# Patient Record
Sex: MALE | Race: WHITE | NOT HISPANIC OR LATINO | Employment: FULL TIME | URBAN - METROPOLITAN AREA
[De-identification: names, ages, dates, MRNs, and addresses within clinical notes are randomized per-mention and may not be internally consistent; named-entity substitution may affect disease eponyms.]

---

## 2017-03-29 RX ORDER — ATORVASTATIN CALCIUM 40 MG/1
TABLET, FILM COATED ORAL
Qty: 30 TABLET | Refills: 3 | Status: SHIPPED | OUTPATIENT
Start: 2017-03-29 | End: 2017-08-23 | Stop reason: SDUPTHER

## 2017-04-16 DIAGNOSIS — I10 ESSENTIAL HYPERTENSION: Chronic | ICD-10-CM

## 2017-04-16 RX ORDER — LISINOPRIL 30 MG/1
TABLET ORAL
Qty: 90 TABLET | Refills: 0 | Status: SHIPPED | OUTPATIENT
Start: 2017-04-16 | End: 2017-07-19 | Stop reason: SDUPTHER

## 2017-04-29 DIAGNOSIS — I10 HYPERTENSION: Chronic | ICD-10-CM

## 2017-04-30 RX ORDER — CHLORTHALIDONE 25 MG/1
TABLET ORAL
Qty: 90 TABLET | Refills: 0 | Status: SHIPPED | OUTPATIENT
Start: 2017-04-30 | End: 2017-07-01 | Stop reason: SDUPTHER

## 2017-07-01 DIAGNOSIS — I10 HYPERTENSION: Chronic | ICD-10-CM

## 2017-07-02 RX ORDER — CHLORTHALIDONE 25 MG/1
TABLET ORAL
Qty: 90 TABLET | Refills: 0 | Status: SHIPPED | OUTPATIENT
Start: 2017-07-02 | End: 2017-07-24 | Stop reason: SDUPTHER

## 2017-07-14 ENCOUNTER — PATIENT MESSAGE (OUTPATIENT)
Dept: CARDIOLOGY | Facility: CLINIC | Age: 57
End: 2017-07-14

## 2017-07-14 ENCOUNTER — PATIENT MESSAGE (OUTPATIENT)
Dept: VASCULAR SURGERY | Facility: CLINIC | Age: 57
End: 2017-07-14

## 2017-07-19 DIAGNOSIS — I10 ESSENTIAL HYPERTENSION: Primary | ICD-10-CM

## 2017-07-19 DIAGNOSIS — I10 ESSENTIAL HYPERTENSION: Chronic | ICD-10-CM

## 2017-07-19 DIAGNOSIS — I10 HYPERTENSION: Chronic | ICD-10-CM

## 2017-07-19 RX ORDER — CHLORTHALIDONE 25 MG/1
TABLET ORAL
Qty: 90 TABLET | Refills: 3 | Status: SHIPPED | OUTPATIENT
Start: 2017-07-19 | End: 2018-08-27 | Stop reason: SDUPTHER

## 2017-07-19 RX ORDER — LISINOPRIL 30 MG/1
30 TABLET ORAL DAILY
Qty: 90 TABLET | Refills: 3 | Status: SHIPPED | OUTPATIENT
Start: 2017-07-19 | End: 2018-03-28 | Stop reason: SDUPTHER

## 2017-07-24 ENCOUNTER — PATIENT MESSAGE (OUTPATIENT)
Dept: INTERNAL MEDICINE | Facility: CLINIC | Age: 57
End: 2017-07-24

## 2017-07-25 ENCOUNTER — OFFICE VISIT (OUTPATIENT)
Dept: VASCULAR SURGERY | Facility: CLINIC | Age: 57
End: 2017-07-25
Payer: COMMERCIAL

## 2017-07-25 ENCOUNTER — HOSPITAL ENCOUNTER (OUTPATIENT)
Dept: VASCULAR SURGERY | Facility: CLINIC | Age: 57
Discharge: HOME | End: 2017-07-25
Attending: SURGERY
Payer: COMMERCIAL

## 2017-07-25 VITALS
BODY MASS INDEX: 27.06 KG/M2 | SYSTOLIC BLOOD PRESSURE: 124 MMHG | DIASTOLIC BLOOD PRESSURE: 74 MMHG | HEIGHT: 70 IN | WEIGHT: 189 LBS | HEART RATE: 86 BPM | TEMPERATURE: 99 F

## 2017-07-25 DIAGNOSIS — Z98.890 S/P CAROTID ENDARTERECTOMY: ICD-10-CM

## 2017-07-25 DIAGNOSIS — Z98.890 S/P CAROTID ENDARTERECTOMY: Primary | ICD-10-CM

## 2017-07-25 PROCEDURE — 93880 EXTRACRANIAL BILAT STUDY: CPT | Mod: S$GLB,,, | Performed by: SURGERY

## 2017-07-25 PROCEDURE — 99999 PR PBB SHADOW E&M-EST. PATIENT-LVL III: CPT | Mod: PBBFAC,,, | Performed by: SURGERY

## 2017-07-25 PROCEDURE — 99213 OFFICE O/P EST LOW 20 MIN: CPT | Mod: S$GLB,,, | Performed by: SURGERY

## 2017-07-25 NOTE — PROGRESS NOTES
See my prior note; review of systems, family history and social history are   unchanged.    HISTORY OF PRESENT ILLNESS:  A 57-year-old male status post: right carotid   endarterectomy 07/15/2015.    This is another one-year followup.  He denies stroke, TIA, or amaurosis.    PAST MEDICAL HISTORY:  Severe chronic back pain, for which he has been on   chronic indwelling pump.  Remarkably, his pain has resolved and he is about to   get his pump out.    MEDICINES:  Include aspirin and statin.    PHYSICAL EXAMINATION:  VITAL SIGNS:  See nursing note.  NEUROLOGIC:  Cranial nerve VII through XII intact, 5/5 motor strength in all   extremities.    IMAGING:  Carotid duplex shows no carotid stenosis bilaterally.    ASSESSMENT:  Two-year followup, right carotid doing well.    RECOMMENDATIONS:  Continue current medical therapy.  We will lengthen   surveillance to follow up in two years with screening carotid duplex.          /olive [id number] ross(s)        NICK/SHAID  dd: 07/25/2017 12:10:46 (CDT)  td: 07/26/2017 00:48:47 (CDT)  Doc ID   #[id number]  Job ID #[id number]    CC:

## 2017-08-29 ENCOUNTER — PATIENT MESSAGE (OUTPATIENT)
Dept: CARDIOLOGY | Facility: CLINIC | Age: 57
End: 2017-08-29

## 2017-09-06 RX ORDER — ATORVASTATIN CALCIUM 40 MG/1
TABLET, FILM COATED ORAL
Qty: 30 TABLET | Refills: 3 | Status: SHIPPED | OUTPATIENT
Start: 2017-09-06 | End: 2018-09-12 | Stop reason: SDUPTHER

## 2017-09-15 ENCOUNTER — PATIENT MESSAGE (OUTPATIENT)
Dept: CARDIOLOGY | Facility: CLINIC | Age: 57
End: 2017-09-15

## 2017-09-20 ENCOUNTER — OFFICE VISIT (OUTPATIENT)
Dept: CARDIOLOGY | Facility: CLINIC | Age: 57
End: 2017-09-20
Payer: COMMERCIAL

## 2017-09-20 VITALS
HEIGHT: 70 IN | HEART RATE: 80 BPM | BODY MASS INDEX: 27.01 KG/M2 | WEIGHT: 188.69 LBS | SYSTOLIC BLOOD PRESSURE: 120 MMHG | DIASTOLIC BLOOD PRESSURE: 70 MMHG

## 2017-09-20 DIAGNOSIS — I10 ESSENTIAL HYPERTENSION: Primary | Chronic | ICD-10-CM

## 2017-09-20 DIAGNOSIS — E78.5 DYSLIPIDEMIA: Chronic | ICD-10-CM

## 2017-09-20 PROCEDURE — 99213 OFFICE O/P EST LOW 20 MIN: CPT | Mod: S$GLB,,, | Performed by: INTERNAL MEDICINE

## 2017-09-20 PROCEDURE — 99999 PR PBB SHADOW E&M-EST. PATIENT-LVL III: CPT | Mod: PBBFAC,,, | Performed by: INTERNAL MEDICINE

## 2017-09-20 PROCEDURE — 3008F BODY MASS INDEX DOCD: CPT | Mod: S$GLB,,, | Performed by: INTERNAL MEDICINE

## 2017-09-20 PROCEDURE — 3074F SYST BP LT 130 MM HG: CPT | Mod: S$GLB,,, | Performed by: INTERNAL MEDICINE

## 2017-09-20 PROCEDURE — 3078F DIAST BP <80 MM HG: CPT | Mod: S$GLB,,, | Performed by: INTERNAL MEDICINE

## 2017-09-20 NOTE — PROGRESS NOTES
"Subjective:   Patient ID:  Partha Curtis Jr. is a 57 y.o. male who presents for follow-up of Hypertension      HPI:   Partha Curtis Jr. presents for follow up of Hypertension with BP well controlled including home readings. E#xercising " some ". Partha Curtis Jr. denies chest pain, shortness of breath, palpitations, presyncope , or syncope. Partha Curtis Jr. has dyslipidemia  on high intensity statin needing recheck...     Review of Systems   Constitution: Negative for weakness, malaise/fatigue, weight gain and weight loss.   Eyes: Negative for blurred vision.   Cardiovascular: Negative for chest pain, claudication, cyanosis, dyspnea on exertion, irregular heartbeat, leg swelling, near-syncope, orthopnea, palpitations, paroxysmal nocturnal dyspnea and syncope.   Respiratory: Negative for cough, shortness of breath and wheezing.    Musculoskeletal: Positive for back pain. Negative for falls and myalgias.   Gastrointestinal: Negative for abdominal pain, heartburn, nausea and vomiting.   Genitourinary: Negative for nocturia.   Neurological: Negative for brief paralysis, dizziness, focal weakness, headaches, numbness and paresthesias.   Psychiatric/Behavioral: Negative for altered mental status.       Current Outpatient Prescriptions   Medication Sig    acyclovir (ZOVIRAX) 400 MG tablet Take 400 mg by mouth 2 (two) times daily as needed. 5 days    amitriptyline (ELAVIL) 25 MG tablet Take 25 mg by mouth nightly as needed for Insomnia.    aspirin (ECOTRIN) 81 MG EC tablet Take 325 mg by mouth once daily.     atorvastatin (LIPITOR) 40 MG tablet TAKE ONE TABLET BY MOUTH ONCE DAILY    chlorthalidone (HYGROTEN) 25 MG Tab TAKE ONE TABLET BY MOUTH ONCE DAILY    FENTANYL, PREMIX, Continuous pump.    fexofenadine-pseudoephedrine  mg (ALLEGRA-D)  mg per tablet Take 1 tablet by mouth daily as needed.     hydrocodone-acetaminophen 5-325mg (NORCO) 5-325 mg per tablet Take 1 tablet by mouth every 4 (four) " "hours as needed.    lisinopril (PRINIVIL,ZESTRIL) 30 MG tablet Take 1 tablet (30 mg total) by mouth once daily.    oxycodone-acetaminophen (PERCOCET)  mg per tablet      No current facility-administered medications for this visit.      Objective:   Physical Exam   Constitutional: He is oriented to person, place, and time. He appears well-developed. No distress.   /70   Pulse 80   Ht 5' 10" (1.778 m)   Wt 85.6 kg (188 lb 11.4 oz)   BMI 27.08 kg/m²    HENT:   Head: Normocephalic.   Right Ear: External ear normal.   Left Ear: External ear normal.   Eyes: EOM are normal. Pupils are equal, round, and reactive to light. No scleral icterus.   Neck: Neck supple. No JVD present. No thyromegaly present.   Cardiovascular: Normal rate, regular rhythm, normal heart sounds and intact distal pulses.  PMI is not displaced.  Exam reveals no gallop and no friction rub.    No murmur heard.  Pulmonary/Chest: Effort normal and breath sounds normal. No respiratory distress. He has no wheezes. He has no rales.   Abdominal: Soft. He exhibits no distension. There is no hepatosplenomegaly. There is no tenderness.   Pain pump LLQ   Musculoskeletal: He exhibits no edema or tenderness.   Gait normal   Neurological: He is alert and oriented to person, place, and time.   Skin: Skin is warm and dry. No rash noted.   Psychiatric: He has a normal mood and affect. His behavior is normal.       Lab Results   Component Value Date     07/16/2015    K 3.7 07/16/2015     07/16/2015    CO2 26 07/16/2015    BUN 14 07/16/2015    CREATININE 1.0 07/16/2015     (H) 07/16/2015    MG 1.9 07/15/2015    AST 11 07/16/2015    ALT 14 07/16/2015    ALBUMIN 2.8 (L) 07/16/2015    PROT 5.2 (L) 07/16/2015    BILITOT 0.4 07/16/2015    WBC 9.90 07/16/2015    HGB 10.8 (L) 07/16/2015    HCT 31.9 (L) 07/16/2015    MCV 94 07/16/2015     07/16/2015    TSH 1.98 09/15/2008    CHOL 171 09/15/2008    HDL 36 (L) 09/15/2008    LDLCALC 99.0 " 09/15/2008    TRIG 180 (H) 09/15/2008       Assessment:     1. Essential hypertension: Good control.    2. Dyslipidemia :  on high intensity statin needing recheck.       Plan:     Partha was seen today for hypertension.    Diagnoses and all orders for this visit:    Essential hypertension  -     Comprehensive metabolic panel; Future; Expected date: 09/20/2017  Continue current regimen    Dyslipidemia  -     Lipid panel; Future; Expected date: 09/20/2017

## 2017-09-20 NOTE — PATIENT INSTRUCTIONS
Continue current regimen  Graded exercise for 30 minutes a day at least 5 days a week suggested.  Continue mediterranean diet

## 2017-09-21 ENCOUNTER — LAB VISIT (OUTPATIENT)
Dept: LAB | Facility: HOSPITAL | Age: 57
End: 2017-09-21
Attending: INTERNAL MEDICINE
Payer: COMMERCIAL

## 2017-09-21 DIAGNOSIS — I10 ESSENTIAL HYPERTENSION: Chronic | ICD-10-CM

## 2017-09-21 DIAGNOSIS — E78.5 DYSLIPIDEMIA: Chronic | ICD-10-CM

## 2017-09-21 LAB
ALBUMIN SERPL BCP-MCNC: 4 G/DL
ALP SERPL-CCNC: 84 U/L
ALT SERPL W/O P-5'-P-CCNC: 26 U/L
ANION GAP SERPL CALC-SCNC: 7 MMOL/L
AST SERPL-CCNC: 23 U/L
BILIRUB SERPL-MCNC: 0.5 MG/DL
BUN SERPL-MCNC: 16 MG/DL
CALCIUM SERPL-MCNC: 9 MG/DL
CHLORIDE SERPL-SCNC: 97 MMOL/L
CHOLEST SERPL-MCNC: 144 MG/DL
CHOLEST/HDLC SERPL: 4.5 {RATIO}
CO2 SERPL-SCNC: 33 MMOL/L
CREAT SERPL-MCNC: 1.4 MG/DL
EST. GFR  (AFRICAN AMERICAN): >60 ML/MIN/1.73 M^2
EST. GFR  (NON AFRICAN AMERICAN): 55.4 ML/MIN/1.73 M^2
GLUCOSE SERPL-MCNC: 144 MG/DL
HDLC SERPL-MCNC: 32 MG/DL
HDLC SERPL: 22.2 %
LDLC SERPL CALC-MCNC: 84.8 MG/DL
NONHDLC SERPL-MCNC: 112 MG/DL
POTASSIUM SERPL-SCNC: 3.9 MMOL/L
PROT SERPL-MCNC: 7 G/DL
SODIUM SERPL-SCNC: 137 MMOL/L
TRIGL SERPL-MCNC: 136 MG/DL

## 2017-09-21 PROCEDURE — 36415 COLL VENOUS BLD VENIPUNCTURE: CPT | Mod: PO

## 2017-09-21 PROCEDURE — 80053 COMPREHEN METABOLIC PANEL: CPT

## 2017-09-21 PROCEDURE — 80061 LIPID PANEL: CPT

## 2017-09-22 DIAGNOSIS — R73.9 ELEVATED BLOOD SUGAR: ICD-10-CM

## 2018-02-15 ENCOUNTER — OFFICE VISIT (OUTPATIENT)
Dept: OCCUPATIONAL MEDICINE | Facility: CLINIC | Age: 58
End: 2018-02-15
Payer: COMMERCIAL

## 2018-02-15 VITALS
BODY MASS INDEX: 26.32 KG/M2 | WEIGHT: 188 LBS | HEART RATE: 88 BPM | SYSTOLIC BLOOD PRESSURE: 150 MMHG | RESPIRATION RATE: 16 BRPM | HEIGHT: 71 IN | DIASTOLIC BLOOD PRESSURE: 88 MMHG | TEMPERATURE: 99 F

## 2018-02-15 DIAGNOSIS — S40.012A CONTUSION OF LEFT SHOULDER, INITIAL ENCOUNTER: Primary | ICD-10-CM
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
## 2018-02-15 DIAGNOSIS — M25.512 ACUTE PAIN OF LEFT SHOULDER: ICD-10-CM

## 2018-02-15 PROCEDURE — 3008F BODY MASS INDEX DOCD: CPT | Mod: S$GLB,,, | Performed by: NURSE PRACTITIONER

## 2018-02-15 PROCEDURE — 99203 OFFICE O/P NEW LOW 30 MIN: CPT | Mod: S$GLB,,, | Performed by: NURSE PRACTITIONER

## 2018-02-15 RX ORDER — ETODOLAC 400 MG/1
400 TABLET, FILM COATED ORAL 2 TIMES DAILY
Qty: 30 TABLET | Refills: 1 | Status: SHIPPED | OUTPATIENT
Start: 2018-02-15 | End: 2018-03-08

## 2018-02-15 RX ORDER — TESTOSTERONE CYPIONATE 200 MG/ML
100 INJECTION, SOLUTION INTRAMUSCULAR
COMMUNITY
Start: 2018-01-29 | End: 2019-03-13

## 2018-02-15 NOTE — LETTER
[facility]  [place]  [address]  [place] [address]  Phone: [phone]  Fax: [phone]    Pt Name: Partha Curtis Jr.  Injury Date: 02/14/2018   Employee ID: [id number] Date of First Treatment: 02/15/2018   Company:  [employer]              Appointment Time: 03:55 PM Arrived:  4:10 PM CST   Physician: Eros Donaldson NP Time Out:17:15 PM       Office Treatment: Partha was seen today for shoulder pain.    Diagnoses and all orders for this visit:    Contusion of left shoulder, initial encounter  -     X-Ray Shoulder Trauma 3 view Left; Future  -     etodolac (LODINE) 400 MG tablet; Take 1 tablet (400 mg total) by mouth 2 (two) times daily. take with food    Acute pain of left shoulder  -     etodolac (LODINE) 400 MG tablet; Take 1 tablet (400 mg total) by mouth 2 (two) times daily. take with food    Work related injury       Patient Instructions: Apply ice 24-48 hours then apply heat/warm soaks, Daily home exercises/warm soaks, Attention not to aggravate affected area, Elevated affected area    Restrictions: No lifting/pushing/pulling more than 10 lbs, Avoid frequent bending/lifting/twisting, Avoid climbing/kneeling/squatting, No above the shoulder/overhead work, Limited use of left hand and arm       Return Appointment: 02/19/2018 at 10:00 AM

## 2018-02-15 NOTE — PROGRESS NOTES
Subjective:       Patient ID: Partha Curtis Jr. is a 57 y.o. male.    Chief Complaint: Shoulder Pain (left)    Patient fell moving a pallet raul, twisting his left shoulder.  Only has pain with ROM. Denies previous injury to left shoulder      Shoulder Pain    The pain is present in the left shoulder. Episode onset: 02/14/2018. The problem occurs constantly. The problem has been unchanged. Associated symptoms include a limited range of motion and stiffness. Pertinent negatives include no fever, headaches, itching or numbness. There is no history of gout.     Review of Systems   Constitution: Negative for chills, fever, weakness and malaise/fatigue.   HENT: Negative for congestion, ear pain and nosebleeds.    Eyes: Negative for blurred vision and pain.   Cardiovascular: Negative for chest pain, palpitations and syncope.   Respiratory: Negative for cough, shortness of breath and wheezing.    Skin: Negative for color change, dry skin, itching, rash and suspicious lesions.   Musculoskeletal: Positive for joint pain and stiffness. Negative for arthritis, back pain, gout, joint swelling, muscle weakness and neck pain.   Gastrointestinal: Negative for abdominal pain, constipation, diarrhea, nausea and vomiting.   Genitourinary: Negative for dysuria, frequency and hematuria.   Neurological: Negative for dizziness, headaches, numbness and seizures.   Psychiatric/Behavioral: The patient has insomnia (past medical hx). The patient is not nervous/anxious.    Allergic/Immunologic: Negative for hives and persistent infections.   All other systems reviewed and are negative.      Objective:      Physical Exam   Constitutional: He is oriented to person, place, and time. He appears well-developed.   HENT:   Head: Normocephalic and atraumatic.   Pulmonary/Chest: Effort normal and breath sounds normal.   Abdominal: Soft. Bowel sounds are normal.       Musculoskeletal: He exhibits tenderness.        Left shoulder: He exhibits  decreased range of motion, tenderness, bony tenderness, pain and decreased strength. He exhibits no swelling and no spasm.        Left elbow: Normal.        Left wrist: Normal.        Right hip: He exhibits tenderness. He exhibits normal range of motion, normal strength, no bony tenderness and no swelling.        Cervical back: Normal.        Left upper arm: Normal.        Left forearm: Normal.        Arms:       Left hand: Normal.   Neurological: He is alert and oriented to person, place, and time. He displays normal reflexes. No cranial nerve deficit or sensory deficit. He exhibits normal muscle tone. Coordination and gait normal. GCS eye subscore is 4. GCS verbal subscore is 5. GCS motor subscore is 6.   Reflex Scores:       Bicep reflexes are 2+ on the right side and 2+ on the left side.       Brachioradialis reflexes are 2+ on the right side and 2+ on the left side.       Patellar reflexes are 1+ on the right side and 1+ on the left side.  Skin: Skin is warm, dry and intact. Capillary refill takes 2 to 3 seconds.       Assessment:       1. Contusion of left shoulder, initial encounter    2. Acute pain of left shoulder    3. Work related injury        Plan:     Partha was seen today for shoulder pain.    Diagnoses and all orders for this visit:    Contusion of left shoulder, initial encounter  -     X-Ray Shoulder Trauma 3 view Left; Future  -     etodolac (LODINE) 400 MG tablet; Take 1 tablet (400 mg total) by mouth 2 (two) times daily. take with food    Acute pain of left shoulder  -     etodolac (LODINE) 400 MG tablet; Take 1 tablet (400 mg total) by mouth 2 (two) times daily. take with food    Work related injury    X-ray Shoulder Trauma 3 View Left    Result Date: 2/15/2018  COMPARISON: Chest radiograph 3/5/14 FINDINGS: 3 views left shoulder.    Bones are well mineralized. Overall alignment is within normal limits.  No acute displaced fracture, dislocation, or destructive osseous process identified.  Mild  degenerative change at the a.c. joint with minimal spurring of the inferior glenoid.  There is a 1 cm well-circumscribed sclerotic area projected at the medial proximal metaphysis of the left humerus. No adjacent periosteal reaction. No subcutaneous emphysema or radiodense retained foreign body. Left lung apex is clear.    1. No acute displaced fracture or dislocation identified. 2. Left humeral neck 1 cm sclerotic focus which may represent a bone island, but remains indeterminate. If clinical concern for osseous sclerotic metastatic focus, further evaluation with elective bone scan can be obtained as warranted. EPIC Notification system was activated. Electronically signed by: DANIS FLORES MD, MD Date:     02/15/18 Time:    16:45         Will consider MRI : mechanism of injury - Twist injury    Medications Ordered This Encounter      etodolac (LODINE) 400 MG tablet          Sig: Take 1 tablet (400 mg total) by mouth 2 (two) times daily. take with food          Dispense:  30 tablet          Refill:  1  Patient Instructions: Apply ice 24-48 hours then apply heat/warm soaks, Daily home exercises/warm soaks, Attention not to aggravate affected area, Elevated affected area   Restrictions: No lifting/pushing/pulling more than 10 lbs, Avoid frequent bending/lifting/twisting, Avoid climbing/kneeling/squatting, No above the shoulder/overhead work, Limited use of left hand and arm  Follow-up in about 4 days (around 2/19/2018), or if symptoms worsen or fail to improve.

## 2018-02-15 NOTE — PATIENT INSTRUCTIONS
R.I.C.E.    R.I.C.E. stands for Rest, Ice, Compression, and Elevation. Doing these things helps limit pain and swelling after an injury. R.I.C.E. also helps injuries heal faster. Use R.I.C.E. for sprains, strains, and severe bruises or bumps. Follow the tips on this handout and begin R.I.C.E. as soon as possible after an injury.  ? Rest  Pain is your bodys way of telling you to rest an injured area. Whether you have hurt an elbow, hand, foot, or knee, limiting its use will prevent further injury and help you heal.  ? Ice  Applying ice right after an injury helps prevent swelling and reduce pain. Dont place ice directly on your skin.  · Wrap a cold pack or bag of ice in a thin cloth. Place it over the injured area.  · Ice for 10 minutes every 3 hours. Dont ice for more than 20 minutes at a time.  ? Compression  Putting pressure (compression) on an injury helps prevent swelling and provides support.  · Wrap the injured area firmly with an elastic bandage. If your hand or foot tingles, becomes discolored, or feels cold to the touch, the bandage may be too tight. Rewrap it more loosely.  · If your bandage becomes too loose, rewrap it.  · Do not wear an elastic bandage overnight.  ? Elevation  Keeping an injury elevated helps reduce swelling, pain, and throbbing. Elevation is most effective when the injury is kept elevated higher than the heart.     Call your healthcare provider if you notice any of the following:  · Fingers or toes feel numb, are cold to the touch, or change color  · Skin looks shiny or tight  · Pain, swelling, or bruising worsens and is not improved with elevation   Date Last Reviewed: 9/3/2015  © 1418-4368 [employer]. [address] [place] [address], [place] [address]. All rights reserved. This information is not intended as a substitute for professional medical care. Always follow your healthcare professional's instructions.        Shoulder Pain with Uncertain Cause  Shoulder pain can have  many causes. Pain often comes from the structures that surround the shoulder joint. These are the joint capsule, ligaments, tendons, muscles, and bursa. Pain can also come from cartilage in the joint. Cartilage can become worn out or injured. Its important to know whats causing your pain so the healthcare provider can use the correct treatment. But sometimes its difficult to find the exact cause of shoulder pain. You may need to see a specialist (orthopedist). You may also need special tests such as a CT scan or MRI. The provider may need to use special tools to look inside the joint (arthroscopy).  Shoulder pain can be treated with a sling or a device that keeps your shoulder from moving. You can take an anti-inflammatory medicine such as ibuprofen to ease pain. You may need to do special shoulder exercises. Follow up with a specialist if the pain is severe or doesnt go away after a few weeks.  Home care  Follow these tips when caring for yourself at home:  · If a sling was given to you, leave it in place for the time advised by your healthcare provider. If you arent sure how long to wear it, ask for advice. If the sling becomes loose, adjust it so that your forearm is level with the ground. Your shoulder should feel well supported.  · Put an ice pack on the injured area for 20 minutes every 1 to 2 hours the first day. You can make your own ice pack by putting ice cubes in a plastic bag. Wrap the bag in a thin towel. Continue with ice packs 3 to 4 times a day for the next 2 days. Then use the pack as needed to ease pain and swelling.  · You may use acetaminophen or ibuprofen to control pain, unless another pain medicine was prescribed. If you have chronic liver or kidney disease, talk with your healthcare provider before using these medicines. Also talk with your provider if youve ever had a stomach ulcer or GI bleeding.  · Shoulder pain may seem worse at night, when there is less to distract you from the  pain. If you sleep on your side, try to keep weight off your painful shoulder. Propping pillows behind you may stop you from rolling over onto that shoulder during sleep.   · Shoulder and elbow joints can become stiff if left in a sling for too long. You should start range of motion exercises about 7 to 10 days after the injury. Talk with your provider to find out what type of exercises to do and how soon to start.  · You can take the sling off to shower or bathe.  Follow-up care  Follow up with your healthcare provider if you dont start to get better in the next 5 days.  When to seek medical advice  Call your healthcare provider right away if any of these occur:  · Pain or swelling gets worse or continues for more than a few days  · Your hand or fingers become cold, blue, numb, or tingly  · Large amount of bruising on your shoulder or upper arm  · Difficulty moving your hand or fingers  · Weakness in your hand or fingers  · Your shoulder becomes stiff  · It feels like your shoulder is popping out  · You are less able to do your daily activities  Date Last Reviewed: 10/1/2016  © 4299-9918 [employer]. [address] [place] [address]. All rights reserved. This information is not intended as a substitute for professional medical care. Always follow your healthcare professional's instructions.

## 2018-02-19 ENCOUNTER — OFFICE VISIT (OUTPATIENT)
Dept: OCCUPATIONAL MEDICINE | Facility: CLINIC | Age: 58
End: 2018-02-19
Payer: COMMERCIAL

## 2018-02-19 DIAGNOSIS — S46.012D STRAIN OF MUSC/TEND THE ROTATOR CUFF OF LEFT SHOULDER, SUBS: Primary | ICD-10-CM

## 2018-02-19 PROCEDURE — 99214 OFFICE O/P EST MOD 30 MIN: CPT | Mod: 25,S$GLB,, | Performed by: PHYSICIAN ASSISTANT

## 2018-02-19 PROCEDURE — 20610 DRAIN/INJ JOINT/BURSA W/O US: CPT | Mod: LT,S$GLB,, | Performed by: PHYSICIAN ASSISTANT

## 2018-02-19 PROCEDURE — 3008F BODY MASS INDEX DOCD: CPT | Mod: S$GLB,,, | Performed by: PHYSICIAN ASSISTANT

## 2018-02-19 RX ORDER — BETAMETHASONE SODIUM PHOSPHATE AND BETAMETHASONE ACETATE 3; 3 MG/ML; MG/ML
6 INJECTION, SUSPENSION INTRA-ARTICULAR; INTRALESIONAL; INTRAMUSCULAR; SOFT TISSUE
Status: DISCONTINUED | OUTPATIENT
Start: 2018-02-19 | End: 2018-02-19 | Stop reason: HOSPADM

## 2018-02-19 RX ADMIN — BETAMETHASONE SODIUM PHOSPHATE AND BETAMETHASONE ACETATE 6 MG: 3; 3 INJECTION, SUSPENSION INTRA-ARTICULAR; INTRALESIONAL; INTRAMUSCULAR; SOFT TISSUE at 10:02

## 2018-02-19 NOTE — PROCEDURES
Large Joint Aspiration/Injection  Date/Time: 2/19/2018 10:53 AM  Performed by: DEL MI  Authorized by: DEL MI     Consent Done?:  Yes (Verbal)  Indications:  Pain  Procedure site marked: Yes    Timeout: Prior to procedure the correct patient, procedure, and site was verified      Location:  Shoulder  Site:  L glenohumeral  Prep: Patient was prepped and draped in usual sterile fashion    Needle size:  27 G  Approach:  Anterior  Medications:  6 mg betamethasone acetate-betamethasone sodium phosphate 6 mg/mL  Patient tolerance:  Patient tolerated the procedure well with no immediate complications    Additional Comments: 1cc Celestone/1cc Lidocaine 1% w/o epi injected into left shoulder joint.

## 2018-02-19 NOTE — PATIENT INSTRUCTIONS
Pendulum (Flexibility)    1. Lean over next to a table, with your left arm supporting your weight on the table.  2. Relax your right arm and let it hang straight down.  3. Slowly begin to swing your right arm in a small [place]. Gradually make the [place] bigger if you can. Change direction after 1 minute of motion.  4. Next, swing your right arm backward and forward. Then move it side to side. Change direction after 1 minute of motion.  5. Repeat these movements for about 5 minutes.  6. Do this exercise 3 times a day, or as often as instructed.  Date Last Reviewed: 3/10/2016  © 8700-4215 [employer]. [address] [place] [address]. All rights reserved. This information is not intended as a substitute for professional medical care. Always follow your healthcare professional's instructions.        Doorway Pectoral Stretch (Flexibility)    7.  an open doorway. Raise each arm up to the side, bent at 90-degree angles with palms forward. Rest your palms on the door frame.  8. Slowly step forward with one foot. Feel the stretch in your shoulders and chest. Stand upright and dont lean forward.  9. Hold for 30 seconds. Step back and relax.  10. Repeat 3 times, or as instructed.  Date Last Reviewed: 3/10/2016  © 0855-3911 [employer]. [address] [place] [address]. All rights reserved. This information is not intended as a substitute for professional medical care. Always follow your healthcare professional's instructions.        Exercises for Shoulder Flexibility: External Rotation    This stretch can help restore shoulder flexibility and relieve pain over time. When stretching, be sure to breathe deeply. Follow any special instructions from your doctor or physical therapist:  11.  a doorway. Grasp the doorjamb with the hand on the frozen side. Your arm should be bent.  12. With the other hand, hold the elbow on the frozen side firmly against your  body.  13. Standing in the same spot, rotate your body away from the doorjamb. Stop when you feel the stretch in the shoulder. At first, try to hold the stretch for 5 seconds.  14. Work up to doing 3 sets of this stretch, 3 times a day. Work up to holding the stretch for 30 to 60 seconds.  Note: Keep your arms as still as you can. Over time, rotate your body a little more to enhance the stretch. But be careful not to twist your back.  Frozen shoulder  Frozen shoulder is another name for adhesive capsulitis, which causes restricted movement in the shoulder. If you have frozen shoulder, this stretch may cause discomfort, especially when you first get started. A few months may pass before you achieve the results you want. But once your shoulder heals, it rarely becomes frozen again. So stick to your stretching program. If you have any questions, be sure to ask your doctor.   Date Last Reviewed: 8/16/2015  © 4976-7345 [employer]. [address] [place] [address], [place] [address]. All rights reserved. This information is not intended as a substitute for professional medical care. Always follow your healthcare professional's instructions.        Exercises for Shoulder Flexibility: Internal Rotation    This stretch can help restore shoulder flexibility and relieve pain over time. When stretching, be sure to breathe deeply. Follow any special instructions from your healthcare provider or physical therapist.  15. While seated, move the arm on the side you want to stretch toward the middle of your back. The palm of your hand should face out.  16. Cup your other hand under the hand thats behind your back. Gently push your cupped hand upward until you feel the stretch in the shoulder. Try to hold the stretch for 5 seconds.  17. Work up to doing 3 sets of this stretch, 3 times a day. Work up to holding the stretch for 30 to 60 seconds.  Note: Keep your back straight. Its OK if your hand cant reach the middle of your  back. Instead, start the stretch with your hand as close as you can get it to the middle of your back.     Frozen shoulder  Frozen shoulder is another name for adhesive capsulitis. This causes restricted movement in the shoulder. If you have frozen shoulder, this stretch may cause discomfort, especially when you first get started. A few months may pass before you achieve the results you want. But once your shoulder heals, it rarely becomes frozen again. So stick to your stretching program. If you have any questions, be sure to ask your healthcare provider.   Date Last Reviewed: 10/14/2015  © 1529-8497 [employer]. [address] [place] [address]. All rights reserved. This information is not intended as a substitute for professional medical care. Always follow your healthcare professional's instructions.        Exercises for Shoulder Flexibility: Adduction (Reaching Across)    This stretch can help restore shoulder flexibility and relieve pain over time. When stretching, be sure to breathe deeply. And follow any special instructions from your doctor or physical therapist:  18. Put the hand from the side you want to stretch on your opposite shoulder. Your elbow should point away from your body. Try to raise your elbow as close to shoulder height as you can.  19. With your other hand, push the raised elbow toward the opposite shoulder. Avoid turning your head. Stop when you feel the stretch. Try to hold the stretch for 5 seconds.  20. Work up to doing 3 sets of this stretch, 3 times a day. Work up to holding the stretch for 30 to 60 seconds.  Note: Be sure to push your elbow across your chest, not up toward your chin. Over time, try to push your elbow farther across your chest to enhance the stretch.  Frozen shoulder  Frozen shoulder is another name for adhesive capsulitis, which causes restricted movement in the shoulder. If you have frozen shoulder, this stretch may cause discomfort,  especially when you first get started. A few months may pass before you achieve the results you want. Once your shoulder heals, it rarely becomes frozen again. So stick to your stretching program. If you have any questions, be sure to ask your doctor.   Date Last Reviewed: 8/16/2015  © 2828-1808 [employer]. [address] [place] [address]. All rights reserved. This information is not intended as a substitute for professional medical care. Always follow your healthcare professional's instructions.        Exercises for Shoulder Flexibility: Back Scratch    Improving your flexibility can reduce pain. Stretching exercises also can help increase your range of pain-free motion. Breathe normally when you exercise. Try to use smooth, fluid movements. Never force a stretch.  Note: Follow any special instructions you are given. If you feel pain, stop the exercise. If the pain continues after stopping, call your healthcare provider.  · Stand straight, placing the back of your hand on the side you want to stretch flat against your lower back.  · Throw one end of a towel over your shoulder. Grab it behind your back with your other hand.  · Pull down gently on the towel with your front arm. Let your back arm slide up as high as is comfortable. Youll feel a stretch in your shoulder. Hold the stretch for a few seconds.  · Repeat 3 to 5 times. Build up to holding each stretch for 30 to 60 seconds.  For your safety, check with your healthcare provider before starting an exercise program.   Date Last Reviewed: 8/26/2015  © 2052-0718 [employer]. [address] [place] [address]. All rights reserved. This information is not intended as a substitute for professional medical care. Always follow your healthcare professional's instructions.        Exercises for Shoulder Flexibility: Wall Walk    Improving your flexibility can reduce pain. Stretching exercises also can help increase your  range of pain-free motion. Breathe normally when you exercise. Use smooth, fluid movements.  Note: Follow any special instructions you are given. If you feel pain, stop the exercise. If the pain continues after stopping, call your healthcare provider:  · Stand with your shoulder about 2 feet from the wall.  · Raise your arm to shoulder level and gently walk your fingers up the wall as high as you can.  · Hold for a few seconds. Then walk your fingers back down.  · Repeat 3 times. Move closer to the wall as you repeat.  · Build up to holding each stretch for 30 seconds.  Caution: Do this stretch only if your healthcare provider recommends it. Dont do it when you are first injured.       Date Last Reviewed: 8/16/2015  © 7770-0268 [employer]. [address] [place] [address], [place] [address]. All rights reserved. This information is not intended as a substitute for professional medical care. Always follow your healthcare professional's instructions.

## 2018-02-19 NOTE — LETTER
[facility]ational [facility]  [address] [address]  [place] [address]  Phone: [phone]  Fax: [phone]    Pt Name: Partha Curtis Jr.  Injury Date: 02/14/2018   Employee ID:  Date of Treatment: 02/19/2018   Company: [employer]              Appointment Time: 09:45 AM Arrived: 10:00 AM CST   Appointment Date: 02/19/18 Time Out:1110 AM   Physician: Gerardo Contreras PA-C        Office Treatment: Partha was seen today for shoulder pain.    Diagnoses and all orders for this visit:  EXAM, JOINT INJECTION, LIGHT DUTY    Strain of musc/tend the rotator cuff of left shoulder, subs  -     Large Joint Aspiration/Injection  -     betamethasone acetate-betamethasone sodium phosphate injection 6 mg; Inject 6 mg into the articular space.     Patient Instructions: Daily home exercises/warm soaks (Continue Lodine as directed. )    Restrictions: Limited use of left hand and arm, No above the shoulder/overhead work, No lifting/pushing/pulling more than 10 lbs       Return Appointment: 2/26/2018 at 10:00 AM

## 2018-02-19 NOTE — PROGRESS NOTES
Subjective:       Patient ID: Partha Curtis Jr. is a 57 y.o. male.    Chief Complaint: Shoulder Pain (follow up form 02/14/18)    Pt is here for a follow up on a LEFT SHOULDER injury from 02/14/18.  He reports mild improvement with the antiinflammatory meds. CT      Shoulder Pain    The pain is present in the left shoulder. The current episode started 1 to 4 weeks ago. There has been a history of trauma. The problem occurs intermittently. The problem has been gradually improving. The pain is at a severity of 6/10. The pain is mild. Associated symptoms include stiffness. Pertinent negatives include no fever, headaches, itching or numbness. He has tried NSAIDS for the symptoms. The treatment provided mild relief. There is no history of gout.     Review of Systems   Constitution: Negative for chills, fever, weakness and malaise/fatigue.   HENT: Negative for congestion, ear pain, hearing loss and nosebleeds.    Eyes: Negative for blurred vision, pain and redness.   Cardiovascular: Negative for chest pain, palpitations and syncope.   Respiratory: Negative for cough, shortness of breath and wheezing.    Hematologic/Lymphatic: Negative for bleeding problem.   Skin: Negative for color change, dry skin, itching, rash and suspicious lesions.   Musculoskeletal: Positive for joint pain and stiffness. Negative for arthritis, back pain, gout, joint swelling, muscle weakness and neck pain.   Gastrointestinal: Negative for abdominal pain, constipation, diarrhea, nausea and vomiting.   Genitourinary: Negative for dysuria, frequency and hematuria.   Neurological: Negative for dizziness, headaches, numbness, paresthesias and seizures.   Psychiatric/Behavioral: The patient has insomnia (past medical hx). The patient is not nervous/anxious.    Allergic/Immunologic: Negative for hives and persistent infections.   All other systems reviewed and are negative.      Objective:      Physical Exam   Constitutional: He appears well-developed  and well-nourished. He is active. No distress.   HENT:   Head: Normocephalic and atraumatic.   Right Ear: Hearing and external ear normal.   Left Ear: Hearing and external ear normal.   Nose: Nose normal. No nasal deformity. No epistaxis.   Mouth/Throat: Oropharynx is clear and moist and mucous membranes are normal.   Eyes: Conjunctivae and lids are normal. No scleral icterus.   Neck: Trachea normal and normal range of motion.   Cardiovascular: Intact distal pulses and normal pulses.    Pulmonary/Chest: Effort normal. No stridor. No respiratory distress.   Musculoskeletal:        Right shoulder: Normal. He exhibits normal range of motion and no tenderness.        Left shoulder: He exhibits decreased range of motion (IR to L1, Right IR to T4), tenderness (anterior aspect) and pain (Britney positive). He exhibits no swelling, no deformity and normal pulse.   Neurological: He is alert. He has normal strength. He is not disoriented. No sensory deficit. GCS eye subscore is 4. GCS verbal subscore is 5. GCS motor subscore is 6.   Skin: Skin is warm, dry and intact. Capillary refill takes less than 2 seconds. He is not diaphoretic.   Psychiatric: He has a normal mood and affect. His speech is normal and behavior is normal. He is attentive.   Nursing note and vitals reviewed.      Assessment:       1. Strain of musc/tend the rotator cuff of left shoulder, subs        Plan:           Medications Ordered This Encounter      betamethasone acetate-betamethasone sodium phosphate injection 6 mg  Patient Instructions: Daily home exercises/warm soaks (Continue Lodine as directed. )   Restrictions: Limited use of left hand and arm, No above the shoulder/overhead work, No lifting/pushing/pulling more than 10 lbs  Follow-up in about 1 week (around 2/26/2018).

## 2018-03-08 ENCOUNTER — HOSPITAL ENCOUNTER (OUTPATIENT)
Facility: HOSPITAL | Age: 58
Discharge: HOME | End: 2018-03-09
Attending: EMERGENCY MEDICINE | Admitting: HOSPITALIST
Payer: COMMERCIAL

## 2018-03-08 DIAGNOSIS — R07.9 CHEST PAIN: Primary | ICD-10-CM

## 2018-03-08 DIAGNOSIS — N17.9 AKI (ACUTE KIDNEY INJURY): ICD-10-CM

## 2018-03-08 DIAGNOSIS — R68.89 MULTIPLE COMPLAINTS: ICD-10-CM

## 2018-03-08 LAB
ALBUMIN SERPL BCP-MCNC: 4.7 G/DL
ALP SERPL-CCNC: 88 U/L
ALT SERPL W/O P-5'-P-CCNC: 27 U/L
ANION GAP SERPL CALC-SCNC: 11 MMOL/L
AST SERPL-CCNC: 23 U/L
BASOPHILS # BLD AUTO: 0.05 K/UL
BASOPHILS NFR BLD: 0.6 %
BILIRUB SERPL-MCNC: 0.8 MG/DL
BILIRUB UR QL STRIP: NEGATIVE
BNP SERPL-MCNC: <10 PG/ML
BUN SERPL-MCNC: 19 MG/DL
CALCIUM SERPL-MCNC: 9.7 MG/DL
CHLORIDE SERPL-SCNC: 97 MMOL/L
CLARITY UR REFRACT.AUTO: CLEAR
CO2 SERPL-SCNC: 28 MMOL/L
COLOR UR AUTO: ABNORMAL
CREAT SERPL-MCNC: 1.5 MG/DL
D DIMER PPP IA.FEU-MCNC: 0.25 MG/L FEU
DIFFERENTIAL METHOD: ABNORMAL
EOSINOPHIL # BLD AUTO: 0.1 K/UL
EOSINOPHIL NFR BLD: 1.4 %
ERYTHROCYTE [DISTWIDTH] IN BLOOD BY AUTOMATED COUNT: 13.6 %
EST. GFR  (AFRICAN AMERICAN): 58.9 ML/MIN/1.73 M^2
EST. GFR  (NON AFRICAN AMERICAN): 50.9 ML/MIN/1.73 M^2
GLUCOSE SERPL-MCNC: 115 MG/DL
GLUCOSE UR QL STRIP: NEGATIVE
HCT VFR BLD AUTO: 48.2 %
HGB BLD-MCNC: 16.7 G/DL
HGB UR QL STRIP: ABNORMAL
IMM GRANULOCYTES # BLD AUTO: 0.01 K/UL
IMM GRANULOCYTES NFR BLD AUTO: 0.1 %
KETONES UR QL STRIP: NEGATIVE
LEUKOCYTE ESTERASE UR QL STRIP: NEGATIVE
LYMPHOCYTES # BLD AUTO: 2.2 K/UL
LYMPHOCYTES NFR BLD: 25.5 %
MAGNESIUM SERPL-MCNC: 2.1 MG/DL
MCH RBC QN AUTO: 31.5 PG
MCHC RBC AUTO-ENTMCNC: 34.6 G/DL
MCV RBC AUTO: 91 FL
MICROSCOPIC COMMENT: NORMAL
MONOCYTES # BLD AUTO: 0.6 K/UL
MONOCYTES NFR BLD: 7.3 %
NEUTROPHILS # BLD AUTO: 5.7 K/UL
NEUTROPHILS NFR BLD: 65.1 %
NITRITE UR QL STRIP: NEGATIVE
NRBC BLD-RTO: 0 /100 WBC
PH UR STRIP: 7 [PH] (ref 5–8)
PHOSPHATE SERPL-MCNC: 2.6 MG/DL
PLATELET # BLD AUTO: 304 K/UL
PMV BLD AUTO: 9.4 FL
POCT GLUCOSE: 105 MG/DL (ref 70–110)
POTASSIUM SERPL-SCNC: 3.7 MMOL/L
PROT SERPL-MCNC: 8.2 G/DL
PROT UR QL STRIP: NEGATIVE
RBC # BLD AUTO: 5.31 M/UL
RBC #/AREA URNS AUTO: 2 /HPF (ref 0–4)
SODIUM SERPL-SCNC: 136 MMOL/L
SP GR UR STRIP: 1 (ref 1–1.03)
SQUAMOUS #/AREA URNS AUTO: 0 /HPF
TROPONIN I SERPL DL<=0.01 NG/ML-MCNC: <0.006 NG/ML
TROPONIN I SERPL DL<=0.01 NG/ML-MCNC: <0.006 NG/ML
URN SPEC COLLECT METH UR: ABNORMAL
UROBILINOGEN UR STRIP-ACNC: NEGATIVE EU/DL
WBC # BLD AUTO: 8.67 K/UL

## 2018-03-08 PROCEDURE — 85379 FIBRIN DEGRADATION QUANT: CPT

## 2018-03-08 PROCEDURE — 99285 EMERGENCY DEPT VISIT HI MDM: CPT | Mod: ,,, | Performed by: EMERGENCY MEDICINE

## 2018-03-08 PROCEDURE — G0378 HOSPITAL OBSERVATION PER HR: HCPCS

## 2018-03-08 PROCEDURE — 83880 ASSAY OF NATRIURETIC PEPTIDE: CPT

## 2018-03-08 PROCEDURE — 84100 ASSAY OF PHOSPHORUS: CPT

## 2018-03-08 PROCEDURE — 84484 ASSAY OF TROPONIN QUANT: CPT

## 2018-03-08 PROCEDURE — 63600175 PHARM REV CODE 636 W HCPCS: Performed by: PHYSICIAN ASSISTANT

## 2018-03-08 PROCEDURE — 96361 HYDRATE IV INFUSION ADD-ON: CPT

## 2018-03-08 PROCEDURE — 84484 ASSAY OF TROPONIN QUANT: CPT | Mod: 91

## 2018-03-08 PROCEDURE — 99285 EMERGENCY DEPT VISIT HI MDM: CPT

## 2018-03-08 PROCEDURE — 96360 HYDRATION IV INFUSION INIT: CPT

## 2018-03-08 PROCEDURE — 93010 ELECTROCARDIOGRAM REPORT: CPT | Mod: ,,, | Performed by: INTERNAL MEDICINE

## 2018-03-08 PROCEDURE — 85025 COMPLETE CBC W/AUTO DIFF WBC: CPT

## 2018-03-08 PROCEDURE — 36415 COLL VENOUS BLD VENIPUNCTURE: CPT

## 2018-03-08 PROCEDURE — 93005 ELECTROCARDIOGRAM TRACING: CPT

## 2018-03-08 PROCEDURE — 80053 COMPREHEN METABOLIC PANEL: CPT

## 2018-03-08 PROCEDURE — 82962 GLUCOSE BLOOD TEST: CPT

## 2018-03-08 PROCEDURE — 25000003 PHARM REV CODE 250: Performed by: PHYSICIAN ASSISTANT

## 2018-03-08 PROCEDURE — 83735 ASSAY OF MAGNESIUM: CPT

## 2018-03-08 PROCEDURE — 81001 URINALYSIS AUTO W/SCOPE: CPT

## 2018-03-08 PROCEDURE — 99220 PR INITIAL OBSERVATION CARE,LEVL III: CPT | Mod: ,,, | Performed by: PHYSICIAN ASSISTANT

## 2018-03-08 RX ORDER — SODIUM CHLORIDE 0.9 % (FLUSH) 0.9 %
3 SYRINGE (ML) INJECTION
Status: DISCONTINUED | OUTPATIENT
Start: 2018-03-08 | End: 2018-03-09 | Stop reason: HOSPADM

## 2018-03-08 RX ORDER — HEPARIN SODIUM 5000 [USP'U]/ML
5000 INJECTION, SOLUTION INTRAVENOUS; SUBCUTANEOUS EVERY 8 HOURS
Status: DISCONTINUED | OUTPATIENT
Start: 2018-03-08 | End: 2018-03-09 | Stop reason: HOSPADM

## 2018-03-08 RX ORDER — AMITRIPTYLINE HYDROCHLORIDE 25 MG/1
25 TABLET, FILM COATED ORAL NIGHTLY PRN
Status: DISCONTINUED | OUTPATIENT
Start: 2018-03-08 | End: 2018-03-09 | Stop reason: HOSPADM

## 2018-03-08 RX ORDER — ONDANSETRON 8 MG/1
8 TABLET, ORALLY DISINTEGRATING ORAL EVERY 8 HOURS PRN
Status: DISCONTINUED | OUTPATIENT
Start: 2018-03-08 | End: 2018-03-09 | Stop reason: HOSPADM

## 2018-03-08 RX ORDER — ATORVASTATIN CALCIUM 20 MG/1
TABLET, FILM COATED ORAL
Status: DISPENSED
Start: 2018-03-08 | End: 2018-03-09

## 2018-03-08 RX ORDER — ASPIRIN 325 MG
325 TABLET, DELAYED RELEASE (ENTERIC COATED) ORAL DAILY
Status: DISCONTINUED | OUTPATIENT
Start: 2018-03-09 | End: 2018-03-09 | Stop reason: HOSPADM

## 2018-03-08 RX ORDER — POLYETHYLENE GLYCOL 3350 17 G/17G
17 POWDER, FOR SOLUTION ORAL 2 TIMES DAILY PRN
Status: DISCONTINUED | OUTPATIENT
Start: 2018-03-08 | End: 2018-03-09 | Stop reason: HOSPADM

## 2018-03-08 RX ORDER — ONDANSETRON 2 MG/ML
4 INJECTION INTRAMUSCULAR; INTRAVENOUS EVERY 8 HOURS PRN
Status: DISCONTINUED | OUTPATIENT
Start: 2018-03-08 | End: 2018-03-09 | Stop reason: HOSPADM

## 2018-03-08 RX ORDER — ACETAMINOPHEN 325 MG/1
650 TABLET ORAL EVERY 4 HOURS PRN
Status: DISCONTINUED | OUTPATIENT
Start: 2018-03-08 | End: 2018-03-09 | Stop reason: HOSPADM

## 2018-03-08 RX ORDER — ATORVASTATIN CALCIUM 20 MG/1
40 TABLET, FILM COATED ORAL DAILY
Status: DISCONTINUED | OUTPATIENT
Start: 2018-03-08 | End: 2018-03-09 | Stop reason: HOSPADM

## 2018-03-08 RX ADMIN — ATORVASTATIN CALCIUM 40 MG: 20 TABLET, FILM COATED ORAL at 04:03

## 2018-03-08 RX ADMIN — SODIUM CHLORIDE 500 ML: 9 INJECTION, SOLUTION INTRAVENOUS at 04:03

## 2018-03-08 RX ADMIN — HEPARIN SODIUM 5000 UNITS: 5000 INJECTION, SOLUTION INTRAVENOUS; SUBCUTANEOUS at 10:03

## 2018-03-08 NOTE — HPI
Patient is a 58yo M with a PMHx of HTN, HLD, CAD being admitted to observation for evaluation of chest pain. Patient states the pain began around 9am this morning. The pain lasted 10 minutes with radiation to the left shoulder and arm. He describes the pain as squeezing. It progressively worsened with hand weakness. He reports taking a baby aspirin after the pain resolved. Patient denies SOB, diaphoresis, N/V, numbness, changes in vision or speech. Patient scheduled to have pain pump removed tomorrow.    In the ED, vitals stable. Intake labs remarkable for Cr 1.5, up from baseline 1.1-1.3. Troponin negative. EKG with questionable injury changes from previous. CXR clear.

## 2018-03-08 NOTE — ED NOTES
"Pt received to ED for evaluation of sudden onset chest "heaviness" at 0900 today while sitting at desk - states had pain into left shoulder and upper arm - had nausea at that time without emesis - no diaphoresis - denies SOB then or now     "

## 2018-03-08 NOTE — ASSESSMENT & PLAN NOTE
Cr 1.5 on admission, baseline Cr 1.1-1.3  - hold diuretics for now  - gentle 500cc bolus  - resume ACE tomorrow

## 2018-03-08 NOTE — ASSESSMENT & PLAN NOTE
Dyslipidemia  Carotid stenosis    Patient with history of carotid stenosis with endarterectomy presenting with crushing chest pain, resolved with rest after 10 minutes. Compliant with home statin and ACE and ASA. No neurologic deficits on exam.  - EKG uinmpressive  - Troponin negative, will trend x 3  - maintain on telemetry  - Dobutamine stress in AM  - NPO at MN  - continue statin and ASA

## 2018-03-08 NOTE — H&P
[facility] Hospital Medicine  History & Physical    Patient Name: Partha Curtis Jr.  MRN: [id number]  Admission Date: 3/8/2018  Attending Physician: Jo Ann James MD   Primary Care Provider: Oneil Vazquez MD    [facility] Medicine Team: [facility] HOSP MED E Nunu Humphrey PA-C     Patient information was obtained from patient, past medical records and ER records.     Subjective:     Principal Problem:Chest pain    Chief Complaint:   Chief Complaint   Patient presents with    Arm Pain     L arm pain, torn rotator cuff feb 14, L arm feels weaker noon, chest heavy        HPI: Patient is a 58yo M with a PMHx of HTN, HLD, CAD being admitted to observation for evaluation of chest pain. Patient states the pain began around 9am this morning. The pain lasted 10 minutes with radiation to the left shoulder and arm. He describes the pain as squeezing. It progressively worsened with hand weakness. He reports taking a baby aspirin after the pain resolved. Patient denies SOB, diaphoresis, N/V, numbness, changes in vision or speech. Patient scheduled to have pain pump removed tomorrow.    In the ED, vitals stable. Intake labs remarkable for Cr 1.5, up from baseline 1.1-1.3. Troponin negative. EKG with questionable injury changes from previous. CXR clear.    Past Medical History:   Diagnosis Date    Allergy     Carotid artery occlusion     Chronic back pain     Hyperlipidemia     Hypertension        Past Surgical History:   Procedure Laterality Date    ANKLE SURGERY      APPENDECTOMY      CAROTID ENDARTERECTOMY Right 07/15/2015    TOTAL KNEE ARTHROPLASTY         Review of patient's allergies indicates:   Allergen Reactions    Stadol [butorphanol tartrate] Rash     Swelling in face    Strawberries [strawberry] Rash       No current facility-administered medications on file prior to encounter.      Current Outpatient Prescriptions on File Prior to Encounter   Medication Sig    amitriptyline (ELAVIL)  25 MG tablet Take 25 mg by mouth nightly as needed for Insomnia.    aspirin (ECOTRIN) 81 MG EC tablet Take 325 mg by mouth once daily.     atorvastatin (LIPITOR) 40 MG tablet TAKE ONE TABLET BY MOUTH ONCE DAILY    chlorthalidone (HYGROTEN) 25 MG Tab TAKE ONE TABLET BY MOUTH ONCE DAILY    lisinopril (PRINIVIL,ZESTRIL) 30 MG tablet Take 1 tablet (30 mg total) by mouth once daily.    hydrocodone-acetaminophen 5-325mg (NORCO) 5-325 mg per tablet Take 1 tablet by mouth every 4 (four) hours as needed.    oxycodone-acetaminophen (PERCOCET)  mg per tablet     testosterone cypionate (DEPOTESTOTERONE CYPIONATE) 200 mg/mL injection Inject 100 mg into the muscle every 14 (fourteen) days.    [DISCONTINUED] etodolac (LODINE) 400 MG tablet Take 1 tablet (400 mg total) by mouth 2 (two) times daily. take with food    [DISCONTINUED] fexofenadine-pseudoephedrine  mg (ALLEGRA-D)  mg per tablet Take 1 tablet by mouth daily as needed.      Family History     Problem Relation (Age of Onset)    Cancer Father, Mother    Hypertension Father        Social History Main Topics    Smoking status: Never Smoker    Smokeless tobacco: Never Used    Alcohol use Yes      Comment: occas, none recently     Drug use: Yes     Types: Oxycodone, Hydrocodone      Comment: Prescribed as needed    Sexual activity: Not on file     Review of Systems   Constitutional: Negative for activity change, chills, diaphoresis and fever.   HENT: Negative for trouble swallowing.    Eyes: Negative for photophobia and visual disturbance.   Respiratory: Positive for chest tightness. Negative for shortness of breath and wheezing.    Cardiovascular: Positive for chest pain and leg swelling. Negative for palpitations.   Gastrointestinal: Negative for abdominal distention, abdominal pain, constipation, diarrhea and nausea.   Genitourinary: Negative for dysuria, frequency and hematuria.   Musculoskeletal: Negative for gait problem, neck pain and neck  stiffness.   Skin: Negative for rash and wound.   Neurological: Negative for dizziness, tremors, facial asymmetry, speech difficulty, weakness and light-headedness.   Psychiatric/Behavioral: Negative for agitation and confusion. The patient is not nervous/anxious.      Objective:     Vital Signs (Most Recent):  Temp: 98.6 °F (37 °C) (03/08/18 1601)  Pulse: 77 (03/08/18 1601)  Resp: 18 (03/08/18 1601)  BP: (!) 142/84 (03/08/18 1601)  SpO2: 100 % (03/08/18 1601) Vital Signs (24h Range):  Temp:  [98.4 °F (36.9 °C)-98.6 °F (37 °C)] 98.6 °F (37 °C)  Pulse:  [] 77  Resp:  [18] 18  SpO2:  [97 %-100 %] 100 %  BP: (141-147)/(84-90) 142/84     Weight: 85.3 kg (188 lb)  Body mass index is 26.22 kg/m².    Physical Exam   Constitutional: He is oriented to person, place, and time. He appears well-developed and well-nourished. No distress.   HENT:   Head: Normocephalic and atraumatic.   Mouth/Throat: No oropharyngeal exudate.   Eyes: EOM are normal. Pupils are equal, round, and reactive to light. No scleral icterus.   Neck: Normal range of motion. Neck supple.   Cardiovascular: Normal rate, regular rhythm, normal heart sounds and intact distal pulses.    No murmur heard.  Pulmonary/Chest: Effort normal and breath sounds normal. No respiratory distress. He has no wheezes.   Abdominal: Soft. Bowel sounds are normal. He exhibits no distension. There is no tenderness.   Musculoskeletal: Normal range of motion. He exhibits edema (trace, BLE to distal shin). He exhibits no tenderness.   Lymphadenopathy:     He has no cervical adenopathy.   Neurological: He is alert and oriented to person, place, and time. No cranial nerve deficit or sensory deficit. Coordination normal.   Skin: Skin is warm and dry. No rash noted.   Psychiatric: He has a normal mood and affect. His behavior is normal. Thought content normal.         CRANIAL NERVES     CN III, IV, VI   Pupils are equal, round, and reactive to light.  Extraocular motions are normal.         Significant Labs:   BMP:   Recent Labs  Lab 03/08/18  1411 03/08/18  1612   *  --      --    K 3.7  --    CL 97  --    CO2 28  --    BUN 19  --    CREATININE 1.5*  --    CALCIUM 9.7  --    MG  --  2.1     CBC:   Recent Labs  Lab 03/08/18  1411   WBC 8.67   HGB 16.7   HCT 48.2        Magnesium:   Recent Labs  Lab 03/08/18  1612   MG 2.1     Troponin:   Recent Labs  Lab 03/08/18  1411   TROPONINI <0.006     Urine Studies:   Recent Labs  Lab 03/08/18  1606   COLORU Straw   APPEARANCEUA Clear   PHUR 7.0   SPECGRAV 1.005   PROTEINUA Negative   GLUCUA Negative   KETONESU Negative   BILIRUBINUA Negative   OCCULTUA 1+*   NITRITE Negative   UROBILINOGEN Negative   LEUKOCYTESUR Negative   RBCUA 2   SQUAMEPITHEL 0       Significant Imaging: I have reviewed all pertinent imaging results/findings within the past 24 hours.    Assessment/Plan:     * Chest pain    Dyslipidemia  Carotid stenosis    Patient with history of carotid stenosis with endarterectomy presenting with crushing chest pain, resolved with rest after 10 minutes. Compliant with home statin and ACE and ASA. No neurologic deficits on exam.  - EKG uinmpressive  - Troponin negative, will trend x 3  - maintain on telemetry  - Dobutamine stress in AM  - NPO at MN  - continue statin and ASA        HANANE (acute kidney injury)    Cr 1.5 on admission, baseline Cr 1.1-1.3  - hold diuretics for now  - gentle 500cc bolus  - resume ACE tomorrow        Essential hypertension    - Lisinopril 30mg daily, continue  - hold Chlorthalidone 25mg daily given Cr 1.5          VTE Risk Mitigation         Ordered     heparin (porcine) injection 5,000 Units  Every 8 hours     Route:  Subcutaneous        03/08/18 1543     Medium Risk of VTE  Once      03/08/18 1543     Place ASPEN hose  Until discontinued      03/08/18 1543     Place sequential compression device  Until discontinued      03/08/18 1543             Nunu Humphrey PA-C  Department of Hospital Medicine    [facility]

## 2018-03-08 NOTE — ED TRIAGE NOTES
"Patient states pain in left arm onset this am at 0900 with h/o left arm/shoulder injury. Patient states  "weakness" in left arm and "coldness" over mid chest.   "

## 2018-03-08 NOTE — ED PROVIDER NOTES
Encounter Date: 3/8/2018    SCRIBE #1 NOTE: I, Cherellesonido Schultz, am scribing for, and in the presence of, Dr. Vo.       History     Chief Complaint   Patient presents with    Arm Pain     L arm pain, torn rotator cuff feb 14, L arm feels weaker noon, chest heavy     Time seen by provider: 1:39 PM    This is a 57 y.o. male with medical conditions including HTN, HLD, CAD, who presents with complaint of general malaise, left arm pain and hand weakness, and 10 minutes of squeezing chest pain this morning, no resolved. The patient reports that around 9 AM this morning, he was sitting at his desk and began to have pain in his left shoulder and arm which he thought this was related to a previous injury to this shoulder. Progressively, he began to feel generally bad, have hand weakness, and became red in the face. He took a baby aspirin. The patient denies any shortness of breath. The squeezing chest pain only lasted 10 minutes but the patient continues to have the other symptoms. Patient is scheduled to have a pain pump removed tomorrow.         The history is provided by the patient.     Review of patient's allergies indicates:   Allergen Reactions    Stadol [butorphanol tartrate] Rash     Swelling in face    Strawberries [strawberry] Rash     Past Medical History:   Diagnosis Date    Allergy     Carotid artery occlusion     Chronic back pain     Hyperlipidemia     Hypertension      Past Surgical History:   Procedure Laterality Date    ANKLE SURGERY      APPENDECTOMY      CAROTID ENDARTERECTOMY Right 07/15/2015    TOTAL KNEE ARTHROPLASTY       Family History   Problem Relation Age of Onset    Hypertension Father     Cancer Father     Cancer Mother      Social History   Substance Use Topics    Smoking status: Never Smoker    Smokeless tobacco: Never Used    Alcohol use Yes      Comment: occas, none recently      Review of Systems   Constitutional: Positive for fatigue. Negative for fever.    Respiratory: Negative for shortness of breath.    Cardiovascular: Positive for chest pain.   Musculoskeletal: Positive for arthralgias and myalgias.   Neurological: Positive for weakness (left hand).   All other systems reviewed and are negative.      Physical Exam     Initial Vitals [03/08/18 1325]   BP Pulse Resp Temp SpO2   (!) 141/86 105 18 98.4 °F (36.9 °C) 99 %      MAP       104.33         Physical Exam    Nursing note and vitals reviewed.  Constitutional: He appears well-developed and well-nourished. He is not diaphoretic. No distress.   HENT:   Head: Normocephalic and atraumatic.   Eyes: Conjunctivae and EOM are normal. Pupils are equal, round, and reactive to light.   Neck: Normal range of motion. Neck supple.   Right carotid endarterectomy scar.    Cardiovascular: Normal rate, regular rhythm and normal heart sounds.   Pulmonary/Chest: Breath sounds normal. No respiratory distress.   Abdominal: Soft. Bowel sounds are normal. He exhibits no distension. There is no tenderness.   Musculoskeletal: Normal range of motion. He exhibits no edema.   Neurological: He is alert and oriented to person, place, and time. He has normal strength. No sensory deficit.   Skin: Skin is warm and dry. Capillary refill takes less than 2 seconds. No rash noted. No erythema.   Psychiatric: He has a normal mood and affect.         ED Course   Procedures  Labs Reviewed   CBC W/ AUTO DIFFERENTIAL - Abnormal; Notable for the following:        Result Value    MCH 31.5 (*)     All other components within normal limits   COMPREHENSIVE METABOLIC PANEL - Abnormal; Notable for the following:     Glucose 115 (*)     Creatinine 1.5 (*)     eGFR if  58.9 (*)     eGFR if non  50.9 (*)     All other components within normal limits   TROPONIN I   D DIMER, QUANTITATIVE   B-TYPE NATRIURETIC PEPTIDE   POCT GLUCOSE     EKG Readings: (Independently Interpreted)   Initial Reading: No STEMI. Previous EKG: Compared with  most recent EKG Rhythm: Normal Sinus Rhythm. Ectopy: No Ectopy. Conduction: Normal. ST Segments: Normal ST Segments. T Waves: Normal. Axis: Normal. Q Waves: V1, V2 and V3. Clinical Impression: Normal Sinus Rhythm     Imaging Results          X-Ray Chest AP Portable (Final result)  Result time 03/08/18 14:42:24    Final result by Negro Jensen MD (03/08/18 14:42:24)                 Impression:      No convincing acute cardiopulmonary process, hypoventilatory exam.        Electronically signed by: NEGRO JENSEN MD  Date:     03/08/18  Time:    14:42              Narrative:    Chest AP portable    Indication:Chest pain    Comparison:3/5/2014    Findings:  The cardiomediastinal silhouette is not enlarged.  There is no pleural effusion.  The trachea is midline.  The lungs are symmetrically expanded bilaterally with mildly coarse interstitial attenuation, likely accentuated by shallow inspiratory effort. No large focal consolidation seen.  There is no pneumothorax.  The osseous structures are remarkable for degenerative changes noting remote right rib injuries.                                 Medical Decision Making:   History:   Old Medical Records: I decided to obtain old medical records.  Clinical Tests:   Lab Tests: Ordered and Reviewed  Radiological Study: Ordered and Reviewed  Medical Tests: Ordered and Reviewed  ED Management:  Cardiac risk factors. Not c/w cva. Less likely assoc with recent shoulder rotator cuff injury.  Discussed with admitting team. Will place in obs.             Scribe Attestation:   Scribe #1: I performed the above scribed service and the documentation accurately describes the services I performed. I attest to the accuracy of the note.    Attending Attestation:   Physician Attestation Statement for Resident:  As the supervising MD  -: I, Dr. Eitan Vo, personally performed the services described in this documentation. All medical record entries made by the scribe were at my  direction and in my presence.  I have reviewed the chart and agree that the record reflects my personal performance and is accurate and complete. Eitan Vo MD.  3:35 PM 03/08/2018                       Clinical Impression:   Diagnoses of Multiple complaints, Chest pain, and Chest pain were pertinent to this visit.    Disposition:   Disposition: Admitted                        Eitan Vo MD  03/08/18 1536       Eitan Vo MD  03/08/18 1267

## 2018-03-08 NOTE — SUBJECTIVE & OBJECTIVE
Past Medical History:   Diagnosis Date    Allergy     Carotid artery occlusion     Chronic back pain     Hyperlipidemia     Hypertension        Past Surgical History:   Procedure Laterality Date    ANKLE SURGERY      APPENDECTOMY      CAROTID ENDARTERECTOMY Right 07/15/2015    TOTAL KNEE ARTHROPLASTY         Review of patient's allergies indicates:   Allergen Reactions    Stadol [butorphanol tartrate] Rash     Swelling in face    Strawberries [strawberry] Rash       No current facility-administered medications on file prior to encounter.      Current Outpatient Prescriptions on File Prior to Encounter   Medication Sig    amitriptyline (ELAVIL) 25 MG tablet Take 25 mg by mouth nightly as needed for Insomnia.    aspirin (ECOTRIN) 81 MG EC tablet Take 325 mg by mouth once daily.     atorvastatin (LIPITOR) 40 MG tablet TAKE ONE TABLET BY MOUTH ONCE DAILY    chlorthalidone (HYGROTEN) 25 MG Tab TAKE ONE TABLET BY MOUTH ONCE DAILY    lisinopril (PRINIVIL,ZESTRIL) 30 MG tablet Take 1 tablet (30 mg total) by mouth once daily.    hydrocodone-acetaminophen 5-325mg (NORCO) 5-325 mg per tablet Take 1 tablet by mouth every 4 (four) hours as needed.    oxycodone-acetaminophen (PERCOCET)  mg per tablet     testosterone cypionate (DEPOTESTOTERONE CYPIONATE) 200 mg/mL injection Inject 100 mg into the muscle every 14 (fourteen) days.    [DISCONTINUED] etodolac (LODINE) 400 MG tablet Take 1 tablet (400 mg total) by mouth 2 (two) times daily. take with food    [DISCONTINUED] fexofenadine-pseudoephedrine  mg (ALLEGRA-D)  mg per tablet Take 1 tablet by mouth daily as needed.      Family History     Problem Relation (Age of Onset)    Cancer Father, Mother    Hypertension Father        Social History Main Topics    Smoking status: Never Smoker    Smokeless tobacco: Never Used    Alcohol use Yes      Comment: occas, none recently     Drug use: Yes     Types: Oxycodone, Hydrocodone      Comment:  Prescribed as needed    Sexual activity: Not on file     Review of Systems   Constitutional: Negative for activity change, chills, diaphoresis and fever.   HENT: Negative for trouble swallowing.    Eyes: Negative for photophobia and visual disturbance.   Respiratory: Positive for chest tightness. Negative for shortness of breath and wheezing.    Cardiovascular: Positive for chest pain and leg swelling. Negative for palpitations.   Gastrointestinal: Negative for abdominal distention, abdominal pain, constipation, diarrhea and nausea.   Genitourinary: Negative for dysuria, frequency and hematuria.   Musculoskeletal: Negative for gait problem, neck pain and neck stiffness.   Skin: Negative for rash and wound.   Neurological: Negative for dizziness, tremors, facial asymmetry, speech difficulty, weakness and light-headedness.   Psychiatric/Behavioral: Negative for agitation and confusion. The patient is not nervous/anxious.      Objective:     Vital Signs (Most Recent):  Temp: 98.6 °F (37 °C) (03/08/18 1601)  Pulse: 77 (03/08/18 1601)  Resp: 18 (03/08/18 1601)  BP: (!) 142/84 (03/08/18 1601)  SpO2: 100 % (03/08/18 1601) Vital Signs (24h Range):  Temp:  [98.4 °F (36.9 °C)-98.6 °F (37 °C)] 98.6 °F (37 °C)  Pulse:  [] 77  Resp:  [18] 18  SpO2:  [97 %-100 %] 100 %  BP: (141-147)/(84-90) 142/84     Weight: 85.3 kg (188 lb)  Body mass index is 26.22 kg/m².    Physical Exam   Constitutional: He is oriented to person, place, and time. He appears well-developed and well-nourished. No distress.   HENT:   Head: Normocephalic and atraumatic.   Mouth/Throat: No oropharyngeal exudate.   Eyes: EOM are normal. Pupils are equal, round, and reactive to light. No scleral icterus.   Neck: Normal range of motion. Neck supple.   Cardiovascular: Normal rate, regular rhythm, normal heart sounds and intact distal pulses.    No murmur heard.  Pulmonary/Chest: Effort normal and breath sounds normal. No respiratory distress. He has no  wheezes.   Abdominal: Soft. Bowel sounds are normal. He exhibits no distension. There is no tenderness.   Musculoskeletal: Normal range of motion. He exhibits edema (trace, BLE to distal shin). He exhibits no tenderness.   Lymphadenopathy:     He has no cervical adenopathy.   Neurological: He is alert and oriented to person, place, and time. No cranial nerve deficit or sensory deficit. Coordination normal.   Skin: Skin is warm and dry. No rash noted.   Psychiatric: He has a normal mood and affect. His behavior is normal. Thought content normal.         CRANIAL NERVES     CN III, IV, VI   Pupils are equal, round, and reactive to light.  Extraocular motions are normal.        Significant Labs:   BMP:   Recent Labs  Lab 03/08/18  1411 03/08/18  1612   *  --      --    K 3.7  --    CL 97  --    CO2 28  --    BUN 19  --    CREATININE 1.5*  --    CALCIUM 9.7  --    MG  --  2.1     CBC:   Recent Labs  Lab 03/08/18  1411   WBC 8.67   HGB 16.7   HCT 48.2        Magnesium:   Recent Labs  Lab 03/08/18  1612   MG 2.1     Troponin:   Recent Labs  Lab 03/08/18  1411   TROPONINI <0.006     Urine Studies:   Recent Labs  Lab 03/08/18  1606   COLORU Straw   APPEARANCEUA Clear   PHUR 7.0   SPECGRAV 1.005   PROTEINUA Negative   GLUCUA Negative   KETONESU Negative   BILIRUBINUA Negative   OCCULTUA 1+*   NITRITE Negative   UROBILINOGEN Negative   LEUKOCYTESUR Negative   RBCUA 2   SQUAMEPITHEL 0       Significant Imaging: I have reviewed all pertinent imaging results/findings within the past 24 hours.

## 2018-03-08 NOTE — ED NOTES
Patient identifiers verified and correct for Mr Curtis  C/C: left arm pain   APPEARANCE: awake and alert in NAD.  SKIN: warm, dry and intact. No breakdown or bruising.  MUSCULOSKELETAL: Patient moving all extremities spontaneously, no obvious swelling or deformities noted. Ambulates independently.  RESPIRATORY: Denies shortness of breath.Respirations unlabored.   CARDIAC: Denies CP, 2+ distal pulses; no peripheral edema  ABDOMEN: S/ND/NT, Denies nausea  : voids spontaneously, denies difficulty  Neurologic: AAO x 4; follows commands equal strength in all extremities; denies numbness/tingling. Denies dizziness Positive left arm weakness, Denies headache, Smile symmetrical, BUE  equal, speech clear. Positive left arm drift, push, pull equal with no c/o shoulder pain. PERRL 3mm

## 2018-03-09 ENCOUNTER — DOCUMENTATION ONLY (OUTPATIENT)
Dept: CARDIOLOGY | Facility: CLINIC | Age: 58
End: 2018-03-09

## 2018-03-09 VITALS
BODY MASS INDEX: 25.95 KG/M2 | OXYGEN SATURATION: 97 % | HEART RATE: 104 BPM | RESPIRATION RATE: 18 BRPM | TEMPERATURE: 99 F | HEIGHT: 71 IN | SYSTOLIC BLOOD PRESSURE: 127 MMHG | DIASTOLIC BLOOD PRESSURE: 81 MMHG | WEIGHT: 185.38 LBS

## 2018-03-09 PROBLEM — N17.9 AKI (ACUTE KIDNEY INJURY): Status: RESOLVED | Noted: 2018-03-08 | Resolved: 2018-03-09

## 2018-03-09 PROBLEM — R07.9 CHEST PAIN: Status: ACTIVE | Noted: 2018-03-09

## 2018-03-09 LAB
ANION GAP SERPL CALC-SCNC: 10 MMOL/L
BASOPHILS # BLD AUTO: 0.05 K/UL
BASOPHILS NFR BLD: 0.7 %
BUN SERPL-MCNC: 17 MG/DL
CALCIUM SERPL-MCNC: 9.3 MG/DL
CHLORIDE SERPL-SCNC: 100 MMOL/L
CO2 SERPL-SCNC: 29 MMOL/L
CREAT SERPL-MCNC: 1.3 MG/DL
DIASTOLIC DYSFUNCTION: NO
DIFFERENTIAL METHOD: NORMAL
EOSINOPHIL # BLD AUTO: 0.2 K/UL
EOSINOPHIL NFR BLD: 2.8 %
ERYTHROCYTE [DISTWIDTH] IN BLOOD BY AUTOMATED COUNT: 13.7 %
EST. GFR  (AFRICAN AMERICAN): >60 ML/MIN/1.73 M^2
EST. GFR  (NON AFRICAN AMERICAN): >60 ML/MIN/1.73 M^2
GLUCOSE SERPL-MCNC: 123 MG/DL
HCT VFR BLD AUTO: 45.4 %
HGB BLD-MCNC: 15.2 G/DL
IMM GRANULOCYTES # BLD AUTO: 0.01 K/UL
IMM GRANULOCYTES NFR BLD AUTO: 0.1 %
LYMPHOCYTES # BLD AUTO: 2.4 K/UL
LYMPHOCYTES NFR BLD: 32.8 %
MCH RBC QN AUTO: 30 PG
MCHC RBC AUTO-ENTMCNC: 33.5 G/DL
MCV RBC AUTO: 90 FL
MONOCYTES # BLD AUTO: 0.7 K/UL
MONOCYTES NFR BLD: 9 %
NEUTROPHILS # BLD AUTO: 3.9 K/UL
NEUTROPHILS NFR BLD: 54.6 %
NRBC BLD-RTO: 0 /100 WBC
PLATELET # BLD AUTO: 283 K/UL
PMV BLD AUTO: 9.4 FL
POTASSIUM SERPL-SCNC: 3.6 MMOL/L
RBC # BLD AUTO: 5.06 M/UL
RETIRED EF AND QEF - SEE NOTES: 65 (ref 55–65)
SODIUM SERPL-SCNC: 139 MMOL/L
TROPONIN I SERPL DL<=0.01 NG/ML-MCNC: <0.006 NG/ML
WBC # BLD AUTO: 7.2 K/UL

## 2018-03-09 PROCEDURE — 93321 DOPPLER ECHO F-UP/LMTD STD: CPT

## 2018-03-09 PROCEDURE — 63600175 PHARM REV CODE 636 W HCPCS: Performed by: PHYSICIAN ASSISTANT

## 2018-03-09 PROCEDURE — 85025 COMPLETE CBC W/AUTO DIFF WBC: CPT

## 2018-03-09 PROCEDURE — 93351 STRESS TTE COMPLETE: CPT | Mod: 26,,, | Performed by: INTERNAL MEDICINE

## 2018-03-09 PROCEDURE — 80048 BASIC METABOLIC PNL TOTAL CA: CPT

## 2018-03-09 PROCEDURE — 93321 DOPPLER ECHO F-UP/LMTD STD: CPT | Mod: 26,,, | Performed by: INTERNAL MEDICINE

## 2018-03-09 PROCEDURE — 25000003 PHARM REV CODE 250: Performed by: PHYSICIAN ASSISTANT

## 2018-03-09 PROCEDURE — 99217 PR OBSERVATION CARE DISCHARGE: CPT | Mod: ,,, | Performed by: PHYSICIAN ASSISTANT

## 2018-03-09 PROCEDURE — 36415 COLL VENOUS BLD VENIPUNCTURE: CPT

## 2018-03-09 PROCEDURE — G0378 HOSPITAL OBSERVATION PER HR: HCPCS

## 2018-03-09 PROCEDURE — 84484 ASSAY OF TROPONIN QUANT: CPT

## 2018-03-09 RX ORDER — ASPIRIN 325 MG
TABLET, DELAYED RELEASE (ENTERIC COATED) ORAL
Status: DISPENSED
Start: 2018-03-09 | End: 2018-03-09

## 2018-03-09 RX ORDER — LISINOPRIL 10 MG/1
TABLET ORAL
Status: DISPENSED
Start: 2018-03-09 | End: 2018-03-09

## 2018-03-09 RX ORDER — ATORVASTATIN CALCIUM 20 MG/1
TABLET, FILM COATED ORAL
Status: DISPENSED
Start: 2018-03-09 | End: 2018-03-09

## 2018-03-09 RX ADMIN — LISINOPRIL 30 MG: 10 TABLET ORAL at 09:03

## 2018-03-09 RX ADMIN — HEPARIN SODIUM 5000 UNITS: 5000 INJECTION, SOLUTION INTRAVENOUS; SUBCUTANEOUS at 02:03

## 2018-03-09 RX ADMIN — ASPIRIN 325 MG: 325 TABLET, DELAYED RELEASE ORAL at 09:03

## 2018-03-09 RX ADMIN — ATORVASTATIN CALCIUM 40 MG: 20 TABLET, FILM COATED ORAL at 09:03

## 2018-03-09 RX ADMIN — HEPARIN SODIUM 5000 UNITS: 5000 INJECTION, SOLUTION INTRAVENOUS; SUBCUTANEOUS at 06:03

## 2018-03-09 NOTE — PROGRESS NOTES
Patient verified by 2 identifiers and allergies reviewed.  18g IV in place to Rt AC.  DSE explained to patient, consent obtained & testing completed.  Pt tolerated testing well. IV flushed post testing.  Post study discharge instructions reviewed with patient, patient verbalized understanding.  Patient transferred back to [facility] via stretcher accompanied by escort in stable condition.

## 2018-03-09 NOTE — PLAN OF CARE
03/09/18 0910   Discharge Assessment   Assessment Type Discharge Planning Assessment   Confirmed/corrected address and phone number on facesheet? Yes   Assessment information obtained from? Patient   Expected Length of Stay (days) 1   Communicated expected length of stay with patient/caregiver yes   Prior to hospitilization cognitive status: Alert/Oriented   Prior to hospitalization functional status: Independent   Current cognitive status: Alert/Oriented   Current Functional Status: Independent   Lives With spouse  (spouse, Tierney Curtis ([phone]))   Able to Return to Prior Arrangements yes   Is patient able to care for self after discharge? Yes   Patient's perception of discharge disposition home or selfcare   Readmission Within The Last 30 Days no previous admission in last 30 days   Patient currently being followed by outpatient case management? No   Patient currently receives any other outside agency services? No   Equipment Currently Used at Home other (see comments)  (BP machine)   Do you have any problems affording any of your prescribed medications? No   Is the patient taking medications as prescribed? yes   Does the patient have transportation home? Yes   Transportation Available car   Does the patient receive services at the [facility]? No   Discharge Plan A Home with family   Discharge Plan B Home Health   Patient/Family In Agreement With Plan yes     Patient resting quietly in bed when CM rounded. No family present. Patient was admitted with chest pain. Patient scheduled to have a stress test done today. Patient lives with his spouse, Tierney Curtis, & is independent of all ADLs. Plan to discharge patient home with support or home with home health when medically stable. Patient requested assistance getting established with a new PCP. Hospital follow up appointment scheduled for the patient with Dr. Rocco Cavazos on 3/13/18 at 0920. Will continue to follow.

## 2018-03-09 NOTE — PROGRESS NOTES
Patient received discharge instructions and verbalized understanding. IV discontinued with catheter tip intact. Patient disconnected from telemetry monitor. Patient will ambulate off unit. Will continue to monitor.

## 2018-03-09 NOTE — PLAN OF CARE
Problem: Patient Care Overview  Goal: Plan of Care Review  Outcome: Ongoing (interventions implemented as appropriate)  Pt A&O x 4.  VSS on room air.  Pt denies chest pain, numbness/tingling or pain to upper extremities.  No other complaints of pain.  Telemetry reconnected at 0600 - pt running NSR.  Pt NPO since 0000.  Troponin trend WNL.  Will continue to monitor pt.

## 2018-03-09 NOTE — NURSING
Telemetry not reading on monitor.  Replaced leads and battery - no change.  Spoke with kristal Campetry.  Will send new box and leads.  Pt currently off monitor.

## 2018-03-10 NOTE — HOSPITAL COURSE
Patient admitted to observation for chest pain. Chest pain resolved with rest overnight. Dobutamine stress echo negative for ischemia. Troponins negative. EKG negative for ischemia. Patient stable for discharge.

## 2018-03-10 NOTE — ASSESSMENT & PLAN NOTE
Dyslipidemia  Carotid stenosis    Patient with history of carotid stenosis with endarterectomy presenting with crushing chest pain, resolved with rest after 10 minutes. Compliant with home statin and ACE and ASA. No neurologic deficits on exam.  - EKG uinmpressive  - Troponins negative  - maintain on telemetry  - Dobutamine stress negative for ischemia  - continue statin and ASA  - patient stable for discharge.

## 2018-03-10 NOTE — DISCHARGE SUMMARY
[facility] Hospital Medicine  Discharge Summary      Patient Name: Partha Curtis Jr.  MRN: [id number]  Admission Date: 3/8/2018  Hospital Length of Stay: 0 days  Discharge Date and Time: 3/9/2018  4:22 PM  Attending Physician: No att. providers found   Discharging Provider: Nunu Humphrey PA-C  Primary Care Provider: Rocco Cavazos DO  [facility] Medicine Team: [facility] HOSP MED E Nunu Humphrey PA-C    HPI:   Patient is a 58yo M with a PMHx of HTN, HLD, CAD being admitted to observation for evaluation of chest pain. Patient states the pain began around 9am this morning. The pain lasted 10 minutes with radiation to the left shoulder and arm. He describes the pain as squeezing. It progressively worsened with hand weakness. He reports taking a baby aspirin after the pain resolved. Patient denies SOB, diaphoresis, N/V, numbness, changes in vision or speech. Patient scheduled to have pain pump removed tomorrow.    In the ED, vitals stable. Intake labs remarkable for Cr 1.5, up from baseline 1.1-1.3. Troponin negative. EKG with questionable injury changes from previous. CXR clear.    * No surgery found *      Hospital Course:   Patient admitted to observation for chest pain. Chest pain resolved with rest overnight. Dobutamine stress echo negative for ischemia. Troponins negative. EKG negative for ischemia. Patient stable for discharge.     Consults:     * Chest pain    Dyslipidemia  Carotid stenosis    Patient with history of carotid stenosis with endarterectomy presenting with crushing chest pain, resolved with rest after 10 minutes. Compliant with home statin and ACE and ASA. No neurologic deficits on exam.  - EKG uinmpressive  - Troponins negative  - maintain on telemetry  - Dobutamine stress negative for ischemia  - continue statin and ASA  - patient stable for discharge.        HANANE (acute kidney injury)-resolved as of 3/9/2018    Cr 1.5 on admission, baseline Cr 1.1-1.3  - hold diuretics for  now  - gentle 500cc bolus  - resume ACE tomorrow        Essential hypertension    - Lisinopril 30mg daily, continue  - hold Chlorthalidone 25mg daily given Cr 1.5          Final Active Diagnoses:    Diagnosis Date Noted POA    PRINCIPAL PROBLEM:  Chest pain [R07.9] 03/09/2018 Yes    Essential hypertension [I10] 10/22/2014 Yes    S/P carotid endarterectomy [Z98.890] 08/11/2015 Not Applicable    Dyslipidemia [E78.5] 10/22/2014 Yes     Chronic    Carotid stenosis [I65.29] 07/01/2014 Yes      Problems Resolved During this Admission:    Diagnosis Date Noted Date Resolved POA    HANANE (acute kidney injury) [N17.9] 03/08/2018 03/09/2018 Yes       Discharged Condition: good    Disposition: Home or Self Care    Follow Up:    Patient Instructions:     Diet Cardiac     Activity as tolerated     Notify your health care provider if you experience any of the following:  temperature >100.4     Notify your health care provider if you experience any of the following:  persistent nausea and vomiting or diarrhea     Notify your health care provider if you experience any of the following:  severe uncontrolled pain     Notify your health care provider if you experience any of the following:  difficulty breathing or increased cough     Notify your health care provider if you experience any of the following:  severe persistent headache     Notify your health care provider if you experience any of the following:  worsening rash         Significant Diagnostic Studies: Labs:   BMP:   Recent Labs  Lab 03/08/18  1411 03/08/18  1612 03/09/18  0424   *  --  123*     --  139   K 3.7  --  3.6   CL 97  --  100   CO2 28  --  29   BUN 19  --  17   CREATININE 1.5*  --  1.3   CALCIUM 9.7  --  9.3   MG  --  2.1  --    , CMP   Recent Labs  Lab 03/08/18  1411 03/09/18  0424    139   K 3.7 3.6   CL 97 100   CO2 28 29   * 123*   BUN 19 17   CREATININE 1.5* 1.3   CALCIUM 9.7 9.3   PROT 8.2  --    ALBUMIN 4.7  --    BILITOT 0.8   --    ALKPHOS 88  --    AST 23  --    ALT 27  --    ANIONGAP 11 10   ESTGFRAFRICA 58.9* >60.0   EGFRNONAA 50.9* >60.0   , CBC   Recent Labs  Lab 03/08/18  1411 03/09/18  0424   WBC 8.67 7.20   HGB 16.7 15.2   HCT 48.2 45.4    283   , Troponin   Recent Labs  Lab 03/09/18  0424   TROPONINI <0.006    and All labs within the past 24 hours have been reviewed  Cardiac Graphics: Echocardiogram: 2D echo with color flow doppler: No results found for this or any previous visit.    Pending Diagnostic Studies:     None         Medications:  Reconciled Home Medications:   Discharge Medication List as of 3/9/2018  3:37 PM      CONTINUE these medications which have NOT CHANGED    Details   amitriptyline (ELAVIL) 25 MG tablet Take 25 mg by mouth nightly as needed for Insomnia., Until Discontinued, Historical Med      aspirin (ECOTRIN) 81 MG EC tablet Take 325 mg by mouth once daily. , Until Discontinued, Historical Med      atorvastatin (LIPITOR) 40 MG tablet TAKE ONE TABLET BY MOUTH ONCE DAILY, Normal      chlorthalidone (HYGROTEN) 25 MG Tab TAKE ONE TABLET BY MOUTH ONCE DAILY, Normal      hydrocodone-acetaminophen 5-325mg (NORCO) 5-325 mg per tablet Take 1 tablet by mouth every 4 (four) hours as needed., Starting 7/16/2015, Until Discontinued, Print      lisinopril (PRINIVIL,ZESTRIL) 30 MG tablet Take 1 tablet (30 mg total) by mouth once daily., Starting Wed 7/19/2017, Normal      oxycodone-acetaminophen (PERCOCET)  mg per tablet Starting 3/28/2016, Until Discontinued, Historical Med      testosterone cypionate (DEPOTESTOTERONE CYPIONATE) 200 mg/mL injection Inject 100 mg into the muscle every 14 (fourteen) days., Starting Mon 1/29/2018, Historical Med         STOP taking these medications       etodolac (LODINE) 400 MG tablet Comments:   Reason for Stopping:         fexofenadine-pseudoephedrine  mg (ALLEGRA-D)  mg per tablet Comments:   Reason for Stopping:               Indwelling Lines/Drains at time  of discharge:   Lines/Drains/Airways          No matching active lines, drains, or airways          Time spent on the discharge of patient: 37 minutes  Patient was seen and examined on the date of discharge and determined to be suitable for discharge.         Nunu Humphrey PA-C  Department of Hospital Medicine  [facility]

## 2018-03-12 ENCOUNTER — TELEPHONE (OUTPATIENT)
Dept: OCCUPATIONAL MEDICINE | Facility: CLINIC | Age: 58
End: 2018-03-12

## 2018-03-12 ENCOUNTER — PATIENT MESSAGE (OUTPATIENT)
Dept: CARDIOLOGY | Facility: CLINIC | Age: 58
End: 2018-03-12

## 2018-03-12 NOTE — PLAN OF CARE
03/12/18 0801   Final Note   Assessment Type Final Discharge Note     Patient discharged home with no needs 3/9/18.

## 2018-03-13 ENCOUNTER — LAB VISIT (OUTPATIENT)
Dept: LAB | Facility: HOSPITAL | Age: 58
End: 2018-03-13
Attending: FAMILY MEDICINE
Payer: COMMERCIAL

## 2018-03-13 ENCOUNTER — OFFICE VISIT (OUTPATIENT)
Dept: CARDIOLOGY | Facility: CLINIC | Age: 58
End: 2018-03-13
Payer: COMMERCIAL

## 2018-03-13 ENCOUNTER — OFFICE VISIT (OUTPATIENT)
Dept: FAMILY MEDICINE | Facility: CLINIC | Age: 58
End: 2018-03-13
Payer: COMMERCIAL

## 2018-03-13 VITALS
HEART RATE: 99 BPM | BODY MASS INDEX: 26.02 KG/M2 | TEMPERATURE: 98 F | HEIGHT: 71 IN | OXYGEN SATURATION: 96 % | SYSTOLIC BLOOD PRESSURE: 104 MMHG | DIASTOLIC BLOOD PRESSURE: 74 MMHG | WEIGHT: 185.88 LBS

## 2018-03-13 VITALS
WEIGHT: 184.31 LBS | BODY MASS INDEX: 26.39 KG/M2 | HEIGHT: 70 IN | DIASTOLIC BLOOD PRESSURE: 80 MMHG | HEART RATE: 78 BPM | SYSTOLIC BLOOD PRESSURE: 124 MMHG

## 2018-03-13 DIAGNOSIS — Z79.890 LONG-TERM CURRENT USE OF TESTOSTERONE REPLACEMENT THERAPY: ICD-10-CM

## 2018-03-13 DIAGNOSIS — E78.5 DYSLIPIDEMIA: Chronic | ICD-10-CM

## 2018-03-13 DIAGNOSIS — Z12.11 COLON CANCER SCREENING: ICD-10-CM

## 2018-03-13 DIAGNOSIS — Z09 HOSPITAL DISCHARGE FOLLOW-UP: ICD-10-CM

## 2018-03-13 DIAGNOSIS — Z00.00 VISIT FOR WELL MAN HEALTH CHECK: Primary | ICD-10-CM

## 2018-03-13 DIAGNOSIS — H91.8X3 OTHER SPECIFIED HEARING LOSS OF BOTH EARS: ICD-10-CM

## 2018-03-13 DIAGNOSIS — I10 ESSENTIAL HYPERTENSION: ICD-10-CM

## 2018-03-13 DIAGNOSIS — Z11.59 NEED FOR HEPATITIS C SCREENING TEST: ICD-10-CM

## 2018-03-13 DIAGNOSIS — Z97.8 PRESENCE OF OTHER SPECIFIED DEVICES: ICD-10-CM

## 2018-03-13 DIAGNOSIS — Z23 NEED FOR INFLUENZA VACCINATION: ICD-10-CM

## 2018-03-13 DIAGNOSIS — Z01.810 PREOPERATIVE CARDIOVASCULAR EXAMINATION: Primary | ICD-10-CM

## 2018-03-13 DIAGNOSIS — Z00.00 VISIT FOR WELL MAN HEALTH CHECK: ICD-10-CM

## 2018-03-13 DIAGNOSIS — Z23 NEED FOR DIPHTHERIA-TETANUS-PERTUSSIS (TDAP) VACCINE: ICD-10-CM

## 2018-03-13 DIAGNOSIS — Z98.890 S/P CAROTID ENDARTERECTOMY: ICD-10-CM

## 2018-03-13 PROBLEM — H91.93 BILATERAL HEARING LOSS: Status: ACTIVE | Noted: 2018-03-13

## 2018-03-13 LAB
25(OH)D3+25(OH)D2 SERPL-MCNC: 32 NG/ML
ALBUMIN SERPL BCP-MCNC: 4.5 G/DL
ALP SERPL-CCNC: 82 U/L
ALT SERPL W/O P-5'-P-CCNC: 32 U/L
ANION GAP SERPL CALC-SCNC: 10 MMOL/L
AST SERPL-CCNC: 31 U/L
BASOPHILS # BLD AUTO: 0.03 K/UL
BASOPHILS NFR BLD: 0.5 %
BILIRUB SERPL-MCNC: 0.5 MG/DL
BUN SERPL-MCNC: 17 MG/DL
CALCIUM SERPL-MCNC: 10 MG/DL
CHLORIDE SERPL-SCNC: 96 MMOL/L
CHOLEST SERPL-MCNC: 161 MG/DL
CHOLEST/HDLC SERPL: 4.7 {RATIO}
CO2 SERPL-SCNC: 32 MMOL/L
CREAT SERPL-MCNC: 1.5 MG/DL
DIFFERENTIAL METHOD: NORMAL
EOSINOPHIL # BLD AUTO: 0.1 K/UL
EOSINOPHIL NFR BLD: 1.7 %
ERYTHROCYTE [DISTWIDTH] IN BLOOD BY AUTOMATED COUNT: 13.7 %
EST. GFR  (AFRICAN AMERICAN): 58.5 ML/MIN/1.73 M^2
EST. GFR  (NON AFRICAN AMERICAN): 50.6 ML/MIN/1.73 M^2
ESTIMATED AVG GLUCOSE: 128 MG/DL
GLUCOSE SERPL-MCNC: 111 MG/DL
HBA1C MFR BLD HPLC: 6.1 %
HCT VFR BLD AUTO: 48 %
HDLC SERPL-MCNC: 34 MG/DL
HDLC SERPL: 21.1 %
HGB BLD-MCNC: 16.1 G/DL
IMM GRANULOCYTES # BLD AUTO: 0.01 K/UL
IMM GRANULOCYTES NFR BLD AUTO: 0.2 %
LDLC SERPL CALC-MCNC: 100.2 MG/DL
LYMPHOCYTES # BLD AUTO: 1.7 K/UL
LYMPHOCYTES NFR BLD: 26.3 %
MCH RBC QN AUTO: 30.7 PG
MCHC RBC AUTO-ENTMCNC: 33.5 G/DL
MCV RBC AUTO: 91 FL
MONOCYTES # BLD AUTO: 0.6 K/UL
MONOCYTES NFR BLD: 9.4 %
NEUTROPHILS # BLD AUTO: 4 K/UL
NEUTROPHILS NFR BLD: 61.9 %
NONHDLC SERPL-MCNC: 127 MG/DL
NRBC BLD-RTO: 0 /100 WBC
PLATELET # BLD AUTO: 322 K/UL
PMV BLD AUTO: 9.5 FL
POTASSIUM SERPL-SCNC: 3.8 MMOL/L
PROT SERPL-MCNC: 7.8 G/DL
RBC # BLD AUTO: 5.25 M/UL
SODIUM SERPL-SCNC: 138 MMOL/L
TRIGL SERPL-MCNC: 134 MG/DL
TSH SERPL DL<=0.005 MIU/L-ACNC: 1.99 UIU/ML
WBC # BLD AUTO: 6.4 K/UL

## 2018-03-13 PROCEDURE — 36415 COLL VENOUS BLD VENIPUNCTURE: CPT | Mod: PO

## 2018-03-13 PROCEDURE — 3079F DIAST BP 80-89 MM HG: CPT | Mod: CPTII,S$GLB,, | Performed by: INTERNAL MEDICINE

## 2018-03-13 PROCEDURE — 99214 OFFICE O/P EST MOD 30 MIN: CPT | Mod: S$GLB,,, | Performed by: INTERNAL MEDICINE

## 2018-03-13 PROCEDURE — 86803 HEPATITIS C AB TEST: CPT

## 2018-03-13 PROCEDURE — 82306 VITAMIN D 25 HYDROXY: CPT

## 2018-03-13 PROCEDURE — 80053 COMPREHEN METABOLIC PANEL: CPT

## 2018-03-13 PROCEDURE — 85025 COMPLETE CBC W/AUTO DIFF WBC: CPT

## 2018-03-13 PROCEDURE — 84443 ASSAY THYROID STIM HORMONE: CPT

## 2018-03-13 PROCEDURE — 3074F SYST BP LT 130 MM HG: CPT | Mod: CPTII,S$GLB,, | Performed by: INTERNAL MEDICINE

## 2018-03-13 PROCEDURE — 99999 PR PBB SHADOW E&M-EST. PATIENT-LVL III: CPT | Mod: PBBFAC,,, | Performed by: INTERNAL MEDICINE

## 2018-03-13 PROCEDURE — 99999 PR PBB SHADOW E&M-EST. PATIENT-LVL III: CPT | Mod: PBBFAC,,, | Performed by: FAMILY MEDICINE

## 2018-03-13 PROCEDURE — 83036 HEMOGLOBIN GLYCOSYLATED A1C: CPT

## 2018-03-13 PROCEDURE — 99386 PREV VISIT NEW AGE 40-64: CPT | Mod: S$GLB,,, | Performed by: FAMILY MEDICINE

## 2018-03-13 PROCEDURE — 80061 LIPID PANEL: CPT

## 2018-03-13 RX ORDER — ACYCLOVIR 400 MG/1
TABLET ORAL
COMMUNITY
Start: 2017-12-27 | End: 2018-11-02

## 2018-03-13 NOTE — PATIENT INSTRUCTIONS
Continue current regimen  Adhere to the mediterranean diet as much as possible.  Continue exercise

## 2018-03-13 NOTE — PATIENT INSTRUCTIONS
Low-Salt Diet  This diet removes foods that are high in salt. It also limits the amount of salt you use when cooking. It is most often used for people with high blood pressure, edema (fluid retention), and kidney, liver, or heart disease.  Table salt contains the mineral sodium. Your body needs sodium to work normally. But too much sodium can make your health problems worse. Your healthcare provider is recommending a low-salt (also called low-sodium) diet for you. Your total daily allowance of salt is 1,500 to 2,300 milligrams (mg). It is less than 1 teaspoon of table salt. This means you can have only about 500 to 700 mg of sodium at each meal. People with certain health problems should limit salt intake to the lower end of the recommended range.    When you cook, dont add much salt. If you can cook without using salt, even better. Dont add salt to your food at the table.  When shopping, read food labels. Salt is often called sodium on the label. Choose foods that are salt-free, low salt, or very low salt. Note that foods with reduced salt may not lower your salt intake enough.    Beans, potatoes, and pasta  Ok: Dry beans, split peas, lentils, potatoes, rice, macaroni, pasta, spaghetti without added salt  Avoid: Potato chips, tortilla chips, and similar products  Breads and cereals  Ok: Low-sodium breads, rolls, cereals, and cakes; low-salt crackers, matzo crackers  Avoid: Salted crackers, pretzels, popcorn, Armenian toast, pancakes, muffins  Dairy  Ok: Milk, chocolate milk, hot chocolate mix, low-salt cheeses, and yogurt  Avoid: Processed cheese and cheese spreads; Roquefort, Camembert, and cottage cheese; buttermilk, instant breakfast drink  Desserts  Ok: Ice cream, frozen yogurt, juice bars, gelatin, cookies and pies, sugar, honey, jelly, hard candy  Avoid: Most pies, cakes and cookies prepared or processed with salt; instant pudding  Drinks  Ok: Tea, coffee, fizzy (carbonated) drinks, juices  Avoid: Flavored  coffees, electrolyte replacement drinks, sports drinks  Meats  Ok: All fresh meat, fish, poultry, low-salt tuna, eggs, egg substitute  Avoid: Smoked, pickled, brine-cured, or salted meats and fish. This includes dwyer, chipped beef, corned beef, hot dogs, deli meats, ham, kosher meats, salt pork, sausage, canned tuna, salted codfish, smoked salmon, herring, sardines, or anchovies.  Seasonings and spices  Ok: Most seasonings are okay. Good substitutes for salt include: fresh herb blends, hot sauce, lemon, garlic, vigil, vinegar, dry mustard, parsley, cilantro, horseradish, tomato paste, regular margarine, mayonnaise, unsalted butter, cream cheese, vegetable oil, cream, low-salt salad dressing and gravy.  Avoid: Regular ketchup, relishes, pickles, soy sauce, teriyaki sauce, Worcestershire sauce, BBQ sauce, tartar sauce, meat tenderizer, chili sauce, regular gravy, regular salad dressing, salted butter  Soups  Ok: Low-salt soups and broths made with allowed foods  Avoid: Bouillon cubes, soups with smoked or salted meats, regular soup and broth  Vegetables  Ok: Most vegetables are okay; also low-salt tomato and vegetable juices  Avoid: Sauerkraut and other brine-soaked vegetables; pickles and other pickled vegetables; tomato juice, olives  Date Last Reviewed: 8/1/2016 © 2000-2017 [employer]. [address] [place] [address]. All rights reserved. This information is not intended as a substitute for professional medical care. Always follow your healthcare professional's instructions.        4 Steps for Eating Healthier  Changing the way you eat can improve your health. It can lower your cholesterol and blood pressure, and help you stay at a healthy weight. Your diet doesnt have to be bland and boring to be healthy. Just watch your calories and follow these steps:    1. Eat fewer unhealthy fats  · Choose more fish and lean meats instead of fatty cuts of meat.  · Skip butter and lard, and use less  margarine.  · Pass on foods that have palm, coconut, or hydrogenated oils.  · Eat fewer high-fat dairy foods like cheese, ice cream, and whole milk.  · Get a heart-healthy cookbook and try some low-fat recipes.  2. Go light on salt  · Keep the saltshaker off the table.  · Limit high-salt ingredients, such as soy sauce, bouillon, and garlic salt.  · Instead of adding salt when cooking, season your food with herbs and flavorings. Try lemon, garlic, and onion.  · Limit convenience foods, such as boxed or canned foods and restaurant food.  · Read food labels and choose lower-sodium options.  3. Limit sugar  · Pause before you add sugars to pancakes, cereal, coffee, or tea. This includes white and brown table sugar, syrup, honey, and molasses. Cut your usual amount by half.  · Use non-sugar sweeteners. Stevia, aspartame, and sucralose can satisfy a sweet tooth without adding calories.  · Swap out sugar-filled soda and other drinks. Buy sugar-free or low-calorie beverages. Remember water is always the best choice.  · Read labels and choose foods with less added sugar. Keep in mind that dairy foods and foods with fruit will have some natural sugar.  · Cut the sugar in recipes by 1/3 to 1/2. Boost the flavor with extracts like almond, vanilla, or orange. Or add spices such as cinnamon or nutmeg.  4. Eat more fiber  · Eat fresh fruits and vegetables every day.  · Boost your diet with whole grains. Go for oats, whole-grain rice, and bran.  · Add beans and lentils to your meals.  · Drink more water to match your fiber increase. This is to help prevent constipation.  Date Last Reviewed: 5/11/2015  © 5855-9881 [employer]. [address] [place] [address], [place] [address]. All rights reserved. This information is not intended as a substitute for professional medical care. Always follow your healthcare professional's instructions.        Understanding Fat and Cholesterol  Too much cholesterol in your blood can lead to many  problems such as blocked arteries. This can lead to heart attack and stroke. One of the best ways to manage heart and blood vessel disease is to lower your blood cholesterol. Planning meals that are low in saturated fat and cholesterol helps reduce the level of cholesterol in your blood. Below are eating tips to help lower your blood cholesterol levels.  Eat less fat  A healthy goal is to have less than 25% to 35% of your daily calories come from fat. Instead of fats, eat more fruits, whole-grains, and vegetables. This also helps control your weight, and can even reduce your risk for some cancers. There are different kinds of fats in foods. Fats can be saturated, unsaturated, or trans fats. The best fats to choose are unsaturated fats. But fats are high in calories, so eat even unsaturated fats sparingly.  Limit foods high in saturated fats  Saturated fats come from animals and certain plants (such as coconut and palm). Eating too much saturated fat can raise your blood cholesterol levels and make your artery problems worse. Your goal is to eat less saturated fat. Below are some examples of foods that contain lots of saturated fat:  · Fatty cuts of meat (lamb, ham, beef)  · Many pastries, cakes, cookies, and candies  · Cream, ice cream, sour cream, cheese, and butter, and foods made with them  · Sauces made with butter or cream  · Salad dressings with saturated fats  · Foods that contain palm or coconut oil  Choose unsaturated fats  Unsaturated fats are usually liquid at room temperature. They are better choices for your heart than saturated fat. There are two types of unsaturated fats: polyunsaturated fat and monounsaturated fat. Aim to replace saturated fats with polyunsaturated or monounsaturated fats.  · Polyunsaturated fats are found in corn oil, safflower oil, sunflower oil, and other vegetable oils.  · Monounsaturated fats are found in olive oil, canola oil, and peanut oil. Some margarines and spreads are now  made with these oils, too. Avocados are also high in monounsaturated fat.  Of all fats, monounsaturated fats are the least harmful to your heart.  Avoid trans fats  Like saturated fats, trans fats have been linked to heart disease. Even a small amount can harm your health. Trans fats are found in liquid oils that have been changed to be solid at room temperature. Margarine, which is often made from vegetable oil, is one example. Vegetable shortening is another. Trans fats are often found in packaged goods. Check ingredients for the words hydrogenated or partially hydrogenated. They mean the foods contain trans fat.  What about triglycerides?  Triglycerides are a type of fat in your blood. Like cholesterol, high levels of triglycerides can lead to blocked arteries. High triglyceride levels can be reduced 20% to 50%  by limiting added sugars in your diet, susbstituting healthier fats for saturated and trans fats, getting more physical activity, and losing weight if your are overweight. You may also be advised to avoid or limi alcohol.    Reading food labels  Luckily, most foods now have labels giving you the facts about what youre eating. Reading food labels helps you make healthy choices. Look for the words highlighted below.  · Serving Size. This is the amount of food in 1 serving. If you eat larger portions, be sure to count more of everything: fat, calories, and cholesterol.  · Total Fat. Tells you how many grams (g) of fat are in 1 serving.  · Calories from Fat. This tells you the total number of calories from fat in 1 serving (there are 9 calories per gram of fat). Look for foods with the fewest calories from fat.  · Saturated Fat. Tells you how many grams (g) of saturated fat are in 1 serving.  · Trans Fat. Tells how many grams (g) of trans fat are in 1 serving.  · Cholesterol. Tells you how many milligrams (mg) of cholesterol are in 1 serving.  Date Last Reviewed: 5/11/2015  © 4130-9520 [employer]. [address] [place] [address], [place] [address]. All rights reserved. This information is not intended as a substitute for professional medical care. Always follow your healthcare professional's instructions.

## 2018-03-13 NOTE — PROGRESS NOTES
Subjective:   Patient ID:  Partha Curtis Jr. is a 58 y.o. male who presents for  Pre-op Exam and Hypertension      HPI:   Partha Curtis Jr. presents for pre op evaluation and risk stratification for pain pump removal. He had a recent Obs stay for arm pain with a negative stress echo. Partha Curtis Jr. has hypertension with good control.Partha Curtis Jr. has dyslipidemia  on high intensity statin with  today on a non-fasting specimen. He is riding his bike 2-3 miles 4-5 times a week...  Review of Systems   Constitution: Negative for weakness, malaise/fatigue, weight gain and weight loss.   Eyes: Negative for blurred vision.   Cardiovascular: Negative for chest pain, claudication, cyanosis, dyspnea on exertion, irregular heartbeat, leg swelling, near-syncope, orthopnea, palpitations, paroxysmal nocturnal dyspnea and syncope.   Respiratory: Negative for cough, shortness of breath and wheezing.    Musculoskeletal: Negative for falls and myalgias.   Gastrointestinal: Negative for abdominal pain, heartburn, nausea and vomiting.   Genitourinary: Negative for nocturia.   Neurological: Negative for brief paralysis, dizziness, focal weakness, headaches, numbness and paresthesias.   Psychiatric/Behavioral: Negative for altered mental status.       Current Outpatient Prescriptions   Medication Sig    amitriptyline (ELAVIL) 25 MG tablet Take 25 mg by mouth nightly as needed for Insomnia.    aspirin (ECOTRIN) 81 MG EC tablet Take 325 mg by mouth once daily.     atorvastatin (LIPITOR) 40 MG tablet TAKE ONE TABLET BY MOUTH ONCE DAILY    chlorthalidone (HYGROTEN) 25 MG Tab TAKE ONE TABLET BY MOUTH ONCE DAILY    lisinopril (PRINIVIL,ZESTRIL) 30 MG tablet Take 1 tablet (30 mg total) by mouth once daily.    oxycodone-acetaminophen (PERCOCET)  mg per tablet     testosterone cypionate (DEPOTESTOTERONE CYPIONATE) 200 mg/mL injection Inject 100 mg into the muscle every 14 (fourteen) days.    acyclovir (ZOVIRAX)  "400 MG tablet      No current facility-administered medications for this visit.      Objective:   Physical Exam   Constitutional: He is oriented to person, place, and time. No distress.   /80 (BP Location: Left arm, Patient Position: Sitting, BP Method: Large (Manual))   Pulse 78   Ht 5' 10" (1.778 m)   Wt 83.6 kg (184 lb 4.9 oz)   BMI 26.44 kg/m²    HENT:   Head: Normocephalic.   Right Ear: External ear normal.   Left Ear: External ear normal.   Nose: Nose normal.   Eyes: Conjunctivae are normal. Pupils are equal, round, and reactive to light. No scleral icterus.   Neck: Neck supple. No JVD present. No thyromegaly present.   Cardiovascular: Normal rate, regular rhythm, normal heart sounds and intact distal pulses.  PMI is not displaced.  Exam reveals no gallop and no friction rub.    No murmur heard.  Pulmonary/Chest: Effort normal and breath sounds normal. No respiratory distress. He has no wheezes. He has no rales.   Abdominal: Soft. He exhibits no distension. There is no hepatosplenomegaly. There is no tenderness.   Pain pump RLQ   Musculoskeletal: He exhibits no edema or tenderness.   Gait normal   Neurological: He is alert and oriented to person, place, and time. No cranial nerve deficit.   Skin: Skin is warm and dry. No rash noted. He is not diaphoretic.   Psychiatric: He has a normal mood and affect. His behavior is normal.       Lab Results   Component Value Date     03/13/2018    K 3.8 03/13/2018    CL 96 03/13/2018    CO2 32 (H) 03/13/2018    BUN 17 03/13/2018    CREATININE 1.5 (H) 03/13/2018     (H) 03/13/2018    HGBA1C 6.1 (H) 03/13/2018    MG 2.1 03/08/2018    AST 31 03/13/2018    ALT 32 03/13/2018    ALBUMIN 4.5 03/13/2018    PROT 7.8 03/13/2018    BILITOT 0.5 03/13/2018    WBC 6.40 03/13/2018    HGB 16.1 03/13/2018    HCT 48.0 03/13/2018    MCV 91 03/13/2018     03/13/2018    TSH 1.994 03/13/2018    CHOL 161 03/13/2018    HDL 34 (L) 03/13/2018    LDLCALC 100.2 03/13/2018 "    TRIG 134 03/13/2018       Assessment:     1. Preoperative cardiovascular examination    2. Essential hypertension : Good control.   3. Dyslipidemia :  on high intensity statin with recent non-fasting        Plan:     Partha was seen today for pre-op exam and hypertension.    Diagnoses and all orders for this visit:    Preoperative cardiovascular examination  The Patient is an acceptable cardiovascular risk for the anticipated low risk surgery  Essential hypertension    Dyslipidemia  -     Lipid panel; Future; Expected date: 05/12/2018  .Mediteranian diet recommended  Continue current regimen    Other orders  -     acyclovir (ZOVIRAX) 400 MG tablet;

## 2018-03-13 NOTE — PROGRESS NOTES
"Subjective:       Patient ID: Partha Curtis Jr. is a 58 y.o. male.    Chief Complaint: Establish Care    Partha Curtis Jr. is a 58 y.o. male who presents today to establish care.     He had a recent hospitalization for chest pain where he was diagnosed with "a silent MI"  He has a pain pump in place, but it was going to be taken out this month, however they might defer this due to his silent MI.    Diet: he eats "what he wants to eat." He drinks sprite, but hasn't had any in over a month. He drinks juice daily. He drinks coffee, about 1-2 cups/day with cream and splenda.   Exercise: he has been biking about 2-3 miles about 3-4 times a week.     Flu: UTD  Tdap: sometime in the last 5 years, records pending  Labs: ordered    C-scope: done at [facility]. Many years ago    PMHx: reviewed in EMR and updated  Meds: reviewed in EMR and updated  Shx: reviewed in EMR and updated  FMHx: great uncle and great grandfather had cardiac issues. His maternal grandmother had a CABG.   Social: . He lives with his wife and 2 kids. (5 kids total). He has two dogs , a cat, and two chickens at home. No smokers at home.       Review of Systems   Constitutional: Negative for chills and fever.   Respiratory: Negative for shortness of breath.    Cardiovascular: Negative for chest pain.   Gastrointestinal: Negative for constipation, diarrhea, nausea and vomiting.   Genitourinary: Negative for difficulty urinating and dysuria.   Skin: Negative for rash.         Health Maintenance Due   Topic Date Due    Hepatitis C Screening  1960    Colonoscopy  03/10/2010     Objective:     Vitals:    03/13/18 0920   BP: 104/74   Pulse: 99   Temp: 98.2 °F (36.8 °C)        Physical Exam   Constitutional: He is oriented to person, place, and time. He appears well-developed and well-nourished.   HENT:   Head: Normocephalic and atraumatic.   Right Ear: Tympanic membrane, external ear and ear canal normal.   Left Ear: Tympanic " "membrane, external ear and ear canal normal.   Nose: Nose normal.   Mouth/Throat: Oropharynx is clear and moist and mucous membranes are normal. No tonsillar exudate.   Eyes: Conjunctivae are normal. Pupils are equal, round, and reactive to light.   Neck: Normal range of motion. Neck supple.   Cardiovascular: Normal rate and regular rhythm.  PMI is not displaced.    Scar from carotid surgery noted (R)  No carotid bruits noted   Pulmonary/Chest: Effort normal and breath sounds normal.   Abdominal: Soft. Bowel sounds are normal. He exhibits no distension.   Pain pump noted in right abdomen   Musculoskeletal: He exhibits no edema.   Neurological: He is alert and oriented to person, place, and time. No cranial nerve deficit or sensory deficit. He exhibits normal muscle tone.   Skin: Skin is warm and dry.   Psychiatric: He has a normal mood and affect. His speech is normal and behavior is normal. Judgment and thought content normal.   Nursing note and vitals reviewed.      Assessment:       1. Visit for well man health check    2. Dyslipidemia    3. S/P carotid endarterectomy    4. Essential hypertension    5. Need for hepatitis C screening test    6. Need for diphtheria-tetanus-pertussis (Tdap) vaccine    7. Need for influenza vaccination    8. Colon cancer screening    9. Presence of other specified devices    10. BMI 25.0-25.9,adult    11. Other specified hearing loss of both ears    12. Long-term current use of testosterone replacement therapy    13. Hospital discharge follow-up        Plan:       58-year-old male is here to establish care today.  He was scheduled to get his pain pump removed at an outside hospital but during his preoperative consult he was told he needed cardiac clearance due to an apparent "silent heart attack"    I have reviewed patient's inpatient medical records and although the initial EKG showed possible septal changes, I do not see that on the repeat EKG. Stress test was negative. I have made " an appointment for him to see his cardiologist today so he can have cardiology clearance, if deemed appropriate, for his upcoming surgery.        Visit for well man health check  ?Avoid tobacco  ?Be physically active  ?Maintain a healthy weight  ?Eat a diet rich in fruits, vegetables, and whole grains, and low in saturated/trans fat  ?Limit alcohol consumption  ?Protect against sexually transmitted infections  ?Avoid excess sun  -     Comprehensive metabolic panel; Future; Expected date: 03/13/2018  -     CBC auto differential; Future; Expected date: 03/13/2018  -     Hemoglobin A1c; Future; Expected date: 03/13/2018  -     Lipid panel; Future; Expected date: 03/13/2018  -     TSH; Future; Expected date: 03/13/2018  -     Vitamin D; Future; Expected date: 03/13/2018  -     Hepatitis C antibody; Future; Expected date: 03/13/2018    Dyslipidemia  Continue atorvastatin 40 mg    S/P carotid endarterectomy  Noted    Essential hypertension  Continue lisinopril 30 mg  Continue chlorthalidone 25 mg    Need for hepatitis C screening test  -     Hepatitis C antibody; Future; Expected date: 03/13/2018    Need for diphtheria-tetanus-pertussis (Tdap) vaccine  UTD, records pending    Need for influenza vaccination  UTD    Colon cancer screening  -     Case request GI: COLONOSCOPY    Presence of other specified devices  Has pain pump in place for chronic back pain that has improved  Is trying to get pump removed    BMI 25.0-25.9,adult  Handouts given    Other specified hearing loss of both ears  Has seen audiology in the past, doesn't want to see them again today  Out of network audiology    Long-term current use of testosterone replacement therapy  Sees urology       Warning signs discussed, patient to call with any further issues or worsening of symptoms.

## 2018-03-14 ENCOUNTER — TELEPHONE (OUTPATIENT)
Dept: FAMILY MEDICINE | Facility: CLINIC | Age: 58
End: 2018-03-14

## 2018-03-14 DIAGNOSIS — R73.01 ELEVATED FASTING GLUCOSE: ICD-10-CM

## 2018-03-14 DIAGNOSIS — N18.30 CKD (CHRONIC KIDNEY DISEASE) STAGE 3, GFR 30-59 ML/MIN: Primary | ICD-10-CM

## 2018-03-14 LAB — HCV AB SERPL QL IA: NEGATIVE

## 2018-03-14 NOTE — TELEPHONE ENCOUNTER
Following message sent via patient portal:     Hi,   I attempted to contact you but was unable to reach you this Am. I have reviewed your blood work. It appears that you are prediabetic (a1c of 6.1). Please decrease your intake of white bread, white rice, corn, pasta, potatoes and sugar to prevent diabetes. Regular daily exercise will also decrease your risk of developing diabetes. Your kidney function was also slightly decreased again. Please avoid red meat and NSAIDS to prevent this from getting worse. I have also ordered lab work to be done in 3 months to recheck this. Please schedule this at your convenience. Your other blood work was within normal limits. You still have one blood test pending. Contact my office with any questions.

## 2018-03-22 ENCOUNTER — TELEPHONE (OUTPATIENT)
Dept: GASTROENTEROLOGY | Facility: CLINIC | Age: 58
End: 2018-03-22

## 2018-03-22 ENCOUNTER — PATIENT MESSAGE (OUTPATIENT)
Dept: GASTROENTEROLOGY | Facility: CLINIC | Age: 58
End: 2018-03-22

## 2018-03-22 NOTE — TELEPHONE ENCOUNTER
Referral was sent from Dr. Cavazos to schedule an Colonoscopy, left an voicemail for patient to call the office back in regards to scheduling procedure.

## 2018-03-27 ENCOUNTER — TELEPHONE (OUTPATIENT)
Dept: GASTROENTEROLOGY | Facility: CLINIC | Age: 58
End: 2018-03-27

## 2018-03-28 DIAGNOSIS — I10 ESSENTIAL HYPERTENSION: Chronic | ICD-10-CM

## 2018-03-28 RX ORDER — LISINOPRIL 30 MG/1
TABLET ORAL
Qty: 90 TABLET | Refills: 3 | Status: SHIPPED | OUTPATIENT
Start: 2018-03-28 | End: 2019-03-13

## 2018-04-05 ENCOUNTER — TELEPHONE (OUTPATIENT)
Dept: GASTROENTEROLOGY | Facility: CLINIC | Age: 58
End: 2018-04-05

## 2018-04-17 ENCOUNTER — PATIENT MESSAGE (OUTPATIENT)
Dept: FAMILY MEDICINE | Facility: CLINIC | Age: 58
End: 2018-04-17

## 2018-04-17 DIAGNOSIS — Z12.11 ENCOUNTER FOR SCREENING COLONOSCOPY: Primary | ICD-10-CM

## 2018-04-17 NOTE — TELEPHONE ENCOUNTER
Hi,   I have ordered the c-scope again.     As he stated In his message, these days work best for him: 4/26, 4/27, 4/30, 5/2, 5/3, 5/7.     Thanks.

## 2018-04-23 ENCOUNTER — PATIENT MESSAGE (OUTPATIENT)
Dept: ADMINISTRATIVE | Facility: HOSPITAL | Age: 58
End: 2018-04-23

## 2018-04-25 ENCOUNTER — TELEPHONE (OUTPATIENT)
Dept: GASTROENTEROLOGY | Facility: CLINIC | Age: 58
End: 2018-04-25

## 2018-04-25 NOTE — TELEPHONE ENCOUNTER
Referral was sent from Dr. Murphy to schedule an Colonoscopy, left an voicemail for patient to call the office back in regards to scheduling procedure.

## 2018-04-26 ENCOUNTER — TELEPHONE (OUTPATIENT)
Dept: GASTROENTEROLOGY | Facility: CLINIC | Age: 58
End: 2018-04-26

## 2018-04-27 ENCOUNTER — PATIENT MESSAGE (OUTPATIENT)
Dept: GASTROENTEROLOGY | Facility: CLINIC | Age: 58
End: 2018-04-27

## 2018-04-30 ENCOUNTER — TELEPHONE (OUTPATIENT)
Dept: GASTROENTEROLOGY | Facility: CLINIC | Age: 58
End: 2018-04-30

## 2018-04-30 NOTE — TELEPHONE ENCOUNTER
----- Message from Altagracia Robins sent at 4/30/2018 10:47 AM CDT -----  No. [phone]    Patient is returning Kathia's call.

## 2018-05-07 ENCOUNTER — TELEPHONE (OUTPATIENT)
Dept: GASTROENTEROLOGY | Facility: CLINIC | Age: 58
End: 2018-05-07

## 2018-05-07 NOTE — TELEPHONE ENCOUNTER
A message was left on patient's voice mail to give the office a call back an regards to scheduling a Colonoscopy.

## 2018-05-11 ENCOUNTER — LAB VISIT (OUTPATIENT)
Dept: LAB | Facility: HOSPITAL | Age: 58
End: 2018-05-11
Attending: INTERNAL MEDICINE
Payer: COMMERCIAL

## 2018-05-11 DIAGNOSIS — E78.5 DYSLIPIDEMIA: Chronic | ICD-10-CM

## 2018-05-11 LAB
CHOLEST SERPL-MCNC: 157 MG/DL
CHOLEST/HDLC SERPL: 5.2 {RATIO}
HDLC SERPL-MCNC: 30 MG/DL
HDLC SERPL: 19.1 %
LDLC SERPL CALC-MCNC: 98.2 MG/DL
NONHDLC SERPL-MCNC: 127 MG/DL
TRIGL SERPL-MCNC: 144 MG/DL

## 2018-05-11 PROCEDURE — 36415 COLL VENOUS BLD VENIPUNCTURE: CPT | Mod: PO

## 2018-05-11 PROCEDURE — 80061 LIPID PANEL: CPT

## 2018-08-27 DIAGNOSIS — I10 HYPERTENSION: Chronic | ICD-10-CM

## 2018-08-27 RX ORDER — CHLORTHALIDONE 25 MG/1
TABLET ORAL
Qty: 90 TABLET | Refills: 3 | Status: SHIPPED | OUTPATIENT
Start: 2018-08-27 | End: 2019-10-17 | Stop reason: SDUPTHER

## 2018-09-12 RX ORDER — ATORVASTATIN CALCIUM 40 MG/1
TABLET, FILM COATED ORAL
Qty: 30 TABLET | Refills: 11 | Status: SHIPPED | OUTPATIENT
Start: 2018-09-12 | End: 2019-03-13 | Stop reason: ALTCHOICE

## 2018-10-18 ENCOUNTER — OFFICE VISIT (OUTPATIENT)
Dept: URGENT CARE | Facility: CLINIC | Age: 58
End: 2018-10-18
Payer: COMMERCIAL

## 2018-10-18 DIAGNOSIS — S46.912D MUSCLE STRAIN OF LEFT SHOULDER REGION, SUBSEQUENT ENCOUNTER: ICD-10-CM

## 2018-10-18 DIAGNOSIS — M25.512 LEFT SHOULDER PAIN, UNSPECIFIED CHRONICITY: Primary | ICD-10-CM

## 2018-10-18 PROCEDURE — 99213 OFFICE O/P EST LOW 20 MIN: CPT | Mod: S$GLB,,, | Performed by: NURSE PRACTITIONER

## 2018-10-18 RX ORDER — ETODOLAC 400 MG/1
400 TABLET, FILM COATED ORAL 2 TIMES DAILY
Qty: 30 TABLET | Refills: 0 | Status: SHIPPED | OUTPATIENT
Start: 2018-10-18 | End: 2018-10-18

## 2018-10-18 RX ORDER — ETODOLAC 400 MG/1
400 TABLET, FILM COATED ORAL 2 TIMES DAILY
Qty: 30 TABLET | Refills: 0 | Status: SHIPPED | OUTPATIENT
Start: 2018-10-18 | End: 2018-11-02

## 2018-10-18 NOTE — PATIENT INSTRUCTIONS
Exercises for Shoulder Flexibility: External Rotation    This stretch can help restore shoulder flexibility and relieve pain over time. When stretching, be sure to breathe deeply. Follow any special instructions from your doctor or physical therapist:  1.  a doorway. Grasp the doorjamb with the hand on the frozen side. Your arm should be bent.  2. With the other hand, hold the elbow on the frozen side firmly against your body.  3. Standing in the same spot, rotate your body away from the doorjamb. Stop when you feel the stretch in the shoulder. At first, try to hold the stretch for 5 seconds.  4. Work up to doing 3 sets of this stretch, 3 times a day. Work up to holding the stretch for 30 to 60 seconds.  Note: Keep your arms as still as you can. Over time, rotate your body a little more to enhance the stretch. But be careful not to twist your back.  Frozen shoulder  Frozen shoulder is another name for adhesive capsulitis, which causes restricted movement in the shoulder. If you have frozen shoulder, this stretch may cause discomfort, especially when you first get started. A few months may pass before you achieve the results you want. But once your shoulder heals, it rarely becomes frozen again. So stick to your stretching program. If you have any questions, be sure to ask your doctor.   Date Last Reviewed: 8/16/2015  © 2545-8045 [employer]. [address] [place] [address], [place] [id number]. All rights reserved. This information is not intended as a substitute for professional medical care. Always follow your healthcare professional's instructions.        Understanding the Pain Response  Your pain is important. It can slow healing and keep you from being active. You may have acute or chronic pain. Both types of pain respond to treatment. Work with your healthcare professional. Together you can find relief.  Types of pain  Acute pain is caused by a health problem or injury. The pain usually goes away  when its cause is treated. You may have pain:  · From an illness or injury that needs emergency care  · After an operation, such as heart surgery  · During and after the birth of your baby  Chronic pain lasts 3 to 6 months or more. It can be caused by a health problem or injury, like arthritis or a shoulder strain. Chronic pain can also exist without a clear cause.  Your perception of pain  Pain is a complex phenomenon that involves many of the chemicals found naturally in the spinal cord and brain. All pain signals travel to the brain. The brain sends back signals to protect the body. The brain also makes its own painkillers (endorphins). These can help reduce the pain.     1. Pain starts in 1 or more parts of the body. In some cases, the site of the pain is far from its source.  2. Pain signals move through nerves and up the spinal cord.  3. The brain reads the signals as pain. Natural painkillers are released.  4. The feeling of pain can be reduced in this way.  Date Last Reviewed: 5/1/2017 © 2000-2017 [employer]. [address] [place] [address], [place] [address]. All rights reserved. This information is not intended as a substitute for professional medical care. Always follow your healthcare professional's instructions.

## 2018-10-18 NOTE — PROGRESS NOTES
Subjective:       Patient ID: Partha Curtis Jr. is a 58 y.o. male.    Chief Complaint: Shoulder Pain (2/2018)    Pt returned to the clinic for a follow up visit for left shoulder pain. doi feb 2018 Pt states that 2 months ago his left shoulder began to bother him every time he lift his arm. IJ       Shoulder Pain    The pain is present in the left shoulder. This is a recurrent problem. The current episode started more than 1 month ago (02/14/2018). The problem occurs rarely. The problem has been gradually worsening. The pain is at a severity of 8/10. Associated symptoms include a limited range of motion and stiffness. Pertinent negatives include no fever, headaches, itching or numbness. The symptoms are aggravated by activity. He has tried nothing for the symptoms. The treatment provided no relief. There is no history of gout.     Review of Systems   Constitution: Negative for chills, fever, weakness and malaise/fatigue.   HENT: Negative for congestion, ear pain and nosebleeds.    Eyes: Negative for blurred vision and pain.   Cardiovascular: Negative for chest pain, claudication, palpitations and syncope.   Respiratory: Negative for cough, shortness of breath and wheezing.    Endocrine: Negative for polydipsia, polyphagia and polyuria.   Hematologic/Lymphatic: Negative for adenopathy. Does not bruise/bleed easily.   Skin: Negative for color change, dry skin, itching, rash and suspicious lesions.   Musculoskeletal: Positive for arthritis, joint pain, muscle weakness, myalgias and stiffness. Negative for back pain, gout, joint swelling and neck pain.   Gastrointestinal: Negative for abdominal pain, constipation, diarrhea, nausea and vomiting.   Genitourinary: Negative for dysuria, frequency and hematuria.   Neurological: Negative for dizziness, headaches, numbness and seizures.   Psychiatric/Behavioral: Negative for altered mental status. The patient does not have insomnia (past medical hx) and is not  nervous/anxious.    Allergic/Immunologic: Negative for hives and persistent infections.   All other systems reviewed and are negative.      Objective:      Physical Exam   Constitutional: He is oriented to person, place, and time. He appears well-developed and well-nourished.   Eyes: Conjunctivae and EOM are normal. Pupils are equal, round, and reactive to light.   Neck: Normal range of motion. Neck supple.   Right surgical scar noted and well healed    Cardiovascular: Normal rate and regular rhythm.   Pulmonary/Chest: Effort normal and breath sounds normal.   Musculoskeletal: He exhibits tenderness.        Left shoulder: He exhibits decreased range of motion, tenderness, pain and decreased strength. He exhibits no bony tenderness.        Cervical back: Normal.        Arms:  Neurological: He is alert and oriented to person, place, and time. He has normal reflexes. No cranial nerve deficit or sensory deficit. He exhibits normal muscle tone. GCS eye subscore is 4. GCS verbal subscore is 5. GCS motor subscore is 6.   Skin: Skin is warm, dry and intact.       Assessment:       1. Left shoulder pain, unspecified chronicity    2. Muscle strain of left shoulder region, subsequent encounter        Plan:     Partha was seen today for shoulder pain.    Diagnoses and all orders for this visit:    Left shoulder pain, unspecified chronicity  -     MRI Shoulder Without Contrast Left; Future  -     Discontinue: etodolac (LODINE) 400 MG tablet; Take 1 tablet (400 mg total) by mouth 2 (two) times daily. TAKE WITH FOOD  -     etodolac (LODINE) 400 MG tablet; Take 1 tablet (400 mg total) by mouth 2 (two) times daily. TAKE WITH FOOD    Muscle strain of left shoulder region, subsequent encounter  -     MRI Shoulder Without Contrast Left; Future  -     Discontinue: etodolac (LODINE) 400 MG tablet; Take 1 tablet (400 mg total) by mouth 2 (two) times daily. TAKE WITH FOOD  -     etodolac (LODINE) 400 MG tablet; Take 1 tablet (400 mg  total) by mouth 2 (two) times daily. TAKE WITH FOOD       YOUR PROVIDER HAS REQUESTED A MRI OF YOUR LEFT SHOULDER- PENDING AUTHORIZATION    Medications Ordered This Encounter   Medications    etodolac (LODINE) 400 MG tablet     Sig: Take 1 tablet (400 mg total) by mouth 2 (two) times daily. TAKE WITH FOOD     Dispense:  30 tablet     Refill:  0     Patient Instructions: Attention not to aggravate affected area, Daily home exercises/warm soaks   Restrictions: No lifting/pushing/pulling more than 10 lbs, Avoid frequent bending/lifting/twisting, No above the shoulder/overhead work, Limited use of left hand and arm  Follow-up in about 8 days (around 10/26/2018).

## 2018-10-18 NOTE — LETTER
[facility] [place]  [facility] [address]  [place] [address]  Phone: [phone]  Fax: [phone]    Pt Name: Partha Curtis Jr.  Injury Date: 02/14/2018   Employee ID:  Date of Treatment: 10/18/2018   Company:  [employer]        Appointment Time: NONE Arrived: 1440 PM    Provider: Eros Donaldson NP Time Out:1550 PM      Office Treatment:   EXAM  MRI ONCE APPROVED  LIGHT DUTY    1. Left shoulder pain, unspecified chronicity    2. Muscle strain of left shoulder region, subsequent encounter      Medications Ordered This Encounter   Medications    etodolac (LODINE) 400 MG tablet      Patient Instructions: Attention not to aggravate affected area, Daily home exercises/warm soaks      Restrictions: No lifting/pushing/pulling more than 10 lbs, Avoid frequent bending/lifting/twisting, No above the shoulder/overhead work, Limited use of left hand and arm     Return Appointment: 10/26/2018 at 1430 PM         
[id number]

## 2018-10-24 ENCOUNTER — HOSPITAL ENCOUNTER (OUTPATIENT)
Dept: RADIOLOGY | Facility: HOSPITAL | Age: 58
Discharge: HOME | End: 2018-10-24
Attending: NURSE PRACTITIONER
Payer: COMMERCIAL

## 2018-10-24 DIAGNOSIS — S46.912D MUSCLE STRAIN OF LEFT SHOULDER REGION, SUBSEQUENT ENCOUNTER: ICD-10-CM

## 2018-10-24 DIAGNOSIS — M25.512 LEFT SHOULDER PAIN, UNSPECIFIED CHRONICITY: ICD-10-CM

## 2018-10-31 ENCOUNTER — OFFICE VISIT (OUTPATIENT)
Dept: URGENT CARE | Facility: CLINIC | Age: 58
End: 2018-10-31
Payer: COMMERCIAL

## 2018-10-31 DIAGNOSIS — M67.814 TENDINOSIS OF LEFT SHOULDER: Primary | ICD-10-CM
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
## 2018-10-31 DIAGNOSIS — M75.52 SUBACROMIAL BURSITIS OF LEFT SHOULDER JOINT: ICD-10-CM

## 2018-10-31 PROCEDURE — 99213 OFFICE O/P EST LOW 20 MIN: CPT | Mod: 25,S$GLB,, | Performed by: PHYSICIAN ASSISTANT

## 2018-10-31 PROCEDURE — 20610 DRAIN/INJ JOINT/BURSA W/O US: CPT | Mod: LT,S$GLB,, | Performed by: PHYSICIAN ASSISTANT

## 2018-10-31 RX ORDER — BETAMETHASONE SODIUM PHOSPHATE AND BETAMETHASONE ACETATE 3; 3 MG/ML; MG/ML
6 INJECTION, SUSPENSION INTRA-ARTICULAR; INTRALESIONAL; INTRAMUSCULAR; SOFT TISSUE
Status: DISCONTINUED | OUTPATIENT
Start: 2018-10-31 | End: 2018-10-31 | Stop reason: HOSPADM

## 2018-10-31 RX ADMIN — BETAMETHASONE SODIUM PHOSPHATE AND BETAMETHASONE ACETATE 6 MG: 3; 3 INJECTION, SUSPENSION INTRA-ARTICULAR; INTRALESIONAL; INTRAMUSCULAR; SOFT TISSUE at 04:10

## 2018-10-31 NOTE — LETTER
[facility]  [place]  [address] [address]  Phone: [phone]  Fax: [phone]  [facility]: [phone]     Name: Partha Curtis Jr.  Injury Date: 02/14/2018   Employee ID:  Date of Treatment: 10/31/2018   Company: [employer]        Appointment Time: 02:45 PM Arrived: 1400 PM   Provider: Gerardo Contreras PA-C Time Out:1600 PM      Office Treatment:   EXAM  REGULAR DUTY    1. Tendinosis of left shoulder    2. Subacromial bursitis of left shoulder joint    3. Work related injury          Patient Instructions: Daily home exercises/warm soaks(Continue Etodolac as directed.)      Restrictions: Regular Duty     Return Appointment: 11/7/2018 at 0900 AM

## 2018-10-31 NOTE — PROCEDURES
Large Joint Aspiration/Injection: L subacromial bursa  Date/Time: 10/31/2018 4:08 PM  Performed by: Gerardo Contreras PA-C  Authorized by: Gerardo Contreras PA-C     Consent Done?:  Yes (Verbal)  Indications:  Pain  Procedure site marked: Yes    Timeout: Prior to procedure the correct patient, procedure, and site was verified      Location:  Shoulder  Site:  L subacromial bursa  Prep: Patient was prepped and draped in usual sterile fashion    Needle size:  25 G  Approach:  Anterior  Medications:  6 mg betamethasone acetate-betamethasone sodium phosphate 6 mg/mL  Patient tolerance:  Patient tolerated the procedure well with no immediate complications    Additional Comments: 2cc 1% xylocaine/2cc 2% Carbocaine/1cc celestone mixture injected into left subacromial bursa.

## 2018-10-31 NOTE — PROGRESS NOTES
Subjective:       Patient ID: Partha Curtis Jr. is a 58 y.o. male.    Chief Complaint: Shoulder Pain (LEFT 02/2018)    Pt returned to the clinic for a follow up visit for left shoulder pain. Pt states he is not having any pain at the moment but with certain movements it aches a little. IJ   Pt had MRI left shoulder 10/29/2018      Shoulder Pain    The pain is present in the left shoulder. This is a recurrent problem. The current episode started more than 1 month ago (02/14/2018). The problem occurs rarely. The problem has been gradually improving. The pain is at a severity of 8/10. Pertinent negatives include no fever, headaches, itching, limited range of motion, numbness or stiffness. The symptoms are aggravated by activity. He has tried nothing for the symptoms. The treatment provided no relief. There is no history of gout.     Review of Systems   Constitution: Negative for chills, fever, weakness and malaise/fatigue.   HENT: Negative for congestion, ear pain, hearing loss and nosebleeds.    Eyes: Negative for blurred vision, pain and redness.   Cardiovascular: Negative for chest pain, claudication, palpitations and syncope.   Respiratory: Negative for cough, shortness of breath and wheezing.    Endocrine: Negative for polydipsia, polyphagia and polyuria.   Hematologic/Lymphatic: Negative for adenopathy and bleeding problem. Does not bruise/bleed easily.   Skin: Negative for color change, dry skin, itching, rash and suspicious lesions.   Musculoskeletal: Positive for arthritis, joint pain and muscle weakness. Negative for back pain, gout, joint swelling, myalgias, neck pain and stiffness.   Gastrointestinal: Negative for abdominal pain, constipation, diarrhea, nausea and vomiting.   Genitourinary: Negative for dysuria, frequency and hematuria.   Neurological: Negative for dizziness, headaches, numbness, paresthesias and seizures.   Psychiatric/Behavioral: Negative for altered mental status. The patient does not  have insomnia (past medical hx) and is not nervous/anxious.    Allergic/Immunologic: Negative for hives and persistent infections.   All other systems reviewed and are negative.      Objective:      Physical Exam   Constitutional: He appears well-developed and well-nourished. He is active. No distress.   HENT:   Head: Normocephalic and atraumatic.   Right Ear: Hearing and external ear normal.   Left Ear: Hearing and external ear normal.   Nose: Nose normal. No nasal deformity. No epistaxis.   Mouth/Throat: Oropharynx is clear and moist and mucous membranes are normal.   Eyes: Conjunctivae and lids are normal. No scleral icterus.   Neck: Trachea normal and normal range of motion.   Cardiovascular: Intact distal pulses and normal pulses.   Pulmonary/Chest: Effort normal. No stridor. No respiratory distress.   Musculoskeletal:        Left shoulder: He exhibits tenderness and pain. He exhibits normal pulse and normal strength.   Neurological: He is alert. He has normal strength. He is not disoriented. No sensory deficit. GCS eye subscore is 4. GCS verbal subscore is 5. GCS motor subscore is 6.   Skin: Skin is warm, dry and intact. Capillary refill takes less than 2 seconds. He is not diaphoretic.   Psychiatric: He has a normal mood and affect. His speech is normal and behavior is normal. He is attentive.   Nursing note reviewed.        MRI left shoulder 10/29/2018: Subacromial impingement with bursitis, Supraspinatus tendinosis with low grade articular surface tear of anterior fibers, subscapularis and infraspinatus tendinosis. See report for full details.       Assessment:       1. Tendinosis of left shoulder    2. Subacromial bursitis of left shoulder joint    3. Work related injury        Plan:       Large Joint Aspiration/Injection: L subacromial bursa  Date/Time: 10/31/2018 4:08 PM  Performed by: Gerardo Contreras PA-C  Authorized by: Gerardo Contreras PA-C     Consent Done?:  Yes (Verbal)  Indications:   Pain  Procedure site marked: Yes    Timeout: Prior to procedure the correct patient, procedure, and site was verified      Location:  Shoulder  Site:  L subacromial bursa  Prep: Patient was prepped and draped in usual sterile fashion    Needle size:  25 G  Approach:  Anterior  Medications:  6 mg betamethasone acetate-betamethasone sodium phosphate 6 mg/mL  Patient tolerance:  Patient tolerated the procedure well with no immediate complications    Additional Comments: 2cc 1% xylocaine/2cc 2% Carbocaine/1cc celestone mixture injected into left subacromial bursa.            Patient Instructions: Daily home exercises/warm soaks(Continue Etodolac as directed.)   Restrictions: Regular Duty  Follow-up in about 1 week (around 11/7/2018).

## 2018-11-02 ENCOUNTER — OFFICE VISIT (OUTPATIENT)
Dept: FAMILY MEDICINE | Facility: CLINIC | Age: 58
End: 2018-11-02
Payer: COMMERCIAL

## 2018-11-02 VITALS
HEART RATE: 100 BPM | OXYGEN SATURATION: 99 % | SYSTOLIC BLOOD PRESSURE: 126 MMHG | HEIGHT: 70 IN | WEIGHT: 192.88 LBS | DIASTOLIC BLOOD PRESSURE: 80 MMHG | BODY MASS INDEX: 27.61 KG/M2

## 2018-11-02 DIAGNOSIS — M25.512 ACUTE PAIN OF LEFT SHOULDER: ICD-10-CM

## 2018-11-02 DIAGNOSIS — I65.29 STENOSIS OF CAROTID ARTERY, UNSPECIFIED LATERALITY: ICD-10-CM

## 2018-11-02 DIAGNOSIS — R53.83 FATIGUE, UNSPECIFIED TYPE: ICD-10-CM

## 2018-11-02 DIAGNOSIS — R79.89 LOW TESTOSTERONE: ICD-10-CM

## 2018-11-02 DIAGNOSIS — Z79.890 LONG-TERM CURRENT USE OF TESTOSTERONE REPLACEMENT THERAPY: ICD-10-CM

## 2018-11-02 DIAGNOSIS — N18.30 CKD (CHRONIC KIDNEY DISEASE) STAGE 3, GFR 30-59 ML/MIN: Primary | ICD-10-CM

## 2018-11-02 DIAGNOSIS — R25.2 LEG CRAMPS: ICD-10-CM

## 2018-11-02 DIAGNOSIS — Z12.11 COLON CANCER SCREENING: ICD-10-CM

## 2018-11-02 PROBLEM — R73.9 ELEVATED BLOOD SUGAR: Status: RESOLVED | Noted: 2017-09-22 | Resolved: 2018-11-02

## 2018-11-02 PROBLEM — Z01.810 PREOPERATIVE CARDIOVASCULAR EXAMINATION: Status: RESOLVED | Noted: 2018-03-13 | Resolved: 2018-11-02

## 2018-11-02 PROCEDURE — 3079F DIAST BP 80-89 MM HG: CPT | Mod: CPTII,S$GLB,, | Performed by: FAMILY MEDICINE

## 2018-11-02 PROCEDURE — 99214 OFFICE O/P EST MOD 30 MIN: CPT | Mod: S$GLB,,, | Performed by: FAMILY MEDICINE

## 2018-11-02 PROCEDURE — 3008F BODY MASS INDEX DOCD: CPT | Mod: CPTII,S$GLB,, | Performed by: FAMILY MEDICINE

## 2018-11-02 PROCEDURE — 99999 PR PBB SHADOW E&M-EST. PATIENT-LVL IV: CPT | Mod: PBBFAC,,, | Performed by: FAMILY MEDICINE

## 2018-11-02 PROCEDURE — 3074F SYST BP LT 130 MM HG: CPT | Mod: CPTII,S$GLB,, | Performed by: FAMILY MEDICINE

## 2018-11-02 RX ORDER — DICLOFENAC SODIUM 10 MG/G
2 GEL TOPICAL DAILY
Qty: 100 G | Refills: 0 | Status: SHIPPED | OUTPATIENT
Start: 2018-11-02 | End: 2020-10-07

## 2018-11-02 NOTE — PROGRESS NOTES
"Subjective:       Patient ID: Partha Curtis Jr. is a 58 y.o. male.    Chief Complaint: Follow-up    Partha is a 58 y.o. male who presents today for a follow-up on his chronic medical issues. Since his last visit, he has removed his pain pump.    He had CKD, but did not follow up with a repeat CMP. He is not currently having any urinary symptoms.     He was seeing Urology for low testosterone. He hasn't had the medication since May or June. His initial testosterone level was "less than a 100." he is having associated Erectile dysfunction with this. This has been ongoing for "years."    He is also having fatigue. He is unsure what triggers this. He has to nap during the afternoon. He is also having leg cramps while sleeping. He has no issues with walking or going up stairs. These leg cramps seem to feel like "charley horses" and occur in both legs, at night. He has a history of PAD.       Review of Systems   Constitutional: Positive for fatigue. Negative for chills and fever.   Respiratory: Negative for choking and shortness of breath.    Gastrointestinal: Negative for constipation, diarrhea, nausea and vomiting.   Genitourinary: Negative for difficulty urinating, discharge, dysuria, penile pain, penile swelling, scrotal swelling, testicular pain and urgency.       Objective:     Vitals:    11/02/18 1518   BP: 126/80   Pulse: 100        Physical Exam   Constitutional: He is oriented to person, place, and time. He appears well-developed and well-nourished.   Cardiovascular: Normal rate and regular rhythm.   Scar from carotid surgery noted (R)  No carotid bruits noted    Pulmonary/Chest: Effort normal and breath sounds normal.   Musculoskeletal: He exhibits no edema.   Neurological: He is alert and oriented to person, place, and time. No cranial nerve deficit or sensory deficit. He exhibits normal muscle tone.   Skin: Skin is warm. No rash noted.   Psychiatric: He has a normal mood and affect. His behavior is normal. " Judgment and thought content normal.   Nursing note and vitals reviewed.      Assessment:       1. CKD (chronic kidney disease) stage 3, GFR 30-59 ml/min    2. Long-term current use of testosterone replacement therapy    3. Low testosterone    4. BMI 27.0-27.9,adult    5. Acute pain of left shoulder    6. Fatigue, unspecified type    7. Leg cramps    8. Stenosis of carotid artery, unspecified laterality    9. Colon cancer screening        Plan:       colonoscopy ordered again per patient request.   CMP ordered. Advised to not take NSAIDs. Stopped NSAID prescribed by the urgent care. May need to decrease chlorthalidone?  Check testosterone in the AM (all labs tomorrow AM) and refer to urology for low T and ED  Rule out PAD given history of Carotid artery stenosis with arterial US  Follow up in 6 weeks.     CKD (chronic kidney disease) stage 3, GFR 30-59 ml/min  -     Comprehensive metabolic panel; Future; Expected date: 11/02/2018    Long-term current use of testosterone replacement therapy  -     Ambulatory referral to Urology  -     Ambulatory referral to Urology  -     Testosterone; Future; Expected date: 11/02/2018  -     Testosterone, free; Future; Expected date: 11/02/2018    Low testosterone  -     Ambulatory referral to Urology  -     Testosterone; Future; Expected date: 11/02/2018  -     Testosterone, free; Future; Expected date: 11/02/2018    BMI 27.0-27.9,adult    Acute pain of left shoulder  -     diclofenac sodium (VOLTAREN) 1 % Gel; Apply 2 g topically once daily.  Dispense: 100 g; Refill: 0    Fatigue, unspecified type  -     CBC auto differential; Future; Expected date: 11/02/2018  -     T4, free; Future; Expected date: 11/02/2018  -     TSH; Future; Expected date: 11/02/2018    Leg cramps  -     Cardiology Lab US Lower Extremity Arteries Bilateral; Future    Stenosis of carotid artery, unspecified laterality    Colon cancer screening  -     Case request GI: COLONOSCOPY      Warning signs discussed,  patient to call with any further issues or worsening of symptoms.

## 2018-11-02 NOTE — PATIENT INSTRUCTIONS
Labs Tomorrow AM  Follow up in 6 weeks    No NSAIDS such as ibuprofen or naproxen. Avoid Red meats. Aggressive fluids. Continue monitoring CMP q3-6 months    colonoscopy has been ordered    US of the legs have been ordered to rule out PAD.      Peripheral Artery Disease (PAD)     Peripheral artery disease (PAD) occurs when the arteries that carry blood to the limbs are narrowed or blocked. This is usually due to a buildup of a fatty substance called plaque in the walls of the arteries.  PAD most often affects the arteries in the legs. When these arteries are narrowed or blocked, blood flow to the legs is reduced. This can cause leg and foot pain and other symptoms. If severe enough, reduced blood flow to the legs can lead to tissue death (gangrene) and the loss of a toe, foot, or leg. Having PAD also makes it more likely that arteries in other body areas are blocked. For instance, arteries that carry blood to the heart or brain may be affected. This raises the chances of heart attack and stroke.  Risk factors  Certain factors can make PAD more likely. They include:  · Smoking  · Diabetes  · High blood pressure  · Unhealthy cholesterol levels  · Obesity  · Inactive lifestyle  · Older age  · Family history of PAD  Symptoms  Many people with PAD have no symptoms. If symptoms do occur, they can include:  · Pain in the muscles of the calves, thighs or hips that gets worse with activity and better with rest (intermittent claudication)  · Achy, tired, or heavy feeling in the legs  · Weakness, numbness, tingling, or loss of feeling in the legs  · Changes in skin color of the legs  · Sores on the legs and feet  · Cold leg, feet, or toes  · Pain the feet or toes even when lying down (rest pain)  Home care  PAD is a chronic (lifelong) condition. Treatment is focused on managing your condition and lowering your health risks. This may include doing the following:  · If you smoke, quit. This helps prevent further damage to your  arteries and lowers your health risks. Ask your provider about medicines or products that can help you quit smoking. Also consider joining a stop-smoking program or support group.  · Be more active. This helps you lose weight and manage problems such as high blood pressure and unhealthy cholesterol levels. Start a walking program if advised to by your provider. Your provider may also help you form a safe exercise program that is right for your needs.  · Make healthy eating changes. This includes eating less fat, salt, and sugar.  · Take medicines for high blood pressure, unhealthy cholesterol levels, and diabetes as directed.  · Have your blood pressure and cholesterol levels checked as often as directed.  · If you have diabetes, try to keep your blood sugar well controlled. Test your blood sugar as directed.  · If you are overweight, talk to your provider about forming a weight-loss plan.  · Watch for cuts, scrapes, or open sores on your feet. Poor blood flow to the feet may slow healing and increase the risk of infection from these problems.   Follow-up care  Follow up with your healthcare provider, or as advised. If imaging tests such as ultrasound were done, they will be reviewed by a doctor. You will be told the results and any new findings that may affect your care.  When to seek medical advice   Call your healthcare provider right away if any of these occur:  · Sudden severe pain in the legs or feet  · Sudden cold, pale or blue color in the legs or feet  · Weakness or numbness in the legs or feet that worsens  · Any sore or wound in the legs or feet that wont heal  · Weak pulse in your legs or feet  Know the Signs of Heart Attack and Stroke  People with PAD are at high risk for heart attack and stroke. Knowing the signs of these problems can help you protect your health and get help when you need it. Call 911 right away if you have any of the following:  Signs of a Heart Attack  · Chest discomfort, such as  pain, aching, tightness, or pressure that lasts more than a few minutes, or that comes and goes  · Pain or discomfort in the arms, back, shoulders, neck, or jaw  · Shortness of breath  · Sweating (often a cold, clammy sweat)  · Nausea  · Lightheadedness  Signs of a Stroke  · Sudden numbness or weakness of the face, arms, or legs, especially on one side  · Sudden confusion or trouble speaking or understanding  · Sudden trouble seeing in one or both eyes  · Sudden trouble walking, dizziness, or loss of balance  · Sudden, severe headache with no known cause   Date Last Reviewed: 9/21/2015  © 3396-6334 [employer]. [address] [place] [address], [place] [address]. All rights reserved. This information is not intended as a substitute for professional medical care. Always follow your healthcare professional's instructions.

## 2018-11-03 ENCOUNTER — LAB VISIT (OUTPATIENT)
Dept: LAB | Facility: HOSPITAL | Age: 58
End: 2018-11-03
Attending: FAMILY MEDICINE
Payer: COMMERCIAL

## 2018-11-03 DIAGNOSIS — N18.30 CKD (CHRONIC KIDNEY DISEASE) STAGE 3, GFR 30-59 ML/MIN: ICD-10-CM

## 2018-11-03 DIAGNOSIS — R79.89 LOW TESTOSTERONE: ICD-10-CM

## 2018-11-03 DIAGNOSIS — Z79.890 LONG-TERM CURRENT USE OF TESTOSTERONE REPLACEMENT THERAPY: ICD-10-CM

## 2018-11-03 DIAGNOSIS — R53.83 FATIGUE, UNSPECIFIED TYPE: ICD-10-CM

## 2018-11-03 LAB
ALBUMIN SERPL BCP-MCNC: 4 G/DL
ALP SERPL-CCNC: 74 U/L
ALT SERPL W/O P-5'-P-CCNC: 31 U/L
ANION GAP SERPL CALC-SCNC: 10 MMOL/L
AST SERPL-CCNC: 24 U/L
BASOPHILS # BLD AUTO: 0.04 K/UL
BASOPHILS NFR BLD: 0.4 %
BILIRUB SERPL-MCNC: 0.3 MG/DL
BUN SERPL-MCNC: 24 MG/DL
CALCIUM SERPL-MCNC: 9.6 MG/DL
CHLORIDE SERPL-SCNC: 101 MMOL/L
CO2 SERPL-SCNC: 29 MMOL/L
CREAT SERPL-MCNC: 1.4 MG/DL
DIFFERENTIAL METHOD: ABNORMAL
EOSINOPHIL # BLD AUTO: 0.2 K/UL
EOSINOPHIL NFR BLD: 1.6 %
ERYTHROCYTE [DISTWIDTH] IN BLOOD BY AUTOMATED COUNT: 14.6 %
EST. GFR  (AFRICAN AMERICAN): >60 ML/MIN/1.73 M^2
EST. GFR  (NON AFRICAN AMERICAN): 55 ML/MIN/1.73 M^2
GLUCOSE SERPL-MCNC: 116 MG/DL
HCT VFR BLD AUTO: 41.6 %
HGB BLD-MCNC: 13.5 G/DL
IMM GRANULOCYTES # BLD AUTO: 0.08 K/UL
IMM GRANULOCYTES NFR BLD AUTO: 0.7 %
LYMPHOCYTES # BLD AUTO: 3.4 K/UL
LYMPHOCYTES NFR BLD: 30.4 %
MCH RBC QN AUTO: 31.3 PG
MCHC RBC AUTO-ENTMCNC: 32.5 G/DL
MCV RBC AUTO: 96 FL
MONOCYTES # BLD AUTO: 1 K/UL
MONOCYTES NFR BLD: 8.8 %
NEUTROPHILS # BLD AUTO: 6.4 K/UL
NEUTROPHILS NFR BLD: 58.1 %
NRBC BLD-RTO: 0 /100 WBC
PLATELET # BLD AUTO: 300 K/UL
PMV BLD AUTO: 9.4 FL
POTASSIUM SERPL-SCNC: 4 MMOL/L
PROT SERPL-MCNC: 7 G/DL
RBC # BLD AUTO: 4.32 M/UL
SODIUM SERPL-SCNC: 140 MMOL/L
T4 FREE SERPL-MCNC: 1 NG/DL
TESTOST SERPL-MCNC: 283 NG/DL
TSH SERPL DL<=0.005 MIU/L-ACNC: 3.98 UIU/ML
WBC # BLD AUTO: 11.06 K/UL

## 2018-11-03 PROCEDURE — 85025 COMPLETE CBC W/AUTO DIFF WBC: CPT

## 2018-11-03 PROCEDURE — 84439 ASSAY OF FREE THYROXINE: CPT

## 2018-11-03 PROCEDURE — 84402 ASSAY OF FREE TESTOSTERONE: CPT

## 2018-11-03 PROCEDURE — 84443 ASSAY THYROID STIM HORMONE: CPT

## 2018-11-03 PROCEDURE — 80053 COMPREHEN METABOLIC PANEL: CPT

## 2018-11-03 PROCEDURE — 84403 ASSAY OF TOTAL TESTOSTERONE: CPT

## 2018-11-04 ENCOUNTER — TELEPHONE (OUTPATIENT)
Dept: FAMILY MEDICINE | Facility: CLINIC | Age: 58
End: 2018-11-04

## 2018-11-04 DIAGNOSIS — D64.9 NORMOCYTIC ANEMIA: ICD-10-CM

## 2018-11-04 DIAGNOSIS — N18.30 CKD (CHRONIC KIDNEY DISEASE) STAGE 3, GFR 30-59 ML/MIN: Primary | ICD-10-CM

## 2018-11-05 ENCOUNTER — CLINICAL SUPPORT (OUTPATIENT)
Dept: CARDIOLOGY | Facility: CLINIC | Age: 58
End: 2018-11-05
Attending: FAMILY MEDICINE
Payer: COMMERCIAL

## 2018-11-05 ENCOUNTER — TELEPHONE (OUTPATIENT)
Dept: GASTROENTEROLOGY | Facility: CLINIC | Age: 58
End: 2018-11-05

## 2018-11-05 DIAGNOSIS — R25.2 LEG CRAMPS: ICD-10-CM

## 2018-11-05 LAB
LEFT ANT TIBIAL SYS PSV: 35 CM/S
LEFT CFA PSV: 125 CM/S
LEFT EXTERNAL ILIAC PSV: 139 CM/S
LEFT PERONEAL SYS PSV: 36 CM/S
LEFT POPLITEAL PSV: 96 CM/S
LEFT POST TIBIAL SYS PSV: 90 CM/S
LEFT PROFUNDA SYS PSV: 68 CM/S
LEFT SUPER FEMORAL DIST SYS PSV: 92 CM/S
LEFT SUPER FEMORAL MID SYS PSV: 128 CM/S
LEFT SUPER FEMORAL OSTIAL SYS PSV: 112 CM/S
LEFT SUPER FEMORAL PROX SYS PSV: 139 CM/S
LEFT TIB/PER TRUNK SYS PSV: 67 CM/S
OHS CV LEFT LOWER EXTREMITY ABI (NO CALC): 1.07
OHS CV RIGHT ABI LOWER EXTREMITY (NO CALC): 1.08
RIGHT ANT TIBIAL SYS PSV: 53 CM/S
RIGHT CFA PSV: 120 CM/S
RIGHT EXTERNAL ILLIAC PSV: 96 CM/S
RIGHT POPLITEAL PSV: 88 CM/S
RIGHT POST TIBIAL SYS PSV: 83 CM/S
RIGHT PROFUNDA SYS PSV: 112 CM/S
RIGHT SUPER FEMORAL DIST SYS PSV: 105 CM/S
RIGHT SUPER FEMORAL MID SYS PSV: 126 CM/S
RIGHT SUPER FEMORAL OSTIAL SYS PSV: 114 CM/S
RIGHT SUPER FEMORAL PROX SYS PSV: 99 CM/S
RIGHT TIB/PER TRUNK SYS PSV: 92 CM/S

## 2018-11-05 PROCEDURE — 99999 PR PBB SHADOW E&M-EST. PATIENT-LVL I: CPT | Mod: PBBFAC,,,

## 2018-11-05 PROCEDURE — 93925 LOWER EXTREMITY STUDY: CPT | Mod: S$GLB,,, | Performed by: INTERNAL MEDICINE

## 2018-11-05 NOTE — TELEPHONE ENCOUNTER
Please call and schedule a CBC and CMP for patient in 3 months.     Following message was sent via the portal. Have patient check it if he wants to know his results.       Your TSH is normal as is your T4. Your thyroid is not the cause of your fatigue.     Your testosterone is low, please keep your appointment with urology to manage this and your ED.     Your kidney function is a little low, but improved from your last blood work. Remember to try No NSAIDS such as ibuprofen or naproxen. Avoid Red meats. Aggressive fluids. I will Continue monitoring CMP q3-6 months    You had mild anemia. We can recheck this in 3-6 months after you have your colonoscopy.     My staff will call you to schedule your CBC and CMP in 3 months. Contact me with any questions.

## 2018-11-05 NOTE — TELEPHONE ENCOUNTER
Spoke with patient about scheduling a Colonoscopy. Staff informed patient that he would need an office visit first before scheduling his procedure. Patient made an appointment with Mari Heard on 11/30/2018.

## 2018-11-06 ENCOUNTER — OFFICE VISIT (OUTPATIENT)
Dept: UROLOGY | Facility: CLINIC | Age: 58
End: 2018-11-06
Payer: COMMERCIAL

## 2018-11-06 ENCOUNTER — PATIENT MESSAGE (OUTPATIENT)
Dept: UROLOGY | Facility: CLINIC | Age: 58
End: 2018-11-06

## 2018-11-06 VITALS
HEIGHT: 70 IN | HEART RATE: 97 BPM | TEMPERATURE: 98 F | BODY MASS INDEX: 27.49 KG/M2 | DIASTOLIC BLOOD PRESSURE: 83 MMHG | WEIGHT: 192 LBS | SYSTOLIC BLOOD PRESSURE: 126 MMHG

## 2018-11-06 DIAGNOSIS — E29.1 HYPOGONADISM IN MALE: Primary | ICD-10-CM

## 2018-11-06 DIAGNOSIS — R53.83 FATIGUE, UNSPECIFIED TYPE: ICD-10-CM

## 2018-11-06 DIAGNOSIS — N52.9 ERECTILE DYSFUNCTION, UNSPECIFIED ERECTILE DYSFUNCTION TYPE: ICD-10-CM

## 2018-11-06 PROCEDURE — 99204 OFFICE O/P NEW MOD 45 MIN: CPT | Mod: S$GLB,,, | Performed by: STUDENT IN AN ORGANIZED HEALTH CARE EDUCATION/TRAINING PROGRAM

## 2018-11-06 PROCEDURE — 3074F SYST BP LT 130 MM HG: CPT | Mod: CPTII,S$GLB,, | Performed by: STUDENT IN AN ORGANIZED HEALTH CARE EDUCATION/TRAINING PROGRAM

## 2018-11-06 PROCEDURE — 99999 PR PBB SHADOW E&M-EST. PATIENT-LVL III: CPT | Mod: PBBFAC,,, | Performed by: STUDENT IN AN ORGANIZED HEALTH CARE EDUCATION/TRAINING PROGRAM

## 2018-11-06 PROCEDURE — 3079F DIAST BP 80-89 MM HG: CPT | Mod: CPTII,S$GLB,, | Performed by: STUDENT IN AN ORGANIZED HEALTH CARE EDUCATION/TRAINING PROGRAM

## 2018-11-06 PROCEDURE — 3008F BODY MASS INDEX DOCD: CPT | Mod: CPTII,S$GLB,, | Performed by: STUDENT IN AN ORGANIZED HEALTH CARE EDUCATION/TRAINING PROGRAM

## 2018-11-06 RX ORDER — TESTOSTERONE CYPIONATE 200 MG/ML
200 INJECTION, SOLUTION INTRAMUSCULAR
Qty: 13 ML | Refills: 0 | Status: SHIPPED | OUTPATIENT
Start: 2018-11-06 | End: 2019-05-02 | Stop reason: SDUPTHER

## 2018-11-06 NOTE — LETTER
November 6, 2018      Rocco Cavazos, DO  [address]  [place] [address]           [facility]  [address]  [place] [address]  Phone: [phone]          Patient: Partha Curtis Jr.   MR Number: [id number]   YOB: 1960   Date of Visit: 11/6/2018       Dear Dr. Rocco Cavazos:    Thank you for referring Partha Curtis to me for evaluation. Attached you will find relevant portions of my assessment and plan of care.    If you have questions, please do not hesitate to call me. I look forward to following Partha Curtis along with you.    Sincerely,    Martina Clemente MD    Enclosure  CC:  No Recipients    If you would like to receive this communication electronically, please contact [email or web address] or [phone] to request more information on [employer] Link access.    For providers and/or their staff who would like to refer a patient to [facility], please contact us through our one-stop-shop provider referral line, Rafiq Villatoro, at [phone].    If you feel you have received this communication in error or would no longer like to receive these types of communications, please e-mail [email or web address]

## 2018-11-06 NOTE — PROGRESS NOTES
Subjective:       Patient ID: Partha Curtis Jr. is a 58 y.o. male.    Chief Complaint:  Low testosterone  This is a 58 y.o.  male patient that is new to me.  The patient is referred to me by Dr. Cavazos for evaluation of low testosterone. He was previously recommended to undergo testosterone replacement therapy.  He reported that his initial testosterone level was <100. His symptoms were erectile dysfunction, fatigue, concentration issues.  He has tried testosterone gel in the past. He was optimally controlled on testopel - reached about testosterone 600/700 level however due to insurance not covering testopel, he was switched to testosterone injections. He felt like the injections was never quite optimized. He has been off of testosterone injections for about 7 months.  He was seeing Dr. James in the past at [facility] for his urologic care. He has no LUTS complaints, no weak stream, nocturia. Denies straining. No family history of prostate cancer or BPH.    He pulled up old labs in his phone of testosterone levels:  2015 - 206 5/26/16 - 186  7/22/2016 - 213    PSA from the patient's phone:  6/2/15 - 0.4  1/19/16 - 0.4  5/26/16 - 0.4  3/11/17 - 0.7  10/20/17 - 0.4    Urinalysis 3/2018 - benign, was 1+ blood on dip, 2 RBCs on micro - clinically insignificant hematuria.   LAST PSA  Lab Results   Component Value Date    PSA 0.41 09/15/2008    PSA 0.4 06/15/2007    PSA 0.3 11/11/2006    PSA 0.3 04/25/2006    PSA 0.3 01/28/2005    PSA 0.4 04/13/2004       Lab Results   Component Value Date    CREATININE 1.4 11/03/2018        ---  Past Medical History:   Diagnosis Date    Allergy     Carotid artery occlusion     Chronic back pain     Hyperlipidemia     Hypertension        Past Surgical History:   Procedure Laterality Date    ANKLE SURGERY Left     2    APPENDECTOMY      at age 20    CAROTID ENDARTERECTOMY Right 07/15/2015    CARPAL TUNNEL RELEASE Bilateral     ENDARTERECTOMY-CAROTID Right 7/15/2015    Performed  by CURT Bone III, [facility] at [facility]    NASAL SEPTUM SURGERY      TOTAL KNEE ARTHROPLASTY Right     6 knee surgeries       Family History   Problem Relation Age of Onset    Hypertension Father     Cancer Father     Lung cancer Father     Cancer Mother     Brain cancer Mother        Social History     Tobacco Use    Smoking status: Never Smoker    Smokeless tobacco: Never Used   Substance Use Topics    Alcohol use: Yes     Comment: occas, none recently     Drug use: No       Current Outpatient Medications on File Prior to Visit   Medication Sig Dispense Refill    amitriptyline (ELAVIL) 25 MG tablet Take 25 mg by mouth nightly as needed for Insomnia.      aspirin (ECOTRIN) 81 MG EC tablet Take 325 mg by mouth once daily.       atorvastatin (LIPITOR) 40 MG tablet TAKE ONE TABLET BY MOUTH ONCE DAILY 30 tablet 11    chlorthalidone (HYGROTEN) 25 MG Tab TAKE ONE TABLET BY MOUTH ONCE DAILY 90 tablet 3    diclofenac sodium (VOLTAREN) 1 % Gel Apply 2 g topically once daily. 100 g 0    lisinopril (PRINIVIL,ZESTRIL) 30 MG tablet TAKE ONE TABLET BY MOUTH ONCE DAILY 90 tablet 3    testosterone cypionate (DEPOTESTOTERONE CYPIONATE) 200 mg/mL injection Inject 100 mg into the muscle every 14 (fourteen) days.       No current facility-administered medications on file prior to visit.        Review of patient's allergies indicates:   Allergen Reactions    Stadol [butorphanol tartrate] Rash     Swelling in face    Strawberries [strawberry] Rash       Review of Systems   Constitutional: Negative for chills.   HENT: Negative for congestion.    Eyes: Negative for visual disturbance.   Respiratory: Negative for shortness of breath.    Cardiovascular: Negative for chest pain.   Gastrointestinal: Negative for abdominal distention.   Musculoskeletal: Negative for gait problem.   Skin: Negative for color change.   Neurological: Negative for dizziness.   Psychiatric/Behavioral: Negative for agitation.      "  Objective:      Physical Exam   Constitutional: He appears well-developed and well-nourished.   HENT:   Head: Normocephalic.   Eyes: Pupils are equal, round, and reactive to light.   Neck: Normal range of motion.   Cardiovascular: Intact distal pulses.   Pulmonary/Chest: Effort normal.   Abdominal: Soft. He exhibits no distension. There is no tenderness.   Right abdominal incision - well healed from previous pain pump site. No hernias   Genitourinary:   Genitourinary Comments: Circumcised phallus, normal shaft, normal orthotopic meatus and glans. No lesions.  Bilaterally descended testicles, nontender, normal in contour, no masses.   LUZ - 30-35g smooth no hard nodules, median furrow palpable   Musculoskeletal: Normal range of motion.   Neurological: He is alert.   Skin: Skin is warm and dry.   Psychiatric: He has a normal mood and affect.       Assessment:       1. Hypogonadism in male    2. Erectile dysfunction, unspecified erectile dysfunction type    3. Fatigue, unspecified type        Plan:       1. Will restart patient's Testosterone with 200mg of cypionate injection - 200mg every 2 weeks. He is aware of how to self administer this.   2. Will obtain baseline PSA before starting testosterone therapy.  3. After he administers the injection, we will recheck a CBC and testosterone around 3-5 days after he injects himself to monitor his testosterone response. We will likely also proceed with checking labs 2 weeks right before the next injection. I counseled him that patients on testosterone cypionate that I have taken care of in the past self inject 200mg q2 or 3 weeks. We will monitor the timing of his injections based off of symptoms rather than testosterone levels. There is no optimal "goal" testosterone value.     Hypogonadism in male  -     PSA, Screening; Future; Expected date: 11/06/2018    Erectile dysfunction, unspecified erectile dysfunction type    Fatigue, unspecified type    Other orders  -     " testosterone cypionate (DEPOTESTOTERONE CYPIONATE) 200 mg/mL injection; Inject 1 mL (200 mg total) into the muscle every 14 (fourteen) days. Dispense with needle and syringes  Dispense: 13 mL; Refill: 0

## 2018-11-07 ENCOUNTER — OFFICE VISIT (OUTPATIENT)
Dept: URGENT CARE | Facility: CLINIC | Age: 58
End: 2018-11-07
Payer: COMMERCIAL

## 2018-11-07 ENCOUNTER — TELEPHONE (OUTPATIENT)
Dept: UROLOGY | Facility: CLINIC | Age: 58
End: 2018-11-07

## 2018-11-07 DIAGNOSIS — M67.814 TENDINOSIS OF LEFT SHOULDER: Primary | ICD-10-CM
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
## 2018-11-07 DIAGNOSIS — M75.52 SUBACROMIAL BURSITIS OF LEFT SHOULDER JOINT: ICD-10-CM

## 2018-11-07 PROCEDURE — 99213 OFFICE O/P EST LOW 20 MIN: CPT | Mod: S$GLB,,, | Performed by: PHYSICIAN ASSISTANT

## 2018-11-07 NOTE — PROGRESS NOTES
Subjective:       Patient ID: Partha Curtis Jr. is a 58 y.o. male.    Chief Complaint: Shoulder Pain (Left 2/18/18)    Patient is a follow up for left shoulder from 2/18/18. States no pain, much improved, continues working regular duty off medicines. Ambulatory. MJB      Shoulder Pain    The pain is present in the left shoulder. This is a recurrent problem. The current episode started more than 1 month ago. The problem occurs intermittently. The problem has been rapidly improving. The quality of the pain is described as aching. The pain is at a severity of 3/10. The pain is mild. Associated symptoms include stiffness. Pertinent negatives include no fever, headaches or numbness. He has tried NSAIDS and rest for the symptoms. The treatment provided significant relief.     Review of Systems   Constitution: Negative for chills and fever.   HENT: Negative for hearing loss, nosebleeds and sore throat.    Eyes: Negative for blurred vision and redness.   Cardiovascular: Negative for chest pain and syncope.   Respiratory: Negative for cough and shortness of breath.    Hematologic/Lymphatic: Negative for bleeding problem.   Skin: Negative for color change and rash.   Musculoskeletal: Positive for stiffness. Negative for back pain, joint pain and neck pain.   Gastrointestinal: Negative for abdominal pain, diarrhea, nausea and vomiting.   Neurological: Negative for headaches, numbness and paresthesias.   Psychiatric/Behavioral: The patient is not nervous/anxious.        Objective:      Physical Exam   Constitutional: He appears well-developed and well-nourished. He is active. No distress.   HENT:   Head: Normocephalic and atraumatic.   Right Ear: Hearing and external ear normal.   Left Ear: Hearing and external ear normal.   Nose: Nose normal. No nasal deformity. No epistaxis.   Mouth/Throat: Oropharynx is clear and moist and mucous membranes are normal.   Eyes: Conjunctivae and lids are normal. No scleral icterus.   Neck:  Trachea normal and normal range of motion.   Cardiovascular: Intact distal pulses and normal pulses.   Pulmonary/Chest: Effort normal. No stridor. No respiratory distress.   Musculoskeletal:        Left shoulder: Normal. He exhibits normal range of motion and no tenderness.   Neurological: He is alert. He has normal strength. He is not disoriented. No sensory deficit. GCS eye subscore is 4. GCS verbal subscore is 5. GCS motor subscore is 6.   Skin: Skin is warm, dry and intact. Capillary refill takes less than 2 seconds. He is not diaphoretic.   Psychiatric: He has a normal mood and affect. His speech is normal and behavior is normal. He is attentive.   Nursing note reviewed.      Assessment:       1. Tendinosis of left shoulder    2. Subacromial bursitis of left shoulder joint    3. Work related injury        Plan:                Restrictions: Regular Duty, Discharged from Occupational Health  Follow-up if symptoms worsen or fail to improve.

## 2018-11-07 NOTE — LETTER
[facility] [place]  [address] [place] [address]  Phone: [phone]  Fax: [phone]  [facility]: [phone]    Pt Name: Partha Curtis Jr.  Injury Date: 02/14/2018   Employee ID:  Date of Treatment: 11/07/2018   Company: [employer]        Appointment Time: 08:45 AM Arrived: 0855 AM   Provider: Gerardo Contreras PA-C Time Out:0920 AM     Office Treatment:   EXAM  DISCHARGED TO FULL DUTY    1. Tendinosis of left shoulder    2. Subacromial bursitis of left shoulder joint    3. Work related injury               Restrictions: Regular Duty, Discharged from Occupational Health     Return Appointment: NONE

## 2018-11-07 NOTE — TELEPHONE ENCOUNTER
----- Message from Martina Clemente MD sent at 11/7/2018 10:39 AM CST -----  Hi Ms. Galindo  I put in the orders for the CBC and testosterone. can you setup the blood work for this Saturday at the [place] office as per the patient's request?  Thank you!

## 2018-11-09 LAB — TESTOST FREE SERPL-MCNC: 5.3 PG/ML

## 2018-11-10 ENCOUNTER — LAB VISIT (OUTPATIENT)
Dept: LAB | Facility: HOSPITAL | Age: 58
End: 2018-11-10
Attending: STUDENT IN AN ORGANIZED HEALTH CARE EDUCATION/TRAINING PROGRAM
Payer: COMMERCIAL

## 2018-11-10 DIAGNOSIS — E29.1 HYPOGONADISM IN MALE: ICD-10-CM

## 2018-11-10 LAB — COMPLEXED PSA SERPL-MCNC: 0.7 NG/ML

## 2018-11-10 PROCEDURE — 84153 ASSAY OF PSA TOTAL: CPT

## 2018-11-13 ENCOUNTER — PATIENT MESSAGE (OUTPATIENT)
Dept: UROLOGY | Facility: CLINIC | Age: 58
End: 2018-11-13

## 2018-11-13 DIAGNOSIS — E29.1 HYPOGONADISM IN MALE: Primary | ICD-10-CM

## 2018-11-26 ENCOUNTER — LAB VISIT (OUTPATIENT)
Dept: LAB | Facility: HOSPITAL | Age: 58
End: 2018-11-26
Attending: STUDENT IN AN ORGANIZED HEALTH CARE EDUCATION/TRAINING PROGRAM
Payer: COMMERCIAL

## 2018-11-26 DIAGNOSIS — E29.1 HYPOGONADISM IN MALE: ICD-10-CM

## 2018-11-26 LAB — TESTOST SERPL-MCNC: 178 NG/DL

## 2018-11-26 PROCEDURE — 36415 COLL VENOUS BLD VENIPUNCTURE: CPT | Mod: PO

## 2018-11-26 PROCEDURE — 84403 ASSAY OF TOTAL TESTOSTERONE: CPT

## 2018-11-27 ENCOUNTER — PATIENT MESSAGE (OUTPATIENT)
Dept: UROLOGY | Facility: CLINIC | Age: 58
End: 2018-11-27

## 2018-11-30 RX ORDER — SODIUM, POTASSIUM,MAG SULFATES 17.5-3.13G
SOLUTION, RECONSTITUTED, ORAL ORAL
Qty: 1 KIT | Refills: 0 | Status: SHIPPED | OUTPATIENT
Start: 2018-11-30 | End: 2019-01-30

## 2018-11-30 NOTE — TELEPHONE ENCOUNTER
SUPREP Instructions    You are scheduled for a colonoscopy with Dr. Selby on 12/12/18 at [facility] located at [address] [place] [address].  Check in at the admit desk, first floor of the hospital (which is the building on the left).     You will receive a call 2-3 days before your colonoscopy to tell you the time to arrive.  If you have not received a call by the day before your procedure, call the Endoscopy Lab at [phone].    To ensure that your test is accurate and complete, you MUST follow these instructions listed below.  If you have any questions, please call our office at [phone].  Plan on being at the hospital for your procedure for 3-4 hours.    1.  Follow a CLEAR LIQUID DIET for the entire day before your scheduled colonoscopy.  This means no solid food the entire day starting when you wake.  You may have as much of the clear liquids as you want throughout the day.   CLEAR LIQUID DIET:   - Avoid Red, Orange, Purple, and/or Blue food coloring   - NO DAIRY   - You can have:  Coffee with sugar (no creamer), tea, water, soda, apple or white grape juice, chicken or beef broth/bouillon (no meat, noodles, or veggies), green/yellow popsicles, green/yellow Jell-O, lemonade.    2.  AT 5 pm the evening before your colonoscopy, POUR ONE (1) BOTTLE OF SUPREP INTO THE MIXING CONTAINER, PROVIDED INSIDE THE BOX.  ADD WATER TO THE LINE ON THE CONTAINER AND MIX IT WELL.  DRINK THE ENTIRE CONTAINER AND THEN DRINK TWO (2) MORE CONTAINERS OF WATER OVER THE NEXT 1 HOUR.  This is sometimes easier to drink if this solution is cold, so you can mix the solution a few hours ahead of time and place in the refrigerator prior to drinking.  You have to drink the solution within 24 hours of mixing it.  Do NOT put this solution over ice.  It IS ok to drink with a straw.    3.  The endoscopy department will call you 2 days before your colonoscopy to tell you the exact time to arrive, AND to tell you the exact time to  drink the 2nd portion of your prep (which will be FIVE HOURS BEFORE YOUR ARRIVAL TIME).  At this time given to you, POUR ONE (1) BOTTLE OF SUPREP INTO THE MIXING CONTAINER, PROVIDED INSIDE THE BOX.  ADD WATER TO THE LINE ON THE CONTAINER AND MIX IT WELL.  DRINK THE ENTIRE CONTAINER AND THEN DRINK TWO (2) MORE CONTAINERS OF WATER OVER THE NEXT 1 HOUR.  This is sometimes easier to drink if this solution is cold, so you can mix the solution a few hours ahead of time and place in the refrigerator prior to drinking.  You have to drink the solution within 24 hours of mixing it.  Do NOT put this solution over ice.  It IS ok to drink with a straw. Once this is complete, you may not have ANYTHING else by mouth!    4.  You must have someone with you to DRIVE YOU HOME since you will be receiving IV sedation for the colonoscopy.    5.  It is ok to take your heart, blood pressure, and seizure medications in the morning of your test with a SIP of water.  Hold other medications until after your procedure.  Do NOT have anything else to eat or drink the morning of your colonoscopy.  It is ok to brush your teeth.    6.  If you are on blood thinners THAT YOU HAVE BEEN INSTRUCTED TO HOLD BY YOUR DOCTOR FOR THIS PROCEDURE, then do NOT take this the morning of your colonoscopy.  Do NOT stop these medications on your own, they must be approved to be held by your doctor.  Your colonoscopy can NOT be done if you are on these medications.  Examples of blood thinners include: Coumadin, Aggrenox, Plavix, Pradaxa, Reapro, Pletal, Xarelto, Ticagrelor, Brilinta, Eliquis, and high dose aspirin (325 mg).  You do not have to stop baby aspirin 81 mg.    7.  IF YOU ARE DIABETIC:  NO INSULIN OR ORAL MEDICATIONS THE MORNING OF THE COLONOSCOPY.  TAKE ONLY HALF THE DOSE OF YOUR INSULIN THE DAY BEFORE THE COLONOSCOPY.  DO NOT TAKE ANY ORAL DIABETIC MEDICATIONS THE DAY BEFORE THE COLONOSCOPY.  IF YOU ARE AN INSULIN DEPENDENT DIABETIC WITH UNSTABLE BLOOD  LENNIE, NOTIFY YOUR PRIMARY CARE PHYSICIAN FOR INSTRUCTIONS.

## 2018-11-30 NOTE — TELEPHONE ENCOUNTER
Colonoscopy Referral  Referring Physician: Dr. Cavazos  Date:   Reason for Referral: Screening  Family History of:   Colon polyp: No  Relationship/Age of Onset:   Colon cancer: No  Relationship/Age of Onset:   Patient with:   Hemoccults Done: No  Iron deficient: No  On Blood Thinner: No  Valvular heart disease/valve replacement: No  Anemia Present: No  On NSAID: No  Lung disease: No  Kidney disease: No  Hx of polyps: No  Hx of colon cancer: No  Previous colon evalations: Yes   Colonoscopy  When: 12 years ago  Where: [facility]  Pertinent symptoms:   Current Outpatient Prescriptions:  loperamide (IMODIUM) 1 mg/5 mL solution, Take by mouth every 6 (six) hours as needed for Diarrhea., Disp: , Rfl:   No current facility-administered medications for this visit.   ?  Review of patient's allergies indicates:  No Known Allergies  Patient was scheduled for colonoscopy on 12/12/18 with Dr. Selby at [facility]. Suprep instructions were reviewed with patient.

## 2018-11-30 NOTE — TELEPHONE ENCOUNTER
Spoke with patient in office today and was able to get him schedule for the Colonoscopy. Patient was originally scheduled for abnormal labs, but I did seek advice from Mari and an OV is not required.

## 2018-12-03 ENCOUNTER — PATIENT MESSAGE (OUTPATIENT)
Dept: GASTROENTEROLOGY | Facility: CLINIC | Age: 58
End: 2018-12-03

## 2018-12-12 ENCOUNTER — TELEPHONE (OUTPATIENT)
Dept: ENDOSCOPY | Facility: HOSPITAL | Age: 58
End: 2018-12-12

## 2018-12-12 NOTE — TELEPHONE ENCOUNTER
SUPREP Instructions    You are scheduled for a colonoscopy with Dr. Selby on 12/14/18 with arrival time of 0700.        1.  Follow a CLEAR LIQUID DIET for the entire day before your scheduled colonoscopy.  This means no solid food the entire day starting when you wake.  You may have as much of the clear liquids as you want throughout the day.   CLEAR LIQUID DIET:   - Avoid Red, Orange, Purple, and/or Blue food coloring   - NO DAIRY   - You can have:  Coffee with sugar (no creamer), tea, water, soda, apple or white grape juice, chicken or beef broth/bouillon (no meat, noodles, or veggies), green/yellow popsicles, green/yellow Jell-O, lemonade.    2.  AT 5 pm the evening before your colonoscopy, POUR ONE (1) BOTTLE OF SUPREP INTO THE MIXING CONTAINER, PROVIDED INSIDE THE BOX.  ADD WATER TO THE LINE ON THE CONTAINER AND MIX IT WELL.  DRINK THE ENTIRE CONTAINER AND THEN DRINK TWO (2) MORE CONTAINERS OF WATER OVER THE NEXT 1 HOUR.  This is sometimes easier to drink if this solution is cold, so you can mix the solution a few hours ahead of time and place in the refrigerator prior to drinking.  You have to drink the solution within 24 hours of mixing it.  Do NOT put this solution over ice.  It IS ok to drink with a straw.    3.  The endoscopy department will call you 2 days before your colonoscopy to tell you the exact time to arrive, AND to tell you the exact time to drink the 2nd portion of your prep (which will be FIVE HOURS BEFORE YOUR ARRIVAL TIME 0200 am).  At this time given to you, POUR ONE (1) BOTTLE OF SUPREP INTO THE MIXING CONTAINER, PROVIDED INSIDE THE BOX.  ADD WATER TO THE LINE ON THE CONTAINER AND MIX IT WELL.  DRINK THE ENTIRE CONTAINER AND THEN DRINK TWO (2) MORE CONTAINERS OF WATER OVER THE NEXT 1 HOUR.  This is sometimes easier to drink if this solution is cold, so you can mix the solution a few hours ahead of time and place in the refrigerator prior to drinking.  You have to drink the solution within  24 hours of mixing it.  Do NOT put this solution over ice.  It IS ok to drink with a straw. Once this is complete, you may not have ANYTHING else by mouth!    4.  You must have someone with you to DRIVE YOU HOME since you will be receiving IV sedation for the colonoscopy.    5.  It is ok to take your heart, blood pressure, and seizure medications in the morning of your test with a SIP of water.  Hold other medications until after your procedure.  Do NOT have anything else to eat or drink the morning of your colonoscopy.  It is ok to brush your teeth.    6.  If you are on blood thinners THAT YOU HAVE BEEN INSTRUCTED TO HOLD BY YOUR DOCTOR FOR THIS PROCEDURE, then do NOT take this the morning of your colonoscopy.  Do NOT stop these medications on your own, they must be approved to be held by your doctor.  Your colonoscopy can NOT be done if you are on these medications.  Examples of blood thinners include: Coumadin, Aggrenox, Plavix, Pradaxa, Reapro, Pletal, Xarelto, Ticagrelor, Brilinta, Eliquis, and high dose aspirin (325 mg).  You do not have to stop baby aspirin 81 mg.    7.  IF YOU ARE DIABETIC:  NO INSULIN OR ORAL MEDICATIONS THE MORNING OF THE COLONOSCOPY.  TAKE ONLY HALF THE DOSE OF YOUR INSULIN THE DAY BEFORE THE COLONOSCOPY.  DO NOT TAKE ANY ORAL DIABETIC MEDICATIONS THE DAY BEFORE THE COLONOSCOPY.  IF YOU ARE AN INSULIN DEPENDENT DIABETIC WITH UNSTABLE BLOOD SUGARDS, NOTIFY YOUR PRIMARY CARE PHYSICIAN FOR INSTRUCTIONS.

## 2018-12-14 ENCOUNTER — ANESTHESIA (OUTPATIENT)
Dept: ENDOSCOPY | Facility: HOSPITAL | Age: 58
End: 2018-12-14
Payer: COMMERCIAL

## 2018-12-14 ENCOUNTER — HOSPITAL ENCOUNTER (OUTPATIENT)
Facility: HOSPITAL | Age: 58
Discharge: HOME | End: 2018-12-14
Attending: INTERNAL MEDICINE | Admitting: INTERNAL MEDICINE
Payer: COMMERCIAL

## 2018-12-14 ENCOUNTER — ANESTHESIA EVENT (OUTPATIENT)
Dept: ENDOSCOPY | Facility: HOSPITAL | Age: 58
End: 2018-12-14
Payer: COMMERCIAL

## 2018-12-14 VITALS
SYSTOLIC BLOOD PRESSURE: 127 MMHG | BODY MASS INDEX: 26.46 KG/M2 | OXYGEN SATURATION: 98 % | TEMPERATURE: 98 F | WEIGHT: 189 LBS | DIASTOLIC BLOOD PRESSURE: 83 MMHG | RESPIRATION RATE: 15 BRPM | HEART RATE: 88 BPM | HEIGHT: 71 IN

## 2018-12-14 DIAGNOSIS — Z12.11 COLON CANCER SCREENING: Primary | ICD-10-CM

## 2018-12-14 PROCEDURE — 45380 COLONOSCOPY AND BIOPSY: CPT | Performed by: INTERNAL MEDICINE

## 2018-12-14 PROCEDURE — 63600175 PHARM REV CODE 636 W HCPCS: Performed by: NURSE ANESTHETIST, CERTIFIED REGISTERED

## 2018-12-14 PROCEDURE — 25000003 PHARM REV CODE 250: Performed by: INTERNAL MEDICINE

## 2018-12-14 PROCEDURE — 37000009 HC ANESTHESIA EA ADD 15 MINS: Performed by: INTERNAL MEDICINE

## 2018-12-14 PROCEDURE — 27201012 HC FORCEPS, HOT/COLD, DISP: Performed by: INTERNAL MEDICINE

## 2018-12-14 PROCEDURE — 88305 TISSUE EXAM BY PATHOLOGIST: CPT | Performed by: PATHOLOGY

## 2018-12-14 PROCEDURE — 88305 TISSUE EXAM BY PATHOLOGIST: CPT | Mod: 26,,, | Performed by: PATHOLOGY

## 2018-12-14 PROCEDURE — 45380 COLONOSCOPY AND BIOPSY: CPT | Mod: 33,,, | Performed by: INTERNAL MEDICINE

## 2018-12-14 PROCEDURE — 37000008 HC ANESTHESIA 1ST 15 MINUTES: Performed by: INTERNAL MEDICINE

## 2018-12-14 RX ORDER — PHENYLEPHRINE HYDROCHLORIDE 10 MG/ML
INJECTION INTRAVENOUS
Status: DISCONTINUED | OUTPATIENT
Start: 2018-12-14 | End: 2018-12-14

## 2018-12-14 RX ORDER — PROPOFOL 10 MG/ML
VIAL (ML) INTRAVENOUS
Status: DISCONTINUED | OUTPATIENT
Start: 2018-12-14 | End: 2018-12-14

## 2018-12-14 RX ORDER — SODIUM CHLORIDE 0.9 % (FLUSH) 0.9 %
3 SYRINGE (ML) INJECTION
Status: DISCONTINUED | OUTPATIENT
Start: 2018-12-14 | End: 2018-12-14 | Stop reason: HOSPADM

## 2018-12-14 RX ORDER — PROPOFOL 10 MG/ML
VIAL (ML) INTRAVENOUS CONTINUOUS PRN
Status: DISCONTINUED | OUTPATIENT
Start: 2018-12-14 | End: 2018-12-14

## 2018-12-14 RX ORDER — LIDOCAINE HCL/PF 100 MG/5ML
SYRINGE (ML) INTRAVENOUS
Status: DISCONTINUED | OUTPATIENT
Start: 2018-12-14 | End: 2018-12-14

## 2018-12-14 RX ORDER — SODIUM CHLORIDE 9 MG/ML
INJECTION, SOLUTION INTRAVENOUS CONTINUOUS
Status: DISCONTINUED | OUTPATIENT
Start: 2018-12-14 | End: 2018-12-14 | Stop reason: HOSPADM

## 2018-12-14 RX ADMIN — PHENYLEPHRINE HYDROCHLORIDE 100 MCG: 10 INJECTION INTRAVENOUS at 08:12

## 2018-12-14 RX ADMIN — LIDOCAINE HYDROCHLORIDE 60 MG: 20 INJECTION, SOLUTION INTRAVENOUS at 08:12

## 2018-12-14 RX ADMIN — PROPOFOL 150 MCG/KG/MIN: 10 INJECTION, EMULSION INTRAVENOUS at 08:12

## 2018-12-14 RX ADMIN — SODIUM CHLORIDE: 0.9 INJECTION, SOLUTION INTRAVENOUS at 07:12

## 2018-12-14 RX ADMIN — PROPOFOL 80 MG: 10 INJECTION, EMULSION INTRAVENOUS at 08:12

## 2018-12-14 NOTE — DISCHARGE SUMMARY
Discharge Summary/Instructions after an Endoscopic Procedure    Patient Name: Partha Curtis  Patient MRN: [id number]  Patient YOB: 1960    Friday, December 14, 2018  Silver Selby MD    RESTRICTIONS:  During your procedure today, you received medications for sedation.  These medications may affect your judgment, balance and coordination.  Therefore, for 24 hours, you have the following restrictions:     - DO NOT drive a car, operate machinery, make legal/financial decisions, sign important papers or drink alcohol.      ACTIVITY:  Today: no heavy lifting, straining or running due to procedural sedation/anesthesia.  The following day: return to full activity including work.    DIET:  Eat and drink normally unless instructed otherwise.     TREATMENT FOR COMMON SIDE EFFECTS:  - Mild abdominal pain, nausea, belching, bloating or excessive gas:  rest, eat lightly and use a heating pad.  - Sore Throat: treat with throat lozenges and/or gargle with warm salt water.  - Because air was used during the procedure, expelling large amounts of air from your rectum or belching is normal.  - If a bowel prep was taken, you may not have a bowel movement for 1-3 days.  This is normal.      SYMPTOMS TO WATCH FOR AND REPORT TO YOUR PHYSICIAN:  1. Abdominal pain or bloating, other than gas cramps.  2. Chest pain.  3. Back pain.  4. Signs of infection such as: chills or fever occurring within 24 hours after the procedure.  5. Rectal bleeding, which would show as bright red, maroon, or black stools. (A tablespoon of blood from the rectum is not serious, especially if hemorrhoids are present.)  6. Vomiting.  7. Weakness or dizziness.      GO DIRECTLY TO THE NEAREST EMERGENCY ROOM IF YOU HAVE ANY OF THE FOLLOWING:     Difficulty breathing              Chills and/or fever over 101 F   Persistent vomiting and/or vomiting blood   Severe abdominal pain   Severe chest pain   Black, tarry stools   Bleeding- more than one tablespoon   Any  other symptom or condition that you feel may need urgent attention    Your doctor recommends these additional instructions:  If any biopsies were taken, your doctors clinic will contact you in 1 to 2 weeks with any results.    - Discharge patient to home (ambulatory).   - Await pathology results.   - Repeat colonoscopy in 5 years for surveillance based on pathology results.   - Resume previous diet.    For questions, problems or results please call your physician - Silver Selby MD at Work:  [phone].    EMERGENCY PHONE NUMBER: [phone],  LAB RESULTS: [phone]    IF A COMPLICATION OR EMERGENCY SITUATION ARISES AND YOU ARE UNABLE TO REACH YOUR PHYSICIAN - GO DIRECTLY TO THE EMERGENCY ROOM.

## 2018-12-14 NOTE — [employer] PATIENT INSTRUCTIONS
Discharge Summary/Instructions after an Endoscopic Procedure  Patient Name: Partha Curtis  Patient MRN: [id number]  Patient YOB: 1960  Friday, December 14, 2018  Silver Selby MD  RESTRICTIONS:  During your procedure today, you received medications for sedation.  These   medications may affect your judgment, balance and coordination.  Therefore,   for 24 hours, you have the following restrictions:   - DO NOT drive a car, operate machinery, make legal/financial decisions,   sign important papers or drink alcohol.    ACTIVITY:  Today: no heavy lifting, straining or running due to procedural   sedation/anesthesia.  The following day: return to full activity including work.  DIET:  Eat and drink normally unless instructed otherwise.     TREATMENT FOR COMMON SIDE EFFECTS:  - Mild abdominal pain, nausea, belching, bloating or excessive gas:  rest,   eat lightly and use a heating pad.  - Sore Throat: treat with throat lozenges and/or gargle with warm salt   water.  - Because air was used during the procedure, expelling large amounts of air   from your rectum or belching is normal.  - If a bowel prep was taken, you may not have a bowel movement for 1-3 days.    This is normal.  SYMPTOMS TO WATCH FOR AND REPORT TO YOUR PHYSICIAN:  1. Abdominal pain or bloating, other than gas cramps.  2. Chest pain.  3. Back pain.  4. Signs of infection such as: chills or fever occurring within 24 hours   after the procedure.  5. Rectal bleeding, which would show as bright red, maroon, or black stools.   (A tablespoon of blood from the rectum is not serious, especially if   hemorrhoids are present.)  6. Vomiting.  7. Weakness or dizziness.  GO DIRECTLY TO THE NEAREST EMERGENCY ROOM IF YOU HAVE ANY OF THE FOLLOWING:      Difficulty breathing              Chills and/or fever over 101 F   Persistent vomiting and/or vomiting blood   Severe abdominal pain   Severe chest pain   Black, tarry stools   Bleeding- more than one  tablespoon   Any other symptom or condition that you feel may need urgent attention  Your doctor recommends these additional instructions:  If any biopsies were taken, your doctors clinic will contact you in 1 to 2   weeks with any results.  - Discharge patient to home (ambulatory).   - Await pathology results.   - Repeat colonoscopy in 5 years for surveillance based on pathology results.     - Resume previous diet.  For questions, problems or results please call your physician - Silver Selby MD at Work:  [phone].  EMERGENCY PHONE NUMBER: [phone],  LAB RESULTS: [phone]  IF A COMPLICATION OR EMERGENCY SITUATION ARISES AND YOU ARE UNABLE TO REACH   YOUR PHYSICIAN - GO DIRECTLY TO THE EMERGENCY ROOM.  Silver Selby MD  12/14/2018 8:23:58 AM  This report has been verified and signed electronically.  [employer]

## 2018-12-14 NOTE — ANESTHESIA PREPROCEDURE EVALUATION
12/14/2018  Partha uCrtis Jr. is a 58 y.o., male for colonoscopy under MAC    Past Medical History:   Diagnosis Date    Allergy     Carotid artery occlusion     Chronic back pain     Hyperlipidemia     Hypertension          Anesthesia Evaluation    I have reviewed the Patient Summary Reports.    I have reviewed the Nursing Notes.   I have reviewed the Medications.     Review of Systems  Social:  Alcohol Use, Non-Smoker        Physical Exam  General:  Well nourished    Airway/Jaw/Neck:  Airway Findings: Mallampati: II      Chest/Lungs:  Chest/Lungs Clear    Heart/Vascular:  Heart Findings: Normal          CONCLUSIONS     1 - Normal left ventricular systolic function (EF 60-65%).     2 - Normal left ventricular diastolic function.     3 - Normal right ventricular systolic function .     No evidence of stress induced myocardial ischemia.         This document has been electronically    SIGNED BY: Stacey Oconnor MD On: 03/09/2018 14:33            Anesthesia Plan  Type of Anesthesia, risks & benefits discussed:  Anesthesia Type:  MAC  Patient's Preference:   Intra-op Monitoring Plan:   Intra-op Monitoring Plan Comments:   Post Op Pain Control Plan:   Post Op Pain Control Plan Comments:   Induction:    Beta Blocker:  Patient is not currently on a Beta-Blocker (No further documentation required).       Informed Consent: Patient understands risks and agrees with Anesthesia plan.  Questions answered. Anesthesia consent signed with patient.  ASA Score: 3     Day of Surgery Review of History & Physical:            Ready For Surgery From Anesthesia Perspective.

## 2018-12-14 NOTE — TRANSFER OF CARE
"Anesthesia Transfer of Care Note    Patient: Partha Curtis Jr.    Procedure(s) Performed: Procedure(s) (LRB):  COLONOSCOPYSuprep (N/A)    Patient location: GI    Anesthesia Type: MAC    Transport from OR: Transported from OR on room air with adequate spontaneous ventilation    Post pain: adequate analgesia    Post assessment: no apparent anesthetic complications and tolerated procedure well    Post vital signs: stable    Level of consciousness: awake, alert and oriented    Nausea/Vomiting: no nausea/vomiting    Complications: none    Transfer of care protocol was followed      Last vitals:   Visit Vitals  /73 (BP Location: Left arm, Patient Position: Lying)   Pulse 93   Temp 36.3 °C (97.3 °F) (Tympanic)   Resp 16   Ht 5' 11" (1.803 m)   Wt 85.7 kg (189 lb)   SpO2 96%   BMI 26.36 kg/m²     "

## 2018-12-14 NOTE — H&P
History & Physical - Short Stay  Gastroenterology      SUBJECTIVE:     Procedure: Colonoscopy    Chief Complaint/Indication for Procedure: Screening    History of Present Illness:  Patient is a 58 y.o. male presents with no family history of colon cancer or previous polys here for screening.     PTA Medications   Medication Sig    amitriptyline (ELAVIL) 25 MG tablet Take 25 mg by mouth nightly as needed for Insomnia.    aspirin (ECOTRIN) 81 MG EC tablet Take 325 mg by mouth once daily.     atorvastatin (LIPITOR) 40 MG tablet TAKE ONE TABLET BY MOUTH ONCE DAILY    chlorthalidone (HYGROTEN) 25 MG Tab TAKE ONE TABLET BY MOUTH ONCE DAILY    lisinopril (PRINIVIL,ZESTRIL) 30 MG tablet TAKE ONE TABLET BY MOUTH ONCE DAILY    sodium,potassium,mag sulfates (SUPREP BOWEL PREP KIT) 17.5-3.13-1.6 gram SolR Use as directed    testosterone cypionate (DEPOTESTOTERONE CYPIONATE) 200 mg/mL injection Inject 100 mg into the muscle every 14 (fourteen) days.    diclofenac sodium (VOLTAREN) 1 % Gel Apply 2 g topically once daily.    testosterone cypionate (DEPOTESTOTERONE CYPIONATE) 200 mg/mL injection Inject 1 mL (200 mg total) into the muscle every 14 (fourteen) days. Dispense with needle and syringes       Review of patient's allergies indicates:   Allergen Reactions    Stadol [butorphanol tartrate] Rash     Swelling in face    Strawberries [strawberry] Rash        Past Medical History:   Diagnosis Date    Allergy     Carotid artery occlusion     Chronic back pain     Hyperlipidemia     Hypertension      Past Surgical History:   Procedure Laterality Date    ANKLE SURGERY Left     2    APPENDECTOMY      at age 20    CAROTID ENDARTERECTOMY Right 07/15/2015    CARPAL TUNNEL RELEASE Bilateral     ENDARTERECTOMY-CAROTID Right 7/15/2015    [facility]    NASAL SEPTUM SURGERY      TOTAL KNEE ARTHROPLASTY Right     6 knee surgeries     Family History   Problem Relation Age of  Onset    Hypertension Father     Cancer Father     Lung cancer Father     Cancer Mother     Brain cancer Mother      Social History     Tobacco Use    Smoking status: Never Smoker    Smokeless tobacco: Never Used   Substance Use Topics    Alcohol use: Yes     Comment: occas, none recently     Drug use: No       Review of Systems:  Constitutional: no fever or chills  Respiratory: no cough or shortness of breath  Cardiovascular: no chest pain or palpitations  Gastrointestinal: no nausea or vomiting, no abdominal pain or change in bowel habits    OBJECTIVE:     Vital Signs (Most Recent)  Temp: 97.3 °F (36.3 °C) (12/14/18 0717)  Pulse: 93 (12/14/18 0717)  Resp: 16 (12/14/18 0717)  BP: 129/73 (12/14/18 0717)  SpO2: 96 % (12/14/18 0717)    Physical Exam:  General: well developed, well nourished  Lungs:  clear to auscultation bilaterally and normal respiratory effort  Heart: regular rate, S1, S2 normal  Abdomen: soft, non-tender non-distented; bowel sounds normal; no masses,  no organomegaly    Laboratory  CBC: No results for input(s): WBC, RBC, HGB, HCT, PLT, MCV, MCH, MCHC in the last 168 hours.  CMP: No results for input(s): GLU, CALCIUM, ALBUMIN, PROT, NA, K, CO2, CL, BUN, CREATININE, ALKPHOS, ALT, AST, BILITOT in the last 168 hours.  Coagulation: No results for input(s): LABPROT, INR, APTT in the last 168 hours.      Diagnostic Results:      ASSESSMENT/PLAN:     Colon cancer screening    Plan: Colonoscopy    Anesthesia Plan: MAC    ASA Grade: ASA 2 - Patient with mild systemic disease with no functional limitations     The impression and plan was discussed in detail with the patient and family. All questions have been answered and the patient voices understanding of our plan at this point. The risk of the procedure was discussed in detail which includes but not limited to bleeding, infection, perforation in some cases requiring surgery with its spectrum of complications.

## 2018-12-14 NOTE — ANESTHESIA POSTPROCEDURE EVALUATION
"Anesthesia Post Evaluation    Patient: Partha Curtis JrRoland    Procedure(s) Performed: Procedure(s) (LRB):  COLONOSCOPYSuprep (N/A)    Final Anesthesia Type: MAC  Patient location during evaluation: GI PACU  Patient participation: Yes- Able to Participate  Level of consciousness: awake and alert and oriented  Post-procedure vital signs: reviewed and stable  Pain management: adequate  Airway patency: patent  PONV status at discharge: No PONV  Anesthetic complications: no      Cardiovascular status: blood pressure returned to baseline and hemodynamically stable  Respiratory status: room air, spontaneous ventilation and unassisted  Hydration status: euvolemic  Follow-up not needed.        Visit Vitals  /73 (BP Location: Left arm, Patient Position: Lying)   Pulse 93   Temp 36.3 °C (97.3 °F) (Tympanic)   Resp 16   Ht 5' 11" (1.803 m)   Wt 85.7 kg (189 lb)   SpO2 96%   BMI 26.36 kg/m²       Pain/Meggan Score: No Data Recorded      "

## 2018-12-20 ENCOUNTER — PATIENT MESSAGE (OUTPATIENT)
Dept: ENDOSCOPY | Facility: HOSPITAL | Age: 58
End: 2018-12-20

## 2018-12-20 ENCOUNTER — TELEPHONE (OUTPATIENT)
Dept: ENDOSCOPY | Facility: HOSPITAL | Age: 58
End: 2018-12-20

## 2019-01-30 ENCOUNTER — OFFICE VISIT (OUTPATIENT)
Dept: FAMILY MEDICINE | Facility: CLINIC | Age: 59
End: 2019-01-30
Payer: COMMERCIAL

## 2019-01-30 VITALS
SYSTOLIC BLOOD PRESSURE: 124 MMHG | DIASTOLIC BLOOD PRESSURE: 78 MMHG | OXYGEN SATURATION: 97 % | HEIGHT: 71 IN | TEMPERATURE: 98 F | HEART RATE: 94 BPM | WEIGHT: 198.19 LBS | BODY MASS INDEX: 27.75 KG/M2

## 2019-01-30 DIAGNOSIS — B96.89 ACUTE BACTERIAL SINUSITIS: Primary | ICD-10-CM

## 2019-01-30 DIAGNOSIS — J01.90 ACUTE BACTERIAL SINUSITIS: Primary | ICD-10-CM

## 2019-01-30 PROBLEM — Z12.11 COLON CANCER SCREENING: Status: RESOLVED | Noted: 2018-12-14 | Resolved: 2019-01-30

## 2019-01-30 PROCEDURE — 99214 PR OFFICE/OUTPT VISIT, EST, LEVL IV, 30-39 MIN: ICD-10-PCS | Mod: S$GLB,,, | Performed by: FAMILY MEDICINE

## 2019-01-30 PROCEDURE — 3008F PR BODY MASS INDEX (BMI) DOCUMENTED: ICD-10-PCS | Mod: CPTII,S$GLB,, | Performed by: FAMILY MEDICINE

## 2019-01-30 PROCEDURE — 3074F SYST BP LT 130 MM HG: CPT | Mod: CPTII,S$GLB,, | Performed by: FAMILY MEDICINE

## 2019-01-30 PROCEDURE — 3008F BODY MASS INDEX DOCD: CPT | Mod: CPTII,S$GLB,, | Performed by: FAMILY MEDICINE

## 2019-01-30 PROCEDURE — 99999 PR PBB SHADOW E&M-EST. PATIENT-LVL IV: CPT | Mod: PBBFAC,,, | Performed by: FAMILY MEDICINE

## 2019-01-30 PROCEDURE — 99999 PR PBB SHADOW E&M-EST. PATIENT-LVL IV: ICD-10-PCS | Mod: PBBFAC,,, | Performed by: FAMILY MEDICINE

## 2019-01-30 PROCEDURE — 3074F PR MOST RECENT SYSTOLIC BLOOD PRESSURE < 130 MM HG: ICD-10-PCS | Mod: CPTII,S$GLB,, | Performed by: FAMILY MEDICINE

## 2019-01-30 PROCEDURE — 99214 OFFICE O/P EST MOD 30 MIN: CPT | Mod: S$GLB,,, | Performed by: FAMILY MEDICINE

## 2019-01-30 PROCEDURE — 3078F PR MOST RECENT DIASTOLIC BLOOD PRESSURE < 80 MM HG: ICD-10-PCS | Mod: CPTII,S$GLB,, | Performed by: FAMILY MEDICINE

## 2019-01-30 PROCEDURE — 3078F DIAST BP <80 MM HG: CPT | Mod: CPTII,S$GLB,, | Performed by: FAMILY MEDICINE

## 2019-01-30 RX ORDER — AMOXICILLIN AND CLAVULANATE POTASSIUM 875; 125 MG/1; MG/1
1 TABLET, FILM COATED ORAL EVERY 12 HOURS
Qty: 14 TABLET | Refills: 0 | Status: SHIPPED | OUTPATIENT
Start: 2019-01-30 | End: 2019-02-06

## 2019-01-30 RX ORDER — AZELASTINE 1 MG/ML
1 SPRAY, METERED NASAL 2 TIMES DAILY
Qty: 30 ML | Refills: 11 | Status: SHIPPED | OUTPATIENT
Start: 2019-01-30 | End: 2020-03-20 | Stop reason: SDUPTHER

## 2019-01-30 RX ORDER — BENZONATATE 100 MG/1
100 CAPSULE ORAL 3 TIMES DAILY PRN
Qty: 30 CAPSULE | Refills: 0 | Status: SHIPPED | OUTPATIENT
Start: 2019-01-30 | End: 2019-02-09

## 2019-01-30 RX ORDER — FLUTICASONE PROPIONATE 50 MCG
1 SPRAY, SUSPENSION (ML) NASAL 2 TIMES DAILY
Qty: 16 G | Refills: 0 | Status: SHIPPED | OUTPATIENT
Start: 2019-01-30 | End: 2019-06-22 | Stop reason: SDUPTHER

## 2019-01-30 RX ORDER — OXYMETAZOLINE HCL 0.05 %
1 SPRAY, NON-AEROSOL (ML) NASAL 2 TIMES DAILY
Start: 2019-01-30 | End: 2019-02-02

## 2019-01-30 NOTE — PATIENT INSTRUCTIONS
Discussed diagnosis and treatment of sinusitis  augmentin x 7 days  Suggested brief course of Afrin for 3 days total (OTC)  Flonase BID  Nasal saline spray for congestion every night  Tylenol 500 mg TID as needed for pain  Aggressive fluids  Buckwheat honey for possible cough  Tessalon perles for the cough  Follow up if symptoms aren't resolving    Once antibiotics are complete, start zyrtec every night and astellin nasal spray. Continue flonase    If cough worsens or fevers return, RTC       Acute Bacterial Rhinosinusitis (ABRS)    Acute bacterial rhinosinusitis (ABRS) is an infection of your nasal cavity and sinuses. Its caused by bacteria. Acute means that youve had symptoms for less than 12 weeks.  Understanding your sinuses  The nasal cavity is the large air-filled space behind your nose. The sinuses are a group of spaces formed by the bones of your face. They connect with your nasal cavity. ABRS causes the tissue lining these spaces to become inflamed. Mucus may not drain normally. This leads to facial pain and other symptoms.  What causes ABRS?  ABRS most often follows an upper respiratory infection caused by a virus. Bacteria then infect the lining of your nasal cavity and sinuses. But you can also get ABRS if you have:  · Nasal allergies  · Long-term nasal swelling and congestion not caused by allergies  · Blockage in the nose  Symptoms of ABRS  The symptoms of ABRS may be different for each person, and can include:  · Nasal congestion  · Runny nose  · Fluid draining from the nose down the throat (postnasal drip)  · Headache  · Cough  · Pain in the sinuses  · Thick, colored fluid from the nose (mucus)  · Fever  Diagnosing ABRS  ABRS may be diagnosed if youve had an upper respiratory infection like a cold and cough for longer than 10 to 14 days. Your health care provider will ask about your symptoms and your medical history. The provider will check your vital signs, including your temperature. Youll have  a physical exam. The health care provider will check your ears, nose, and throat. You likely wont need any tests. If ABRS comes back, you may have a culture or other tests.  Treatment for ABRS  Treatment may include:  · Antibiotic medicine. This is for symptoms that last for at least 10 to 14 days.  · Nasal corticosteroid medicine. Drops or spray used in the nose can lessen swelling and congestion.  · Over-the-counter pain medicine. This is to lessen sinus pain and pressure.  · Nasal decongestant medicine. Spray or drops may help to lessen congestion. Do not use them for more than a few days.  · Salt wash (saline irrigation). This can help to loosen mucus.  Possible complications of ABRS  ABRS may come back or become long-term (chronic).  In rare cases, ABRS may cause complications such as:   · Inflamed tissue around the brain and spinal cord (meningitis)  · Inflamed tissue around the eyes (orbital cellulitis)  · Inflamed bones around the sinuses (osteitis)  These problems may need to be treated in a hospital with intravenous (IV) antibiotic medicine or surgery.  When to call the health care provider  Call your health care provider if you have any of the following:  · Symptoms that dont get better, or get worse  · Symptoms that dont get better after 3 to 5 days on antibiotics  · Trouble seeing  · Swelling around your eyes  · Confusion or trouble staying awake   Date Last Reviewed: 3/3/2015  © 2007-8701 [employer]. [address] [place] [address], [place] [address]. All rights reserved. This information is not intended as a substitute for professional medical care. Always follow your healthcare professional's instructions.

## 2019-01-30 NOTE — PROGRESS NOTES
Subjective:       Patient ID: Partha Curtis Jr. is a 58 y.o. male.    Chief Complaint: Cough and Nasal Congestion    Partha is a 58 y.o. male who presents today with cough, congestion, and feeling sick x 1 week. His symptoms started about 1 week ago with a sore throat. [place] was initially helping but then his symptoms worsened over the weekend. His OTC medications were initially providing relief but then nothing was helping. He is having a hacking cough. This is not affected by position. Low grade fever of 100.5 on Saturday.     No recent travel. No recent sick contacts.       Sinusitis   This is a new problem. The current episode started 1 to 4 weeks ago. The problem has been gradually worsening since onset. The maximum temperature recorded prior to his arrival was 100.4 - 100.9 F. The fever has been present for less than 1 day. Associated symptoms include congestion, ear pain, headaches, sinus pressure, sneezing and a sore throat. Pertinent negatives include no chills, coughing, diaphoresis, hoarse voice, neck pain, shortness of breath or swollen glands. Treatments tried: benadryl, allegra, willy seltzer, Halls, tylenol. The treatment provided mild relief.     Review of Systems   Constitutional: Positive for activity change, fatigue and fever. Negative for chills, diaphoresis and unexpected weight change.   HENT: Positive for congestion, ear pain, rhinorrhea, sinus pressure, sinus pain, sneezing and sore throat. Negative for hearing loss, hoarse voice and trouble swallowing.    Eyes: Positive for discharge. Negative for visual disturbance.   Respiratory: Negative for cough, chest tightness, shortness of breath and wheezing.    Cardiovascular: Negative for chest pain and palpitations.   Gastrointestinal: Negative for blood in stool, constipation, diarrhea and vomiting.   Endocrine: Negative for polydipsia and polyuria.   Genitourinary: Negative for difficulty urinating, hematuria and urgency.   Musculoskeletal:  Negative for arthralgias, joint swelling and neck pain.   Neurological: Positive for headaches. Negative for weakness.   Psychiatric/Behavioral: Negative for confusion and dysphoric mood.       Objective:     Vitals:    01/30/19 1455   BP: 124/78   Pulse: 94   Temp: 98.1 °F (36.7 °C)        Physical Exam   Constitutional: He is oriented to person, place, and time. He appears well-developed and well-nourished.   HENT:   Head: Normocephalic and atraumatic.   TM: appear normal BL  Severe Nasal congestion noted   Cobblestoning and pharyngeal erythema without exudate noted. There also appears to be an inflamed, but midline uvula. Patient is able to open his mouth fully without any issue.   BL adenopathy noted, Mild  Full ROM of neck  Mild TTP of sinus BL (frontal)     Eyes: Conjunctivae and EOM are normal. Pupils are equal, round, and reactive to light.   Neck: Normal range of motion. Neck supple.   Cardiovascular: Normal rate and regular rhythm.   Pulmonary/Chest: Effort normal and breath sounds normal. No stridor. No respiratory distress. He has no wheezes.   Mildly diminished but clear throughout without wheezing or rales.    Abdominal: Soft. There is no tenderness.   Musculoskeletal: He exhibits no edema.   Neurological: He is alert and oriented to person, place, and time.   Skin: No rash noted.   Psychiatric: He has a normal mood and affect. His behavior is normal. Judgment and thought content normal.   Nursing note and vitals reviewed.      Assessment:       1. Acute bacterial sinusitis        Plan:       Discussed diagnosis and treatment of sinusitis  augmentin x 7 days  Suggested brief course of Afrin for 3 days total (OTC)  Flonase BID  Nasal saline spray for congestion every night  Tylenol 500 mg TID as needed for pain  Aggressive fluids  Buckwheat honey for possible cough  Tessalon perles for the cough  Follow up if symptoms aren't resolving    Once antibiotics are complete, start zyrtec every night and  astellin nasal spray. Continue flonase    If cough worsens or fevers return, RTC       Acute bacterial sinusitis  -     amoxicillin-clavulanate 875-125mg (AUGMENTIN) 875-125 mg per tablet; Take 1 tablet by mouth every 12 (twelve) hours. for 7 days  Dispense: 14 tablet; Refill: 0  -     fluticasone (FLONASE) 50 mcg/actuation nasal spray; 1 spray (50 mcg total) by Each Nare route 2 (two) times daily.  Dispense: 16 g; Refill: 0  -     oxymetazoline (AFRIN, OXYMETAZOLINE,) 0.05 % nasal spray; 1 spray by Nasal route 2 (two) times daily. for 3 days  -     benzonatate (TESSALON) 100 MG capsule; Take 1 capsule (100 mg total) by mouth 3 (three) times daily as needed for Cough.  Dispense: 30 capsule; Refill: 0  -     azelastine (ASTELIN) 137 mcg (0.1 %) nasal spray; 1 spray (137 mcg total) by Nasal route 2 (two) times daily.  Dispense: 30 mL; Refill: 11        Warning signs discussed, patient to call with any further issues or worsening of symptoms.

## 2019-01-30 NOTE — LETTER
January 30, 2019      [facility]  [address]  [place] [address]  Phone: [phone]  Fax: [phone]       Patient: Partha Curtis   YOB: 1960  Date of Visit: 01/30/2019    To Whom It May Concern:    Yasmeen Curtis  was at [facility] on 01/30/2019. He may return to work/school on 2/1/19 with no restrictions. If you have any questions or concerns, or if I can be of further assistance, please do not hesitate to contact me.    Sincerely,    Rocco Cavazos, DO

## 2019-03-13 ENCOUNTER — OFFICE VISIT (OUTPATIENT)
Dept: CARDIOLOGY | Facility: CLINIC | Age: 59
End: 2019-03-13
Payer: COMMERCIAL

## 2019-03-13 VITALS
HEART RATE: 107 BPM | SYSTOLIC BLOOD PRESSURE: 126 MMHG | DIASTOLIC BLOOD PRESSURE: 69 MMHG | HEIGHT: 70 IN | WEIGHT: 192.69 LBS | BODY MASS INDEX: 27.58 KG/M2

## 2019-03-13 DIAGNOSIS — I10 ESSENTIAL HYPERTENSION: Primary | ICD-10-CM

## 2019-03-13 DIAGNOSIS — Z98.890 S/P CAROTID ENDARTERECTOMY: ICD-10-CM

## 2019-03-13 DIAGNOSIS — E78.5 DYSLIPIDEMIA: Chronic | ICD-10-CM

## 2019-03-13 DIAGNOSIS — N18.30 CKD (CHRONIC KIDNEY DISEASE) STAGE 3, GFR 30-59 ML/MIN: ICD-10-CM

## 2019-03-13 PROCEDURE — 3074F PR MOST RECENT SYSTOLIC BLOOD PRESSURE < 130 MM HG: ICD-10-PCS | Mod: CPTII,S$GLB,, | Performed by: INTERNAL MEDICINE

## 2019-03-13 PROCEDURE — 3008F BODY MASS INDEX DOCD: CPT | Mod: CPTII,S$GLB,, | Performed by: INTERNAL MEDICINE

## 2019-03-13 PROCEDURE — 99214 PR OFFICE/OUTPT VISIT, EST, LEVL IV, 30-39 MIN: ICD-10-PCS | Mod: S$GLB,,, | Performed by: INTERNAL MEDICINE

## 2019-03-13 PROCEDURE — 99214 OFFICE O/P EST MOD 30 MIN: CPT | Mod: S$GLB,,, | Performed by: INTERNAL MEDICINE

## 2019-03-13 PROCEDURE — 3008F PR BODY MASS INDEX (BMI) DOCUMENTED: ICD-10-PCS | Mod: CPTII,S$GLB,, | Performed by: INTERNAL MEDICINE

## 2019-03-13 PROCEDURE — 99999 PR PBB SHADOW E&M-EST. PATIENT-LVL IV: CPT | Mod: PBBFAC,,, | Performed by: INTERNAL MEDICINE

## 2019-03-13 PROCEDURE — 3074F SYST BP LT 130 MM HG: CPT | Mod: CPTII,S$GLB,, | Performed by: INTERNAL MEDICINE

## 2019-03-13 PROCEDURE — 99999 PR PBB SHADOW E&M-EST. PATIENT-LVL IV: ICD-10-PCS | Mod: PBBFAC,,, | Performed by: INTERNAL MEDICINE

## 2019-03-13 PROCEDURE — 3078F DIAST BP <80 MM HG: CPT | Mod: CPTII,S$GLB,, | Performed by: INTERNAL MEDICINE

## 2019-03-13 PROCEDURE — 3078F PR MOST RECENT DIASTOLIC BLOOD PRESSURE < 80 MM HG: ICD-10-PCS | Mod: CPTII,S$GLB,, | Performed by: INTERNAL MEDICINE

## 2019-03-13 RX ORDER — CANDESARTAN 16 MG/1
16 TABLET ORAL DAILY
Qty: 90 TABLET | Refills: 3 | Status: SHIPPED | OUTPATIENT
Start: 2019-03-13 | End: 2020-02-19

## 2019-03-13 RX ORDER — ROSUVASTATIN CALCIUM 40 MG/1
40 TABLET, COATED ORAL NIGHTLY
Qty: 90 TABLET | Refills: 3 | Status: SHIPPED | OUTPATIENT
Start: 2019-03-13 | End: 2020-02-19

## 2019-03-13 NOTE — PROGRESS NOTES
Subjective:   Patient ID:  Partha Curtis Jr. is a 59 y.o. male who presents for follow-up of Essential hypertension (1 yr fu)      HPI:   Partha Curtis Jr. presents for follow up of  hypertension currently with good control although his right arm is in the 140s/. Active and beginning to ride his bike. Partha Curtis Jr. denies chest pain, shortness of breath, palpitations, presyncope , or syncope. Has had a right CEA. Partha Curtis Jr. has dyslipidemia  on high intensity statin with  LDL not at goal and HDL low..  Review of Systems   Constitution: Negative for weakness, malaise/fatigue, weight gain and weight loss.   Eyes: Negative for blurred vision.   Cardiovascular: Negative for chest pain, claudication, cyanosis, dyspnea on exertion, irregular heartbeat, leg swelling, near-syncope, orthopnea, palpitations, paroxysmal nocturnal dyspnea and syncope.   Respiratory: Negative for cough, shortness of breath and wheezing.    Musculoskeletal: Negative for falls and myalgias.   Gastrointestinal: Negative for abdominal pain, heartburn, nausea and vomiting.   Genitourinary: Negative for nocturia.   Neurological: Negative for brief paralysis, dizziness, focal weakness, headaches, numbness and paresthesias.   Psychiatric/Behavioral: Negative for altered mental status.       Current Outpatient Medications   Medication Sig    amitriptyline (ELAVIL) 25 MG tablet Take 25 mg by mouth nightly as needed for Insomnia.    aspirin (ECOTRIN) 81 MG EC tablet Take 325 mg by mouth once daily.     azelastine (ASTELIN) 137 mcg (0.1 %) nasal spray 1 spray (137 mcg total) by Nasal route 2 (two) times daily.    chlorthalidone (HYGROTEN) 25 MG Tab TAKE ONE TABLET BY MOUTH ONCE DAILY    diclofenac sodium (VOLTAREN) 1 % Gel Apply 2 g topically once daily.    ferrous fumarate/vit Bcomp,C (SUPER B COMPLEX ORAL) Take by mouth once daily.    testosterone cypionate (DEPOTESTOTERONE CYPIONATE) 200 mg/mL injection Inject 1 mL (200 mg  "total) into the muscle every 14 (fourteen) days. Dispense with needle and syringes    candesartan (ATACAND) 16 MG tablet Take 1 tablet (16 mg total) by mouth once daily.    rosuvastatin (CRESTOR) 40 MG Tab Take 1 tablet (40 mg total) by mouth every evening.     No current facility-administered medications for this visit.      Objective:   Physical Exam   Constitutional: He is oriented to person, place, and time. He appears well-developed. No distress.   /69 (BP Location: Left arm, Patient Position: Sitting, BP Method: Large (Automatic))   Pulse 107   Ht 5' 10" (1.778 m)   Wt 87.4 kg (192 lb 10.9 oz)   BMI 27.65 kg/m²    HENT:   Head: Normocephalic.   Right Ear: External ear normal.   Left Ear: External ear normal.   Eyes: EOM are normal. Pupils are equal, round, and reactive to light. No scleral icterus.   Neck: Neck supple. No JVD present. No thyromegaly present.   Right CEA scar.   Cardiovascular: Normal rate, regular rhythm, normal heart sounds and intact distal pulses. PMI is not displaced. Exam reveals no gallop and no friction rub.   No murmur heard.  Pulmonary/Chest: Effort normal and breath sounds normal. No respiratory distress. He has no wheezes. He has no rales.   Abdominal: Soft. He exhibits no distension. There is no hepatosplenomegaly. There is no tenderness.   Musculoskeletal: He exhibits no edema or tenderness.   Gait normal   Neurological: He is alert and oriented to person, place, and time.   Skin: Skin is warm and dry. No rash noted.   Psychiatric: He has a normal mood and affect. His behavior is normal.       Lab Results   Component Value Date     11/03/2018    K 4.0 11/03/2018     11/03/2018    CO2 29 11/03/2018    BUN 24 (H) 11/03/2018    CREATININE 1.4 11/03/2018     (H) 11/03/2018    HGBA1C 6.1 (H) 03/13/2018    MG 2.1 03/08/2018    AST 24 11/03/2018    ALT 31 11/03/2018    ALBUMIN 4.0 11/03/2018    PROT 7.0 11/03/2018    BILITOT 0.3 11/03/2018    WBC 10.41 " 11/10/2018    HGB 13.7 (L) 11/10/2018    HCT 42.3 11/10/2018    MCV 94 11/10/2018     11/10/2018    TSH 3.977 11/03/2018    CHOL 157 05/11/2018    HDL 30 (L) 05/11/2018    LDLCALC 98.2 05/11/2018    TRIG 144 05/11/2018       Assessment:     1. Essential hypertension : borderline control   2. Dyslipidemia :  on high intensity statin not at LDL goal   3. S/P carotid endarterectomy    4. CKD (chronic kidney disease) stage 3, GFR 30-59 ml/min        Plan:     Partha was seen today for essential hypertension.    Diagnoses and all orders for this visit:    Essential hypertension  -     candesartan (ATACAND) 16 MG tablet; Take 1 tablet (16 mg total) by mouth once daily in place of Lisinopril.  -     Comprehensive metabolic panel; Future; Expected date: 05/12/2019    Dyslipidemia  -     rosuvastatin (CRESTOR) 40 MG Tab; Take 1 tablet (40 mg total) by mouth every evening in place of Atorvastatin.  -     Lipid panel; Future; Expected date: 05/12/2019  Graded exercise for 30 minutes a day at least 5 days a week suggested.  .Mediteranian diet recommended  S/P carotid endarterectomy    CKD (chronic kidney disease) stage 3, GFR 30-59 ml/min  -     Comprehensive metabolic panel; Future; Expected date: 05/12/2019    Other orders  -     ferrous fumarate/vit Bcomp,C (SUPER B COMPLEX ORAL); Take by mouth once daily.

## 2019-03-13 NOTE — PATIENT INSTRUCTIONS
Stop Lisinopril and begin candesartan 16 mg daily  Change Atorvastatin to Rosuvastatin 40 mg daily   .Mediteranian diet again   Graded exercise for 30 minutes a day at least 5 days a week suggested. Recommended  Continue checking Bp at home and let me know if it stays in the 130s/ or higher.

## 2019-04-26 RX ORDER — TESTOSTERONE CYPIONATE 200 MG/ML
200 INJECTION, SOLUTION INTRAMUSCULAR
Qty: 13 ML | Refills: 0 | OUTPATIENT
Start: 2019-04-26 | End: 2019-10-25

## 2019-05-02 RX ORDER — TESTOSTERONE CYPIONATE 200 MG/ML
200 INJECTION, SOLUTION INTRAMUSCULAR
Qty: 13 ML | Refills: 0 | Status: SHIPPED | OUTPATIENT
Start: 2019-05-02 | End: 2019-10-18 | Stop reason: SDUPTHER

## 2019-05-13 ENCOUNTER — LAB VISIT (OUTPATIENT)
Dept: LAB | Facility: HOSPITAL | Age: 59
End: 2019-05-13
Attending: FAMILY MEDICINE
Payer: COMMERCIAL

## 2019-05-13 DIAGNOSIS — E78.5 DYSLIPIDEMIA: Chronic | ICD-10-CM

## 2019-05-13 DIAGNOSIS — D64.9 NORMOCYTIC ANEMIA: ICD-10-CM

## 2019-05-13 LAB
BASOPHILS # BLD AUTO: 0.05 K/UL (ref 0–0.2)
BASOPHILS NFR BLD: 0.6 % (ref 0–1.9)
CHOLEST SERPL-MCNC: 125 MG/DL (ref 120–199)
CHOLEST/HDLC SERPL: 4.5 {RATIO} (ref 2–5)
DIFFERENTIAL METHOD: ABNORMAL
EOSINOPHIL # BLD AUTO: 0.3 K/UL (ref 0–0.5)
EOSINOPHIL NFR BLD: 3.3 % (ref 0–8)
ERYTHROCYTE [DISTWIDTH] IN BLOOD BY AUTOMATED COUNT: 17.4 % (ref 11.5–14.5)
HCT VFR BLD AUTO: 46 % (ref 40–54)
HDLC SERPL-MCNC: 28 MG/DL (ref 40–75)
HDLC SERPL: 22.4 % (ref 20–50)
HGB BLD-MCNC: 14.4 G/DL (ref 14–18)
IMM GRANULOCYTES # BLD AUTO: 0.02 K/UL (ref 0–0.04)
IMM GRANULOCYTES NFR BLD AUTO: 0.2 % (ref 0–0.5)
LDLC SERPL CALC-MCNC: 68.8 MG/DL (ref 63–159)
LYMPHOCYTES # BLD AUTO: 2.5 K/UL (ref 1–4.8)
LYMPHOCYTES NFR BLD: 28 % (ref 18–48)
MCH RBC QN AUTO: 27.5 PG (ref 27–31)
MCHC RBC AUTO-ENTMCNC: 31.3 G/DL (ref 32–36)
MCV RBC AUTO: 88 FL (ref 82–98)
MONOCYTES # BLD AUTO: 0.8 K/UL (ref 0.3–1)
MONOCYTES NFR BLD: 9.1 % (ref 4–15)
NEUTROPHILS # BLD AUTO: 5.3 K/UL (ref 1.8–7.7)
NEUTROPHILS NFR BLD: 58.8 % (ref 38–73)
NONHDLC SERPL-MCNC: 97 MG/DL
NRBC BLD-RTO: 0 /100 WBC
PLATELET # BLD AUTO: 341 K/UL (ref 150–350)
PMV BLD AUTO: 9.2 FL (ref 9.2–12.9)
RBC # BLD AUTO: 5.23 M/UL (ref 4.6–6.2)
TRIGL SERPL-MCNC: 141 MG/DL (ref 30–150)
WBC # BLD AUTO: 8.99 K/UL (ref 3.9–12.7)

## 2019-05-13 PROCEDURE — 85025 COMPLETE CBC W/AUTO DIFF WBC: CPT

## 2019-05-13 PROCEDURE — 80061 LIPID PANEL: CPT

## 2019-05-13 PROCEDURE — 36415 COLL VENOUS BLD VENIPUNCTURE: CPT | Mod: PO

## 2019-05-30 ENCOUNTER — PATIENT MESSAGE (OUTPATIENT)
Dept: FAMILY MEDICINE | Facility: CLINIC | Age: 59
End: 2019-05-30

## 2019-05-30 ENCOUNTER — TELEPHONE (OUTPATIENT)
Dept: DERMATOLOGY | Facility: CLINIC | Age: 59
End: 2019-05-30

## 2019-05-30 DIAGNOSIS — Z12.83 SKIN EXAM, SCREENING FOR CANCER: Primary | ICD-10-CM

## 2019-05-30 NOTE — TELEPHONE ENCOUNTER
----- Message from Joselyn Garcia sent at 5/30/2019  2:41 PM CDT -----  Good afternoon,   The patient has a referral from Dr. Cavazos, the diagnosis is Skin exam, screening for cancer. Patient sent a message to the office stating he has a few moles and would like them checked out. Can someone contact the patient to schedule?    Thank you

## 2019-06-22 DIAGNOSIS — J01.90 ACUTE BACTERIAL SINUSITIS: ICD-10-CM

## 2019-06-22 DIAGNOSIS — B96.89 ACUTE BACTERIAL SINUSITIS: ICD-10-CM

## 2019-06-23 RX ORDER — FLUTICASONE PROPIONATE 50 MCG
SPRAY, SUSPENSION (ML) NASAL
Qty: 1 BOTTLE | Refills: 0 | Status: SHIPPED | OUTPATIENT
Start: 2019-06-23 | End: 2020-03-12

## 2019-07-29 DIAGNOSIS — I65.29 STENOSIS OF CAROTID ARTERY, UNSPECIFIED LATERALITY: Primary | ICD-10-CM

## 2019-08-13 ENCOUNTER — HOSPITAL ENCOUNTER (OUTPATIENT)
Dept: VASCULAR SURGERY | Facility: CLINIC | Age: 59
Discharge: HOME | End: 2019-08-13
Attending: SURGERY
Payer: COMMERCIAL

## 2019-08-13 ENCOUNTER — OFFICE VISIT (OUTPATIENT)
Dept: VASCULAR SURGERY | Facility: CLINIC | Age: 59
End: 2019-08-13
Payer: COMMERCIAL

## 2019-08-13 VITALS
SYSTOLIC BLOOD PRESSURE: 129 MMHG | BODY MASS INDEX: 27.47 KG/M2 | DIASTOLIC BLOOD PRESSURE: 71 MMHG | HEIGHT: 71 IN | HEART RATE: 96 BPM | WEIGHT: 196.19 LBS | TEMPERATURE: 99 F

## 2019-08-13 DIAGNOSIS — I65.29 STENOSIS OF CAROTID ARTERY, UNSPECIFIED LATERALITY: ICD-10-CM

## 2019-08-13 DIAGNOSIS — Z98.890 S/P CAROTID ENDARTERECTOMY: ICD-10-CM

## 2019-08-13 DIAGNOSIS — I65.23 BILATERAL CAROTID ARTERY STENOSIS: ICD-10-CM

## 2019-08-13 DIAGNOSIS — I65.23 BILATERAL CAROTID ARTERY STENOSIS: Primary | ICD-10-CM

## 2019-08-13 PROCEDURE — 99999 PR PBB SHADOW E&M-EST. PATIENT-LVL III: ICD-10-PCS | Mod: PBBFAC,,, | Performed by: SURGERY

## 2019-08-13 PROCEDURE — 3074F PR MOST RECENT SYSTOLIC BLOOD PRESSURE < 130 MM HG: ICD-10-PCS | Mod: CPTII,S$GLB,, | Performed by: SURGERY

## 2019-08-13 PROCEDURE — 3008F PR BODY MASS INDEX (BMI) DOCUMENTED: ICD-10-PCS | Mod: CPTII,S$GLB,, | Performed by: SURGERY

## 2019-08-13 PROCEDURE — 3074F SYST BP LT 130 MM HG: CPT | Mod: CPTII,S$GLB,, | Performed by: SURGERY

## 2019-08-13 PROCEDURE — 99999 PR PBB SHADOW E&M-EST. PATIENT-LVL III: CPT | Mod: PBBFAC,,, | Performed by: SURGERY

## 2019-08-13 PROCEDURE — 93880 EXTRACRANIAL BILAT STUDY: CPT | Mod: S$GLB,,, | Performed by: SURGERY

## 2019-08-13 PROCEDURE — 3078F DIAST BP <80 MM HG: CPT | Mod: CPTII,S$GLB,, | Performed by: SURGERY

## 2019-08-13 PROCEDURE — 99214 OFFICE O/P EST MOD 30 MIN: CPT | Mod: S$GLB,,, | Performed by: SURGERY

## 2019-08-13 PROCEDURE — 3078F PR MOST RECENT DIASTOLIC BLOOD PRESSURE < 80 MM HG: ICD-10-PCS | Mod: CPTII,S$GLB,, | Performed by: SURGERY

## 2019-08-13 PROCEDURE — 3008F BODY MASS INDEX DOCD: CPT | Mod: CPTII,S$GLB,, | Performed by: SURGERY

## 2019-08-13 PROCEDURE — 99214 PR OFFICE/OUTPT VISIT, EST, LEVL IV, 30-39 MIN: ICD-10-PCS | Mod: S$GLB,,, | Performed by: SURGERY

## 2019-08-13 PROCEDURE — 93880 PR DUPLEX SCAN EXTRACRANIAL,BILAT: ICD-10-PCS | Mod: S$GLB,,, | Performed by: SURGERY

## 2019-08-13 NOTE — PROGRESS NOTES
See my prior note; review of systems, family history and social history are   unchanged.    HISTORY OF PRESENT ILLNESS:  A 59-year-old male status post: right carotid   endarterectomy 07/15/2015.    This is a 2-year followup.  He denies stroke, TIA, or amaurosis.    PAST MEDICAL HISTORY:  Severe chronic back pain, for which he has been on   chronic indwelling pump.  Remarkably, his pain has resolved and he is about to   get his pump out.    MEDICINES:  Include aspirin and statin.    SOCIAL:  No smoking    PHYSICAL EXAMINATION:  VITAL SIGNS:  See nursing note.  NEUROLOGIC:  Cranial nerve VII through XII intact, 5/5 motor strength in all   extremities.    IMAGING:  Carotid duplex shows no carotid stenosis bilaterally. Stable    ASSESSMENT:  4-year followup, right CEA  doing well.    RECOMMENDATIONS:  Continue current medical therapy.    surveillance to follow up in two years with screening carotid duplex.    LEAH Bone III, MD, FACS  Professor and Chief, Vascular and Endovascular Surgery

## 2019-09-12 ENCOUNTER — TELEPHONE (OUTPATIENT)
Dept: UROLOGY | Facility: CLINIC | Age: 59
End: 2019-09-12

## 2019-09-12 NOTE — TELEPHONE ENCOUNTER
Returned call, spoke with pharmacist.  It is now a requirement by LAW that a prescription is needed for syringes to inject testosterone.  Informed Dr Clemente is out until October, patient to request this from primary MD at this time.  Urologists covering for Dr Clemente will not authorize refill.  Patient last office visit was November 2018.  Voiced understanding.

## 2019-09-12 NOTE — TELEPHONE ENCOUNTER
----- Message from Vidya Phoenix sent at 9/12/2019  2:51 PM CDT -----  Contact: Jenifer vee/[facility] [place] [phone]   Jenifer is requesting a new prescription for Syringes 3ml 22gauge x1 1/2 inches. Please advise

## 2019-09-12 NOTE — TELEPHONE ENCOUNTER
----- Message from Altagracia Robins sent at 9/12/2019  8:50 AM CDT -----  [facility] called.   No. [phone]    Patient needs refill on syringes.

## 2019-10-17 DIAGNOSIS — I10 HYPERTENSION: Chronic | ICD-10-CM

## 2019-10-18 RX ORDER — TESTOSTERONE CYPIONATE 200 MG/ML
200 INJECTION, SOLUTION INTRAMUSCULAR
Qty: 13 ML | Refills: 0 | Status: SHIPPED | OUTPATIENT
Start: 2019-10-18 | End: 2020-03-24

## 2019-10-18 RX ORDER — CHLORTHALIDONE 25 MG/1
TABLET ORAL
Qty: 90 TABLET | Refills: 3 | Status: SHIPPED | OUTPATIENT
Start: 2019-10-18 | End: 2020-03-20 | Stop reason: SDUPTHER

## 2020-02-19 DIAGNOSIS — I10 ESSENTIAL HYPERTENSION: ICD-10-CM

## 2020-02-19 DIAGNOSIS — E78.5 DYSLIPIDEMIA: Chronic | ICD-10-CM

## 2020-02-19 RX ORDER — ROSUVASTATIN CALCIUM 40 MG/1
TABLET, COATED ORAL
Qty: 90 TABLET | Refills: 0 | Status: SHIPPED | OUTPATIENT
Start: 2020-02-19 | End: 2020-03-20 | Stop reason: SDUPTHER

## 2020-02-19 RX ORDER — CANDESARTAN 16 MG/1
TABLET ORAL
Qty: 90 TABLET | Refills: 0 | Status: SHIPPED | OUTPATIENT
Start: 2020-02-19 | End: 2020-03-10

## 2020-03-10 DIAGNOSIS — I10 ESSENTIAL HYPERTENSION: ICD-10-CM

## 2020-03-10 RX ORDER — CANDESARTAN 16 MG/1
TABLET ORAL
Qty: 90 TABLET | Refills: 0 | Status: SHIPPED | OUTPATIENT
Start: 2020-03-10 | End: 2020-03-20 | Stop reason: SDUPTHER

## 2020-03-12 ENCOUNTER — TELEPHONE (OUTPATIENT)
Dept: INTERNAL MEDICINE | Facility: CLINIC | Age: 60
End: 2020-03-12

## 2020-03-12 ENCOUNTER — NURSE TRIAGE (OUTPATIENT)
Dept: ADMINISTRATIVE | Facility: CLINIC | Age: 60
End: 2020-03-12

## 2020-03-12 DIAGNOSIS — J01.90 ACUTE BACTERIAL SINUSITIS: Primary | ICD-10-CM

## 2020-03-12 DIAGNOSIS — B96.89 ACUTE BACTERIAL SINUSITIS: Primary | ICD-10-CM

## 2020-03-12 RX ORDER — PROMETHAZINE HYDROCHLORIDE AND DEXTROMETHORPHAN HYDROBROMIDE 6.25; 15 MG/5ML; MG/5ML
5 SYRUP ORAL NIGHTLY PRN
Qty: 180 ML | Refills: 0 | Status: SHIPPED | OUTPATIENT
Start: 2020-03-12 | End: 2020-03-22

## 2020-03-12 RX ORDER — AMOXICILLIN 500 MG/1
500 TABLET, FILM COATED ORAL EVERY 12 HOURS
Qty: 20 TABLET | Refills: 0 | Status: SHIPPED | OUTPATIENT
Start: 2020-03-12 | End: 2020-03-22

## 2020-03-12 RX ORDER — BENZONATATE 100 MG/1
100 CAPSULE ORAL 3 TIMES DAILY PRN
Qty: 30 CAPSULE | Refills: 0 | Status: SHIPPED | OUTPATIENT
Start: 2020-03-12 | End: 2020-03-22

## 2020-03-12 NOTE — TELEPHONE ENCOUNTER
Contacted patient and recommended he contact the patient hotline at [phone]. He verbalized understanding and his appt today will be cancelled.

## 2020-03-12 NOTE — TELEPHONE ENCOUNTER
I spoke with patient and he state that he's having cough,sore throat and congestion. Patient advised that  would give him a return call before the end of clinic today. Patient voiced understanding.

## 2020-03-12 NOTE — TELEPHONE ENCOUNTER
----- Message from Vidya DUNAWAYRoland Jamisonanjumradha sent at 3/12/2020  8:43 AM CDT -----  Contact: [phone] self  Pt is requesting to be seen today.  Pt stated that he traveled to [facility] for [place] week. Pt is experiencing dry cough and pt stated he has a low grade fever of 99. Pt stated that he thinks that it is just a sinus issue but is little worried due to the coronavirus.  Please advise

## 2020-03-12 NOTE — TELEPHONE ENCOUNTER
Tuesday patient states that he had symptoms of COVID-19 such as sore throat, dry cough; patient's PCP cancelled appointment and told him that he had to call the nurse on call line for these symptoms. Referred to [facility].    Reason for Disposition   Patient wants to be seen    Additional Information   Negative: SEVERE difficulty breathing (e.g., struggling for each breath, speaks in single words)   Negative: Sounds like a life-threatening emergency to the triager   Negative: Drooling or spitting out saliva (because can't swallow)   Negative: Unable to open mouth completely   Negative: Drinking very little and has signs of dehydration (e.g., no urine > 12 hours, very dry mouth, very lightheaded)   Negative: Patient sounds very sick or weak to the triager   Negative: Difficulty breathing (per caller) but not severe   Negative: Fever > 104 F (40 C)   Negative: Refuses to drink anything for > 12 hours    Protocols used: ST SORE THROAT-A-OH

## 2020-03-12 NOTE — TELEPHONE ENCOUNTER
Called patient. Has been having cough and congestion. Fever overnight of 99 and 100.9. Not wheezing. Not myalgias. No travel. No myalgias.     Will call in empiric abx x 1. Cough medication.     Discussed warning signs and when to call back, go to the ED or seek immediate medical attention.

## 2020-03-20 ENCOUNTER — OFFICE VISIT (OUTPATIENT)
Dept: FAMILY MEDICINE | Facility: CLINIC | Age: 60
End: 2020-03-20
Payer: COMMERCIAL

## 2020-03-20 VITALS
HEIGHT: 71 IN | BODY MASS INDEX: 27.81 KG/M2 | SYSTOLIC BLOOD PRESSURE: 110 MMHG | WEIGHT: 198.63 LBS | DIASTOLIC BLOOD PRESSURE: 76 MMHG | OXYGEN SATURATION: 97 % | TEMPERATURE: 98 F | HEART RATE: 96 BPM

## 2020-03-20 DIAGNOSIS — R73.01 ELEVATED FASTING GLUCOSE: ICD-10-CM

## 2020-03-20 DIAGNOSIS — E78.5 DYSLIPIDEMIA: Chronic | ICD-10-CM

## 2020-03-20 DIAGNOSIS — Z12.5 SCREENING PSA (PROSTATE SPECIFIC ANTIGEN): ICD-10-CM

## 2020-03-20 DIAGNOSIS — Z00.00 VISIT FOR WELL MAN HEALTH CHECK: Primary | ICD-10-CM

## 2020-03-20 DIAGNOSIS — B96.89 ACUTE BACTERIAL SINUSITIS: ICD-10-CM

## 2020-03-20 DIAGNOSIS — J01.90 ACUTE BACTERIAL SINUSITIS: ICD-10-CM

## 2020-03-20 DIAGNOSIS — N18.30 CKD (CHRONIC KIDNEY DISEASE) STAGE 3, GFR 30-59 ML/MIN: ICD-10-CM

## 2020-03-20 DIAGNOSIS — M25.40 JOINT SWELLING: ICD-10-CM

## 2020-03-20 DIAGNOSIS — I10 ESSENTIAL HYPERTENSION: ICD-10-CM

## 2020-03-20 DIAGNOSIS — Z79.890 LONG-TERM CURRENT USE OF TESTOSTERONE REPLACEMENT THERAPY: ICD-10-CM

## 2020-03-20 PROBLEM — R07.9 CHEST PAIN: Status: RESOLVED | Noted: 2018-03-09 | Resolved: 2020-03-20

## 2020-03-20 PROCEDURE — 3078F PR MOST RECENT DIASTOLIC BLOOD PRESSURE < 80 MM HG: ICD-10-PCS | Mod: CPTII,S$GLB,, | Performed by: FAMILY MEDICINE

## 2020-03-20 PROCEDURE — 99396 PREV VISIT EST AGE 40-64: CPT | Mod: S$GLB,,, | Performed by: FAMILY MEDICINE

## 2020-03-20 PROCEDURE — 3074F SYST BP LT 130 MM HG: CPT | Mod: CPTII,S$GLB,, | Performed by: FAMILY MEDICINE

## 2020-03-20 PROCEDURE — 99396 PR PREVENTIVE VISIT,EST,40-64: ICD-10-PCS | Mod: S$GLB,,, | Performed by: FAMILY MEDICINE

## 2020-03-20 PROCEDURE — 99999 PR PBB SHADOW E&M-EST. PATIENT-LVL III: ICD-10-PCS | Mod: PBBFAC,,, | Performed by: FAMILY MEDICINE

## 2020-03-20 PROCEDURE — 3074F PR MOST RECENT SYSTOLIC BLOOD PRESSURE < 130 MM HG: ICD-10-PCS | Mod: CPTII,S$GLB,, | Performed by: FAMILY MEDICINE

## 2020-03-20 PROCEDURE — 3078F DIAST BP <80 MM HG: CPT | Mod: CPTII,S$GLB,, | Performed by: FAMILY MEDICINE

## 2020-03-20 PROCEDURE — 99999 PR PBB SHADOW E&M-EST. PATIENT-LVL III: CPT | Mod: PBBFAC,,, | Performed by: FAMILY MEDICINE

## 2020-03-20 RX ORDER — AZELASTINE 1 MG/ML
1 SPRAY, METERED NASAL 2 TIMES DAILY
Qty: 30 ML | Refills: 11 | Status: SHIPPED | OUTPATIENT
Start: 2020-03-20 | End: 2024-02-28

## 2020-03-20 RX ORDER — CHLORTHALIDONE 25 MG/1
25 TABLET ORAL DAILY
Qty: 90 TABLET | Refills: 1 | Status: SHIPPED | OUTPATIENT
Start: 2020-03-20 | End: 2020-07-07

## 2020-03-20 RX ORDER — FLUTICASONE PROPIONATE 50 MCG
2 SPRAY, SUSPENSION (ML) NASAL DAILY
Qty: 16 G | Refills: 12 | Status: SHIPPED | OUTPATIENT
Start: 2020-03-20 | End: 2020-04-19

## 2020-03-20 RX ORDER — CANDESARTAN 16 MG/1
16 TABLET ORAL DAILY
Qty: 90 TABLET | Refills: 1 | Status: SHIPPED | OUTPATIENT
Start: 2020-03-20 | End: 2020-07-07 | Stop reason: SDUPTHER

## 2020-03-20 RX ORDER — ROSUVASTATIN CALCIUM 40 MG/1
40 TABLET, COATED ORAL NIGHTLY
Qty: 90 TABLET | Refills: 1 | Status: SHIPPED | OUTPATIENT
Start: 2020-03-20 | End: 2020-10-07 | Stop reason: SDUPTHER

## 2020-03-20 NOTE — PROGRESS NOTES
"Subjective:       Patient ID: Partha Curtis Jr. is a 60 y.o. male.    Chief Complaint:  Physical.     Partha Curtis Jr. is a 60 y.o. male who presents today to establish care.     Diet:  His weight is up about 13 pounds. He is eating "junk." He is eating cookies, fried food, fast food. Eats cookies before bed. No soda. Rare sweet tea. One cup of coffee with cream and sugar.   Exercise: nothing outside of work.     He has been having some cough and congestion. He was treated over the phone; given amoxicillin and nasal sprays. Symptoms started 10 days ago, on his birthday. Symptoms started with a cough. He has fatigue and congestion as well. He was isolated at home due to his symptoms. He was having ear pain. This has possibly improved? His jaw was bothering him slightly. He has a history of hearing loss. Overall symptoms are 60% better.     He has had hand pain that he was following with hand surgery. He has been getting injections into the right middle finger. Wants to be testing for RA.     He has an outside back/spine doctor.     Flu: UTD  Tdap: UTD  Labs: ordered    C-scope: UTD    PMHx: reviewed in EMR and updated  Meds: reviewed in EMR and updated  Shx: reviewed in EMR and updated  FMHx: no family history of colon cancer, breast cancer, ovarian cancer  Social: . He lives with his wife and 2 kids. (5 kids total). He has one dog, one cat, no more chickens at home. One dog recently passed away. No smokers at home.     Cough   This is a recurrent problem. The current episode started 1 to 4 weeks ago. The problem has been unchanged. The problem occurs every few minutes. The cough is productive of purulent sputum. Associated symptoms include ear congestion, headaches, nasal congestion, rhinorrhea and a sore throat. Pertinent negatives include no chest pain, chills, fever, heartburn, hemoptysis, myalgias, postnasal drip, rash, shortness of breath, sweats, weight loss or wheezing. Risk factors for lung " disease include animal exposure. He has tried OTC cough suppressant, prescription cough suppressant and rest for the symptoms. The treatment provided mild relief. His past medical history is significant for bronchitis and environmental allergies. There is no history of asthma, bronchiectasis, COPD, emphysema or pneumonia.     Review of Systems   Constitutional: Negative for chills, fever and weight loss.   HENT: Positive for rhinorrhea and sore throat. Negative for postnasal drip.    Respiratory: Positive for cough. Negative for hemoptysis, shortness of breath and wheezing.    Cardiovascular: Negative for chest pain.   Gastrointestinal: Negative for heartburn.   Musculoskeletal: Negative for myalgias.   Skin: Negative for rash.   Allergic/Immunologic: Positive for environmental allergies.   Neurological: Positive for headaches.         Immunization History   Administered Date(s) Administered    Influenza 09/01/2018, 08/29/2019    Influenza - Quadrivalent - MDCK - PF 09/01/2018    Zoster Recombinant 03/19/2019, 05/29/2019       Objective:     Vitals:    03/20/20 0811   BP: 110/76   Pulse: 96   Temp: 98.2 °F (36.8 °C)        Physical Exam   Constitutional: He is oriented to person, place, and time. He appears well-developed and well-nourished.   HENT:   Head: Normocephalic and atraumatic.   TM: normal  Sinus congestion noted  Mild cobblestoning  No adenopathy  ROM normal   Eyes: Conjunctivae are normal.   Neck: Normal range of motion. Neck supple.   Cardiovascular: Normal rate and regular rhythm.   Pulmonary/Chest: Effort normal and breath sounds normal. No stridor. No respiratory distress.   Abdominal: Soft.   Musculoskeletal:   Mild swelling noted of the knuckles on the right hand   Neurological: He is alert and oriented to person, place, and time.   Skin: Skin is warm.   Psychiatric: He has a normal mood and affect. His behavior is normal. Judgment and thought content normal.   Nursing note and vitals  reviewed.      Assessment:       1. Visit for [facility] health check    2. Screening PSA (prostate specific antigen)    3. Long-term current use of testosterone replacement therapy    4. BMI 27.0-27.9,adult    5. Elevated fasting glucose    6. CKD (chronic kidney disease) stage 3, GFR 30-59 ml/min    7. Essential hypertension    8. Dyslipidemia    9. Joint swelling    10. Acute bacterial sinusitis        Plan:       Hasn't been seen in >1 year   Recommend f/u q6 months  Testosterone ordered for urology, labs scheduled in 1 week  Check other labs as well  Check for RA per patient request  Continue BP medications  Avoid NSAIDS including naproxen.     Visit for [facility] health check  -     CBC auto differential; Future; Expected date: 03/20/2020  -     Comprehensive metabolic panel; Future; Expected date: 03/20/2020  -     Hemoglobin A1c; Future; Expected date: 03/20/2020  -     Lipid panel; Future; Expected date: 03/20/2020  -     TSH; Future; Expected date: 03/20/2020  -     Vitamin D; Future; Expected date: 03/20/2020  -     PSA, Screening; Future; Expected date: 03/20/2020    Screening PSA (prostate specific antigen)  -     PSA, Screening; Future; Expected date: 03/20/2020    Long-term current use of testosterone replacement therapy  -     Testosterone; Future; Expected date: 03/20/2020  -     Testosterone, free; Future; Expected date: 03/20/2020    BMI 27.0-27.9,adult    Elevated fasting glucose    CKD (chronic kidney disease) stage 3, GFR 30-59 ml/min    Essential hypertension  -     candesartan (ATACAND) 16 MG tablet; Take 1 tablet (16 mg total) by mouth once daily.  Dispense: 90 tablet; Refill: 1  -     chlorthalidone (HYGROTEN) 25 MG Tab; Take 1 tablet (25 mg total) by mouth once daily.  Dispense: 90 tablet; Refill: 1    Dyslipidemia  -     rosuvastatin (CRESTOR) 40 MG Tab; Take 1 tablet (40 mg total) by mouth every evening.  Dispense: 90 tablet; Refill: 1    Joint swelling  -     JERRI Screen w/Reflex; Future;  Expected date: 03/20/2020  -     Rheumatoid factor; Future; Expected date: 03/20/2020    Acute bacterial sinusitis  -     azelastine (ASTELIN) 137 mcg (0.1 %) nasal spray; 1 spray (137 mcg total) by Nasal route 2 (two) times daily.  Dispense: 30 mL; Refill: 11  -     fluticasone propionate (FLONASE) 50 mcg/actuation nasal spray; 2 sprays (100 mcg total) by Each Nostril route once daily.  Dispense: 16 g; Refill: 12      Warning signs discussed, patient to call with any further issues or worsening of symptoms.

## 2020-03-20 NOTE — PATIENT INSTRUCTIONS
?Avoid tobacco  ?Be physically active  ?Maintain a healthy weight  ?Eat a diet rich in fruits, vegetables, and whole grains, and low in saturated/trans fat  ?Limit alcohol consumption  ?Protect against sexually transmitted infections  ?Avoid excess sun      Continue current medications  BP well controlled  Weight loss, can consider decreasing medications if patient loses weight  DASH diet  Ambulatory BP monitoring PRN  Follow up in 6 months      Low-Salt Diet  This diet removes foods that are high in salt. It also limits the amount of salt you use when cooking. It is most often used for people with high blood pressure, edema (fluid retention), and kidney, liver, or heart disease.  Table salt contains the mineral sodium. Your body needs sodium to work normally. But too much sodium can make your health problems worse. Your healthcare provider is recommending a low-salt (also called low-sodium) diet for you. Your total daily allowance of salt is 1,500 to 2,300 milligrams (mg). It is less than 1 teaspoon of table salt. This means you can have only about 500 to 700 mg of sodium at each meal. People with certain health problems should limit salt intake to the lower end of the recommended range.    When you cook, dont add much salt. If you can cook without using salt, even better. Dont add salt to your food at the table.  When shopping, read food labels. Salt is often called sodium on the label. Choose foods that are salt-free, low salt, or very low salt. Note that foods with reduced salt may not lower your salt intake enough.    Beans, potatoes, and pasta  Ok: Dry beans, split peas, lentils, potatoes, rice, macaroni, pasta, spaghetti without added salt  Avoid: Potato chips, tortilla chips, and similar products  Breads and cereals  Ok: Low-sodium breads, rolls, cereals, and cakes; low-salt crackers, matzo crackers  Avoid: Salted crackers, pretzels, popcorn, Korean toast, pancakes, muffins  Dairy  Ok: Milk, chocolate  milk, hot chocolate mix, low-salt cheeses, and yogurt  Avoid: Processed cheese and cheese spreads; Roquefort, Camembert, and cottage cheese; buttermilk, instant breakfast drink  Desserts  Ok: Ice cream, frozen yogurt, juice bars, gelatin, cookies and pies, sugar, honey, jelly, hard candy  Avoid: Most pies, cakes and cookies prepared or processed with salt; instant pudding  Drinks  Ok: Tea, coffee, fizzy (carbonated) drinks, juices  Avoid: Flavored coffees, electrolyte replacement drinks, sports drinks  Meats  Ok: All fresh meat, fish, poultry, low-salt tuna, eggs, egg substitute  Avoid: Smoked, pickled, brine-cured, or salted meats and fish. This includes dwyer, chipped beef, corned beef, hot dogs, deli meats, ham, kosher meats, salt pork, sausage, canned tuna, salted codfish, smoked salmon, herring, sardines, or anchovies.  Seasonings and spices  Ok: Most seasonings are okay. Good substitutes for salt include: fresh herb blends, hot sauce, lemon, garlic, vigil, vinegar, dry mustard, parsley, cilantro, horseradish, tomato paste, regular margarine, mayonnaise, unsalted butter, cream cheese, vegetable oil, cream, low-salt salad dressing and gravy.  Avoid: Regular ketchup, relishes, pickles, soy sauce, teriyaki sauce, Worcestershire sauce, BBQ sauce, tartar sauce, meat tenderizer, chili sauce, regular gravy, regular salad dressing, salted butter  Soups  Ok: Low-salt soups and broths made with allowed foods  Avoid: Bouillon cubes, soups with smoked or salted meats, regular soup and broth  Vegetables  Ok: Most vegetables are okay; also low-salt tomato and vegetable juices  Avoid: Sauerkraut and other brine-soaked vegetables; pickles and other pickled vegetables; tomato juice, olives  Date Last Reviewed: 8/1/2016  © 7750-7426 [employer]. [address] [place] [address]. All rights reserved. This information is not intended as a substitute for professional medical care. Always follow your  healthcare professional's instructions.        4 Steps for Eating Healthier  Changing the way you eat can improve your health. It can lower your cholesterol and blood pressure, and help you stay at a healthy weight. Your diet doesnt have to be bland and boring to be healthy. Just watch your calories and follow these steps:    1. Eat fewer unhealthy fats  · Choose more fish and lean meats instead of fatty cuts of meat.  · Skip butter and lard, and use less margarine.  · Pass on foods that have palm, coconut, or hydrogenated oils.  · Eat fewer high-fat dairy foods like cheese, ice cream, and whole milk.  · Get a heart-healthy cookbook and try some low-fat recipes.  2. Go light on salt  · Keep the saltshaker off the table.  · Limit high-salt ingredients, such as soy sauce, bouillon, and garlic salt.  · Instead of adding salt when cooking, season your food with herbs and flavorings. Try lemon, garlic, and onion.  · Limit convenience foods, such as boxed or canned foods and restaurant food.  · Read food labels and choose lower-sodium options.  3. Limit sugar  · Pause before you add sugars to pancakes, cereal, coffee, or tea. This includes white and brown table sugar, syrup, honey, and molasses. Cut your usual amount by half.  · Use non-sugar sweeteners. Stevia, aspartame, and sucralose can satisfy a sweet tooth without adding calories.  · Swap out sugar-filled soda and other drinks. Buy sugar-free or low-calorie beverages. Remember water is always the best choice.  · Read labels and choose foods with less added sugar. Keep in mind that dairy foods and foods with fruit will have some natural sugar.  · Cut the sugar in recipes by 1/3 to 1/2. Boost the flavor with extracts like almond, vanilla, or orange. Or add spices such as cinnamon or nutmeg.  4. Eat more fiber  · Eat fresh fruits and vegetables every day.  · Boost your diet with whole grains. Go for oats, whole-grain rice, and bran.  · Add beans and lentils to your  meals.  · Drink more water to match your fiber increase. This is to help prevent constipation.  Date Last Reviewed: 5/11/2015  © 5378-6794 [employer]. [address] [place] [address], [place] [address]. All rights reserved. This information is not intended as a substitute for professional medical care. Always follow your healthcare professional's instructions.        Understanding Fat and Cholesterol  Too much cholesterol in your blood can lead to many problems such as blocked arteries. This can lead to heart attack and stroke. One of the best ways to manage heart and blood vessel disease is to lower your blood cholesterol. Planning meals that are low in saturated fat and cholesterol helps reduce the level of cholesterol in your blood. Below are eating tips to help lower your blood cholesterol levels.  Eat less fat  A healthy goal is to have less than 25% to 35% of your daily calories come from fat. Instead of fats, eat more fruits, whole-grains, and vegetables. This also helps control your weight, and can even reduce your risk for some cancers. There are different kinds of fats in foods. Fats can be saturated, unsaturated, or trans fats. The best fats to choose are unsaturated fats. But fats are high in calories, so eat even unsaturated fats sparingly.  Limit foods high in saturated fats  Saturated fats come from animals and certain plants (such as coconut and palm). Eating too much saturated fat can raise your blood cholesterol levels and make your artery problems worse. Your goal is to eat less saturated fat. Below are some examples of foods that contain lots of saturated fat:  · Fatty cuts of meat (lamb, ham, beef)  · Many pastries, cakes, cookies, and candies  · Cream, ice cream, sour cream, cheese, and butter, and foods made with them  · Sauces made with butter or cream  · Salad dressings with saturated fats  · Foods that contain palm or coconut oil  Choose unsaturated fats  Unsaturated fats are usually  liquid at room temperature. They are better choices for your heart than saturated fat. There are two types of unsaturated fats: polyunsaturated fat and monounsaturated fat. Aim to replace saturated fats with polyunsaturated or monounsaturated fats.  · Polyunsaturated fats are found in corn oil, safflower oil, sunflower oil, and other vegetable oils.  · Monounsaturated fats are found in olive oil, canola oil, and peanut oil. Some margarines and spreads are now made with these oils, too. Avocados are also high in monounsaturated fat.  Of all fats, monounsaturated fats are the least harmful to your heart.  Avoid trans fats  Like saturated fats, trans fats have been linked to heart disease. Even a small amount can harm your health. Trans fats are found in liquid oils that have been changed to be solid at room temperature. Margarine, which is often made from vegetable oil, is one example. Vegetable shortening is another. Trans fats are often found in packaged goods. Check ingredients for the words hydrogenated or partially hydrogenated. They mean the foods contain trans fat.  What about triglycerides?  Triglycerides are a type of fat in your blood. Like cholesterol, high levels of triglycerides can lead to blocked arteries. High triglyceride levels can be reduced 20% to 50%  by limiting added sugars in your diet, susbstituting healthier fats for saturated and trans fats, getting more physical activity, and losing weight if your are overweight. You may also be advised to avoid or limi alcohol.    Reading food labels  Luckily, most foods now have labels giving you the facts about what youre eating. Reading food labels helps you make healthy choices. Look for the words highlighted below.  · Serving Size. This is the amount of food in 1 serving. If you eat larger portions, be sure to count more of everything: fat, calories, and cholesterol.  · Total Fat. Tells you how many grams (g) of fat are in 1 serving.  · Calories  from Fat. This tells you the total number of calories from fat in 1 serving (there are 9 calories per gram of fat). Look for foods with the fewest calories from fat.  · Saturated Fat. Tells you how many grams (g) of saturated fat are in 1 serving.  · Trans Fat. Tells how many grams (g) of trans fat are in 1 serving.  · Cholesterol. Tells you how many milligrams (mg) of cholesterol are in 1 serving.  Date Last Reviewed: 5/11/2015 © 2000-2017 [employer]. [address] [place] [address]. All rights reserved. This information is not intended as a substitute for professional medical care. Always follow your healthcare professional's instructions.

## 2020-03-24 ENCOUNTER — PATIENT MESSAGE (OUTPATIENT)
Dept: UROLOGY | Facility: CLINIC | Age: 60
End: 2020-03-24

## 2020-03-24 ENCOUNTER — PATIENT MESSAGE (OUTPATIENT)
Dept: FAMILY MEDICINE | Facility: CLINIC | Age: 60
End: 2020-03-24

## 2020-03-24 DIAGNOSIS — E29.1 HYPOGONADISM IN MALE: Primary | ICD-10-CM

## 2020-03-24 RX ORDER — TESTOSTERONE CYPIONATE 200 MG/ML
200 INJECTION, SOLUTION INTRAMUSCULAR
Qty: 13 ML | Refills: 0 | Status: SHIPPED | OUTPATIENT
Start: 2020-03-24 | End: 2020-09-02 | Stop reason: SDUPTHER

## 2020-03-25 ENCOUNTER — PATIENT MESSAGE (OUTPATIENT)
Dept: FAMILY MEDICINE | Facility: CLINIC | Age: 60
End: 2020-03-25

## 2020-04-01 ENCOUNTER — LAB VISIT (OUTPATIENT)
Dept: LAB | Facility: HOSPITAL | Age: 60
End: 2020-04-01
Attending: FAMILY MEDICINE
Payer: COMMERCIAL

## 2020-04-01 ENCOUNTER — TELEPHONE (OUTPATIENT)
Dept: FAMILY MEDICINE | Facility: CLINIC | Age: 60
End: 2020-04-01

## 2020-04-01 DIAGNOSIS — Z00.00 VISIT FOR WELL MAN HEALTH CHECK: ICD-10-CM

## 2020-04-01 DIAGNOSIS — M25.40 JOINT SWELLING: ICD-10-CM

## 2020-04-01 DIAGNOSIS — Z12.5 SCREENING PSA (PROSTATE SPECIFIC ANTIGEN): ICD-10-CM

## 2020-04-01 DIAGNOSIS — Z79.890 LONG-TERM CURRENT USE OF TESTOSTERONE REPLACEMENT THERAPY: ICD-10-CM

## 2020-04-01 LAB
25(OH)D3+25(OH)D2 SERPL-MCNC: 27 NG/ML (ref 30–96)
ALBUMIN SERPL BCP-MCNC: 4.1 G/DL (ref 3.5–5.2)
ALP SERPL-CCNC: 65 U/L (ref 55–135)
ALT SERPL W/O P-5'-P-CCNC: 27 U/L (ref 10–44)
ANION GAP SERPL CALC-SCNC: 10 MMOL/L (ref 8–16)
AST SERPL-CCNC: 25 U/L (ref 10–40)
BASOPHILS # BLD AUTO: 0.04 K/UL (ref 0–0.2)
BASOPHILS NFR BLD: 0.4 % (ref 0–1.9)
BILIRUB SERPL-MCNC: 0.3 MG/DL (ref 0.1–1)
BUN SERPL-MCNC: 17 MG/DL (ref 6–20)
CALCIUM SERPL-MCNC: 9.4 MG/DL (ref 8.7–10.5)
CHLORIDE SERPL-SCNC: 98 MMOL/L (ref 95–110)
CHOLEST SERPL-MCNC: 112 MG/DL (ref 120–199)
CHOLEST/HDLC SERPL: 3.7 {RATIO} (ref 2–5)
CO2 SERPL-SCNC: 31 MMOL/L (ref 23–29)
COMPLEXED PSA SERPL-MCNC: 0.94 NG/ML (ref 0–4)
CREAT SERPL-MCNC: 1.6 MG/DL (ref 0.5–1.4)
DIFFERENTIAL METHOD: ABNORMAL
EOSINOPHIL # BLD AUTO: 0.3 K/UL (ref 0–0.5)
EOSINOPHIL NFR BLD: 3 % (ref 0–8)
ERYTHROCYTE [DISTWIDTH] IN BLOOD BY AUTOMATED COUNT: 18.6 % (ref 11.5–14.5)
EST. GFR  (AFRICAN AMERICAN): 53.3 ML/MIN/1.73 M^2
EST. GFR  (NON AFRICAN AMERICAN): 46.1 ML/MIN/1.73 M^2
ESTIMATED AVG GLUCOSE: 183 MG/DL (ref 68–131)
GLUCOSE SERPL-MCNC: 161 MG/DL (ref 70–110)
HBA1C MFR BLD HPLC: 8 % (ref 4–5.6)
HCT VFR BLD AUTO: 44.8 % (ref 40–54)
HDLC SERPL-MCNC: 30 MG/DL (ref 40–75)
HDLC SERPL: 26.8 % (ref 20–50)
HGB BLD-MCNC: 13.2 G/DL (ref 14–18)
IMM GRANULOCYTES # BLD AUTO: 0.03 K/UL (ref 0–0.04)
IMM GRANULOCYTES NFR BLD AUTO: 0.3 % (ref 0–0.5)
LDLC SERPL CALC-MCNC: 55 MG/DL (ref 63–159)
LYMPHOCYTES # BLD AUTO: 2.8 K/UL (ref 1–4.8)
LYMPHOCYTES NFR BLD: 29.4 % (ref 18–48)
MCH RBC QN AUTO: 24.2 PG (ref 27–31)
MCHC RBC AUTO-ENTMCNC: 29.5 G/DL (ref 32–36)
MCV RBC AUTO: 82 FL (ref 82–98)
MONOCYTES # BLD AUTO: 0.8 K/UL (ref 0.3–1)
MONOCYTES NFR BLD: 8.9 % (ref 4–15)
NEUTROPHILS # BLD AUTO: 5.5 K/UL (ref 1.8–7.7)
NEUTROPHILS NFR BLD: 58 % (ref 38–73)
NONHDLC SERPL-MCNC: 82 MG/DL
NRBC BLD-RTO: 0 /100 WBC
PLATELET # BLD AUTO: 409 K/UL (ref 150–350)
PMV BLD AUTO: 9.6 FL (ref 9.2–12.9)
POTASSIUM SERPL-SCNC: 3.5 MMOL/L (ref 3.5–5.1)
PROT SERPL-MCNC: 7.5 G/DL (ref 6–8.4)
RBC # BLD AUTO: 5.46 M/UL (ref 4.6–6.2)
RHEUMATOID FACT SERPL-ACNC: <10 IU/ML (ref 0–15)
SODIUM SERPL-SCNC: 139 MMOL/L (ref 136–145)
TESTOST SERPL-MCNC: 1375 NG/DL (ref 304–1227)
TRIGL SERPL-MCNC: 135 MG/DL (ref 30–150)
TSH SERPL DL<=0.005 MIU/L-ACNC: 2.23 UIU/ML (ref 0.4–4)
WBC # BLD AUTO: 9.44 K/UL (ref 3.9–12.7)

## 2020-04-01 PROCEDURE — 84402 ASSAY OF FREE TESTOSTERONE: CPT

## 2020-04-01 PROCEDURE — 84443 ASSAY THYROID STIM HORMONE: CPT

## 2020-04-01 PROCEDURE — 82306 VITAMIN D 25 HYDROXY: CPT

## 2020-04-01 PROCEDURE — 80053 COMPREHEN METABOLIC PANEL: CPT

## 2020-04-01 PROCEDURE — 86431 RHEUMATOID FACTOR QUANT: CPT

## 2020-04-01 PROCEDURE — 85025 COMPLETE CBC W/AUTO DIFF WBC: CPT

## 2020-04-01 PROCEDURE — 36415 COLL VENOUS BLD VENIPUNCTURE: CPT | Mod: PO

## 2020-04-01 PROCEDURE — 80061 LIPID PANEL: CPT

## 2020-04-01 PROCEDURE — 83036 HEMOGLOBIN GLYCOSYLATED A1C: CPT

## 2020-04-01 PROCEDURE — 84403 ASSAY OF TOTAL TESTOSTERONE: CPT

## 2020-04-01 PROCEDURE — 84153 ASSAY OF PSA TOTAL: CPT

## 2020-04-01 PROCEDURE — 86038 ANTINUCLEAR ANTIBODIES: CPT

## 2020-04-01 NOTE — TELEPHONE ENCOUNTER
I spoke with patient and informed him of labs, Patient scheduled a virtual visit with  on 04/02/2020 at 8 am. Patient voiced understanding.

## 2020-04-02 ENCOUNTER — OFFICE VISIT (OUTPATIENT)
Dept: FAMILY MEDICINE | Facility: CLINIC | Age: 60
End: 2020-04-02
Payer: COMMERCIAL

## 2020-04-02 ENCOUNTER — PATIENT MESSAGE (OUTPATIENT)
Dept: FAMILY MEDICINE | Facility: CLINIC | Age: 60
End: 2020-04-02

## 2020-04-02 ENCOUNTER — TELEPHONE (OUTPATIENT)
Dept: DIABETES | Facility: CLINIC | Age: 60
End: 2020-04-02

## 2020-04-02 DIAGNOSIS — E78.5 DYSLIPIDEMIA: Chronic | ICD-10-CM

## 2020-04-02 DIAGNOSIS — I10 ESSENTIAL HYPERTENSION: ICD-10-CM

## 2020-04-02 DIAGNOSIS — N18.30 TYPE 2 DIABETES MELLITUS WITH STAGE 3 CHRONIC KIDNEY DISEASE, WITHOUT LONG-TERM CURRENT USE OF INSULIN: Primary | ICD-10-CM

## 2020-04-02 DIAGNOSIS — R79.89 LOW VITAMIN D LEVEL: ICD-10-CM

## 2020-04-02 DIAGNOSIS — N18.30 CKD (CHRONIC KIDNEY DISEASE) STAGE 3, GFR 30-59 ML/MIN: ICD-10-CM

## 2020-04-02 DIAGNOSIS — Z79.890 LONG-TERM CURRENT USE OF TESTOSTERONE REPLACEMENT THERAPY: ICD-10-CM

## 2020-04-02 DIAGNOSIS — E11.22 TYPE 2 DIABETES MELLITUS WITH STAGE 3 CHRONIC KIDNEY DISEASE, WITHOUT LONG-TERM CURRENT USE OF INSULIN: Primary | ICD-10-CM

## 2020-04-02 PROBLEM — R73.01 ELEVATED FASTING GLUCOSE: Status: RESOLVED | Noted: 2018-03-14 | Resolved: 2020-04-02

## 2020-04-02 PROCEDURE — 3052F HG A1C>EQUAL 8.0%<EQUAL 9.0%: CPT | Mod: CPTII,,, | Performed by: FAMILY MEDICINE

## 2020-04-02 PROCEDURE — 99214 OFFICE O/P EST MOD 30 MIN: CPT | Mod: 95,,, | Performed by: FAMILY MEDICINE

## 2020-04-02 PROCEDURE — 99214 PR OFFICE/OUTPT VISIT, EST, LEVL IV, 30-39 MIN: ICD-10-PCS | Mod: 95,,, | Performed by: FAMILY MEDICINE

## 2020-04-02 PROCEDURE — 3052F PR MOST RECENT HEMOGLOBIN A1C LEVEL 8.0 - < 9.0%: ICD-10-PCS | Mod: CPTII,,, | Performed by: FAMILY MEDICINE

## 2020-04-02 RX ORDER — METFORMIN HYDROCHLORIDE 500 MG/1
TABLET, EXTENDED RELEASE ORAL
Qty: 291 TABLET | Refills: 0 | Status: SHIPPED | OUTPATIENT
Start: 2020-04-02 | End: 2020-06-29 | Stop reason: SDUPTHER

## 2020-04-02 RX ORDER — ERGOCALCIFEROL 1.25 MG/1
50000 CAPSULE ORAL
Qty: 12 CAPSULE | Refills: 0 | Status: SHIPPED | OUTPATIENT
Start: 2020-04-02 | End: 2020-10-07 | Stop reason: SDUPTHER

## 2020-04-02 NOTE — PATIENT INSTRUCTIONS
You are diabetic.     Please decrease your intake of white bread, white rice, corn, pasta, potatoes and sugar to   prevent worsening diabetes. Regular daily exercise will also decrease your risk of developing worsening diabetes.    I want you to track your weight and your waist circumference. Do this weekly.     Start metformin slowly, titrate up by 500 mg weekly up to a max dose of 1500 mg XR daily with breakfast.     Kidney function is a little low. No NSAIDS such as ibuprofen or naproxen. Avoid Red meats. Drink a lot of water. I will continue monitoring kidney function q3-6 months.    Get an eye exam; either outside of here, [facility], or an eye camera at the next visit.    You have low vitamin D and a Rx has been sent to your pharmacy. Take this pill once a week and when the prescription and refills are done, start taking an OTC vitamin D supplementation at 1000 IU daily.     Message me via [employer] with any issues or changes.     RTC in 3 months. Labs prior.       Diabetes Management Status    Statin: Taking  ACE/ARB: Taking    Screening or Prevention Patient's value Goal Complete/Controlled?   HgA1C Testing and Control   Lab Results   Component Value Date    HGBA1C 8.0 (H) 04/01/2020      Annually/Less than 8% No   Lipid profile : 04/01/2020 Annually Yes   LDL control Lab Results   Component Value Date    LDLCALC 55.0 (L) 04/01/2020    Annually/Less than 100 mg/dl  Yes   Nephropathy screening No results found for: LABMICR  Lab Results   Component Value Date    PROTEINUA Negative 03/08/2018    Annually No   Blood pressure BP Readings from Last 1 Encounters:   03/20/20 110/76    Less than 140/90 Yes   Dilated retinal exam Most Recent Eye Exam Date: Not Found Annually Yes   Foot exam   Most Recent Foot Exam Date: Not Found Annually No

## 2020-04-02 NOTE — PROGRESS NOTES
Subjective:       Patient ID: Partha Curtis Jr. is a 60 y.o. male.    Chief Complaint: DM    The patient location is: his home In [place]  The chief complaint leading to consultation is: lab review and new onset DM  Visit type: Virtual visit with synchronous audio and video  Total time spent with patient: 25 minutes spent with patient, of which >50% was spent on counseling and coordination of care.     Each patient to whom he or she provides medical services by telemedicine is:  (1) informed of the relationship between the physician and patient and the respective role of any other health care provider with respect to management of the patient; and (2) notified that he or she may decline to receive medical services by telemedicine and may withdraw from such care at any time.    Partha is a 60 y.o. male who presents today to follow up on his recent labs.    HTN: stable. Seeing cards as well  CKD: worsening. Secondary to DM  DM: new onset. Has gained weight. Has been eating poorly. No family history  DLD: already on statin, LDL at goal.  Anemia: stable. Low due to CKD?    Labs as below reviewed in detail.     Review of Systems   Constitutional: Positive for fatigue.   Respiratory: Negative for shortness of breath.    Cardiovascular: Negative for chest pain.   Gastrointestinal: Negative for constipation, diarrhea, nausea and vomiting.   Genitourinary: Negative for difficulty urinating and dysuria.   Neurological: Negative for dizziness, light-headedness and headaches.             Results for orders placed or performed in visit on 04/01/20   CBC auto differential   Result Value Ref Range    WBC 9.44 3.90 - 12.70 K/uL    RBC 5.46 4.60 - 6.20 M/uL    Hemoglobin 13.2 (L) 14.0 - 18.0 g/dL    Hematocrit 44.8 40.0 - 54.0 %    Mean Corpuscular Volume 82 82 - 98 fL    Mean Corpuscular Hemoglobin 24.2 (L) 27.0 - 31.0 pg    Mean Corpuscular Hemoglobin Conc 29.5 (L) 32.0 - 36.0 g/dL    RDW 18.6 (H) 11.5 - 14.5 %    Platelets 409 (H)  150 - 350 K/uL    MPV 9.6 9.2 - 12.9 fL    Immature Granulocytes 0.3 0.0 - 0.5 %    Gran # (ANC) 5.5 1.8 - 7.7 K/uL    Immature Grans (Abs) 0.03 0.00 - 0.04 K/uL    Lymph # 2.8 1.0 - 4.8 K/uL    Mono # 0.8 0.3 - 1.0 K/uL    Eos # 0.3 0.0 - 0.5 K/uL    Baso # 0.04 0.00 - 0.20 K/uL    nRBC 0 0 /100 WBC    Gran% 58.0 38.0 - 73.0 %    Lymph% 29.4 18.0 - 48.0 %    Mono% 8.9 4.0 - 15.0 %    Eosinophil% 3.0 0.0 - 8.0 %    Basophil% 0.4 0.0 - 1.9 %    Differential Method Automated    Comprehensive metabolic panel   Result Value Ref Range    Sodium 139 136 - 145 mmol/L    Potassium 3.5 3.5 - 5.1 mmol/L    Chloride 98 95 - 110 mmol/L    CO2 31 (H) 23 - 29 mmol/L    Glucose 161 (H) 70 - 110 mg/dL    BUN, Bld 17 6 - 20 mg/dL    Creatinine 1.6 (H) 0.5 - 1.4 mg/dL    Calcium 9.4 8.7 - 10.5 mg/dL    Total Protein 7.5 6.0 - 8.4 g/dL    Albumin 4.1 3.5 - 5.2 g/dL    Total Bilirubin 0.3 0.1 - 1.0 mg/dL    Alkaline Phosphatase 65 55 - 135 U/L    AST 25 10 - 40 U/L    ALT 27 10 - 44 U/L    Anion Gap 10 8 - 16 mmol/L    eGFR if African American 53.3 (A) >60 mL/min/1.73 m^2    eGFR if non  46.1 (A) >60 mL/min/1.73 m^2   Hemoglobin A1c   Result Value Ref Range    Hemoglobin A1C 8.0 (H) 4.0 - 5.6 %    Estimated Avg Glucose 183 (H) 68 - 131 mg/dL   Lipid panel   Result Value Ref Range    Cholesterol 112 (L) 120 - 199 mg/dL    Triglycerides 135 30 - 150 mg/dL    HDL 30 (L) 40 - 75 mg/dL    LDL Cholesterol 55.0 (L) 63.0 - 159.0 mg/dL    Hdl/Cholesterol Ratio 26.8 20.0 - 50.0 %    Total Cholesterol/HDL Ratio 3.7 2.0 - 5.0    Non-HDL Cholesterol 82 mg/dL   TSH   Result Value Ref Range    TSH 2.231 0.400 - 4.000 uIU/mL   Vitamin D   Result Value Ref Range    Vit D, 25-Hydroxy 27 (L) 30 - 96 ng/mL   PSA, Screening   Result Value Ref Range    PSA, SCREEN 0.94 0.00 - 4.00 ng/mL   Testosterone   Result Value Ref Range    Testosterone, Total 1375 (H) 304 - 1227 ng/dL   Rheumatoid factor   Result Value Ref Range    Rheumatoid Factor  <10.0 0.0 - 15.0 IU/mL       Objective:   There were no vitals filed for this visit.      Exam performed as able via telemedicine visit    Physical Exam   Constitutional: He is oriented to person, place, and time. He appears well-developed and well-nourished. No distress.   Pulmonary/Chest: No respiratory distress. He has no wheezes.   Neurological: He is alert and oriented to person, place, and time.   Skin: He is not diaphoretic.   Psychiatric: He has a normal mood and affect. His behavior is normal. Judgment and thought content normal.       Assessment:       1. Type 2 diabetes mellitus with stage 3 chronic kidney disease, without long-term current use of insulin    2. CKD (chronic kidney disease) stage 3, GFR 30-59 ml/min    3. Low vitamin D level    4. Long-term current use of testosterone replacement therapy    5. Dyslipidemia    6. Essential hypertension        Plan:       New onset diabetes.     Please decrease your intake of white bread, white rice, corn, pasta, potatoes and sugar to prevent worsening diabetes. Regular daily exercise will also decrease your risk of developing worsening diabetes. Diabetic education referral also placed; unclear if it will be done due to covid 19 pandemic.     I want you to track your weight and your waist circumference. Do this weekly.     Start metformin slowly, titrate up by 500 mg weekly up to a max dose of 1500 mg XR daily with breakfast.     Kidney function is a little low. No NSAIDS such as ibuprofen or naproxen. Avoid Red meats. Drink a lot of water. I will continue monitoring kidney function q3-6 months.    Get an eye exam; either outside of here, through [facility], or an eye camera at the next visit.    You have low vitamin D and a Rx has been sent to your pharmacy. Take this pill once a week and when the prescription and refills are done, start taking an OTC vitamin D supplementation at 1000 IU daily.     Message me via [employer] with any issues or changes.     RTC in  3 months. Labs prior.     Type 2 diabetes mellitus with stage 3 chronic kidney disease, without long-term current use of insulin  -     Ambulatory referral/consult to Diabetes Education; Future; Expected date: 04/09/2020  -     Diabetic Eye Screening Photo; Future  -     metFORMIN (GLUCOPHAGE-XR) 500 MG XR 24hr tablet; Take 1 tablet (500 mg total) by mouth daily with breakfast for 7 days, THEN 2 tablets (1,000 mg total) once daily for 7 days, THEN 3 tablets (1,500 mg total) once daily.  Dispense: 291 tablet; Refill: 0  -     CBC auto differential; Future; Expected date: 04/02/2020  -     Comprehensive metabolic panel; Future; Expected date: 04/02/2020  -     Hemoglobin A1c; Future; Expected date: 04/02/2020  -     Diabetes Digital Medicine (DDMP) Enrollment Order  -     Diabetes Digital Medicine (DDMP): Assign Onboarding Questionnaires    CKD (chronic kidney disease) stage 3, GFR 30-59 ml/min  -     CBC auto differential; Future; Expected date: 04/02/2020  -     Comprehensive metabolic panel; Future; Expected date: 04/02/2020  -     Hemoglobin A1c; Future; Expected date: 04/02/2020    Low vitamin D level  -     ergocalciferol (ERGOCALCIFEROL) 50,000 unit Cap; Take 1 capsule (50,000 Units total) by mouth every 7 days. Then start daily OTC replacement after this Rx is complete  Dispense: 12 capsule; Refill: 0    Long-term current use of testosterone replacement therapy    Dyslipidemia    Essential hypertension  -     Hypertension Digital Medicine (HDMP) Enrollment Order  -     Hypertension Digital Medicine (HDMP): Assign Onboarding Questionnaires        Warning signs discussed, patient to call with any further issues or worsening of symptoms.

## 2020-04-03 ENCOUNTER — TELEPHONE (OUTPATIENT)
Dept: DIABETES | Facility: CLINIC | Age: 60
End: 2020-04-03

## 2020-04-03 LAB
ANA SER QL IF: NORMAL
TESTOST FREE SERPL-MCNC: 37.6 PG/ML

## 2020-04-04 ENCOUNTER — PATIENT MESSAGE (OUTPATIENT)
Dept: FAMILY MEDICINE | Facility: CLINIC | Age: 60
End: 2020-04-04

## 2020-04-15 ENCOUNTER — PATIENT MESSAGE (OUTPATIENT)
Dept: ADMINISTRATIVE | Facility: OTHER | Age: 60
End: 2020-04-15

## 2020-04-15 ENCOUNTER — PATIENT MESSAGE (OUTPATIENT)
Dept: FAMILY MEDICINE | Facility: CLINIC | Age: 60
End: 2020-04-15

## 2020-04-16 DIAGNOSIS — E11.9 TYPE 2 DIABETES MELLITUS: ICD-10-CM

## 2020-04-17 ENCOUNTER — PATIENT MESSAGE (OUTPATIENT)
Dept: FAMILY MEDICINE | Facility: CLINIC | Age: 60
End: 2020-04-17

## 2020-04-17 ENCOUNTER — PATIENT MESSAGE (OUTPATIENT)
Dept: ADMINISTRATIVE | Facility: OTHER | Age: 60
End: 2020-04-17

## 2020-04-17 NOTE — TELEPHONE ENCOUNTER
Hi,  Can we send him the number for the digital medicine team?    I can't seem to find it in my email. Thanks    JERRY

## 2020-04-22 ENCOUNTER — PATIENT OUTREACH (OUTPATIENT)
Dept: ADMINISTRATIVE | Facility: OTHER | Age: 60
End: 2020-04-22

## 2020-04-22 NOTE — PROGRESS NOTES
Chart reviewed.   Requested updates from Care Everywhere.  Immunizations reconciled.   HM updated.    
previously intubated - no problems

## 2020-04-27 ENCOUNTER — CLINICAL SUPPORT (OUTPATIENT)
Dept: DIABETES | Facility: CLINIC | Age: 60
End: 2020-04-27
Payer: COMMERCIAL

## 2020-04-27 DIAGNOSIS — E11.22 TYPE 2 DIABETES MELLITUS WITH STAGE 3 CHRONIC KIDNEY DISEASE, WITHOUT LONG-TERM CURRENT USE OF INSULIN: ICD-10-CM

## 2020-04-27 DIAGNOSIS — N18.30 TYPE 2 DIABETES MELLITUS WITH STAGE 3 CHRONIC KIDNEY DISEASE, WITHOUT LONG-TERM CURRENT USE OF INSULIN: ICD-10-CM

## 2020-04-27 PROCEDURE — 99999 PR PBB SHADOW E&M-EST. PATIENT-LVL II: ICD-10-PCS | Mod: PBBFAC,,,

## 2020-04-27 PROCEDURE — G0108 PR DIAB MANAGE TRN  PER INDIV: ICD-10-PCS | Mod: S$GLB,,, | Performed by: INTERNAL MEDICINE

## 2020-04-27 PROCEDURE — 99999 PR PBB SHADOW E&M-EST. PATIENT-LVL II: CPT | Mod: PBBFAC,,,

## 2020-04-27 PROCEDURE — G0108 DIAB MANAGE TRN  PER INDIV: HCPCS | Mod: S$GLB,,, | Performed by: INTERNAL MEDICINE

## 2020-04-28 VITALS — BODY MASS INDEX: 27.2 KG/M2 | WEIGHT: 195 LBS

## 2020-04-28 NOTE — PROGRESS NOTES
Telephone - [id number] [phone]    Diabetes Care Specialist Telehealth Visit Note  This visit was conducted using Telehealth/Telephonic Technology. It was in the patient's best interest to receive diabetes self-management education and support services in this format to prevent the exposure to COVID-19. Diabetes Education  Author: Shanna John RN  Date: 4/28/2020  Diabetes Care Management Summary  Diabetes Education Record Assessment/Progress: Initial  Current Diabetes Risk Level: Moderate   Last A1c:   Lab Results   Component Value Date    HGBA1C 8.0 (H) 04/01/2020     Last visit with Diabetes Educator: : 04/27/2020  Diabetes Type  Diabetes Type : Type II  Diabetes History  Diabetes Diagnosis: 0-1 year  Current Treatment: Oral Medication, Diet, Exercise  Health Maintenance was reviewed today with patient. Discussed with patient importance of routine eye exams, foot exams/foot care, blood work (i.e.: A1c, microalbumin, and lipid), dental visits, yearly flu vaccine, and pneumonia vaccine as indicated by PCP. Patient verbalized understanding.     Health Maintenance Topics with due status: Not Due       Topic Last Completion Date    TETANUS VACCINE 01/01/2013    Colonoscopy 12/14/2018    Aspirin/Antiplatelet Therapy 08/13/2019    High Dose Statin 03/20/2020    PROSTATE-SPECIFIC ANTIGEN 04/01/2020    Lipid Panel 04/01/2020    Hemoglobin A1c 04/01/2020     Health Maintenance Due   Topic Date Due    Foot Exam  03/10/1970    Eye Exam  03/10/1970   Nutrition  Meal Planning: water, 3 meals per day  What type of beverages do you drink?: water  Meal Plan 24 Hour Recall - Breakfast: grits  Meal Plan 24 Hour Recall - Lunch: pulled pork sandwich, potato salad  Meal Plan 24 Hour Recall - Dinner: shepherds pie  Monitoring   Self Monitoring : pt is waiting to receive meter from digital medicine. instructed pt to test once a day fasting in the am. instructed pt on the normal bs ranges. instructed pt to keep a record of his  bs's and to bring the record to his md visits.  Blood Glucose Logs: No  Do you use a personal continuous glucose monitor?: No  In the last month, how often have you had a low blood sugar reaction?: never  Can you tell when your blood sugar is too high?: no  Exercise   Exercise Type: none  Current Diabetes Treatment   Current Treatment: Oral Medication, Diet, Exercise  Social History  Preferred Learning Method: Reading Materials  Primary Support: Spouse  Occupation: plant mgr    DDS-2 Score  ( > 3 = SIGNIFICANT DISTRESS): 1   Barriers to Change  Barriers to Change: None  Learning Challenges : None  Readiness to Learn   Readiness to Learn : Acceptance  Cultural Influences  Cultural Influences: None  Diabetes Education Assessment/Progress  Diabetes Disease Process (diabetes disease process and treatment options): Discussion, Instructed, Demonstrates Understanding/Competency(verbalizes/demonstrates), Individual Session, Written Materials Provided  Nutrition (Incorporating nutritional management into one's lifestyle): Discussion, Instructed, Demonstrates Understanding/Competency (verbalizes/demonstrates), Individual Session, Written Materials Provided  Physical Activity (incorporating physical activity into one's lifestyle): Discussion, Demonstrates Understanding/Competency (verbalizes/demonstrates), Instructed, Individual Session, Written Materials Provided  Medications (states correct name, dose, onset, peak, duration, side effects & timing of meds): Discussion, Instructed, Demonstrates Understanding/Competency(verbalizes/demonstrates), Individual Session, Written Materials Provided  Monitoring (monitoring blood glucose/other parameters & using results): Discussion, Instructed, Demonstrates Understanding/Competency (verbalizes/demonstrates), Individual Session, Written Materials Provided  Acute Complications (preventing, detecting, and treating acute complications): Discussion, Instructed, Demonstrates  Understanding/Competency (verbalizes/demonstrates), Individual Session, Written Materials Provided  Chronic Complications (preventing, detecting, and treating chronic complications): Discussion, Instructed, Demonstrates Understanding/Competency (verbalizes/demonstrates), Individual Session, Written Materials Provided  Clinical (diabetes, other pertinent medical history, and relevant comorbidities reviewed during visit): Discussion, Instructed, Demonstrates Understanding/Competency (verbalizes/demonstrates), Individual Session  Cognitive (knowledge of self-management skills, functional health literacy): Discussion, Instructed, Demonstrates Understanding/Competency (verbalizes/demonstrates), Individual Session  Psychosocial (emotional response to diabetes): Discussion, Instructed, Individual Session  Diabetes Distress and Support Systems: Discussion, Instructed, Demonstrates Understanding/Competency (verbalizes/demonstrates), Individual Session  Behavioral (readiness for change, lifestyle practices, self-care behaviors): Discussion, Instructed, Demonstrates Understanding/Competency (verbalizes/demonstrates), Individual Session  Goals  Patient has selected/evaluated goals during today's session: Yes, selected  Healthy Eating: Set  Start Date: 04/28/20  Target Date: 06/28/20  Monitoring: Set  Start Date: 04/28/20  Target Date: 06/28/20  Diabetes Care Plan/Intervention  Education Plan/Intervention: Eye Exam, Foot Exam, Dental Exam  Diabetes Meal Plan  Restrictions: Restricted Carbohydrate  Calories: 1800  Carbohydrate Per Meal: 45-60g  Carbohydrate Per Snack : 15-20g  Instructed pt on the food groups, how to read labels and count carbs. Pt was given sample menus and meal plans as examples. Discussed with pt the importance of eating 3 balanced and portioned meals per day.Pt is eating 3 meals per day but they are not balanced. Discussed with pt foods that he likes that he can include in his meal plan. Discussed with pt how  to put his meals together.Discussed with pt the importance of planning his meals and portioning food. Discussed with pt what needed to be added to his meals to make them balanced. Discussed covid 19 precautions with pt. Pt had good understanding of meal plan and the adjustments that he needs to make. Pt was advised to call for any problems or questions. Will follow up in one month.  Today's Self-Management Care Plan was developed with the patient's input and is based on barriers identified during today's assessment.    The long and short-term goals in the care plan were written with the patient/caregiver's input. The patient has agreed to work toward these goals to improve his overall diabetes control.      The patient received a copy of today's self-management plan and verbalized understanding of the care plan, goals, and all of today's instructions.      The patient was encouraged to communicate with his physician and care team regarding his condition(s) and treatment.  I provided the patient with my contact information today and encouraged him to contact me via phone or patient portal as needed.     Education Units of Time   Time Spent: 60 min

## 2020-05-04 ENCOUNTER — PATIENT OUTREACH (OUTPATIENT)
Dept: OTHER | Facility: OTHER | Age: 60
End: 2020-05-04

## 2020-05-04 NOTE — LETTER
June 4, 2020     Partha Curtis Jr.  [address] [place] [address]       Dear Partha,    Welcome to [facility]! Our goal is to make care effective, proactive and convenient by using data you send us from home to better treat your chronic conditions.              My name is Luciana RoblesMarkAntwon, and I am your dedicated Digital Medicine clinician. As an expert in medication management, I will help ensure that the medications you are taking continue to provide the intended benefits and help you reach your goals. You can reach me directly at [phone] or by sending me a message directly through your [facility] account.      I am Maria Alejandra Metcalf and I will be your health . My job is to help you identify lifestyle changes to improve your disease control. We will talk about nutrition, exercise, and other ways you may be able to adjust your current habits to better your health. Additionally, we will help ensure you are completing the tests and screenings that are necessary to help manage your conditions. You can reach me directly at [phone] or by sending me a message directly through your [facility] account.    Most importantly, YOU are at the center of this team. Together, we will work to improve your overall health and encourage you to meet your goals for a healthier lifestyle.     What we expect from YOU:  · Please take frequent home blood pressure measurements. We ask that you take at least 1 blood pressure reading per week, but more information will better help us get you know you. Be sure you rest for a few minutes before taking the reading in a quiet, comfortable place.     Be available to receive phone calls or [employer] messages, when appropriate, from your care team. Please let us know if there are any specific days or times that work best for us to reach you via phone.     Complete routine tests and screenings. Dont worry, we will help keep you on track!           What  you should expect from your Digital Medicine Care Team:   We will work with you to create a personalized plan of care and provide you with encouragement and education, including regarding lifestyle changes, that could help you manage your disease states.     We will adjust your current medications, if needed, and continue to monitor your long-term progress.     We will provide you and your physician with monthly progress reports after you have been in the program for more than 30 days.     We will send you reminders through [employer] and text messages to help ensure you do not miss any testing deadlines to help manage your disease states.    You will be able to reach us by phone or through your [facility] account by clicking our names under Care Team on the right side of the home screen.    I look forward to working with you to achieve your blood pressure goals!    We look forward to working with you to help manage your health,    Sincerely,    Your Digital Medicine Team    Please visit our websites to learn more:   · Hypertension: [email or web address]      Remember, we are not available for emergencies. If you have an emergency, please contact your doctors office directly or call [facility] on-call ([phone] or [phone]) or [phone].

## 2020-05-21 ENCOUNTER — PATIENT OUTREACH (OUTPATIENT)
Dept: ADMINISTRATIVE | Facility: OTHER | Age: 60
End: 2020-05-21

## 2020-05-25 ENCOUNTER — CLINICAL SUPPORT (OUTPATIENT)
Dept: DIABETES | Facility: CLINIC | Age: 60
End: 2020-05-25
Payer: COMMERCIAL

## 2020-05-25 DIAGNOSIS — E11.22 TYPE 2 DIABETES MELLITUS WITH STAGE 3 CHRONIC KIDNEY DISEASE, WITHOUT LONG-TERM CURRENT USE OF INSULIN: ICD-10-CM

## 2020-05-25 DIAGNOSIS — N18.30 TYPE 2 DIABETES MELLITUS WITH STAGE 3 CHRONIC KIDNEY DISEASE, WITHOUT LONG-TERM CURRENT USE OF INSULIN: ICD-10-CM

## 2020-05-25 PROCEDURE — 99999 PR PBB SHADOW E&M-EST. PATIENT-LVL II: ICD-10-PCS | Mod: PBBFAC,,,

## 2020-05-25 PROCEDURE — 99999 PR PBB SHADOW E&M-EST. PATIENT-LVL II: CPT | Mod: PBBFAC,,,

## 2020-05-25 PROCEDURE — G0108 DIAB MANAGE TRN  PER INDIV: HCPCS | Mod: S$GLB,,, | Performed by: INTERNAL MEDICINE

## 2020-05-25 PROCEDURE — G0108 PR DIAB MANAGE TRN  PER INDIV: ICD-10-PCS | Mod: S$GLB,,, | Performed by: INTERNAL MEDICINE

## 2020-05-26 ENCOUNTER — OFFICE VISIT (OUTPATIENT)
Dept: OPTOMETRY | Facility: CLINIC | Age: 60
End: 2020-05-26
Payer: COMMERCIAL

## 2020-05-26 VITALS — BODY MASS INDEX: 25.66 KG/M2 | WEIGHT: 184 LBS

## 2020-05-26 DIAGNOSIS — H52.4 PRESBYOPIA: ICD-10-CM

## 2020-05-26 DIAGNOSIS — E11.9 TYPE 2 DIABETES MELLITUS WITHOUT RETINOPATHY: Primary | ICD-10-CM

## 2020-05-26 DIAGNOSIS — H25.13 NUCLEAR SCLEROSIS, BILATERAL: ICD-10-CM

## 2020-05-26 LAB
LEFT EYE DM RETINOPATHY: NEGATIVE
RIGHT EYE DM RETINOPATHY: NEGATIVE

## 2020-05-26 PROCEDURE — 92015 DETERMINE REFRACTIVE STATE: CPT | Mod: S$GLB,,, | Performed by: OPTOMETRIST

## 2020-05-26 PROCEDURE — 92004 PR EYE EXAM, NEW PATIENT,COMPREHESV: ICD-10-PCS | Mod: S$GLB,,, | Performed by: OPTOMETRIST

## 2020-05-26 PROCEDURE — 92004 COMPRE OPH EXAM NEW PT 1/>: CPT | Mod: S$GLB,,, | Performed by: OPTOMETRIST

## 2020-05-26 PROCEDURE — 99999 PR PBB SHADOW E&M-EST. PATIENT-LVL II: ICD-10-PCS | Mod: PBBFAC,,, | Performed by: OPTOMETRIST

## 2020-05-26 PROCEDURE — 92015 PR REFRACTION: ICD-10-PCS | Mod: S$GLB,,, | Performed by: OPTOMETRIST

## 2020-05-26 PROCEDURE — 99999 PR PBB SHADOW E&M-EST. PATIENT-LVL II: CPT | Mod: PBBFAC,,, | Performed by: OPTOMETRIST

## 2020-05-26 RX ORDER — OXYCODONE AND ACETAMINOPHEN 5; 325 MG/1; MG/1
1 TABLET ORAL 2 TIMES DAILY PRN
COMMUNITY
Start: 2020-05-05 | End: 2023-01-09

## 2020-05-26 NOTE — PROGRESS NOTES
Telephone - [id number] ([phone]    Diabetes Care Specialist Telehealth Visit Note  This visit was conducted using Telehealth/Telephonic Technology. It was in the patient's best interest to receive diabetes self-management education and support services in this format to prevent the exposure to COVID-19. Diabetes Education  Author: Shanna John RN  Date: 5/26/2020  Diabetes Care Management Summary  Diabetes Education Record Assessment/Progress: Post Program/Follow-up  Current Diabetes Risk Level: Moderate     Last A1c:   Lab Results   Component Value Date    HGBA1C 8.0 (H) 04/01/2020     Last visit with Diabetes Educator: : 05/25/2020    Diabetes Type  Diabetes Type : Type II  Diabetes History  Current Treatment: Oral Medication, Diet, Exercise  Health Maintenance was reviewed today with patient. Discussed with patient importance of routine eye exams, foot exams/foot care, blood work (i.e.: A1c, microalbumin, and lipid), dental visits, yearly flu vaccine, and pneumonia vaccine as indicated by PCP. Patient verbalized understanding.   Health Maintenance Topics with due status: Not Due       Topic Last Completion Date    TETANUS VACCINE 01/01/2013    Colonoscopy 12/14/2018    PROSTATE-SPECIFIC ANTIGEN 04/01/2020    Lipid Panel 04/01/2020    Hemoglobin A1c 04/01/2020    High Dose Statin 05/26/2020    Aspirin/Antiplatelet Therapy 05/26/2020    Eye Exam 05/26/2020     Health Maintenance Due   Topic Date Due    Foot Exam  03/10/1970   Nutrition  Meal Planning: water, 3 meals per day, snacks between meal  What type of beverages do you drink?: water  Meal Plan 24 Hour Recall - Breakfast: crack n egg, cheese  Meal Plan 24 Hour Recall - Lunch: sandwich, peanut butter crackers  Meal Plan 24 Hour Recall - Dinner: 3 slices pizza  Meal Plan 24 Hour Recall - Snack: chris crackers  Monitoring   Self Monitoring : pt has a meter and is checking once a day fasting in am. bs's range from 105-140  Blood Glucose Logs: Yes  Do you  use a personal continuous glucose monitor?: No  In the last month, how often have you had a low blood sugar reaction?: never  Can you tell when your blood sugar is too high?: no  Current Diabetes Treatment   Current Treatment: Oral Medication, Diet, Exercise  Social History  Primary Support: Spouse  DDS-2 Score  ( > 3 = SIGNIFICANT DISTRESS): 1     Barriers to Change  Barriers to Change: None  Learning Challenges : None  Readiness to Learn   Readiness to Learn : Acceptance  Cultural Influences  Cultural Influences: None  Diabetes Education Assessment/Progress  Diabetes Disease Process (diabetes disease process and treatment options): Not Covered/Deferred  Nutrition (Incorporating nutritional management into one's lifestyle): Discussion, Instructed, Demonstrates Understanding/Competency (verbalizes/demonstrates), Individual Session  Physical Activity (incorporating physical activity into one's lifestyle): Discussion, Instructed, Demonstrates Understanding/Competency (verbalizes/demonstrates), Individual Session  Medications (states correct name, dose, onset, peak, duration, side effects & timing of meds): Discussion, Instructed, Demonstrates Understanding/Competency(verbalizes/demonstrates), Individual Session  Monitoring (monitoring blood glucose/other parameters & using results): Discussion, Instructed, Demonstrates Understanding/Competency (verbalizes/demonstrates), Individual Session  Acute Complications (preventing, detecting, and treating acute complications): Not Covered/Deferred  Chronic Complications (preventing, detecting, and treating chronic complications): Not Covered/Deferred  Clinical (diabetes, other pertinent medical history, and relevant comorbidities reviewed during visit): Discussion, Instructed, Demonstrates Understanding/Competency (verbalizes/demonstrates), Individual Session  Cognitive (knowledge of self-management skills, functional health literacy): Discussion, Instructed, Demonstrates  Understanding/Competency (verbalizes/demonstrates), Individual Session  Psychosocial (emotional response to diabetes): Discussion, Instructed, Demonstrates Understanding/Competency (verbalizes/demonstrates), Individual Session  Diabetes Distress and Support Systems: Discussion, Instructed, Demonstrates Understanding/Competency (verbalizes/demonstrates), Individual Session  Behavioral (readiness for change, lifestyle practices, self-care behaviors): Discussion, Instructed, Demonstrates Understanding/Competency (verbalizes/demonstrates), Individual Session  Goals  Patient has selected/evaluated goals during today's session: Yes, evaluated  Healthy Eating: % Met  Met Percentage : 75%  Monitoring: % Met  Met Percentage : 100%  Diabetes Care Plan/Intervention  Education Plan/Intervention: Individual Follow-Up DSMT  Diabetes Meal Plan  Restrictions: Restricted Carbohydrate  Calories: 1800  Carbohydrate Per Meal: 45-60g  Carbohydrate Per Snack : 15-20g  Pt completed diabetes fu visit via phone. Pt states that he now has his digital glucometer but questions the validity because his bs's are considerably higher on this meter.. Bs' s ranging from 105-140. Pt is now on metformin 1500 mg per day. He is working on his meals and  Keeps a food diary of what he eats. He has lost weight. 24 hour meal recall showed that pt is not eating completely balanced. Discussed with pt adjustments that need to be added to his meals to make them more balanced and portioned. Pt had good understanding of adjustments and will work on them. Discussed covid 19 precautions with pt. Pt was advised to call for any problems or questions. Will fu in one month.  isidro's Self-Management Care Plan was developed with the patient's input and is based on barriers identified during today's assessment.    The long and short-term goals in the care plan were written with the patient/caregiver's input. The patient has agreed to work toward these goals to improve his  overall diabetes control.      The patient received a copy of today's self-management plan and verbalized understanding of the care plan, goals, and all of today's instructions.      The patient was encouraged to communicate with his physician and care team regarding his condition(s) and treatment.  I provided the patient with my contact information today and encouraged him to contact me via phone or patient portal as needed.     Education Units of Time   Time Spent: 60 min

## 2020-05-26 NOTE — LETTER
May 26, 2020      No Recipients           [facility]  [address]  [place] [address]  Phone: [phone]  Fax: [phone]          Patient: Partha Curtis Jr.   MR Number: [id number]   YOB: 1960   Date of Visit: 5/26/2020       Dear :    Thank you for referring Partha Curtis to me for evaluation. Attached you will find relevant portions of my assessment and plan of care.    If you have questions, please do not hesitate to call me. I look forward to following Partha Curtis along with you.    Sincerely,    Luis Armando Palacio, OD    Enclosure  CC:  No Recipients    If you would like to receive this communication electronically, please contact [email or web address] or [phone] to request more information on [employer] access.    For providers and/or their staff who would like to refer a patient to [facility], please contact us through our one-stop-shop provider referral line, [facility], at [phone].    If you feel you have received this communication in error or would no longer like to receive these types of communications, please e-mail [email or web address]

## 2020-05-26 NOTE — PROGRESS NOTES
HPI     Patient referred by Nohemy GALLARDO Diabetic eye exam.  Last eye exam X 2 years.  Patient deny any visual distubance at present time.  FLASHES-  FLOATERS-  HA-  PAIN-  Hemoglobin A1C       Date                     Value               Ref Range             Status                04/01/2020               8.0 (H)             4.0 - 5.6 %           Final                    Last edited by Luis Armando Palacio, OD on 5/26/2020  8:10 AM. (History)        ROS     Positive for: Endocrine (DM)    Negative for: Constitutional, Gastrointestinal, Neurological, Skin,   Genitourinary, Musculoskeletal, HENT, Cardiovascular, Eyes, Respiratory,   Psychiatric, Allergic/Imm, Heme/Lymph    Last edited by Luis Armando Palacio, OD on 5/26/2020  8:10 AM. (History)        Assessment /Plan     For exam results, see Encounter Report.    Type 2 diabetes mellitus without retinopathy    Nuclear sclerosis, bilateral    Presbyopia      1. Small Cats OU--pt happy w otc readers  2. DM- WITHOUT RETINOPATHY.  Advised yearly DFE    PLAN:    rtc 1 yr

## 2020-06-10 ENCOUNTER — PATIENT OUTREACH (OUTPATIENT)
Dept: OTHER | Facility: OTHER | Age: 60
End: 2020-06-10

## 2020-06-10 DIAGNOSIS — E11.22 TYPE 2 DIABETES MELLITUS WITH STAGE 3 CHRONIC KIDNEY DISEASE, WITHOUT LONG-TERM CURRENT USE OF INSULIN: Primary | ICD-10-CM

## 2020-06-10 DIAGNOSIS — N18.30 TYPE 2 DIABETES MELLITUS WITH STAGE 3 CHRONIC KIDNEY DISEASE, WITHOUT LONG-TERM CURRENT USE OF INSULIN: Primary | ICD-10-CM

## 2020-06-14 ENCOUNTER — OFFICE VISIT (OUTPATIENT)
Dept: URGENT CARE | Facility: CLINIC | Age: 60
End: 2020-06-14
Payer: COMMERCIAL

## 2020-06-14 VITALS
HEART RATE: 128 BPM | TEMPERATURE: 98 F | BODY MASS INDEX: 25.62 KG/M2 | OXYGEN SATURATION: 99 % | WEIGHT: 183 LBS | RESPIRATION RATE: 18 BRPM | HEIGHT: 71 IN

## 2020-06-14 DIAGNOSIS — Z01.89 ENCOUNTER FOR LABORATORY TEST: ICD-10-CM

## 2020-06-14 DIAGNOSIS — S62.652A CLOSED NONDISPLACED FRACTURE OF MIDDLE PHALANX OF RIGHT MIDDLE FINGER, INITIAL ENCOUNTER: ICD-10-CM

## 2020-06-14 DIAGNOSIS — S61.210A LACERATION OF RIGHT INDEX FINGER W/O FOREIGN BODY W/O DAMAGE TO NAIL, INITIAL ENCOUNTER: Primary | ICD-10-CM

## 2020-06-14 DIAGNOSIS — S61.212A LACERATION OF RIGHT MIDDLE FINGER WITHOUT FOREIGN BODY WITHOUT DAMAGE TO NAIL, INITIAL ENCOUNTER: ICD-10-CM

## 2020-06-14 LAB — SARS-COV-2 IGG SERPLBLD QL IA.RAPID: NEGATIVE

## 2020-06-14 PROCEDURE — 86769 SARS-COV-2 COVID-19 ANTIBODY: CPT

## 2020-06-14 PROCEDURE — 73140 X-RAY EXAM OF FINGER(S): CPT | Mod: FY,S$GLB,, | Performed by: RADIOLOGY

## 2020-06-14 PROCEDURE — 12042 LACERATION REPAIR: ICD-10-PCS | Mod: S$GLB,,, | Performed by: PHYSICIAN ASSISTANT

## 2020-06-14 PROCEDURE — 99214 OFFICE O/P EST MOD 30 MIN: CPT | Mod: 25,S$GLB,, | Performed by: PHYSICIAN ASSISTANT

## 2020-06-14 PROCEDURE — 99214 PR OFFICE/OUTPT VISIT, EST, LEVL IV, 30-39 MIN: ICD-10-PCS | Mod: 25,S$GLB,, | Performed by: PHYSICIAN ASSISTANT

## 2020-06-14 PROCEDURE — 12042 INTMD RPR N-HF/GENIT2.6-7.5: CPT | Mod: S$GLB,,, | Performed by: PHYSICIAN ASSISTANT

## 2020-06-14 PROCEDURE — 73140 XR FINGER 2 OR MORE VIEWS: ICD-10-PCS | Mod: FY,S$GLB,, | Performed by: RADIOLOGY

## 2020-06-14 RX ORDER — CEPHALEXIN 500 MG/1
500 CAPSULE ORAL EVERY 12 HOURS
Qty: 14 CAPSULE | Refills: 0 | Status: SHIPPED | OUTPATIENT
Start: 2020-06-14 | End: 2020-06-21

## 2020-06-14 NOTE — PROGRESS NOTES
"Subjective:       Patient ID: Partha Curtis Jr. is a 60 y.o. male.    Vitals:  height is 5' 11" (1.803 m) and weight is 83 kg (183 lb). His temperature is 98.4 °F (36.9 °C). His pulse is 128 (abnormal). His respiration is 18 and oxygen saturation is 99%.     Chief Complaint: Laceration (2 and 3 finger rt hand 1 hour ago)    Cut fingers     Laceration   The incident occurred 1 to 3 hours ago. The laceration is located on the right hand. The laceration mechanism was a metal edge. The pain is at a severity of 9/10. The pain is severe. The pain has been constant since onset. He reports no foreign bodies present. His tetanus status is UTD.       Constitution: Negative for fatigue.   HENT: Negative for facial swelling and facial trauma.    Neck: Negative for neck stiffness.   Cardiovascular: Negative for chest trauma.   Eyes: Negative for eye trauma, double vision and blurred vision.   Gastrointestinal: Negative for abdominal trauma, abdominal pain and rectal bleeding.   Genitourinary: Negative for hematuria, genital trauma and pelvic pain.   Musculoskeletal: Positive for pain, joint pain and joint swelling. Negative for trauma, abnormal ROM of joint and pain with walking.   Skin: Negative for color change, wound, abrasion, laceration and erythema.   Neurological: Negative for dizziness, history of vertigo, light-headedness, coordination disturbances, altered mental status and loss of consciousness.   Hematologic/Lymphatic: Negative for history of bleeding disorder.   Psychiatric/Behavioral: Negative for altered mental status.       Objective:      Physical Exam   Constitutional: He is oriented to person, place, and time. He appears well-developed.   HENT:   Head: Normocephalic and atraumatic. Head is without abrasion, without contusion and without laceration.   Right Ear: External ear normal.   Left Ear: External ear normal.   Nose: Nose normal.   Mouth/Throat: Oropharynx is clear and moist and mucous " membranes are normal.   Eyes: Pupils are equal, round, and reactive to light. Conjunctivae, EOM and lids are normal.   Neck: Trachea normal, full passive range of motion without pain and phonation normal. Neck supple.   Cardiovascular: Normal rate, regular rhythm and normal heart sounds.   Pulmonary/Chest: Effort normal and breath sounds normal. No stridor. No respiratory distress.   Musculoskeletal: Normal range of motion.      Right hand: He exhibits tenderness, bony tenderness and laceration. He exhibits normal range of motion, normal two-point discrimination, normal capillary refill, no deformity and no swelling. Normal sensation noted. Decreased sensation is not present in the ulnar distribution, is not present in the medial distribution and is not present in the radial distribution. Normal strength noted. He exhibits no finger abduction, no thumb/finger opposition and no wrist extension trouble.        Hands:    Neurological: He is alert and oriented to person, place, and time.   Skin: Skin is warm, dry, intact and no rash. Capillary refill takes less than 2 seconds. Lacerations - upper ext.:  right handabrasion, burn, bruising, erythema and ecchymosis  Psychiatric: His speech is normal and behavior is normal. Judgment and thought content normal.   Nursing note and vitals reviewed.        Assessment:       1. Laceration of right index finger w/o foreign body w/o damage to nail, initial encounter    2. Laceration of right middle finger without foreign body without damage to nail, initial encounter    3. Encounter for laboratory test    4. Closed nondisplaced fracture of middle phalanx of right middle finger, initial encounter        Plan:         Laceration of right index finger w/o foreign body w/o damage to nail, initial encounter  -     X-Ray Finger 2 or More Views; Future; Expected date: 06/14/2020  -     cephALEXin (KEFLEX) 500 MG capsule; Take 1 capsule (500 mg total) by mouth every 12 (twelve) hours. for  7 days  Dispense: 14 capsule; Refill: 0    Laceration of right middle finger without foreign body without damage to nail, initial encounter  -     X-Ray Finger 2 or More Views; Future; Expected date: 06/14/2020  -     cephALEXin (KEFLEX) 500 MG capsule; Take 1 capsule (500 mg total) by mouth every 12 (twelve) hours. for 7 days  Dispense: 14 capsule; Refill: 0    Encounter for laboratory test  -     COVID-19 (SARS CoV-2) IgG Antibody    Closed nondisplaced fracture of middle phalanx of right middle finger, initial encounter     Reviewed radiographs with patient. Discussed diagnosis with patient as well as treatment and home care. Discussed return to clinic precautions vs ER precautions. All patients questions answered. Patient verbalized understanding. Patient agreed with plan of care.         Finger Fracture, Closed  You have a broken finger (fracture). This causes local pain, swelling, and bruising. This injury usually takes about 4 to 6 weeks to heal, but can take longer in some cases. Finger injuries are often treated with a splint or cast, or by taping the injured finger to the next one (alannah taping). This protects the injured finger and holds the bone in position while it heals. More serious fractures may need surgery.     If the fingernail has been severely injured, it will probably fall off in 1 to 2 weeks. A new fingernail will usually start to grow back within a month.  Home care  Follow these guidelines when caring for yourself at home:  · Keep your hand elevated to reduce pain and swelling. When sitting or lying down keep your arm above the level of your heart. You can do this by placing your arm on a pillow that rests on your chest or on a pillow at your side. This is most important during the first 2 days (48 hours) after the injury.  · Put an ice pack on the injured area. Do this for 20 minutes every 1 to 2 hours the first day for pain relief. You can make an ice pack by wrapping a plastic bag of ice  cubes in a thin towel. As the ice melts, be careful that the cast or splint doesnt get wet. Continue using the ice pack 3 to 4 times a day until the pain and swelling go away.  · Keep the cast or splint completely dry at all times. Bathe with your cast or splint out of the water. Protect it with a large plastic bag, rubber-banded at the top end. If a fiberglass cast or splint gets wet, you can dry it with a hair dryer.  · If alannah tape was put on and it becomes wet or dirty, change it. You may replace it with paper, plastic, or cloth tape. Cloth tape and paper tapes must be kept dry. Keep the alannah tape in place for at least 4 weeks.  · You may use acetaminophen or ibuprofen to control pain, unless another pain medicine was prescribed. If you have chronic liver or kidney disease, talk with your healthcare provider before using these medicines. Also talk with your provider if youve had a stomach ulcer or gastrointestinal bleeding.  · Dont put creams or objects under the cast if you have itching.  Follow-up care  Follow up with your healthcare provider, or as advised. This is to make sure the bone is healing the way it should.  X-rays may be taken. You will be told of any new findings that may affect your care.  When to seek medical advice  Call your healthcare provider right away if any of these occur:  · The plaster cast or splint becomes wet or soft  · The cast or splint cracks  · The fiberglass cast or splint stays wet for more than 24 hours  · Pain or swelling gets worse  · Redness, warmth, swelling, drainage from the wound, or foul odor from a cast or splint  · Finger becomes more cold, blue, numb, or tingly  · You cant move your finger  · The skin around the cast or splint becomes red  · Fever of 100.4ºF (38ºC) or higher, or as directed by your healthcare provider  Date Last Reviewed: 2/1/2017  © 2267-0543 [employer]. [address] [place] [address], [place] [address]. All rights reserved. This  information is not intended as a substitute for professional medical care. Always follow your healthcare professional's instructions.        Extremity Laceration: Sutures, Staples, or Tape  A laceration is a cut through the skin. If it is deep, it may require stitches (sutures) or staples to close so it can heal. Minor cuts may be treated with surgical tape closures.   X-rays may be done if something may have entered the skin through the cut. You may also need a tetanus shot if you are not up to date on this vaccination.  Home care  · Follow the health care providers instructions on how to care for the cut.  · Wash your hands with soap and warm water before and after caring for your wound. This is to help prevent infection.  · Keep the wound clean and dry. If a bandage was applied and it becomes wet or dirty, replace it. Otherwise, leave it in place for the first 24 hours, then change it once a day or as directed.  · If sutures or staples were used, clean the wound daily:  · After removing the bandage, wash the area with soap and water. Use a wet cotton swab to loosen and remove any blood or crust that forms.  · After cleaning, keep the wound clean and dry. Talk with your doctor before applying any antibiotic ointment to the wound. Reapply the bandage.  · You may remove the bandage to shower as usual after the first 24 hours, but do not soak the area in water (no swimming) until the stitches or staples are removed.  · If surgical tape closures were used, keep the area clean and dry. If it becomes wet, blot it dry with a towel.  · The doctor may prescribe an antibiotic cream or ointment to prevent infection. Do not stop taking this medication until you have finished the prescribed course or the doctor tells you to stop. The doctor may also prescribe medications for pain. Follow the doctors instructions for taking these medications.  · Avoid activities that may reopen your wound.  Follow-up care  Follow up with your  health care provider. Most skin wounds heal within ten days. However, an infection may sometimes occur despite proper treatment. Therefore, check the wound daily for the signs of infection listed below. Stitches and staples should be removed within 7-14 days. If surgical tape closures were used, you may remove them after 10 days if they have not fallen off by then.   When to seek medical advice  Call your health care provider right away if any of these occur:  · Wound bleeding not controlled by direct pressure  · Signs of infection, including increasing pain in the wound, increasing wound redness or swelling, or pus or bad odor coming from the wound  · Fever of 100.4°F (38ºC) or higher or as directed by your healthcare provider  · Stitches or staples come apart or fall out or surgical tape falls off before 7 days  · Wound edges re-open  · Wound changes colors  · Numbness around the wound   · Decreased movement around the injured area  Date Last Reviewed: 6/14/2015  © 6744-8154 [employer]. [address] [place] [address], [place] [address]. All rights reserved. This information is not intended as a substitute for professional medical care. Always follow your healthcare professional's instructions.      Please follow up with your Primary care provider within 2-5 days if your signs and symptoms have not resolved or worsen.     If your condition worsens or fails to improve we recommend that you receive another evaluation at the emergency room immediately or contact your primary medical clinic to discuss your concerns.   You must understand that you have received an Urgent Care treatment only and that you may be released before all of your medical problems are known or treated. You, the patient, will arrange for follow up care as instructed.     RED FLAGS/WARNING SYMPTOMS DISCUSSED WITH PATIENT THAT WOULD WARRANT EMERGENT MEDICAL ATTENTION. PATIENT VERBALIZED UNDERSTANDING.

## 2020-06-14 NOTE — PATIENT INSTRUCTIONS
Finger Fracture, Closed  You have a broken finger (fracture). This causes local pain, swelling, and bruising. This injury usually takes about 4 to 6 weeks to heal, but can take longer in some cases. Finger injuries are often treated with a splint or cast, or by taping the injured finger to the next one (alannah taping). This protects the injured finger and holds the bone in position while it heals. More serious fractures may need surgery.     If the fingernail has been severely injured, it will probably fall off in 1 to 2 weeks. A new fingernail will usually start to grow back within a month.  Home care  Follow these guidelines when caring for yourself at home:  · Keep your hand elevated to reduce pain and swelling. When sitting or lying down keep your arm above the level of your heart. You can do this by placing your arm on a pillow that rests on your chest or on a pillow at your side. This is most important during the first 2 days (48 hours) after the injury.  · Put an ice pack on the injured area. Do this for 20 minutes every 1 to 2 hours the first day for pain relief. You can make an ice pack by wrapping a plastic bag of ice cubes in a thin towel. As the ice melts, be careful that the cast or splint doesnt get wet. Continue using the ice pack 3 to 4 times a day until the pain and swelling go away.  · Keep the cast or splint completely dry at all times. Bathe with your cast or splint out of the water. Protect it with a large plastic bag, rubber-banded at the top end. If a fiberglass cast or splint gets wet, you can dry it with a hair dryer.  · If alannah tape was put on and it becomes wet or dirty, change it. You may replace it with paper, plastic, or cloth tape. Cloth tape and paper tapes must be kept dry. Keep the alannah tape in place for at least 4 weeks.  · You may use acetaminophen or ibuprofen to control pain, unless another pain medicine was prescribed. If you have chronic liver or kidney disease, talk with  No signs of infection. Although she has had abx and steroid injections for similar issues in the past, I recommended against that for now. Recommend flonase and zyrtec. If not improving by Monday, call.   your healthcare provider before using these medicines. Also talk with your provider if youve had a stomach ulcer or gastrointestinal bleeding.  · Dont put creams or objects under the cast if you have itching.  Follow-up care  Follow up with your healthcare provider, or as advised. This is to make sure the bone is healing the way it should.  X-rays may be taken. You will be told of any new findings that may affect your care.  When to seek medical advice  Call your healthcare provider right away if any of these occur:  · The plaster cast or splint becomes wet or soft  · The cast or splint cracks  · The fiberglass cast or splint stays wet for more than 24 hours  · Pain or swelling gets worse  · Redness, warmth, swelling, drainage from the wound, or foul odor from a cast or splint  · Finger becomes more cold, blue, numb, or tingly  · You cant move your finger  · The skin around the cast or splint becomes red  · Fever of 100.4ºF (38ºC) or higher, or as directed by your healthcare provider  Date Last Reviewed: 2/1/2017 © 2000-2017 [employer]. [address] [place] [address]. All rights reserved. This information is not intended as a substitute for professional medical care. Always follow your healthcare professional's instructions.        Extremity Laceration: Sutures, Staples, or Tape  A laceration is a cut through the skin. If it is deep, it may require stitches (sutures) or staples to close so it can heal. Minor cuts may be treated with surgical tape closures.   X-rays may be done if something may have entered the skin through the cut. You may also need a tetanus shot if you are not up to date on this vaccination.  Home care  · Follow the health care providers instructions on how to care for the cut.  · Wash your hands with soap and warm water before and after caring for your wound. This is to help prevent infection.  · Keep the wound clean and dry. If a bandage was applied and it becomes  wet or dirty, replace it. Otherwise, leave it in place for the first 24 hours, then change it once a day or as directed.  · If sutures or staples were used, clean the wound daily:  · After removing the bandage, wash the area with soap and water. Use a wet cotton swab to loosen and remove any blood or crust that forms.  · After cleaning, keep the wound clean and dry. Talk with your doctor before applying any antibiotic ointment to the wound. Reapply the bandage.  · You may remove the bandage to shower as usual after the first 24 hours, but do not soak the area in water (no swimming) until the stitches or staples are removed.  · If surgical tape closures were used, keep the area clean and dry. If it becomes wet, blot it dry with a towel.  · The doctor may prescribe an antibiotic cream or ointment to prevent infection. Do not stop taking this medication until you have finished the prescribed course or the doctor tells you to stop. The doctor may also prescribe medications for pain. Follow the doctors instructions for taking these medications.  · Avoid activities that may reopen your wound.  Follow-up care  Follow up with your health care provider. Most skin wounds heal within ten days. However, an infection may sometimes occur despite proper treatment. Therefore, check the wound daily for the signs of infection listed below. Stitches and staples should be removed within 7-14 days. If surgical tape closures were used, you may remove them after 10 days if they have not fallen off by then.   When to seek medical advice  Call your health care provider right away if any of these occur:  · Wound bleeding not controlled by direct pressure  · Signs of infection, including increasing pain in the wound, increasing wound redness or swelling, or pus or bad odor coming from the wound  · Fever of 100.4°F (38ºC) or higher or as directed by your healthcare provider  · Stitches or staples come apart or fall out or surgical tape falls  off before 7 days  · Wound edges re-open  · Wound changes colors  · Numbness around the wound   · Decreased movement around the injured area  Date Last Reviewed: 6/14/2015  © 8445-6232 [employer]. [address] [place] [address], [place] [address]. All rights reserved. This information is not intended as a substitute for professional medical care. Always follow your healthcare professional's instructions.      Please follow up with your Primary care provider within 2-5 days if your signs and symptoms have not resolved or worsen.     If your condition worsens or fails to improve we recommend that you receive another evaluation at the emergency room immediately or contact your primary medical clinic to discuss your concerns.   You must understand that you have received an Urgent Care treatment only and that you may be released before all of your medical problems are known or treated. You, the patient, will arrange for follow up care as instructed.     RED FLAGS/WARNING SYMPTOMS DISCUSSED WITH PATIENT THAT WOULD WARRANT EMERGENT MEDICAL ATTENTION. PATIENT VERBALIZED UNDERSTANDING.

## 2020-06-14 NOTE — PROCEDURES
Laceration Repair    Date/Time: 6/14/2020 4:25 PM  Performed by: Rayne Barba PA-C  Authorized by: Rayne Barba PA-C   Body area: upper extremity  Location details: right hand  Laceration length: 3 cm  Tendon involvement: none  Nerve involvement: none  Vascular damage: no    Anesthesia:  Local Anesthetic: lidocaine 1% without epinephrine  Anesthetic total: 3 mL  Patient sedated: no  Irrigation solution: saline  Irrigation method: syringe  Amount of cleaning: extensive  Debridement: none  Degree of undermining: none  Skin closure: 3-0 nylon  Number of sutures: 9  Technique: simple  Approximation: close  Approximation difficulty: simple  Patient tolerance: Patient tolerated the procedure well with no immediate complications       Patient had laceration to the tip of right index finger.  For sutures placed.  Patient had laceration over the DI P the palmar aspect of the right middle finger with 3sutures placed.  Small superficial laceration noted the tip of the right middle finger with 2 sutures placed.  Patient illicits full range of motion of the PIP and D IP joints with extensor and flexor tendon still intact.  Based on radiographs possible fracture of the distal aspect right middle phalanx.  Patient placed in finger splint and instructed to follow-up with orthopedics.

## 2020-06-15 ENCOUNTER — TELEPHONE (OUTPATIENT)
Dept: URGENT CARE | Facility: CLINIC | Age: 60
End: 2020-06-15

## 2020-06-15 NOTE — TELEPHONE ENCOUNTER
----- Message from Home Woodruff NP sent at 6/15/2020  9:23 AM CDT -----  Please call to check on pt and notify about negative COVID-19 antibody results.

## 2020-06-23 ENCOUNTER — PATIENT OUTREACH (OUTPATIENT)
Dept: ADMINISTRATIVE | Facility: HOSPITAL | Age: 60
End: 2020-06-23

## 2020-06-29 DIAGNOSIS — N18.30 TYPE 2 DIABETES MELLITUS WITH STAGE 3 CHRONIC KIDNEY DISEASE, WITHOUT LONG-TERM CURRENT USE OF INSULIN: ICD-10-CM

## 2020-06-29 DIAGNOSIS — E11.22 TYPE 2 DIABETES MELLITUS WITH STAGE 3 CHRONIC KIDNEY DISEASE, WITHOUT LONG-TERM CURRENT USE OF INSULIN: ICD-10-CM

## 2020-06-29 RX ORDER — METFORMIN HYDROCHLORIDE 500 MG/1
TABLET, EXTENDED RELEASE ORAL
Qty: 291 TABLET | Refills: 0 | Status: SHIPPED | OUTPATIENT
Start: 2020-06-29 | End: 2020-07-07 | Stop reason: SDUPTHER

## 2020-07-01 ENCOUNTER — LAB VISIT (OUTPATIENT)
Dept: LAB | Facility: HOSPITAL | Age: 60
End: 2020-07-01
Attending: FAMILY MEDICINE
Payer: COMMERCIAL

## 2020-07-01 DIAGNOSIS — N18.30 CKD (CHRONIC KIDNEY DISEASE) STAGE 3, GFR 30-59 ML/MIN: ICD-10-CM

## 2020-07-01 DIAGNOSIS — E11.22 TYPE 2 DIABETES MELLITUS WITH STAGE 3 CHRONIC KIDNEY DISEASE, WITHOUT LONG-TERM CURRENT USE OF INSULIN: ICD-10-CM

## 2020-07-01 DIAGNOSIS — N18.30 TYPE 2 DIABETES MELLITUS WITH STAGE 3 CHRONIC KIDNEY DISEASE, WITHOUT LONG-TERM CURRENT USE OF INSULIN: ICD-10-CM

## 2020-07-01 LAB
ALBUMIN SERPL BCP-MCNC: 4.1 G/DL (ref 3.5–5.2)
ALP SERPL-CCNC: 70 U/L (ref 55–135)
ALT SERPL W/O P-5'-P-CCNC: 19 U/L (ref 10–44)
ANION GAP SERPL CALC-SCNC: 10 MMOL/L (ref 8–16)
AST SERPL-CCNC: 20 U/L (ref 10–40)
BASOPHILS # BLD AUTO: 0.05 K/UL (ref 0–0.2)
BASOPHILS NFR BLD: 0.5 % (ref 0–1.9)
BILIRUB SERPL-MCNC: 0.2 MG/DL (ref 0.1–1)
BUN SERPL-MCNC: 21 MG/DL (ref 6–20)
CALCIUM SERPL-MCNC: 9.4 MG/DL (ref 8.7–10.5)
CHLORIDE SERPL-SCNC: 100 MMOL/L (ref 95–110)
CO2 SERPL-SCNC: 26 MMOL/L (ref 23–29)
CREAT SERPL-MCNC: 2 MG/DL (ref 0.5–1.4)
DIFFERENTIAL METHOD: ABNORMAL
EOSINOPHIL # BLD AUTO: 0.6 K/UL (ref 0–0.5)
EOSINOPHIL NFR BLD: 5.7 % (ref 0–8)
ERYTHROCYTE [DISTWIDTH] IN BLOOD BY AUTOMATED COUNT: 19.9 % (ref 11.5–14.5)
EST. GFR  (AFRICAN AMERICAN): 40.7 ML/MIN/1.73 M^2
EST. GFR  (NON AFRICAN AMERICAN): 35.2 ML/MIN/1.73 M^2
ESTIMATED AVG GLUCOSE: 137 MG/DL (ref 68–131)
GLUCOSE SERPL-MCNC: 116 MG/DL (ref 70–110)
HBA1C MFR BLD HPLC: 6.4 % (ref 4–5.6)
HCT VFR BLD AUTO: 41.3 % (ref 40–54)
HGB BLD-MCNC: 12 G/DL (ref 14–18)
IMM GRANULOCYTES # BLD AUTO: 0.03 K/UL (ref 0–0.04)
IMM GRANULOCYTES NFR BLD AUTO: 0.3 % (ref 0–0.5)
LYMPHOCYTES # BLD AUTO: 2 K/UL (ref 1–4.8)
LYMPHOCYTES NFR BLD: 19.2 % (ref 18–48)
MCH RBC QN AUTO: 23 PG (ref 27–31)
MCHC RBC AUTO-ENTMCNC: 29.1 G/DL (ref 32–36)
MCV RBC AUTO: 79 FL (ref 82–98)
MONOCYTES # BLD AUTO: 1 K/UL (ref 0.3–1)
MONOCYTES NFR BLD: 9.5 % (ref 4–15)
NEUTROPHILS # BLD AUTO: 6.7 K/UL (ref 1.8–7.7)
NEUTROPHILS NFR BLD: 64.8 % (ref 38–73)
NRBC BLD-RTO: 0 /100 WBC
PLATELET # BLD AUTO: 405 K/UL (ref 150–350)
PMV BLD AUTO: 9.1 FL (ref 9.2–12.9)
POTASSIUM SERPL-SCNC: 4.1 MMOL/L (ref 3.5–5.1)
PROT SERPL-MCNC: 7.5 G/DL (ref 6–8.4)
RBC # BLD AUTO: 5.21 M/UL (ref 4.6–6.2)
SODIUM SERPL-SCNC: 136 MMOL/L (ref 136–145)
WBC # BLD AUTO: 10.25 K/UL (ref 3.9–12.7)

## 2020-07-01 PROCEDURE — 36415 COLL VENOUS BLD VENIPUNCTURE: CPT | Mod: PO

## 2020-07-01 PROCEDURE — 80053 COMPREHEN METABOLIC PANEL: CPT

## 2020-07-01 PROCEDURE — 85025 COMPLETE CBC W/AUTO DIFF WBC: CPT

## 2020-07-01 PROCEDURE — 83036 HEMOGLOBIN GLYCOSYLATED A1C: CPT

## 2020-07-07 ENCOUNTER — LAB VISIT (OUTPATIENT)
Dept: LAB | Facility: HOSPITAL | Age: 60
End: 2020-07-07
Attending: FAMILY MEDICINE
Payer: COMMERCIAL

## 2020-07-07 ENCOUNTER — OFFICE VISIT (OUTPATIENT)
Dept: FAMILY MEDICINE | Facility: CLINIC | Age: 60
End: 2020-07-07
Payer: COMMERCIAL

## 2020-07-07 VITALS
TEMPERATURE: 97 F | HEIGHT: 71 IN | DIASTOLIC BLOOD PRESSURE: 58 MMHG | BODY MASS INDEX: 27.13 KG/M2 | OXYGEN SATURATION: 97 % | WEIGHT: 193.81 LBS | SYSTOLIC BLOOD PRESSURE: 111 MMHG | HEART RATE: 92 BPM

## 2020-07-07 DIAGNOSIS — I10 ESSENTIAL HYPERTENSION: ICD-10-CM

## 2020-07-07 DIAGNOSIS — N18.30 CKD (CHRONIC KIDNEY DISEASE) STAGE 3, GFR 30-59 ML/MIN: ICD-10-CM

## 2020-07-07 DIAGNOSIS — E11.22 TYPE 2 DIABETES MELLITUS WITH STAGE 3 CHRONIC KIDNEY DISEASE, WITHOUT LONG-TERM CURRENT USE OF INSULIN: Primary | ICD-10-CM

## 2020-07-07 DIAGNOSIS — R53.83 OTHER FATIGUE: ICD-10-CM

## 2020-07-07 DIAGNOSIS — N18.30 TYPE 2 DIABETES MELLITUS WITH STAGE 3 CHRONIC KIDNEY DISEASE, WITHOUT LONG-TERM CURRENT USE OF INSULIN: Primary | ICD-10-CM

## 2020-07-07 LAB
CREAT UR-MCNC: 156 MG/DL (ref 23–375)
PROT UR-MCNC: <7 MG/DL (ref 0–15)
PROT/CREAT UR: NORMAL MG/G{CREAT} (ref 0–0.2)

## 2020-07-07 PROCEDURE — 82570 ASSAY OF URINE CREATININE: CPT

## 2020-07-07 PROCEDURE — 3078F DIAST BP <80 MM HG: CPT | Mod: CPTII,S$GLB,, | Performed by: FAMILY MEDICINE

## 2020-07-07 PROCEDURE — 3074F PR MOST RECENT SYSTOLIC BLOOD PRESSURE < 130 MM HG: ICD-10-PCS | Mod: CPTII,S$GLB,, | Performed by: FAMILY MEDICINE

## 2020-07-07 PROCEDURE — 3044F HG A1C LEVEL LT 7.0%: CPT | Mod: CPTII,S$GLB,, | Performed by: FAMILY MEDICINE

## 2020-07-07 PROCEDURE — 3008F BODY MASS INDEX DOCD: CPT | Mod: CPTII,S$GLB,, | Performed by: FAMILY MEDICINE

## 2020-07-07 PROCEDURE — 99999 PR PBB SHADOW E&M-EST. PATIENT-LVL V: ICD-10-PCS | Mod: PBBFAC,,, | Performed by: FAMILY MEDICINE

## 2020-07-07 PROCEDURE — 3078F PR MOST RECENT DIASTOLIC BLOOD PRESSURE < 80 MM HG: ICD-10-PCS | Mod: CPTII,S$GLB,, | Performed by: FAMILY MEDICINE

## 2020-07-07 PROCEDURE — 3044F PR MOST RECENT HEMOGLOBIN A1C LEVEL <7.0%: ICD-10-PCS | Mod: CPTII,S$GLB,, | Performed by: FAMILY MEDICINE

## 2020-07-07 PROCEDURE — 3008F PR BODY MASS INDEX (BMI) DOCUMENTED: ICD-10-PCS | Mod: CPTII,S$GLB,, | Performed by: FAMILY MEDICINE

## 2020-07-07 PROCEDURE — 99214 OFFICE O/P EST MOD 30 MIN: CPT | Mod: S$GLB,,, | Performed by: FAMILY MEDICINE

## 2020-07-07 PROCEDURE — 99214 PR OFFICE/OUTPT VISIT, EST, LEVL IV, 30-39 MIN: ICD-10-PCS | Mod: S$GLB,,, | Performed by: FAMILY MEDICINE

## 2020-07-07 PROCEDURE — 99999 PR PBB SHADOW E&M-EST. PATIENT-LVL V: CPT | Mod: PBBFAC,,, | Performed by: FAMILY MEDICINE

## 2020-07-07 PROCEDURE — 3074F SYST BP LT 130 MM HG: CPT | Mod: CPTII,S$GLB,, | Performed by: FAMILY MEDICINE

## 2020-07-07 RX ORDER — CANDESARTAN 32 MG/1
32 TABLET ORAL DAILY
Qty: 90 TABLET | Refills: 0 | Status: SHIPPED | OUTPATIENT
Start: 2020-07-07 | End: 2020-07-28 | Stop reason: SDUPTHER

## 2020-07-07 RX ORDER — METOPROLOL SUCCINATE 25 MG/1
25 TABLET, EXTENDED RELEASE ORAL DAILY
Qty: 30 TABLET | Refills: 0 | Status: SHIPPED | OUTPATIENT
Start: 2020-07-07 | End: 2020-07-28 | Stop reason: SDUPTHER

## 2020-07-07 RX ORDER — METFORMIN HYDROCHLORIDE 500 MG/1
1000 TABLET, EXTENDED RELEASE ORAL
Qty: 180 TABLET | Refills: 0 | Status: SHIPPED | OUTPATIENT
Start: 2020-07-07 | End: 2020-07-28 | Stop reason: SDUPTHER

## 2020-07-07 NOTE — PATIENT INSTRUCTIONS
HANANE noted  Stop chlorthalidone  Continue digital HTN  Increase candesartan to 32 mg  Monitor BP x 1 week  If BP >130/80, add metoprolol 25 mg XR    Decrease metformin to 1000 mg daily    Recheck BMP in 3 weeks  If kidney function still low, need to consider US retroperitoneal and renal referral  No NSAIDS such as ibuprofen or naproxen. Avoid Red meats. Drink a lot of water. I will continue monitoring kidney function q3-6 months.

## 2020-07-07 NOTE — PROGRESS NOTES
Subjective:       Patient ID: Partha Curtis Jr. is a 60 y.o. male.    Chief Complaint: Diabetes    Partha is a 60 y.o. male who presents today to follow up on his recent labs and chronic medical issues.      HTN: stable. Seeing cards as well  CKD: worsening. Unclear why. DM is better controlled. Has gone from CKD 3A/2 to CKD 3B (lower end)  DM: taking metformin 3 pills/day. He has lost some weight. He has been trying to eat healthier. He is still not drinking soda.   DLD: already on statin, LDL at goal.  Anemia: stable. Low due to CKD?    He possibly snores at night. He is very fatigued by the end of the day.     He gets occasionally dizzy when he goes up the steps at work. No chest pain. No SOB.      Labs as below reviewed in detail.     Review of Systems   Constitutional: Negative for activity change and unexpected weight change.   HENT: Negative for hearing loss, rhinorrhea and trouble swallowing.    Eyes: Negative for discharge and visual disturbance.   Respiratory: Negative for chest tightness and wheezing.    Cardiovascular: Negative for chest pain and palpitations.   Gastrointestinal: Positive for constipation. Negative for blood in stool, diarrhea and vomiting.   Endocrine: Negative for polydipsia and polyuria.   Genitourinary: Negative for difficulty urinating, hematuria and urgency.   Musculoskeletal: Negative for arthralgias, joint swelling and neck pain.   Neurological: Negative for weakness and headaches.   Psychiatric/Behavioral: Negative for confusion and dysphoric mood.           Results for orders placed or performed in visit on 07/01/20   CBC auto differential   Result Value Ref Range    WBC 10.25 3.90 - 12.70 K/uL    RBC 5.21 4.60 - 6.20 M/uL    Hemoglobin 12.0 (L) 14.0 - 18.0 g/dL    Hematocrit 41.3 40.0 - 54.0 %    Mean Corpuscular Volume 79 (L) 82 - 98 fL    Mean Corpuscular Hemoglobin 23.0 (L) 27.0 - 31.0 pg    Mean Corpuscular Hemoglobin Conc 29.1 (L) 32.0 - 36.0 g/dL    RDW 19.9 (H) 11.5  - 14.5 %    Platelets 405 (H) 150 - 350 K/uL    MPV 9.1 (L) 9.2 - 12.9 fL    Immature Granulocytes 0.3 0.0 - 0.5 %    Gran # (ANC) 6.7 1.8 - 7.7 K/uL    Immature Grans (Abs) 0.03 0.00 - 0.04 K/uL    Lymph # 2.0 1.0 - 4.8 K/uL    Mono # 1.0 0.3 - 1.0 K/uL    Eos # 0.6 (H) 0.0 - 0.5 K/uL    Baso # 0.05 0.00 - 0.20 K/uL    nRBC 0 0 /100 WBC    Gran% 64.8 38.0 - 73.0 %    Lymph% 19.2 18.0 - 48.0 %    Mono% 9.5 4.0 - 15.0 %    Eosinophil% 5.7 0.0 - 8.0 %    Basophil% 0.5 0.0 - 1.9 %    Differential Method Automated    Comprehensive metabolic panel   Result Value Ref Range    Sodium 136 136 - 145 mmol/L    Potassium 4.1 3.5 - 5.1 mmol/L    Chloride 100 95 - 110 mmol/L    CO2 26 23 - 29 mmol/L    Glucose 116 (H) 70 - 110 mg/dL    BUN, Bld 21 (H) 6 - 20 mg/dL    Creatinine 2.0 (H) 0.5 - 1.4 mg/dL    Calcium 9.4 8.7 - 10.5 mg/dL    Total Protein 7.5 6.0 - 8.4 g/dL    Albumin 4.1 3.5 - 5.2 g/dL    Total Bilirubin 0.2 0.1 - 1.0 mg/dL    Alkaline Phosphatase 70 55 - 135 U/L    AST 20 10 - 40 U/L    ALT 19 10 - 44 U/L    Anion Gap 10 8 - 16 mmol/L    eGFR if African American 40.7 (A) >60 mL/min/1.73 m^2    eGFR if non  35.2 (A) >60 mL/min/1.73 m^2   Hemoglobin A1c   Result Value Ref Range    Hemoglobin A1C 6.4 (H) 4.0 - 5.6 %    Estimated Avg Glucose 137 (H) 68 - 131 mg/dL       Objective:     Vitals:    07/07/20 0921   BP: (!) 111/58   Pulse: 92   Temp: 97.3 °F (36.3 °C)        Physical Exam  Vitals signs and nursing note reviewed.   Constitutional:       Appearance: He is well-developed. He is obese.   HENT:      Head: Normocephalic and atraumatic.   Neck:      Musculoskeletal: Normal range of motion and neck supple.   Cardiovascular:      Rate and Rhythm: Normal rate and regular rhythm.   Pulmonary:      Effort: Pulmonary effort is normal.      Breath sounds: Normal breath sounds.   Musculoskeletal:      Right foot: No deformity.      Left foot: No deformity.   Feet:      Right foot:      Protective Sensation:  10 sites tested. 10 sites sensed.      Skin integrity: Dry skin present. No ulcer, blister, skin breakdown, erythema, warmth or callus.      Left foot:      Protective Sensation: 10 sites tested. 10 sites sensed.      Skin integrity: Dry skin present. No ulcer, blister, skin breakdown, erythema, warmth or callus.   Neurological:      Mental Status: He is alert and oriented to person, place, and time.   Psychiatric:         Behavior: Behavior normal.         Thought Content: Thought content normal.         Judgment: Judgment normal.         Assessment:       1. Type 2 diabetes mellitus with stage 3 chronic kidney disease, without long-term current use of insulin    2. CKD (chronic kidney disease) stage 3, GFR 30-59 ml/min    3. Essential hypertension    4. Other fatigue        Plan:       HANANE noted  Stop chlorthalidone  Continue digital HTN  Increase candesartan to 32 mg  Monitor BP x 1 week  If BP >130/80, add metoprolol 25 mg XR    Decrease metformin to 1000 mg daily    Recheck BMP in 3 weeks  If kidney function still low, need to consider US retroperitoneal and renal referral  No NSAIDS such as ibuprofen or naproxen. Avoid Red meats. Drink a lot of water. I will continue monitoring kidney function q3-6 months.      Type 2 diabetes mellitus with stage 3 chronic kidney disease, without long-term current use of insulin  -     metFORMIN (GLUCOPHAGE-XR) 500 MG XR 24hr tablet; Take 2 tablets (1,000 mg total) by mouth daily with breakfast.  Dispense: 180 tablet; Refill: 0    CKD (chronic kidney disease) stage 3, GFR 30-59 ml/min  -     Basic metabolic panel; Future; Expected date: 07/07/2020  -     Protein / creatinine ratio, urine; Future; Expected date: 07/07/2020    Essential hypertension  -     candesartan (ATACAND) 32 MG tablet; Take 1 tablet (32 mg total) by mouth once daily.  Dispense: 90 tablet; Refill: 0  -     metoprolol succinate (TOPROL-XL) 25 MG 24 hr tablet; Take 1 tablet (25 mg total) by mouth once daily.   Dispense: 30 tablet; Refill: 0    Other fatigue  -     Ambulatory referral/consult to Sleep Disorders; Future; Expected date: 07/14/2020        Warning signs discussed, patient to call with any further issues or worsening of symptoms.

## 2020-07-08 NOTE — PROGRESS NOTES
Called patient, I was able to leave a voicemail. I will reach back out in 2 weeks. Need to verify med changes, also review technique. Pt has not completed enrollment in [facility], will see if he has received glucometer or testing supplies.    Pt saw PCP on 7/7, the following med changes and lab results are noted:    Hypertension  - dc'd chlorthalidone d/t worsening CKD  - increased candesartan from 16 to 32 mg QD  - option to add metoprolol 25 XR if BP above goal     *eGFR worsening over past year from 55.0 -> 46.1 -> 35.2 now    Diabetes  - decreased metformin from 1500 mg to 1000 mg QD  - A1C decreased from 8.0 three months ago to 6.4      BMP is scheduled on 7/25 and f/u with PCP on 7/28.    Last 5 Patient Entered Readings                                      Current 30 Day Average: 124/81     Recent Readings 7/8/2020 7/7/2020 7/6/2020 7/5/2020 7/4/2020    SBP (mmHg) 146 133 135 125 124    DBP (mmHg) 98 86 88 81 81    Pulse 87 94 106 87 92

## 2020-07-15 NOTE — PROGRESS NOTES
Digital Medicine: Clinician Introduction    Partha Curtis Jr. is a 60 y.o. male who is newly enrolled in the [facility].    Called pt to welcome into HDMP & DDMP.     Hypertension  Pt is doing well, confirmed recent PCP changes  - dc'd chlorthalidone d/t worsening CKD  - increased candesartan from 16 to 32 mg QD  - option to add metoprolol 25 XR if BP above goal     Pt did start using metoprolol approx 2 days ago. No issues, takes metoprolol in the AM and all other meds at night.      Diabetes  confirmed recent PCP changes  - decreased metformin from 1500 mg to 1000 mg QD; pt is taking this in the evening to help with fatigue/tiredness during the day. States this has improved since he changed admin time.     Pt reports he has manually entering readings into [employer] ricky. He states he has talked to tech support about this issue in the past, but they reported they could see the readings.     He is also out of test strips, contacted E approx a month ago and they said he could get his strips in July. He ran out a few days ago.       The history is provided by the patient.      Review of patient's allergies indicates:   -- Stadol (butorphanol tartrate) -- Rash    --  Swelling in face   -- Strawberries (strawberry) -- Rash  Completed Medication Reconciliation  Verified pharmacy information.  Patient is on ACEI/ARB.   Patient is on statin     HYPERTENSION    Explained non-pharmacologic therapies like low salt diet and physical activity can reduce blood pressure.       Explained that we expect patient to submit several blood pressure readings per week at random times of the day, but at least 30 minutes after taking blood pressure medications. Instructed patient not to allow anyone else to use their blood pressure monitor and phone as data submitted is directly entered into medical record. Reviewed and confirmed appropriate blood pressure monitoring technique.         Reviewed signs/symptoms of hypertension  (headache, changes in vision, chest pain, shortness of breath)   Reviewed signs/symptoms of hypotension (lightheaded, dizziness, weakness)     Patient's BP goal is less than or equal to 130/80. Patients BP average is 127/83 mmHg, which is at goal, per 2017 ACC/AHA Hypertension Guidelines. Patient attributes current blood pressure to: med adherence      DIABETES  Explained the goal of the diabetes digital medicine program is to decrease A1c within patient-specific target levels. Reviewed benefits of A1c reduction including reducing risk for kidney, eye, and nerve disease.      Explained that we expect patient to submit blood sugar readings as prescribed. Instructed patient not to allow anyone else to use their glucometer and phone as data submitted is directly entered into their medical record. Reviewed and confirmed appropriate blood sugar testing technique.      Patient reported SMBG schedule: Daily.   Reviewed general Self-Monitoring of Blood Glucose (SMBG) goals:  · FP-130 mg/dL  · 2h PPG: <180 mg/dL  · Bedtime: <150 mg/dL    Reviewed signs or symptoms of hyperglycemia (headache, increased thirst, increased urination, fatigue, blurred vision, etc.).      Reviewed signs and symptoms of hypoglycemia (weakness, dizziness, hunger, shakiness, nausea, headache, heart palpitations, sweating, fatigue, anxiety, etc.).  Reviewed treatment of hypoglycemia (1515 rule).      Patient does not have history of hypoglycemia.    Patient's A1C goal is less than or equal to 7. Patient's most recent A1C result is at goal. Lab Results     Component                Value               Date                     HGBA1C                   6.4 (H)             2020          . Patient attributes current A1C to: med adherence          Last 5 Patient Entered Readings                                      Current 30 Day Average: 127/83     Recent Readings 2020 2020 7/10/2020 2020 2020    SBP (mmHg) 127 136 133 128 146     DBP (mmHg) 84 91 95 89 98    Pulse 95 84 97 85 87        Last 6 Patient Entered Readings                                          Most Recent A1c: 6.4% on 7/1/2020  (Goal: 7%)     There is no flowsheet data to display.            Depression Screening  Did not address depression screening.    Sleep Apnea Screening    Did not address sleep apnea screening.     Medication Affordability Screening  Did not address medication affordability screening.           ASSESSMENT(S)  Patients BP average is 127/83 mmHg, which is at goal. Patient's BP goal is less than or equal to 130/80 per 2017 ACC/AHA Hypertension Guidelines.  Patient's most recent A1C result is Lab Results    Component                Value               Date                     HGBA1C                   6.4 (H)             07/01/2020           and is at goal. Patient's A1C goal is less than or equal to 7.        PLAN  Continue current therapy: will contact [facility] on pt behalf about test strips, also will f/u on uploading issues. will reach out to pt in approx 3 months    Patient verbalizes understanding. Patient did not express questions or concerns and patient has contact information if needed.      Explained to the patient that the Digital Medicine team is not available for emergencies. Advised patient call [facility] ([phone] or [phone]) or [phone] if needed.         There are no preventive care reminders to display for this patient.    Current Medication Regimen:  Hypertension Medications             candesartan (ATACAND) 32 MG tablet Take 1 tablet (32 mg total) by mouth once daily.    metoprolol succinate (TOPROL-XL) 25 MG 24 hr tablet Take 1 tablet (25 mg total) by mouth once daily.

## 2020-07-25 ENCOUNTER — LAB VISIT (OUTPATIENT)
Dept: LAB | Facility: HOSPITAL | Age: 60
End: 2020-07-25
Attending: FAMILY MEDICINE
Payer: COMMERCIAL

## 2020-07-25 DIAGNOSIS — N18.30 CKD (CHRONIC KIDNEY DISEASE) STAGE 3, GFR 30-59 ML/MIN: ICD-10-CM

## 2020-07-25 LAB
ANION GAP SERPL CALC-SCNC: 9 MMOL/L (ref 8–16)
BUN SERPL-MCNC: 14 MG/DL (ref 6–20)
CALCIUM SERPL-MCNC: 9.1 MG/DL (ref 8.7–10.5)
CHLORIDE SERPL-SCNC: 104 MMOL/L (ref 95–110)
CO2 SERPL-SCNC: 25 MMOL/L (ref 23–29)
CREAT SERPL-MCNC: 1.4 MG/DL (ref 0.5–1.4)
EST. GFR  (AFRICAN AMERICAN): >60 ML/MIN/1.73 M^2
EST. GFR  (NON AFRICAN AMERICAN): 54.2 ML/MIN/1.73 M^2
GLUCOSE SERPL-MCNC: 146 MG/DL (ref 70–110)
POTASSIUM SERPL-SCNC: 4.2 MMOL/L (ref 3.5–5.1)
SODIUM SERPL-SCNC: 138 MMOL/L (ref 136–145)

## 2020-07-25 PROCEDURE — 36415 COLL VENOUS BLD VENIPUNCTURE: CPT | Mod: PO

## 2020-07-25 PROCEDURE — 80048 BASIC METABOLIC PNL TOTAL CA: CPT

## 2020-07-28 ENCOUNTER — OFFICE VISIT (OUTPATIENT)
Dept: FAMILY MEDICINE | Facility: CLINIC | Age: 60
End: 2020-07-28
Payer: COMMERCIAL

## 2020-07-28 VITALS
BODY MASS INDEX: 27.4 KG/M2 | DIASTOLIC BLOOD PRESSURE: 70 MMHG | TEMPERATURE: 98 F | WEIGHT: 195.75 LBS | SYSTOLIC BLOOD PRESSURE: 138 MMHG | HEART RATE: 82 BPM | OXYGEN SATURATION: 95 % | HEIGHT: 71 IN

## 2020-07-28 DIAGNOSIS — I10 ESSENTIAL HYPERTENSION: ICD-10-CM

## 2020-07-28 DIAGNOSIS — E11.22 TYPE 2 DIABETES MELLITUS WITH STAGE 3 CHRONIC KIDNEY DISEASE, WITHOUT LONG-TERM CURRENT USE OF INSULIN: ICD-10-CM

## 2020-07-28 DIAGNOSIS — N18.30 TYPE 2 DIABETES MELLITUS WITH STAGE 3 CHRONIC KIDNEY DISEASE, WITHOUT LONG-TERM CURRENT USE OF INSULIN: ICD-10-CM

## 2020-07-28 PROCEDURE — 99999 PR PBB SHADOW E&M-EST. PATIENT-LVL IV: ICD-10-PCS | Mod: PBBFAC,,, | Performed by: FAMILY MEDICINE

## 2020-07-28 PROCEDURE — 3078F DIAST BP <80 MM HG: CPT | Mod: CPTII,S$GLB,, | Performed by: FAMILY MEDICINE

## 2020-07-28 PROCEDURE — 3078F PR MOST RECENT DIASTOLIC BLOOD PRESSURE < 80 MM HG: ICD-10-PCS | Mod: CPTII,S$GLB,, | Performed by: FAMILY MEDICINE

## 2020-07-28 PROCEDURE — 3008F BODY MASS INDEX DOCD: CPT | Mod: CPTII,S$GLB,, | Performed by: FAMILY MEDICINE

## 2020-07-28 PROCEDURE — 99213 PR OFFICE/OUTPT VISIT, EST, LEVL III, 20-29 MIN: ICD-10-PCS | Mod: S$GLB,,, | Performed by: FAMILY MEDICINE

## 2020-07-28 PROCEDURE — 3008F PR BODY MASS INDEX (BMI) DOCUMENTED: ICD-10-PCS | Mod: CPTII,S$GLB,, | Performed by: FAMILY MEDICINE

## 2020-07-28 PROCEDURE — 3075F SYST BP GE 130 - 139MM HG: CPT | Mod: CPTII,S$GLB,, | Performed by: FAMILY MEDICINE

## 2020-07-28 PROCEDURE — 99999 PR PBB SHADOW E&M-EST. PATIENT-LVL IV: CPT | Mod: PBBFAC,,, | Performed by: FAMILY MEDICINE

## 2020-07-28 PROCEDURE — 3044F PR MOST RECENT HEMOGLOBIN A1C LEVEL <7.0%: ICD-10-PCS | Mod: CPTII,S$GLB,, | Performed by: FAMILY MEDICINE

## 2020-07-28 PROCEDURE — 99213 OFFICE O/P EST LOW 20 MIN: CPT | Mod: S$GLB,,, | Performed by: FAMILY MEDICINE

## 2020-07-28 PROCEDURE — 3075F PR MOST RECENT SYSTOLIC BLOOD PRESS GE 130-139MM HG: ICD-10-PCS | Mod: CPTII,S$GLB,, | Performed by: FAMILY MEDICINE

## 2020-07-28 PROCEDURE — 3044F HG A1C LEVEL LT 7.0%: CPT | Mod: CPTII,S$GLB,, | Performed by: FAMILY MEDICINE

## 2020-07-28 RX ORDER — METOPROLOL SUCCINATE 50 MG/1
50 TABLET, EXTENDED RELEASE ORAL DAILY
Qty: 90 TABLET | Refills: 0 | Status: SHIPPED | OUTPATIENT
Start: 2020-07-28 | End: 2020-10-07 | Stop reason: SDUPTHER

## 2020-07-28 RX ORDER — METFORMIN HYDROCHLORIDE 500 MG/1
500 TABLET, EXTENDED RELEASE ORAL
Qty: 90 TABLET | Refills: 0
Start: 2020-07-28 | End: 2020-10-07 | Stop reason: SDUPTHER

## 2020-07-28 RX ORDER — CANDESARTAN 32 MG/1
32 TABLET ORAL DAILY
Qty: 90 TABLET | Refills: 0 | Status: SHIPPED | OUTPATIENT
Start: 2020-07-28 | End: 2020-10-07 | Stop reason: SDUPTHER

## 2020-07-28 NOTE — PATIENT INSTRUCTIONS
Digital Cuff: 149/89 (right)  132/78 (left, manual)  Sent message to digital HTN, calibration vs tubing change needed?  Increase metoprolol to 50 mg XR  Continue candesartan  Decrease metformin to 500 mg XR (down from 1000 mg)    Continue digital HTN and DM for now

## 2020-07-28 NOTE — Clinical Note
Hi,   In clinic today: Digital Cuff: 149/89 (right)  132/78 manual (left)    I think his tubing may need replacement, he states that its a lot tighter then before.    Can you help with this? Thanks    JM

## 2020-07-28 NOTE — PROGRESS NOTES
Subjective:       Patient ID: Partha Curtis Jr. is a 60 y.o. male.    Chief Complaint: Follow-up and Hypertension    Partha is a 60 y.o. male who presents today for f/u    CKD: improved on recent labs    Chlorthalidone was stopped. Initially prescribed by Dr. Stinson as 1/2 tablet/day >5 years ago. Had increased candesartan. BP was still a little high. He started metoprolol 25 mg. BP is still a little high, taking it at night. Machine seems to run higher as well.     Dizziness: resolved with decreased metformin.     DM: had cut metformin to 1000 mg once daily    Had asked patient to increase fluid intake. Increased fluid intake.    He feels better, has been sleeping better.     Sugars are low 100's in the AM.     Reviewed labs as per below.     Review of Systems   Constitutional: Negative for chills and fever.   Respiratory: Negative for shortness of breath.    Cardiovascular: Negative for chest pain and leg swelling.   Gastrointestinal: Negative for diarrhea, nausea and vomiting.   Neurological: Negative for dizziness, light-headedness and headaches.               Results for orders placed or performed in visit on 07/25/20   Basic metabolic panel   Result Value Ref Range    Sodium 138 136 - 145 mmol/L    Potassium 4.2 3.5 - 5.1 mmol/L    Chloride 104 95 - 110 mmol/L    CO2 25 23 - 29 mmol/L    Glucose 146 (H) 70 - 110 mg/dL    BUN, Bld 14 6 - 20 mg/dL    Creatinine 1.4 0.5 - 1.4 mg/dL    Calcium 9.1 8.7 - 10.5 mg/dL    Anion Gap 9 8 - 16 mmol/L    eGFR if African American >60.0 >60 mL/min/1.73 m^2    eGFR if non  54.2 (A) >60 mL/min/1.73 m^2       Objective:     Vitals:    07/28/20 1308   BP: 138/70   Pulse: 82   Temp: 97.6 °F (36.4 °C)        Physical Exam  Vitals signs and nursing note reviewed.   Constitutional:       Appearance: He is well-developed.   HENT:      Head: Normocephalic and atraumatic.   Eyes:      Conjunctiva/sclera: Conjunctivae normal.   Neck:      Musculoskeletal: Normal range  of motion and neck supple.   Cardiovascular:      Rate and Rhythm: Normal rate and regular rhythm.   Pulmonary:      Effort: Pulmonary effort is normal.      Breath sounds: Normal breath sounds.   Skin:     Findings: No rash.   Neurological:      Mental Status: He is alert and oriented to person, place, and time.   Psychiatric:         Behavior: Behavior normal.         Thought Content: Thought content normal.         Judgment: Judgment normal.         Assessment:       1. Essential hypertension    2. Type 2 diabetes mellitus with stage 3 chronic kidney disease, without long-term current use of insulin        Plan:         Digital Cuff: 149/89 (right)  132/78 (manual left)  Sent message to digital HTN, calibration vs tubing change needed?  Increase metoprolol to 50 mg XR  Continue candesartan  Decrease metformin to 500 mg XR (down from 1000 mg)    Continue digital HTN and DM for now    F/u as scheduled, labs prior.       Essential hypertension  -     metoprolol succinate (TOPROL-XL) 50 MG 24 hr tablet; Take 1 tablet (50 mg total) by mouth once daily.  Dispense: 90 tablet; Refill: 0  -     candesartan (ATACAND) 32 MG tablet; Take 1 tablet (32 mg total) by mouth once daily.  Dispense: 90 tablet; Refill: 0    Type 2 diabetes mellitus with stage 3 chronic kidney disease, without long-term current use of insulin  -     metFORMIN (GLUCOPHAGE-XR) 500 MG XR 24hr tablet; Take 1 tablet (500 mg total) by mouth daily with breakfast.  Dispense: 90 tablet; Refill: 0        Warning signs discussed, patient to call with any further issues or worsening of symptoms.

## 2020-07-28 NOTE — PROGRESS NOTES
Received msg from PCP:  In clinic today: Digital Cuff: 149/89 (right)   132/78 manual (left)     I think his tubing may need replacement, he states that its a lot tighter then before.     Can you help with this? Thanks       I will place CRM for tubing exchange. Of note, I am still able to see pt's glucose readings, support team is investigating active program status of DDMP.

## 2020-08-01 ENCOUNTER — PATIENT MESSAGE (OUTPATIENT)
Dept: FAMILY MEDICINE | Facility: CLINIC | Age: 60
End: 2020-08-01

## 2020-08-10 ENCOUNTER — PATIENT OUTREACH (OUTPATIENT)
Dept: OTHER | Facility: OTHER | Age: 60
End: 2020-08-10

## 2020-08-11 NOTE — PROGRESS NOTES
Digital Medicine: Clinician Follow-Up    Incoming call from patient, he remains concerned about increasing BP. He notes that BP has been increasing since d/c of chlorthalidone d/t CKD. eGFR has since improved from 35.2 to 54.2. Patient also spoke to someone else within Digital Medicine, he will go to [facility] to replace cuff.     The history is provided by the patient.     Hypertension    Readings are trending up Patient is not experiencing signs/symptoms of hypertension.          Last 5 Patient Entered Readings                                      Current 30 Day Average: 145/91     Recent Readings 8/11/2020 8/10/2020 8/10/2020 8/2/2020 8/1/2020    SBP (mmHg) 165 180 171 151 152    DBP (mmHg) 90 96 96 85 91    Pulse 72 59 63 71 71        Last 6 Patient Entered Readings                                          Most Recent A1c: 6.4% on 7/1/2020  (Goal: 7%)     Recent Readings 8/11/2020 8/10/2020    Blood Glucose (mg/dL) 144 117             Depression Screening  Did not address depression screening.    Sleep Apnea Screening    Did not address sleep apnea screening.     Medication Affordability Screening  Did not address medication affordability screening.     Medication Adherence-Medication adherence was assessed.          ASSESSMENT(S)  Patient's A1C goal is less than or equal to 7 per 2020 ADA guidelines. Patient's most recent A1C result is at goal. Lab Results    Component                Value               Date                     HGBA1C                   6.4 (H)             07/01/2020          .     Patients BP average is 145/91 mmHg, which is above goal. Patient's BP goal is less than or equal to 130/80 per 2017 ACC/AHA Hypertension Guidelines.     Patient may need additional thiazide therapy (HCTZ or lower dose chlorthalidone). He also needs to replace cuff to ensure accuracy of BP readings.       Hypertension Plan  Continue current therapy.  Await MD intervention. Will consult w/ PCP about additional  therapy.   Patient will inform me when he gets cuff replaced. F/u in 3-4 weeks or sooner if med therapy needs to be adjusted.   Diabetes Plan  Continue current therapy.       Addressed any questions or concerns and patient has my contact information if needed prior to next outreach. Patient verbalizes understanding.            There are no preventive care reminders to display for this patient.      Hypertension Medications             candesartan (ATACAND) 32 MG tablet Take 1 tablet (32 mg total) by mouth once daily.    metoprolol succinate (TOPROL-XL) 50 MG 24 hr tablet Take 1 tablet (50 mg total) by mouth once daily.        Diabetes Medications             metFORMIN (GLUCOPHAGE-XR) 500 MG XR 24hr tablet Take 1 tablet (500 mg total) by mouth daily with breakfast.

## 2020-08-12 ENCOUNTER — PATIENT OUTREACH (OUTPATIENT)
Dept: OTHER | Facility: OTHER | Age: 60
End: 2020-08-12

## 2020-08-12 DIAGNOSIS — I10 ESSENTIAL HYPERTENSION: Primary | ICD-10-CM

## 2020-08-12 NOTE — PROGRESS NOTES
Digital Medicine: Clinician Follow-Up    Patient reports that he was able to exchange the cuff, and is still getting similar elevated readings. He is open to new meds.       Follow-up reason(s): addressing patient questions/concerns.     Hypertension    Readings are trending up       Last 5 Patient Entered Readings                                      Current 30 Day Average: 145/91     Recent Readings 8/11/2020 8/10/2020 8/10/2020 8/2/2020 8/1/2020    SBP (mmHg) 165 180 171 151 152    DBP (mmHg) 90 96 96 85 91    Pulse 72 59 63 71 71        Last 6 Patient Entered Readings                                          Most Recent A1c: 6.4% on 7/1/2020  (Goal: 7%)     Recent Readings 8/12/2020 8/11/2020 8/10/2020    Blood Glucose (mg/dL) 133 144 117           Screenings    ASSESSMENT(S)  Patients BP average is 145/91 mmHg, which is above goal. Patient's BP goal is less than or equal to 130/80 per 2017 ACC/AHA Hypertension Guidelines.     Additional therapy is warranted. Most effective would likely be thiazide diuretic, it should be a lower potency than previous thiazide med. CCB may also be beneficical.       Hypertension Plan  Continue current therapy. Use of candesartan and metoprolol as prescribed.   Await MD intervention.  I messaged PCP about use of HCTZ for BP control, recommend 12.5 mg dose with renal function monitoring.    08/12/2020 4:20 PM   Received msg from PCP to start HCTZ 25 mg and check BMP in 2 weeks. I will place order and inform the patient via [employer].      Addressed any questions or concerns and patient has my contact information if needed prior to next outreach. Patient verbalizes understanding.            There are no preventive care reminders to display for this patient.      Hypertension Medications             candesartan (ATACAND) 32 MG tablet Take 1 tablet (32 mg total) by mouth once daily.    metoprolol succinate (TOPROL-XL) 50 MG 24 hr tablet Take 1 tablet (50 mg total) by mouth once daily.         Diabetes Medications             metFORMIN (GLUCOPHAGE-XR) 500 MG XR 24hr tablet Take 1 tablet (500 mg total) by mouth daily with breakfast.

## 2020-08-13 RX ORDER — HYDROCHLOROTHIAZIDE 25 MG/1
25 TABLET ORAL DAILY
Qty: 90 TABLET | Refills: 1 | Status: SHIPPED | OUTPATIENT
Start: 2020-08-13 | End: 2020-10-07 | Stop reason: SDUPTHER

## 2020-08-28 ENCOUNTER — PATIENT OUTREACH (OUTPATIENT)
Dept: ADMINISTRATIVE | Facility: HOSPITAL | Age: 60
End: 2020-08-28

## 2020-08-31 ENCOUNTER — PATIENT OUTREACH (OUTPATIENT)
Dept: OTHER | Facility: OTHER | Age: 60
End: 2020-08-31

## 2020-08-31 DIAGNOSIS — I10 ESSENTIAL HYPERTENSION: Primary | ICD-10-CM

## 2020-09-02 DIAGNOSIS — E29.1 HYPOGONADISM IN MALE: ICD-10-CM

## 2020-09-02 RX ORDER — TESTOSTERONE CYPIONATE 200 MG/ML
200 INJECTION, SOLUTION INTRAMUSCULAR
Qty: 13 ML | Refills: 3 | Status: SHIPPED | OUTPATIENT
Start: 2020-09-02 | End: 2021-06-06

## 2020-09-09 ENCOUNTER — TELEPHONE (OUTPATIENT)
Dept: FAMILY MEDICINE | Facility: CLINIC | Age: 60
End: 2020-09-09

## 2020-09-09 DIAGNOSIS — N18.30 CKD (CHRONIC KIDNEY DISEASE) STAGE 3, GFR 30-59 ML/MIN: ICD-10-CM

## 2020-09-09 DIAGNOSIS — N18.30 TYPE 2 DIABETES MELLITUS WITH STAGE 3 CHRONIC KIDNEY DISEASE, WITHOUT LONG-TERM CURRENT USE OF INSULIN: Primary | ICD-10-CM

## 2020-09-09 DIAGNOSIS — E11.22 TYPE 2 DIABETES MELLITUS WITH STAGE 3 CHRONIC KIDNEY DISEASE, WITHOUT LONG-TERM CURRENT USE OF INSULIN: Primary | ICD-10-CM

## 2020-09-09 NOTE — TELEPHONE ENCOUNTER
----- Message from Abbey Wilhelm MA sent at 9/9/2020  3:44 PM CDT -----    ----- Message -----  From: Luciana Jean, PharmD  Sent: 9/9/2020   3:21 PM CDT  To: [facility] Staff    I spoke with this patient today. He recently learned from a blood donation center that his hemoglobin was low (~12), and I confirmed that the most recent CBC from July also reflected a low hemoglobin level. He states this was not previously discussed with him, and he would like to consult with PCP on how to treat and monitor.     I have also set up a BMP for the patient next week to f/u on addition of HCTZ, feel free to add any other pertinent labs.     Thank you,    Luciana Jean, PharmD  Digital Medicine Clinical Pharmacist  [phone]

## 2020-09-09 NOTE — PROGRESS NOTES
Digital Medicine: Clinician Follow-Up    Patient called to discuss recent BP readings and other lab results. He usually gives blood every 12-16 weeks, and at the most recent visit he was unable to donate d/t a reported hemoglobin of 12.1. He was unaware of his low iron levels. He denies any changes in nutrition. He does state he is fatigued, usually in the afternoon.     He does not feel that his blood pressure has been decreasing, and he is unsure about the medication changes that have been made.     The history is provided by the patient.   Follow-up reason(s): medication change follow-up.     Hypertension    Readings are trending down due to unknown.       Diabetes    Readings are trending up due to medication adherence.        Last 5 Patient Entered Readings                                      Current 30 Day Average: 142/88     Recent Readings 9/1/2020 8/31/2020 8/28/2020 8/26/2020 8/25/2020    SBP (mmHg) 134 146 149 122 127    DBP (mmHg) 83 86 97 79 84    Pulse 71 64 68 67 68        Last 6 Patient Entered Readings                                          Most Recent A1c: 6.4% on 7/1/2020  (Goal: 7%)     Recent Readings 9/1/2020 8/31/2020 8/28/2020 8/26/2020 8/25/2020    Blood Glucose (mg/dL) 166 146 113 155 132             Depression Screening  Did not address depression screening.    Sleep Apnea Screening    Did not address sleep apnea screening.     Medication Affordability Screening  Did not address medication affordability screening.     Medication Adherence-Medication adherence was assessed.        Patient takes all meds in the evening except HCTZ.      ASSESSMENT(S)  Patient's A1C goal is less than or equal to 7 per 2020 ADA guidelines. Patient's most recent A1C result is at goal. Lab Results    Component                Value               Date                     HGBA1C                   6.4 (H)             07/01/2020          .     Patients BP average is 142/88 mmHg, which is above goal. Patient's BP  goal is less than or equal to 130/80 per 2017 ACC/AHA Hypertension Guidelines.     HTN - average BP remains elevate but is trending down nicely. Several readings have been at goal since addition of HCTZ. No further HTN changes recommended.    DM - glucose trending up, can continue to monitor at this time.       Hypertension Plan  Labs ordered. BMP Expected Lab Date: 9/16/2020  Continue current therapy. HCTZ, candesartan, and metoprolol as prescribed.   Await MD intervention.  Provided patient education.  F/u BMP to monitor renal function, I also messaged PCP about the patient's concern regarding hemoglobin. F/u in 2-3 weeks to review labs.  Diabetes Plan  Continue current therapy. Metformin as prescribed.   Continue to monitor, if FBG is consistently trending up recommend to increase metformin to BID.     Addressed any questions or concerns and patient has my contact information if needed prior to next outreach. Patient verbalizes understanding.            There are no preventive care reminders to display for this patient.      Hypertension Medications             candesartan (ATACAND) 32 MG tablet Take 1 tablet (32 mg total) by mouth once daily.    hydroCHLOROthiazide (HYDRODIURIL) 25 MG tablet Take 1 tablet (25 mg total) by mouth once daily.    metoprolol succinate (TOPROL-XL) 50 MG 24 hr tablet Take 1 tablet (50 mg total) by mouth once daily.        Diabetes Medications             metFORMIN (GLUCOPHAGE-XR) 500 MG XR 24hr tablet Take 1 tablet (500 mg total) by mouth daily with breakfast.

## 2020-09-09 NOTE — TELEPHONE ENCOUNTER
Anemia was suspected to be due to low kidney function or low testosterone or a mixture of both.     I will do CBC, CMP, iron studies.     Please cancel BMP.    [place], please schedule these labs with upcoming labs in October.

## 2020-09-10 NOTE — TELEPHONE ENCOUNTER
I attempt to call patient no answer. I left a VM informing patient that additional labs have been added to his up coming labs in October. Patient advised to call office with any questions. Please advise.

## 2020-09-23 ENCOUNTER — PATIENT OUTREACH (OUTPATIENT)
Dept: OTHER | Facility: OTHER | Age: 60
End: 2020-09-23
Payer: COMMERCIAL

## 2020-09-23 DIAGNOSIS — I10 ESSENTIAL HYPERTENSION: Primary | ICD-10-CM

## 2020-09-23 DIAGNOSIS — N18.31 TYPE 2 DIABETES MELLITUS WITH STAGE 3A CHRONIC KIDNEY DISEASE, WITHOUT LONG-TERM CURRENT USE OF INSULIN: ICD-10-CM

## 2020-09-23 DIAGNOSIS — E11.22 TYPE 2 DIABETES MELLITUS WITH STAGE 3A CHRONIC KIDNEY DISEASE, WITHOUT LONG-TERM CURRENT USE OF INSULIN: ICD-10-CM

## 2020-09-28 ENCOUNTER — PATIENT MESSAGE (OUTPATIENT)
Dept: FAMILY MEDICINE | Facility: CLINIC | Age: 60
End: 2020-09-28

## 2020-09-28 DIAGNOSIS — Z15.89 MONOALLELIC MUTATION OF MUTYH GENE: Primary | ICD-10-CM

## 2020-10-05 ENCOUNTER — LAB VISIT (OUTPATIENT)
Dept: LAB | Facility: HOSPITAL | Age: 60
End: 2020-10-05
Attending: FAMILY MEDICINE
Payer: COMMERCIAL

## 2020-10-05 DIAGNOSIS — N18.30 TYPE 2 DIABETES MELLITUS WITH STAGE 3 CHRONIC KIDNEY DISEASE, WITHOUT LONG-TERM CURRENT USE OF INSULIN: ICD-10-CM

## 2020-10-05 DIAGNOSIS — E11.22 TYPE 2 DIABETES MELLITUS WITH STAGE 3 CHRONIC KIDNEY DISEASE, WITHOUT LONG-TERM CURRENT USE OF INSULIN: ICD-10-CM

## 2020-10-05 DIAGNOSIS — N18.30 CKD (CHRONIC KIDNEY DISEASE) STAGE 3, GFR 30-59 ML/MIN: ICD-10-CM

## 2020-10-05 DIAGNOSIS — I10 ESSENTIAL HYPERTENSION: ICD-10-CM

## 2020-10-05 LAB
ALBUMIN SERPL BCP-MCNC: 4.1 G/DL (ref 3.5–5.2)
ALP SERPL-CCNC: 59 U/L (ref 55–135)
ALT SERPL W/O P-5'-P-CCNC: 23 U/L (ref 10–44)
ANION GAP SERPL CALC-SCNC: 9 MMOL/L (ref 8–16)
ANION GAP SERPL CALC-SCNC: 9 MMOL/L (ref 8–16)
AST SERPL-CCNC: 21 U/L (ref 10–40)
BASOPHILS # BLD AUTO: 0.07 K/UL (ref 0–0.2)
BASOPHILS NFR BLD: 1 % (ref 0–1.9)
BILIRUB SERPL-MCNC: 0.3 MG/DL (ref 0.1–1)
BUN SERPL-MCNC: 17 MG/DL (ref 6–20)
BUN SERPL-MCNC: 17 MG/DL (ref 6–20)
CALCIUM SERPL-MCNC: 9.3 MG/DL (ref 8.7–10.5)
CALCIUM SERPL-MCNC: 9.3 MG/DL (ref 8.7–10.5)
CHLORIDE SERPL-SCNC: 100 MMOL/L (ref 95–110)
CHLORIDE SERPL-SCNC: 100 MMOL/L (ref 95–110)
CO2 SERPL-SCNC: 31 MMOL/L (ref 23–29)
CO2 SERPL-SCNC: 31 MMOL/L (ref 23–29)
CREAT SERPL-MCNC: 1.6 MG/DL (ref 0.5–1.4)
CREAT SERPL-MCNC: 1.6 MG/DL (ref 0.5–1.4)
DIFFERENTIAL METHOD: ABNORMAL
EOSINOPHIL # BLD AUTO: 0.3 K/UL (ref 0–0.5)
EOSINOPHIL NFR BLD: 3.8 % (ref 0–8)
ERYTHROCYTE [DISTWIDTH] IN BLOOD BY AUTOMATED COUNT: 20.6 % (ref 11.5–14.5)
EST. GFR  (AFRICAN AMERICAN): 53.3 ML/MIN/1.73 M^2
EST. GFR  (AFRICAN AMERICAN): 53.3 ML/MIN/1.73 M^2
EST. GFR  (NON AFRICAN AMERICAN): 46.1 ML/MIN/1.73 M^2
EST. GFR  (NON AFRICAN AMERICAN): 46.1 ML/MIN/1.73 M^2
ESTIMATED AVG GLUCOSE: 157 MG/DL (ref 68–131)
FERRITIN SERPL-MCNC: 8 NG/ML (ref 20–300)
GLUCOSE SERPL-MCNC: 172 MG/DL (ref 70–110)
GLUCOSE SERPL-MCNC: 172 MG/DL (ref 70–110)
HBA1C MFR BLD HPLC: 7.1 % (ref 4–5.6)
HCT VFR BLD AUTO: 46.1 % (ref 40–54)
HGB BLD-MCNC: 13.6 G/DL (ref 14–18)
IMM GRANULOCYTES # BLD AUTO: 0.02 K/UL (ref 0–0.04)
IMM GRANULOCYTES NFR BLD AUTO: 0.3 % (ref 0–0.5)
IRON SERPL-MCNC: 33 UG/DL (ref 45–160)
LYMPHOCYTES # BLD AUTO: 2.3 K/UL (ref 1–4.8)
LYMPHOCYTES NFR BLD: 31.8 % (ref 18–48)
MCH RBC QN AUTO: 23.5 PG (ref 27–31)
MCHC RBC AUTO-ENTMCNC: 29.5 G/DL (ref 32–36)
MCV RBC AUTO: 80 FL (ref 82–98)
MONOCYTES # BLD AUTO: 0.7 K/UL (ref 0.3–1)
MONOCYTES NFR BLD: 10.2 % (ref 4–15)
NEUTROPHILS # BLD AUTO: 3.8 K/UL (ref 1.8–7.7)
NEUTROPHILS NFR BLD: 52.9 % (ref 38–73)
NRBC BLD-RTO: 0 /100 WBC
PLATELET # BLD AUTO: 371 K/UL (ref 150–350)
PMV BLD AUTO: 9.3 FL (ref 9.2–12.9)
POTASSIUM SERPL-SCNC: 4.6 MMOL/L (ref 3.5–5.1)
POTASSIUM SERPL-SCNC: 4.6 MMOL/L (ref 3.5–5.1)
PROT SERPL-MCNC: 7.3 G/DL (ref 6–8.4)
RBC # BLD AUTO: 5.79 M/UL (ref 4.6–6.2)
SATURATED IRON: 6 % (ref 20–50)
SODIUM SERPL-SCNC: 140 MMOL/L (ref 136–145)
SODIUM SERPL-SCNC: 140 MMOL/L (ref 136–145)
TOTAL IRON BINDING CAPACITY: 524 UG/DL (ref 250–450)
TRANSFERRIN SERPL-MCNC: 354 MG/DL (ref 200–375)
WBC # BLD AUTO: 7.14 K/UL (ref 3.9–12.7)

## 2020-10-05 PROCEDURE — 83036 HEMOGLOBIN GLYCOSYLATED A1C: CPT

## 2020-10-05 PROCEDURE — 36415 COLL VENOUS BLD VENIPUNCTURE: CPT | Mod: PO

## 2020-10-05 PROCEDURE — 85025 COMPLETE CBC W/AUTO DIFF WBC: CPT

## 2020-10-05 PROCEDURE — 82728 ASSAY OF FERRITIN: CPT

## 2020-10-05 PROCEDURE — 83540 ASSAY OF IRON: CPT

## 2020-10-05 PROCEDURE — 80053 COMPREHEN METABOLIC PANEL: CPT

## 2020-10-07 ENCOUNTER — OFFICE VISIT (OUTPATIENT)
Dept: FAMILY MEDICINE | Facility: CLINIC | Age: 60
End: 2020-10-07
Payer: COMMERCIAL

## 2020-10-07 VITALS
DIASTOLIC BLOOD PRESSURE: 74 MMHG | OXYGEN SATURATION: 98 % | BODY MASS INDEX: 27.15 KG/M2 | HEART RATE: 94 BPM | WEIGHT: 194.69 LBS | SYSTOLIC BLOOD PRESSURE: 130 MMHG | TEMPERATURE: 97 F

## 2020-10-07 DIAGNOSIS — I10 ESSENTIAL HYPERTENSION: ICD-10-CM

## 2020-10-07 DIAGNOSIS — R79.89 LOW VITAMIN D LEVEL: ICD-10-CM

## 2020-10-07 DIAGNOSIS — N18.31 STAGE 3A CHRONIC KIDNEY DISEASE: ICD-10-CM

## 2020-10-07 DIAGNOSIS — E11.22 TYPE 2 DIABETES MELLITUS WITH STAGE 3A CHRONIC KIDNEY DISEASE, WITHOUT LONG-TERM CURRENT USE OF INSULIN: Primary | ICD-10-CM

## 2020-10-07 DIAGNOSIS — D50.8 OTHER IRON DEFICIENCY ANEMIA: ICD-10-CM

## 2020-10-07 DIAGNOSIS — E78.5 DYSLIPIDEMIA: Chronic | ICD-10-CM

## 2020-10-07 DIAGNOSIS — N18.31 TYPE 2 DIABETES MELLITUS WITH STAGE 3A CHRONIC KIDNEY DISEASE, WITHOUT LONG-TERM CURRENT USE OF INSULIN: Primary | ICD-10-CM

## 2020-10-07 DIAGNOSIS — Z52.008 BLOOD DONOR: ICD-10-CM

## 2020-10-07 PROBLEM — D50.9 IRON DEFICIENCY ANEMIA: Status: ACTIVE | Noted: 2020-10-07

## 2020-10-07 PROCEDURE — 3075F SYST BP GE 130 - 139MM HG: CPT | Mod: CPTII,S$GLB,, | Performed by: FAMILY MEDICINE

## 2020-10-07 PROCEDURE — 3078F DIAST BP <80 MM HG: CPT | Mod: CPTII,S$GLB,, | Performed by: FAMILY MEDICINE

## 2020-10-07 PROCEDURE — 99214 PR OFFICE/OUTPT VISIT, EST, LEVL IV, 30-39 MIN: ICD-10-PCS | Mod: S$GLB,,, | Performed by: FAMILY MEDICINE

## 2020-10-07 PROCEDURE — 99999 PR PBB SHADOW E&M-EST. PATIENT-LVL IV: CPT | Mod: PBBFAC,,, | Performed by: FAMILY MEDICINE

## 2020-10-07 PROCEDURE — 3008F PR BODY MASS INDEX (BMI) DOCUMENTED: ICD-10-PCS | Mod: CPTII,S$GLB,, | Performed by: FAMILY MEDICINE

## 2020-10-07 PROCEDURE — 99999 PR PBB SHADOW E&M-EST. PATIENT-LVL IV: ICD-10-PCS | Mod: PBBFAC,,, | Performed by: FAMILY MEDICINE

## 2020-10-07 PROCEDURE — 3051F PR MOST RECENT HEMOGLOBIN A1C LEVEL 7.0 - < 8.0%: ICD-10-PCS | Mod: CPTII,S$GLB,, | Performed by: FAMILY MEDICINE

## 2020-10-07 PROCEDURE — 99214 OFFICE O/P EST MOD 30 MIN: CPT | Mod: S$GLB,,, | Performed by: FAMILY MEDICINE

## 2020-10-07 PROCEDURE — 3051F HG A1C>EQUAL 7.0%<8.0%: CPT | Mod: CPTII,S$GLB,, | Performed by: FAMILY MEDICINE

## 2020-10-07 PROCEDURE — 3078F PR MOST RECENT DIASTOLIC BLOOD PRESSURE < 80 MM HG: ICD-10-PCS | Mod: CPTII,S$GLB,, | Performed by: FAMILY MEDICINE

## 2020-10-07 PROCEDURE — 3008F BODY MASS INDEX DOCD: CPT | Mod: CPTII,S$GLB,, | Performed by: FAMILY MEDICINE

## 2020-10-07 PROCEDURE — 3075F PR MOST RECENT SYSTOLIC BLOOD PRESS GE 130-139MM HG: ICD-10-PCS | Mod: CPTII,S$GLB,, | Performed by: FAMILY MEDICINE

## 2020-10-07 RX ORDER — METOPROLOL SUCCINATE 50 MG/1
50 TABLET, EXTENDED RELEASE ORAL DAILY
Qty: 90 TABLET | Refills: 0 | Status: SHIPPED | OUTPATIENT
Start: 2020-10-07 | End: 2021-01-14 | Stop reason: SDUPTHER

## 2020-10-07 RX ORDER — FERROUS SULFATE 324(65)MG
325 TABLET, DELAYED RELEASE (ENTERIC COATED) ORAL 2 TIMES DAILY
Qty: 180 TABLET | Refills: 3 | Status: SHIPPED | OUTPATIENT
Start: 2020-10-07 | End: 2021-02-10

## 2020-10-07 RX ORDER — ERGOCALCIFEROL 1.25 MG/1
50000 CAPSULE ORAL
Qty: 12 CAPSULE | Refills: 0 | Status: SHIPPED | OUTPATIENT
Start: 2020-10-07 | End: 2021-02-11

## 2020-10-07 RX ORDER — METFORMIN HYDROCHLORIDE 500 MG/1
500 TABLET, EXTENDED RELEASE ORAL
Qty: 90 TABLET | Refills: 0
Start: 2020-10-07 | End: 2021-01-14 | Stop reason: SDUPTHER

## 2020-10-07 RX ORDER — FLUTICASONE PROPIONATE 50 MCG
2 SPRAY, SUSPENSION (ML) NASAL DAILY
COMMUNITY
Start: 2020-08-31 | End: 2021-05-11

## 2020-10-07 RX ORDER — ROSUVASTATIN CALCIUM 40 MG/1
40 TABLET, COATED ORAL NIGHTLY
Qty: 90 TABLET | Refills: 1 | Status: SHIPPED | OUTPATIENT
Start: 2020-10-07 | End: 2021-05-11 | Stop reason: SDUPTHER

## 2020-10-07 RX ORDER — HYDROCHLOROTHIAZIDE 25 MG/1
25 TABLET ORAL DAILY
Qty: 90 TABLET | Refills: 1 | Status: SHIPPED | OUTPATIENT
Start: 2020-10-07 | End: 2021-02-10

## 2020-10-07 RX ORDER — CANDESARTAN 32 MG/1
32 TABLET ORAL DAILY
Qty: 90 TABLET | Refills: 0 | Status: SHIPPED | OUTPATIENT
Start: 2020-10-07 | End: 2021-01-14 | Stop reason: SDUPTHER

## 2020-10-07 NOTE — PROGRESS NOTES
Subjective:       Patient ID: Partha Curtis Jr. is a 60 y.o. male.    Chief Complaint: Follow-up (Hyertension)    Partha is a 60 y.o. male who presents today for f/u     CKD: improved from low when Dm was diagnosed, but appears slightly lower then last reading     HTN: taking metoprolol and HCTZ and candesartan. Had stopped chlorthalidone.     Anemia: had been thought to be due to CKD, iron studies low. Colonoscopy was normal in 2018. Was donating blood every 12 weeks, but hasn't donated blood in about 5 months. No black stools, no red stools. No hemoptysis. No easy bruising or bleeding.      Dizziness: resolved with decreased metformin.      DM: had cut metformin to 500 mg once daily. a1c is high normal, goal is less then 7. Has been stressed. Daughter has been having health issues, abnormal pap.       Weight is up again.     He is much more stressed. Has been stress eating.     Labs as below reviewed in detail.     Hypertension  This is a new problem. The current episode started more than 1 year ago. The problem has been rapidly worsening since onset. The problem is uncontrolled. Associated symptoms include anxiety, headaches and malaise/fatigue. Pertinent negatives include no blurred vision, chest pain, neck pain, orthopnea, palpitations, peripheral edema, PND, shortness of breath or sweats. There are no associated agents to hypertension. Risk factors for coronary artery disease include diabetes mellitus and dyslipidemia. The current treatment provides no improvement.     Review of Systems   Constitutional: Positive for malaise/fatigue.   Eyes: Negative for blurred vision.   Respiratory: Negative for shortness of breath.    Cardiovascular: Negative for chest pain, palpitations, orthopnea and PND.   Musculoskeletal: Negative for neck pain.   Neurological: Positive for headaches.             Results for orders placed or performed in visit on 10/05/20   Hemoglobin A1c   Result Value Ref Range    Hemoglobin A1C  7.1 (H) 4.0 - 5.6 %    Estimated Avg Glucose 157 (H) 68 - 131 mg/dL   BASIC METABOLIC PANEL   Result Value Ref Range    Sodium 140 136 - 145 mmol/L    Potassium 4.6 3.5 - 5.1 mmol/L    Chloride 100 95 - 110 mmol/L    CO2 31 (H) 23 - 29 mmol/L    Glucose 172 (H) 70 - 110 mg/dL    BUN, Bld 17 6 - 20 mg/dL    Creatinine 1.6 (H) 0.5 - 1.4 mg/dL    Calcium 9.3 8.7 - 10.5 mg/dL    Anion Gap 9 8 - 16 mmol/L    eGFR if African American 53.3 (A) >60 mL/min/1.73 m^2    eGFR if non  46.1 (A) >60 mL/min/1.73 m^2   CBC auto differential   Result Value Ref Range    WBC 7.14 3.90 - 12.70 K/uL    RBC 5.79 4.60 - 6.20 M/uL    Hemoglobin 13.6 (L) 14.0 - 18.0 g/dL    Hematocrit 46.1 40.0 - 54.0 %    Mean Corpuscular Volume 80 (L) 82 - 98 fL    Mean Corpuscular Hemoglobin 23.5 (L) 27.0 - 31.0 pg    Mean Corpuscular Hemoglobin Conc 29.5 (L) 32.0 - 36.0 g/dL    RDW 20.6 (H) 11.5 - 14.5 %    Platelets 371 (H) 150 - 350 K/uL    MPV 9.3 9.2 - 12.9 fL    Immature Granulocytes 0.3 0.0 - 0.5 %    Gran # (ANC) 3.8 1.8 - 7.7 K/uL    Immature Grans (Abs) 0.02 0.00 - 0.04 K/uL    Lymph # 2.3 1.0 - 4.8 K/uL    Mono # 0.7 0.3 - 1.0 K/uL    Eos # 0.3 0.0 - 0.5 K/uL    Baso # 0.07 0.00 - 0.20 K/uL    nRBC 0 0 /100 WBC    Gran% 52.9 38.0 - 73.0 %    Lymph% 31.8 18.0 - 48.0 %    Mono% 10.2 4.0 - 15.0 %    Eosinophil% 3.8 0.0 - 8.0 %    Basophil% 1.0 0.0 - 1.9 %    Differential Method Automated    Comprehensive metabolic panel   Result Value Ref Range    Sodium 140 136 - 145 mmol/L    Potassium 4.6 3.5 - 5.1 mmol/L    Chloride 100 95 - 110 mmol/L    CO2 31 (H) 23 - 29 mmol/L    Glucose 172 (H) 70 - 110 mg/dL    BUN, Bld 17 6 - 20 mg/dL    Creatinine 1.6 (H) 0.5 - 1.4 mg/dL    Calcium 9.3 8.7 - 10.5 mg/dL    Total Protein 7.3 6.0 - 8.4 g/dL    Albumin 4.1 3.5 - 5.2 g/dL    Total Bilirubin 0.3 0.1 - 1.0 mg/dL    Alkaline Phosphatase 59 55 - 135 U/L    AST 21 10 - 40 U/L    ALT 23 10 - 44 U/L    Anion Gap 9 8 - 16 mmol/L    eGFR if African  American 53.3 (A) >60 mL/min/1.73 m^2    eGFR if non  46.1 (A) >60 mL/min/1.73 m^2   Iron and TIBC   Result Value Ref Range    Iron 33 (L) 45 - 160 ug/dL    Transferrin 354 200 - 375 mg/dL    TIBC 524 (H) 250 - 450 ug/dL    Saturated Iron 6 (L) 20 - 50 %   Ferritin   Result Value Ref Range    Ferritin 8 (L) 20.0 - 300.0 ng/mL       Objective:     Vitals:    10/07/20 1501   BP: 130/74   Pulse: 94   Temp: 97.2 °F (36.2 °C)        Physical Exam  Vitals signs and nursing note reviewed.   Constitutional:       Appearance: He is well-developed.   HENT:      Head: Normocephalic and atraumatic.   Eyes:      Conjunctiva/sclera: Conjunctivae normal.      Pupils: Pupils are equal, round, and reactive to light.   Neck:      Musculoskeletal: Normal range of motion and neck supple.   Cardiovascular:      Rate and Rhythm: Normal rate and regular rhythm.   Pulmonary:      Effort: Pulmonary effort is normal.      Breath sounds: Normal breath sounds.   Abdominal:      Palpations: Abdomen is soft.      Tenderness: There is no abdominal tenderness.   Skin:     General: Skin is warm.      Findings: No rash.   Neurological:      Mental Status: He is alert and oriented to person, place, and time.   Psychiatric:         Behavior: Behavior normal.         Thought Content: Thought content normal.         Judgment: Judgment normal.         Assessment:       1. Type 2 diabetes mellitus with stage 3a chronic kidney disease, without long-term current use of insulin    2. Stage 3a chronic kidney disease    3. Essential hypertension    4. Dyslipidemia    5. Other iron deficiency anemia    6. Low vitamin D level    7. Blood donor        Plan:       DM: on metformin once daily. Not increasing today. NEED to change diet and exercise. Continue metformin only once daily. Goal a1c <7    HTN: continue HCTZ and candesartan and metoprolol. 130/74, 120/68, 116/68. Checking BP on digital machine too soon after taking medications? Will message  pharm D    DLD: continue crestor 40 mg    Iron def: hold blood donation. Start iron twice daily. Recheck in 3 months.     CKD: stable, at 3a, will check US of the kidneys. If lower in the future, consider renal referral.    Kidney function is a little low. No NSAIDS such as ibuprofen or naproxen. Avoid Red meats. Drink a lot of water. I will continue monitoring kidney function q3-6 months.      Type 2 diabetes mellitus with stage 3a chronic kidney disease, without long-term current use of insulin  -     metFORMIN (GLUCOPHAGE-XR) 500 MG ER 24hr tablet; Take 1 tablet (500 mg total) by mouth daily with breakfast.  Dispense: 90 tablet; Refill: 0  -     CBC auto differential; Future; Expected date: 10/07/2020  -     Comprehensive Metabolic Panel; Future; Expected date: 10/07/2020  -     Hemoglobin A1C; Future; Expected date: 10/07/2020    Stage 3a chronic kidney disease  -     US Retroperitoneal Complete; Future; Expected date: 10/07/2020  -     CBC auto differential; Future; Expected date: 10/07/2020  -     Comprehensive Metabolic Panel; Future; Expected date: 10/07/2020    Essential hypertension  -     metoprolol succinate (TOPROL-XL) 50 MG 24 hr tablet; Take 1 tablet (50 mg total) by mouth once daily.  Dispense: 90 tablet; Refill: 0  -     hydroCHLOROthiazide (HYDRODIURIL) 25 MG tablet; Take 1 tablet (25 mg total) by mouth once daily.  Dispense: 90 tablet; Refill: 1  -     candesartan (ATACAND) 32 MG tablet; Take 1 tablet (32 mg total) by mouth once daily.  Dispense: 90 tablet; Refill: 0    Dyslipidemia  -     rosuvastatin (CRESTOR) 40 MG Tab; Take 1 tablet (40 mg total) by mouth every evening.  Dispense: 90 tablet; Refill: 1    Other iron deficiency anemia  -     ferrous sulfate 324 mg (65 mg iron) TbEC; Take 1 tablet (324 mg total) by mouth 2 (two) times daily.  Dispense: 180 tablet; Refill: 3  -     CBC auto differential; Future; Expected date: 10/07/2020  -     Iron and TIBC; Future; Expected date: 10/07/2020  -      Ferritin; Future; Expected date: 10/07/2020    Low vitamin D level  -     ergocalciferol (ERGOCALCIFEROL) 50,000 unit Cap; Take 1 capsule (50,000 Units total) by mouth every 7 days. Then start daily OTC replacement after this Rx is complete  Dispense: 12 capsule; Refill: 0    Blood donor  -     ferrous sulfate 324 mg (65 mg iron) TbEC; Take 1 tablet (324 mg total) by mouth 2 (two) times daily.  Dispense: 180 tablet; Refill: 3  -     CBC auto differential; Future; Expected date: 10/07/2020  -     Iron and TIBC; Future; Expected date: 10/07/2020  -     Ferritin; Future; Expected date: 10/07/2020        Warning signs discussed, patient to call with any further issues or worsening of symptoms.

## 2020-10-07 NOTE — PATIENT INSTRUCTIONS
DM: on metformin once daily. Not increasing today. NEED to change diet and exercise. Continue metformin only once daily. Goal a1c <7    HTN: continue HCTZ and candesartan and metoprolol. 130/74, 120/68, 116/68. Checking BP on digital machine too soon after taking medications? Will message pharm D    DLD: continue crestor 40 mg    Iron def: hold blood donation. Start iron twice daily. Recheck in 3 months.     CKD: stable, at 3a, will check US of the kidneys. If lower in the future, consider renal referral.    Kidney function is a little low. No NSAIDS such as ibuprofen or naproxen. Avoid Red meats. Drink a lot of water. I will continue monitoring kidney function q3-6 months.    Diabetes Management Status    Statin: Taking  ACE/ARB: Taking    Screening or Prevention Patient's value Goal Complete/Controlled?   HgA1C Testing and Control   Lab Results   Component Value Date    HGBA1C 7.1 (H) 10/05/2020      Annually/Less than 8% Yes   Lipid profile : 04/01/2020 Annually Yes   LDL control Lab Results   Component Value Date    LDLCALC 55.0 (L) 04/01/2020    Annually/Less than 100 mg/dl  Yes   Nephropathy screening No results found for: LABMICR  Lab Results   Component Value Date    PROTEINUA Negative 03/08/2018    Annually No   Blood pressure BP Readings from Last 1 Encounters:   10/07/20 130/74    Less than 140/90 Yes   Dilated retinal exam : 05/26/2020 Annually Yes   Foot exam   : 07/07/2020 Annually Yes

## 2020-10-08 ENCOUNTER — TELEPHONE (OUTPATIENT)
Dept: ADMINISTRATIVE | Facility: OTHER | Age: 60
End: 2020-10-08

## 2020-10-08 NOTE — TELEPHONE ENCOUNTER
Juan,   We had briefly discussed this patient and his sister's breast cancer and MUTYH gene. Eunice had sent him some paperwork. He would like to meet with you/your team prior to any testing. Can you set that up for him?    Thanks    JM

## 2020-10-12 ENCOUNTER — PATIENT MESSAGE (OUTPATIENT)
Dept: FAMILY MEDICINE | Facility: CLINIC | Age: 60
End: 2020-10-12

## 2020-10-16 ENCOUNTER — HOSPITAL ENCOUNTER (OUTPATIENT)
Dept: RADIOLOGY | Facility: HOSPITAL | Age: 60
Discharge: HOME | End: 2020-10-16
Attending: FAMILY MEDICINE
Payer: COMMERCIAL

## 2020-10-16 DIAGNOSIS — N18.31 STAGE 3A CHRONIC KIDNEY DISEASE: ICD-10-CM

## 2020-10-16 PROCEDURE — 76770 US RETROPERITONEAL COMPLETE: ICD-10-PCS | Mod: 26,,, | Performed by: RADIOLOGY

## 2020-10-16 PROCEDURE — 76770 US EXAM ABDO BACK WALL COMP: CPT | Mod: TC

## 2020-10-16 PROCEDURE — 76770 US EXAM ABDO BACK WALL COMP: CPT | Mod: 26,,, | Performed by: RADIOLOGY

## 2020-10-26 ENCOUNTER — OFFICE VISIT (OUTPATIENT)
Dept: HEMATOLOGY/ONCOLOGY | Facility: CLINIC | Age: 60
End: 2020-10-26
Payer: COMMERCIAL

## 2020-10-26 ENCOUNTER — LAB VISIT (OUTPATIENT)
Dept: LAB | Facility: HOSPITAL | Age: 60
End: 2020-10-26
Attending: NURSE PRACTITIONER
Payer: COMMERCIAL

## 2020-10-26 VITALS — HEART RATE: 83 BPM | DIASTOLIC BLOOD PRESSURE: 80 MMHG | SYSTOLIC BLOOD PRESSURE: 139 MMHG | OXYGEN SATURATION: 100 %

## 2020-10-26 DIAGNOSIS — Z84.81 FAMILY HISTORY OF CARRIER OF GENETIC DISEASE: ICD-10-CM

## 2020-10-26 DIAGNOSIS — Z71.83 ENCOUNTER FOR NONPROCREATIVE GENETIC COUNSELING: Primary | ICD-10-CM

## 2020-10-26 PROCEDURE — 99999 PR PBB SHADOW E&M-EST. PATIENT-LVL III: CPT | Mod: PBBFAC,,, | Performed by: NURSE PRACTITIONER

## 2020-10-26 PROCEDURE — 3079F PR MOST RECENT DIASTOLIC BLOOD PRESSURE 80-89 MM HG: ICD-10-PCS | Mod: CPTII,S$GLB,, | Performed by: NURSE PRACTITIONER

## 2020-10-26 PROCEDURE — 99203 OFFICE O/P NEW LOW 30 MIN: CPT | Mod: S$GLB,,, | Performed by: NURSE PRACTITIONER

## 2020-10-26 PROCEDURE — 36415 COLL VENOUS BLD VENIPUNCTURE: CPT

## 2020-10-26 PROCEDURE — 3075F SYST BP GE 130 - 139MM HG: CPT | Mod: CPTII,S$GLB,, | Performed by: NURSE PRACTITIONER

## 2020-10-26 PROCEDURE — 99999 PR PBB SHADOW E&M-EST. PATIENT-LVL III: ICD-10-PCS | Mod: PBBFAC,,, | Performed by: NURSE PRACTITIONER

## 2020-10-26 PROCEDURE — 3075F PR MOST RECENT SYSTOLIC BLOOD PRESS GE 130-139MM HG: ICD-10-PCS | Mod: CPTII,S$GLB,, | Performed by: NURSE PRACTITIONER

## 2020-10-26 PROCEDURE — 99203 PR OFFICE/OUTPT VISIT, NEW, LEVL III, 30-44 MIN: ICD-10-PCS | Mod: S$GLB,,, | Performed by: NURSE PRACTITIONER

## 2020-10-26 PROCEDURE — 3079F DIAST BP 80-89 MM HG: CPT | Mod: CPTII,S$GLB,, | Performed by: NURSE PRACTITIONER

## 2020-10-26 NOTE — LETTER
October 26, 2020      Rocco Cavazos, DO  [address]  [place] [address]           [facility] 3rd Fl  [address]  [facility] [address]  Phone: [phone]  Fax: [phone]          Patient: Partha Curtis Jr.   MR Number: [id number]   YOB: 1960   Date of Visit: 10/26/2020       Dear Dr. Rocco Cavazos:    Thank you for referring Partha Curtis to me for evaluation. Attached you will find relevant portions of my assessment and plan of care.    If you have questions, please do not hesitate to call me. I look forward to following Partha Curtis along with you.    Sincerely,    Juan Roche, DNP    Enclosure  CC:  No Recipients    If you would like to receive this communication electronically, please contact [email or web address] or [phone] to request more information on [employer] Link access.    For providers and/or their staff who would like to refer a patient to [facility], please contact us through our one-stop-shop provider referral line, [facility] , at [phone].    If you feel you have received this communication in error or would no longer like to receive these types of communications, please e-mail [email or web address]

## 2020-10-26 NOTE — PROGRESS NOTES
Hereditary and High-Risk Clinic  Department of Hematology and Oncology  [facility]  [facility] [place], [place]     10/26/2020  Provider:  Juan Roche DNP    Patient ID: Partha Curtis Jr. is a 60 y.o. male.  Patient states preferred name is Jarvis.    Chief Complaint: Genetic Evaluation      Referring Provider:  Rocco Cavazos, DO  [address] [place] [address]    SUBJECTIVE:      History of Present Illness (HPI):  New patient presents today for an evaluation as it pertains to hereditary cancer syndromes.      Pedigree      Review of Systems   - See HPI.    - Patient's Distress Score today was 5/10 (with 10 being the worst).  Patient attributes this to work.  Patient denies experiencing suicidal or homicidal ideations (SI/HI).  Offered patient a referral to a mental health professional, and patient declined.  - Patient denies chest pain or dyspnea.    OBJECTIVE:     Physical Exam   Pleasant patient.  Presents alone today.  /80 (BP Location: Left arm, Patient Position: Sitting)   Pulse 83   SpO2 100% Comment: on room air   Constitutional: He appears well developed and well nourished. No distress.   Pulmonary/Chest: Effort normal. Bilateral lung sounds normal to auscultation.  Cardiac: S1,S2 auscultated.  Neurological: He is alert.   Psychiatric: He has a normal mood and affect. His speech is normal and behavior is normal. His thought content is normal.     COUNSELING:     Based on the information provided to me by the patient, Partha Curtis Jr., he meets clinical recommendations for genetic testing for his sister's MUTYH mutation.  I would like to see a copy of Partha's sister's genetic testing results report so that I can determine whether panel genetic testing is recommended for Partha.  There are significant limitations of interpreting a negative genetic testing result in a patient unaffected with cancer prior to testing appropriate affected relative(s).  "     Germline genetic oncology panel testing consists of testing multiple genes known to be related to hereditary cancer syndromes.  These genes are known as "tumor suppressor genes."  A key role of tumor suppressor genes is to prevent cancer.  When a tumor suppressor gene has a clinically significant mutation, it affects the functioning of the gene, and the carrier may be more likely to develop cancers in certain organs.      Only some cancers are hereditary.  When a gene mutation is not identified, it does not completely rule out the possibility of hereditary cancers but does make them less likely.  Additionally, it is possible to see familial clustering of related cancer amongst family members, and these are sometimes caused by environmental factors and/or lifestyle factors that may be shared amongst family members.       Results of genetic testing include positive, negative, and variant of uncertain significance (VUS) (i.e. unclear) results.  If positive, the patient's specific cancer risks vary depending upon the tumor suppressor gene(s) in which there is/are a mutation(s).  In some cases, depending upon the result and the patient's clinical history, modified management may be recommended, including measures for risk reduction and/or surveillance, though modified management is not always an option.  Modified management may also be recommended, even with a negative result, based upon risk assessment that incorporates the family history.  A VUS indicates that there is not presently enough data for the laboratory to make a determination as to whether the variant is benign or pathogenic; VUSs are not typically acted upon clinically.       If Jarvis tests positive for a mutation inherited in an autosomal-dominant manner, his first-degree relatives would each have a 50% chance of having the same mutation, and other blood-relatives would also be at (a lesser) risk of having the same mutation.       The Genetic " "Information Nondiscrimination Act (JUDI) prohibits most health insurance agencies and employers with 16 or more employees from discriminating against an individual based on the results of genetic testing; however, JUDI does not protect individuals from discrimination by other types of policies/entities, including but not limited to life insurance, disability, long-term care insurance,  benefits or insurance, and  Health Services benefits).     An outside laboratory would perform the testing after a blood sample is collected at an [facility] laboratory or a saliva sample is collected by the patient at home.  With genetic testing, there is a potential for the patient to incur out-of-pocket costs.  Results can take several weeks.       Offered Jarvis germline genetic oncology testing today, versus deferring testing at this time or declining testing altogether.  Jarvis desires to proceed with germline genetic oncology testing of MUTYH only today and has provided informed consent to proceed with the test, after a discussion regarding various genetic testing panels that could be performed as well as associated risks.  He states he will try to obtain his sister's cancer-genetic testing results report and send it to me via [facility] message; then, I will determine whether panel testing is indicated for Fili.     Post-test genetic counseling will be conducted once the genetic testing results are available.     Questions were encouraged and answered to the patient's satisfaction, and he verbalized understanding of information and agreement with the plan.       ASSESSMENT/PLAN:       Fili was seen today for genetic evaluation.    Diagnoses and all orders for this visit:    Encounter for nonprocreative genetic counseling  Fili BRANDON Curtis Jr. "Jarvis" meets clinical recommendations for genetic testing for his sister's MUTYH mutation.  Fili desires to proceed with germline genetic oncology testing of MUTYH only " today and has provided informed consent to proceed with the test, after a discussion regarding various genetic testing panels that could be performed as well as associated risks.  He states he will try to obtain his sister's cancer-genetic testing results report and send it to me via [facility] message; then, I will determine whether panel testing is indicated for Fili.    Family history of carrier of genetic disease  -     Ambulatory referral/consult to Genetics: Completed  -     Genetic Misc Sendout Test, Blood; Future    Follow up in about 4 weeks (around 11/23/2020) for post-test genetics visit.     Discussed with patient that he is overdue for Cardiology follow-up. He states he does not need assistance in getting scheduled for follow-up and will do so.    During this encounter, I spent approximately 30 minutes face-to-face with this patient, more than half of which was spent counseling the patient and/or coordinating his care.    Juan Villalba, DNP, APRN, FNP-BC, AOCNP  Nurse Practitioner, Hereditary and High-Risk Clinic  Department of Hematology and Oncology  [facility], 3rd floor  [facility] [address], [place]  [address]  office phone: [phone]    office fax: [phone]     10/26/2020

## 2020-10-27 ENCOUNTER — PATIENT MESSAGE (OUTPATIENT)
Dept: HEMATOLOGY/ONCOLOGY | Facility: CLINIC | Age: 60
End: 2020-10-27

## 2020-11-03 ENCOUNTER — PATIENT MESSAGE (OUTPATIENT)
Dept: HEMATOLOGY/ONCOLOGY | Facility: CLINIC | Age: 60
End: 2020-11-03

## 2020-11-03 LAB
GENETIC COUNSELING?: YES
GENSO SPECIMEN TYPE: NORMAL
MISCELLANEOUS GENETIC TEST NAME: NORMAL
PARTENTAL OR SIBLING TESTING?: YES
REFERENCE LAB: NORMAL
TEST RESULT: NORMAL

## 2020-11-08 ENCOUNTER — PATIENT MESSAGE (OUTPATIENT)
Dept: FAMILY MEDICINE | Facility: CLINIC | Age: 60
End: 2020-11-08

## 2020-12-09 NOTE — PROGRESS NOTES
Patient has current dx of covid-19 w/ symptoms including cough and fever. Sent [facility] msg to communicate, will reach back out in 2 weeks.     Last 5 Patient Entered Readings                                      Current 30 Day Average: 135/87     Recent Readings 12/8/2020 12/7/2020 12/6/2020 12/3/2020 12/2/2020    SBP (mmHg) 142 159 130 125 134    DBP (mmHg) 96 99 89 86 87    Pulse 84 77 97 92 94        Hypertension Medications             candesartan (ATACAND) 32 MG tablet Take 1 tablet (32 mg total) by mouth once daily.    hydroCHLOROthiazide (HYDRODIURIL) 25 MG tablet Take 1 tablet (25 mg total) by mouth once daily.    metoprolol succinate (TOPROL-XL) 50 MG 24 hr tablet Take 1 tablet (50 mg total) by mouth once daily.        Last 6 Patient Entered Readings                                          Most Recent A1c: 7.1% on 10/5/2020  (Goal: 7%)     Recent Readings 12/8/2020 12/7/2020 12/6/2020 12/4/2020 12/3/2020    Blood Glucose (mg/dL) 116 103 136 102 130        Diabetes Medications             metFORMIN (GLUCOPHAGE-XR) 500 MG ER 24hr tablet Take 1 tablet (500 mg total) by mouth daily with breakfast.

## 2021-01-05 ENCOUNTER — OFFICE VISIT (OUTPATIENT)
Dept: HEMATOLOGY/ONCOLOGY | Facility: CLINIC | Age: 61
End: 2021-01-05
Payer: COMMERCIAL

## 2021-01-05 VITALS — DIASTOLIC BLOOD PRESSURE: 83 MMHG | OXYGEN SATURATION: 97 % | SYSTOLIC BLOOD PRESSURE: 137 MMHG | HEART RATE: 87 BPM

## 2021-01-05 DIAGNOSIS — Z71.83 ENCOUNTER FOR NONPROCREATIVE GENETIC COUNSELING: Primary | ICD-10-CM

## 2021-01-05 PROCEDURE — 99999 PR PBB SHADOW E&M-EST. PATIENT-LVL II: CPT | Mod: PBBFAC,,, | Performed by: NURSE PRACTITIONER

## 2021-01-05 PROCEDURE — 99212 PR OFFICE/OUTPT VISIT, EST, LEVL II, 10-19 MIN: ICD-10-PCS | Mod: S$GLB,,, | Performed by: NURSE PRACTITIONER

## 2021-01-05 PROCEDURE — 3072F LOW RISK FOR RETINOPATHY: CPT | Mod: S$GLB,,, | Performed by: NURSE PRACTITIONER

## 2021-01-05 PROCEDURE — 3075F SYST BP GE 130 - 139MM HG: CPT | Mod: CPTII,S$GLB,, | Performed by: NURSE PRACTITIONER

## 2021-01-05 PROCEDURE — 3079F PR MOST RECENT DIASTOLIC BLOOD PRESSURE 80-89 MM HG: ICD-10-PCS | Mod: CPTII,S$GLB,, | Performed by: NURSE PRACTITIONER

## 2021-01-05 PROCEDURE — 3072F PR LOW RISK FOR RETINOPATHY: ICD-10-PCS | Mod: S$GLB,,, | Performed by: NURSE PRACTITIONER

## 2021-01-05 PROCEDURE — 1126F AMNT PAIN NOTED NONE PRSNT: CPT | Mod: S$GLB,,, | Performed by: NURSE PRACTITIONER

## 2021-01-05 PROCEDURE — 3079F DIAST BP 80-89 MM HG: CPT | Mod: CPTII,S$GLB,, | Performed by: NURSE PRACTITIONER

## 2021-01-05 PROCEDURE — 1126F PR PAIN SEVERITY QUANTIFIED, NO PAIN PRESENT: ICD-10-PCS | Mod: S$GLB,,, | Performed by: NURSE PRACTITIONER

## 2021-01-05 PROCEDURE — 99999 PR PBB SHADOW E&M-EST. PATIENT-LVL II: ICD-10-PCS | Mod: PBBFAC,,, | Performed by: NURSE PRACTITIONER

## 2021-01-05 PROCEDURE — 3075F PR MOST RECENT SYSTOLIC BLOOD PRESS GE 130-139MM HG: ICD-10-PCS | Mod: CPTII,S$GLB,, | Performed by: NURSE PRACTITIONER

## 2021-01-05 PROCEDURE — 99212 OFFICE O/P EST SF 10 MIN: CPT | Mod: S$GLB,,, | Performed by: NURSE PRACTITIONER

## 2021-01-15 DIAGNOSIS — I65.23 BILATERAL CAROTID ARTERY STENOSIS: Primary | ICD-10-CM

## 2021-01-27 ENCOUNTER — PATIENT OUTREACH (OUTPATIENT)
Dept: ADMINISTRATIVE | Facility: OTHER | Age: 61
End: 2021-01-27

## 2021-01-28 ENCOUNTER — TELEPHONE (OUTPATIENT)
Dept: VASCULAR SURGERY | Facility: CLINIC | Age: 61
End: 2021-01-28

## 2021-01-29 ENCOUNTER — HOSPITAL ENCOUNTER (OUTPATIENT)
Dept: VASCULAR SURGERY | Facility: CLINIC | Age: 61
Discharge: HOME | End: 2021-01-29
Attending: SURGERY
Payer: COMMERCIAL

## 2021-01-29 DIAGNOSIS — I65.23 BILATERAL CAROTID ARTERY STENOSIS: ICD-10-CM

## 2021-01-29 PROCEDURE — 93880 EXTRACRANIAL BILAT STUDY: CPT | Mod: S$GLB,,, | Performed by: SURGERY

## 2021-01-29 PROCEDURE — 93880 PR DUPLEX SCAN EXTRACRANIAL,BILAT: ICD-10-PCS | Mod: S$GLB,,, | Performed by: SURGERY

## 2021-02-08 ENCOUNTER — LAB VISIT (OUTPATIENT)
Dept: LAB | Facility: HOSPITAL | Age: 61
End: 2021-02-08
Attending: FAMILY MEDICINE
Payer: COMMERCIAL

## 2021-02-08 DIAGNOSIS — Z52.008 BLOOD DONOR: ICD-10-CM

## 2021-02-08 DIAGNOSIS — E11.22 TYPE 2 DIABETES MELLITUS WITH STAGE 3A CHRONIC KIDNEY DISEASE, WITHOUT LONG-TERM CURRENT USE OF INSULIN: ICD-10-CM

## 2021-02-08 DIAGNOSIS — D50.8 OTHER IRON DEFICIENCY ANEMIA: ICD-10-CM

## 2021-02-08 DIAGNOSIS — N18.31 TYPE 2 DIABETES MELLITUS WITH STAGE 3A CHRONIC KIDNEY DISEASE, WITHOUT LONG-TERM CURRENT USE OF INSULIN: ICD-10-CM

## 2021-02-08 DIAGNOSIS — N18.31 STAGE 3A CHRONIC KIDNEY DISEASE: ICD-10-CM

## 2021-02-08 LAB
ALBUMIN SERPL BCP-MCNC: 4 G/DL (ref 3.5–5.2)
ALP SERPL-CCNC: 64 U/L (ref 55–135)
ALT SERPL W/O P-5'-P-CCNC: 28 U/L (ref 10–44)
ANION GAP SERPL CALC-SCNC: 12 MMOL/L (ref 8–16)
AST SERPL-CCNC: 20 U/L (ref 10–40)
BASOPHILS # BLD AUTO: 0.04 K/UL (ref 0–0.2)
BASOPHILS NFR BLD: 0.4 % (ref 0–1.9)
BILIRUB SERPL-MCNC: 0.3 MG/DL (ref 0.1–1)
BUN SERPL-MCNC: 15 MG/DL (ref 6–20)
CALCIUM SERPL-MCNC: 8.9 MG/DL (ref 8.7–10.5)
CHLORIDE SERPL-SCNC: 100 MMOL/L (ref 95–110)
CO2 SERPL-SCNC: 26 MMOL/L (ref 23–29)
CREAT SERPL-MCNC: 1.7 MG/DL (ref 0.5–1.4)
DIFFERENTIAL METHOD: ABNORMAL
EOSINOPHIL # BLD AUTO: 0.2 K/UL (ref 0–0.5)
EOSINOPHIL NFR BLD: 1.9 % (ref 0–8)
ERYTHROCYTE [DISTWIDTH] IN BLOOD BY AUTOMATED COUNT: 20.2 % (ref 11.5–14.5)
EST. GFR  (AFRICAN AMERICAN): 49.6 ML/MIN/1.73 M^2
EST. GFR  (NON AFRICAN AMERICAN): 42.9 ML/MIN/1.73 M^2
ESTIMATED AVG GLUCOSE: 166 MG/DL (ref 68–131)
FERRITIN SERPL-MCNC: 16 NG/ML (ref 20–300)
GLUCOSE SERPL-MCNC: 178 MG/DL (ref 70–110)
HBA1C MFR BLD: 7.4 % (ref 4–5.6)
HCT VFR BLD AUTO: 56.4 % (ref 40–54)
HGB BLD-MCNC: 17.5 G/DL (ref 14–18)
IMM GRANULOCYTES # BLD AUTO: 0.02 K/UL (ref 0–0.04)
IMM GRANULOCYTES NFR BLD AUTO: 0.2 % (ref 0–0.5)
IRON SERPL-MCNC: 52 UG/DL (ref 45–160)
LYMPHOCYTES # BLD AUTO: 2.1 K/UL (ref 1–4.8)
LYMPHOCYTES NFR BLD: 22.5 % (ref 18–48)
MCH RBC QN AUTO: 27.9 PG (ref 27–31)
MCHC RBC AUTO-ENTMCNC: 31 G/DL (ref 32–36)
MCV RBC AUTO: 90 FL (ref 82–98)
MONOCYTES # BLD AUTO: 0.8 K/UL (ref 0.3–1)
MONOCYTES NFR BLD: 9 % (ref 4–15)
NEUTROPHILS # BLD AUTO: 6.2 K/UL (ref 1.8–7.7)
NEUTROPHILS NFR BLD: 66 % (ref 38–73)
NRBC BLD-RTO: 0 /100 WBC
PLATELET # BLD AUTO: 295 K/UL (ref 150–350)
PMV BLD AUTO: 9.3 FL (ref 9.2–12.9)
POTASSIUM SERPL-SCNC: 4.1 MMOL/L (ref 3.5–5.1)
PROT SERPL-MCNC: 7.3 G/DL (ref 6–8.4)
RBC # BLD AUTO: 6.27 M/UL (ref 4.6–6.2)
SATURATED IRON: 12 % (ref 20–50)
SODIUM SERPL-SCNC: 138 MMOL/L (ref 136–145)
TOTAL IRON BINDING CAPACITY: 422 UG/DL (ref 250–450)
TRANSFERRIN SERPL-MCNC: 285 MG/DL (ref 200–375)
WBC # BLD AUTO: 9.33 K/UL (ref 3.9–12.7)

## 2021-02-08 PROCEDURE — 83540 ASSAY OF IRON: CPT

## 2021-02-08 PROCEDURE — 85025 COMPLETE CBC W/AUTO DIFF WBC: CPT

## 2021-02-08 PROCEDURE — 83036 HEMOGLOBIN GLYCOSYLATED A1C: CPT

## 2021-02-08 PROCEDURE — 80053 COMPREHEN METABOLIC PANEL: CPT

## 2021-02-08 PROCEDURE — 82728 ASSAY OF FERRITIN: CPT

## 2021-02-08 PROCEDURE — 36415 COLL VENOUS BLD VENIPUNCTURE: CPT | Mod: PO

## 2021-02-10 ENCOUNTER — OFFICE VISIT (OUTPATIENT)
Dept: FAMILY MEDICINE | Facility: CLINIC | Age: 61
End: 2021-02-10
Payer: COMMERCIAL

## 2021-02-10 VITALS
TEMPERATURE: 97 F | HEART RATE: 91 BPM | WEIGHT: 196.19 LBS | OXYGEN SATURATION: 97 % | SYSTOLIC BLOOD PRESSURE: 126 MMHG | BODY MASS INDEX: 27.47 KG/M2 | DIASTOLIC BLOOD PRESSURE: 84 MMHG | HEIGHT: 71 IN

## 2021-02-10 DIAGNOSIS — L82.1 SK (SEBORRHEIC KERATOSIS): ICD-10-CM

## 2021-02-10 DIAGNOSIS — F41.9 ANXIETY: ICD-10-CM

## 2021-02-10 DIAGNOSIS — Z12.5 SCREENING PSA (PROSTATE SPECIFIC ANTIGEN): ICD-10-CM

## 2021-02-10 DIAGNOSIS — Z52.008 BLOOD DONOR: ICD-10-CM

## 2021-02-10 DIAGNOSIS — Z00.00 VISIT FOR WELL MAN HEALTH CHECK: ICD-10-CM

## 2021-02-10 DIAGNOSIS — G89.29 CHRONIC BILATERAL LOW BACK PAIN WITHOUT SCIATICA: ICD-10-CM

## 2021-02-10 DIAGNOSIS — E55.9 VITAMIN D DEFICIENCY: ICD-10-CM

## 2021-02-10 DIAGNOSIS — D50.8 OTHER IRON DEFICIENCY ANEMIA: ICD-10-CM

## 2021-02-10 DIAGNOSIS — N18.31 TYPE 2 DIABETES MELLITUS WITH STAGE 3A CHRONIC KIDNEY DISEASE, WITHOUT LONG-TERM CURRENT USE OF INSULIN: Primary | ICD-10-CM

## 2021-02-10 DIAGNOSIS — M54.50 CHRONIC BILATERAL LOW BACK PAIN WITHOUT SCIATICA: ICD-10-CM

## 2021-02-10 DIAGNOSIS — E11.22 TYPE 2 DIABETES MELLITUS WITH STAGE 3A CHRONIC KIDNEY DISEASE, WITHOUT LONG-TERM CURRENT USE OF INSULIN: Primary | ICD-10-CM

## 2021-02-10 DIAGNOSIS — I10 ESSENTIAL HYPERTENSION: ICD-10-CM

## 2021-02-10 DIAGNOSIS — N18.31 STAGE 3A CHRONIC KIDNEY DISEASE: ICD-10-CM

## 2021-02-10 PROCEDURE — 99214 PR OFFICE/OUTPT VISIT, EST, LEVL IV, 30-39 MIN: ICD-10-PCS | Mod: S$GLB,,, | Performed by: FAMILY MEDICINE

## 2021-02-10 PROCEDURE — 3072F LOW RISK FOR RETINOPATHY: CPT | Mod: S$GLB,,, | Performed by: FAMILY MEDICINE

## 2021-02-10 PROCEDURE — 3051F PR MOST RECENT HEMOGLOBIN A1C LEVEL 7.0 - < 8.0%: ICD-10-PCS | Mod: CPTII,S$GLB,, | Performed by: FAMILY MEDICINE

## 2021-02-10 PROCEDURE — 99214 OFFICE O/P EST MOD 30 MIN: CPT | Mod: S$GLB,,, | Performed by: FAMILY MEDICINE

## 2021-02-10 PROCEDURE — 1125F PR PAIN SEVERITY QUANTIFIED, PAIN PRESENT: ICD-10-PCS | Mod: S$GLB,,, | Performed by: FAMILY MEDICINE

## 2021-02-10 PROCEDURE — 3008F BODY MASS INDEX DOCD: CPT | Mod: CPTII,S$GLB,, | Performed by: FAMILY MEDICINE

## 2021-02-10 PROCEDURE — 99999 PR PBB SHADOW E&M-EST. PATIENT-LVL V: CPT | Mod: PBBFAC,,, | Performed by: FAMILY MEDICINE

## 2021-02-10 PROCEDURE — 3051F HG A1C>EQUAL 7.0%<8.0%: CPT | Mod: CPTII,S$GLB,, | Performed by: FAMILY MEDICINE

## 2021-02-10 PROCEDURE — 3072F PR LOW RISK FOR RETINOPATHY: ICD-10-PCS | Mod: S$GLB,,, | Performed by: FAMILY MEDICINE

## 2021-02-10 PROCEDURE — 3079F DIAST BP 80-89 MM HG: CPT | Mod: CPTII,S$GLB,, | Performed by: FAMILY MEDICINE

## 2021-02-10 PROCEDURE — 3008F PR BODY MASS INDEX (BMI) DOCUMENTED: ICD-10-PCS | Mod: CPTII,S$GLB,, | Performed by: FAMILY MEDICINE

## 2021-02-10 PROCEDURE — 3074F SYST BP LT 130 MM HG: CPT | Mod: CPTII,S$GLB,, | Performed by: FAMILY MEDICINE

## 2021-02-10 PROCEDURE — 99999 PR PBB SHADOW E&M-EST. PATIENT-LVL V: ICD-10-PCS | Mod: PBBFAC,,, | Performed by: FAMILY MEDICINE

## 2021-02-10 PROCEDURE — 1125F AMNT PAIN NOTED PAIN PRSNT: CPT | Mod: S$GLB,,, | Performed by: FAMILY MEDICINE

## 2021-02-10 PROCEDURE — 3079F PR MOST RECENT DIASTOLIC BLOOD PRESSURE 80-89 MM HG: ICD-10-PCS | Mod: CPTII,S$GLB,, | Performed by: FAMILY MEDICINE

## 2021-02-10 PROCEDURE — 3074F PR MOST RECENT SYSTOLIC BLOOD PRESSURE < 130 MM HG: ICD-10-PCS | Mod: CPTII,S$GLB,, | Performed by: FAMILY MEDICINE

## 2021-02-10 RX ORDER — DULOXETIN HYDROCHLORIDE 30 MG/1
30 CAPSULE, DELAYED RELEASE ORAL DAILY
Qty: 90 CAPSULE | Refills: 0 | Status: SHIPPED | OUTPATIENT
Start: 2021-02-10 | End: 2021-05-11

## 2021-02-10 RX ORDER — SYRINGE WITH NEEDLE, 1 ML 25GX5/8"
SYRINGE, EMPTY DISPOSABLE MISCELLANEOUS
COMMUNITY
Start: 2020-11-30 | End: 2023-09-26 | Stop reason: SDUPTHER

## 2021-02-10 RX ORDER — CANDESARTAN 16 MG/1
16 TABLET ORAL DAILY
COMMUNITY
Start: 2020-12-18 | End: 2021-02-10

## 2021-02-10 RX ORDER — METOPROLOL SUCCINATE 100 MG/1
100 TABLET, EXTENDED RELEASE ORAL DAILY
Qty: 90 TABLET | Refills: 0
Start: 2021-02-10 | End: 2021-03-15 | Stop reason: SDUPTHER

## 2021-02-10 RX ORDER — FERROUS SULFATE 324(65)MG
325 TABLET, DELAYED RELEASE (ENTERIC COATED) ORAL DAILY
Qty: 90 TABLET | Refills: 1 | Status: SHIPPED | OUTPATIENT
Start: 2021-02-10 | End: 2021-05-11

## 2021-02-10 RX ORDER — HYDROCHLOROTHIAZIDE 25 MG/1
12.5 TABLET ORAL DAILY
Qty: 90 TABLET | Refills: 1
Start: 2021-02-10 | End: 2021-05-11 | Stop reason: SDUPTHER

## 2021-03-05 ENCOUNTER — OFFICE VISIT (OUTPATIENT)
Dept: DERMATOLOGY | Facility: CLINIC | Age: 61
End: 2021-03-05
Payer: COMMERCIAL

## 2021-03-05 VITALS — BODY MASS INDEX: 27.34 KG/M2 | WEIGHT: 196 LBS

## 2021-03-05 DIAGNOSIS — L81.4 LENTIGINES: ICD-10-CM

## 2021-03-05 DIAGNOSIS — L57.3 POIKILODERMA CIVATTE'S: Primary | ICD-10-CM

## 2021-03-05 DIAGNOSIS — L82.1 SK (SEBORRHEIC KERATOSIS): ICD-10-CM

## 2021-03-05 DIAGNOSIS — L82.1 SEBORRHEIC KERATOSES: ICD-10-CM

## 2021-03-05 DIAGNOSIS — Z12.83 SKIN EXAM, SCREENING FOR CANCER: ICD-10-CM

## 2021-03-05 PROCEDURE — 1126F AMNT PAIN NOTED NONE PRSNT: CPT | Mod: S$GLB,,, | Performed by: DERMATOLOGY

## 2021-03-05 PROCEDURE — 99999 PR PBB SHADOW E&M-EST. PATIENT-LVL III: CPT | Mod: PBBFAC,,, | Performed by: DERMATOLOGY

## 2021-03-05 PROCEDURE — 3008F BODY MASS INDEX DOCD: CPT | Mod: CPTII,S$GLB,, | Performed by: DERMATOLOGY

## 2021-03-05 PROCEDURE — 99202 PR OFFICE/OUTPT VISIT, NEW, LEVL II, 15-29 MIN: ICD-10-PCS | Mod: 25,S$GLB,, | Performed by: DERMATOLOGY

## 2021-03-05 PROCEDURE — 99999 PR PBB SHADOW E&M-EST. PATIENT-LVL III: ICD-10-PCS | Mod: PBBFAC,,, | Performed by: DERMATOLOGY

## 2021-03-05 PROCEDURE — 1126F PR PAIN SEVERITY QUANTIFIED, NO PAIN PRESENT: ICD-10-PCS | Mod: S$GLB,,, | Performed by: DERMATOLOGY

## 2021-03-05 PROCEDURE — 3008F PR BODY MASS INDEX (BMI) DOCUMENTED: ICD-10-PCS | Mod: CPTII,S$GLB,, | Performed by: DERMATOLOGY

## 2021-03-05 PROCEDURE — 17110 PR DESTRUCTION BENIGN LESIONS UP TO 14: ICD-10-PCS | Mod: S$GLB,,, | Performed by: DERMATOLOGY

## 2021-03-05 PROCEDURE — 3072F LOW RISK FOR RETINOPATHY: CPT | Mod: S$GLB,,, | Performed by: DERMATOLOGY

## 2021-03-05 PROCEDURE — 3072F PR LOW RISK FOR RETINOPATHY: ICD-10-PCS | Mod: S$GLB,,, | Performed by: DERMATOLOGY

## 2021-03-05 PROCEDURE — 17110 DESTRUCTION B9 LES UP TO 14: CPT | Mod: S$GLB,,, | Performed by: DERMATOLOGY

## 2021-03-05 PROCEDURE — 99202 OFFICE O/P NEW SF 15 MIN: CPT | Mod: 25,S$GLB,, | Performed by: DERMATOLOGY

## 2021-03-15 ENCOUNTER — PATIENT MESSAGE (OUTPATIENT)
Dept: FAMILY MEDICINE | Facility: CLINIC | Age: 61
End: 2021-03-15

## 2021-03-15 DIAGNOSIS — I10 ESSENTIAL HYPERTENSION: ICD-10-CM

## 2021-03-15 RX ORDER — METOPROLOL SUCCINATE 100 MG/1
100 TABLET, EXTENDED RELEASE ORAL DAILY
Qty: 90 TABLET | Refills: 0 | Status: SHIPPED | OUTPATIENT
Start: 2021-03-15 | End: 2021-05-11 | Stop reason: SDUPTHER

## 2021-04-21 DIAGNOSIS — N18.31 TYPE 2 DIABETES MELLITUS WITH STAGE 3A CHRONIC KIDNEY DISEASE, WITHOUT LONG-TERM CURRENT USE OF INSULIN: ICD-10-CM

## 2021-04-21 DIAGNOSIS — E11.22 TYPE 2 DIABETES MELLITUS WITH STAGE 3A CHRONIC KIDNEY DISEASE, WITHOUT LONG-TERM CURRENT USE OF INSULIN: ICD-10-CM

## 2021-04-21 DIAGNOSIS — R79.89 LOW VITAMIN D LEVEL: ICD-10-CM

## 2021-04-21 RX ORDER — METFORMIN HYDROCHLORIDE 500 MG/1
TABLET, EXTENDED RELEASE ORAL
Qty: 180 TABLET | Refills: 0 | Status: SHIPPED | OUTPATIENT
Start: 2021-04-21 | End: 2021-05-11 | Stop reason: SDUPTHER

## 2021-04-21 RX ORDER — ERGOCALCIFEROL 1.25 MG/1
CAPSULE ORAL
Qty: 12 CAPSULE | Refills: 0 | Status: SHIPPED | OUTPATIENT
Start: 2021-04-21 | End: 2021-09-03 | Stop reason: SDUPTHER

## 2021-05-05 ENCOUNTER — LAB VISIT (OUTPATIENT)
Dept: LAB | Facility: HOSPITAL | Age: 61
End: 2021-05-05
Attending: FAMILY MEDICINE
Payer: COMMERCIAL

## 2021-05-05 DIAGNOSIS — N18.31 TYPE 2 DIABETES MELLITUS WITH STAGE 3A CHRONIC KIDNEY DISEASE, WITHOUT LONG-TERM CURRENT USE OF INSULIN: ICD-10-CM

## 2021-05-05 DIAGNOSIS — N18.31 STAGE 3A CHRONIC KIDNEY DISEASE: ICD-10-CM

## 2021-05-05 DIAGNOSIS — E11.22 TYPE 2 DIABETES MELLITUS WITH STAGE 3A CHRONIC KIDNEY DISEASE, WITHOUT LONG-TERM CURRENT USE OF INSULIN: ICD-10-CM

## 2021-05-05 DIAGNOSIS — I10 ESSENTIAL HYPERTENSION: ICD-10-CM

## 2021-05-05 DIAGNOSIS — E55.9 VITAMIN D DEFICIENCY: ICD-10-CM

## 2021-05-05 DIAGNOSIS — D50.8 OTHER IRON DEFICIENCY ANEMIA: ICD-10-CM

## 2021-05-05 DIAGNOSIS — Z00.00 VISIT FOR WELL MAN HEALTH CHECK: ICD-10-CM

## 2021-05-05 DIAGNOSIS — Z12.5 SCREENING PSA (PROSTATE SPECIFIC ANTIGEN): ICD-10-CM

## 2021-05-05 LAB
25(OH)D3+25(OH)D2 SERPL-MCNC: 39 NG/ML (ref 30–96)
ALBUMIN SERPL BCP-MCNC: 4 G/DL (ref 3.5–5.2)
ALP SERPL-CCNC: 58 U/L (ref 55–135)
ALT SERPL W/O P-5'-P-CCNC: 32 U/L (ref 10–44)
ANION GAP SERPL CALC-SCNC: 8 MMOL/L (ref 8–16)
AST SERPL-CCNC: 29 U/L (ref 10–40)
BASOPHILS # BLD AUTO: 0.05 K/UL (ref 0–0.2)
BASOPHILS NFR BLD: 0.6 % (ref 0–1.9)
BILIRUB SERPL-MCNC: 0.5 MG/DL (ref 0.1–1)
BUN SERPL-MCNC: 19 MG/DL (ref 8–23)
CALCIUM SERPL-MCNC: 9.6 MG/DL (ref 8.7–10.5)
CHLORIDE SERPL-SCNC: 102 MMOL/L (ref 95–110)
CHOLEST SERPL-MCNC: 117 MG/DL (ref 120–199)
CHOLEST/HDLC SERPL: 3.9 {RATIO} (ref 2–5)
CO2 SERPL-SCNC: 29 MMOL/L (ref 23–29)
COMPLEXED PSA SERPL-MCNC: 0.55 NG/ML (ref 0–4)
CREAT SERPL-MCNC: 1.4 MG/DL (ref 0.5–1.4)
DIFFERENTIAL METHOD: ABNORMAL
EOSINOPHIL # BLD AUTO: 0.3 K/UL (ref 0–0.5)
EOSINOPHIL NFR BLD: 3.4 % (ref 0–8)
ERYTHROCYTE [DISTWIDTH] IN BLOOD BY AUTOMATED COUNT: 18.5 % (ref 11.5–14.5)
EST. GFR  (AFRICAN AMERICAN): >60 ML/MIN/1.73 M^2
EST. GFR  (NON AFRICAN AMERICAN): 53.8 ML/MIN/1.73 M^2
ESTIMATED AVG GLUCOSE: 163 MG/DL (ref 68–131)
FERRITIN SERPL-MCNC: 32 NG/ML (ref 20–300)
GLUCOSE SERPL-MCNC: 143 MG/DL (ref 70–110)
HBA1C MFR BLD: 7.3 % (ref 4–5.6)
HCT VFR BLD AUTO: 52.8 % (ref 40–54)
HDLC SERPL-MCNC: 30 MG/DL (ref 40–75)
HDLC SERPL: 25.6 % (ref 20–50)
HGB BLD-MCNC: 17.5 G/DL (ref 14–18)
IMM GRANULOCYTES # BLD AUTO: 0.01 K/UL (ref 0–0.04)
IMM GRANULOCYTES NFR BLD AUTO: 0.1 % (ref 0–0.5)
IRON SERPL-MCNC: 119 UG/DL (ref 45–160)
LDLC SERPL CALC-MCNC: 54.2 MG/DL (ref 63–159)
LYMPHOCYTES # BLD AUTO: 2.3 K/UL (ref 1–4.8)
LYMPHOCYTES NFR BLD: 27.6 % (ref 18–48)
MCH RBC QN AUTO: 29.7 PG (ref 27–31)
MCHC RBC AUTO-ENTMCNC: 33.1 G/DL (ref 32–36)
MCV RBC AUTO: 90 FL (ref 82–98)
MONOCYTES # BLD AUTO: 0.8 K/UL (ref 0.3–1)
MONOCYTES NFR BLD: 9.2 % (ref 4–15)
NEUTROPHILS # BLD AUTO: 4.9 K/UL (ref 1.8–7.7)
NEUTROPHILS NFR BLD: 59.1 % (ref 38–73)
NONHDLC SERPL-MCNC: 87 MG/DL
NRBC BLD-RTO: 0 /100 WBC
PLATELET # BLD AUTO: 287 K/UL (ref 150–450)
PMV BLD AUTO: 9.8 FL (ref 9.2–12.9)
POTASSIUM SERPL-SCNC: 4.3 MMOL/L (ref 3.5–5.1)
PROT SERPL-MCNC: 7.3 G/DL (ref 6–8.4)
RBC # BLD AUTO: 5.9 M/UL (ref 4.6–6.2)
SATURATED IRON: 27 % (ref 20–50)
SODIUM SERPL-SCNC: 139 MMOL/L (ref 136–145)
TOTAL IRON BINDING CAPACITY: 444 UG/DL (ref 250–450)
TRANSFERRIN SERPL-MCNC: 300 MG/DL (ref 200–375)
TRIGL SERPL-MCNC: 164 MG/DL (ref 30–150)
TSH SERPL DL<=0.005 MIU/L-ACNC: 2.78 UIU/ML (ref 0.4–4)
VIT B12 SERPL-MCNC: 472 PG/ML (ref 210–950)
WBC # BLD AUTO: 8.25 K/UL (ref 3.9–12.7)

## 2021-05-05 PROCEDURE — 82728 ASSAY OF FERRITIN: CPT | Performed by: FAMILY MEDICINE

## 2021-05-05 PROCEDURE — 85025 COMPLETE CBC W/AUTO DIFF WBC: CPT | Performed by: FAMILY MEDICINE

## 2021-05-05 PROCEDURE — 84153 ASSAY OF PSA TOTAL: CPT | Performed by: FAMILY MEDICINE

## 2021-05-05 PROCEDURE — 82306 VITAMIN D 25 HYDROXY: CPT | Performed by: FAMILY MEDICINE

## 2021-05-05 PROCEDURE — 83540 ASSAY OF IRON: CPT | Performed by: FAMILY MEDICINE

## 2021-05-05 PROCEDURE — 83036 HEMOGLOBIN GLYCOSYLATED A1C: CPT | Performed by: FAMILY MEDICINE

## 2021-05-05 PROCEDURE — 82607 VITAMIN B-12: CPT | Performed by: FAMILY MEDICINE

## 2021-05-05 PROCEDURE — 84443 ASSAY THYROID STIM HORMONE: CPT | Performed by: FAMILY MEDICINE

## 2021-05-05 PROCEDURE — 36415 COLL VENOUS BLD VENIPUNCTURE: CPT | Mod: PO | Performed by: FAMILY MEDICINE

## 2021-05-05 PROCEDURE — 80053 COMPREHEN METABOLIC PANEL: CPT | Performed by: FAMILY MEDICINE

## 2021-05-05 PROCEDURE — 80061 LIPID PANEL: CPT | Performed by: FAMILY MEDICINE

## 2021-05-11 ENCOUNTER — OFFICE VISIT (OUTPATIENT)
Dept: FAMILY MEDICINE | Facility: CLINIC | Age: 61
End: 2021-05-11
Payer: COMMERCIAL

## 2021-05-11 VITALS
HEIGHT: 71 IN | WEIGHT: 197.56 LBS | DIASTOLIC BLOOD PRESSURE: 92 MMHG | SYSTOLIC BLOOD PRESSURE: 150 MMHG | HEART RATE: 74 BPM | OXYGEN SATURATION: 97 % | BODY MASS INDEX: 27.66 KG/M2

## 2021-05-11 DIAGNOSIS — Z00.00 VISIT FOR WELL MAN HEALTH CHECK: Primary | ICD-10-CM

## 2021-05-11 DIAGNOSIS — E11.22 TYPE 2 DIABETES MELLITUS WITH STAGE 3A CHRONIC KIDNEY DISEASE, WITHOUT LONG-TERM CURRENT USE OF INSULIN: ICD-10-CM

## 2021-05-11 DIAGNOSIS — E78.5 DYSLIPIDEMIA: Chronic | ICD-10-CM

## 2021-05-11 DIAGNOSIS — Z52.008 BLOOD DONOR: ICD-10-CM

## 2021-05-11 DIAGNOSIS — N18.31 TYPE 2 DIABETES MELLITUS WITH STAGE 3A CHRONIC KIDNEY DISEASE, WITHOUT LONG-TERM CURRENT USE OF INSULIN: ICD-10-CM

## 2021-05-11 DIAGNOSIS — I65.23 BILATERAL CAROTID ARTERY STENOSIS: ICD-10-CM

## 2021-05-11 DIAGNOSIS — I10 ESSENTIAL HYPERTENSION: ICD-10-CM

## 2021-05-11 DIAGNOSIS — Z98.890 S/P CAROTID ENDARTERECTOMY: ICD-10-CM

## 2021-05-11 DIAGNOSIS — N18.31 STAGE 3A CHRONIC KIDNEY DISEASE: ICD-10-CM

## 2021-05-11 PROCEDURE — 99999 PR PBB SHADOW E&M-EST. PATIENT-LVL IV: ICD-10-PCS | Mod: PBBFAC,,, | Performed by: FAMILY MEDICINE

## 2021-05-11 PROCEDURE — 99396 PREV VISIT EST AGE 40-64: CPT | Mod: S$GLB,,, | Performed by: FAMILY MEDICINE

## 2021-05-11 PROCEDURE — 3072F LOW RISK FOR RETINOPATHY: CPT | Mod: S$GLB,,, | Performed by: FAMILY MEDICINE

## 2021-05-11 PROCEDURE — 3072F PR LOW RISK FOR RETINOPATHY: ICD-10-PCS | Mod: S$GLB,,, | Performed by: FAMILY MEDICINE

## 2021-05-11 PROCEDURE — 3008F PR BODY MASS INDEX (BMI) DOCUMENTED: ICD-10-PCS | Mod: CPTII,S$GLB,, | Performed by: FAMILY MEDICINE

## 2021-05-11 PROCEDURE — 99999 PR PBB SHADOW E&M-EST. PATIENT-LVL IV: CPT | Mod: PBBFAC,,, | Performed by: FAMILY MEDICINE

## 2021-05-11 PROCEDURE — 1126F PR PAIN SEVERITY QUANTIFIED, NO PAIN PRESENT: ICD-10-PCS | Mod: S$GLB,,, | Performed by: FAMILY MEDICINE

## 2021-05-11 PROCEDURE — 1126F AMNT PAIN NOTED NONE PRSNT: CPT | Mod: S$GLB,,, | Performed by: FAMILY MEDICINE

## 2021-05-11 PROCEDURE — 3008F BODY MASS INDEX DOCD: CPT | Mod: CPTII,S$GLB,, | Performed by: FAMILY MEDICINE

## 2021-05-11 PROCEDURE — 3051F PR MOST RECENT HEMOGLOBIN A1C LEVEL 7.0 - < 8.0%: ICD-10-PCS | Mod: CPTII,S$GLB,, | Performed by: FAMILY MEDICINE

## 2021-05-11 PROCEDURE — 3051F HG A1C>EQUAL 7.0%<8.0%: CPT | Mod: CPTII,S$GLB,, | Performed by: FAMILY MEDICINE

## 2021-05-11 PROCEDURE — 99396 PR PREVENTIVE VISIT,EST,40-64: ICD-10-PCS | Mod: S$GLB,,, | Performed by: FAMILY MEDICINE

## 2021-05-11 RX ORDER — CHLORTHALIDONE 25 MG/1
25 TABLET ORAL DAILY
Qty: 30 TABLET | Refills: 0 | Status: SHIPPED | OUTPATIENT
Start: 2021-05-11 | End: 2021-06-07

## 2021-05-11 RX ORDER — METFORMIN HYDROCHLORIDE 500 MG/1
1500 TABLET, EXTENDED RELEASE ORAL
Qty: 270 TABLET | Refills: 1
Start: 2021-05-11 | End: 2021-07-05

## 2021-05-11 RX ORDER — HYDROCHLOROTHIAZIDE 12.5 MG/1
12.5 TABLET ORAL DAILY
Qty: 90 TABLET | Refills: 1 | Status: SHIPPED | OUTPATIENT
Start: 2021-05-11 | End: 2021-05-11

## 2021-05-11 RX ORDER — METOPROLOL SUCCINATE 100 MG/1
100 TABLET, EXTENDED RELEASE ORAL DAILY
Qty: 90 TABLET | Refills: 0
Start: 2021-05-11 | End: 2021-06-07

## 2021-05-11 RX ORDER — ROSUVASTATIN CALCIUM 40 MG/1
40 TABLET, COATED ORAL NIGHTLY
Qty: 90 TABLET | Refills: 1
Start: 2021-05-11 | End: 2021-07-27 | Stop reason: SDUPTHER

## 2021-05-11 RX ORDER — CANDESARTAN 32 MG/1
32 TABLET ORAL DAILY
Qty: 90 TABLET | Refills: 0
Start: 2021-05-11 | End: 2021-07-27 | Stop reason: SDUPTHER

## 2021-05-11 RX ORDER — AMLODIPINE BESYLATE 5 MG/1
5 TABLET ORAL DAILY
Qty: 30 TABLET | Refills: 0 | Status: SHIPPED | OUTPATIENT
Start: 2021-05-11 | End: 2021-05-11

## 2021-05-12 ENCOUNTER — PATIENT MESSAGE (OUTPATIENT)
Dept: FAMILY MEDICINE | Facility: CLINIC | Age: 61
End: 2021-05-12

## 2021-05-12 NOTE — PROGRESS NOTES
PCP forwarded me the most recent encounter. Per his notes:  Increase metformin to 3 pills/day as tolerated, they are XR, can take together or separate.      Continue metoprolol 100 mg XR  Continue candesartan  STOP HCTZ  Restart chlorthalidone  Increase fluid intake  At least 8 cups water/day  ...  If HANANE noted again, will restart HCTZ 12.5 and add amlodipine    Will move up outreach for patient and reach out to patient in 1-2 days to review care plan.

## 2021-05-26 ENCOUNTER — PATIENT MESSAGE (OUTPATIENT)
Dept: FAMILY MEDICINE | Facility: CLINIC | Age: 61
End: 2021-05-26

## 2021-05-27 ENCOUNTER — PATIENT OUTREACH (OUTPATIENT)
Dept: ADMINISTRATIVE | Facility: OTHER | Age: 61
End: 2021-05-27

## 2021-05-27 ENCOUNTER — OFFICE VISIT (OUTPATIENT)
Dept: CARDIOLOGY | Facility: CLINIC | Age: 61
End: 2021-05-27
Payer: COMMERCIAL

## 2021-05-27 VITALS
HEART RATE: 60 BPM | BODY MASS INDEX: 27.94 KG/M2 | WEIGHT: 195.13 LBS | HEIGHT: 70 IN | DIASTOLIC BLOOD PRESSURE: 80 MMHG | SYSTOLIC BLOOD PRESSURE: 122 MMHG

## 2021-05-27 DIAGNOSIS — Z98.890 S/P CAROTID ENDARTERECTOMY: ICD-10-CM

## 2021-05-27 DIAGNOSIS — E78.5 DYSLIPIDEMIA: Chronic | ICD-10-CM

## 2021-05-27 DIAGNOSIS — N18.31 TYPE 2 DIABETES MELLITUS WITH STAGE 3A CHRONIC KIDNEY DISEASE, WITHOUT LONG-TERM CURRENT USE OF INSULIN: ICD-10-CM

## 2021-05-27 DIAGNOSIS — I10 ESSENTIAL HYPERTENSION: Primary | ICD-10-CM

## 2021-05-27 DIAGNOSIS — E11.22 TYPE 2 DIABETES MELLITUS WITH STAGE 3A CHRONIC KIDNEY DISEASE, WITHOUT LONG-TERM CURRENT USE OF INSULIN: ICD-10-CM

## 2021-05-27 PROCEDURE — 3074F SYST BP LT 130 MM HG: CPT | Mod: CPTII,S$GLB,, | Performed by: INTERNAL MEDICINE

## 2021-05-27 PROCEDURE — 3072F LOW RISK FOR RETINOPATHY: CPT | Mod: S$GLB,,, | Performed by: INTERNAL MEDICINE

## 2021-05-27 PROCEDURE — 3008F BODY MASS INDEX DOCD: CPT | Mod: CPTII,S$GLB,, | Performed by: INTERNAL MEDICINE

## 2021-05-27 PROCEDURE — 3051F PR MOST RECENT HEMOGLOBIN A1C LEVEL 7.0 - < 8.0%: ICD-10-PCS | Mod: CPTII,S$GLB,, | Performed by: INTERNAL MEDICINE

## 2021-05-27 PROCEDURE — 3008F PR BODY MASS INDEX (BMI) DOCUMENTED: ICD-10-PCS | Mod: CPTII,S$GLB,, | Performed by: INTERNAL MEDICINE

## 2021-05-27 PROCEDURE — 3051F HG A1C>EQUAL 7.0%<8.0%: CPT | Mod: CPTII,S$GLB,, | Performed by: INTERNAL MEDICINE

## 2021-05-27 PROCEDURE — 99999 PR PBB SHADOW E&M-EST. PATIENT-LVL IV: CPT | Mod: PBBFAC,,, | Performed by: INTERNAL MEDICINE

## 2021-05-27 PROCEDURE — 99214 OFFICE O/P EST MOD 30 MIN: CPT | Mod: S$GLB,,, | Performed by: INTERNAL MEDICINE

## 2021-05-27 PROCEDURE — 3072F PR LOW RISK FOR RETINOPATHY: ICD-10-PCS | Mod: S$GLB,,, | Performed by: INTERNAL MEDICINE

## 2021-05-27 PROCEDURE — 99214 PR OFFICE/OUTPT VISIT, EST, LEVL IV, 30-39 MIN: ICD-10-PCS | Mod: S$GLB,,, | Performed by: INTERNAL MEDICINE

## 2021-05-27 PROCEDURE — 3074F PR MOST RECENT SYSTOLIC BLOOD PRESSURE < 130 MM HG: ICD-10-PCS | Mod: CPTII,S$GLB,, | Performed by: INTERNAL MEDICINE

## 2021-05-27 PROCEDURE — 3079F DIAST BP 80-89 MM HG: CPT | Mod: CPTII,S$GLB,, | Performed by: INTERNAL MEDICINE

## 2021-05-27 PROCEDURE — 3079F PR MOST RECENT DIASTOLIC BLOOD PRESSURE 80-89 MM HG: ICD-10-PCS | Mod: CPTII,S$GLB,, | Performed by: INTERNAL MEDICINE

## 2021-05-27 PROCEDURE — 99999 PR PBB SHADOW E&M-EST. PATIENT-LVL IV: ICD-10-PCS | Mod: PBBFAC,,, | Performed by: INTERNAL MEDICINE

## 2021-06-01 ENCOUNTER — PATIENT MESSAGE (OUTPATIENT)
Dept: FAMILY MEDICINE | Facility: CLINIC | Age: 61
End: 2021-06-01

## 2021-06-03 ENCOUNTER — LAB VISIT (OUTPATIENT)
Dept: LAB | Facility: HOSPITAL | Age: 61
End: 2021-06-03
Attending: FAMILY MEDICINE
Payer: COMMERCIAL

## 2021-06-03 ENCOUNTER — PATIENT MESSAGE (OUTPATIENT)
Dept: CARDIOLOGY | Facility: CLINIC | Age: 61
End: 2021-06-03

## 2021-06-03 DIAGNOSIS — E11.22 TYPE 2 DIABETES MELLITUS WITH STAGE 3A CHRONIC KIDNEY DISEASE, WITHOUT LONG-TERM CURRENT USE OF INSULIN: ICD-10-CM

## 2021-06-03 DIAGNOSIS — N18.31 TYPE 2 DIABETES MELLITUS WITH STAGE 3A CHRONIC KIDNEY DISEASE, WITHOUT LONG-TERM CURRENT USE OF INSULIN: ICD-10-CM

## 2021-06-03 LAB
ANION GAP SERPL CALC-SCNC: 11 MMOL/L (ref 8–16)
BUN SERPL-MCNC: 21 MG/DL (ref 8–23)
CALCIUM SERPL-MCNC: 9.8 MG/DL (ref 8.7–10.5)
CHLORIDE SERPL-SCNC: 99 MMOL/L (ref 95–110)
CO2 SERPL-SCNC: 28 MMOL/L (ref 23–29)
CREAT SERPL-MCNC: 1.4 MG/DL (ref 0.5–1.4)
EST. GFR  (AFRICAN AMERICAN): >60 ML/MIN/1.73 M^2
EST. GFR  (NON AFRICAN AMERICAN): 53.8 ML/MIN/1.73 M^2
GLUCOSE SERPL-MCNC: 152 MG/DL (ref 70–110)
POTASSIUM SERPL-SCNC: 3.9 MMOL/L (ref 3.5–5.1)
SODIUM SERPL-SCNC: 138 MMOL/L (ref 136–145)

## 2021-06-03 PROCEDURE — 80048 BASIC METABOLIC PNL TOTAL CA: CPT | Performed by: FAMILY MEDICINE

## 2021-06-03 PROCEDURE — 36415 COLL VENOUS BLD VENIPUNCTURE: CPT | Mod: PO | Performed by: FAMILY MEDICINE

## 2021-06-21 ENCOUNTER — OFFICE VISIT (OUTPATIENT)
Dept: OPTOMETRY | Facility: CLINIC | Age: 61
End: 2021-06-21
Payer: COMMERCIAL

## 2021-06-21 DIAGNOSIS — H25.13 NUCLEAR SCLEROSIS, BILATERAL: ICD-10-CM

## 2021-06-21 DIAGNOSIS — Z00.00 VISIT FOR WELL MAN HEALTH CHECK: ICD-10-CM

## 2021-06-21 DIAGNOSIS — N18.31 TYPE 2 DIABETES MELLITUS WITH STAGE 3A CHRONIC KIDNEY DISEASE, WITHOUT LONG-TERM CURRENT USE OF INSULIN: ICD-10-CM

## 2021-06-21 DIAGNOSIS — E11.22 TYPE 2 DIABETES MELLITUS WITH STAGE 3A CHRONIC KIDNEY DISEASE, WITHOUT LONG-TERM CURRENT USE OF INSULIN: ICD-10-CM

## 2021-06-21 DIAGNOSIS — H52.4 PRESBYOPIA: ICD-10-CM

## 2021-06-21 DIAGNOSIS — E11.9 TYPE 2 DIABETES MELLITUS WITHOUT RETINOPATHY: Primary | ICD-10-CM

## 2021-06-21 PROCEDURE — 99999 PR PBB SHADOW E&M-EST. PATIENT-LVL III: ICD-10-PCS | Mod: PBBFAC,,, | Performed by: OPTOMETRIST

## 2021-06-21 PROCEDURE — 2023F PR DILATED RETINAL EXAM W/O EVID OF RETINOPATHY: ICD-10-PCS | Mod: S$GLB,,, | Performed by: OPTOMETRIST

## 2021-06-21 PROCEDURE — 1126F PR PAIN SEVERITY QUANTIFIED, NO PAIN PRESENT: ICD-10-PCS | Mod: S$GLB,,, | Performed by: OPTOMETRIST

## 2021-06-21 PROCEDURE — 2023F DILAT RTA XM W/O RTNOPTHY: CPT | Mod: S$GLB,,, | Performed by: OPTOMETRIST

## 2021-06-21 PROCEDURE — 92014 PR EYE EXAM, EST PATIENT,COMPREHESV: ICD-10-PCS | Mod: S$GLB,,, | Performed by: OPTOMETRIST

## 2021-06-21 PROCEDURE — 3051F PR MOST RECENT HEMOGLOBIN A1C LEVEL 7.0 - < 8.0%: ICD-10-PCS | Mod: CPTII,S$GLB,, | Performed by: OPTOMETRIST

## 2021-06-21 PROCEDURE — 92014 COMPRE OPH EXAM EST PT 1/>: CPT | Mod: S$GLB,,, | Performed by: OPTOMETRIST

## 2021-06-21 PROCEDURE — 1126F AMNT PAIN NOTED NONE PRSNT: CPT | Mod: S$GLB,,, | Performed by: OPTOMETRIST

## 2021-06-21 PROCEDURE — 3051F HG A1C>EQUAL 7.0%<8.0%: CPT | Mod: CPTII,S$GLB,, | Performed by: OPTOMETRIST

## 2021-06-21 PROCEDURE — 99999 PR PBB SHADOW E&M-EST. PATIENT-LVL III: CPT | Mod: PBBFAC,,, | Performed by: OPTOMETRIST

## 2021-06-21 RX ORDER — AMLODIPINE BESYLATE 5 MG/1
5 TABLET ORAL DAILY
COMMUNITY
Start: 2021-06-05 | End: 2021-10-05 | Stop reason: SDUPTHER

## 2021-07-19 DIAGNOSIS — N18.31 TYPE 2 DIABETES MELLITUS WITH STAGE 3A CHRONIC KIDNEY DISEASE, WITHOUT LONG-TERM CURRENT USE OF INSULIN: ICD-10-CM

## 2021-07-19 DIAGNOSIS — E11.22 TYPE 2 DIABETES MELLITUS WITH STAGE 3A CHRONIC KIDNEY DISEASE, WITHOUT LONG-TERM CURRENT USE OF INSULIN: ICD-10-CM

## 2021-07-19 RX ORDER — METFORMIN HYDROCHLORIDE 500 MG/1
TABLET, EXTENDED RELEASE ORAL
Qty: 180 TABLET | Refills: 0 | Status: SHIPPED | OUTPATIENT
Start: 2021-07-19 | End: 2021-10-05 | Stop reason: SDUPTHER

## 2021-07-27 DIAGNOSIS — E78.5 DYSLIPIDEMIA: Chronic | ICD-10-CM

## 2021-07-27 DIAGNOSIS — I10 ESSENTIAL HYPERTENSION: ICD-10-CM

## 2021-07-27 RX ORDER — ROSUVASTATIN CALCIUM 40 MG/1
40 TABLET, COATED ORAL NIGHTLY
Qty: 90 TABLET | Refills: 1 | Status: SHIPPED | OUTPATIENT
Start: 2021-07-27 | End: 2021-10-05 | Stop reason: SDUPTHER

## 2021-07-27 RX ORDER — CANDESARTAN 32 MG/1
32 TABLET ORAL DAILY
Qty: 90 TABLET | Refills: 0 | Status: SHIPPED | OUTPATIENT
Start: 2021-07-27 | End: 2021-10-04

## 2021-09-13 ENCOUNTER — PATIENT MESSAGE (OUTPATIENT)
Dept: FAMILY MEDICINE | Facility: CLINIC | Age: 61
End: 2021-09-13

## 2021-10-01 ENCOUNTER — PATIENT MESSAGE (OUTPATIENT)
Dept: ADMINISTRATIVE | Facility: OTHER | Age: 61
End: 2021-10-01

## 2021-10-01 ENCOUNTER — LAB VISIT (OUTPATIENT)
Dept: LAB | Facility: HOSPITAL | Age: 61
End: 2021-10-01
Attending: FAMILY MEDICINE
Payer: COMMERCIAL

## 2021-10-01 DIAGNOSIS — Z52.008 BLOOD DONOR: ICD-10-CM

## 2021-10-01 DIAGNOSIS — N18.31 TYPE 2 DIABETES MELLITUS WITH STAGE 3A CHRONIC KIDNEY DISEASE, WITHOUT LONG-TERM CURRENT USE OF INSULIN: ICD-10-CM

## 2021-10-01 DIAGNOSIS — E11.22 TYPE 2 DIABETES MELLITUS WITH STAGE 3A CHRONIC KIDNEY DISEASE, WITHOUT LONG-TERM CURRENT USE OF INSULIN: ICD-10-CM

## 2021-10-01 DIAGNOSIS — I10 ESSENTIAL HYPERTENSION: ICD-10-CM

## 2021-10-01 DIAGNOSIS — N18.31 STAGE 3A CHRONIC KIDNEY DISEASE: ICD-10-CM

## 2021-10-01 LAB
ALBUMIN SERPL BCP-MCNC: 4.1 G/DL (ref 3.5–5.2)
ALP SERPL-CCNC: 60 U/L (ref 55–135)
ALT SERPL W/O P-5'-P-CCNC: 49 U/L (ref 10–44)
ANION GAP SERPL CALC-SCNC: 12 MMOL/L (ref 8–16)
AST SERPL-CCNC: 35 U/L (ref 10–40)
BASOPHILS # BLD AUTO: 0.05 K/UL (ref 0–0.2)
BASOPHILS NFR BLD: 0.6 % (ref 0–1.9)
BILIRUB SERPL-MCNC: 0.6 MG/DL (ref 0.1–1)
BUN SERPL-MCNC: 16 MG/DL (ref 8–23)
CALCIUM SERPL-MCNC: 10.1 MG/DL (ref 8.7–10.5)
CHLORIDE SERPL-SCNC: 97 MMOL/L (ref 95–110)
CO2 SERPL-SCNC: 28 MMOL/L (ref 23–29)
CREAT SERPL-MCNC: 1.3 MG/DL (ref 0.5–1.4)
DIFFERENTIAL METHOD: ABNORMAL
EOSINOPHIL # BLD AUTO: 0.4 K/UL (ref 0–0.5)
EOSINOPHIL NFR BLD: 4.8 % (ref 0–8)
ERYTHROCYTE [DISTWIDTH] IN BLOOD BY AUTOMATED COUNT: 15.9 % (ref 11.5–14.5)
EST. GFR  (AFRICAN AMERICAN): >60 ML/MIN/1.73 M^2
EST. GFR  (NON AFRICAN AMERICAN): 58.9 ML/MIN/1.73 M^2
ESTIMATED AVG GLUCOSE: 169 MG/DL (ref 68–131)
FERRITIN SERPL-MCNC: 81 NG/ML (ref 20–300)
GLUCOSE SERPL-MCNC: 148 MG/DL (ref 70–110)
HBA1C MFR BLD: 7.5 % (ref 4–5.6)
HCT VFR BLD AUTO: 50.5 % (ref 40–54)
HGB BLD-MCNC: 16.8 G/DL (ref 14–18)
IMM GRANULOCYTES # BLD AUTO: 0.02 K/UL (ref 0–0.04)
IMM GRANULOCYTES NFR BLD AUTO: 0.2 % (ref 0–0.5)
IRON SERPL-MCNC: 103 UG/DL (ref 45–160)
LYMPHOCYTES # BLD AUTO: 2 K/UL (ref 1–4.8)
LYMPHOCYTES NFR BLD: 24.9 % (ref 18–48)
MCH RBC QN AUTO: 31.2 PG (ref 27–31)
MCHC RBC AUTO-ENTMCNC: 33.3 G/DL (ref 32–36)
MCV RBC AUTO: 94 FL (ref 82–98)
MONOCYTES # BLD AUTO: 1 K/UL (ref 0.3–1)
MONOCYTES NFR BLD: 12 % (ref 4–15)
NEUTROPHILS # BLD AUTO: 4.7 K/UL (ref 1.8–7.7)
NEUTROPHILS NFR BLD: 57.5 % (ref 38–73)
NRBC BLD-RTO: 0 /100 WBC
PLATELET # BLD AUTO: 252 K/UL (ref 150–450)
PMV BLD AUTO: 9.4 FL (ref 9.2–12.9)
POTASSIUM SERPL-SCNC: 4.2 MMOL/L (ref 3.5–5.1)
PROT SERPL-MCNC: 7.2 G/DL (ref 6–8.4)
RBC # BLD AUTO: 5.38 M/UL (ref 4.6–6.2)
SATURATED IRON: 26 % (ref 20–50)
SODIUM SERPL-SCNC: 137 MMOL/L (ref 136–145)
TOTAL IRON BINDING CAPACITY: 400 UG/DL (ref 250–450)
TRANSFERRIN SERPL-MCNC: 270 MG/DL (ref 200–375)
WBC # BLD AUTO: 8.1 K/UL (ref 3.9–12.7)

## 2021-10-01 PROCEDURE — 85025 COMPLETE CBC W/AUTO DIFF WBC: CPT | Performed by: FAMILY MEDICINE

## 2021-10-01 PROCEDURE — 36415 COLL VENOUS BLD VENIPUNCTURE: CPT | Mod: PO | Performed by: FAMILY MEDICINE

## 2021-10-01 PROCEDURE — 83036 HEMOGLOBIN GLYCOSYLATED A1C: CPT | Performed by: FAMILY MEDICINE

## 2021-10-01 PROCEDURE — 82728 ASSAY OF FERRITIN: CPT | Performed by: FAMILY MEDICINE

## 2021-10-01 PROCEDURE — 80053 COMPREHEN METABOLIC PANEL: CPT | Performed by: FAMILY MEDICINE

## 2021-10-01 PROCEDURE — 84466 ASSAY OF TRANSFERRIN: CPT | Performed by: FAMILY MEDICINE

## 2021-10-05 ENCOUNTER — OFFICE VISIT (OUTPATIENT)
Dept: FAMILY MEDICINE | Facility: CLINIC | Age: 61
End: 2021-10-05
Payer: COMMERCIAL

## 2021-10-05 ENCOUNTER — PATIENT MESSAGE (OUTPATIENT)
Dept: FAMILY MEDICINE | Facility: CLINIC | Age: 61
End: 2021-10-05

## 2021-10-05 VITALS
WEIGHT: 195.31 LBS | BODY MASS INDEX: 27.96 KG/M2 | SYSTOLIC BLOOD PRESSURE: 128 MMHG | DIASTOLIC BLOOD PRESSURE: 74 MMHG | HEART RATE: 57 BPM | OXYGEN SATURATION: 96 % | HEIGHT: 70 IN

## 2021-10-05 DIAGNOSIS — I10 ESSENTIAL HYPERTENSION: ICD-10-CM

## 2021-10-05 DIAGNOSIS — E55.9 VITAMIN D DEFICIENCY: ICD-10-CM

## 2021-10-05 DIAGNOSIS — N18.31 STAGE 3A CHRONIC KIDNEY DISEASE: ICD-10-CM

## 2021-10-05 DIAGNOSIS — E11.22 TYPE 2 DIABETES MELLITUS WITH STAGE 3A CHRONIC KIDNEY DISEASE, WITHOUT LONG-TERM CURRENT USE OF INSULIN: ICD-10-CM

## 2021-10-05 DIAGNOSIS — N18.31 TYPE 2 DIABETES MELLITUS WITH STAGE 3A CHRONIC KIDNEY DISEASE, WITHOUT LONG-TERM CURRENT USE OF INSULIN: ICD-10-CM

## 2021-10-05 DIAGNOSIS — E78.5 DYSLIPIDEMIA: Chronic | ICD-10-CM

## 2021-10-05 PROBLEM — D50.9 IRON DEFICIENCY ANEMIA: Status: RESOLVED | Noted: 2020-10-07 | Resolved: 2021-10-05

## 2021-10-05 PROCEDURE — 3061F PR NEG MICROALBUMINURIA RESULT DOCUMENTED/REVIEW: ICD-10-PCS | Mod: CPTII,S$GLB,, | Performed by: FAMILY MEDICINE

## 2021-10-05 PROCEDURE — 99999 PR PBB SHADOW E&M-EST. PATIENT-LVL IV: CPT | Mod: PBBFAC,,, | Performed by: FAMILY MEDICINE

## 2021-10-05 PROCEDURE — 3078F DIAST BP <80 MM HG: CPT | Mod: CPTII,S$GLB,, | Performed by: FAMILY MEDICINE

## 2021-10-05 PROCEDURE — 3066F NEPHROPATHY DOC TX: CPT | Mod: CPTII,S$GLB,, | Performed by: FAMILY MEDICINE

## 2021-10-05 PROCEDURE — 1159F MED LIST DOCD IN RCRD: CPT | Mod: CPTII,S$GLB,, | Performed by: FAMILY MEDICINE

## 2021-10-05 PROCEDURE — 3066F PR DOCUMENTATION OF TREATMENT FOR NEPHROPATHY: ICD-10-PCS | Mod: CPTII,S$GLB,, | Performed by: FAMILY MEDICINE

## 2021-10-05 PROCEDURE — 3078F PR MOST RECENT DIASTOLIC BLOOD PRESSURE < 80 MM HG: ICD-10-PCS | Mod: CPTII,S$GLB,, | Performed by: FAMILY MEDICINE

## 2021-10-05 PROCEDURE — 4010F PR ACE/ARB THEARPY RXD/TAKEN: ICD-10-PCS | Mod: CPTII,S$GLB,, | Performed by: FAMILY MEDICINE

## 2021-10-05 PROCEDURE — 3061F NEG MICROALBUMINURIA REV: CPT | Mod: CPTII,S$GLB,, | Performed by: FAMILY MEDICINE

## 2021-10-05 PROCEDURE — 3008F BODY MASS INDEX DOCD: CPT | Mod: CPTII,S$GLB,, | Performed by: FAMILY MEDICINE

## 2021-10-05 PROCEDURE — 3074F SYST BP LT 130 MM HG: CPT | Mod: CPTII,S$GLB,, | Performed by: FAMILY MEDICINE

## 2021-10-05 PROCEDURE — 3051F PR MOST RECENT HEMOGLOBIN A1C LEVEL 7.0 - < 8.0%: ICD-10-PCS | Mod: CPTII,S$GLB,, | Performed by: FAMILY MEDICINE

## 2021-10-05 PROCEDURE — 3074F PR MOST RECENT SYSTOLIC BLOOD PRESSURE < 130 MM HG: ICD-10-PCS | Mod: CPTII,S$GLB,, | Performed by: FAMILY MEDICINE

## 2021-10-05 PROCEDURE — 1160F RVW MEDS BY RX/DR IN RCRD: CPT | Mod: CPTII,S$GLB,, | Performed by: FAMILY MEDICINE

## 2021-10-05 PROCEDURE — 1160F PR REVIEW ALL MEDS BY PRESCRIBER/CLIN PHARMACIST DOCUMENTED: ICD-10-PCS | Mod: CPTII,S$GLB,, | Performed by: FAMILY MEDICINE

## 2021-10-05 PROCEDURE — 99214 OFFICE O/P EST MOD 30 MIN: CPT | Mod: S$GLB,,, | Performed by: FAMILY MEDICINE

## 2021-10-05 PROCEDURE — 1159F PR MEDICATION LIST DOCUMENTED IN MEDICAL RECORD: ICD-10-PCS | Mod: CPTII,S$GLB,, | Performed by: FAMILY MEDICINE

## 2021-10-05 PROCEDURE — 3008F PR BODY MASS INDEX (BMI) DOCUMENTED: ICD-10-PCS | Mod: CPTII,S$GLB,, | Performed by: FAMILY MEDICINE

## 2021-10-05 PROCEDURE — 3051F HG A1C>EQUAL 7.0%<8.0%: CPT | Mod: CPTII,S$GLB,, | Performed by: FAMILY MEDICINE

## 2021-10-05 PROCEDURE — 99999 PR PBB SHADOW E&M-EST. PATIENT-LVL IV: ICD-10-PCS | Mod: PBBFAC,,, | Performed by: FAMILY MEDICINE

## 2021-10-05 PROCEDURE — 4010F ACE/ARB THERAPY RXD/TAKEN: CPT | Mod: CPTII,S$GLB,, | Performed by: FAMILY MEDICINE

## 2021-10-05 PROCEDURE — 3072F PR LOW RISK FOR RETINOPATHY: ICD-10-PCS | Mod: CPTII,S$GLB,, | Performed by: FAMILY MEDICINE

## 2021-10-05 PROCEDURE — 3072F LOW RISK FOR RETINOPATHY: CPT | Mod: CPTII,S$GLB,, | Performed by: FAMILY MEDICINE

## 2021-10-05 PROCEDURE — 99214 PR OFFICE/OUTPT VISIT, EST, LEVL IV, 30-39 MIN: ICD-10-PCS | Mod: S$GLB,,, | Performed by: FAMILY MEDICINE

## 2021-10-05 RX ORDER — CANDESARTAN 32 MG/1
32 TABLET ORAL DAILY
Qty: 90 TABLET | Refills: 1 | Status: SHIPPED | OUTPATIENT
Start: 2021-10-05 | End: 2022-03-10 | Stop reason: SDUPTHER

## 2021-10-05 RX ORDER — ERGOCALCIFEROL 1.25 MG/1
CAPSULE ORAL
Qty: 12 CAPSULE | Refills: 0 | Status: SHIPPED | OUTPATIENT
Start: 2021-10-05 | End: 2022-02-23

## 2021-10-05 RX ORDER — ROSUVASTATIN CALCIUM 40 MG/1
40 TABLET, COATED ORAL NIGHTLY
Qty: 90 TABLET | Refills: 1 | Status: SHIPPED | OUTPATIENT
Start: 2021-10-05 | End: 2022-03-10 | Stop reason: SDUPTHER

## 2021-10-05 RX ORDER — DIAZEPAM 10 MG/1
TABLET ORAL
COMMUNITY
Start: 2021-06-24 | End: 2021-10-05

## 2021-10-05 RX ORDER — METOPROLOL SUCCINATE 100 MG/1
100 TABLET, EXTENDED RELEASE ORAL DAILY
Qty: 90 TABLET | Refills: 1 | Status: SHIPPED | OUTPATIENT
Start: 2021-10-05 | End: 2022-03-10 | Stop reason: SDUPTHER

## 2021-10-05 RX ORDER — METFORMIN HYDROCHLORIDE 500 MG/1
TABLET, EXTENDED RELEASE ORAL
Qty: 270 TABLET | Refills: 0 | Status: SHIPPED | OUTPATIENT
Start: 2021-10-05 | End: 2021-11-03 | Stop reason: SDUPTHER

## 2021-10-05 RX ORDER — AMLODIPINE BESYLATE 5 MG/1
5 TABLET ORAL DAILY
Qty: 90 TABLET | Refills: 1 | Status: SHIPPED | OUTPATIENT
Start: 2021-10-05 | End: 2021-10-08

## 2021-10-05 RX ORDER — CHLORTHALIDONE 25 MG/1
25 TABLET ORAL DAILY
Qty: 90 TABLET | Refills: 1 | Status: SHIPPED | OUTPATIENT
Start: 2021-10-05 | End: 2022-03-10 | Stop reason: SDUPTHER

## 2021-10-07 ENCOUNTER — PATIENT MESSAGE (OUTPATIENT)
Dept: FAMILY MEDICINE | Facility: CLINIC | Age: 61
End: 2021-10-07

## 2021-10-08 ENCOUNTER — PATIENT MESSAGE (OUTPATIENT)
Dept: FAMILY MEDICINE | Facility: CLINIC | Age: 61
End: 2021-10-08

## 2021-10-11 ENCOUNTER — PATIENT OUTREACH (OUTPATIENT)
Dept: ADMINISTRATIVE | Facility: HOSPITAL | Age: 61
End: 2021-10-11

## 2021-11-03 ENCOUNTER — PATIENT MESSAGE (OUTPATIENT)
Dept: FAMILY MEDICINE | Facility: CLINIC | Age: 61
End: 2021-11-03
Payer: COMMERCIAL

## 2021-11-03 DIAGNOSIS — N18.31 TYPE 2 DIABETES MELLITUS WITH STAGE 3A CHRONIC KIDNEY DISEASE, WITHOUT LONG-TERM CURRENT USE OF INSULIN: ICD-10-CM

## 2021-11-03 DIAGNOSIS — E11.22 TYPE 2 DIABETES MELLITUS WITH STAGE 3A CHRONIC KIDNEY DISEASE, WITHOUT LONG-TERM CURRENT USE OF INSULIN: ICD-10-CM

## 2021-11-03 RX ORDER — METFORMIN HYDROCHLORIDE 500 MG/1
TABLET, EXTENDED RELEASE ORAL
Qty: 270 TABLET | Refills: 0 | Status: SHIPPED | OUTPATIENT
Start: 2021-11-03 | End: 2022-03-10 | Stop reason: SDUPTHER

## 2021-12-08 NOTE — PROGRESS NOTES
Reviewed chart and available patient entered data.     HTN - Patient is currently at goal. BP alert received for 12/8 reading of 186/83, likely a mistake. Patient has not been answering care team calls, but is taking readings. Will remove noncompliance at this time.      Last 5 Patient Entered Readings                Current 30 Day Average: 123/81  Recent Readings 12/8/2021 12/8/2021 12/7/2021 12/6/2021 12/3/2021   SBP (mmHg) 126 186 119 109 118   DBP (mmHg) 82 83 82 76 75   Pulse 54 54 57 58 56               Hypertension Medications             candesartan (ATACAND) 32 MG tablet Take 1 tablet (32 mg total) by mouth once daily.    chlorthalidone (HYGROTEN) 25 MG Tab Take 1 tablet (25 mg total) by mouth once daily.    metoprolol succinate (TOPROL-XL) 100 MG 24 hr tablet Take 1 tablet (100 mg total) by mouth once daily.          Diabetes - Patient is currently not at goal. A1C likely lower, new labs due in Jan 2022. No changes recommended at this time.     Last 6 Patient Entered Readings                                          Most Recent A1c: 7.5% on 10/1/2021  (Goal: 7%)     Recent Readings 12/8/2021 12/7/2021 12/6/2021 12/3/2021 12/2/2021    Blood Glucose (mg/dL) 90 82 98 99 87           Diabetes Medications             metFORMIN (GLUCOPHAGE-XR) 500 MG ER 24hr tablet Take 1 tablet (500 mg total) by mouth daily with breakfast AND 2 tablets (1,000 mg total) daily with dinner or evening meal.

## 2021-12-21 ENCOUNTER — PATIENT MESSAGE (OUTPATIENT)
Dept: FAMILY MEDICINE | Facility: CLINIC | Age: 61
End: 2021-12-21
Payer: COMMERCIAL

## 2021-12-21 DIAGNOSIS — M79.89 RIGHT LEG SWELLING: Primary | ICD-10-CM

## 2021-12-22 ENCOUNTER — HOSPITAL ENCOUNTER (OUTPATIENT)
Dept: RADIOLOGY | Facility: HOSPITAL | Age: 61
Discharge: HOME | End: 2021-12-22
Attending: FAMILY MEDICINE
Payer: COMMERCIAL

## 2021-12-22 DIAGNOSIS — M79.89 RIGHT LEG SWELLING: ICD-10-CM

## 2021-12-22 PROCEDURE — 93971 US LOWER EXTREMITY VEINS RIGHT: ICD-10-PCS | Mod: 26,RT,, | Performed by: STUDENT IN AN ORGANIZED HEALTH CARE EDUCATION/TRAINING PROGRAM

## 2021-12-22 PROCEDURE — 93971 EXTREMITY STUDY: CPT | Mod: 26,RT,, | Performed by: STUDENT IN AN ORGANIZED HEALTH CARE EDUCATION/TRAINING PROGRAM

## 2021-12-22 PROCEDURE — 93971 EXTREMITY STUDY: CPT | Mod: TC,RT

## 2022-01-28 ENCOUNTER — LAB VISIT (OUTPATIENT)
Dept: LAB | Facility: HOSPITAL | Age: 62
End: 2022-01-28
Attending: FAMILY MEDICINE
Payer: COMMERCIAL

## 2022-01-28 DIAGNOSIS — N18.31 TYPE 2 DIABETES MELLITUS WITH STAGE 3A CHRONIC KIDNEY DISEASE, WITHOUT LONG-TERM CURRENT USE OF INSULIN: ICD-10-CM

## 2022-01-28 DIAGNOSIS — E11.22 TYPE 2 DIABETES MELLITUS WITH STAGE 3A CHRONIC KIDNEY DISEASE, WITHOUT LONG-TERM CURRENT USE OF INSULIN: ICD-10-CM

## 2022-01-28 DIAGNOSIS — N18.31 STAGE 3A CHRONIC KIDNEY DISEASE: ICD-10-CM

## 2022-01-28 DIAGNOSIS — E78.5 DYSLIPIDEMIA: Chronic | ICD-10-CM

## 2022-01-28 DIAGNOSIS — I10 ESSENTIAL HYPERTENSION: ICD-10-CM

## 2022-01-28 LAB
ALBUMIN SERPL BCP-MCNC: 4.1 G/DL (ref 3.5–5.2)
ALP SERPL-CCNC: 57 U/L (ref 55–135)
ALT SERPL W/O P-5'-P-CCNC: 49 U/L (ref 10–44)
ANION GAP SERPL CALC-SCNC: 8 MMOL/L (ref 8–16)
AST SERPL-CCNC: 32 U/L (ref 10–40)
BASOPHILS # BLD AUTO: 0.05 K/UL (ref 0–0.2)
BASOPHILS NFR BLD: 0.5 % (ref 0–1.9)
BILIRUB SERPL-MCNC: 0.4 MG/DL (ref 0.1–1)
BUN SERPL-MCNC: 17 MG/DL (ref 8–23)
CALCIUM SERPL-MCNC: 9.7 MG/DL (ref 8.7–10.5)
CHLORIDE SERPL-SCNC: 95 MMOL/L (ref 95–110)
CO2 SERPL-SCNC: 29 MMOL/L (ref 23–29)
CREAT SERPL-MCNC: 1.6 MG/DL (ref 0.5–1.4)
DIFFERENTIAL METHOD: ABNORMAL
EOSINOPHIL # BLD AUTO: 0.4 K/UL (ref 0–0.5)
EOSINOPHIL NFR BLD: 4.4 % (ref 0–8)
ERYTHROCYTE [DISTWIDTH] IN BLOOD BY AUTOMATED COUNT: 14.6 % (ref 11.5–14.5)
EST. GFR  (AFRICAN AMERICAN): 53 ML/MIN/1.73 M^2
EST. GFR  (NON AFRICAN AMERICAN): 45.8 ML/MIN/1.73 M^2
ESTIMATED AVG GLUCOSE: 180 MG/DL (ref 68–131)
GLUCOSE SERPL-MCNC: 248 MG/DL (ref 70–110)
HBA1C MFR BLD: 7.9 % (ref 4–5.6)
HCT VFR BLD AUTO: 51.4 % (ref 40–54)
HGB BLD-MCNC: 17.1 G/DL (ref 14–18)
IMM GRANULOCYTES # BLD AUTO: 0.02 K/UL (ref 0–0.04)
IMM GRANULOCYTES NFR BLD AUTO: 0.2 % (ref 0–0.5)
LYMPHOCYTES # BLD AUTO: 2.6 K/UL (ref 1–4.8)
LYMPHOCYTES NFR BLD: 27.8 % (ref 18–48)
MCH RBC QN AUTO: 31.2 PG (ref 27–31)
MCHC RBC AUTO-ENTMCNC: 33.3 G/DL (ref 32–36)
MCV RBC AUTO: 94 FL (ref 82–98)
MONOCYTES # BLD AUTO: 0.8 K/UL (ref 0.3–1)
MONOCYTES NFR BLD: 8.5 % (ref 4–15)
NEUTROPHILS # BLD AUTO: 5.5 K/UL (ref 1.8–7.7)
NEUTROPHILS NFR BLD: 58.6 % (ref 38–73)
NRBC BLD-RTO: 0 /100 WBC
PLATELET # BLD AUTO: 258 K/UL (ref 150–450)
PMV BLD AUTO: 9.6 FL (ref 9.2–12.9)
POTASSIUM SERPL-SCNC: 3.8 MMOL/L (ref 3.5–5.1)
PROT SERPL-MCNC: 7.3 G/DL (ref 6–8.4)
RBC # BLD AUTO: 5.48 M/UL (ref 4.6–6.2)
SODIUM SERPL-SCNC: 132 MMOL/L (ref 136–145)
WBC # BLD AUTO: 9.38 K/UL (ref 3.9–12.7)

## 2022-01-28 PROCEDURE — 80053 COMPREHEN METABOLIC PANEL: CPT | Performed by: FAMILY MEDICINE

## 2022-01-28 PROCEDURE — 83036 HEMOGLOBIN GLYCOSYLATED A1C: CPT | Performed by: FAMILY MEDICINE

## 2022-01-28 PROCEDURE — 36415 COLL VENOUS BLD VENIPUNCTURE: CPT | Mod: PO | Performed by: FAMILY MEDICINE

## 2022-01-28 PROCEDURE — 85025 COMPLETE CBC W/AUTO DIFF WBC: CPT | Performed by: FAMILY MEDICINE

## 2022-02-21 ENCOUNTER — PATIENT MESSAGE (OUTPATIENT)
Dept: OTHER | Facility: OTHER | Age: 62
End: 2022-02-21
Payer: COMMERCIAL

## 2022-03-10 ENCOUNTER — OFFICE VISIT (OUTPATIENT)
Dept: FAMILY MEDICINE | Facility: CLINIC | Age: 62
End: 2022-03-10
Payer: COMMERCIAL

## 2022-03-10 ENCOUNTER — PATIENT MESSAGE (OUTPATIENT)
Dept: FAMILY MEDICINE | Facility: CLINIC | Age: 62
End: 2022-03-10

## 2022-03-10 VITALS
OXYGEN SATURATION: 95 % | TEMPERATURE: 98 F | BODY MASS INDEX: 27.87 KG/M2 | HEIGHT: 70 IN | WEIGHT: 194.69 LBS | DIASTOLIC BLOOD PRESSURE: 70 MMHG | SYSTOLIC BLOOD PRESSURE: 130 MMHG | HEART RATE: 67 BPM

## 2022-03-10 DIAGNOSIS — E78.5 DYSLIPIDEMIA: Chronic | ICD-10-CM

## 2022-03-10 DIAGNOSIS — M54.12 CERVICAL RADICULOPATHY: ICD-10-CM

## 2022-03-10 DIAGNOSIS — N18.31 STAGE 3A CHRONIC KIDNEY DISEASE: ICD-10-CM

## 2022-03-10 DIAGNOSIS — Z00.00 VISIT FOR WELL MAN HEALTH CHECK: ICD-10-CM

## 2022-03-10 DIAGNOSIS — R06.83 SNORING: ICD-10-CM

## 2022-03-10 DIAGNOSIS — E11.22 TYPE 2 DIABETES MELLITUS WITH STAGE 3A CHRONIC KIDNEY DISEASE, WITHOUT LONG-TERM CURRENT USE OF INSULIN: ICD-10-CM

## 2022-03-10 DIAGNOSIS — I10 ESSENTIAL HYPERTENSION: ICD-10-CM

## 2022-03-10 DIAGNOSIS — N18.31 TYPE 2 DIABETES MELLITUS WITH STAGE 3A CHRONIC KIDNEY DISEASE, WITHOUT LONG-TERM CURRENT USE OF INSULIN: ICD-10-CM

## 2022-03-10 DIAGNOSIS — Z12.5 SCREENING PSA (PROSTATE SPECIFIC ANTIGEN): ICD-10-CM

## 2022-03-10 PROBLEM — E29.1 HYPOGONADISM IN MALE: Status: ACTIVE | Noted: 2022-02-24

## 2022-03-10 PROCEDURE — 3078F DIAST BP <80 MM HG: CPT | Mod: CPTII,S$GLB,, | Performed by: FAMILY MEDICINE

## 2022-03-10 PROCEDURE — 3075F PR MOST RECENT SYSTOLIC BLOOD PRESS GE 130-139MM HG: ICD-10-PCS | Mod: CPTII,S$GLB,, | Performed by: FAMILY MEDICINE

## 2022-03-10 PROCEDURE — 3072F PR LOW RISK FOR RETINOPATHY: ICD-10-PCS | Mod: CPTII,S$GLB,, | Performed by: FAMILY MEDICINE

## 2022-03-10 PROCEDURE — 3075F SYST BP GE 130 - 139MM HG: CPT | Mod: CPTII,S$GLB,, | Performed by: FAMILY MEDICINE

## 2022-03-10 PROCEDURE — 99999 PR PBB SHADOW E&M-EST. PATIENT-LVL V: CPT | Mod: PBBFAC,,, | Performed by: FAMILY MEDICINE

## 2022-03-10 PROCEDURE — 4010F PR ACE/ARB THEARPY RXD/TAKEN: ICD-10-PCS | Mod: CPTII,S$GLB,, | Performed by: FAMILY MEDICINE

## 2022-03-10 PROCEDURE — 3072F LOW RISK FOR RETINOPATHY: CPT | Mod: CPTII,S$GLB,, | Performed by: FAMILY MEDICINE

## 2022-03-10 PROCEDURE — 99214 PR OFFICE/OUTPT VISIT, EST, LEVL IV, 30-39 MIN: ICD-10-PCS | Mod: S$GLB,,, | Performed by: FAMILY MEDICINE

## 2022-03-10 PROCEDURE — 3078F PR MOST RECENT DIASTOLIC BLOOD PRESSURE < 80 MM HG: ICD-10-PCS | Mod: CPTII,S$GLB,, | Performed by: FAMILY MEDICINE

## 2022-03-10 PROCEDURE — 99214 OFFICE O/P EST MOD 30 MIN: CPT | Mod: S$GLB,,, | Performed by: FAMILY MEDICINE

## 2022-03-10 PROCEDURE — 1159F MED LIST DOCD IN RCRD: CPT | Mod: CPTII,S$GLB,, | Performed by: FAMILY MEDICINE

## 2022-03-10 PROCEDURE — 1160F RVW MEDS BY RX/DR IN RCRD: CPT | Mod: CPTII,S$GLB,, | Performed by: FAMILY MEDICINE

## 2022-03-10 PROCEDURE — 3008F PR BODY MASS INDEX (BMI) DOCUMENTED: ICD-10-PCS | Mod: CPTII,S$GLB,, | Performed by: FAMILY MEDICINE

## 2022-03-10 PROCEDURE — 3051F HG A1C>EQUAL 7.0%<8.0%: CPT | Mod: CPTII,S$GLB,, | Performed by: FAMILY MEDICINE

## 2022-03-10 PROCEDURE — 1160F PR REVIEW ALL MEDS BY PRESCRIBER/CLIN PHARMACIST DOCUMENTED: ICD-10-PCS | Mod: CPTII,S$GLB,, | Performed by: FAMILY MEDICINE

## 2022-03-10 PROCEDURE — 3008F BODY MASS INDEX DOCD: CPT | Mod: CPTII,S$GLB,, | Performed by: FAMILY MEDICINE

## 2022-03-10 PROCEDURE — 1159F PR MEDICATION LIST DOCUMENTED IN MEDICAL RECORD: ICD-10-PCS | Mod: CPTII,S$GLB,, | Performed by: FAMILY MEDICINE

## 2022-03-10 PROCEDURE — 3051F PR MOST RECENT HEMOGLOBIN A1C LEVEL 7.0 - < 8.0%: ICD-10-PCS | Mod: CPTII,S$GLB,, | Performed by: FAMILY MEDICINE

## 2022-03-10 PROCEDURE — 99999 PR PBB SHADOW E&M-EST. PATIENT-LVL V: ICD-10-PCS | Mod: PBBFAC,,, | Performed by: FAMILY MEDICINE

## 2022-03-10 PROCEDURE — 4010F ACE/ARB THERAPY RXD/TAKEN: CPT | Mod: CPTII,S$GLB,, | Performed by: FAMILY MEDICINE

## 2022-03-10 RX ORDER — ROSUVASTATIN CALCIUM 40 MG/1
40 TABLET, COATED ORAL NIGHTLY
Qty: 90 TABLET | Refills: 1 | Status: SHIPPED | OUTPATIENT
Start: 2022-03-10 | End: 2022-04-28

## 2022-03-10 RX ORDER — METFORMIN HYDROCHLORIDE 500 MG/1
TABLET, EXTENDED RELEASE ORAL
Qty: 270 TABLET | Refills: 0 | Status: SHIPPED | OUTPATIENT
Start: 2022-03-10 | End: 2022-04-08

## 2022-03-10 RX ORDER — GABAPENTIN 800 MG/1
800 TABLET ORAL NIGHTLY
Qty: 90 TABLET | Refills: 1 | Status: SHIPPED | OUTPATIENT
Start: 2022-03-10 | End: 2022-03-10 | Stop reason: SDUPTHER

## 2022-03-10 RX ORDER — CANDESARTAN 32 MG/1
32 TABLET ORAL DAILY
Qty: 90 TABLET | Refills: 1 | Status: SHIPPED | OUTPATIENT
Start: 2022-03-10 | End: 2022-06-14

## 2022-03-10 RX ORDER — GABAPENTIN 800 MG/1
800 TABLET ORAL NIGHTLY
Qty: 90 TABLET | Refills: 1 | Status: SHIPPED | OUTPATIENT
Start: 2022-03-10 | End: 2022-08-15 | Stop reason: SDUPTHER

## 2022-03-10 RX ORDER — METOPROLOL SUCCINATE 100 MG/1
100 TABLET, EXTENDED RELEASE ORAL DAILY
Qty: 90 TABLET | Refills: 1 | Status: SHIPPED | OUTPATIENT
Start: 2022-03-10 | End: 2022-06-23

## 2022-03-10 RX ORDER — CHLORTHALIDONE 25 MG/1
25 TABLET ORAL DAILY
Qty: 90 TABLET | Refills: 1 | Status: SHIPPED | OUTPATIENT
Start: 2022-03-10 | End: 2022-06-27

## 2022-03-10 NOTE — PATIENT INSTRUCTIONS
Continue metformin 1500 mg daily, 3 pills, together or separately  Recent digital DM levels, appear normal  Consider trulicity??    Continue metoprolol 100 mg XR  Continue candesartan  Continue chlorthalidone  Continue amlodipine     Continue crestor     Iron levels normal, off iron. Hold off on donation until A1c at goal.     Kidney function is a little low. No NSAIDS such as ibuprofen or naproxen. Avoid Red meats. Drink a lot of water. I will continue monitoring kidney function q3-6 months.     Has tried lyrica and gabapentin. Didn't help neck and shoulder pain. Send me outside MRI and consult notes.   Start gabapentin 1/2 pill nightly x 2 days. Then increase to full pill  Consider continuing elavil at current dose or decreasing  All can cause sedation.     Weight loss. Dash diet. F/u 3 months. Labs prior.    Lab Results   Component Value Date    HGBA1C 7.9 (H) 01/28/2022    HGBA1C 7.5 (H) 10/01/2021    HGBA1C 7.3 (H) 05/05/2021       Diabetes Management Status    Statin: Taking  ACE/ARB: Taking    Screening or Prevention Patient's value Goal Complete/Controlled?   HgA1C Testing and Control   Lab Results   Component Value Date    HGBA1C 7.9 (H) 01/28/2022      Annually/Less than 8% Yes     Lipid profile : 05/05/2021 Annually Yes     LDL control Lab Results   Component Value Date    LDLCALC 54.2 (L) 05/05/2021    Annually/Less than 100 mg/dl  Yes     Nephropathy screening Lab Results   Component Value Date    LABMICR 8.0 10/01/2021     Lab Results   Component Value Date    PROTEINUA Negative 03/08/2018     Lab Results   Component Value Date    UTPCR Unable to calculate 07/07/2020      Annually Yes     Blood pressure BP Readings from Last 1 Encounters:   03/10/22 130/70    Less than 140/90 Yes     Dilated retinal exam : 06/21/2021 Annually Yes     Foot exam   : 05/11/2021 Annually Yes

## 2022-03-10 NOTE — PROGRESS NOTES
"Subjective:       Patient ID: Partha Curtis Jr. is a 62 y.o. male.    Chief Complaint: Diabetes    Jarvis is a 62 y.o. male who presents today for f/u   CKD: slightly worse.   HTN: taking metoprolol, amlodipine, chlorthalidone, and candesartan. BP stable.  DLD: on crestor.   Anemia: had been thought to be due to CKD, iron studies low. Colonoscopy was normal in 2018. However, found out that patient was donating blood every 12 weeks. No black stools, no red stools. Anemia has resolved. Iron sat and ferritin is normal, was taking iron daily. Stopped at last visit in early 2021.      DM: on metformin, 2 at night, one in the AM. Had been as low as 1 pill daily. "I really messed up around the holidays." Checked digital DM, recent sugars are better.      Low testosterone: seeing urology    Neck/back pain: seeing outside doctor. MRI ordered. Results pending.     House had damage in [place]      Weight: stable.     Review of Systems   Constitutional: Negative for chills and fever.   Respiratory: Negative for shortness of breath.    Cardiovascular: Negative for chest pain.   Gastrointestinal: Negative for diarrhea, nausea and vomiting.   Genitourinary: Negative for difficulty urinating.   Musculoskeletal: Positive for neck pain and neck stiffness.   Neurological: Negative for dizziness, light-headedness and headaches.             Results for orders placed or performed in visit on 01/28/22   CBC Auto Differential   Result Value Ref Range    WBC 9.38 3.90 - 12.70 K/uL    RBC 5.48 4.60 - 6.20 M/uL    Hemoglobin 17.1 14.0 - 18.0 g/dL    Hematocrit 51.4 40.0 - 54.0 %    MCV 94 82 - 98 fL    MCH 31.2 (H) 27.0 - 31.0 pg    MCHC 33.3 32.0 - 36.0 g/dL    RDW 14.6 (H) 11.5 - 14.5 %    Platelets 258 150 - 450 K/uL    MPV 9.6 9.2 - 12.9 fL    Immature Granulocytes 0.2 0.0 - 0.5 %    Gran # (ANC) 5.5 1.8 - 7.7 K/uL    Immature Grans (Abs) 0.02 0.00 - 0.04 K/uL    Lymph # 2.6 1.0 - 4.8 K/uL    Mono # 0.8 0.3 - 1.0 K/uL    Eos # 0.4 0.0 - 0.5 " K/uL    Baso # 0.05 0.00 - 0.20 K/uL    nRBC 0 0 /100 WBC    Gran % 58.6 38.0 - 73.0 %    Lymph % 27.8 18.0 - 48.0 %    Mono % 8.5 4.0 - 15.0 %    Eosinophil % 4.4 0.0 - 8.0 %    Basophil % 0.5 0.0 - 1.9 %    Differential Method Automated    Comprehensive Metabolic Panel   Result Value Ref Range    Sodium 132 (L) 136 - 145 mmol/L    Potassium 3.8 3.5 - 5.1 mmol/L    Chloride 95 95 - 110 mmol/L    CO2 29 23 - 29 mmol/L    Glucose 248 (H) 70 - 110 mg/dL    BUN 17 8 - 23 mg/dL    Creatinine 1.6 (H) 0.5 - 1.4 mg/dL    Calcium 9.7 8.7 - 10.5 mg/dL    Total Protein 7.3 6.0 - 8.4 g/dL    Albumin 4.1 3.5 - 5.2 g/dL    Total Bilirubin 0.4 0.1 - 1.0 mg/dL    Alkaline Phosphatase 57 55 - 135 U/L    AST 32 10 - 40 U/L    ALT 49 (H) 10 - 44 U/L    Anion Gap 8 8 - 16 mmol/L    eGFR if African American 53.0 (A) >60 mL/min/1.73 m^2    eGFR if non  45.8 (A) >60 mL/min/1.73 m^2   Hemoglobin A1C   Result Value Ref Range    Hemoglobin A1C 7.9 (H) 4.0 - 5.6 %    Estimated Avg Glucose 180 (H) 68 - 131 mg/dL     Lab Results   Component Value Date    HGBA1C 7.9 (H) 01/28/2022    HGBA1C 7.5 (H) 10/01/2021    HGBA1C 7.3 (H) 05/05/2021         Objective:     Vitals:    03/10/22 1044   BP: 130/70   Pulse: 67   Temp: 98.4 °F (36.9 °C)        Physical Exam  Vitals and nursing note reviewed.   Constitutional:       General: He is not in acute distress.     Appearance: He is well-developed. He is not ill-appearing, toxic-appearing or diaphoretic.   HENT:      Head: Normocephalic and atraumatic.   Cardiovascular:      Rate and Rhythm: Normal rate and regular rhythm.   Pulmonary:      Effort: Pulmonary effort is normal.      Breath sounds: Normal breath sounds.   Neurological:      Mental Status: He is alert and oriented to person, place, and time.   Psychiatric:         Behavior: Behavior normal.         Thought Content: Thought content normal.         Judgment: Judgment normal.         Assessment:       1. BMI 27.0-27.9,adult    2.  Type 2 diabetes mellitus with stage 3a chronic kidney disease, without long-term current use of insulin    3. Stage 3a chronic kidney disease    4. Essential hypertension    5. Dyslipidemia    6. Cervical radiculopathy    7. Snoring    8. Screening PSA (prostate specific antigen)    9. Visit for well man health check        Plan:       Continue metformin 1500 mg daily, 3 pills, together or separately  Recent digital DM levels, appear normal  Consider trulicity??    Continue metoprolol 100 mg XR  Continue candesartan  Continue chlorthalidone  Continue amlodipine     Continue crestor     Iron levels normal, off iron. Hold off on donation until A1c at goal.     Kidney function is a little low. No NSAIDS such as ibuprofen or naproxen. Avoid Red meats. Drink a lot of water. I will continue monitoring kidney function q3-6 months.     Has tried lyrica and gabapentin. Didn't help neck and shoulder pain. Send me outside MRI and consult notes.   Start gabapentin 1/2 pill nightly x 2 days. Then increase to full pill  Consider continuing elavil at current dose or decreasing  All can cause sedation.     Weight loss. Dash diet. F/u 3 months. Labs prior.     BMI 27.0-27.9,adult    Type 2 diabetes mellitus with stage 3a chronic kidney disease, without long-term current use of insulin  -     metFORMIN (GLUCOPHAGE-XR) 500 MG ER 24hr tablet; Take 1 tablet (500 mg total) by mouth daily with breakfast AND 2 tablets (1,000 mg total) daily with dinner or evening meal.  Dispense: 270 tablet; Refill: 0  -     Microalbumin/Creatinine Ratio, Urine; Future; Expected date: 03/10/2022    Stage 3a chronic kidney disease  -     chlorthalidone (HYGROTEN) 25 MG Tab; Take 1 tablet (25 mg total) by mouth once daily.  Dispense: 90 tablet; Refill: 1    Essential hypertension  -     metoprolol succinate (TOPROL-XL) 100 MG 24 hr tablet; Take 1 tablet (100 mg total) by mouth once daily.  Dispense: 90 tablet; Refill: 1  -     chlorthalidone (HYGROTEN) 25 MG  Tab; Take 1 tablet (25 mg total) by mouth once daily.  Dispense: 90 tablet; Refill: 1  -     candesartan (ATACAND) 32 MG tablet; Take 1 tablet (32 mg total) by mouth once daily.  Dispense: 90 tablet; Refill: 1    Dyslipidemia  -     rosuvastatin (CRESTOR) 40 MG Tab; Take 1 tablet (40 mg total) by mouth every evening.  Dispense: 90 tablet; Refill: 1    Cervical radiculopathy  -     gabapentin (NEURONTIN) 800 MG tablet; Take 1 tablet (800 mg total) by mouth every evening.  Dispense: 90 tablet; Refill: 1    Snoring  -     Ambulatory referral/consult to Sleep Disorders; Future; Expected date: 03/17/2022    Screening PSA (prostate specific antigen)  -     PSA, Screening; Future; Expected date: 03/10/2022    Visit for well [place] health check  -     CBC Auto Differential; Future; Expected date: 03/10/2022  -     Comprehensive Metabolic Panel; Future; Expected date: 03/10/2022  -     Hemoglobin A1C; Future; Expected date: 03/10/2022  -     Lipid Panel; Future; Expected date: 03/10/2022  -     TSH; Future; Expected date: 03/10/2022  -     Vitamin D; Future; Expected date: 03/10/2022  -     Vitamin B12; Future; Expected date: 03/10/2022  -     Iron and TIBC; Future; Expected date: 03/10/2022  -     Ferritin; Future; Expected date: 03/10/2022  -     PSA, Screening; Future; Expected date: 03/10/2022  -     Microalbumin/Creatinine Ratio, Urine; Future; Expected date: 03/10/2022        Warning signs discussed, patient to call with any further issues or worsening of symptoms.          Alert and oriented, no focal deficits, no motor or sensory deficits.

## 2022-03-15 ENCOUNTER — TELEPHONE (OUTPATIENT)
Dept: ADMINISTRATIVE | Facility: OTHER | Age: 62
End: 2022-03-15
Payer: COMMERCIAL

## 2022-03-17 ENCOUNTER — PATIENT MESSAGE (OUTPATIENT)
Dept: OTHER | Facility: OTHER | Age: 62
End: 2022-03-17
Payer: COMMERCIAL

## 2022-03-17 NOTE — PROGRESS NOTES
Digital Medicine: Clinician Follow-Up    Patient inquired about diabetes testing supplies. He states that he ordered >1 month but had not received anything in the mail.     He denies other concerns at this time. 1st contact with patient in > 1 year.          Review of patient's allergies indicates:   -- Stadol (butorphanol tartrate) -- Rash    --  Swelling in face   -- Strawberries (strawberry) -- Rash  Follow-up reason(s): addressing patient questions/concerns.     Hypertension    Patient's blood pressure is stable.     Diabetes    Patient's blood glucose is stable.          Last 5 Patient Entered Readings                Current 30 Day Average: 121/78  Recent Readings 3/17/2022 3/16/2022 3/15/2022 3/14/2022 3/11/2022   SBP (mmHg) 133 117 113 134 111   DBP (mmHg) 84 82 70 83 69   Pulse 56 68 66 56 55               Last 6 Patient Entered Readings                                          Most Recent A1c: 7.9% on 1/28/2022  (Goal: 7%)     Recent Readings 3/17/2022 3/16/2022 3/15/2022 3/14/2022 3/11/2022    Blood Glucose (mg/dL) 118 120 96 106 105             Hypertension Medications             candesartan (ATACAND) 32 MG tablet Take 1 tablet (32 mg total) by mouth once daily.    chlorthalidone (HYGROTEN) 25 MG Tab Take 1 tablet (25 mg total) by mouth once daily.    metoprolol succinate (TOPROL-XL) 100 MG 24 hr tablet Take 1 tablet (100 mg total) by mouth once daily.         Diabetes Medications             metFORMIN (GLUCOPHAGE-XR) 500 MG ER 24hr tablet Take 1 tablet (500 mg total) by mouth daily with breakfast AND 2 tablets (1,000 mg total) daily with dinner or evening meal.                                  Medication Adherence  Medication Adherence not addressed.          ASSESSMENT(S)  Patients BP average is 121/78 mmHg, which is at goal. Patient's BP goal is less than or equal to 130/80.  Patient's A1C goal is less than or equal to 7. Patient's most recent A1C result is above goal. Lab Results    Component                 Value               Date                     HGBA1C                   7.9 (H)             01/28/2022          .          HTN - BP well controlled, no HTN medication changes recommended at this time.     Diabetes - BG well controlled, A1C elevated but likely lower or controlled at next update. No diabetes medications changes recommended at this time.          Hypertension Plan  Continue current therapy.  Continue current diet/physical activity routine.      Diabetes Plan  Continue current therapy.  Continue current diet/physical activity routine.  Contacted HME and patient, supplies should be in the mail today. F/u in 3 months.      Addressed patient questions and patient has my contact information if needed prior to next outreach. Patient verbalizes understanding.          Hypertension Medications             candesartan (ATACAND) 32 MG tablet Take 1 tablet (32 mg total) by mouth once daily.    chlorthalidone (HYGROTEN) 25 MG Tab Take 1 tablet (25 mg total) by mouth once daily.    metoprolol succinate (TOPROL-XL) 100 MG 24 hr tablet Take 1 tablet (100 mg total) by mouth once daily.        Diabetes Medications             metFORMIN (GLUCOPHAGE-XR) 500 MG ER 24hr tablet Take 1 tablet (500 mg total) by mouth daily with breakfast AND 2 tablets (1,000 mg total) daily with dinner or evening meal.          There are no preventive care reminders to display for this patient.

## 2022-03-28 ENCOUNTER — PATIENT MESSAGE (OUTPATIENT)
Dept: FAMILY MEDICINE | Facility: CLINIC | Age: 62
End: 2022-03-28
Payer: COMMERCIAL

## 2022-03-28 ENCOUNTER — PATIENT MESSAGE (OUTPATIENT)
Dept: VASCULAR SURGERY | Facility: CLINIC | Age: 62
End: 2022-03-28
Payer: COMMERCIAL

## 2022-03-30 ENCOUNTER — PATIENT MESSAGE (OUTPATIENT)
Dept: FAMILY MEDICINE | Facility: CLINIC | Age: 62
End: 2022-03-30
Payer: COMMERCIAL

## 2022-03-30 DIAGNOSIS — I65.23 BILATERAL CAROTID ARTERY STENOSIS: Primary | ICD-10-CM

## 2022-04-01 ENCOUNTER — OFFICE VISIT (OUTPATIENT)
Dept: VASCULAR SURGERY | Facility: CLINIC | Age: 62
End: 2022-04-01
Payer: COMMERCIAL

## 2022-04-01 ENCOUNTER — HOSPITAL ENCOUNTER (OUTPATIENT)
Dept: VASCULAR SURGERY | Facility: CLINIC | Age: 62
Discharge: HOME | End: 2022-04-01
Attending: SURGERY
Payer: COMMERCIAL

## 2022-04-01 VITALS
HEART RATE: 58 BPM | DIASTOLIC BLOOD PRESSURE: 75 MMHG | WEIGHT: 196.19 LBS | BODY MASS INDEX: 27.47 KG/M2 | HEIGHT: 71 IN | SYSTOLIC BLOOD PRESSURE: 132 MMHG | TEMPERATURE: 98 F

## 2022-04-01 DIAGNOSIS — I65.23 BILATERAL CAROTID ARTERY STENOSIS: Primary | ICD-10-CM

## 2022-04-01 DIAGNOSIS — Z98.890 S/P CAROTID ENDARTERECTOMY: ICD-10-CM

## 2022-04-01 DIAGNOSIS — I65.23 BILATERAL CAROTID ARTERY STENOSIS: ICD-10-CM

## 2022-04-01 PROCEDURE — 3078F PR MOST RECENT DIASTOLIC BLOOD PRESSURE < 80 MM HG: ICD-10-PCS | Mod: CPTII,S$GLB,, | Performed by: SURGERY

## 2022-04-01 PROCEDURE — 3072F PR LOW RISK FOR RETINOPATHY: ICD-10-PCS | Mod: CPTII,S$GLB,, | Performed by: SURGERY

## 2022-04-01 PROCEDURE — 93880 EXTRACRANIAL BILAT STUDY: CPT | Mod: S$GLB,,, | Performed by: SURGERY

## 2022-04-01 PROCEDURE — 3078F DIAST BP <80 MM HG: CPT | Mod: CPTII,S$GLB,, | Performed by: SURGERY

## 2022-04-01 PROCEDURE — 3051F HG A1C>EQUAL 7.0%<8.0%: CPT | Mod: CPTII,S$GLB,, | Performed by: SURGERY

## 2022-04-01 PROCEDURE — 3008F PR BODY MASS INDEX (BMI) DOCUMENTED: ICD-10-PCS | Mod: CPTII,S$GLB,, | Performed by: SURGERY

## 2022-04-01 PROCEDURE — 1159F MED LIST DOCD IN RCRD: CPT | Mod: CPTII,S$GLB,, | Performed by: SURGERY

## 2022-04-01 PROCEDURE — 4010F PR ACE/ARB THEARPY RXD/TAKEN: ICD-10-PCS | Mod: CPTII,S$GLB,, | Performed by: SURGERY

## 2022-04-01 PROCEDURE — 1160F RVW MEDS BY RX/DR IN RCRD: CPT | Mod: CPTII,S$GLB,, | Performed by: SURGERY

## 2022-04-01 PROCEDURE — 3075F PR MOST RECENT SYSTOLIC BLOOD PRESS GE 130-139MM HG: ICD-10-PCS | Mod: CPTII,S$GLB,, | Performed by: SURGERY

## 2022-04-01 PROCEDURE — 3072F LOW RISK FOR RETINOPATHY: CPT | Mod: CPTII,S$GLB,, | Performed by: SURGERY

## 2022-04-01 PROCEDURE — 93880 PR DUPLEX SCAN EXTRACRANIAL,BILAT: ICD-10-PCS | Mod: S$GLB,,, | Performed by: SURGERY

## 2022-04-01 PROCEDURE — 99214 PR OFFICE/OUTPT VISIT, EST, LEVL IV, 30-39 MIN: ICD-10-PCS | Mod: S$GLB,,, | Performed by: SURGERY

## 2022-04-01 PROCEDURE — 3051F PR MOST RECENT HEMOGLOBIN A1C LEVEL 7.0 - < 8.0%: ICD-10-PCS | Mod: CPTII,S$GLB,, | Performed by: SURGERY

## 2022-04-01 PROCEDURE — 3075F SYST BP GE 130 - 139MM HG: CPT | Mod: CPTII,S$GLB,, | Performed by: SURGERY

## 2022-04-01 PROCEDURE — 1159F PR MEDICATION LIST DOCUMENTED IN MEDICAL RECORD: ICD-10-PCS | Mod: CPTII,S$GLB,, | Performed by: SURGERY

## 2022-04-01 PROCEDURE — 3008F BODY MASS INDEX DOCD: CPT | Mod: CPTII,S$GLB,, | Performed by: SURGERY

## 2022-04-01 PROCEDURE — 4010F ACE/ARB THERAPY RXD/TAKEN: CPT | Mod: CPTII,S$GLB,, | Performed by: SURGERY

## 2022-04-01 PROCEDURE — 1160F PR REVIEW ALL MEDS BY PRESCRIBER/CLIN PHARMACIST DOCUMENTED: ICD-10-PCS | Mod: CPTII,S$GLB,, | Performed by: SURGERY

## 2022-04-01 PROCEDURE — 99999 PR PBB SHADOW E&M-EST. PATIENT-LVL IV: ICD-10-PCS | Mod: PBBFAC,,, | Performed by: SURGERY

## 2022-04-01 PROCEDURE — 99214 OFFICE O/P EST MOD 30 MIN: CPT | Mod: S$GLB,,, | Performed by: SURGERY

## 2022-04-01 PROCEDURE — 99999 PR PBB SHADOW E&M-EST. PATIENT-LVL IV: CPT | Mod: PBBFAC,,, | Performed by: SURGERY

## 2022-04-01 NOTE — PROGRESS NOTES
See my prior note; review of systems, family history and social history are   unchanged.     HISTORY OF PRESENT ILLNESS:  A 62-year-old male status post: right carotid   endarterectomy 07/15/2015.     This is a 2.5-year followup.  He denies stroke, TIA, or amaurosis. Doing well.  Had a cervical MRI done recently concerning for no flow void in the left vertebral, however US today shows normal flow in the L vert.  US reviewed, no signs of restenosis.     PAST MEDICAL HISTORY:  Severe chronic back pain, 99% R carotid stenosis s/p CEA     MEDICINES:  Include aspirin and statin.     SOCIAL:  No smoking     PHYSICAL EXAMINATION:  VITAL SIGNS:  See nursing note.  NEUROLOGIC:  Cranial nerve VII through XII intact, 5/5 motor strength in all   extremities.     IMAGING:  Carotid duplex shows no carotid stenosis bilaterally. Once again, stable     ASSESSMENT:  7-year followup, right CEA  doing well.     RECOMMENDATIONS:  Continue current medical therapy.    surveillance to follow up in two years with screening carotid duplex.     Josue Seay MD  Vascular Fellow PGY7    VASCULAR STAFF    I have personally taken the history and examined this patient and agree with the resident's note as stated above    LEAH Bone III, MD, FACS  Professor and Chief, Vascular and Endovascular Surgery

## 2022-04-06 ENCOUNTER — PATIENT MESSAGE (OUTPATIENT)
Dept: FAMILY MEDICINE | Facility: CLINIC | Age: 62
End: 2022-04-06
Payer: COMMERCIAL

## 2022-04-07 DIAGNOSIS — E11.22 TYPE 2 DIABETES MELLITUS WITH STAGE 3A CHRONIC KIDNEY DISEASE, WITHOUT LONG-TERM CURRENT USE OF INSULIN: ICD-10-CM

## 2022-04-07 DIAGNOSIS — N18.31 TYPE 2 DIABETES MELLITUS WITH STAGE 3A CHRONIC KIDNEY DISEASE, WITHOUT LONG-TERM CURRENT USE OF INSULIN: ICD-10-CM

## 2022-04-07 NOTE — TELEPHONE ENCOUNTER
No new care gaps identified.  Powered by [employer]. Reference number: [id number].   4/07/2022 5:15:01 PM CDT

## 2022-04-08 RX ORDER — METFORMIN HYDROCHLORIDE 500 MG/1
TABLET, EXTENDED RELEASE ORAL
Qty: 270 TABLET | Refills: 1 | Status: SHIPPED | OUTPATIENT
Start: 2022-04-08 | End: 2022-09-28 | Stop reason: SDUPTHER

## 2022-04-08 NOTE — TELEPHONE ENCOUNTER
Refill Routing Note   Medication(s) are not appropriate for processing by [facility] for the following reason(s):      - Required laboratory values are abnormal               Medication reconciliation completed: No     Appointments  past 12m or future 3m with PCP    Date Provider   Last Visit   3/10/2022 Rocco Cavazos, DO   Next Visit   6/27/2022 Rocco Cavazos, DO

## 2022-04-19 ENCOUNTER — PATIENT MESSAGE (OUTPATIENT)
Dept: FAMILY MEDICINE | Facility: CLINIC | Age: 62
End: 2022-04-19
Payer: COMMERCIAL

## 2022-04-28 DIAGNOSIS — E78.5 DYSLIPIDEMIA: Chronic | ICD-10-CM

## 2022-04-28 RX ORDER — ROSUVASTATIN CALCIUM 40 MG/1
TABLET, COATED ORAL
Qty: 90 TABLET | Refills: 0 | Status: SHIPPED | OUTPATIENT
Start: 2022-04-28 | End: 2022-07-25

## 2022-04-28 NOTE — TELEPHONE ENCOUNTER
No new care gaps identified.  Powered by [employer]. Reference number: [id number].   4/28/2022 12:46:20 PM CDT

## 2022-04-28 NOTE — TELEPHONE ENCOUNTER
Refill Authorization Note   Partha Curtis  is requesting a refill authorization.  Brief Assessment and Rationale for Refill:  Approve     Medication Therapy Plan:       Medication Reconciliation Completed: No   Comments:     No Care Gaps recommended.     Note composed:12:55 PM 04/28/2022

## 2022-05-19 ENCOUNTER — OFFICE VISIT (OUTPATIENT)
Dept: URGENT CARE | Facility: CLINIC | Age: 62
End: 2022-05-19
Payer: COMMERCIAL

## 2022-05-19 VITALS
HEIGHT: 71 IN | TEMPERATURE: 98 F | DIASTOLIC BLOOD PRESSURE: 81 MMHG | SYSTOLIC BLOOD PRESSURE: 133 MMHG | WEIGHT: 184 LBS | RESPIRATION RATE: 16 BRPM | BODY MASS INDEX: 25.76 KG/M2 | OXYGEN SATURATION: 95 % | HEART RATE: 66 BPM

## 2022-05-19 DIAGNOSIS — T63.481A INSECT STINGS, ACCIDENTAL OR UNINTENTIONAL, INITIAL ENCOUNTER: Primary | ICD-10-CM

## 2022-05-19 PROCEDURE — 3075F PR MOST RECENT SYSTOLIC BLOOD PRESS GE 130-139MM HG: ICD-10-PCS | Mod: CPTII,S$GLB,, | Performed by: PHYSICIAN ASSISTANT

## 2022-05-19 PROCEDURE — 3051F HG A1C>EQUAL 7.0%<8.0%: CPT | Mod: CPTII,S$GLB,, | Performed by: PHYSICIAN ASSISTANT

## 2022-05-19 PROCEDURE — 4010F ACE/ARB THERAPY RXD/TAKEN: CPT | Mod: CPTII,S$GLB,, | Performed by: PHYSICIAN ASSISTANT

## 2022-05-19 PROCEDURE — 3051F PR MOST RECENT HEMOGLOBIN A1C LEVEL 7.0 - < 8.0%: ICD-10-PCS | Mod: CPTII,S$GLB,, | Performed by: PHYSICIAN ASSISTANT

## 2022-05-19 PROCEDURE — 99214 OFFICE O/P EST MOD 30 MIN: CPT | Mod: 25,S$GLB,, | Performed by: PHYSICIAN ASSISTANT

## 2022-05-19 PROCEDURE — 3008F PR BODY MASS INDEX (BMI) DOCUMENTED: ICD-10-PCS | Mod: CPTII,S$GLB,, | Performed by: PHYSICIAN ASSISTANT

## 2022-05-19 PROCEDURE — 99214 PR OFFICE/OUTPT VISIT, EST, LEVL IV, 30-39 MIN: ICD-10-PCS | Mod: 25,S$GLB,, | Performed by: PHYSICIAN ASSISTANT

## 2022-05-19 PROCEDURE — 3079F PR MOST RECENT DIASTOLIC BLOOD PRESSURE 80-89 MM HG: ICD-10-PCS | Mod: CPTII,S$GLB,, | Performed by: PHYSICIAN ASSISTANT

## 2022-05-19 PROCEDURE — 1159F PR MEDICATION LIST DOCUMENTED IN MEDICAL RECORD: ICD-10-PCS | Mod: CPTII,S$GLB,, | Performed by: PHYSICIAN ASSISTANT

## 2022-05-19 PROCEDURE — 1159F MED LIST DOCD IN RCRD: CPT | Mod: CPTII,S$GLB,, | Performed by: PHYSICIAN ASSISTANT

## 2022-05-19 PROCEDURE — 3079F DIAST BP 80-89 MM HG: CPT | Mod: CPTII,S$GLB,, | Performed by: PHYSICIAN ASSISTANT

## 2022-05-19 PROCEDURE — 4010F PR ACE/ARB THEARPY RXD/TAKEN: ICD-10-PCS | Mod: CPTII,S$GLB,, | Performed by: PHYSICIAN ASSISTANT

## 2022-05-19 PROCEDURE — 96372 THER/PROPH/DIAG INJ SC/IM: CPT | Mod: S$GLB,,, | Performed by: PHYSICIAN ASSISTANT

## 2022-05-19 PROCEDURE — 96372 PR INJECTION,THERAP/PROPH/DIAG2ST, IM OR SUBCUT: ICD-10-PCS | Mod: S$GLB,,, | Performed by: PHYSICIAN ASSISTANT

## 2022-05-19 PROCEDURE — 3072F LOW RISK FOR RETINOPATHY: CPT | Mod: CPTII,S$GLB,, | Performed by: PHYSICIAN ASSISTANT

## 2022-05-19 PROCEDURE — 3075F SYST BP GE 130 - 139MM HG: CPT | Mod: CPTII,S$GLB,, | Performed by: PHYSICIAN ASSISTANT

## 2022-05-19 PROCEDURE — 3008F BODY MASS INDEX DOCD: CPT | Mod: CPTII,S$GLB,, | Performed by: PHYSICIAN ASSISTANT

## 2022-05-19 PROCEDURE — 1160F PR REVIEW ALL MEDS BY PRESCRIBER/CLIN PHARMACIST DOCUMENTED: ICD-10-PCS | Mod: CPTII,S$GLB,, | Performed by: PHYSICIAN ASSISTANT

## 2022-05-19 PROCEDURE — 1160F RVW MEDS BY RX/DR IN RCRD: CPT | Mod: CPTII,S$GLB,, | Performed by: PHYSICIAN ASSISTANT

## 2022-05-19 PROCEDURE — 3072F PR LOW RISK FOR RETINOPATHY: ICD-10-PCS | Mod: CPTII,S$GLB,, | Performed by: PHYSICIAN ASSISTANT

## 2022-05-19 RX ORDER — DEXAMETHASONE SODIUM PHOSPHATE 100 MG/10ML
10 INJECTION INTRAMUSCULAR; INTRAVENOUS
Status: COMPLETED | OUTPATIENT
Start: 2022-05-19 | End: 2022-05-19

## 2022-05-19 RX ORDER — CLINDAMYCIN HYDROCHLORIDE 300 MG/1
300 CAPSULE ORAL EVERY 8 HOURS
Qty: 21 CAPSULE | Refills: 0 | Status: SHIPPED | OUTPATIENT
Start: 2022-05-19 | End: 2022-05-26

## 2022-05-19 RX ADMIN — DEXAMETHASONE SODIUM PHOSPHATE 10 MG: 100 INJECTION INTRAMUSCULAR; INTRAVENOUS at 05:05

## 2022-05-19 NOTE — PATIENT INSTRUCTIONS
Take benadryl at night and zyrtec during the day.  Increase hydration.  Monitor your glucose, as it may be higher over the next few days due to your steroid injection.    - You were given a prescription for antibiotic, but do not start taking it yet.  Wait 2-3 days to see if your symptoms improve without it.  If they don't or you get significantly worse, then start the antibiotics.     Please follow up with your primary care provider within 2-5 days if your signs and symptoms have not resolved or worsen.     If your condition worsens or fails to improve we recommend that you receive another evaluation at the emergency room immediately or contact your primary medical clinic to discuss your concerns.   You must understand that you have received an Urgent Care treatment only and that you may be released before all of your medical problems are known or treated. You, the patient, will arrange for follow up care as instructed.

## 2022-05-19 NOTE — PROGRESS NOTES
"Subjective:       Patient ID: Partha Curtis Jr. is a 62 y.o. male.    Vitals:  height is 5' 11" (1.803 m) and weight is 83.5 kg (184 lb). His oral temperature is 98.4 °F (36.9 °C). His blood pressure is 133/81 and his pulse is 66. His respiration is 16 and oxygen saturation is 95%.     Chief Complaint: Insect Bite    Patient presents with complaints of a 3 separate wasp stings to his right arm. He reports increased swelling and itching today. He tried benadryl last night with no relief. Pain was 8/10 at time of stings yesterday, but reports pain has improved since.    Insect Bite  This is a new problem. The current episode started yesterday. The problem occurs constantly. Pertinent negatives include no abdominal pain, anorexia, chest pain, chills, congestion, coughing, diaphoresis, fever, headaches, joint swelling, myalgias, nausea, neck pain, rash, sore throat, swollen glands or vomiting. Nothing aggravates the symptoms. The treatment provided mild relief.       Constitution: Negative for chills, sweating and fever.   HENT: Negative for ear pain, congestion and sore throat.    Neck: Negative for neck pain, neck stiffness and painful lymph nodes.   Cardiovascular: Negative for chest pain, palpitations and sob on exertion.   Eyes: Negative for eye discharge, eye itching and eye pain.   Respiratory: Negative for cough, sputum production and shortness of breath.    Gastrointestinal: Negative for abdominal pain, nausea, vomiting and diarrhea.   Genitourinary: Negative for dysuria, hematuria and pelvic pain.   Musculoskeletal: Negative for pain, joint swelling, muscle cramps and muscle ache.   Skin: Positive for color change and erythema. Negative for pale, rash and wound.   Neurological: Negative for dizziness, light-headedness and headaches.   Hematologic/Lymphatic: Negative for swollen lymph nodes.       Objective:      Physical Exam   Constitutional: He is oriented to person, place, and time. He appears " well-developed. He is cooperative. He does not appear ill. No distress.   HENT:   Head: Normocephalic and atraumatic.   Ears:   Right Ear: External ear normal.   Left Ear: External ear normal.   Nose: Nose normal.   Mouth/Throat: Oropharynx is clear and moist.   Eyes: Conjunctivae, EOM and lids are normal.   Neck: Trachea normal and phonation normal. Neck supple.   Cardiovascular: Regular rhythm and normal heart sounds.   No murmur heard.Exam reveals no gallop.   Pulmonary/Chest: Effort normal and breath sounds normal. No respiratory distress. He has no decreased breath sounds. He has no wheezes. He has no rhonchi. He has no rales.   Musculoskeletal: Normal range of motion.         General: Normal range of motion.        Arms:         Hands:    Neurological: He is alert and oriented to person, place, and time.   Skin: Skin is warm, dry, intact and not diaphoretic. erythema   Psychiatric: His speech is normal and behavior is normal. Judgment and thought content normal.   Nursing note and vitals reviewed.        Assessment:       1. Insect stings, accidental or unintentional, initial encounter          Plan:         Insect stings, accidental or unintentional, initial encounter  -     dexamethasone injection 10 mg  -     clindamycin (CLEOCIN) 300 MG capsule; Take 1 capsule (300 mg total) by mouth every 8 (eight) hours. for 7 days  Dispense: 21 capsule; Refill: 0      Patient Instructions   Take benadryl at night and zyrtec during the day.  Increase hydration.  Monitor your glucose, as it may be higher over the next few days due to your steroid injection.    - You were given a prescription for antibiotic, but do not start taking it yet.  Wait 2-3 days to see if your symptoms improve without it.  If they don't or you get significantly worse, then start the antibiotics.     Please follow up with your primary care provider within 2-5 days if your signs and symptoms have not resolved or worsen.     If your condition worsens  or fails to improve we recommend that you receive another evaluation at the emergency room immediately or contact your primary medical clinic to discuss your concerns.   You must understand that you have received an Urgent Care treatment only and that you may be released before all of your medical problems are known or treated. You, the patient, will arrange for follow up care as instructed.

## 2022-05-23 ENCOUNTER — PATIENT MESSAGE (OUTPATIENT)
Dept: FAMILY MEDICINE | Facility: CLINIC | Age: 62
End: 2022-05-23
Payer: COMMERCIAL

## 2022-06-01 ENCOUNTER — PATIENT MESSAGE (OUTPATIENT)
Dept: OTHER | Facility: OTHER | Age: 62
End: 2022-06-01
Payer: COMMERCIAL

## 2022-06-09 ENCOUNTER — TELEPHONE (OUTPATIENT)
Dept: FAMILY MEDICINE | Facility: CLINIC | Age: 62
End: 2022-06-09
Payer: COMMERCIAL

## 2022-06-09 ENCOUNTER — PATIENT MESSAGE (OUTPATIENT)
Dept: FAMILY MEDICINE | Facility: CLINIC | Age: 62
End: 2022-06-09
Payer: COMMERCIAL

## 2022-06-09 NOTE — TELEPHONE ENCOUNTER
I advised patient to go to the ED for hypotension. Patient report dizziness, confusing, light headiness. Patient voiced understanding.

## 2022-06-09 NOTE — TELEPHONE ENCOUNTER
Please advise the patient to go to the emergency room for proper medical workup.    Patient with hypotension

## 2022-06-14 ENCOUNTER — PATIENT MESSAGE (OUTPATIENT)
Dept: FAMILY MEDICINE | Facility: CLINIC | Age: 62
End: 2022-06-14
Payer: COMMERCIAL

## 2022-06-14 DIAGNOSIS — I10 ESSENTIAL HYPERTENSION: ICD-10-CM

## 2022-06-14 RX ORDER — CANDESARTAN 16 MG/1
16 TABLET ORAL DAILY
Start: 2022-06-14 | End: 2022-07-25 | Stop reason: SDUPTHER

## 2022-06-14 RX ORDER — ACYCLOVIR 400 MG/1
400 TABLET ORAL 2 TIMES DAILY
Qty: 60 TABLET | Refills: 11 | OUTPATIENT
Start: 2022-06-14 | End: 2023-06-14

## 2022-06-14 RX ORDER — VALACYCLOVIR HYDROCHLORIDE 1 G/1
2000 TABLET, FILM COATED ORAL EVERY 12 HOURS
Qty: 4 TABLET | Refills: 0 | Status: SHIPPED | OUTPATIENT
Start: 2022-06-14 | End: 2022-09-12 | Stop reason: SDUPTHER

## 2022-06-14 NOTE — TELEPHONE ENCOUNTER
Care Due:                  Date            Visit Type   Department     Provider  --------------------------------------------------------------------------------                                EP -                              [facility] FAM[facility]  Last Visit: 03-      CARE ([facility])   [facility]       Rocco Cavazos                              EP -                              PRIMARY      [facility]  Next Visit: 06-      CARE ([facility])   [facility]       Roccobeck Cavazos                                                            Last  Test          Frequency    Reason                     Performed    Due Date  --------------------------------------------------------------------------------    HBA1C.......  6 months...  metFORMIN................  01- 07-    Lipid Panel.  12 months..  rosuvastatin.............  05- 05-    [employer] Embedded Care Gaps. Reference number: [id number]. 6/14/2022   8:51:10 AM CDT

## 2022-06-21 ENCOUNTER — LAB VISIT (OUTPATIENT)
Dept: LAB | Facility: HOSPITAL | Age: 62
End: 2022-06-21
Attending: FAMILY MEDICINE
Payer: COMMERCIAL

## 2022-06-21 ENCOUNTER — PATIENT MESSAGE (OUTPATIENT)
Dept: FAMILY MEDICINE | Facility: CLINIC | Age: 62
End: 2022-06-21
Payer: COMMERCIAL

## 2022-06-21 DIAGNOSIS — R23.2 HOT FLASHES: ICD-10-CM

## 2022-06-21 DIAGNOSIS — R00.0 TACHYCARDIA: ICD-10-CM

## 2022-06-21 DIAGNOSIS — R00.0 TACHYCARDIA: Primary | ICD-10-CM

## 2022-06-21 DIAGNOSIS — Z12.5 SCREENING PSA (PROSTATE SPECIFIC ANTIGEN): ICD-10-CM

## 2022-06-21 DIAGNOSIS — Z00.00 VISIT FOR WELL MAN HEALTH CHECK: ICD-10-CM

## 2022-06-21 LAB
ALBUMIN SERPL BCP-MCNC: 4.2 G/DL (ref 3.5–5.2)
ALP SERPL-CCNC: 59 U/L (ref 55–135)
ALT SERPL W/O P-5'-P-CCNC: 37 U/L (ref 10–44)
ANION GAP SERPL CALC-SCNC: 12 MMOL/L (ref 8–16)
AST SERPL-CCNC: 27 U/L (ref 10–40)
BASOPHILS # BLD AUTO: 0.05 K/UL (ref 0–0.2)
BASOPHILS NFR BLD: 0.6 % (ref 0–1.9)
BILIRUB SERPL-MCNC: 0.5 MG/DL (ref 0.1–1)
BUN SERPL-MCNC: 20 MG/DL (ref 8–23)
CALCIUM SERPL-MCNC: 9.9 MG/DL (ref 8.7–10.5)
CHLORIDE SERPL-SCNC: 100 MMOL/L (ref 95–110)
CHOLEST SERPL-MCNC: 110 MG/DL (ref 120–199)
CHOLEST/HDLC SERPL: 4.6 {RATIO} (ref 2–5)
CO2 SERPL-SCNC: 26 MMOL/L (ref 23–29)
COMPLEXED PSA SERPL-MCNC: 0.61 NG/ML (ref 0–4)
CREAT SERPL-MCNC: 1.6 MG/DL (ref 0.5–1.4)
CRP SERPL-MCNC: 1.3 MG/L (ref 0–8.2)
DIFFERENTIAL METHOD: NORMAL
EOSINOPHIL # BLD AUTO: 0.3 K/UL (ref 0–0.5)
EOSINOPHIL NFR BLD: 3.1 % (ref 0–8)
ERYTHROCYTE [DISTWIDTH] IN BLOOD BY AUTOMATED COUNT: 14.1 % (ref 11.5–14.5)
ERYTHROCYTE [SEDIMENTATION RATE] IN BLOOD BY WESTERGREN METHOD: 11 MM/HR (ref 0–10)
EST. GFR  (AFRICAN AMERICAN): 53 ML/MIN/1.73 M^2
EST. GFR  (NON AFRICAN AMERICAN): 45 ML/MIN/1.73 M^2
ESTIMATED AVG GLUCOSE: 206 MG/DL (ref 68–131)
FERRITIN SERPL-MCNC: 200 NG/ML (ref 20–300)
GLUCOSE SERPL-MCNC: 200 MG/DL (ref 70–110)
HBA1C MFR BLD: 8.8 % (ref 4–5.6)
HCT VFR BLD AUTO: 45.3 % (ref 40–54)
HDLC SERPL-MCNC: 24 MG/DL (ref 40–75)
HDLC SERPL: 21.8 % (ref 20–50)
HGB BLD-MCNC: 15 G/DL (ref 14–18)
IMM GRANULOCYTES # BLD AUTO: 0.02 K/UL (ref 0–0.04)
IMM GRANULOCYTES NFR BLD AUTO: 0.2 % (ref 0–0.5)
LDLC SERPL CALC-MCNC: 30 MG/DL (ref 63–159)
LYMPHOCYTES # BLD AUTO: 2 K/UL (ref 1–4.8)
LYMPHOCYTES NFR BLD: 25.2 % (ref 18–48)
MAGNESIUM SERPL-MCNC: 1.9 MG/DL (ref 1.6–2.6)
MCH RBC QN AUTO: 30.2 PG (ref 27–31)
MCHC RBC AUTO-ENTMCNC: 33.1 G/DL (ref 32–36)
MCV RBC AUTO: 91 FL (ref 82–98)
MONOCYTES # BLD AUTO: 0.9 K/UL (ref 0.3–1)
MONOCYTES NFR BLD: 10.7 % (ref 4–15)
NEUTROPHILS # BLD AUTO: 4.8 K/UL (ref 1.8–7.7)
NEUTROPHILS NFR BLD: 60.2 % (ref 38–73)
NONHDLC SERPL-MCNC: 86 MG/DL
NRBC BLD-RTO: 0 /100 WBC
PHOSPHATE SERPL-MCNC: 2.7 MG/DL (ref 2.7–4.5)
PLATELET # BLD AUTO: 262 K/UL (ref 150–450)
PMV BLD AUTO: 9.5 FL (ref 9.2–12.9)
POTASSIUM SERPL-SCNC: 4.1 MMOL/L (ref 3.5–5.1)
PROT SERPL-MCNC: 7.3 G/DL (ref 6–8.4)
RBC # BLD AUTO: 4.96 M/UL (ref 4.6–6.2)
SODIUM SERPL-SCNC: 138 MMOL/L (ref 136–145)
TRIGL SERPL-MCNC: 280 MG/DL (ref 30–150)
TSH SERPL DL<=0.005 MIU/L-ACNC: 1.54 UIU/ML (ref 0.4–4)
WBC # BLD AUTO: 8.04 K/UL (ref 3.9–12.7)

## 2022-06-21 PROCEDURE — 84153 ASSAY OF PSA TOTAL: CPT | Performed by: FAMILY MEDICINE

## 2022-06-21 PROCEDURE — 84100 ASSAY OF PHOSPHORUS: CPT | Performed by: FAMILY MEDICINE

## 2022-06-21 PROCEDURE — 86140 C-REACTIVE PROTEIN: CPT | Performed by: FAMILY MEDICINE

## 2022-06-21 PROCEDURE — 83036 HEMOGLOBIN GLYCOSYLATED A1C: CPT | Performed by: FAMILY MEDICINE

## 2022-06-21 PROCEDURE — 80053 COMPREHEN METABOLIC PANEL: CPT | Performed by: FAMILY MEDICINE

## 2022-06-21 PROCEDURE — 82306 VITAMIN D 25 HYDROXY: CPT | Performed by: FAMILY MEDICINE

## 2022-06-21 PROCEDURE — 85652 RBC SED RATE AUTOMATED: CPT | Performed by: FAMILY MEDICINE

## 2022-06-21 PROCEDURE — 36415 COLL VENOUS BLD VENIPUNCTURE: CPT | Performed by: FAMILY MEDICINE

## 2022-06-21 PROCEDURE — 82728 ASSAY OF FERRITIN: CPT | Performed by: FAMILY MEDICINE

## 2022-06-21 PROCEDURE — 83735 ASSAY OF MAGNESIUM: CPT | Performed by: FAMILY MEDICINE

## 2022-06-21 PROCEDURE — 80061 LIPID PANEL: CPT | Performed by: FAMILY MEDICINE

## 2022-06-21 PROCEDURE — 82607 VITAMIN B-12: CPT | Performed by: FAMILY MEDICINE

## 2022-06-21 PROCEDURE — 85025 COMPLETE CBC W/AUTO DIFF WBC: CPT | Performed by: FAMILY MEDICINE

## 2022-06-21 PROCEDURE — 84466 ASSAY OF TRANSFERRIN: CPT | Performed by: FAMILY MEDICINE

## 2022-06-21 PROCEDURE — 84443 ASSAY THYROID STIM HORMONE: CPT | Performed by: FAMILY MEDICINE

## 2022-06-21 NOTE — TELEPHONE ENCOUNTER
[place],   Take labs scheduled 6/25/2022    Add it to labs and urine I ordered today    Do all labs today and an EKG today please

## 2022-06-22 ENCOUNTER — TELEPHONE (OUTPATIENT)
Dept: FAMILY MEDICINE | Facility: CLINIC | Age: 62
End: 2022-06-22
Payer: COMMERCIAL

## 2022-06-22 ENCOUNTER — PATIENT MESSAGE (OUTPATIENT)
Dept: INTERNAL MEDICINE | Facility: CLINIC | Age: 62
End: 2022-06-22
Payer: COMMERCIAL

## 2022-06-22 ENCOUNTER — PATIENT MESSAGE (OUTPATIENT)
Dept: FAMILY MEDICINE | Facility: CLINIC | Age: 62
End: 2022-06-22
Payer: COMMERCIAL

## 2022-06-22 ENCOUNTER — TELEPHONE (OUTPATIENT)
Dept: INTERNAL MEDICINE | Facility: CLINIC | Age: 62
End: 2022-06-22
Payer: COMMERCIAL

## 2022-06-22 DIAGNOSIS — R42 LIGHT HEADEDNESS: ICD-10-CM

## 2022-06-22 DIAGNOSIS — R00.0 TACHYCARDIA: ICD-10-CM

## 2022-06-22 DIAGNOSIS — E11.22 TYPE 2 DIABETES MELLITUS WITH STAGE 3A CHRONIC KIDNEY DISEASE, WITHOUT LONG-TERM CURRENT USE OF INSULIN: Primary | ICD-10-CM

## 2022-06-22 DIAGNOSIS — I10 ESSENTIAL HYPERTENSION: ICD-10-CM

## 2022-06-22 DIAGNOSIS — R94.31 ABNORMAL EKG: Primary | ICD-10-CM

## 2022-06-22 DIAGNOSIS — N18.31 TYPE 2 DIABETES MELLITUS WITH STAGE 3A CHRONIC KIDNEY DISEASE, WITHOUT LONG-TERM CURRENT USE OF INSULIN: Primary | ICD-10-CM

## 2022-06-22 LAB
25(OH)D3+25(OH)D2 SERPL-MCNC: 47 NG/ML (ref 30–96)
IRON SERPL-MCNC: 143 UG/DL (ref 45–160)
SATURATED IRON: 39 % (ref 20–50)
TOTAL IRON BINDING CAPACITY: 370 UG/DL (ref 250–450)
TRANSFERRIN SERPL-MCNC: 250 MG/DL (ref 200–375)
VIT B12 SERPL-MCNC: 351 PG/ML (ref 210–950)

## 2022-06-22 RX ORDER — GLIMEPIRIDE 4 MG/1
4 TABLET ORAL
Qty: 90 TABLET | Refills: 3 | Status: SHIPPED | OUTPATIENT
Start: 2022-06-22 | End: 2023-02-01

## 2022-06-22 NOTE — TELEPHONE ENCOUNTER
Abbey/Laura  Ekg was done for this patient yesterday  It shows some changes compared to his last EKG, but non specific. Due to his symptoms, Can we get a stress test on him asap?    I also ordered a holter.

## 2022-06-23 ENCOUNTER — HOSPITAL ENCOUNTER (EMERGENCY)
Facility: HOSPITAL | Age: 62
Discharge: HOME | End: 2022-06-23
Attending: EMERGENCY MEDICINE
Payer: COMMERCIAL

## 2022-06-23 VITALS
TEMPERATURE: 98 F | SYSTOLIC BLOOD PRESSURE: 121 MMHG | DIASTOLIC BLOOD PRESSURE: 64 MMHG | BODY MASS INDEX: 25.8 KG/M2 | OXYGEN SATURATION: 100 % | HEART RATE: 57 BPM | RESPIRATION RATE: 16 BRPM | WEIGHT: 185 LBS

## 2022-06-23 DIAGNOSIS — R53.83 FATIGUE: ICD-10-CM

## 2022-06-23 DIAGNOSIS — R29.818 TRANSIENT NEUROLOGICAL SYMPTOMS: ICD-10-CM

## 2022-06-23 DIAGNOSIS — I95.1 ORTHOSTATIC HYPOTENSION: Primary | ICD-10-CM

## 2022-06-23 LAB
ALBUMIN SERPL BCP-MCNC: 4.1 G/DL (ref 3.5–5.2)
ALP SERPL-CCNC: 56 U/L (ref 55–135)
ALT SERPL W/O P-5'-P-CCNC: 40 U/L (ref 10–44)
ANION GAP SERPL CALC-SCNC: 11 MMOL/L (ref 8–16)
AST SERPL-CCNC: 31 U/L (ref 10–40)
BASOPHILS # BLD AUTO: 0.04 K/UL (ref 0–0.2)
BASOPHILS NFR BLD: 0.5 % (ref 0–1.9)
BILIRUB SERPL-MCNC: 0.5 MG/DL (ref 0.1–1)
BNP SERPL-MCNC: <10 PG/ML (ref 0–99)
BUN SERPL-MCNC: 17 MG/DL (ref 8–23)
CALCIUM SERPL-MCNC: 9.7 MG/DL (ref 8.7–10.5)
CHLORIDE SERPL-SCNC: 99 MMOL/L (ref 95–110)
CO2 SERPL-SCNC: 28 MMOL/L (ref 23–29)
CREAT SERPL-MCNC: 1.4 MG/DL (ref 0.5–1.4)
DIFFERENTIAL METHOD: ABNORMAL
EOSINOPHIL # BLD AUTO: 0.3 K/UL (ref 0–0.5)
EOSINOPHIL NFR BLD: 3.8 % (ref 0–8)
ERYTHROCYTE [DISTWIDTH] IN BLOOD BY AUTOMATED COUNT: 14 % (ref 11.5–14.5)
EST. GFR  (AFRICAN AMERICAN): >60 ML/MIN/1.73 M^2
EST. GFR  (NON AFRICAN AMERICAN): 53.5 ML/MIN/1.73 M^2
GLUCOSE SERPL-MCNC: 149 MG/DL (ref 70–110)
HCT VFR BLD AUTO: 46.7 % (ref 40–54)
HGB BLD-MCNC: 15.8 G/DL (ref 14–18)
IMM GRANULOCYTES # BLD AUTO: 0.02 K/UL (ref 0–0.04)
IMM GRANULOCYTES NFR BLD AUTO: 0.2 % (ref 0–0.5)
LYMPHOCYTES # BLD AUTO: 2.3 K/UL (ref 1–4.8)
LYMPHOCYTES NFR BLD: 27.2 % (ref 18–48)
MCH RBC QN AUTO: 30.9 PG (ref 27–31)
MCHC RBC AUTO-ENTMCNC: 33.8 G/DL (ref 32–36)
MCV RBC AUTO: 91 FL (ref 82–98)
MONOCYTES # BLD AUTO: 0.8 K/UL (ref 0.3–1)
MONOCYTES NFR BLD: 9.7 % (ref 4–15)
NEUTROPHILS # BLD AUTO: 4.9 K/UL (ref 1.8–7.7)
NEUTROPHILS NFR BLD: 58.6 % (ref 38–73)
NRBC BLD-RTO: 0 /100 WBC
PLATELET # BLD AUTO: 276 K/UL (ref 150–450)
PMV BLD AUTO: 9.1 FL (ref 9.2–12.9)
POCT GLUCOSE: 167 MG/DL (ref 70–110)
POTASSIUM SERPL-SCNC: 3.5 MMOL/L (ref 3.5–5.1)
PROT SERPL-MCNC: 7.1 G/DL (ref 6–8.4)
RBC # BLD AUTO: 5.11 M/UL (ref 4.6–6.2)
SODIUM SERPL-SCNC: 138 MMOL/L (ref 136–145)
TROPONIN I SERPL DL<=0.01 NG/ML-MCNC: 0.01 NG/ML (ref 0–0.03)
WBC # BLD AUTO: 8.36 K/UL (ref 3.9–12.7)

## 2022-06-23 PROCEDURE — 99285 EMERGENCY DEPT VISIT HI MDM: CPT | Mod: 25

## 2022-06-23 PROCEDURE — 25000003 PHARM REV CODE 250: Performed by: PHYSICIAN ASSISTANT

## 2022-06-23 PROCEDURE — 84484 ASSAY OF TROPONIN QUANT: CPT | Performed by: PHYSICIAN ASSISTANT

## 2022-06-23 PROCEDURE — 83880 ASSAY OF NATRIURETIC PEPTIDE: CPT | Performed by: PHYSICIAN ASSISTANT

## 2022-06-23 PROCEDURE — 93005 ELECTROCARDIOGRAM TRACING: CPT

## 2022-06-23 PROCEDURE — 99284 EMERGENCY DEPT VISIT MOD MDM: CPT | Mod: ,,, | Performed by: EMERGENCY MEDICINE

## 2022-06-23 PROCEDURE — 93010 EKG 12-LEAD: ICD-10-PCS | Mod: ,,, | Performed by: INTERNAL MEDICINE

## 2022-06-23 PROCEDURE — 99284 PR EMERGENCY DEPT VISIT,LEVEL IV: ICD-10-PCS | Mod: ,,, | Performed by: EMERGENCY MEDICINE

## 2022-06-23 PROCEDURE — 80053 COMPREHEN METABOLIC PANEL: CPT | Performed by: PHYSICIAN ASSISTANT

## 2022-06-23 PROCEDURE — 85025 COMPLETE CBC W/AUTO DIFF WBC: CPT | Performed by: PHYSICIAN ASSISTANT

## 2022-06-23 PROCEDURE — 93010 ELECTROCARDIOGRAM REPORT: CPT | Mod: ,,, | Performed by: INTERNAL MEDICINE

## 2022-06-23 PROCEDURE — 25500020 PHARM REV CODE 255: Performed by: EMERGENCY MEDICINE

## 2022-06-23 PROCEDURE — 82962 GLUCOSE BLOOD TEST: CPT

## 2022-06-23 RX ORDER — METOPROLOL SUCCINATE 100 MG/1
TABLET, EXTENDED RELEASE ORAL
Qty: 90 TABLET | Refills: 2 | Status: SHIPPED | OUTPATIENT
Start: 2022-06-23 | End: 2022-06-27

## 2022-06-23 RX ORDER — DIAZEPAM 5 MG/1
TABLET ORAL
COMMUNITY
Start: 2022-03-05 | End: 2022-06-27

## 2022-06-23 RX ORDER — METHYLPREDNISOLONE 4 MG/1
TABLET ORAL
COMMUNITY
Start: 2022-02-24 | End: 2022-06-23 | Stop reason: CLARIF

## 2022-06-23 RX ORDER — DIAZEPAM 10 MG/1
10 TABLET ORAL ONCE
COMMUNITY
Start: 2022-03-22 | End: 2022-06-27

## 2022-06-23 RX ORDER — DIAZEPAM 10 MG/1
TABLET ORAL
COMMUNITY
Start: 2022-03-22 | End: 2022-06-27

## 2022-06-23 RX ADMIN — IOHEXOL 75 ML: 350 INJECTION, SOLUTION INTRAVENOUS at 10:06

## 2022-06-23 RX ADMIN — SODIUM CHLORIDE 1000 ML: 0.9 INJECTION, SOLUTION INTRAVENOUS at 11:06

## 2022-06-23 NOTE — ED NOTES
Patient states b/p at home ( does not know number) states fatigue, reports changes to b/p meds recently, Bg levels 120s at home

## 2022-06-23 NOTE — TELEPHONE ENCOUNTER
No new care gaps identified.  [facility] Embedded Care Gaps. Reference number: [id number]. 6/22/2022   10:00:56 PM CDT

## 2022-06-23 NOTE — TELEPHONE ENCOUNTER
Refill Authorization Note   Partha Curtis  is requesting a refill authorization.  Brief Assessment and Rationale for Refill:  Approve     Medication Therapy Plan:       Medication Reconciliation Completed: No   Comments:     No Care Gaps Recommended     Note composed:9:05 AM 06/23/2022

## 2022-06-23 NOTE — ED PROVIDER NOTES
Encounter Date: 6/23/2022       History     Chief Complaint   Patient presents with    Fatigue     States BP has been low at home (100/74) and has been feeling weak and sluggish      This is a 62 year old male with a PMH of HTN, DM presenting to the ED with multiple complaints. He reports several months of not feeling himself. Over the last few weeks he reports progressively worsening fatigue, lightheadedness, loss of energy. He describes difficulty with speech that is intermittent. Occasional difficulty with balance. He was seen for routine internal medicine visit earlier this week and reports abnormal EKG findings, his PCP referred to cardiology for holter and stress test. Medication changes include glimepiride which he took for the first time today, decreasing candesartan and stopping chlorthalidone. He had to leave work yesterday due to feeling so poorly and reports measuring a low blood pressure with a high HR. He describes an uneasy feeling. He and his wife just started work on their house to repair damages from [place]. He is working full time in . He would typically bike a few times a week but hasn't been doing that lately due to the heat outside. He has a history of Covid in November 2020, during which time his predominant symptom was fatigue. He has been vaccinated and boosted. He reports two negative home Covid tests in the last week. He denies fever, URI symptoms, headache, vision changes, focal weakness/numbness.        Review of patient's allergies indicates:   Allergen Reactions    Stadol [butorphanol tartrate] Rash     Swelling in face    Strawberries [strawberry] Rash     Past Medical History:   Diagnosis Date    Allergy     Carotid artery occlusion     Chronic back pain     Colonic polyp     Genetic testing     MUTYH mutation-negative    Hyperlipidemia     Hypertension     Type 2 diabetes mellitus with stage 3 chronic kidney disease, without long-term current use  of insulin 4/2/2020     Past Surgical History:   Procedure Laterality Date    ANKLE SURGERY Left     2    APPENDECTOMY      at age 20    CAROTID ENDARTERECTOMY Right 07/15/2015    CARPAL TUNNEL RELEASE Bilateral     COLONOSCOPY N/A 12/14/2018    Procedure: COLONOSCOPYSuprep;  Surgeon: Silver Selby MD;  Location: [facility] ENDO;  Service: Endoscopy;  Laterality: N/A;    JOINT REPLACEMENT  9/2010    NASAL SEPTUM SURGERY      TOTAL KNEE ARTHROPLASTY Right     6 knee surgeries     Family History   Problem Relation Age of Onset    Hypertension Father     Lung cancer Father         x2 (PAUL initially- treated surgically only, then recurred distantly) (smoker, & had been exposed to many chemicals)    Cancer Father     Brain cancer Mother 67    Cancer Mother     Breast cancer Sister 58    Genetic Disorder Sister         monoallelic MUTYH mutation    Cancer Maternal Grandfather     Brain cancer Paternal Grandfather 73    Aneurysm Other 48        brain    Breast cancer Maternal Cousin 57    Ulcerative colitis Other      Social History     Tobacco Use    Smoking status: Never Smoker    Smokeless tobacco: Never Used   Substance Use Topics    Alcohol use: Yes     Alcohol/week: 2.0 standard drinks     Types: 2 Cans of beer per week     Comment: a week    Drug use: Never     Review of Systems   Constitutional: Positive for appetite change and fatigue. Negative for chills and fever.   HENT: Negative for congestion and sore throat.    Eyes: Negative for visual disturbance.   Respiratory: Negative for shortness of breath.    Cardiovascular: Negative for chest pain.   Gastrointestinal: Negative for diarrhea, nausea and vomiting.   Endocrine: Positive for polyphagia.   Genitourinary: Negative for dysuria.   Musculoskeletal: Positive for back pain (chronic, stable).   Skin: Negative for rash.   Neurological: Positive for speech difficulty and light-headedness. Negative for weakness and headaches.   Hematological:  Does not bruise/bleed easily.   Psychiatric/Behavioral: Positive for sleep disturbance.       Physical Exam     Initial Vitals [06/23/22 0903]   BP Pulse Resp Temp SpO2   124/74 82 18 98 °F (36.7 °C) 99 %      MAP       --         Physical Exam    Constitutional: He appears well-developed and well-nourished. No distress.   HENT:   Head: Atraumatic.   Eyes: Conjunctivae and EOM are normal. Pupils are equal, round, and reactive to light.   Cardiovascular: Normal rate, regular rhythm and normal heart sounds.   Pulmonary/Chest: Breath sounds normal. No respiratory distress. He has no wheezes. He has no rhonchi. He has no rales.   Abdominal: Abdomen is soft. Bowel sounds are normal. There is no abdominal tenderness. There is no rebound and no guarding.     Neurological: He is alert and oriented to person, place, and time. He has normal strength. No cranial nerve deficit or sensory deficit. Coordination and gait normal.   Skin: Skin is warm and dry. No rash noted.         ED Course   Procedures  Labs Reviewed   CBC W/ AUTO DIFFERENTIAL - Abnormal; Notable for the following components:       Result Value    MPV 9.1 (*)     All other components within normal limits   COMPREHENSIVE METABOLIC PANEL - Abnormal; Notable for the following components:    Glucose 149 (*)     eGFR if non  53.5 (*)     All other components within normal limits   TROPONIN I   B-TYPE NATRIURETIC PEPTIDE   POCT GLUCOSE MONITORING CONTINUOUS        ECG Results          EKG 12-lead (Final result)  Result time 06/23/22 13:50:31    Final result by Interface, Lab In [facility] (06/23/22 13:50:31)                 Narrative:    Test Reason : R53.83,    Vent. Rate : 068 BPM     Atrial Rate : 068 BPM     P-R Int : 166 ms          QRS Dur : 086 ms      QT Int : 396 ms       P-R-T Axes : 046 026 016 degrees     QTc Int : 421 ms    Normal sinus rhythm  Cannot rule out Septal infarct ,age undetermined  Abnormal ECG  When compared with ECG of  21-JUN-2022 16:00,  No significant change was found  Confirmed by Lida GALLARDO, Alexandru ([id number]) on 6/23/2022 1:50:17 PM    Referred By: [email or web address]   SELF           Confirmed By:Alexandru Hilton MD                            Imaging Results          CTA Head and Neck (xpd) (Final result)  Result time 06/23/22 10:54:26    Final result by Galen Moody MD (06/23/22 10:54:26)                 Impression:      No acute intracranial process.    No significant stenosis at the carotid bifurcations by NASCET criteria.  The right vertebral artery is dominant.    Intracranially mild atherosclerotic narrowing of the distal dominant right vertebral artery and cavernous and supraclinoid ICA bilaterally but no major branch stenosis/occlusion at the gbzecy-jr-Cweaaw.      Electronically signed by: Galen Moody  Date:    06/23/2022  Time:    10:54             Narrative:    EXAMINATION:  CTA HEAD AND NECK (XPD)    CLINICAL HISTORY:  Transient ischemic attack (TIA);intermittent difficulty with speech.  Fatigue, lightheadedness, occasional difficulty with balance    TECHNIQUE:  Non contrast low dose axial images were obtained through the head.  CT angiogram was performed from the level of the laura to the top of the head following the IV administration of 75 mL of Omnipaque 350.   Sagittal and coronal reconstructions and maximum intensity projection reconstructions were performed. Arterial stenosis percentages are based on NASCET measurement criteria.    COMPARISON:  MRI 07/13/2015    FINDINGS:  There is no evidence of hydrocephalus mass effect intracranial hemorrhage or acute territorial infarct.  The brain parenchyma overall maintains normal attenuation.    The visualized sinuses and mastoid air cells are clear.    No enhancing intracranial lesion is identified.    CTA:    There is no significant stenosis at the origin of the vessels from the aortic arch or at the origin of the vertebral arteries from the subclavian arteries.  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  The right vertebral artery takes a variant course entering the foramen transversarium at the C5 level.    There is no significant stenosis at the carotid bifurcations bilaterally by NASCET criteria with mild atherosclerotic calcifications.    Intracranially calcifications result in mild narrowing of the cavernous and supraclinoid segments.  No major branch stenosis/occlusion is identified at the tahpqy-dq-Hizsws.  The basilar artery is patent although there is mild narrowing and calcification of the distal intracranial right vertebral artery.    The venous sinuses are patent.  No evidence of aneurysm intracranially.    No soft tissue mass or adenopathy is identified in the neck.  A right paracentral protrusion at C4-5 mildly flattens the traversing cord.  Endplate osteophyte formation at C5-6 mildly flattens the traversing cord with severe bilateral foraminal stenosis.    Mild nonspecific ground-glass opacities within the visualized upper lung zones.                               X-Ray Chest PA And Lateral (Final result)  Result time 06/23/22 10:48:19    Final result by Lefty Hines III, MD (06/23/22 10:48:19)                 Impression:      No acute process seen.      Electronically signed by: Lefty Hines MD  Date:    06/23/2022  Time:    10:48             Narrative:    EXAMINATION:  XR CHEST PA AND LATERAL    CLINICAL HISTORY:  Other fatigue    FINDINGS:  Chest two views: Heart size is normal.  Lungs are clear.  There is an azygous fissure and lobe variant.  There is DJD and remote right rib fractures.                                 Medications   iohexoL (OMNIPAQUE 350) injection 75 mL (75 mLs Intravenous Given 6/23/22 1037)   sodium chloride 0.9% bolus 1,000 mL (1,000 mLs Intravenous New Bag 6/23/22 1102)     Medical Decision Making:   History:   Old Medical Records: I decided to obtain old medical records.  Clinical Tests:   Lab Tests: Ordered and  Reviewed  Radiological Study: Ordered and Reviewed  Medical Tests: Ordered and Reviewed       APC / Resident Notes:   62 y.o. year old male presenting with lightheadedness, dysphasia (resolved) and fatigue.    DDx includes but is not limited to hyperglycemia, orthostasis, TIA, arrhythmia, ACS.    ED course  Orthostatics are positive  EKG normal sinus rhythm, rate of 68  Labs are stable, negative cardiac biomarkers  CXR no acute process  CTA head and neck without significant stenosis. No masses or other acute findings.    Plan  IVFs  Reassurance  Advised PCP f/u for medication review, cardiology f/u as directed by PCP  Does not warrant additional emergency workup at this time.  I discussed the care of this patient with my supervising MD.            Attending Attestation:     Physician Attestation Statement for NP/PA:   I discussed this assessment and plan of this patient with the NP/PA, but I did not personally examine the patient. The face to face encounter was performed by the NP/PA.                       Clinical Impression:   Final diagnoses:  [R53.83] Fatigue  [I95.1] Orthostatic hypotension (Primary)  [R29.818] Transient neurological symptoms          ED Disposition Condition    Discharge Stable        ED Prescriptions     None        Follow-up Information     Follow up With Specialties Details Why Contact Info    Rocco Cavazos,  Family Medicine Schedule an appointment as soon as possible for a visit   [address] [facility]  [place] [address]  [phone]             Torie Clancy PA-C  06/23/22 1986       Guadalupe Steve MD  06/23/22 6251

## 2022-06-24 ENCOUNTER — PATIENT MESSAGE (OUTPATIENT)
Dept: FAMILY MEDICINE | Facility: CLINIC | Age: 62
End: 2022-06-24
Payer: COMMERCIAL

## 2022-06-24 ENCOUNTER — TELEPHONE (OUTPATIENT)
Dept: ADMINISTRATIVE | Facility: OTHER | Age: 62
End: 2022-06-24
Payer: COMMERCIAL

## 2022-06-27 ENCOUNTER — HOSPITAL ENCOUNTER (OUTPATIENT)
Dept: CARDIOLOGY | Facility: HOSPITAL | Age: 62
Discharge: HOME | End: 2022-06-27
Attending: FAMILY MEDICINE
Payer: COMMERCIAL

## 2022-06-27 ENCOUNTER — OFFICE VISIT (OUTPATIENT)
Dept: FAMILY MEDICINE | Facility: CLINIC | Age: 62
End: 2022-06-27
Payer: COMMERCIAL

## 2022-06-27 VITALS
HEIGHT: 71 IN | BODY MASS INDEX: 27.06 KG/M2 | DIASTOLIC BLOOD PRESSURE: 82 MMHG | WEIGHT: 193.31 LBS | OXYGEN SATURATION: 99 % | HEART RATE: 93 BPM | SYSTOLIC BLOOD PRESSURE: 122 MMHG

## 2022-06-27 DIAGNOSIS — Z79.890 LONG-TERM CURRENT USE OF TESTOSTERONE REPLACEMENT THERAPY: ICD-10-CM

## 2022-06-27 DIAGNOSIS — E78.5 DYSLIPIDEMIA: Chronic | ICD-10-CM

## 2022-06-27 DIAGNOSIS — E11.22 TYPE 2 DIABETES MELLITUS WITH STAGE 3A CHRONIC KIDNEY DISEASE, WITHOUT LONG-TERM CURRENT USE OF INSULIN: ICD-10-CM

## 2022-06-27 DIAGNOSIS — R81 GLUCOSURIA: ICD-10-CM

## 2022-06-27 DIAGNOSIS — R94.31 ABNORMAL EKG: ICD-10-CM

## 2022-06-27 DIAGNOSIS — R00.0 TACHYCARDIA: ICD-10-CM

## 2022-06-27 DIAGNOSIS — E53.8 B12 DEFICIENCY: ICD-10-CM

## 2022-06-27 DIAGNOSIS — N18.31 TYPE 2 DIABETES MELLITUS WITH STAGE 3A CHRONIC KIDNEY DISEASE, WITHOUT LONG-TERM CURRENT USE OF INSULIN: ICD-10-CM

## 2022-06-27 DIAGNOSIS — Z00.00 VISIT FOR WELL MAN HEALTH CHECK: Primary | ICD-10-CM

## 2022-06-27 DIAGNOSIS — R42 LIGHT HEADEDNESS: ICD-10-CM

## 2022-06-27 DIAGNOSIS — E55.9 VITAMIN D DEFICIENCY: ICD-10-CM

## 2022-06-27 DIAGNOSIS — N18.31 STAGE 3A CHRONIC KIDNEY DISEASE: ICD-10-CM

## 2022-06-27 PROCEDURE — 99999 PR PBB SHADOW E&M-EST. PATIENT-LVL IV: CPT | Mod: PBBFAC,,, | Performed by: FAMILY MEDICINE

## 2022-06-27 PROCEDURE — 3074F SYST BP LT 130 MM HG: CPT | Mod: CPTII,S$GLB,, | Performed by: FAMILY MEDICINE

## 2022-06-27 PROCEDURE — 3061F PR NEG MICROALBUMINURIA RESULT DOCUMENTED/REVIEW: ICD-10-PCS | Mod: CPTII,S$GLB,, | Performed by: FAMILY MEDICINE

## 2022-06-27 PROCEDURE — 93227 HOLTER MONITOR - 48 HOUR (CUPID ONLY): ICD-10-PCS | Mod: ,,, | Performed by: INTERNAL MEDICINE

## 2022-06-27 PROCEDURE — 1159F MED LIST DOCD IN RCRD: CPT | Mod: CPTII,S$GLB,, | Performed by: FAMILY MEDICINE

## 2022-06-27 PROCEDURE — 3066F NEPHROPATHY DOC TX: CPT | Mod: CPTII,S$GLB,, | Performed by: FAMILY MEDICINE

## 2022-06-27 PROCEDURE — 93227 XTRNL ECG REC<48 HR R&I: CPT | Mod: ,,, | Performed by: INTERNAL MEDICINE

## 2022-06-27 PROCEDURE — 4010F PR ACE/ARB THEARPY RXD/TAKEN: ICD-10-PCS | Mod: CPTII,S$GLB,, | Performed by: FAMILY MEDICINE

## 2022-06-27 PROCEDURE — 3066F PR DOCUMENTATION OF TREATMENT FOR NEPHROPATHY: ICD-10-PCS | Mod: CPTII,S$GLB,, | Performed by: FAMILY MEDICINE

## 2022-06-27 PROCEDURE — 1160F RVW MEDS BY RX/DR IN RCRD: CPT | Mod: CPTII,S$GLB,, | Performed by: FAMILY MEDICINE

## 2022-06-27 PROCEDURE — 3008F PR BODY MASS INDEX (BMI) DOCUMENTED: ICD-10-PCS | Mod: CPTII,S$GLB,, | Performed by: FAMILY MEDICINE

## 2022-06-27 PROCEDURE — 1159F PR MEDICATION LIST DOCUMENTED IN MEDICAL RECORD: ICD-10-PCS | Mod: CPTII,S$GLB,, | Performed by: FAMILY MEDICINE

## 2022-06-27 PROCEDURE — 1160F PR REVIEW ALL MEDS BY PRESCRIBER/CLIN PHARMACIST DOCUMENTED: ICD-10-PCS | Mod: CPTII,S$GLB,, | Performed by: FAMILY MEDICINE

## 2022-06-27 PROCEDURE — 99396 PR PREVENTIVE VISIT,EST,40-64: ICD-10-PCS | Mod: S$GLB,,, | Performed by: FAMILY MEDICINE

## 2022-06-27 PROCEDURE — 3061F NEG MICROALBUMINURIA REV: CPT | Mod: CPTII,S$GLB,, | Performed by: FAMILY MEDICINE

## 2022-06-27 PROCEDURE — 3079F PR MOST RECENT DIASTOLIC BLOOD PRESSURE 80-89 MM HG: ICD-10-PCS | Mod: CPTII,S$GLB,, | Performed by: FAMILY MEDICINE

## 2022-06-27 PROCEDURE — 4010F ACE/ARB THERAPY RXD/TAKEN: CPT | Mod: CPTII,S$GLB,, | Performed by: FAMILY MEDICINE

## 2022-06-27 PROCEDURE — 3079F DIAST BP 80-89 MM HG: CPT | Mod: CPTII,S$GLB,, | Performed by: FAMILY MEDICINE

## 2022-06-27 PROCEDURE — 3008F BODY MASS INDEX DOCD: CPT | Mod: CPTII,S$GLB,, | Performed by: FAMILY MEDICINE

## 2022-06-27 PROCEDURE — 3072F LOW RISK FOR RETINOPATHY: CPT | Mod: CPTII,S$GLB,, | Performed by: FAMILY MEDICINE

## 2022-06-27 PROCEDURE — 99999 PR PBB SHADOW E&M-EST. PATIENT-LVL IV: ICD-10-PCS | Mod: PBBFAC,,, | Performed by: FAMILY MEDICINE

## 2022-06-27 PROCEDURE — 3052F PR MOST RECENT HEMOGLOBIN A1C LEVEL 8.0 - < 9.0%: ICD-10-PCS | Mod: CPTII,S$GLB,, | Performed by: FAMILY MEDICINE

## 2022-06-27 PROCEDURE — 93226 XTRNL ECG REC<48 HR SCAN A/R: CPT

## 2022-06-27 PROCEDURE — 3072F PR LOW RISK FOR RETINOPATHY: ICD-10-PCS | Mod: CPTII,S$GLB,, | Performed by: FAMILY MEDICINE

## 2022-06-27 PROCEDURE — 99396 PREV VISIT EST AGE 40-64: CPT | Mod: S$GLB,,, | Performed by: FAMILY MEDICINE

## 2022-06-27 PROCEDURE — 3074F PR MOST RECENT SYSTOLIC BLOOD PRESSURE < 130 MM HG: ICD-10-PCS | Mod: CPTII,S$GLB,, | Performed by: FAMILY MEDICINE

## 2022-06-27 PROCEDURE — 3052F HG A1C>EQUAL 8.0%<EQUAL 9.0%: CPT | Mod: CPTII,S$GLB,, | Performed by: FAMILY MEDICINE

## 2022-06-27 RX ORDER — ERGOCALCIFEROL 1.25 MG/1
50000 CAPSULE ORAL
Qty: 12 CAPSULE | Refills: 4 | Status: ON HOLD | OUTPATIENT
Start: 2022-06-27 | End: 2023-06-29 | Stop reason: HOSPADM

## 2022-06-27 RX ORDER — LANOLIN ALCOHOL/MO/W.PET/CERES
1000 CREAM (GRAM) TOPICAL DAILY
Qty: 90 TABLET | Refills: 5 | Status: SHIPPED | OUTPATIENT
Start: 2022-06-27 | End: 2023-08-25

## 2022-06-27 NOTE — PROGRESS NOTES
Subjective:       Patient ID: Partha Curtis Jr. is a 62 y.o. male.    Chief Complaint: Annual Exam      Partha Curtis Jr. is a 62 y.o. male who presents today to establish care or for an annual exam    Has not been riding his bike, due to the heat.     Labs: ordered, reviewed today.     C-scope: Last Colonoscopy completed on 12/14/2018     Had recent episodes of orthostatic hypotension. Decreased BP medication. He went to the ER. Currently. Off of chlorthalidone. He ran out of his metoprolol 2 days ago. Hasn't taken it since then. Has continued to have low bp. Had one episode of labile HR.   Hasn't had night sweats since Friday.   In addition, I suspect his glucometer is not working. See info in the plan.     HTN: had been taking metoprolol, amlodipine, chlorthalidone, and candesartan. Currently only on candesartan 16 mg. Had been on chlorthalidone for almost a decade. Metoprolol for almost 2 years. Unclear why BP improved with no dietary changes.     DM: suspect glucometer through digital DM is not working. a1c very high. Home sugars low. On metformin and amaryl, amaryl restarted recently. No low sugars (symptoms) on this.     Abnormal EKG: seeing cardiology    CKD: slightly worse.   DLD: on crestor.   Anemia: had been thought to be due to CKD, iron studies low. Colonoscopy was normal in 2018. However, found out that patient was donating blood every 12 weeks. No black stools, no red stools. Anemia has resolved. Iron sat and ferritin is normal, was taking iron daily. Stopped at last visit in early 2021.     Has been on elavil for many years.     PMHx: reviewed in EMR and updated  Meds: reviewed in EMR and updated  Shx: reviewed in EMR and updated  FMHx:  reviewed in EMR and updated  Social:  reviewed in EMR and updated      Review of Systems   Constitutional: Positive for fatigue. Negative for chills and fever.   Respiratory: Negative for chest tightness and shortness of breath.    Cardiovascular: Negative for  chest pain and palpitations.   Gastrointestinal: Negative for diarrhea, nausea and vomiting.   Neurological: Negative for dizziness, light-headedness and headaches.         Health Maintenance Due   Topic Date Due    HIV Screening  Never done    COVID-19 Vaccine (4 - Booster for Pfizer series) 03/13/2022    Foot Exam  05/11/2022    Eye Exam  06/21/2022     Immunization History   Administered Date(s) Administered    COVID-19, MRNA, LN-S, PF (Pfizer) (Purple Cap) 02/28/2021, 03/21/2021, 11/13/2021    Influenza 01/22/2014, 09/01/2018, 08/29/2019, 10/07/2021    Influenza - Quadrivalent - MDCK - PF 09/06/2017, 09/01/2018, 08/16/2019, 08/21/2020, 10/07/2021    Influenza - Quadrivalent - PF *Preferred* (6 months and older) 11/21/2014, 03/28/2016, 11/10/2016    Influenza - Trivalent (ADULT) 01/22/2014    Influenza - Trivalent - PF (ADULT) 11/21/2014    Pneumococcal Polysaccharide - 23 Valent 08/21/2020    Tdap 11/10/2016    Zoster Recombinant 03/19/2019, 05/29/2019         Objective:     Vitals:    06/27/22 0925   BP: 122/82   Pulse: 93        Physical Exam  Constitutional:       General: He is not in acute distress.     Appearance: He is not ill-appearing, toxic-appearing or diaphoretic.   Cardiovascular:      Rate and Rhythm: Normal rate and regular rhythm.   Pulmonary:      Effort: Pulmonary effort is normal.      Breath sounds: Normal breath sounds.   Abdominal:      Palpations: Abdomen is soft.   Skin:     Findings: No rash.   Neurological:      Mental Status: He is alert.   Psychiatric:         Mood and Affect: Mood normal.         Behavior: Behavior normal.         Thought Content: Thought content normal.         Judgment: Judgment normal.         Assessment:       1. Visit for well man health check    2. Type 2 diabetes mellitus with stage 3a chronic kidney disease, without long-term current use of insulin    3. Stage 3a chronic kidney disease    4. Long-term current use of testosterone replacement  therapy    5. Dyslipidemia    6. Vitamin D deficiency    7. B12 deficiency    8. Glucosuria        Plan:         BP normal, off multiple medications  Off of metoprolol  Off of chlorthalidone  On 16 mg of candesartan  Continue for now only on candesartan 16 mg  Keep f/u with cardiology  Nuclear stress test??    Unclear what caused symptoms and why he is off of BP medications at this time  Working theory, dehydration due to uncontrolled diabetes (3+ glucose in urine) and chlorthalidone.       Home glucometer: 91  Here POCT: 110  Message sent to digital medicine, to try to get a new unit asap.     Continue metformin  Continue amaryl  CGM?  Refer to endocrine?  If a1c continues to be high, will do so.     You have low B12. Goal >400. Start daily OTC B12 supplementation at 1000 mcg     Continue Crestor     Kidney function is a little low. No NSAIDS such as ibuprofen or naproxen. Avoid Red meats. Drink a lot of water. I will continue monitoring kidney function q3-6 months.    Kidney function is a little low. No NSAIDS such as ibuprofen or naproxen. Avoid Red meats. Drink a lot of water. I will continue monitoring kidney function q3-6 months.    F/u 1 month, 3 months, labs prior to 3 month apt    Visit for well man health check    Type 2 diabetes mellitus with stage 3a chronic kidney disease, without long-term current use of insulin  -     POCT Glucose, Hand-Held Device  -     CBC Auto Differential; Future; Expected date: 06/27/2022  -     Comprehensive Metabolic Panel; Future; Expected date: 06/27/2022  -     Hemoglobin A1C; Future; Expected date: 06/27/2022  -     Ambulatory referral/consult to Optometry; Future; Expected date: 07/04/2022    Stage 3a chronic kidney disease  -     CBC Auto Differential; Future; Expected date: 06/27/2022  -     Comprehensive Metabolic Panel; Future; Expected date: 06/27/2022    Long-term current use of testosterone replacement therapy    Dyslipidemia    Vitamin D deficiency  -      ergocalciferol (ERGOCALCIFEROL) 50,000 unit Cap; Take 1 capsule (50,000 Units total) by mouth every 7 days.  Dispense: 12 capsule; Refill: 4    B12 deficiency  -     cyanocobalamin (VITAMIN B-12) 1000 MCG tablet; Take 1 tablet (1,000 mcg total) by mouth once daily.  Dispense: 90 tablet; Refill: 5  -     Vitamin B12; Future; Expected date: 06/27/2022    Glucosuria  -     Urinalysis; Future; Expected date: 06/27/2022

## 2022-06-27 NOTE — PATIENT INSTRUCTIONS
BP normal, off multiple medications  Off of metoprolol  Off of chlorthalidone  On 16 mg of candesartan  Continue for now only on candesartan 16 mg  Keep f/u with cardiology  Nuclear stress test??    Unclear what caused symptoms and why he is off of BP medications at this time  Working theory, dehydration due to uncontrolled diabetes (3+ glucose in urine) and chlorthalidone.       Home glucometer: 91  Here POCT: 110  Message sent to digital medicine, to try to get a new unit asap.     Continue metformin  Continue amaryl  CGM?  Refer to endocrine?  If a1c continues to be high, will do so.     You have low B12. Goal >400. Start daily OTC B12 supplementation at 1000 mcg     Continue Crestor     Kidney function is a little low. No NSAIDS such as ibuprofen or naproxen. Avoid Red meats. Drink a lot of water. I will continue monitoring kidney function q3-6 months.    F/u 1 month, 3 months, labs prior to 3 month apt    Lab Results   Component Value Date    HGBA1C 8.8 (H) 06/21/2022    HGBA1C 7.9 (H) 01/28/2022    HGBA1C 7.5 (H) 10/01/2021         Diabetes Management Status    Statin: Taking  ACE/ARB: Taking    Screening or Prevention Patient's value Goal Complete/Controlled?   HgA1C Testing and Control   Lab Results   Component Value Date    HGBA1C 8.8 (H) 06/21/2022      Annually/Less than 8% No     Lipid profile : 06/21/2022 Annually Yes     LDL control Lab Results   Component Value Date    LDLCALC 30.0 (L) 06/21/2022    Annually/Less than 100 mg/dl  Yes     Nephropathy screening Lab Results   Component Value Date    LABMICR <5.0 06/21/2022     Lab Results   Component Value Date    PROTEINUA Negative 06/21/2022     Lab Results   Component Value Date    UTPCR Unable to calculate 07/07/2020      Annually Yes     Blood pressure BP Readings from Last 1 Encounters:   06/27/22 122/82    Less than 140/90 Yes     Dilated retinal exam : 06/21/2021 Annually No     Foot exam   : 05/11/2021 Annually No

## 2022-06-29 ENCOUNTER — PATIENT MESSAGE (OUTPATIENT)
Dept: FAMILY MEDICINE | Facility: CLINIC | Age: 62
End: 2022-06-29
Payer: COMMERCIAL

## 2022-06-29 ENCOUNTER — OFFICE VISIT (OUTPATIENT)
Dept: CARDIOLOGY | Facility: CLINIC | Age: 62
End: 2022-06-29
Payer: COMMERCIAL

## 2022-06-29 VITALS
OXYGEN SATURATION: 98 % | BODY MASS INDEX: 27.06 KG/M2 | HEART RATE: 104 BPM | SYSTOLIC BLOOD PRESSURE: 140 MMHG | WEIGHT: 194 LBS | DIASTOLIC BLOOD PRESSURE: 85 MMHG

## 2022-06-29 DIAGNOSIS — N18.31 TYPE 2 DIABETES MELLITUS WITH STAGE 3A CHRONIC KIDNEY DISEASE, WITHOUT LONG-TERM CURRENT USE OF INSULIN: ICD-10-CM

## 2022-06-29 DIAGNOSIS — I10 ESSENTIAL HYPERTENSION: ICD-10-CM

## 2022-06-29 DIAGNOSIS — R94.31 ABNORMAL ELECTROCARDIOGRAM: ICD-10-CM

## 2022-06-29 DIAGNOSIS — N18.31 STAGE 3A CHRONIC KIDNEY DISEASE: ICD-10-CM

## 2022-06-29 DIAGNOSIS — E78.5 DYSLIPIDEMIA: Chronic | ICD-10-CM

## 2022-06-29 DIAGNOSIS — Z98.890 S/P CAROTID ENDARTERECTOMY: ICD-10-CM

## 2022-06-29 DIAGNOSIS — R94.31 ABNORMAL EKG: ICD-10-CM

## 2022-06-29 DIAGNOSIS — R00.0 TACHYCARDIA: ICD-10-CM

## 2022-06-29 DIAGNOSIS — E11.22 TYPE 2 DIABETES MELLITUS WITH STAGE 3A CHRONIC KIDNEY DISEASE, WITHOUT LONG-TERM CURRENT USE OF INSULIN: ICD-10-CM

## 2022-06-29 DIAGNOSIS — R42 LIGHT HEADEDNESS: ICD-10-CM

## 2022-06-29 PROCEDURE — 93000 ELECTROCARDIOGRAM COMPLETE: CPT | Mod: S$GLB,,, | Performed by: INTERNAL MEDICINE

## 2022-06-29 PROCEDURE — 3052F HG A1C>EQUAL 8.0%<EQUAL 9.0%: CPT | Mod: CPTII,S$GLB,, | Performed by: INTERNAL MEDICINE

## 2022-06-29 PROCEDURE — 4010F PR ACE/ARB THEARPY RXD/TAKEN: ICD-10-PCS | Mod: CPTII,S$GLB,, | Performed by: INTERNAL MEDICINE

## 2022-06-29 PROCEDURE — 99214 OFFICE O/P EST MOD 30 MIN: CPT | Mod: S$GLB,,, | Performed by: INTERNAL MEDICINE

## 2022-06-29 PROCEDURE — 3079F PR MOST RECENT DIASTOLIC BLOOD PRESSURE 80-89 MM HG: ICD-10-PCS | Mod: CPTII,S$GLB,, | Performed by: INTERNAL MEDICINE

## 2022-06-29 PROCEDURE — 4010F ACE/ARB THERAPY RXD/TAKEN: CPT | Mod: CPTII,S$GLB,, | Performed by: INTERNAL MEDICINE

## 2022-06-29 PROCEDURE — 3079F DIAST BP 80-89 MM HG: CPT | Mod: CPTII,S$GLB,, | Performed by: INTERNAL MEDICINE

## 2022-06-29 PROCEDURE — 93000 EKG 12-LEAD: ICD-10-PCS | Mod: S$GLB,,, | Performed by: INTERNAL MEDICINE

## 2022-06-29 PROCEDURE — 3077F SYST BP >= 140 MM HG: CPT | Mod: CPTII,S$GLB,, | Performed by: INTERNAL MEDICINE

## 2022-06-29 PROCEDURE — 1160F PR REVIEW ALL MEDS BY PRESCRIBER/CLIN PHARMACIST DOCUMENTED: ICD-10-PCS | Mod: CPTII,S$GLB,, | Performed by: INTERNAL MEDICINE

## 2022-06-29 PROCEDURE — 3008F BODY MASS INDEX DOCD: CPT | Mod: CPTII,S$GLB,, | Performed by: INTERNAL MEDICINE

## 2022-06-29 PROCEDURE — 3061F NEG MICROALBUMINURIA REV: CPT | Mod: CPTII,S$GLB,, | Performed by: INTERNAL MEDICINE

## 2022-06-29 PROCEDURE — 99214 PR OFFICE/OUTPT VISIT, EST, LEVL IV, 30-39 MIN: ICD-10-PCS | Mod: S$GLB,,, | Performed by: INTERNAL MEDICINE

## 2022-06-29 PROCEDURE — 3077F PR MOST RECENT SYSTOLIC BLOOD PRESSURE >= 140 MM HG: ICD-10-PCS | Mod: CPTII,S$GLB,, | Performed by: INTERNAL MEDICINE

## 2022-06-29 PROCEDURE — 1160F RVW MEDS BY RX/DR IN RCRD: CPT | Mod: CPTII,S$GLB,, | Performed by: INTERNAL MEDICINE

## 2022-06-29 PROCEDURE — 1159F PR MEDICATION LIST DOCUMENTED IN MEDICAL RECORD: ICD-10-PCS | Mod: CPTII,S$GLB,, | Performed by: INTERNAL MEDICINE

## 2022-06-29 PROCEDURE — 3072F LOW RISK FOR RETINOPATHY: CPT | Mod: CPTII,S$GLB,, | Performed by: INTERNAL MEDICINE

## 2022-06-29 PROCEDURE — 3072F PR LOW RISK FOR RETINOPATHY: ICD-10-PCS | Mod: CPTII,S$GLB,, | Performed by: INTERNAL MEDICINE

## 2022-06-29 PROCEDURE — 3008F PR BODY MASS INDEX (BMI) DOCUMENTED: ICD-10-PCS | Mod: CPTII,S$GLB,, | Performed by: INTERNAL MEDICINE

## 2022-06-29 PROCEDURE — 99999 PR PBB SHADOW E&M-EST. PATIENT-LVL V: CPT | Mod: PBBFAC,,, | Performed by: INTERNAL MEDICINE

## 2022-06-29 PROCEDURE — 1159F MED LIST DOCD IN RCRD: CPT | Mod: CPTII,S$GLB,, | Performed by: INTERNAL MEDICINE

## 2022-06-29 PROCEDURE — 3052F PR MOST RECENT HEMOGLOBIN A1C LEVEL 8.0 - < 9.0%: ICD-10-PCS | Mod: CPTII,S$GLB,, | Performed by: INTERNAL MEDICINE

## 2022-06-29 PROCEDURE — 3061F PR NEG MICROALBUMINURIA RESULT DOCUMENTED/REVIEW: ICD-10-PCS | Mod: CPTII,S$GLB,, | Performed by: INTERNAL MEDICINE

## 2022-06-29 PROCEDURE — 3066F PR DOCUMENTATION OF TREATMENT FOR NEPHROPATHY: ICD-10-PCS | Mod: CPTII,S$GLB,, | Performed by: INTERNAL MEDICINE

## 2022-06-29 PROCEDURE — 99999 PR PBB SHADOW E&M-EST. PATIENT-LVL V: ICD-10-PCS | Mod: PBBFAC,,, | Performed by: INTERNAL MEDICINE

## 2022-06-29 PROCEDURE — 3066F NEPHROPATHY DOC TX: CPT | Mod: CPTII,S$GLB,, | Performed by: INTERNAL MEDICINE

## 2022-06-29 NOTE — PROGRESS NOTES
[facility]     Subjective:    Patient ID:  Partha Curtis Jr. is a 62 y.o. male who presents for evaluation of Hypertension, Carotid Artery Disease, Diabetes Mellitus, Hyperlipidemia, and Dizziness    Review of patient's allergies indicates:   Allergen Reactions    Stadol [butorphanol tartrate] Rash     Swelling in face    Strawberries [strawberry] Rash      He recently had a significant change in blood pressure medication requirement.  He used to be on 3 meds and cut 2 of them out completely, candesartan and reduced from 32 to 16 mg daily.  He presented with low blood pressure readings and orthostatic changes.  He is feeling much better.  He does notice that his heart rates now are in the 100 range at rest, he even had heart rates in the 100 range as before he discontinued metoprolol.  He was taking Toprol 100 mg daily, he stopped couple of days ago.  Today's blood pressure 140/85 mm Hg.    He had 2 Electrocardiograms in June.  Some of his Electrocardiograms have shown poor R-wave progression suggestive of anterior wall MI.  His most recent Electrocardiogram showed stable R-wave progression.  In 2018 he had a stress echo showing normal wall motion negative for ischemia.  A recent Holter was placed, results are pending.  He is diabetic.      He is on treatment for mixed hyperlipidemia with LDL well controlled at 30 mg%.  He does have persistent low HDL 24 mg%, triglycerides 280 mg% his last hemoglobin A1c was 8.8.  He bicycles for exercise.  He does have renal insufficiency.      He feels okay at this time with his new regimen of blood pressure therapy.  He did have right-sided endarterectomy 2015 for 99% stenosis.  His most recent visit in the ED had repeat CTA neck which showed no obstructive carotid disease.  He has 1-39% left-sided disease, no right sided disease assessed by Doppler by recent ultrasound.     Today's  electrocardiogram confirms heart rate 101, low-voltage QRS, poor R-wave progression, cannot rule out anteroseptal infarct age undetermined.  Abnormal Electrocardiogram.        Review of Systems   Constitutional: Negative for chills, decreased appetite, diaphoresis, fever, malaise/fatigue, night sweats, weight gain and weight loss.   HENT: Positive for hearing loss. Negative for congestion, ear discharge, ear pain, hoarse voice, nosebleeds, odynophagia, sore throat, stridor and tinnitus.    Eyes: Negative for blurred vision, discharge, double vision, pain, photophobia, redness, vision loss in left eye, vision loss in right eye, visual disturbance and visual halos.   Cardiovascular: Negative for chest pain, claudication, cyanosis, dyspnea on exertion, irregular heartbeat, leg swelling, near-syncope, orthopnea, palpitations, paroxysmal nocturnal dyspnea and syncope.   Respiratory: Negative for cough, hemoptysis, shortness of breath, sleep disturbances due to breathing, snoring, sputum production and wheezing.    Endocrine: Negative for cold intolerance, heat intolerance, polydipsia, polyphagia and polyuria.   Hematologic/Lymphatic: Negative for adenopathy and bleeding problem. Does not bruise/bleed easily.   Skin: Negative for color change, dry skin, flushing, itching, nail changes, poor wound healing, rash, skin cancer, suspicious lesions and unusual hair distribution.   Musculoskeletal: Positive for back pain and neck pain. Negative for arthritis, falls, gout, joint pain, joint swelling, muscle cramps, muscle weakness, myalgias and stiffness.   Gastrointestinal: Negative for bloating, abdominal pain, anorexia, change in bowel habit, bowel incontinence, constipation, diarrhea, dysphagia, excessive appetite, flatus, heartburn, hematemesis, hematochezia, hemorrhoids, jaundice, melena, nausea and vomiting.   Genitourinary: Negative for bladder incontinence, decreased libido, dysuria, flank pain, frequency, genital sores,  hematuria, hesitancy, incomplete emptying, nocturia and urgency.   Neurological: Positive for dizziness. Negative for aphonia, brief paralysis, difficulty with concentration, disturbances in coordination, excessive daytime sleepiness, focal weakness, headaches, light-headedness, loss of balance, numbness, paresthesias, seizures, sensory change, tremors, vertigo and weakness.   Psychiatric/Behavioral: Negative for altered mental status, depression, hallucinations, memory loss, substance abuse, suicidal ideas and thoughts of violence. The patient does not have insomnia and is not nervous/anxious.    Allergic/Immunologic: Negative for hives and persistent infections.        Objective:       Vitals:    06/29/22 1109   BP: (!) 140/85   BP Location: Left arm   Pulse: 104   SpO2: 98%   Weight: 88 kg (194 lb 0.1 oz)    Physical Exam  Constitutional:       General: He is not in acute distress.     Appearance: He is well-developed. He is not diaphoretic.   HENT:      Head: Normocephalic and atraumatic.      Nose: Nose normal.   Eyes:      General: No scleral icterus.        Right eye: No discharge.      Conjunctiva/sclera: Conjunctivae normal.      Pupils: Pupils are equal, round, and reactive to light.   Neck:      Thyroid: No thyromegaly.      Vascular: No JVD.      Trachea: No tracheal deviation.   Cardiovascular:      Rate and Rhythm: Regular rhythm. Tachycardia present.      Pulses:           Carotid pulses are 2+ on the right side and 2+ on the left side.       Radial pulses are 2+ on the right side and 2+ on the left side.        Dorsalis pedis pulses are 2+ on the right side and 2+ on the left side.        Posterior tibial pulses are 2+ on the right side and 2+ on the left side.      Heart sounds: Normal heart sounds. No murmur heard.    No friction rub. No gallop.   Pulmonary:      Effort: Pulmonary effort is normal. No respiratory distress.      Breath sounds: Normal breath sounds. No stridor. No wheezing or rales.    Chest:      Chest wall: No tenderness.   Abdominal:      General: Bowel sounds are normal. There is no distension.      Palpations: Abdomen is soft. There is no mass.      Tenderness: There is no abdominal tenderness. There is no guarding or rebound.   Musculoskeletal:         General: No tenderness. Normal range of motion.      Cervical back: Normal range of motion and neck supple.   Lymphadenopathy:      Cervical: No cervical adenopathy.   Skin:     General: Skin is warm and dry.      Coloration: Skin is not pale.      Findings: No erythema or rash.   Neurological:      Mental Status: He is alert and oriented to person, place, and time.      Cranial Nerves: No cranial nerve deficit.      Coordination: Coordination normal.   Psychiatric:         Behavior: Behavior normal.         Thought Content: Thought content normal.         Judgment: Judgment normal.           Assessment:       1. Abnormal EKG    2. Tachycardia    3. Light headedness    4. Stage 3a chronic kidney disease    5. S/P carotid endarterectomy    6. Essential hypertension    7. Dyslipidemia    8. Type 2 diabetes mellitus with stage 3a chronic kidney disease, without long-term current use of insulin    9. Abnormal electrocardiogram      Results for orders placed or performed during the hospital encounter of 06/23/22   CBC auto differential   Result Value Ref Range    WBC 8.36 3.90 - 12.70 K/uL    RBC 5.11 4.60 - 6.20 M/uL    Hemoglobin 15.8 14.0 - 18.0 g/dL    Hematocrit 46.7 40.0 - 54.0 %    MCV 91 82 - 98 fL    MCH 30.9 27.0 - 31.0 pg    MCHC 33.8 32.0 - 36.0 g/dL    RDW 14.0 11.5 - 14.5 %    Platelets 276 150 - 450 K/uL    MPV 9.1 (L) 9.2 - 12.9 fL    Immature Granulocytes 0.2 0.0 - 0.5 %    Gran # (ANC) 4.9 1.8 - 7.7 K/uL    Immature Grans (Abs) 0.02 0.00 - 0.04 K/uL    Lymph # 2.3 1.0 - 4.8 K/uL    Mono # 0.8 0.3 - 1.0 K/uL    Eos # 0.3 0.0 - 0.5 K/uL    Baso # 0.04 0.00 - 0.20 K/uL    nRBC 0 0 /100 WBC    Gran % 58.6 38.0 - 73.0 %    Lymph %  "27.2 18.0 - 48.0 %    Mono % 9.7 4.0 - 15.0 %    Eosinophil % 3.8 0.0 - 8.0 %    Basophil % 0.5 0.0 - 1.9 %    Differential Method Automated    Comprehensive metabolic panel   Result Value Ref Range    Sodium 138 136 - 145 mmol/L    Potassium 3.5 3.5 - 5.1 mmol/L    Chloride 99 95 - 110 mmol/L    CO2 28 23 - 29 mmol/L    Glucose 149 (H) 70 - 110 mg/dL    BUN 17 8 - 23 mg/dL    Creatinine 1.4 0.5 - 1.4 mg/dL    Calcium 9.7 8.7 - 10.5 mg/dL    Total Protein 7.1 6.0 - 8.4 g/dL    Albumin 4.1 3.5 - 5.2 g/dL    Total Bilirubin 0.5 0.1 - 1.0 mg/dL    Alkaline Phosphatase 56 55 - 135 U/L    AST 31 10 - 40 U/L    ALT 40 10 - 44 U/L    Anion Gap 11 8 - 16 mmol/L    eGFR if African American >60.0 >60 mL/min/1.73 m^2    eGFR if non  53.5 (A) >60 mL/min/1.73 m^2   Troponin I   Result Value Ref Range    Troponin I 0.009 0.000 - 0.026 ng/mL   Brain natriuretic peptide   Result Value Ref Range    BNP <10 0 - 99 pg/mL   POCT glucose   Result Value Ref Range    POCT Glucose 167 (H) 70 - 110 mg/dL         Current Outpatient Medications:     amitriptyline (ELAVIL) 25 MG tablet, Take 75 mg by mouth nightly as needed for Insomnia., Disp: , Rfl:     aspirin (ECOTRIN) 81 MG EC tablet, Take 325 mg by mouth once daily. , Disp: , Rfl:     azelastine (ASTELIN) 137 mcg (0.1 %) nasal spray, 1 spray (137 mcg total) by Nasal route 2 (two) times daily., Disp: 30 mL, Rfl: 11    BD INTEGRA SYRINGE 3 mL 22 gauge x 1 1/2" Syrg, USE TO INJECT 1 ML INTO THE MUSCLE EVERY 14 DAYS, Disp: 5 Syringe, Rfl: 3    BD LUER-RUSS SYRINGE 3 mL 25 gauge x 1" Syrg, USE ONE SYRINGE EVERY 14 DAYS WITH TESTOSTERONE, Disp: , Rfl:     candesartan (ATACAND) 16 MG tablet, Take 1 tablet (16 mg total) by mouth once daily., Disp: , Rfl:     cyanocobalamin (VITAMIN B-12) 1000 MCG tablet, Take 1 tablet (1,000 mcg total) by mouth once daily., Disp: 90 tablet, Rfl: 5    ergocalciferol (ERGOCALCIFEROL) 50,000 unit Cap, Take 1 capsule (50,000 Units total) by " mouth every 7 days., Disp: 12 capsule, Rfl: 4    gabapentin (NEURONTIN) 800 MG tablet, Take 1 tablet (800 mg total) by mouth every evening., Disp: 90 tablet, Rfl: 1    glimepiride (AMARYL) 4 MG tablet, Take 1 tablet (4 mg total) by mouth before breakfast., Disp: 90 tablet, Rfl: 3    metFORMIN (GLUCOPHAGE-XR) 500 MG ER 24hr tablet, TAKE ONE TABLET BY MOUTH DAILY  WITH BREAKFAST AND TWO TABLETS DAILY WITH DINNER OR EVENING MEAL, Disp: 270 tablet, Rfl: 1    oxyCODONE-acetaminophen (PERCOCET) 5-325 mg per tablet, Take 1 tablet by mouth 2 (two) times daily as needed., Disp: , Rfl:     rosuvastatin (CRESTOR) 40 MG Tab, TAKE 1 TABLET BY MOUTH ONCE DAILY IN THE EVENING, Disp: 90 tablet, Rfl: 0    testosterone cypionate (DEPOTESTOTERONE CYPIONATE) 200 mg/mL injection, INJECT 1 ML INTRAMUSCULARLY  EVERY TWO WEEKS, Disp: 6 mL, Rfl: 5    valACYclovir (VALTREX) 1000 MG tablet, Take 2 tablets (2,000 mg total) by mouth every 12 (twelve) hours. for 1 day, Disp: 4 tablet, Rfl: 0     Lab Results   Component Value Date    WBC 8.36 06/23/2022    RBC 5.11 06/23/2022    HGB 15.8 06/23/2022    HCT 46.7 06/23/2022    MCV 91 06/23/2022    MCH 30.9 06/23/2022    MCHC 33.8 06/23/2022    RDW 14.0 06/23/2022     06/23/2022    MPV 9.1 (L) 06/23/2022    GRAN 4.9 06/23/2022    GRAN 58.6 06/23/2022    LYMPH 2.3 06/23/2022    LYMPH 27.2 06/23/2022    MONO 0.8 06/23/2022    MONO 9.7 06/23/2022    EOS 0.3 06/23/2022    BASO 0.04 06/23/2022    EOSINOPHIL 3.8 06/23/2022    BASOPHIL 0.5 06/23/2022    MG 1.9 06/21/2022        CMP  Lab Results   Component Value Date     06/23/2022    K 3.5 06/23/2022    CL 99 06/23/2022    CO2 28 06/23/2022     (H) 06/23/2022    BUN 17 06/23/2022    CREATININE 1.4 06/23/2022    CALCIUM 9.7 06/23/2022    PROT 7.1 06/23/2022    ALBUMIN 4.1 06/23/2022    BILITOT 0.5 06/23/2022    ALKPHOS 56 06/23/2022    AST 31 06/23/2022    ALT 40 06/23/2022    ANIONGAP 11 06/23/2022    ESTGFRAFRICA >60.0  06/23/2022    EGFRNONAA 53.5 (A) 06/23/2022        Lab Results   Component Value Date    LABURIN No significant growth 06/21/2022            Results for orders placed or performed during the hospital encounter of 06/23/22   EKG 12-lead    Collection Time: 06/23/22  9:59 AM    Narrative    Test Reason : R53.83,    Vent. Rate : 068 BPM     Atrial Rate : 068 BPM     P-R Int : 166 ms          QRS Dur : 086 ms      QT Int : 396 ms       P-R-T Axes : 046 026 016 degrees     QTc Int : 421 ms    Normal sinus rhythm  Cannot rule out Septal infarct ,age undetermined  Abnormal ECG  When compared with ECG of 21-JUN-2022 16:00,  No significant change was found  Confirmed by Alexandru Hilton MD ([id number]) on 6/23/2022 1:50:17 PM    Referred By: MATT   SELF           Confirmed By:Alexandru Hilton MD        Holter monitor - 48 hour 06/27/2022 [id number] Exam Ended   CTA Head and Neck (xpd) 06/23/2022 [id number] Final   X-Ray Chest PA And Lateral 06/23/2022 [id number] Final            Plan:       Problem List Items Addressed This Visit        Cardiac/Vascular    Dyslipidemia (Chronic)     He has mixed hyperlipidemia current LDL 30, HDL 24, triglycerides 280 mg%.  He is on Crestor 40 mg per day.  Condition stable.           Essential hypertension     Atacand 16 mg to continue.  Blood pressure today 140/85 mm Hg.  Likely, he will require increased antihypertensive therapy most likely in the near future.    Clearly his requirements for blood pressure control are changing.  The change is not well explained.           S/P carotid endarterectomy     Right-sided surgery 2015 with neurologic deficits but no major stroke.  Condition stable.           Abnormal electrocardiogram     Today's Electrocardiogram does support poor R-wave progression and a pattern of anterior wall MI. His previous echo in 2018 showed normal wall motion.  Repeat echo ordered.              Renal/    Stage 3a chronic kidney disease     He is diabetic.  GFR 54, serum creatinine 1.4.   His urinalysis is negative for protein.              Endocrine    Type 2 diabetes mellitus with stage 3 chronic kidney disease, without long-term current use of insulin     Most recent hemoglobin A1c 8.8.  He is on Amaryl.             Other Visit Diagnoses     Abnormal EKG        Relevant Orders    Echo    Tachycardia        Relevant Orders    EKG 12-lead    Light headedness                 I told the patient that likely he would return to more antihypertensive therapy.  Similar to your assessment, I do not see any explanation for why his readings on blood pressure now require less treatment.  All his recent labs looked stable.  He has had low blood pressure readings for too extensive of a time to be related to volume depletion.    I will give him phone reports of the echo ordered today.  Given his stable exercise tolerance and previous normal stress test 2018, negative for ischemia, provided his echo looks normal I do not advise a repeat stress test currently.  I advised a 3 month follow-up.      Thank you for allowing me to participate in your patient's care.      Alexandru Mario MD  06/30/2022   12:06 PM

## 2022-06-30 PROBLEM — R94.31 ABNORMAL ELECTROCARDIOGRAM: Status: ACTIVE | Noted: 2022-06-30

## 2022-06-30 NOTE — ASSESSMENT & PLAN NOTE
Atacand 16 mg to continue.  Blood pressure today 140/85 mm Hg.  Likely, he will require increased antihypertensive therapy most likely in the near future.    Clearly his requirements for blood pressure control are changing.  The change is not well explained.

## 2022-06-30 NOTE — ASSESSMENT & PLAN NOTE
Today's Electrocardiogram does support poor R-wave progression and a pattern of anterior wall MI. His previous echo in 2018 showed normal wall motion.  Repeat echo ordered.

## 2022-06-30 NOTE — ASSESSMENT & PLAN NOTE
He has mixed hyperlipidemia current LDL 30, HDL 24, triglycerides 280 mg%.  He is on Crestor 40 mg per day.  Condition stable.

## 2022-07-01 LAB
OHS CV EVENT MONITOR DAY: 0
OHS CV HOLTER LENGTH DECIMAL HOURS: 47.98
OHS CV HOLTER LENGTH HOURS: 47
OHS CV HOLTER LENGTH MINUTES: 59
OHS CV HOLTER SINUS AVERAGE HR: 93
OHS CV HOLTER SINUS MAX HR: 156
OHS CV HOLTER SINUS MIN HR: 54

## 2022-07-05 ENCOUNTER — TELEPHONE (OUTPATIENT)
Dept: FAMILY MEDICINE | Facility: CLINIC | Age: 62
End: 2022-07-05
Payer: COMMERCIAL

## 2022-07-05 DIAGNOSIS — I47.19 ATRIAL TACHYCARDIA: Primary | ICD-10-CM

## 2022-07-07 ENCOUNTER — PATIENT MESSAGE (OUTPATIENT)
Dept: FAMILY MEDICINE | Facility: CLINIC | Age: 62
End: 2022-07-07
Payer: COMMERCIAL

## 2022-07-07 ENCOUNTER — HOSPITAL ENCOUNTER (OUTPATIENT)
Dept: CARDIOLOGY | Facility: HOSPITAL | Age: 62
Discharge: HOME | End: 2022-07-07
Attending: INTERNAL MEDICINE
Payer: COMMERCIAL

## 2022-07-07 VITALS — WEIGHT: 194 LBS | BODY MASS INDEX: 27.16 KG/M2 | HEIGHT: 71 IN

## 2022-07-07 DIAGNOSIS — R94.31 ABNORMAL EKG: ICD-10-CM

## 2022-07-07 LAB
AORTIC ROOT ANNULUS: 3.5 CM
ASCENDING AORTA: 3.17 CM
AV INDEX (PROSTH): 0.79
AV MEAN GRADIENT: 5 MMHG
AV PEAK GRADIENT: 8 MMHG
AV VALVE AREA: 2.92 CM2
AV VELOCITY RATIO: 0.81
BSA FOR ECHO PROCEDURE: 2.1 M2
CV ECHO LV RWT: 0.44 CM
DOP CALC AO PEAK VEL: 1.41 M/S
DOP CALC AO VTI: 29.99 CM
DOP CALC LVOT AREA: 3.7 CM2
DOP CALC LVOT DIAMETER: 2.17 CM
DOP CALC LVOT PEAK VEL: 1.14 M/S
DOP CALC LVOT STROKE VOLUME: 87.61 CM3
DOP CALC MV VTI: 22.43 CM
DOP CALCLVOT PEAK VEL VTI: 23.7 CM
E WAVE DECELERATION TIME: 220.1 MSEC
E/A RATIO: 0.8
E/E' RATIO: 11.38 M/S
ECHO LV POSTERIOR WALL: 1 CM (ref 0.6–1.1)
EJECTION FRACTION: 65 %
FRACTIONAL SHORTENING: 60 % (ref 28–44)
INTERVENTRICULAR SEPTUM: 1 CM (ref 0.6–1.1)
LA MAJOR: 4.38 CM
LA MINOR: 4.3 CM
LA WIDTH: 3.57 CM
LEFT ATRIUM SIZE: 3.08 CM
LEFT ATRIUM VOLUME INDEX MOD: 13.1 ML/M2
LEFT ATRIUM VOLUME INDEX: 19.5 ML/M2
LEFT ATRIUM VOLUME MOD: 27.25 CM3
LEFT ATRIUM VOLUME: 40.56 CM3
LEFT INTERNAL DIMENSION IN SYSTOLE: 1.8 CM (ref 2.1–4)
LEFT VENTRICLE DIASTOLIC VOLUME INDEX: 37.43 ML/M2
LEFT VENTRICLE DIASTOLIC VOLUME: 77.86 ML
LEFT VENTRICLE MASS INDEX: 74 G/M2
LEFT VENTRICLE SYSTOLIC VOLUME INDEX: 8.9 ML/M2
LEFT VENTRICLE SYSTOLIC VOLUME: 18.59 ML
LEFT VENTRICULAR INTERNAL DIMENSION IN DIASTOLE: 4.5 CM (ref 3.5–6)
LEFT VENTRICULAR MASS: 153.27 G
LV LATERAL E/E' RATIO: 10.57 M/S
LV SEPTAL E/E' RATIO: 12.33 M/S
MV MEAN GRADIENT: 1 MMHG
MV PEAK A VEL: 0.93 M/S
MV PEAK E VEL: 0.74 M/S
MV PEAK GRADIENT: 3 MMHG
MV STENOSIS PRESSURE HALF TIME: 63.83 MS
MV VALVE AREA BY CONTINUITY EQUATION: 3.91 CM2
MV VALVE AREA P 1/2 METHOD: 3.45 CM2
PV PEAK VELOCITY: 1.23 CM/S
RA MAJOR: 4.15 CM
RA PRESSURE: 3 MMHG
RA WIDTH: 2.85 CM
RIGHT VENTRICULAR END-DIASTOLIC DIMENSION: 3.91 CM
RV TISSUE DOPPLER FREE WALL SYSTOLIC VELOCITY 1 (APICAL 4 CHAMBER VIEW): 17.77 CM/S
STJ: 3.06 CM
TDI LATERAL: 0.07 M/S
TDI SEPTAL: 0.06 M/S
TDI: 0.07 M/S
TRICUSPID ANNULAR PLANE SYSTOLIC EXCURSION: 2.68 CM

## 2022-07-07 PROCEDURE — 93306 TTE W/DOPPLER COMPLETE: CPT | Mod: 26,,, | Performed by: INTERNAL MEDICINE

## 2022-07-07 PROCEDURE — 93306 TTE W/DOPPLER COMPLETE: CPT

## 2022-07-07 PROCEDURE — 93306 ECHO (CUPID ONLY): ICD-10-PCS | Mod: 26,,, | Performed by: INTERNAL MEDICINE

## 2022-07-08 ENCOUNTER — PATIENT MESSAGE (OUTPATIENT)
Dept: CARDIOLOGY | Facility: HOSPITAL | Age: 62
End: 2022-07-08
Payer: COMMERCIAL

## 2022-07-10 ENCOUNTER — PATIENT MESSAGE (OUTPATIENT)
Dept: FAMILY MEDICINE | Facility: CLINIC | Age: 62
End: 2022-07-10
Payer: COMMERCIAL

## 2022-07-11 ENCOUNTER — TELEPHONE (OUTPATIENT)
Dept: ELECTROPHYSIOLOGY | Facility: CLINIC | Age: 62
End: 2022-07-11
Payer: COMMERCIAL

## 2022-07-11 RX ORDER — METOPROLOL SUCCINATE 100 MG/1
100 TABLET, EXTENDED RELEASE ORAL DAILY
Qty: 90 TABLET | Refills: 0 | Status: SHIPPED | OUTPATIENT
Start: 2022-07-11 | End: 2022-07-26 | Stop reason: SDUPTHER

## 2022-07-11 NOTE — TELEPHONE ENCOUNTER
Spoke with pt to ask new patient questions. No outside records or device. Informed pt of department move. Time and date confirmed

## 2022-07-12 ENCOUNTER — PATIENT MESSAGE (OUTPATIENT)
Dept: ADMINISTRATIVE | Facility: HOSPITAL | Age: 62
End: 2022-07-12
Payer: COMMERCIAL

## 2022-07-12 ENCOUNTER — PATIENT OUTREACH (OUTPATIENT)
Dept: ADMINISTRATIVE | Facility: HOSPITAL | Age: 62
End: 2022-07-12
Payer: COMMERCIAL

## 2022-07-12 NOTE — PROGRESS NOTES
2022 Care Everywhere updates requested and reviewed.  Immunizations reconciled. Media reports reviewed.  Duplicate HM overrides and  orders removed.  Overdue HM topic chart audit and/or requested.  Overdue lab testing linked to upcoming lab appointments if applies.Patient outreach regarding Health Maintenance- Portal outreached regarding Health maintenance     Health Maintenance Due   Topic Date Due    HIV Screening  Never done    Pneumococcal Vaccines (Age 0-64) (2 - PCV) 2021    COVID-19 Vaccine (4 - Booster for Pfizer series) 2022    Foot Exam  2022    Eye Exam  2022

## 2022-07-15 DIAGNOSIS — R00.0 TACHYCARDIA: Primary | ICD-10-CM

## 2022-07-23 DIAGNOSIS — I10 ESSENTIAL HYPERTENSION: ICD-10-CM

## 2022-07-23 DIAGNOSIS — E78.5 DYSLIPIDEMIA: Chronic | ICD-10-CM

## 2022-07-23 NOTE — TELEPHONE ENCOUNTER
No new care gaps identified.  [facility] Embedded Care Gaps. Reference number: [id number]. 7/23/2022   8:11:56 AM FLEXT

## 2022-07-23 NOTE — TELEPHONE ENCOUNTER
No new care gaps identified.  [facility] Embedded Care Gaps. Reference number: [id number]. 7/23/2022   8:12:32 AM CDT

## 2022-07-25 RX ORDER — CANDESARTAN 32 MG/1
TABLET ORAL
Qty: 90 TABLET | Refills: 0 | OUTPATIENT
Start: 2022-07-25

## 2022-07-25 RX ORDER — CANDESARTAN 16 MG/1
16 TABLET ORAL DAILY
Qty: 90 TABLET | Refills: 0 | Status: SHIPPED | OUTPATIENT
Start: 2022-07-25 | End: 2022-07-26 | Stop reason: SDUPTHER

## 2022-07-25 RX ORDER — ROSUVASTATIN CALCIUM 40 MG/1
TABLET, COATED ORAL
Qty: 90 TABLET | Refills: 3 | Status: SHIPPED | OUTPATIENT
Start: 2022-07-25 | End: 2023-02-01 | Stop reason: SDUPTHER

## 2022-07-25 RX ORDER — CANDESARTAN 16 MG/1
16 TABLET ORAL DAILY
Qty: 90 TABLET | Refills: 0
Start: 2022-07-25 | End: 2022-07-25 | Stop reason: SDUPTHER

## 2022-07-25 NOTE — TELEPHONE ENCOUNTER
Refill Routing Note   Medication(s) are not appropriate for processing by [facility] for the following reason(s):      - Required vitals are abnormal  - Medication not active on medication list    [facility] action(s):  Defer          Medication reconciliation completed: No     Appointments  past 12m or future 3m with PCP    Date Provider   Last Visit   6/27/2022 Rocco Cavazos, DO   Next Visit   7/23/2022 Rocco Cavazos,    ED visits in past 90 days: 1        Note composed:10:33 AM 07/25/2022

## 2022-07-25 NOTE — TELEPHONE ENCOUNTER
Refill Decision Note   Partha Curtis  is requesting a refill authorization.  Brief Assessment and Rationale for Refill:  Approve     Medication Therapy Plan:       Medication Reconciliation Completed: No   Comments:     No Care Gaps recommended.     Note composed:11:19 AM 07/25/2022

## 2022-07-26 ENCOUNTER — OFFICE VISIT (OUTPATIENT)
Dept: FAMILY MEDICINE | Facility: CLINIC | Age: 62
End: 2022-07-26
Payer: COMMERCIAL

## 2022-07-26 ENCOUNTER — PATIENT MESSAGE (OUTPATIENT)
Dept: OTHER | Facility: OTHER | Age: 62
End: 2022-07-26
Payer: COMMERCIAL

## 2022-07-26 VITALS
HEIGHT: 71 IN | BODY MASS INDEX: 27.47 KG/M2 | OXYGEN SATURATION: 96 % | SYSTOLIC BLOOD PRESSURE: 138 MMHG | WEIGHT: 196.19 LBS | DIASTOLIC BLOOD PRESSURE: 83 MMHG | HEART RATE: 70 BPM

## 2022-07-26 DIAGNOSIS — I10 ESSENTIAL HYPERTENSION: ICD-10-CM

## 2022-07-26 PROCEDURE — 3075F PR MOST RECENT SYSTOLIC BLOOD PRESS GE 130-139MM HG: ICD-10-PCS | Mod: CPTII,S$GLB,, | Performed by: FAMILY MEDICINE

## 2022-07-26 PROCEDURE — 1159F MED LIST DOCD IN RCRD: CPT | Mod: CPTII,S$GLB,, | Performed by: FAMILY MEDICINE

## 2022-07-26 PROCEDURE — 3079F DIAST BP 80-89 MM HG: CPT | Mod: CPTII,S$GLB,, | Performed by: FAMILY MEDICINE

## 2022-07-26 PROCEDURE — 1160F RVW MEDS BY RX/DR IN RCRD: CPT | Mod: CPTII,S$GLB,, | Performed by: FAMILY MEDICINE

## 2022-07-26 PROCEDURE — 99999 PR PBB SHADOW E&M-EST. PATIENT-LVL III: CPT | Mod: PBBFAC,,, | Performed by: FAMILY MEDICINE

## 2022-07-26 PROCEDURE — 99214 OFFICE O/P EST MOD 30 MIN: CPT | Mod: S$GLB,,, | Performed by: FAMILY MEDICINE

## 2022-07-26 PROCEDURE — 3075F SYST BP GE 130 - 139MM HG: CPT | Mod: CPTII,S$GLB,, | Performed by: FAMILY MEDICINE

## 2022-07-26 PROCEDURE — 99999 PR PBB SHADOW E&M-EST. PATIENT-LVL III: ICD-10-PCS | Mod: PBBFAC,,, | Performed by: FAMILY MEDICINE

## 2022-07-26 PROCEDURE — 3008F BODY MASS INDEX DOCD: CPT | Mod: CPTII,S$GLB,, | Performed by: FAMILY MEDICINE

## 2022-07-26 PROCEDURE — 3072F LOW RISK FOR RETINOPATHY: CPT | Mod: CPTII,S$GLB,, | Performed by: FAMILY MEDICINE

## 2022-07-26 PROCEDURE — 99214 PR OFFICE/OUTPT VISIT, EST, LEVL IV, 30-39 MIN: ICD-10-PCS | Mod: S$GLB,,, | Performed by: FAMILY MEDICINE

## 2022-07-26 PROCEDURE — 3066F NEPHROPATHY DOC TX: CPT | Mod: CPTII,S$GLB,, | Performed by: FAMILY MEDICINE

## 2022-07-26 PROCEDURE — 4010F ACE/ARB THERAPY RXD/TAKEN: CPT | Mod: CPTII,S$GLB,, | Performed by: FAMILY MEDICINE

## 2022-07-26 PROCEDURE — 4010F PR ACE/ARB THEARPY RXD/TAKEN: ICD-10-PCS | Mod: CPTII,S$GLB,, | Performed by: FAMILY MEDICINE

## 2022-07-26 PROCEDURE — 1159F PR MEDICATION LIST DOCUMENTED IN MEDICAL RECORD: ICD-10-PCS | Mod: CPTII,S$GLB,, | Performed by: FAMILY MEDICINE

## 2022-07-26 PROCEDURE — 3052F PR MOST RECENT HEMOGLOBIN A1C LEVEL 8.0 - < 9.0%: ICD-10-PCS | Mod: CPTII,S$GLB,, | Performed by: FAMILY MEDICINE

## 2022-07-26 PROCEDURE — 3061F NEG MICROALBUMINURIA REV: CPT | Mod: CPTII,S$GLB,, | Performed by: FAMILY MEDICINE

## 2022-07-26 PROCEDURE — 3066F PR DOCUMENTATION OF TREATMENT FOR NEPHROPATHY: ICD-10-PCS | Mod: CPTII,S$GLB,, | Performed by: FAMILY MEDICINE

## 2022-07-26 PROCEDURE — 1160F PR REVIEW ALL MEDS BY PRESCRIBER/CLIN PHARMACIST DOCUMENTED: ICD-10-PCS | Mod: CPTII,S$GLB,, | Performed by: FAMILY MEDICINE

## 2022-07-26 PROCEDURE — 3061F PR NEG MICROALBUMINURIA RESULT DOCUMENTED/REVIEW: ICD-10-PCS | Mod: CPTII,S$GLB,, | Performed by: FAMILY MEDICINE

## 2022-07-26 PROCEDURE — 3052F HG A1C>EQUAL 8.0%<EQUAL 9.0%: CPT | Mod: CPTII,S$GLB,, | Performed by: FAMILY MEDICINE

## 2022-07-26 PROCEDURE — 3079F PR MOST RECENT DIASTOLIC BLOOD PRESSURE 80-89 MM HG: ICD-10-PCS | Mod: CPTII,S$GLB,, | Performed by: FAMILY MEDICINE

## 2022-07-26 PROCEDURE — 3072F PR LOW RISK FOR RETINOPATHY: ICD-10-PCS | Mod: CPTII,S$GLB,, | Performed by: FAMILY MEDICINE

## 2022-07-26 PROCEDURE — 3008F PR BODY MASS INDEX (BMI) DOCUMENTED: ICD-10-PCS | Mod: CPTII,S$GLB,, | Performed by: FAMILY MEDICINE

## 2022-07-26 RX ORDER — AMLODIPINE BESYLATE 5 MG/1
5 TABLET ORAL DAILY
Qty: 30 TABLET | Refills: 0 | Status: SHIPPED | OUTPATIENT
Start: 2022-07-26 | End: 2022-08-15 | Stop reason: SDUPTHER

## 2022-07-26 RX ORDER — METOPROLOL SUCCINATE 100 MG/1
100 TABLET, EXTENDED RELEASE ORAL DAILY
Qty: 90 TABLET | Refills: 0 | Status: SHIPPED | OUTPATIENT
Start: 2022-07-26 | End: 2022-08-15 | Stop reason: SDUPTHER

## 2022-07-26 RX ORDER — CANDESARTAN 32 MG/1
32 TABLET ORAL DAILY
Qty: 90 TABLET | Refills: 0 | Status: SHIPPED | OUTPATIENT
Start: 2022-07-26 | End: 2022-10-24

## 2022-07-26 NOTE — PATIENT INSTRUCTIONS
Continue candesartan 32 mg  Continue metoprolol 100 mg, can increase, but will avoid for now  Add amlodipine 5 mg  Goal 130/80 or less    Update me via [employer], may increase to 10 mg.     Will try to avoid diuretics  Continue amaryl and metformin    Continue digital medicine programs    F/u 2 months, labs prior, as scheduled.

## 2022-07-26 NOTE — PROGRESS NOTES
Subjective:       Patient ID: Partha Curtis Jr. is a 62 y.o. male.    Chief Complaint: Hypertension    Jarvis is a 62 y.o. male who presents today for f/u    HTN: had been taking metoprolol, amlodipine, chlorthalidone, and candesartan. Had symptoms of orthostatic hypotension, so multiple meds were stopped. Back on candesartan 32 mg and metoprolol 100 mg Xr.     DM: suspect glucometer through digital DM is not working. New glucometer was sent. Readings have improved. Taking metformin 1500 mg/day and amaryl 4 mg day.     EAT: seeing EP    Headaches have resolved once BP has improved.       Review of Systems   Constitutional: Negative for chills and fever.   Respiratory: Negative for chest tightness.    Cardiovascular: Negative for chest pain.   Gastrointestinal: Negative for nausea and vomiting.   Neurological: Negative for dizziness, syncope, light-headedness and headaches.           Objective:     Vitals:    07/26/22 0750   BP: 138/83   Pulse: 70        Physical Exam  Constitutional:       General: He is not in acute distress.     Appearance: He is not ill-appearing, toxic-appearing or diaphoretic.   Cardiovascular:      Rate and Rhythm: Normal rate and regular rhythm.   Pulmonary:      Effort: Pulmonary effort is normal.      Breath sounds: Normal breath sounds.   Abdominal:      Palpations: Abdomen is soft.      Tenderness: There is no abdominal tenderness.   Musculoskeletal:         General: No swelling.   Neurological:      Mental Status: He is alert.   Psychiatric:         Mood and Affect: Mood normal.         Behavior: Behavior normal.         Thought Content: Thought content normal.         Judgment: Judgment normal.         Assessment:       1. Essential hypertension        Plan:       Continue candesartan 32 mg  Continue metoprolol 100 mg, can increase, but will avoid for now  Add amlodipine 5 mg  Goal 130/80 or less    Update me via mychart, may increase to 10 mg.     Will try to avoid diuretics  Continue  amaryl and metformin    Continue digital medicine programs    Refer to EP for tachycardia and EAT    F/u 2 months, labs prior, as scheduled.     Essential hypertension  -     candesartan (ATACAND) 32 MG tablet; Take 1 tablet (32 mg total) by mouth once daily.  Dispense: 90 tablet; Refill: 0  -     metoprolol succinate (TOPROL-XL) 100 MG 24 hr tablet; Take 1 tablet (100 mg total) by mouth once daily.  Dispense: 90 tablet; Refill: 0  -     amLODIPine (NORVASC) 5 MG tablet; Take 1 tablet (5 mg total) by mouth once daily.  Dispense: 30 tablet; Refill: 0        Warning signs discussed, patient to call with any further issues or worsening of symptoms.

## 2022-07-28 ENCOUNTER — OFFICE VISIT (OUTPATIENT)
Dept: CARDIOLOGY | Facility: CLINIC | Age: 62
End: 2022-07-28
Payer: COMMERCIAL

## 2022-07-28 VITALS
DIASTOLIC BLOOD PRESSURE: 91 MMHG | BODY MASS INDEX: 27.56 KG/M2 | WEIGHT: 196.88 LBS | HEART RATE: 64 BPM | HEIGHT: 71 IN | SYSTOLIC BLOOD PRESSURE: 154 MMHG

## 2022-07-28 DIAGNOSIS — E11.22 TYPE 2 DIABETES MELLITUS WITH STAGE 3A CHRONIC KIDNEY DISEASE, WITHOUT LONG-TERM CURRENT USE OF INSULIN: ICD-10-CM

## 2022-07-28 DIAGNOSIS — R94.31 ABNORMAL ELECTROCARDIOGRAM: Primary | ICD-10-CM

## 2022-07-28 DIAGNOSIS — I10 ESSENTIAL HYPERTENSION: ICD-10-CM

## 2022-07-28 DIAGNOSIS — Z76.89 SLEEP CONCERN: ICD-10-CM

## 2022-07-28 DIAGNOSIS — N18.31 TYPE 2 DIABETES MELLITUS WITH STAGE 3A CHRONIC KIDNEY DISEASE, WITHOUT LONG-TERM CURRENT USE OF INSULIN: ICD-10-CM

## 2022-07-28 DIAGNOSIS — E78.5 DYSLIPIDEMIA: Chronic | ICD-10-CM

## 2022-07-28 DIAGNOSIS — N18.31 STAGE 3A CHRONIC KIDNEY DISEASE: ICD-10-CM

## 2022-07-28 DIAGNOSIS — I47.19 ATRIAL TACHYCARDIA: ICD-10-CM

## 2022-07-28 DIAGNOSIS — R00.0 TACHYCARDIA: ICD-10-CM

## 2022-07-28 PROCEDURE — 3080F PR MOST RECENT DIASTOLIC BLOOD PRESSURE >= 90 MM HG: ICD-10-PCS | Mod: CPTII,S$GLB,, | Performed by: INTERNAL MEDICINE

## 2022-07-28 PROCEDURE — 4010F ACE/ARB THERAPY RXD/TAKEN: CPT | Mod: CPTII,S$GLB,, | Performed by: INTERNAL MEDICINE

## 2022-07-28 PROCEDURE — 3008F BODY MASS INDEX DOCD: CPT | Mod: CPTII,S$GLB,, | Performed by: INTERNAL MEDICINE

## 2022-07-28 PROCEDURE — 99204 OFFICE O/P NEW MOD 45 MIN: CPT | Mod: S$GLB,,, | Performed by: INTERNAL MEDICINE

## 2022-07-28 PROCEDURE — 1160F RVW MEDS BY RX/DR IN RCRD: CPT | Mod: CPTII,S$GLB,, | Performed by: INTERNAL MEDICINE

## 2022-07-28 PROCEDURE — 3061F NEG MICROALBUMINURIA REV: CPT | Mod: CPTII,S$GLB,, | Performed by: INTERNAL MEDICINE

## 2022-07-28 PROCEDURE — 1159F MED LIST DOCD IN RCRD: CPT | Mod: CPTII,S$GLB,, | Performed by: INTERNAL MEDICINE

## 2022-07-28 PROCEDURE — 3080F DIAST BP >= 90 MM HG: CPT | Mod: CPTII,S$GLB,, | Performed by: INTERNAL MEDICINE

## 2022-07-28 PROCEDURE — 3052F HG A1C>EQUAL 8.0%<EQUAL 9.0%: CPT | Mod: CPTII,S$GLB,, | Performed by: INTERNAL MEDICINE

## 2022-07-28 PROCEDURE — 99204 PR OFFICE/OUTPT VISIT, NEW, LEVL IV, 45-59 MIN: ICD-10-PCS | Mod: S$GLB,,, | Performed by: INTERNAL MEDICINE

## 2022-07-28 PROCEDURE — 99999 PR PBB SHADOW E&M-EST. PATIENT-LVL V: ICD-10-PCS | Mod: PBBFAC,,, | Performed by: INTERNAL MEDICINE

## 2022-07-28 PROCEDURE — 1159F PR MEDICATION LIST DOCUMENTED IN MEDICAL RECORD: ICD-10-PCS | Mod: CPTII,S$GLB,, | Performed by: INTERNAL MEDICINE

## 2022-07-28 PROCEDURE — 3052F PR MOST RECENT HEMOGLOBIN A1C LEVEL 8.0 - < 9.0%: ICD-10-PCS | Mod: CPTII,S$GLB,, | Performed by: INTERNAL MEDICINE

## 2022-07-28 PROCEDURE — 3077F PR MOST RECENT SYSTOLIC BLOOD PRESSURE >= 140 MM HG: ICD-10-PCS | Mod: CPTII,S$GLB,, | Performed by: INTERNAL MEDICINE

## 2022-07-28 PROCEDURE — 93010 ELECTROCARDIOGRAM REPORT: CPT | Mod: S$GLB,,, | Performed by: INTERNAL MEDICINE

## 2022-07-28 PROCEDURE — 93005 ELECTROCARDIOGRAM TRACING: CPT | Mod: S$GLB,,, | Performed by: INTERNAL MEDICINE

## 2022-07-28 PROCEDURE — 3008F PR BODY MASS INDEX (BMI) DOCUMENTED: ICD-10-PCS | Mod: CPTII,S$GLB,, | Performed by: INTERNAL MEDICINE

## 2022-07-28 PROCEDURE — 3061F PR NEG MICROALBUMINURIA RESULT DOCUMENTED/REVIEW: ICD-10-PCS | Mod: CPTII,S$GLB,, | Performed by: INTERNAL MEDICINE

## 2022-07-28 PROCEDURE — 1160F PR REVIEW ALL MEDS BY PRESCRIBER/CLIN PHARMACIST DOCUMENTED: ICD-10-PCS | Mod: CPTII,S$GLB,, | Performed by: INTERNAL MEDICINE

## 2022-07-28 PROCEDURE — 93005 RHYTHM STRIP: ICD-10-PCS | Mod: S$GLB,,, | Performed by: INTERNAL MEDICINE

## 2022-07-28 PROCEDURE — 3077F SYST BP >= 140 MM HG: CPT | Mod: CPTII,S$GLB,, | Performed by: INTERNAL MEDICINE

## 2022-07-28 PROCEDURE — 4010F PR ACE/ARB THEARPY RXD/TAKEN: ICD-10-PCS | Mod: CPTII,S$GLB,, | Performed by: INTERNAL MEDICINE

## 2022-07-28 PROCEDURE — 99999 PR PBB SHADOW E&M-EST. PATIENT-LVL V: CPT | Mod: PBBFAC,,, | Performed by: INTERNAL MEDICINE

## 2022-07-28 PROCEDURE — 3066F PR DOCUMENTATION OF TREATMENT FOR NEPHROPATHY: ICD-10-PCS | Mod: CPTII,S$GLB,, | Performed by: INTERNAL MEDICINE

## 2022-07-28 PROCEDURE — 3066F NEPHROPATHY DOC TX: CPT | Mod: CPTII,S$GLB,, | Performed by: INTERNAL MEDICINE

## 2022-07-28 PROCEDURE — 3072F LOW RISK FOR RETINOPATHY: CPT | Mod: CPTII,S$GLB,, | Performed by: INTERNAL MEDICINE

## 2022-07-28 PROCEDURE — 3072F PR LOW RISK FOR RETINOPATHY: ICD-10-PCS | Mod: CPTII,S$GLB,, | Performed by: INTERNAL MEDICINE

## 2022-07-28 PROCEDURE — 93010 RHYTHM STRIP: ICD-10-PCS | Mod: S$GLB,,, | Performed by: INTERNAL MEDICINE

## 2022-07-28 NOTE — PROGRESS NOTES
Subjective:    Patient ID:  Partha Curtis Jr. is a 62 y.o. male who presents for evaluation of ST    HPI   62 y.o. M  HTN on meds  DM on meds  peripheral arterial dz: s/p R CEA    After having hypotension episodes, many of his medications were stopped.  He found his HR to be consistently >100 bpm thereafter. This has improved.  He just restarted norvasc 2 days ago.    He c/o unrefreshing sleep.  Able to bike for 10 miles without issues.  No palpitations, no syncope.    6/27/22 holter: SR  (avg 93), with 2 NSAT episodes (longest 10 beats)  echo 65%    My interpretation of today's ECG is NSR without ectopy.    Review of Systems   Constitutional: Negative. Negative for malaise/fatigue.   HENT: Negative.  Negative for ear pain and tinnitus.    Eyes: Negative for blurred vision.   Cardiovascular: Negative.  Negative for chest pain, dyspnea on exertion, near-syncope, palpitations and syncope.   Respiratory: Negative.  Negative for shortness of breath.    Endocrine: Negative.  Negative for polyuria.   Hematologic/Lymphatic: Does not bruise/bleed easily.   Skin: Negative.  Negative for rash.   Musculoskeletal: Negative.  Negative for joint pain and muscle weakness.   Gastrointestinal: Negative.  Negative for abdominal pain and change in bowel habit.   Genitourinary: Negative for frequency.   Neurological: Negative.  Negative for dizziness and weakness.   Psychiatric/Behavioral: Negative.  Negative for depression. The patient is not nervous/anxious.    Allergic/Immunologic: Negative for environmental allergies.        Objective:    Physical Exam  Vitals and nursing note reviewed.   Constitutional:       Appearance: He is well-developed.   HENT:      Head: Normocephalic and atraumatic.   Eyes:      General: Lids are normal. No scleral icterus.     Conjunctiva/sclera: Conjunctivae normal.   Neck:      Thyroid: No thyromegaly.      Vascular: No JVD.      Trachea: No tracheal deviation.   Cardiovascular:      Rate and  Rhythm: Normal rate and regular rhythm.      Pulses:           Radial pulses are 2+ on the right side and 2+ on the left side.   Pulmonary:      Effort: Pulmonary effort is normal. No tachypnea, accessory muscle usage or respiratory distress.      Breath sounds: Normal breath sounds. No wheezing.   Abdominal:      General: There is no distension.   Musculoskeletal:         General: Normal range of motion.      Cervical back: Normal range of motion.   Skin:     General: Skin is warm and dry.   Neurological:      Mental Status: He is alert and oriented to person, place, and time.      Motor: No abnormal muscle tone.      Deep Tendon Reflexes: Reflexes are normal and symmetric.   Psychiatric:         Behavior: Behavior normal.           Assessment:       1. Abnormal electrocardiogram    2. Atrial tachycardia    3. Tachycardia    4. Dyslipidemia    5. Essential hypertension    6. Stage 3a chronic kidney disease    7. Type 2 diabetes mellitus with stage 3a chronic kidney disease, without long-term current use of insulin    8. Sleep concern         Plan:       Likely rebound sinus tachycardia after d/c of BB.  Short asymptomatic NSAT does not require further workup.    Reassured patient.  Refer to sleep clinic re: unrefreshing sleep, waking with SOB. ?ALLA.  HTN treatment per primary cardiologist Dr Mario.    f/u in EP clinic prn.

## 2022-08-02 ENCOUNTER — OFFICE VISIT (OUTPATIENT)
Dept: SLEEP MEDICINE | Facility: CLINIC | Age: 62
End: 2022-08-02
Payer: COMMERCIAL

## 2022-08-02 VITALS
DIASTOLIC BLOOD PRESSURE: 94 MMHG | BODY MASS INDEX: 27.09 KG/M2 | HEART RATE: 75 BPM | WEIGHT: 194.25 LBS | SYSTOLIC BLOOD PRESSURE: 154 MMHG

## 2022-08-02 DIAGNOSIS — G47.30 SLEEP APNEA, UNSPECIFIED TYPE: Primary | ICD-10-CM

## 2022-08-02 DIAGNOSIS — I10 ESSENTIAL HYPERTENSION: ICD-10-CM

## 2022-08-02 DIAGNOSIS — N18.31 TYPE 2 DIABETES MELLITUS WITH STAGE 3A CHRONIC KIDNEY DISEASE, WITHOUT LONG-TERM CURRENT USE OF INSULIN: ICD-10-CM

## 2022-08-02 DIAGNOSIS — E11.22 TYPE 2 DIABETES MELLITUS WITH STAGE 3A CHRONIC KIDNEY DISEASE, WITHOUT LONG-TERM CURRENT USE OF INSULIN: ICD-10-CM

## 2022-08-02 DIAGNOSIS — Z76.89 SLEEP CONCERN: ICD-10-CM

## 2022-08-02 PROCEDURE — 3052F HG A1C>EQUAL 8.0%<EQUAL 9.0%: CPT | Mod: CPTII,S$GLB,, | Performed by: NURSE PRACTITIONER

## 2022-08-02 PROCEDURE — 3077F SYST BP >= 140 MM HG: CPT | Mod: CPTII,S$GLB,, | Performed by: NURSE PRACTITIONER

## 2022-08-02 PROCEDURE — 4010F ACE/ARB THERAPY RXD/TAKEN: CPT | Mod: CPTII,S$GLB,, | Performed by: NURSE PRACTITIONER

## 2022-08-02 PROCEDURE — 3008F BODY MASS INDEX DOCD: CPT | Mod: CPTII,S$GLB,, | Performed by: NURSE PRACTITIONER

## 2022-08-02 PROCEDURE — 99999 PR PBB SHADOW E&M-EST. PATIENT-LVL III: CPT | Mod: PBBFAC,,, | Performed by: NURSE PRACTITIONER

## 2022-08-02 PROCEDURE — 3052F PR MOST RECENT HEMOGLOBIN A1C LEVEL 8.0 - < 9.0%: ICD-10-PCS | Mod: CPTII,S$GLB,, | Performed by: NURSE PRACTITIONER

## 2022-08-02 PROCEDURE — 3061F NEG MICROALBUMINURIA REV: CPT | Mod: CPTII,S$GLB,, | Performed by: NURSE PRACTITIONER

## 2022-08-02 PROCEDURE — 3061F PR NEG MICROALBUMINURIA RESULT DOCUMENTED/REVIEW: ICD-10-PCS | Mod: CPTII,S$GLB,, | Performed by: NURSE PRACTITIONER

## 2022-08-02 PROCEDURE — 3066F NEPHROPATHY DOC TX: CPT | Mod: CPTII,S$GLB,, | Performed by: NURSE PRACTITIONER

## 2022-08-02 PROCEDURE — 99204 PR OFFICE/OUTPT VISIT, NEW, LEVL IV, 45-59 MIN: ICD-10-PCS | Mod: S$GLB,,, | Performed by: NURSE PRACTITIONER

## 2022-08-02 PROCEDURE — 99204 OFFICE O/P NEW MOD 45 MIN: CPT | Mod: S$GLB,,, | Performed by: NURSE PRACTITIONER

## 2022-08-02 PROCEDURE — 1159F PR MEDICATION LIST DOCUMENTED IN MEDICAL RECORD: ICD-10-PCS | Mod: CPTII,S$GLB,, | Performed by: NURSE PRACTITIONER

## 2022-08-02 PROCEDURE — 3072F PR LOW RISK FOR RETINOPATHY: ICD-10-PCS | Mod: CPTII,S$GLB,, | Performed by: NURSE PRACTITIONER

## 2022-08-02 PROCEDURE — 3072F LOW RISK FOR RETINOPATHY: CPT | Mod: CPTII,S$GLB,, | Performed by: NURSE PRACTITIONER

## 2022-08-02 PROCEDURE — 3008F PR BODY MASS INDEX (BMI) DOCUMENTED: ICD-10-PCS | Mod: CPTII,S$GLB,, | Performed by: NURSE PRACTITIONER

## 2022-08-02 PROCEDURE — 1159F MED LIST DOCD IN RCRD: CPT | Mod: CPTII,S$GLB,, | Performed by: NURSE PRACTITIONER

## 2022-08-02 PROCEDURE — 4010F PR ACE/ARB THEARPY RXD/TAKEN: ICD-10-PCS | Mod: CPTII,S$GLB,, | Performed by: NURSE PRACTITIONER

## 2022-08-02 PROCEDURE — 99999 PR PBB SHADOW E&M-EST. PATIENT-LVL III: ICD-10-PCS | Mod: PBBFAC,,, | Performed by: NURSE PRACTITIONER

## 2022-08-02 PROCEDURE — 3066F PR DOCUMENTATION OF TREATMENT FOR NEPHROPATHY: ICD-10-PCS | Mod: CPTII,S$GLB,, | Performed by: NURSE PRACTITIONER

## 2022-08-02 PROCEDURE — 3080F PR MOST RECENT DIASTOLIC BLOOD PRESSURE >= 90 MM HG: ICD-10-PCS | Mod: CPTII,S$GLB,, | Performed by: NURSE PRACTITIONER

## 2022-08-02 PROCEDURE — 3077F PR MOST RECENT SYSTOLIC BLOOD PRESSURE >= 140 MM HG: ICD-10-PCS | Mod: CPTII,S$GLB,, | Performed by: NURSE PRACTITIONER

## 2022-08-02 PROCEDURE — 3080F DIAST BP >= 90 MM HG: CPT | Mod: CPTII,S$GLB,, | Performed by: NURSE PRACTITIONER

## 2022-08-02 NOTE — PROGRESS NOTES
"referred by   CHIEF COMPLAINT: Snoring, excessive daytime sleepiness    HISTORY OF PRESENT ILLNESS:He has never had a sleep study (discussed briefly with pcp years ago but ordering was deferred). +snores, so much now that wife sleeps separately. Denies witnessed apneic pauses but ~once every 10-14d he may awaken feeling like he's drowning. Active mind bedtime, taking 75mg amitriptyline helps usually (if doesn't miss the window) but sometimes its not until 1a he's asleep. Wakes [id number]. Sleep is not refreshed. Does not feel rested. Am headaches ~ 1x/week mild. May have urges to nap afternoon for up to 1hr  Chronic back pain, hx fentanyl pump, may now take percocet 1 tab infrequently  DM HgBA1c 8.8  Hard to get BP controlled despite meds    HX ENT surgery  On todays [place] Sleepiness Scale the patient scores a 8/24.     FAMILY HISTORY: No known sleep disorders.   SOCIAL HISTORY:     PHYSICAL EXAM:   BP (!) 154/94 (BP Location: Right arm, Patient Position: Sitting, BP Method: Medium (Automatic))   Pulse 75   Wt 88.1 kg (194 lb 3.6 oz)   BMI 27.09 kg/m²   GENERAL: W/D, W/N  body habitus, well groomed   17.5" neck circumference        ASSESSMENT:   Unspecified Sleep Apnea, with symptoms of disruptive snoring, questionable self-reported apneic pauses, un-refreshing disrupted sleep anddaytime sleepiness, with medical comorbidities of DM2 with CKD stage III, suboptimal control hypertension, hx carotid atherosclerosis s/p endarectomy. Warrants further investigation for untreated sleep apnea.   Claustrophobia, began recent years  Insomnia, unspecified    PLAN:   1.Home Sleep Study, discussed plan of care    2. Discussed etiology of ALLA and potential ramifications of untreated ALLA, including stroke, heart disease, HTN.  We discussed potential treatment options, which could include continuous positive airway pressure (CPAP-definitive) or referral for surgical consideration.   3. Good sleep hygiene " literature provided, sleep restriction encouraged, avoid tv/electronics in bedroom, avoid bedroom until sleepy  4. See pcp re DM/HTN mgt/continue meds      Thank you for allowing me the opportunity to participate in the care of your patient

## 2022-08-03 ENCOUNTER — TELEPHONE (OUTPATIENT)
Dept: SLEEP MEDICINE | Facility: OTHER | Age: 62
End: 2022-08-03
Payer: COMMERCIAL

## 2022-08-05 ENCOUNTER — PATIENT MESSAGE (OUTPATIENT)
Dept: FAMILY MEDICINE | Facility: CLINIC | Age: 62
End: 2022-08-05
Payer: COMMERCIAL

## 2022-08-10 ENCOUNTER — PATIENT MESSAGE (OUTPATIENT)
Dept: SLEEP MEDICINE | Facility: OTHER | Age: 62
End: 2022-08-10
Payer: COMMERCIAL

## 2022-08-10 ENCOUNTER — TELEPHONE (OUTPATIENT)
Dept: SLEEP MEDICINE | Facility: OTHER | Age: 62
End: 2022-08-10
Payer: COMMERCIAL

## 2022-08-12 ENCOUNTER — HOSPITAL ENCOUNTER (OUTPATIENT)
Dept: SLEEP MEDICINE | Facility: OTHER | Age: 62
Discharge: HOME | End: 2022-08-12
Attending: NURSE PRACTITIONER
Payer: COMMERCIAL

## 2022-08-12 DIAGNOSIS — G47.30 SLEEP APNEA, UNSPECIFIED TYPE: ICD-10-CM

## 2022-08-12 DIAGNOSIS — G47.33 OSA (OBSTRUCTIVE SLEEP APNEA): ICD-10-CM

## 2022-08-12 PROCEDURE — 95806 SLEEP STUDY UNATT&RESP EFFT: CPT | Mod: 26,,, | Performed by: PSYCHIATRY & NEUROLOGY

## 2022-08-12 PROCEDURE — 95800 SLP STDY UNATTENDED: CPT

## 2022-08-12 PROCEDURE — 95806 PR SLEEP STUDY, UNATTENDED, SIMUL RECORD HR/O2 SAT/RESP FLOW/RESP EFFT: ICD-10-PCS | Mod: 26,,, | Performed by: PSYCHIATRY & NEUROLOGY

## 2022-08-15 ENCOUNTER — PATIENT MESSAGE (OUTPATIENT)
Dept: FAMILY MEDICINE | Facility: CLINIC | Age: 62
End: 2022-08-15
Payer: COMMERCIAL

## 2022-08-15 DIAGNOSIS — M54.12 CERVICAL RADICULOPATHY: ICD-10-CM

## 2022-08-15 DIAGNOSIS — I10 ESSENTIAL HYPERTENSION: ICD-10-CM

## 2022-08-15 RX ORDER — METOPROLOL SUCCINATE 200 MG/1
200 TABLET, EXTENDED RELEASE ORAL DAILY
Qty: 90 TABLET | Refills: 0 | Status: SHIPPED | OUTPATIENT
Start: 2022-08-15 | End: 2023-01-04 | Stop reason: SDUPTHER

## 2022-08-15 RX ORDER — GABAPENTIN 800 MG/1
800 TABLET ORAL NIGHTLY
Qty: 90 TABLET | Refills: 1 | Status: SHIPPED | OUTPATIENT
Start: 2022-08-15 | End: 2023-02-01 | Stop reason: SDUPTHER

## 2022-08-15 RX ORDER — AMLODIPINE BESYLATE 10 MG/1
10 TABLET ORAL DAILY
Qty: 90 TABLET | Refills: 1 | Status: SHIPPED | OUTPATIENT
Start: 2022-08-15 | End: 2022-09-28

## 2022-08-27 NOTE — PROCEDURES
PHYSICIAN INTERPRETATION AND COMMENTS: Findings are consistent with mild, obstructive sleep apnea (ALLA) (G47.33),  by overall AHI (apnea hypopnea index). However, findings on this study suggest that the degree of sleep disordered  breathing is in the moderate severity range, when RDI is measured. This study was technically adequate to allow for  interpretation.  CLINICAL HISTORY: 62 year old male presented with: 17 inch neck, BMI of 27.1, an [place] sleepiness score of 9, history of  hypertension, diabetes and symptoms of nocturnal snoring, waking up choking and witnessed apneas. Based on the  clinical history, the patient has a high pre-test probability of having Severe ALLA.  SLEEP STUDY FINDINGS: Patient underwent a 1 night Home Sleep Test and by behavioral criteria, slept for approximately  6.38 hours, with a sleep latency of 6 minutes and a sleep efficiency of 90.2%. When considering more subtle measures of  sleep disordered breathing, the overall respiratory disturbance index is mild(RDI=13) based on a 1% hypopnea desaturation  criteria with confirmation by surrogate arousal indicators. The apneas/hypopneas are accompanied by minimal oxygen  desaturation (percent time below 90% SpO2: 0.5%, Min SpO2: 85%). The average desaturation across all sleep disordered  breathing events is 2.5%. Snoring occurs for 3.8% (30 dB) of the study, 1.5% is very loud. The mean pulse rate is 63.5 BPM,  with infrequent pulse rate variability (23 events with >= 6 BPM increase/decrease per hour).  TREATMENT CONSIDERATIONS: Consider trial of Auto-titrating CPAP 6-20 cm, mask of patient's choice, and heated  humidification. If patient has difficulty with CPAP adherence or ongoing ALLA symptoms or despite CPAP adherence, then  consider an in-lab titration sleep study in order to determine optimal fixed CPAP setting. Alternatively consider oral  appliance fitted by a dentist specializing in these devices, or surgical consultation for  uvulopalatopharyngoplasty (UPPP)  for treatment of obstructive sleep apnea.  DISEASE MANAGEMENT CONSIDERATIONS: Definitive treatment for ALLA is recommended. Consider Sleep Clinic referral for  ALLA management.  Signature:

## 2022-08-31 ENCOUNTER — PATIENT MESSAGE (OUTPATIENT)
Dept: OTHER | Facility: OTHER | Age: 62
End: 2022-08-31
Payer: COMMERCIAL

## 2022-08-31 ENCOUNTER — PATIENT MESSAGE (OUTPATIENT)
Dept: SLEEP MEDICINE | Facility: CLINIC | Age: 62
End: 2022-08-31
Payer: COMMERCIAL

## 2022-09-01 DIAGNOSIS — G47.33 OSA (OBSTRUCTIVE SLEEP APNEA): Primary | ICD-10-CM

## 2022-09-12 ENCOUNTER — PATIENT MESSAGE (OUTPATIENT)
Dept: FAMILY MEDICINE | Facility: CLINIC | Age: 62
End: 2022-09-12
Payer: COMMERCIAL

## 2022-09-12 RX ORDER — VALACYCLOVIR HYDROCHLORIDE 1 G/1
2000 TABLET, FILM COATED ORAL EVERY 12 HOURS
Qty: 4 TABLET | Refills: 0 | Status: SHIPPED | OUTPATIENT
Start: 2022-09-12 | End: 2022-10-20 | Stop reason: SDUPTHER

## 2022-09-12 NOTE — TELEPHONE ENCOUNTER
No new care gaps identified.  [facility] Embedded Care Gaps. Reference number: [id number]. 9/12/2022   2:45:06 PM CDT

## 2022-09-21 ENCOUNTER — PATIENT MESSAGE (OUTPATIENT)
Dept: FAMILY MEDICINE | Facility: CLINIC | Age: 62
End: 2022-09-21
Payer: COMMERCIAL

## 2022-09-26 ENCOUNTER — LAB VISIT (OUTPATIENT)
Dept: LAB | Facility: HOSPITAL | Age: 62
End: 2022-09-26
Attending: FAMILY MEDICINE
Payer: COMMERCIAL

## 2022-09-26 ENCOUNTER — PATIENT MESSAGE (OUTPATIENT)
Dept: FAMILY MEDICINE | Facility: CLINIC | Age: 62
End: 2022-09-26
Payer: COMMERCIAL

## 2022-09-26 DIAGNOSIS — M79.89 LEG SWELLING: Primary | ICD-10-CM

## 2022-09-26 DIAGNOSIS — R81 GLUCOSURIA: ICD-10-CM

## 2022-09-26 LAB
BILIRUB UR QL STRIP: NEGATIVE
CLARITY UR REFRACT.AUTO: CLEAR
COLOR UR AUTO: YELLOW
GLUCOSE UR QL STRIP: ABNORMAL
HGB UR QL STRIP: NEGATIVE
KETONES UR QL STRIP: NEGATIVE
LEUKOCYTE ESTERASE UR QL STRIP: NEGATIVE
NITRITE UR QL STRIP: NEGATIVE
PH UR STRIP: 6 [PH] (ref 5–8)
PROT UR QL STRIP: NEGATIVE
SP GR UR STRIP: 1.01 (ref 1–1.03)
URN SPEC COLLECT METH UR: ABNORMAL

## 2022-09-26 PROCEDURE — 81003 URINALYSIS AUTO W/O SCOPE: CPT | Performed by: FAMILY MEDICINE

## 2022-09-27 ENCOUNTER — HOSPITAL ENCOUNTER (OUTPATIENT)
Dept: RADIOLOGY | Facility: HOSPITAL | Age: 62
Discharge: HOME | End: 2022-09-27
Attending: FAMILY MEDICINE
Payer: COMMERCIAL

## 2022-09-27 DIAGNOSIS — M79.89 LEG SWELLING: ICD-10-CM

## 2022-09-27 PROCEDURE — 93970 EXTREMITY STUDY: CPT | Mod: TC

## 2022-09-27 PROCEDURE — 93970 US LOWER EXTREMITY VEINS BILATERAL: ICD-10-PCS | Mod: 26,,, | Performed by: RADIOLOGY

## 2022-09-27 PROCEDURE — 93970 EXTREMITY STUDY: CPT | Mod: 26,,, | Performed by: RADIOLOGY

## 2022-09-28 ENCOUNTER — OFFICE VISIT (OUTPATIENT)
Dept: CARDIOLOGY | Facility: CLINIC | Age: 62
End: 2022-09-28
Payer: COMMERCIAL

## 2022-09-28 ENCOUNTER — OFFICE VISIT (OUTPATIENT)
Dept: FAMILY MEDICINE | Facility: CLINIC | Age: 62
End: 2022-09-28
Payer: COMMERCIAL

## 2022-09-28 VITALS
SYSTOLIC BLOOD PRESSURE: 126 MMHG | HEIGHT: 71 IN | HEART RATE: 55 BPM | BODY MASS INDEX: 28.24 KG/M2 | OXYGEN SATURATION: 98 % | WEIGHT: 201.75 LBS | DIASTOLIC BLOOD PRESSURE: 70 MMHG

## 2022-09-28 VITALS
WEIGHT: 199.94 LBS | BODY MASS INDEX: 27.89 KG/M2 | DIASTOLIC BLOOD PRESSURE: 79 MMHG | SYSTOLIC BLOOD PRESSURE: 152 MMHG | HEART RATE: 100 BPM

## 2022-09-28 DIAGNOSIS — I10 PRIMARY HYPERTENSION: Primary | ICD-10-CM

## 2022-09-28 DIAGNOSIS — M79.89 LEG SWELLING: ICD-10-CM

## 2022-09-28 DIAGNOSIS — N18.31 TYPE 2 DIABETES MELLITUS WITH STAGE 3A CHRONIC KIDNEY DISEASE, WITHOUT LONG-TERM CURRENT USE OF INSULIN: Primary | ICD-10-CM

## 2022-09-28 DIAGNOSIS — Z98.890 S/P CAROTID ENDARTERECTOMY: ICD-10-CM

## 2022-09-28 DIAGNOSIS — E11.22 TYPE 2 DIABETES MELLITUS WITH STAGE 3A CHRONIC KIDNEY DISEASE, WITHOUT LONG-TERM CURRENT USE OF INSULIN: ICD-10-CM

## 2022-09-28 DIAGNOSIS — I10 ESSENTIAL HYPERTENSION: ICD-10-CM

## 2022-09-28 DIAGNOSIS — E78.5 DYSLIPIDEMIA: Chronic | ICD-10-CM

## 2022-09-28 DIAGNOSIS — E11.22 TYPE 2 DIABETES MELLITUS WITH STAGE 3A CHRONIC KIDNEY DISEASE, WITHOUT LONG-TERM CURRENT USE OF INSULIN: Primary | ICD-10-CM

## 2022-09-28 DIAGNOSIS — G47.33 OSA (OBSTRUCTIVE SLEEP APNEA): ICD-10-CM

## 2022-09-28 DIAGNOSIS — N18.31 STAGE 3A CHRONIC KIDNEY DISEASE: ICD-10-CM

## 2022-09-28 DIAGNOSIS — N18.31 TYPE 2 DIABETES MELLITUS WITH STAGE 3A CHRONIC KIDNEY DISEASE, WITHOUT LONG-TERM CURRENT USE OF INSULIN: ICD-10-CM

## 2022-09-28 DIAGNOSIS — E53.8 B12 DEFICIENCY: ICD-10-CM

## 2022-09-28 PROCEDURE — 3074F PR MOST RECENT SYSTOLIC BLOOD PRESSURE < 130 MM HG: ICD-10-PCS | Mod: CPTII,S$GLB,, | Performed by: FAMILY MEDICINE

## 2022-09-28 PROCEDURE — 4010F PR ACE/ARB THEARPY RXD/TAKEN: ICD-10-PCS | Mod: CPTII,S$GLB,, | Performed by: FAMILY MEDICINE

## 2022-09-28 PROCEDURE — 3078F DIAST BP <80 MM HG: CPT | Mod: CPTII,S$GLB,, | Performed by: INTERNAL MEDICINE

## 2022-09-28 PROCEDURE — 3044F PR MOST RECENT HEMOGLOBIN A1C LEVEL <7.0%: ICD-10-PCS | Mod: CPTII,S$GLB,, | Performed by: FAMILY MEDICINE

## 2022-09-28 PROCEDURE — 3061F PR NEG MICROALBUMINURIA RESULT DOCUMENTED/REVIEW: ICD-10-PCS | Mod: CPTII,S$GLB,, | Performed by: FAMILY MEDICINE

## 2022-09-28 PROCEDURE — 99999 PR PBB SHADOW E&M-EST. PATIENT-LVL V: CPT | Mod: PBBFAC,,, | Performed by: FAMILY MEDICINE

## 2022-09-28 PROCEDURE — 3066F NEPHROPATHY DOC TX: CPT | Mod: CPTII,S$GLB,, | Performed by: FAMILY MEDICINE

## 2022-09-28 PROCEDURE — 4010F ACE/ARB THERAPY RXD/TAKEN: CPT | Mod: CPTII,S$GLB,, | Performed by: INTERNAL MEDICINE

## 2022-09-28 PROCEDURE — 99214 OFFICE O/P EST MOD 30 MIN: CPT | Mod: S$GLB,,, | Performed by: FAMILY MEDICINE

## 2022-09-28 PROCEDURE — 1160F PR REVIEW ALL MEDS BY PRESCRIBER/CLIN PHARMACIST DOCUMENTED: ICD-10-PCS | Mod: CPTII,S$GLB,, | Performed by: INTERNAL MEDICINE

## 2022-09-28 PROCEDURE — 3008F PR BODY MASS INDEX (BMI) DOCUMENTED: ICD-10-PCS | Mod: CPTII,S$GLB,, | Performed by: FAMILY MEDICINE

## 2022-09-28 PROCEDURE — 4010F PR ACE/ARB THEARPY RXD/TAKEN: ICD-10-PCS | Mod: CPTII,S$GLB,, | Performed by: INTERNAL MEDICINE

## 2022-09-28 PROCEDURE — 3008F BODY MASS INDEX DOCD: CPT | Mod: CPTII,S$GLB,, | Performed by: FAMILY MEDICINE

## 2022-09-28 PROCEDURE — 3072F LOW RISK FOR RETINOPATHY: CPT | Mod: CPTII,S$GLB,, | Performed by: FAMILY MEDICINE

## 2022-09-28 PROCEDURE — 3008F PR BODY MASS INDEX (BMI) DOCUMENTED: ICD-10-PCS | Mod: CPTII,S$GLB,, | Performed by: INTERNAL MEDICINE

## 2022-09-28 PROCEDURE — 99214 PR OFFICE/OUTPT VISIT, EST, LEVL IV, 30-39 MIN: ICD-10-PCS | Mod: S$GLB,,, | Performed by: INTERNAL MEDICINE

## 2022-09-28 PROCEDURE — 99214 OFFICE O/P EST MOD 30 MIN: CPT | Mod: S$GLB,,, | Performed by: INTERNAL MEDICINE

## 2022-09-28 PROCEDURE — 1159F PR MEDICATION LIST DOCUMENTED IN MEDICAL RECORD: ICD-10-PCS | Mod: CPTII,S$GLB,, | Performed by: INTERNAL MEDICINE

## 2022-09-28 PROCEDURE — 3077F SYST BP >= 140 MM HG: CPT | Mod: CPTII,S$GLB,, | Performed by: INTERNAL MEDICINE

## 2022-09-28 PROCEDURE — 3072F PR LOW RISK FOR RETINOPATHY: ICD-10-PCS | Mod: CPTII,S$GLB,, | Performed by: INTERNAL MEDICINE

## 2022-09-28 PROCEDURE — 3078F PR MOST RECENT DIASTOLIC BLOOD PRESSURE < 80 MM HG: ICD-10-PCS | Mod: CPTII,S$GLB,, | Performed by: INTERNAL MEDICINE

## 2022-09-28 PROCEDURE — 1159F MED LIST DOCD IN RCRD: CPT | Mod: CPTII,S$GLB,, | Performed by: INTERNAL MEDICINE

## 2022-09-28 PROCEDURE — 3066F PR DOCUMENTATION OF TREATMENT FOR NEPHROPATHY: ICD-10-PCS | Mod: CPTII,S$GLB,, | Performed by: INTERNAL MEDICINE

## 2022-09-28 PROCEDURE — 3044F HG A1C LEVEL LT 7.0%: CPT | Mod: CPTII,S$GLB,, | Performed by: INTERNAL MEDICINE

## 2022-09-28 PROCEDURE — 1160F RVW MEDS BY RX/DR IN RCRD: CPT | Mod: CPTII,S$GLB,, | Performed by: FAMILY MEDICINE

## 2022-09-28 PROCEDURE — 3008F BODY MASS INDEX DOCD: CPT | Mod: CPTII,S$GLB,, | Performed by: INTERNAL MEDICINE

## 2022-09-28 PROCEDURE — 3044F PR MOST RECENT HEMOGLOBIN A1C LEVEL <7.0%: ICD-10-PCS | Mod: CPTII,S$GLB,, | Performed by: INTERNAL MEDICINE

## 2022-09-28 PROCEDURE — 3074F SYST BP LT 130 MM HG: CPT | Mod: CPTII,S$GLB,, | Performed by: FAMILY MEDICINE

## 2022-09-28 PROCEDURE — 3078F DIAST BP <80 MM HG: CPT | Mod: CPTII,S$GLB,, | Performed by: FAMILY MEDICINE

## 2022-09-28 PROCEDURE — 4010F ACE/ARB THERAPY RXD/TAKEN: CPT | Mod: CPTII,S$GLB,, | Performed by: FAMILY MEDICINE

## 2022-09-28 PROCEDURE — 3044F HG A1C LEVEL LT 7.0%: CPT | Mod: CPTII,S$GLB,, | Performed by: FAMILY MEDICINE

## 2022-09-28 PROCEDURE — 3077F PR MOST RECENT SYSTOLIC BLOOD PRESSURE >= 140 MM HG: ICD-10-PCS | Mod: CPTII,S$GLB,, | Performed by: INTERNAL MEDICINE

## 2022-09-28 PROCEDURE — 3078F PR MOST RECENT DIASTOLIC BLOOD PRESSURE < 80 MM HG: ICD-10-PCS | Mod: CPTII,S$GLB,, | Performed by: FAMILY MEDICINE

## 2022-09-28 PROCEDURE — 3061F NEG MICROALBUMINURIA REV: CPT | Mod: CPTII,S$GLB,, | Performed by: FAMILY MEDICINE

## 2022-09-28 PROCEDURE — 3072F PR LOW RISK FOR RETINOPATHY: ICD-10-PCS | Mod: CPTII,S$GLB,, | Performed by: FAMILY MEDICINE

## 2022-09-28 PROCEDURE — 3072F LOW RISK FOR RETINOPATHY: CPT | Mod: CPTII,S$GLB,, | Performed by: INTERNAL MEDICINE

## 2022-09-28 PROCEDURE — 3066F PR DOCUMENTATION OF TREATMENT FOR NEPHROPATHY: ICD-10-PCS | Mod: CPTII,S$GLB,, | Performed by: FAMILY MEDICINE

## 2022-09-28 PROCEDURE — 1159F MED LIST DOCD IN RCRD: CPT | Mod: CPTII,S$GLB,, | Performed by: FAMILY MEDICINE

## 2022-09-28 PROCEDURE — 3061F PR NEG MICROALBUMINURIA RESULT DOCUMENTED/REVIEW: ICD-10-PCS | Mod: CPTII,S$GLB,, | Performed by: INTERNAL MEDICINE

## 2022-09-28 PROCEDURE — 3066F NEPHROPATHY DOC TX: CPT | Mod: CPTII,S$GLB,, | Performed by: INTERNAL MEDICINE

## 2022-09-28 PROCEDURE — 1159F PR MEDICATION LIST DOCUMENTED IN MEDICAL RECORD: ICD-10-PCS | Mod: CPTII,S$GLB,, | Performed by: FAMILY MEDICINE

## 2022-09-28 PROCEDURE — 3061F NEG MICROALBUMINURIA REV: CPT | Mod: CPTII,S$GLB,, | Performed by: INTERNAL MEDICINE

## 2022-09-28 PROCEDURE — 99214 PR OFFICE/OUTPT VISIT, EST, LEVL IV, 30-39 MIN: ICD-10-PCS | Mod: S$GLB,,, | Performed by: FAMILY MEDICINE

## 2022-09-28 PROCEDURE — 99999 PR PBB SHADOW E&M-EST. PATIENT-LVL IV: ICD-10-PCS | Mod: PBBFAC,,, | Performed by: INTERNAL MEDICINE

## 2022-09-28 PROCEDURE — 1160F PR REVIEW ALL MEDS BY PRESCRIBER/CLIN PHARMACIST DOCUMENTED: ICD-10-PCS | Mod: CPTII,S$GLB,, | Performed by: FAMILY MEDICINE

## 2022-09-28 PROCEDURE — 1160F RVW MEDS BY RX/DR IN RCRD: CPT | Mod: CPTII,S$GLB,, | Performed by: INTERNAL MEDICINE

## 2022-09-28 PROCEDURE — 99999 PR PBB SHADOW E&M-EST. PATIENT-LVL IV: CPT | Mod: PBBFAC,,, | Performed by: INTERNAL MEDICINE

## 2022-09-28 PROCEDURE — 99999 PR PBB SHADOW E&M-EST. PATIENT-LVL V: ICD-10-PCS | Mod: PBBFAC,,, | Performed by: FAMILY MEDICINE

## 2022-09-28 RX ORDER — CHLORTHALIDONE 25 MG/1
25 TABLET ORAL DAILY
Qty: 90 TABLET | Refills: 4 | Status: ON HOLD | OUTPATIENT
Start: 2022-09-28 | End: 2023-06-29 | Stop reason: SDUPTHER

## 2022-09-28 RX ORDER — FUROSEMIDE 40 MG/1
40 TABLET ORAL DAILY
Qty: 7 TABLET | Refills: 0 | Status: SHIPPED | OUTPATIENT
Start: 2022-09-28 | End: 2023-01-09

## 2022-09-28 RX ORDER — METFORMIN HYDROCHLORIDE 500 MG/1
500 TABLET, EXTENDED RELEASE ORAL 2 TIMES DAILY WITH MEALS
Qty: 180 TABLET | Refills: 1 | Status: SHIPPED | OUTPATIENT
Start: 2022-09-28 | End: 2023-02-01 | Stop reason: SDUPTHER

## 2022-09-28 NOTE — PROGRESS NOTES
Subjective:       Patient ID: Partha Curtis Jr. is a 62 y.o. male.    Chief Complaint: Diabetes    Jarvis is a 62 y.o. male who presents today for f/u    HTN: had been taking metoprolol, amlodipine, chlorthalidone, and candesartan. Had episodes of hypotension. Saw cardiology. No cause found. Was briefly off all meds. Now back on candesartan, metoprolol, amlodipine. Has been having leg swelling for at least a month. Worse at the end of the day. US ruled out DVT yesterday. Went to [place], had a rash, this didn't change rash. When it swells, he has pain in his ankle. Has limited ROM. Denies chest pain, SOB, orthopnea.     DM: a1c much better. He is unsure why. Reports sugars at home are better. Taking amaryl 4 mg. Thinks this may be helping. Taking metformin 3 pills/day.   CKD: stable  DLD: on crestor  Anemia: had been thought to be due to CKD, iron studies low. Colonoscopy was normal in 2018. However, found out that patient was donating blood every 12 weeks. No black stools, no red stools. Anemia has resolved. Iron sat and ferritin is normal, was taking iron daily. Stopped at last visit in early 2021.      Has been on elavil for many years.     B12 def: improved on labs 9/26/2022.     Waiting for CPAP machine.     Review of Systems   Constitutional:  Negative for chills and fever.   Respiratory:  Negative for chest tightness and shortness of breath.    Cardiovascular:  Positive for leg swelling. Negative for chest pain.   Gastrointestinal:  Negative for nausea and vomiting.   Neurological:  Negative for dizziness, light-headedness and headaches.             Results for orders placed or performed in visit on 09/26/22   CBC Auto Differential   Result Value Ref Range    WBC 8.66 3.90 - 12.70 K/uL    RBC 5.35 4.60 - 6.20 M/uL    Hemoglobin 16.6 14.0 - 18.0 g/dL    Hematocrit 52.6 40.0 - 54.0 %    MCV 98 82 - 98 fL    MCH 31.0 27.0 - 31.0 pg    MCHC 31.6 (L) 32.0 - 36.0 g/dL    RDW 13.3 11.5 - 14.5 %    Platelets 274 150 - 450  K/uL    MPV 9.9 9.2 - 12.9 fL    Immature Granulocytes 0.2 0.0 - 0.5 %    Gran # (ANC) 5.4 1.8 - 7.7 K/uL    Immature Grans (Abs) 0.02 0.00 - 0.04 K/uL    Lymph # 2.0 1.0 - 4.8 K/uL    Mono # 0.8 0.3 - 1.0 K/uL    Eos # 0.4 0.0 - 0.5 K/uL    Baso # 0.04 0.00 - 0.20 K/uL    nRBC 0 0 /100 WBC    Gran % 62.7 38.0 - 73.0 %    Lymph % 23.0 18.0 - 48.0 %    Mono % 9.0 4.0 - 15.0 %    Eosinophil % 4.6 0.0 - 8.0 %    Basophil % 0.5 0.0 - 1.9 %    Differential Method Automated    Comprehensive Metabolic Panel   Result Value Ref Range    Sodium 137 136 - 145 mmol/L    Potassium 4.3 3.5 - 5.1 mmol/L    Chloride 101 95 - 110 mmol/L    CO2 26 23 - 29 mmol/L    Glucose 164 (H) 70 - 110 mg/dL    BUN 18 8 - 23 mg/dL    Creatinine 1.5 (H) 0.5 - 1.4 mg/dL    Calcium 9.4 8.7 - 10.5 mg/dL    Total Protein 6.5 6.0 - 8.4 g/dL    Albumin 4.1 3.5 - 5.2 g/dL    Total Bilirubin 0.5 0.1 - 1.0 mg/dL    Alkaline Phosphatase 59 55 - 135 U/L    AST 35 10 - 40 U/L    ALT 45 (H) 10 - 44 U/L    Anion Gap 10 8 - 16 mmol/L    eGFR 52.3 (A) >60 mL/min/1.73 m^2   Hemoglobin A1C   Result Value Ref Range    Hemoglobin A1C 6.0 (H) 4.0 - 5.6 %    Estimated Avg Glucose 126 68 - 131 mg/dL   Vitamin B12   Result Value Ref Range    Vitamin B-12 1635 (H) 210 - 950 pg/mL       Objective:     Vitals:    09/28/22 0853   BP: 126/70   Pulse: (!) 55        Physical Exam  Constitutional:       General: He is not in acute distress.     Appearance: He is obese. He is not ill-appearing, toxic-appearing or diaphoretic.   Cardiovascular:      Rate and Rhythm: Normal rate and regular rhythm.   Pulmonary:      Effort: Pulmonary effort is normal.      Breath sounds: Normal breath sounds.   Musculoskeletal:      Comments: Trace pedal edema   Neurological:      General: No focal deficit present.      Mental Status: He is alert.   Psychiatric:         Mood and Affect: Mood normal.         Behavior: Behavior normal.         Thought Content: Thought content normal.          Judgment: Judgment normal.       Assessment:       1. Type 2 diabetes mellitus with stage 3a chronic kidney disease, without long-term current use of insulin    2. B12 deficiency    3. Stage 3a chronic kidney disease    4. Essential hypertension    5. BMI 28.0-28.9,adult    6. Leg swelling        Plan:         Lasix x 7 days for acute leg swelling  Stop amlodipine  Continue metoprolol and candesartan for now  Would like to avoid diuretics  However, next medication may need to be HCTZ or aldactone if BP remains high off of amlodipine.  Wear compression socks daily, up to the knees.   Keep legs elevated    Update me with a BP in 2 weeks.  Repeat a BMP in 1-2 weeks.       Continue metformin, but only 2 pills/day.   Continue amaryl 4 mg    Continue Crestor 5 mg  Continue gabapentin, may decrease if leg swelling persists, as this can also cause leg swelling    You have low B12. Goal >400. Start daily OTC B12 supplementation at 1000 mcg     You have low vitamin D and a Rx has been sent to your pharmacy. Take this pill once a week and when the prescription and refills are done, start taking an OTC vitamin D supplementation at 1000 IU daily.           Type 2 diabetes mellitus with stage 3a chronic kidney disease, without long-term current use of insulin  -     metFORMIN (GLUCOPHAGE-XR) 500 MG ER 24hr tablet; Take 1 tablet (500 mg total) by mouth 2 (two) times daily with meals.  Dispense: 180 tablet; Refill: 1  -     Ambulatory referral/consult to Optometry; Future; Expected date: 10/05/2022  -     CBC Auto Differential; Future; Expected date: 09/28/2022  -     Comprehensive Metabolic Panel; Future; Expected date: 09/28/2022  -     Hemoglobin A1C; Future; Expected date: 09/28/2022    B12 deficiency    Stage 3a chronic kidney disease  -     CBC Auto Differential; Future; Expected date: 09/28/2022  -     Comprehensive Metabolic Panel; Future; Expected date: 09/28/2022    Essential hypertension    BMI 28.0-28.9,adult    Leg  swelling  -     furosemide (LASIX) 40 MG tablet; Take 1 tablet (40 mg total) by mouth once daily.  Dispense: 7 tablet; Refill: 0  -     BASIC METABOLIC PANEL; Future; Expected date: 09/28/2022          Warning signs discussed, patient to call with any further issues or worsening of symptoms.

## 2022-09-28 NOTE — PROGRESS NOTES
[facility] Cardiology     Subjective:    Patient ID:  Partha Curtis Jr. is a 62 y.o. male who presents for follow-up of Diabetes Mellitus, Hyperlipidemia, and Hypertension    Review of patient's allergies indicates:   Allergen Reactions    Stadol [butorphanol tartrate] Rash     Swelling in face    Strawberries [strawberry] Rash      He is still having blood pressure issues.  Amlodipine was discontinued today due to persistent swelling worsening throughout the day.  He had initiated Toprol and is now on 100 mg.  He had developed bradycardia into the 40s.  Today's heart rate 51 beats per minute.  He is diabetic.  Last A1c 6.0, GFR 52.  He has previous right carotid endarterectomy.  It was in 2015.  He is on statin therapy.  His last LDL was 30 mg% with HDL 24 triglycerides 280 mg%.  He is on aspirin.  He is asymptomatic neurologically.  His last carotid Doppler study April 2022 showed no residual right-sided disease with 1-39% plaquing left-sided carotid.  He monitors his blood pressures regularly.          Review of Systems   Constitutional: Negative for chills, decreased appetite, diaphoresis, fever, malaise/fatigue, night sweats, weight gain and weight loss.   HENT:  Positive for hearing loss. Negative for congestion, ear discharge, ear pain, hoarse voice, nosebleeds, odynophagia, sore throat, stridor and tinnitus.    Eyes:  Negative for blurred vision, discharge, double vision, pain, photophobia, redness, vision loss in left eye, vision loss in right eye, visual disturbance and visual halos.   Cardiovascular:  Positive for leg swelling. Negative for chest pain, claudication, cyanosis, dyspnea on exertion, irregular heartbeat, near-syncope, orthopnea, palpitations, paroxysmal nocturnal dyspnea and syncope.   Respiratory:  Negative for cough, hemoptysis, shortness of breath, sleep disturbances due to breathing, snoring, sputum production and  wheezing.    Endocrine: Negative for cold intolerance, heat intolerance, polydipsia, polyphagia and polyuria.   Hematologic/Lymphatic: Negative for adenopathy and bleeding problem. Does not bruise/bleed easily.   Skin:  Negative for color change, dry skin, flushing, itching, nail changes, poor wound healing, rash, skin cancer, suspicious lesions and unusual hair distribution.   Musculoskeletal:  Negative for arthritis, back pain, falls, gout, joint pain, joint swelling, muscle cramps, muscle weakness, myalgias, neck pain and stiffness.   Gastrointestinal:  Negative for bloating, abdominal pain, anorexia, change in bowel habit, bowel incontinence, constipation, diarrhea, dysphagia, excessive appetite, flatus, heartburn, hematemesis, hematochezia, hemorrhoids, jaundice, melena, nausea and vomiting.   Genitourinary:  Negative for bladder incontinence, decreased libido, dysuria, flank pain, frequency, genital sores, hematuria, hesitancy, incomplete emptying, nocturia and urgency.   Neurological:  Negative for aphonia, brief paralysis, difficulty with concentration, disturbances in coordination, excessive daytime sleepiness, dizziness, focal weakness, headaches, light-headedness, loss of balance, numbness, paresthesias, seizures, sensory change, tremors, vertigo and weakness.   Psychiatric/Behavioral:  Negative for altered mental status, depression, hallucinations, memory loss, substance abuse, suicidal ideas and thoughts of violence. The patient does not have insomnia and is not nervous/anxious.    Allergic/Immunologic: Negative for hives and persistent infections.      Objective:       Vitals:    09/28/22 1004   BP: (!) 152/79   Pulse: 100   Weight: 90.7 kg (199 lb 15.3 oz)    Physical Exam  Constitutional:       General: He is not in acute distress.     Appearance: He is well-developed. He is not diaphoretic.   HENT:      Head: Normocephalic and atraumatic.      Nose: Nose normal.   Eyes:      General: No scleral  icterus.        Right eye: No discharge.      Conjunctiva/sclera: Conjunctivae normal.      Pupils: Pupils are equal, round, and reactive to light.   Neck:      Thyroid: No thyromegaly.      Vascular: No JVD.      Trachea: No tracheal deviation.   Cardiovascular:      Rate and Rhythm: Regular rhythm. Tachycardia present.      Pulses:           Carotid pulses are 2+ on the right side and 2+ on the left side.       Radial pulses are 2+ on the right side and 2+ on the left side.        Dorsalis pedis pulses are 2+ on the right side and 2+ on the left side.        Posterior tibial pulses are 2+ on the right side and 2+ on the left side.      Heart sounds: Normal heart sounds. No murmur heard.    No friction rub. No gallop.   Pulmonary:      Effort: Pulmonary effort is normal. No respiratory distress.      Breath sounds: Normal breath sounds. No stridor. No wheezing or rales.   Chest:      Chest wall: No tenderness.   Abdominal:      General: Bowel sounds are normal. There is no distension.      Palpations: Abdomen is soft. There is no mass.      Tenderness: There is no abdominal tenderness. There is no guarding or rebound.   Musculoskeletal:         General: No tenderness. Normal range of motion.      Cervical back: Normal range of motion and neck supple.   Lymphadenopathy:      Cervical: No cervical adenopathy.   Skin:     General: Skin is warm and dry.      Coloration: Skin is not pale.      Findings: No erythema or rash.   Neurological:      Mental Status: He is alert and oriented to person, place, and time.      Cranial Nerves: No cranial nerve deficit.      Coordination: Coordination normal.   Psychiatric:         Behavior: Behavior normal.         Thought Content: Thought content normal.         Judgment: Judgment normal.         Assessment:       1. Primary hypertension    2. S/P carotid endarterectomy    3. Essential hypertension    4. Dyslipidemia    5. Stage 3a chronic kidney disease    6. Type 2 diabetes  mellitus with stage 3a chronic kidney disease, without long-term current use of insulin    7. ALLA (obstructive sleep apnea)      Results for orders placed or performed in visit on 09/26/22   CBC Auto Differential   Result Value Ref Range    WBC 8.66 3.90 - 12.70 K/uL    RBC 5.35 4.60 - 6.20 M/uL    Hemoglobin 16.6 14.0 - 18.0 g/dL    Hematocrit 52.6 40.0 - 54.0 %    MCV 98 82 - 98 fL    MCH 31.0 27.0 - 31.0 pg    MCHC 31.6 (L) 32.0 - 36.0 g/dL    RDW 13.3 11.5 - 14.5 %    Platelets 274 150 - 450 K/uL    MPV 9.9 9.2 - 12.9 fL    Immature Granulocytes 0.2 0.0 - 0.5 %    Gran # (ANC) 5.4 1.8 - 7.7 K/uL    Immature Grans (Abs) 0.02 0.00 - 0.04 K/uL    Lymph # 2.0 1.0 - 4.8 K/uL    Mono # 0.8 0.3 - 1.0 K/uL    Eos # 0.4 0.0 - 0.5 K/uL    Baso # 0.04 0.00 - 0.20 K/uL    nRBC 0 0 /100 WBC    Gran % 62.7 38.0 - 73.0 %    Lymph % 23.0 18.0 - 48.0 %    Mono % 9.0 4.0 - 15.0 %    Eosinophil % 4.6 0.0 - 8.0 %    Basophil % 0.5 0.0 - 1.9 %    Differential Method Automated    Comprehensive Metabolic Panel   Result Value Ref Range    Sodium 137 136 - 145 mmol/L    Potassium 4.3 3.5 - 5.1 mmol/L    Chloride 101 95 - 110 mmol/L    CO2 26 23 - 29 mmol/L    Glucose 164 (H) 70 - 110 mg/dL    BUN 18 8 - 23 mg/dL    Creatinine 1.5 (H) 0.5 - 1.4 mg/dL    Calcium 9.4 8.7 - 10.5 mg/dL    Total Protein 6.5 6.0 - 8.4 g/dL    Albumin 4.1 3.5 - 5.2 g/dL    Total Bilirubin 0.5 0.1 - 1.0 mg/dL    Alkaline Phosphatase 59 55 - 135 U/L    AST 35 10 - 40 U/L    ALT 45 (H) 10 - 44 U/L    Anion Gap 10 8 - 16 mmol/L    eGFR 52.3 (A) >60 mL/min/1.73 m^2   Hemoglobin A1C   Result Value Ref Range    Hemoglobin A1C 6.0 (H) 4.0 - 5.6 %    Estimated Avg Glucose 126 68 - 131 mg/dL   Vitamin B12   Result Value Ref Range    Vitamin B-12 1635 (H) 210 - 950 pg/mL         Current Outpatient Medications:     amitriptyline (ELAVIL) 25 MG tablet, Take 75 mg by mouth nightly as needed for Insomnia., Disp: , Rfl:     aspirin (ECOTRIN) 81 MG EC tablet, Take 325 mg by  "mouth once daily. , Disp: , Rfl:     azelastine (ASTELIN) 137 mcg (0.1 %) nasal spray, 1 spray (137 mcg total) by Nasal route 2 (two) times daily., Disp: 30 mL, Rfl: 11    BD INTEGRA SYRINGE 3 mL 22 gauge x 1 1/2" Syrg, USE TO INJECT 1 ML INTO THE MUSCLE EVERY 14 DAYS, Disp: 5 Syringe, Rfl: 3    BD LUER-RUSS SYRINGE 3 mL 25 gauge x 1" Syrg, USE ONE SYRINGE EVERY 14 DAYS WITH TESTOSTERONE, Disp: , Rfl:     candesartan (ATACAND) 32 MG tablet, Take 1 tablet (32 mg total) by mouth once daily., Disp: 90 tablet, Rfl: 0    cyanocobalamin (VITAMIN B-12) 1000 MCG tablet, Take 1 tablet (1,000 mcg total) by mouth once daily., Disp: 90 tablet, Rfl: 5    ergocalciferol (ERGOCALCIFEROL) 50,000 unit Cap, Take 1 capsule (50,000 Units total) by mouth every 7 days., Disp: 12 capsule, Rfl: 4    furosemide (LASIX) 40 MG tablet, Take 1 tablet (40 mg total) by mouth once daily., Disp: 7 tablet, Rfl: 0    gabapentin (NEURONTIN) 800 MG tablet, Take 1 tablet (800 mg total) by mouth every evening., Disp: 90 tablet, Rfl: 1    glimepiride (AMARYL) 4 MG tablet, Take 1 tablet (4 mg total) by mouth before breakfast., Disp: 90 tablet, Rfl: 3    metFORMIN (GLUCOPHAGE-XR) 500 MG ER 24hr tablet, Take 1 tablet (500 mg total) by mouth 2 (two) times daily with meals., Disp: 180 tablet, Rfl: 1    metoprolol succinate (TOPROL-XL) 200 MG 24 hr tablet, Take 1 tablet (200 mg total) by mouth once daily., Disp: 90 tablet, Rfl: 0    oxyCODONE-acetaminophen (PERCOCET) 5-325 mg per tablet, Take 1 tablet by mouth 2 (two) times daily as needed., Disp: , Rfl:     rosuvastatin (CRESTOR) 40 MG Tab, Take 1 tablet by mouth in the evening, Disp: 90 tablet, Rfl: 3    testosterone cypionate (DEPOTESTOTERONE CYPIONATE) 200 mg/mL injection, INJECT 1 ML INTRAMUSCULARLY  EVERY TWO WEEKS, Disp: 6 mL, Rfl: 5    chlorthalidone (HYGROTEN) 25 MG Tab, Take 1 tablet (25 mg total) by mouth once daily., Disp: 90 tablet, Rfl: 4    valACYclovir (VALTREX) 1000 MG tablet, Take 2 tablets " (2,000 mg total) by mouth every 12 (twelve) hours. for 1 day, Disp: 4 tablet, Rfl: 0     Lab Results   Component Value Date    WBC 8.66 09/26/2022    RBC 5.35 09/26/2022    HGB 16.6 09/26/2022    HCT 52.6 09/26/2022    MCV 98 09/26/2022    MCH 31.0 09/26/2022    MCHC 31.6 (L) 09/26/2022    RDW 13.3 09/26/2022     09/26/2022    MPV 9.9 09/26/2022    GRAN 5.4 09/26/2022    GRAN 62.7 09/26/2022    LYMPH 2.0 09/26/2022    LYMPH 23.0 09/26/2022    MONO 0.8 09/26/2022    MONO 9.0 09/26/2022    EOS 0.4 09/26/2022    BASO 0.04 09/26/2022    EOSINOPHIL 4.6 09/26/2022    BASOPHIL 0.5 09/26/2022    MG 1.9 06/21/2022        CMP  Lab Results   Component Value Date     09/26/2022    K 4.3 09/26/2022     09/26/2022    CO2 26 09/26/2022     (H) 09/26/2022    BUN 18 09/26/2022    CREATININE 1.5 (H) 09/26/2022    CALCIUM 9.4 09/26/2022    PROT 6.5 09/26/2022    ALBUMIN 4.1 09/26/2022    BILITOT 0.5 09/26/2022    ALKPHOS 59 09/26/2022    AST 35 09/26/2022    ALT 45 (H) 09/26/2022    ANIONGAP 10 09/26/2022    ESTGFRAFRICA >60.0 06/23/2022    EGFRNONAA 53.5 (A) 06/23/2022        Lab Results   Component Value Date    LABURIN No significant growth 06/21/2022            Results for orders placed or performed in visit on 06/29/22   EKG 12-lead    Collection Time: 06/29/22 10:16 AM    Narrative    Test Reason : R00.0,    Vent. Rate : 101 BPM     Atrial Rate : 101 BPM     P-R Int : 162 ms          QRS Dur : 078 ms      QT Int : 314 ms       P-R-T Axes : 065 058 041 degrees     QTc Int : 407 ms    Sinus tachycardia  Low voltage QRS  Cannot rule out Anteroseptal infarct (cited on or before 21-JUN-2022)  Abnormal ECG  When compared with ECG of 23-JUN-2022 09:59,  Vent. rate has increased BY  33 BPM    Confirmed by KELLIE GALLARDO, NORMAN ([id number]) on 6/30/2022 9:07:22 PM    Referred By: GUNNAR EVANS           Confirmed By:NORMAN HOPKINS MD                  Plan:       Problem List Items Addressed This Visit           Cardiac/Vascular    Dyslipidemia (Chronic)     Rosuvastatin 40 milligram she utilized.  Most recent LDL 30-mixed hyperlipidemia picture, HDL 24, triglycerides 280 milligram percent.         Essential hypertension     Chlorthalidone 25 milligrams added to his attic and therapy for better blood pressure control.  Amlodipine was drawn today due to leg swelling side effects.         S/P carotid endarterectomy     2015 right sided surgery.  Most recent follow-up ultrasound April 2022 demonstrating mild left-sided 1-39 percent plaquing no residual right-sided disease.  Condition stable.    His Preoperative severity was 80-99 percent peak velocity 272 centimeters/second right-sided disease.            Renal/    Stage 3a chronic kidney disease     Condition unchanged GFR 52 range.            Endocrine    Type 2 diabetes mellitus with stage 3 chronic kidney disease, without long-term current use of insulin     Most recent A1c 6.0.  He is on Amaryl and metformin.            Other    ALLA (obstructive sleep apnea)     Recent workup confirming apnea.  He is awaiting his CPAP unit.          Other Visit Diagnoses       Primary hypertension    -  Primary    Relevant Medications    chlorthalidone (HYGROTEN) 25 MG Tab                 Chlorthalidone 25 mg ordered.  His recent echo confirmed normal ejection fraction without LVH.  He has furosemide if his swelling persists.  I am not convinced he will need Lasix once amlodipine is withdrawn.  He is going to initiate treatment for sleep apnea soon.    I made a six-month follow-up appointment.  Dr. Cavazos is working with him on his medications.  It is possible the 5 mg amlodipine would be tolerated better than 10 mg related to swelling.    I advised a 6 month follow-up.           Alexandru Mario MD  09/29/2022   10:25 AM

## 2022-09-28 NOTE — PATIENT INSTRUCTIONS
Lasix x 7 days for acute leg swelling  Stop amlodipine  Continue metoprolol and candesartan for now  Would like to avoid diuretics  However, next medication may need to be HCTZ or aldactone if BP remains high off of amlodipine.  Wear compression socks daily, up to the knees.   Keep legs elevated    Update me with a BP in 2 weeks.  Repeat a BMP in 1-2 weeks.     Continue metformin, but only 2 pills/day.   Continue amaryl 4 mg    Continue Crestor 5 mg  Continue gabapentin, may decrease if leg swelling persists, as this can also cause leg swelling    You have low B12. Goal >400. Start daily OTC B12 supplementation at 1000 mcg     You have low vitamin D and a Rx has been sent to your pharmacy. Take this pill once a week and when the prescription and refills are done, start taking an OTC vitamin D supplementation at 1000 IU daily.     DME number for CPAP: [phone]    Lab Results   Component Value Date    HGBA1C 6.0 (H) 09/26/2022    HGBA1C 8.8 (H) 06/21/2022    HGBA1C 7.9 (H) 01/28/2022     Diabetes Management Status    Statin: Taking  ACE/ARB: Taking    Screening or Prevention Patient's value Goal Complete/Controlled?   HgA1C Testing and Control   Lab Results   Component Value Date    HGBA1C 6.0 (H) 09/26/2022      Annually/Less than 8% Yes     Lipid profile : 06/21/2022 Annually Yes     LDL control Lab Results   Component Value Date    LDLCALC 30.0 (L) 06/21/2022    Annually/Less than 100 mg/dl  Yes     Nephropathy screening Lab Results   Component Value Date    LABMICR <5.0 06/21/2022     Lab Results   Component Value Date    PROTEINUA Negative 09/26/2022     Lab Results   Component Value Date    UTPCR Unable to calculate 07/07/2020      Annually Yes     Blood pressure BP Readings from Last 1 Encounters:   09/28/22 126/70    Less than 140/90 Yes     Dilated retinal exam : 06/21/2021 Annually No     Foot exam   : 05/11/2021 Annually No

## 2022-09-29 NOTE — ASSESSMENT & PLAN NOTE
Rosuvastatin 40 milligram she utilized.  Most recent LDL 30-mixed hyperlipidemia picture, HDL 24, triglycerides 280 milligram percent.

## 2022-09-29 NOTE — ASSESSMENT & PLAN NOTE
2015 right sided surgery.  Most recent follow-up ultrasound April 2022 demonstrating mild left-sided 1-39 percent plaquing no residual right-sided disease.  Condition stable.    His Preoperative severity was 80-99 percent peak velocity 272 centimeters/second right-sided disease.

## 2022-10-05 ENCOUNTER — PATIENT MESSAGE (OUTPATIENT)
Dept: SLEEP MEDICINE | Facility: CLINIC | Age: 62
End: 2022-10-05
Payer: COMMERCIAL

## 2022-10-12 ENCOUNTER — LAB VISIT (OUTPATIENT)
Dept: LAB | Facility: HOSPITAL | Age: 62
End: 2022-10-12
Attending: FAMILY MEDICINE
Payer: COMMERCIAL

## 2022-10-12 DIAGNOSIS — M79.89 LEG SWELLING: ICD-10-CM

## 2022-10-12 LAB
ANION GAP SERPL CALC-SCNC: 9 MMOL/L (ref 8–16)
BUN SERPL-MCNC: 15 MG/DL (ref 8–23)
CALCIUM SERPL-MCNC: 9.7 MG/DL (ref 8.7–10.5)
CHLORIDE SERPL-SCNC: 100 MMOL/L (ref 95–110)
CO2 SERPL-SCNC: 29 MMOL/L (ref 23–29)
CREAT SERPL-MCNC: 1.5 MG/DL (ref 0.5–1.4)
EST. GFR  (NO RACE VARIABLE): 52.3 ML/MIN/1.73 M^2
GLUCOSE SERPL-MCNC: 171 MG/DL (ref 70–110)
POTASSIUM SERPL-SCNC: 3.6 MMOL/L (ref 3.5–5.1)
SODIUM SERPL-SCNC: 138 MMOL/L (ref 136–145)

## 2022-10-12 PROCEDURE — 80048 BASIC METABOLIC PNL TOTAL CA: CPT | Performed by: FAMILY MEDICINE

## 2022-10-12 PROCEDURE — 36415 COLL VENOUS BLD VENIPUNCTURE: CPT | Mod: PO | Performed by: FAMILY MEDICINE

## 2022-10-25 ENCOUNTER — PATIENT MESSAGE (OUTPATIENT)
Dept: FAMILY MEDICINE | Facility: CLINIC | Age: 62
End: 2022-10-25
Payer: COMMERCIAL

## 2022-10-25 ENCOUNTER — OFFICE VISIT (OUTPATIENT)
Dept: URGENT CARE | Facility: CLINIC | Age: 62
End: 2022-10-25
Payer: COMMERCIAL

## 2022-10-25 VITALS
WEIGHT: 199 LBS | OXYGEN SATURATION: 95 % | HEIGHT: 71 IN | HEART RATE: 80 BPM | SYSTOLIC BLOOD PRESSURE: 140 MMHG | RESPIRATION RATE: 20 BRPM | DIASTOLIC BLOOD PRESSURE: 70 MMHG | BODY MASS INDEX: 27.86 KG/M2 | TEMPERATURE: 100 F

## 2022-10-25 DIAGNOSIS — R05.9 COUGH, UNSPECIFIED TYPE: ICD-10-CM

## 2022-10-25 DIAGNOSIS — J10.1 INFLUENZA A: Primary | ICD-10-CM

## 2022-10-25 DIAGNOSIS — R21 RASH AND NONSPECIFIC SKIN ERUPTION: ICD-10-CM

## 2022-10-25 LAB
CTP QC/QA: YES
CTP QC/QA: YES
POC MOLECULAR INFLUENZA A AGN: POSITIVE
POC MOLECULAR INFLUENZA B AGN: NEGATIVE
SARS-COV-2 RDRP RESP QL NAA+PROBE: NEGATIVE

## 2022-10-25 PROCEDURE — 3072F LOW RISK FOR RETINOPATHY: CPT | Mod: CPTII,S$GLB,,

## 2022-10-25 PROCEDURE — 3044F PR MOST RECENT HEMOGLOBIN A1C LEVEL <7.0%: ICD-10-PCS | Mod: CPTII,S$GLB,,

## 2022-10-25 PROCEDURE — 1159F MED LIST DOCD IN RCRD: CPT | Mod: CPTII,S$GLB,,

## 2022-10-25 PROCEDURE — 99213 PR OFFICE/OUTPT VISIT, EST, LEVL III, 20-29 MIN: ICD-10-PCS | Mod: S$GLB,,,

## 2022-10-25 PROCEDURE — 3066F PR DOCUMENTATION OF TREATMENT FOR NEPHROPATHY: ICD-10-PCS | Mod: CPTII,S$GLB,,

## 2022-10-25 PROCEDURE — 3072F PR LOW RISK FOR RETINOPATHY: ICD-10-PCS | Mod: CPTII,S$GLB,,

## 2022-10-25 PROCEDURE — 3077F SYST BP >= 140 MM HG: CPT | Mod: CPTII,S$GLB,,

## 2022-10-25 PROCEDURE — 1160F RVW MEDS BY RX/DR IN RCRD: CPT | Mod: CPTII,S$GLB,,

## 2022-10-25 PROCEDURE — 1159F PR MEDICATION LIST DOCUMENTED IN MEDICAL RECORD: ICD-10-PCS | Mod: CPTII,S$GLB,,

## 2022-10-25 PROCEDURE — 3077F PR MOST RECENT SYSTOLIC BLOOD PRESSURE >= 140 MM HG: ICD-10-PCS | Mod: CPTII,S$GLB,,

## 2022-10-25 PROCEDURE — 3066F NEPHROPATHY DOC TX: CPT | Mod: CPTII,S$GLB,,

## 2022-10-25 PROCEDURE — 3078F PR MOST RECENT DIASTOLIC BLOOD PRESSURE < 80 MM HG: ICD-10-PCS | Mod: CPTII,S$GLB,,

## 2022-10-25 PROCEDURE — 87502 INFLUENZA DNA AMP PROBE: CPT | Mod: QW,S$GLB,,

## 2022-10-25 PROCEDURE — 3061F NEG MICROALBUMINURIA REV: CPT | Mod: CPTII,S$GLB,,

## 2022-10-25 PROCEDURE — 87635: ICD-10-PCS | Mod: QW,S$GLB,,

## 2022-10-25 PROCEDURE — 87502 POCT INFLUENZA A/B MOLECULAR: ICD-10-PCS | Mod: QW,S$GLB,,

## 2022-10-25 PROCEDURE — 99213 OFFICE O/P EST LOW 20 MIN: CPT | Mod: S$GLB,,,

## 2022-10-25 PROCEDURE — 87635 SARS-COV-2 COVID-19 AMP PRB: CPT | Mod: QW,S$GLB,,

## 2022-10-25 PROCEDURE — 3061F PR NEG MICROALBUMINURIA RESULT DOCUMENTED/REVIEW: ICD-10-PCS | Mod: CPTII,S$GLB,,

## 2022-10-25 PROCEDURE — 3078F DIAST BP <80 MM HG: CPT | Mod: CPTII,S$GLB,,

## 2022-10-25 PROCEDURE — 4010F PR ACE/ARB THEARPY RXD/TAKEN: ICD-10-PCS | Mod: CPTII,S$GLB,,

## 2022-10-25 PROCEDURE — 3044F HG A1C LEVEL LT 7.0%: CPT | Mod: CPTII,S$GLB,,

## 2022-10-25 PROCEDURE — 4010F ACE/ARB THERAPY RXD/TAKEN: CPT | Mod: CPTII,S$GLB,,

## 2022-10-25 PROCEDURE — 1160F PR REVIEW ALL MEDS BY PRESCRIBER/CLIN PHARMACIST DOCUMENTED: ICD-10-PCS | Mod: CPTII,S$GLB,,

## 2022-10-25 RX ORDER — BENZONATATE 200 MG/1
200 CAPSULE ORAL 3 TIMES DAILY PRN
Qty: 30 CAPSULE | Refills: 0 | Status: SHIPPED | OUTPATIENT
Start: 2022-10-25 | End: 2023-01-09

## 2022-10-25 RX ORDER — IPRATROPIUM BROMIDE 42 UG/1
2 SPRAY, METERED NASAL 2 TIMES DAILY PRN
Qty: 15 ML | Refills: 0 | Status: SHIPPED | OUTPATIENT
Start: 2022-10-25

## 2022-10-25 NOTE — PROGRESS NOTES
"Subjective:       Patient ID: Partha Curtis Jr. is a 62 y.o. male.    Vitals:  height is 5' 11" (1.803 m) and weight is 90.3 kg (199 lb). His temperature is 100.2 °F (37.9 °C). His blood pressure is 140/70 (abnormal) and his pulse is 80. His respiration is 20 and oxygen saturation is 95%.     Chief Complaint: URI and Fever    Partha Curtis Jr. is a 62 y.o. male who presents for URI sxs which onset 1 week ago. Associated sxs include productive cough, fever, congestion, fatigue, and rash. Patient denies any fever, chills, SOB, CP, abd pain, n/v/d, rash, dizziness, or numbness/tingling. Patient denies sick contact. He is vaccinated. Prior Tx includes Dayquil/Nyquil.     URI   This is a new problem. The current episode started in the past 7 days. The problem has been gradually worsening. The maximum temperature recorded prior to his arrival was 100.4 - 100.9 F. Associated symptoms include congestion, coughing, headaches, rhinorrhea, sinus pain and sneezing. Pertinent negatives include no abdominal pain, chest pain, diarrhea, ear pain, nausea, sore throat or vomiting.     Constitution: Positive for fever. Negative for appetite change, chills, sweating and fatigue.   HENT:  Positive for congestion and sinus pain. Negative for ear pain, ear discharge, foreign body in ear, hearing loss, postnasal drip, sinus pressure, sore throat and trouble swallowing.    Cardiovascular:  Negative for chest pain.   Respiratory:  Positive for cough. Negative for sputum production and shortness of breath.    Gastrointestinal:  Negative for abdominal pain, nausea, vomiting and diarrhea.   Musculoskeletal:  Negative for muscle ache.   Allergic/Immunologic: Positive for sneezing.   Neurological:  Positive for headaches. Negative for dizziness, numbness and tingling.     Objective:      Physical Exam   Constitutional: He is oriented to person, place, and time. He appears well-developed. He is cooperative.  Non-toxic appearance. He does not " appear ill. No distress.   HENT:   Head: Normocephalic and atraumatic.   Ears:   Right Ear: Hearing, tympanic membrane, external ear and ear canal normal.   Left Ear: Hearing, tympanic membrane, external ear and ear canal normal.   Nose: Nose normal. No mucosal edema, rhinorrhea or nasal deformity. No epistaxis. Right sinus exhibits no maxillary sinus tenderness and no frontal sinus tenderness. Left sinus exhibits no maxillary sinus tenderness and no frontal sinus tenderness.   Mouth/Throat: Uvula is midline, oropharynx is clear and moist and mucous membranes are normal. Mucous membranes are moist. No trismus in the jaw. Normal dentition. No uvula swelling. No oropharyngeal exudate, posterior oropharyngeal edema or posterior oropharyngeal erythema.   Eyes: Conjunctivae and lids are normal. No scleral icterus.   Neck: Trachea normal and phonation normal. Neck supple. No edema present. No erythema present. No neck rigidity present.   Cardiovascular: Normal rate, regular rhythm, normal heart sounds and normal pulses.   Pulmonary/Chest: Effort normal and breath sounds normal. No respiratory distress. He has no decreased breath sounds. He has no rhonchi.         Comments: Clear to auscultation.     Abdominal: Normal appearance.   Musculoskeletal: Normal range of motion.         General: No deformity. Normal range of motion.   Neurological: He is alert and oriented to person, place, and time. He exhibits normal muscle tone. Coordination normal.   Skin: Skin is warm, dry, intact, not diaphoretic and not pale.   Psychiatric: His speech is normal and behavior is normal. Judgment and thought content normal.   Nursing note and vitals reviewed.      Assessment:       1. Influenza A    2. Cough, unspecified type    3. Rash and nonspecific skin eruption          Results for orders placed or performed in visit on 10/25/22   POCT COVID-19 Rapid Screening   Result Value Ref Range    POC Rapid COVID Negative Negative    Quality  Control Acceptable Yes    POCT Influenza A/B MOLECULAR   Result Value Ref Range    POC Molecular Influenza A Ag Positive (A) Negative, Not Reported    POC Molecular Influenza B Ag Negative Negative, Not Reported     Acceptable Yes        Plan:         Influenza A  -     ipratropium (ATROVENT) 42 mcg (0.06 %) nasal spray; 2 sprays by Nasal route 2 (two) times daily as needed for Rhinitis.  Dispense: 15 mL; Refill: 0    Cough, unspecified type  -     POCT COVID-19 Rapid Screening  -     POCT Influenza A/B MOLECULAR  -     benzonatate (TESSALON) 200 MG capsule; Take 1 capsule (200 mg total) by mouth 3 (three) times daily as needed for Cough.  Dispense: 30 capsule; Refill: 0    Rash and nonspecific skin eruption    Discussed positive Influenza A results with patient. Patient out of the window for Tamiflu. Recommend for symptom relief, take OTC zyrtec D and flonase nasal spray for symptom relief. Get plenty of rest and drink plenty of fluids. Discussed that the patient is contagious for 24 hours after starting the Tamilfu or 24 hours after last fever, whichever happens last. Advised him to return here or go to the Emergency Department for any concerns or worsening of condition.Tamiflu prescription has been discussed and if prescribed, please take to completion unless you cannot tolerate the side effects. Advised patient to take tylenol (acetominophen) for fever, chills or body aches every 4 hours but not to exceed 4000 mg/ day. Patient vitals stable. Patient has no questions or concerns at this time, all questions were answered before discharge. Patient given handout with discharge instructions. Patient exits exam room in no acute distress.     Discussed with patient all pertinent information and results. Discussed patient diagnosis and plan of treatment. Patient was given all follow up and return instructions. All questions and concerns were addressed at this time. Patient expresses understanding of  information and instructions, and is comfortable with plan.    Patient was instructed to return to clinic or go to ED immediately for any worsening or change in current symptoms.    Patient Instructions   Flu Discharge Instructions  You have been diagnosed with Influenza.   You are contagious for 24 hours after you start the Tamilfu or 24 hours after your last fever, whichever happens last.  Please drink plenty of fluids.  Please get plenty of rest.  Please return here or go to the Emergency Department for any concerns or worsening of condition.  Tamiflu prescription has been discussed and if prescribed, please take to completion unless you cannot tolerate the side effects.   If you were prescribed a narcotic medication, do not drive or operate heavy equipment or machinery while taking these medications.  If you were given a steroid shot in the clinic and have also been given a prescription for a steroid such as Prednisone or a Medrol Dose Pack, please begin taking them tomorrow.  Take tylenol (acetominophen) for fever, chills or body aches every 4 hours. do not exceed 4000 mg/ day.  Take Motrin (Ibuprofen) every 4 hours for fever, chills, pain or inflammation.  Use an antihistmine such as claritin or zyrtec to dry you out. Use pseudoephedrine (behind the counter) to decongest (beware this can raise your blood pressure). Use mucinex (guaifenisin) to break up mucous      PLEASE READ YOUR DISCHARGE INSTRUCTIONS ENTIRELY AS IT CONTAINS IMPORTANT INFORMATION.    Please drink plenty of fluids.    Please get plenty of rest.    Please return here or go to the Emergency Department for any concerns or worsening of condition.    Please take an over the counter antihistamine medication (Allegra/Claritin/Zyrtec) of your choice as directed for allergy symptoms and/or runny nose and postnasal drip.    Try an over the counter decongestant for sinus pressure/ear pressure, congestion symptoms like Mucinex D or Sudafed or  Phenylephrine. You buy this behind the pharmacy counter.    If you do have Hypertension or palpitations, it is safe to take Coricidin HBP for relief of sinus symptoms.    Tylenol or ibuprofen can also be used as directed for pain and fever unless you have an allergy to them or medical condition such as stomach ulcers, kidney or liver disease or blood thinners etc for which you should not be taking these type of medications.     Sore throat recommendations: Warm fluids, warm salt water gargles, throat lozenges, tea, honey, soup, rest, hydration.    Use over the counter Flonase or Nasocort: one spray each nostril twice daily OR two sprays each nostril once daily until nares dry out, unless you have Glaucoma.   Can also supplement with nasal saline rinse.    Sinus rinses DO NOT USE TAP WATER, if you must, water must be at a rolling boil for 1 minute, let it cool, then use.  May use distilled water, or over the counter nasal saline rinses.  Mario's vapor rub in shower to help open nasal passages.  May use nasal gel to keep passages moisturized.  May use nasal saline sprays during the day for added relief of congestion.   For those who go to the gym, please do not use the sauna or steam room now to clear sinuses.    Cough     Rest and fluids are important  Can use honey with isabela to soothe your throat    Robitussin or Delsyum for cough suppressant for dry cough.    Mucinex DM or products containing Guaifenesin or Dextromethorphan for expectorant (wet cough).    Take prescription cough meds (pills) as prescribed; take prescription cough syrup at night as needed for cough.  Do not take both the prescribed cough pills and syrup at the same time or within 6 hours of each other.  Do not take the cough syrup with any other sedative medication as it can can cause drowsiness. Do not operate any heavy machinery, drink or drive while taking the cough syrup.     Please follow up with your primary care doctor or specialist in the  next 48-72hrs as needed and if no improvement    If you smoke, please stop smoking.    Please return or see your primary care doctor if you develop new or worsening symptoms.     Please arrange follow up with your primary medical clinic as soon as possible. You must understand that you've received an Urgent Care treatment only and that you may be released before all of your medical problems are known or treated. You, the patient, will arrange for follow up as instructed. If your symptoms worsen or fail to improve you should go to the Emergency Room.

## 2022-10-25 NOTE — PROGRESS NOTES
Subjective:       Patient ID: Partha Curtis Jr. is a 62 y.o. male.    Chief Complaint: No chief complaint on file.    HPI  ROS     Objective:      Physical Exam    Assessment:       No diagnosis found.    Plan:                   No follow-ups on file.

## 2022-10-25 NOTE — PATIENT INSTRUCTIONS
Flu Discharge Instructions  You have been diagnosed with Influenza.   You are contagious for 24 hours after you start the Tamilfu or 24 hours after your last fever, whichever happens last.  Please drink plenty of fluids.  Please get plenty of rest.  Please return here or go to the Emergency Department for any concerns or worsening of condition.  Tamiflu prescription has been discussed and if prescribed, please take to completion unless you cannot tolerate the side effects.   If you were prescribed a narcotic medication, do not drive or operate heavy equipment or machinery while taking these medications.  If you were given a steroid shot in the clinic and have also been given a prescription for a steroid such as Prednisone or a Medrol Dose Pack, please begin taking them tomorrow.  Take tylenol (acetominophen) for fever, chills or body aches every 4 hours. do not exceed 4000 mg/ day.  Take Motrin (Ibuprofen) every 4 hours for fever, chills, pain or inflammation.  Use an antihistmine such as claritin or zyrtec to dry you out. Use pseudoephedrine (behind the counter) to decongest (beware this can raise your blood pressure). Use mucinex (guaifenisin) to break up mucous      PLEASE READ YOUR DISCHARGE INSTRUCTIONS ENTIRELY AS IT CONTAINS IMPORTANT INFORMATION.    Please drink plenty of fluids.    Please get plenty of rest.    Please return here or go to the Emergency Department for any concerns or worsening of condition.    Please take an over the counter antihistamine medication (Allegra/Claritin/Zyrtec) of your choice as directed for allergy symptoms and/or runny nose and postnasal drip.    Try an over the counter decongestant for sinus pressure/ear pressure, congestion symptoms like Mucinex D or Sudafed or Phenylephrine. You buy this behind the pharmacy counter.    If you do have Hypertension or palpitations, it is safe to take Coricidin HBP for relief of sinus symptoms.    Tylenol or ibuprofen can also be used as  directed for pain and fever unless you have an allergy to them or medical condition such as stomach ulcers, kidney or liver disease or blood thinners etc for which you should not be taking these type of medications.     Sore throat recommendations: Warm fluids, warm salt water gargles, throat lozenges, tea, honey, soup, rest, hydration.    Use over the counter Flonase or Nasocort: one spray each nostril twice daily OR two sprays each nostril once daily until nares dry out, unless you have Glaucoma.   Can also supplement with nasal saline rinse.    Sinus rinses DO NOT USE TAP WATER, if you must, water must be at a rolling boil for 1 minute, let it cool, then use.  May use distilled water, or over the counter nasal saline rinses.  Mario's vapor rub in shower to help open nasal passages.  May use nasal gel to keep passages moisturized.  May use nasal saline sprays during the day for added relief of congestion.   For those who go to the gym, please do not use the sauna or steam room now to clear sinuses.    Cough     Rest and fluids are important  Can use honey with isabela to soothe your throat    Robitussin or Delsyum for cough suppressant for dry cough.    Mucinex DM or products containing Guaifenesin or Dextromethorphan for expectorant (wet cough).    Take prescription cough meds (pills) as prescribed; take prescription cough syrup at night as needed for cough.  Do not take both the prescribed cough pills and syrup at the same time or within 6 hours of each other.  Do not take the cough syrup with any other sedative medication as it can can cause drowsiness. Do not operate any heavy machinery, drink or drive while taking the cough syrup.     Please follow up with your primary care doctor or specialist in the next 48-72hrs as needed and if no improvement    If you smoke, please stop smoking.    Please return or see your primary care doctor if you develop new or worsening symptoms.     Please arrange follow up with your  primary medical clinic as soon as possible. You must understand that you've received an Urgent Care treatment only and that you may be released before all of your medical problems are known or treated. You, the patient, will arrange for follow up as instructed. If your symptoms worsen or fail to improve you should go to the Emergency Room.

## 2022-11-02 ENCOUNTER — PATIENT MESSAGE (OUTPATIENT)
Dept: FAMILY MEDICINE | Facility: CLINIC | Age: 62
End: 2022-11-02
Payer: COMMERCIAL

## 2022-11-02 RX ORDER — AMOXICILLIN AND CLAVULANATE POTASSIUM 875; 125 MG/1; MG/1
1 TABLET, FILM COATED ORAL EVERY 12 HOURS
Qty: 14 TABLET | Refills: 0 | Status: SHIPPED | OUTPATIENT
Start: 2022-11-02 | End: 2022-11-09

## 2022-11-08 ENCOUNTER — PATIENT MESSAGE (OUTPATIENT)
Dept: FAMILY MEDICINE | Facility: CLINIC | Age: 62
End: 2022-11-08
Payer: COMMERCIAL

## 2022-12-01 NOTE — PROGRESS NOTES
"Digital Medicine: Health  Introduction    Introduced Partha Curtis to Digital Medicine. Discussed health  role and recommended lifestyle modifications.    Patient states that he talked with someone this morning who stated that his BG readings are coming over. He reports that he has been talking to his doctor about the metformin recalls. Discussed taking BG readings daily. He states that he has a follow up appointment again soon. He states that he does not have a family history of diabetes, and that this is all new to him. He states that he has changed eating habits and has lost weight. He reports that his blood pressure runs close to "normal" around 120/80 mmHg typically. He states that most of the time when he checked his BP it was before medication. He states that for the past few days he has now been taking it 1-2 hours after medication.     The history is provided by the patient. No  was used.     HYPERTENSION  Our goal is to get BP to consistently below 130/80mmHg and make the process convenient so patient can avoid extra trips to the office. Getting your blood pressure below 130/80mmHg (definition of control) will reduce your risk for heart attack, kidney failure, stroke and death (as well as kidney failure, eye disease, & dementia)      Reviewed that the Digital Medicine care team - consisting of a clinician and a health  - will follow the most current evidence-based national guidelines for treating your condition.  The health  will focus on lifestyle modifications and motivation while the clinician will focus on medication therapy.  The care team will review all data on a regular basis and reach out as needed.      Explained that one of the key parts of the program is communication with the care team.  Asked patient to respond to outreach attempts and complete questionnaires.  Stressed importance of medication adherence.    Explained that we expect patient to obtain " several blood pressures per week at random times of day.  Instructed patient not to allow anyone else to use phone and monitoring device.  Confirmed appropriate BP monitoring technique.      Patient's BP goal is 130/80.Patient's BP average is 130/83 mmHg, which is at or below goal, per 2017 ACC/AHA Hypertension Guidelines.        DIABETES    Our goal is to decrease A1c within patient-specific target levels and make the process convenient so patient can avoid extra trips to the office. Reducing A1C by merely 1% results in a decreased risk of complications of at least 10%. For example, an A1C reduced from 8.5% to 7.5% results in almost 40% lower risk of kidney, eye, and nerve disease      Reviewed that the Digital Medicine care team - consisting of a clinician and a health  - will follow the most current evidence-based national guidelines for treating your condition.  The health  will focus on lifestyle modifications and motivation while the clinician will focus on medication therapy.  The care team will review all data on a regular basis and reach out as needed.      Explained that one of the key parts of the program is communication with the care team.  Asked patient to respond to outreach attempts and complete questionnaires.  Stressed importance of medication adherence.      Instructed patient not to allow anyone else to use phone and monitoring device.  Explained that we expect patient to test their blood sugar as prescribed by their physician or Digital Medicine Clinician.      Reviewed general Self-Monitoring of Blood Glucose (SMBG) goals:  · FPG: <  mg/dL  · 1h PPG: < 180 mg/dL  · 2h PPG: < 160 mg/dL  · Bedtime: < 150 mg/dL    Expected SMBG schedule: Daily  Reviewed signs and symptoms of hypoglycemia (weakness, dizziness, hunger, shakiness, nausea, headache, heart palpitations, sweating, fatigue, anxiety, etc.).  Reviewed treatment of hypoglycemia (15/15 rule).      Patient's A1C goal is less  than or equal to 7.  Patient's most recent A1C result is above goal.  Lab Results     Component                Value               Date                     HGBA1C                   8.0 (H)             04/01/2020                    Last 5 Patient Entered Readings                                      Current 30 Day Average: 130/83     Recent Readings 6/4/2020 6/3/2020 6/2/2020 6/1/2020 5/31/2020    SBP (mmHg) 108 114 114 132 138    DBP (mmHg) 75 76 77 87 86    Pulse 95 98 89 94 76        Last 6 Patient Entered Readings                                          Most Recent A1c:      There is no flowsheet data to display.          INTERVENTION(S)  reviewed monitoring technique    PLAN  patient verbalizes understanding, demonstrates understanding via teach back and continue monitoring    Will follow up in ~4 weeks to discuss health goals.       There are no preventive care reminders to display for this patient.    Reviewed the importance of self-monitoring, medication adherence, and that the health  can be used as a resource for lifestyle modifications to help reduce or maintain a healthy lifestyle.    Sent link to [facility] [place] [facility] [place] webpages and my contact information via [employer] for future questions. Follow up scheduled.       2-3x/week

## 2022-12-13 ENCOUNTER — PATIENT MESSAGE (OUTPATIENT)
Dept: OTHER | Facility: OTHER | Age: 62
End: 2022-12-13
Payer: COMMERCIAL

## 2023-01-05 ENCOUNTER — PATIENT MESSAGE (OUTPATIENT)
Dept: OTHER | Facility: OTHER | Age: 63
End: 2023-01-05
Payer: COMMERCIAL

## 2023-01-06 ENCOUNTER — OFFICE VISIT (OUTPATIENT)
Dept: OPTOMETRY | Facility: CLINIC | Age: 63
End: 2023-01-06
Payer: COMMERCIAL

## 2023-01-06 DIAGNOSIS — E11.9 TYPE 2 DIABETES MELLITUS WITHOUT RETINOPATHY: Primary | ICD-10-CM

## 2023-01-06 DIAGNOSIS — H25.13 NUCLEAR SCLEROSIS, BILATERAL: ICD-10-CM

## 2023-01-06 DIAGNOSIS — H52.4 PRESBYOPIA: ICD-10-CM

## 2023-01-06 DIAGNOSIS — N18.31 TYPE 2 DIABETES MELLITUS WITH STAGE 3A CHRONIC KIDNEY DISEASE, WITHOUT LONG-TERM CURRENT USE OF INSULIN: ICD-10-CM

## 2023-01-06 DIAGNOSIS — E11.22 TYPE 2 DIABETES MELLITUS WITH STAGE 3A CHRONIC KIDNEY DISEASE, WITHOUT LONG-TERM CURRENT USE OF INSULIN: ICD-10-CM

## 2023-01-06 PROCEDURE — 92015 PR REFRACTION: ICD-10-PCS | Mod: S$GLB,,, | Performed by: OPTOMETRIST

## 2023-01-06 PROCEDURE — 92014 PR EYE EXAM, EST PATIENT,COMPREHESV: ICD-10-PCS | Mod: S$GLB,,, | Performed by: OPTOMETRIST

## 2023-01-06 PROCEDURE — 92014 COMPRE OPH EXAM EST PT 1/>: CPT | Mod: S$GLB,,, | Performed by: OPTOMETRIST

## 2023-01-06 PROCEDURE — 99999 PR PBB SHADOW E&M-EST. PATIENT-LVL IV: CPT | Mod: PBBFAC,,, | Performed by: OPTOMETRIST

## 2023-01-06 PROCEDURE — 1159F PR MEDICATION LIST DOCUMENTED IN MEDICAL RECORD: ICD-10-PCS | Mod: CPTII,S$GLB,, | Performed by: OPTOMETRIST

## 2023-01-06 PROCEDURE — 2023F DILAT RTA XM W/O RTNOPTHY: CPT | Mod: CPTII,S$GLB,, | Performed by: OPTOMETRIST

## 2023-01-06 PROCEDURE — 92015 DETERMINE REFRACTIVE STATE: CPT | Mod: S$GLB,,, | Performed by: OPTOMETRIST

## 2023-01-06 PROCEDURE — 99999 PR PBB SHADOW E&M-EST. PATIENT-LVL IV: ICD-10-PCS | Mod: PBBFAC,,, | Performed by: OPTOMETRIST

## 2023-01-06 PROCEDURE — 1160F PR REVIEW ALL MEDS BY PRESCRIBER/CLIN PHARMACIST DOCUMENTED: ICD-10-PCS | Mod: CPTII,S$GLB,, | Performed by: OPTOMETRIST

## 2023-01-06 PROCEDURE — 1160F RVW MEDS BY RX/DR IN RCRD: CPT | Mod: CPTII,S$GLB,, | Performed by: OPTOMETRIST

## 2023-01-06 PROCEDURE — 2023F PR DILATED RETINAL EXAM W/O EVID OF RETINOPATHY: ICD-10-PCS | Mod: CPTII,S$GLB,, | Performed by: OPTOMETRIST

## 2023-01-06 PROCEDURE — 1159F MED LIST DOCD IN RCRD: CPT | Mod: CPTII,S$GLB,, | Performed by: OPTOMETRIST

## 2023-01-06 NOTE — PROGRESS NOTES
HPI    63 Y/o male is here for diabetic eye exam with no C/o about ocular health   Pt denies pain and discomfort   Occ floaters     Eye med: no gtt   Hemoglobin A1C       Date                     Value               Ref Range             Status                09/26/2022               6.0 (H)             4.0 - 5.6 %           Final                Last edited by Luis Armando Palacio, OD on 1/6/2023  8:48 AM.        ROS    Positive for: Endocrine (DM)  Negative for: Constitutional, Gastrointestinal, Neurological, Skin,   Genitourinary, Musculoskeletal, HENT, Cardiovascular, Eyes, Respiratory,   Psychiatric, Allergic/Imm, Heme/Lymph  Last edited by Luis Armando Palacio, OD on 1/6/2023  8:48 AM.        Assessment /Plan     For exam results, see Encounter Report.    Type 2 diabetes mellitus without retinopathy    Type 2 diabetes mellitus with stage 3a chronic kidney disease, without long-term current use of insulin  -     Ambulatory referral/consult to Optometry    Nuclear sclerosis, bilateral    Presbyopia      Small cats OU--pt happy w otc readers  DM- WITHOUT RETINOPATHY.  Advised yearly DFE     PLAN:    Rtc 1 yr

## 2023-01-09 ENCOUNTER — HOSPITAL ENCOUNTER (OUTPATIENT)
Dept: RADIOLOGY | Facility: HOSPITAL | Age: 63
Discharge: HOME | End: 2023-01-09
Attending: FAMILY MEDICINE
Payer: COMMERCIAL

## 2023-01-09 ENCOUNTER — PATIENT MESSAGE (OUTPATIENT)
Dept: FAMILY MEDICINE | Facility: CLINIC | Age: 63
End: 2023-01-09

## 2023-01-09 ENCOUNTER — OFFICE VISIT (OUTPATIENT)
Dept: FAMILY MEDICINE | Facility: CLINIC | Age: 63
End: 2023-01-09
Payer: COMMERCIAL

## 2023-01-09 VITALS
BODY MASS INDEX: 28.27 KG/M2 | HEIGHT: 71 IN | OXYGEN SATURATION: 99 % | DIASTOLIC BLOOD PRESSURE: 82 MMHG | HEART RATE: 62 BPM | WEIGHT: 201.94 LBS | SYSTOLIC BLOOD PRESSURE: 124 MMHG | TEMPERATURE: 98 F

## 2023-01-09 DIAGNOSIS — R53.1 WEAKNESS: Primary | ICD-10-CM

## 2023-01-09 DIAGNOSIS — R79.89 LOW TESTOSTERONE IN MALE: ICD-10-CM

## 2023-01-09 DIAGNOSIS — R53.1 WEAKNESS: ICD-10-CM

## 2023-01-09 PROCEDURE — 99999 PR PBB SHADOW E&M-EST. PATIENT-LVL V: CPT | Mod: PBBFAC,,, | Performed by: FAMILY MEDICINE

## 2023-01-09 PROCEDURE — 93010 ELECTROCARDIOGRAM REPORT: CPT | Mod: S$GLB,,, | Performed by: INTERNAL MEDICINE

## 2023-01-09 PROCEDURE — 99999 PR PBB SHADOW E&M-EST. PATIENT-LVL V: ICD-10-PCS | Mod: PBBFAC,,, | Performed by: FAMILY MEDICINE

## 2023-01-09 PROCEDURE — 93005 EKG 12-LEAD: ICD-10-PCS | Mod: S$GLB,,, | Performed by: FAMILY MEDICINE

## 2023-01-09 PROCEDURE — 99214 OFFICE O/P EST MOD 30 MIN: CPT | Mod: S$GLB,,, | Performed by: FAMILY MEDICINE

## 2023-01-09 PROCEDURE — 3079F DIAST BP 80-89 MM HG: CPT | Mod: CPTII,S$GLB,, | Performed by: FAMILY MEDICINE

## 2023-01-09 PROCEDURE — 93005 ELECTROCARDIOGRAM TRACING: CPT | Mod: S$GLB,,, | Performed by: FAMILY MEDICINE

## 2023-01-09 PROCEDURE — 3008F PR BODY MASS INDEX (BMI) DOCUMENTED: ICD-10-PCS | Mod: CPTII,S$GLB,, | Performed by: FAMILY MEDICINE

## 2023-01-09 PROCEDURE — 3074F PR MOST RECENT SYSTOLIC BLOOD PRESSURE < 130 MM HG: ICD-10-PCS | Mod: CPTII,S$GLB,, | Performed by: FAMILY MEDICINE

## 2023-01-09 PROCEDURE — 99214 PR OFFICE/OUTPT VISIT, EST, LEVL IV, 30-39 MIN: ICD-10-PCS | Mod: S$GLB,,, | Performed by: FAMILY MEDICINE

## 2023-01-09 PROCEDURE — 1160F PR REVIEW ALL MEDS BY PRESCRIBER/CLIN PHARMACIST DOCUMENTED: ICD-10-PCS | Mod: CPTII,S$GLB,, | Performed by: FAMILY MEDICINE

## 2023-01-09 PROCEDURE — 71046 X-RAY EXAM CHEST 2 VIEWS: CPT | Mod: TC,FY,PO

## 2023-01-09 PROCEDURE — 71046 XR CHEST PA AND LATERAL: ICD-10-PCS | Mod: 26,,, | Performed by: RADIOLOGY

## 2023-01-09 PROCEDURE — 71046 X-RAY EXAM CHEST 2 VIEWS: CPT | Mod: 26,,, | Performed by: RADIOLOGY

## 2023-01-09 PROCEDURE — 3079F PR MOST RECENT DIASTOLIC BLOOD PRESSURE 80-89 MM HG: ICD-10-PCS | Mod: CPTII,S$GLB,, | Performed by: FAMILY MEDICINE

## 2023-01-09 PROCEDURE — 93010 EKG 12-LEAD: ICD-10-PCS | Mod: S$GLB,,, | Performed by: INTERNAL MEDICINE

## 2023-01-09 PROCEDURE — 1159F MED LIST DOCD IN RCRD: CPT | Mod: CPTII,S$GLB,, | Performed by: FAMILY MEDICINE

## 2023-01-09 PROCEDURE — 3008F BODY MASS INDEX DOCD: CPT | Mod: CPTII,S$GLB,, | Performed by: FAMILY MEDICINE

## 2023-01-09 PROCEDURE — 1159F PR MEDICATION LIST DOCUMENTED IN MEDICAL RECORD: ICD-10-PCS | Mod: CPTII,S$GLB,, | Performed by: FAMILY MEDICINE

## 2023-01-09 PROCEDURE — 3074F SYST BP LT 130 MM HG: CPT | Mod: CPTII,S$GLB,, | Performed by: FAMILY MEDICINE

## 2023-01-09 PROCEDURE — 1160F RVW MEDS BY RX/DR IN RCRD: CPT | Mod: CPTII,S$GLB,, | Performed by: FAMILY MEDICINE

## 2023-01-09 NOTE — PROGRESS NOTES
"Subjective:       Patient ID: Partha Curtis Jr. is a 62 y.o. male.    Chief Complaint: Fatigue    Jarvis is a 62 y.o. male who presents today with weakness  He woke up yesterday feeling weak.  Denies any URI or flu or covid like symptoms    Feels like his "brain and hands" are not on the same page    He slept  most of the day yesterday. He is on chlorthalidone, not sure if he is dehydrated.     Doesn't feel better today.     Hasn't had testosterone since November.     No change in medications. However, Hasn't been taking his percocet.     Legs feel heavy, but "no pain."    No cough, no congestion, no chest pain, no SOB.     Review of Systems   Constitutional:  Positive for fatigue. Negative for chills and fever.   Respiratory:  Negative for cough, choking, chest tightness and shortness of breath.    Cardiovascular:  Negative for chest pain.   Gastrointestinal:  Negative for diarrhea, nausea and vomiting.   Neurological:  Negative for dizziness, light-headedness and headaches.           Objective:     Vitals:    01/09/23 1124   BP: 124/82   Pulse: 62   Temp: 97.7 °F (36.5 °C)        Physical Exam  Vitals and nursing note reviewed.   Constitutional:       General: He is not in acute distress.     Appearance: He is well-developed. He is not ill-appearing or toxic-appearing.   Cardiovascular:      Rate and Rhythm: Normal rate and regular rhythm.      Heart sounds: Normal heart sounds.   Pulmonary:      Effort: Pulmonary effort is normal.      Breath sounds: Normal breath sounds.   Abdominal:      Palpations: Abdomen is soft.      Tenderness: There is no abdominal tenderness.   Musculoskeletal:         General: No tenderness.   Lymphadenopathy:      Cervical: No cervical adenopathy.   Skin:     Findings: No rash.   Neurological:      Mental Status: He is alert and oriented to person, place, and time.      Cranial Nerves: No cranial nerve deficit.      Sensory: No sensory deficit.      Motor: No abnormal muscle tone.      " Comments: Finger to nose intact  Heel to shin intact  Strength and sensation grossly intact BL UE/LE  CN 2-12 grossly intact  PERRLA  Rapid alternating movement intact    Gait normal  Toe walk normal  Heel walk normal     Psychiatric:         Speech: Speech normal.         Behavior: Behavior normal.         Thought Content: Thought content normal.         Judgment: Judgment normal.       Assessment:       1. Weakness    2. Low testosterone in male          Plan:         Generalized weakness  Last had this with HANANE  Labs today  Will check T, and refer back to urology  EKG - appears unchanged.   Neuro exam normal  Exam otherwise appears normal  If symptoms worsen or persist, go to the ER    May need to hold chlorthalidone?      Weakness  -     Iron and TIBC; Future; Expected date: 01/09/2023  -     Ferritin; Future; Expected date: 01/09/2023  -     Urine culture; Future; Expected date: 01/09/2023  -     Urinalysis; Future; Expected date: 01/09/2023  -     TSH; Future; Expected date: 01/09/2023  -     Vitamin B12; Future; Expected date: 01/09/2023  -     IN OFFICE EKG 12-LEAD (to Muse)  -     CK; Future; Expected date: 01/09/2023  -     BNP; Future; Expected date: 01/09/2023  -     X-Ray Chest PA And Lateral; Future; Expected date: 01/09/2023    Low testosterone in male  -     Testosterone; Future; Expected date: 01/09/2023        Warning signs discussed, patient to call with any further issues or worsening of symptoms.

## 2023-01-09 NOTE — Clinical Note
Serena escobar, can you set up f/u for this patient to see you for his low T? Has seen you in the past, thanks!  JM

## 2023-01-19 ENCOUNTER — OFFICE VISIT (OUTPATIENT)
Dept: UROLOGY | Facility: CLINIC | Age: 63
End: 2023-01-19
Payer: COMMERCIAL

## 2023-01-19 VITALS
WEIGHT: 200.31 LBS | SYSTOLIC BLOOD PRESSURE: 116 MMHG | BODY MASS INDEX: 28.04 KG/M2 | DIASTOLIC BLOOD PRESSURE: 74 MMHG | HEART RATE: 66 BPM | HEIGHT: 71 IN

## 2023-01-19 DIAGNOSIS — R53.83 LOW ENERGY: ICD-10-CM

## 2023-01-19 DIAGNOSIS — N52.9 ERECTILE DYSFUNCTION, UNSPECIFIED ERECTILE DYSFUNCTION TYPE: ICD-10-CM

## 2023-01-19 DIAGNOSIS — R53.83 FATIGUE, UNSPECIFIED TYPE: ICD-10-CM

## 2023-01-19 DIAGNOSIS — E29.1 HYPOGONADISM IN MALE: Primary | ICD-10-CM

## 2023-01-19 DIAGNOSIS — R79.89 LOW TESTOSTERONE: ICD-10-CM

## 2023-01-19 PROCEDURE — 99204 OFFICE O/P NEW MOD 45 MIN: CPT | Mod: S$GLB,,, | Performed by: STUDENT IN AN ORGANIZED HEALTH CARE EDUCATION/TRAINING PROGRAM

## 2023-01-19 PROCEDURE — 1159F PR MEDICATION LIST DOCUMENTED IN MEDICAL RECORD: ICD-10-PCS | Mod: CPTII,S$GLB,, | Performed by: STUDENT IN AN ORGANIZED HEALTH CARE EDUCATION/TRAINING PROGRAM

## 2023-01-19 PROCEDURE — 3051F HG A1C>EQUAL 7.0%<8.0%: CPT | Mod: CPTII,S$GLB,, | Performed by: STUDENT IN AN ORGANIZED HEALTH CARE EDUCATION/TRAINING PROGRAM

## 2023-01-19 PROCEDURE — 3008F PR BODY MASS INDEX (BMI) DOCUMENTED: ICD-10-PCS | Mod: CPTII,S$GLB,, | Performed by: STUDENT IN AN ORGANIZED HEALTH CARE EDUCATION/TRAINING PROGRAM

## 2023-01-19 PROCEDURE — 99999 PR PBB SHADOW E&M-EST. PATIENT-LVL III: CPT | Mod: PBBFAC,,, | Performed by: STUDENT IN AN ORGANIZED HEALTH CARE EDUCATION/TRAINING PROGRAM

## 2023-01-19 PROCEDURE — 99999 PR PBB SHADOW E&M-EST. PATIENT-LVL III: ICD-10-PCS | Mod: PBBFAC,,, | Performed by: STUDENT IN AN ORGANIZED HEALTH CARE EDUCATION/TRAINING PROGRAM

## 2023-01-19 PROCEDURE — 99204 PR OFFICE/OUTPT VISIT, NEW, LEVL IV, 45-59 MIN: ICD-10-PCS | Mod: S$GLB,,, | Performed by: STUDENT IN AN ORGANIZED HEALTH CARE EDUCATION/TRAINING PROGRAM

## 2023-01-19 PROCEDURE — 1160F PR REVIEW ALL MEDS BY PRESCRIBER/CLIN PHARMACIST DOCUMENTED: ICD-10-PCS | Mod: CPTII,S$GLB,, | Performed by: STUDENT IN AN ORGANIZED HEALTH CARE EDUCATION/TRAINING PROGRAM

## 2023-01-19 PROCEDURE — 1160F RVW MEDS BY RX/DR IN RCRD: CPT | Mod: CPTII,S$GLB,, | Performed by: STUDENT IN AN ORGANIZED HEALTH CARE EDUCATION/TRAINING PROGRAM

## 2023-01-19 PROCEDURE — 3008F BODY MASS INDEX DOCD: CPT | Mod: CPTII,S$GLB,, | Performed by: STUDENT IN AN ORGANIZED HEALTH CARE EDUCATION/TRAINING PROGRAM

## 2023-01-19 PROCEDURE — 3051F PR MOST RECENT HEMOGLOBIN A1C LEVEL 7.0 - < 8.0%: ICD-10-PCS | Mod: CPTII,S$GLB,, | Performed by: STUDENT IN AN ORGANIZED HEALTH CARE EDUCATION/TRAINING PROGRAM

## 2023-01-19 PROCEDURE — 1159F MED LIST DOCD IN RCRD: CPT | Mod: CPTII,S$GLB,, | Performed by: STUDENT IN AN ORGANIZED HEALTH CARE EDUCATION/TRAINING PROGRAM

## 2023-01-19 RX ORDER — VALACYCLOVIR HYDROCHLORIDE 1 G/1
1000 TABLET, FILM COATED ORAL 2 TIMES DAILY
COMMUNITY
End: 2023-02-15

## 2023-01-19 NOTE — Clinical Note
Can you get with him about lower extremity edema in his ankles (pitting) and he's feeling it in his hands too. Thanks!

## 2023-01-19 NOTE — PROGRESS NOTES
Subjective:       Patient ID: Partha Curtis Jr. is a 62 y.o. male.    Chief Complaint:  f/u testosterone replacement  This is a 62 y.o.  male patient that is an established patient of mine.    The patient is referred to me by Dr. Cavazos for evaluation of low testosterone. He was previously recommended to undergo testosterone replacement therapy.  He reported that his initial testosterone level was <100. His symptoms were erectile dysfunction, fatigue, concentration issues.  He has tried testosterone gel in the past. He was optimally controlled on testopel - reached about testosterone 600/700 level however due to insurance not covering testopel, he was switched to testosterone injections. He felt like the injections was never quite optimized. He has been off of testosterone injections for about 7 months.  He was seeing Dr. James in the past at [facility] for his urologic care. He has no LUTS complaints, no weak stream, nocturia. Denies straining. No family history of prostate cancer or BPH.     He pulled up old labs in his phone of testosterone levels:  2015 - 206 5/26/16 - 186  7/22/2016 - 213     PSA from the patient's phone:  6/2/15 - 0.4  1/19/16 - 0.4  5/26/16 - 0.4  3/11/17 - 0.7  10/20/17 - 0.4     Urinalysis 3/2018 - benign, was 1+ blood on dip, 2 RBCs on micro - clinically insignificant hematuria.     1/19/2023 (last visit was 2018- delayed because of covid)  Was maintained on testosterone 200mg injection q2 weeks.   Last set of labs ordered by Dr. Cavazos were 1/9/2023 checked at 1:47pm:  Testosterone 104 but again was checked at 1:47pm  LFTs normal, hgb and hct normal, no PSA checked.    He has been off of testosterone therapy since 11/2022. He notes that when he is on testosterone replacement, erections improved, libido improved, energy is much improved. He notes that since being off of therapy in 11/2022, symptoms are all worsened.       LAST PSA  Lab Results   Component Value Date    PSA 0.61  06/21/2022    PSA 0.55 05/05/2021    PSA 0.94 04/01/2020    PSA 0.70 11/10/2018    PSA 0.41 09/15/2008    PSA 0.4 06/15/2007    PSA 0.3 11/11/2006    PSA 0.3 04/25/2006    PSA 0.3 01/28/2005    PSA 0.4 04/13/2004       Lab Results   Component Value Date    CREATININE 1.5 (H) 01/09/2023       ---  Past Medical History:   Diagnosis Date    Allergy     Carotid artery occlusion     Chronic back pain     Colonic polyp     Genetic testing     MUTYH mutation-negative    Hyperlipidemia     Hypertension     Sleep apnea     Type 2 diabetes mellitus with stage 3 chronic kidney disease, without long-term current use of insulin 4/2/2020       Past Surgical History:   Procedure Laterality Date    ANKLE SURGERY Left     2    APPENDECTOMY      at age 20    CAROTID ENDARTERECTOMY Right 07/15/2015    CARPAL TUNNEL RELEASE Bilateral     COLONOSCOPY N/A 12/14/2018    Procedure: COLONOSCOPYSuprep;  Surgeon: Silver Selby MD;  Location: [facility];  Service: Endoscopy;  Laterality: N/A;    JOINT REPLACEMENT  9/2010    NASAL SEPTUM SURGERY      TOTAL KNEE ARTHROPLASTY Right     6 knee surgeries       Family History   Problem Relation Age of Onset    Hypertension Father     Lung cancer Father         x2 (PAUL initially- treated surgically only, then recurred distantly) (smoker, & had been exposed to many chemicals)    Cancer Father     Brain cancer Mother 67    Cancer Mother     Breast cancer Sister 58    Genetic Disorder Sister         monoallelic MUTYH mutation    Cancer Maternal Grandfather     Brain cancer Paternal Grandfather 73    Aneurysm Other 48        brain    Breast cancer Maternal Cousin 57    Ulcerative colitis Other        Social History     Tobacco Use    Smoking status: Never    Smokeless tobacco: Never   Substance Use Topics    Alcohol use: Yes     Alcohol/week: 2.0 standard drinks     Types: 2 Cans of beer per week     Comment: a week    Drug use: Never       Current Outpatient Medications on File Prior to Visit  "  Medication Sig Dispense Refill    amitriptyline (ELAVIL) 25 MG tablet Take 75 mg by mouth nightly as needed for Insomnia.      aspirin (ECOTRIN) 81 MG EC tablet Take 325 mg by mouth once daily.       azelastine (ASTELIN) 137 mcg (0.1 %) nasal spray 1 spray (137 mcg total) by Nasal route 2 (two) times daily. 30 mL 11    BD INTEGRA SYRINGE 3 mL 22 gauge x 1 1/2" Syrg USE TO INJECT 1 ML INTO THE MUSCLE EVERY 14 DAYS 5 Syringe 3    BD LUER-RUSS SYRINGE 3 mL 25 gauge x 1" Syrg USE ONE SYRINGE EVERY 14 DAYS WITH TESTOSTERONE      candesartan (ATACAND) 32 MG tablet Take 1 tablet by mouth once daily 90 tablet 0    chlorthalidone (HYGROTEN) 25 MG Tab Take 1 tablet (25 mg total) by mouth once daily. 90 tablet 4    cyanocobalamin (VITAMIN B-12) 1000 MCG tablet Take 1 tablet (1,000 mcg total) by mouth once daily. 90 tablet 5    ergocalciferol (ERGOCALCIFEROL) 50,000 unit Cap Take 1 capsule (50,000 Units total) by mouth every 7 days. 12 capsule 4    gabapentin (NEURONTIN) 800 MG tablet Take 1 tablet (800 mg total) by mouth every evening. 90 tablet 1    glimepiride (AMARYL) 4 MG tablet Take 1 tablet (4 mg total) by mouth before breakfast. 90 tablet 3    ipratropium (ATROVENT) 42 mcg (0.06 %) nasal spray 2 sprays by Nasal route 2 (two) times daily as needed for Rhinitis. 15 mL 0    metFORMIN (GLUCOPHAGE-XR) 500 MG ER 24hr tablet Take 1 tablet (500 mg total) by mouth 2 (two) times daily with meals. 180 tablet 1    metoprolol succinate (TOPROL-XL) 200 MG 24 hr tablet Take 1 tablet (200 mg total) by mouth once daily. 90 tablet 0    rosuvastatin (CRESTOR) 40 MG Tab Take 1 tablet by mouth in the evening 90 tablet 3    testosterone cypionate (DEPOTESTOTERONE CYPIONATE) 200 mg/mL injection INJECT 1 ML INTRAMUSCULARLY  EVERY TWO WEEKS 6 mL 5    valACYclovir (VALTREX) 1000 MG tablet Take 1,000 mg by mouth 2 (two) times daily.       No current facility-administered medications on file prior to visit.       Review of patient's allergies " indicates:   Allergen Reactions    Stadol [butorphanol tartrate] Rash     Swelling in face    Strawberries [strawberry] Rash       Review of Systems   Constitutional:  Negative for chills.   HENT:  Negative for congestion.    Eyes:  Negative for visual disturbance.   Respiratory:  Negative for shortness of breath.    Cardiovascular:  Negative for chest pain.   Gastrointestinal:  Negative for abdominal distention.   Musculoskeletal:  Negative for gait problem.   Skin:  Negative for color change.   Neurological:  Negative for dizziness.   Psychiatric/Behavioral:  Negative for agitation.      Objective:      Physical Exam  Constitutional:       Appearance: He is well-developed.   HENT:      Head: Normocephalic.   Eyes:      Pupils: Pupils are equal, round, and reactive to light.   Pulmonary:      Effort: Pulmonary effort is normal.   Abdominal:      Palpations: Abdomen is soft.   Musculoskeletal:         General: Normal range of motion.      Cervical back: Normal range of motion.   Skin:     General: Skin is warm and dry.   Neurological:      Mental Status: He is alert.       Assessment:       1. Hypogonadism in male    2. Low testosterone    3. Erectile dysfunction, unspecified erectile dysfunction type    4. Fatigue, unspecified type    5. Low energy        Plan:         Need to repeat testosterone and PSA in AM.   Afterwards, I can refill testosterone 200mg q2 weeks.   Counseled after he starts testosterone, labs q6 months and visits once yearly.  Will message Dr. Cavazos about retaining lower extremity fluids and ankle edema, pitting edema noted when he moves his sock.       Hypogonadism in male  -     Testosterone; Future; Expected date: 01/19/2023  -     Prostate Specific Antigen, Diagnostic; Future; Expected date: 01/19/2023    Low testosterone  -     Testosterone; Future; Expected date: 01/19/2023  -     Prostate Specific Antigen, Diagnostic; Future; Expected date: 01/19/2023    Erectile dysfunction,  unspecified erectile dysfunction type  -     Testosterone; Future; Expected date: 01/19/2023  -     Prostate Specific Antigen, Diagnostic; Future; Expected date: 01/19/2023    Fatigue, unspecified type  -     Testosterone; Future; Expected date: 01/19/2023  -     Prostate Specific Antigen, Diagnostic; Future; Expected date: 01/19/2023    Low energy  -     Testosterone; Future; Expected date: 01/19/2023  -     Prostate Specific Antigen, Diagnostic; Future; Expected date: 01/19/2023

## 2023-01-23 ENCOUNTER — LAB VISIT (OUTPATIENT)
Dept: LAB | Facility: HOSPITAL | Age: 63
End: 2023-01-23
Attending: STUDENT IN AN ORGANIZED HEALTH CARE EDUCATION/TRAINING PROGRAM
Payer: COMMERCIAL

## 2023-01-23 DIAGNOSIS — E29.1 HYPOGONADISM IN MALE: ICD-10-CM

## 2023-01-23 DIAGNOSIS — R79.89 LOW TESTOSTERONE: ICD-10-CM

## 2023-01-23 DIAGNOSIS — R53.83 LOW ENERGY: ICD-10-CM

## 2023-01-23 DIAGNOSIS — N52.9 ERECTILE DYSFUNCTION, UNSPECIFIED ERECTILE DYSFUNCTION TYPE: ICD-10-CM

## 2023-01-23 DIAGNOSIS — R53.83 FATIGUE, UNSPECIFIED TYPE: ICD-10-CM

## 2023-01-23 LAB
COMPLEXED PSA SERPL-MCNC: 0.39 NG/ML (ref 0–4)
TESTOST SERPL-MCNC: 132 NG/DL (ref 304–1227)

## 2023-01-23 PROCEDURE — 36415 COLL VENOUS BLD VENIPUNCTURE: CPT | Mod: PO | Performed by: STUDENT IN AN ORGANIZED HEALTH CARE EDUCATION/TRAINING PROGRAM

## 2023-01-23 PROCEDURE — 84403 ASSAY OF TOTAL TESTOSTERONE: CPT | Performed by: STUDENT IN AN ORGANIZED HEALTH CARE EDUCATION/TRAINING PROGRAM

## 2023-01-23 PROCEDURE — 84153 ASSAY OF PSA TOTAL: CPT | Performed by: STUDENT IN AN ORGANIZED HEALTH CARE EDUCATION/TRAINING PROGRAM

## 2023-01-27 ENCOUNTER — PATIENT MESSAGE (OUTPATIENT)
Dept: UROLOGY | Facility: CLINIC | Age: 63
End: 2023-01-27
Payer: COMMERCIAL

## 2023-01-27 DIAGNOSIS — E29.1 HYPOGONADISM IN MALE: ICD-10-CM

## 2023-01-27 RX ORDER — TESTOSTERONE CYPIONATE 200 MG/ML
200 INJECTION, SOLUTION INTRAMUSCULAR
Qty: 6 ML | Refills: 1 | Status: SHIPPED | OUTPATIENT
Start: 2023-01-27 | End: 2023-09-26 | Stop reason: SDUPTHER

## 2023-01-31 DIAGNOSIS — M54.12 CERVICAL RADICULOPATHY: ICD-10-CM

## 2023-01-31 NOTE — TELEPHONE ENCOUNTER
No new care gaps identified.  [facility] Embedded Care Gaps. Reference number: [id number]. 1/31/2023   5:13:18 PM CST

## 2023-02-01 ENCOUNTER — OFFICE VISIT (OUTPATIENT)
Dept: FAMILY MEDICINE | Facility: CLINIC | Age: 63
End: 2023-02-01
Payer: COMMERCIAL

## 2023-02-01 VITALS
SYSTOLIC BLOOD PRESSURE: 128 MMHG | BODY MASS INDEX: 28.09 KG/M2 | WEIGHT: 200.63 LBS | HEIGHT: 71 IN | DIASTOLIC BLOOD PRESSURE: 70 MMHG | HEART RATE: 53 BPM | OXYGEN SATURATION: 98 %

## 2023-02-01 DIAGNOSIS — N18.31 TYPE 2 DIABETES MELLITUS WITH STAGE 3A CHRONIC KIDNEY DISEASE, WITHOUT LONG-TERM CURRENT USE OF INSULIN: Primary | ICD-10-CM

## 2023-02-01 DIAGNOSIS — E83.52 HYPERCALCEMIA: ICD-10-CM

## 2023-02-01 DIAGNOSIS — E53.8 B12 DEFICIENCY: ICD-10-CM

## 2023-02-01 DIAGNOSIS — E78.5 DYSLIPIDEMIA: Chronic | ICD-10-CM

## 2023-02-01 DIAGNOSIS — N18.31 STAGE 3A CHRONIC KIDNEY DISEASE: ICD-10-CM

## 2023-02-01 DIAGNOSIS — M54.12 CERVICAL RADICULOPATHY: ICD-10-CM

## 2023-02-01 DIAGNOSIS — F41.9 ANXIETY: ICD-10-CM

## 2023-02-01 DIAGNOSIS — Z79.890 LONG-TERM CURRENT USE OF TESTOSTERONE REPLACEMENT THERAPY: ICD-10-CM

## 2023-02-01 DIAGNOSIS — I10 ESSENTIAL HYPERTENSION: ICD-10-CM

## 2023-02-01 DIAGNOSIS — E11.22 TYPE 2 DIABETES MELLITUS WITH STAGE 3A CHRONIC KIDNEY DISEASE, WITHOUT LONG-TERM CURRENT USE OF INSULIN: Primary | ICD-10-CM

## 2023-02-01 PROCEDURE — 4010F ACE/ARB THERAPY RXD/TAKEN: CPT | Mod: CPTII,S$GLB,, | Performed by: FAMILY MEDICINE

## 2023-02-01 PROCEDURE — 3008F PR BODY MASS INDEX (BMI) DOCUMENTED: ICD-10-PCS | Mod: CPTII,S$GLB,, | Performed by: FAMILY MEDICINE

## 2023-02-01 PROCEDURE — 99999 PR PBB SHADOW E&M-EST. PATIENT-LVL IV: ICD-10-PCS | Mod: PBBFAC,,, | Performed by: FAMILY MEDICINE

## 2023-02-01 PROCEDURE — 99999 PR PBB SHADOW E&M-EST. PATIENT-LVL IV: CPT | Mod: PBBFAC,,, | Performed by: FAMILY MEDICINE

## 2023-02-01 PROCEDURE — 3078F PR MOST RECENT DIASTOLIC BLOOD PRESSURE < 80 MM HG: ICD-10-PCS | Mod: CPTII,S$GLB,, | Performed by: FAMILY MEDICINE

## 2023-02-01 PROCEDURE — 4010F PR ACE/ARB THEARPY RXD/TAKEN: ICD-10-PCS | Mod: CPTII,S$GLB,, | Performed by: FAMILY MEDICINE

## 2023-02-01 PROCEDURE — 3074F SYST BP LT 130 MM HG: CPT | Mod: CPTII,S$GLB,, | Performed by: FAMILY MEDICINE

## 2023-02-01 PROCEDURE — 3008F BODY MASS INDEX DOCD: CPT | Mod: CPTII,S$GLB,, | Performed by: FAMILY MEDICINE

## 2023-02-01 PROCEDURE — 99214 PR OFFICE/OUTPT VISIT, EST, LEVL IV, 30-39 MIN: ICD-10-PCS | Mod: S$GLB,,, | Performed by: FAMILY MEDICINE

## 2023-02-01 PROCEDURE — 3074F PR MOST RECENT SYSTOLIC BLOOD PRESSURE < 130 MM HG: ICD-10-PCS | Mod: CPTII,S$GLB,, | Performed by: FAMILY MEDICINE

## 2023-02-01 PROCEDURE — 3078F DIAST BP <80 MM HG: CPT | Mod: CPTII,S$GLB,, | Performed by: FAMILY MEDICINE

## 2023-02-01 PROCEDURE — 1159F MED LIST DOCD IN RCRD: CPT | Mod: CPTII,S$GLB,, | Performed by: FAMILY MEDICINE

## 2023-02-01 PROCEDURE — 1160F RVW MEDS BY RX/DR IN RCRD: CPT | Mod: CPTII,S$GLB,, | Performed by: FAMILY MEDICINE

## 2023-02-01 PROCEDURE — 3051F HG A1C>EQUAL 7.0%<8.0%: CPT | Mod: CPTII,S$GLB,, | Performed by: FAMILY MEDICINE

## 2023-02-01 PROCEDURE — 99214 OFFICE O/P EST MOD 30 MIN: CPT | Mod: S$GLB,,, | Performed by: FAMILY MEDICINE

## 2023-02-01 PROCEDURE — 1160F PR REVIEW ALL MEDS BY PRESCRIBER/CLIN PHARMACIST DOCUMENTED: ICD-10-PCS | Mod: CPTII,S$GLB,, | Performed by: FAMILY MEDICINE

## 2023-02-01 PROCEDURE — 3051F PR MOST RECENT HEMOGLOBIN A1C LEVEL 7.0 - < 8.0%: ICD-10-PCS | Mod: CPTII,S$GLB,, | Performed by: FAMILY MEDICINE

## 2023-02-01 PROCEDURE — 1159F PR MEDICATION LIST DOCUMENTED IN MEDICAL RECORD: ICD-10-PCS | Mod: CPTII,S$GLB,, | Performed by: FAMILY MEDICINE

## 2023-02-01 RX ORDER — GABAPENTIN 800 MG/1
400 TABLET ORAL NIGHTLY
Qty: 90 TABLET | Refills: 1
Start: 2023-02-01 | End: 2023-02-01 | Stop reason: SDUPTHER

## 2023-02-01 RX ORDER — SEMAGLUTIDE 1.34 MG/ML
INJECTION, SOLUTION SUBCUTANEOUS
Qty: 4 PEN | Refills: 0 | Status: SHIPPED | OUTPATIENT
Start: 2023-02-01 | End: 2023-05-17 | Stop reason: SDUPTHER

## 2023-02-01 RX ORDER — ROSUVASTATIN CALCIUM 40 MG/1
40 TABLET, COATED ORAL NIGHTLY
Qty: 90 TABLET | Refills: 3 | Status: SHIPPED | OUTPATIENT
Start: 2023-02-01 | End: 2023-07-14 | Stop reason: SDUPTHER

## 2023-02-01 RX ORDER — GABAPENTIN 800 MG/1
400 TABLET ORAL NIGHTLY
Qty: 90 TABLET | Refills: 0 | Status: SHIPPED | OUTPATIENT
Start: 2023-02-01 | End: 2023-07-17 | Stop reason: SDUPTHER

## 2023-02-01 RX ORDER — METOPROLOL SUCCINATE 200 MG/1
200 TABLET, EXTENDED RELEASE ORAL DAILY
Qty: 90 TABLET | Refills: 0 | Status: SHIPPED | OUTPATIENT
Start: 2023-02-01 | End: 2023-06-26

## 2023-02-01 RX ORDER — METFORMIN HYDROCHLORIDE 500 MG/1
1500 TABLET, EXTENDED RELEASE ORAL DAILY
Qty: 180 TABLET | Refills: 1 | Status: SHIPPED | OUTPATIENT
Start: 2023-02-01 | End: 2023-06-27

## 2023-02-01 RX ORDER — SEMAGLUTIDE 1.34 MG/ML
INJECTION, SOLUTION SUBCUTANEOUS
Qty: 4 PEN | Refills: 0 | Status: SHIPPED | OUTPATIENT
Start: 2023-02-01 | End: 2023-02-01 | Stop reason: SDUPTHER

## 2023-02-01 RX ORDER — CANDESARTAN 32 MG/1
32 TABLET ORAL DAILY
Qty: 90 TABLET | Refills: 0 | Status: SHIPPED | OUTPATIENT
Start: 2023-02-01 | End: 2023-05-10 | Stop reason: SDUPTHER

## 2023-02-01 NOTE — PROGRESS NOTES
"Subjective:       Patient ID: Partha Curtis Jr. is a 62 y.o. male.    Chief Complaint: Diabetes    Jarvis is a 62 y.o. male who presents today for f/u    HTN: has been taking metoprolol, chlorthalidone, and candesartan. Off amlodipine.   Hypercalcemia: due to dehydration from chlorthalidone?? Recheck today.     DM: worsening. On metformin 2 pills/day. And amaryl. Wants to start ozempic.   CKD: stable  DLD: on crestor  B12 def: at goal 1/2023    Low T: seeing urology. Started testosterone Sunday  Using CPAP, started 2 night ago.     Back in his house since December, still has no kitchen. Eating out. Not exercising. Is stressed. Snacking more.     Review of Systems   Constitutional:  Positive for fatigue. Negative for chills and fever.   Respiratory:  Negative for chest tightness and shortness of breath.    Cardiovascular:  Positive for leg swelling. Negative for chest pain.   Gastrointestinal:  Negative for diarrhea, nausea and vomiting.   Neurological:  Negative for dizziness, light-headedness and headaches.             Objective:     Vitals:    02/01/23 0815   BP: 128/70   BP Location: Left arm   Patient Position: Sitting   BP Method: Large (Manual)   Pulse: (!) 53   SpO2: 98%   Weight: 91 kg (200 lb 9.9 oz)   Height: 5' 11" (1.803 m)        Physical Exam  Constitutional:       General: He is not in acute distress.     Appearance: He is obese. He is not ill-appearing, toxic-appearing or diaphoretic.   Cardiovascular:      Rate and Rhythm: Regular rhythm. Bradycardia present.   Pulmonary:      Effort: Pulmonary effort is normal.      Breath sounds: Normal breath sounds.   Musculoskeletal:      Comments: No edema noted on exam today   Neurological:      General: No focal deficit present.      Mental Status: He is alert.   Psychiatric:         Mood and Affect: Mood normal.         Behavior: Behavior normal.         Thought Content: Thought content normal.         Judgment: Judgment normal.         Lab Results "   Component Value Date    HGBA1C 7.0 (H) 01/09/2023    HGBA1C 6.0 (H) 09/26/2022    HGBA1C 8.8 (H) 06/21/2022       Assessment:       1. Type 2 diabetes mellitus with stage 3a chronic kidney disease, without long-term current use of insulin    2. B12 deficiency    3. Stage 3a chronic kidney disease    4. Long-term current use of testosterone replacement therapy    5. Essential hypertension    6. Dyslipidemia    7. Anxiety    8. Hypercalcemia    9. Cervical radiculopathy        Plan:     Continue metformin 2 pills daily  Stop amaryl  Start ozempic 0.25 mg increase to 0.5 until f/u  Discussed risks and benefits of this medication    Continue Crestor 5 mg  Continue gabapentin, but at 1/2 pill.     You have low B12. Goal >400. Start daily OTC B12 supplementation at 1000 mcg      You have low vitamin D and a Rx has been sent to your pharmacy. Take this pill once a week and when the prescription and refills are done, start taking an OTC vitamin D supplementation at 1000 IU daily.     HTN: has been labile. Continue candesartan, metoprolol, chlorthalidone. Take 1/2 or Hold chlorthalidone if feeling weak or dehydrated or if BP is 110/70 or less.     Testosterone per urology.     Wear compression socks daily       Type 2 diabetes mellitus with stage 3a chronic kidney disease, without long-term current use of insulin  -     metFORMIN (GLUCOPHAGE-XR) 500 MG ER 24hr tablet; Take 3 tablets (1,500 mg total) by mouth once daily.  Dispense: 180 tablet; Refill: 1  -     Discontinue: semaglutide (OZEMPIC) 0.25 mg or 0.5 mg(2 mg/1.5 mL) pen injector; Inject 0.25 mg into the skin every 7 days for 30 days, THEN 0.5 mg every 7 days.  Dispense: 4 pen; Refill: 0  -     semaglutide (OZEMPIC) 0.25 mg or 0.5 mg(2 mg/1.5 mL) pen injector; Inject 0.25 mg into the skin every 7 days for 30 days, THEN 0.5 mg every 7 days.  Dispense: 4 pen; Refill: 0  -     CBC Auto Differential; Future; Expected date: 02/01/2023  -     Comprehensive Metabolic  Panel; Future; Expected date: 02/01/2023  -     Hemoglobin A1C; Future; Expected date: 02/01/2023    B12 deficiency    Stage 3a chronic kidney disease    Long-term current use of testosterone replacement therapy    Essential hypertension  -     metoprolol succinate (TOPROL-XL) 200 MG 24 hr tablet; Take 1 tablet (200 mg total) by mouth once daily.  Dispense: 90 tablet; Refill: 0  -     candesartan (ATACAND) 32 MG tablet; Take 1 tablet (32 mg total) by mouth once daily.  Dispense: 90 tablet; Refill: 0    Dyslipidemia  -     rosuvastatin (CRESTOR) 40 MG Tab; Take 1 tablet (40 mg total) by mouth every evening.  Dispense: 90 tablet; Refill: 3    Anxiety    Hypercalcemia  -     Comprehensive Metabolic Panel; Future; Expected date: 02/01/2023  -     Vitamin D; Future; Expected date: 02/01/2023  -     PTH, intact; Future; Expected date: 02/01/2023    Cervical radiculopathy  -     gabapentin (NEURONTIN) 800 MG tablet; Take 0.5 tablets (400 mg total) by mouth every evening.  Dispense: 90 tablet; Refill: 1            Warning signs discussed, patient to call with any further issues or worsening of symptoms. Above note may have utilized dictation, please excuse any transcription errors.

## 2023-02-01 NOTE — PATIENT INSTRUCTIONS
Continue metformin 2 pills daily  Stop amaryl  Start ozempic 0.25 mg increase to 0.5 until f/u  Discussed risks and benefits of this medication    Continue Crestor 5 mg  Continue gabapentin, but at 1/2 pill.     You have low B12. Goal >400. Start daily OTC B12 supplementation at 1000 mcg      You have low vitamin D and a Rx has been sent to your pharmacy. Take this pill once a week and when the prescription and refills are done, start taking an OTC vitamin D supplementation at 1000 IU daily.     HTN: has been labile. Continue candesartan, metoprolol, chlorthalidone. Take 1/2 or Hold chlorthalidone if feeling weak or dehydrated or if BP is 110/70 or less.     Testosterone per urology.     Wear compression socks daily

## 2023-02-02 ENCOUNTER — PATIENT MESSAGE (OUTPATIENT)
Dept: FAMILY MEDICINE | Facility: CLINIC | Age: 63
End: 2023-02-02
Payer: COMMERCIAL

## 2023-02-06 ENCOUNTER — PATIENT MESSAGE (OUTPATIENT)
Dept: FAMILY MEDICINE | Facility: CLINIC | Age: 63
End: 2023-02-06
Payer: COMMERCIAL

## 2023-02-06 ENCOUNTER — PATIENT MESSAGE (OUTPATIENT)
Dept: SLEEP MEDICINE | Facility: CLINIC | Age: 63
End: 2023-02-06
Payer: COMMERCIAL

## 2023-03-22 ENCOUNTER — OFFICE VISIT (OUTPATIENT)
Dept: URGENT CARE | Facility: CLINIC | Age: 63
End: 2023-03-22
Payer: COMMERCIAL

## 2023-03-22 VITALS
HEIGHT: 70 IN | TEMPERATURE: 98 F | OXYGEN SATURATION: 95 % | RESPIRATION RATE: 19 BRPM | SYSTOLIC BLOOD PRESSURE: 135 MMHG | BODY MASS INDEX: 28.63 KG/M2 | HEART RATE: 60 BPM | WEIGHT: 200 LBS | DIASTOLIC BLOOD PRESSURE: 90 MMHG

## 2023-03-22 DIAGNOSIS — S93.602A SPRAIN OF LEFT FOOT, INITIAL ENCOUNTER: Primary | ICD-10-CM

## 2023-03-22 PROCEDURE — 73630 XR FOOT COMPLETE 3 VIEW LEFT: ICD-10-PCS | Mod: FY,LT,S$GLB, | Performed by: RADIOLOGY

## 2023-03-22 PROCEDURE — 99213 PR OFFICE/OUTPT VISIT, EST, LEVL III, 20-29 MIN: ICD-10-PCS | Mod: S$GLB,,,

## 2023-03-22 PROCEDURE — 99213 OFFICE O/P EST LOW 20 MIN: CPT | Mod: S$GLB,,,

## 2023-03-22 PROCEDURE — 73630 X-RAY EXAM OF FOOT: CPT | Mod: FY,LT,S$GLB, | Performed by: RADIOLOGY

## 2023-03-22 RX ORDER — NAPROXEN 500 MG/1
500 TABLET ORAL 2 TIMES DAILY
Qty: 30 TABLET | Refills: 0 | Status: SHIPPED | OUTPATIENT
Start: 2023-03-22 | End: 2023-06-27

## 2023-03-22 NOTE — PATIENT INSTRUCTIONS
Rest ankle and use walking boot provided until swelling and pain resolve.    Ice to the ankle. Use ice for about 15-20 minutes at a time to help reduce swelling.    Tylenol as needed for pain.     Take the naproxen with food. Also take an over the counter antacid with it (prilosec, Nexium, Pepcid) to protect your stomach.     Referral for ortho placed if symptoms do not improve within the next couple of weeks. A referral was placed for you, call [phone] to schedule appointment.    Please arrange follow up with your primary medical clinic as soon as possible. You must understand that you've received an Urgent Care treatment only and that you may be released before all of your medical problems are known or treated. You, the patient, will arrange for follow up as instructed. If your symptoms worsen or fail to improve you should go to the Emergency Room.

## 2023-03-22 NOTE — PROGRESS NOTES
"Subjective:       Patient ID: Partha Curtis Jr. is a 63 y.o. male.    Vitals:  height is 5' 10" (1.778 m) and weight is 90.7 kg (200 lb). His oral temperature is 97.5 °F (36.4 °C). His blood pressure is 135/90 (abnormal) and his pulse is 60. His respiration is 19 and oxygen saturation is 95%.     Chief Complaint: Injury (Felt and heard a crack in my left ankle this morning. Difficulty in walking, very painful - Entered by patient)    This is a 63 y.o. male who presents today with a chief complaint of left foot injury from a fall that happened today. Pt explain that he was trying to put on his pants this morning and loss balance fell and injured his foot. He states he felt like he heard something pop when he fell. Pt reports he has pain with walking. Pt denies numbness or tingling.      Injury  This is a new problem. The current episode started today. The problem occurs constantly. The problem has been unchanged. Nothing aggravates the symptoms. He has tried nothing for the symptoms. The treatment provided no relief.     Musculoskeletal:  Positive for pain and pain with walking.     Objective:      Physical Exam   Constitutional: He is oriented to person, place, and time. He appears well-developed. He is cooperative.   HENT:   Head: Normocephalic and atraumatic.   Nose: Nose normal.   Mouth/Throat: Oropharynx is clear and moist and mucous membranes are normal.   Eyes: Conjunctivae and lids are normal.   Neck: Trachea normal and phonation normal. Neck supple.   Cardiovascular: Normal rate, regular rhythm, normal heart sounds and normal pulses.   Pulmonary/Chest: Effort normal and breath sounds normal.   Abdominal: Normal appearance and bowel sounds are normal. Soft.   Musculoskeletal: Normal range of motion.         General: Normal range of motion.        Feet:       Comments: TTP to lateral aspect of foot, no erythema, bruising or swelling; FROM, cap refill <2 seconds, dorsalis pedal pulse 2+   Neurological: He is " alert and oriented to person, place, and time. He exhibits normal muscle tone.   Skin: Skin is warm, dry and intact.   Psychiatric: His speech is normal and behavior is normal. Judgment and thought content normal.   Nursing note and vitals reviewed.    XR FOOT COMPLETE 3 VIEW LEFT    Result Date: 3/22/2023  EXAMINATION: XR FOOT COMPLETE 3 VIEW LEFT CLINICAL HISTORY: Unspecified injury of left foot, initial encounter FINDINGS: Foot complete three views left: There is postoperative change of the distal tibia.  No acute fracture dislocation bone destruction seen.  There is remote trauma of the distal tibia.  There are spurs on the calcaneus. Electronically signed by: Lefty Hines MD Date:    03/22/2023 Time:    11:25     Assessment:       1. Sprain of left foot, initial encounter          Plan:         Sprain of left foot, initial encounter  -     XR FOOT COMPLETE 3 VIEW LEFT; Future; Expected date: 03/22/2023  -     NON-PNEUMATIC WALKING BOOT FOR HOME USE  -     naproxen (NAPROSYN) 500 MG tablet; Take 1 tablet (500 mg total) by mouth 2 (two) times daily.  Dispense: 30 tablet; Refill: 0  -     Ambulatory referral/consult to Orthopedics    Reviewed negative x-rays of foot with patient who verbalized understanding.  Patient advised that we would treat for sprains of the right foot and right ankle with the [employer] principal, a walking boot and Naproxen which was prescribed on today.  We also discussed an ambulatory referral to Orthopedics if his symptoms do not improve with this treatment within the next week or so.  Patient verbalized understanding agrees with plan of care.  Patient exits exam room walking with his walking boot and crutches in no acute distress.    Patient Instructions   Rest ankle and use walking boot provided until swelling and pain resolve.    Ice to the ankle. Use ice for about 15-20 minutes at a time to help reduce swelling.    Tylenol as needed for pain.     Take the naproxen with food. Also take  an over the counter antacid with it (prilosec, Nexium, Pepcid) to protect your stomach.     Referral for ortho placed if symptoms do not improve within the next couple of weeks. A referral was placed for you, call [phone] to schedule appointment.    Please arrange follow up with your primary medical clinic as soon as possible. You must understand that you've received an Urgent Care treatment only and that you may be released before all of your medical problems are known or treated. You, the patient, will arrange for follow up as instructed. If your symptoms worsen or fail to improve you should go to the Emergency Room.

## 2023-03-24 ENCOUNTER — PATIENT MESSAGE (OUTPATIENT)
Dept: OTHER | Facility: OTHER | Age: 63
End: 2023-03-24
Payer: COMMERCIAL

## 2023-03-28 ENCOUNTER — PATIENT MESSAGE (OUTPATIENT)
Dept: ADMINISTRATIVE | Facility: OTHER | Age: 63
End: 2023-03-28
Payer: COMMERCIAL

## 2023-04-03 ENCOUNTER — PATIENT MESSAGE (OUTPATIENT)
Dept: ADMINISTRATIVE | Facility: OTHER | Age: 63
End: 2023-04-03
Payer: COMMERCIAL

## 2023-05-08 ENCOUNTER — OFFICE VISIT (OUTPATIENT)
Dept: URGENT CARE | Facility: CLINIC | Age: 63
End: 2023-05-08
Payer: COMMERCIAL

## 2023-05-08 VITALS
DIASTOLIC BLOOD PRESSURE: 88 MMHG | WEIGHT: 200 LBS | HEIGHT: 70 IN | OXYGEN SATURATION: 95 % | RESPIRATION RATE: 19 BRPM | TEMPERATURE: 98 F | HEART RATE: 68 BPM | BODY MASS INDEX: 28.63 KG/M2 | SYSTOLIC BLOOD PRESSURE: 154 MMHG

## 2023-05-08 DIAGNOSIS — S93.601A SPRAIN OF RIGHT FOOT, INITIAL ENCOUNTER: Primary | ICD-10-CM

## 2023-05-08 DIAGNOSIS — M79.671 RIGHT FOOT PAIN: ICD-10-CM

## 2023-05-08 PROCEDURE — 73630 XR FOOT COMPLETE 3 VIEW RIGHT: ICD-10-PCS | Mod: FY,RT,S$GLB, | Performed by: RADIOLOGY

## 2023-05-08 PROCEDURE — 99213 OFFICE O/P EST LOW 20 MIN: CPT | Mod: S$GLB,,, | Performed by: NURSE PRACTITIONER

## 2023-05-08 PROCEDURE — 73630 X-RAY EXAM OF FOOT: CPT | Mod: FY,RT,S$GLB, | Performed by: RADIOLOGY

## 2023-05-08 PROCEDURE — 99213 PR OFFICE/OUTPT VISIT, EST, LEVL III, 20-29 MIN: ICD-10-PCS | Mod: S$GLB,,, | Performed by: NURSE PRACTITIONER

## 2023-05-10 DIAGNOSIS — I10 ESSENTIAL HYPERTENSION: ICD-10-CM

## 2023-05-10 RX ORDER — CANDESARTAN 32 MG/1
32 TABLET ORAL DAILY
Qty: 90 TABLET | Refills: 0 | Status: ON HOLD | OUTPATIENT
Start: 2023-05-10 | End: 2023-06-29 | Stop reason: SDUPTHER

## 2023-05-10 NOTE — TELEPHONE ENCOUNTER
Care Due:                  Date            Visit Type   Department     Provider  --------------------------------------------------------------------------------                                EP -                              PRIMARY      [facility]  Last Visit: 02-      CARE ([facility])   [facility]       Rocco Cavazos                              EP -                              PRIMARY      [facility]  Next Visit: 06-      CARE ([facility])   [facility]       Rocco Cavazos                                                            Last  Test          Frequency    Reason                     Performed    Due Date  --------------------------------------------------------------------------------    HBA1C.......  6 months...  metFORMIN, semaglutide...  01-   07-    Lipid Panel.  12 months..  rosuvastatin.............  06- 06-    Health [facility] Embedded Care Due Messages. Reference number: [id number].   5/10/2023 11:17:52 AM CDT

## 2023-05-12 ENCOUNTER — OFFICE VISIT (OUTPATIENT)
Dept: ORTHOPEDICS | Facility: CLINIC | Age: 63
End: 2023-05-12
Payer: COMMERCIAL

## 2023-05-12 VITALS — WEIGHT: 199.94 LBS | BODY MASS INDEX: 28.62 KG/M2 | HEIGHT: 70 IN

## 2023-05-12 DIAGNOSIS — S93.601A RIGHT FOOT SPRAIN, INITIAL ENCOUNTER: Primary | ICD-10-CM

## 2023-05-12 PROCEDURE — 99203 OFFICE O/P NEW LOW 30 MIN: CPT | Mod: S$GLB,,, | Performed by: ORTHOPAEDIC SURGERY

## 2023-05-12 PROCEDURE — 1159F MED LIST DOCD IN RCRD: CPT | Mod: CPTII,S$GLB,, | Performed by: ORTHOPAEDIC SURGERY

## 2023-05-12 PROCEDURE — 3051F HG A1C>EQUAL 7.0%<8.0%: CPT | Mod: CPTII,S$GLB,, | Performed by: ORTHOPAEDIC SURGERY

## 2023-05-12 PROCEDURE — 99999 PR PBB SHADOW E&M-EST. PATIENT-LVL III: ICD-10-PCS | Mod: PBBFAC,,, | Performed by: ORTHOPAEDIC SURGERY

## 2023-05-12 PROCEDURE — 3051F PR MOST RECENT HEMOGLOBIN A1C LEVEL 7.0 - < 8.0%: ICD-10-PCS | Mod: CPTII,S$GLB,, | Performed by: ORTHOPAEDIC SURGERY

## 2023-05-12 PROCEDURE — 4010F ACE/ARB THERAPY RXD/TAKEN: CPT | Mod: CPTII,S$GLB,, | Performed by: ORTHOPAEDIC SURGERY

## 2023-05-12 PROCEDURE — 1159F PR MEDICATION LIST DOCUMENTED IN MEDICAL RECORD: ICD-10-PCS | Mod: CPTII,S$GLB,, | Performed by: ORTHOPAEDIC SURGERY

## 2023-05-12 PROCEDURE — 3008F BODY MASS INDEX DOCD: CPT | Mod: CPTII,S$GLB,, | Performed by: ORTHOPAEDIC SURGERY

## 2023-05-12 PROCEDURE — 4010F PR ACE/ARB THEARPY RXD/TAKEN: ICD-10-PCS | Mod: CPTII,S$GLB,, | Performed by: ORTHOPAEDIC SURGERY

## 2023-05-12 PROCEDURE — 99999 PR PBB SHADOW E&M-EST. PATIENT-LVL III: CPT | Mod: PBBFAC,,, | Performed by: ORTHOPAEDIC SURGERY

## 2023-05-12 PROCEDURE — 3008F PR BODY MASS INDEX (BMI) DOCUMENTED: ICD-10-PCS | Mod: CPTII,S$GLB,, | Performed by: ORTHOPAEDIC SURGERY

## 2023-05-12 PROCEDURE — 99203 PR OFFICE/OUTPT VISIT, NEW, LEVL III, 30-44 MIN: ICD-10-PCS | Mod: S$GLB,,, | Performed by: ORTHOPAEDIC SURGERY

## 2023-05-12 NOTE — PROGRESS NOTES
"Subjective:      Patient ID: Partha Curtis Jr. is a 63 y.o. male.    Chief Complaint:  Right foot injury    HPI:  This is a 63-year-old male who four days ago had a misstep on his patio and his forefoot and toes hyperextended and he had some immediate pain.  He was seen in urgent care where an x-ray was obtained which revealed a possible nondisplaced fracture of the base of the distal phalanx of the big toe.  He went into a fracture boot that he would received recently for an injury to his left foot and comes in today for follow-up.  He reports pain mainly across the ball of his foot.  He does not report any pain in the midfoot.  He reports minimal swelling.    Past Medical History:   Diagnosis Date    Allergy     Carotid artery occlusion     Chronic back pain     Colonic polyp     Genetic testing     MUTYH mutation-negative    Hyperlipidemia     Hypertension     Sleep apnea     Type 2 diabetes mellitus with stage 3 chronic kidney disease, without long-term current use of insulin 4/2/2020       Current Outpatient Medications:     amitriptyline (ELAVIL) 25 MG tablet, Take 75 mg by mouth nightly as needed for Insomnia., Disp: , Rfl:     aspirin (ECOTRIN) 81 MG EC tablet, Take 325 mg by mouth once daily. , Disp: , Rfl:     azelastine (ASTELIN) 137 mcg (0.1 %) nasal spray, 1 spray (137 mcg total) by Nasal route 2 (two) times daily., Disp: 30 mL, Rfl: 11    BD LUER-RUSS SYRINGE 3 mL 25 gauge x 1" Syrg, USE ONE SYRINGE EVERY 14 DAYS WITH TESTOSTERONE, Disp: , Rfl:     candesartan (ATACAND) 32 MG tablet, Take 1 tablet (32 mg total) by mouth once daily., Disp: 90 tablet, Rfl: 0    chlorthalidone (HYGROTEN) 25 MG Tab, Take 1 tablet (25 mg total) by mouth once daily., Disp: 90 tablet, Rfl: 4    cyanocobalamin (VITAMIN B-12) 1000 MCG tablet, Take 1 tablet (1,000 mcg total) by mouth once daily., Disp: 90 tablet, Rfl: 5    ergocalciferol (ERGOCALCIFEROL) 50,000 unit Cap, Take 1 capsule (50,000 Units total) by mouth every 7 " "days. (Patient not taking: Reported on 3/22/2023), Disp: 12 capsule, Rfl: 4    gabapentin (NEURONTIN) 800 MG tablet, Take 0.5 tablets (400 mg total) by mouth every evening., Disp: 90 tablet, Rfl: 0    ipratropium (ATROVENT) 42 mcg (0.06 %) nasal spray, 2 sprays by Nasal route 2 (two) times daily as needed for Rhinitis., Disp: 15 mL, Rfl: 0    metFORMIN (GLUCOPHAGE-XR) 500 MG ER 24hr tablet, Take 3 tablets (1,500 mg total) by mouth once daily. (Patient not taking: Reported on 3/22/2023), Disp: 180 tablet, Rfl: 1    metoprolol succinate (TOPROL-XL) 200 MG 24 hr tablet, Take 1 tablet (200 mg total) by mouth once daily., Disp: 90 tablet, Rfl: 0    naproxen (NAPROSYN) 500 MG tablet, Take 1 tablet (500 mg total) by mouth 2 (two) times daily., Disp: 30 tablet, Rfl: 0    rosuvastatin (CRESTOR) 40 MG Tab, Take 1 tablet (40 mg total) by mouth every evening., Disp: 90 tablet, Rfl: 3    semaglutide (OZEMPIC) 0.25 mg or 0.5 mg(2 mg/1.5 mL) pen injector, Inject 0.25 mg into the skin every 7 days for 30 days, THEN 0.5 mg every 7 days., Disp: 4 pen, Rfl: 0    syringe with needle, safety (BD INTEGRA SYRINGE) 3 mL 22 gauge x 1 1/2" Syrg, Inject 1 Syringe as directed once a week., Disp: 6 each, Rfl: 3    testosterone cypionate (DEPOTESTOTERONE CYPIONATE) 200 mg/mL injection, Inject 1 mL (200 mg total) into the muscle every 14 (fourteen) days., Disp: 6 mL, Rfl: 1    valACYclovir (VALTREX) 1000 MG tablet, TAKE 2 TABLETS BY MOUTH EVERY 12 HOURS (Patient not taking: Reported on 3/22/2023), Disp: 4 tablet, Rfl: 3  Review of patient's allergies indicates:   Allergen Reactions    Stadol [butorphanol tartrate] Rash     Swelling in face    Strawberries [strawberry] Rash       There were no vitals taken for this visit.    ROS:  Negative for chest pain, shortness of breath, fevers, or unexplained weight loss      Objective:    Ortho Exam       Is a well-developed well-nourished male who walks in with an antalgic gait.  Inspection of the right " foot reveals no significant swelling.  He has full motion of his right ankle and subtalar joint without any pain.  He has some mild diffuse tenderness across the forefoot.  There is no tenderness in the midfoot.  He has active motion of his toes with mild discomfort.  He is good function of all the tendons about his ankle.  He is neurovascularly intact.      Assessment:     Imaging:  I reviewed x-rays of the right foot that were performed on 05/08/2023 which do not reveal any obvious structural abnormalities.  There is a small cortical break in the distal phalanx as described in the radiology report.  The midfoot joints are all well aligned.        1. Right foot sprain, initial encounter                Plan:          Recommendation:  I do not believe there any significant structural injuries that would require any intervention.  I would recommend giving this further time and avoid any weight-bearing that causes pain.    Follow-up in two weeks.  If his symptoms are not improved or worsening I will consider obtaining an MRI

## 2023-05-23 ENCOUNTER — TELEPHONE (OUTPATIENT)
Dept: FAMILY MEDICINE | Facility: CLINIC | Age: 63
End: 2023-05-23
Payer: COMMERCIAL

## 2023-05-23 NOTE — TELEPHONE ENCOUNTER
I left a VM informing patient appointment scheduled on 6/28/2023 need to be rescheduled . 1 st attempt. Please advise.

## 2023-05-25 ENCOUNTER — TELEPHONE (OUTPATIENT)
Dept: FAMILY MEDICINE | Facility: CLINIC | Age: 63
End: 2023-05-25
Payer: COMMERCIAL

## 2023-05-25 NOTE — TELEPHONE ENCOUNTER
I attempt to call patient to rescheduled appointment , No answer. 2nd attempt . I left a VM for pt to return my call. Please advise.

## 2023-06-07 RX ORDER — GLIMEPIRIDE 4 MG/1
4 TABLET ORAL EVERY MORNING
COMMUNITY
Start: 2023-03-01 | End: 2023-06-07 | Stop reason: SDUPTHER

## 2023-06-07 RX ORDER — GLIMEPIRIDE 4 MG/1
4 TABLET ORAL
Qty: 90 TABLET | Refills: 0 | Status: ON HOLD | OUTPATIENT
Start: 2023-06-07 | End: 2023-06-29 | Stop reason: HOSPADM

## 2023-06-07 NOTE — TELEPHONE ENCOUNTER
Refill Routing Note   Medication(s) are not appropriate for processing by [facility] for the following reason(s):      Required labs abnormal: Cr, eGFR  New or recently adjusted medication    [facility] action(s):  Defer Care Due:  None identified          Appointments  past 12m or future 3m with PCP    Date Provider   Last Visit   2/1/2023 Rocco Cavazos DO   Next Visit   6/28/2023 Rocco Cavazos DO   ED visits in past 90 days: 0        Note composed:1:41 PM 06/07/2023

## 2023-06-07 NOTE — TELEPHONE ENCOUNTER
No care due was identified.  Health [facility] Embedded Care Due Messages. Reference number: [id number].   6/07/2023 9:43:08 AM CDT

## 2023-06-09 RX ORDER — SEMAGLUTIDE 0.68 MG/ML
INJECTION, SOLUTION SUBCUTANEOUS
Qty: 3 ML | Refills: 0 | Status: SHIPPED | OUTPATIENT
Start: 2023-06-09 | End: 2023-06-22

## 2023-06-09 NOTE — TELEPHONE ENCOUNTER
No care due was identified.  Health [facility] Embedded Care Due Messages. Reference number: [id number].   6/09/2023 9:06:56 AM CDT

## 2023-06-15 ENCOUNTER — PATIENT MESSAGE (OUTPATIENT)
Dept: FAMILY MEDICINE | Facility: CLINIC | Age: 63
End: 2023-06-15
Payer: COMMERCIAL

## 2023-06-20 ENCOUNTER — PATIENT MESSAGE (OUTPATIENT)
Dept: FAMILY MEDICINE | Facility: CLINIC | Age: 63
End: 2023-06-20
Payer: COMMERCIAL

## 2023-06-22 ENCOUNTER — PATIENT MESSAGE (OUTPATIENT)
Dept: FAMILY MEDICINE | Facility: CLINIC | Age: 63
End: 2023-06-22
Payer: COMMERCIAL

## 2023-06-22 DIAGNOSIS — N18.31 TYPE 2 DIABETES MELLITUS WITH STAGE 3A CHRONIC KIDNEY DISEASE, WITHOUT LONG-TERM CURRENT USE OF INSULIN: Primary | ICD-10-CM

## 2023-06-22 DIAGNOSIS — E11.22 TYPE 2 DIABETES MELLITUS WITH STAGE 3A CHRONIC KIDNEY DISEASE, WITHOUT LONG-TERM CURRENT USE OF INSULIN: Primary | ICD-10-CM

## 2023-06-22 DIAGNOSIS — E78.5 DYSLIPIDEMIA: Primary | ICD-10-CM

## 2023-06-22 RX ORDER — SEMAGLUTIDE 1.34 MG/ML
1 INJECTION, SOLUTION SUBCUTANEOUS
Qty: 9 ML | Refills: 1 | Status: SHIPPED | OUTPATIENT
Start: 2023-06-22 | End: 2023-10-11 | Stop reason: ALTCHOICE

## 2023-06-25 DIAGNOSIS — I10 ESSENTIAL HYPERTENSION: ICD-10-CM

## 2023-06-25 NOTE — TELEPHONE ENCOUNTER
No care due was identified.  Health [facility] Embedded Care Due Messages. Reference number: [id number].   6/25/2023 11:09:18 AM CDT

## 2023-06-26 ENCOUNTER — PATIENT MESSAGE (OUTPATIENT)
Dept: FAMILY MEDICINE | Facility: CLINIC | Age: 63
End: 2023-06-26
Payer: COMMERCIAL

## 2023-06-26 RX ORDER — METOPROLOL SUCCINATE 200 MG/1
TABLET, EXTENDED RELEASE ORAL
Qty: 90 TABLET | Refills: 0 | Status: SHIPPED | OUTPATIENT
Start: 2023-06-26 | End: 2023-07-14 | Stop reason: SDUPTHER

## 2023-06-26 RX ORDER — ONDANSETRON 4 MG/1
4 TABLET, FILM COATED ORAL EVERY 8 HOURS PRN
Qty: 16 TABLET | Refills: 0 | Status: SHIPPED | OUTPATIENT
Start: 2023-06-26 | End: 2023-07-14

## 2023-06-26 NOTE — TELEPHONE ENCOUNTER
Refill Routing Note   Medication(s) are not appropriate for processing by [facility] for the following reason(s):      Required vitals abnormal    [facility] action(s):  Defer None identified          Appointments  past 12m or future 3m with PCP    Date Provider   Last Visit   2/1/2023 Rocco Cavazos DO   Next Visit   7/14/2023 Rocco Cavazos DO   ED visits in past 90 days: 0        Note composed:10:47 PM 06/25/2023

## 2023-06-27 ENCOUNTER — OFFICE VISIT (OUTPATIENT)
Dept: URGENT CARE | Facility: CLINIC | Age: 63
End: 2023-06-27
Payer: COMMERCIAL

## 2023-06-27 ENCOUNTER — HOSPITAL ENCOUNTER (INPATIENT)
Facility: HOSPITAL | Age: 63
LOS: 2 days | Discharge: HOME | DRG: 641 | End: 2023-06-29
Attending: EMERGENCY MEDICINE | Admitting: HOSPITALIST
Payer: COMMERCIAL

## 2023-06-27 VITALS
HEART RATE: 105 BPM | RESPIRATION RATE: 19 BRPM | SYSTOLIC BLOOD PRESSURE: 91 MMHG | OXYGEN SATURATION: 96 % | BODY MASS INDEX: 26.04 KG/M2 | TEMPERATURE: 98 F | DIASTOLIC BLOOD PRESSURE: 69 MMHG | WEIGHT: 186 LBS | HEIGHT: 71 IN

## 2023-06-27 DIAGNOSIS — R53.83 FATIGUE, UNSPECIFIED TYPE: ICD-10-CM

## 2023-06-27 DIAGNOSIS — E86.0 DEHYDRATION: Primary | ICD-10-CM

## 2023-06-27 DIAGNOSIS — R10.9 ABDOMINAL PAIN: ICD-10-CM

## 2023-06-27 DIAGNOSIS — E87.6 HYPOKALEMIA: ICD-10-CM

## 2023-06-27 DIAGNOSIS — R07.9 CHEST PAIN: ICD-10-CM

## 2023-06-27 DIAGNOSIS — N17.9 AKI (ACUTE KIDNEY INJURY): Primary | ICD-10-CM

## 2023-06-27 DIAGNOSIS — K21.9 GASTROESOPHAGEAL REFLUX DISEASE, UNSPECIFIED WHETHER ESOPHAGITIS PRESENT: ICD-10-CM

## 2023-06-27 DIAGNOSIS — I10 PRIMARY HYPERTENSION: ICD-10-CM

## 2023-06-27 DIAGNOSIS — I10 ESSENTIAL HYPERTENSION: ICD-10-CM

## 2023-06-27 DIAGNOSIS — E83.42 HYPOMAGNESEMIA: ICD-10-CM

## 2023-06-27 DIAGNOSIS — E83.52 HYPERCALCEMIA: ICD-10-CM

## 2023-06-27 PROBLEM — G47.00 INSOMNIA: Status: ACTIVE | Noted: 2023-06-27

## 2023-06-27 PROBLEM — E66.3 OVERWEIGHT (BMI 25.0-29.9): Status: ACTIVE | Noted: 2023-06-27

## 2023-06-27 PROBLEM — N18.30 ACUTE RENAL FAILURE SUPERIMPOSED ON STAGE 3 CHRONIC KIDNEY DISEASE: Status: ACTIVE | Noted: 2018-03-08

## 2023-06-27 PROBLEM — D58.2 ELEVATED HEMOGLOBIN: Status: ACTIVE | Noted: 2023-06-27

## 2023-06-27 LAB
ALBUMIN SERPL BCP-MCNC: 4.2 G/DL (ref 3.5–5.2)
ALP SERPL-CCNC: 68 U/L (ref 55–135)
ALT SERPL W/O P-5'-P-CCNC: 34 U/L (ref 10–44)
ANION GAP SERPL CALC-SCNC: 14 MMOL/L (ref 8–16)
AST SERPL-CCNC: 32 U/L (ref 10–40)
BASOPHILS # BLD AUTO: 0.04 K/UL (ref 0–0.2)
BASOPHILS NFR BLD: 0.3 % (ref 0–1.9)
BILIRUB SERPL-MCNC: 0.6 MG/DL (ref 0.1–1)
BILIRUB UR QL STRIP: NEGATIVE
BUN SERPL-MCNC: 25 MG/DL (ref 8–23)
CALCIUM SERPL-MCNC: 12.4 MG/DL (ref 8.7–10.5)
CHLORIDE SERPL-SCNC: 97 MMOL/L (ref 95–110)
CLARITY UR: CLEAR
CO2 SERPL-SCNC: 28 MMOL/L (ref 23–29)
COLOR UR: YELLOW
CREAT SERPL-MCNC: 2.5 MG/DL (ref 0.5–1.4)
DIFFERENTIAL METHOD: ABNORMAL
EOSINOPHIL # BLD AUTO: 0.1 K/UL (ref 0–0.5)
EOSINOPHIL NFR BLD: 0.8 % (ref 0–8)
ERYTHROCYTE [DISTWIDTH] IN BLOOD BY AUTOMATED COUNT: 17.2 % (ref 11.5–14.5)
EST. GFR  (NO RACE VARIABLE): 28 ML/MIN/1.73 M^2
GLUCOSE SERPL-MCNC: 116 MG/DL (ref 70–110)
GLUCOSE UR QL STRIP: NEGATIVE
HCT VFR BLD AUTO: 55.7 % (ref 40–54)
HGB BLD-MCNC: 18.7 G/DL (ref 14–18)
HGB UR QL STRIP: NEGATIVE
IMM GRANULOCYTES # BLD AUTO: 0.04 K/UL (ref 0–0.04)
IMM GRANULOCYTES NFR BLD AUTO: 0.3 % (ref 0–0.5)
KETONES UR QL STRIP: ABNORMAL
LEUKOCYTE ESTERASE UR QL STRIP: NEGATIVE
LIPASE SERPL-CCNC: 82 U/L (ref 4–60)
LYMPHOCYTES # BLD AUTO: 2.5 K/UL (ref 1–4.8)
LYMPHOCYTES NFR BLD: 17.2 % (ref 18–48)
MAGNESIUM SERPL-MCNC: 1.4 MG/DL (ref 1.6–2.6)
MCH RBC QN AUTO: 30.2 PG (ref 27–31)
MCHC RBC AUTO-ENTMCNC: 33.6 G/DL (ref 32–36)
MCV RBC AUTO: 90 FL (ref 82–98)
MONOCYTES # BLD AUTO: 1.3 K/UL (ref 0.3–1)
MONOCYTES NFR BLD: 9.4 % (ref 4–15)
NEUTROPHILS # BLD AUTO: 10.3 K/UL (ref 1.8–7.7)
NEUTROPHILS NFR BLD: 72 % (ref 38–73)
NITRITE UR QL STRIP: NEGATIVE
NRBC BLD-RTO: 0 /100 WBC
PH UR STRIP: 6 [PH] (ref 5–8)
PLATELET # BLD AUTO: 288 K/UL (ref 150–450)
PMV BLD AUTO: 9.5 FL (ref 9.2–12.9)
POCT GLUCOSE: 112 MG/DL (ref 70–110)
POCT GLUCOSE: 51 MG/DL (ref 70–110)
POCT GLUCOSE: 62 MG/DL (ref 70–110)
POCT GLUCOSE: 81 MG/DL (ref 70–110)
POTASSIUM SERPL-SCNC: 3.2 MMOL/L (ref 3.5–5.1)
PROT SERPL-MCNC: 7.5 G/DL (ref 6–8.4)
PROT UR QL STRIP: ABNORMAL
PTH-INTACT SERPL-MCNC: 7.1 PG/ML (ref 9–77)
RBC # BLD AUTO: 6.19 M/UL (ref 4.6–6.2)
SODIUM SERPL-SCNC: 139 MMOL/L (ref 136–145)
SP GR UR STRIP: 1.02 (ref 1–1.03)
URN SPEC COLLECT METH UR: ABNORMAL
UROBILINOGEN UR STRIP-ACNC: NEGATIVE EU/DL
WBC # BLD AUTO: 14.32 K/UL (ref 3.9–12.7)

## 2023-06-27 PROCEDURE — 63600175 PHARM REV CODE 636 W HCPCS: Performed by: HOSPITALIST

## 2023-06-27 PROCEDURE — 93010 EKG 12-LEAD: ICD-10-PCS | Mod: ,,, | Performed by: INTERNAL MEDICINE

## 2023-06-27 PROCEDURE — 99291 CRITICAL CARE FIRST HOUR: CPT

## 2023-06-27 PROCEDURE — 83970 ASSAY OF PARATHORMONE: CPT | Performed by: HOSPITALIST

## 2023-06-27 PROCEDURE — 85025 COMPLETE CBC W/AUTO DIFF WBC: CPT | Performed by: EMERGENCY MEDICINE

## 2023-06-27 PROCEDURE — A4216 STERILE WATER/SALINE, 10 ML: HCPCS | Performed by: HOSPITALIST

## 2023-06-27 PROCEDURE — 93005 ELECTROCARDIOGRAM TRACING: CPT

## 2023-06-27 PROCEDURE — 11000001 HC ACUTE MED/SURG PRIVATE ROOM

## 2023-06-27 PROCEDURE — 25000003 PHARM REV CODE 250: Performed by: HOSPITALIST

## 2023-06-27 PROCEDURE — 99214 OFFICE O/P EST MOD 30 MIN: CPT | Mod: S$GLB,,,

## 2023-06-27 PROCEDURE — 93005 EKG 12-LEAD: ICD-10-PCS | Mod: S$GLB,,,

## 2023-06-27 PROCEDURE — 83690 ASSAY OF LIPASE: CPT | Performed by: EMERGENCY MEDICINE

## 2023-06-27 PROCEDURE — 83735 ASSAY OF MAGNESIUM: CPT | Performed by: EMERGENCY MEDICINE

## 2023-06-27 PROCEDURE — 99214 PR OFFICE/OUTPT VISIT, EST, LEVL IV, 30-39 MIN: ICD-10-PCS | Mod: S$GLB,,,

## 2023-06-27 PROCEDURE — 93010 EKG 12-LEAD: ICD-10-PCS | Mod: S$GLB,,, | Performed by: INTERNAL MEDICINE

## 2023-06-27 PROCEDURE — 81003 URINALYSIS AUTO W/O SCOPE: CPT | Performed by: EMERGENCY MEDICINE

## 2023-06-27 PROCEDURE — 93005 ELECTROCARDIOGRAM TRACING: CPT | Mod: S$GLB,,,

## 2023-06-27 PROCEDURE — 93010 ELECTROCARDIOGRAM REPORT: CPT | Mod: ,,, | Performed by: INTERNAL MEDICINE

## 2023-06-27 PROCEDURE — 93010 ELECTROCARDIOGRAM REPORT: CPT | Mod: S$GLB,,, | Performed by: INTERNAL MEDICINE

## 2023-06-27 PROCEDURE — 80053 COMPREHEN METABOLIC PANEL: CPT | Performed by: EMERGENCY MEDICINE

## 2023-06-27 RX ORDER — AMITRIPTYLINE HYDROCHLORIDE 25 MG/1
75 TABLET, FILM COATED ORAL NIGHTLY PRN
Status: DISCONTINUED | OUTPATIENT
Start: 2023-06-27 | End: 2023-06-29 | Stop reason: HOSPADM

## 2023-06-27 RX ORDER — ONDANSETRON 2 MG/ML
4 INJECTION INTRAMUSCULAR; INTRAVENOUS EVERY 8 HOURS PRN
Status: DISCONTINUED | OUTPATIENT
Start: 2023-06-27 | End: 2023-06-29 | Stop reason: HOSPADM

## 2023-06-27 RX ORDER — POTASSIUM CHLORIDE 20 MEQ/1
40 TABLET, EXTENDED RELEASE ORAL
Status: COMPLETED | OUTPATIENT
Start: 2023-06-27 | End: 2023-06-27

## 2023-06-27 RX ORDER — TALC
6 POWDER (GRAM) TOPICAL NIGHTLY PRN
Status: DISCONTINUED | OUTPATIENT
Start: 2023-06-27 | End: 2023-06-29 | Stop reason: HOSPADM

## 2023-06-27 RX ORDER — MAGNESIUM SULFATE HEPTAHYDRATE 40 MG/ML
2 INJECTION, SOLUTION INTRAVENOUS ONCE
Status: COMPLETED | OUTPATIENT
Start: 2023-06-27 | End: 2023-06-27

## 2023-06-27 RX ORDER — ASPIRIN 81 MG/1
81 TABLET ORAL DAILY
Status: DISCONTINUED | OUTPATIENT
Start: 2023-06-27 | End: 2023-06-29 | Stop reason: HOSPADM

## 2023-06-27 RX ORDER — ACETAMINOPHEN 325 MG/1
650 TABLET ORAL EVERY 4 HOURS PRN
Status: DISCONTINUED | OUTPATIENT
Start: 2023-06-27 | End: 2023-06-29 | Stop reason: HOSPADM

## 2023-06-27 RX ORDER — OXYCODONE AND ACETAMINOPHEN 5; 325 MG/1; MG/1
1 TABLET ORAL 2 TIMES DAILY PRN
COMMUNITY
Start: 2023-04-04

## 2023-06-27 RX ORDER — GLUCAGON 1 MG
1 KIT INJECTION
Status: DISCONTINUED | OUTPATIENT
Start: 2023-06-27 | End: 2023-06-29 | Stop reason: HOSPADM

## 2023-06-27 RX ORDER — NALOXONE HCL 0.4 MG/ML
0.02 VIAL (ML) INJECTION
Status: DISCONTINUED | OUTPATIENT
Start: 2023-06-27 | End: 2023-06-29 | Stop reason: HOSPADM

## 2023-06-27 RX ORDER — INSULIN ASPART 100 [IU]/ML
0-5 INJECTION, SOLUTION INTRAVENOUS; SUBCUTANEOUS
Status: DISCONTINUED | OUTPATIENT
Start: 2023-06-27 | End: 2023-06-29 | Stop reason: HOSPADM

## 2023-06-27 RX ORDER — ONDANSETRON 8 MG/1
8 TABLET, ORALLY DISINTEGRATING ORAL EVERY 8 HOURS PRN
Status: DISCONTINUED | OUTPATIENT
Start: 2023-06-27 | End: 2023-06-29 | Stop reason: HOSPADM

## 2023-06-27 RX ORDER — GABAPENTIN 400 MG/1
400 CAPSULE ORAL NIGHTLY
Status: DISCONTINUED | OUTPATIENT
Start: 2023-06-27 | End: 2023-06-29 | Stop reason: HOSPADM

## 2023-06-27 RX ORDER — SEMAGLUTIDE 0.68 MG/ML
INJECTION, SOLUTION SUBCUTANEOUS
COMMUNITY
Start: 2023-05-17 | End: 2023-06-27 | Stop reason: DRUGHIGH

## 2023-06-27 RX ORDER — METOPROLOL SUCCINATE 50 MG/1
200 TABLET, EXTENDED RELEASE ORAL DAILY
Status: DISCONTINUED | OUTPATIENT
Start: 2023-06-28 | End: 2023-06-29 | Stop reason: HOSPADM

## 2023-06-27 RX ORDER — OXYCODONE AND ACETAMINOPHEN 5; 325 MG/1; MG/1
1 TABLET ORAL 2 TIMES DAILY PRN
COMMUNITY
Start: 2023-04-04 | End: 2023-06-27 | Stop reason: DRUGHIGH

## 2023-06-27 RX ORDER — POLYETHYLENE GLYCOL 3350 17 G/17G
17 POWDER, FOR SOLUTION ORAL DAILY
Status: DISCONTINUED | OUTPATIENT
Start: 2023-06-27 | End: 2023-06-29 | Stop reason: HOSPADM

## 2023-06-27 RX ORDER — SODIUM CHLORIDE 0.9 % (FLUSH) 0.9 %
10 SYRINGE (ML) INJECTION EVERY 8 HOURS
Status: DISCONTINUED | OUTPATIENT
Start: 2023-06-27 | End: 2023-06-29 | Stop reason: HOSPADM

## 2023-06-27 RX ORDER — ACETAMINOPHEN 500 MG
1000 TABLET ORAL EVERY 8 HOURS PRN
Status: DISCONTINUED | OUTPATIENT
Start: 2023-06-27 | End: 2023-06-29 | Stop reason: HOSPADM

## 2023-06-27 RX ORDER — IBUPROFEN 200 MG
24 TABLET ORAL
Status: DISCONTINUED | OUTPATIENT
Start: 2023-06-27 | End: 2023-06-29 | Stop reason: HOSPADM

## 2023-06-27 RX ORDER — LIDOCAINE HYDROCHLORIDE 20 MG/ML
10 SOLUTION OROPHARYNGEAL
Status: DISCONTINUED | OUTPATIENT
Start: 2023-06-27 | End: 2023-06-27 | Stop reason: HOSPADM

## 2023-06-27 RX ORDER — MAG HYDROX/ALUMINUM HYD/SIMETH 200-200-20
30 SUSPENSION, ORAL (FINAL DOSE FORM) ORAL
Status: DISCONTINUED | OUTPATIENT
Start: 2023-06-27 | End: 2023-06-27 | Stop reason: HOSPADM

## 2023-06-27 RX ORDER — SYRINGE WITH NEEDLE, 1 ML 25GX5/8"
SYRINGE, EMPTY DISPOSABLE MISCELLANEOUS
COMMUNITY
Start: 2023-04-21 | End: 2023-06-27 | Stop reason: DRUGHIGH

## 2023-06-27 RX ORDER — SODIUM CHLORIDE, SODIUM LACTATE, POTASSIUM CHLORIDE, CALCIUM CHLORIDE 600; 310; 30; 20 MG/100ML; MG/100ML; MG/100ML; MG/100ML
INJECTION, SOLUTION INTRAVENOUS CONTINUOUS
Status: DISCONTINUED | OUTPATIENT
Start: 2023-06-27 | End: 2023-06-29 | Stop reason: HOSPADM

## 2023-06-27 RX ORDER — ATORVASTATIN CALCIUM 40 MG/1
40 TABLET, FILM COATED ORAL DAILY
Status: DISCONTINUED | OUTPATIENT
Start: 2023-06-27 | End: 2023-06-29 | Stop reason: HOSPADM

## 2023-06-27 RX ORDER — IBUPROFEN 200 MG
16 TABLET ORAL
Status: DISCONTINUED | OUTPATIENT
Start: 2023-06-27 | End: 2023-06-29 | Stop reason: HOSPADM

## 2023-06-27 RX ORDER — LANOLIN ALCOHOL/MO/W.PET/CERES
1000 CREAM (GRAM) TOPICAL DAILY
Status: DISCONTINUED | OUTPATIENT
Start: 2023-06-27 | End: 2023-06-29 | Stop reason: HOSPADM

## 2023-06-27 RX ORDER — HEPATITIS A VACCINE, INACTIVATED 50 [IU]/ML
INJECTION, SUSPENSION INTRAMUSCULAR
COMMUNITY
Start: 2023-03-03 | End: 2024-02-15

## 2023-06-27 RX ADMIN — CYANOCOBALAMIN TAB 1000 MCG 1000 MCG: 1000 TAB at 03:06

## 2023-06-27 RX ADMIN — Medication 10 ML: at 10:06

## 2023-06-27 RX ADMIN — MAGNESIUM SULFATE 2 G: 2 INJECTION INTRAVENOUS at 01:06

## 2023-06-27 RX ADMIN — POLYETHYLENE GLYCOL 3350 17 G: 17 POWDER, FOR SOLUTION ORAL at 03:06

## 2023-06-27 RX ADMIN — GABAPENTIN 400 MG: 400 CAPSULE ORAL at 09:06

## 2023-06-27 RX ADMIN — Medication 30 ML: at 09:06

## 2023-06-27 RX ADMIN — ATORVASTATIN CALCIUM 40 MG: 40 TABLET, FILM COATED ORAL at 03:06

## 2023-06-27 RX ADMIN — POTASSIUM CHLORIDE 40 MEQ: 1500 TABLET, EXTENDED RELEASE ORAL at 05:06

## 2023-06-27 RX ADMIN — LIDOCAINE HYDROCHLORIDE 10 ML: 20 SOLUTION OROPHARYNGEAL at 09:06

## 2023-06-27 RX ADMIN — ASPIRIN 81 MG: 81 TABLET, COATED ORAL at 03:06

## 2023-06-27 RX ADMIN — Medication 16 G: at 11:06

## 2023-06-27 RX ADMIN — POTASSIUM CHLORIDE 40 MEQ: 1500 TABLET, EXTENDED RELEASE ORAL at 02:06

## 2023-06-27 RX ADMIN — SODIUM CHLORIDE, POTASSIUM CHLORIDE, SODIUM LACTATE AND CALCIUM CHLORIDE: 600; 310; 30; 20 INJECTION, SOLUTION INTRAVENOUS at 01:06

## 2023-06-27 NOTE — ASSESSMENT & PLAN NOTE
Patient's FSGs are controlled on current medication regimen.  Last A1c reviewed-   Lab Results   Component Value Date    HGBA1C 7.0 (H) 01/09/2023     Most recent fingerstick glucose reviewed- No results for input(s): POCTGLUCOSE in the last 24 hours.  Current correctional scale  Low  Maintain anti-hyperglycemic dose as follows-   Antihyperglycemics (From admission, onward)    Start     Stop Route Frequency Ordered    06/27/23 1538  insulin aspart U-100 pen 0-5 Units         -- SubQ Before meals & nightly PRN 06/27/23 1438        Hold Oral hypoglycemics while patient is in the hospital.

## 2023-06-27 NOTE — NURSING
Report called to room 4th floor receiving nurse.  Patient eating meal at this time and CBG 62.  Patient is asymptomatic and has no complaints at this time.  Requested glucose tablets from pharmacy.  Will recheck CBG in 15 minutes.

## 2023-06-27 NOTE — SUBJECTIVE & OBJECTIVE
Past Medical History:   Diagnosis Date    Allergy     Carotid artery occlusion     Chronic back pain     Colonic polyp     Genetic testing     MUTYH mutation-negative    Hyperlipidemia     Hypertension     Sleep apnea     Type 2 diabetes mellitus with stage 3 chronic kidney disease, without long-term current use of insulin 4/2/2020       Past Surgical History:   Procedure Laterality Date    ANKLE SURGERY Left     2    APPENDECTOMY      at age 20    CAROTID ENDARTERECTOMY Right 07/15/2015    CARPAL TUNNEL RELEASE Bilateral     COLONOSCOPY N/A 12/14/2018    Procedure: COLONOSCOPYSuprep;  Surgeon: Silver Selby MD;  Location: [facility];  Service: Endoscopy;  Laterality: N/A;    JOINT REPLACEMENT  9/2010    NASAL SEPTUM SURGERY      TOTAL KNEE ARTHROPLASTY Right     6 knee surgeries       Review of patient's allergies indicates:   Allergen Reactions    Stadol [butorphanol tartrate] Rash     Swelling in face    Strawberries [strawberry] Rash       Current Facility-Administered Medications on File Prior to Encounter   Medication    [COMPLETED] aluminum-magnesium hydroxide-simethicone 200-200-20 mg/5 mL suspension 30 mL    [COMPLETED] LIDOcaine HCl 2% oral solution 10 mL     Current Outpatient Medications on File Prior to Encounter   Medication Sig    amitriptyline (ELAVIL) 25 MG tablet Take 75 mg by mouth nightly as needed for Insomnia.    aspirin (ECOTRIN) 81 MG EC tablet Take 81 mg by mouth once daily.    azelastine (ASTELIN) 137 mcg (0.1 %) nasal spray 1 spray (137 mcg total) by Nasal route 2 (two) times daily. (Patient taking differently: 1 spray by Nasal route 2 (two) times daily as needed.)    candesartan (ATACAND) 32 MG tablet Take 1 tablet (32 mg total) by mouth once daily. (Patient taking differently: Take 32 mg by mouth every evening.)    chlorthalidone (HYGROTEN) 25 MG Tab Take 1 tablet (25 mg total) by mouth once daily.    cyanocobalamin (VITAMIN B-12) 1000 MCG tablet Take 1 tablet (1,000 mcg total) by  "mouth once daily.    ergocalciferol (ERGOCALCIFEROL) 50,000 unit Cap Take 1 capsule (50,000 Units total) by mouth every 7 days. (Patient taking differently: Take 50,000 Units by mouth Every Friday.)    gabapentin (NEURONTIN) 800 MG tablet Take 0.5 tablets (400 mg total) by mouth every evening.    glimepiride (AMARYL) 4 MG tablet Take 1 tablet (4 mg total) by mouth before breakfast.    metoprolol succinate (TOPROL-XL) 200 MG 24 hr tablet Take 1 tablet by mouth once daily (Patient taking differently: Take 200 mg by mouth once daily.)    ondansetron (ZOFRAN) 4 MG tablet Take 1 tablet (4 mg total) by mouth every 8 (eight) hours as needed for Nausea.    oxyCODONE-acetaminophen (PERCOCET) 5-325 mg per tablet Take 1 tablet by mouth 2 (two) times daily as needed.    rosuvastatin (CRESTOR) 40 MG Tab Take 1 tablet (40 mg total) by mouth every evening.    semaglutide (OZEMPIC) 1 mg/dose (4 mg/3 mL) Inject 1 mg into the skin every 7 days. (Patient taking differently: Inject 1 mg into the skin every Thursday.)    testosterone cypionate (DEPOTESTOTERONE CYPIONATE) 200 mg/mL injection Inject 1 mL (200 mg total) into the muscle every 14 (fourteen) days.    [DISCONTINUED] oxyCODONE-acetaminophen (PERCOCET) 5-325 mg per tablet Take 1 tablet by mouth 2 (two) times daily as needed.    [DISCONTINUED] semaglutide (OZEMPIC) 0.25 mg or 0.5 mg (2 mg/3 mL) pen injector     BD LUER-RUSS SYRINGE 3 mL 25 gauge x 1" Syrg USE ONE SYRINGE EVERY 14 DAYS WITH TESTOSTERONE    ipratropium (ATROVENT) 42 mcg (0.06 %) nasal spray 2 sprays by Nasal route 2 (two) times daily as needed for Rhinitis.    syringe with needle, safety (BD INTEGRA SYRINGE) 3 mL 22 gauge x 1 1/2" Syrg Inject 1 Syringe as directed once a week.    valACYclovir (VALTREX) 1000 MG tablet TAKE 2 TABLETS BY MOUTH EVERY 12 HOURS (Patient taking differently: Take 2,000 mg by mouth every 12 (twelve) hours as needed.)    VAQTA, PF, 50 unit/mL vaccine     [DISCONTINUED] BD LUER-RUSS SYRINGE 3 " "mL 22 x 1 1/2" Syrg USE ONE SYRINGE ONCE A WEEK AS DIRECTED    [DISCONTINUED] metFORMIN (GLUCOPHAGE-XR) 500 MG ER 24hr tablet Take 3 tablets (1,500 mg total) by mouth once daily.    [DISCONTINUED] naproxen (NAPROSYN) 500 MG tablet Take 1 tablet (500 mg total) by mouth 2 (two) times daily. (Patient not taking: Reported on 6/27/2023)     Family History       Problem Relation (Age of Onset)    Aneurysm Other (48)    Brain cancer Mother (67), Paternal Grandfather (73)    Breast cancer Sister (58), Maternal Cousin (57)    Cancer Father, Mother, Maternal Grandfather    Genetic Disorder Sister    Hypertension Father    Lung cancer Father    Ulcerative colitis Other          Tobacco Use    Smoking status: Never     Passive exposure: Never    Smokeless tobacco: Never   Substance and Sexual Activity    Alcohol use: Yes     Alcohol/week: 2.0 standard drinks     Types: 2 Cans of beer per week     Comment: a week    Drug use: Never    Sexual activity: Yes     Partners: Female     Birth control/protection: None     Review of Systems   Constitutional:  Positive for appetite change. Negative for chills, diaphoresis and fever.   HENT:  Negative for congestion and sore throat.    Eyes:  Negative for discharge and visual disturbance.   Respiratory:  Negative for cough and shortness of breath.    Cardiovascular:  Negative for chest pain and leg swelling.   Gastrointestinal:  Positive for nausea and vomiting. Negative for abdominal pain.   Genitourinary:  Negative for dysuria and flank pain.   Musculoskeletal:  Negative for arthralgias and joint swelling.   Skin:  Negative for rash and wound.   Allergic/Immunologic: Negative for immunocompromised state.   Neurological:  Positive for dizziness. Negative for light-headedness and headaches.   Psychiatric/Behavioral:  Negative for agitation and confusion.    Objective:     Vital Signs (Most Recent):  Temp: 98.1 °F (36.7 °C) (06/27/23 1353)  Pulse: 96 (06/27/23 1353)  Resp: 14 (06/27/23 " 1353)  BP: (!) 151/78 (06/27/23 1353)  SpO2: (!) 94 % (06/27/23 1353) Vital Signs (24h Range):  Temp:  [97.8 °F (36.6 °C)-98.4 °F (36.9 °C)] 98.1 °F (36.7 °C)  Pulse:  [] 96  Resp:  [14-19] 14  SpO2:  [94 %-98 %] 94 %  BP: ()/() 151/78     Weight: 84.4 kg (186 lb)  Body mass index is 25.94 kg/m².     Physical Exam  Vitals reviewed.   Constitutional:       General: He is not in acute distress.     Appearance: He is well-developed. He is not diaphoretic.   HENT:      Head: Normocephalic and atraumatic.      Nose: Nose normal.   Eyes:      General: No scleral icterus.     Pupils: Pupils are equal, round, and reactive to light.   Neck:      Vascular: No JVD.      Trachea: No tracheal deviation.   Cardiovascular:      Rate and Rhythm: Normal rate and regular rhythm.      Heart sounds: Normal heart sounds. No murmur heard.    No friction rub. No gallop.   Pulmonary:      Effort: Pulmonary effort is normal. No respiratory distress.      Breath sounds: Normal breath sounds.   Abdominal:      General: There is no distension.      Palpations: Abdomen is soft. There is no mass.      Tenderness: There is no abdominal tenderness.      Hernia: No hernia is present.   Musculoskeletal:         General: No deformity.      Cervical back: Normal range of motion.   Skin:     General: Skin is warm and dry.      Findings: No rash.   Neurological:      Mental Status: He is alert and oriented to person, place, and time.   Psychiatric:         Behavior: Behavior normal.            CRANIAL NERVES     CN III, IV, VI   Pupils are equal, round, and reactive to light.     Significant Labs: All pertinent labs within the past 24 hours have been reviewed.    Significant Imaging: I have reviewed all pertinent imaging results/findings within the past 24 hours.

## 2023-06-27 NOTE — ASSESSMENT & PLAN NOTE
-could be due to hemoconcentration from significant nausea and vomiting versus testosterone side effect   -repeat CBC in a.m.

## 2023-06-27 NOTE — HPI
Mr. Curtis is a 62yo man with hypertension, type 2 diabetes, and obesity who presents with several days of nausea and vomiting.  He states that he is on Ozempic at home and recently had dose increased on Thursday (6 days prior to admission) the following day after taking increased dose, he had significantly decreased appetite and did not eat or drink at on Friday.  On Saturday, he started to have severe nausea and vomiting with retching every 20-40 minutes and significant vomiting.  He contacted his PCP, who prescribed Zofran but was without relief.  After no improvement with monitoring, he was seen in urgent care where he was found to have positive orthostatics and given 1 L of normal saline and directed to the ED.

## 2023-06-27 NOTE — ASSESSMENT & PLAN NOTE
-noted   -patient reports significant weight loss over the past few days as he has been unable to eat or drink  -hold Ozempic for now; may need to deescalate to lower dose in the future, will defer to PCP

## 2023-06-27 NOTE — ASSESSMENT & PLAN NOTE
-mildly elevated upon admission but in the setting of nausea/vomiting   -had positive orthostatics at outside urgent care   -hold home antihypertensives for now; can consider resumption soon

## 2023-06-27 NOTE — ASSESSMENT & PLAN NOTE
- baseline serum Cr 1.5, 2.5 on admit  - continue to monitor renal function with daily labs   - avoid nephrotoxins  - renally dose all medications   - monitor events that may lead to decreased renal perfusion (hypovolemia, hypotension, sepsis)  - monitor urine output to assure that no obstruction precipitates worsening in GFR  - IV fluids; suspect prerenal in the setting of significant GI losses

## 2023-06-27 NOTE — NURSING
Patient's CBG 62.  Patient was able to eat meal and CBG recheck was 82.  Patient remain asymptomatic throughout.  Physician notified, no new orders at this time.  Will continue to monitor.

## 2023-06-27 NOTE — ASSESSMENT & PLAN NOTE
-in the setting significant dehydration and GI losses with HANANE although was up trending at last blood check 6 months previously  -admission calcium corrects to 12.2  -initiate on IV fluids   -check PTH   -may need to consider whether vitamin-D supplementation will be required in future

## 2023-06-27 NOTE — ASSESSMENT & PLAN NOTE
-presents with hypercalcemia but also in the setting of severe dehydration   -checking PTH; may benefit from vitamin-D level and reassessment of need for vitamin-D supplementation

## 2023-06-27 NOTE — H&P
[facility] Medicine  History & Physical    Patient Name: Partha Curtis Jr.  MRN: [id number]  Patient Class: IP- Inpatient  Admission Date: 6/27/2023  Attending Physician: Arpan Nava MD  Primary Care Provider: Rocco Cavazos DO         Patient information was obtained from patient, spouse/SO, past medical records and ER records.     Subjective:     Principal Problem:Hypercalcemia    Chief Complaint:   Chief Complaint   Patient presents with    Abdominal Pain     Pt arrived via EMS from urgent care,  pt increased ozempic, pt complains of fatigue, heart burn, received maylox and po lidocaine, and 1 liter fluids from urgent care, urgent care reports pt was orthostatic            HPI: Mr. Curtis is a 64yo man with hypertension, type 2 diabetes, and obesity who presents with several days of nausea and vomiting.  He states that he is on Ozempic at home and recently had dose increased on Thursday (6 days prior to admission) the following day after taking increased dose, he had significantly decreased appetite and did not eat or drink at on Friday.  On Saturday, he started to have severe nausea and vomiting with retching every 20-40 minutes and significant vomiting.  He contacted his PCP, who prescribed Zofran but was without relief.  After no improvement with monitoring, he was seen in urgent care where he was found to have positive orthostatics and given 1 L of normal saline and directed to the ED.      Past Medical History:   Diagnosis Date    Allergy     Carotid artery occlusion     Chronic back pain     Colonic polyp     Genetic testing     MUTYH mutation-negative    Hyperlipidemia     Hypertension     Sleep apnea     Type 2 diabetes mellitus with stage 3 chronic kidney disease, without long-term current use of insulin 4/2/2020       Past Surgical History:   Procedure Laterality Date    ANKLE SURGERY Left     2    APPENDECTOMY      at age 20    CAROTID ENDARTERECTOMY Right  07/15/2015    CARPAL TUNNEL RELEASE Bilateral     COLONOSCOPY N/A 12/14/2018    Procedure: COLONOSCOPYSuprep;  Surgeon: Silver Selby MD;  Location: [facility];  Service: Endoscopy;  Laterality: N/A;    JOINT REPLACEMENT  9/2010    NASAL SEPTUM SURGERY      TOTAL KNEE ARTHROPLASTY Right     6 knee surgeries       Review of patient's allergies indicates:   Allergen Reactions    Stadol [butorphanol tartrate] Rash     Swelling in face    Strawberries [strawberry] Rash       Current Facility-Administered Medications on File Prior to Encounter   Medication    [COMPLETED] aluminum-magnesium hydroxide-simethicone 200-200-20 mg/5 mL suspension 30 mL    [COMPLETED] LIDOcaine HCl 2% oral solution 10 mL     Current Outpatient Medications on File Prior to Encounter   Medication Sig    amitriptyline (ELAVIL) 25 MG tablet Take 75 mg by mouth nightly as needed for Insomnia.    aspirin (ECOTRIN) 81 MG EC tablet Take 81 mg by mouth once daily.    azelastine (ASTELIN) 137 mcg (0.1 %) nasal spray 1 spray (137 mcg total) by Nasal route 2 (two) times daily. (Patient taking differently: 1 spray by Nasal route 2 (two) times daily as needed.)    candesartan (ATACAND) 32 MG tablet Take 1 tablet (32 mg total) by mouth once daily. (Patient taking differently: Take 32 mg by mouth every evening.)    chlorthalidone (HYGROTEN) 25 MG Tab Take 1 tablet (25 mg total) by mouth once daily.    cyanocobalamin (VITAMIN B-12) 1000 MCG tablet Take 1 tablet (1,000 mcg total) by mouth once daily.    ergocalciferol (ERGOCALCIFEROL) 50,000 unit Cap Take 1 capsule (50,000 Units total) by mouth every 7 days. (Patient taking differently: Take 50,000 Units by mouth Every Friday.)    gabapentin (NEURONTIN) 800 MG tablet Take 0.5 tablets (400 mg total) by mouth every evening.    glimepiride (AMARYL) 4 MG tablet Take 1 tablet (4 mg total) by mouth before breakfast.    metoprolol succinate (TOPROL-XL) 200 MG 24 hr tablet Take 1 tablet by mouth  "once daily (Patient taking differently: Take 200 mg by mouth once daily.)    ondansetron (ZOFRAN) 4 MG tablet Take 1 tablet (4 mg total) by mouth every 8 (eight) hours as needed for Nausea.    oxyCODONE-acetaminophen (PERCOCET) 5-325 mg per tablet Take 1 tablet by mouth 2 (two) times daily as needed.    rosuvastatin (CRESTOR) 40 MG Tab Take 1 tablet (40 mg total) by mouth every evening.    semaglutide (OZEMPIC) 1 mg/dose (4 mg/3 mL) Inject 1 mg into the skin every 7 days. (Patient taking differently: Inject 1 mg into the skin every Thursday.)    testosterone cypionate (DEPOTESTOTERONE CYPIONATE) 200 mg/mL injection Inject 1 mL (200 mg total) into the muscle every 14 (fourteen) days.    [DISCONTINUED] oxyCODONE-acetaminophen (PERCOCET) 5-325 mg per tablet Take 1 tablet by mouth 2 (two) times daily as needed.    [DISCONTINUED] semaglutide (OZEMPIC) 0.25 mg or 0.5 mg (2 mg/3 mL) pen injector     BD LUER-RUSS SYRINGE 3 mL 25 gauge x 1" Syrg USE ONE SYRINGE EVERY 14 DAYS WITH TESTOSTERONE    ipratropium (ATROVENT) 42 mcg (0.06 %) nasal spray 2 sprays by Nasal route 2 (two) times daily as needed for Rhinitis.    syringe with needle, safety (BD INTEGRA SYRINGE) 3 mL 22 gauge x 1 1/2" Syrg Inject 1 Syringe as directed once a week.    valACYclovir (VALTREX) 1000 MG tablet TAKE 2 TABLETS BY MOUTH EVERY 12 HOURS (Patient taking differently: Take 2,000 mg by mouth every 12 (twelve) hours as needed.)    VAQTA, PF, 50 unit/mL vaccine     [DISCONTINUED] BD LUER-RUSS SYRINGE 3 mL 22 x 1 1/2" Syrg USE ONE SYRINGE ONCE A WEEK AS DIRECTED    [DISCONTINUED] metFORMIN (GLUCOPHAGE-XR) 500 MG ER 24hr tablet Take 3 tablets (1,500 mg total) by mouth once daily.    [DISCONTINUED] naproxen (NAPROSYN) 500 MG tablet Take 1 tablet (500 mg total) by mouth 2 (two) times daily. (Patient not taking: Reported on 6/27/2023)     Family History       Problem Relation (Age of Onset)    Aneurysm Other (48)    Brain cancer Mother (67), " Paternal Grandfather (73)    Breast cancer Sister (58), Maternal Cousin (57)    Cancer Father, Mother, Maternal Grandfather    Genetic Disorder Sister    Hypertension Father    Lung cancer Father    Ulcerative colitis Other          Tobacco Use    Smoking status: Never     Passive exposure: Never    Smokeless tobacco: Never   Substance and Sexual Activity    Alcohol use: Yes     Alcohol/week: 2.0 standard drinks     Types: 2 Cans of beer per week     Comment: a week    Drug use: Never    Sexual activity: Yes     Partners: Female     Birth control/protection: None     Review of Systems   Constitutional:  Positive for appetite change. Negative for chills, diaphoresis and fever.   HENT:  Negative for congestion and sore throat.    Eyes:  Negative for discharge and visual disturbance.   Respiratory:  Negative for cough and shortness of breath.    Cardiovascular:  Negative for chest pain and leg swelling.   Gastrointestinal:  Positive for nausea and vomiting. Negative for abdominal pain.   Genitourinary:  Negative for dysuria and flank pain.   Musculoskeletal:  Negative for arthralgias and joint swelling.   Skin:  Negative for rash and wound.   Allergic/Immunologic: Negative for immunocompromised state.   Neurological:  Positive for dizziness. Negative for light-headedness and headaches.   Psychiatric/Behavioral:  Negative for agitation and confusion.    Objective:     Vital Signs (Most Recent):  Temp: 98.1 °F (36.7 °C) (06/27/23 1353)  Pulse: 96 (06/27/23 1353)  Resp: 14 (06/27/23 1353)  BP: (!) 151/78 (06/27/23 1353)  SpO2: (!) 94 % (06/27/23 1353) Vital Signs (24h Range):  Temp:  [97.8 °F (36.6 °C)-98.4 °F (36.9 °C)] 98.1 °F (36.7 °C)  Pulse:  [] 96  Resp:  [14-19] 14  SpO2:  [94 %-98 %] 94 %  BP: ()/() 151/78     Weight: 84.4 kg (186 lb)  Body mass index is 25.94 kg/m².     Physical Exam  Vitals reviewed.   Constitutional:       General: He is not in acute distress.     Appearance: He is  well-developed. He is not diaphoretic.   HENT:      Head: Normocephalic and atraumatic.      Nose: Nose normal.   Eyes:      General: No scleral icterus.     Pupils: Pupils are equal, round, and reactive to light.   Neck:      Vascular: No JVD.      Trachea: No tracheal deviation.   Cardiovascular:      Rate and Rhythm: Normal rate and regular rhythm.      Heart sounds: Normal heart sounds. No murmur heard.    No friction rub. No gallop.   Pulmonary:      Effort: Pulmonary effort is normal. No respiratory distress.      Breath sounds: Normal breath sounds.   Abdominal:      General: There is no distension.      Palpations: Abdomen is soft. There is no mass.      Tenderness: There is no abdominal tenderness.      Hernia: No hernia is present.   Musculoskeletal:         General: No deformity.      Cervical back: Normal range of motion.   Skin:     General: Skin is warm and dry.      Findings: No rash.   Neurological:      Mental Status: He is alert and oriented to person, place, and time.   Psychiatric:         Behavior: Behavior normal.            CRANIAL NERVES     CN III, IV, VI   Pupils are equal, round, and reactive to light.     Significant Labs: All pertinent labs within the past 24 hours have been reviewed.    Significant Imaging: I have reviewed all pertinent imaging results/findings within the past 24 hours.    Assessment/Plan:     * Hypercalcemia  -in the setting significant dehydration and GI losses with HANANE although was up trending at last blood check 6 months previously  -admission calcium corrects to 12.2  -initiate on IV fluids   -check PTH   -may need to consider whether vitamin-D supplementation will be required in future      Hypomagnesemia  -1.4 on admission, will replace      Elevated hemoglobin  -could be due to hemoconcentration from significant nausea and vomiting versus testosterone side effect   -repeat CBC in a.m.      Insomnia  -continue home amitriptyline      Overweight (BMI  25.0-29.9)  -noted   -patient reports significant weight loss over the past few days as he has been unable to eat or drink  -hold Ozempic for now; may need to deescalate to lower dose in the future, will defer to PCP      Hypokalemia  -in the setting of GI losses   -replace and repeat BMP in a.m.      ALLA (obstructive sleep apnea)  -CPAP q.h.s.      B12 deficiency  -continue home B12 replacement      Vitamin D deficiency  -presents with hypercalcemia but also in the setting of severe dehydration   -checking PTH; may benefit from vitamin-D level and reassessment of need for vitamin-D supplementation      Chronic bilateral low back pain without sciatica  -continue home gabapentin      Type 2 diabetes mellitus with stage 3 chronic kidney disease, without long-term current use of insulin  Patient's FSGs are controlled on current medication regimen.  Last A1c reviewed-   Lab Results   Component Value Date    HGBA1C 7.0 (H) 01/09/2023     Most recent fingerstick glucose reviewed- No results for input(s): POCTGLUCOSE in the last 24 hours.  Current correctional scale  Low  Maintain anti-hyperglycemic dose as follows-   Antihyperglycemics (From admission, onward)    Start     Stop Route Frequency Ordered    06/27/23 1538  insulin aspart U-100 pen 0-5 Units         -- SubQ Before meals & nightly PRN 06/27/23 1438        Hold Oral hypoglycemics while patient is in the hospital.    Long-term current use of testosterone replacement therapy  -noted   -would monitor elevated hemoglobin as this could be related to testosterone use versus hemoconcentration      Acute renal failure superimposed on stage 3 chronic kidney disease  - baseline serum Cr 1.5, 2.5 on admit  - continue to monitor renal function with daily labs   - avoid nephrotoxins  - renally dose all medications   - monitor events that may lead to decreased renal perfusion (hypovolemia, hypotension, sepsis)  - monitor urine output to assure that no obstruction precipitates  worsening in GFR  - IV fluids; suspect prerenal in the setting of significant GI losses         Dyslipidemia  -continue home statin      Essential hypertension  -mildly elevated upon admission but in the setting of nausea/vomiting   -had positive orthostatics at outside urgent care   -hold home antihypertensives for now; can consider resumption soon        VTE Risk Mitigation (From admission, onward)         Ordered     IP VTE LOW RISK PATIENT  Once         06/27/23 1438     Place sequential compression device  Until discontinued         06/27/23 1438                           Arpan Nava MD  Department of Hospital Medicine  [place] - Emergency Dept

## 2023-06-27 NOTE — PROGRESS NOTES
"Subjective:      Patient ID: Partha Curtis Jr. is a 63 y.o. male.    Vitals:  height is 5' 11" (1.803 m) and weight is 84.4 kg (186 lb). His temperature is 97.8 °F (36.6 °C). His blood pressure is 91/69 and his pulse is 105. His respiration is 19 and oxygen saturation is 96%.     Chief Complaint: Medication Reaction    Pt has been taking Ozempic X 3-4 months and his PCP told him to increase his dose last Thursday 6/22/2023. Since then has loss appetite, heart burn, nausea, vomiting and unable to hold any food or liquids down. His PCP called in an RX- of Zofran and told him to go to urgent care if his symptoms don't improve. He reports zofran has been controlling N/V. Pt stated he weight 202 lb on Friday morning and now weighing 186 lb. Pt feeling very weak and fatigued and hasn't been able to even take his daily medications. No abdominal pain or heart burn at this time. He only feels heartburn when he lies flat. No CP, SOB, palpitations, lightheaded/dizziness.     Medication Reaction  This is a new problem. The current episode started in the past 7 days. The problem occurs constantly. The problem has been gradually worsening. Associated symptoms include fatigue, headaches, nausea, vomiting and weakness. Pertinent negatives include no abdominal pain, chest pain or fever. Associated symptoms comments: Loss of appetite and heart burn  . Nothing aggravates the symptoms. Treatments tried: RX- Zofran. The treatment provided no relief.     Constitution: Positive for fatigue. Negative for fever.   Cardiovascular:  Negative for chest pain.   Gastrointestinal:  Positive for nausea and vomiting. Negative for abdominal pain.   Neurological:  Positive for headaches.    Objective:     Physical Exam   Constitutional: He is oriented to person, place, and time. He appears well-developed.   HENT:   Head: Normocephalic and atraumatic.   Ears:   Right Ear: External ear normal.   Left Ear: External ear normal.   Nose: Nose normal. "   Mouth/Throat: Mucous membranes are dry.   Eyes: Conjunctivae and lids are normal.   Neck: Trachea normal. Neck supple.   Cardiovascular: Regular rhythm and normal heart sounds. Tachycardia present.   Pulmonary/Chest: Effort normal and breath sounds normal. No stridor. No respiratory distress. He has no wheezes. He has no rhonchi. He has no rales. He exhibits no tenderness.   Abdominal: Normal appearance and bowel sounds are normal. He exhibits no distension and no mass. Soft. There is no abdominal tenderness. There is no rebound and no guarding.   Musculoskeletal: Normal range of motion.         General: Normal range of motion.   Neurological: He is alert and oriented to person, place, and time. He has normal strength.   Skin: Skin is warm, dry, intact, not diaphoretic and not pale.   Psychiatric: His speech is normal and behavior is normal. Judgment and thought content normal.   Nursing note and vitals reviewed.    Assessment:     1. Dehydration    2. Fatigue, unspecified type    3. Gastroesophageal reflux disease, unspecified whether esophagitis present        Plan:       Dehydration  -     Refer to Emergency Dept.    Fatigue, unspecified type  -     Orthostatic vital signs  -     IN OFFICE EKG 12-LEAD (to Muse)    Gastroesophageal reflux disease, unspecified whether esophagitis present  -     aluminum-magnesium hydroxide-simethicone 200-200-20 mg/5 mL suspension 30 mL  -     LIDOcaine HCl 2% oral solution 10 mL  -     IN OFFICE EKG 12-LEAD (to Muse)    Orthostatic Vitals  10:00 Right arm lying BP: 120/82 P: 89   10:03 Right arm standing BP: 90/68  P:110   10:04 Right arm standing BP: 90/68 P:110               Medical Decision Making:   Differential Diagnosis:   Dehydration, electrolyte abnormalities, arrhythmia, ACS  Clinical Tests:   Medical Tests: Ordered and Reviewed  Urgent Care Management:  Pt here for generalized weakness after 3-4 days of vomiting. Ozempic dose was increased prior to start of symptoms.  "He tried zofran with relief of nausea. He has not kept food or fluid down since Friday. He reports reflux when lying. Denied abdominal pain, CP, SOB, palpitations, diarrhea. Maalox and lidocaine PO given and tolerated. Orthostatic vitals are positive with a 30 point decrease in SBP. EKG completed at 1003: "Sinus tachycardia, possible left atrial enlargement, septal & inferior infarct, abnormal ECG". Depressed T waves present. 20g IV inserted to rt AC. 1 liter of NS administered. I consulted with NINFA Kapoor here in the clinic to discuss the treatment options for the patient. Due to the high risk of complications, the patient is being sent to the emergency room for further evaluation, treatment and possible hospitalization. I have reviewed the patients previous visits, labs and images to look for any trends or previous treatments. I called report to [place] [facility] and EMS transferred patient.     30 minutes spent on patient's encounter. This includes face to face time and non-face to face time preparing to see the patient (eg, review of tests), obtaining and/or reviewing separately obtained history, documenting clinical information in the electronic or other health record, independently interpreting results and communicating results to the patient/family/caregiver, or care coordinator.  Other:   I have discussed this case with another health care provider.           "

## 2023-06-27 NOTE — NURSING
Patient arrived to room via stretcher with nurse.  Patient is a,a,ox4, VSS, and has no complaints at this time.  Will continue to monitor.

## 2023-06-27 NOTE — ED PROVIDER NOTES
Encounter Date: 6/27/2023       History     Chief Complaint   Patient presents with    Abdominal Pain     Pt arrived via EMS from urgent care,  pt increased ozempic, pt complains of fatigue, heart burn, received maylox and po lidocaine, and 1 liter fluids from urgent care, urgent care reports pt was orthostatic         Patient is a 64 yo M who presents to the ED with the complaint of nausea and reflux. Pt states that he has been taking Ozempic for the past 3-4 months and his PCP recently increases last dose last Thursday.  Since that time the patient has had intermittent nausea, heartburn type symptoms.  He states that on Saturday he had multiple episodes of nonbilious nonbloody emesis the last in the Sunday and has since resolved.  States that he has been feeling very fatigued and tired following this.  His PCP called in a prescription for Zofran on Monday which help the patient's nausea but he continues to feel dehydrated week.  He subsequently presented to a local urgent care today where he was referred to the ED for further evaluation and treatment.  Per the urgent Care note, the patient was found to have orthostatic hypotension and was administered IV fluids.  The patient reports feeling much better after receipt of the aforementioned fluids.  He denies any overt abdominal pain,  chest pain, shortness of breath palpitations, near-syncope, syncope, lightheadedness or dizziness when explicitly question.  Denies any numbness or tingling or weakness in any extremity.  Denies any suspicious food intake or recent travel.        Review of patient's allergies indicates:   Allergen Reactions    Stadol [butorphanol tartrate] Rash     Swelling in face    Strawberries [strawberry] Rash     Past Medical History:   Diagnosis Date    Allergy     Carotid artery occlusion     Chronic back pain     Colonic polyp     Genetic testing     MUTYH mutation-negative    Hyperlipidemia     Hypertension     Sleep apnea     Type 2 diabetes  mellitus with stage 3 chronic kidney disease, without long-term current use of insulin 4/2/2020     Past Surgical History:   Procedure Laterality Date    ANKLE SURGERY Left     2    APPENDECTOMY      at age 20    CAROTID ENDARTERECTOMY Right 07/15/2015    CARPAL TUNNEL RELEASE Bilateral     COLONOSCOPY N/A 12/14/2018    Procedure: COLONOSCOPYSuprep;  Surgeon: Silver Selby MD;  Location: [facility];  Service: Endoscopy;  Laterality: N/A;    JOINT REPLACEMENT  9/2010    NASAL SEPTUM SURGERY      TOTAL KNEE ARTHROPLASTY Right     6 knee surgeries     Family History   Problem Relation Age of Onset    Hypertension Father     Lung cancer Father         x2 (PAUL initially- treated surgically only, then recurred distantly) (smoker, & had been exposed to many chemicals)    Cancer Father     Brain cancer Mother 67    Cancer Mother     Breast cancer Sister 58    Genetic Disorder Sister         monoallelic MUTYH mutation    Cancer Maternal Grandfather     Brain cancer Paternal Grandfather 73    Aneurysm Other 48        brain    Breast cancer Maternal Cousin 57    Ulcerative colitis Other      Social History     Tobacco Use    Smoking status: Never     Passive exposure: Never    Smokeless tobacco: Never   Substance Use Topics    Alcohol use: Yes     Alcohol/week: 2.0 standard drinks     Types: 2 Cans of beer per week     Comment: a week    Drug use: Never     Review of Systems   HENT:  Negative for congestion and drooling.    Respiratory:  Negative for chest tightness and shortness of breath.    Cardiovascular:  Negative for palpitations and leg swelling.   Gastrointestinal:  Positive for nausea and vomiting. Negative for abdominal pain and diarrhea.   Genitourinary:  Negative for dysuria and flank pain.   Musculoskeletal:  Negative for back pain and joint swelling.   Neurological:  Negative for dizziness and headaches.   Psychiatric/Behavioral:  Negative for agitation and confusion.      Physical Exam     Initial Vitals  [06/27/23 1117]   BP Pulse Resp Temp SpO2   (!) 180/101 96 18 98.4 °F (36.9 °C) 98 %      MAP       --         Physical Exam    Nursing note and vitals reviewed.  Constitutional: He appears well-developed and well-nourished. No distress.   HENT:   Head: Normocephalic and atraumatic.   Right Ear: External ear normal.   Left Ear: External ear normal.   Eyes: EOM are normal. Pupils are equal, round, and reactive to light.   Neck: Neck supple.   Normal range of motion.  Cardiovascular:  Normal rate, regular rhythm, normal heart sounds and intact distal pulses.           Pulmonary/Chest: Breath sounds normal.   Abdominal: Abdomen is soft. Bowel sounds are normal.   The abdomen is soft. There is no rebound or guarding. Normal bowel sounds in all four quadrants. Negative Phillips's sign. Negative McBurney's point tenderness Negative heel tap. No masses, fluid wave, ecchymosis or other skin changes appreciated on physical exam.     Musculoskeletal:         General: Normal range of motion.      Cervical back: Normal range of motion and neck supple.     Neurological: He is alert and oriented to person, place, and time. He has normal strength. GCS score is 15. GCS eye subscore is 4. GCS verbal subscore is 5. GCS motor subscore is 6.   No focal neurological deficit   Strength 5/5   Sensation grossly in tact  MAEW  GCS 15   AAOX3    Skin: Skin is warm. Capillary refill takes less than 2 seconds.   Psychiatric: He has a normal mood and affect.       ED Course   Critical Care    Date/Time: 6/27/2023 2:27 PM  Performed by: Edmar Barraza MD  Authorized by: Edmar Barraza MD   Direct patient critical care time: 15 minutes  Additional history critical care time: 5 minutes  Ordering / reviewing critical care time: 5 minutes  Documentation critical care time: 5 minutes  Consulting other physicians critical care time: 5 minutes  Consult with family critical care time: 5 minutes  Other critical care time: 5 minutes  Total  critical care time (exclusive of procedural time) : 45 minutes  Critical care was necessary to treat or prevent imminent or life-threatening deterioration of the following conditions: metabolic crisis and dehydration.  Critical care was time spent personally by me on the following activities: discussions with primary provider, evaluation of patient's response to treatment, examination of patient, obtaining history from patient or surrogate, review of old charts, re-evaluation of patient's condition, pulse oximetry, ordering and review of radiographic studies and ordering and review of laboratory studies.      Labs Reviewed   CBC W/ AUTO DIFFERENTIAL - Abnormal; Notable for the following components:       Result Value    WBC 14.32 (*)     Hemoglobin 18.7 (*)     Hematocrit 55.7 (*)     RDW 17.2 (*)     Gran # (ANC) 10.3 (*)     Mono # 1.3 (*)     Lymph % 17.2 (*)     All other components within normal limits   COMPREHENSIVE METABOLIC PANEL - Abnormal; Notable for the following components:    Potassium 3.2 (*)     Glucose 116 (*)     BUN 25 (*)     Creatinine 2.5 (*)     Calcium 12.4 (*)     eGFR 28 (*)     All other components within normal limits    Narrative:       Ca critical result(s) called and verbal readback obtained from   Celia Leiva RN. by DILLON 06/27/2023 12:44   LIPASE - Abnormal; Notable for the following components:    Lipase 82 (*)     All other components within normal limits   URINALYSIS, REFLEX TO URINE CULTURE - Abnormal; Notable for the following components:    Protein, UA Trace (*)     Ketones, UA Trace (*)     All other components within normal limits    Narrative:     Specimen Source->Urine   MAGNESIUM - Abnormal; Notable for the following components:    Magnesium 1.4 (*)     All other components within normal limits   PTH, INTACT     EKG Readings: (Independently Interpreted)   Initial Reading: No STEMI. Rhythm: Normal Sinus Rhythm. Heart Rate: 91. Ectopy: No Ectopy. Conduction: Normal. ST  Segments: Normal ST Segments. T Waves: Normal.   NSR; no ischemic change; no STEMI    ECG Results              EKG 12-lead (In process)  Result time 06/27/23 12:28:42      In process by Interface, Lab In [facility] (06/27/23 12:28:42)                   Narrative:    Test Reason : R10.9,    Vent. Rate : 091 BPM     Atrial Rate : 091 BPM     P-R Int : 182 ms          QRS Dur : 088 ms      QT Int : 352 ms       P-R-T Axes : 056 002 028 degrees     QTc Int : 432 ms    Normal sinus rhythm  Low voltage QRS  Cannot rule out Anteroseptal infarct (cited on or before 21-JUN-2022)  Abnormal ECG  When compared with ECG of 27-JUN-2023 10:03,  Criteria for Inferior infarct are no longer Present  Serial changes of Anteroseptal infarct Present    Referred By: [email or web address]   SELF           Confirmed By:                                   Imaging Results    None          Medications   lactated ringers infusion ( Intravenous New Bag 6/27/23 1335)   magnesium sulfate 2g in water 50mL IVPB (premix) (2 g Intravenous New Bag 6/27/23 1336)   potassium chloride SA CR tablet 40 mEq (40 mEq Oral Given 6/27/23 1413)     Medical Decision Making:   History:   Old Records Summarized: records from clinic visits.       <> Summary of Records: See HPI   Initial Assessment:   64 yo M presents with N/GERD. Pt with recent increase in Ozempic dose; will obtain basic labs, administer IVF, anti-emetics   Differential Diagnosis:   Drug-induced pancreatitis, electrolyte abnormality, dehydration, UTI   Clinical Tests:   Lab Tests: Reviewed and Ordered  Radiological Study: Reviewed and Ordered  ED Management:  - laboratory analysis notable for hypercalcemia, hypomagnesemia, mild hypokalemia and creatinine of 2.5 which is significant compared to the patient's baseline creatinine of 1.4; in light of these findings, we will admit the patient to the [facility] Medicine service for further evaluation of electrolyte abnormality, acute kidney injury; patient was  updated regarding the findings of ED work today he is comfortable with plan for admission at this time.  All patient questions were answered           ED Course as of 06/27/23 1429   Tue Jun 27, 2023   1247 Calcium(!!): 12.4  Reviewed by self - abnormal.  [LC]   1248 WBC(!): 14.32  Reviewed by self.  [LC]   1249 Potassium(!): 3.2  Reviewed by self - mildly decrease.  [LC]   1249 Magnesium(!): 1.4  Reviewed by self - mildly decreased.  [LC]   1250 Creatinine(!): 2.5  Reviewed by self - elevated (baseline is 1.5)  [LC]      ED Course User Index  [LC] Edmar Barraza MD                   Clinical Impression:   Final diagnoses:  [R10.9] Abdominal pain  [R07.9] Chest pain  [N17.9] HANANE (acute kidney injury) (Primary)  [E83.52] Hypercalcemia  [E83.42] Hypomagnesemia  [E87.6] Hypokalemia        ED Disposition Condition    Admit                 Edmar Barraza MD  06/27/23 143

## 2023-06-27 NOTE — ED NOTES
Informed patient of need for urine sample, which patient proceeded to laugh at. Instructed patient to utilize call light once sample is provided.

## 2023-06-27 NOTE — ED TRIAGE NOTES
"Pt admitted to the ED via [facility] from urgent care for abd pain, nausea, heartburn and vomiting. Pt reports increasing his dose of Ozempic on Thursday with symptoms beginning Saturday. Pt states that he has thrown up "to many times to count." Per EMS urgent care was concerned about EKG changes and electrolyte imbalances. Pt received 1000 ml of NS in route and reports feeling "much better" at this time. Pt also received zofran from PCP with relief in nausea.   "

## 2023-06-27 NOTE — ASSESSMENT & PLAN NOTE
-noted   -would monitor elevated hemoglobin as this could be related to testosterone use versus hemoconcentration

## 2023-06-27 NOTE — PHARMACY MED REC
"Admission Medication History     The home medication history was taken by Sangeeta Martinez CPhT.    Medication history obtained from, Patient Verified    You may go to "Admission" then "Reconcile Home Medications" tabs to review and/or act upon these items.     The home medication list has been updated by the Pharmacy department.   Please read ALL comments highlighted in yellow.   Please address this information as you see fit.    Feel free to contact us if you have any questions or require assistance.      The medications listed below were removed from the home medication list.  Please reorder if appropriate:  Patient reports no longer taking the following medication(s):  Metformin  mg  Naproxen 500 mg      Sangeeta Martinez CPhT.  Ext [phone]               .        "

## 2023-06-28 LAB
ANION GAP SERPL CALC-SCNC: 13 MMOL/L (ref 8–16)
BASOPHILS # BLD AUTO: 0.04 K/UL (ref 0–0.2)
BASOPHILS NFR BLD: 0.4 % (ref 0–1.9)
BUN SERPL-MCNC: 27 MG/DL (ref 8–23)
CALCIUM SERPL-MCNC: 10.7 MG/DL (ref 8.7–10.5)
CHLORIDE SERPL-SCNC: 100 MMOL/L (ref 95–110)
CO2 SERPL-SCNC: 26 MMOL/L (ref 23–29)
CREAT SERPL-MCNC: 2.1 MG/DL (ref 0.5–1.4)
DIFFERENTIAL METHOD: ABNORMAL
EOSINOPHIL # BLD AUTO: 0.2 K/UL (ref 0–0.5)
EOSINOPHIL NFR BLD: 1.9 % (ref 0–8)
ERYTHROCYTE [DISTWIDTH] IN BLOOD BY AUTOMATED COUNT: 17 % (ref 11.5–14.5)
EST. GFR  (NO RACE VARIABLE): 34.7 ML/MIN/1.73 M^2
GLUCOSE SERPL-MCNC: 101 MG/DL (ref 70–110)
HCT VFR BLD AUTO: 49.7 % (ref 40–54)
HGB BLD-MCNC: 16.9 G/DL (ref 14–18)
IMM GRANULOCYTES # BLD AUTO: 0.03 K/UL (ref 0–0.04)
IMM GRANULOCYTES NFR BLD AUTO: 0.3 % (ref 0–0.5)
LYMPHOCYTES # BLD AUTO: 2.6 K/UL (ref 1–4.8)
LYMPHOCYTES NFR BLD: 24.1 % (ref 18–48)
MAGNESIUM SERPL-MCNC: 1.8 MG/DL (ref 1.6–2.6)
MCH RBC QN AUTO: 30.1 PG (ref 27–31)
MCHC RBC AUTO-ENTMCNC: 34 G/DL (ref 32–36)
MCV RBC AUTO: 88 FL (ref 82–98)
MONOCYTES # BLD AUTO: 1.4 K/UL (ref 0.3–1)
MONOCYTES NFR BLD: 12.9 % (ref 4–15)
NEUTROPHILS # BLD AUTO: 6.5 K/UL (ref 1.8–7.7)
NEUTROPHILS NFR BLD: 60.4 % (ref 38–73)
NRBC BLD-RTO: 0 /100 WBC
PHOSPHATE SERPL-MCNC: 1.8 MG/DL (ref 2.7–4.5)
PLATELET # BLD AUTO: 262 K/UL (ref 150–450)
PMV BLD AUTO: 9.2 FL (ref 9.2–12.9)
POCT GLUCOSE: 107 MG/DL (ref 70–110)
POCT GLUCOSE: 115 MG/DL (ref 70–110)
POCT GLUCOSE: 120 MG/DL (ref 70–110)
POCT GLUCOSE: 144 MG/DL (ref 70–110)
POCT GLUCOSE: 150 MG/DL (ref 70–110)
POCT GLUCOSE: 67 MG/DL (ref 70–110)
POCT GLUCOSE: 93 MG/DL (ref 70–110)
POTASSIUM SERPL-SCNC: 3.1 MMOL/L (ref 3.5–5.1)
RBC # BLD AUTO: 5.62 M/UL (ref 4.6–6.2)
SODIUM SERPL-SCNC: 139 MMOL/L (ref 136–145)
WBC # BLD AUTO: 10.81 K/UL (ref 3.9–12.7)

## 2023-06-28 PROCEDURE — 83735 ASSAY OF MAGNESIUM: CPT | Performed by: HOSPITALIST

## 2023-06-28 PROCEDURE — 25000003 PHARM REV CODE 250: Performed by: STUDENT IN AN ORGANIZED HEALTH CARE EDUCATION/TRAINING PROGRAM

## 2023-06-28 PROCEDURE — 99900035 HC TECH TIME PER 15 MIN (STAT)

## 2023-06-28 PROCEDURE — 25000003 PHARM REV CODE 250: Performed by: HOSPITALIST

## 2023-06-28 PROCEDURE — 84100 ASSAY OF PHOSPHORUS: CPT | Performed by: HOSPITALIST

## 2023-06-28 PROCEDURE — 94761 N-INVAS EAR/PLS OXIMETRY MLT: CPT

## 2023-06-28 PROCEDURE — A4216 STERILE WATER/SALINE, 10 ML: HCPCS | Performed by: HOSPITALIST

## 2023-06-28 PROCEDURE — 11000001 HC ACUTE MED/SURG PRIVATE ROOM

## 2023-06-28 PROCEDURE — 63600175 PHARM REV CODE 636 W HCPCS: Performed by: HOSPITALIST

## 2023-06-28 PROCEDURE — 85025 COMPLETE CBC W/AUTO DIFF WBC: CPT | Performed by: HOSPITALIST

## 2023-06-28 PROCEDURE — 63600175 PHARM REV CODE 636 W HCPCS: Performed by: STUDENT IN AN ORGANIZED HEALTH CARE EDUCATION/TRAINING PROGRAM

## 2023-06-28 PROCEDURE — 80048 BASIC METABOLIC PNL TOTAL CA: CPT | Performed by: HOSPITALIST

## 2023-06-28 PROCEDURE — 36415 COLL VENOUS BLD VENIPUNCTURE: CPT | Performed by: HOSPITALIST

## 2023-06-28 RX ORDER — MAGNESIUM SULFATE 1 G/100ML
1 INJECTION INTRAVENOUS ONCE
Status: COMPLETED | OUTPATIENT
Start: 2023-06-28 | End: 2023-06-28

## 2023-06-28 RX ORDER — POTASSIUM CHLORIDE 20 MEQ/1
40 TABLET, EXTENDED RELEASE ORAL EVERY 4 HOURS
Status: COMPLETED | OUTPATIENT
Start: 2023-06-28 | End: 2023-06-28

## 2023-06-28 RX ADMIN — CYANOCOBALAMIN TAB 1000 MCG 1000 MCG: 1000 TAB at 09:06

## 2023-06-28 RX ADMIN — GABAPENTIN 400 MG: 400 CAPSULE ORAL at 09:06

## 2023-06-28 RX ADMIN — POTASSIUM CHLORIDE 40 MEQ: 1500 TABLET, EXTENDED RELEASE ORAL at 06:06

## 2023-06-28 RX ADMIN — SODIUM CHLORIDE, POTASSIUM CHLORIDE, SODIUM LACTATE AND CALCIUM CHLORIDE: 600; 310; 30; 20 INJECTION, SOLUTION INTRAVENOUS at 08:06

## 2023-06-28 RX ADMIN — Medication 16 G: at 05:06

## 2023-06-28 RX ADMIN — Medication 10 ML: at 05:06

## 2023-06-28 RX ADMIN — POTASSIUM CHLORIDE 40 MEQ: 1500 TABLET, EXTENDED RELEASE ORAL at 04:06

## 2023-06-28 RX ADMIN — ATORVASTATIN CALCIUM 40 MG: 40 TABLET, FILM COATED ORAL at 09:06

## 2023-06-28 RX ADMIN — ASPIRIN 81 MG: 81 TABLET, COATED ORAL at 09:06

## 2023-06-28 RX ADMIN — POTASSIUM PHOSPHATE, MONOBASIC AND POTASSIUM PHOSPHATE, DIBASIC 20 MMOL: 224; 236 INJECTION, SOLUTION, CONCENTRATE INTRAVENOUS at 06:06

## 2023-06-28 RX ADMIN — MAGNESIUM SULFATE 1 G: 1 INJECTION INTRAVENOUS at 04:06

## 2023-06-28 RX ADMIN — METOPROLOL SUCCINATE 200 MG: 50 TABLET, EXTENDED RELEASE ORAL at 09:06

## 2023-06-28 NOTE — ASSESSMENT & PLAN NOTE
-could be due to hemoconcentration from significant nausea and vomiting versus testosterone side effect   Hemoglobin downtrending, elevated hemoglobin likely in setting of hemoconcentration

## 2023-06-28 NOTE — PLAN OF CARE
Pt arrived to unit. Introduced self as VN for this shift. Admission questions completed by VN. Educated pt on VTE risk, safety precautions, and VN's role in pt care. Opportunity given for pt's questions. All questions answered.     Problem: Adult Inpatient Plan of Care  Goal: Plan of Care Review  Outcome: Ongoing, Progressing  Goal: Patient-Specific Goal (Individualized)  Outcome: Ongoing, Progressing  Goal: Absence of Hospital-Acquired Illness or Injury  Outcome: Ongoing, Progressing  Goal: Optimal Comfort and Wellbeing  Outcome: Ongoing, Progressing  Goal: Readiness for Transition of Care  Outcome: Ongoing, Progressing

## 2023-06-28 NOTE — NURSING
BG 51, asymptomatic, pt wide awake, denies any complaitns. Dr Lewis notified juice and prn tab 16g given. No orders per MD. Will recheck see graphics.

## 2023-06-28 NOTE — SUBJECTIVE & OBJECTIVE
Interval History:  Reports feeling much better overall.  Nausea resolved.  Patient able to tolerate p.o..  Renal function gradually improving, remains hypercalcemic.      Review of Systems   Gastrointestinal:  Negative for abdominal pain, nausea and vomiting.   Objective:     Vital Signs (Most Recent):  Temp: 97.1 °F (36.2 °C) (06/28/23 1553)  Pulse: 79 (06/28/23 1602)  Resp: 18 (06/28/23 1553)  BP: (!) 162/80 (06/28/23 1553)  SpO2: 98 % (06/28/23 1553) Vital Signs (24h Range):  Temp:  [96.4 °F (35.8 °C)-98.3 °F (36.8 °C)] 97.1 °F (36.2 °C)  Pulse:  [] 79  Resp:  [18-19] 18  SpO2:  [92 %-98 %] 98 %  BP: (110-162)/(63-83) 162/80     Weight: 91 kg (200 lb 9.9 oz)  Body mass index is 27.98 kg/m².    Intake/Output Summary (Last 24 hours) at 6/28/2023 1718  Last data filed at 6/28/2023 0821  Gross per 24 hour   Intake 620 ml   Output 900 ml   Net -280 ml         Physical Exam  Vitals and nursing note reviewed.   Constitutional:       General: He is not in acute distress.     Appearance: He is well-developed. He is not diaphoretic.   Cardiovascular:      Rate and Rhythm: Normal rate and regular rhythm.      Heart sounds: Normal heart sounds. No murmur heard.  Pulmonary:      Effort: Pulmonary effort is normal. No respiratory distress.      Breath sounds: Normal breath sounds. No wheezing.   Abdominal:      Palpations: Abdomen is soft.      Tenderness: There is no abdominal tenderness.   Musculoskeletal:      Right lower leg: No edema.      Left lower leg: No edema.   Skin:     General: Skin is warm and dry.      Findings: No rash.   Neurological:      Mental Status: He is alert and oriented to person, place, and time.           Significant Labs: All pertinent labs within the past 24 hours have been reviewed.    Significant Imaging: I have reviewed all pertinent imaging results/findings within the past 24 hours.

## 2023-06-28 NOTE — ASSESSMENT & PLAN NOTE
-presents with hypercalcemia but also in the setting of severe dehydration   PTH low at 7.1, not suggestive of primary hyperparathyroidism  Possible in setting of elevated vitamin-D  Takes 50,000 ergocalciferol weekly as an outpatient for the last 1 year.  Last vitamin-D per patient in January 2023 within normal limits   Vitamin-D is a send out lab here and take several days to resolved.  Will have patient get vitamin-D levels as an outpatient   Will hold ergocalciferol for now and at discharge until follow-up

## 2023-06-28 NOTE — ASSESSMENT & PLAN NOTE
-mildly elevated upon admission but in the setting of nausea/vomiting   -had positive orthostatics at outside urgent care   Holding ARB given HANANE.  Holding chlorthalidone given HANANE, hypercalcemia and volume depletion

## 2023-06-28 NOTE — PLAN OF CARE
SW communicated with pt to discuss dc planning. Pt confirmed information on chart. Pt resides in home with his wife and son. Pt is independent in ADLs. Pt has no dme/hh services. Upon dc pts wife will provide transport home. SW will continue to follow pt throughout his transitions of care and assist with any dc needs.     Future Appointments   Date Time Provider Department Center   7/14/2023 10:40 AM Rocco Cavazos DO [facility]        06/28/23 1348   Discharge Assessment   Assessment Type Discharge Planning Assessment   Confirmed/corrected address, phone number and insurance Yes   Confirmed Demographics Correct on Facesheet   Source of Information patient   Communicated CHUCK with patient/caregiver Yes   Reason For Admission Hypercalcemia   People in Home spouse;child(leonard), adult   Do you expect to return to your current living situation? Yes   Do you have help at home or someone to help you manage your care at home? Yes   Who are your caregiver(s) and their phone number(s)? Tierney Curtis (Spouse)   [phone]   Prior to hospitilization cognitive status: Alert/Oriented   Current cognitive status: Alert/Oriented   Home Layout Able to live on 1st floor   Equipment Currently Used at Home none   Readmission within 30 days? No   Patient currently being followed by outpatient case management? No   Do you currently have service(s) that help you manage your care at home? No   Do you take prescription medications? Yes   Do you have prescription coverage? Yes   Coverage BLUE CROSS BLUE SHIELD - BCBS OF [facility]   Do you have any problems affording any of your prescribed medications? No   Is the patient taking medications as prescribed? yes   Who is going to help you get home at discharge? Tierney Curtis (Spouse)   [phone]   How do you get to doctors appointments? family or friend will provide;car, drives self   Are you on dialysis? No   Do you take coumadin? No   Discharge Plan A Home with family   DME Needed Upon  Discharge  none   Discharge Plan discussed with: Patient   Transition of Care Barriers None   Financial Resource Strain   How hard is it for you to pay for the very basics like food, housing, medical care, and heating? Not hard   Housing Stability   In the last 12 months, was there a time when you were not able to pay the mortgage or rent on time? N   In the last 12 months, was there a time when you did not have a steady place to sleep or slept in a shelter (including now)? N   Transportation Needs   In the past 12 months, has lack of transportation kept you from medical appointments or from getting medications? no   In the past 12 months, has lack of transportation kept you from meetings, work, or from getting things needed for daily living? No   Food Insecurity   Within the past 12 months, you worried that your food would run out before you got the money to buy more. Never true   Within the past 12 months, the food you bought just didn't last and you didn't have money to get more. Never true   Social Connections   Are you , , , , never , or living with a partner?    Alcohol Use   Q1: How often do you have a drink containing alcohol? Monthly or l   Q2: How many drinks containing alcohol do you have on a typical day when you are drinking? 1 or 2   Q3: How often do you have six or more drinks on one occasion? Less than mo   OTHER   Name(s) of People in Home Tierney Curtis (Spouse)   [phone] and son

## 2023-06-28 NOTE — PROGRESS NOTES
[facility] Medicine  Progress Note    Patient Name: Partha Curtis Jr.  MRN: [id number]  Patient Class: IP- Inpatient   Admission Date: 6/27/2023  Length of Stay: 1 days  Attending Physician: Wandy Benavidez MD  Primary Care Provider: Rocco Cavazos DO        Subjective:     Principal Problem:Hypercalcemia      HPI:  Mr. Curtis is a 62yo man with hypertension, type 2 diabetes, and obesity who presents with several days of nausea and vomiting.  He states that he is on Ozempic at home and recently had dose increased on Thursday (6 days prior to admission) the following day after taking increased dose, he had significantly decreased appetite and did not eat or drink at on Friday.  On Saturday, he started to have severe nausea and vomiting with retching every 20-40 minutes and significant vomiting.  He contacted his PCP, who prescribed Zofran but was without relief.  After no improvement with monitoring, he was seen in urgent care where he was found to have positive orthostatics and given 1 L of normal saline and directed to the ED.      Overview/Hospital Course:  No notes on file    Interval History:  Reports feeling much better overall.  Nausea resolved.  Patient able to tolerate p.o..  Renal function gradually improving, remains hypercalcemic.      Review of Systems   Gastrointestinal:  Negative for abdominal pain, nausea and vomiting.   Objective:     Vital Signs (Most Recent):  Temp: 97.1 °F (36.2 °C) (06/28/23 1553)  Pulse: 79 (06/28/23 1602)  Resp: 18 (06/28/23 1553)  BP: (!) 162/80 (06/28/23 1553)  SpO2: 98 % (06/28/23 1553) Vital Signs (24h Range):  Temp:  [96.4 °F (35.8 °C)-98.3 °F (36.8 °C)] 97.1 °F (36.2 °C)  Pulse:  [] 79  Resp:  [18-19] 18  SpO2:  [92 %-98 %] 98 %  BP: (110-162)/(63-83) 162/80     Weight: 91 kg (200 lb 9.9 oz)  Body mass index is 27.98 kg/m².    Intake/Output Summary (Last 24 hours) at 6/28/2023 1968  Last data filed at 6/28/2023 0821  Gross per 24 hour   Intake 620  ml   Output 900 ml   Net -280 ml         Physical Exam  Vitals and nursing note reviewed.   Constitutional:       General: He is not in acute distress.     Appearance: He is well-developed. He is not diaphoretic.   Cardiovascular:      Rate and Rhythm: Normal rate and regular rhythm.      Heart sounds: Normal heart sounds. No murmur heard.  Pulmonary:      Effort: Pulmonary effort is normal. No respiratory distress.      Breath sounds: Normal breath sounds. No wheezing.   Abdominal:      Palpations: Abdomen is soft.      Tenderness: There is no abdominal tenderness.   Musculoskeletal:      Right lower leg: No edema.      Left lower leg: No edema.   Skin:     General: Skin is warm and dry.      Findings: No rash.   Neurological:      Mental Status: He is alert and oriented to person, place, and time.           Significant Labs: All pertinent labs within the past 24 hours have been reviewed.    Significant Imaging: I have reviewed all pertinent imaging results/findings within the past 24 hours.      Assessment/Plan:      * Hypercalcemia  -in the setting significant dehydration and GI losses with HANANE although was up trending at last blood check 6 months previously  -admission calcium corrects to 12.2, gradually downtrending  -continue IV fluids   -check PTH low, not consistent with primary hyperparathyroidism  Will hold vitamin-D supplementation at discharge       Hypomagnesemia  -1.4 on admission, will replace as needed      Elevated hemoglobin  -could be due to hemoconcentration from significant nausea and vomiting versus testosterone side effect   Hemoglobin downtrending, elevated hemoglobin likely in setting of hemoconcentration      Insomnia  -continue home amitriptyline      Overweight (BMI 25.0-29.9)  -noted   -patient reports significant weight loss over the past few days as he has been unable to eat or drink  -hold Ozempic for now; may need to deescalate to lower dose in the future, will defer to  PCP      Hypokalemia  -in the setting of GI losses   -replace as needed  Telemetry  Monitor closely      ALLA (obstructive sleep apnea)  -CPAP q.h.s.      B12 deficiency  -continue home B12 replacement      Vitamin D deficiency  -presents with hypercalcemia but also in the setting of severe dehydration   PTH low at 7.1, not suggestive of primary hyperparathyroidism  Possible in setting of elevated vitamin-D  Takes 50,000 ergocalciferol weekly as an outpatient for the last 1 year.  Last vitamin-D per patient in January 2023 within normal limits   Vitamin-D is a send out lab here and take several days to resolved.  Will have patient get vitamin-D levels as an outpatient   Will hold ergocalciferol for now and at discharge until follow-up      Chronic bilateral low back pain without sciatica  -continue home gabapentin      Type 2 diabetes mellitus with stage 3 chronic kidney disease, without long-term current use of insulin  Patient's FSGs are uncontrolled due to hypoglycemia on current medication regimen.  Last A1c reviewed-   Lab Results   Component Value Date    HGBA1C 7.0 (H) 01/09/2023     Most recent fingerstick glucose reviewed-   Recent Labs   Lab 06/28/23  0532 06/28/23  0611 06/28/23  1048 06/28/23  1554   POCTGLUCOSE 67* 107 93 115*     Current correctional scale  Low  Maintain anti-hyperglycemic dose as follows-   Antihyperglycemics (From admission, onward)    Start     Stop Route Frequency Ordered    06/27/23 1538  insulin aspart U-100 pen 0-5 Units         -- SubQ Before meals & nightly PRN 06/27/23 1438        Hold Oral hypoglycemics while patient is in the hospital.    Long-term current use of testosterone replacement therapy  -noted   -would monitor elevated hemoglobin as this could be related to testosterone use versus hemoconcentration      Acute renal failure superimposed on stage 3 chronic kidney disease  - baseline serum Cr 1.5, 2.5 on admit gradually improving on IV fluids  - continue to monitor  renal function with daily labs   - avoid nephrotoxins  - renally dose all medications   - monitor events that may lead to decreased renal perfusion (hypovolemia, hypotension, sepsis)  - monitor urine output to assure that no obstruction precipitates worsening in GFR  - continue IV fluids; suspect prerenal in the setting of significant GI losses         Dyslipidemia  -continue home statin      Essential hypertension  -mildly elevated upon admission but in the setting of nausea/vomiting   -had positive orthostatics at outside urgent care   Holding ARB given HANANE.  Holding chlorthalidone given HANANE, hypercalcemia and volume depletion        VTE Risk Mitigation (From admission, onward)         Ordered     IP VTE LOW RISK PATIENT  Once         06/27/23 1438     Place sequential compression device  Until discontinued         06/27/23 1438                Discharge Planning   CHUCK:      Code Status: Full Code   Is the patient medically ready for discharge?:     Reason for patient still in hospital (select all that apply): Patient trending condition, Laboratory test and Treatment  Discharge Plan A: Home with family            Wandy Benavidez MD  Department of Hospital Medicine   [facility]

## 2023-06-28 NOTE — NURSING
pt BG this AM resulted 67 at 0530, previous BG spot check resulted 144 at 0203. pt has been dropping fairly quicly after replacements given. Dr Ferreira notified. Pt completely asymtomatic each time, wide awake, denies any complaints.

## 2023-06-28 NOTE — ASSESSMENT & PLAN NOTE
Patient's FSGs are uncontrolled due to hypoglycemia on current medication regimen.  Last A1c reviewed-   Lab Results   Component Value Date    HGBA1C 7.0 (H) 01/09/2023     Most recent fingerstick glucose reviewed-   Recent Labs   Lab 06/28/23  0532 06/28/23  0611 06/28/23  1048 06/28/23  1554   POCTGLUCOSE 67* 107 93 115*     Current correctional scale  Low  Maintain anti-hyperglycemic dose as follows-   Antihyperglycemics (From admission, onward)    Start     Stop Route Frequency Ordered    06/27/23 1538  insulin aspart U-100 pen 0-5 Units         -- SubQ Before meals & nightly PRN 06/27/23 1438        Hold Oral hypoglycemics while patient is in the hospital.

## 2023-06-28 NOTE — ASSESSMENT & PLAN NOTE
- baseline serum Cr 1.5, 2.5 on admit gradually improving on IV fluids  - continue to monitor renal function with daily labs   - avoid nephrotoxins  - renally dose all medications   - monitor events that may lead to decreased renal perfusion (hypovolemia, hypotension, sepsis)  - monitor urine output to assure that no obstruction precipitates worsening in GFR  - continue IV fluids; suspect prerenal in the setting of significant GI losses

## 2023-06-29 ENCOUNTER — PATIENT MESSAGE (OUTPATIENT)
Dept: PRIMARY CARE CLINIC | Facility: CLINIC | Age: 63
End: 2023-06-29
Payer: COMMERCIAL

## 2023-06-29 VITALS
SYSTOLIC BLOOD PRESSURE: 162 MMHG | WEIGHT: 200.63 LBS | HEART RATE: 66 BPM | OXYGEN SATURATION: 95 % | DIASTOLIC BLOOD PRESSURE: 86 MMHG | TEMPERATURE: 98 F | BODY MASS INDEX: 27.98 KG/M2 | RESPIRATION RATE: 18 BRPM

## 2023-06-29 PROBLEM — D58.2 ELEVATED HEMOGLOBIN: Status: RESOLVED | Noted: 2023-06-27 | Resolved: 2023-06-29

## 2023-06-29 PROBLEM — N17.9 ACUTE RENAL FAILURE SUPERIMPOSED ON STAGE 3 CHRONIC KIDNEY DISEASE: Status: RESOLVED | Noted: 2018-03-08 | Resolved: 2023-06-29

## 2023-06-29 PROBLEM — N18.30 ACUTE RENAL FAILURE SUPERIMPOSED ON STAGE 3 CHRONIC KIDNEY DISEASE: Status: RESOLVED | Noted: 2018-03-08 | Resolved: 2023-06-29

## 2023-06-29 PROBLEM — E83.52 HYPERCALCEMIA: Status: RESOLVED | Noted: 2023-06-27 | Resolved: 2023-06-29

## 2023-06-29 PROBLEM — E83.42 HYPOMAGNESEMIA: Status: RESOLVED | Noted: 2023-06-27 | Resolved: 2023-06-29

## 2023-06-29 LAB
ANION GAP SERPL CALC-SCNC: 9 MMOL/L (ref 8–16)
BUN SERPL-MCNC: 20 MG/DL (ref 8–23)
CALCIUM SERPL-MCNC: 9.7 MG/DL (ref 8.7–10.5)
CHLORIDE SERPL-SCNC: 103 MMOL/L (ref 95–110)
CO2 SERPL-SCNC: 28 MMOL/L (ref 23–29)
CREAT SERPL-MCNC: 1.7 MG/DL (ref 0.5–1.4)
EST. GFR  (NO RACE VARIABLE): 45 ML/MIN/1.73 M^2
GLUCOSE SERPL-MCNC: 120 MG/DL (ref 70–110)
MAGNESIUM SERPL-MCNC: 1.6 MG/DL (ref 1.6–2.6)
PHOSPHATE SERPL-MCNC: 2.5 MG/DL (ref 2.7–4.5)
POCT GLUCOSE: 122 MG/DL (ref 70–110)
POCT GLUCOSE: 123 MG/DL (ref 70–110)
POCT GLUCOSE: 90 MG/DL (ref 70–110)
POCT GLUCOSE: 91 MG/DL (ref 70–110)
POTASSIUM SERPL-SCNC: 3.4 MMOL/L (ref 3.5–5.1)
SODIUM SERPL-SCNC: 140 MMOL/L (ref 136–145)

## 2023-06-29 PROCEDURE — 36415 COLL VENOUS BLD VENIPUNCTURE: CPT | Performed by: STUDENT IN AN ORGANIZED HEALTH CARE EDUCATION/TRAINING PROGRAM

## 2023-06-29 PROCEDURE — 84100 ASSAY OF PHOSPHORUS: CPT | Performed by: STUDENT IN AN ORGANIZED HEALTH CARE EDUCATION/TRAINING PROGRAM

## 2023-06-29 PROCEDURE — 80048 BASIC METABOLIC PNL TOTAL CA: CPT | Performed by: STUDENT IN AN ORGANIZED HEALTH CARE EDUCATION/TRAINING PROGRAM

## 2023-06-29 PROCEDURE — 94761 N-INVAS EAR/PLS OXIMETRY MLT: CPT

## 2023-06-29 PROCEDURE — 83735 ASSAY OF MAGNESIUM: CPT | Performed by: STUDENT IN AN ORGANIZED HEALTH CARE EDUCATION/TRAINING PROGRAM

## 2023-06-29 PROCEDURE — 63600175 PHARM REV CODE 636 W HCPCS: Performed by: STUDENT IN AN ORGANIZED HEALTH CARE EDUCATION/TRAINING PROGRAM

## 2023-06-29 PROCEDURE — 99900035 HC TECH TIME PER 15 MIN (STAT)

## 2023-06-29 PROCEDURE — 25000003 PHARM REV CODE 250: Performed by: STUDENT IN AN ORGANIZED HEALTH CARE EDUCATION/TRAINING PROGRAM

## 2023-06-29 PROCEDURE — 25000003 PHARM REV CODE 250: Performed by: HOSPITALIST

## 2023-06-29 RX ORDER — CANDESARTAN 32 MG/1
32 TABLET ORAL DAILY
Qty: 90 TABLET | Refills: 0 | Status: SHIPPED | OUTPATIENT
Start: 2023-06-30 | End: 2023-07-14 | Stop reason: SDUPTHER

## 2023-06-29 RX ORDER — POTASSIUM CHLORIDE 20 MEQ/1
40 TABLET, EXTENDED RELEASE ORAL EVERY 4 HOURS
Status: COMPLETED | OUTPATIENT
Start: 2023-06-29 | End: 2023-06-29

## 2023-06-29 RX ORDER — MAGNESIUM SULFATE 1 G/100ML
1 INJECTION INTRAVENOUS ONCE
Status: COMPLETED | OUTPATIENT
Start: 2023-06-29 | End: 2023-06-29

## 2023-06-29 RX ORDER — CHLORTHALIDONE 25 MG/1
25 TABLET ORAL DAILY
Qty: 90 TABLET | Refills: 4 | Status: SHIPPED | OUTPATIENT
Start: 2023-07-02 | End: 2023-07-14

## 2023-06-29 RX ORDER — POTASSIUM CHLORIDE 20 MEQ/1
20 TABLET, EXTENDED RELEASE ORAL DAILY
Qty: 7 TABLET | Refills: 0 | Status: SHIPPED | OUTPATIENT
Start: 2023-06-29 | End: 2023-07-14

## 2023-06-29 RX ORDER — SODIUM,POTASSIUM PHOSPHATES 280-250MG
1 POWDER IN PACKET (EA) ORAL ONCE
Status: COMPLETED | OUTPATIENT
Start: 2023-06-29 | End: 2023-06-29

## 2023-06-29 RX ADMIN — CYANOCOBALAMIN TAB 1000 MCG 1000 MCG: 1000 TAB at 09:06

## 2023-06-29 RX ADMIN — POTASSIUM & SODIUM PHOSPHATES POWDER PACK 280-160-250 MG 1 PACKET: 280-160-250 PACK at 09:06

## 2023-06-29 RX ADMIN — ATORVASTATIN CALCIUM 40 MG: 40 TABLET, FILM COATED ORAL at 09:06

## 2023-06-29 RX ADMIN — ASPIRIN 81 MG: 81 TABLET, COATED ORAL at 09:06

## 2023-06-29 RX ADMIN — POTASSIUM CHLORIDE 40 MEQ: 1500 TABLET, EXTENDED RELEASE ORAL at 09:06

## 2023-06-29 RX ADMIN — METOPROLOL SUCCINATE 200 MG: 50 TABLET, EXTENDED RELEASE ORAL at 09:06

## 2023-06-29 RX ADMIN — MAGNESIUM SULFATE 1 G: 1 INJECTION INTRAVENOUS at 09:06

## 2023-06-29 RX ADMIN — POTASSIUM CHLORIDE 40 MEQ: 1500 TABLET, EXTENDED RELEASE ORAL at 12:06

## 2023-06-29 NOTE — PROGRESS NOTES
"[facility]                    Pharmacy       Discharge Medication Education    Patient ACCEPTED medication education. Pharmacy has provided education on the name, indication, and possible side effects of the medication(s) prescribed, using teach-back method.     The following medications have also been discussed, during this admission.        Medication List        START taking these medications      potassium chloride SA 20 MEQ tablet  Commonly known as: K-DUR,KLOR-CON  Take 1 tablet (20 mEq total) by mouth once daily.            CHANGE how you take these medications      candesartan 32 MG tablet  Commonly known as: ATACAND  Take 1 tablet (32 mg total) by mouth once daily.  Start taking on: June 30, 2023  What changed: when to take this     chlorthalidone 25 MG Tab  Commonly known as: HYGROTEN  Take 1 tablet (25 mg total) by mouth once daily.  Start taking on: July 2, 2023  What changed: These instructions start on July 2, 2023. If you are unsure what to do until then, ask your doctor or other care provider.     metoprolol succinate 200 MG 24 hr tablet  Commonly known as: TOPROL-XL  Take 1 tablet by mouth once daily  What changed: when to take this     OZEMPIC 1 mg/dose (4 mg/3 mL)  Generic drug: semaglutide  Inject 1 mg into the skin every 7 days.  What changed: when to take this     valACYclovir 1000 MG tablet  Commonly known as: VALTREX  TAKE 2 TABLETS BY MOUTH EVERY 12 HOURS  What changed:   when to take this  reasons to take this            CONTINUE taking these medications      amitriptyline 25 MG tablet  Commonly known as: ELAVIL     aspirin 81 MG EC tablet  Commonly known as: ECOTRIN     azelastine 137 mcg (0.1 %) nasal spray  Commonly known as: ASTELIN  1 spray (137 mcg total) by Nasal route 2 (two) times daily.     BD INTEGRA SYRINGE 3 mL 22 gauge x 1 1/2" Syrg  Generic drug: syringe with needle, safety  Inject 1 Syringe as directed once a week.     BD LUER-RUSS SYRINGE 3 mL 25 gauge x " "1" Syrg  Generic drug: syringe with needle     cyanocobalamin 1000 MCG tablet  Commonly known as: VITAMIN B-12  Take 1 tablet (1,000 mcg total) by mouth once daily.     gabapentin 800 MG tablet  Commonly known as: NEURONTIN  Take 0.5 tablets (400 mg total) by mouth every evening.     ipratropium 42 mcg (0.06 %) nasal spray  Commonly known as: ATROVENT  2 sprays by Nasal route 2 (two) times daily as needed for Rhinitis.     ondansetron 4 MG tablet  Commonly known as: ZOFRAN  Take 1 tablet (4 mg total) by mouth every 8 (eight) hours as needed for Nausea.     oxyCODONE-acetaminophen 5-325 mg per tablet  Commonly known as: PERCOCET     rosuvastatin 40 MG Tab  Commonly known as: CRESTOR  Take 1 tablet (40 mg total) by mouth every evening.     testosterone cypionate 200 mg/mL injection  Commonly known as: DEPOTESTOTERONE CYPIONATE  Inject 1 mL (200 mg total) into the muscle every 14 (fourteen) days.     VAQTA (PF) 50 unit/mL vaccine  Generic drug: hepatitis A vaccine syringe (PF)            STOP taking these medications      ergocalciferol 50,000 unit Cap  Commonly known as: ERGOCALCIFEROL     glimepiride 4 MG tablet  Commonly known as: AMARYL               Where to Get Your Medications        These medications were sent to [facility] [address], [place] [id number]      Hours: Mon-Fri, 8a-5:30p Phone: [phone]   candesartan 32 MG tablet  chlorthalidone 25 MG Tab  potassium chloride SA 20 MEQ tablet          Thank you  Samm Velasquez, PharmD  [phone]   "

## 2023-06-29 NOTE — PLAN OF CARE
Anesthesia Pre Eval Note    Anesthesia ROS/Med Hx        Anesthetic Complication History:  Patient does not have a history of anesthetic complications      Pulmonary Review:  Patient does not have a pulmonary history      Neuro/Psych Review:  Patient does not have a neuro/psych history       Cardiovascular Review:  Exercise tolerance: good (>4 METS)  Positive for hypertension    GI/HEPATIC/RENAL Review:  Patient does not have a GI/hepatic/renalhistory     Positive for bowel prep    End/Other Review:  Patient does not have an endo/other history    Additional Results:     ALLERGIES:  No Known Allergies       Lab Results       Component                Value               Date                       WBC                      6.3                 01/09/2020                 RBC                      6.16 (H)            01/09/2020                 HGB                      14.3                01/09/2020                 HCT                      47.2                01/09/2020                 MCHC                     30.3 (L)            01/09/2020                 SODIUM                   139                 01/09/2020                 POTASSIUM                4.1                 01/09/2020                 CHLORIDE                 106                 01/09/2020                 CO2                      28                  01/09/2020                 GLUCOSE                  91                  01/09/2020                 BUN                      12                  01/09/2020                 CREATININE               1.02                01/09/2020                 GFRA                     >90                 01/09/2020                 GFRNA                    86                  01/09/2020                 CALCIUM                  9.2                 01/09/2020                 PLT                      277                 01/09/2020             Past Medical History:  No date: Essential (primary) hypertension    History reviewed. No  VIRTUAL NURSE:  Labs, notes, orders, and careplan reviewed. VN to be available as needed.    Problem: Adult Inpatient Plan of Care  Goal: Plan of Care Review  Outcome: Ongoing, Progressing  Goal: Patient-Specific Goal (Individualized)  Outcome: Ongoing, Progressing  Goal: Absence of Hospital-Acquired Illness or Injury  Outcome: Ongoing, Progressing  Goal: Optimal Comfort and Wellbeing  Outcome: Ongoing, Progressing  Goal: Readiness for Transition of Care  Outcome: Ongoing, Progressing      pertinent surgical history.       Prior to Admission medications :  Medication Multiple Vitamin (MULTIVITAMIN ADULT PO), Sig Take 1 capsule by mouth daily., Start Date , End Date , Taking? Yes, Authorizing Provider Outside Provider    Medication amlodipine-benazepril (LOTREL) 5-20 MG per capsule, Sig Take 1 capsule by mouth daily., Start Date 3/19/21, End Date , Taking? Yes, Authorizing Provider Drew Meadows MD            Relevant Problems   No relevant active problems       Physical Exam     Airway   Mallampati: III  TM Distance: >3 FB  Neck ROM: Full  TMJ Mobility: Good    Cardiovascular  Cardiovascular exam normal  Cardio Rhythm: Regular  Cardio Rate: Normal    Head Assessment  Head assessment: Normocephalic and Atraumatic    General Assessment  General Assessment: Alert and oriented and No acute distress    Dental Exam  Dental exam normal    Pulmonary Exam  Pulmonary exam normal  Breath sounds clear to auscultation:  Yes      Anesthesia Plan:  Anesthesia Plan    ASA Status: 2    Anesthesia Type: MAC    Induction: Intravenous  Preferred Airway Type: Nasal Cannula  Premedication: None      Post-op Pain Management: Per Surgeon      Checklist  Reviewed: Lab Results, Nursing Notes, Patient Summary, Beta Blocker Status, Outside Records, Care Everywhere, DNR Status, NPO Status, Allergies, Medications, Problem list and Past Med History  Consent/Risks Discussed Statement:  The proposed anesthetic plan, including its risks and benefits, have been discussed with the Patient along with the risks and benefits of alternatives. Questions were encouraged and answered and the patient and/or representative understands and agrees to proceed.        I discussed with the patient (and/or patient's legal representative) the risks and benefits of the proposed anesthesia plan, MAC, which may include services performed by other anesthesia providers.    Alternative anesthesia plans, if available, were reviewed with the patient (and/or  patient's legal representative). Discussion has been held with the patient (and/or patient's legal representative) regarding risks of anesthesia, which include Sore Throat, Dental Injury, Hypotension, Allergic Reaction, Aspiration, Intra-operative Awareness, Emergence Delirium, conversion to general anesthesia, anxiety, depressed breathing, vomiting, nausea, memory loss, nerve injury, oral injury, post-op intubation, intra-operative awareness, ICU admit, eye injury, emergence delirium, aspiration, allergic reaction, bleeding/hematoma, dental injury, hypotension and infection and emergent situations that may require change in anesthesia plan.    The patient (and/or patient's legal representative) has indicated understanding, his/her questions have been answered, and he/she wishes to proceed with the planned anesthetic.    Blood Products: Not Anticipated    Comments  Plan Comments: RECORDS, EKG AND CHEST X-RAYS WHEN AVAILABLE

## 2023-06-29 NOTE — PROGRESS NOTES
Virtual Nurse:Discharge orders noted; additional clinical references attached.  and pharmacy tech notified.  Patient's discharge instruction packet given by bedside RN.    Cued into patient's room.  Permission received per patient to turn camera to view patient.  Introduced as VN that will be instructing on discharge instructions.  Educated patient on reason for admission; medications to hold, continue, and start, appointment to follow-up with doctor, and when to return to ED. Teach back method used. Verbalized understanding  Number given for 24/7 Nurse Line. Opportunity given for questions and questions answered.  Bedside nurse updated. Transport to [facility] requested.        06/29/23 1501   Admission   Communication Issues? None   Shift   Virtual Nurse - Rounding Complete   Virtual Nurse - Patient Verbalized Approval Of Camera Use;VN Rounding   Type of Frequent Check   Type Patient Rounds   Safety/Activity   Patient Rounds visualized patient

## 2023-06-29 NOTE — HOSPITAL COURSE
63-year-old male with PMH of type 2 diabetes mellitus, hypertension, obesity presented to the ER with nausea/vomiting found to have HANANE, hypercalcemia, hypokalemia. Ozempic dose was recently increased which was thought to be contributing to his nausea/vomiting and poor p.o. intake causing pre renal HANANE and electrolyte abnormalities.  Symptoms, hypercalcemia and HANANE improved with IV fluids, supportive care.  Parathyroid hormone levels low at 7.1.  Patient takes vitamin-D supplements at home, last vitamin-D levels in January per report were normal.  Given vitamin D is a send out lab and it takes 3-5 days for the results, this was not done during this hospital stay.  Electrolytes were replaced.  Patient was able to tolerate a diet.  Vital signs remained stable.    Patient advised to hold Ozempic and vitamin-D at discharge.  Also noted to have hypoglycemic episodes which could be in setting of the higher Ozempic dose.  Advised patient to hold glimepiride at discharge.  Patient to resume ARB tomorrow and chlorthalidone in a few days for hypertension.    Patient to follow-up with [facility] for lab work when medication can be resumed as deemed appropriate.    Physical Exam  Vitals and nursing note reviewed.   Constitutional:       General: He is not in acute distress.  Cardiovascular:      Rate and Rhythm: Normal rate and regular rhythm.      Heart sounds: Normal heart sounds. No murmur heard.  Pulmonary:      Effort: Pulmonary effort is normal. No respiratory distress.      Breath sounds: Normal breath sounds. No wheezing.   Abdominal:      Palpations: Abdomen is soft.      Tenderness: There is no abdominal tenderness.   Musculoskeletal:      Right lower leg: No edema.      Left lower leg: No edema.   Skin:     General: Skin is warm and dry.      Findings: No rash.   Neurological:      Mental Status: He is alert and oriented to person, place, and time.

## 2023-06-29 NOTE — DISCHARGE SUMMARY
[facility] Medicine  Discharge Summary      Patient Name: Partha Curtis Jr.  MRN: [id number]  WENDY: [id number]  Patient Class: IP- Inpatient  Admission Date: 6/27/2023  Hospital Length of Stay: 2 days  Discharge Date and Time: 6/29/2023  3:20 PM  Attending Physician: No att. providers found   Discharging Provider: Wandy Benavidez MD  Primary Care Provider: Rocco Cavazos DO    Primary Care Team: Networked reference to record PCT     HPI:   Mr. Curtis is a 62yo man with hypertension, type 2 diabetes, and obesity who presents with several days of nausea and vomiting.  He states that he is on Ozempic at home and recently had dose increased on Thursday (6 days prior to admission) the following day after taking increased dose, he had significantly decreased appetite and did not eat or drink at on Friday.  On Saturday, he started to have severe nausea and vomiting with retching every 20-40 minutes and significant vomiting.  He contacted his PCP, who prescribed Zofran but was without relief.  After no improvement with monitoring, he was seen in urgent care where he was found to have positive orthostatics and given 1 L of normal saline and directed to the ED.      * No surgery found *      Hospital Course:   63-year-old male with PMH of type 2 diabetes mellitus, hypertension, obesity presented to the ER with nausea/vomiting found to have HANANE, hypercalcemia, hypokalemia. Ozempic dose was recently increased which was thought to be contributing to his nausea/vomiting and poor p.o. intake causing pre renal HANANE and electrolyte abnormalities.  Symptoms, hypercalcemia and HANANE improved with IV fluids, supportive care.  Parathyroid hormone levels low at 7.1.  Patient takes vitamin-D supplements at home, last vitamin-D levels in January per report were normal.  Given vitamin D is a send out lab and it takes 3-5 days for the results, this was not done during this hospital stay.  Electrolytes were replaced.  Patient  was able to tolerate a diet.  Vital signs remained stable.    Patient advised to hold Ozempic and vitamin-D at discharge.  Also noted to have hypoglycemic episodes which could be in setting of the higher Ozempic dose.  Advised patient to hold glimepiride at discharge.  Patient to resume ARB tomorrow and chlorthalidone in a few days for hypertension.    Patient [facility] for lab work when medication can be resumed as deemed appropriate.    Physical Exam  Vitals and nursing note reviewed.   Constitutional:       General: He is not in acute distress.  Cardiovascular:      Rate and Rhythm: Normal rate and regular rhythm.      Heart sounds: Normal heart sounds. No murmur heard.  Pulmonary:      Effort: Pulmonary effort is normal. No respiratory distress.      Breath sounds: Normal breath sounds. No wheezing.   Abdominal:      Palpations: Abdomen is soft.      Tenderness: There is no abdominal tenderness.   Musculoskeletal:      Right lower leg: No edema.      Left lower leg: No edema.   Skin:     General: Skin is warm and dry.      Findings: No rash.   Neurological:      Mental Status: He is alert and oriented to person, place, and time.        Goals of Care Treatment Preferences:  Code Status: Full Code    Living Will: Yes              Consults:       Final Active Diagnoses:    Diagnosis Date Noted POA    Hypokalemia [E87.6] 06/27/2023 Yes    Overweight (BMI 25.0-29.9) [E66.3] 06/27/2023 Yes    Insomnia [G47.00] 06/27/2023 Yes    ALLA (obstructive sleep apnea) [G47.33]  Yes    B12 deficiency [E53.8] 06/27/2022 Yes    Chronic bilateral low back pain without sciatica [M54.50, G89.29] 02/10/2021 Yes    Vitamin D deficiency [E55.9] 02/10/2021 Yes    Type 2 diabetes mellitus with stage 3 chronic kidney disease, without long-term current use of insulin [E11.22, N18.30] 04/02/2020 Yes    Long-term current use of testosterone replacement therapy [Z79.890] 03/13/2018 Not Applicable     Essential hypertension [I10] 10/22/2014 Yes    Dyslipidemia [E78.5] 10/22/2014 Yes     Chronic      Problems Resolved During this Admission:    Diagnosis Date Noted Date Resolved POA    PRINCIPAL PROBLEM:  Hypercalcemia [E83.52] 06/27/2023 06/29/2023 Yes    Elevated hemoglobin [D58.2] 06/27/2023 06/29/2023 Yes    Hypomagnesemia [E83.42] 06/27/2023 06/29/2023 Yes    Acute renal failure superimposed on stage 3 chronic kidney disease [N17.9, N18.30] 03/08/2018 06/29/2023 Yes       Discharged Condition: stable    Disposition: Home or Self Care    Follow Up:    Patient Instructions:      BASIC METABOLIC PANEL   Standing Status: Future Standing Exp. Date: 08/27/24     [facility]   Standing Status: Future   Referral Priority: Routine Referral Type: Consultation   Referral Reason: Specialty Services Required   Number of Visits Requested: 1       Significant Diagnostic Studies: Labs:   BMP:   Recent Labs   Lab 06/28/23  0351 06/29/23  0327    120*    140   K 3.1* 3.4*    103   CO2 26 28   BUN 27* 20   CREATININE 2.1* 1.7*   CALCIUM 10.7* 9.7   MG 1.8 1.6    and CBC   Recent Labs   Lab 06/28/23  0351   WBC 10.81   HGB 16.9   HCT 49.7          Pending Diagnostic Studies:     None         Medications:  Reconciled Home Medications:      Medication List      START taking these medications    potassium chloride SA 20 MEQ tablet  Commonly known as: K-DUR,KLOR-CON  Take 1 tablet (20 mEq total) by mouth once daily.        CHANGE how you take these medications    candesartan 32 MG tablet  Commonly known as: ATACAND  Take 1 tablet (32 mg total) by mouth once daily.  Start taking on: June 30, 2023  What changed: when to take this     chlorthalidone 25 MG Tab  Commonly known as: HYGROTEN  Take 1 tablet (25 mg total) by mouth once daily.  Start taking on: July 2, 2023  What changed: These instructions start on July 2, 2023. If you are unsure what to do until then, ask your  "doctor or other care provider.     metoprolol succinate 200 MG 24 hr tablet  Commonly known as: TOPROL-XL  Take 1 tablet by mouth once daily  What changed: when to take this     OZEMPIC 1 mg/dose (4 mg/3 mL)  Generic drug: semaglutide  Inject 1 mg into the skin every 7 days.  What changed: when to take this     valACYclovir 1000 MG tablet  Commonly known as: VALTREX  TAKE 2 TABLETS BY MOUTH EVERY 12 HOURS  What changed:   · when to take this  · reasons to take this        CONTINUE taking these medications    amitriptyline 25 MG tablet  Commonly known as: ELAVIL  Take 75 mg by mouth nightly as needed for Insomnia.     aspirin 81 MG EC tablet  Commonly known as: ECOTRIN  Take 81 mg by mouth once daily.     azelastine 137 mcg (0.1 %) nasal spray  Commonly known as: ASTELIN  1 spray (137 mcg total) by Nasal route 2 (two) times daily.     BD INTEGRA SYRINGE 3 mL 22 gauge x 1 1/2" Syrg  Generic drug: syringe with needle, safety  Inject 1 Syringe as directed once a week.     BD LUER-RUSS SYRINGE 3 mL 25 gauge x 1" Syrg  Generic drug: syringe with needle  USE ONE SYRINGE EVERY 14 DAYS WITH TESTOSTERONE     cyanocobalamin 1000 MCG tablet  Commonly known as: VITAMIN B-12  Take 1 tablet (1,000 mcg total) by mouth once daily.     gabapentin 800 MG tablet  Commonly known as: NEURONTIN  Take 0.5 tablets (400 mg total) by mouth every evening.     ipratropium 42 mcg (0.06 %) nasal spray  Commonly known as: ATROVENT  2 sprays by Nasal route 2 (two) times daily as needed for Rhinitis.     ondansetron 4 MG tablet  Commonly known as: ZOFRAN  Take 1 tablet (4 mg total) by mouth every 8 (eight) hours as needed for Nausea.     oxyCODONE-acetaminophen 5-325 mg per tablet  Commonly known as: PERCOCET  Take 1 tablet by mouth 2 (two) times daily as needed.     rosuvastatin 40 MG Tab  Commonly known as: CRESTOR  Take 1 tablet (40 mg total) by mouth every evening.     testosterone cypionate 200 mg/mL injection  Commonly known as: DEPOTESTOTERONE " CYPIONATE  Inject 1 mL (200 mg total) into the muscle every 14 (fourteen) days.     VAQTA (PF) 50 unit/mL vaccine  Generic drug: hepatitis A vaccine syringe (PF)        STOP taking these medications    ergocalciferol 50,000 unit Cap  Commonly known as: ERGOCALCIFEROL     glimepiride 4 MG tablet  Commonly known as: AMARYL            Indwelling Lines/Drains at time of discharge:   Lines/Drains/Airways     None                 Time spent on the discharge of patient: 38 minutes        This report has been created through the use of [employer] dictation software. Typographical and content errors may occur with this process. While efforts are made to detect and correct such errors, in some cases errors will persist. For this reason, wording in this document should be considered in the proper context and not strictly verbatim        Wandy Benavidez MD  Department of [facility] Medicine  [facility]

## 2023-06-29 NOTE — PLAN OF CARE
Pt will dc with no needs noted. Pts wife will provide transport home. SW will continue to follow pt throughout his transitions of care and assist with any dc needs.     Cleared from CM . Bedside Nurse and VN notified.    SW requested PCC follow up.     Future Appointments   Date Time Provider Department Center   7/14/2023 10:40 AM DO CON [facility]        06/29/23 0905   Final Note   Assessment Type Final Discharge Note   Anticipated Discharge Disposition Home   Hospital Resources/Appts/Education Provided Appointments scheduled by Navigator/Coordinator   Post-Acute Status   Discharge Delays None known at this time

## 2023-06-30 ENCOUNTER — PATIENT OUTREACH (OUTPATIENT)
Dept: ADMINISTRATIVE | Facility: CLINIC | Age: 63
End: 2023-06-30
Payer: COMMERCIAL

## 2023-06-30 NOTE — NURSING
[facility] RN met with [facility] regarding [facility] hospital follow up upon discharge. Pt agreeable to hospital follow up .RN informed pt of scheduled appointment and that appointment will also appear on d/c AVS. Patient informed to bring all medication bottles to [facility] follow up appointment.  [facility] information handout, appointment letter and folder provided to patient. Pt able drive self to appointment will provide transportation to appointment.    Patient Contact info:Contact Information   [phone] (Home Phone)    [phone] (    Person providing transportation contact info: self    Barriers to attending PCC visit: none     Future Appointments   Date Time Provider Department Center   7/5/2023  3:00 PM Skylar Pelletier MD [facility]   [facility]14[facility]2023 10[facility]

## 2023-07-05 ENCOUNTER — LAB VISIT (OUTPATIENT)
Dept: LAB | Facility: HOSPITAL | Age: 63
End: 2023-07-05
Attending: FAMILY MEDICINE
Payer: COMMERCIAL

## 2023-07-05 ENCOUNTER — PATIENT MESSAGE (OUTPATIENT)
Dept: FAMILY MEDICINE | Facility: CLINIC | Age: 63
End: 2023-07-05
Payer: COMMERCIAL

## 2023-07-05 ENCOUNTER — OFFICE VISIT (OUTPATIENT)
Dept: PRIMARY CARE CLINIC | Facility: CLINIC | Age: 63
End: 2023-07-05
Payer: COMMERCIAL

## 2023-07-05 VITALS
WEIGHT: 197.75 LBS | BODY MASS INDEX: 27.58 KG/M2 | TEMPERATURE: 98 F | OXYGEN SATURATION: 95 % | SYSTOLIC BLOOD PRESSURE: 113 MMHG | HEART RATE: 73 BPM | DIASTOLIC BLOOD PRESSURE: 72 MMHG

## 2023-07-05 DIAGNOSIS — N17.9 AKI (ACUTE KIDNEY INJURY): ICD-10-CM

## 2023-07-05 DIAGNOSIS — E87.6 HYPOKALEMIA: ICD-10-CM

## 2023-07-05 DIAGNOSIS — N18.31 TYPE 2 DIABETES MELLITUS WITH STAGE 3A CHRONIC KIDNEY DISEASE, WITHOUT LONG-TERM CURRENT USE OF INSULIN: ICD-10-CM

## 2023-07-05 DIAGNOSIS — E11.22 TYPE 2 DIABETES MELLITUS WITH STAGE 3A CHRONIC KIDNEY DISEASE, WITHOUT LONG-TERM CURRENT USE OF INSULIN: ICD-10-CM

## 2023-07-05 DIAGNOSIS — E83.52 HYPERCALCEMIA: ICD-10-CM

## 2023-07-05 DIAGNOSIS — R11.2 NAUSEA AND VOMITING, UNSPECIFIED VOMITING TYPE: Primary | ICD-10-CM

## 2023-07-05 DIAGNOSIS — R79.89 HIGH SERUM PARATHYROID HORMONE (PTH): ICD-10-CM

## 2023-07-05 DIAGNOSIS — E78.5 DYSLIPIDEMIA: ICD-10-CM

## 2023-07-05 LAB
25(OH)D3+25(OH)D2 SERPL-MCNC: 53 NG/ML (ref 30–96)
ALBUMIN SERPL BCP-MCNC: 4.1 G/DL (ref 3.5–5.2)
ALBUMIN SERPL BCP-MCNC: 4.1 G/DL (ref 3.5–5.2)
ALP SERPL-CCNC: 63 U/L (ref 55–135)
ALP SERPL-CCNC: 63 U/L (ref 55–135)
ALT SERPL W/O P-5'-P-CCNC: 39 U/L (ref 10–44)
ALT SERPL W/O P-5'-P-CCNC: 39 U/L (ref 10–44)
ANION GAP SERPL CALC-SCNC: 12 MMOL/L (ref 8–16)
AST SERPL-CCNC: 30 U/L (ref 10–40)
AST SERPL-CCNC: 30 U/L (ref 10–40)
BASOPHILS # BLD AUTO: 0.03 K/UL (ref 0–0.2)
BASOPHILS NFR BLD: 0.4 % (ref 0–1.9)
BILIRUB SERPL-MCNC: 0.5 MG/DL (ref 0.1–1)
BILIRUB SERPL-MCNC: 0.5 MG/DL (ref 0.1–1)
BUN SERPL-MCNC: 17 MG/DL (ref 8–23)
CALCIUM SERPL-MCNC: 9.4 MG/DL (ref 8.7–10.5)
CHLORIDE SERPL-SCNC: 103 MMOL/L (ref 95–110)
CHOLEST SERPL-MCNC: 124 MG/DL (ref 120–199)
CHOLEST/HDLC SERPL: 4.6 {RATIO} (ref 2–5)
CO2 SERPL-SCNC: 25 MMOL/L (ref 23–29)
CREAT SERPL-MCNC: 1.8 MG/DL (ref 0.5–1.4)
DIFFERENTIAL METHOD: ABNORMAL
EOSINOPHIL # BLD AUTO: 0.2 K/UL (ref 0–0.5)
EOSINOPHIL NFR BLD: 3.2 % (ref 0–8)
ERYTHROCYTE [DISTWIDTH] IN BLOOD BY AUTOMATED COUNT: 17.6 % (ref 11.5–14.5)
EST. GFR  (NO RACE VARIABLE): 42 ML/MIN/1.73 M^2
ESTIMATED AVG GLUCOSE: 143 MG/DL (ref 68–131)
GLUCOSE SERPL-MCNC: 131 MG/DL (ref 70–110)
HBA1C MFR BLD: 6.6 % (ref 4–5.6)
HCT VFR BLD AUTO: 53.1 % (ref 40–54)
HDLC SERPL-MCNC: 27 MG/DL (ref 40–75)
HDLC SERPL: 21.8 % (ref 20–50)
HGB BLD-MCNC: 17.4 G/DL (ref 14–18)
IMM GRANULOCYTES # BLD AUTO: 0.02 K/UL (ref 0–0.04)
IMM GRANULOCYTES NFR BLD AUTO: 0.3 % (ref 0–0.5)
LDLC SERPL CALC-MCNC: 56 MG/DL (ref 63–159)
LYMPHOCYTES # BLD AUTO: 3 K/UL (ref 1–4.8)
LYMPHOCYTES NFR BLD: 40.5 % (ref 18–48)
MCH RBC QN AUTO: 29.8 PG (ref 27–31)
MCHC RBC AUTO-ENTMCNC: 32.8 G/DL (ref 32–36)
MCV RBC AUTO: 91 FL (ref 82–98)
MONOCYTES # BLD AUTO: 0.8 K/UL (ref 0.3–1)
MONOCYTES NFR BLD: 10.1 % (ref 4–15)
NEUTROPHILS # BLD AUTO: 3.4 K/UL (ref 1.8–7.7)
NEUTROPHILS NFR BLD: 45.5 % (ref 38–73)
NONHDLC SERPL-MCNC: 97 MG/DL
NRBC BLD-RTO: 0 /100 WBC
PLATELET # BLD AUTO: 257 K/UL (ref 150–450)
PMV BLD AUTO: 9.5 FL (ref 9.2–12.9)
POTASSIUM SERPL-SCNC: 4.3 MMOL/L (ref 3.5–5.1)
PROT SERPL-MCNC: 7.4 G/DL (ref 6–8.4)
PROT SERPL-MCNC: 7.4 G/DL (ref 6–8.4)
PTH-INTACT SERPL-MCNC: 128.9 PG/ML (ref 9–77)
RBC # BLD AUTO: 5.83 M/UL (ref 4.6–6.2)
SODIUM SERPL-SCNC: 140 MMOL/L (ref 136–145)
TRIGL SERPL-MCNC: 205 MG/DL (ref 30–150)
WBC # BLD AUTO: 7.4 K/UL (ref 3.9–12.7)

## 2023-07-05 PROCEDURE — 80061 LIPID PANEL: CPT | Performed by: FAMILY MEDICINE

## 2023-07-05 PROCEDURE — 3008F BODY MASS INDEX DOCD: CPT | Mod: CPTII,S$GLB,, | Performed by: INTERNAL MEDICINE

## 2023-07-05 PROCEDURE — 3074F PR MOST RECENT SYSTOLIC BLOOD PRESSURE < 130 MM HG: ICD-10-PCS | Mod: CPTII,S$GLB,, | Performed by: INTERNAL MEDICINE

## 2023-07-05 PROCEDURE — 4010F PR ACE/ARB THEARPY RXD/TAKEN: ICD-10-PCS | Mod: CPTII,S$GLB,, | Performed by: INTERNAL MEDICINE

## 2023-07-05 PROCEDURE — 83036 HEMOGLOBIN GLYCOSYLATED A1C: CPT | Performed by: FAMILY MEDICINE

## 2023-07-05 PROCEDURE — 80053 COMPREHEN METABOLIC PANEL: CPT | Performed by: FAMILY MEDICINE

## 2023-07-05 PROCEDURE — 99204 OFFICE O/P NEW MOD 45 MIN: CPT | Mod: S$GLB,,, | Performed by: INTERNAL MEDICINE

## 2023-07-05 PROCEDURE — 1111F PR DISCHARGE MEDS RECONCILED W/ CURRENT OUTPATIENT MED LIST: ICD-10-PCS | Mod: CPTII,S$GLB,, | Performed by: INTERNAL MEDICINE

## 2023-07-05 PROCEDURE — 99999 PR PBB SHADOW E&M-EST. PATIENT-LVL V: ICD-10-PCS | Mod: PBBFAC,,, | Performed by: INTERNAL MEDICINE

## 2023-07-05 PROCEDURE — 85025 COMPLETE CBC W/AUTO DIFF WBC: CPT | Performed by: FAMILY MEDICINE

## 2023-07-05 PROCEDURE — 99204 PR OFFICE/OUTPT VISIT, NEW, LEVL IV, 45-59 MIN: ICD-10-PCS | Mod: S$GLB,,, | Performed by: INTERNAL MEDICINE

## 2023-07-05 PROCEDURE — 3044F HG A1C LEVEL LT 7.0%: CPT | Mod: CPTII,S$GLB,, | Performed by: INTERNAL MEDICINE

## 2023-07-05 PROCEDURE — 83970 ASSAY OF PARATHORMONE: CPT | Performed by: FAMILY MEDICINE

## 2023-07-05 PROCEDURE — 3008F PR BODY MASS INDEX (BMI) DOCUMENTED: ICD-10-PCS | Mod: CPTII,S$GLB,, | Performed by: INTERNAL MEDICINE

## 2023-07-05 PROCEDURE — 36415 COLL VENOUS BLD VENIPUNCTURE: CPT | Performed by: FAMILY MEDICINE

## 2023-07-05 PROCEDURE — 3074F SYST BP LT 130 MM HG: CPT | Mod: CPTII,S$GLB,, | Performed by: INTERNAL MEDICINE

## 2023-07-05 PROCEDURE — 3078F DIAST BP <80 MM HG: CPT | Mod: CPTII,S$GLB,, | Performed by: INTERNAL MEDICINE

## 2023-07-05 PROCEDURE — 1111F DSCHRG MED/CURRENT MED MERGE: CPT | Mod: CPTII,S$GLB,, | Performed by: INTERNAL MEDICINE

## 2023-07-05 PROCEDURE — 1159F MED LIST DOCD IN RCRD: CPT | Mod: CPTII,S$GLB,, | Performed by: INTERNAL MEDICINE

## 2023-07-05 PROCEDURE — 3078F PR MOST RECENT DIASTOLIC BLOOD PRESSURE < 80 MM HG: ICD-10-PCS | Mod: CPTII,S$GLB,, | Performed by: INTERNAL MEDICINE

## 2023-07-05 PROCEDURE — 99999 PR PBB SHADOW E&M-EST. PATIENT-LVL V: CPT | Mod: PBBFAC,,, | Performed by: INTERNAL MEDICINE

## 2023-07-05 PROCEDURE — 1160F RVW MEDS BY RX/DR IN RCRD: CPT | Mod: CPTII,S$GLB,, | Performed by: INTERNAL MEDICINE

## 2023-07-05 PROCEDURE — 3044F PR MOST RECENT HEMOGLOBIN A1C LEVEL <7.0%: ICD-10-PCS | Mod: CPTII,S$GLB,, | Performed by: INTERNAL MEDICINE

## 2023-07-05 PROCEDURE — 1160F PR REVIEW ALL MEDS BY PRESCRIBER/CLIN PHARMACIST DOCUMENTED: ICD-10-PCS | Mod: CPTII,S$GLB,, | Performed by: INTERNAL MEDICINE

## 2023-07-05 PROCEDURE — 1159F PR MEDICATION LIST DOCUMENTED IN MEDICAL RECORD: ICD-10-PCS | Mod: CPTII,S$GLB,, | Performed by: INTERNAL MEDICINE

## 2023-07-05 PROCEDURE — 4010F ACE/ARB THERAPY RXD/TAKEN: CPT | Mod: CPTII,S$GLB,, | Performed by: INTERNAL MEDICINE

## 2023-07-05 PROCEDURE — 82306 VITAMIN D 25 HYDROXY: CPT | Performed by: FAMILY MEDICINE

## 2023-07-05 RX ORDER — GLIMEPIRIDE 4 MG/1
4 TABLET ORAL
COMMUNITY
End: 2023-09-26

## 2023-07-05 NOTE — PROGRESS NOTES
[facility]   New Visit Progress Note   Recent Hospital Discharge     PRESENTING HISTORY     Chief Complaint/Reason for Admission:  Follow up Hospital Discharge   PCP: Rocco Cavazos DO    History of Present Illness:  Mr. Partha Curtis Jr. is a 63 y.o. male who was recently admitted to the hospital.    [facility] Medicine  Discharge Summary        Patient Name: Partha Curtis Jr.  MRN: [id number]  WENDY: [id number]  Patient Class: IP- Inpatient  Admission Date: 6/27/2023  Hospital Length of Stay: 2 days  Discharge Date and Time: 6/29/2023  3:20 PM  Attending Physician: Yanique att. providers found   Discharging Provider: Wandy Benavidez MD  Primary Care Provider: Rocco Cavazos DO  ___________________________________________________________________    Today:  [facility] follow up.  Recently hospitalized for management of N/V with HANANE, hypercalcemia and hypokalemia.  Patient on Ozempic as outpatient with recent increase 0.5 -> 1 mg.  N/V symptoms began ~ 24 hours after new dose was administered.   Admitted to [facility] Medicine service.  IV fluids administered with improvement in renal function and electrolyte abnormalities.  Parathyroid hormone level low at 7.1.  Patient on Vit D supplements as part of outpatient regimen and this was discontinued.  Also advised to hold Ozempic due to NV and glimepiride due to hypoglycemic episodes while hospitalized.   Patient discharged to home.     Patient unaccompanied today.  Ambulatory and independent with ADL's.  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  CBG 95- 130 OFF all diabetic meds.  Repeat labs drawn this AM with elevated .9 in setting of normal calcium, normal Vit D, and normal albumin levels.   Renal function remains impaired but near baseline.     Review of Systems  General ROS: negative for chills, fever or weight loss  Psychological ROS: negative for hallucination, depression or  suicidal ideation  Ophthalmic ROS: negative for blurry vision, photophobia or eye pain  ENT ROS: negative for epistaxis, sore throat or rhinorrhea  Respiratory ROS: no cough, shortness of breath, or wheezing  Cardiovascular ROS: no chest pain or dyspnea on exertion  Gastrointestinal ROS: no abdominal pain, change in bowel habits, or black/ bloody stools  Genito-Urinary ROS: no dysuria, trouble voiding, or hematuria  Musculoskeletal ROS: negative for gait disturbance or muscular weakness  Neurological ROS: no syncope or seizures; no ataxia  Dermatological ROS: negative for pruritis, rash and jaundice      PAST HISTORY:     Past Medical History:   Diagnosis Date    Allergy     Carotid artery occlusion     Chronic back pain     Colonic polyp     Genetic testing     MUTYH mutation-negative    Hyperlipidemia     Hypertension     Sleep apnea     Type 2 diabetes mellitus with stage 3 chronic kidney disease, without long-term current use of insulin 4/2/2020       Past Surgical History:   Procedure Laterality Date    ANKLE SURGERY Left     2    APPENDECTOMY      at age 20    CAROTID ENDARTERECTOMY Right 07/15/2015    CARPAL TUNNEL RELEASE Bilateral     COLONOSCOPY N/A 12/14/2018    Procedure: COLONOSCOPYSuprep;  Surgeon: Silver Selby MD;  Location: [facility];  Service: Endoscopy;  Laterality: N/A;    JOINT REPLACEMENT  9/2010    NASAL SEPTUM SURGERY      TOTAL KNEE ARTHROPLASTY Right     6 knee surgeries       Family History   Problem Relation Age of Onset    Hypertension Father     Lung cancer Father         x2 (PAUL initially- treated surgically only, then recurred distantly) (smoker, & had been exposed to many chemicals)    Cancer Father     Brain cancer Mother 67    Cancer Mother     Breast cancer Sister 58    Genetic Disorder Sister         monoallelic MUTYH mutation    Cancer Maternal Grandfather     Brain cancer Paternal Grandfather 73    Aneurysm Other 48        brain    Breast cancer Maternal Cousin 57     "Ulcerative colitis Other          MEDICATIONS & ALLERGIES:     Current Outpatient Medications on File Prior to Visit   Medication Sig Dispense Refill    amitriptyline (ELAVIL) 25 MG tablet Take 75 mg by mouth nightly as needed for Insomnia.      aspirin (ECOTRIN) 81 MG EC tablet Take 81 mg by mouth once daily.      azelastine (ASTELIN) 137 mcg (0.1 %) nasal spray 1 spray (137 mcg total) by Nasal route 2 (two) times daily. (Patient taking differently: 1 spray by Nasal route 2 (two) times daily as needed.) 30 mL 11    BD LUER-RUSS SYRINGE 3 mL 25 gauge x 1" Syrg USE ONE SYRINGE EVERY 14 DAYS WITH TESTOSTERONE      candesartan (ATACAND) 32 MG tablet Take 1 tablet (32 mg total) by mouth once daily. 90 tablet 0    chlorthalidone (HYGROTEN) 25 MG Tab Take 1 tablet (25 mg total) by mouth once daily. 90 tablet 4    cyanocobalamin (VITAMIN B-12) 1000 MCG tablet Take 1 tablet (1,000 mcg total) by mouth once daily. 90 tablet 5    gabapentin (NEURONTIN) 800 MG tablet Take 0.5 tablets (400 mg total) by mouth every evening. 90 tablet 0    ipratropium (ATROVENT) 42 mcg (0.06 %) nasal spray 2 sprays by Nasal route 2 (two) times daily as needed for Rhinitis. 15 mL 0    metoprolol succinate (TOPROL-XL) 200 MG 24 hr tablet Take 1 tablet by mouth once daily (Patient taking differently: Take 200 mg by mouth once daily.) 90 tablet 0    ondansetron (ZOFRAN) 4 MG tablet Take 1 tablet (4 mg total) by mouth every 8 (eight) hours as needed for Nausea. 16 tablet 0    oxyCODONE-acetaminophen (PERCOCET) 5-325 mg per tablet Take 1 tablet by mouth 2 (two) times daily as needed.      potassium chloride SA (K-DUR,KLOR-CON) 20 MEQ tablet Take 1 tablet (20 mEq total) by mouth once daily. 7 tablet 0    rosuvastatin (CRESTOR) 40 MG Tab Take 1 tablet (40 mg total) by mouth every evening. 90 tablet 3    semaglutide (OZEMPIC) 1 mg/dose (4 mg/3 mL) Inject 1 mg into the skin every 7 days. (Patient taking differently: Inject 1 mg into the skin every Thursday.) " "9 mL 1    syringe with needle, safety (BD INTEGRA SYRINGE) 3 mL 22 gauge x 1 1/2" Syrg Inject 1 Syringe as directed once a week. 6 each 3    testosterone cypionate (DEPOTESTOTERONE CYPIONATE) 200 mg/mL injection Inject 1 mL (200 mg total) into the muscle every 14 (fourteen) days. 6 mL 1    valACYclovir (VALTREX) 1000 MG tablet TAKE 2 TABLETS BY MOUTH EVERY 12 HOURS (Patient taking differently: Take 2,000 mg by mouth every 12 (twelve) hours as needed.) 4 tablet 3    VAQTA, PF, 50 unit/mL vaccine        No current facility-administered medications on file prior to visit.        Review of patient's allergies indicates:   Allergen Reactions    Stadol [butorphanol tartrate] Rash     Swelling in face    Strawberries [strawberry] Rash       OBJECTIVE:     Vital Signs:  /72 (BP Location: Right arm, Patient Position: Sitting, BP Method: Medium (Automatic))   Pulse 73   Temp 97.8 °F (36.6 °C) (Oral)   Wt 89.7 kg (197 lb 12 oz)   SpO2 95%   BMI 27.58 kg/m²   Wt Readings from Last 3 Encounters:   07/05/23 1509 89.7 kg (197 lb 12 oz)   06/27/23 1820 91 kg (200 lb 9.9 oz)   06/27/23 1117 84.4 kg (186 lb)   06/27/23 0921 84.4 kg (186 lb)     Body mass index is 27.58 kg/m².        Physical Exam:  /72 (BP Location: Right arm, Patient Position: Sitting, BP Method: Medium (Automatic))   Pulse 73   Temp 97.8 °F (36.6 °C) (Oral)   Wt 89.7 kg (197 lb 12 oz)   SpO2 95%   BMI 27.58 kg/m²   General appearance: alert, cooperative, no distress  Constitutional:Oriented to person, place, and time  + appears well-developed and well-nourished.   HEENT: Normocephalic, atraumatic, neck symmetrical, no nasal discharge   Eyes: conjunctivae/corneas clear, PERRL, EOM's intact  Lungs: clear to auscultation bilaterally, no dullness to percussion bilaterally  Heart: regular rate and rhythm without rub; no displacement of the PMI   Abdomen: soft, non-tender; bowel sounds normoactive; no organomegaly  Extremities: extremities " symmetric; no clubbing, cyanosis, or edema  Integument: Skin color, texture, turgor normal; no rashes; hair distrubution normal  Neurologic: Alert and oriented X 3, normal strength, normal coordination and gait  Psychiatric: no pressured speech; normal affect; no evidence of impaired cognition     Laboratory  Lab Results   Component Value Date    WBC 7.40 07/05/2023    HGB 17.4 07/05/2023    HCT 53.1 07/05/2023    MCV 91 07/05/2023     07/05/2023     BMP  Lab Results   Component Value Date     07/05/2023     07/05/2023     07/05/2023    K 4.3 07/05/2023    K 4.3 07/05/2023    K 4.3 07/05/2023     07/05/2023     07/05/2023     07/05/2023    CO2 25 07/05/2023    CO2 25 07/05/2023    CO2 25 07/05/2023    BUN 17 07/05/2023    BUN 17 07/05/2023    BUN 17 07/05/2023    CREATININE 1.8 (H) 07/05/2023    CREATININE 1.8 (H) 07/05/2023    CREATININE 1.8 (H) 07/05/2023    CALCIUM 9.4 07/05/2023    CALCIUM 9.4 07/05/2023    CALCIUM 9.4 07/05/2023    ANIONGAP 12 07/05/2023    ANIONGAP 12 07/05/2023    ANIONGAP 12 07/05/2023    EGFRNORACEVR 42 (A) 07/05/2023    EGFRNORACEVR 42 (A) 07/05/2023    EGFRNORACEVR 42 (A) 07/05/2023     Lab Results   Component Value Date    ALT 39 07/05/2023    ALT 39 07/05/2023    AST 30 07/05/2023    AST 30 07/05/2023    ALKPHOS 63 07/05/2023    ALKPHOS 63 07/05/2023    BILITOT 0.5 07/05/2023    BILITOT 0.5 07/05/2023     Lab Results   Component Value Date    INR 1.1 03/05/2014    INR 1.0 04/13/2004     Lab Results   Component Value Date    HGBA1C 6.6 (H) 07/05/2023       ASSESSMENT & PLAN:       Nausea and vomiting, unspecified vomiting type  - etiology unclear  - symptoms began ~ 24 hours after administering increased dose Ozempic when he titrated 0.5 mg to 1 mg  - Ozempic side effect? vs viral gastroenteritis vs food poisoning (last thing he ate prior to symptom onset was [employer] egg mc muffin)   - symptoms were self limited and have resolved     HANANE  (acute kidney injury)  - occurred in setting of N/V, GI fluid loss   - repeat labs today with return to baseline renal function     Hypercalcemia  Hypokalemia  - electrolyte abnormalities noted at hospital presentation  - resolved with IV fluids and supportive care     Type 2 diabetes mellitus with stage 3a chronic kidney disease, without long-term current use of insulin  - holding Ozempic since hospital discharge due to NV  - holding glimepiride since hospital discharge due to hypoglycemia while hospitalized  - CBG  while OFF all diabetic meds per patient report   - will defer to PCP to restart diabetic regimen     High serum parathyroid hormone (PTH)  - PTH low while hospitalized and patient advised to discontinue Vit D supplements  - repeat PTH this AM elevated 128.9 in setting of normal calcium, normal Vit D, and normal albumin levels  - defer to PCP    Patient will be released from hospital follow up clinic.  He will see his PCP, Dr Cavazos, 7/14/23.       Instructions for the patient:      Scheduled Follow-up :  Future Appointments   Date Time Provider Department Center   7/5/2023  3:00 PM [facility]   7/14/2023 10:40 AM Rocco Cavazos DO [facility]       Post Visit Medication List:     Medication List            Accurate as of July 5, 2023  4:42 PM. If you have any questions, ask your nurse or doctor.                CHANGE how you take these medications      azelastine 137 mcg (0.1 %) nasal spray  Commonly known as: ASTELIN  1 spray (137 mcg total) by Nasal route 2 (two) times daily.  What changed:   when to take this  reasons to take this     metoprolol succinate 200 MG 24 hr tablet  Commonly known as: TOPROL-XL  Take 1 tablet by mouth once daily  What changed: when to take this     valACYclovir 1000 MG tablet  Commonly known as: VALTREX  TAKE 2 TABLETS BY MOUTH EVERY 12 HOURS  What changed:   when to take this  reasons to take this            CONTINUE  "taking these medications      amitriptyline 25 MG tablet  Commonly known as: ELAVIL     aspirin 81 MG EC tablet  Commonly known as: ECOTRIN     BD INTEGRA SYRINGE 3 mL 22 gauge x 1 1/2" Syrg  Generic drug: syringe with needle, safety  Inject 1 Syringe as directed once a week.     BD LUER-RUSS SYRINGE 3 mL 25 gauge x 1" Syrg  Generic drug: syringe with needle     candesartan 32 MG tablet  Commonly known as: ATACAND  Take 1 tablet (32 mg total) by mouth once daily.     chlorthalidone 25 MG Tab  Commonly known as: HYGROTEN  Take 1 tablet (25 mg total) by mouth once daily.     cyanocobalamin 1000 MCG tablet  Commonly known as: VITAMIN B-12  Take 1 tablet (1,000 mcg total) by mouth once daily.     gabapentin 800 MG tablet  Commonly known as: NEURONTIN  Take 0.5 tablets (400 mg total) by mouth every evening.     glimepiride 4 MG tablet  Commonly known as: AMARYL     ipratropium 42 mcg (0.06 %) nasal spray  Commonly known as: ATROVENT  2 sprays by Nasal route 2 (two) times daily as needed for Rhinitis.     ondansetron 4 MG tablet  Commonly known as: ZOFRAN  Take 1 tablet (4 mg total) by mouth every 8 (eight) hours as needed for Nausea.     oxyCODONE-acetaminophen 5-325 mg per tablet  Commonly known as: PERCOCET     OZEMPIC 1 mg/dose (4 mg/3 mL)  Generic drug: semaglutide  Inject 1 mg into the skin every 7 days.     potassium chloride SA 20 MEQ tablet  Commonly known as: K-DUR,KLOR-CON  Take 1 tablet (20 mEq total) by mouth once daily.     rosuvastatin 40 MG Tab  Commonly known as: CRESTOR  Take 1 tablet (40 mg total) by mouth every evening.     testosterone cypionate 200 mg/mL injection  Commonly known as: DEPOTESTOTERONE CYPIONATE  Inject 1 mL (200 mg total) into the muscle every 14 (fourteen) days.     VAQTA (PF) 50 unit/mL vaccine  Generic drug: hepatitis A vaccine syringe (PF)              Signing Physician:  Skylar Pelletier MD    "

## 2023-07-14 ENCOUNTER — OFFICE VISIT (OUTPATIENT)
Dept: FAMILY MEDICINE | Facility: CLINIC | Age: 63
End: 2023-07-14
Payer: COMMERCIAL

## 2023-07-14 VITALS
WEIGHT: 183 LBS | HEART RATE: 67 BPM | BODY MASS INDEX: 25.62 KG/M2 | HEIGHT: 71 IN | SYSTOLIC BLOOD PRESSURE: 108 MMHG | OXYGEN SATURATION: 97 % | TEMPERATURE: 98 F | DIASTOLIC BLOOD PRESSURE: 74 MMHG

## 2023-07-14 DIAGNOSIS — E11.22 TYPE 2 DIABETES MELLITUS WITH STAGE 3A CHRONIC KIDNEY DISEASE, WITHOUT LONG-TERM CURRENT USE OF INSULIN: ICD-10-CM

## 2023-07-14 DIAGNOSIS — R79.89 HIGH SERUM PARATHYROID HORMONE (PTH): ICD-10-CM

## 2023-07-14 DIAGNOSIS — Z00.00 VISIT FOR WELL MAN HEALTH CHECK: Primary | ICD-10-CM

## 2023-07-14 DIAGNOSIS — I10 ESSENTIAL HYPERTENSION: ICD-10-CM

## 2023-07-14 DIAGNOSIS — M54.12 CERVICAL RADICULOPATHY: ICD-10-CM

## 2023-07-14 DIAGNOSIS — N17.9 AKI (ACUTE KIDNEY INJURY): ICD-10-CM

## 2023-07-14 DIAGNOSIS — E78.5 DYSLIPIDEMIA: Chronic | ICD-10-CM

## 2023-07-14 DIAGNOSIS — N18.31 TYPE 2 DIABETES MELLITUS WITH STAGE 3A CHRONIC KIDNEY DISEASE, WITHOUT LONG-TERM CURRENT USE OF INSULIN: ICD-10-CM

## 2023-07-14 PROCEDURE — 99999 PR PBB SHADOW E&M-EST. PATIENT-LVL V: ICD-10-PCS | Mod: PBBFAC,,, | Performed by: FAMILY MEDICINE

## 2023-07-14 PROCEDURE — 3008F BODY MASS INDEX DOCD: CPT | Mod: CPTII,S$GLB,, | Performed by: FAMILY MEDICINE

## 2023-07-14 PROCEDURE — 1159F MED LIST DOCD IN RCRD: CPT | Mod: CPTII,S$GLB,, | Performed by: FAMILY MEDICINE

## 2023-07-14 PROCEDURE — 1160F PR REVIEW ALL MEDS BY PRESCRIBER/CLIN PHARMACIST DOCUMENTED: ICD-10-PCS | Mod: CPTII,S$GLB,, | Performed by: FAMILY MEDICINE

## 2023-07-14 PROCEDURE — 1111F DSCHRG MED/CURRENT MED MERGE: CPT | Mod: CPTII,S$GLB,, | Performed by: FAMILY MEDICINE

## 2023-07-14 PROCEDURE — 3078F PR MOST RECENT DIASTOLIC BLOOD PRESSURE < 80 MM HG: ICD-10-PCS | Mod: CPTII,S$GLB,, | Performed by: FAMILY MEDICINE

## 2023-07-14 PROCEDURE — 3044F PR MOST RECENT HEMOGLOBIN A1C LEVEL <7.0%: ICD-10-PCS | Mod: CPTII,S$GLB,, | Performed by: FAMILY MEDICINE

## 2023-07-14 PROCEDURE — 3074F PR MOST RECENT SYSTOLIC BLOOD PRESSURE < 130 MM HG: ICD-10-PCS | Mod: CPTII,S$GLB,, | Performed by: FAMILY MEDICINE

## 2023-07-14 PROCEDURE — 3078F DIAST BP <80 MM HG: CPT | Mod: CPTII,S$GLB,, | Performed by: FAMILY MEDICINE

## 2023-07-14 PROCEDURE — 4010F ACE/ARB THERAPY RXD/TAKEN: CPT | Mod: CPTII,S$GLB,, | Performed by: FAMILY MEDICINE

## 2023-07-14 PROCEDURE — 99396 PREV VISIT EST AGE 40-64: CPT | Mod: S$GLB,,, | Performed by: FAMILY MEDICINE

## 2023-07-14 PROCEDURE — 1111F PR DISCHARGE MEDS RECONCILED W/ CURRENT OUTPATIENT MED LIST: ICD-10-PCS | Mod: CPTII,S$GLB,, | Performed by: FAMILY MEDICINE

## 2023-07-14 PROCEDURE — 4010F PR ACE/ARB THEARPY RXD/TAKEN: ICD-10-PCS | Mod: CPTII,S$GLB,, | Performed by: FAMILY MEDICINE

## 2023-07-14 PROCEDURE — 3008F PR BODY MASS INDEX (BMI) DOCUMENTED: ICD-10-PCS | Mod: CPTII,S$GLB,, | Performed by: FAMILY MEDICINE

## 2023-07-14 PROCEDURE — 3074F SYST BP LT 130 MM HG: CPT | Mod: CPTII,S$GLB,, | Performed by: FAMILY MEDICINE

## 2023-07-14 PROCEDURE — 99999 PR PBB SHADOW E&M-EST. PATIENT-LVL V: CPT | Mod: PBBFAC,,, | Performed by: FAMILY MEDICINE

## 2023-07-14 PROCEDURE — 99396 PR PREVENTIVE VISIT,EST,40-64: ICD-10-PCS | Mod: S$GLB,,, | Performed by: FAMILY MEDICINE

## 2023-07-14 PROCEDURE — 1159F PR MEDICATION LIST DOCUMENTED IN MEDICAL RECORD: ICD-10-PCS | Mod: CPTII,S$GLB,, | Performed by: FAMILY MEDICINE

## 2023-07-14 PROCEDURE — 3044F HG A1C LEVEL LT 7.0%: CPT | Mod: CPTII,S$GLB,, | Performed by: FAMILY MEDICINE

## 2023-07-14 PROCEDURE — 1160F RVW MEDS BY RX/DR IN RCRD: CPT | Mod: CPTII,S$GLB,, | Performed by: FAMILY MEDICINE

## 2023-07-14 RX ORDER — AMLODIPINE BESYLATE 2.5 MG/1
2.5 TABLET ORAL DAILY
Qty: 30 TABLET | Refills: 0 | Status: SHIPPED | OUTPATIENT
Start: 2023-07-14 | End: 2023-08-15 | Stop reason: SDUPTHER

## 2023-07-14 RX ORDER — METOPROLOL SUCCINATE 200 MG/1
200 TABLET, EXTENDED RELEASE ORAL DAILY
Qty: 90 TABLET | Refills: 0 | Status: SHIPPED | OUTPATIENT
Start: 2023-07-14 | End: 2023-11-24

## 2023-07-14 RX ORDER — ROSUVASTATIN CALCIUM 40 MG/1
40 TABLET, COATED ORAL NIGHTLY
Qty: 90 TABLET | Refills: 3 | Status: SHIPPED | OUTPATIENT
Start: 2023-07-14 | End: 2024-02-15 | Stop reason: SDUPTHER

## 2023-07-14 RX ORDER — CANDESARTAN 32 MG/1
32 TABLET ORAL DAILY
Qty: 90 TABLET | Refills: 0 | Status: SHIPPED | OUTPATIENT
Start: 2023-07-14 | End: 2023-11-24

## 2023-07-14 RX ORDER — METFORMIN HYDROCHLORIDE 500 MG/1
1000 TABLET, EXTENDED RELEASE ORAL DAILY
Qty: 180 TABLET | Refills: 1 | Status: SHIPPED | OUTPATIENT
Start: 2023-07-14 | End: 2024-02-15

## 2023-07-14 NOTE — PROGRESS NOTES
Subjective:       Patient ID: Partha Curtis Jr. is a 63 y.o. male.    Chief Complaint: Diabetes    Partha Curtis Jr. is a 63 y.o. male who presents today for an annual exam     Labs: ordered, reviewed today.      C-scope: Last Colonoscopy completed on 12/14/2018      HTN: has multiple issues with meds. In the past: had been taking metoprolol, amlodipine, chlorthalidone, and candesartan.   Had second episode of hospitalization/Er visit due to HANANE. Had episode June 2022 and June 2023 again.   See EMR for full details and discussions.     DM: was on ozempic. Did have abdominal pain/hospitalization, but I am unsure if this was related to increase vs viral infection superimposed with hanane worsened by diuretic and decreased appetite from ozempic  CKD: slightly worse.   DLD: on crestor. LDL at goal. HDL chronically low.   Anemia: had been thought to be due to CKD, iron studies low. Colonoscopy was normal in 2018. However, found out that patient was donating blood every 12 weeks. No black stools, no red stools. Anemia has resolved. Iron sat and ferritin is normal, was taking iron daily. Stopped at last visit in early 2021.      Has been on elavil for many years.      PMHx: reviewed in EMR and updated  Meds: reviewed in EMR and updated  Shx: reviewed in EMR and updated  FMHx:  reviewed in EMR and updated  Social:  reviewed in EMR and updated    Review of Systems   Constitutional:  Positive for fatigue. Negative for chills and fever.   Respiratory:  Negative for shortness of breath.    Cardiovascular:  Negative for chest pain.   Gastrointestinal:  Negative for nausea and vomiting.   Neurological:  Negative for dizziness, light-headedness and headaches.       Results for orders placed or performed in visit on 07/05/23   Comprehensive Metabolic Panel   Result Value Ref Range    Sodium 140 136 - 145 mmol/L    Potassium 4.3 3.5 - 5.1 mmol/L    Chloride 103 95 - 110 mmol/L    CO2 25 23 - 29 mmol/L    Glucose 131 (H) 70 - 110  mg/dL    BUN 17 8 - 23 mg/dL    Creatinine 1.8 (H) 0.5 - 1.4 mg/dL    Calcium 9.4 8.7 - 10.5 mg/dL    Total Protein 7.4 6.0 - 8.4 g/dL    Albumin 4.1 3.5 - 5.2 g/dL    Total Bilirubin 0.5 0.1 - 1.0 mg/dL    Alkaline Phosphatase 63 55 - 135 U/L    AST 30 10 - 40 U/L    ALT 39 10 - 44 U/L    eGFR 42 (A) >60 mL/min/1.73 m^2    Anion Gap 12 8 - 16 mmol/L   Vitamin D   Result Value Ref Range    Vit D, 25-Hydroxy 53 30 - 96 ng/mL   PTH, intact   Result Value Ref Range    PTH, Intact 128.9 (H) 9.0 - 77.0 pg/mL   CBC Auto Differential   Result Value Ref Range    WBC 7.40 3.90 - 12.70 K/uL    RBC 5.83 4.60 - 6.20 M/uL    Hemoglobin 17.4 14.0 - 18.0 g/dL    Hematocrit 53.1 40.0 - 54.0 %    MCV 91 82 - 98 fL    MCH 29.8 27.0 - 31.0 pg    MCHC 32.8 32.0 - 36.0 g/dL    RDW 17.6 (H) 11.5 - 14.5 %    Platelets 257 150 - 450 K/uL    MPV 9.5 9.2 - 12.9 fL    Immature Granulocytes 0.3 0.0 - 0.5 %    Gran # (ANC) 3.4 1.8 - 7.7 K/uL    Immature Grans (Abs) 0.02 0.00 - 0.04 K/uL    Lymph # 3.0 1.0 - 4.8 K/uL    Mono # 0.8 0.3 - 1.0 K/uL    Eos # 0.2 0.0 - 0.5 K/uL    Baso # 0.03 0.00 - 0.20 K/uL    nRBC 0 0 /100 WBC    Gran % 45.5 38.0 - 73.0 %    Lymph % 40.5 18.0 - 48.0 %    Mono % 10.1 4.0 - 15.0 %    Eosinophil % 3.2 0.0 - 8.0 %    Basophil % 0.4 0.0 - 1.9 %    Differential Method Automated    Comprehensive Metabolic Panel   Result Value Ref Range    Sodium 140 136 - 145 mmol/L    Potassium 4.3 3.5 - 5.1 mmol/L    Chloride 103 95 - 110 mmol/L    CO2 25 23 - 29 mmol/L    Glucose 131 (H) 70 - 110 mg/dL    BUN 17 8 - 23 mg/dL    Creatinine 1.8 (H) 0.5 - 1.4 mg/dL    Calcium 9.4 8.7 - 10.5 mg/dL    Total Protein 7.4 6.0 - 8.4 g/dL    Albumin 4.1 3.5 - 5.2 g/dL    Total Bilirubin 0.5 0.1 - 1.0 mg/dL    Alkaline Phosphatase 63 55 - 135 U/L    AST 30 10 - 40 U/L    ALT 39 10 - 44 U/L    eGFR 42 (A) >60 mL/min/1.73 m^2    Anion Gap 12 8 - 16 mmol/L   Hemoglobin A1C   Result Value Ref Range    Hemoglobin A1C 6.6 (H) 4.0 - 5.6 %    Estimated  "Avg Glucose 143 (H) 68 - 131 mg/dL   Lipid Panel   Result Value Ref Range    Cholesterol 124 120 - 199 mg/dL    Triglycerides 205 (H) 30 - 150 mg/dL    HDL 27 (L) 40 - 75 mg/dL    LDL Cholesterol 56.0 (L) 63.0 - 159.0 mg/dL    HDL/Cholesterol Ratio 21.8 20.0 - 50.0 %    Total Cholesterol/HDL Ratio 4.6 2.0 - 5.0    Non-HDL Cholesterol 97 mg/dL   BASIC METABOLIC PANEL   Result Value Ref Range    Sodium 140 136 - 145 mmol/L    Potassium 4.3 3.5 - 5.1 mmol/L    Chloride 103 95 - 110 mmol/L    CO2 25 23 - 29 mmol/L    Glucose 131 (H) 70 - 110 mg/dL    BUN 17 8 - 23 mg/dL    Creatinine 1.8 (H) 0.5 - 1.4 mg/dL    Calcium 9.4 8.7 - 10.5 mg/dL    Anion Gap 12 8 - 16 mmol/L    eGFR 42 (A) >60 mL/min/1.73 m^2       Objective:     Vitals:    07/14/23 1021   BP: 108/74   BP Location: Right arm   Patient Position: Sitting   BP Method: Large (Manual)   Pulse: 67   Temp: 98.4 °F (36.9 °C)   TempSrc: Oral   SpO2: 97%   Weight: 83 kg (182 lb 15.7 oz)   Height: 5' 11" (1.803 m)        Physical Exam  Constitutional:       General: He is not in acute distress.     Appearance: He is not ill-appearing, toxic-appearing or diaphoretic.   Cardiovascular:      Rate and Rhythm: Normal rate and regular rhythm.   Pulmonary:      Effort: Pulmonary effort is normal.      Breath sounds: Normal breath sounds.   Abdominal:      Palpations: Abdomen is soft.      Tenderness: There is no abdominal tenderness.   Musculoskeletal:         General: No swelling.   Neurological:      General: No focal deficit present.      Mental Status: He is alert.   Psychiatric:         Mood and Affect: Mood normal.         Behavior: Behavior normal.         Thought Content: Thought content normal.         Judgment: Judgment normal.       Assessment:       1. Visit for well man health check    2. Dyslipidemia    3. Essential hypertension    4. Cervical radiculopathy    5. Type 2 diabetes mellitus with stage 3a chronic kidney disease, without long-term current use of " insulin    6. High serum parathyroid hormone (PTH)    7. HANANE (acute kidney injury)        Plan:       Stop chlorthalidone  Will try my utmost to not restart diuretic  Continue candesartan  Continue metoprolol  Start amlodipine but at 2.5 mg   Wear compression socks    For now, restart metformin 2 pills/day.   If sugars >140 fasting, restart amaryl 2 mg (1/2 pill with a meal)  If sugars still high, restart full dose (4 mg)  Will defer ozempic for now  Can consider again in the future  Had no issues with 0.5 mg.     Continue crestor    Avoid naproxen, aleve, ibuprofen, mobic, etc.     Very close monitoring of BP and blood sugar    Weight loss maintained in a HEALTHY manner    F/u 3 months.     Visit for well man health check    Dyslipidemia  -     rosuvastatin (CRESTOR) 40 MG Tab; Take 1 tablet (40 mg total) by mouth every evening.  Dispense: 90 tablet; Refill: 3    Essential hypertension  -     candesartan (ATACAND) 32 MG tablet; Take 1 tablet (32 mg total) by mouth once daily.  Dispense: 90 tablet; Refill: 0  -     metoprolol succinate (TOPROL-XL) 200 MG 24 hr tablet; Take 1 tablet (200 mg total) by mouth once daily.  Dispense: 90 tablet; Refill: 0  -     amLODIPine (NORVASC) 2.5 MG tablet; Take 1 tablet (2.5 mg total) by mouth once daily.  Dispense: 30 tablet; Refill: 0  -     Comprehensive Metabolic Panel; Future; Expected date: 07/14/2023    Cervical radiculopathy    Type 2 diabetes mellitus with stage 3a chronic kidney disease, without long-term current use of insulin  -     metFORMIN (GLUCOPHAGE-XR) 500 MG ER 24hr tablet; Take 2 tablets (1,000 mg total) by mouth once daily.  Dispense: 180 tablet; Refill: 1  -     CBC Auto Differential; Future; Expected date: 07/14/2023  -     Comprehensive Metabolic Panel; Future; Expected date: 07/14/2023  -     Hemoglobin A1C; Future; Expected date: 07/14/2023    High serum parathyroid hormone (PTH)  -     PTH, Intact; Future; Expected date: 07/14/2023    HANANE (acute kidney  injury)  -     Comprehensive Metabolic Panel; Future; Expected date: 07/14/2023        Warning signs discussed, patient to call with any further issues or worsening of symptoms. Above note may have utilized dictation, please excuse any transcription errors.

## 2023-07-14 NOTE — PATIENT INSTRUCTIONS
Stop chlorthalidone  Will try my utmost to not restart diuretic  Continue candesartan  Continue metoprolol  Start amlodipine but at 2.5 mg   Wear compression socks    For now, restart metformin 2 pills/day.   If sugars >140 fasting, restart amaryl 2 mg (1/2 pill with a meal)  If sugars still high, restart full dose (4 mg)  Will defer ozempic for now  Can consider again in the future  Had no issues with 0.5 mg.     Continue crestor    Avoid naproxen, aleve, ibuprofen, mobic, etc.     Very close monitoring of BP and blood sugar    Weight loss maintained in a HEALTHY manner    F/u 3 months.     Lab Results   Component Value Date    HGBA1C 6.6 (H) 07/05/2023    HGBA1C 7.0 (H) 01/09/2023    HGBA1C 6.0 (H) 09/26/2022       Lab Results   Component Value Date    LDLCALC 56.0 (L) 07/05/2023    LDLCALC 30.0 (L) 06/21/2022    LDLCALC 54.2 (L) 05/05/2021

## 2023-07-17 ENCOUNTER — PATIENT MESSAGE (OUTPATIENT)
Dept: FAMILY MEDICINE | Facility: CLINIC | Age: 63
End: 2023-07-17
Payer: COMMERCIAL

## 2023-07-17 DIAGNOSIS — M54.12 CERVICAL RADICULOPATHY: ICD-10-CM

## 2023-07-17 RX ORDER — GABAPENTIN 800 MG/1
400 TABLET ORAL NIGHTLY
Qty: 90 TABLET | Refills: 1 | Status: SHIPPED | OUTPATIENT
Start: 2023-07-17 | End: 2024-02-15 | Stop reason: SDUPTHER

## 2023-07-20 ENCOUNTER — PATIENT MESSAGE (OUTPATIENT)
Dept: FAMILY MEDICINE | Facility: CLINIC | Age: 63
End: 2023-07-20
Payer: COMMERCIAL

## 2023-08-15 ENCOUNTER — PATIENT MESSAGE (OUTPATIENT)
Dept: FAMILY MEDICINE | Facility: CLINIC | Age: 63
End: 2023-08-15
Payer: COMMERCIAL

## 2023-08-15 DIAGNOSIS — I10 ESSENTIAL HYPERTENSION: ICD-10-CM

## 2023-08-15 RX ORDER — AMLODIPINE BESYLATE 5 MG/1
5 TABLET ORAL DAILY
Qty: 30 TABLET | Refills: 0 | Status: SHIPPED | OUTPATIENT
Start: 2023-08-15 | End: 2023-09-26

## 2023-08-24 DIAGNOSIS — E53.8 B12 DEFICIENCY: ICD-10-CM

## 2023-08-24 DIAGNOSIS — E55.9 VITAMIN D DEFICIENCY: ICD-10-CM

## 2023-08-25 RX ORDER — LANOLIN ALCOHOL/MO/W.PET/CERES
1000 CREAM (GRAM) TOPICAL
Qty: 90 TABLET | Refills: 0 | Status: SHIPPED | OUTPATIENT
Start: 2023-08-25 | End: 2023-11-26

## 2023-08-25 RX ORDER — ERGOCALCIFEROL 1.25 MG/1
50000 CAPSULE ORAL
Qty: 12 CAPSULE | Refills: 0 | Status: SHIPPED | OUTPATIENT
Start: 2023-08-25 | End: 2024-01-10

## 2023-09-20 ENCOUNTER — PATIENT MESSAGE (OUTPATIENT)
Dept: FAMILY MEDICINE | Facility: CLINIC | Age: 63
End: 2023-09-20
Payer: COMMERCIAL

## 2023-09-20 DIAGNOSIS — I10 ESSENTIAL HYPERTENSION: Primary | ICD-10-CM

## 2023-09-20 DIAGNOSIS — Z79.890 LONG-TERM CURRENT USE OF TESTOSTERONE REPLACEMENT THERAPY: ICD-10-CM

## 2023-09-22 ENCOUNTER — TELEPHONE (OUTPATIENT)
Dept: UROLOGY | Facility: CLINIC | Age: 63
End: 2023-09-22
Payer: COMMERCIAL

## 2023-09-22 NOTE — TELEPHONE ENCOUNTER
----- Message from Joselyn Garcia sent at 9/22/2023  2:45 PM CDT -----  Good afternoon,  Do any of the providers so testosterone injections? For a patient with Low testosterone.    Thanks!

## 2023-09-25 DIAGNOSIS — I10 ESSENTIAL HYPERTENSION: ICD-10-CM

## 2023-09-25 NOTE — TELEPHONE ENCOUNTER
No care due was identified.  [facility] Embedded Care Due Messages. Reference number: [id number].   9/25/2023 9:53:11 AM CDT

## 2023-09-26 ENCOUNTER — OFFICE VISIT (OUTPATIENT)
Dept: ENDOCRINOLOGY | Facility: CLINIC | Age: 63
End: 2023-09-26
Payer: COMMERCIAL

## 2023-09-26 VITALS
BODY MASS INDEX: 27.55 KG/M2 | DIASTOLIC BLOOD PRESSURE: 81 MMHG | WEIGHT: 197.56 LBS | SYSTOLIC BLOOD PRESSURE: 144 MMHG | OXYGEN SATURATION: 96 % | HEART RATE: 77 BPM

## 2023-09-26 DIAGNOSIS — E29.1 HYPOGONADISM IN MALE: Primary | ICD-10-CM

## 2023-09-26 DIAGNOSIS — Z79.890 LONG-TERM CURRENT USE OF TESTOSTERONE REPLACEMENT THERAPY: ICD-10-CM

## 2023-09-26 PROCEDURE — 3079F PR MOST RECENT DIASTOLIC BLOOD PRESSURE 80-89 MM HG: ICD-10-PCS | Mod: CPTII,S$GLB,, | Performed by: INTERNAL MEDICINE

## 2023-09-26 PROCEDURE — 1159F MED LIST DOCD IN RCRD: CPT | Mod: CPTII,S$GLB,, | Performed by: INTERNAL MEDICINE

## 2023-09-26 PROCEDURE — 1160F RVW MEDS BY RX/DR IN RCRD: CPT | Mod: CPTII,S$GLB,, | Performed by: INTERNAL MEDICINE

## 2023-09-26 PROCEDURE — 3008F PR BODY MASS INDEX (BMI) DOCUMENTED: ICD-10-PCS | Mod: CPTII,S$GLB,, | Performed by: INTERNAL MEDICINE

## 2023-09-26 PROCEDURE — 3044F HG A1C LEVEL LT 7.0%: CPT | Mod: CPTII,S$GLB,, | Performed by: INTERNAL MEDICINE

## 2023-09-26 PROCEDURE — 1160F PR REVIEW ALL MEDS BY PRESCRIBER/CLIN PHARMACIST DOCUMENTED: ICD-10-PCS | Mod: CPTII,S$GLB,, | Performed by: INTERNAL MEDICINE

## 2023-09-26 PROCEDURE — 3079F DIAST BP 80-89 MM HG: CPT | Mod: CPTII,S$GLB,, | Performed by: INTERNAL MEDICINE

## 2023-09-26 PROCEDURE — 99204 PR OFFICE/OUTPT VISIT, NEW, LEVL IV, 45-59 MIN: ICD-10-PCS | Mod: S$GLB,,, | Performed by: INTERNAL MEDICINE

## 2023-09-26 PROCEDURE — 3077F SYST BP >= 140 MM HG: CPT | Mod: CPTII,S$GLB,, | Performed by: INTERNAL MEDICINE

## 2023-09-26 PROCEDURE — 3044F PR MOST RECENT HEMOGLOBIN A1C LEVEL <7.0%: ICD-10-PCS | Mod: CPTII,S$GLB,, | Performed by: INTERNAL MEDICINE

## 2023-09-26 PROCEDURE — 3008F BODY MASS INDEX DOCD: CPT | Mod: CPTII,S$GLB,, | Performed by: INTERNAL MEDICINE

## 2023-09-26 PROCEDURE — 99204 OFFICE O/P NEW MOD 45 MIN: CPT | Mod: S$GLB,,, | Performed by: INTERNAL MEDICINE

## 2023-09-26 PROCEDURE — 99999 PR PBB SHADOW E&M-EST. PATIENT-LVL III: CPT | Mod: PBBFAC,,, | Performed by: INTERNAL MEDICINE

## 2023-09-26 PROCEDURE — 99999 PR PBB SHADOW E&M-EST. PATIENT-LVL III: ICD-10-PCS | Mod: PBBFAC,,, | Performed by: INTERNAL MEDICINE

## 2023-09-26 PROCEDURE — 3077F PR MOST RECENT SYSTOLIC BLOOD PRESSURE >= 140 MM HG: ICD-10-PCS | Mod: CPTII,S$GLB,, | Performed by: INTERNAL MEDICINE

## 2023-09-26 PROCEDURE — 4010F ACE/ARB THERAPY RXD/TAKEN: CPT | Mod: CPTII,S$GLB,, | Performed by: INTERNAL MEDICINE

## 2023-09-26 PROCEDURE — 1159F PR MEDICATION LIST DOCUMENTED IN MEDICAL RECORD: ICD-10-PCS | Mod: CPTII,S$GLB,, | Performed by: INTERNAL MEDICINE

## 2023-09-26 PROCEDURE — 4010F PR ACE/ARB THEARPY RXD/TAKEN: ICD-10-PCS | Mod: CPTII,S$GLB,, | Performed by: INTERNAL MEDICINE

## 2023-09-26 RX ORDER — SYRINGE WITH NEEDLE, 1 ML 25GX5/8"
SYRINGE, EMPTY DISPOSABLE MISCELLANEOUS
Qty: 100 EACH | Refills: 2 | Status: SHIPPED | OUTPATIENT
Start: 2023-09-26

## 2023-09-26 RX ORDER — NEEDLES, FILTER 19GX1 1/2"
1 NEEDLE, DISPOSABLE MISCELLANEOUS WEEKLY
Qty: 6 EACH | Refills: 3 | Status: SHIPPED | OUTPATIENT
Start: 2023-09-26

## 2023-09-26 RX ORDER — AMLODIPINE BESYLATE 5 MG/1
5 TABLET ORAL
Qty: 90 TABLET | Refills: 3 | Status: SHIPPED | OUTPATIENT
Start: 2023-09-26 | End: 2024-02-15 | Stop reason: SDUPTHER

## 2023-09-26 RX ORDER — TESTOSTERONE CYPIONATE 200 MG/ML
200 INJECTION, SOLUTION INTRAMUSCULAR
Qty: 6 ML | Refills: 1 | Status: SHIPPED | OUTPATIENT
Start: 2023-09-26 | End: 2024-03-11 | Stop reason: SDUPTHER

## 2023-09-26 RX ORDER — GLIMEPIRIDE 4 MG/1
4 TABLET ORAL
Qty: 90 TABLET | Refills: 1 | Status: SHIPPED | OUTPATIENT
Start: 2023-09-26 | End: 2024-02-15 | Stop reason: SDUPTHER

## 2023-09-26 NOTE — TELEPHONE ENCOUNTER
Refill Routing Note   Medication(s) are not appropriate for processing by [facility] for the following reason(s):      Required labs abnormal  New or recently adjusted medication  No active prescription written by PCP    [facility] action(s):  Defer Care Due:  None identified          Appointments  past 12m or future 3m with PCP    Date Provider   Last Visit   7/14/2023 Rocco Cavazos DO   Next Visit   10/11/2023 Rocco Cavazos DO   ED visits in past 90 days: 0        Note composed:9:54 PM 09/25/2023

## 2023-09-26 NOTE — PROGRESS NOTES
Subjective:      Patient ID: Partha Curtis Jr. is referred by Rocco Cavazos DO     Chief Complaint:  Hypogonadism    History of Present Illness    Partha Curtis is a 63 y.o. male who presents for evaluation of hypogonadism.      Hypogonadism    Dx in 2015, presented with fatigue, decreased libido.   Patient is currently on testosterone injection 200 mg every 14 days.  Patient was on testosterone pellets for a few years, was following with urology.   Tried testosterone gel, says it did not work well.   Off testosterone for 6 weeks as he ran out of his prescription.     Patient was hospitalized in 6/2023 for nausea and vomiting and found to have HANANE.   Ozempic dose had been recently increased.  He has lost 21 lb on the Ozempic.    Sleep apnea: CPAP, not very compliant. Tired various combinations - is a side sleeper.   Liver disease: Denies    Headaches: Denies  Loss of peripheral vision: Denies  Galactorrhea: Denies  Urinary symptoms: Denies    He denies use of anabolic steroids, pelvic trauma or surgery, head trauma     Exercise:  Was biking 3-4 times a week, 8-12 miles per day.  We will start again.    Has 3 children.     Family history:  No family history low testosterone.     Weight:  Wt Readings from Last 3 Encounters:   09/26/23 89.6 kg (197 lb 8.5 oz)   07/14/23 83 kg (182 lb 15.7 oz)   07/05/23 89.7 kg (197 lb 12 oz)     Body mass index is 27.55 kg/m².    Lab Results   Component Value Date    HCT 53.1 07/05/2023    HCT 49.7 06/28/2023    HCT 55.7 (H) 06/27/2023    TOTALTESTOST 132 (L) 01/23/2023    TOTALTESTOST 104 (L) 01/09/2023    TOTALTESTOST 1375 (H) 04/01/2020    TESTOSTERONE 37.6 04/01/2020    TESTOSTERONE 5.3 11/03/2018    PSADIAG 0.39 01/23/2023    PSA 0.61 06/21/2022    PSA 0.55 05/05/2021    HDL 27 (L) 07/05/2023    HDL 24 (L) 06/21/2022    TRIG 205 (H) 07/05/2023    TRIG 280 (H) 06/21/2022    ALT 39 07/05/2023    ALT 39 07/05/2023    ALT 34 06/27/2023    AST 30 07/05/2023    AST 30  07/05/2023    AST 32 06/27/2023       Patient has history of type 2 diabetes, controlled on metformin, glimepiride and Ozempic. He follows up with his PCP.      ROS:   As above    Objective:     BP (!) 144/81 (BP Location: Left arm, Patient Position: Sitting, BP Method: Medium (Automatic))   Pulse 77   Wt 89.6 kg (197 lb 8.5 oz)   SpO2 96%   BMI 27.55 kg/m²     Body mass index is 27.55 kg/m².      Physical Exam  Constitutional:       General: He is not in acute distress.     Appearance: He is not ill-appearing.   HENT:      Head: Normocephalic.   Eyes:      Conjunctiva/sclera: Conjunctivae normal.   Cardiovascular:      Rate and Rhythm: Normal rate.   Pulmonary:      Effort: Pulmonary effort is normal.   Abdominal:      Palpations: Abdomen is soft.   Skin:     General: Skin is warm and dry.   Neurological:      Mental Status: He is alert and oriented to person, place, and time.           Lab Review:   Lab Results   Component Value Date    HGBA1C 6.6 (H) 07/05/2023     Lab Results   Component Value Date    CHOL 124 07/05/2023    HDL 27 (L) 07/05/2023    LDLCALC 56.0 (L) 07/05/2023    TRIG 205 (H) 07/05/2023    CHOLHDL 21.8 07/05/2023     Lab Results   Component Value Date     07/05/2023     07/05/2023     07/05/2023    K 4.3 07/05/2023    K 4.3 07/05/2023    K 4.3 07/05/2023     07/05/2023     07/05/2023     07/05/2023    CO2 25 07/05/2023    CO2 25 07/05/2023    CO2 25 07/05/2023     (H) 07/05/2023     (H) 07/05/2023     (H) 07/05/2023    BUN 17 07/05/2023    BUN 17 07/05/2023    BUN 17 07/05/2023    CREATININE 1.8 (H) 07/05/2023    CREATININE 1.8 (H) 07/05/2023    CREATININE 1.8 (H) 07/05/2023    CALCIUM 9.4 07/05/2023    CALCIUM 9.4 07/05/2023    CALCIUM 9.4 07/05/2023    PROT 7.4 07/05/2023    PROT 7.4 07/05/2023    ALBUMIN 4.1 07/05/2023    ALBUMIN 4.1 07/05/2023    BILITOT 0.5 07/05/2023    BILITOT 0.5 07/05/2023    ALKPHOS 63 07/05/2023    ALKPHOS 63  07/05/2023    AST 30 07/05/2023    AST 30 07/05/2023    ALT 39 07/05/2023    ALT 39 07/05/2023    ANIONGAP 12 07/05/2023    ANIONGAP 12 07/05/2023    ANIONGAP 12 07/05/2023    EGFRNORACEVR 42 (A) 07/05/2023    EGFRNORACEVR 42 (A) 07/05/2023    EGFRNORACEVR 42 (A) 07/05/2023    TSH 2.589 01/09/2023          Vit D, 25-Hydroxy   Date Value Ref Range Status   07/05/2023 53 30 - 96 ng/mL Final     Comment:     Vitamin D deficiency.........<10 ng/mL                              Vitamin D insufficiency......10-29 ng/mL       Vitamin D sufficiency........> or equal to 30 ng/mL  Vitamin D toxicity............>100 ng/mL         Assessment and Plan     Problem List Items Addressed This Visit          Renal/    Long-term current use of testosterone replacement therapy       Endocrine    Hypogonadism in male         Patient had presented with fatigue and decreased libido  Patient is currently on testosterone injection 200 mg every 14 days.  Off testosterone for about 6 weeks as he ran out of prescriptions.  Repeat testosterone once he starts his testosterone injections.    Recent Dx of sleep apnea, not very compliant with CPAP.  BMI 27.       Lab Results   Component Value Date    TOTALTESTOST 132 (L) 01/23/2023     Lab Results   Component Value Date    HGB 17.4 07/05/2023    HCT 53.1 07/05/2023    PSA 0.61 06/21/2022    PSADIAG 0.39 01/23/2023    AST 30 07/05/2023    AST 30 07/05/2023    ALT 39 07/05/2023    ALT 39 07/05/2023     I discussed the potential adverse effects of testosterone including risk of cardiovascular events, BPH, prostate cancer, erythrocytosis and venous thromboembolism.   Monitor PSA, LFTs, HGB, HCT  Monitor urinary and cardiovascular symptoms             Relevant Medications    testosterone cypionate (DEPOTESTOTERONE CYPIONATE) 200 mg/mL injection      Follow up in about 1 year (around 9/26/2024).     Tammy FUNK Rai, MD

## 2023-10-01 NOTE — ASSESSMENT & PLAN NOTE
Patient had presented with fatigue and decreased libido  Patient is currently on testosterone injection 200 mg every 14 days.  Off testosterone for about 6 weeks as he ran out of prescriptions.  Repeat testosterone once he starts his testosterone injections.    Recent Dx of sleep apnea, not very compliant with CPAP.  BMI 27.       Lab Results   Component Value Date    TOTALTESTOST 132 (L) 01/23/2023     Lab Results   Component Value Date    HGB 17.4 07/05/2023    HCT 53.1 07/05/2023    PSA 0.61 06/21/2022    PSADIAG 0.39 01/23/2023    AST 30 07/05/2023    AST 30 07/05/2023    ALT 39 07/05/2023    ALT 39 07/05/2023     I discussed the potential adverse effects of testosterone including risk of cardiovascular events, BPH, prostate cancer, erythrocytosis and venous thromboembolism.   Monitor PSA, LFTs, HGB, HCT yearly.  Monitor urinary and cardiovascular symptoms

## 2023-10-09 ENCOUNTER — LAB VISIT (OUTPATIENT)
Dept: LAB | Facility: HOSPITAL | Age: 63
End: 2023-10-09
Attending: FAMILY MEDICINE
Payer: COMMERCIAL

## 2023-10-09 DIAGNOSIS — R79.89 HIGH SERUM PARATHYROID HORMONE (PTH): ICD-10-CM

## 2023-10-09 DIAGNOSIS — N18.31 TYPE 2 DIABETES MELLITUS WITH STAGE 3A CHRONIC KIDNEY DISEASE, WITHOUT LONG-TERM CURRENT USE OF INSULIN: ICD-10-CM

## 2023-10-09 DIAGNOSIS — I10 ESSENTIAL HYPERTENSION: ICD-10-CM

## 2023-10-09 DIAGNOSIS — N17.9 AKI (ACUTE KIDNEY INJURY): ICD-10-CM

## 2023-10-09 DIAGNOSIS — E11.22 TYPE 2 DIABETES MELLITUS WITH STAGE 3A CHRONIC KIDNEY DISEASE, WITHOUT LONG-TERM CURRENT USE OF INSULIN: ICD-10-CM

## 2023-10-09 LAB
ALBUMIN SERPL BCP-MCNC: 4.1 G/DL (ref 3.5–5.2)
ALP SERPL-CCNC: 75 U/L (ref 55–135)
ALT SERPL W/O P-5'-P-CCNC: 30 U/L (ref 10–44)
ANION GAP SERPL CALC-SCNC: 11 MMOL/L (ref 8–16)
AST SERPL-CCNC: 25 U/L (ref 10–40)
BASOPHILS # BLD AUTO: 0.05 K/UL (ref 0–0.2)
BASOPHILS NFR BLD: 0.6 % (ref 0–1.9)
BILIRUB SERPL-MCNC: 0.4 MG/DL (ref 0.1–1)
BUN SERPL-MCNC: 12 MG/DL (ref 8–23)
CALCIUM SERPL-MCNC: 10.2 MG/DL (ref 8.7–10.5)
CHLORIDE SERPL-SCNC: 96 MMOL/L (ref 95–110)
CO2 SERPL-SCNC: 30 MMOL/L (ref 23–29)
CREAT SERPL-MCNC: 1.3 MG/DL (ref 0.5–1.4)
DIFFERENTIAL METHOD: ABNORMAL
EOSINOPHIL # BLD AUTO: 0.4 K/UL (ref 0–0.5)
EOSINOPHIL NFR BLD: 4.4 % (ref 0–8)
ERYTHROCYTE [DISTWIDTH] IN BLOOD BY AUTOMATED COUNT: 16.6 % (ref 11.5–14.5)
EST. GFR  (NO RACE VARIABLE): >60 ML/MIN/1.73 M^2
ESTIMATED AVG GLUCOSE: 163 MG/DL (ref 68–131)
GLUCOSE SERPL-MCNC: 160 MG/DL (ref 70–110)
HBA1C MFR BLD: 7.3 % (ref 4–5.6)
HCT VFR BLD AUTO: 52.2 % (ref 40–54)
HGB BLD-MCNC: 17.3 G/DL (ref 14–18)
IMM GRANULOCYTES # BLD AUTO: 0.05 K/UL (ref 0–0.04)
IMM GRANULOCYTES NFR BLD AUTO: 0.6 % (ref 0–0.5)
LYMPHOCYTES # BLD AUTO: 2.5 K/UL (ref 1–4.8)
LYMPHOCYTES NFR BLD: 28.2 % (ref 18–48)
MCH RBC QN AUTO: 31.2 PG (ref 27–31)
MCHC RBC AUTO-ENTMCNC: 33.1 G/DL (ref 32–36)
MCV RBC AUTO: 94 FL (ref 82–98)
MONOCYTES # BLD AUTO: 0.9 K/UL (ref 0.3–1)
MONOCYTES NFR BLD: 10.8 % (ref 4–15)
NEUTROPHILS # BLD AUTO: 4.8 K/UL (ref 1.8–7.7)
NEUTROPHILS NFR BLD: 55.4 % (ref 38–73)
NRBC BLD-RTO: 0 /100 WBC
PLATELET # BLD AUTO: 326 K/UL (ref 150–450)
PMV BLD AUTO: 9.5 FL (ref 9.2–12.9)
POTASSIUM SERPL-SCNC: 3.6 MMOL/L (ref 3.5–5.1)
PROT SERPL-MCNC: 7.5 G/DL (ref 6–8.4)
PTH-INTACT SERPL-MCNC: 29.5 PG/ML (ref 9–77)
RBC # BLD AUTO: 5.55 M/UL (ref 4.6–6.2)
SODIUM SERPL-SCNC: 137 MMOL/L (ref 136–145)
WBC # BLD AUTO: 8.71 K/UL (ref 3.9–12.7)

## 2023-10-09 PROCEDURE — 83036 HEMOGLOBIN GLYCOSYLATED A1C: CPT | Performed by: FAMILY MEDICINE

## 2023-10-09 PROCEDURE — 83970 ASSAY OF PARATHORMONE: CPT | Performed by: FAMILY MEDICINE

## 2023-10-09 PROCEDURE — 36415 COLL VENOUS BLD VENIPUNCTURE: CPT | Mod: PO | Performed by: FAMILY MEDICINE

## 2023-10-09 PROCEDURE — 85025 COMPLETE CBC W/AUTO DIFF WBC: CPT | Performed by: FAMILY MEDICINE

## 2023-10-09 PROCEDURE — 80053 COMPREHEN METABOLIC PANEL: CPT | Performed by: FAMILY MEDICINE

## 2023-10-11 ENCOUNTER — OFFICE VISIT (OUTPATIENT)
Dept: FAMILY MEDICINE | Facility: CLINIC | Age: 63
End: 2023-10-11
Payer: COMMERCIAL

## 2023-10-11 VITALS
OXYGEN SATURATION: 98 % | WEIGHT: 195.31 LBS | HEIGHT: 71 IN | BODY MASS INDEX: 27.34 KG/M2 | DIASTOLIC BLOOD PRESSURE: 74 MMHG | SYSTOLIC BLOOD PRESSURE: 124 MMHG | HEART RATE: 79 BPM | TEMPERATURE: 99 F

## 2023-10-11 DIAGNOSIS — N18.31 TYPE 2 DIABETES MELLITUS WITH STAGE 3A CHRONIC KIDNEY DISEASE, WITHOUT LONG-TERM CURRENT USE OF INSULIN: Primary | ICD-10-CM

## 2023-10-11 DIAGNOSIS — I10 ESSENTIAL HYPERTENSION: ICD-10-CM

## 2023-10-11 DIAGNOSIS — E78.5 DYSLIPIDEMIA: Chronic | ICD-10-CM

## 2023-10-11 DIAGNOSIS — E53.8 B12 DEFICIENCY: ICD-10-CM

## 2023-10-11 DIAGNOSIS — E11.22 TYPE 2 DIABETES MELLITUS WITH STAGE 3A CHRONIC KIDNEY DISEASE, WITHOUT LONG-TERM CURRENT USE OF INSULIN: Primary | ICD-10-CM

## 2023-10-11 DIAGNOSIS — Z12.11 COLON CANCER SCREENING: ICD-10-CM

## 2023-10-11 DIAGNOSIS — R79.89 LOW TESTOSTERONE IN MALE: ICD-10-CM

## 2023-10-11 DIAGNOSIS — Z23 NEED FOR VACCINATION AGAINST STREPTOCOCCUS PNEUMONIAE: ICD-10-CM

## 2023-10-11 PROBLEM — E87.6 HYPOKALEMIA: Status: RESOLVED | Noted: 2023-06-27 | Resolved: 2023-10-11

## 2023-10-11 PROCEDURE — 3078F PR MOST RECENT DIASTOLIC BLOOD PRESSURE < 80 MM HG: ICD-10-PCS | Mod: CPTII,S$GLB,, | Performed by: FAMILY MEDICINE

## 2023-10-11 PROCEDURE — 3008F PR BODY MASS INDEX (BMI) DOCUMENTED: ICD-10-PCS | Mod: CPTII,S$GLB,, | Performed by: FAMILY MEDICINE

## 2023-10-11 PROCEDURE — 99999 PR PBB SHADOW E&M-EST. PATIENT-LVL V: CPT | Mod: PBBFAC,,, | Performed by: FAMILY MEDICINE

## 2023-10-11 PROCEDURE — 3074F PR MOST RECENT SYSTOLIC BLOOD PRESSURE < 130 MM HG: ICD-10-PCS | Mod: CPTII,S$GLB,, | Performed by: FAMILY MEDICINE

## 2023-10-11 PROCEDURE — 99999 PR PBB SHADOW E&M-EST. PATIENT-LVL V: ICD-10-PCS | Mod: PBBFAC,,, | Performed by: FAMILY MEDICINE

## 2023-10-11 PROCEDURE — 1160F RVW MEDS BY RX/DR IN RCRD: CPT | Mod: CPTII,S$GLB,, | Performed by: FAMILY MEDICINE

## 2023-10-11 PROCEDURE — 3051F PR MOST RECENT HEMOGLOBIN A1C LEVEL 7.0 - < 8.0%: ICD-10-PCS | Mod: CPTII,S$GLB,, | Performed by: FAMILY MEDICINE

## 2023-10-11 PROCEDURE — 1160F PR REVIEW ALL MEDS BY PRESCRIBER/CLIN PHARMACIST DOCUMENTED: ICD-10-PCS | Mod: CPTII,S$GLB,, | Performed by: FAMILY MEDICINE

## 2023-10-11 PROCEDURE — 3008F BODY MASS INDEX DOCD: CPT | Mod: CPTII,S$GLB,, | Performed by: FAMILY MEDICINE

## 2023-10-11 PROCEDURE — 1159F MED LIST DOCD IN RCRD: CPT | Mod: CPTII,S$GLB,, | Performed by: FAMILY MEDICINE

## 2023-10-11 PROCEDURE — 99214 OFFICE O/P EST MOD 30 MIN: CPT | Mod: 25,S$GLB,, | Performed by: FAMILY MEDICINE

## 2023-10-11 PROCEDURE — 90677 PCV20 VACCINE IM: CPT | Mod: S$GLB,,, | Performed by: FAMILY MEDICINE

## 2023-10-11 PROCEDURE — 1159F PR MEDICATION LIST DOCUMENTED IN MEDICAL RECORD: ICD-10-PCS | Mod: CPTII,S$GLB,, | Performed by: FAMILY MEDICINE

## 2023-10-11 PROCEDURE — 90471 IMMUNIZATION ADMIN: CPT | Mod: S$GLB,,, | Performed by: FAMILY MEDICINE

## 2023-10-11 PROCEDURE — 3074F SYST BP LT 130 MM HG: CPT | Mod: CPTII,S$GLB,, | Performed by: FAMILY MEDICINE

## 2023-10-11 PROCEDURE — 3078F DIAST BP <80 MM HG: CPT | Mod: CPTII,S$GLB,, | Performed by: FAMILY MEDICINE

## 2023-10-11 PROCEDURE — 90471 PNEUMOCOCCAL CONJUGATE VACCINE 20-VALENT: ICD-10-PCS | Mod: S$GLB,,, | Performed by: FAMILY MEDICINE

## 2023-10-11 PROCEDURE — 99214 PR OFFICE/OUTPT VISIT, EST, LEVL IV, 30-39 MIN: ICD-10-PCS | Mod: 25,S$GLB,, | Performed by: FAMILY MEDICINE

## 2023-10-11 PROCEDURE — 4010F PR ACE/ARB THEARPY RXD/TAKEN: ICD-10-PCS | Mod: CPTII,S$GLB,, | Performed by: FAMILY MEDICINE

## 2023-10-11 PROCEDURE — 4010F ACE/ARB THERAPY RXD/TAKEN: CPT | Mod: CPTII,S$GLB,, | Performed by: FAMILY MEDICINE

## 2023-10-11 PROCEDURE — 3051F HG A1C>EQUAL 7.0%<8.0%: CPT | Mod: CPTII,S$GLB,, | Performed by: FAMILY MEDICINE

## 2023-10-11 PROCEDURE — 90677 PNEUMOCOCCAL CONJUGATE VACCINE 20-VALENT: ICD-10-PCS | Mod: S$GLB,,, | Performed by: FAMILY MEDICINE

## 2023-10-11 RX ORDER — SEMAGLUTIDE 0.68 MG/ML
INJECTION, SOLUTION SUBCUTANEOUS
Qty: 12 ML | Refills: 1 | Status: SHIPPED | OUTPATIENT
Start: 2023-10-11 | End: 2023-11-27 | Stop reason: SDUPTHER

## 2023-10-11 NOTE — PATIENT INSTRUCTIONS
Continue not taking chlorthalidone  Will try my utmost to not restart diuretic  Continue candesartan  Continue metoprolol  Continue amlodipine 5 mg  Wear compression socks    Continue metformin 2 pills/day  Continue amaryl at 4 mg  Start ozempic 0.25 weekly x 4 weeks  Increase to 0.5 mg until f/u  Will monitor closely    Continue crestor    Weight loss maintained in a HEALTHY manner     F/u 3 -4 months, DM, labs prior. AM labs.     Lab Results   Component Value Date    HGBA1C 7.3 (H) 10/09/2023    HGBA1C 6.6 (H) 07/05/2023    HGBA1C 7.0 (H) 01/09/2023

## 2023-10-11 NOTE — PROGRESS NOTES
Subjective:       Patient ID: Partha Curtis Jr. is a 63 y.o. male.    Chief Complaint: Follow-up    Jarvis is a 63 y.o. male who presents today for f/u     HTN: currently taking metoprolol, amlodipine, and candesartan. No longer on diuretic.   See EMR for full details and discussions.   Had second episode of hospitalization/Er visit due to RAMILA. Had episode June 2022 and June 2023 again.     DM: was on ozempic. Did have abdominal pain/hospitalization, but I am unsure if this was related to increase vs viral infection superimposed with ramila worsened by diuretic and decreased appetite from ozempic. On metformin 2 pills/day and amaryl 4 mg.   DLD: on crestor. LDL at goal. HDL chronically low.    RAMILA/CKD: not noted on recent labs.   Electrolyte issues: resolved  High PTH: resolved.     Has acute worsening of chronic low back pain. Seeing external physician.       Review of Systems   Constitutional:  Negative for chills and fever.   Cardiovascular:  Negative for chest pain.   Gastrointestinal:  Negative for nausea and vomiting.   Musculoskeletal:  Positive for back pain.   Neurological:  Negative for dizziness, light-headedness and headaches.               Results for orders placed or performed in visit on 10/09/23   CBC Auto Differential   Result Value Ref Range    WBC 8.71 3.90 - 12.70 K/uL    RBC 5.55 4.60 - 6.20 M/uL    Hemoglobin 17.3 14.0 - 18.0 g/dL    Hematocrit 52.2 40.0 - 54.0 %    MCV 94 82 - 98 fL    MCH 31.2 (H) 27.0 - 31.0 pg    MCHC 33.1 32.0 - 36.0 g/dL    RDW 16.6 (H) 11.5 - 14.5 %    Platelets 326 150 - 450 K/uL    MPV 9.5 9.2 - 12.9 fL    Immature Granulocytes 0.6 (H) 0.0 - 0.5 %    Gran # (ANC) 4.8 1.8 - 7.7 K/uL    Immature Grans (Abs) 0.05 (H) 0.00 - 0.04 K/uL    Lymph # 2.5 1.0 - 4.8 K/uL    Mono # 0.9 0.3 - 1.0 K/uL    Eos # 0.4 0.0 - 0.5 K/uL    Baso # 0.05 0.00 - 0.20 K/uL    nRBC 0 0 /100 WBC    Gran % 55.4 38.0 - 73.0 %    Lymph % 28.2 18.0 - 48.0 %    Mono % 10.8 4.0 - 15.0 %    Eosinophil %  "4.4 0.0 - 8.0 %    Basophil % 0.6 0.0 - 1.9 %    Differential Method Automated    Comprehensive Metabolic Panel   Result Value Ref Range    Sodium 137 136 - 145 mmol/L    Potassium 3.6 3.5 - 5.1 mmol/L    Chloride 96 95 - 110 mmol/L    CO2 30 (H) 23 - 29 mmol/L    Glucose 160 (H) 70 - 110 mg/dL    BUN 12 8 - 23 mg/dL    Creatinine 1.3 0.5 - 1.4 mg/dL    Calcium 10.2 8.7 - 10.5 mg/dL    Total Protein 7.5 6.0 - 8.4 g/dL    Albumin 4.1 3.5 - 5.2 g/dL    Total Bilirubin 0.4 0.1 - 1.0 mg/dL    Alkaline Phosphatase 75 55 - 135 U/L    AST 25 10 - 40 U/L    ALT 30 10 - 44 U/L    eGFR >60.0 >60 mL/min/1.73 m^2    Anion Gap 11 8 - 16 mmol/L   Hemoglobin A1C   Result Value Ref Range    Hemoglobin A1C 7.3 (H) 4.0 - 5.6 %    Estimated Avg Glucose 163 (H) 68 - 131 mg/dL   PTH, Intact   Result Value Ref Range    PTH, Intact 29.5 9.0 - 77.0 pg/mL       Objective:     Vitals:    10/11/23 0957 10/11/23 1004   BP: (!) 142/80 124/74   BP Location: Right arm    Patient Position: Sitting    BP Method: Medium (Manual)    Pulse: 79    Temp: 98.5 °F (36.9 °C)    TempSrc: Oral    SpO2: 98%    Weight: 88.6 kg (195 lb 5.2 oz)    Height: 5' 11" (1.803 m)         Physical Exam  Constitutional:       General: He is not in acute distress.     Appearance: He is not ill-appearing, toxic-appearing or diaphoretic.   Cardiovascular:      Rate and Rhythm: Normal rate and regular rhythm.   Pulmonary:      Effort: Pulmonary effort is normal.      Breath sounds: Normal breath sounds.   Abdominal:      Palpations: Abdomen is soft.      Tenderness: There is no abdominal tenderness.   Musculoskeletal:         General: No swelling.   Neurological:      General: No focal deficit present.      Mental Status: He is alert.   Psychiatric:         Mood and Affect: Mood normal.         Behavior: Behavior normal.         Thought Content: Thought content normal.         Judgment: Judgment normal.         Assessment:       1. Type 2 diabetes mellitus with stage 3a " chronic kidney disease, without long-term current use of insulin    2. B12 deficiency    3. Essential hypertension    4. Dyslipidemia    5. Need for vaccination against Streptococcus pneumoniae    6. Colon cancer screening    7. Low testosterone in male    8. BMI 27.0-27.9,adult        Plan:       Plan     Continue not taking chlorthalidone  Will try my utmost to not restart diuretic  Continue candesartan  Continue metoprolol  Continue amlodipine 5 mg  Wear compression socks    Continue metformin 2 pills/day  Continue amaryl at 4 mg  Start ozempic 0.25 weekly x 4 weeks  Increase to 0.5 mg until f/u  Will monitor closely    Continue crestor    Will recheck testosterone for endocrine.     Weight loss maintained in a HEALTHY manner     F/u 3 -4 months, DM, labs prior. AM labs.       Type 2 diabetes mellitus with stage 3a chronic kidney disease, without long-term current use of insulin  -     semaglutide (OZEMPIC) 0.25 mg or 0.5 mg (2 mg/3 mL) pen injector; Inject 0.25 mg into the skin every 7 days for 30 days, THEN 0.5 mg every 7 days.  Dispense: 12 mL; Refill: 1  -     CBC Auto Differential; Future; Expected date: 10/11/2023  -     Comprehensive Metabolic Panel; Future; Expected date: 10/11/2023  -     Hemoglobin A1C; Future; Expected date: 10/11/2023  -     Microalbumin/Creatinine Ratio, Urine; Future; Expected date: 10/11/2023  -     Ambulatory referral/consult to Optometry; Future; Expected date: 10/18/2023    B12 deficiency    Essential hypertension    Dyslipidemia    Need for vaccination against Streptococcus pneumoniae  -     (In Office Administered) Pneumococcal Conjugate Vaccine (20 Valent) (IM) (Preferred)    Colon cancer screening  -     Ambulatory referral/consult to Endo Procedure ; Future; Expected date: 10/12/2023    Low testosterone in male  -     Testosterone; Future; Expected date: 10/11/2023    BMI 27.0-27.9,adult            Warning signs discussed, patient to call with any further issues or  worsening of symptoms. Above note may have utilized dictation, please excuse any transcription errors.

## 2023-10-18 ENCOUNTER — TELEPHONE (OUTPATIENT)
Dept: ENDOSCOPY | Facility: HOSPITAL | Age: 63
End: 2023-10-18

## 2023-10-23 ENCOUNTER — TELEPHONE (OUTPATIENT)
Dept: FAMILY MEDICINE | Facility: CLINIC | Age: 63
End: 2023-10-23
Payer: COMMERCIAL

## 2023-10-23 DIAGNOSIS — Z12.11 COLON CANCER SCREENING: Primary | ICD-10-CM

## 2023-10-24 ENCOUNTER — TELEPHONE (OUTPATIENT)
Dept: ENDOSCOPY | Facility: HOSPITAL | Age: 63
End: 2023-10-24

## 2023-10-24 ENCOUNTER — CLINICAL SUPPORT (OUTPATIENT)
Dept: ENDOSCOPY | Facility: HOSPITAL | Age: 63
End: 2023-10-24
Attending: FAMILY MEDICINE
Payer: COMMERCIAL

## 2023-10-24 DIAGNOSIS — Z12.11 COLON CANCER SCREENING: ICD-10-CM

## 2023-10-24 RX ORDER — SOD SULF/POT CHLORIDE/MAG SULF 1.479 G
12 TABLET ORAL DAILY
Qty: 24 TABLET | Refills: 0 | Status: SHIPPED | OUTPATIENT
Start: 2023-10-24

## 2023-10-24 NOTE — TELEPHONE ENCOUNTER
Spoke to pt to schedule procedure(s) Colonoscopy       Physician to perform procedure(s) Dr. BIENVENIDO Ortiz  Date of Procedure (s) 2/2/24  Arrival Time 8:30 AM  Time of Procedure(s) 9:30 AM   Location of Procedure(s) [place] 2nd Floor  Type of Rx Prep sent to patient: Sutab  Instructions provided to patient via [facility]    Patient was informed on the following information and verbalized understanding. Screening questionnaire reviewed with patient and complete. If procedure requires anesthesia, a responsible adult needs to be present to accompany the patient home, patient cannot drive after receiving anesthesia. Appointment details are tentative, especially check-in time. Patient will receive a prep-op call 4 days prior to confirm check-in time for procedure. If applicable the patient should contact their pharmacy to verify Rx for procedure prep is ready for pick-up. Patient was advised to call the scheduling department at [phone] if pharmacy states no Rx is available. Patient was advised to call the endoscopy scheduling department if any questions or concerns arise.      [facility] Scheduling Department

## 2023-11-11 NOTE — TELEPHONE ENCOUNTER
Care Due:                  Date            Visit Type   Department     Provider  --------------------------------------------------------------------------------                                EP -                              PRIMARY      [facility]  Last Visit: 03-      CARE ([facility])   [facility]       Rocco Cavazos                              [facility]                              PRIMARY      [facility]  Next Visit: 06-      CARE ([facility])   [facility]  [facility]                                                            Last  Test          Frequency    Reason                     Performed    Due Date  --------------------------------------------------------------------------------    Lipid Panel.  12 months..  rosuvastatin.............  05- 05-    Powered by [employer]. Reference number: [id number].   3/10/2022 2:16:14 PM CST   - Limited PNC, only seen x2 in this pregnancy  - Rubella/Rubeola immune, HepB NR  - A1c 5.0  - FHT reassuring, on continuous monitoring  - Daily PNV

## 2023-11-23 DIAGNOSIS — E53.8 B12 DEFICIENCY: ICD-10-CM

## 2023-11-23 DIAGNOSIS — I10 ESSENTIAL HYPERTENSION: ICD-10-CM

## 2023-11-23 NOTE — TELEPHONE ENCOUNTER
No care due was identified.  [facility] Embedded Care Due Messages. Reference number: [id number].   11/23/2023 1:08:59 PM CST

## 2023-11-24 RX ORDER — CANDESARTAN 32 MG/1
32 TABLET ORAL
Qty: 90 TABLET | Refills: 3 | Status: SHIPPED | OUTPATIENT
Start: 2023-11-24 | End: 2024-02-15 | Stop reason: SDUPTHER

## 2023-11-24 RX ORDER — METOPROLOL SUCCINATE 200 MG/1
200 TABLET, EXTENDED RELEASE ORAL
Qty: 90 TABLET | Refills: 3 | Status: SHIPPED | OUTPATIENT
Start: 2023-11-24 | End: 2024-02-15 | Stop reason: SDUPTHER

## 2023-11-24 NOTE — TELEPHONE ENCOUNTER
Refill Routing Note   Medication(s) are not appropriate for processing by [facility] for the following reason(s):        Outside of protocol    [facility] action(s):  Route  Approve               Appointments  past 12m or future 3m with PCP    Date Provider   Last Visit   10/11/2023 Rocco Cavazos DO   Next Visit   2/15/2024 Rocco Cavazos,    ED visits in past 90 days: 0        Note composed:12:04 PM 11/24/2023

## 2023-11-26 RX ORDER — ZINC GLUCONATE 50 MG
1000 TABLET ORAL
Qty: 90 TABLET | Refills: 4 | Status: SHIPPED | OUTPATIENT
Start: 2023-11-26 | End: 2024-02-15 | Stop reason: SDUPTHER

## 2023-11-27 DIAGNOSIS — E11.22 TYPE 2 DIABETES MELLITUS WITH STAGE 3A CHRONIC KIDNEY DISEASE, WITHOUT LONG-TERM CURRENT USE OF INSULIN: ICD-10-CM

## 2023-11-27 DIAGNOSIS — N18.31 TYPE 2 DIABETES MELLITUS WITH STAGE 3A CHRONIC KIDNEY DISEASE, WITHOUT LONG-TERM CURRENT USE OF INSULIN: ICD-10-CM

## 2023-11-27 RX ORDER — SEMAGLUTIDE 0.68 MG/ML
0.5 INJECTION, SOLUTION SUBCUTANEOUS
Qty: 12 ML | Refills: 1 | Status: SHIPPED | OUTPATIENT
Start: 2023-11-27 | End: 2024-02-15 | Stop reason: SDUPTHER

## 2023-11-27 NOTE — TELEPHONE ENCOUNTER
No care due was identified.  [facility] Embedded Care Due Messages. Reference number: [id number].   11/27/2023 2:42:04 PM CST

## 2024-01-10 DIAGNOSIS — E55.9 VITAMIN D DEFICIENCY: ICD-10-CM

## 2024-01-10 RX ORDER — ERGOCALCIFEROL 1.25 MG/1
50000 CAPSULE ORAL
Qty: 12 CAPSULE | Refills: 0 | Status: SHIPPED | OUTPATIENT
Start: 2024-01-10 | End: 2024-02-01 | Stop reason: SDUPTHER

## 2024-01-10 NOTE — TELEPHONE ENCOUNTER
Refill Routing Note   Medication(s) are not appropriate for processing by [facility] for the following reason(s):        Outside of protocol    [facility] action(s):  Route             Appointments  past 12m or future 3m with PCP    Date Provider   Last Visit   10/11/2023 Rocco Cavazos DO   Next Visit   2/15/2024 Rocco Cavazos,    ED visits in past 90 days: 0        Note composed:7:41 AM 01/10/2024

## 2024-01-17 ENCOUNTER — TELEPHONE (OUTPATIENT)
Dept: PAIN MEDICINE | Facility: CLINIC | Age: 64
End: 2024-01-17
Payer: COMMERCIAL

## 2024-01-17 ENCOUNTER — PATIENT MESSAGE (OUTPATIENT)
Dept: PAIN MEDICINE | Facility: CLINIC | Age: 64
End: 2024-01-17

## 2024-01-17 ENCOUNTER — OFFICE VISIT (OUTPATIENT)
Dept: PAIN MEDICINE | Facility: CLINIC | Age: 64
End: 2024-01-17
Attending: ANESTHESIOLOGY
Payer: COMMERCIAL

## 2024-01-17 VITALS
SYSTOLIC BLOOD PRESSURE: 152 MMHG | DIASTOLIC BLOOD PRESSURE: 96 MMHG | HEIGHT: 71 IN | WEIGHT: 191.81 LBS | BODY MASS INDEX: 26.85 KG/M2 | RESPIRATION RATE: 18 BRPM | OXYGEN SATURATION: 100 % | TEMPERATURE: 98 F | HEART RATE: 66 BPM

## 2024-01-17 DIAGNOSIS — M25.812 SHOULDER IMPINGEMENT, LEFT: Primary | ICD-10-CM

## 2024-01-17 DIAGNOSIS — M54.12 CERVICAL RADICULOPATHY: Primary | ICD-10-CM

## 2024-01-17 DIAGNOSIS — M54.2 CERVICALGIA: ICD-10-CM

## 2024-01-17 DIAGNOSIS — M79.18 MYOFASCIAL PAIN SYNDROME: ICD-10-CM

## 2024-01-17 DIAGNOSIS — M47.812 CERVICAL SPONDYLOSIS: ICD-10-CM

## 2024-01-17 DIAGNOSIS — M67.912 TENDINOPATHY OF ROTATOR CUFF, LEFT: ICD-10-CM

## 2024-01-17 DIAGNOSIS — M25.812 SHOULDER IMPINGEMENT, LEFT: ICD-10-CM

## 2024-01-17 PROCEDURE — 99999 PR PBB SHADOW E&M-EST. PATIENT-LVL IV: CPT | Mod: PBBFAC,,, | Performed by: ANESTHESIOLOGY

## 2024-01-17 PROCEDURE — 4010F ACE/ARB THERAPY RXD/TAKEN: CPT | Mod: CPTII,S$GLB,, | Performed by: ANESTHESIOLOGY

## 2024-01-17 PROCEDURE — 3072F LOW RISK FOR RETINOPATHY: CPT | Mod: CPTII,S$GLB,, | Performed by: ANESTHESIOLOGY

## 2024-01-17 PROCEDURE — 3077F SYST BP >= 140 MM HG: CPT | Mod: CPTII,S$GLB,, | Performed by: ANESTHESIOLOGY

## 2024-01-17 PROCEDURE — 3008F BODY MASS INDEX DOCD: CPT | Mod: CPTII,S$GLB,, | Performed by: ANESTHESIOLOGY

## 2024-01-17 PROCEDURE — 1160F RVW MEDS BY RX/DR IN RCRD: CPT | Mod: CPTII,S$GLB,, | Performed by: ANESTHESIOLOGY

## 2024-01-17 PROCEDURE — 99204 OFFICE O/P NEW MOD 45 MIN: CPT | Mod: S$GLB,,, | Performed by: ANESTHESIOLOGY

## 2024-01-17 PROCEDURE — 1159F MED LIST DOCD IN RCRD: CPT | Mod: CPTII,S$GLB,, | Performed by: ANESTHESIOLOGY

## 2024-01-17 PROCEDURE — 3080F DIAST BP >= 90 MM HG: CPT | Mod: CPTII,S$GLB,, | Performed by: ANESTHESIOLOGY

## 2024-01-17 RX ORDER — DIAZEPAM 2 MG/1
TABLET ORAL
Qty: 3 TABLET | Refills: 0 | Status: SHIPPED | OUTPATIENT
Start: 2024-01-17 | End: 2024-01-30 | Stop reason: SDUPTHER

## 2024-01-17 NOTE — PROGRESS NOTES
Subjective:      Patient ID: Partha Curtis Jr. is a 63 y.o. male.    Chief Complaint: Neck Pain    Referred by: Self, Aaareferral     HPI: Partha Curtis Jr. is a 63 y.o. male presents to pain clinic for evaluation of neck pain. Symptoms developed 4 weeks ago. Similar pain in 2022 that improved after an C7/T1 ILESI on 3/28/22, had been pain free since this procedure until 4 weeks ago. Original neck pain started in 2021 without inciting event. Denies trauma or injury to the area in the past 4 weeks upon return of symptoms. Pain management previously at [facility] with Dr. Herrera     Original Pain Description:  The pain is located in the upper thoracic region just left of the midline with radiation into his left shoulder and proximal arm terminating above the elbow. All of patients upper back/neck pain is on the left. The pain is described as aching, sharp, and throbbing. These exact symptoms were present prior to his 2022 ILESI. Chinyere ranges from 4 - 10. The current pain is 8. Exacerbating factors: Coughing/Sneezing and L arm abduction above the head, head forward flexion and head lateral bending to the left, and laying flat on the ground. Mitigating factors pillow. Symptoms interfere with daily activity and sleeping and work. 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 Works as a  for [employer]. The patient feels like symptoms have been worsening. Patient endorses weakness in his left arm with return of pain. Patient denies saddle anesthesia, bladder/bowel incontinence, acute or signifcant limb weakness, ataxia, or increased falls.     Additional Treatments:  PT: None  Chiro: years ago.   HEP: Bicycles 3-4 days weeks.     Treatments / Medications: (Ice/Heat/NSAIDS/APAP/etc):  - Gabapentin 400mg nightly, sedation during the day   - Percocet 5-325 Taking BID recently. Historically receiving #60/month and would typically last 2-3 months. This was primarily for LBP.   -  No NSAIDs due to CKD   - Tylenol without relief     Interventional Pain History  - March 2022 - C7/T1 ILESI at [facility] nearly 2 years of relief   - Multiple low back RFA: 80% relief for usually 6 months or more, 2023 most recent 8/10/23  - Multiple ESIs 80% relief for usually 6 months, November 2023 most recent  - 8/10/23 BL SIJ Injections 100% relief of buttock pain that continues until today  - 5/3/22Intercostal nerve blocks right T6 T7 and T8  - History of explanted pain pump    ROS:  GENERAL: No fever. No chills. Denies unintentional weight loss. Denies history of cancer  HEENT: Denies dysphagia. Denies new vision loss.  Denies new hearing loss  CV: Denies chest pain and palpitations.   PULM: Denies of shortness of breath or cough  GI: Denies bowel incontinence. Denies blood in stool. Denies Melena  HEME: Denies bleeding problems. On ASA 81  : Denies incontinence and dysuria   MS: See H&P  SKIN: Denies new rash.   ENDOCRINE: DM2 or thyroid disease  NEURO: Denies seizures.  PSYCH: Insomnia. Denies anxiety. Denies depression. No suicidal thoughts.       Imaging:  FINDINGS:  Spine Numbering: For purposes of this dictation, it is assumed that there are 5 non-rib-bearing, lumbar-type vertebrae, and the most caudal fully segmented lumbar vertebra is labeled L5.    Alignment: Straightening of normal lumbar lordosis. Unchanged dextrocurvature of the lumbar spine. Unchanged grade 1 left lateral listhesis of L2 on L3. Unchanged grade 1 right lateral listhesis of L3 on L4. Unchanged grade 1 retrolisthesis of L2 on L3.    Surgical: None.    Vertebral Bodies: Unchanged multilevel mild anterior osteophytosis.    Marrow Signal: Unchanged degenerative fatty marrow placement at the L3-4 endplates. No marrow edema. No suspicious osseous lesions.    Intervertebral Discs: Unchanged multilevel disc dessication with loss of disc space height    Conus Medullaris: Terminates at L1    Cauda Equina: Unchanged bunching of the cauda  equina at L3-4 and L4-5 due to thecal sac narrowing detailed below.    Paraspinal Soft Tissues: Normal    Intra-abdominal Findings: None.    Individual Levels:    T12-L1: Unchanged circumferential disc bulge with unchanged right paracentral disc herniation with cranial migration to the infrapedicular level of T12 measuring 1.6 x 0.6 cm (image 7, series 2). No thecal sac or neural foraminal narrowing.    L1-L2: Unchanged circumferential disc bulge with bulky left lateral osteophytosis. No spinal canal or foraminal narrowing.    L2-L3: Circumferential disc bulge, ligamentum flavum thickening, and epidural lipomatosis cause unchanged moderate thecal sac narrowing. Mild facet arthropathy and disc disease cause unchanged moderate bilateral neural foraminal narrowing.    L3-L4: Circumferential disc bulge, ligamentum flavum thickening, and epidural lipomatosis cause unchanged severe thecal sac narrowing. Moderate right and severe left facet arthropathy with disc  disease cause unchanged mild right and moderate left neural foraminal narrowing.    L4-L5: Circumferential disc bulge, ligamentum flavum thickening, and epidural lipomatosis cause unchanged severe thecal sac narrowing. Severe facet arthropathy and disc disease cause unchanged severe right and moderate left neural foraminal narrowing with impingement of the exiting right L4 nerve root.    L5-S1: Unchanged small disc bulge and moderate facet arthropathy. No spinal canal or foraminal narrowing.    Exam End: 11/02/23 17:52     Past Medical History:   Diagnosis Date    Allergy     Carotid artery occlusion     Chronic back pain     Colonic polyp     Genetic testing     MUTYH mutation-negative    Hyperlipidemia     Hypertension     Sleep apnea     Type 2 diabetes mellitus with stage 3 chronic kidney disease, without long-term current use of insulin 4/2/2020       Past Surgical History:   Procedure Laterality Date    ANKLE SURGERY Left     2    APPENDECTOMY      at age 20  "   CAROTID ENDARTERECTOMY Right 07/15/2015    CARPAL TUNNEL RELEASE Bilateral     COLONOSCOPY N/A 12/14/2018    Procedure: COLONOSCOPYSuprep;  Surgeon: Silver Selby MD;  Location: [facility];  Service: Endoscopy;  Laterality: N/A;    JOINT REPLACEMENT  9/2010    NASAL SEPTUM SURGERY      TOTAL KNEE ARTHROPLASTY Right     6 knee surgeries       Review of patient's allergies indicates:   Allergen Reactions    Stadol [butorphanol tartrate] Rash     Swelling in face    Strawberries [strawberry] Rash       Current Outpatient Medications   Medication Sig Dispense Refill    amitriptyline (ELAVIL) 25 MG tablet Take 75 mg by mouth nightly as needed for Insomnia.      amLODIPine (NORVASC) 5 MG tablet Take 1 tablet by mouth once daily 90 tablet 3    aspirin (ECOTRIN) 81 MG EC tablet Take 81 mg by mouth once daily.      azelastine (ASTELIN) 137 mcg (0.1 %) nasal spray 1 spray (137 mcg total) by Nasal route 2 (two) times daily. (Patient taking differently: 1 spray by Nasal route 2 (two) times daily as needed.) 30 mL 11    BD LUER-RUSS SYRINGE 3 mL 25 gauge x 1" Syrg USE ONE SYRINGE EVERY 14 DAYS WITH TESTOSTERONE 100 each 2    candesartan (ATACAND) 32 MG tablet Take 1 tablet by mouth once daily 90 tablet 3    cyanocobalamin (VITAMIN B-12) 1000 MCG tablet Take 1 tablet by mouth once daily 90 tablet 4    ergocalciferol (ERGOCALCIFEROL) 50,000 unit Cap Take 1 capsule by mouth once a week 12 capsule 0    gabapentin (NEURONTIN) 800 MG tablet Take 0.5 tablets (400 mg total) by mouth every evening. 90 tablet 1    glimepiride (AMARYL) 4 MG tablet TAKE 1 TABLET BY MOUTH BEFORE BREAKFAST 90 tablet 1    ipratropium (ATROVENT) 42 mcg (0.06 %) nasal spray 2 sprays by Nasal route 2 (two) times daily as needed for Rhinitis. 15 mL 0    metFORMIN (GLUCOPHAGE-XR) 500 MG ER 24hr tablet Take 2 tablets (1,000 mg total) by mouth once daily. 180 tablet 1    metoprolol succinate (TOPROL-XL) 200 MG 24 hr tablet Take 1 tablet by mouth once daily 90 " "tablet 3    oxyCODONE-acetaminophen (PERCOCET) 5-325 mg per tablet Take 1 tablet by mouth 2 (two) times daily as needed.      rosuvastatin (CRESTOR) 40 MG Tab Take 1 tablet (40 mg total) by mouth every evening. 90 tablet 3    semaglutide (OZEMPIC) 0.25 mg or 0.5 mg (2 mg/3 mL) pen injector Inject 0.5 mg into the skin every 7 days. 12 mL 1    sod sulf-pot chloride-mag sulf (SUTAB) 1.479-0.188- 0.225 gram tablet Take 12 tablets by mouth once daily. Take according to package instructions with indicated amount of water. 24 tablet 0    syringe with needle, safety (BD INTEGRA SYRINGE) 3 mL 22 gauge x 1 1/2" Syrg Inject 1 Syringe as directed once a week. 6 each 3    testosterone cypionate (DEPOTESTOTERONE CYPIONATE) 200 mg/mL injection Inject 1 mL (200 mg total) into the muscle every 14 (fourteen) days. 6 mL 1    valACYclovir (VALTREX) 1000 MG tablet TAKE 2 TABLETS BY MOUTH EVERY 12 HOURS (Patient taking differently: Take 2,000 mg by mouth every 12 (twelve) hours as needed.) 4 tablet 3    VAQTA, PF, 50 unit/mL vaccine        No current facility-administered medications for this visit.       Family History   Problem Relation Age of Onset    Brain cancer Mother 67    Cancer Mother     Hypertension Father     Lung cancer Father         x2 (PAUL initially- treated surgically only, then recurred distantly) (smoker, & had been exposed to many chemicals)    Cancer Father     Breast cancer Sister 58    Genetic Disorder Sister         monoallelic MUTYH mutation    Seizures Daughter     Cancer Maternal Grandfather     Brain cancer Paternal Grandfather 73    Breast cancer Maternal Cousin 57    Aneurysm Other 48        brain    Ulcerative colitis Other        Social History     Socioeconomic History    Marital status:    Tobacco Use    Smoking status: Never     Passive exposure: Never    Smokeless tobacco: Never   Substance and Sexual Activity    Alcohol use: Yes     Alcohol/week: 2.0 standard drinks of alcohol     Types: 2 Cans " "of beer per week     Comment: a week    Drug use: Never    Sexual activity: Yes     Partners: Female     Birth control/protection: None   Social History Narrative    5/11/2021: . He lives with his wife and 2 kids. (5 kids total). He has one dog, one cat, no more chickens at home. One dog recently passed away. No smokers at home.      Social Determinants of Health     Financial Resource Strain: Low Risk  (6/28/2023)    Overall Financial Resource Strain (CARDIA)     Difficulty of Paying Living Expenses: Not hard at all   Food Insecurity: No Food Insecurity (6/28/2023)    Hunger Vital Sign     Worried About Running Out of Food in the Last Year: Never true     Ran Out of Food in the Last Year: Never true   Transportation Needs: No Transportation Needs (6/28/2023)    PRAPARE - Transportation     Lack of Transportation (Medical): No     Lack of Transportation (Non-Medical): No   Social Connections: Unknown (6/28/2023)    Social Connection and Isolation Panel [NHANES]     Marital Status:    Housing Stability: Unknown (6/28/2023)    Housing Stability Vital Sign     Unable to Pay for Housing in the Last Year: No     Unstable Housing in the Last Year: No             Objective:   BP (!) 152/96   Pulse 66   Temp 97.6 °F (36.4 °C)   Resp 18   Ht 5' 11" (1.803 m)   Wt 87 kg (191 lb 12.8 oz)   SpO2 100%   BMI 26.75 kg/m²   Pain Disability Index Review:      1/17/2024    10:38 AM   Last 3 PDI Scores   Pain Disability Index (PDI) 37     PHYSICAL EXAM:   GENERAL: Well appearing, in no acute distress, alert and oriented x3.  PSYCH:  Mood and affect appropriate.  SKIN: Skin color, texture, turgor normal, no rashes or lesions.  HEENT:  Normocephalic, atraumatic. Cranial nerves grossly intact.  NECK:  Reduced ROM with flexion, extension, rotation left, and lateral flexion left. Pain on end range. Facet loading positive. Upper thoracic left Facets, and paraspinals tender to palpation. Spurling's positive on the " left   PULM: No evidence of respiratory difficulty, symmetric chest rise.  GI:  Non-distended  EXTREMITIES: No deformities, edema, or skin discoloration.   MUSCULOSKELETAL: Left neer's hawkin's, empty can, painful arc positive. Asymmetric weakness with external rotation on the left, 4/5.  Left shoulder abduction and interosseous 4+/5. Otherwise Bilateral upper and lower extremity strength is normal and symmetric. No atrophy is noted.  NEURO: Sensation is equal and appropriate bilaterally. Bilateral upper and lower extremity muscle stretch reflexes are physiologic and symmetric. Plantar response are downgoing.   GAIT: Normal        Assessment:   Partha Curtis Jr. Presents with neck and left shoulder pain consistent with the below diagnoses. His upper thoracic pain radiates to shoulder and down his arm terminating at the elbow that improved 100% following a C7/T1 ILESI in 2022. Pain returned 4 weeks with new weakness of his left arm. His exam was notable for positive Spurling's as well as positive RTC and impingement findings on the left. His history and exam is consistent with cervical radiculopathy complicated by left sided rotator cuff tendinopathy and subacromial impingement. He additionally has thoracic paraspinal and periscapular tenderness on the left suggesting a myofascial component to his pain.     Encounter Diagnoses   Name Primary?    Cervical radiculopathy Yes    Tendinopathy of rotator cuff, left     Shoulder impingement, left     Cervical spondylosis     Myofascial pain syndrome     Cervicalgia          Plan:   We discussed with the patient the assessment and recommendations. The following is the plan we agreed on:    - MRI C Spine to assess for new anatomical source of return of pain and LUE weakness. This will need to occur prior to repeat SELENA    - Schedule repeat C7/T1 ILESI as this provided significant 100% reduction in pain lasting nearly 2 years for symptoms in similar quality and location.      - Plan for left subacromial bursa injection at 2 week SELENA follow up.     - Referral to physical therapy for neck and left shoulder pain    - Can address low back pain when pain     - Continue Percocet  BID PRN, #60 will typically last 2-3 months. Patient does not need a refill at this time    - Plan for UDS if patient requires further opioid pain management after intervention    - Plan for opioid contract if patient requires further opioid pain management          Partha was seen today for low-back pain and neck pain.    Diagnoses and all orders for this visit:    Cervical radiculopathy  -     Ambulatory referral/consult to Physical/Occupational Therapy; Future  -     Procedure Order to Pain Management; Future    Tendinopathy of rotator cuff, left  -     Ambulatory referral/consult to Physical/Occupational Therapy; Future    Shoulder impingement, left    Cervical spondylosis    Myofascial pain syndrome  -     Ambulatory referral/consult to Physical/Occupational Therapy; Future    Cervicalgia  -     MRI Cervical Spine Without Contrast; Future     Raffy Ascencio MD  [facility] PM&R Resident    I have personally taken the history and examined this patient and agree with the resident's note as stated above.

## 2024-01-18 ENCOUNTER — TELEPHONE (OUTPATIENT)
Dept: PAIN MEDICINE | Facility: CLINIC | Age: 64
End: 2024-01-18
Payer: COMMERCIAL

## 2024-01-18 ENCOUNTER — PATIENT MESSAGE (OUTPATIENT)
Dept: PAIN MEDICINE | Facility: OTHER | Age: 64
End: 2024-01-18
Payer: COMMERCIAL

## 2024-01-18 NOTE — TELEPHONE ENCOUNTER
Pt notified.    ----- Message from Gokul Fung MD sent at 1/17/2024  4:53 PM CST -----  done  ----- Message -----  From: Felicita Laureano MA  Sent: 1/17/2024   2:27 PM CST  To: Gokul Fung MD    Hi I just saw Dr. Fung and he scheduled me for an MRI on 1/25/2024.   I forgot to ask him for medication for the MRI. I'm, very claustrophobic and need something for it.  Please send to [facility]'s pharmacy.     Thanks Jarvis Curtis

## 2024-01-18 NOTE — TELEPHONE ENCOUNTER
----- Message from Gokul Fung MD sent at 1/17/2024  4:53 PM CST -----  done  ----- Message -----  From: Felicita Laureano MA  Sent: 1/17/2024   2:27 PM CST  To: Gokul Fung MD    Hi I just saw Dr. Fung and he scheduled me for an MRI on 1/25/2024.   I forgot to ask him for medication for the MRI. I'm, very claustrophobic and need something for it.  Please send to [facility]'s pharmacy.     Thanks Jarvis Curtis

## 2024-01-23 ENCOUNTER — CLINICAL SUPPORT (OUTPATIENT)
Dept: REHABILITATION | Facility: HOSPITAL | Age: 64
End: 2024-01-23
Payer: COMMERCIAL

## 2024-01-23 DIAGNOSIS — M79.18 MYOFASCIAL PAIN SYNDROME: ICD-10-CM

## 2024-01-23 DIAGNOSIS — M25.612 DECREASED RANGE OF MOTION OF LEFT SHOULDER: ICD-10-CM

## 2024-01-23 DIAGNOSIS — M67.912 TENDINOPATHY OF ROTATOR CUFF, LEFT: ICD-10-CM

## 2024-01-23 DIAGNOSIS — M54.12 CERVICAL RADICULOPATHY: ICD-10-CM

## 2024-01-23 DIAGNOSIS — M54.2 NECK PAIN: ICD-10-CM

## 2024-01-23 DIAGNOSIS — R29.898 DECREASED RANGE OF MOTION OF NECK: Primary | ICD-10-CM

## 2024-01-23 PROCEDURE — 97162 PT EVAL MOD COMPLEX 30 MIN: CPT | Mod: PN

## 2024-01-23 PROCEDURE — 97110 THERAPEUTIC EXERCISES: CPT | Mod: PN

## 2024-01-23 PROCEDURE — 97140 MANUAL THERAPY 1/> REGIONS: CPT | Mod: PN

## 2024-01-23 NOTE — PLAN OF CARE
[facility]  Physical Therapy Initial Evaluation    Date: 1/23/2024   Name: Partha Curtis Jr.  Clinic Number: [id number]    Therapy Diagnosis:   Encounter Diagnoses   Name Primary?    Cervical radiculopathy     Myofascial pain syndrome     Tendinopathy of rotator cuff, left     Decreased range of motion of neck Yes    Decreased range of motion of left shoulder     Neck pain      Physician: Raffy Ascencio MD    Physician Orders: PT Eval and Treat   Medical Diagnosis from Referral:   M54.12 (ICD-10-CM) - Cervical radiculopathy   M79.18 (ICD-10-CM) - Myofascial pain syndrome   M67.912 (ICD-10-CM) - Tendinopathy of rotator cuff, left   Evaluation Date: 1/23/2024  Authorization Period Expiration: 12/31/2024  Plan of Care Expiration: 3/18/2024  Visit # / Visits authorized: 1/1    Time In: 3:10 pm  Time Out: 4:30 pm  Total Appointment Time (timed & untimed codes): 70 minutes (1 MCE) (1 TE) (1 MT)    Precautions: Standard; history of carotid artery occlusion    Subjective   Date of onset: ~ 3 years; flare up about 2 months ago   History of current condition - Jarvis reports: he's had chronic neck and low back pain with a history of steroid injections. Patient had a cervical steroid injection in 2022 with complete relief for about 2 years. Also reporting left shoulder pain off and on for the same amount of time. Patient reports insidious onset of neck pain about 2 months ago. Aggravating factors include sitting at his computer for too long, extension of his neck, reaching overhead, lifting, and prolonged positions. Easing factors are minimal at this time. Patient has pain medication if needed.      Medical History:   Past Medical History:   Diagnosis Date    Allergy     Carotid artery occlusion     Chronic back pain     Colonic polyp     Genetic testing     MUTYH mutation-negative    Hyperlipidemia     Hypertension     Sleep apnea     Type 2 diabetes mellitus with stage 3 chronic kidney  disease, without long-term current use of insulin 4/2/2020       Surgical History:   Partha Curtis Jr.  has a past surgical history that includes Total knee arthroplasty (Right); Ankle surgery (Left); Carotid endarterectomy (Right, 07/15/2015); Nasal septum surgery; Appendectomy; Carpal tunnel release (Bilateral); Colonoscopy (N/A, 12/14/2018); and Joint replacement (9/2010).    Medications:   Partha has a current medication list which includes the following prescription(s): amitriptyline, amlodipine, aspirin, azelastine, bd luer-dequan syringe, candesartan, vitamin b-12, diazepam, ergocalciferol, gabapentin, glimepiride, ipratropium, metformin, metoprolol succinate, oxycodone-acetaminophen, rosuvastatin, ozempic, sutab, bd integra syringe, testosterone cypionate, valacyclovir, and vaqta (pf).    Allergies:   Review of patient's allergies indicates:   Allergen Reactions    Stadol [butorphanol tartrate] Rash     Swelling in face    Strawberries [strawberry] Rash        Imaging: see EMR; upcoming cervical MRI    Prior Therapy: yes, but not for his neck   Social History: lives with wife   Occupation: works as a  for [employer]; primarily desk work   Prior Level of Function: no neck pain prior to about 2 months ago   Current Level of Function: not biking, wants to ride in [place]    Pain:  Current 9/10, worst 10/10, best 7/10   Location: neck; left shoulder   Description: dull/aching, sharp at times; denies numbness/tingling  Aggravating Factors: see above  Easing Factors: see above    Pt's goals:   Patient wants to learn how to manage his neck pain and reduce the need for opioids.     Objective   Observation/Posture: Protracted cervical spine/forward head posture    Cervical Range of Motion: AROM   Degrees Subjective report   Flexion  45 degrees  Tightness    Extension  10 degrees  Left sided neck pain     Right Rotation  75 degrees  No pain     Left Rotation  60 degrees  Left sided neck pain       Right Side Bending  40 degrees  No pain   Left Side Bending  20 degrees  Left sided neck pain        Shoulder Range of Motion: AROM   Shoulder Right Left   Flexion  145 degrees  125 degrees   ER  65 degrees  60 degrees       Upper Extremity Strength  (R) UE  (L) UE    Shoulder flexion: 5/5 Shoulder flexion: 4+/5 *   Shoulder Abduction: 5/5 Shoulder abduction: 4+/5 *   Shoulder ER 5/5 Shoulder ER 4-/5 *   Shoulder IR 5/5 Shoulder IR 4+/5   Lower Trap 3+/5 Lower Trap 3+/5   Middle Trap 4/5 Middle Trap 4/5     Joint Mobility: left lower cervical facet hypomobility with down glide; left C7 facet arthropathy     Palpation: upper trap tender to palpation    Sensation: WNL    Flexibility: Pec major/minor tightness; Latissimus dorsi tightness       Limitation/Restriction for FOTO Cervical Survey    Therapist reviewed FOTO scores for Partha Curtis Jr. on 1/23/2024.   FOTO documents entered into [employer] - see Media section.    Limitation Score: 47%  Predicted Limitation Score: 33%         TREATMENT   Treatment Time In: 4:00 pm  Treatment Time Out: 4:30 pm  Total Treatment time (time-based codes) separate from Evaluation: 30 minutes    manual therapy techniques: Joint mobilizations and Soft tissue Mobilization were applied to the: cervical spine for 15 minutes, including:  Intermittent manual cervical traction   Left lower cervical down glides  Mechanical traction x 10 minutes - 30# high; 15# low     Jarvis received therapeutic exercises to develop ROM, flexibility, and posture for 15 minutes including:  Seated cervical extension SNAGs: 15x   Home exercise program overview   Patient education on plan of care, prognosis, and home exercise program         Home Exercises and Patient Education Provided    Education provided:   - Home Exercise Program  - Prognosis/POC  - Pain science    Written Home Exercises Provided: yes.  Exercises were reviewed and Jarvis was able to demonstrate them prior to the end of the session.  Jarvis  demonstrated good  understanding of the education provided.     See EMR under Patient Instructions for exercises provided 1/23/2024.    Assessment   Partha is a 63 y.o. male referred to outpatient Physical Therapy with a medical diagnosis of cervical radiculopathy, myofascial pain syndrome, and rotator cuff tendinopathy of the left shoulder. Patient presents with a significant past medical history including carotid artery occlusion and right carotid endarterectomy. Patient is presenting with signs and symptoms of left C7 facet arthropathy with down glides. With objective tests and measures, patient presents with cervical range of motion deficits, bilateral shoulder range of motion deficits, and left rotator cuff strength deficits. Treatment consisted of manual therapy for cervical joint mobility, mechanical traction for pain relief, and cervical extension SNAGs. Patient would benefit from skilled PT focusing on regaining neck and shoulder range of motion followed by postural education and strengthening.     Pt prognosis is Excellent.   Pt will benefit from skilled outpatient Physical Therapy to address the deficits stated above and in the chart below, provide pt/family education, and to maximize pt's level of independence.     Plan of care discussed with patient: Yes  Pt's spiritual, cultural and educational needs considered and patient is agreeable to the plan of care and goals as stated below:     Anticipated Barriers for therapy: past medical history     Medical Necessity is demonstrated by the following  History  Co-morbidities and personal factors that may impact the plan of care Co-morbidities:   diabetes, HTN, and carotid artery occlusion    Personal Factors:   coping style     high   Examination  Body Structures and Functions, activity limitations and participation restrictions that may impact the plan of care Body Regions:   head  neck  upper extremities    Body Systems:    gross  symmetry  ROM  strength  gross coordinated movement  balance  gait  transfers  transitions  motor control  motor learning    Participation Restrictions:   Chores   ADLs  Exercising     Activity limitations:   Learning and applying knowledge  no deficits    General Tasks and Commands  no deficits    Communication  no deficits    Mobility  lifting and carrying objects    Self care  no deficits    Domestic Life  cooking  doing house work (cleaning house, washing dishes, laundry)  assisting others    Interactions/Relationships  no deficits    Life Areas  no deficits    Community and Social Life  no deficits         high   Clinical Presentation evolving clinical presentation with changing clinical characteristics moderate   Decision Making/ Complexity Score: moderate     Goals:  Short Term Goals (4 Weeks):   1. Pt will be independent with HEP to supplement PT in improving pain free cervical mobility  2. Pt will improve cervical AROM 10 deg with rotation to improve cervical mobility for driving  3. Pt will improve low trap, mid trap, and rotator cuff MMTs by 1/2 grade in all planes to improve strength for lifting and carrying tasks.  4. Pt will demonstrate improved sitting posture to decrease pain experienced in head and neck.  Long Term Goals (8 Weeks):   1. Pt will improve FOTO to </=33% limitation to improve perceived limitation with changing and maintaining mobility.  2. Pt will improve cervical AROM to WNL in all planes to improve cervical mobility for driving   3. Pt will improve low trap, mid trap, and rotator cuff MMTs 1 grade in all planes to improve strength for lifting and carrying tasks.  4. Pt will report no pain with lifting 10 lbs to promote physical activity.   5. Pt will report no pain with cervical AROM in all planes to promote QOL.    Plan   Plan of care Certification: 1/23/2024 to 3/18/2024.    Outpatient Physical Therapy 1 times weekly for 8 weeks to include the following interventions:  Cervical/Lumbar Traction, Manual Therapy, Neuromuscular Re-ed, Patient Education, Self Care, Therapeutic Activities, and Therapeutic Exercise.     Jan Rowe, PT

## 2024-01-26 ENCOUNTER — PATIENT MESSAGE (OUTPATIENT)
Dept: PAIN MEDICINE | Facility: CLINIC | Age: 64
End: 2024-01-26
Payer: COMMERCIAL

## 2024-01-26 ENCOUNTER — HOSPITAL ENCOUNTER (OUTPATIENT)
Dept: RADIOLOGY | Facility: OTHER | Age: 64
Discharge: HOME | End: 2024-01-26
Attending: STUDENT IN AN ORGANIZED HEALTH CARE EDUCATION/TRAINING PROGRAM
Payer: COMMERCIAL

## 2024-01-26 DIAGNOSIS — M54.2 CERVICALGIA: ICD-10-CM

## 2024-01-29 ENCOUNTER — OFFICE VISIT (OUTPATIENT)
Dept: OPTOMETRY | Facility: CLINIC | Age: 64
End: 2024-01-29
Payer: COMMERCIAL

## 2024-01-29 DIAGNOSIS — H25.13 NUCLEAR SCLEROSIS, BILATERAL: ICD-10-CM

## 2024-01-29 DIAGNOSIS — N18.31 TYPE 2 DIABETES MELLITUS WITH STAGE 3A CHRONIC KIDNEY DISEASE, WITHOUT LONG-TERM CURRENT USE OF INSULIN: ICD-10-CM

## 2024-01-29 DIAGNOSIS — E11.9 TYPE 2 DIABETES MELLITUS WITHOUT RETINOPATHY: Primary | ICD-10-CM

## 2024-01-29 DIAGNOSIS — E11.22 TYPE 2 DIABETES MELLITUS WITH STAGE 3A CHRONIC KIDNEY DISEASE, WITHOUT LONG-TERM CURRENT USE OF INSULIN: ICD-10-CM

## 2024-01-29 PROCEDURE — 92014 COMPRE OPH EXAM EST PT 1/>: CPT | Mod: S$GLB,,, | Performed by: OPTOMETRIST

## 2024-01-29 PROCEDURE — 4010F ACE/ARB THERAPY RXD/TAKEN: CPT | Mod: CPTII,S$GLB,, | Performed by: OPTOMETRIST

## 2024-01-29 PROCEDURE — 1160F RVW MEDS BY RX/DR IN RCRD: CPT | Mod: CPTII,S$GLB,, | Performed by: OPTOMETRIST

## 2024-01-29 PROCEDURE — 2023F DILAT RTA XM W/O RTNOPTHY: CPT | Mod: CPTII,S$GLB,, | Performed by: OPTOMETRIST

## 2024-01-29 PROCEDURE — 99999 PR PBB SHADOW E&M-EST. PATIENT-LVL IV: CPT | Mod: PBBFAC,,, | Performed by: OPTOMETRIST

## 2024-01-29 PROCEDURE — 1159F MED LIST DOCD IN RCRD: CPT | Mod: CPTII,S$GLB,, | Performed by: OPTOMETRIST

## 2024-01-29 NOTE — PROGRESS NOTES
HPI    Patient is here today for a diabetic eye exam. Last seen on 01/06/2023.   States it is difficult to see up close with smaller prints. Patient is   wearing OTC +1.75 readers. No pain or discomfort. Occasional floaters.     Eye Meds: None  Past Ocular Sx: None    Hemoglobin A1C       Date                     Value               Ref Range             Status                10/09/2023               7.3 (H)             4.0 - 5.6 %           Final              Comment:    ADA Screening Guidelines:  5.7-6.4%  Consistent with   prediabetes  >or=6.5%  Consistent with diabetes    High levels of fetal   hemoglobin interfere with the HbA1C  assay. Heterozygous hemoglobin   variants (HbS, HgC, etc)do  not significantly interfere with this assay.     However, presence of multiple variants may affect accuracy.         07/05/2023               6.6 (H)             4.0 - 5.6 %           Final              Comment:    ADA Screening Guidelines:  5.7-6.4%  Consistent with   prediabetes  >or=6.5%  Consistent with diabetes    High levels of fetal   hemoglobin interfere with the HbA1C  assay. Heterozygous hemoglobin   variants (HbS, HgC, etc)do  not significantly interfere with this assay.     However, presence of multiple variants may affect accuracy.         01/09/2023               7.0 (H)             4.0 - 5.6 %           Final              Comment:    ADA Screening Guidelines:  5.7-6.4%  Consistent with   prediabetes  >or=6.5%  Consistent with diabetes    High levels of fetal   hemoglobin interfere with the HbA1C  assay. Heterozygous hemoglobin   variants (HbS, HgC, etc)do  not significantly interfere with this assay.     However, presence of multiple variants may affect accuracy.    ----------   Last edited by Nicole Winchester on 1/29/2024  8:01 AM.            Assessment /Plan     For exam results, see Encounter Report.    Type 2 diabetes mellitus without retinopathy    Type 2 diabetes mellitus with stage 3a chronic kidney  disease, without long-term current use of insulin  -     Ambulatory referral/consult to Optometry    Nuclear sclerosis, bilateral      Small cats OU--pt happy w otc readers  DM- WITHOUT RETINOPATHY.  Advised yearly DFE     PLAN:    Rtc 1 yr

## 2024-01-30 ENCOUNTER — PATIENT MESSAGE (OUTPATIENT)
Dept: ADMINISTRATIVE | Facility: OTHER | Age: 64
End: 2024-01-30
Payer: COMMERCIAL

## 2024-01-30 ENCOUNTER — CLINICAL SUPPORT (OUTPATIENT)
Dept: REHABILITATION | Facility: HOSPITAL | Age: 64
End: 2024-01-30
Payer: COMMERCIAL

## 2024-01-30 DIAGNOSIS — M54.2 NECK PAIN: ICD-10-CM

## 2024-01-30 DIAGNOSIS — R29.898 DECREASED RANGE OF MOTION OF NECK: Primary | ICD-10-CM

## 2024-01-30 DIAGNOSIS — M79.18 MYOFASCIAL PAIN SYNDROME: ICD-10-CM

## 2024-01-30 DIAGNOSIS — M67.912 TENDINOPATHY OF ROTATOR CUFF, LEFT: ICD-10-CM

## 2024-01-30 DIAGNOSIS — M25.612 DECREASED RANGE OF MOTION OF LEFT SHOULDER: ICD-10-CM

## 2024-01-30 PROCEDURE — 97140 MANUAL THERAPY 1/> REGIONS: CPT | Mod: PN

## 2024-01-30 PROCEDURE — 97112 NEUROMUSCULAR REEDUCATION: CPT | Mod: PN

## 2024-01-30 RX ORDER — DIAZEPAM 2 MG/1
TABLET ORAL
Qty: 3 TABLET | Refills: 0 | Status: SHIPPED | OUTPATIENT
Start: 2024-01-30 | End: 2024-02-01

## 2024-01-30 NOTE — PROGRESS NOTES
[facility]   Physical Therapy Treatment Note      Name: Partha Curtis Jr.  Clinic Number: [id number]    Therapy Diagnosis:   Encounter Diagnoses   Name Primary?    Decreased range of motion of neck Yes    Decreased range of motion of left shoulder     Neck pain     Myofascial pain syndrome     Tendinopathy of rotator cuff, left      Physician: Raffy Ascencio MD    Visit Date: 1/30/2024    Physician Orders: PT Eval and Treat   Medical Diagnosis from Referral:   M54.12 (ICD-10-CM) - Cervical radiculopathy   M79.18 (ICD-10-CM) - Myofascial pain syndrome   M67.912 (ICD-10-CM) - Tendinopathy of rotator cuff, left   Evaluation Date: 1/23/2024  Authorization Period Expiration: 12/31/2024  Plan of Care Expiration: 3/18/2024  Visit # / Visits authorized: 1/20 (+1)     Time In: 3:05 pm  Time Out: 4:00 pm  Total Appointment Time (timed & untimed codes): 55 minutes (2 MT) (2 NMR)     Precautions: Standard; history of carotid artery occlusion    Subjective     Patient reports: he had pain relief following the initial evaluation for about 3 days until pain eventually returned. Patient reports 6/10 pain this afternoon.   He was compliant with home exercise program.  Response to previous treatment: no change   Functional change: no change    Pain: 6/10  Location: neck    Objective      Objective Measures updated at progress report unless specified.     Treatment     Jarvis received the treatments listed below:      manual therapy techniques: Joint mobilizations and mechanical traction were applied to the: cervical spine for 30 minutes, including:  Intermittent mechanical cervical traction: 25 minutes   30 pound high; 15 pound low   30 seconds on; 10 seconds off     neuromuscular re-education activities to improve: Coordination, Kinesthetic, Sense, Proprioception, and Posture for 25 minutes. The following activities were included:  Seated cervical extension SNAGs: 15x   Seated cervical rotation  "SNAGs: 15x   Seated thoracic extension: 15x   Supine chin tucks: 5"x20   Supine shoulder flexion AAROM: 2x10       Patient Education and Home Exercises       Education provided:   - home exercise program   - POC/Prognosis     Written Home Exercises Provided: yes. Exercises were reviewed and Jarvis was able to demonstrate them prior to the end of the session.  Jarvis demonstrated good  understanding of the education provided. See Electronic Medical Record under Patient Instructions for exercises provided during therapy sessions    Assessment   Partha is a 63 y.o. male referred to outpatient Physical Therapy with a medical diagnosis of cervical radiculopathy, myofascial pain syndrome, and rotator cuff tendinopathy of the left shoulder. Patient presents with a significant past medical history including carotid artery occlusion and right carotid endarterectomy. Patient is presenting with signs and symptoms of left C7 facet arthropathy with down glides. First visit following initial evaluation consisted of mechanical cervical traction for cervical joint mobility and pain reduction followed by shoulder and cervical AAROM exercises. Planning to monitor response next visit.     Jarvis Is progressing well towards his goals.   Patient prognosis is Excellent.     Patient will continue to benefit from skilled outpatient physical therapy to address the deficits listed in the problem list box on initial evaluation, provide pt/family education and to maximize pt's level of independence in the home and community environment.     Patient's spiritual, cultural and educational needs considered and pt agreeable to plan of care and goals.     Anticipated barriers to physical therapy: past medical history     Goals:   Short Term Goals (4 Weeks):   1. Pt will be independent with HEP to supplement PT in improving pain free cervical mobility  2. Pt will improve cervical AROM 10 deg with rotation to improve cervical mobility for driving  3. Pt " will improve low trap, mid trap, and rotator cuff MMTs by 1/2 grade in all planes to improve strength for lifting and carrying tasks.  4. Pt will demonstrate improved sitting posture to decrease pain experienced in head and neck.  Long Term Goals (8 Weeks):   1. Pt will improve FOTO to </=33% limitation to improve perceived limitation with changing and maintaining mobility.  2. Pt will improve cervical AROM to WNL in all planes to improve cervical mobility for driving   3. Pt will improve low trap, mid trap, and rotator cuff MMTs 1 grade in all planes to improve strength for lifting and carrying tasks.  4. Pt will report no pain with lifting 10 lbs to promote physical activity.   5. Pt will report no pain with cervical AROM in all planes to promote QOL.    Plan   Cervical traction   Thoracic extension  Left shoulder range of motion   Periscapular strength     Jan Rowe, PT

## 2024-01-31 ENCOUNTER — TELEPHONE (OUTPATIENT)
Dept: ENDOSCOPY | Facility: HOSPITAL | Age: 64
End: 2024-01-31
Payer: COMMERCIAL

## 2024-01-31 VITALS — WEIGHT: 191 LBS | BODY MASS INDEX: 26.74 KG/M2 | HEIGHT: 71 IN

## 2024-01-31 NOTE — TELEPHONE ENCOUNTER
Spoke to Jarvis to schedule procedure(s) Colonoscopy       Physician to perform procedure(s) Dr. BIENVENIDO Ortiz  Date of Procedure (s) 02/27/24  Arrival Time 11:30 AM  Time of Procedure(s) 12:30 AM   Location of Procedure(s) [place] [address]nd Floor  Type of Rx Prep sent to patient: Sutab  Instructions provided to patient via [facility]    Patient was informed on the following information and verbalized understanding. Screening questionnaire reviewed with patient and complete. If procedure requires anesthesia, a responsible adult needs to be present to accompany the patient home, patient cannot drive after receiving anesthesia. Appointment details are tentative, especially check-in time. Patient will receive a prep-op call 7 days prior to confirm check-in time for procedure. If applicable the patient should contact their pharmacy to verify Rx for procedure prep is ready for pick-up. Patient was advised to call the scheduling department at [phone] if pharmacy states no Rx is available. Patient was advised to call the endoscopy scheduling department if any questions or concerns arise.      [facility] Scheduling Department

## 2024-01-31 NOTE — TELEPHONE ENCOUNTER
----- Message from Alida Law sent at 1/31/2024  1:25 PM CST -----  Regarding: FW: needs to resche  procedure    ----- Message -----  From: Shannon Pace  Sent: 1/31/2024  10:27 AM CST  To: [facility] Schedulers  Subject: needs to resche  procedure                       Name of Who is Calling: MEL BULLARD JR. [[id number]]        What is the request in detail:Pt called states he needs to resche procedure states he has an appt for injections on 02/05 and the  Informed him he can't have both within 2 weeks of each other. Please advise         Can the clinic reply by [facility]:no        What Number to Call Back if not in [facility]: Telephone Information:  Mobile          [phone]

## 2024-02-01 ENCOUNTER — PROCEDURE VISIT (OUTPATIENT)
Dept: PAIN MEDICINE | Facility: CLINIC | Age: 64
End: 2024-02-01
Attending: ANESTHESIOLOGY
Payer: COMMERCIAL

## 2024-02-01 VITALS
SYSTOLIC BLOOD PRESSURE: 158 MMHG | HEART RATE: 72 BPM | TEMPERATURE: 98 F | RESPIRATION RATE: 18 BRPM | OXYGEN SATURATION: 98 % | DIASTOLIC BLOOD PRESSURE: 97 MMHG | BODY MASS INDEX: 26.78 KG/M2 | WEIGHT: 192 LBS

## 2024-02-01 DIAGNOSIS — G89.29 CHRONIC LEFT SHOULDER PAIN: Primary | ICD-10-CM

## 2024-02-01 DIAGNOSIS — M25.512 CHRONIC LEFT SHOULDER PAIN: Primary | ICD-10-CM

## 2024-02-01 DIAGNOSIS — M75.52 CHRONIC BURSITIS OF LEFT SHOULDER: ICD-10-CM

## 2024-02-01 DIAGNOSIS — M25.812 SHOULDER IMPINGEMENT, LEFT: ICD-10-CM

## 2024-02-01 DIAGNOSIS — E55.9 VITAMIN D DEFICIENCY: ICD-10-CM

## 2024-02-01 DIAGNOSIS — F40.240 CLAUSTROPHOBIA: ICD-10-CM

## 2024-02-01 PROCEDURE — 20550 NJX 1 TENDON SHEATH/LIGAMENT: CPT | Mod: 59,LT,S$GLB, | Performed by: ANESTHESIOLOGY

## 2024-02-01 PROCEDURE — 20611 DRAIN/INJ JOINT/BURSA W/US: CPT | Mod: 59,LT,S$GLB, | Performed by: ANESTHESIOLOGY

## 2024-02-01 PROCEDURE — 64418 NJX AA&/STRD SPRSCAP NRV: CPT | Mod: LT,S$GLB,, | Performed by: ANESTHESIOLOGY

## 2024-02-01 RX ORDER — ERGOCALCIFEROL 1.25 MG/1
50000 CAPSULE ORAL
Qty: 12 CAPSULE | Refills: 0 | Status: SHIPPED | OUTPATIENT
Start: 2024-02-01 | End: 2024-02-15 | Stop reason: SDUPTHER

## 2024-02-01 RX ORDER — DIAZEPAM 10 MG/1
TABLET ORAL
Qty: 2 TABLET | Refills: 0 | Status: SHIPPED | OUTPATIENT
Start: 2024-02-01 | End: 2024-02-15

## 2024-02-01 RX ORDER — TRIAMCINOLONE ACETONIDE 40 MG/ML
40 INJECTION, SUSPENSION INTRA-ARTICULAR; INTRAMUSCULAR
Status: COMPLETED | OUTPATIENT
Start: 2024-02-01 | End: 2024-02-01

## 2024-02-01 RX ADMIN — TRIAMCINOLONE ACETONIDE 40 MG: 40 INJECTION, SUSPENSION INTRA-ARTICULAR; INTRAMUSCULAR at 04:02

## 2024-02-01 NOTE — PROCEDURES
Shoulder Injection Procedure Note      Time-out taken to identify patient and procedure side prior to starting the procedure.   I attest that I have reviewed the patient's home medications prior to the procedure and no contraindication have been identified. I  re-evaluated the patient after the patient was positioned for the procedure in the procedure room immediately before the procedural time-out. The vital signs are current and represent the current state of the patient which has not significantly changed since the preprocedure assessment.     Date of Service: 02/01/2024    PCP: Rocco Cavazos DO                 PROCEDURE:  Left  injection of:  1. Suprascapular articular branch from the Suprascapular Nerve  2. Subacromial bursa   3. Supraspinatus tendon sheath      REASON FOR PROCEDURE:  * No surgery found *  1. Chronic left shoulder pain    2. Shoulder impingement, left    3. Chronic bursitis of left shoulder    4. Claustrophobia      POSTOP DIAGNOSIS: * No surgery found *  1. Chronic left shoulder pain    2. Shoulder impingement, left    3. Chronic bursitis of left shoulder    4. Claustrophobia      PHYSICIAN: Gokul Fung MD  ASSISTANTS: [facility] [employer] [facility] Fellow       MEDICATIONS INJECTED: 0.5 mL of Triamcinolone 40mg/ml X with 10 mL of bupivacaine 0.25%    SEDATION MEDICATIONS: None    ESTIMATED BLOOD LOSS:  None.    COMPLICATIONS:  None.    TECHNIQUE:   The patient was brought to the procedure room and placed on the exam table in a comfortable position.    After sterile skin preparation with chloraprep, a 27 gauge, 1.5 inch needle was used introduced. Under ultrasound guidance, and after negative aspiration, the left suprascapular nerve, supraspinatus tendon sheath & subacromial bursa were injected. The medication mixture above was split evenly between each site. The needle was removed and a bandaid was placed. The patient tolerated the procedure well.      Gokul Fung

## 2024-02-01 NOTE — TELEPHONE ENCOUNTER
Refill Routing Note   Medication(s) are not appropriate for processing by [facility] for the following reason(s):        Outside of protocol    [facility] action(s):  Route               Appointments  past 12m or future 3m with PCP    Date Provider   Last Visit   10/11/2023 Rocco Cavazos DO   Next Visit   2/15/2024 Rocco Cavazos,    ED visits in past 90 days: 0        Note composed:9:09 AM 02/01/2024

## 2024-02-05 DIAGNOSIS — M54.12 CERVICAL RADICULOPATHY: Primary | ICD-10-CM

## 2024-02-06 ENCOUNTER — TELEPHONE (OUTPATIENT)
Dept: GASTROENTEROLOGY | Facility: CLINIC | Age: 64
End: 2024-02-06
Payer: COMMERCIAL

## 2024-02-06 NOTE — TELEPHONE ENCOUNTER
----- Message from Zuleyka Cuenca sent at 2/6/2024 10:01 AM CST -----  Regarding: apt  Contact: [phone]  Pt calling in requesting call back regarding reschedule  colonoscopy until  the first of March  please call to discuss  further

## 2024-02-07 ENCOUNTER — PATIENT MESSAGE (OUTPATIENT)
Dept: ENDOSCOPY | Facility: HOSPITAL | Age: 64
End: 2024-02-07
Payer: COMMERCIAL

## 2024-02-09 ENCOUNTER — LAB VISIT (OUTPATIENT)
Dept: LAB | Facility: HOSPITAL | Age: 64
End: 2024-02-09
Attending: FAMILY MEDICINE
Payer: COMMERCIAL

## 2024-02-09 DIAGNOSIS — R79.89 LOW TESTOSTERONE IN MALE: ICD-10-CM

## 2024-02-09 DIAGNOSIS — E11.22 TYPE 2 DIABETES MELLITUS WITH STAGE 3A CHRONIC KIDNEY DISEASE, WITHOUT LONG-TERM CURRENT USE OF INSULIN: ICD-10-CM

## 2024-02-09 DIAGNOSIS — N18.31 TYPE 2 DIABETES MELLITUS WITH STAGE 3A CHRONIC KIDNEY DISEASE, WITHOUT LONG-TERM CURRENT USE OF INSULIN: ICD-10-CM

## 2024-02-09 LAB
ALBUMIN SERPL BCP-MCNC: 4.2 G/DL (ref 3.5–5.2)
ALP SERPL-CCNC: 69 U/L (ref 55–135)
ALT SERPL W/O P-5'-P-CCNC: 40 U/L (ref 10–44)
ANION GAP SERPL CALC-SCNC: 10 MMOL/L (ref 8–16)
AST SERPL-CCNC: 23 U/L (ref 10–40)
BASOPHILS # BLD AUTO: 0.02 K/UL (ref 0–0.2)
BASOPHILS NFR BLD: 0.1 % (ref 0–1.9)
BILIRUB SERPL-MCNC: 0.5 MG/DL (ref 0.1–1)
BUN SERPL-MCNC: 19 MG/DL (ref 8–23)
CALCIUM SERPL-MCNC: 10.4 MG/DL (ref 8.7–10.5)
CHLORIDE SERPL-SCNC: 100 MMOL/L (ref 95–110)
CO2 SERPL-SCNC: 27 MMOL/L (ref 23–29)
CREAT SERPL-MCNC: 1.6 MG/DL (ref 0.5–1.4)
DIFFERENTIAL METHOD BLD: ABNORMAL
EOSINOPHIL # BLD AUTO: 0 K/UL (ref 0–0.5)
EOSINOPHIL NFR BLD: 0 % (ref 0–8)
ERYTHROCYTE [DISTWIDTH] IN BLOOD BY AUTOMATED COUNT: 14.4 % (ref 11.5–14.5)
EST. GFR  (NO RACE VARIABLE): 48.1 ML/MIN/1.73 M^2
ESTIMATED AVG GLUCOSE: 126 MG/DL (ref 68–131)
GLUCOSE SERPL-MCNC: 143 MG/DL (ref 70–110)
HBA1C MFR BLD: 6 % (ref 4–5.6)
HCT VFR BLD AUTO: 56.8 % (ref 40–54)
HGB BLD-MCNC: 18.6 G/DL (ref 14–18)
IMM GRANULOCYTES # BLD AUTO: 0.05 K/UL (ref 0–0.04)
IMM GRANULOCYTES NFR BLD AUTO: 0.4 % (ref 0–0.5)
LYMPHOCYTES # BLD AUTO: 1.5 K/UL (ref 1–4.8)
LYMPHOCYTES NFR BLD: 10.8 % (ref 18–48)
MCH RBC QN AUTO: 31.2 PG (ref 27–31)
MCHC RBC AUTO-ENTMCNC: 32.7 G/DL (ref 32–36)
MCV RBC AUTO: 95 FL (ref 82–98)
MONOCYTES # BLD AUTO: 0.6 K/UL (ref 0.3–1)
MONOCYTES NFR BLD: 4.4 % (ref 4–15)
NEUTROPHILS # BLD AUTO: 11.7 K/UL (ref 1.8–7.7)
NEUTROPHILS NFR BLD: 84.3 % (ref 38–73)
NRBC BLD-RTO: 0 /100 WBC
PLATELET # BLD AUTO: 348 K/UL (ref 150–450)
PMV BLD AUTO: 9.9 FL (ref 9.2–12.9)
POTASSIUM SERPL-SCNC: 5 MMOL/L (ref 3.5–5.1)
PROT SERPL-MCNC: 7.4 G/DL (ref 6–8.4)
RBC # BLD AUTO: 5.96 M/UL (ref 4.6–6.2)
SODIUM SERPL-SCNC: 137 MMOL/L (ref 136–145)
TESTOST SERPL-MCNC: 1003 NG/DL (ref 304–1227)
WBC # BLD AUTO: 13.92 K/UL (ref 3.9–12.7)

## 2024-02-09 PROCEDURE — 85025 COMPLETE CBC W/AUTO DIFF WBC: CPT | Performed by: FAMILY MEDICINE

## 2024-02-09 PROCEDURE — 80053 COMPREHEN METABOLIC PANEL: CPT | Performed by: FAMILY MEDICINE

## 2024-02-09 PROCEDURE — 84403 ASSAY OF TOTAL TESTOSTERONE: CPT | Performed by: FAMILY MEDICINE

## 2024-02-09 PROCEDURE — 83036 HEMOGLOBIN GLYCOSYLATED A1C: CPT | Performed by: FAMILY MEDICINE

## 2024-02-09 PROCEDURE — 36415 COLL VENOUS BLD VENIPUNCTURE: CPT | Mod: PO | Performed by: FAMILY MEDICINE

## 2024-02-12 DIAGNOSIS — E29.1 HYPOGONADISM IN MALE: Primary | ICD-10-CM

## 2024-02-14 ENCOUNTER — PATIENT MESSAGE (OUTPATIENT)
Dept: FAMILY MEDICINE | Facility: CLINIC | Age: 64
End: 2024-02-14
Payer: COMMERCIAL

## 2024-02-15 ENCOUNTER — OFFICE VISIT (OUTPATIENT)
Dept: FAMILY MEDICINE | Facility: CLINIC | Age: 64
End: 2024-02-15
Payer: COMMERCIAL

## 2024-02-15 ENCOUNTER — HOSPITAL ENCOUNTER (OUTPATIENT)
Dept: RADIOLOGY | Facility: HOSPITAL | Age: 64
Discharge: HOME | End: 2024-02-15
Attending: STUDENT IN AN ORGANIZED HEALTH CARE EDUCATION/TRAINING PROGRAM
Payer: COMMERCIAL

## 2024-02-15 VITALS
OXYGEN SATURATION: 99 % | HEIGHT: 70 IN | TEMPERATURE: 98 F | WEIGHT: 187.19 LBS | SYSTOLIC BLOOD PRESSURE: 110 MMHG | DIASTOLIC BLOOD PRESSURE: 78 MMHG | HEART RATE: 65 BPM | BODY MASS INDEX: 26.8 KG/M2

## 2024-02-15 DIAGNOSIS — E11.22 TYPE 2 DIABETES MELLITUS WITH STAGE 3A CHRONIC KIDNEY DISEASE, WITHOUT LONG-TERM CURRENT USE OF INSULIN: Primary | ICD-10-CM

## 2024-02-15 DIAGNOSIS — E55.9 VITAMIN D DEFICIENCY: ICD-10-CM

## 2024-02-15 DIAGNOSIS — N17.9 AKI (ACUTE KIDNEY INJURY): ICD-10-CM

## 2024-02-15 DIAGNOSIS — D72.829 LEUKOCYTOSIS, UNSPECIFIED TYPE: ICD-10-CM

## 2024-02-15 DIAGNOSIS — I10 ESSENTIAL HYPERTENSION: ICD-10-CM

## 2024-02-15 DIAGNOSIS — M54.12 CERVICAL RADICULOPATHY: ICD-10-CM

## 2024-02-15 DIAGNOSIS — Z12.5 SCREENING PSA (PROSTATE SPECIFIC ANTIGEN): ICD-10-CM

## 2024-02-15 DIAGNOSIS — N18.31 TYPE 2 DIABETES MELLITUS WITH STAGE 3A CHRONIC KIDNEY DISEASE, WITHOUT LONG-TERM CURRENT USE OF INSULIN: Primary | ICD-10-CM

## 2024-02-15 DIAGNOSIS — K64.9 HEMORRHOIDS, UNSPECIFIED HEMORRHOID TYPE: ICD-10-CM

## 2024-02-15 DIAGNOSIS — E53.8 B12 DEFICIENCY: ICD-10-CM

## 2024-02-15 DIAGNOSIS — G89.29 CHRONIC BILATERAL LOW BACK PAIN WITHOUT SCIATICA: ICD-10-CM

## 2024-02-15 DIAGNOSIS — E78.5 DYSLIPIDEMIA: Chronic | ICD-10-CM

## 2024-02-15 DIAGNOSIS — M54.50 CHRONIC BILATERAL LOW BACK PAIN WITHOUT SCIATICA: ICD-10-CM

## 2024-02-15 PROCEDURE — 99999 PR PBB SHADOW E&M-EST. PATIENT-LVL IV: CPT | Mod: PBBFAC,,, | Performed by: FAMILY MEDICINE

## 2024-02-15 PROCEDURE — 72141 MRI NECK SPINE W/O DYE: CPT | Mod: TC

## 2024-02-15 PROCEDURE — 3078F DIAST BP <80 MM HG: CPT | Mod: CPTII,S$GLB,, | Performed by: FAMILY MEDICINE

## 2024-02-15 PROCEDURE — 4010F ACE/ARB THERAPY RXD/TAKEN: CPT | Mod: CPTII,S$GLB,, | Performed by: FAMILY MEDICINE

## 2024-02-15 PROCEDURE — 1159F MED LIST DOCD IN RCRD: CPT | Mod: CPTII,S$GLB,, | Performed by: FAMILY MEDICINE

## 2024-02-15 PROCEDURE — 3074F SYST BP LT 130 MM HG: CPT | Mod: CPTII,S$GLB,, | Performed by: FAMILY MEDICINE

## 2024-02-15 PROCEDURE — 3008F BODY MASS INDEX DOCD: CPT | Mod: CPTII,S$GLB,, | Performed by: FAMILY MEDICINE

## 2024-02-15 PROCEDURE — 99214 OFFICE O/P EST MOD 30 MIN: CPT | Mod: S$GLB,,, | Performed by: FAMILY MEDICINE

## 2024-02-15 PROCEDURE — 72141 MRI NECK SPINE W/O DYE: CPT | Mod: 26,,, | Performed by: RADIOLOGY

## 2024-02-15 PROCEDURE — 3044F HG A1C LEVEL LT 7.0%: CPT | Mod: CPTII,S$GLB,, | Performed by: FAMILY MEDICINE

## 2024-02-15 RX ORDER — GLIMEPIRIDE 4 MG/1
4 TABLET ORAL
Qty: 90 TABLET | Refills: 1 | Status: SHIPPED | OUTPATIENT
Start: 2024-02-15

## 2024-02-15 RX ORDER — AMITRIPTYLINE HYDROCHLORIDE 25 MG/1
75 TABLET, FILM COATED ORAL NIGHTLY
Qty: 270 TABLET | Refills: 3 | Status: SHIPPED | OUTPATIENT
Start: 2024-02-15 | End: 2024-03-08

## 2024-02-15 RX ORDER — AZITHROMYCIN 250 MG/1
TABLET, FILM COATED ORAL
Qty: 6 TABLET | Refills: 0 | Status: SHIPPED | OUTPATIENT
Start: 2024-02-15 | End: 2024-02-20

## 2024-02-15 RX ORDER — CANDESARTAN 32 MG/1
32 TABLET ORAL DAILY
Qty: 90 TABLET | Refills: 3 | Status: SHIPPED | OUTPATIENT
Start: 2024-02-15

## 2024-02-15 RX ORDER — AMLODIPINE BESYLATE 2.5 MG/1
2.5 TABLET ORAL DAILY
Qty: 90 TABLET | Refills: 1 | Status: SHIPPED | OUTPATIENT
Start: 2024-02-15 | End: 2024-03-04

## 2024-02-15 RX ORDER — METOPROLOL SUCCINATE 200 MG/1
200 TABLET, EXTENDED RELEASE ORAL DAILY
Qty: 90 TABLET | Refills: 3 | Status: SHIPPED | OUTPATIENT
Start: 2024-02-15

## 2024-02-15 RX ORDER — SEMAGLUTIDE 0.68 MG/ML
0.5 INJECTION, SOLUTION SUBCUTANEOUS
Qty: 12 ML | Refills: 1 | Status: SHIPPED | OUTPATIENT
Start: 2024-02-15 | End: 2024-05-15

## 2024-02-15 RX ORDER — ERGOCALCIFEROL 1.25 MG/1
50000 CAPSULE ORAL
Qty: 12 CAPSULE | Refills: 0 | Status: SHIPPED | OUTPATIENT
Start: 2024-02-15 | End: 2024-03-08 | Stop reason: SDUPTHER

## 2024-02-15 RX ORDER — LANOLIN ALCOHOL/MO/W.PET/CERES
1000 CREAM (GRAM) TOPICAL DAILY
Qty: 90 TABLET | Refills: 4 | Status: SHIPPED | OUTPATIENT
Start: 2024-02-15

## 2024-02-15 RX ORDER — HYDROCORTISONE 25 MG/G
CREAM TOPICAL 2 TIMES DAILY
Qty: 28 G | Refills: 0 | Status: SHIPPED | OUTPATIENT
Start: 2024-02-15

## 2024-02-15 RX ORDER — GABAPENTIN 800 MG/1
800 TABLET ORAL NIGHTLY
Qty: 90 TABLET | Refills: 1 | Status: SHIPPED | OUTPATIENT
Start: 2024-02-15

## 2024-02-15 RX ORDER — ROSUVASTATIN CALCIUM 40 MG/1
40 TABLET, COATED ORAL NIGHTLY
Qty: 90 TABLET | Refills: 3 | Status: SHIPPED | OUTPATIENT
Start: 2024-02-15

## 2024-02-15 NOTE — PATIENT INSTRUCTIONS
Stop metformin completely  Continue amaryl 4 mg  Continue ozempic 0.5 mg   Goal A1c <7.0    Decrease amlodipine to 2.5 mg due to leg swelling  Continue candesartan  Continue metoprolol  NO diuretics!    Continue vitamin D and b12    Try to cut elavil to lowest tolerated dose (down from 75 mg)  Increase gabapentin to 1 pill, 800 mg. Max dose, 1200 mg/TID.     Try nasal saline nightly  Azithromycin  If nose bleeds persist, message me to see ent.     F/u hgb and ramila in 1 month.     Discussed the diagnosis with the patient, including plans for treatment and expected course.  Distributed educational material.  Discussed symptomatic and preventative measures regarding hemorrhoidal disease.  Recommended fiber supplementation and increased water intake  Ideal bristol stool scale: type 4 stool  Can consider either psyllium or methylcellulose  Can try peaches, prunes, pears, plums, oat meal.  Patients should refrain from straining or lingering (eg, reading) on the toilet.  Patients should have regular physical exercise.  If possible, patients should avoid medications that can cause constipation  Topical corticosteroid per medication orders.  Sitz baths -- Sitz baths are an intuitive topical treatment for acute flare-ups of hemorrhoids to reduce inflammation and edema and relax the sphincter muscles  Follow up as needed.  Advised to seek care immediately if bleeding becomes heavy.

## 2024-02-15 NOTE — PROGRESS NOTES
Subjective:       Patient ID: Partha Curtis Jr. is a 63 y.o. male.    Chief Complaint: Diabetes    Jarvis is a 63 y.o. male who presents today for f/u     HTN: currently taking metoprolol, amlodipine, and candesartan. No longer on diuretic. Does have some leg swelling.   Had second episode of hospitalization/Er visit due to HANANE. Had episode June 2022 and June 2023 again.   See EMR for full details and discussions.      DM: currently on ozempic 0.5 mg/week. On metformin 2 pills/day and amaryl 4 mg. A1c well controlled.   DLD: on crestor. LDL at goal. HDL chronically low.  Low T: seeing endocrine. Hgb high.   HANANE/CKD: mild noted on recent labs.   Electrolyte issues: resolved  High PTH: resolved.      Has acute worsening of chronic low back pain. Now seeing [facility]. Needs refill of elavil and gabapentin. Was taking 75 mg and 400 mg respectively.      Cough and congestion for 3-4 days. Having nose bleeds.     Having some hemorrhoids and constipation. They are occasionally painful. Colonoscopy pending. Does have some bleeding. This is ongoing for 2 weeks total. Miralax is helping.         Review of Systems   Constitutional:  Negative for chills and fever.   HENT:  Positive for congestion.    Respiratory:  Positive for cough. Negative for shortness of breath.    Cardiovascular:  Negative for chest pain.   Gastrointestinal:  Positive for blood in stool. Negative for nausea and vomiting.   Neurological:  Negative for dizziness and headaches.       Results for orders placed or performed in visit on 02/09/24   CBC Auto Differential   Result Value Ref Range    WBC 13.92 (H) 3.90 - 12.70 K/uL    RBC 5.96 4.60 - 6.20 M/uL    Hemoglobin 18.6 (H) 14.0 - 18.0 g/dL    Hematocrit 56.8 (H) 40.0 - 54.0 %    MCV 95 82 - 98 fL    MCH 31.2 (H) 27.0 - 31.0 pg    MCHC 32.7 32.0 - 36.0 g/dL    RDW 14.4 11.5 - 14.5 %    Platelets 348 150 - 450 K/uL    MPV 9.9 9.2 - 12.9 fL    Immature Granulocytes 0.4 0.0 - 0.5 %    Gran # (ANC) 11.7 (H) 1.8  "- 7.7 K/uL    Immature Grans (Abs) 0.05 (H) 0.00 - 0.04 K/uL    Lymph # 1.5 1.0 - 4.8 K/uL    Mono # 0.6 0.3 - 1.0 K/uL    Eos # 0.0 0.0 - 0.5 K/uL    Baso # 0.02 0.00 - 0.20 K/uL    nRBC 0 0 /100 WBC    Gran % 84.3 (H) 38.0 - 73.0 %    Lymph % 10.8 (L) 18.0 - 48.0 %    Mono % 4.4 4.0 - 15.0 %    Eosinophil % 0.0 0.0 - 8.0 %    Basophil % 0.1 0.0 - 1.9 %    Differential Method Automated    Comprehensive Metabolic Panel   Result Value Ref Range    Sodium 137 136 - 145 mmol/L    Potassium 5.0 3.5 - 5.1 mmol/L    Chloride 100 95 - 110 mmol/L    CO2 27 23 - 29 mmol/L    Glucose 143 (H) 70 - 110 mg/dL    BUN 19 8 - 23 mg/dL    Creatinine 1.6 (H) 0.5 - 1.4 mg/dL    Calcium 10.4 8.7 - 10.5 mg/dL    Total Protein 7.4 6.0 - 8.4 g/dL    Albumin 4.2 3.5 - 5.2 g/dL    Total Bilirubin 0.5 0.1 - 1.0 mg/dL    Alkaline Phosphatase 69 55 - 135 U/L    AST 23 10 - 40 U/L    ALT 40 10 - 44 U/L    eGFR 48.1 (A) >60 mL/min/1.73 m^2    Anion Gap 10 8 - 16 mmol/L   Hemoglobin A1C   Result Value Ref Range    Hemoglobin A1C 6.0 (H) 4.0 - 5.6 %    Estimated Avg Glucose 126 68 - 131 mg/dL   Testosterone   Result Value Ref Range    Testosterone, Total 1003 304 - 1227 ng/dL       Objective:     Vitals:    02/15/24 0814   BP: 110/78   Pulse: 65   Temp: 97.5 °F (36.4 °C)   TempSrc: Oral   SpO2: 99%   Weight: 84.9 kg (187 lb 2.7 oz)   Height: 5' 10" (1.778 m)        Physical Exam  Constitutional:       General: He is not in acute distress.     Appearance: He is not ill-appearing, toxic-appearing or diaphoretic.   HENT:      Nose: Congestion present. No rhinorrhea.      Mouth/Throat:      Pharynx: No oropharyngeal exudate or posterior oropharyngeal erythema.   Cardiovascular:      Rate and Rhythm: Normal rate and regular rhythm.   Pulmonary:      Effort: Pulmonary effort is normal.      Breath sounds: Normal breath sounds.   Abdominal:      Palpations: Abdomen is soft.      Tenderness: There is no abdominal tenderness.   Genitourinary:     Comments: " deferred  Musculoskeletal:         General: No swelling.   Neurological:      General: No focal deficit present.      Mental Status: He is alert.   Psychiatric:         Mood and Affect: Mood normal.         Behavior: Behavior normal.         Thought Content: Thought content normal.         Judgment: Judgment normal.         Assessment:       1. Type 2 diabetes mellitus with stage 3a chronic kidney disease, without long-term current use of insulin    2. Dyslipidemia    3. Vitamin D deficiency    4. B12 deficiency    5. Essential hypertension    6. Cervical radiculopathy    7. HANANE (acute kidney injury)    8. Leukocytosis, unspecified type    9. Chronic bilateral low back pain without sciatica    10. Screening PSA (prostate specific antigen)    11. Hemorrhoids, unspecified hemorrhoid type        Plan:       Stop metformin completely  Continue amaryl 4 mg  Continue ozempic 0.5 mg   Goal A1c <7.0    Decrease amlodipine to 2.5 mg due to leg swelling  Continue candesartan  Continue metoprolol  NO diuretics!    Continue vitamin D and b12    Try to cut elavil to lowest tolerated dose (down from 75 mg)  Increase gabapentin to 1 pill, 800 mg. Max dose, 1200 mg/TID.     Try nasal saline nightly  Azithromycin  If nose bleeds persist, message me to see ent.     F/u hgb and hanane in 1 month.     F/u 3 months, labs prior.       Type 2 diabetes mellitus with stage 3a chronic kidney disease, without long-term current use of insulin  -     semaglutide (OZEMPIC) 0.25 mg or 0.5 mg (2 mg/3 mL) pen injector; Inject 0.5 mg into the skin every 7 days.  Dispense: 12 mL; Refill: 1  -     glimepiride (AMARYL) 4 MG tablet; Take 1 tablet (4 mg total) by mouth before breakfast.  Dispense: 90 tablet; Refill: 1  -     Microalbumin/Creatinine Ratio, Urine; Standing  -     CBC Auto Differential; Future; Expected date: 02/15/2024  -     Comprehensive Metabolic Panel; Future; Expected date: 02/15/2024  -     Hemoglobin A1C; Future; Expected date:  02/15/2024    Dyslipidemia  -     rosuvastatin (CRESTOR) 40 MG Tab; Take 1 tablet (40 mg total) by mouth every evening.  Dispense: 90 tablet; Refill: 3    Vitamin D deficiency  -     ergocalciferol (ERGOCALCIFEROL) 50,000 unit Cap; Take 1 capsule (50,000 Units total) by mouth every 7 days.  Dispense: 12 capsule; Refill: 0    B12 deficiency  -     cyanocobalamin (VITAMIN B-12) 1000 MCG tablet; Take 1 tablet (1,000 mcg total) by mouth once daily.  Dispense: 90 tablet; Refill: 4    Essential hypertension  -     candesartan (ATACAND) 32 MG tablet; Take 1 tablet (32 mg total) by mouth once daily.  Dispense: 90 tablet; Refill: 3  -     amLODIPine (NORVASC) 2.5 MG tablet; Take 1 tablet (2.5 mg total) by mouth once daily.  Dispense: 90 tablet; Refill: 1  -     metoprolol succinate (TOPROL-XL) 200 MG 24 hr tablet; Take 1 tablet (200 mg total) by mouth once daily.  Dispense: 90 tablet; Refill: 3    Cervical radiculopathy  -     gabapentin (NEURONTIN) 800 MG tablet; Take 1 tablet (800 mg total) by mouth every evening.  Dispense: 90 tablet; Refill: 1    HANANE (acute kidney injury)    Leukocytosis, unspecified type  -     CBC Auto Differential; Future; Expected date: 02/15/2024  -     Comprehensive Metabolic Panel; Future; Expected date: 02/15/2024  -     azithromycin (Z-BRYNN) 250 MG tablet; Take 2 tablets by mouth on day 1; Take 1 tablet by mouth on days 2-5  Dispense: 6 tablet; Refill: 0    Chronic bilateral low back pain without sciatica  -     amitriptyline (ELAVIL) 25 MG tablet; Take 3 tablets (75 mg total) by mouth every evening.  Dispense: 270 tablet; Refill: 3    Screening PSA (prostate specific antigen)  -     PSA, Screening; Future; Expected date: 02/15/2024    Hemorrhoids, unspecified hemorrhoid type  -     hydrocortisone 2.5 % cream; Apply topically 2 (two) times daily.  Dispense: 28 g; Refill: 0            Warning signs discussed, patient to call with any further issues or worsening of symptoms. Above note may have  utilized dictation, please excuse any transcription errors.

## 2024-02-19 ENCOUNTER — PATIENT MESSAGE (OUTPATIENT)
Dept: ADMINISTRATIVE | Facility: HOSPITAL | Age: 64
End: 2024-02-19
Payer: COMMERCIAL

## 2024-02-19 ENCOUNTER — PATIENT OUTREACH (OUTPATIENT)
Dept: ADMINISTRATIVE | Facility: HOSPITAL | Age: 64
End: 2024-02-19
Payer: COMMERCIAL

## 2024-02-19 NOTE — PROGRESS NOTES
02/19/2024 Gap report audit performed. Care Everywhere updates requested and reviewed  Overdue HM topic chart audit and/or requested. LINKS triggered and reconciled. Media reviewed, campaign f/up - Urine microalb scheduled for 02/26/2024

## 2024-02-26 ENCOUNTER — HOSPITAL ENCOUNTER (OUTPATIENT)
Facility: OTHER | Age: 64
Discharge: HOME | End: 2024-02-26
Attending: ANESTHESIOLOGY | Admitting: ANESTHESIOLOGY
Payer: COMMERCIAL

## 2024-02-26 VITALS
TEMPERATURE: 98 F | RESPIRATION RATE: 16 BRPM | WEIGHT: 184 LBS | HEART RATE: 98 BPM | SYSTOLIC BLOOD PRESSURE: 138 MMHG | HEIGHT: 70 IN | DIASTOLIC BLOOD PRESSURE: 79 MMHG | BODY MASS INDEX: 26.34 KG/M2 | OXYGEN SATURATION: 98 %

## 2024-02-26 DIAGNOSIS — M50.30 DDD (DEGENERATIVE DISC DISEASE), CERVICAL: ICD-10-CM

## 2024-02-26 DIAGNOSIS — G89.29 CHRONIC PAIN: ICD-10-CM

## 2024-02-26 DIAGNOSIS — M54.12 CERVICAL RADICULOPATHY: Primary | ICD-10-CM

## 2024-02-26 LAB — POCT GLUCOSE: 117 MG/DL (ref 70–110)

## 2024-02-26 PROCEDURE — 62321 NJX INTERLAMINAR CRV/THRC: CPT | Mod: ,,, | Performed by: ANESTHESIOLOGY

## 2024-02-26 PROCEDURE — 62321 NJX INTERLAMINAR CRV/THRC: CPT | Performed by: ANESTHESIOLOGY

## 2024-02-26 PROCEDURE — A4216 STERILE WATER/SALINE, 10 ML: HCPCS | Performed by: ANESTHESIOLOGY

## 2024-02-26 PROCEDURE — 25500020 PHARM REV CODE 255: Performed by: ANESTHESIOLOGY

## 2024-02-26 PROCEDURE — 63600175 PHARM REV CODE 636 W HCPCS: Performed by: ANESTHESIOLOGY

## 2024-02-26 PROCEDURE — 25000003 PHARM REV CODE 250: Performed by: ANESTHESIOLOGY

## 2024-02-26 RX ORDER — LIDOCAINE HYDROCHLORIDE 20 MG/ML
INJECTION, SOLUTION INFILTRATION; PERINEURAL
Status: DISCONTINUED | OUTPATIENT
Start: 2024-02-26 | End: 2024-02-26 | Stop reason: HOSPADM

## 2024-02-26 RX ORDER — DEXAMETHASONE SODIUM PHOSPHATE 10 MG/ML
INJECTION INTRAMUSCULAR; INTRAVENOUS
Status: DISCONTINUED | OUTPATIENT
Start: 2024-02-26 | End: 2024-02-26 | Stop reason: HOSPADM

## 2024-02-26 RX ORDER — LIDOCAINE HYDROCHLORIDE 10 MG/ML
INJECTION, SOLUTION EPIDURAL; INFILTRATION; INTRACAUDAL; PERINEURAL
Status: DISCONTINUED | OUTPATIENT
Start: 2024-02-26 | End: 2024-02-26 | Stop reason: HOSPADM

## 2024-02-26 RX ORDER — SODIUM CHLORIDE 9 MG/ML
INJECTION, SOLUTION INTRAVENOUS CONTINUOUS
Status: ACTIVE | OUTPATIENT
Start: 2024-02-26

## 2024-02-26 RX ORDER — MIDAZOLAM HYDROCHLORIDE 1 MG/ML
INJECTION INTRAMUSCULAR; INTRAVENOUS
Status: DISCONTINUED | OUTPATIENT
Start: 2024-02-26 | End: 2024-02-26 | Stop reason: HOSPADM

## 2024-02-26 RX ORDER — FENTANYL CITRATE 50 UG/ML
INJECTION, SOLUTION INTRAMUSCULAR; INTRAVENOUS
Status: DISCONTINUED | OUTPATIENT
Start: 2024-02-26 | End: 2024-02-26 | Stop reason: HOSPADM

## 2024-02-26 RX ORDER — SODIUM CHLORIDE 9 MG/ML
INJECTION, SOLUTION INTRAMUSCULAR; INTRAVENOUS; SUBCUTANEOUS
Status: DISCONTINUED | OUTPATIENT
Start: 2024-02-26 | End: 2024-02-26 | Stop reason: HOSPADM

## 2024-02-26 NOTE — DISCHARGE INSTRUCTIONS

## 2024-02-26 NOTE — DISCHARGE SUMMARY
"Discharge Note  Short Stay      SUMMARY     Admit Date: 2/26/2024    Attending Physician: oGkul Fung      Discharge Physician: Gokul Fung      Discharge Date: 2/26/2024 1:34 PM    Procedure(s) (LRB):  CERVICAL C7/T1 IL SELENA (N/A)    Final Diagnosis: Cervical radiculopathy [M54.12]    Disposition: Home or self care    Patient Instructions:   Current Discharge Medication List        CONTINUE these medications which have NOT CHANGED    Details   amitriptyline (ELAVIL) 25 MG tablet Take 3 tablets (75 mg total) by mouth every evening.  Qty: 270 tablet, Refills: 3    Associated Diagnoses: Chronic bilateral low back pain without sciatica      amLODIPine (NORVASC) 2.5 MG tablet Take 1 tablet (2.5 mg total) by mouth once daily.  Qty: 90 tablet, Refills: 1    Comments: .  Associated Diagnoses: Essential hypertension      aspirin (ECOTRIN) 81 MG EC tablet Take 81 mg by mouth once daily.      azelastine (ASTELIN) 137 mcg (0.1 %) nasal spray 1 spray (137 mcg total) by Nasal route 2 (two) times daily.  Qty: 30 mL, Refills: 11    Associated Diagnoses: Acute bacterial sinusitis      BD LUER-RUSS SYRINGE 3 mL 25 gauge x 1" Syrg USE ONE SYRINGE EVERY 14 DAYS WITH TESTOSTERONE  Qty: 100 each, Refills: 2      candesartan (ATACAND) 32 MG tablet Take 1 tablet (32 mg total) by mouth once daily.  Qty: 90 tablet, Refills: 3    Associated Diagnoses: Essential hypertension      cyanocobalamin (VITAMIN B-12) 1000 MCG tablet Take 1 tablet (1,000 mcg total) by mouth once daily.  Qty: 90 tablet, Refills: 4    Associated Diagnoses: B12 deficiency      ergocalciferol (ERGOCALCIFEROL) 50,000 unit Cap Take 1 capsule (50,000 Units total) by mouth every 7 days.  Qty: 12 capsule, Refills: 0    Associated Diagnoses: Vitamin D deficiency      gabapentin (NEURONTIN) 800 MG tablet Take 1 tablet (800 mg total) by mouth every evening.  Qty: 90 tablet, Refills: 1    Associated Diagnoses: Cervical radiculopathy      glimepiride (AMARYL) 4 MG tablet Take 1 " "tablet (4 mg total) by mouth before breakfast.  Qty: 90 tablet, Refills: 1    Associated Diagnoses: Type 2 diabetes mellitus with stage 3a chronic kidney disease, without long-term current use of insulin      hydrocortisone 2.5 % cream Apply topically 2 (two) times daily.  Qty: 28 g, Refills: 0    Associated Diagnoses: Hemorrhoids, unspecified hemorrhoid type      ipratropium (ATROVENT) 42 mcg (0.06 %) nasal spray 2 sprays by Nasal route 2 (two) times daily as needed for Rhinitis.  Qty: 15 mL, Refills: 0    Associated Diagnoses: Influenza A      metoprolol succinate (TOPROL-XL) 200 MG 24 hr tablet Take 1 tablet (200 mg total) by mouth once daily.  Qty: 90 tablet, Refills: 3    Comments: .  Associated Diagnoses: Essential hypertension      oxyCODONE-acetaminophen (PERCOCET) 5-325 mg per tablet Take 1 tablet by mouth 2 (two) times daily as needed.      rosuvastatin (CRESTOR) 40 MG Tab Take 1 tablet (40 mg total) by mouth every evening.  Qty: 90 tablet, Refills: 3    Associated Diagnoses: Dyslipidemia      semaglutide (OZEMPIC) 0.25 mg or 0.5 mg (2 mg/3 mL) pen injector Inject 0.5 mg into the skin every 7 days.  Qty: 12 mL, Refills: 1    Associated Diagnoses: Type 2 diabetes mellitus with stage 3a chronic kidney disease, without long-term current use of insulin      sod sulf-pot chloride-mag sulf (SUTAB) 1.479-0.188- 0.225 gram tablet Take 12 tablets by mouth once daily. Take according to package instructions with indicated amount of water.  Qty: 24 tablet, Refills: 0    Comments: Coupon code [id number] PCN: [id number] Group: [id number] Member ID: [id number]  Associated Diagnoses: Colon cancer screening      syringe with needle, safety (BD INTEGRA SYRINGE) 3 mL 22 gauge x 1 1/2" Syrg Inject 1 Syringe as directed once a week.  Qty: 6 each, Refills: 3      testosterone cypionate (DEPOTESTOTERONE CYPIONATE) 200 mg/mL injection Inject 1 mL (200 mg total) into the muscle every 14 (fourteen) days.  Qty: 6 mL, Refills: 1    " Associated Diagnoses: Hypogonadism in male      valACYclovir (VALTREX) 1000 MG tablet TAKE 2 TABLETS BY MOUTH EVERY 12 HOURS  Qty: 4 tablet, Refills: 3                 Discharge Diagnosis: Cervical radiculopathy [M54.12]  Condition on Discharge: Stable with no complications to procedure   Diet on Discharge: Same as before.  Activity: as per instruction sheet.  Discharge to: Home with a responsible adult.  Follow up: 2-4 weeks       Please call my office or pager at [phone] if experienced any weakness or loss of sensation, fever > 101.5, pain uncontrolled with oral medications, persistent nausea/vomiting/or diarrhea, redness or drainage from the incisions, or any other worrisome concerns. If physician on call was not reached or could not communicate with our office for any reason please go to the nearest emergency department

## 2024-02-26 NOTE — OP NOTE
Cervical Interlaminar Epidural Steroid Injection under Fluoroscopic Guidance    The procedure, risks, benefits, and options were discussed with the patient. There are no contraindications to the procedure. The patent expressed understanding and agreed to the procedure. Informed written consent was obtained prior to the start of the procedure and can be found in the patient's chart.     PATIENT NAME: Partha Curtis Jr.   MRN: [id number]     DATE OF PROCEDURE: 02/26/2024    PROCEDURE: Cervical Interlaminar Epidural Steroid Injection C7/T1 under Fluoroscopic Guidance    PRE-OP DIAGNOSIS: Cervical radiculopathy [M54.12] Cervical radiculopathy [M54.12]    POST-OP DIAGNOSIS: Same    PHYSICIAN: Gokul Fung MD     ASSISTANTS: Aditya Vela MD  [employer] [facility] Pain Fellow     MEDICATIONS INJECTED: Preservative-free Decadron 10mg with 1cc of Lidocaine 1% MPF and preservative free normal saline    LOCAL ANESTHETIC INJECTED: Xylocaine 2%     SEDATION: Versed 2mg and Fentanyl 50mcg                                                                                                                                                                                     Conscious sedation ordered by M.D. Patient re-evaluation prior to administration of conscious sedation. No changes noted in patient's status from initial evaluation. The patient's vital signs were monitored by RN and patient remained hemodynamically stable throughout the procedure.    Event Time In   Sedation Start 1332       ESTIMATED BLOOD LOSS: None    COMPLICATIONS: None    TECHNIQUE: Time-out was performed to identify the patient and procedure to be performed. With the patient laying in a prone position, the surgical area was prepped and draped in the usual sterile fashion using ChloraPrep and a fenestrated drape. The level was determined under fluoroscopy guidance. Skin anesthesia was achieved by injecting Lidocaine 2% over the injection site.  The interlaminar space was  then approached with a 20 gauge, 3.5 inch Tuohy needle that was introduced under fluoroscopic guidance with AP, lateral and/or contralateral oblique imaging. Once the Ligamentum flavum was encountered loss of resistance to saline was used to enter the epidural space. With positive loss of resistance and negative aspiration for CSF or Blood, contrast dye  Omnipaque (300mg/mL) was injected to confirm placement and there was no vascular runoff. Then 2 mL of the medication mixture listed above was then injected slowly. Displacement of the radio opaque contrast after injection of the medication confirmed that the medication went into the area of the epidural space. The needles were removed, and bleeding was nil. A sterile dressing was applied. No specimens collected. The patient tolerated the procedure well.     PRE-PROCEDURE PAIN SCORE: 10/10    POST-PROCEDURE PAIN SCORE: 4/10    The patient was monitored after the procedure in the recovery area. They were given post-procedure and discharge instructions to follow at home. The patient was discharged in a stable condition.        Gokul Fung MD

## 2024-02-26 NOTE — H&P
"HPI  Patient presenting for Procedure(s) (LRB):  CERVICAL C7/T1 IL SELENA (N/A)     Patient on Anti-coagulation No    No health changes since previous encounter. No changes in pain since procedure was scheduled at previous visit. Denies any fevers or infections. Denies any issues with clotting or bleeding.     Current Facility-Administered Medications on File Prior to Encounter   Medication Dose Route Frequency Provider Last Rate Last Admin    0.9%  NaCl infusion   Intravenous Continuous Kendell Hoffman MD         Current Outpatient Medications on File Prior to Encounter   Medication Sig Dispense Refill    aspirin (ECOTRIN) 81 MG EC tablet Take 81 mg by mouth once daily.      azelastine (ASTELIN) 137 mcg (0.1 %) nasal spray 1 spray (137 mcg total) by Nasal route 2 (two) times daily. (Patient taking differently: 1 spray by Nasal route 2 (two) times daily as needed.) 30 mL 11    BD LUER-RUSS SYRINGE 3 mL 25 gauge x 1" Syrg USE ONE SYRINGE EVERY 14 DAYS WITH TESTOSTERONE 100 each 2    ipratropium (ATROVENT) 42 mcg (0.06 %) nasal spray 2 sprays by Nasal route 2 (two) times daily as needed for Rhinitis. 15 mL 0    oxyCODONE-acetaminophen (PERCOCET) 5-325 mg per tablet Take 1 tablet by mouth 2 (two) times daily as needed.      sod sulf-pot chloride-mag sulf (SUTAB) 1.479-0.188- 0.225 gram tablet Take 12 tablets by mouth once daily. Take according to package instructions with indicated amount of water. 24 tablet 0    syringe with needle, safety (BD INTEGRA SYRINGE) 3 mL 22 gauge x 1 1/2" Syrg Inject 1 Syringe as directed once a week. 6 each 3    testosterone cypionate (DEPOTESTOTERONE CYPIONATE) 200 mg/mL injection Inject 1 mL (200 mg total) into the muscle every 14 (fourteen) days. 6 mL 1    valACYclovir (VALTREX) 1000 MG tablet TAKE 2 TABLETS BY MOUTH EVERY 12 HOURS (Patient taking differently: Take 2,000 mg by mouth every 12 (twelve) hours as needed.) 4 tablet 3     Past Medical History:   Diagnosis Date    Allergy     " "Carotid artery occlusion     Chronic back pain     Colonic polyp     Genetic testing     MUTYH mutation-negative    Hyperlipidemia     Hypertension     Sleep apnea     Type 2 diabetes mellitus with stage 3 chronic kidney disease, without long-term current use of insulin 4/2/2020     Past Surgical History:   Procedure Laterality Date    ANKLE SURGERY Left     2    APPENDECTOMY      at age 20    CAROTID ENDARTERECTOMY Right 07/15/2015    CARPAL TUNNEL RELEASE Bilateral     COLONOSCOPY N/A 12/14/2018    Procedure: COLONOSCOPYSuprep;  Surgeon: Silver Selby MD;  Location: [facility];  Service: Endoscopy;  Laterality: N/A;    JOINT REPLACEMENT  9/2010    NASAL SEPTUM SURGERY      TOTAL KNEE ARTHROPLASTY Right     6 knee surgeries     Review of patient's allergies indicates:   Allergen Reactions    Stadol [butorphanol tartrate] Rash     Swelling in face    Strawberries [strawberry] Rash      Current Facility-Administered Medications   Medication    0.9%  NaCl infusion     Facility-Administered Medications Ordered in Other Encounters   Medication    0.9%  NaCl infusion       PMHx, PSHx, Allergies, Medications reviewed in epic    ROS negative except pain complaints in HPI    OBJECTIVE:    BP (!) 151/107 (BP Location: Right arm, Patient Position: Sitting)   Pulse 110   Temp 98.2 °F (36.8 °C) (Oral)   Resp 16   Ht 5' 10" (1.778 m)   Wt 83.5 kg (184 lb)   SpO2 96%   BMI 26.40 kg/m²     PHYSICAL EXAMINATION:    GENERAL: Well appearing, in no acute distress, alert and oriented.  PSYCH:  Mood and affect appropriate.  SKIN: Skin color, texture, turgor normal in procedure area, no rashes or lesions which will impact the procedure.  CV: RRR with palpation of the radial artery.  PULM: No evidence of respiratory difficulty, symmetric chest rise. Clear to auscultation.  NEURO: Alert. Oriented. Speech fluent and appropriate. Moving all extremities.    Plan:    Proceed with procedure as planned Procedure(s) (LRB):  CERVICAL " C7/T1 IL SELENA (N/A)    Maggie Vela  02/26/2024

## 2024-02-27 ENCOUNTER — TELEPHONE (OUTPATIENT)
Dept: FAMILY MEDICINE | Facility: CLINIC | Age: 64
End: 2024-02-27
Payer: COMMERCIAL

## 2024-02-27 ENCOUNTER — PATIENT MESSAGE (OUTPATIENT)
Dept: FAMILY MEDICINE | Facility: CLINIC | Age: 64
End: 2024-02-27
Payer: COMMERCIAL

## 2024-02-27 ENCOUNTER — LAB VISIT (OUTPATIENT)
Dept: LAB | Facility: HOSPITAL | Age: 64
End: 2024-02-27
Attending: FAMILY MEDICINE
Payer: COMMERCIAL

## 2024-02-27 ENCOUNTER — HOSPITAL ENCOUNTER (OUTPATIENT)
Facility: HOSPITAL | Age: 64
Discharge: HOME | End: 2024-02-29
Attending: STUDENT IN AN ORGANIZED HEALTH CARE EDUCATION/TRAINING PROGRAM | Admitting: STUDENT IN AN ORGANIZED HEALTH CARE EDUCATION/TRAINING PROGRAM
Payer: COMMERCIAL

## 2024-02-27 DIAGNOSIS — I48.91 ATRIAL FIBRILLATION WITH RVR: Primary | ICD-10-CM

## 2024-02-27 DIAGNOSIS — R00.0 TACHYCARDIA: ICD-10-CM

## 2024-02-27 DIAGNOSIS — I48.91 ATRIAL FIBRILLATION: ICD-10-CM

## 2024-02-27 DIAGNOSIS — N17.9 AKI (ACUTE KIDNEY INJURY): ICD-10-CM

## 2024-02-27 DIAGNOSIS — R07.9 CHEST PAIN, UNSPECIFIED TYPE: ICD-10-CM

## 2024-02-27 DIAGNOSIS — R74.01 TRANSAMINITIS: ICD-10-CM

## 2024-02-27 DIAGNOSIS — I48.91 ATRIAL FIBRILLATION STATUS POST CARDIOVERSION: ICD-10-CM

## 2024-02-27 DIAGNOSIS — I48.91 A-FIB: ICD-10-CM

## 2024-02-27 DIAGNOSIS — R07.9 CHEST PAIN: ICD-10-CM

## 2024-02-27 DIAGNOSIS — N17.9 AKI (ACUTE KIDNEY INJURY): Primary | ICD-10-CM

## 2024-02-27 PROBLEM — E11.29 MICROALBUMINURIA DUE TO TYPE 2 DIABETES MELLITUS: Status: ACTIVE | Noted: 2024-02-27

## 2024-02-27 PROBLEM — R80.9 MICROALBUMINURIA DUE TO TYPE 2 DIABETES MELLITUS: Status: ACTIVE | Noted: 2024-02-27

## 2024-02-27 LAB
ALBUMIN SERPL BCP-MCNC: 4.1 G/DL (ref 3.5–5.2)
ALP SERPL-CCNC: 69 U/L (ref 55–135)
ALT SERPL W/O P-5'-P-CCNC: 70 U/L (ref 10–44)
ANION GAP SERPL CALC-SCNC: 15 MMOL/L (ref 8–16)
AST SERPL-CCNC: 35 U/L (ref 10–40)
BASOPHILS # BLD AUTO: 0.03 K/UL (ref 0–0.2)
BASOPHILS NFR BLD: 0.2 % (ref 0–1.9)
BILIRUB SERPL-MCNC: 0.6 MG/DL (ref 0.1–1)
BUN SERPL-MCNC: 19 MG/DL (ref 8–23)
CALCIUM SERPL-MCNC: 9.9 MG/DL (ref 8.7–10.5)
CHLORIDE SERPL-SCNC: 103 MMOL/L (ref 95–110)
CO2 SERPL-SCNC: 18 MMOL/L (ref 23–29)
CREAT SERPL-MCNC: 1.4 MG/DL (ref 0.5–1.4)
DIFFERENTIAL METHOD BLD: ABNORMAL
EOSINOPHIL # BLD AUTO: 0 K/UL (ref 0–0.5)
EOSINOPHIL NFR BLD: 0 % (ref 0–8)
ERYTHROCYTE [DISTWIDTH] IN BLOOD BY AUTOMATED COUNT: 14.3 % (ref 11.5–14.5)
EST. GFR  (NO RACE VARIABLE): 56 ML/MIN/1.73 M^2
GLUCOSE SERPL-MCNC: 173 MG/DL (ref 70–110)
HCT VFR BLD AUTO: 53.2 % (ref 40–54)
HGB BLD-MCNC: 18.7 G/DL (ref 14–18)
IMM GRANULOCYTES # BLD AUTO: 0.06 K/UL (ref 0–0.04)
IMM GRANULOCYTES NFR BLD AUTO: 0.3 % (ref 0–0.5)
LYMPHOCYTES # BLD AUTO: 1.7 K/UL (ref 1–4.8)
LYMPHOCYTES NFR BLD: 8.9 % (ref 18–48)
MAGNESIUM SERPL-MCNC: 1.9 MG/DL (ref 1.6–2.6)
MCH RBC QN AUTO: 31.3 PG (ref 27–31)
MCHC RBC AUTO-ENTMCNC: 35.2 G/DL (ref 32–36)
MCV RBC AUTO: 89 FL (ref 82–98)
MONOCYTES # BLD AUTO: 1 K/UL (ref 0.3–1)
MONOCYTES NFR BLD: 5.3 % (ref 4–15)
NEUTROPHILS # BLD AUTO: 16.2 K/UL (ref 1.8–7.7)
NEUTROPHILS NFR BLD: 85.3 % (ref 38–73)
NRBC BLD-RTO: 0 /100 WBC
PHOSPHATE SERPL-MCNC: 3 MG/DL (ref 2.7–4.5)
PLATELET # BLD AUTO: 280 K/UL (ref 150–450)
PMV BLD AUTO: 9.8 FL (ref 9.2–12.9)
POTASSIUM SERPL-SCNC: 4.3 MMOL/L (ref 3.5–5.1)
PROT SERPL-MCNC: 7.5 G/DL (ref 6–8.4)
RBC # BLD AUTO: 5.98 M/UL (ref 4.6–6.2)
SODIUM SERPL-SCNC: 136 MMOL/L (ref 136–145)
TSH SERPL DL<=0.005 MIU/L-ACNC: 0.62 UIU/ML (ref 0.4–4)
WBC # BLD AUTO: 18.94 K/UL (ref 3.9–12.7)

## 2024-02-27 PROCEDURE — 80053 COMPREHEN METABOLIC PANEL: CPT | Mod: 91 | Performed by: STUDENT IN AN ORGANIZED HEALTH CARE EDUCATION/TRAINING PROGRAM

## 2024-02-27 PROCEDURE — 84443 ASSAY THYROID STIM HORMONE: CPT | Mod: 91 | Performed by: STUDENT IN AN ORGANIZED HEALTH CARE EDUCATION/TRAINING PROGRAM

## 2024-02-27 PROCEDURE — 84484 ASSAY OF TROPONIN QUANT: CPT | Performed by: STUDENT IN AN ORGANIZED HEALTH CARE EDUCATION/TRAINING PROGRAM

## 2024-02-27 PROCEDURE — 93010 ELECTROCARDIOGRAM REPORT: CPT | Mod: 76,,, | Performed by: INTERNAL MEDICINE

## 2024-02-27 PROCEDURE — 85610 PROTHROMBIN TIME: CPT | Performed by: STUDENT IN AN ORGANIZED HEALTH CARE EDUCATION/TRAINING PROGRAM

## 2024-02-27 PROCEDURE — 85025 COMPLETE CBC W/AUTO DIFF WBC: CPT | Performed by: FAMILY MEDICINE

## 2024-02-27 PROCEDURE — 80053 COMPREHEN METABOLIC PANEL: CPT | Performed by: FAMILY MEDICINE

## 2024-02-27 PROCEDURE — 36415 COLL VENOUS BLD VENIPUNCTURE: CPT | Performed by: FAMILY MEDICINE

## 2024-02-27 PROCEDURE — 96374 THER/PROPH/DIAG INJ IV PUSH: CPT

## 2024-02-27 PROCEDURE — 84443 ASSAY THYROID STIM HORMONE: CPT | Performed by: FAMILY MEDICINE

## 2024-02-27 PROCEDURE — 85025 COMPLETE CBC W/AUTO DIFF WBC: CPT | Mod: 91 | Performed by: STUDENT IN AN ORGANIZED HEALTH CARE EDUCATION/TRAINING PROGRAM

## 2024-02-27 PROCEDURE — 93005 ELECTROCARDIOGRAM TRACING: CPT

## 2024-02-27 PROCEDURE — 99285 EMERGENCY DEPT VISIT HI MDM: CPT | Mod: 25

## 2024-02-27 PROCEDURE — 83735 ASSAY OF MAGNESIUM: CPT | Mod: 91 | Performed by: STUDENT IN AN ORGANIZED HEALTH CARE EDUCATION/TRAINING PROGRAM

## 2024-02-27 PROCEDURE — 83735 ASSAY OF MAGNESIUM: CPT | Performed by: FAMILY MEDICINE

## 2024-02-27 PROCEDURE — 84100 ASSAY OF PHOSPHORUS: CPT | Performed by: FAMILY MEDICINE

## 2024-02-27 PROCEDURE — 83880 ASSAY OF NATRIURETIC PEPTIDE: CPT | Performed by: STUDENT IN AN ORGANIZED HEALTH CARE EDUCATION/TRAINING PROGRAM

## 2024-02-27 PROCEDURE — 93010 ELECTROCARDIOGRAM REPORT: CPT | Mod: ,,, | Performed by: INTERNAL MEDICINE

## 2024-02-27 RX ORDER — GABAPENTIN 400 MG/1
800 CAPSULE ORAL NIGHTLY
Status: DISCONTINUED | OUTPATIENT
Start: 2024-02-28 | End: 2024-02-29 | Stop reason: HOSPADM

## 2024-02-27 RX ORDER — AMITRIPTYLINE HYDROCHLORIDE 25 MG/1
75 TABLET, FILM COATED ORAL NIGHTLY
Status: DISCONTINUED | OUTPATIENT
Start: 2024-02-28 | End: 2024-02-29 | Stop reason: HOSPADM

## 2024-02-27 RX ORDER — IBUPROFEN 200 MG
16 TABLET ORAL
Status: DISCONTINUED | OUTPATIENT
Start: 2024-02-28 | End: 2024-02-29 | Stop reason: HOSPADM

## 2024-02-27 RX ORDER — ACETAMINOPHEN 325 MG/1
650 TABLET ORAL EVERY 8 HOURS PRN
Status: DISCONTINUED | OUTPATIENT
Start: 2024-02-28 | End: 2024-02-29 | Stop reason: HOSPADM

## 2024-02-27 RX ORDER — ASPIRIN 81 MG/1
81 TABLET ORAL DAILY
Status: DISCONTINUED | OUTPATIENT
Start: 2024-02-28 | End: 2024-02-29 | Stop reason: HOSPADM

## 2024-02-27 RX ORDER — METOPROLOL SUCCINATE 50 MG/1
200 TABLET, EXTENDED RELEASE ORAL DAILY
Status: DISCONTINUED | OUTPATIENT
Start: 2024-02-28 | End: 2024-02-29 | Stop reason: HOSPADM

## 2024-02-27 RX ORDER — ONDANSETRON HYDROCHLORIDE 2 MG/ML
4 INJECTION, SOLUTION INTRAVENOUS EVERY 8 HOURS PRN
Status: DISCONTINUED | OUTPATIENT
Start: 2024-02-28 | End: 2024-02-29 | Stop reason: HOSPADM

## 2024-02-27 RX ORDER — TALC
6 POWDER (GRAM) TOPICAL NIGHTLY PRN
Status: DISCONTINUED | OUTPATIENT
Start: 2024-02-28 | End: 2024-02-29 | Stop reason: HOSPADM

## 2024-02-27 RX ORDER — OXYCODONE AND ACETAMINOPHEN 5; 325 MG/1; MG/1
1 TABLET ORAL 2 TIMES DAILY PRN
Status: DISCONTINUED | OUTPATIENT
Start: 2024-02-28 | End: 2024-02-29 | Stop reason: HOSPADM

## 2024-02-27 RX ORDER — LOSARTAN POTASSIUM 50 MG/1
100 TABLET ORAL DAILY
Status: DISCONTINUED | OUTPATIENT
Start: 2024-02-28 | End: 2024-02-29 | Stop reason: HOSPADM

## 2024-02-27 RX ORDER — GLUCAGON 1 MG
1 KIT INJECTION
Status: DISCONTINUED | OUTPATIENT
Start: 2024-02-28 | End: 2024-02-29 | Stop reason: HOSPADM

## 2024-02-27 RX ORDER — IBUPROFEN 200 MG
24 TABLET ORAL
Status: DISCONTINUED | OUTPATIENT
Start: 2024-02-28 | End: 2024-02-29 | Stop reason: HOSPADM

## 2024-02-27 RX ORDER — DILTIAZEM HYDROCHLORIDE 5 MG/ML
20 INJECTION INTRAVENOUS ONCE
Status: COMPLETED | OUTPATIENT
Start: 2024-02-28 | End: 2024-02-27

## 2024-02-27 RX ORDER — ACETAMINOPHEN 325 MG/1
650 TABLET ORAL EVERY 4 HOURS PRN
Status: DISCONTINUED | OUTPATIENT
Start: 2024-02-28 | End: 2024-02-29 | Stop reason: HOSPADM

## 2024-02-27 RX ORDER — NALOXONE HCL 0.4 MG/ML
0.02 VIAL (ML) INJECTION
Status: DISCONTINUED | OUTPATIENT
Start: 2024-02-28 | End: 2024-02-29 | Stop reason: HOSPADM

## 2024-02-27 RX ORDER — SODIUM CHLORIDE 0.9 % (FLUSH) 0.9 %
10 SYRINGE (ML) INJECTION EVERY 12 HOURS PRN
Status: DISCONTINUED | OUTPATIENT
Start: 2024-02-28 | End: 2024-02-29 | Stop reason: HOSPADM

## 2024-02-27 RX ORDER — AMLODIPINE BESYLATE 2.5 MG/1
2.5 TABLET ORAL DAILY
Status: DISCONTINUED | OUTPATIENT
Start: 2024-02-28 | End: 2024-02-29 | Stop reason: HOSPADM

## 2024-02-27 RX ORDER — ACETAMINOPHEN 500 MG
1000 TABLET ORAL EVERY 8 HOURS PRN
Status: DISCONTINUED | OUTPATIENT
Start: 2024-02-28 | End: 2024-02-29 | Stop reason: HOSPADM

## 2024-02-27 RX ADMIN — DILTIAZEM HYDROCHLORIDE 20 MG: 5 INJECTION INTRAVENOUS at 11:02

## 2024-02-27 NOTE — TELEPHONE ENCOUNTER
Patient has a history of dehydration.   Please set up labs urine and EKG today at the hospital for his HR and kidneys to be evaluated.

## 2024-02-28 ENCOUNTER — ANESTHESIA (OUTPATIENT)
Dept: CARDIOLOGY | Facility: HOSPITAL | Age: 64
End: 2024-02-28
Payer: COMMERCIAL

## 2024-02-28 ENCOUNTER — ANESTHESIA EVENT (OUTPATIENT)
Dept: CARDIOLOGY | Facility: HOSPITAL | Age: 64
End: 2024-02-28
Payer: COMMERCIAL

## 2024-02-28 PROBLEM — I48.0 PAROXYSMAL ATRIAL FIBRILLATION: Status: ACTIVE | Noted: 2024-02-28

## 2024-02-28 PROBLEM — I48.0 PAROXYSMAL ATRIAL FIBRILLATION: Chronic | Status: ACTIVE | Noted: 2024-02-28

## 2024-02-28 PROBLEM — R74.01 TRANSAMINITIS: Status: ACTIVE | Noted: 2024-02-28

## 2024-02-28 PROBLEM — I48.91 ATRIAL FIBRILLATION WITH RVR: Status: ACTIVE | Noted: 2024-02-28

## 2024-02-28 PROBLEM — D72.829 LEUKOCYTOSIS: Status: ACTIVE | Noted: 2024-02-28

## 2024-02-28 LAB
ALBUMIN SERPL BCP-MCNC: 3.8 G/DL (ref 3.5–5.2)
ALBUMIN SERPL BCP-MCNC: 4.3 G/DL (ref 3.5–5.2)
ALP SERPL-CCNC: 65 U/L (ref 55–135)
ALP SERPL-CCNC: 78 U/L (ref 55–135)
ALT SERPL W/O P-5'-P-CCNC: 105 U/L (ref 10–44)
ALT SERPL W/O P-5'-P-CCNC: 129 U/L (ref 10–44)
ANION GAP SERPL CALC-SCNC: 11 MMOL/L (ref 8–16)
ANION GAP SERPL CALC-SCNC: 11 MMOL/L (ref 8–16)
ASCENDING AORTA: 3.6 CM
AST SERPL-CCNC: 66 U/L (ref 10–40)
AST SERPL-CCNC: 96 U/L (ref 10–40)
AV INDEX (PROSTH): 0.6
AV MEAN GRADIENT: 3 MMHG
AV PEAK GRADIENT: 4 MMHG
AV VALVE AREA BY VELOCITY RATIO: 2.55 CM²
AV VALVE AREA: 2.17 CM²
AV VELOCITY RATIO: 0.71
BASOPHILS # BLD AUTO: 0.02 K/UL (ref 0–0.2)
BASOPHILS # BLD AUTO: 0.04 K/UL (ref 0–0.2)
BASOPHILS NFR BLD: 0.2 % (ref 0–1.9)
BASOPHILS NFR BLD: 0.3 % (ref 0–1.9)
BILIRUB SERPL-MCNC: 0.3 MG/DL (ref 0.1–1)
BILIRUB SERPL-MCNC: 0.4 MG/DL (ref 0.1–1)
BNP SERPL-MCNC: 195 PG/ML (ref 0–99)
BSA FOR ECHO PROCEDURE: 2.03 M2
BSA FOR ECHO PROCEDURE: 2.03 M2
BUN SERPL-MCNC: 16 MG/DL (ref 8–23)
BUN SERPL-MCNC: 19 MG/DL (ref 8–23)
CALCIUM SERPL-MCNC: 9 MG/DL (ref 8.7–10.5)
CALCIUM SERPL-MCNC: 9.8 MG/DL (ref 8.7–10.5)
CHLORIDE SERPL-SCNC: 102 MMOL/L (ref 95–110)
CHLORIDE SERPL-SCNC: 107 MMOL/L (ref 95–110)
CO2 SERPL-SCNC: 23 MMOL/L (ref 23–29)
CO2 SERPL-SCNC: 25 MMOL/L (ref 23–29)
CREAT SERPL-MCNC: 1.2 MG/DL (ref 0.5–1.4)
CREAT SERPL-MCNC: 1.4 MG/DL (ref 0.5–1.4)
CV ECHO LV RWT: 0.55 CM
DIFFERENTIAL METHOD BLD: ABNORMAL
DIFFERENTIAL METHOD BLD: ABNORMAL
DOP CALC AO PEAK VEL: 1.03 M/S
DOP CALC AO VTI: 19.9 CM
DOP CALC LVOT AREA: 3.6 CM2
DOP CALC LVOT DIAMETER: 2.14 CM
DOP CALC LVOT PEAK VEL: 0.73 M/S
DOP CALC LVOT STROKE VOLUME: 43.14 CM3
DOP CALC MV VTI: 16.7 CM
DOP CALCLVOT PEAK VEL VTI: 12 CM
ECHO LV POSTERIOR WALL: 1.16 CM (ref 0.6–1.1)
EOSINOPHIL # BLD AUTO: 0 K/UL (ref 0–0.5)
EOSINOPHIL # BLD AUTO: 0 K/UL (ref 0–0.5)
EOSINOPHIL NFR BLD: 0.1 % (ref 0–8)
EOSINOPHIL NFR BLD: 0.3 % (ref 0–8)
ERYTHROCYTE [DISTWIDTH] IN BLOOD BY AUTOMATED COUNT: 14.6 % (ref 11.5–14.5)
ERYTHROCYTE [DISTWIDTH] IN BLOOD BY AUTOMATED COUNT: 15.4 % (ref 11.5–14.5)
EST. GFR  (NO RACE VARIABLE): 56 ML/MIN/1.73 M^2
EST. GFR  (NO RACE VARIABLE): >60 ML/MIN/1.73 M^2
FRACTIONAL SHORTENING: 28 % (ref 28–44)
GLUCOSE SERPL-MCNC: 151 MG/DL (ref 70–110)
GLUCOSE SERPL-MCNC: 265 MG/DL (ref 70–110)
HAV IGM SERPL QL IA: NORMAL
HBV CORE IGM SERPL QL IA: NORMAL
HBV SURFACE AG SERPL QL IA: NORMAL
HCT VFR BLD AUTO: 52.1 % (ref 40–54)
HCT VFR BLD AUTO: 58.4 % (ref 40–54)
HCV AB SERPL QL IA: NORMAL
HGB BLD-MCNC: 17.8 G/DL (ref 14–18)
HGB BLD-MCNC: 19.8 G/DL (ref 14–18)
IMM GRANULOCYTES # BLD AUTO: 0.04 K/UL (ref 0–0.04)
IMM GRANULOCYTES # BLD AUTO: 0.04 K/UL (ref 0–0.04)
IMM GRANULOCYTES NFR BLD AUTO: 0.3 % (ref 0–0.5)
IMM GRANULOCYTES NFR BLD AUTO: 0.4 % (ref 0–0.5)
INR PPP: 1.1 (ref 0.8–1.2)
INTERVENTRICULAR SEPTUM: 0.91 CM (ref 0.6–1.1)
IVRT: 99.9 MSEC
LA MAJOR: 5.39 CM
LA MINOR: 5.5 CM
LA WIDTH: 3.9 CM
LEFT ATRIUM SIZE: 3.69 CM
LEFT ATRIUM VOLUME INDEX MOD: 27.4 ML/M2
LEFT ATRIUM VOLUME INDEX: 33.1 ML/M2
LEFT ATRIUM VOLUME MOD: 55.1 CM3
LEFT ATRIUM VOLUME: 66.6 CM3
LEFT INTERNAL DIMENSION IN SYSTOLE: 3.05 CM (ref 2.1–4)
LEFT VENTRICLE DIASTOLIC VOLUME INDEX: 40.19 ML/M2
LEFT VENTRICLE DIASTOLIC VOLUME: 80.79 ML
LEFT VENTRICLE MASS INDEX: 73 G/M2
LEFT VENTRICLE SYSTOLIC VOLUME INDEX: 18.1 ML/M2
LEFT VENTRICLE SYSTOLIC VOLUME: 36.44 ML
LEFT VENTRICULAR INTERNAL DIMENSION IN DIASTOLE: 4.25 CM (ref 3.5–6)
LEFT VENTRICULAR MASS: 146.76 G
LVOT MG: 1.54 MMHG
LVOT MV: 0.61 CM/S
LYMPHOCYTES # BLD AUTO: 2.1 K/UL (ref 1–4.8)
LYMPHOCYTES # BLD AUTO: 2.1 K/UL (ref 1–4.8)
LYMPHOCYTES NFR BLD: 15 % (ref 18–48)
LYMPHOCYTES NFR BLD: 18.6 % (ref 18–48)
MAGNESIUM SERPL-MCNC: 2.1 MG/DL (ref 1.6–2.6)
MCH RBC QN AUTO: 30.5 PG (ref 27–31)
MCH RBC QN AUTO: 30.8 PG (ref 27–31)
MCHC RBC AUTO-ENTMCNC: 33.9 G/DL (ref 32–36)
MCHC RBC AUTO-ENTMCNC: 34.2 G/DL (ref 32–36)
MCV RBC AUTO: 90 FL (ref 82–98)
MCV RBC AUTO: 90 FL (ref 82–98)
MONOCYTES # BLD AUTO: 0.8 K/UL (ref 0.3–1)
MONOCYTES # BLD AUTO: 0.9 K/UL (ref 0.3–1)
MONOCYTES NFR BLD: 6.5 % (ref 4–15)
MONOCYTES NFR BLD: 6.7 % (ref 4–15)
MV MEAN GRADIENT: 2 MMHG
MV PEAK GRADIENT: 5 MMHG
MV VALVE AREA BY CONTINUITY EQUATION: 2.58 CM2
NEUTROPHILS # BLD AUTO: 11 K/UL (ref 1.8–7.7)
NEUTROPHILS # BLD AUTO: 8.3 K/UL (ref 1.8–7.7)
NEUTROPHILS NFR BLD: 73.8 % (ref 38–73)
NEUTROPHILS NFR BLD: 77.8 % (ref 38–73)
NRBC BLD-RTO: 0 /100 WBC
NRBC BLD-RTO: 0 /100 WBC
OHS LV EJECTION FRACTION SIMPSONS BIPLANE MOD: 39 %
OHS QRS DURATION: 76 MS
OHS QRS DURATION: 82 MS
OHS QTC CALCULATION: 434 MS
OHS QTC CALCULATION: 439 MS
OHS QTC CALCULATION: 464 MS
OHS QTC CALCULATION: 483 MS
PISA TR MAX VEL: 2.39 M/S
PLATELET # BLD AUTO: 206 K/UL (ref 150–450)
PLATELET # BLD AUTO: 240 K/UL (ref 150–450)
PMV BLD AUTO: 9.3 FL (ref 9.2–12.9)
PMV BLD AUTO: 9.8 FL (ref 9.2–12.9)
POTASSIUM SERPL-SCNC: 3.9 MMOL/L (ref 3.5–5.1)
POTASSIUM SERPL-SCNC: 4.1 MMOL/L (ref 3.5–5.1)
PROT SERPL-MCNC: 6.8 G/DL (ref 6–8.4)
PROT SERPL-MCNC: 8 G/DL (ref 6–8.4)
PROTHROMBIN TIME: 12.3 SEC (ref 9–12.5)
PULM VEIN S/D RATIO: 1.38
PV PEAK D VEL: 0.13 M/S
PV PEAK S VEL: 0.18 M/S
RA MAJOR: 5.01 CM
RA PRESSURE ESTIMATED: 3 MMHG
RA WIDTH: 3.53 CM
RBC # BLD AUTO: 5.78 M/UL (ref 4.6–6.2)
RBC # BLD AUTO: 6.49 M/UL (ref 4.6–6.2)
RIGHT VENTRICULAR END-DIASTOLIC DIMENSION: 3.58 CM
RV TB RVSP: 5 MMHG
RV TISSUE DOPPLER FREE WALL SYSTOLIC VELOCITY 1 (APICAL 4 CHAMBER VIEW): 15.58 CM/S
SINUS: 3.57 CM
SODIUM SERPL-SCNC: 138 MMOL/L (ref 136–145)
SODIUM SERPL-SCNC: 141 MMOL/L (ref 136–145)
STJ: 3.46 CM
TR MAX PG: 23 MMHG
TRICUSPID ANNULAR PLANE SYSTOLIC EXCURSION: 1.1 CM
TROPONIN I SERPL DL<=0.01 NG/ML-MCNC: 0.01 NG/ML (ref 0–0.03)
TSH SERPL DL<=0.005 MIU/L-ACNC: 1.27 UIU/ML (ref 0.4–4)
TV REST PULMONARY ARTERY PRESSURE: 26 MMHG
WBC # BLD AUTO: 11.23 K/UL (ref 3.9–12.7)
WBC # BLD AUTO: 14.12 K/UL (ref 3.9–12.7)
Z-SCORE OF LEFT VENTRICULAR DIMENSION IN END DIASTOLE: -3.26
Z-SCORE OF LEFT VENTRICULAR DIMENSION IN END SYSTOLE: -1.34

## 2024-02-28 PROCEDURE — 80053 COMPREHEN METABOLIC PANEL: CPT | Performed by: STUDENT IN AN ORGANIZED HEALTH CARE EDUCATION/TRAINING PROGRAM

## 2024-02-28 PROCEDURE — 93005 ELECTROCARDIOGRAM TRACING: CPT

## 2024-02-28 PROCEDURE — 93010 ELECTROCARDIOGRAM REPORT: CPT | Mod: 76,,, | Performed by: INTERNAL MEDICINE

## 2024-02-28 PROCEDURE — 96361 HYDRATE IV INFUSION ADD-ON: CPT | Mod: 59

## 2024-02-28 PROCEDURE — 36415 COLL VENOUS BLD VENIPUNCTURE: CPT | Performed by: FAMILY MEDICINE

## 2024-02-28 PROCEDURE — D9220A PRA ANESTHESIA: Mod: CRNA,,, | Performed by: NURSE ANESTHETIST, CERTIFIED REGISTERED

## 2024-02-28 PROCEDURE — D9220A PRA ANESTHESIA: Mod: ANES,,, | Performed by: STUDENT IN AN ORGANIZED HEALTH CARE EDUCATION/TRAINING PROGRAM

## 2024-02-28 PROCEDURE — 37000009 HC ANESTHESIA EA ADD 15 MINS: Performed by: STUDENT IN AN ORGANIZED HEALTH CARE EDUCATION/TRAINING PROGRAM

## 2024-02-28 PROCEDURE — 63600175 PHARM REV CODE 636 W HCPCS: Performed by: NURSE ANESTHETIST, CERTIFIED REGISTERED

## 2024-02-28 PROCEDURE — 99214 OFFICE O/P EST MOD 30 MIN: CPT | Mod: 25,,, | Performed by: NURSE PRACTITIONER

## 2024-02-28 PROCEDURE — 85025 COMPLETE CBC W/AUTO DIFF WBC: CPT | Performed by: STUDENT IN AN ORGANIZED HEALTH CARE EDUCATION/TRAINING PROGRAM

## 2024-02-28 PROCEDURE — 80074 ACUTE HEPATITIS PANEL: CPT | Performed by: FAMILY MEDICINE

## 2024-02-28 PROCEDURE — 25000003 PHARM REV CODE 250: Performed by: STUDENT IN AN ORGANIZED HEALTH CARE EDUCATION/TRAINING PROGRAM

## 2024-02-28 PROCEDURE — 93010 ELECTROCARDIOGRAM REPORT: CPT | Mod: ,,, | Performed by: INTERNAL MEDICINE

## 2024-02-28 PROCEDURE — 93005 ELECTROCARDIOGRAM TRACING: CPT | Mod: 59

## 2024-02-28 PROCEDURE — 37000008 HC ANESTHESIA 1ST 15 MINUTES: Performed by: STUDENT IN AN ORGANIZED HEALTH CARE EDUCATION/TRAINING PROGRAM

## 2024-02-28 PROCEDURE — G0378 HOSPITAL OBSERVATION PER HR: HCPCS

## 2024-02-28 PROCEDURE — 25000003 PHARM REV CODE 250: Performed by: NURSE ANESTHETIST, CERTIFIED REGISTERED

## 2024-02-28 RX ORDER — LIDOCAINE HYDROCHLORIDE 20 MG/ML
INJECTION, SOLUTION EPIDURAL; INFILTRATION; INTRACAUDAL; PERINEURAL
Status: DISCONTINUED | OUTPATIENT
Start: 2024-02-28 | End: 2024-02-28

## 2024-02-28 RX ORDER — ONDANSETRON HYDROCHLORIDE 2 MG/ML
4 INJECTION, SOLUTION INTRAVENOUS DAILY PRN
Status: DISCONTINUED | OUTPATIENT
Start: 2024-02-28 | End: 2024-02-29 | Stop reason: HOSPADM

## 2024-02-28 RX ORDER — PROPOFOL 10 MG/ML
VIAL (ML) INTRAVENOUS
Status: DISCONTINUED | OUTPATIENT
Start: 2024-02-28 | End: 2024-02-28

## 2024-02-28 RX ORDER — HYDROMORPHONE HYDROCHLORIDE 2 MG/ML
0.2 INJECTION, SOLUTION INTRAMUSCULAR; INTRAVENOUS; SUBCUTANEOUS EVERY 5 MIN PRN
Status: DISCONTINUED | OUTPATIENT
Start: 2024-02-28 | End: 2024-02-29 | Stop reason: HOSPADM

## 2024-02-28 RX ORDER — METHYLPREDNISOLONE 4 MG/1
TABLET ORAL
COMMUNITY
Start: 2024-01-11 | End: 2024-02-28

## 2024-02-28 RX ADMIN — LIDOCAINE HYDROCHLORIDE 40 MG: 20 INJECTION, SOLUTION EPIDURAL; INFILTRATION; INTRACAUDAL; PERINEURAL at 11:02

## 2024-02-28 RX ADMIN — PROPOFOL 10 MG: 10 INJECTION, EMULSION INTRAVENOUS at 11:02

## 2024-02-28 RX ADMIN — APIXABAN 5 MG: 5 TABLET, FILM COATED ORAL at 04:02

## 2024-02-28 RX ADMIN — METOPROLOL SUCCINATE 200 MG: 50 TABLET, EXTENDED RELEASE ORAL at 09:02

## 2024-02-28 RX ADMIN — APIXABAN 5 MG: 5 TABLET, FILM COATED ORAL at 09:02

## 2024-02-28 RX ADMIN — PROPOFOL 20 MG: 10 INJECTION, EMULSION INTRAVENOUS at 11:02

## 2024-02-28 RX ADMIN — AMITRIPTYLINE HYDROCHLORIDE 75 MG: 25 TABLET, FILM COATED ORAL at 09:02

## 2024-02-28 RX ADMIN — GABAPENTIN 800 MG: 400 CAPSULE ORAL at 09:02

## 2024-02-28 RX ADMIN — PROPOFOL 50 MG: 10 INJECTION, EMULSION INTRAVENOUS at 11:02

## 2024-02-28 RX ADMIN — SODIUM CHLORIDE: 0.9 INJECTION, SOLUTION INTRAVENOUS at 11:02

## 2024-02-28 RX ADMIN — SODIUM CHLORIDE 1000 ML: 9 INJECTION, SOLUTION INTRAVENOUS at 12:02

## 2024-02-28 RX ADMIN — LOSARTAN POTASSIUM 100 MG: 50 TABLET, FILM COATED ORAL at 09:02

## 2024-02-28 RX ADMIN — AMLODIPINE BESYLATE 2.5 MG: 2.5 TABLET ORAL at 09:02

## 2024-02-28 RX ADMIN — ASPIRIN 81 MG: 81 TABLET, COATED ORAL at 09:02

## 2024-02-28 NOTE — ASSESSMENT & PLAN NOTE
Chronic, controlled. Latest blood pressure and vitals reviewed-     Temp:  [97.6 °F (36.4 °C)-98.4 °F (36.9 °C)]   Pulse:  []   Resp:  [12-21]   BP: (133-191)/()   SpO2:  [93 %-99 %] .   Home meds for hypertension were reviewed and noted below.   Hypertension Medications               amLODIPine (NORVASC) 2.5 MG tablet Take 1 tablet (2.5 mg total) by mouth once daily.    candesartan (ATACAND) 32 MG tablet Take 1 tablet (32 mg total) by mouth once daily.    metoprolol succinate (TOPROL-XL) 200 MG 24 hr tablet Take 1 tablet (200 mg total) by mouth once daily.            While in the hospital, will manage blood pressure as follows; Continue home antihypertensive regimen    Will utilize p.r.n. blood pressure medication only if patient's blood pressure greater than 180/110 and he develops symptoms such as worsening chest pain or shortness of breath.

## 2024-02-28 NOTE — HOSPITAL COURSE
2/28/2024 per HPI  2/29/2024 Successful restoration to NSR yesterday. Remains in NSR. CBC and BMP WNL. SBP 110s-150s

## 2024-02-28 NOTE — ASSESSMENT & PLAN NOTE
S/P Successful DCCV   Patient with Paroxysmal (<7 days) atrial fibrillation which is uncontrolled currently with Beta Blocker and Calcium Channel Blocker. Patient is currently in atrial fibrillation.[id number] Score: 2. HASBLED Score: 2+. Anticoagulation indicated. Anticoagulation done with apixaban .    Plan:  Continue patient on metoprolol 200 mg daily  Patient s/p diltiazem in the ED.  Cardiology rfollowing  cont apixaban 5 mg b.i.d.

## 2024-02-28 NOTE — ED PROVIDER NOTES
Encounter Date: 2/27/2024       History     Chief Complaint   Patient presents with    Tachycardia    Chest Pain     Patient states he was seen at his doctors office today for a neck problem and they did an ecg because his heart rate was high. He was called by his doctor to go to er because he was in a-fib and patient has no hx. Patient only reports some chest heaviness. Denies dizziness, shortness of breath.      63 y.o.male who has a past medical history of Carotid artery occlusion, Chronic back pain, Hyperlipidemia, Hypertension, and Type 2 diabetes mellitus, and CKD   Presents to the emergency department due to palpitations for the past approximately 4 days.  Patient reports yesterday while obtaining epidural injection EKG he was noted to have Afib RVR she was discovered this afternoon by his primary care doctor was instructed to come to the emergency department for evaluation.  Patient endorse having chest heaviness but denies overt chest pain.  Reports intermittent dyspnea on exertion.  Denies any hemoptysis.  Any lower extremity swelling reports currently being on attention which he this morning.        Review of patient's allergies indicates:   Allergen Reactions    Stadol [butorphanol tartrate] Rash     Swelling in face    Strawberries [strawberry] Rash     Past Medical History:   Diagnosis Date    Allergy     Carotid artery occlusion     Chronic back pain     Colonic polyp     Genetic testing     MUTYH mutation-negative    Hyperlipidemia     Hypertension     Paroxysmal atrial fibrillation 2/28/2024    Sleep apnea     Type 2 diabetes mellitus with stage 3 chronic kidney disease, without long-term current use of insulin 4/2/2020     Past Surgical History:   Procedure Laterality Date    ANKLE SURGERY Left     2    APPENDECTOMY      at age 20    CAROTID ENDARTERECTOMY Right 07/15/2015    CARPAL TUNNEL RELEASE Bilateral     COLONOSCOPY N/A 12/14/2018    Procedure: COLONOSCOPYSuprep;  Surgeon: Silver Selby  MD;  Location: [facility] ENDO;  Service: Endoscopy;  Laterality: N/A;    EPIDURAL STEROID INJECTION N/A 2/26/2024    Procedure: CERVICAL C7/T1 IL SELENA;  Surgeon: Gokul Fung MD;  Location: [facility] PAIN MGT;  Service: Pain Management;  Laterality: N/A;  [phone]  2 WK F/U SERGEI    JOINT REPLACEMENT  9/2010    NASAL SEPTUM SURGERY      TOTAL KNEE ARTHROPLASTY Right     6 knee surgeries     Family History   Problem Relation Age of Onset    Brain cancer Mother 67    Cancer Mother     Hypertension Father     Lung cancer Father         x2 (PAUL initially- treated surgically only, then recurred distantly) (smoker, & had been exposed to many chemicals)    Cancer Father     Breast cancer Sister 58    Genetic Disorder Sister         monoallelic MUTYH mutation    Seizures Daughter     Cancer Maternal Grandfather     Brain cancer Paternal Grandfather 73    Breast cancer Maternal Cousin 57    Aneurysm Other 48        brain    Ulcerative colitis Other      Social History     Tobacco Use    Smoking status: Never     Passive exposure: Never    Smokeless tobacco: Never   Substance Use Topics    Alcohol use: Yes     Alcohol/week: 2.0 standard drinks of alcohol     Types: 2 Cans of beer per week     Comment: a week    Drug use: Never     Review of Systems   Constitutional:  Negative for fever.   HENT:  Negative for sore throat.    Respiratory:  Positive for shortness of breath.    Cardiovascular:  Positive for chest pain.   Gastrointestinal:  Negative for nausea.   Genitourinary:  Negative for dysuria.   Musculoskeletal:  Negative for back pain.   Skin:  Negative for rash.   Neurological:  Negative for weakness.   Hematological:  Does not bruise/bleed easily.       Physical Exam     Initial Vitals [02/27/24 2051]   BP Pulse Resp Temp SpO2   (!) 191/113 (!) 120 18 97.9 °F (36.6 °C) 99 %      MAP       --         Physical Exam    Nursing note and vitals reviewed.  Constitutional: He is not diaphoretic. No distress.   HENT:   Head:  Normocephalic and atraumatic.   Eyes: Conjunctivae and EOM are normal. Pupils are equal, round, and reactive to light.   Neck:   Normal range of motion.  Cardiovascular:  An irregularly irregular rhythm present.   Tachycardia present.         Pulses:       Radial pulses are 2+ on the right side and 2+ on the left side.        Posterior tibial pulses are 2+ on the right side and 2+ on the left side.   Pulmonary/Chest: Breath sounds normal. No respiratory distress.   Abdominal: Abdomen is soft. Bowel sounds are normal. He exhibits no distension. There is no abdominal tenderness. There is no rebound and no guarding.   Musculoskeletal:         General: No tenderness. Normal range of motion.      Cervical back: Normal range of motion.     Lymphadenopathy:     He has no cervical adenopathy.   Neurological: He is alert and oriented to person, place, and time.   Skin: Skin is warm. Capillary refill takes less than 2 seconds.   Psychiatric: He has a normal mood and affect. His behavior is normal.         ED Course   Procedures  Labs Reviewed   CBC W/ AUTO DIFFERENTIAL - Abnormal; Notable for the following components:       Result Value    WBC 14.12 (*)     RBC 6.49 (*)     Hemoglobin 19.8 (*)     Hematocrit 58.4 (*)     RDW 15.4 (*)     Gran # (ANC) 11.0 (*)     Gran % 77.8 (*)     Lymph % 15.0 (*)     All other components within normal limits   COMPREHENSIVE METABOLIC PANEL - Abnormal; Notable for the following components:    Glucose 265 (*)     AST 96 (*)      (*)     eGFR 56 (*)     All other components within normal limits   B-TYPE NATRIURETIC PEPTIDE - Abnormal; Notable for the following components:     (*)     All other components within normal limits   TROPONIN I   PROTIME-INR   MAGNESIUM   TSH   COMPREHENSIVE METABOLIC PANEL   CBC W/ AUTO DIFFERENTIAL          Imaging Results              X-Ray Chest AP Portable (Final result)  Result time 02/27/24 22:43:08      Final result by Devyn Ernandez MD  (02/27/24 22:43:08)                   Impression:      Mild diminished depth of inspiration, mild atelectatic change, no additional radiographic evidence for superimposed acute intrathoracic process.      Electronically signed by: Devyn Ernandez  Date:    02/27/2024  Time:    22:43               Narrative:    EXAMINATION:  XR CHEST AP PORTABLE    CLINICAL HISTORY:  Tachycardia, unspecified    TECHNIQUE:  Single frontal view of the chest was performed.    COMPARISON:  Chest radiograph January 9, 2023    FINDINGS:  Single portable chest view is submitted.  There is mild diminished depth of inspiration and minimal rotation.  When accounting for position and technique and depth of inspiration the appearance of the cardiomediastinal silhouette is stable.  Azygous lobe again noted.    Mild accentuation of the pulmonary bronchovascular markings consistent with diminished depth of inspiration noted.  Mild atelectatic change noted.  There is no evidence for superimposed confluent infiltrate or consolidation, significant pleural effusion or pneumothorax.    Remote right-sided rib fractures again noted.  The osseous structures demonstrate chronic change.                                       Medications   sodium chloride 0.9% flush 10 mL (has no administration in time range)   naloxone 0.4 mg/mL injection 0.02 mg (has no administration in time range)   glucose chewable tablet 16 g (has no administration in time range)   glucose chewable tablet 24 g (has no administration in time range)   glucagon (human recombinant) injection 1 mg (has no administration in time range)   acetaminophen tablet 650 mg (has no administration in time range)   acetaminophen tablet 1,000 mg (has no administration in time range)   ondansetron injection 4 mg (has no administration in time range)   acetaminophen tablet 650 mg (has no administration in time range)   melatonin tablet 6 mg (has no administration in time range)   dextrose 10% bolus 125 mL  125 mL (has no administration in time range)   dextrose 10% bolus 250 mL 250 mL (has no administration in time range)   amitriptyline tablet 75 mg (has no administration in time range)   amLODIPine tablet 2.5 mg (has no administration in time range)   aspirin EC tablet 81 mg (has no administration in time range)   oxyCODONE-acetaminophen 5-325 mg per tablet 1 tablet (has no administration in time range)   metoprolol succinate (TOPROL-XL) 24 hr tablet 200 mg (has no administration in time range)   gabapentin capsule 800 mg (has no administration in time range)   losartan tablet 100 mg (has no administration in time range)   diltiaZEM injection 20 mg (20 mg Intravenous Given 2/27/24 1185)   sodium chloride 0.9% bolus 1,000 mL 1,000 mL (0 mLs Intravenous Stopped 2/28/24 0103)     Medical Decision Making:   Initial Assessment:   63 y.o.male who has a past medical history of Carotid artery occlusion, Chronic back pain, Hyperlipidemia, Hypertension, and Type 2 diabetes mellitus, and CKD   Presents to the emergency department due to palpitations for the past approximately 4 days.   Patient in no acute distress, vitals notable for elevated blood pressure and tachycardia.  EKG with Afib RVR.  No evidence of volume overload on exam.  Uncertain etiology of his afib  but given onset of symptoms have been greater than 48 hours will not cardiovert patient this time.  Patient did take his metoprolol today so will trial diltiazem checked to rate control his Afib.  Will obtain cardiac workup anticipate patient will need admission for further management. Pt with CHADSVASC score of 3. Will stat AC   Differential Diagnosis:   Differential Diagnosis includes, but is not limited to:  ACS/MI, PE, aortic dissection, pneumothorax, cardiac tamponade, pericarditis/myocarditis, pneumonia, infection/abscess, lung mass, trauma/fracture, costochondritis/pleurisy, MSK pain/contusion, GERD, biliary disease, pancreatitis, anemia    Clinical Tests:    Lab Tests: Ordered and Reviewed  Radiological Study: Ordered and Reviewed  Medical Tests: Ordered and Reviewed             ED Course as of 02/28/24 0111   Tue Feb 27, 2024 2105 Independent Interpretation of EKG:  Rhythm: afib RVR  Rate: 126  QTC: 483  No STEMI   [AS]   Wed Feb 28, 2024   0036 BNP(!): 195 [AS]   0037 Comprehensive metabolic panel(!) [AS]   0037 CBC auto differential(!) [AS]   0037 After review of the patient's physical exam, ED testing, and history/symptoms, the patient requires additional care in the hospital for the treatment of new onset afib . Discussed patients case with [facility] who will accept the patient and any pending labs/imaging/interventions.   I discussed the objective findings with the patient including laboratory studies, diagnostic imaging, and any consultant involvement. The patient/ family was educated on their clinical presentation and all questions were answered. They acknowledged and verbally agreed to the treatment plan. The patient will be admitted to the hospital for further management.    [AS]      ED Course User Index  [AS] Korey Elena MD          Medical Decision Making  Amount and/or Complexity of Data Reviewed  Labs: ordered. Decision-making details documented in ED Course.  Radiology: ordered.    Risk  Prescription drug management.         Critical Care Procedure Note  Authorized and Performed by: Korey Elena MD  Total critical care time: 35 minutes  Due to a high probability of clinically significant, life threatening deterioration, the patient required my highest level of preparedness to intervene emergently and I personally spent this critical care time directly and personally managing the patient. This critical care time included obtaining a history; examining the patient; pulse oximetry; ordering and review of studies; arranging urgent treatment with development of a management plan; evaluation of patient's response to treatment; frequent reassessment; and,  discussions with other providers.  This critical care time was performed to assess and manage the high probability of imminent, life-threatening deterioration that could result in multi-organ failure. It was exclusive of separately billable procedures and treating other patients and teaching time.  Please see MDM section and the rest of the note for further information on patient assessment and treatment.    Clinical Impression:   Final diagnoses:  [R00.0] Tachycardia  [I48.91] Atrial fibrillation with RVR (Primary)  [R07.9] Chest pain, unspecified type  [R74.01] Transaminitis          ED Disposition Condition    Observation                DISCLAIMER: This note was prepared with [email or web address] voice recognition transcription software. Garbled syntax, mangled pronouns, and other bizarre constructions may be attributed to that software system.     Korey Elena MD  02/28/24 0111

## 2024-02-28 NOTE — ANESTHESIA POSTPROCEDURE EVALUATION
Anesthesia Post Evaluation    Patient: Partha Curtis JrRoland    Procedure(s) Performed: Procedure(s) (LRB):  Transesophageal echo (ELIZABETH) intra-procedure log documentation (N/A)    Final Anesthesia Type: general      Patient location during evaluation: PACU  Patient participation: Yes- Able to Participate  Level of consciousness: awake  Post-procedure vital signs: reviewed and stable  Pain management: adequate  Airway patency: patent    PONV status at discharge: No PONV  Anesthetic complications: no      Cardiovascular status: blood pressure returned to baseline, stable and hypertensive  Respiratory status: unassisted  Hydration status: euvolemic  Follow-up not needed.              Vitals Value Taken Time   /94 02/28/24 1223   Temp 36.5 °C (97.7 °F) 02/28/24 1223   Pulse 67 02/28/24 1223   Resp 18 02/28/24 1223   SpO2 93 % 02/28/24 1223         No case tracking events are documented in the log.      Pain/Meggan Score: Meggan Score: 10 (2/28/2024 12:00 PM)

## 2024-02-28 NOTE — PLAN OF CARE
SW spoke with Pt spouse. Pt demographics confirmed. Pt independent with daily ADL's. Per spouse no needs for DME or HHS. Pt spouse to transport Pt home at D/C. No other CM needs identified.    Tierney Curtis (Spouse)   [phone] (Mobile)     SW to assist with any future needs.    SW requested PCP f/u.     Future Appointments   Date Time Provider Department Center   3/4/2024  4:00 PM Delisa Martinez, PT [facility]   3/12/2024  3:00 PM Alexandru Sanders, PTA [facility]   3/14/2024  2:40 PM Baylee Ni, NP [facility]   3/15/2024 10:45 AM [facility], [facility] [place]   3/15/2024 11:30 AM [facility], [facility] [place]   3/18/2024  4:00 PM Jan Rowe, PT [facility]   5/13/2024 10:15 AM LAB, [facility] [place]   5/15/2024 10:20 AM Rocco Cavazos DO [facility] - Telemetry  Discharge Assessment    Primary Care Provider: Rocco Cavazos DO     Discharge Assessment (most recent)       BRIEF DISCHARGE ASSESSMENT - 02/28/24 1226          Discharge Planning    Assessment Type Discharge Planning Brief Assessment (P)      Resource/Environmental Concerns none (P)      Support Systems Spouse/significant other (P)      Equipment Currently Used at Home none (P)      Current Living Arrangements home (P)      Patient/Family Anticipates Transition to home;home with family (P)      Patient/Family Anticipated Services at Transition none (P)      DME Needed Upon Discharge  none (P)      Discharge Plan A Home;Home with family (P)                    Keke Jordan LMSW  Phone: [phone]  [facility]

## 2024-02-28 NOTE — TELEPHONE ENCOUNTER
Patient with new onset a fib  Feels worse  Fatigued  HR >100  Will refer to the ER    Will set up cardiology fu in the outpatient  Called ER, gave signout    Laura, can you set up cards f/u christianne Ernandez, can you set up f/u for him to see me after cardiology?

## 2024-02-28 NOTE — SUBJECTIVE & OBJECTIVE
Interval History:   Seen and examined stated feeling good with improvement of palpitation post cardioversion. No CP or SOB    Review of Systems   Constitutional:  Negative for activity change, appetite change and fatigue.   HENT:  Negative for congestion.    Respiratory:  Negative for chest tightness, shortness of breath and wheezing.    Cardiovascular:  Negative for chest pain, palpitations and leg swelling.   Gastrointestinal:  Negative for abdominal pain, diarrhea, nausea and vomiting.   Musculoskeletal:  Negative for back pain and gait problem.   Neurological:  Negative for dizziness, weakness and headaches.   Psychiatric/Behavioral:  Negative for agitation, confusion and hallucinations.    All other systems reviewed and are negative.    Objective:     Vital Signs (Most Recent):  Temp: 97.6 °F (36.4 °C) (02/28/24 1629)  Pulse: 65 (02/28/24 1629)  Resp: 18 (02/28/24 1629)  BP: (!) 147/78 (02/28/24 1629)  SpO2: 96 % (02/28/24 1629) Vital Signs (24h Range):  Temp:  [97.6 °F (36.4 °C)-98.4 °F (36.9 °C)] 97.6 °F (36.4 °C)  Pulse:  [] 65  Resp:  [12-21] 18  SpO2:  [93 %-99 %] 96 %  BP: (133-191)/() 147/78     Weight: 83.5 kg (184 lb)  Body mass index is 26.4 kg/m².    Intake/Output Summary (Last 24 hours) at 2/28/2024 1659  Last data filed at 2/28/2024 1120  Gross per 24 hour   Intake 1199 ml   Output 375 ml   Net 824 ml         Physical Exam  Vitals and nursing note reviewed.   Constitutional:       General: He is not in acute distress.     Appearance: He is not toxic-appearing.   HENT:      Head: Normocephalic and atraumatic.      Mouth/Throat:      Mouth: Mucous membranes are moist.      Pharynx: No oropharyngeal exudate.   Eyes:      Extraocular Movements: Extraocular movements intact.      Pupils: Pupils are equal, round, and reactive to light.   Cardiovascular:      Rate and Rhythm: Normal rate and regular rhythm.      Heart sounds:      No friction rub.   Pulmonary:      Effort: Pulmonary effort is  "normal. No respiratory distress.      Breath sounds: No wheezing or rales.   Chest:      Chest wall: No tenderness.   Abdominal:      General: Bowel sounds are normal. There is no distension.      Palpations: Abdomen is soft.      Tenderness: There is no abdominal tenderness. There is no guarding or rebound.   Musculoskeletal:         General: No swelling or tenderness.      Cervical back: No rigidity or tenderness.      Right lower leg: No edema.      Left lower leg: No edema.   Neurological:      General: No focal deficit present.      Mental Status: He is alert and oriented to person, place, and time.      Cranial Nerves: No cranial nerve deficit.      Motor: No weakness.   Psychiatric:         Thought Content: Thought content normal.             Significant Labs: All pertinent labs within the past 24 hours have been reviewed.  A1C:   Recent Labs   Lab 10/09/23  0746 02/09/24  0719   HGBA1C 7.3* 6.0*     BMP:   Recent Labs   Lab 02/27/24  2349 02/28/24  0409   * 151*    141   K 3.9 4.1    107   CO2 25 23   BUN 19 16   CREATININE 1.4 1.2   CALCIUM 9.8 9.0   MG 2.1  --      CBC:   Recent Labs   Lab 02/27/24  1137 02/27/24 2349 02/28/24  0409   WBC 18.94* 14.12* 11.23   HGB 18.7* 19.8* 17.8   HCT 53.2 58.4* 52.1    240 206     CMP:   Recent Labs   Lab 02/27/24  1137 02/27/24 2349 02/28/24  0409    138 141   K 4.3 3.9 4.1    102 107   CO2 18* 25 23   * 265* 151*   BUN 19 19 16   CREATININE 1.4 1.4 1.2   CALCIUM 9.9 9.8 9.0   PROT 7.5 8.0 6.8   ALBUMIN 4.1 4.3 3.8   BILITOT 0.6 0.3 0.4   ALKPHOS 69 78 65   AST 35 96* 66*   ALT 70* 129* 105*   ANIONGAP 15 11 11     Respiratory Culture: No results for input(s): "GSRESP", "RESPIRATORYC" in the last 48 hours.  Troponin:   Recent Labs   Lab 02/27/24 2349   TROPONINI 0.011     TSH:   Recent Labs   Lab 02/27/24 2349   TSH 1.266     Urine Culture: No results for input(s): "LABURIN" in the last 48 hours.  Urine Studies:   Recent " Labs   Lab 02/27/24  1133   COLORU Yellow   APPEARANCEUA Clear   PHUR 6.0   SPECGRAV 1.010   PROTEINUA Negative   GLUCUA Negative   KETONESU Negative   BILIRUBINUA Negative   OCCULTUA Negative   NITRITE Negative   UROBILINOGEN Negative   LEUKOCYTESUR Negative     Recent Lab Results  (Last 5 results in the past 24 hours)        02/28/24  1141   02/28/24  1134   02/28/24  0846   02/28/24  0743   02/28/24  0409        Albumin         3.8       ALP         65       ALT         105       Anion Gap         11       Ascending aorta     3.6           Ao peak angie     1.03           Ao VTI     19.90           AST         66       AV valve area     2.17           MALISSA by Velocity Ratio     2.55           AV mean gradient     3           AV index (prosthetic)     0.60           AV peak gradient     4           AV Velocity Ratio     0.71           Baso #         0.02       Basophil %         0.2       BILIRUBIN TOTAL         0.4  Comment: For infants and newborns, interpretation of results should be based  on gestational age, weight and in agreement with clinical  observations.    Premature Infant recommended reference ranges:  Up to 24 hours.............<8.0 mg/dL  Up to 48 hours............<12.0 mg/dL  3-5 days..................<15.0 mg/dL  6-29 days.................<15.0 mg/dL         BSA 2.03     2.03           BUN         16       Calcium         9.0       Chloride         107       CO2         23       Creatinine         1.2       Left Ventricle Relative Wall Thickness     0.55           Differential Method         Automated       eGFR         >60       Eos #         0.0       Eos %         0.3       FS     28           Glucose         151       Gran # (ANC)         8.3       Gran %         73.8       Hematocrit         52.1       Hemoglobin         17.8       Immature Grans (Abs)         0.04  Comment: Mild elevation in immature granulocytes is non specific and   can be seen in a variety of conditions including stress  response,   acute inflammation, trauma and pregnancy. Correlation with other   laboratory and clinical findings is essential.         Immature Granulocytes         0.4       IVRT     99.90           IVSd     0.91           LA WIDTH     3.9           Left Atrium Major Axis     5.39           Left Atrium Minor Axis     5.50           LA size     3.69           LA volume     66.60                55.10           LA vol index     33.1           LA Volume Index (Mod)     27.4           LVOT area     3.6           LV EDV BP     80.79           LV Diastolic Volume Index     40.19           LVIDd     4.25           LVIDs     3.05           LV mass     146.76           LV Mass Index     73           Left Ventricular Outflow Tract Mean Gradient     1.54           Left Ventricular Outflow Tract Mean Velocity     0.61           LVOT diameter     2.14           LVOT peak hammad     0.73           LVOT stroke volume     43.14           LVOT peak VTI     12.00           LV ESV BP     36.44           LV Systolic Volume Index     18.1           Lymph #         2.1       Lymph %         18.6       MCH         30.8       MCHC         34.2       MCV         90       Mono #         0.8       Mono %         6.7       MPV         9.8       MV valve area by continuity eq     2.58           MV mean gradient     2           MV peak gradient     5           MV VTI     16.7           nRBC         0       Ellis's Biplane MOD Ejection Fraction     39           QRS Duration   82     82         [facility] QTC Calculation   439     464         Platelet Count         206       Potassium         4.1       PROTEIN TOTAL         6.8       PV Peak D Hammad     0.13           PV Peak S Hammad     0.18           Pulm vein S/D ratio     1.38           Posterior Wall     1.16           RA Major Axis     5.01           Est. RA pres     3           RA Width     3.53           RBC         5.78       RDW         14.6       RV S'     15.58           RV TB RVSP     5            RVDD     3.58           Sinus     3.57           Sodium         141       STJ     3.46           TAPSE     1.10           Triscuspid Valve Regurgitation Peak Gradient     23           TR Max Hammad     2.39           TV resting pulmonary artery pressure     26           WBC         11.23       ZLVIDD     -3.26           ZLVIDS     -1.34                                  Significant Imaging: I have reviewed all pertinent imaging results/findings within the past 24 hours.

## 2024-02-28 NOTE — HPI
64yo male with DMII, HLP,  HTN, leukocytosis, carotid stenosis who presented to the ER with complaints of palpitations. He was seen yesterday for routine outpatient appt for evaluation of neck pain. His HR was noted to be elevated and in afib therefore he was sent to the ER for evaluation. He reports palpitations since the weekend  but no chest pain. He proceed with the procedure with no issues but his HR remained elevated. He was seen by his PCP and had outpatient labs and EKG with notation of afib and was sent to the ER. He denies any history of afib. In the ER, labs CBC and BMP WNL. Total bili 0.5 AST 66 down from 96  down from 129  Troponin 0.011. EKG afib with  and was given Cardizem 20mg IV in the ER and resumed on home dose of Toprol XL. Admitted to [facility] consulted for evaluation of afib

## 2024-02-28 NOTE — SUBJECTIVE & OBJECTIVE
"Past Medical History:   Diagnosis Date    Allergy     Carotid artery occlusion     Chronic back pain     Colonic polyp     Genetic testing     MUTYH mutation-negative    Hyperlipidemia     Hypertension     Sleep apnea     Type 2 diabetes mellitus with stage 3 chronic kidney disease, without long-term current use of insulin 4/2/2020       Past Surgical History:   Procedure Laterality Date    ANKLE SURGERY Left     2    APPENDECTOMY      at age 20    CAROTID ENDARTERECTOMY Right 07/15/2015    CARPAL TUNNEL RELEASE Bilateral     COLONOSCOPY N/A 12/14/2018    Procedure: COLONOSCOPYSuprep;  Surgeon: Silver Selby MD;  Location: [facility] ENDO;  Service: Endoscopy;  Laterality: N/A;    EPIDURAL STEROID INJECTION N/A 2/26/2024    Procedure: CERVICAL C7/T1 IL SELENA;  Surgeon: Gokul Fung MD;  Location: [facility] PAIN MGT;  Service: Pain Management;  Laterality: N/A;  [phone]  2 WK F/U SERGEI    JOINT REPLACEMENT  9/2010    NASAL SEPTUM SURGERY      TOTAL KNEE ARTHROPLASTY Right     6 knee surgeries       Review of patient's allergies indicates:   Allergen Reactions    Stadol [butorphanol tartrate] Rash     Swelling in face    Strawberries [strawberry] Rash       Current Facility-Administered Medications on File Prior to Encounter   Medication    0.9%  NaCl infusion    0.9%  NaCl infusion     Current Outpatient Medications on File Prior to Encounter   Medication Sig    amitriptyline (ELAVIL) 25 MG tablet Take 3 tablets (75 mg total) by mouth every evening.    amLODIPine (NORVASC) 2.5 MG tablet Take 1 tablet (2.5 mg total) by mouth once daily.    aspirin (ECOTRIN) 81 MG EC tablet Take 81 mg by mouth once daily.    azelastine (ASTELIN) 137 mcg (0.1 %) nasal spray 1 spray (137 mcg total) by Nasal route 2 (two) times daily. (Patient taking differently: 1 spray by Nasal route 2 (two) times daily as needed.)    BD LUER-RUSS SYRINGE 3 mL 25 gauge x 1" Syrg USE ONE SYRINGE EVERY 14 DAYS WITH TESTOSTERONE    candesartan (ATACAND) 32 MG " "tablet Take 1 tablet (32 mg total) by mouth once daily.    cyanocobalamin (VITAMIN B-12) 1000 MCG tablet Take 1 tablet (1,000 mcg total) by mouth once daily.    ergocalciferol (ERGOCALCIFEROL) 50,000 unit Cap Take 1 capsule (50,000 Units total) by mouth every 7 days.    gabapentin (NEURONTIN) 800 MG tablet Take 1 tablet (800 mg total) by mouth every evening.    glimepiride (AMARYL) 4 MG tablet Take 1 tablet (4 mg total) by mouth before breakfast.    hydrocortisone 2.5 % cream Apply topically 2 (two) times daily.    ipratropium (ATROVENT) 42 mcg (0.06 %) nasal spray 2 sprays by Nasal route 2 (two) times daily as needed for Rhinitis.    metoprolol succinate (TOPROL-XL) 200 MG 24 hr tablet Take 1 tablet (200 mg total) by mouth once daily.    oxyCODONE-acetaminophen (PERCOCET) 5-325 mg per tablet Take 1 tablet by mouth 2 (two) times daily as needed.    rosuvastatin (CRESTOR) 40 MG Tab Take 1 tablet (40 mg total) by mouth every evening.    semaglutide (OZEMPIC) 0.25 mg or 0.5 mg (2 mg/3 mL) pen injector Inject 0.5 mg into the skin every 7 days.    sod sulf-pot chloride-mag sulf (SUTAB) 1.479-0.188- 0.225 gram tablet Take 12 tablets by mouth once daily. Take according to package instructions with indicated amount of water.    syringe with needle, safety (BD INTEGRA SYRINGE) 3 mL 22 gauge x 1 1/2" Syrg Inject 1 Syringe as directed once a week.    testosterone cypionate (DEPOTESTOTERONE CYPIONATE) 200 mg/mL injection Inject 1 mL (200 mg total) into the muscle every 14 (fourteen) days.    valACYclovir (VALTREX) 1000 MG tablet TAKE 2 TABLETS BY MOUTH EVERY 12 HOURS (Patient taking differently: Take 2,000 mg by mouth every 12 (twelve) hours as needed.)     Family History       Problem Relation (Age of Onset)    Aneurysm Other (48)    Brain cancer Mother (67), Paternal Grandfather (73)    Breast cancer Sister (58), Maternal Cousin (57)    Cancer Mother, Father, Maternal Grandfather    Genetic Disorder Sister    Hypertension " Father    Lung cancer Father    Seizures Daughter    Ulcerative colitis Other          Tobacco Use    Smoking status: Never     Passive exposure: Never    Smokeless tobacco: Never   Substance and Sexual Activity    Alcohol use: Yes     Alcohol/week: 2.0 standard drinks of alcohol     Types: 2 Cans of beer per week     Comment: a week    Drug use: Never    Sexual activity: Yes     Partners: Female     Birth control/protection: None     Review of Systems   Constitutional:  Negative for activity change and appetite change.   HENT:  Negative for ear discharge and ear pain.    Eyes:  Negative for pain and redness.   Respiratory:  Negative for chest tightness.    Cardiovascular:  Positive for palpitations. Negative for chest pain.   Gastrointestinal:  Negative for anal bleeding and blood in stool.   Endocrine: Negative for polydipsia and polyphagia.   Genitourinary:  Negative for frequency and genital sores.   Musculoskeletal:  Negative for gait problem and joint swelling.   Skin:  Negative for pallor and rash.   Allergic/Immunologic: Negative for environmental allergies and food allergies.   Neurological:  Negative for seizures and numbness.   Hematological:  Negative for adenopathy. Does not bruise/bleed easily.   Psychiatric/Behavioral:  Negative for confusion and decreased concentration.      Objective:     Vital Signs (Most Recent):  Temp: 97.9 °F (36.6 °C) (02/27/24 2051)  Pulse: 99 (02/28/24 0001)  Resp: 15 (02/28/24 0001)  BP: (!) 183/109 (02/27/24 2325)  SpO2: 95 % (02/28/24 0001) Vital Signs (24h Range):  Temp:  [97.9 °F (36.6 °C)] 97.9 °F (36.6 °C)  Pulse:  [] 99  Resp:  [15-19] 15  SpO2:  [95 %-99 %] 95 %  BP: (183-191)/(109-113) 183/109     Weight: 83.5 kg (184 lb)  Body mass index is 26.4 kg/m².     Physical Exam  Constitutional:       General: He is not in acute distress.     Appearance: Normal appearance. He is not ill-appearing.   HENT:      Head: Normocephalic and atraumatic.      Right Ear: There  "is no impacted cerumen.      Left Ear: Tympanic membrane normal. There is no impacted cerumen.      Nose: No congestion or rhinorrhea.      Mouth/Throat:      Pharynx: No oropharyngeal exudate or posterior oropharyngeal erythema.   Eyes:      General:         Right eye: No discharge.         Left eye: No discharge.   Cardiovascular:      Rate and Rhythm: Tachycardia present. Rhythm irregular.      Heart sounds: No murmur heard.     No gallop.   Abdominal:      Tenderness: There is no abdominal tenderness. There is no guarding or rebound.      Hernia: No hernia is present.   Musculoskeletal:         General: No deformity.      Cervical back: No rigidity.      Right lower leg: No edema.   Lymphadenopathy:      Cervical: No cervical adenopathy.   Skin:     Findings: No bruising or lesion.   Neurological:      Mental Status: He is alert.      Motor: No weakness.      Gait: Gait normal.   Psychiatric:         Mood and Affect: Mood normal.         Behavior: Behavior normal.                Significant Labs: All pertinent labs within the past 24 hours have been reviewed.  A1C:   Recent Labs   Lab 10/09/23  0746 02/09/24  0719   HGBA1C 7.3* 6.0*     BMP:   Recent Labs   Lab 02/27/24  1137   *      K 4.3      CO2 18*   BUN 19   CREATININE 1.4   CALCIUM 9.9   MG 1.9     CBC:   Recent Labs   Lab 02/26/24  0851 02/27/24  1137 02/27/24  2349   WBC  --  18.94* 14.12*   HGB 18.3* 18.7* 19.8*   HCT 57.2* 53.2 58.4*   PLT  --  280 240     CMP:   Recent Labs   Lab 02/27/24  1137      K 4.3      CO2 18*   *   BUN 19   CREATININE 1.4   CALCIUM 9.9   PROT 7.5   ALBUMIN 4.1   BILITOT 0.6   ALKPHOS 69   AST 35   ALT 70*   ANIONGAP 15     Troponin: No results for input(s): "TROPONINI", "TROPONINIHS" in the last 48 hours.  TSH:   Recent Labs   Lab 02/27/24  1137   TSH 0.623       Significant Imaging: I have reviewed all pertinent imaging results/findings within the past 24 hours.  I have reviewed and " interpreted all pertinent imaging results/findings within the past 24 hours.

## 2024-02-28 NOTE — PLAN OF CARE
Patient remains cath lab bay #1 on stretcher with side rails up x2. Pt drowsy but / AAOx4 and able to follow commands. Pt is stable when connecting to cardiac monitors. VSS.    +2 galina radial pulses palpated. Skin normal in color and warm to touch, <3 sec cap refill.  Fall risk precautions given and patient acknowledges.  AIDET completed to pt.  Will continue to monitor patient.

## 2024-02-28 NOTE — ASSESSMENT & PLAN NOTE
Chronic, controlled. Latest blood pressure and vitals reviewed-     Temp:  [97.7 °F (36.5 °C)-98.4 °F (36.9 °C)]   Pulse:  []   Resp:  [12-19]   BP: (136-191)/()   SpO2:  [95 %-99 %] .   Home meds for hypertension were reviewed and noted below.   Hypertension Medications               amLODIPine (NORVASC) 2.5 MG tablet Take 1 tablet (2.5 mg total) by mouth once daily.    candesartan (ATACAND) 32 MG tablet Take 1 tablet (32 mg total) by mouth once daily.    metoprolol succinate (TOPROL-XL) 200 MG 24 hr tablet Take 1 tablet (200 mg total) by mouth once daily.            While in the hospital, will manage blood pressure as follows; Continue home antihypertensive regimen    Will utilize p.r.n. blood pressure medication only if patient's blood pressure greater than 180/110 and he develops symptoms such as worsening chest pain or shortness of breath.

## 2024-02-28 NOTE — ASSESSMENT & PLAN NOTE
Creatine stable for now. BMP reviewed- noted Estimated Creatinine Clearance: 55.8 mL/min (based on SCr of 1.4 mg/dL). according to latest data. Based on current GFR, CKD stage is stage 3 - GFR 30-59.  Monitor UOP and serial BMP and adjust therapy as needed. Renally dose meds. Avoid nephrotoxic medications and procedures.

## 2024-02-28 NOTE — ASSESSMENT & PLAN NOTE
- presented with palpitations  - EKG with afib HR in 120s; given IV Cardizem 20mg in the ER and continued on oral Toprol home dose of 200mg daily  - remains in afib this AM; AM EKG with afib with HR 110s; started on Eliquis  - CHADSVAS score 3 (HTN, PAD, DMII) will continue Eliquis  - echo today; given conitnued afib with symptoms and slight RVR will proceed with ELIZABETH/cardioversion today; will place NPO; continue Eliquis; discussed need for continuation of DOAC for 4-6 weeks without interruption post cardioversion

## 2024-02-28 NOTE — ASSESSMENT & PLAN NOTE
- SBP 140s-190s overnight  - on Atacand 32 daily and Toprol   daily  as an outpatient   - continued while admitted  - goal BP less than 130/80  - BP not fully controlled; will monitor and consider adding additional agent if BP remains above goal

## 2024-02-28 NOTE — PLAN OF CARE
Report given to tele nurse.  All questions answered. Pt sitting up in bed talking.  Vss.  Nad noted.

## 2024-02-28 NOTE — BRIEF OP NOTE
Transesophageal echo (ELIZABETH)/DCCV intra-procedure log documentation Brief Op Note     Procedure: ELIZABETH and DCCV     Pre-op Dx:   Afib with RVR     Post-op Dx:   Successful DCCV      Staff: Ahmet Thayer MD     Sedation:   MAC       Findings:   -ELIZABETH performed under monitered anestheia care with LVEF 50-55%, without thrombus noted on interatrial septum or in left atrial appendage.    -Successful synchronized cardioversion from atrial fibrillation to normal sinus rhythm with 200J x1.           Complications:  No immediate complications            Disposition:  Return to inpatient room           Condition:  stable      Impression:  Successful ELIZABETH and DCCV     Plan:    -Obtain 12-lead ECG   -Continue current management

## 2024-02-28 NOTE — HOSPITAL COURSE
2/28 63-year-old male with a past medical history of hypertension, DMII, chronic back pain was admitted for new onset A. Fib with RVR s/p Successful DCCV   Patient was admitted was admitted for palpitation. He was found to be in A. Fib with RVR. He was evaluated by cardiology and had a successful DCCV. Post procedure he was doing well, denies any acute new concern. During the course of this admission serial routine labs with elevated LFTs. He had an acute hepatitis panel which came back negative. His diabetes has been well controlled. He is stable for DC home per cardiology and will follow up with PCP and cardiology as outpatient.

## 2024-02-28 NOTE — ASSESSMENT & PLAN NOTE
Creatine stable for now. BMP reviewed- noted Estimated Creatinine Clearance: 65.1 mL/min (based on SCr of 1.2 mg/dL). according to latest data. Based on current GFR, CKD stage is stage 3 - GFR 30-59.  Monitor UOP and serial BMP and adjust therapy as needed. Renally dose meds. Avoid nephrotoxic medications and procedures.

## 2024-02-28 NOTE — ED NOTES
Pt returned from cardio and back on cardiac monitor. Pt updated on NPO status and verbalized understanding. Call bell within reach.

## 2024-02-28 NOTE — PROGRESS NOTES
[facility] Medicine  Progress Note    Patient Name: Partha Curtis Jr.  MRN: [id number]  Patient Class: OP- Observation   Admission Date: 2/27/2024  Length of Stay: 0 days  Attending Physician: Shaji Vicente MD  Primary Care Provider: Rocco Cavazos DO        Subjective:     Principal Problem:Atrial fibrillation with RVR        HPI:  Partha Curtis is a 63-year-old male with a past medical history of hypertension, back pain, who presents with palpitations.    Patient states that over the past 48 hours he has had significant palpitations with no chest pain.  This has never happened before.  As a result, he decided to go to his doctor's office for further evaluation and also for some neck pain.  He was given pain medications for his neck pain and an EKG was done.  The EKG showed new onset atrial fibrillation with RVR and the patient was told to come to the ER for further management.    Triage vitals were significant for tachycardia, elevated blood pressures.  Review of systems significant for neck pain and chest palpitations.  Physical exam significant for tachycardia.      Initial CBC remarkable for leukocytosis. CMP significant for transaminitis, elevated glucose. PT/INR within normal range.  BNP elevated.      UA unremarkable.  Urine culture pending.    Chest x-ray without evidence of acute cardiopulmonary issues.    EKG shows AFib with RVR.    Patient given diltiazem with improvement in heart rates.    Patient admitted for new onset atrial fibrillation with RVR.    Overview/Hospital Course:  2/28 63-year-old male with a past medical history of hypertension, back pain was admitted for new onset A. Fib with RVR s/p Successful DCCV     Interval History:   Seen and examined stated feeling good with improvement of palpitation post cardioversion. No CP or SOB    Review of Systems   Constitutional:  Negative for activity change, appetite change and fatigue.   HENT:  Negative for congestion.     Respiratory:  Negative for chest tightness, shortness of breath and wheezing.    Cardiovascular:  Negative for chest pain, palpitations and leg swelling.   Gastrointestinal:  Negative for abdominal pain, diarrhea, nausea and vomiting.   Musculoskeletal:  Negative for back pain and gait problem.   Neurological:  Negative for dizziness, weakness and headaches.   Psychiatric/Behavioral:  Negative for agitation, confusion and hallucinations.    All other systems reviewed and are negative.    Objective:     Vital Signs (Most Recent):  Temp: 97.6 °F (36.4 °C) (02/28/24 1629)  Pulse: 65 (02/28/24 1629)  Resp: 18 (02/28/24 1629)  BP: (!) 147/78 (02/28/24 1629)  SpO2: 96 % (02/28/24 1629) Vital Signs (24h Range):  Temp:  [97.6 °F (36.4 °C)-98.4 °F (36.9 °C)] 97.6 °F (36.4 °C)  Pulse:  [] 65  Resp:  [12-21] 18  SpO2:  [93 %-99 %] 96 %  BP: (133-191)/() 147/78     Weight: 83.5 kg (184 lb)  Body mass index is 26.4 kg/m².    Intake/Output Summary (Last 24 hours) at 2/28/2024 1659  Last data filed at 2/28/2024 1120  Gross per 24 hour   Intake 1199 ml   Output 375 ml   Net 824 ml         Physical Exam  Vitals and nursing note reviewed.   Constitutional:       General: He is not in acute distress.     Appearance: He is not toxic-appearing.   HENT:      Head: Normocephalic and atraumatic.      Mouth/Throat:      Mouth: Mucous membranes are moist.      Pharynx: No oropharyngeal exudate.   Eyes:      Extraocular Movements: Extraocular movements intact.      Pupils: Pupils are equal, round, and reactive to light.   Cardiovascular:      Rate and Rhythm: Normal rate and regular rhythm.      Heart sounds:      No friction rub.   Pulmonary:      Effort: Pulmonary effort is normal. No respiratory distress.      Breath sounds: No wheezing or rales.   Chest:      Chest wall: No tenderness.   Abdominal:      General: Bowel sounds are normal. There is no distension.      Palpations: Abdomen is soft.      Tenderness: There is no  "abdominal tenderness. There is no guarding or rebound.   Musculoskeletal:         General: No swelling or tenderness.      Cervical back: No rigidity or tenderness.      Right lower leg: No edema.      Left lower leg: No edema.   Neurological:      General: No focal deficit present.      Mental Status: He is alert and oriented to person, place, and time.      Cranial Nerves: No cranial nerve deficit.      Motor: No weakness.   Psychiatric:         Thought Content: Thought content normal.             Significant Labs: All pertinent labs within the past 24 hours have been reviewed.  A1C:   Recent Labs   Lab 10/09/23  0746 02/09/24  0719   HGBA1C 7.3* 6.0*     BMP:   Recent Labs   Lab 02/27/24  2349 02/28/24  0409   * 151*    141   K 3.9 4.1    107   CO2 25 23   BUN 19 16   CREATININE 1.4 1.2   CALCIUM 9.8 9.0   MG 2.1  --      CBC:   Recent Labs   Lab 02/27/24  1137 02/27/24 2349 02/28/24  0409   WBC 18.94* 14.12* 11.23   HGB 18.7* 19.8* 17.8   HCT 53.2 58.4* 52.1    240 206     CMP:   Recent Labs   Lab 02/27/24  1137 02/27/24 2349 02/28/24  0409    138 141   K 4.3 3.9 4.1    102 107   CO2 18* 25 23   * 265* 151*   BUN 19 19 16   CREATININE 1.4 1.4 1.2   CALCIUM 9.9 9.8 9.0   PROT 7.5 8.0 6.8   ALBUMIN 4.1 4.3 3.8   BILITOT 0.6 0.3 0.4   ALKPHOS 69 78 65   AST 35 96* 66*   ALT 70* 129* 105*   ANIONGAP 15 11 11     Respiratory Culture: No results for input(s): "GSRESP", "RESPIRATORYC" in the last 48 hours.  Troponin:   Recent Labs   Lab 02/27/24 2349   TROPONINI 0.011     TSH:   Recent Labs   Lab 02/27/24 2349   TSH 1.266     Urine Culture: No results for input(s): "LABURIN" in the last 48 hours.  Urine Studies:   Recent Labs   Lab 02/27/24  1133   COLORU Yellow   APPEARANCEUA Clear   PHUR 6.0   SPECGRAV 1.010   PROTEINUA Negative   GLUCUA Negative   KETONESU Negative   BILIRUBINUA Negative   OCCULTUA Negative   NITRITE Negative   UROBILINOGEN Negative   LEUKOCYTESUR " Negative     Recent Lab Results  (Last 5 results in the past 24 hours)        02/28/24  1141   02/28/24  1134   02/28/24  0846   02/28/24  0743   02/28/24  0409        Albumin         3.8       ALP         65       ALT         105       Anion Gap         11       Ascending aorta     3.6           Ao peak angie     1.03           Ao VTI     19.90           AST         66       AV valve area     2.17           MALISSA by Velocity Ratio     2.55           AV mean gradient     3           AV index (prosthetic)     0.60           AV peak gradient     4           AV Velocity Ratio     0.71           Baso #         0.02       Basophil %         0.2       BILIRUBIN TOTAL         0.4  Comment: For infants and newborns, interpretation of results should be based  on gestational age, weight and in agreement with clinical  observations.    Premature Infant recommended reference ranges:  Up to 24 hours.............<8.0 mg/dL  Up to 48 hours............<12.0 mg/dL  3-5 days..................<15.0 mg/dL  6-29 days.................<15.0 mg/dL         BSA 2.03     2.03           BUN         16       Calcium         9.0       Chloride         107       CO2         23       Creatinine         1.2       Left Ventricle Relative Wall Thickness     0.55           Differential Method         Automated       eGFR         >60       Eos #         0.0       Eos %         0.3       FS     28           Glucose         151       Gran # (ANC)         8.3       Gran %         73.8       Hematocrit         52.1       Hemoglobin         17.8       Immature Grans (Abs)         0.04  Comment: Mild elevation in immature granulocytes is non specific and   can be seen in a variety of conditions including stress response,   acute inflammation, trauma and pregnancy. Correlation with other   laboratory and clinical findings is essential.         Immature Granulocytes         0.4       IVRT     99.90           IVSd     0.91           LA WIDTH     3.9           Left  Atrium Major Axis     5.39           Left Atrium Minor Axis     5.50           LA size     3.69           LA volume     66.60                55.10           LA vol index     33.1           LA Volume Index (Mod)     27.4           LVOT area     3.6           LV EDV BP     80.79           LV Diastolic Volume Index     40.19           LVIDd     4.25           LVIDs     3.05           LV mass     146.76           LV Mass Index     73           Left Ventricular Outflow Tract Mean Gradient     1.54           Left Ventricular Outflow Tract Mean Velocity     0.61           LVOT diameter     2.14           LVOT peak hammad     0.73           LVOT stroke volume     43.14           LVOT peak VTI     12.00           LV ESV BP     36.44           LV Systolic Volume Index     18.1           Lymph #         2.1       Lymph %         18.6       MCH         30.8       MCHC         34.2       MCV         90       Mono #         0.8       Mono %         6.7       MPV         9.8       MV valve area by continuity eq     2.58           MV mean gradient     2           MV peak gradient     5           MV VTI     16.7           nRBC         0       Ellis's Biplane MOD Ejection Fraction     39           QRS Duration   82     82         [facility]HS QTC Calculation   439     464         Platelet Count         206       Potassium         4.1       PROTEIN TOTAL         6.8       PV Peak D Hammad     0.13           PV Peak S Hammad     0.18           Pulm vein S/D ratio     1.38           Posterior Wall     1.16           RA Major Axis     5.01           Est. RA pres     3           RA Width     3.53           RBC         5.78       RDW         14.6       RV S'     15.58           RV TB RVSP     5           RVDD     3.58           Sinus     3.57           Sodium         141       STJ     3.46           TAPSE     1.10           Triscuspid Valve Regurgitation Peak Gradient     23           TR Max Hammad     2.39           TV resting pulmonary artery pressure      26           WBC         11.23       ZLVIDD     -3.26           ZLVIDS     -1.34                                  Significant Imaging: I have reviewed all pertinent imaging results/findings within the past 24 hours.    Assessment/Plan:      * Atrial fibrillation with RVR  S/P Successful DCCV   Patient with Paroxysmal (<7 days) atrial fibrillation which is uncontrolled currently with Beta Blocker and Calcium Channel Blocker. Patient is currently in atrial fibrillation.[id number] Score: 2. HASBLED Score: 2+. Anticoagulation indicated. Anticoagulation done with apixaban .    Plan:  Continue patient on metoprolol 200 mg daily  Patient s/p diltiazem in the ED.  Cardiology rfollowing  cont apixaban 5 mg b.i.d.    Transaminitis  Will get acute hepatitis panel      Leukocytosis  Noted on lab work    Plan:  Improvement with fluid administration and rate control      Overweight (BMI 25.0-29.9)  Discussing weight loss with the patient.      Chronic bilateral low back pain without sciatica  Pain management as outpatient      Type 2 diabetes mellitus with stage 3 chronic kidney disease, without long-term current use of insulin  Creatine stable for now. BMP reviewed- noted Estimated Creatinine Clearance: 65.1 mL/min (based on SCr of 1.2 mg/dL). according to latest data. Based on current GFR, CKD stage is stage 3 - GFR 30-59.  Monitor UOP and serial BMP and adjust therapy as needed. Renally dose meds. Avoid nephrotoxic medications and procedures.    Dyslipidemia  Continue with home statin      Essential hypertension  Chronic, controlled. Latest blood pressure and vitals reviewed-     Temp:  [97.6 °F (36.4 °C)-98.4 °F (36.9 °C)]   Pulse:  []   Resp:  [12-21]   BP: (133-191)/()   SpO2:  [93 %-99 %] .   Home meds for hypertension were reviewed and noted below.   Hypertension Medications               amLODIPine (NORVASC) 2.5 MG tablet Take 1 tablet (2.5 mg total) by mouth once daily.    candesartan (ATACAND) 32 MG tablet  Take 1 tablet (32 mg total) by mouth once daily.    metoprolol succinate (TOPROL-XL) 200 MG 24 hr tablet Take 1 tablet (200 mg total) by mouth once daily.            While in the hospital, will manage blood pressure as follows; Continue home antihypertensive regimen    Will utilize p.r.n. blood pressure medication only if patient's blood pressure greater than 180/110 and he develops symptoms such as worsening chest pain or shortness of breath.      VTE Risk Mitigation (From admission, onward)           Ordered     apixaban tablet 5 mg  2 times daily         02/28/24 0335     IP VTE HIGH RISK PATIENT  Once         02/27/24 2342     Place sequential compression device  Until discontinued         02/27/24 2342                    Discharge Planning   CHCUK:      Code Status: Full Code   Is the patient medically ready for discharge?:     Reason for patient still in hospital (select all that apply): Patient trending condition, Treatment, and Consult recommendations  Discharge Plan A: Home, Home with family          Time spent > 35 min        Shaji Vicente MD  Department of [facility] Medicine   [facility]

## 2024-02-28 NOTE — HPI
Partha Curtis is a 63-year-old male with a past medical history of hypertension, back pain, who presents with palpitations.    Patient states that over the past 48 hours he has had significant palpitations with no chest pain.  This has never happened before.  As a result, he decided to go to his doctor's office for further evaluation and also for some neck pain.  He was given pain medications for his neck pain and an EKG was done.  The EKG showed new onset atrial fibrillation with RVR and the patient was told to come to the ER for further management.    Triage vitals were significant for tachycardia, elevated blood pressures.  Review of systems significant for neck pain and chest palpitations.  Physical exam significant for tachycardia.      Initial CBC remarkable for leukocytosis. CMP significant for transaminitis, elevated glucose. PT/INR within normal range.  BNP elevated.      UA unremarkable.  Urine culture pending.    Chest x-ray without evidence of acute cardiopulmonary issues.    EKG shows AFib with RVR.    Patient given diltiazem with improvement in heart rates.    Patient admitted for new onset atrial fibrillation with RVR.

## 2024-02-28 NOTE — ED NOTES
Pt states he was told to come to ED by his doctor because of abnormal EKG. Pt states he has been feeling fatigued and feels a heaviness in chest.

## 2024-02-28 NOTE — ED NOTES
Report rec'd, pt care assumed. Pt resting quietly in bed with no complaints. Pt ambulated to bathroom and returned to bed. Cardio called and will put in transport for pt to get echo. Pt updated on status, call bell within reach.

## 2024-02-28 NOTE — ASSESSMENT & PLAN NOTE
Patient with Paroxysmal (<7 days) atrial fibrillation which is uncontrolled currently with Beta Blocker and Calcium Channel Blocker. Patient is currently in atrial fibrillation.[id number] Score: 2. HASBLED Score: 2+. Anticoagulation indicated. Anticoagulation done with apixaban .    Plan:  Continue patient on metoprolol 200 mg daily  Patient s/p diltiazem in the ED. we will discuss with Cardiology regarding rate control methods  Start patient on apixaban 5 mg b.i.d.

## 2024-02-28 NOTE — PLAN OF CARE
Pt arrived to unit. Introduced self as VN for this shift. Admission questions completed by VN. Educated pt on VTE risk, safety precautions, and VN's role in pt care. Opportunity given for pt's questions. All questions answered.      02/28/24 1638   Admission   Initial VN Admission Questions Complete   Shift   Virtual Nurse - Rounding Complete   Virtual Nurse - Patient Verbalized Approval Of Camera Use;VN Rounding   Type of Frequent Check   Type Patient Rounds   Safety/Activity   Patient Rounds bed in low position;bed wheels locked;call light in patient/parent reach;clutter free environment maintained;ID band on;visualized patient;toileting offered;placement of personal items at bedside   Safety Promotion/Fall Prevention assistive device/personal item within reach   Safety Precautions emergency equipment at bedside   Activity Assistance Provided assistance, 1 person   Positioning   Body Position position changed independently

## 2024-02-28 NOTE — ED NOTES
Patient identifiers for Partha Curtis . checked and correct.  LOC: The patient is awake, alert and aware of environment with an appropriate affect, the patient is oriented x 3 and speaking appropriately.  APPEARANCE: Patient resting quielty and in no acute distress, patient is clean and well groomed, patient's clothing are properly fastened.  SKIN: The skin is warm and dry, patient has normal skin turgor and moist mucus membranes.  MUSKULOSKELETAL: Patient moving all extremities well, no obvious swelling or deformities noted.  RESPIRATORY: Airway is open and patent, respirations are spontaneous, patient has a normal effort and rate.  CARDIAC: Patient a-fib, no periphreal edema  NEUROLOGIC: PERRL, facial expression is symmetrical, bilateral hand grasp equal and even, normal sensation in all extremities when touched with a finger.

## 2024-02-28 NOTE — TRANSFER OF CARE
"Anesthesia Transfer of Care Note    Patient: Partha Curtis Jr.    Procedure(s) Performed: Procedure(s) (LRB):  Transesophageal echo (ELIZABETH) intra-procedure log documentation (N/A)    Patient location: Cath Lab    Anesthesia Type: general    Transport from OR: Transported from OR on 6-10 L/min O2 by face mask with adequate spontaneous ventilation    Post pain: adequate analgesia    Post assessment: no apparent anesthetic complications    Post vital signs: stable    Level of consciousness: awake    Nausea/Vomiting: no nausea/vomiting    Complications: none    Transfer of care protocol was followed      Last vitals: Visit Vitals  BP (!) 175/111   Pulse 104   Temp 36.5 °C (97.7 °F) (Oral)   Resp 17   Ht 5' 10" (1.778 m)   Wt 83.5 kg (184 lb)   SpO2 95%   BMI 26.40 kg/m²     "

## 2024-02-28 NOTE — SUBJECTIVE & OBJECTIVE
"Past Medical History:   Diagnosis Date    Allergy     Carotid artery occlusion     Chronic back pain     Colonic polyp     Genetic testing     MUTYH mutation-negative    Hyperlipidemia     Hypertension     Paroxysmal atrial fibrillation 2/28/2024    Sleep apnea     Type 2 diabetes mellitus with stage 3 chronic kidney disease, without long-term current use of insulin 4/2/2020       Past Surgical History:   Procedure Laterality Date    ANKLE SURGERY Left     2    APPENDECTOMY      at age 20    CAROTID ENDARTERECTOMY Right 07/15/2015    CARPAL TUNNEL RELEASE Bilateral     COLONOSCOPY N/A 12/14/2018    Procedure: COLONOSCOPYSuprep;  Surgeon: Silver Selby MD;  Location: [facility] ENDO;  Service: Endoscopy;  Laterality: N/A;    EPIDURAL STEROID INJECTION N/A 2/26/2024    Procedure: CERVICAL C7/T1 IL SELENA;  Surgeon: Gokul Fung MD;  Location: [facility] PAIN MGT;  Service: Pain Management;  Laterality: N/A;  [phone]  2 WK F/U SERGEI    JOINT REPLACEMENT  9/2010    NASAL SEPTUM SURGERY      TOTAL KNEE ARTHROPLASTY Right     6 knee surgeries       Review of patient's allergies indicates:   Allergen Reactions    Stadol [butorphanol tartrate] Rash     Swelling in face    Strawberries [strawberry] Rash       Current Facility-Administered Medications on File Prior to Encounter   Medication    0.9%  NaCl infusion    0.9%  NaCl infusion     Current Outpatient Medications on File Prior to Encounter   Medication Sig    amitriptyline (ELAVIL) 25 MG tablet Take 3 tablets (75 mg total) by mouth every evening.    amLODIPine (NORVASC) 2.5 MG tablet Take 1 tablet (2.5 mg total) by mouth once daily.    aspirin (ECOTRIN) 81 MG EC tablet Take 81 mg by mouth once daily.    azelastine (ASTELIN) 137 mcg (0.1 %) nasal spray 1 spray (137 mcg total) by Nasal route 2 (two) times daily. (Patient taking differently: 1 spray by Nasal route 2 (two) times daily as needed.)    BD LUER-RUSS SYRINGE 3 mL 25 gauge x 1" Syrg USE ONE SYRINGE EVERY 14 DAYS WITH " "TESTOSTERONE    candesartan (ATACAND) 32 MG tablet Take 1 tablet (32 mg total) by mouth once daily.    cyanocobalamin (VITAMIN B-12) 1000 MCG tablet Take 1 tablet (1,000 mcg total) by mouth once daily.    ergocalciferol (ERGOCALCIFEROL) 50,000 unit Cap Take 1 capsule (50,000 Units total) by mouth every 7 days.    gabapentin (NEURONTIN) 800 MG tablet Take 1 tablet (800 mg total) by mouth every evening.    glimepiride (AMARYL) 4 MG tablet Take 1 tablet (4 mg total) by mouth before breakfast.    hydrocortisone 2.5 % cream Apply topically 2 (two) times daily.    ipratropium (ATROVENT) 42 mcg (0.06 %) nasal spray 2 sprays by Nasal route 2 (two) times daily as needed for Rhinitis.    metoprolol succinate (TOPROL-XL) 200 MG 24 hr tablet Take 1 tablet (200 mg total) by mouth once daily.    oxyCODONE-acetaminophen (PERCOCET) 5-325 mg per tablet Take 1 tablet by mouth 2 (two) times daily as needed.    rosuvastatin (CRESTOR) 40 MG Tab Take 1 tablet (40 mg total) by mouth every evening.    semaglutide (OZEMPIC) 0.25 mg or 0.5 mg (2 mg/3 mL) pen injector Inject 0.5 mg into the skin every 7 days.    sod sulf-pot chloride-mag sulf (SUTAB) 1.479-0.188- 0.225 gram tablet Take 12 tablets by mouth once daily. Take according to package instructions with indicated amount of water.    syringe with needle, safety (BD INTEGRA SYRINGE) 3 mL 22 gauge x 1 1/2" Syrg Inject 1 Syringe as directed once a week.    testosterone cypionate (DEPOTESTOTERONE CYPIONATE) 200 mg/mL injection Inject 1 mL (200 mg total) into the muscle every 14 (fourteen) days.    valACYclovir (VALTREX) 1000 MG tablet TAKE 2 TABLETS BY MOUTH EVERY 12 HOURS (Patient taking differently: Take 2,000 mg by mouth every 12 (twelve) hours as needed.)     Family History       Problem Relation (Age of Onset)    Aneurysm Other (48)    Brain cancer Mother (67), Paternal Grandfather (73)    Breast cancer Sister (58), Maternal Cousin (57)    Cancer Mother, Father, Maternal Grandfather    " Genetic Disorder Sister    Hypertension Father    Lung cancer Father    Seizures Daughter    Ulcerative colitis Other          Tobacco Use    Smoking status: Never     Passive exposure: Never    Smokeless tobacco: Never   Substance and Sexual Activity    Alcohol use: Yes     Alcohol/week: 2.0 standard drinks of alcohol     Types: 2 Cans of beer per week     Comment: a week    Drug use: Never    Sexual activity: Yes     Partners: Female     Birth control/protection: None     Review of Systems   Constitutional: Negative for chills, decreased appetite, diaphoresis, fever, malaise/fatigue, weight gain and weight loss.   Cardiovascular:  Positive for chest pain. Negative for claudication, dyspnea on exertion, irregular heartbeat, leg swelling, near-syncope, orthopnea, palpitations and paroxysmal nocturnal dyspnea.   Respiratory:  Negative for cough, shortness of breath, snoring, sputum production and wheezing.    Endocrine: Negative for cold intolerance, heat intolerance, polydipsia, polyphagia and polyuria.   Skin:  Negative for color change, dry skin, itching, nail changes and poor wound healing.   Musculoskeletal:  Negative for back pain, gout, joint pain and joint swelling.   Gastrointestinal:  Negative for bloating, abdominal pain, constipation, diarrhea, hematemesis, hematochezia, melena, nausea and vomiting.   Genitourinary:  Negative for dysuria, hematuria and nocturia.   Neurological:  Negative for dizziness, headaches, light-headedness, numbness, paresthesias and weakness.   Psychiatric/Behavioral:  Negative for altered mental status, depression and memory loss.      Objective:     Vital Signs (Most Recent):  Temp: 97.7 °F (36.5 °C) (02/28/24 0428)  Pulse: 105 (02/28/24 0700)  Resp: 16 (02/28/24 0700)  BP: (!) 161/98 (02/28/24 0702)  SpO2: 96 % (02/28/24 0700) Vital Signs (24h Range):  Temp:  [97.7 °F (36.5 °C)-98.4 °F (36.9 °C)] 97.7 °F (36.5 °C)  Pulse:  [] 105  Resp:  [12-19] 16  SpO2:  [95 %-99 %] 96  %  BP: (136-191)/() 161/98     Weight: 83.5 kg (184 lb)  Body mass index is 26.4 kg/m².    SpO2: 96 %         Intake/Output Summary (Last 24 hours) at 2/28/2024 0840  Last data filed at 2/28/2024 0428  Gross per 24 hour   Intake 999 ml   Output 375 ml   Net 624 ml       Lines/Drains/Airways       Peripheral Intravenous Line  Duration                  Peripheral IV - Single Lumen 02/27/24 2349 20 G Posterior;Right Hand <1 day                     Physical Exam  Constitutional:       General: He is not in acute distress.     Appearance: He is well-developed.   Cardiovascular:      Rate and Rhythm: Tachycardia present. Rhythm irregularly irregular.      Heart sounds: No murmur heard.     No gallop.   Pulmonary:      Effort: Pulmonary effort is normal. No respiratory distress.      Breath sounds: Normal breath sounds. No wheezing.   Abdominal:      General: Bowel sounds are normal. There is no distension.      Palpations: Abdomen is soft.      Tenderness: There is no abdominal tenderness.   Skin:     General: Skin is warm and dry.   Neurological:      Mental Status: He is alert and oriented to person, place, and time.          Significant Labs: BMP:   Recent Labs   Lab 02/27/24  1137 02/27/24 2349 02/28/24  0409   * 265* 151*    138 141   K 4.3 3.9 4.1    102 107   CO2 18* 25 23   BUN 19 19 16   CREATININE 1.4 1.4 1.2   CALCIUM 9.9 9.8 9.0   MG 1.9 2.1  --    , CBC   Recent Labs   Lab 02/27/24  1137 02/27/24 2349 02/28/24  0409   WBC 18.94* 14.12* 11.23   HGB 18.7* 19.8* 17.8   HCT 53.2 58.4* 52.1    240 206   , and Troponin   Recent Labs   Lab 02/27/24 2349   TROPONINI 0.011       Significant Imaging: Echocardiogram: Transthoracic echo (TTE) complete (Cupid Only):   Results for orders placed or performed during the hospital encounter of 07/07/22   Echo   Result Value Ref Range    BSA 2.1 m2    TDI SEPTAL 0.06 m/s    LV LATERAL E/E' RATIO 10.57 m/s    LV SEPTAL E/E' RATIO 12.33 m/s     LA WIDTH 3.57 cm    TDI LATERAL 0.07 m/s    PV PEAK VELOCITY 1.23 cm/s    LVIDd 4.50 3.5 - 6.0 cm    IVS 1.00 (A) 0.6 - 1.1 cm    Posterior Wall 1.00 (A) 0.6 - 1.1 cm    Ao root annulus 3.50 cm    LVIDs 1.80 2.1 - 4.0 cm    FS 60 28 - 44 %    LA volume 40.56 cm3    STJ 3.06 cm    Ascending aorta 3.17 cm    LV mass 153.27 g    LA size 3.08 cm    RVDD 3.91 cm    TAPSE 2.68 cm    RV S' 17.77 cm/s    Left Ventricle Relative Wall Thickness 0.44 cm    AV mean gradient 5 mmHg    AV valve area 2.92 cm2    AV Velocity Ratio 0.81     AV index (prosthetic) 0.79     MV mean gradient 1 mmHg    MV valve area p 1/2 method 3.45 cm2    MV valve area by continuity eq 3.91 cm2    E/A ratio 0.80     Mean e' 0.07 m/s    E wave deceleration time 220.10 msec    LVOT diameter 2.17 cm    LVOT area 3.7 cm2    LVOT peak hammad 1.14 m/s    LVOT peak VTI 23.70 cm    Ao peak hammad 1.41 m/s    Ao VTI 29.99 cm    LVOT stroke volume 87.61 cm3    AV peak gradient 8 mmHg    MV peak gradient 3 mmHg    E/E' ratio 11.38 m/s    MV Peak E Hammad 0.74 m/s    MV VTI 22.43 cm    MV stenosis pressure 1/2 time 63.83 ms    MV Peak A Hammad 0.93 m/s    LV Systolic Volume 18.59 mL    LV Systolic Volume Index 8.9 mL/m2    LV Diastolic Volume 77.86 mL    LV Diastolic Volume Index 37.43 mL/m2    LA Volume Index 19.5 mL/m2    LV Mass Index 74 g/m2    RA Major Axis 4.15 cm    Left Atrium Minor Axis 4.30 cm    Left Atrium Major Axis 4.38 cm    LA Volume Index (Mod) 13.1 mL/m2    LA volume (mod) 27.25 cm3    RA Width 2.85 cm    Right Atrial Pressure (from IVC) 3 mmHg    EF 65 %    Narrative    · The estimated ejection fraction is 65%.  · The left ventricle is normal in size with normal systolic function.  · Normal left ventricular diastolic function.  · Normal right ventricular size with normal right ventricular systolic   function.  · Normal central venous pressure (3 mmHg).

## 2024-02-28 NOTE — ANESTHESIA PREPROCEDURE EVALUATION
[facility]  Anesthesia Pre-Operative Evaluation         Patient Name: Partha Curtis Jr.  YOB: 1960  MRN: [id number]    SUBJECTIVE:     02/28/2024    Procedure(s) (LRB):  Transesophageal echo (ELIZABETH) intra-procedure log documentation (N/A)    Partha Curtis Jr. is a 63 y.o. male here for Procedure(s) (LRB):  Transesophageal echo (ELIZABETH) intra-procedure log documentation (N/A)    Drips:     Patient Active Problem List   Diagnosis    Carotid stenosis    Essential hypertension    Dyslipidemia    S/P carotid endarterectomy    Long-term current use of testosterone replacement therapy    Bilateral hearing loss    BMI 27.0-27.9,adult    Type 2 diabetes mellitus with stage 3 chronic kidney disease, without long-term current use of insulin    Chronic bilateral low back pain without sciatica    Anxiety    Vitamin D deficiency    Cervical radiculopathy    Hypogonadism in male    Herniation of intervertebral disc    B12 deficiency    Abnormal electrocardiogram    ALLA (obstructive sleep apnea)    Overweight (BMI 25.0-29.9)    Insomnia    Low testosterone in male    Decreased range of motion of neck    Decreased range of motion of left shoulder    Neck pain    Myofascial pain syndrome    Tendinopathy of rotator cuff, left    Microalbuminuria due to type 2 diabetes mellitus    Atrial fibrillation with RVR    Leukocytosis       Review of patient's allergies indicates:   Allergen Reactions    Stadol [butorphanol tartrate] Rash     Swelling in face    Strawberries [strawberry] Rash       Current Facility-Administered Medications on File Prior to Encounter   Medication Dose Route Frequency Provider Last Rate Last Admin    0.9%  NaCl infusion   Intravenous Continuous Kendell Hoffman MD        0.9%  NaCl infusion   Intravenous Continuous Gerardo Uribe MD         Current Outpatient Medications on File Prior to Encounter   Medication Sig Dispense Refill    amitriptyline (ELAVIL) 25 MG tablet Take 3 tablets (75 mg  "total) by mouth every evening. 270 tablet 3    amLODIPine (NORVASC) 2.5 MG tablet Take 1 tablet (2.5 mg total) by mouth once daily. 90 tablet 1    aspirin (ECOTRIN) 81 MG EC tablet Take 81 mg by mouth once daily.      azelastine (ASTELIN) 137 mcg (0.1 %) nasal spray 1 spray (137 mcg total) by Nasal route 2 (two) times daily. (Patient taking differently: 1 spray by Nasal route 2 (two) times daily as needed.) 30 mL 11    BD LUER-RUSS SYRINGE 3 mL 25 gauge x 1" Syrg USE ONE SYRINGE EVERY 14 DAYS WITH TESTOSTERONE 100 each 2    candesartan (ATACAND) 32 MG tablet Take 1 tablet (32 mg total) by mouth once daily. 90 tablet 3    cyanocobalamin (VITAMIN B-12) 1000 MCG tablet Take 1 tablet (1,000 mcg total) by mouth once daily. 90 tablet 4    ergocalciferol (ERGOCALCIFEROL) 50,000 unit Cap Take 1 capsule (50,000 Units total) by mouth every 7 days. 12 capsule 0    gabapentin (NEURONTIN) 800 MG tablet Take 1 tablet (800 mg total) by mouth every evening. 90 tablet 1    glimepiride (AMARYL) 4 MG tablet Take 1 tablet (4 mg total) by mouth before breakfast. 90 tablet 1    hydrocortisone 2.5 % cream Apply topically 2 (two) times daily. 28 g 0    ipratropium (ATROVENT) 42 mcg (0.06 %) nasal spray 2 sprays by Nasal route 2 (two) times daily as needed for Rhinitis. 15 mL 0    metoprolol succinate (TOPROL-XL) 200 MG 24 hr tablet Take 1 tablet (200 mg total) by mouth once daily. 90 tablet 3    oxyCODONE-acetaminophen (PERCOCET) 5-325 mg per tablet Take 1 tablet by mouth 2 (two) times daily as needed.      rosuvastatin (CRESTOR) 40 MG Tab Take 1 tablet (40 mg total) by mouth every evening. 90 tablet 3    semaglutide (OZEMPIC) 0.25 mg or 0.5 mg (2 mg/3 mL) pen injector Inject 0.5 mg into the skin every 7 days. 12 mL 1    sod sulf-pot chloride-mag sulf (SUTAB) 1.479-0.188- 0.225 gram tablet Take 12 tablets by mouth once daily. Take according to package instructions with indicated amount of water. 24 tablet 0    syringe with needle, safety " "(BD INTEGRA SYRINGE) 3 mL 22 gauge x 1 1/2" Syrg Inject 1 Syringe as directed once a week. 6 each 3    testosterone cypionate (DEPOTESTOTERONE CYPIONATE) 200 mg/mL injection Inject 1 mL (200 mg total) into the muscle every 14 (fourteen) days. 6 mL 1    valACYclovir (VALTREX) 1000 MG tablet TAKE 2 TABLETS BY MOUTH EVERY 12 HOURS (Patient taking differently: Take 2,000 mg by mouth every 12 (twelve) hours as needed.) 4 tablet 3       Past Surgical History:   Procedure Laterality Date    ANKLE SURGERY Left     2    APPENDECTOMY      at age 20    CAROTID ENDARTERECTOMY Right 07/15/2015    CARPAL TUNNEL RELEASE Bilateral     COLONOSCOPY N/A 12/14/2018    Procedure: COLONOSCOPYSuprep;  Surgeon: Silver Selby MD;  Location: [facility] ENDO;  Service: Endoscopy;  Laterality: N/A;    EPIDURAL STEROID INJECTION N/A 2/26/2024    Procedure: CERVICAL C7/T1 IL SELENA;  Surgeon: Gokul Fung MD;  Location: [facility] PAIN MGT;  Service: Pain Management;  Laterality: N/A;  [phone]  2 WK F/U SERGEI    JOINT REPLACEMENT  9/2010    NASAL SEPTUM SURGERY      TOTAL KNEE ARTHROPLASTY Right     6 knee surgeries       Social History     Socioeconomic History    Marital status:    Tobacco Use    Smoking status: Never     Passive exposure: Never    Smokeless tobacco: Never   Substance and Sexual Activity    Alcohol use: Yes     Alcohol/week: 2.0 standard drinks of alcohol     Types: 2 Cans of beer per week     Comment: a week    Drug use: Never    Sexual activity: Yes     Partners: Female     Birth control/protection: None   Social History Narrative    5/11/2021: . He lives with his wife and 2 kids. (5 kids total). He has one dog, one cat, no more chickens at home. One dog recently passed away. No smokers at home.      Social Determinants of Health     Financial Resource Strain: Low Risk  (6/28/2023)    Overall Financial Resource Strain (CARDIA)     Difficulty of Paying Living Expenses: Not hard at all   Food Insecurity: No Food " Insecurity (6/28/2023)    Hunger Vital Sign     Worried About Running Out of Food in the Last Year: Never true     Ran Out of Food in the Last Year: Never true   Transportation Needs: No Transportation Needs (6/28/2023)    PRAPARE - Transportation     Lack of Transportation (Medical): No     Lack of Transportation (Non-Medical): No   Social Connections: Unknown (6/28/2023)    Social Connection and Isolation Panel [NHANES]     Marital Status:    Housing Stability: Unknown (6/28/2023)    Housing Stability Vital Sign     Unable to Pay for Housing in the Last Year: No     Unstable Housing in the Last Year: No         OBJECTIVE:     Vital Signs Range (Last 24H):  Temp:  [36.5 °C (97.7 °F)-36.9 °C (98.4 °F)] 36.5 °C (97.7 °F)  Pulse:  [] 104  Resp:  [12-21] 17  SpO2:  [95 %-99 %] 95 %  BP: (136-191)/() 175/111    Significant Labs:  Lab Results   Component Value Date    WBC 11.23 02/28/2024    HGB 17.8 02/28/2024    HCT 52.1 02/28/2024     02/28/2024    CHOL 124 07/05/2023    TRIG 205 (H) 07/05/2023    HDL 27 (L) 07/05/2023     (H) 02/28/2024    AST 66 (H) 02/28/2024     02/28/2024    K 4.1 02/28/2024     02/28/2024    CREATININE 1.2 02/28/2024    BUN 16 02/28/2024    CO2 23 02/28/2024    TSH 1.266 02/27/2024    PSA 0.61 06/21/2022    INR 1.1 02/27/2024    HGBA1C 6.0 (H) 02/09/2024       Diagnostic Studies:    EKG:   Results for orders placed or performed during the hospital encounter of 02/27/24   EKG 12-lead    Collection Time: 02/28/24  7:43 AM   Result Value Ref Range    QRS Duration 82 ms    [facility] QTC Calculation 464 ms    Narrative    Test Reason : I48.91,    Vent. Rate : 115 BPM     Atrial Rate : 394 BPM     P-R Int : 000 ms          QRS Dur : 082 ms      QT Int : 336 ms       P-R-T Axes : 000 -05 014 degrees     QTc Int : 464 ms    Atrial fibrillation with rapid ventricular response  Septal infarct (cited on or before 21-JUN-2022)  Abnormal ECG  When compared with ECG of  27-FEB-2024 21:01,  Questionable change in The axis    Referred By: [email or web address]   SELF           Confirmed By:        2D ECHO:  TTE:  No results found for this or any previous visit.  Results for orders placed or performed during the hospital encounter of 02/27/24   Echo   Result Value Ref Range    Ellis's Biplane MOD Ejection Fraction 39 %    LVOT stroke volume 43.14 cm3    LVIDd 4.25 3.5 - 6.0 cm    LV Systolic Volume 36.44 mL    LVIDs 3.05 2.1 - 4.0 cm    LV Diastolic Volume 80.79 mL    IVS 0.91 0.6 - 1.1 cm    LVOT diameter 2.14 cm    LVOT area 3.6 cm2    FS 28 28 - 44 %    Left Ventricle Relative Wall Thickness 0.55 cm    Posterior Wall 1.16 (A) 0.6 - 1.1 cm    LV mass 146.76 g    TR Max Hammad 2.39 m/s    IVRT 99.90 msec    PV Peak S Hammad 0.18 m/s    PV Peak D Hammad 0.13 m/s    Pulm vein S/D ratio 1.38     LVOT peak hammad 0.73 m/s    Left Ventricular Outflow Tract Mean Velocity 0.61 cm/s    Left Ventricular Outflow Tract Mean Gradient 1.54 mmHg    RVDD 3.58 cm    RV S' 15.58 cm/s    TAPSE 1.10 cm    LA size 3.69 cm    Left Atrium Minor Axis 5.50 cm    Left Atrium Major Axis 5.39 cm    LA volume (mod) 55.10 cm3    RA Major Axis 5.01 cm    RA Width 3.53 cm    AV mean gradient 3 mmHg    AV peak gradient 4 mmHg    Ao peak hammad 1.03 m/s    Ao VTI 19.90 cm    LVOT peak VTI 12.00 cm    AV valve area 2.17 cm²    AV Velocity Ratio 0.71     AV index (prosthetic) 0.60     MALISSA by Velocity Ratio 2.55 cm²    MV mean gradient 2 mmHg    MV peak gradient 5 mmHg    MV valve area by continuity eq 2.58 cm2    MV VTI 16.7 cm    Triscuspid Valve Regurgitation Peak Gradient 23 mmHg    Sinus 3.57 cm    STJ 3.46 cm    Ascending aorta 3.6 cm    BSA 2.03 m2    LV Systolic Volume Index 18.1 mL/m2    LV Diastolic Volume Index 40.19 mL/m2    LV Mass Index 73 g/m2    LA Volume Index (Mod) 27.4 mL/m2    ZLVIDS -1.34     ZLVIDD -3.26     LA Volume Index 33.1 mL/m2    LA volume 66.60 cm3    LA WIDTH 3.9 cm    TV resting pulmonary artery pressure 26  mmHg    RV TB RVSP 5 mmHg    Est. RA pres 3 mmHg    Narrative      Left Ventricle: The left ventricle is normal in size. Normal wall   thickness. There is concentric remodeling. There is low normal systolic   function with a visually estimated ejection fraction of 50 - 55%. Unable   to assess diastolic function due to atrial fibrillation.    Right Ventricle: Normal right ventricular cavity size. Wall thickness   is normal. Right ventricle wall motion  is normal. Systolic function is   normal.    Aortic Valve: The aortic valve is a trileaflet valve. There is mild   aortic valve sclerosis.    Mitral Valve: There is mild regurgitation.    Pulmonary Artery: The estimated pulmonary artery systolic pressure is   26 mmHg.    IVC/SVC: Normal venous pressure at 3 mmHg.                                                                                                                           Pre-op Assessment    I have reviewed the Patient Summary Reports.     I have reviewed the Nursing Notes. I have reviewed the NPO Status.   I have reviewed the Medications.     Review of Systems  Anesthesia Hx:  No problems with previous Anesthesia   History of prior surgery of interest to airway management or planning:            Denies Personal Hx of Anesthesia complications.                    Social:  Non-Smoker, Social Alcohol Use       Cardiovascular:     Hypertension    Dysrhythmias atrial fibrillation      hyperlipidemia    New onset atrial fibrillation with palpitations                          Pulmonary:        Sleep Apnea, CPAP           Education provided regarding risk of obstructive sleep apnea            Renal/:  Chronic Renal Disease                Musculoskeletal:  Arthritis   S/p 2/26/2024  Cervical Interlaminar Epidural Steroid Injection C7/T1 under Fluoroscopic Guidance       Spine Disorders:  Chronic Pain           Endocrine:  Diabetes Denies Hypothyroidism.  Denies Hyperthyroidism.             Physical Exam  General:  Well nourished, Cooperative, Alert and Oriented    Airway:  Mallampati: III / II  Mouth Opening: Normal  TM Distance: Normal  Tongue: Normal    Dental:  Intact    Chest/Lungs:  Normal Respiratory Rate        Anesthesia Plan  Type of Anesthesia, risks & benefits discussed:    Anesthesia Type: Gen Natural Airway  Intra-op Monitoring Plan: Standard ASA Monitors  Post Op Pain Control Plan: multimodal analgesia and IV/PO Opioids PRN  Induction:  IV  Informed Consent: Informed consent signed with the Patient and all parties understand the risks and agree with anesthesia plan.  All questions answered.   ASA Score: 3  Day of Surgery Review of History & Physical: H&P Update referred to the surgeon/provider.    Ready For Surgery From Anesthesia Perspective.     .

## 2024-02-28 NOTE — PHARMACY MED REC
"Admission Medication History     The home medication history was taken by Sangeeta Martinez CPhT.    Medication history obtained from, Patient Verified    You may go to "Admission" then "Reconcile Home Medications" tabs to review and/or act upon these items.     The home medication list has been updated by the Pharmacy department.   Please read ALL comments highlighted in yellow.   Please address this information as you see fit.    Feel free to contact us if you have any questions or require assistance.      The medications listed below were removed from the home medication list.  Please reorder if appropriate:  Patient reports no longer taking the following medication(s):  Astelin 137 mcg nasal spray  Medrol Dosepack 4 mg      Sangeeta Martinez CPhT.  Ext [phone]               .          "

## 2024-02-28 NOTE — H&P
[facility]  History & Physical    Patient Name: Partha Curtis Jr.  MRN: [id number]  Patient Class: OP- Observation  Admission Date: 2/27/2024  Attending Physician: Arpan Nava,*   Primary Care Provider: Rocco Cavazos DO         Patient information was obtained from patient and ER records.     Subjective:     Principal Problem:Paroxysmal atrial fibrillation    Chief Complaint:   Chief Complaint   Patient presents with    Tachycardia    Chest Pain     Patient states he was seen at his doctors office today for a neck problem and they did an ecg because his heart rate was high. He was called by his doctor to go to er because he was in a-fib and patient has no hx. Patient only reports some chest heaviness. Denies dizziness, shortness of breath.         HPI: Partha Curtis is a 63-year-old male with a past medical history of hypertension, back pain, who presents with palpitations.    Patient states that over the past 48 hours he has had significant palpitations with no chest pain.  This has never happened before.  As a result, he decided to go to his doctor's office for further evaluation and also for some neck pain.  He was given pain medications for his neck pain and an EKG was done.  The EKG showed new onset atrial fibrillation with RVR and the patient was told to come to the ER for further management.    Triage vitals were significant for tachycardia, elevated blood pressures.  Review of systems significant for neck pain and chest palpitations.  Physical exam significant for tachycardia.      Initial CBC remarkable for leukocytosis. CMP significant for transaminitis, elevated glucose. PT/INR within normal range.  BNP elevated.      UA unremarkable.  Urine culture pending.    Chest x-ray without evidence of acute cardiopulmonary issues.    EKG shows AFib with RVR.    Patient given diltiazem with improvement in heart rates.    Patient admitted for new onset atrial  "fibrillation with RVR.    Past Medical History:   Diagnosis Date    Allergy     Carotid artery occlusion     Chronic back pain     Colonic polyp     Genetic testing     MUTYH mutation-negative    Hyperlipidemia     Hypertension     Sleep apnea     Type 2 diabetes mellitus with stage 3 chronic kidney disease, without long-term current use of insulin 4/2/2020       Past Surgical History:   Procedure Laterality Date    ANKLE SURGERY Left     2    APPENDECTOMY      at age 20    CAROTID ENDARTERECTOMY Right 07/15/2015    CARPAL TUNNEL RELEASE Bilateral     COLONOSCOPY N/A 12/14/2018    Procedure: COLONOSCOPYSuprep;  Surgeon: Silver Selby MD;  Location: [facility] ENDO;  Service: Endoscopy;  Laterality: N/A;    EPIDURAL STEROID INJECTION N/A 2/26/2024    Procedure: CERVICAL C7/T1 IL SELENA;  Surgeon: Gokul Fung MD;  Location: [facility] PAIN MGT;  Service: Pain Management;  Laterality: N/A;  [phone]  2 WK F/U SERGEI    JOINT REPLACEMENT  9/2010    NASAL SEPTUM SURGERY      TOTAL KNEE ARTHROPLASTY Right     6 knee surgeries       Review of patient's allergies indicates:   Allergen Reactions    Stadol [butorphanol tartrate] Rash     Swelling in face    Strawberries [strawberry] Rash       Current Facility-Administered Medications on File Prior to Encounter   Medication    0.9%  NaCl infusion    0.9%  NaCl infusion     Current Outpatient Medications on File Prior to Encounter   Medication Sig    amitriptyline (ELAVIL) 25 MG tablet Take 3 tablets (75 mg total) by mouth every evening.    amLODIPine (NORVASC) 2.5 MG tablet Take 1 tablet (2.5 mg total) by mouth once daily.    aspirin (ECOTRIN) 81 MG EC tablet Take 81 mg by mouth once daily.    azelastine (ASTELIN) 137 mcg (0.1 %) nasal spray 1 spray (137 mcg total) by Nasal route 2 (two) times daily. (Patient taking differently: 1 spray by Nasal route 2 (two) times daily as needed.)    BD LUER-RUSS SYRINGE 3 mL 25 gauge x 1" Syrg USE ONE SYRINGE EVERY 14 DAYS WITH TESTOSTERONE    " "candesartan (ATACAND) 32 MG tablet Take 1 tablet (32 mg total) by mouth once daily.    cyanocobalamin (VITAMIN B-12) 1000 MCG tablet Take 1 tablet (1,000 mcg total) by mouth once daily.    ergocalciferol (ERGOCALCIFEROL) 50,000 unit Cap Take 1 capsule (50,000 Units total) by mouth every 7 days.    gabapentin (NEURONTIN) 800 MG tablet Take 1 tablet (800 mg total) by mouth every evening.    glimepiride (AMARYL) 4 MG tablet Take 1 tablet (4 mg total) by mouth before breakfast.    hydrocortisone 2.5 % cream Apply topically 2 (two) times daily.    ipratropium (ATROVENT) 42 mcg (0.06 %) nasal spray 2 sprays by Nasal route 2 (two) times daily as needed for Rhinitis.    metoprolol succinate (TOPROL-XL) 200 MG 24 hr tablet Take 1 tablet (200 mg total) by mouth once daily.    oxyCODONE-acetaminophen (PERCOCET) 5-325 mg per tablet Take 1 tablet by mouth 2 (two) times daily as needed.    rosuvastatin (CRESTOR) 40 MG Tab Take 1 tablet (40 mg total) by mouth every evening.    semaglutide (OZEMPIC) 0.25 mg or 0.5 mg (2 mg/3 mL) pen injector Inject 0.5 mg into the skin every 7 days.    sod sulf-pot chloride-mag sulf (SUTAB) 1.479-0.188- 0.225 gram tablet Take 12 tablets by mouth once daily. Take according to package instructions with indicated amount of water.    syringe with needle, safety (BD INTEGRA SYRINGE) 3 mL 22 gauge x 1 1/2" Syrg Inject 1 Syringe as directed once a week.    testosterone cypionate (DEPOTESTOTERONE CYPIONATE) 200 mg/mL injection Inject 1 mL (200 mg total) into the muscle every 14 (fourteen) days.    valACYclovir (VALTREX) 1000 MG tablet TAKE 2 TABLETS BY MOUTH EVERY 12 HOURS (Patient taking differently: Take 2,000 mg by mouth every 12 (twelve) hours as needed.)     Family History       Problem Relation (Age of Onset)    Aneurysm Other (48)    Brain cancer Mother (67), Paternal Grandfather (73)    Breast cancer Sister (58), Maternal Cousin (57)    Cancer Mother, Father, Maternal Grandfather    Genetic " Disorder Sister    Hypertension Father    Lung cancer Father    Seizures Daughter    Ulcerative colitis Other          Tobacco Use    Smoking status: Never     Passive exposure: Never    Smokeless tobacco: Never   Substance and Sexual Activity    Alcohol use: Yes     Alcohol/week: 2.0 standard drinks of alcohol     Types: 2 Cans of beer per week     Comment: a week    Drug use: Never    Sexual activity: Yes     Partners: Female     Birth control/protection: None     Review of Systems   Constitutional:  Negative for activity change and appetite change.   HENT:  Negative for ear discharge and ear pain.    Eyes:  Negative for pain and redness.   Respiratory:  Negative for chest tightness.    Cardiovascular:  Positive for palpitations. Negative for chest pain.   Gastrointestinal:  Negative for anal bleeding and blood in stool.   Endocrine: Negative for polydipsia and polyphagia.   Genitourinary:  Negative for frequency and genital sores.   Musculoskeletal:  Negative for gait problem and joint swelling.   Skin:  Negative for pallor and rash.   Allergic/Immunologic: Negative for environmental allergies and food allergies.   Neurological:  Negative for seizures and numbness.   Hematological:  Negative for adenopathy. Does not bruise/bleed easily.   Psychiatric/Behavioral:  Negative for confusion and decreased concentration.      Objective:     Vital Signs (Most Recent):  Temp: 97.9 °F (36.6 °C) (02/27/24 2051)  Pulse: 99 (02/28/24 0001)  Resp: 15 (02/28/24 0001)  BP: (!) 183/109 (02/27/24 2325)  SpO2: 95 % (02/28/24 0001) Vital Signs (24h Range):  Temp:  [97.9 °F (36.6 °C)] 97.9 °F (36.6 °C)  Pulse:  [] 99  Resp:  [15-19] 15  SpO2:  [95 %-99 %] 95 %  BP: (183-191)/(109-113) 183/109     Weight: 83.5 kg (184 lb)  Body mass index is 26.4 kg/m².     Physical Exam  Constitutional:       General: He is not in acute distress.     Appearance: Normal appearance. He is not ill-appearing.   HENT:      Head: Normocephalic and  "atraumatic.      Right Ear: There is no impacted cerumen.      Left Ear: Tympanic membrane normal. There is no impacted cerumen.      Nose: No congestion or rhinorrhea.      Mouth/Throat:      Pharynx: No oropharyngeal exudate or posterior oropharyngeal erythema.   Eyes:      General:         Right eye: No discharge.         Left eye: No discharge.   Cardiovascular:      Rate and Rhythm: Tachycardia present. Rhythm irregular.      Heart sounds: No murmur heard.     No gallop.   Abdominal:      Tenderness: There is no abdominal tenderness. There is no guarding or rebound.      Hernia: No hernia is present.   Musculoskeletal:         General: No deformity.      Cervical back: No rigidity.      Right lower leg: No edema.   Lymphadenopathy:      Cervical: No cervical adenopathy.   Skin:     Findings: No bruising or lesion.   Neurological:      Mental Status: He is alert.      Motor: No weakness.      Gait: Gait normal.   Psychiatric:         Mood and Affect: Mood normal.         Behavior: Behavior normal.                Significant Labs: All pertinent labs within the past 24 hours have been reviewed.  A1C:   Recent Labs   Lab 10/09/23  0746 02/09/24  0719   HGBA1C 7.3* 6.0*     BMP:   Recent Labs   Lab 02/27/24  1137   *      K 4.3      CO2 18*   BUN 19   CREATININE 1.4   CALCIUM 9.9   MG 1.9     CBC:   Recent Labs   Lab 02/26/24  0851 02/27/24  1137 02/27/24  2349   WBC  --  18.94* 14.12*   HGB 18.3* 18.7* 19.8*   HCT 57.2* 53.2 58.4*   PLT  --  280 240     CMP:   Recent Labs   Lab 02/27/24  1137      K 4.3      CO2 18*   *   BUN 19   CREATININE 1.4   CALCIUM 9.9   PROT 7.5   ALBUMIN 4.1   BILITOT 0.6   ALKPHOS 69   AST 35   ALT 70*   ANIONGAP 15     Troponin: No results for input(s): "TROPONINI", "TROPONINIHS" in the last 48 hours.  TSH:   Recent Labs   Lab 02/27/24  1137   TSH 0.623       Significant Imaging: I have reviewed all pertinent imaging results/findings within the past " 24 hours.  I have reviewed and interpreted all pertinent imaging results/findings within the past 24 hours.  Assessment/Plan:     * Paroxysmal atrial fibrillation  Patient with Paroxysmal (<7 days) atrial fibrillation which is uncontrolled currently with Beta Blocker and Calcium Channel Blocker. Patient is currently in atrial fibrillation.[id number] Score: 2. HASBLED Score: 2+. Anticoagulation indicated. Anticoagulation done with apixaban .    Plan:  Continue patient on metoprolol 200 mg daily  Patient s/p diltiazem in the ED. we will discuss with Cardiology regarding rate control methods  Start patient on apixaban 5 mg b.i.d.    Leukocytosis  Noted on lab work    Plan:  Improvement with fluid administration and rate control      Overweight (BMI 25.0-29.9)  Discussing weight loss with the patient.      Type 2 diabetes mellitus with stage 3 chronic kidney disease, without long-term current use of insulin  Creatine stable for now. BMP reviewed- noted Estimated Creatinine Clearance: 55.8 mL/min (based on SCr of 1.4 mg/dL). according to latest data. Based on current GFR, CKD stage is stage 3 - GFR 30-59.  Monitor UOP and serial BMP and adjust therapy as needed. Renally dose meds. Avoid nephrotoxic medications and procedures.    Dyslipidemia  Continue with home statin      Essential hypertension  Chronic, controlled. Latest blood pressure and vitals reviewed-     Temp:  [97.7 °F (36.5 °C)-98.4 °F (36.9 °C)]   Pulse:  []   Resp:  [12-19]   BP: (136-191)/()   SpO2:  [95 %-99 %] .   Home meds for hypertension were reviewed and noted below.   Hypertension Medications               amLODIPine (NORVASC) 2.5 MG tablet Take 1 tablet (2.5 mg total) by mouth once daily.    candesartan (ATACAND) 32 MG tablet Take 1 tablet (32 mg total) by mouth once daily.    metoprolol succinate (TOPROL-XL) 200 MG 24 hr tablet Take 1 tablet (200 mg total) by mouth once daily.            While in the hospital, will manage blood pressure  as follows; Continue home antihypertensive regimen    Will utilize p.r.n. blood pressure medication only if patient's blood pressure greater than 180/110 and he develops symptoms such as worsening chest pain or shortness of breath.      VTE Risk Mitigation (From admission, onward)           Ordered     apixaban tablet 5 mg  2 times daily         02/28/24 0335     IP VTE HIGH RISK PATIENT  Once         02/27/24 2342     Place sequential compression device  Until discontinued         02/27/24 2342                       On 02/28/2024, patient should be placed in hospital observation services under my care.             Dwight Burns MD  Department of Hospital Medicine  [facility] - Emergency Dept

## 2024-02-28 NOTE — PLAN OF CARE
Patient transferred via wheelchair to Room 419 4th floor tele on  assigned cardiac tele monitor. VSS, AAOx4. No c/o pain.   Assessed by MALIK Joshi at bedside. All questions answered. Wife at the bedside.

## 2024-02-28 NOTE — CONSULTS
[facility] - Emergency Dept  Cardiology  Consult Note    Patient Name: Partha Curtis Jr.  MRN: [id number]  Admission Date: 2/27/2024  Hospital Length of Stay: 0 days  Code Status: Full Code   Attending Provider: Shaji Vicente MD   Consulting Provider: FAWN Hill ANP  Primary Care Physician: Rocco Cavazos DO  Principal Problem:Atrial fibrillation with RVR    Patient information was obtained from patient, past medical records, and ER records.     Inpatient consult to [facility]  Consult performed by: Zarina Avila APRN, ANP  Consult ordered by: Dwight Bruns MD  Reason for consult: new onset afib        Subjective:     Chief Complaint:  palpitations     HPI:   64yo male with DMII, HLP,  HTN, leukocytosis, carotid stenosis who presented to the ER with complaints of palpitations. He was seen yesterday for routine outpatient appt for evaluation of neck pain. His HR was noted to be elevated and in afib therefore he was sent to the ER for evaluation. He reports palpitations since the weekend  but no chest pain. He proceed with the procedure with no issues but his HR remained elevated. He was seen by his PCP and had outpatient labs and EKG with notation of afib and was sent to the ER. He denies any history of afib. In the ER, labs CBC and BMP WNL. Total bili 0.5 AST 66 down from 96  down from 129  Troponin 0.011. EKG afib with  and was given Cardizem 20mg IV in the ER and resumed on home dose of Toprol XL. Admitted to [facility] and Cardiology consulted for evaluation of afib           Past Medical History:   Diagnosis Date    Allergy     Carotid artery occlusion     Chronic back pain     Colonic polyp     Genetic testing     MUTYH mutation-negative    Hyperlipidemia     Hypertension     Paroxysmal atrial fibrillation 2/28/2024    Sleep apnea     Type 2 diabetes mellitus with stage 3 chronic kidney disease, without long-term current use of insulin  "4/2/2020       Past Surgical History:   Procedure Laterality Date    ANKLE SURGERY Left     2    APPENDECTOMY      at age 20    CAROTID ENDARTERECTOMY Right 07/15/2015    CARPAL TUNNEL RELEASE Bilateral     COLONOSCOPY N/A 12/14/2018    Procedure: COLONOSCOPYSuprep;  Surgeon: Silver Selby MD;  Location: [facility] ENDO;  Service: Endoscopy;  Laterality: N/A;    EPIDURAL STEROID INJECTION N/A 2/26/2024    Procedure: CERVICAL C7/T1 IL SELENA;  Surgeon: Gokul Fung MD;  Location: [facility] PAIN MGT;  Service: Pain Management;  Laterality: N/A;  [phone]  2 WK F/U SERGEI    JOINT REPLACEMENT  9/2010    NASAL SEPTUM SURGERY      TOTAL KNEE ARTHROPLASTY Right     6 knee surgeries       Review of patient's allergies indicates:   Allergen Reactions    Stadol [butorphanol tartrate] Rash     Swelling in face    Strawberries [strawberry] Rash       Current Facility-Administered Medications on File Prior to Encounter   Medication    0.9%  NaCl infusion    0.9%  NaCl infusion     Current Outpatient Medications on File Prior to Encounter   Medication Sig    amitriptyline (ELAVIL) 25 MG tablet Take 3 tablets (75 mg total) by mouth every evening.    amLODIPine (NORVASC) 2.5 MG tablet Take 1 tablet (2.5 mg total) by mouth once daily.    aspirin (ECOTRIN) 81 MG EC tablet Take 81 mg by mouth once daily.    azelastine (ASTELIN) 137 mcg (0.1 %) nasal spray 1 spray (137 mcg total) by Nasal route 2 (two) times daily. (Patient taking differently: 1 spray by Nasal route 2 (two) times daily as needed.)    BD LUER-RUSS SYRINGE 3 mL 25 gauge x 1" Syrg USE ONE SYRINGE EVERY 14 DAYS WITH TESTOSTERONE    candesartan (ATACAND) 32 MG tablet Take 1 tablet (32 mg total) by mouth once daily.    cyanocobalamin (VITAMIN B-12) 1000 MCG tablet Take 1 tablet (1,000 mcg total) by mouth once daily.    ergocalciferol (ERGOCALCIFEROL) 50,000 unit Cap Take 1 capsule (50,000 Units total) by mouth every 7 days.    gabapentin (NEURONTIN) 800 MG tablet Take 1 tablet (800 " "mg total) by mouth every evening.    glimepiride (AMARYL) 4 MG tablet Take 1 tablet (4 mg total) by mouth before breakfast.    hydrocortisone 2.5 % cream Apply topically 2 (two) times daily.    ipratropium (ATROVENT) 42 mcg (0.06 %) nasal spray 2 sprays by Nasal route 2 (two) times daily as needed for Rhinitis.    metoprolol succinate (TOPROL-XL) 200 MG 24 hr tablet Take 1 tablet (200 mg total) by mouth once daily.    oxyCODONE-acetaminophen (PERCOCET) 5-325 mg per tablet Take 1 tablet by mouth 2 (two) times daily as needed.    rosuvastatin (CRESTOR) 40 MG Tab Take 1 tablet (40 mg total) by mouth every evening.    semaglutide (OZEMPIC) 0.25 mg or 0.5 mg (2 mg/3 mL) pen injector Inject 0.5 mg into the skin every 7 days.    sod sulf-pot chloride-mag sulf (SUTAB) 1.479-0.188- 0.225 gram tablet Take 12 tablets by mouth once daily. Take according to package instructions with indicated amount of water.    syringe with needle, safety (BD INTEGRA SYRINGE) 3 mL 22 gauge x 1 1/2" Syrg Inject 1 Syringe as directed once a week.    testosterone cypionate (DEPOTESTOTERONE CYPIONATE) 200 mg/mL injection Inject 1 mL (200 mg total) into the muscle every 14 (fourteen) days.    valACYclovir (VALTREX) 1000 MG tablet TAKE 2 TABLETS BY MOUTH EVERY 12 HOURS (Patient taking differently: Take 2,000 mg by mouth every 12 (twelve) hours as needed.)     Family History       Problem Relation (Age of Onset)    Aneurysm Other (48)    Brain cancer Mother (67), Paternal Grandfather (73)    Breast cancer Sister (58), Maternal Cousin (57)    Cancer Mother, Father, Maternal Grandfather    Genetic Disorder Sister    Hypertension Father    Lung cancer Father    Seizures Daughter    Ulcerative colitis Other          Tobacco Use    Smoking status: Never     Passive exposure: Never    Smokeless tobacco: Never   Substance and Sexual Activity    Alcohol use: Yes     Alcohol/week: 2.0 standard drinks of alcohol     Types: 2 Cans of beer per week     Comment: a " week    Drug use: Never    Sexual activity: Yes     Partners: Female     Birth control/protection: None     Review of Systems   Constitutional: Negative for chills, decreased appetite, diaphoresis, fever, malaise/fatigue, weight gain and weight loss.   Cardiovascular:  Positive for chest pain. Negative for claudication, dyspnea on exertion, irregular heartbeat, leg swelling, near-syncope, orthopnea, palpitations and paroxysmal nocturnal dyspnea.   Respiratory:  Negative for cough, shortness of breath, snoring, sputum production and wheezing.    Endocrine: Negative for cold intolerance, heat intolerance, polydipsia, polyphagia and polyuria.   Skin:  Negative for color change, dry skin, itching, nail changes and poor wound healing.   Musculoskeletal:  Negative for back pain, gout, joint pain and joint swelling.   Gastrointestinal:  Negative for bloating, abdominal pain, constipation, diarrhea, hematemesis, hematochezia, melena, nausea and vomiting.   Genitourinary:  Negative for dysuria, hematuria and nocturia.   Neurological:  Negative for dizziness, headaches, light-headedness, numbness, paresthesias and weakness.   Psychiatric/Behavioral:  Negative for altered mental status, depression and memory loss.      Objective:     Vital Signs (Most Recent):  Temp: 97.7 °F (36.5 °C) (02/28/24 0428)  Pulse: 105 (02/28/24 0700)  Resp: 16 (02/28/24 0700)  BP: (!) 161/98 (02/28/24 0702)  SpO2: 96 % (02/28/24 0700) Vital Signs (24h Range):  Temp:  [97.7 °F (36.5 °C)-98.4 °F (36.9 °C)] 97.7 °F (36.5 °C)  Pulse:  [] 105  Resp:  [12-19] 16  SpO2:  [95 %-99 %] 96 %  BP: (136-191)/() 161/98     Weight: 83.5 kg (184 lb)  Body mass index is 26.4 kg/m².    SpO2: 96 %         Intake/Output Summary (Last 24 hours) at 2/28/2024 0840  Last data filed at 2/28/2024 0428  Gross per 24 hour   Intake 999 ml   Output 375 ml   Net 624 ml       Lines/Drains/Airways       Peripheral Intravenous Line  Duration                  Peripheral  IV - Single Lumen 02/27/24 2349 20 G Posterior;Right Hand <1 day                     Physical Exam  Constitutional:       General: He is not in acute distress.     Appearance: He is well-developed.   Cardiovascular:      Rate and Rhythm: Tachycardia present. Rhythm irregularly irregular.      Heart sounds: No murmur heard.     No gallop.   Pulmonary:      Effort: Pulmonary effort is normal. No respiratory distress.      Breath sounds: Normal breath sounds. No wheezing.   Abdominal:      General: Bowel sounds are normal. There is no distension.      Palpations: Abdomen is soft.      Tenderness: There is no abdominal tenderness.   Skin:     General: Skin is warm and dry.   Neurological:      Mental Status: He is alert and oriented to person, place, and time.          Significant Labs: BMP:   Recent Labs   Lab 02/27/24 1137 02/27/24 2349 02/28/24  0409   * 265* 151*    138 141   K 4.3 3.9 4.1    102 107   CO2 18* 25 23   BUN 19 19 16   CREATININE 1.4 1.4 1.2   CALCIUM 9.9 9.8 9.0   MG 1.9 2.1  --    , CBC   Recent Labs   Lab 02/27/24 1137 02/27/24 2349 02/28/24  0409   WBC 18.94* 14.12* 11.23   HGB 18.7* 19.8* 17.8   HCT 53.2 58.4* 52.1    240 206   , and Troponin   Recent Labs   Lab 02/27/24 2349   TROPONINI 0.011       Significant Imaging: Echocardiogram: Transthoracic echo (TTE) complete (Cupid Only):   Results for orders placed or performed during the hospital encounter of 07/07/22   Echo   Result Value Ref Range    BSA 2.1 m2    TDI SEPTAL 0.06 m/s    LV LATERAL E/E' RATIO 10.57 m/s    LV SEPTAL E/E' RATIO 12.33 m/s    LA WIDTH 3.57 cm    TDI LATERAL 0.07 m/s    PV PEAK VELOCITY 1.23 cm/s    LVIDd 4.50 3.5 - 6.0 cm    IVS 1.00 (A) 0.6 - 1.1 cm    Posterior Wall 1.00 (A) 0.6 - 1.1 cm    Ao root annulus 3.50 cm    LVIDs 1.80 2.1 - 4.0 cm    FS 60 28 - 44 %    LA volume 40.56 cm3    STJ 3.06 cm    Ascending aorta 3.17 cm    LV mass 153.27 g    LA size 3.08 cm    RVDD 3.91 cm    TAPSE  2.68 cm    RV S' 17.77 cm/s    Left Ventricle Relative Wall Thickness 0.44 cm    AV mean gradient 5 mmHg    AV valve area 2.92 cm2    AV Velocity Ratio 0.81     AV index (prosthetic) 0.79     MV mean gradient 1 mmHg    MV valve area p 1/2 method 3.45 cm2    MV valve area by continuity eq 3.91 cm2    E/A ratio 0.80     Mean e' 0.07 m/s    E wave deceleration time 220.10 msec    LVOT diameter 2.17 cm    LVOT area 3.7 cm2    LVOT peak hammad 1.14 m/s    LVOT peak VTI 23.70 cm    Ao peak hammad 1.41 m/s    Ao VTI 29.99 cm    LVOT stroke volume 87.61 cm3    AV peak gradient 8 mmHg    MV peak gradient 3 mmHg    E/E' ratio 11.38 m/s    MV Peak E Hammad 0.74 m/s    MV VTI 22.43 cm    MV stenosis pressure 1/2 time 63.83 ms    MV Peak A Hammad 0.93 m/s    LV Systolic Volume 18.59 mL    LV Systolic Volume Index 8.9 mL/m2    LV Diastolic Volume 77.86 mL    LV Diastolic Volume Index 37.43 mL/m2    LA Volume Index 19.5 mL/m2    LV Mass Index 74 g/m2    RA Major Axis 4.15 cm    Left Atrium Minor Axis 4.30 cm    Left Atrium Major Axis 4.38 cm    LA Volume Index (Mod) 13.1 mL/m2    LA volume (mod) 27.25 cm3    RA Width 2.85 cm    Right Atrial Pressure (from IVC) 3 mmHg    EF 65 %    Narrative    · The estimated ejection fraction is 65%.  · The left ventricle is normal in size with normal systolic function.  · Normal left ventricular diastolic function.  · Normal right ventricular size with normal right ventricular systolic   function.  · Normal central venous pressure (3 mmHg).        Assessment and Plan:     * Atrial fibrillation with RVR  - presented with palpitations  - EKG with afib HR in 120s; given IV Cardizem 20mg in the ER and continued on oral Toprol home dose of 200mg daily  - remains in afib this AM; AM EKG with afib with HR 110s; started on Eliquis  - CHADSVAS score 3 (HTN, PAD, DMII) will continue Eliquis  - echo today; given conitnued afib with symptoms and slight RVR will proceed with ELIZABETH/cardioversion today; will place NPO;  continue Eliquis; discussed need for continuation of DOAC for 4-6 weeks without interruption post cardioversion     Leukocytosis  - WBCs 18K upon admission; WBCs down to 11K this AM  - urine culture pending    Essential hypertension  - SBP 140s-190s overnight  - on Atacand 32 daily and Toprol   daily  as an outpatient   - continued while admitted  - goal BP less than 130/80  - BP not fully controlled; will monitor and consider adding additional agent if BP remains above goal         VTE Risk Mitigation (From admission, onward)           Ordered     apixaban tablet 5 mg  2 times daily         02/28/24 0335     IP VTE HIGH RISK PATIENT  Once         02/27/24 2342     Place sequential compression device  Until discontinued         02/27/24 2342                    Thank you for your consult. I will follow-up with patient. Please contact us if you have any additional questions.    FAWN Hill, ANP  Cardiology   [place] - Emergency Dept

## 2024-02-28 NOTE — PLAN OF CARE
Patient remains cath lab bay #1 on stretcher with side rails up x2. Pt drowsy but able to follow commands. Pt is stable when connecting to cardiac monitors. VSS.    +2 galina radial pulses palpated. Skin normal in color and warm to touch, <3 sec cap refill.  Fall risk precautions given and patient acknowledges.  AIDET completed to pt. Will continue to monitor patient.

## 2024-02-29 VITALS
BODY MASS INDEX: 26.33 KG/M2 | RESPIRATION RATE: 18 BRPM | DIASTOLIC BLOOD PRESSURE: 90 MMHG | WEIGHT: 183.88 LBS | SYSTOLIC BLOOD PRESSURE: 158 MMHG | HEIGHT: 70 IN | OXYGEN SATURATION: 96 % | HEART RATE: 71 BPM | TEMPERATURE: 98 F

## 2024-02-29 LAB
ALBUMIN SERPL BCP-MCNC: 3.5 G/DL (ref 3.5–5.2)
ALP SERPL-CCNC: 55 U/L (ref 55–135)
ALT SERPL W/O P-5'-P-CCNC: 98 U/L (ref 10–44)
ANION GAP SERPL CALC-SCNC: 9 MMOL/L (ref 8–16)
AST SERPL-CCNC: 52 U/L (ref 10–40)
BASOPHILS # BLD AUTO: 0.03 K/UL (ref 0–0.2)
BASOPHILS NFR BLD: 0.4 % (ref 0–1.9)
BILIRUB SERPL-MCNC: 0.6 MG/DL (ref 0.1–1)
BUN SERPL-MCNC: 19 MG/DL (ref 8–23)
CALCIUM SERPL-MCNC: 8.6 MG/DL (ref 8.7–10.5)
CHLORIDE SERPL-SCNC: 105 MMOL/L (ref 95–110)
CO2 SERPL-SCNC: 25 MMOL/L (ref 23–29)
CREAT SERPL-MCNC: 1.3 MG/DL (ref 0.5–1.4)
DIFFERENTIAL METHOD BLD: ABNORMAL
EOSINOPHIL # BLD AUTO: 0.1 K/UL (ref 0–0.5)
EOSINOPHIL NFR BLD: 1.1 % (ref 0–8)
ERYTHROCYTE [DISTWIDTH] IN BLOOD BY AUTOMATED COUNT: 14.9 % (ref 11.5–14.5)
EST. GFR  (NO RACE VARIABLE): >60 ML/MIN/1.73 M^2
GLUCOSE SERPL-MCNC: 129 MG/DL (ref 70–110)
HCT VFR BLD AUTO: 49.4 % (ref 40–54)
HGB BLD-MCNC: 16.8 G/DL (ref 14–18)
IMM GRANULOCYTES # BLD AUTO: 0.04 K/UL (ref 0–0.04)
IMM GRANULOCYTES NFR BLD AUTO: 0.5 % (ref 0–0.5)
LYMPHOCYTES # BLD AUTO: 2.8 K/UL (ref 1–4.8)
LYMPHOCYTES NFR BLD: 33.4 % (ref 18–48)
MCH RBC QN AUTO: 30.8 PG (ref 27–31)
MCHC RBC AUTO-ENTMCNC: 34 G/DL (ref 32–36)
MCV RBC AUTO: 91 FL (ref 82–98)
MONOCYTES # BLD AUTO: 0.7 K/UL (ref 0.3–1)
MONOCYTES NFR BLD: 8.7 % (ref 4–15)
NEUTROPHILS # BLD AUTO: 4.7 K/UL (ref 1.8–7.7)
NEUTROPHILS NFR BLD: 55.9 % (ref 38–73)
NRBC BLD-RTO: 0 /100 WBC
PLATELET # BLD AUTO: 192 K/UL (ref 150–450)
PMV BLD AUTO: 9.2 FL (ref 9.2–12.9)
POTASSIUM SERPL-SCNC: 3.8 MMOL/L (ref 3.5–5.1)
PROT SERPL-MCNC: 6.1 G/DL (ref 6–8.4)
RBC # BLD AUTO: 5.46 M/UL (ref 4.6–6.2)
SODIUM SERPL-SCNC: 139 MMOL/L (ref 136–145)
WBC # BLD AUTO: 8.35 K/UL (ref 3.9–12.7)

## 2024-02-29 PROCEDURE — 99214 OFFICE O/P EST MOD 30 MIN: CPT | Mod: ,,, | Performed by: NURSE PRACTITIONER

## 2024-02-29 PROCEDURE — 80053 COMPREHEN METABOLIC PANEL: CPT | Performed by: STUDENT IN AN ORGANIZED HEALTH CARE EDUCATION/TRAINING PROGRAM

## 2024-02-29 PROCEDURE — 36415 COLL VENOUS BLD VENIPUNCTURE: CPT | Performed by: STUDENT IN AN ORGANIZED HEALTH CARE EDUCATION/TRAINING PROGRAM

## 2024-02-29 PROCEDURE — 25000003 PHARM REV CODE 250: Performed by: STUDENT IN AN ORGANIZED HEALTH CARE EDUCATION/TRAINING PROGRAM

## 2024-02-29 PROCEDURE — G0378 HOSPITAL OBSERVATION PER HR: HCPCS

## 2024-02-29 PROCEDURE — 85025 COMPLETE CBC W/AUTO DIFF WBC: CPT | Performed by: STUDENT IN AN ORGANIZED HEALTH CARE EDUCATION/TRAINING PROGRAM

## 2024-02-29 RX ADMIN — AMLODIPINE BESYLATE 2.5 MG: 2.5 TABLET ORAL at 08:02

## 2024-02-29 RX ADMIN — METOPROLOL SUCCINATE 200 MG: 50 TABLET, EXTENDED RELEASE ORAL at 08:02

## 2024-02-29 RX ADMIN — APIXABAN 5 MG: 5 TABLET, FILM COATED ORAL at 08:02

## 2024-02-29 RX ADMIN — LOSARTAN POTASSIUM 100 MG: 50 TABLET, FILM COATED ORAL at 08:02

## 2024-02-29 RX ADMIN — ASPIRIN 81 MG: 81 TABLET, COATED ORAL at 08:02

## 2024-02-29 NOTE — DISCHARGE SUMMARY
[facility] Medicine  Discharge Summary      Patient Name: Partha Curtis Jr.  MRN: [id number]  WENDY: [id number]  Patient Class: OP- Observation  Admission Date: 2/27/2024  Hospital Length of Stay: 0 days  Discharge Date and Time:  02/29/2024 9:52 AM  Attending Physician: Shaji Vicente MD   Discharging Provider: Shaji Vicente MD  Primary Care Provider: Rocco Cavazos DO    Primary Care Team: Networked reference to record PCT     HPI:   Partha Curtis is a 63-year-old male with a past medical history of hypertension, back pain, who presents with palpitations.    Patient states that over the past 48 hours he has had significant palpitations with no chest pain.  This has never happened before.  As a result, he decided to go to his doctor's office for further evaluation and also for some neck pain.  He was given pain medications for his neck pain and an EKG was done.  The EKG showed new onset atrial fibrillation with RVR and the patient was told to come to the ER for further management.    Triage vitals were significant for tachycardia, elevated blood pressures.  Review of systems significant for neck pain and chest palpitations.  Physical exam significant for tachycardia.      Initial CBC remarkable for leukocytosis. CMP significant for transaminitis, elevated glucose. PT/INR within normal range.  BNP elevated.      UA unremarkable.  Urine culture pending.    Chest x-ray without evidence of acute cardiopulmonary issues.    EKG shows AFib with RVR.    Patient given diltiazem with improvement in heart rates.    Patient admitted for new onset atrial fibrillation with RVR.    Procedure(s) (LRB):  Transesophageal echo (ELIZABETH) intra-procedure log documentation (N/A)      Hospital Course:   2/28 63-year-old male with a past medical history of hypertension, DMII, chronic back pain was admitted for new onset A. Fib with RVR s/p Successful DCCV   Patient was admitted was admitted for palpitation. He was found  to be in A. Fib with RVR. He was evaluated by cardiology and had a successful DCCV. Post procedure he was doing well, denies any acute new concern. During the course of this admission serial routine labs with elevated LFTs. He had an acute hepatitis panel which came back negative. His diabetes has been well controlled. He is stable for DC home per cardiology and will follow up with PCP and cardiology as outpatient.     Goals of Care Treatment Preferences:  Code Status: Full Code    Living Will: Yes              Consults:   Consults (From admission, onward)          Status Ordering Provider     Inpatient consult to [facility]  Once        Provider:  (Not yet assigned)    Completed YOUNG ODONNELL            No new Assessment & Plan notes have been filed under this hospital service since the last note was generated.  Service: Hospital Medicine    Final Active Diagnoses:    Diagnosis Date Noted POA    PRINCIPAL PROBLEM:  Atrial fibrillation with RVR [I48.91] 02/28/2024 Yes    Leukocytosis [D72.829] 02/28/2024 Yes    Transaminitis [R74.01] 02/28/2024 Yes    Overweight (BMI 25.0-29.9) [E66.3] 06/27/2023 Yes    Chronic bilateral low back pain without sciatica [M54.50, G89.29] 02/10/2021 Yes    Type 2 diabetes mellitus with stage 3 chronic kidney disease, without long-term current use of insulin [E11.22, N18.30] 04/02/2020 Yes    Dyslipidemia [E78.5] 10/22/2014 Yes     Chronic    Essential hypertension [I10] 10/22/2014 Yes      Problems Resolved During this Admission:       Discharged Condition: good    Disposition: Home or Self Care    Follow Up:   Follow-up Information       Rocco Cavazos DO Follow up on 3/8/2024.    Specialty: Family Medicine  Why: 9:20am  Contact information:  [address]  [place] [id number] [phone]               Alexandru Mario MD Follow up on 3/12/2024.    Specialties: Cardiology, Interventional Cardiology  Why: 11:20am  Contact information:  [address]  [address]  [place] [address]  [phone]                           Patient Instructions:      Diet diabetic     Diet Cardiac     Notify your health care provider if you experience any of the following:  temperature >100.4     Notify your health care provider if you experience any of the following:  persistent nausea and vomiting or diarrhea     Notify your health care provider if you experience any of the following:  severe uncontrolled pain     Notify your health care provider if you experience any of the following:  redness, tenderness, or signs of infection (pain, swelling, redness, odor or green/yellow discharge around incision site)     Notify your health care provider if you experience any of the following:  difficulty breathing or increased cough     Notify your health care provider if you experience any of the following:  severe persistent headache     Notify your health care provider if you experience any of the following:  worsening rash     Notify your health care provider if you experience any of the following:  persistent dizziness, light-headedness, or visual disturbances     Notify your health care provider if you experience any of the following:  increased confusion or weakness     Activity as tolerated       Significant Diagnostic Studies: Labs: BMP:   Recent Labs   Lab 02/27/24  1137 02/27/24  2349 02/28/24  0409 02/29/24  0256   * 265* 151* 129*    138 141 139   K 4.3 3.9 4.1 3.8    102 107 105   CO2 18* 25 23 25   BUN 19 19 16 19   CREATININE 1.4 1.4 1.2 1.3   CALCIUM 9.9 9.8 9.0 8.6*   MG 1.9 2.1  --   --    , CMP   Recent Labs   Lab 02/27/24  2349 02/28/24  0409 02/29/24  0256    141 139   K 3.9 4.1 3.8    107 105   CO2 25 23 25   * 151* 129*   BUN 19 16 19   CREATININE 1.4 1.2 1.3   CALCIUM 9.8 9.0 8.6*   PROT 8.0 6.8 6.1   ALBUMIN 4.3 3.8 3.5   BILITOT 0.3 0.4 0.6   ALKPHOS 78 65 55   AST 96* 66* 52*   * 105* 98*   ANIONGAP 11 11 9   , CBC   Recent Labs    Lab 02/27/24  2349 02/28/24  0409 02/29/24  0256   WBC 14.12* 11.23 8.35   HGB 19.8* 17.8 16.8   HCT 58.4* 52.1 49.4    206 192   , INR   Lab Results   Component Value Date    INR 1.1 02/27/2024    INR 1.1 03/05/2014    INR 1.0 04/13/2004   , Lipid Panel   Lab Results   Component Value Date    CHOL 124 07/05/2023    HDL 27 (L) 07/05/2023    LDLCALC 56.0 (L) 07/05/2023    TRIG 205 (H) 07/05/2023    CHOLHDL 21.8 07/05/2023   , Troponin   Recent Labs   Lab 02/27/24 2349   TROPONINI 0.011   , A1C:   Recent Labs   Lab 10/09/23  0746 02/09/24  0719   HGBA1C 7.3* 6.0*   , and All labs within the past 24 hours have been reviewed  Radiology: X-Ray: CXR: X-Ray Chest 1 View (CXR): No results found for this visit on 02/27/24.  Cardiac Graphics: ECG:     Pending Diagnostic Studies:       None           Medications:  Reconciled Home Medications:      Medication List        START taking these medications      apixaban 5 mg Tab  Commonly known as: ELIQUIS  Take 1 tablet (5 mg total) by mouth 2 (two) times daily.            CHANGE how you take these medications      amLODIPine 2.5 MG tablet  Commonly known as: NORVASC  Take 1 tablet (2.5 mg total) by mouth once daily.  What changed: when to take this     ergocalciferol 50,000 unit Cap  Commonly known as: ERGOCALCIFEROL  Take 1 capsule (50,000 Units total) by mouth every 7 days.  What changed: when to take this     metoprolol succinate 200 MG 24 hr tablet  Commonly known as: TOPROL-XL  Take 1 tablet (200 mg total) by mouth once daily.  What changed: when to take this     OZEMPIC 0.25 mg or 0.5 mg (2 mg/3 mL) pen injector  Generic drug: semaglutide  Inject 0.5 mg into the skin every 7 days.  What changed: when to take this     valACYclovir 1000 MG tablet  Commonly known as: VALTREX  TAKE 2 TABLETS BY MOUTH EVERY 12 HOURS  What changed:   when to take this  reasons to take this            CONTINUE taking these medications      amitriptyline 25 MG tablet  Commonly known as:  "ELAVIL  Take 3 tablets (75 mg total) by mouth every evening.     aspirin 81 MG EC tablet  Commonly known as: ECOTRIN  Take 81 mg by mouth every evening.     BD INTEGRA SYRINGE 3 mL 22 gauge x 1 1/2" Syrg  Generic drug: syringe with needle, safety  Inject 1 Syringe as directed once a week.     BD LUER-RUSS SYRINGE 3 mL 25 gauge x 1" Syrg  Generic drug: syringe with needle  USE ONE SYRINGE EVERY 14 DAYS WITH TESTOSTERONE     candesartan 32 MG tablet  Commonly known as: ATACAND  Take 1 tablet (32 mg total) by mouth once daily.     cyanocobalamin 1000 MCG tablet  Commonly known as: VITAMIN B-12  Take 1 tablet (1,000 mcg total) by mouth once daily.     gabapentin 800 MG tablet  Commonly known as: NEURONTIN  Take 1 tablet (800 mg total) by mouth every evening.     glimepiride 4 MG tablet  Commonly known as: AMARYL  Take 1 tablet (4 mg total) by mouth before breakfast.     hydrocortisone 2.5 % cream  Apply topically 2 (two) times daily.     ipratropium 42 mcg (0.06 %) nasal spray  Commonly known as: ATROVENT  2 sprays by Nasal route 2 (two) times daily as needed for Rhinitis.     oxyCODONE-acetaminophen 5-325 mg per tablet  Commonly known as: PERCOCET  Take 1 tablet by mouth 2 (two) times daily as needed.     rosuvastatin 40 MG Tab  Commonly known as: CRESTOR  Take 1 tablet (40 mg total) by mouth every evening.     SUTAB 1.479-0.188- 0.225 gram tablet  Generic drug: sod sulf-pot chloride-mag sulf  Take 12 tablets by mouth once daily. Take according to package instructions with indicated amount of water.     testosterone cypionate 200 mg/mL injection  Commonly known as: DEPOTESTOTERONE CYPIONATE  Inject 1 mL (200 mg total) into the muscle every 14 (fourteen) days.              Indwelling Lines/Drains at time of discharge:   Lines/Drains/Airways       None                   Time spent on the discharge of patient: >35 minutes         Shaji Vicente MD  Department of [facility] Medicine  [facility]  "

## 2024-02-29 NOTE — PLAN OF CARE
Pt medically stable to D/C today. Pt f/u PCP and CARDS appointment scheduled. Pt independent with ADL's. No needs for DME or HHS. Pt spouse to provide transport home once D/C. No other needs from CM identified. Pt clear from CM.    Future Appointments   Date Time Provider Department Center   3/4/2024  4:00 PM Delisa Martinez, PT [facility] [place] [facility]   3/8/2024  9:20 AM Rocco Cavazos DO [facility] [place]   3/12/2024 11:20 AM Alexandru Mario MD [facility] CARDIO [place]   3/12/2024  3:00 PM Alexandru Sanders PTA [facility] OP [facility] [place]   3/14/2024  2:40 PM Baylee Ni, NP [facility]   3/15/2024 10:45 AM [facility] LAB [place]   3/15/2024 11:30 AM LAB, [place] [facility] LAB [place]   3/18/2024  4:00 PM Jan Rowe, PT [facility] OP [facility] [place]   3/19/2024 10:00 AM Rocco Cavazos DO [facility] [place]   5/13/2024 10:15 AM LAB, [place] KENH LAB [place]   5/15/2024 10:20 AM Rocco Cavazos DO [facility] [place]        02/29/24 0938   Final Note   Assessment Type Final Discharge Note   Anticipated Discharge Disposition Home   What phone number can be called within the next 1-3 days to see how you are doing after discharge? [phone]   Hospital Resources/Appts/Education Provided Appointments scheduled and added to AVS   Post-Acute Status   Discharge Delays None known at this time     Keke Jordan LMSW  Phone: [phone]  [facility]

## 2024-02-29 NOTE — SUBJECTIVE & OBJECTIVE
Review of Systems   Constitutional: Negative for chills, decreased appetite, diaphoresis and fever.   Cardiovascular:  Negative for chest pain, claudication, cyanosis, dyspnea on exertion, irregular heartbeat, leg swelling, near-syncope, orthopnea, palpitations, paroxysmal nocturnal dyspnea and syncope.   Respiratory:  Negative for cough, hemoptysis, shortness of breath and wheezing.    Gastrointestinal:  Negative for bloating, abdominal pain, constipation, diarrhea, melena, nausea and vomiting.   Neurological:  Negative for dizziness and weakness.     Objective:     Vital Signs (Most Recent):  Temp: 98 °F (36.7 °C) (02/29/24 0817)  Pulse: 68 (02/29/24 1133)  Resp: 18 (02/29/24 0817)  BP: (!) 154/81 (02/29/24 0817)  SpO2: 97 % (02/29/24 0817) Vital Signs (24h Range):  Temp:  [97.5 °F (36.4 °C)-98 °F (36.7 °C)] 98 °F (36.7 °C)  Pulse:  [60-87] 68  Resp:  [18-20] 18  SpO2:  [93 %-98 %] 97 %  BP: (118-167)/(69-99) 154/81     Weight: 83.4 kg (183 lb 13.8 oz)  Body mass index is 26.38 kg/m².     SpO2: 97 %         Intake/Output Summary (Last 24 hours) at 2/29/2024 1139  Last data filed at 2/29/2024 0025  Gross per 24 hour   Intake 360 ml   Output 1425 ml   Net -1065 ml       Lines/Drains/Airways       Peripheral Intravenous Line  Duration                  Peripheral IV - Single Lumen 02/27/24 2349 20 G Posterior;Right Hand 1 day                       Physical Exam  Constitutional:       General: He is not in acute distress.     Appearance: He is well-developed.   Cardiovascular:      Rate and Rhythm: Normal rate and regular rhythm.      Heart sounds: No murmur heard.     No gallop.   Pulmonary:      Effort: Pulmonary effort is normal. No respiratory distress.      Breath sounds: Normal breath sounds. No wheezing.   Abdominal:      General: Bowel sounds are normal. There is no distension.      Palpations: Abdomen is soft.      Tenderness: There is no abdominal tenderness.   Skin:     General: Skin is warm and dry.    Neurological:      Mental Status: He is alert and oriented to person, place, and time.            Significant Labs: BMP:   Recent Labs   Lab 02/27/24  2349 02/28/24  0409 02/29/24  0256   * 151* 129*    141 139   K 3.9 4.1 3.8    107 105   CO2 25 23 25   BUN 19 16 19   CREATININE 1.4 1.2 1.3   CALCIUM 9.8 9.0 8.6*   MG 2.1  --   --    , CBC   Recent Labs   Lab 02/27/24 2349 02/28/24 0409 02/29/24  0256   WBC 14.12* 11.23 8.35   HGB 19.8* 17.8 16.8   HCT 58.4* 52.1 49.4    206 192   , and Troponin   Recent Labs   Lab 02/27/24 2349   TROPONINI 0.011       Significant Imaging: Echocardiogram: Transthoracic echo (TTE) complete (Cupid Only):   Results for orders placed or performed during the hospital encounter of 02/27/24   Echo   Result Value Ref Range    Ellis's Biplane MOD Ejection Fraction 39 %    LVOT stroke volume 43.14 cm3    LVIDd 4.25 3.5 - 6.0 cm    LV Systolic Volume 36.44 mL    LVIDs 3.05 2.1 - 4.0 cm    LV Diastolic Volume 80.79 mL    IVS 0.91 0.6 - 1.1 cm    LVOT diameter 2.14 cm    LVOT area 3.6 cm2    FS 28 28 - 44 %    Left Ventricle Relative Wall Thickness 0.55 cm    Posterior Wall 1.16 (A) 0.6 - 1.1 cm    LV mass 146.76 g    TR Max Hammad 2.39 m/s    IVRT 99.90 msec    PV Peak S Hammad 0.18 m/s    PV Peak D Hammad 0.13 m/s    Pulm vein S/D ratio 1.38     LVOT peak hammad 0.73 m/s    Left Ventricular Outflow Tract Mean Velocity 0.61 cm/s    Left Ventricular Outflow Tract Mean Gradient 1.54 mmHg    RVDD 3.58 cm    RV S' 15.58 cm/s    TAPSE 1.10 cm    LA size 3.69 cm    Left Atrium Minor Axis 5.50 cm    Left Atrium Major Axis 5.39 cm    LA volume (mod) 55.10 cm3    RA Major Axis 5.01 cm    RA Width 3.53 cm    AV mean gradient 3 mmHg    AV peak gradient 4 mmHg    Ao peak hammad 1.03 m/s    Ao VTI 19.90 cm    LVOT peak VTI 12.00 cm    AV valve area 2.17 cm²    AV Velocity Ratio 0.71     AV index (prosthetic) 0.60     MALISSA by Velocity Ratio 2.55 cm²    MV mean gradient 2 mmHg    MV peak  gradient 5 mmHg    MV valve area by continuity eq 2.58 cm2    MV VTI 16.7 cm    Triscuspid Valve Regurgitation Peak Gradient 23 mmHg    Sinus 3.57 cm    STJ 3.46 cm    Ascending aorta 3.6 cm    BSA 2.03 m2    LV Systolic Volume Index 18.1 mL/m2    LV Diastolic Volume Index 40.19 mL/m2    LV Mass Index 73 g/m2    LA Volume Index (Mod) 27.4 mL/m2    ZLVIDS -1.34     ZLVIDD -3.26     LA Volume Index 33.1 mL/m2    LA volume 66.60 cm3    LA WIDTH 3.9 cm    TV resting pulmonary artery pressure 26 mmHg    RV TB RVSP 5 mmHg    Est. RA pres 3 mmHg    Narrative      Left Ventricle: The left ventricle is normal in size. Normal wall   thickness. There is concentric remodeling. There is low normal systolic   function with a visually estimated ejection fraction of 50 - 55%. Unable   to assess diastolic function due to atrial fibrillation.    Right Ventricle: Normal right ventricular cavity size. Wall thickness   is normal. Right ventricle wall motion  is normal. Systolic function is   normal.    Aortic Valve: The aortic valve is a trileaflet valve. There is mild   aortic valve sclerosis.    Mitral Valve: There is mild regurgitation.    Pulmonary Artery: The estimated pulmonary artery systolic pressure is   26 mmHg.    IVC/SVC: Normal venous pressure at 3 mmHg.

## 2024-02-29 NOTE — PROGRESS NOTES
[facility] - Telemetry  Cardiology  Progress Note    Patient Name: Partha Curtis Jr.  MRN: [id number]  Admission Date: 2/27/2024  Hospital Length of Stay: 0 days  Code Status: Full Code   Attending Physician: Shaji Vicente MD   Primary Care Physician: Rocco Cavazos DO  Expected Discharge Date: 2/29/2024  Principal Problem:Atrial fibrillation with RVR    Subjective:     Hospital Course:   2/28/2024 per HPI  2/29/2024 Successful restoration to NSR yesterday. Remains in NSR. CBC and BMP WNL. SBP 110s-150s        Review of Systems   Constitutional: Negative for chills, decreased appetite, diaphoresis and fever.   Cardiovascular:  Negative for chest pain, claudication, cyanosis, dyspnea on exertion, irregular heartbeat, leg swelling, near-syncope, orthopnea, palpitations, paroxysmal nocturnal dyspnea and syncope.   Respiratory:  Negative for cough, hemoptysis, shortness of breath and wheezing.    Gastrointestinal:  Negative for bloating, abdominal pain, constipation, diarrhea, melena, nausea and vomiting.   Neurological:  Negative for dizziness and weakness.     Objective:     Vital Signs (Most Recent):  Temp: 98 °F (36.7 °C) (02/29/24 0817)  Pulse: 68 (02/29/24 1133)  Resp: 18 (02/29/24 0817)  BP: (!) 154/81 (02/29/24 0817)  SpO2: 97 % (02/29/24 0817) Vital Signs (24h Range):  Temp:  [97.5 °F (36.4 °C)-98 °F (36.7 °C)] 98 °F (36.7 °C)  Pulse:  [60-87] 68  Resp:  [18-20] 18  SpO2:  [93 %-98 %] 97 %  BP: (118-167)/(69-99) 154/81     Weight: 83.4 kg (183 lb 13.8 oz)  Body mass index is 26.38 kg/m².     SpO2: 97 %         Intake/Output Summary (Last 24 hours) at 2/29/2024 1139  Last data filed at 2/29/2024 0025  Gross per 24 hour   Intake 360 ml   Output 1425 ml   Net -1065 ml       Lines/Drains/Airways       Peripheral Intravenous Line  Duration                  Peripheral IV - Single Lumen 02/27/24 2349 20 G Posterior;Right Hand 1 day                       Physical Exam  Constitutional:       General: He is not in acute  distress.     Appearance: He is well-developed.   Cardiovascular:      Rate and Rhythm: Normal rate and regular rhythm.      Heart sounds: No murmur heard.     No gallop.   Pulmonary:      Effort: Pulmonary effort is normal. No respiratory distress.      Breath sounds: Normal breath sounds. No wheezing.   Abdominal:      General: Bowel sounds are normal. There is no distension.      Palpations: Abdomen is soft.      Tenderness: There is no abdominal tenderness.   Skin:     General: Skin is warm and dry.   Neurological:      Mental Status: He is alert and oriented to person, place, and time.            Significant Labs: BMP:   Recent Labs   Lab 02/27/24 2349 02/28/24 0409 02/29/24  0256   * 151* 129*    141 139   K 3.9 4.1 3.8    107 105   CO2 25 23 25   BUN 19 16 19   CREATININE 1.4 1.2 1.3   CALCIUM 9.8 9.0 8.6*   MG 2.1  --   --    , CBC   Recent Labs   Lab 02/27/24 2349 02/28/24 0409 02/29/24  0256   WBC 14.12* 11.23 8.35   HGB 19.8* 17.8 16.8   HCT 58.4* 52.1 49.4    206 192   , and Troponin   Recent Labs   Lab 02/27/24 2349   TROPONINI 0.011       Significant Imaging: Echocardiogram: Transthoracic echo (TTE) complete (Cupid Only):   Results for orders placed or performed during the hospital encounter of 02/27/24   Echo   Result Value Ref Range    Ellis's Biplane MOD Ejection Fraction 39 %    LVOT stroke volume 43.14 cm3    LVIDd 4.25 3.5 - 6.0 cm    LV Systolic Volume 36.44 mL    LVIDs 3.05 2.1 - 4.0 cm    LV Diastolic Volume 80.79 mL    IVS 0.91 0.6 - 1.1 cm    LVOT diameter 2.14 cm    LVOT area 3.6 cm2    FS 28 28 - 44 %    Left Ventricle Relative Wall Thickness 0.55 cm    Posterior Wall 1.16 (A) 0.6 - 1.1 cm    LV mass 146.76 g    TR Max Hammad 2.39 m/s    IVRT 99.90 msec    PV Peak S Hammad 0.18 m/s    PV Peak D Hammad 0.13 m/s    Pulm vein S/D ratio 1.38     LVOT peak hammad 0.73 m/s    Left Ventricular Outflow Tract Mean Velocity 0.61 cm/s    Left Ventricular Outflow Tract Mean  Gradient 1.54 mmHg    RVDD 3.58 cm    RV S' 15.58 cm/s    TAPSE 1.10 cm    LA size 3.69 cm    Left Atrium Minor Axis 5.50 cm    Left Atrium Major Axis 5.39 cm    LA volume (mod) 55.10 cm3    RA Major Axis 5.01 cm    RA Width 3.53 cm    AV mean gradient 3 mmHg    AV peak gradient 4 mmHg    Ao peak angie 1.03 m/s    Ao VTI 19.90 cm    LVOT peak VTI 12.00 cm    AV valve area 2.17 cm²    AV Velocity Ratio 0.71     AV index (prosthetic) 0.60     MALISSA by Velocity Ratio 2.55 cm²    MV mean gradient 2 mmHg    MV peak gradient 5 mmHg    MV valve area by continuity eq 2.58 cm2    MV VTI 16.7 cm    Triscuspid Valve Regurgitation Peak Gradient 23 mmHg    Sinus 3.57 cm    STJ 3.46 cm    Ascending aorta 3.6 cm    BSA 2.03 m2    LV Systolic Volume Index 18.1 mL/m2    LV Diastolic Volume Index 40.19 mL/m2    LV Mass Index 73 g/m2    LA Volume Index (Mod) 27.4 mL/m2    ZLVIDS -1.34     ZLVIDD -3.26     LA Volume Index 33.1 mL/m2    LA volume 66.60 cm3    LA WIDTH 3.9 cm    TV resting pulmonary artery pressure 26 mmHg    RV TB RVSP 5 mmHg    Est. RA pres 3 mmHg    Narrative      Left Ventricle: The left ventricle is normal in size. Normal wall   thickness. There is concentric remodeling. There is low normal systolic   function with a visually estimated ejection fraction of 50 - 55%. Unable   to assess diastolic function due to atrial fibrillation.    Right Ventricle: Normal right ventricular cavity size. Wall thickness   is normal. Right ventricle wall motion  is normal. Systolic function is   normal.    Aortic Valve: The aortic valve is a trileaflet valve. There is mild   aortic valve sclerosis.    Mitral Valve: There is mild regurgitation.    Pulmonary Artery: The estimated pulmonary artery systolic pressure is   26 mmHg.    IVC/SVC: Normal venous pressure at 3 mmHg.       Assessment and Plan:     Brief HPI: Seen this morning on AM rounds with Dr. Pierson while resting in bed. Denies any complaints and reports feeling signficantly  better. Discussed POC as detailed below-verbalized understanding and agrees with POC      * Atrial fibrillation with RVR  - presented with palpitations  - EKG with afib HR in 120s- new onset  - given IV Cardizem 20mg and continued on oral Toprol home dose of 200mg daily with continue afib  - successful restoration to NSR with no recurrent afib  - CHADSVAS score 3 (HTN, PAD, DMII) agree with initiation of Eliquis; continue Toprol XL   - echo with normal LVEF; discussed need for continuation of DOAC for 4-6 weeks without interruption post cardioversion     Leukocytosis  - WBCs 18K upon admission; WBCs down to 11K this AM  - urine culture pending    Essential hypertension  - SBP 140s-190s overnight  - on Norvasc 2.5 daily Atacand 32 daily and Toprol   daily  as an outpatient   - continued while admitted  - goal BP less than 130/80  - BP improving; will continue current regimen         VTE Risk Mitigation (From admission, onward)           Ordered     apixaban tablet 5 mg  2 times daily         02/28/24 0335     IP VTE HIGH RISK PATIENT  Once         02/27/24 2342     Place sequential compression device  Until discontinued         02/27/24 2342                  Suitable for discharge from cardiac standpoint with cardiology follow up as an outpatient     FAWN Hill, ANP  Cardiology  [facility] - Telemetry

## 2024-02-29 NOTE — ASSESSMENT & PLAN NOTE
- presented with palpitations  - EKG with afib HR in 120s- new onset  - given IV Cardizem 20mg and continued on oral Toprol home dose of 200mg daily with continue afib  - successful restoration to NSR with no recurrent afib  - CHADSVAS score 3 (HTN, PAD, DMII) agree with initiation of Eliquis; continue Toprol XL   - echo with normal LVEF; discussed need for continuation of DOAC for 4-6 weeks without interruption post cardioversion

## 2024-02-29 NOTE — ASSESSMENT & PLAN NOTE
- SBP 140s-190s overnight  - on Norvasc 2.5 daily Atacand 32 daily and Toprol   daily  as an outpatient   - continued while admitted  - goal BP less than 130/80  - BP improving; will continue current regimen

## 2024-02-29 NOTE — PROGRESS NOTES
"[facility]                    Pharmacy       Discharge Medication Education    Patient ACCEPTED medication education. Pharmacy has provided education on the name, indication, and possible side effects of the medication(s) prescribed, using teach-back method.     The following medications have also been discussed, during this admission.        Medication List        START taking these medications      ELIQUIS 5 mg Tab  Generic drug: apixaban  Take 1 tablet (5 mg total) by mouth 2 (two) times daily.            CHANGE how you take these medications      amLODIPine 2.5 MG tablet  Commonly known as: NORVASC  Take 1 tablet (2.5 mg total) by mouth once daily.  What changed: when to take this     ergocalciferol 50,000 unit Cap  Commonly known as: ERGOCALCIFEROL  Take 1 capsule (50,000 Units total) by mouth every 7 days.  What changed: when to take this     metoprolol succinate 200 MG 24 hr tablet  Commonly known as: TOPROL-XL  Take 1 tablet (200 mg total) by mouth once daily.  What changed: when to take this     OZEMPIC 0.25 mg or 0.5 mg (2 mg/3 mL) pen injector  Generic drug: semaglutide  Inject 0.5 mg into the skin every 7 days.  What changed: when to take this     valACYclovir 1000 MG tablet  Commonly known as: VALTREX  TAKE 2 TABLETS BY MOUTH EVERY 12 HOURS  What changed:   when to take this  reasons to take this            CONTINUE taking these medications      amitriptyline 25 MG tablet  Commonly known as: ELAVIL  Take 3 tablets (75 mg total) by mouth every evening.     aspirin 81 MG EC tablet  Commonly known as: ECOTRIN     BD INTEGRA SYRINGE 3 mL 22 gauge x 1 1/2" Syrg  Generic drug: syringe with needle, safety  Inject 1 Syringe as directed once a week.     BD LUER-RUSS SYRINGE 3 mL 25 gauge x 1" Syrg  Generic drug: syringe with needle  USE ONE SYRINGE EVERY 14 DAYS WITH TESTOSTERONE     candesartan 32 MG tablet  Commonly known as: ATACAND  Take 1 tablet (32 mg total) by mouth once daily.   "   cyanocobalamin 1000 MCG tablet  Commonly known as: VITAMIN B-12  Take 1 tablet (1,000 mcg total) by mouth once daily.     gabapentin 800 MG tablet  Commonly known as: NEURONTIN  Take 1 tablet (800 mg total) by mouth every evening.     glimepiride 4 MG tablet  Commonly known as: AMARYL  Take 1 tablet (4 mg total) by mouth before breakfast.     hydrocortisone 2.5 % cream  Apply topically 2 (two) times daily.     ipratropium 42 mcg (0.06 %) nasal spray  Commonly known as: ATROVENT  2 sprays by Nasal route 2 (two) times daily as needed for Rhinitis.     oxyCODONE-acetaminophen 5-325 mg per tablet  Commonly known as: PERCOCET     rosuvastatin 40 MG Tab  Commonly known as: CRESTOR  Take 1 tablet (40 mg total) by mouth every evening.     SUTAB 1.479-0.188- 0.225 gram tablet  Generic drug: sod sulf-pot chloride-mag sulf  Take 12 tablets by mouth once daily. Take according to package instructions with indicated amount of water.     testosterone cypionate 200 mg/mL injection  Commonly known as: DEPOTESTOTERONE CYPIONATE  Inject 1 mL (200 mg total) into the muscle every 14 (fourteen) days.               Where to Get Your Medications        These medications were sent to [facility] [address], [place] [address]      Hours: Mon-Fri, 8a-5:30p Phone: [phone]   ELIQUIS 5 mg Tab          Thank you  Stephan Calderon, PharmD  [phone]

## 2024-02-29 NOTE — PROGRESS NOTES
Discharge orders noted. Additional clinical references attached. Patient's discharge instructions given by bedside RN. Virtual nurse cued into room and reviewed discharge instructions. Education provided on new medication, diagnosis, and follow-up appointments. Teach back method used. Patient verbalized understanding. All questions answered. Transport to [facility] requested. Bedside nurse updated on patient status and transportation request.      02/29/24 1306   AVS Confirmation   Discharge instructions and AVS given to and reviewed with patient and/or significant other. Yes

## 2024-03-01 ENCOUNTER — PATIENT MESSAGE (OUTPATIENT)
Dept: FAMILY MEDICINE | Facility: CLINIC | Age: 64
End: 2024-03-01
Payer: COMMERCIAL

## 2024-03-01 ENCOUNTER — PATIENT OUTREACH (OUTPATIENT)
Dept: ADMINISTRATIVE | Facility: CLINIC | Age: 64
End: 2024-03-01
Payer: COMMERCIAL

## 2024-03-01 NOTE — PROGRESS NOTES
C3 nurse spoke with Partha Curtis Jr. for a TCC post hospital discharge follow up call. Pt states he is feeling better but is still tired and worn out. He states this morning his HR was 61 and BP was 153/98, which he states is about where it was even at discharge. He denies any new symptoms and confirmed he was given the OOC number for any or worsening symptoms.     The patient has a scheduled followup with Rocco Cavazos DO ([facility]) on 3/8/24 at 0920. Message routed to Rocco Cavazos DO requesting a visit type change to 'HOSFU.'     OP NOTE  Procedures   1. YUKI  2. DC cardioversion  Indications:  Atrial fibrillations with rapid ventricular response  Sedation by anesthesia  Informed consent obtained  Time out done  After adequate sedation YUKI performed   Findings EF 35% moderate TR MR no intracardiac clot and no clot in left atrial appendage.   DCCV performed with no additional sedation  A single shock of 200Joules delivered in synchronized fashion converted to NSR  EKG to follow  Complications none

## 2024-03-04 ENCOUNTER — OFFICE VISIT (OUTPATIENT)
Dept: CARDIOLOGY | Facility: CLINIC | Age: 64
End: 2024-03-04
Payer: COMMERCIAL

## 2024-03-04 VITALS
HEART RATE: 64 BPM | BODY MASS INDEX: 26.73 KG/M2 | DIASTOLIC BLOOD PRESSURE: 89 MMHG | WEIGHT: 186.31 LBS | SYSTOLIC BLOOD PRESSURE: 145 MMHG

## 2024-03-04 DIAGNOSIS — I48.91 ATRIAL FIBRILLATION WITH RVR: Primary | ICD-10-CM

## 2024-03-04 DIAGNOSIS — Z98.890 S/P CAROTID ENDARTERECTOMY: ICD-10-CM

## 2024-03-04 DIAGNOSIS — I10 ESSENTIAL HYPERTENSION: ICD-10-CM

## 2024-03-04 DIAGNOSIS — E78.5 DYSLIPIDEMIA: Chronic | ICD-10-CM

## 2024-03-04 DIAGNOSIS — I65.23 BILATERAL CAROTID ARTERY STENOSIS: ICD-10-CM

## 2024-03-04 DIAGNOSIS — R94.31 ABNORMAL ELECTROCARDIOGRAM: ICD-10-CM

## 2024-03-04 LAB
OHS QRS DURATION: 84 MS
OHS QTC CALCULATION: 418 MS

## 2024-03-04 PROCEDURE — 3066F NEPHROPATHY DOC TX: CPT | Mod: CPTII,S$GLB,, | Performed by: INTERNAL MEDICINE

## 2024-03-04 PROCEDURE — 99214 OFFICE O/P EST MOD 30 MIN: CPT | Mod: 25,S$GLB,, | Performed by: INTERNAL MEDICINE

## 2024-03-04 PROCEDURE — 3044F HG A1C LEVEL LT 7.0%: CPT | Mod: CPTII,S$GLB,, | Performed by: INTERNAL MEDICINE

## 2024-03-04 PROCEDURE — 3060F POS MICROALBUMINURIA REV: CPT | Mod: CPTII,S$GLB,, | Performed by: INTERNAL MEDICINE

## 2024-03-04 PROCEDURE — 1159F MED LIST DOCD IN RCRD: CPT | Mod: CPTII,S$GLB,, | Performed by: INTERNAL MEDICINE

## 2024-03-04 PROCEDURE — 3079F DIAST BP 80-89 MM HG: CPT | Mod: CPTII,S$GLB,, | Performed by: INTERNAL MEDICINE

## 2024-03-04 PROCEDURE — 4010F ACE/ARB THERAPY RXD/TAKEN: CPT | Mod: CPTII,S$GLB,, | Performed by: INTERNAL MEDICINE

## 2024-03-04 PROCEDURE — 93000 ELECTROCARDIOGRAM COMPLETE: CPT | Mod: S$GLB,,, | Performed by: INTERNAL MEDICINE

## 2024-03-04 PROCEDURE — 1160F RVW MEDS BY RX/DR IN RCRD: CPT | Mod: CPTII,S$GLB,, | Performed by: INTERNAL MEDICINE

## 2024-03-04 PROCEDURE — 99999 PR PBB SHADOW E&M-EST. PATIENT-LVL IV: CPT | Mod: PBBFAC,,, | Performed by: INTERNAL MEDICINE

## 2024-03-04 PROCEDURE — 3077F SYST BP >= 140 MM HG: CPT | Mod: CPTII,S$GLB,, | Performed by: INTERNAL MEDICINE

## 2024-03-04 PROCEDURE — 3008F BODY MASS INDEX DOCD: CPT | Mod: CPTII,S$GLB,, | Performed by: INTERNAL MEDICINE

## 2024-03-04 RX ORDER — SPIRONOLACTONE 25 MG/1
12.5 TABLET ORAL DAILY
Qty: 15 TABLET | Refills: 11 | Status: SHIPPED | OUTPATIENT
Start: 2024-03-04 | End: 2024-03-08

## 2024-03-04 RX ORDER — AMLODIPINE BESYLATE 5 MG/1
5 TABLET ORAL NIGHTLY
Qty: 30 TABLET | Refills: 11 | Status: SHIPPED | OUTPATIENT
Start: 2024-03-04 | End: 2024-05-15

## 2024-03-04 NOTE — PROGRESS NOTES
Subjective:   03/04/2024     Patient ID:  Partha Curtis Jr. is a 63 y.o. male who presents for evaulation of Atrial Fibrillation, Irregular Heart Beat, Hospital Follow Up, and Establish Care      Recent presentation with atrial fib with rapid response, CHADS2 Vasc score was 3.  Cardioverted to sinus rhythm with ELIZABETH guidance.  Discharged on Eliquis.  Has maintain sinus rhythm since discharge.  No palpitations.      Hypertension was poorly controlled, his amlodipine dose had been decreased due to lower extremity swelling.  He had become dehydrated while taking hydrochlorothiazide 25 mg daily.  He is now back on 5 mg of amlodipine a day.  Blood pressures remain poorly controlled.  Will add low-dose spironolactone.    Mixed hyperlipidemia is present, patient currently on rosuvastatin 40 mg nightly.  Will repeat.      Status post carotid endarterectomy in 2015.  LDL goal less than 55.              Recent cardiology consult:     64yo male with DMII, HLP,  HTN, leukocytosis, carotid stenosis who presented to the ER with complaints of palpitations. He was seen yesterday for routine outpatient appt for evaluation of neck pain. His HR was noted to be elevated and in afib therefore he was sent to the ER for evaluation. He reports palpitations since the weekend  but no chest pain. He proceed with the procedure with no issues but his HR remained elevated. He was seen by his PCP and had outpatient labs and EKG with notation of afib and was sent to the ER. He denies any history of afib. In the ER, labs CBC and BMP WNL. Total bili 0.5 AST 66 down from 96  down from 129  Troponin 0.011. EKG afib with  and was given Cardizem 20mg IV in the ER and resumed on home dose of Toprol XL. Admitted to [facility] consulted for evaluation of afib   Echocardiogram:  · The estimated ejection fraction is 65%.  · The left ventricle is normal in size with normal systolic function.  · Normal left  ventricular diastolic function.  · Normal right ventricular size with normal right ventricular systolic   function.  · Normal central venous pressure (3 mmHg).        Assessment and Plan:     * Atrial fibrillation with RVR  - presented with palpitations  - EKG with afib HR in 120s; given IV Cardizem 20mg in the ER and continued on oral Toprol home dose of 200mg daily  - remains in afib this AM; AM EKG with afib with HR 110s; started on Eliquis  - CHADSVAS score 3 (HTN, PAD, DMII) will continue Eliquis  - echo today; given conitnued afib with symptoms and slight RVR will proceed with ELIZABETH/cardioversion today; will place NPO; continue Eliquis; discussed need for continuation of DOAC for 4-6 weeks without interruption post cardioversion      Leukocytosis  - WBCs 18K upon admission; WBCs down to 11K this AM  - urine culture pending     Essential hypertension  - SBP 140s-190s overnight  - on Atacand 32 daily and Toprol   daily  as an outpatient   - continued while admitted  - goal BP less than 130/80  - BP not fully controlled; will monitor and consider adding additional agent if BP remains above goal          Recent hospital discharge note reviewed:  Patient Name: Partha Curtis Jr.  MRN: [id number]  WENDY: [id number]  Patient Class: OP- Observation  Admission Date: 2/27/2024  Hospital Length of Stay: 0 days  Discharge Date and Time:  02/29/2024 9:52 AM  Attending Physician: Shaji Vicente MD   Discharging Provider: Shaji Vicente MD  Primary Care Provider: Rocco Cavazos DO     Primary Care Team: Networked reference to record PCT      HPI:   Partha Curtis is a 63-year-old male with a past medical history of hypertension, back pain, who presents with palpitations.     Patient states that over the past 48 hours he has had significant palpitations with no chest pain.  This has never happened before.  As a result, he decided to go to his doctor's office for further evaluation and also for some neck pain.  He was  given pain medications for his neck pain and an EKG was done.  The EKG showed new onset atrial fibrillation with RVR and the patient was told to come to the ER for further management.     Triage vitals were significant for tachycardia, elevated blood pressures.  Review of systems significant for neck pain and chest palpitations.  Physical exam significant for tachycardia.       Initial CBC remarkable for leukocytosis. CMP significant for transaminitis, elevated glucose. PT/INR within normal range.  BNP elevated.       UA unremarkable.  Urine culture pending.     Chest x-ray without evidence of acute cardiopulmonary issues.     EKG shows AFib with RVR.     Patient given diltiazem with improvement in heart rates.     Patient admitted for new onset atrial fibrillation with RVR.     Procedure(s) (LRB):  Transesophageal echo (ELIZABETH) intra-procedure log documentation (N/A)       Hospital Course:   2/28 63-year-old male with a past medical history of hypertension, DMII, chronic back pain was admitted for new onset A. Fib with RVR s/p Successful DCCV   Patient was admitted was admitted for palpitation. He was found to be in A. Fib with RVR. He was evaluated by cardiology and had a successful DCCV. Post procedure he was doing well, denies any acute new concern. During the course of this admission serial routine labs with elevated LFTs. He had an acute hepatitis panel which came back negative. His diabetes has been well controlled. He is stable for DC home per cardiology and will follow up with PCP and cardiology as outpatient.         Past Medical History:   Diagnosis Date    Allergy     Carotid artery occlusion     Chronic back pain     Colonic polyp     Genetic testing     MUTYH mutation-negative    Hyperlipidemia     Hypertension     Paroxysmal atrial fibrillation 2/28/2024    Sleep apnea     Type 2 diabetes mellitus with stage 3 chronic kidney disease, without long-term current use of insulin 4/2/2020       Review of  "patient's allergies indicates:   Allergen Reactions    Stadol [butorphanol tartrate] Rash     Swelling in face    Strawberries [strawberry] Rash         Current Outpatient Medications:     amitriptyline (ELAVIL) 25 MG tablet, Take 3 tablets (75 mg total) by mouth every evening., Disp: 270 tablet, Rfl: 3    amLODIPine (NORVASC) 2.5 MG tablet, Take 1 tablet (2.5 mg total) by mouth once daily. (Patient taking differently: Take 5 mg by mouth every evening.), Disp: 90 tablet, Rfl: 1    apixaban (ELIQUIS) 5 mg Tab, Take 1 tablet (5 mg total) by mouth 2 (two) times daily., Disp: 60 tablet, Rfl: 11    aspirin (ECOTRIN) 81 MG EC tablet, Take 81 mg by mouth every evening., Disp: , Rfl:     BD LUER-RUSS SYRINGE 3 mL 25 gauge x 1" Syrg, USE ONE SYRINGE EVERY 14 DAYS WITH TESTOSTERONE, Disp: 100 each, Rfl: 2    candesartan (ATACAND) 32 MG tablet, Take 1 tablet (32 mg total) by mouth once daily., Disp: 90 tablet, Rfl: 3    cyanocobalamin (VITAMIN B-12) 1000 MCG tablet, Take 1 tablet (1,000 mcg total) by mouth once daily., Disp: 90 tablet, Rfl: 4    ergocalciferol (ERGOCALCIFEROL) 50,000 unit Cap, Take 1 capsule (50,000 Units total) by mouth every 7 days. (Patient taking differently: Take 50,000 Units by mouth Every Friday.), Disp: 12 capsule, Rfl: 0    gabapentin (NEURONTIN) 800 MG tablet, Take 1 tablet (800 mg total) by mouth every evening., Disp: 90 tablet, Rfl: 1    glimepiride (AMARYL) 4 MG tablet, Take 1 tablet (4 mg total) by mouth before breakfast., Disp: 90 tablet, Rfl: 1    hydrocortisone 2.5 % cream, Apply topically 2 (two) times daily. (Patient taking differently: Apply topically 2 (two) times daily as needed.), Disp: 28 g, Rfl: 0    ipratropium (ATROVENT) 42 mcg (0.06 %) nasal spray, 2 sprays by Nasal route 2 (two) times daily as needed for Rhinitis., Disp: 15 mL, Rfl: 0    metoprolol succinate (TOPROL-XL) 200 MG 24 hr tablet, Take 1 tablet (200 mg total) by mouth once daily. (Patient taking differently: Take 200 mg by " "mouth every evening.), Disp: 90 tablet, Rfl: 3    oxyCODONE-acetaminophen (PERCOCET) 5-325 mg per tablet, Take 1 tablet by mouth 2 (two) times daily as needed., Disp: , Rfl:     rosuvastatin (CRESTOR) 40 MG Tab, Take 1 tablet (40 mg total) by mouth every evening., Disp: 90 tablet, Rfl: 3    semaglutide (OZEMPIC) 0.25 mg or 0.5 mg (2 mg/3 mL) pen injector, Inject 0.5 mg into the skin every 7 days. (Patient taking differently: Inject 0.5 mg into the skin every Wednesday.), Disp: 12 mL, Rfl: 1    sod sulf-pot chloride-mag sulf (SUTAB) 1.479-0.188- 0.225 gram tablet, Take 12 tablets by mouth once daily. Take according to package instructions with indicated amount of water., Disp: 24 tablet, Rfl: 0    syringe with needle, safety (BD INTEGRA SYRINGE) 3 mL 22 gauge x 1 1/2" Syrg, Inject 1 Syringe as directed once a week., Disp: 6 each, Rfl: 3    testosterone cypionate (DEPOTESTOTERONE CYPIONATE) 200 mg/mL injection, Inject 1 mL (200 mg total) into the muscle every 14 (fourteen) days., Disp: 6 mL, Rfl: 1    valACYclovir (VALTREX) 1000 MG tablet, TAKE 2 TABLETS BY MOUTH EVERY 12 HOURS (Patient taking differently: Take 2,000 mg by mouth every 12 (twelve) hours as needed.), Disp: 4 tablet, Rfl: 3  No current facility-administered medications for this visit.    Facility-Administered Medications Ordered in Other Visits:     0.9%  NaCl infusion, , Intravenous, Continuous, Kendell Hoffman MD    0.9%  NaCl infusion, , Intravenous, Continuous, Gerardo Uribe MD     Objective:   Review of Systems   Cardiovascular:  Negative for chest pain, claudication, cyanosis, dyspnea on exertion, irregular heartbeat, leg swelling, near-syncope, orthopnea, palpitations, paroxysmal nocturnal dyspnea and syncope.         Vitals:    03/04/24 1037   BP: (!) 145/89   Pulse: 64     Wt Readings from Last 3 Encounters:   03/04/24 84.5 kg (186 lb 4.6 oz)   02/28/24 83.4 kg (183 lb 13.8 oz)   02/26/24 83.5 kg (184 lb)     Temp Readings from Last 3 " Encounters:   02/29/24 98.2 °F (36.8 °C) (Oral)   02/26/24 98.2 °F (36.8 °C) (Oral)   02/15/24 97.5 °F (36.4 °C) (Oral)     BP Readings from Last 3 Encounters:   03/04/24 (!) 145/89   02/29/24 (!) 158/90   02/26/24 138/79     Pulse Readings from Last 3 Encounters:   03/04/24 64   02/29/24 71   02/26/24 98             Physical Exam  Vitals reviewed.   Constitutional:       General: He is not in acute distress.     Appearance: He is well-developed.   HENT:      Head: Normocephalic and atraumatic.      Nose: Nose normal.   Eyes:      Conjunctiva/sclera: Conjunctivae normal.      Pupils: Pupils are equal, round, and reactive to light.   Neck:      Vascular: No carotid bruit or JVD.   Cardiovascular:      Rate and Rhythm: Normal rate and regular rhythm.      Pulses: Normal pulses and intact distal pulses.      Heart sounds: Normal heart sounds. No murmur heard.     No friction rub. No gallop.   Pulmonary:      Effort: Pulmonary effort is normal. No respiratory distress.      Breath sounds: Normal breath sounds. No wheezing or rales.   Chest:      Chest wall: No tenderness.   Abdominal:      General: Bowel sounds are normal. There is no distension.      Palpations: Abdomen is soft.      Tenderness: There is no abdominal tenderness.   Musculoskeletal:         General: No tenderness or deformity. Normal range of motion.      Cervical back: Normal range of motion and neck supple.      Right lower leg: No edema.      Left lower leg: No edema.   Skin:     General: Skin is warm and dry.      Findings: No erythema or rash.   Neurological:      Mental Status: He is alert and oriented to person, place, and time.      Cranial Nerves: No cranial nerve deficit.      Motor: No abnormal muscle tone.      Coordination: Coordination normal.   Psychiatric:         Behavior: Behavior normal.         Thought Content: Thought content normal.         Judgment: Judgment normal.           Lab Results   Component Value Date    CHOL 124  07/05/2023    CHOL 110 (L) 06/21/2022    CHOL 117 (L) 05/05/2021     Lab Results   Component Value Date    HDL 27 (L) 07/05/2023    HDL 24 (L) 06/21/2022    HDL 30 (L) 05/05/2021     Lab Results   Component Value Date    LDLCALC 56.0 (L) 07/05/2023    LDLCALC 30.0 (L) 06/21/2022    LDLCALC 54.2 (L) 05/05/2021     Lab Results   Component Value Date    ALT 98 (H) 02/29/2024    AST 52 (H) 02/29/2024    AST 66 (H) 02/28/2024    AST 96 (H) 02/27/2024     Lab Results   Component Value Date    CREATININE 1.3 02/29/2024    BUN 19 02/29/2024     02/29/2024    K 3.8 02/29/2024    CO2 25 02/29/2024    CO2 23 02/28/2024    CO2 25 02/27/2024     Lab Results   Component Value Date    HGB 16.8 02/29/2024    HCT 49.4 02/29/2024    HCT 52.1 02/28/2024    HCT 58.4 (H) 02/27/2024           EKG today shows sinus rhythm with anteroseptal infarct, unchanged, no evidence for infarct on recent echocardiogram.              Assessment and Plan:     Atrial fibrillation with RVR  Comments:  Currently in sinus rhythm after cardioversion   Continue Eliquis  Orders:  -     Ambulatory referral/consult to Cardiology  -     IN OFFICE EKG 12-LEAD (to Muse)    Essential hypertension  Comments:  Elevated, continue amlodipine 5 mg daily,  Add spironolactone 25 mg tablets, 1/2 tablet daily    Dyslipidemia  Comments:  Recheck lipids    Bilateral carotid artery stenosis    S/P carotid endarterectomy    Abnormal electrocardiogram  Comments:  Septal infarct noted, not present on echocardiography         Follow up in about 6 months (around 9/4/2024).          Future Appointments   Date Time Provider Department Center   3/8/2024  9:20 AM Rocco Cavazos DO [facility]   3/12/2024  3:00 PM Alexandru Sanders PTA [facility]   3/14/2024  2:40 PM Baylee Ni NP [facility]   3/15/2024 10:45 AM LAB, [facility] [place]   3/15/2024 11:30 AM LAB, [facility] [place]   3/18/2024  4:00 PM Jan Rowe,  PT [facility] [place]   3/19/2024 10:00 AM Rocco Cavazos DO [facility]   5/13/2024 10:15 AM LAB, [facility] [place]   5/15/2024 10:20 AM [facility]

## 2024-03-05 ENCOUNTER — TELEPHONE (OUTPATIENT)
Dept: ENDOSCOPY | Facility: HOSPITAL | Age: 64
End: 2024-03-05
Payer: COMMERCIAL

## 2024-03-05 NOTE — TELEPHONE ENCOUNTER
I see that Jin López has already reached out to pt and is handling him being reschedule when he is able. This encounter closed.

## 2024-03-05 NOTE — TELEPHONE ENCOUNTER
----- Message from Alida Law sent at 3/5/2024  8:33 AM CST -----  Regarding: FW: Reschedule Procedure  Contact: [phone]    ----- Message -----  From: Magy Hearn  Sent: 3/5/2024   8:32 AM CST  To: Louis Parker Schedulers; Angel [facility]APPAS Staff  Subject: Reschedule Procedure                             Calling in regards to rescheduling procedure due to recent hospital stay for afib and medication change as soon as possible. Please call and schedule.

## 2024-03-08 ENCOUNTER — OFFICE VISIT (OUTPATIENT)
Dept: FAMILY MEDICINE | Facility: CLINIC | Age: 64
End: 2024-03-08
Payer: COMMERCIAL

## 2024-03-08 VITALS
WEIGHT: 190.25 LBS | OXYGEN SATURATION: 98 % | HEIGHT: 70 IN | TEMPERATURE: 98 F | HEART RATE: 70 BPM | BODY MASS INDEX: 27.24 KG/M2 | DIASTOLIC BLOOD PRESSURE: 82 MMHG | SYSTOLIC BLOOD PRESSURE: 138 MMHG

## 2024-03-08 DIAGNOSIS — R79.89 LOW TESTOSTERONE IN MALE: ICD-10-CM

## 2024-03-08 DIAGNOSIS — G47.00 INSOMNIA, UNSPECIFIED TYPE: ICD-10-CM

## 2024-03-08 DIAGNOSIS — E55.9 VITAMIN D DEFICIENCY: ICD-10-CM

## 2024-03-08 DIAGNOSIS — Z09 HOSPITAL DISCHARGE FOLLOW-UP: Primary | ICD-10-CM

## 2024-03-08 DIAGNOSIS — I10 ESSENTIAL HYPERTENSION: ICD-10-CM

## 2024-03-08 DIAGNOSIS — E78.5 DYSLIPIDEMIA: Chronic | ICD-10-CM

## 2024-03-08 PROCEDURE — 4010F ACE/ARB THERAPY RXD/TAKEN: CPT | Mod: CPTII,S$GLB,, | Performed by: FAMILY MEDICINE

## 2024-03-08 PROCEDURE — 99999 PR PBB SHADOW E&M-EST. PATIENT-LVL V: CPT | Mod: PBBFAC,,, | Performed by: FAMILY MEDICINE

## 2024-03-08 PROCEDURE — 3075F SYST BP GE 130 - 139MM HG: CPT | Mod: CPTII,S$GLB,, | Performed by: FAMILY MEDICINE

## 2024-03-08 PROCEDURE — 3066F NEPHROPATHY DOC TX: CPT | Mod: CPTII,S$GLB,, | Performed by: FAMILY MEDICINE

## 2024-03-08 PROCEDURE — 1159F MED LIST DOCD IN RCRD: CPT | Mod: CPTII,S$GLB,, | Performed by: FAMILY MEDICINE

## 2024-03-08 PROCEDURE — 3060F POS MICROALBUMINURIA REV: CPT | Mod: CPTII,S$GLB,, | Performed by: FAMILY MEDICINE

## 2024-03-08 PROCEDURE — 99495 TRANSJ CARE MGMT MOD F2F 14D: CPT | Mod: S$GLB,,, | Performed by: FAMILY MEDICINE

## 2024-03-08 PROCEDURE — 3044F HG A1C LEVEL LT 7.0%: CPT | Mod: CPTII,S$GLB,, | Performed by: FAMILY MEDICINE

## 2024-03-08 PROCEDURE — 3079F DIAST BP 80-89 MM HG: CPT | Mod: CPTII,S$GLB,, | Performed by: FAMILY MEDICINE

## 2024-03-08 RX ORDER — ERGOCALCIFEROL 1.25 MG/1
50000 CAPSULE ORAL
Qty: 12 CAPSULE | Refills: 0 | Status: SHIPPED | OUTPATIENT
Start: 2024-03-08

## 2024-03-08 RX ORDER — AMOXICILLIN 500 MG/1
1000 CAPSULE ORAL 2 TIMES DAILY
COMMUNITY
Start: 2024-03-07 | End: 2024-03-08

## 2024-03-08 RX ORDER — TRAZODONE HYDROCHLORIDE 50 MG/1
50 TABLET ORAL NIGHTLY
Qty: 90 TABLET | Refills: 0 | Status: SHIPPED | OUTPATIENT
Start: 2024-03-08 | End: 2024-04-26

## 2024-03-08 RX ORDER — SPIRONOLACTONE 25 MG/1
25 TABLET ORAL DAILY
Qty: 30 TABLET | Refills: 11
Start: 2024-03-08 | End: 2024-03-19 | Stop reason: SDUPTHER

## 2024-03-08 NOTE — PROGRESS NOTES
Transitional Care Note  Subjective:       Patient ID: Partha Curtis Jr. is a 63 y.o. male.  Chief Complaint: Hospital Follow Up    Hospital Discharge Date: 2/29/2024  Hospital Follow up within two days of discharge: yes on 3/1/2024  Current Date: 03/08/2024     Family and/or Caretaker present at visit?  No.  Diagnostic tests reviewed/disposition: I have reviewed all completed as well as pending diagnostic tests at the time of discharge.  Disease/illness education: completed  Home health/community services discussion/referrals: Patient does not have home health established from hospital visit.  They do not need home health.  If needed, we will set up home health for the patient.   Establishment or re-establishment of referral orders for community resources: No other necessary community resources.   Discussion with other health care providers: No discussion with other health care providers necessary.   About 10 days ago, he noticed a high hr and high BP. He was pending a neck injection so he thought this was due to pain. When he went for his neck injection on Monday he was told his HR was labile. He contacted me and I completed a workup which showed a fib with RVR. He was sent to the ER and was given diltiazem for rate control and then underwent ELIZABETH and cardioversion    Unfortunately BP has arnulfo high since onset of all of this.     Now on amlodipine 5 mg. No leg swelling  Cardiology also placed on aldactone 12.5 mg (1/2 pill)    Off testosterone x 5 weeks    Not snoring currently. Can't tolerate cpap.       Review of Systems   Constitutional:  Positive for fatigue. Negative for fever.   Respiratory:  Negative for chest tightness, shortness of breath and wheezing.    Cardiovascular:  Negative for chest pain and palpitations.   Gastrointestinal:  Negative for diarrhea, nausea and vomiting.   Neurological:  Negative for dizziness, light-headedness and headaches.       Objective:      Physical Exam  Constitutional:        General: He is not in acute distress.     Appearance: He is not ill-appearing, toxic-appearing or diaphoretic.   Cardiovascular:      Rate and Rhythm: Normal rate and regular rhythm.   Pulmonary:      Effort: Pulmonary effort is normal.      Breath sounds: Normal breath sounds.   Abdominal:      Palpations: Abdomen is soft.      Tenderness: There is no abdominal tenderness.   Musculoskeletal:         General: No swelling.   Neurological:      Mental Status: He is alert.   Psychiatric:         Mood and Affect: Mood normal.         Behavior: Behavior normal.         Thought Content: Thought content normal.         Judgment: Judgment normal.         Assessment:       1. Hospital discharge follow-up    2. Low testosterone in male    3. Essential hypertension    4. Dyslipidemia    5. Vitamin D deficiency    6. Insomnia, unspecified type        Plan:         Currently taking elavil 50 mg  Decrease to 25 mg nightly x 2 weeks  Then 1/2 tab (12.5 mg) nightly at 2 weeks  Then stop  When you are taking 12.5 mg, start trazodone 25 mg (1/2 pill)  Can increase trazodone to up to 100 mg for sleep.     Continue candesartan  Continue amlodipine  Continue metoprolol.   Increase aldactone to 25 mg (1 pill)  Continue digital HTN    Continue other meds    Continue eliquis    Labs as scheduled, 7-9 AM.     Will try my hardest to avoid diuretics    F/u as scheduled    Can cancel apt next week if bp stable and labs normal and keep apt in may        Hospital discharge follow-up    Low testosterone in male  -     Testosterone; Future; Expected date: 03/08/2024    Essential hypertension  -     spironolactone (ALDACTONE) 25 MG tablet; Take 1 tablet (25 mg total) by mouth once daily.  Dispense: 30 tablet; Refill: 11    Dyslipidemia    Vitamin D deficiency  -     ergocalciferol (ERGOCALCIFEROL) 50,000 unit Cap; Take 1 capsule (50,000 Units total) by mouth every 7 days.  Dispense: 12 capsule; Refill: 0    Insomnia, unspecified type  -      traZODone (DESYREL) 50 MG tablet; Take 1 tablet (50 mg total) by mouth every evening.  Dispense: 90 tablet; Refill: 0

## 2024-03-08 NOTE — PATIENT INSTRUCTIONS
Currently taking elavil 50 mg  Decrease to 25 mg nightly x 2 weeks  Then 1/2 tab (12.5 mg) nightly at 2 weeks  Then stop  When you are taking 12.5 mg, start trazodone 25 mg (1/2 pill)  Can increase trazodone to up to 100 mg for sleep.     Continue candesartan  Continue amlodipine  Continue metoprolol.   Increase aldactone to 25 mg (1 pill)  Continue digital HTN    Continue other meds    Continue eliquis    Labs as scheduled, 7-9 AM.

## 2024-03-11 ENCOUNTER — LAB VISIT (OUTPATIENT)
Dept: LAB | Facility: HOSPITAL | Age: 64
End: 2024-03-11
Attending: INTERNAL MEDICINE
Payer: COMMERCIAL

## 2024-03-11 ENCOUNTER — PATIENT MESSAGE (OUTPATIENT)
Dept: FAMILY MEDICINE | Facility: CLINIC | Age: 64
End: 2024-03-11
Payer: COMMERCIAL

## 2024-03-11 DIAGNOSIS — R79.89 LOW TESTOSTERONE IN MALE: ICD-10-CM

## 2024-03-11 DIAGNOSIS — E78.5 DYSLIPIDEMIA: Chronic | ICD-10-CM

## 2024-03-11 DIAGNOSIS — Z12.5 SCREENING PSA (PROSTATE SPECIFIC ANTIGEN): ICD-10-CM

## 2024-03-11 DIAGNOSIS — D72.829 LEUKOCYTOSIS, UNSPECIFIED TYPE: ICD-10-CM

## 2024-03-11 DIAGNOSIS — E29.1 HYPOGONADISM IN MALE: ICD-10-CM

## 2024-03-11 LAB
ALBUMIN SERPL BCP-MCNC: 3.9 G/DL (ref 3.5–5.2)
ALP SERPL-CCNC: 71 U/L (ref 55–135)
ALT SERPL W/O P-5'-P-CCNC: 75 U/L (ref 10–44)
ANION GAP SERPL CALC-SCNC: 6 MMOL/L (ref 8–16)
AST SERPL-CCNC: 35 U/L (ref 10–40)
BASOPHILS # BLD AUTO: 0.04 K/UL (ref 0–0.2)
BASOPHILS NFR BLD: 0.6 % (ref 0–1.9)
BILIRUB SERPL-MCNC: 0.6 MG/DL (ref 0.1–1)
BUN SERPL-MCNC: 16 MG/DL (ref 8–23)
CALCIUM SERPL-MCNC: 9.7 MG/DL (ref 8.7–10.5)
CHLORIDE SERPL-SCNC: 103 MMOL/L (ref 95–110)
CHOLEST SERPL-MCNC: 151 MG/DL (ref 120–199)
CHOLEST/HDLC SERPL: 4.9 {RATIO} (ref 2–5)
CO2 SERPL-SCNC: 30 MMOL/L (ref 23–29)
COMPLEXED PSA SERPL-MCNC: 0.75 NG/ML (ref 0–4)
CREAT SERPL-MCNC: 1.2 MG/DL (ref 0.5–1.4)
DIFFERENTIAL METHOD BLD: ABNORMAL
EOSINOPHIL # BLD AUTO: 0.2 K/UL (ref 0–0.5)
EOSINOPHIL NFR BLD: 3 % (ref 0–8)
ERYTHROCYTE [DISTWIDTH] IN BLOOD BY AUTOMATED COUNT: 14.6 % (ref 11.5–14.5)
EST. GFR  (NO RACE VARIABLE): >60 ML/MIN/1.73 M^2
GLUCOSE SERPL-MCNC: 146 MG/DL (ref 70–110)
HCT VFR BLD AUTO: 52.2 % (ref 40–54)
HDLC SERPL-MCNC: 31 MG/DL (ref 40–75)
HDLC SERPL: 20.5 % (ref 20–50)
HGB BLD-MCNC: 17.5 G/DL (ref 14–18)
IMM GRANULOCYTES # BLD AUTO: 0.02 K/UL (ref 0–0.04)
IMM GRANULOCYTES NFR BLD AUTO: 0.3 % (ref 0–0.5)
LDLC SERPL CALC-MCNC: 76.4 MG/DL (ref 63–159)
LYMPHOCYTES # BLD AUTO: 2.1 K/UL (ref 1–4.8)
LYMPHOCYTES NFR BLD: 31.2 % (ref 18–48)
MCH RBC QN AUTO: 31.1 PG (ref 27–31)
MCHC RBC AUTO-ENTMCNC: 33.5 G/DL (ref 32–36)
MCV RBC AUTO: 93 FL (ref 82–98)
MONOCYTES # BLD AUTO: 0.7 K/UL (ref 0.3–1)
MONOCYTES NFR BLD: 9.9 % (ref 4–15)
NEUTROPHILS # BLD AUTO: 3.7 K/UL (ref 1.8–7.7)
NEUTROPHILS NFR BLD: 55 % (ref 38–73)
NONHDLC SERPL-MCNC: 120 MG/DL
NRBC BLD-RTO: 0 /100 WBC
PLATELET # BLD AUTO: 268 K/UL (ref 150–450)
PMV BLD AUTO: 9.8 FL (ref 9.2–12.9)
POTASSIUM SERPL-SCNC: 4.2 MMOL/L (ref 3.5–5.1)
PROT SERPL-MCNC: 7.1 G/DL (ref 6–8.4)
RBC # BLD AUTO: 5.62 M/UL (ref 4.6–6.2)
SODIUM SERPL-SCNC: 139 MMOL/L (ref 136–145)
TESTOST SERPL-MCNC: 141 NG/DL (ref 304–1227)
TRIGL SERPL-MCNC: 218 MG/DL (ref 30–150)
WBC # BLD AUTO: 6.64 K/UL (ref 3.9–12.7)

## 2024-03-11 PROCEDURE — 82172 ASSAY OF APOLIPOPROTEIN: CPT | Performed by: INTERNAL MEDICINE

## 2024-03-11 PROCEDURE — 84403 ASSAY OF TOTAL TESTOSTERONE: CPT | Performed by: FAMILY MEDICINE

## 2024-03-11 PROCEDURE — 83695 ASSAY OF LIPOPROTEIN(A): CPT | Performed by: INTERNAL MEDICINE

## 2024-03-11 PROCEDURE — 80061 LIPID PANEL: CPT | Performed by: INTERNAL MEDICINE

## 2024-03-11 PROCEDURE — 36415 COLL VENOUS BLD VENIPUNCTURE: CPT | Mod: PO | Performed by: FAMILY MEDICINE

## 2024-03-11 PROCEDURE — 85025 COMPLETE CBC W/AUTO DIFF WBC: CPT | Performed by: FAMILY MEDICINE

## 2024-03-11 PROCEDURE — 84153 ASSAY OF PSA TOTAL: CPT | Performed by: FAMILY MEDICINE

## 2024-03-11 PROCEDURE — 80053 COMPREHEN METABOLIC PANEL: CPT | Performed by: FAMILY MEDICINE

## 2024-03-11 RX ORDER — TESTOSTERONE CYPIONATE 200 MG/ML
150 INJECTION, SOLUTION INTRAMUSCULAR
Qty: 2 ML | Refills: 5 | Status: SHIPPED | OUTPATIENT
Start: 2024-03-11 | End: 2024-05-27 | Stop reason: SDUPTHER

## 2024-03-13 LAB — APO B SERPL-MCNC: 86 MG/DL

## 2024-03-14 ENCOUNTER — TELEPHONE (OUTPATIENT)
Dept: FAMILY MEDICINE | Facility: CLINIC | Age: 64
End: 2024-03-14
Payer: COMMERCIAL

## 2024-03-14 DIAGNOSIS — R39.9 UTI SYMPTOMS: Primary | ICD-10-CM

## 2024-03-15 ENCOUNTER — PATIENT MESSAGE (OUTPATIENT)
Dept: FAMILY MEDICINE | Facility: CLINIC | Age: 64
End: 2024-03-15
Payer: COMMERCIAL

## 2024-03-15 ENCOUNTER — LAB VISIT (OUTPATIENT)
Dept: LAB | Facility: HOSPITAL | Age: 64
End: 2024-03-15
Attending: FAMILY MEDICINE
Payer: COMMERCIAL

## 2024-03-15 ENCOUNTER — PATIENT MESSAGE (OUTPATIENT)
Dept: CARDIOLOGY | Facility: CLINIC | Age: 64
End: 2024-03-15
Payer: COMMERCIAL

## 2024-03-15 DIAGNOSIS — E78.5 DYSLIPIDEMIA: Primary | ICD-10-CM

## 2024-03-15 DIAGNOSIS — E78.5 DYSLIPIDEMIA: Primary | Chronic | ICD-10-CM

## 2024-03-15 DIAGNOSIS — N18.31 TYPE 2 DIABETES MELLITUS WITH STAGE 3A CHRONIC KIDNEY DISEASE, WITHOUT LONG-TERM CURRENT USE OF INSULIN: ICD-10-CM

## 2024-03-15 DIAGNOSIS — E11.22 TYPE 2 DIABETES MELLITUS WITH STAGE 3A CHRONIC KIDNEY DISEASE, WITHOUT LONG-TERM CURRENT USE OF INSULIN: ICD-10-CM

## 2024-03-15 DIAGNOSIS — R39.9 UTI SYMPTOMS: ICD-10-CM

## 2024-03-15 LAB
ALBUMIN/CREAT UR: 14.7 UG/MG (ref 0–30)
BILIRUB UR QL STRIP: NEGATIVE
CLARITY UR REFRACT.AUTO: CLEAR
COLOR UR AUTO: YELLOW
CREAT UR-MCNC: 75 MG/DL (ref 23–375)
GLUCOSE UR QL STRIP: ABNORMAL
HGB UR QL STRIP: NEGATIVE
KETONES UR QL STRIP: NEGATIVE
LEUKOCYTE ESTERASE UR QL STRIP: NEGATIVE
LPA SERPL-MCNC: 10 MG/DL (ref 0–30)
MICROALBUMIN UR DL<=1MG/L-MCNC: 11 UG/ML
NITRITE UR QL STRIP: NEGATIVE
PH UR STRIP: 6 [PH] (ref 5–8)
PROT UR QL STRIP: NEGATIVE
SP GR UR STRIP: 1.01 (ref 1–1.03)
URN SPEC COLLECT METH UR: ABNORMAL

## 2024-03-15 PROCEDURE — 81003 URINALYSIS AUTO W/O SCOPE: CPT | Performed by: FAMILY MEDICINE

## 2024-03-15 PROCEDURE — 82043 UR ALBUMIN QUANTITATIVE: CPT | Performed by: FAMILY MEDICINE

## 2024-03-15 PROCEDURE — 87086 URINE CULTURE/COLONY COUNT: CPT | Performed by: FAMILY MEDICINE

## 2024-03-15 RX ORDER — EZETIMIBE 10 MG/1
10 TABLET ORAL DAILY
Qty: 90 TABLET | Refills: 3 | Status: SHIPPED | OUTPATIENT
Start: 2024-03-15 | End: 2025-03-15

## 2024-03-15 NOTE — PROGRESS NOTES
Discussed with patient:  1. Lipoprotein small a is normal.    2. Borderline diabetes present   3. Triglycerides elevated at 218, HDL 31 LDL 76.  April lipoprotein B is 86.      Will maximize statin therapy, continue rosuvastatin 40 mg nightly, add ezetimibe 10 mg daily.  Aggressive weight loss, dietary management to controlled diabetes, is borderline.

## 2024-03-16 ENCOUNTER — LAB VISIT (OUTPATIENT)
Dept: LAB | Facility: HOSPITAL | Age: 64
End: 2024-03-16
Attending: FAMILY MEDICINE
Payer: COMMERCIAL

## 2024-03-16 DIAGNOSIS — E78.5 DYSLIPIDEMIA: ICD-10-CM

## 2024-03-16 LAB — BACTERIA UR CULT: NO GROWTH

## 2024-03-16 PROCEDURE — 83695 ASSAY OF LIPOPROTEIN(A): CPT | Performed by: FAMILY MEDICINE

## 2024-03-16 PROCEDURE — 36415 COLL VENOUS BLD VENIPUNCTURE: CPT | Mod: PO | Performed by: FAMILY MEDICINE

## 2024-03-18 ENCOUNTER — CLINICAL SUPPORT (OUTPATIENT)
Dept: REHABILITATION | Facility: HOSPITAL | Age: 64
End: 2024-03-18
Payer: COMMERCIAL

## 2024-03-18 DIAGNOSIS — R29.898 DECREASED RANGE OF MOTION OF NECK: Primary | ICD-10-CM

## 2024-03-18 DIAGNOSIS — M54.2 NECK PAIN: ICD-10-CM

## 2024-03-18 DIAGNOSIS — M25.612 DECREASED RANGE OF MOTION OF LEFT SHOULDER: ICD-10-CM

## 2024-03-18 DIAGNOSIS — M67.912 TENDINOPATHY OF ROTATOR CUFF, LEFT: ICD-10-CM

## 2024-03-18 DIAGNOSIS — M79.18 MYOFASCIAL PAIN SYNDROME: ICD-10-CM

## 2024-03-18 PROCEDURE — 97112 NEUROMUSCULAR REEDUCATION: CPT | Mod: PN

## 2024-03-18 PROCEDURE — 97530 THERAPEUTIC ACTIVITIES: CPT | Mod: PN

## 2024-03-18 NOTE — PROGRESS NOTES
[facility]   Physical Therapy Treatment Note      Name: Partha Curtis Jr.  Clinic Number: [id number]    Therapy Diagnosis:   Encounter Diagnoses   Name Primary?    Decreased range of motion of neck Yes    Decreased range of motion of left shoulder     Neck pain     Myofascial pain syndrome     Tendinopathy of rotator cuff, left        Physician: Raffy Ascencio MD    Visit Date: 3/18/2024    Physician Orders: PT Eval and Treat   Medical Diagnosis from Referral:   M54.12 (ICD-10-CM) - Cervical radiculopathy   M79.18 (ICD-10-CM) - Myofascial pain syndrome   M67.912 (ICD-10-CM) - Tendinopathy of rotator cuff, left   Evaluation Date: 1/23/2024  Authorization Period Expiration: 12/31/2024  Plan of Care Expiration: 3/18/2024  Visit # / Visits authorized: 2/20 (+1)     Time In: 3:05 pm  Time Out: 4:00 pm  Total Appointment Time (timed & untimed codes): 55 minutes (2 TA) (2 NMR)     Precautions: Standard; history of carotid artery occlusion    Subjective     Patient reports: he missed therapy from February to mid march due to having heart complications. Patient eventually had to go to the Emergency Room due to aFib. Patient would like to continue with physical therapy to gain strength. He reports that he has ironically been pain free since all of this occurred.   He was compliant with home exercise program.  Response to previous treatment: no change   Functional change: no change    Pain: 0/10  Location: neck    Objective    (3/18/2024):  Cervical Range of Motion: AROM    Degrees Subjective report   Flexion  45 --> 70 degrees  Tightness    Extension  10 -> 30 degrees  No pain       Right Rotation  75 --> 75 degrees  No pain      Left Rotation  60 --> 75 degrees  No pain       Right Side Bending  40 --> 40 degrees  No pain   Left Side Bending  20 --> 35 degrees  No pain      Shoulder Range of Motion: AROM   Shoulder Right Left   Flexion  145 --> 165 degrees  125 --> 170 degrees   ER  65  degrees  60 degrees      Upper Extremity Strength  (R) UE   (L) UE     Shoulder flexion: 5/5 Shoulder flexion: 5/5   Shoulder Abduction: 5/5 Shoulder abduction: 5/5   Shoulder ER 5/5 Shoulder ER 4+/5   Shoulder IR 5/5 Shoulder IR 5/5   Lower Trap 3+/5 Lower Trap 3+/5   Middle Trap 4/5 Middle Trap 4/5      Joint Mobility: left lower cervical facet hypomobility with down glide; left C7 facet arthropathy        Treatment     Jarvis received the treatments listed below:      neuromuscular re-education activities to improve: Coordination, Kinesthetic, Sense, Proprioception, and Posture for 30 minutes. The following activities were included:  Side lying open books: 15x   Prone scapular squeeze with shoulder extension: 3x10   Prone horizontal shoulder abduction: 3x10   Prone modified Y's: 3x10   Seated thoracic extension: 15x     therapeutic activities to improve functional performance for 25 minutes, including:  Seated freemotion rows: 3x10 - 10#   Seated freemotion lat pulldowns: 3x10 - 13#  Theraband shoulder external rotation: 2x10 - red theraband       Patient Education and Home Exercises       Education provided:   - home exercise program   - POC/Prognosis     Written Home Exercises Provided: yes. Exercises were reviewed and Jarvis was able to demonstrate them prior to the end of the session.  Jarvis demonstrated good  understanding of the education provided. See Electronic Medical Record under Patient Instructions for exercises provided during therapy sessions    Assessment   Partha is a 63 y.o. male referred to outpatient Physical Therapy with a medical diagnosis of cervical radiculopathy, myofascial pain syndrome, and rotator cuff tendinopathy of the left shoulder. Patient presents with a significant past medical history including carotid artery occlusion and right carotid endarterectomy. Patient is presenting with signs and symptoms of left C7 facet arthropathy with down glides. Recent lapse in therapy due to aFib.  Despite lapse in therapy, patient is now pain free and objective tests/measures displaying improvements with overall cervical and shoulder range of motion/strength. Updating plan of care to continue physical therapy once per week for another 4 weeks focusing on periscapular and rotator cuff strengthening.     Jarvis Is progressing well towards his goals.   Patient prognosis is Excellent.     Patient will continue to benefit from skilled outpatient physical therapy to address the deficits listed in the problem list box on initial evaluation, provide pt/family education and to maximize pt's level of independence in the home and community environment.     Patient's spiritual, cultural and educational needs considered and pt agreeable to plan of care and goals.     Anticipated barriers to physical therapy: past medical history     Goals:   Short Term Goals (4 Weeks):   1. Pt will be independent with HEP to supplement PT in improving pain free cervical mobility  2. Pt will improve cervical AROM 10 deg with rotation to improve cervical mobility for driving  3. Pt will improve low trap, mid trap, and rotator cuff MMTs by 1/2 grade in all planes to improve strength for lifting and carrying tasks.  4. Pt will demonstrate improved sitting posture to decrease pain experienced in head and neck.  Long Term Goals (8 Weeks):   1. Pt will improve FOTO to </=33% limitation to improve perceived limitation with changing and maintaining mobility.  2. Pt will improve cervical AROM to WNL in all planes to improve cervical mobility for driving   3. Pt will improve low trap, mid trap, and rotator cuff MMTs 1 grade in all planes to improve strength for lifting and carrying tasks.  4. Pt will report no pain with lifting 10 lbs to promote physical activity.   5. Pt will report no pain with cervical AROM in all planes to promote QOL.    Plan   Cervical traction   Thoracic extension  Left shoulder range of motion   Periscapular strength     Jan  Louis Rowe, PT

## 2024-03-19 ENCOUNTER — OFFICE VISIT (OUTPATIENT)
Dept: FAMILY MEDICINE | Facility: CLINIC | Age: 64
End: 2024-03-19
Payer: COMMERCIAL

## 2024-03-19 VITALS
WEIGHT: 191.13 LBS | SYSTOLIC BLOOD PRESSURE: 118 MMHG | BODY MASS INDEX: 27.36 KG/M2 | DIASTOLIC BLOOD PRESSURE: 70 MMHG | HEART RATE: 65 BPM | HEIGHT: 70 IN | OXYGEN SATURATION: 99 %

## 2024-03-19 DIAGNOSIS — Z91.09 ENVIRONMENTAL ALLERGIES: ICD-10-CM

## 2024-03-19 DIAGNOSIS — I10 ESSENTIAL HYPERTENSION: Primary | ICD-10-CM

## 2024-03-19 PROCEDURE — 3044F HG A1C LEVEL LT 7.0%: CPT | Mod: CPTII,S$GLB,, | Performed by: FAMILY MEDICINE

## 2024-03-19 PROCEDURE — 4010F ACE/ARB THERAPY RXD/TAKEN: CPT | Mod: CPTII,S$GLB,, | Performed by: FAMILY MEDICINE

## 2024-03-19 PROCEDURE — 99999 PR PBB SHADOW E&M-EST. PATIENT-LVL V: CPT | Mod: PBBFAC,,, | Performed by: FAMILY MEDICINE

## 2024-03-19 PROCEDURE — 3078F DIAST BP <80 MM HG: CPT | Mod: CPTII,S$GLB,, | Performed by: FAMILY MEDICINE

## 2024-03-19 PROCEDURE — 1159F MED LIST DOCD IN RCRD: CPT | Mod: CPTII,S$GLB,, | Performed by: FAMILY MEDICINE

## 2024-03-19 PROCEDURE — 99214 OFFICE O/P EST MOD 30 MIN: CPT | Mod: S$GLB,,, | Performed by: FAMILY MEDICINE

## 2024-03-19 PROCEDURE — 3008F BODY MASS INDEX DOCD: CPT | Mod: CPTII,S$GLB,, | Performed by: FAMILY MEDICINE

## 2024-03-19 PROCEDURE — 3061F NEG MICROALBUMINURIA REV: CPT | Mod: CPTII,S$GLB,, | Performed by: FAMILY MEDICINE

## 2024-03-19 PROCEDURE — 3066F NEPHROPATHY DOC TX: CPT | Mod: CPTII,S$GLB,, | Performed by: FAMILY MEDICINE

## 2024-03-19 PROCEDURE — 3074F SYST BP LT 130 MM HG: CPT | Mod: CPTII,S$GLB,, | Performed by: FAMILY MEDICINE

## 2024-03-19 RX ORDER — SPIRONOLACTONE 25 MG/1
25 TABLET ORAL DAILY
Qty: 90 TABLET | Refills: 1 | Status: SHIPPED | OUTPATIENT
Start: 2024-03-19 | End: 2024-05-15

## 2024-03-19 RX ORDER — SPIRONOLACTONE 25 MG/1
25 TABLET ORAL DAILY
Qty: 30 TABLET | Refills: 11
Start: 2024-03-19 | End: 2024-03-19 | Stop reason: SDUPTHER

## 2024-03-19 RX ORDER — FLUTICASONE PROPIONATE 50 MCG
2 SPRAY, SUSPENSION (ML) NASAL DAILY
Qty: 16 G | Refills: 12 | Status: SHIPPED | OUTPATIENT
Start: 2024-03-19 | End: 2024-04-26

## 2024-03-19 NOTE — PROGRESS NOTES
"Subjective:       Patient ID: Partha Curtis Jr. is a 64 y.o. male.    Chief Complaint: Follow-up    Jarvis is a 64 y.o. male who presents today for f/u    Tapering of elavil due to a fib. Was on 75 mg. Last night was his first night off of elavil. Just started trazodone for sleep. Not sleeping as well off of elavil.     On aldactone 25 mg. Has been feeling okay with this. On aldactone, amlodipine, and candesartan and metoprolol. Using digital htn.     Having some health anxiety and anxiety about the insomnia.     PAF: seeing cardiology. On eliquis.     Not using cpap. Wife reports he isn't snoring.       Review of Systems   Constitutional:  Negative for chills and fever.   Respiratory:  Negative for chest tightness and shortness of breath.    Cardiovascular:  Negative for chest pain and palpitations.   Gastrointestinal:  Negative for nausea and vomiting.   Neurological:  Negative for dizziness, light-headedness and headaches.                 Objective:     Vitals:    03/19/24 1010   BP: 118/70   BP Location: Right arm   Patient Position: Sitting   BP Method: Large (Manual)   Pulse: 65   SpO2: 99%   Weight: 86.7 kg (191 lb 2.2 oz)   Height: 5' 10" (1.778 m)        Physical Exam  Vitals and nursing note reviewed.   Constitutional:       General: He is not in acute distress.     Appearance: He is not ill-appearing or toxic-appearing.   Cardiovascular:      Rate and Rhythm: Normal rate and regular rhythm.   Pulmonary:      Effort: Pulmonary effort is normal.      Breath sounds: Normal breath sounds.   Musculoskeletal:         General: No swelling.   Neurological:      Mental Status: He is alert.   Psychiatric:         Mood and Affect: Mood normal.         Behavior: Behavior normal.         Thought Content: Thought content normal.         Judgment: Judgment normal.         Assessment:       1. Essential hypertension    2. Environmental allergies        Plan:       Try trazodone up to 3 pills nightly  (150 mg)  If still " not able to sleep, we can try to increase gabapentin to [id number] mg nightly  If still not able to sleep, can send ambien  If still not able to sleep, we can discuss restarting elavil at 25 mg or 50 mg.  This is a gray area in regards to risks vs benefits given new a.fib.    Continue candesartan, metoprolol, aldactone, amlodipine 5 mg.     Consider increase of ozempic to 1 mg at next apt.     Labs including CMP prior to next apt        Essential hypertension  -     Discontinue: spironolactone (ALDACTONE) 25 MG tablet; Take 1 tablet (25 mg total) by mouth once daily.  Dispense: 30 tablet; Refill: 11  -     spironolactone (ALDACTONE) 25 MG tablet; Take 1 tablet (25 mg total) by mouth once daily.  Dispense: 90 tablet; Refill: 1    Environmental allergies  -     fluticasone propionate (FLONASE) 50 mcg/actuation nasal spray; 2 sprays (100 mcg total) by Each Nostril route once daily.  Dispense: 16 g; Refill: 12

## 2024-03-19 NOTE — PATIENT INSTRUCTIONS
Try trazodone up to 3 pills nightly  (150 mg)  If still not able to sleep, we can try to increase gabapentin to [id number] mg nightly  If still not able to sleep, can send ambien  If still not able to sleep, we can discuss restarting elavil at 25 mg or 50 mg.  This is a gray area in regards to risks vs benefits given new a.fib.    Continue candesartan, metoprolol, aldactone, amlodipine 5 mg.     Consider increase of ozempic to 1 mg at next apt.

## 2024-03-21 LAB — LPA SERPL-MCNC: 10 MG/DL (ref 0–30)

## 2024-03-25 ENCOUNTER — OFFICE VISIT (OUTPATIENT)
Dept: PAIN MEDICINE | Facility: CLINIC | Age: 64
End: 2024-03-25
Payer: COMMERCIAL

## 2024-03-25 VITALS
HEART RATE: 68 BPM | DIASTOLIC BLOOD PRESSURE: 82 MMHG | BODY MASS INDEX: 27.35 KG/M2 | SYSTOLIC BLOOD PRESSURE: 129 MMHG | WEIGHT: 191 LBS | HEIGHT: 70 IN

## 2024-03-25 DIAGNOSIS — G89.29 CHRONIC LEFT SHOULDER PAIN: ICD-10-CM

## 2024-03-25 DIAGNOSIS — M51.36 DDD (DEGENERATIVE DISC DISEASE), LUMBAR: ICD-10-CM

## 2024-03-25 DIAGNOSIS — M54.12 CERVICAL RADICULOPATHY: Primary | ICD-10-CM

## 2024-03-25 DIAGNOSIS — M50.30 DDD (DEGENERATIVE DISC DISEASE), CERVICAL: ICD-10-CM

## 2024-03-25 DIAGNOSIS — M47.816 LUMBAR SPONDYLOSIS: ICD-10-CM

## 2024-03-25 DIAGNOSIS — M25.812 SHOULDER IMPINGEMENT, LEFT: ICD-10-CM

## 2024-03-25 DIAGNOSIS — M25.512 CHRONIC LEFT SHOULDER PAIN: ICD-10-CM

## 2024-03-25 DIAGNOSIS — M47.812 CERVICAL SPONDYLOSIS: ICD-10-CM

## 2024-03-25 PROCEDURE — 3008F BODY MASS INDEX DOCD: CPT | Mod: CPTII,S$GLB,, | Performed by: NURSE PRACTITIONER

## 2024-03-25 PROCEDURE — 3066F NEPHROPATHY DOC TX: CPT | Mod: CPTII,S$GLB,, | Performed by: NURSE PRACTITIONER

## 2024-03-25 PROCEDURE — 3044F HG A1C LEVEL LT 7.0%: CPT | Mod: CPTII,S$GLB,, | Performed by: NURSE PRACTITIONER

## 2024-03-25 PROCEDURE — 3074F SYST BP LT 130 MM HG: CPT | Mod: CPTII,S$GLB,, | Performed by: NURSE PRACTITIONER

## 2024-03-25 PROCEDURE — 1160F RVW MEDS BY RX/DR IN RCRD: CPT | Mod: CPTII,S$GLB,, | Performed by: NURSE PRACTITIONER

## 2024-03-25 PROCEDURE — 99999 PR PBB SHADOW E&M-EST. PATIENT-LVL V: CPT | Mod: PBBFAC,,, | Performed by: NURSE PRACTITIONER

## 2024-03-25 PROCEDURE — 99213 OFFICE O/P EST LOW 20 MIN: CPT | Mod: S$GLB,,, | Performed by: NURSE PRACTITIONER

## 2024-03-25 PROCEDURE — 3079F DIAST BP 80-89 MM HG: CPT | Mod: CPTII,S$GLB,, | Performed by: NURSE PRACTITIONER

## 2024-03-25 PROCEDURE — 3061F NEG MICROALBUMINURIA REV: CPT | Mod: CPTII,S$GLB,, | Performed by: NURSE PRACTITIONER

## 2024-03-25 PROCEDURE — 4010F ACE/ARB THERAPY RXD/TAKEN: CPT | Mod: CPTII,S$GLB,, | Performed by: NURSE PRACTITIONER

## 2024-03-25 PROCEDURE — 1159F MED LIST DOCD IN RCRD: CPT | Mod: CPTII,S$GLB,, | Performed by: NURSE PRACTITIONER

## 2024-03-25 NOTE — PROGRESS NOTES
Chronic patient Established Note (Follow up visit)      SUBJECTIVE:    Interval History 3/25/2024:  Partha Curtis Jr. presents to the clinic for a follow-up appointment for neck pain. He is s/p C7/T1 IL SELENA on 2/26/2024. He reports 100% relief of his neck pain. He was admitted for atrial fibrillation after the last procedure. He was converted to sinus rhythm. He is now on Eliquis. He reports increased low back pain. He denies any radicular leg pain. His pain is worse as the day goes on. He previously had relief with ESIs. He is interested in repeating this in the future. He denies any weakness. He is taking Gabapentin. He takes Percocet intermittently for severe pain. He denies any other health changes. His pain today is 0/10.        HPI: Partha Curtis Jr. is a 63 y.o. male presents to pain clinic for evaluation of neck pain. Symptoms developed 4 weeks ago. Similar pain in 2022 that improved after an C7/T1 ILESI on 3/28/22, had been pain free since this procedure until 4 weeks ago. Original neck pain started in 2021 without inciting event. Denies trauma or injury to the area in the past 4 weeks upon return of symptoms. Pain management previously at [facility] with Dr. Herrera     Original Pain Description:  The pain is located in the upper thoracic region just left of the midline with radiation into his left shoulder and proximal arm terminating above the elbow. All of patients upper back/neck pain is on the left. The pain is described as aching, sharp, and throbbing. These exact symptoms were present prior to his 2022 ILESI. Chinyere ranges from 4 - 10. The current pain is 8. Exacerbating factors: Coughing/Sneezing and L arm abduction above the head, head forward flexion and head lateral bending to the left, and laying flat on the ground. Mitigating factors pillow. Symptoms interfere with daily activity and sleeping and work. 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. The patient feels like symptoms have been worsening. Patient endorses weakness in his left arm with return of pain. Patient denies saddle anesthesia, bladder/bowel incontinence, acute or signifcant limb weakness, ataxia, or increased falls.     Pain Disability Index Review:      2/1/2024     2:36 PM 1/17/2024    10:38 AM   Last 3 PDI Scores   Pain Disability Index (PDI) 56 37       Pain Medications:  Gabapentin  Percocet     Opioid Contract: not applicable     report:  Reviewed and consistent with medication use as prescribed.    Pain Procedures:   2/26/2024- C7/T1 IL SELENA    Physical Therapy/Home Exercise: yes    Imaging:   MRI Cervical Spine 2/15/2024:  COMPARISON:  XR C-spine 03/02/2010.  CTA head neck 06/23/2022.     FINDINGS:  Alignment: Normal sagittal alignment.  Grade 1 retrolisthesis at C5-C6 and grade 1 anterolisthesis at C7-T1.     Vertebrae: Vertebral body heights are maintained.  No fracture or marrow infiltrative process.     Discs: Multilevel degenerative disc desiccation and height loss most pronounced at C5-C6 and C6-C7.     Cord: Normal cord signal intensity.     Cerebellar tonsils are in their expected location.  Visualized brainstem is normal. Vertebral artery flow voids are present.  Prevertebral soft tissues are normal.  No cervical lymph node enlargement.  Paraspinal musculature demonstrates normal bulk and signal intensity.     Degenerative findings:     C2-C3: Thickening of the posterior longitudinal ligament.  No spinal canal stenosis or neural foraminal narrowing.     C3-C4: Thickening of the posterior longitudinal ligament, bilateral uncovertebral spurring and facet arthropathy.  No spinal canal stenosis.  Mild left neural foraminal narrowing.     C4-C5: Central/right paracentral disc extrusion with inferior migration which abuts and slightly posteriorly displaces the right ventral cord.  Bilateral uncovertebral spurring and facet arthropathy.  Mild  spinal canal stenosis.  Mild left neural foraminal narrowing.     C5-C6: Posterior disc osteophyte complex, bilateral uncovertebral spurring and facet arthropathy.  Moderate spinal canal stenosis.  Severe bilateral neural foraminal narrowing.     C6-C7: Posterior disc osteophyte complex, bilateral uncovertebral spurring and facet arthropathy.  No spinal canal stenosis.  Mild right and moderate left neural foraminal narrowing.     C7-T1: No spinal canal stenosis or neural foraminal narrowing.     Impression:     Multilevel degenerative change of the cervical spine, detailed above.  Findings most pronounced at C5-C6 with moderate spinal canal stenosis and severe bilateral neural foraminal narrowing.    MRI Lumbar Spine 11/2/2023:  COMPARISON: MRI lumbar spine 6/28/2021     FINDINGS:   Spine Numbering: For purposes of this dictation, it is assumed that there are 5 non-rib-bearing, lumbar-type vertebrae, and the most caudal fully segmented lumbar vertebra is labeled L5.     Alignment: Straightening of normal lumbar lordosis. Unchanged dextrocurvature of the lumbar spine. Unchanged grade 1 left lateral listhesis of L2 on L3. Unchanged grade 1 right lateral listhesis of L3 on L4. Unchanged grade 1 retrolisthesis of L2 on L3.     Surgical: None.     Vertebral Bodies: Unchanged multilevel mild anterior osteophytosis.     Marrow Signal: Unchanged degenerative fatty marrow placement at the L3-4 endplates. No marrow edema. No suspicious osseous lesions.     Intervertebral Discs: Unchanged multilevel disc dessication with loss of disc space height     Conus Medullaris: Terminates at L1     Cauda Equina: Unchanged bunching of the cauda equina at L3-4 and L4-5 due to thecal sac narrowing detailed below.     Paraspinal Soft Tissues: Normal     Intra-abdominal Findings: None.     Individual Levels:     T12-L1: Unchanged circumferential disc bulge with unchanged right paracentral disc herniation with cranial migration to the  "infrapedicular level of T12 measuring 1.6 x 0.6 cm (image 7, series 2). No thecal sac or neural foraminal narrowing.     L1-L2: Unchanged circumferential disc bulge with bulky left lateral osteophytosis. No spinal canal or foraminal narrowing.     L2-L3: Circumferential disc bulge, ligamentum flavum thickening, and epidural lipomatosis cause unchanged moderate thecal sac narrowing. Mild facet arthropathy and disc disease cause unchanged moderate bilateral neural foraminal narrowing.     L3-L4: Circumferential disc bulge, ligamentum flavum thickening, and epidural lipomatosis cause unchanged severe thecal sac narrowing. Moderate right and severe left facet arthropathy with disc   disease cause unchanged mild right and moderate left neural foraminal narrowing.     L4-L5: Circumferential disc bulge, ligamentum flavum thickening, and epidural lipomatosis cause unchanged severe thecal sac narrowing. Severe facet arthropathy and disc disease cause unchanged severe right and moderate left neural foraminal narrowing with impingement of the exiting right L4 nerve root.     L5-S1: Unchanged small disc bulge and moderate facet arthropathy. No spinal canal or foraminal narrowing.     Impression    Unchanged severe multilevel lumbar spondylosis as detailed above.    Allergies:   Review of patient's allergies indicates:   Allergen Reactions    Stadol [butorphanol tartrate] Rash     Swelling in face    Strawberries [strawberry] Rash       Current Medications:   Current Outpatient Medications   Medication Sig Dispense Refill    amLODIPine (NORVASC) 5 MG tablet Take 1 tablet (5 mg total) by mouth every evening. 30 tablet 11    apixaban (ELIQUIS) 5 mg Tab Take 1 tablet (5 mg total) by mouth 2 (two) times daily. 60 tablet 11    aspirin (ECOTRIN) 81 MG EC tablet Take 81 mg by mouth every evening.      BD LUER-RUSS SYRINGE 3 mL 25 gauge x 1" Syrg USE ONE SYRINGE EVERY 14 DAYS WITH TESTOSTERONE 100 each 2    candesartan (ATACAND) 32 MG " tablet Take 1 tablet (32 mg total) by mouth once daily. 90 tablet 3    cyanocobalamin (VITAMIN B-12) 1000 MCG tablet Take 1 tablet (1,000 mcg total) by mouth once daily. 90 tablet 4    ergocalciferol (ERGOCALCIFEROL) 50,000 unit Cap Take 1 capsule (50,000 Units total) by mouth every 7 days. 12 capsule 0    ezetimibe (ZETIA) 10 mg tablet Take 1 tablet (10 mg total) by mouth once daily. 90 tablet 3    fluticasone propionate (FLONASE) 50 mcg/actuation nasal spray 2 sprays (100 mcg total) by Each Nostril route once daily. 16 g 12    gabapentin (NEURONTIN) 800 MG tablet Take 1 tablet (800 mg total) by mouth every evening. 90 tablet 1    glimepiride (AMARYL) 4 MG tablet Take 1 tablet (4 mg total) by mouth before breakfast. 90 tablet 1    hydrocortisone 2.5 % cream Apply topically 2 (two) times daily. (Patient taking differently: Apply topically 2 (two) times daily as needed.) 28 g 0    ipratropium (ATROVENT) 42 mcg (0.06 %) nasal spray 2 sprays by Nasal route 2 (two) times daily as needed for Rhinitis. 15 mL 0    metoprolol succinate (TOPROL-XL) 200 MG 24 hr tablet Take 1 tablet (200 mg total) by mouth once daily. (Patient taking differently: Take 200 mg by mouth every evening.) 90 tablet 3    oxyCODONE-acetaminophen (PERCOCET) 5-325 mg per tablet Take 1 tablet by mouth 2 (two) times daily as needed.      rosuvastatin (CRESTOR) 40 MG Tab Take 1 tablet (40 mg total) by mouth every evening. 90 tablet 3    semaglutide (OZEMPIC) 0.25 mg or 0.5 mg (2 mg/3 mL) pen injector Inject 0.5 mg into the skin every 7 days. (Patient taking differently: Inject 0.5 mg into the skin every Wednesday.) 12 mL 1    sod sulf-pot chloride-mag sulf (SUTAB) 1.479-0.188- 0.225 gram tablet Take 12 tablets by mouth once daily. Take according to package instructions with indicated amount of water. 24 tablet 0    spironolactone (ALDACTONE) 25 MG tablet Take 1 tablet (25 mg total) by mouth once daily. 90 tablet 1    syringe with needle, safety (BD  "INTEGRA SYRINGE) 3 mL 22 gauge x 1 1/2" Syrg Inject 1 Syringe as directed once a week. 6 each 3    testosterone cypionate (DEPOTESTOTERONE CYPIONATE) 200 mg/mL injection Inject 0.75 mLs (150 mg total) into the muscle every 14 (fourteen) days. 2 mL 5    traZODone (DESYREL) 50 MG tablet Take 1 tablet (50 mg total) by mouth every evening. 90 tablet 0    valACYclovir (VALTREX) 1000 MG tablet TAKE 2 TABLETS BY MOUTH EVERY 12 HOURS (Patient taking differently: Take 2,000 mg by mouth every 12 (twelve) hours as needed.) 4 tablet 3     No current facility-administered medications for this visit.     Facility-Administered Medications Ordered in Other Visits   Medication Dose Route Frequency Provider Last Rate Last Admin    0.9%  NaCl infusion   Intravenous Continuous Kendell Hoffman MD        0.9%  NaCl infusion   Intravenous Continuous Gerardo Uribe MD           REVIEW OF SYSTEMS:    GENERAL:  No weight loss, malaise or fevers.  HEENT:  Negative for frequent or significant headaches.  NECK:  Negative for lumps, goiter, pain and significant neck swelling.  RESPIRATORY:  Negative for cough, wheezing or shortness of breath.  CARDIOVASCULAR:  Negative for chest pain, leg swelling or palpitations. HTN, AFib  GI:  Negative for abdominal discomfort, blood in stools or black stools or change in bowel habits.  MUSCULOSKELETAL:  See HPI.  SKIN:  Negative for lesions, rash, and itching.  PSYCH:  Negative for sleep disturbance, mood disorder and recent psychosocial stressors.  ENDO: Diabetes.   HEMATOLOGY/LYMPHOLOGY:  Negative for swollen nodes. Eliquis  NEURO:   No history of headaches, syncope, paralysis, seizures or tremors.  All other reviewed and negative other than HPI.    Past Medical History:  Past Medical History:   Diagnosis Date    Allergy     Carotid artery occlusion     Chronic back pain     Colonic polyp     Genetic testing     MUTYH mutation-negative    Hyperlipidemia     Hypertension     Paroxysmal atrial " fibrillation 2/28/2024    Sleep apnea     Type 2 diabetes mellitus with stage 3 chronic kidney disease, without long-term current use of insulin 4/2/2020       Past Surgical History:  Past Surgical History:   Procedure Laterality Date    ANKLE SURGERY Left     2    APPENDECTOMY      at age 20    CAROTID ENDARTERECTOMY Right 07/15/2015    CARPAL TUNNEL RELEASE Bilateral     COLONOSCOPY N/A 12/14/2018    Procedure: COLONOSCOPYSuprep;  Surgeon: Silver Selby MD;  Location: [facility] ENDO;  Service: Endoscopy;  Laterality: N/A;    EPIDURAL STEROID INJECTION N/A 2/26/2024    Procedure: CERVICAL C7/T1 IL SELENA;  Surgeon: Gokul Fung MD;  Location: [facility] PAIN MGT;  Service: Pain Management;  Laterality: N/A;  [phone]  2 WK F/U SERGEI    JOINT REPLACEMENT  9/2010    NASAL SEPTUM SURGERY      TOTAL KNEE ARTHROPLASTY Right     6 knee surgeries    TRANSESOPHAGEAL ECHOCARDIOGRAM WITH POSSIBLE CARDIOVERSION (ELIZABETH W/ POSS CARDIOVERSION) N/A 2/28/2024    Procedure: Transesophageal echo (ELIZABETH) intra-procedure log documentation;  Surgeon: Ahmet De La Cruz MD;  Location: [facility] CATH LAB/EP;  Service: Cardiology;  Laterality: N/A;       Family History:  Family History   Problem Relation Age of Onset    Brain cancer Mother 67    Cancer Mother     Hypertension Father     Lung cancer Father         x2 (PAUL initially- treated surgically only, then recurred distantly) (smoker, & had been exposed to many chemicals)    Cancer Father     Breast cancer Sister 58    Genetic Disorder Sister         monoallelic MUTYH mutation    Seizures Daughter     Cancer Maternal Grandfather     Brain cancer Paternal Grandfather 73    Breast cancer Maternal Cousin 57    Aneurysm Other 48        brain    Ulcerative colitis Other        Social History:  Social History     Socioeconomic History    Marital status:    Tobacco Use    Smoking status: Never     Passive exposure: Never    Smokeless tobacco: Never   Substance and Sexual Activity    Alcohol use:  Yes     Alcohol/week: 2.0 standard drinks of alcohol     Types: 2 Cans of beer per week     Comment: a week    Drug use: Never    Sexual activity: Yes     Partners: Female     Birth control/protection: None   Social History Narrative    5/11/2021: . He lives with his wife and 2 kids. (5 kids total). He has one dog, one cat, no more chickens at home. One dog recently passed away. No smokers at home.      Social Determinants of Health     Financial Resource Strain: Low Risk  (3/8/2024)    Overall Financial Resource Strain (CARDIA)     Difficulty of Paying Living Expenses: Not very hard   Food Insecurity: No Food Insecurity (3/8/2024)    Hunger Vital Sign     Worried About Running Out of Food in the Last Year: Never true     Ran Out of Food in the Last Year: Never true   Transportation Needs: No Transportation Needs (3/8/2024)    PRAPARE - Transportation     Lack of Transportation (Medical): No     Lack of Transportation (Non-Medical): No   Physical Activity: Unknown (3/8/2024)    Exercise Vital Sign     Days of Exercise per Week: 0 days   Stress: Stress Concern Present (3/8/2024)    [employer] - Occupational Stress Questionnaire     Feeling of Stress : To some extent   Social Connections: Unknown (3/8/2024)    Social Connection and Isolation Panel [NHANES]     Frequency of Communication with Friends and Family: Twice a week     Frequency of Social Gatherings with Friends and Family: Three times a week     Active Member of Clubs or Organizations: Yes     Attends Club or Organization Meetings: More than 4 times per year     Marital Status:    Housing Stability: Low Risk  (3/8/2024)    Housing Stability Vital Sign     Unable to Pay for Housing in the Last Year: No     Number of Places Lived in the Last Year: 1     Unstable Housing in the Last Year: No       OBJECTIVE:    /82 (BP Location: Right arm, Patient Position: Sitting, BP Method: Small (Automatic))   Pulse  "68   Ht 5' 10" (1.778 m)   Wt 86.6 kg (191 lb)   BMI 27.41 kg/m²     PHYSICAL EXAMINATION:    General appearance: Well appearing, in no acute distress, alert and oriented x3.  Psych:  Mood and affect appropriate.  Skin: Skin color, texture, turgor normal, no rashes or lesions, in both upper and lower body.  Head/face:  Atraumatic, normocephalic. No palpable lymph nodes  Neck: No pain to palpation over the cervical paraspinous muscles.   Cor: RRR  Pulm: Symmetric chest rise, no respiratory distress noted.   Back: Straight leg raising in the sitting position is negative to radicular pain bilaterally. There is pain to palpation over the lumbar facet joints. Limited ROM with pain on flexion and extension. Positive facet loading bilaterally.   Extremities:  No deformities, edema, or skin discoloration. Good capillary refill.  Musculoskeletal:  Bilateral upper and lower extremity strength is normal and symmetric.  No atrophy or tone abnormalities are noted.  Neuro: No loss of sensation is noted.  Gait: Normal.    ASSESSMENT: 64 y.o. year old male with neck and back pain, consistent with the followin. Cervical radiculopathy        2. Cervical spondylosis        3. DDD (degenerative disc disease), cervical        4. Chronic left shoulder pain        5. Shoulder impingement, left        6. Lumbar spondylosis        7. DDD (degenerative disc disease), lumbar              PLAN:     - Previous imaging was reviewed and discussed with the patient today.    - He is s/p C7/T1 IL SELENA with benefit.     - Consider L4/5 IL SELENA in the future. This would require holding anticoagulation.     - Continue Gabapentin.     - Continue Percocet sparingly, if refills required, will need UDS/pain contract.     - I have stressed the importance of physical activity and a home exercise plan to help with pain and improve health.    - RTC PRN.     The above plan and management options were discussed at length with patient. Patient is in " agreement with the above and verbalized understanding.    Baylee Ni  03/25/2024

## 2024-03-26 ENCOUNTER — PATIENT MESSAGE (OUTPATIENT)
Dept: CARDIOLOGY | Facility: CLINIC | Age: 64
End: 2024-03-26
Payer: COMMERCIAL

## 2024-04-02 NOTE — PLAN OF CARE
[facility]  Physical Therapy Plan of Care Note     Name: Partha Curtis Jr.  Clinic Number: [id number]    Therapy Diagnosis:   Encounter Diagnoses   Name Primary?    Decreased range of motion of neck Yes    Decreased range of motion of left shoulder     Neck pain     Myofascial pain syndrome     Tendinopathy of rotator cuff, left      Physician: Raffy Ascencio MD    Visit Date: 3/18/2024    Physician Orders: PT Eval and Treat   Medical Diagnosis from Referral:   M54.12 (ICD-10-CM) - Cervical radiculopathy   M79.18 (ICD-10-CM) - Myofascial pain syndrome   M67.912 (ICD-10-CM) - Tendinopathy of rotator cuff, left   Evaluation Date: 1/23/2024  Authorization Period Expiration: 12/31/2024  Plan of Care Expiration: 3/18/2024  Visit # / Visits authorized: 2/20 (+1)    Precautions: Standard  Functional Level Prior to Evaluation:  see initial evaluation    SUBJECTIVE     Update:   Patient reports: he missed therapy from February to mid march due to having heart complications. Patient eventually had to go to the Emergency Room due to aFib. Patient would like to continue with physical therapy to gain strength. He reports that he has ironically been pain free since all of this occurred.   He was compliant with home exercise program.  Response to previous treatment: no change   Functional change: no change     Pain: 0/10  Location: neck      OBJECTIVE     Update:   (3/18/2024):  Cervical Range of Motion: AROM    Degrees Subjective report   Flexion  45 --> 70 degrees  Tightness    Extension  10 -> 30 degrees  No pain       Right Rotation  75 --> 75 degrees  No pain      Left Rotation  60 --> 75 degrees  No pain       Right Side Bending  40 --> 40 degrees  No pain   Left Side Bending  20 --> 35 degrees  No pain      Shoulder Range of Motion: AROM   Shoulder Right Left   Flexion  145 --> 165 degrees  125 --> 170 degrees   ER  65 degrees  60 degrees      Upper Extremity Strength  (R) UE   (L) UE      Shoulder flexion: 5/5 Shoulder flexion: 5/5   Shoulder Abduction: 5/5 Shoulder abduction: 5/5   Shoulder ER 5/5 Shoulder ER 4+/5   Shoulder IR 5/5 Shoulder IR 5/5   Lower Trap 3+/5 Lower Trap 3+/5   Middle Trap 4/5 Middle Trap 4/5      Joint Mobility: left lower cervical facet hypomobility with down glide; left C7 facet arthropathy     ASSESSMENT     Update:   Partha is a 63 y.o. male referred to outpatient Physical Therapy with a medical diagnosis of cervical radiculopathy, myofascial pain syndrome, and rotator cuff tendinopathy of the left shoulder. Patient presents with a significant past medical history including carotid artery occlusion and right carotid endarterectomy. Patient is presenting with signs and symptoms of left C7 facet arthropathy with down glides. Recent lapse in therapy due to aFib. Despite lapse in therapy, patient is now pain free and objective tests/measures displaying improvements with overall cervical and shoulder range of motion/strength. Updating plan of care to continue physical therapy once per week for another 4 weeks focusing on periscapular and rotator cuff strengthening.     Previous Short Term Goals Status:   met  New Short Term Goals Status:   d/c  Long Term Goal Status: continue per initial plan of care.  Reasons for Recertification of Therapy:  POC update     PLAN     Updated Certification Period: 3/18/2024 to 4/18/2024   Recommended Treatment Plan: 1 times per week for 4 weeks:  Cervical/Lumbar Traction, Manual Therapy, Moist Heat/ Ice, Patient Education, Self Care, Therapeutic Activities, and Therapeutic Exercise    Jan Rowe, PT

## 2024-04-04 ENCOUNTER — PATIENT MESSAGE (OUTPATIENT)
Dept: FAMILY MEDICINE | Facility: CLINIC | Age: 64
End: 2024-04-04
Payer: COMMERCIAL

## 2024-04-04 DIAGNOSIS — Z96.659 HISTORY OF KNEE REPLACEMENT, UNSPECIFIED LATERALITY: ICD-10-CM

## 2024-04-04 DIAGNOSIS — G47.00 INSOMNIA, UNSPECIFIED TYPE: Primary | ICD-10-CM

## 2024-04-04 RX ORDER — AMITRIPTYLINE HYDROCHLORIDE 25 MG/1
TABLET, FILM COATED ORAL
Qty: 90 TABLET | Refills: 1 | Status: SHIPPED | OUTPATIENT
Start: 2024-04-04 | End: 2024-04-26 | Stop reason: SDUPTHER

## 2024-04-04 RX ORDER — ZOLPIDEM TARTRATE 5 MG/1
5 TABLET ORAL NIGHTLY PRN
Qty: 30 TABLET | Refills: 0 | Status: SHIPPED | OUTPATIENT
Start: 2024-04-04 | End: 2024-04-26

## 2024-04-08 ENCOUNTER — PATIENT MESSAGE (OUTPATIENT)
Dept: SPORTS MEDICINE | Facility: CLINIC | Age: 64
End: 2024-04-08
Payer: COMMERCIAL

## 2024-04-18 ENCOUNTER — PATIENT MESSAGE (OUTPATIENT)
Dept: FAMILY MEDICINE | Facility: CLINIC | Age: 64
End: 2024-04-18
Payer: COMMERCIAL

## 2024-04-23 ENCOUNTER — PATIENT MESSAGE (OUTPATIENT)
Dept: PAIN MEDICINE | Facility: CLINIC | Age: 64
End: 2024-04-23
Payer: COMMERCIAL

## 2024-04-26 ENCOUNTER — OFFICE VISIT (OUTPATIENT)
Dept: FAMILY MEDICINE | Facility: CLINIC | Age: 64
End: 2024-04-26
Payer: COMMERCIAL

## 2024-04-26 VITALS
DIASTOLIC BLOOD PRESSURE: 70 MMHG | WEIGHT: 191.81 LBS | HEART RATE: 69 BPM | OXYGEN SATURATION: 98 % | HEIGHT: 70 IN | TEMPERATURE: 98 F | BODY MASS INDEX: 27.46 KG/M2 | SYSTOLIC BLOOD PRESSURE: 126 MMHG

## 2024-04-26 DIAGNOSIS — M54.17 LUMBOSACRAL RADICULOPATHY AT L5: ICD-10-CM

## 2024-04-26 DIAGNOSIS — R20.0 NUMBNESS: Primary | ICD-10-CM

## 2024-04-26 DIAGNOSIS — G47.00 INSOMNIA, UNSPECIFIED TYPE: ICD-10-CM

## 2024-04-26 PROCEDURE — 3078F DIAST BP <80 MM HG: CPT | Mod: CPTII,S$GLB,, | Performed by: FAMILY MEDICINE

## 2024-04-26 PROCEDURE — 3044F HG A1C LEVEL LT 7.0%: CPT | Mod: CPTII,S$GLB,, | Performed by: FAMILY MEDICINE

## 2024-04-26 PROCEDURE — 3074F SYST BP LT 130 MM HG: CPT | Mod: CPTII,S$GLB,, | Performed by: FAMILY MEDICINE

## 2024-04-26 PROCEDURE — 3061F NEG MICROALBUMINURIA REV: CPT | Mod: CPTII,S$GLB,, | Performed by: FAMILY MEDICINE

## 2024-04-26 PROCEDURE — 3066F NEPHROPATHY DOC TX: CPT | Mod: CPTII,S$GLB,, | Performed by: FAMILY MEDICINE

## 2024-04-26 PROCEDURE — 4010F ACE/ARB THERAPY RXD/TAKEN: CPT | Mod: CPTII,S$GLB,, | Performed by: FAMILY MEDICINE

## 2024-04-26 PROCEDURE — 3008F BODY MASS INDEX DOCD: CPT | Mod: CPTII,S$GLB,, | Performed by: FAMILY MEDICINE

## 2024-04-26 PROCEDURE — 1159F MED LIST DOCD IN RCRD: CPT | Mod: CPTII,S$GLB,, | Performed by: FAMILY MEDICINE

## 2024-04-26 PROCEDURE — 99214 OFFICE O/P EST MOD 30 MIN: CPT | Mod: S$GLB,,, | Performed by: FAMILY MEDICINE

## 2024-04-26 PROCEDURE — 99999 PR PBB SHADOW E&M-EST. PATIENT-LVL V: CPT | Mod: PBBFAC,,, | Performed by: FAMILY MEDICINE

## 2024-04-26 RX ORDER — AMITRIPTYLINE HYDROCHLORIDE 75 MG/1
75 TABLET ORAL NIGHTLY
Qty: 90 TABLET | Refills: 1 | Status: SHIPPED | OUTPATIENT
Start: 2024-04-26

## 2024-04-26 NOTE — PATIENT INSTRUCTIONS
Stop ambien  Increase elavil back to 75 mg  Discussed risks of this   Have tried to stop, but this has worsened sleep and possibly neuropathy  Try the elavil 75 mg nightly for 1 week  If symptoms are improving but not resolve, try 1.5 tablets of gabapentin nightly (1200 mg nightly)    Keep f/u with pain management for back.     Consider restarting eliquis. Chadvasc2: 2, will be 3 next year based on age.

## 2024-04-26 NOTE — PROGRESS NOTES
"Subjective:       Patient ID: Partha Curtis Jr. is a 64 y.o. male.    Chief Complaint: Numbness    Jarvis is a 64 y.o. male who presents today for f/u    I had decreased elavil due to cardiac issues (a fib).  Now on elavil 2 pills down from 3 pills and ambien.  This is helping sleep    Decreased elavil 3/8/2024    He is having right foot pain and itchiness x 3 weeks.  He reports he has numbness more then pain. Its seems to be at the top part of his foot.     Has known L4 radiculopathy  Current symptoms seem to be more consistent with L5 radiculopathy    Takes gabapentin 800 mg daily.     Also having flare of chronic back pain. May be causing his foot pain.     He stopped eliquis after 6 weeks. Cardiology didn't recommend this.       Review of Systems   Constitutional:  Negative for chills and fever.   Respiratory:  Negative for shortness of breath.    Cardiovascular:  Negative for chest pain.   Gastrointestinal:  Negative for nausea and vomiting.   Musculoskeletal:  Positive for back pain.   Neurological:  Positive for numbness.               Results for orders placed or performed in visit on 03/16/24   LIPOPROTEIN A (LPA)   Result Value Ref Range    Lipoprotein (a) 10 0 - 30 mg/dL       Objective:     Vitals:    04/26/24 0911   BP: 126/70   Pulse: 69   Temp: 98.3 °F (36.8 °C)   TempSrc: Oral   SpO2: 98%   Weight: 87 kg (191 lb 12.8 oz)   Height: 5' 10" (1.778 m)        Physical Exam  Constitutional:       General: He is not in acute distress.     Appearance: He is not ill-appearing, toxic-appearing or diaphoretic.   Cardiovascular:      Rate and Rhythm: Normal rate and regular rhythm.   Pulmonary:      Effort: Pulmonary effort is normal.      Breath sounds: Normal breath sounds.   Musculoskeletal:        Feet:    Feet:      Comments: Area of numbness  Neurological:      Mental Status: He is alert.      Gait: Gait normal.   Psychiatric:         Mood and Affect: Mood normal.         Behavior: Behavior normal.         " Thought Content: Thought content normal.         Judgment: Judgment normal.         Assessment:       1. Numbness    2. Insomnia, unspecified type    3. Lumbosacral radiculopathy at L5        Plan:         Stop ambien  Increase elavil back to 75 mg  Discussed risks of this   Have tried to stop, but this has worsened sleep and possibly neuropathy  Try the elavil 75 mg nightly for 1 week  If symptoms are improving but not resolve, try 1.5 tablets of gabapentin nightly (1200 mg nightly)    Keep f/u with pain management for back.     Consider restarting eliquis. Chadvasc2: 2, will be 3 next year based on age.     Numbness    Insomnia, unspecified type  -     amitriptyline (ELAVIL) 75 MG tablet; Take 1 tablet (75 mg total) by mouth every evening.  Dispense: 90 tablet; Refill: 1    Lumbosacral radiculopathy at L5

## 2024-05-01 ENCOUNTER — PATIENT MESSAGE (OUTPATIENT)
Dept: PAIN MEDICINE | Facility: OTHER | Age: 64
End: 2024-05-01
Payer: COMMERCIAL

## 2024-05-01 ENCOUNTER — OFFICE VISIT (OUTPATIENT)
Dept: SPINE | Facility: CLINIC | Age: 64
End: 2024-05-01
Payer: COMMERCIAL

## 2024-05-01 VITALS
WEIGHT: 191.81 LBS | SYSTOLIC BLOOD PRESSURE: 112 MMHG | BODY MASS INDEX: 27.46 KG/M2 | OXYGEN SATURATION: 100 % | DIASTOLIC BLOOD PRESSURE: 64 MMHG | HEIGHT: 70 IN | HEART RATE: 58 BPM | RESPIRATION RATE: 18 BRPM

## 2024-05-01 DIAGNOSIS — M54.16 LUMBAR RADICULOPATHY: ICD-10-CM

## 2024-05-01 DIAGNOSIS — M47.816 LUMBAR SPONDYLOSIS: ICD-10-CM

## 2024-05-01 DIAGNOSIS — M47.812 CERVICAL SPONDYLOSIS: ICD-10-CM

## 2024-05-01 DIAGNOSIS — M51.36 DDD (DEGENERATIVE DISC DISEASE), LUMBAR: ICD-10-CM

## 2024-05-01 DIAGNOSIS — M50.30 DDD (DEGENERATIVE DISC DISEASE), CERVICAL: ICD-10-CM

## 2024-05-01 DIAGNOSIS — M54.12 CERVICAL RADICULOPATHY: ICD-10-CM

## 2024-05-01 DIAGNOSIS — M54.12 CERVICAL RADICULOPATHY: Primary | ICD-10-CM

## 2024-05-01 DIAGNOSIS — Z51.81 ENCOUNTER FOR MONITORING OPIOID MAINTENANCE THERAPY: ICD-10-CM

## 2024-05-01 DIAGNOSIS — Z79.891 ENCOUNTER FOR MONITORING OPIOID MAINTENANCE THERAPY: ICD-10-CM

## 2024-05-01 DIAGNOSIS — G89.4 CHRONIC PAIN DISORDER: Primary | ICD-10-CM

## 2024-05-01 DIAGNOSIS — M54.16 LUMBAR RADICULOPATHY: Primary | ICD-10-CM

## 2024-05-01 PROCEDURE — 80326 AMPHETAMINES 5 OR MORE: CPT | Performed by: NURSE PRACTITIONER

## 2024-05-01 PROCEDURE — 3078F DIAST BP <80 MM HG: CPT | Mod: CPTII,S$GLB,, | Performed by: NURSE PRACTITIONER

## 2024-05-01 PROCEDURE — 3008F BODY MASS INDEX DOCD: CPT | Mod: CPTII,S$GLB,, | Performed by: NURSE PRACTITIONER

## 2024-05-01 PROCEDURE — 99214 OFFICE O/P EST MOD 30 MIN: CPT | Mod: S$GLB,,, | Performed by: NURSE PRACTITIONER

## 2024-05-01 PROCEDURE — 80355 GABAPENTIN NON-BLOOD: CPT | Performed by: NURSE PRACTITIONER

## 2024-05-01 PROCEDURE — 3074F SYST BP LT 130 MM HG: CPT | Mod: CPTII,S$GLB,, | Performed by: NURSE PRACTITIONER

## 2024-05-01 PROCEDURE — 1159F MED LIST DOCD IN RCRD: CPT | Mod: CPTII,S$GLB,, | Performed by: NURSE PRACTITIONER

## 2024-05-01 PROCEDURE — 80347 BENZODIAZEPINES 13 OR MORE: CPT | Performed by: NURSE PRACTITIONER

## 2024-05-01 PROCEDURE — 3061F NEG MICROALBUMINURIA REV: CPT | Mod: CPTII,S$GLB,, | Performed by: NURSE PRACTITIONER

## 2024-05-01 PROCEDURE — 4010F ACE/ARB THERAPY RXD/TAKEN: CPT | Mod: CPTII,S$GLB,, | Performed by: NURSE PRACTITIONER

## 2024-05-01 PROCEDURE — 3044F HG A1C LEVEL LT 7.0%: CPT | Mod: CPTII,S$GLB,, | Performed by: NURSE PRACTITIONER

## 2024-05-01 PROCEDURE — 99999 PR PBB SHADOW E&M-EST. PATIENT-LVL V: CPT | Mod: PBBFAC,,, | Performed by: NURSE PRACTITIONER

## 2024-05-01 PROCEDURE — 3066F NEPHROPATHY DOC TX: CPT | Mod: CPTII,S$GLB,, | Performed by: NURSE PRACTITIONER

## 2024-05-01 PROCEDURE — 1160F RVW MEDS BY RX/DR IN RCRD: CPT | Mod: CPTII,S$GLB,, | Performed by: NURSE PRACTITIONER

## 2024-05-01 RX ORDER — OXYCODONE AND ACETAMINOPHEN 10; 325 MG/1; MG/1
1 TABLET ORAL EVERY 12 HOURS PRN
Qty: 60 TABLET | Refills: 0 | Status: SHIPPED | OUTPATIENT
Start: 2024-05-01 | End: 2024-05-31

## 2024-05-01 NOTE — H&P (VIEW-ONLY)
Chronic patient Established Note (Follow up visit)      SUBJECTIVE:    Interval History 5/1/2024:  The patient returns to clinic today for follow up of neck and back pain. He reports increased low back pain. He denies any radicular leg pain but does have numbness into the lateral aspect of his right leg. This is worse from the knee to the top of his right foot. He does have numbness under the left foot. His pain is worse with standing and walking. He is no longer taking Eliquis. He is taking Gabapentin. He also takes Percocet for severe pain. He needs a refill. He denies any other health changes. His pain today is 8/10.    Interval History 3/25/2024:  Partha Curtis Jr. presents to the clinic for a follow-up appointment for neck pain. He is s/p C7/T1 IL SELENA on 2/26/2024. He reports 100% relief of his neck pain. He was admitted for atrial fibrillation after the last procedure. He was converted to sinus rhythm. He is now on Eliquis. He reports increased low back pain. He denies any radicular leg pain. His pain is worse as the day goes on. He previously had relief with ESIs. He is interested in repeating this in the future. He denies any weakness. He is taking Gabapentin. He takes Percocet intermittently for severe pain. He denies any other health changes. His pain today is 0/10.        HPI: Partha Curtis Jr. is a 63 y.o. male presents to pain clinic for evaluation of neck pain. Symptoms developed 4 weeks ago. Similar pain in 2022 that improved after an C7/T1 ILESI on 3/28/22, had been pain free since this procedure until 4 weeks ago. Original neck pain started in 2021 without inciting event. Denies trauma or injury to the area in the past 4 weeks upon return of symptoms. [facility] at [facility] with Dr. Herrera     Original Pain Description:  The pain is located in the upper thoracic region just left of the midline with radiation into his left shoulder and proximal arm terminating above the elbow.  All of patients upper back/neck pain is on the left. The pain is described as aching, sharp, and throbbing. These exact symptoms were present prior to his 2022 ILESI. Chinyere ranges from 4 - 10. The current pain is 8. Exacerbating factors: Coughing/Sneezing and L arm abduction above the head, head forward flexion and head lateral bending to the left, and laying flat on the ground. Mitigating factors pillow. Symptoms interfere with daily activity and sleeping and work. 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 Works as a  for [employer]. The patient feels like symptoms have been worsening. Patient endorses weakness in his left arm with return of pain. Patient denies saddle anesthesia, bladder/bowel incontinence, acute or signifcant limb weakness, ataxia, or increased falls.     Pain Disability Index Review:      2/1/2024     2:36 PM 1/17/2024    10:38 AM   Last 3 PDI Scores   Pain Disability Index (PDI) 56 37       Pain Medications:  Gabapentin  Percocet     Opioid Contract: not applicable     report:  Reviewed and consistent with medication use as prescribed.    Pain Procedures:   2/26/2024- C7/T1 IL SELENA    Physical Therapy/Home Exercise: yes    Imaging:   MRI Cervical Spine 2/15/2024:  COMPARISON:  XR C-spine 03/02/2010.  CTA head neck 06/23/2022.     FINDINGS:  Alignment: Normal sagittal alignment.  Grade 1 retrolisthesis at C5-C6 and grade 1 anterolisthesis at C7-T1.     Vertebrae: Vertebral body heights are maintained.  No fracture or marrow infiltrative process.     Discs: Multilevel degenerative disc desiccation and height loss most pronounced at C5-C6 and C6-C7.     Cord: Normal cord signal intensity.     Cerebellar tonsils are in their expected location.  Visualized brainstem is normal. Vertebral artery flow voids are present.  Prevertebral soft tissues are normal.  No cervical lymph node enlargement.  Paraspinal musculature demonstrates normal bulk and  signal intensity.     Degenerative findings:     C2-C3: Thickening of the posterior longitudinal ligament.  No spinal canal stenosis or neural foraminal narrowing.     C3-C4: Thickening of the posterior longitudinal ligament, bilateral uncovertebral spurring and facet arthropathy.  No spinal canal stenosis.  Mild left neural foraminal narrowing.     C4-C5: Central/right paracentral disc extrusion with inferior migration which abuts and slightly posteriorly displaces the right ventral cord.  Bilateral uncovertebral spurring and facet arthropathy.  Mild spinal canal stenosis.  Mild left neural foraminal narrowing.     C5-C6: Posterior disc osteophyte complex, bilateral uncovertebral spurring and facet arthropathy.  Moderate spinal canal stenosis.  Severe bilateral neural foraminal narrowing.     C6-C7: Posterior disc osteophyte complex, bilateral uncovertebral spurring and facet arthropathy.  No spinal canal stenosis.  Mild right and moderate left neural foraminal narrowing.     C7-T1: No spinal canal stenosis or neural foraminal narrowing.     Impression:     Multilevel degenerative change of the cervical spine, detailed above.  Findings most pronounced at C5-C6 with moderate spinal canal stenosis and severe bilateral neural foraminal narrowing.    MRI Lumbar Spine 11/2/2023:  COMPARISON: MRI lumbar spine 6/28/2021     FINDINGS:   Spine Numbering: For purposes of this dictation, it is assumed that there are 5 non-rib-bearing, lumbar-type vertebrae, and the most caudal fully segmented lumbar vertebra is labeled L5.     Alignment: Straightening of normal lumbar lordosis. Unchanged dextrocurvature of the lumbar spine. Unchanged grade 1 left lateral listhesis of L2 on L3. Unchanged grade 1 right lateral listhesis of L3 on L4. Unchanged grade 1 retrolisthesis of L2 on L3.     Surgical: None.     Vertebral Bodies: Unchanged multilevel mild anterior osteophytosis.     Marrow Signal: Unchanged degenerative fatty marrow  placement at the L3-4 endplates. No marrow edema. No suspicious osseous lesions.     Intervertebral Discs: Unchanged multilevel disc dessication with loss of disc space height     Conus Medullaris: Terminates at L1     Cauda Equina: Unchanged bunching of the cauda equina at L3-4 and L4-5 due to thecal sac narrowing detailed below.     Paraspinal Soft Tissues: Normal     Intra-abdominal Findings: None.     Individual Levels:     T12-L1: Unchanged circumferential disc bulge with unchanged right paracentral disc herniation with cranial migration to the infrapedicular level of T12 measuring 1.6 x 0.6 cm (image 7, series 2). No thecal sac or neural foraminal narrowing.     L1-L2: Unchanged circumferential disc bulge with bulky left lateral osteophytosis. No spinal canal or foraminal narrowing.     L2-L3: Circumferential disc bulge, ligamentum flavum thickening, and epidural lipomatosis cause unchanged moderate thecal sac narrowing. Mild facet arthropathy and disc disease cause unchanged moderate bilateral neural foraminal narrowing.     L3-L4: Circumferential disc bulge, ligamentum flavum thickening, and epidural lipomatosis cause unchanged severe thecal sac narrowing. Moderate right and severe left facet arthropathy with disc disease cause unchanged mild right and moderate left neural foraminal narrowing.     L4-L5: Circumferential disc bulge, ligamentum flavum thickening, and epidural lipomatosis cause unchanged severe thecal sac narrowing. Severe facet arthropathy and disc disease cause unchanged severe right and moderate left neural foraminal narrowing with impingement of the exiting right L4 nerve root.     L5-S1: Unchanged small disc bulge and moderate facet arthropathy. No spinal canal or foraminal narrowing.     Impression    Unchanged severe multilevel lumbar spondylosis as detailed above.    Allergies:   Review of patient's allergies indicates:   Allergen Reactions    Stadol [butorphanol tartrate] Rash      "Swelling in face    Strawberries [strawberry] Rash       Current Medications:   Current Outpatient Medications   Medication Sig Dispense Refill    amitriptyline (ELAVIL) 75 MG tablet Take 1 tablet (75 mg total) by mouth every evening. 90 tablet 1    amLODIPine (NORVASC) 5 MG tablet Take 1 tablet (5 mg total) by mouth every evening. 30 tablet 11    aspirin (ECOTRIN) 81 MG EC tablet Take 81 mg by mouth every evening.      BD LUER-RUSS SYRINGE 3 mL 25 gauge x 1" Syrg USE ONE SYRINGE EVERY 14 DAYS WITH TESTOSTERONE 100 each 2    candesartan (ATACAND) 32 MG tablet Take 1 tablet (32 mg total) by mouth once daily. 90 tablet 3    cyanocobalamin (VITAMIN B-12) 1000 MCG tablet Take 1 tablet (1,000 mcg total) by mouth once daily. 90 tablet 4    ergocalciferol (ERGOCALCIFEROL) 50,000 unit Cap Take 1 capsule (50,000 Units total) by mouth every 7 days. 12 capsule 0    ezetimibe (ZETIA) 10 mg tablet Take 1 tablet (10 mg total) by mouth once daily. 90 tablet 3    gabapentin (NEURONTIN) 800 MG tablet Take 1 tablet (800 mg total) by mouth every evening. 90 tablet 1    glimepiride (AMARYL) 4 MG tablet Take 1 tablet (4 mg total) by mouth before breakfast. 90 tablet 1    hydrocortisone 2.5 % cream Apply topically 2 (two) times daily. (Patient taking differently: Apply topically 2 (two) times daily as needed.) 28 g 0    ipratropium (ATROVENT) 42 mcg (0.06 %) nasal spray 2 sprays by Nasal route 2 (two) times daily as needed for Rhinitis. 15 mL 0    metoprolol succinate (TOPROL-XL) 200 MG 24 hr tablet Take 1 tablet (200 mg total) by mouth once daily. (Patient taking differently: Take 200 mg by mouth every evening.) 90 tablet 3    oxyCODONE-acetaminophen (PERCOCET) 5-325 mg per tablet Take 1 tablet by mouth 2 (two) times daily as needed.      rosuvastatin (CRESTOR) 40 MG Tab Take 1 tablet (40 mg total) by mouth every evening. 90 tablet 3    semaglutide (OZEMPIC) 0.25 mg or 0.5 mg (2 mg/3 mL) pen injector Inject 0.5 mg into the skin every 7 " "days. (Patient taking differently: Inject 0.5 mg into the skin every Wednesday.) 12 mL 1    sod sulf-pot chloride-mag sulf (SUTAB) 1.479-0.188- 0.225 gram tablet Take 12 tablets by mouth once daily. Take according to package instructions with indicated amount of water. 24 tablet 0    spironolactone (ALDACTONE) 25 MG tablet Take 1 tablet (25 mg total) by mouth once daily. 90 tablet 1    syringe with needle, safety (BD INTEGRA SYRINGE) 3 mL 22 gauge x 1 1/2" Syrg Inject 1 Syringe as directed once a week. 6 each 3    testosterone cypionate (DEPOTESTOTERONE CYPIONATE) 200 mg/mL injection Inject 0.75 mLs (150 mg total) into the muscle every 14 (fourteen) days. 2 mL 5    valACYclovir (VALTREX) 1000 MG tablet TAKE 2 TABLETS BY MOUTH EVERY 12 HOURS (Patient taking differently: Take 2,000 mg by mouth every 12 (twelve) hours as needed.) 4 tablet 3     No current facility-administered medications for this visit.     Facility-Administered Medications Ordered in Other Visits   Medication Dose Route Frequency Provider Last Rate Last Admin    0.9%  NaCl infusion   Intravenous Continuous Kendell Hoffman MD        0.9%  NaCl infusion   Intravenous Continuous Gerardo Uribe MD           REVIEW OF SYSTEMS:    GENERAL:  No weight loss, malaise or fevers.  HEENT:  Negative for frequent or significant headaches.  NECK:  Negative for lumps, goiter, pain and significant neck swelling.  RESPIRATORY:  Negative for cough, wheezing or shortness of breath.  CARDIOVASCULAR:  Negative for chest pain, leg swelling or palpitations. HTN, AFib  GI:  Negative for abdominal discomfort, blood in stools or black stools or change in bowel habits.  MUSCULOSKELETAL:  See HPI.  SKIN:  Negative for lesions, rash, and itching.  PSYCH:  Negative for sleep disturbance, mood disorder and recent psychosocial stressors.  ENDO: Diabetes.   HEMATOLOGY/LYMPHOLOGY:  Negative for swollen nodes. Eliquis  NEURO:   No history of headaches, syncope, paralysis, seizures " or tremors.  All other reviewed and negative other than HPI.    Past Medical History:  Past Medical History:   Diagnosis Date    Allergy     Carotid artery occlusion     Chronic back pain     Colonic polyp     Genetic testing     MUTYH mutation-negative    Hyperlipidemia     Hypertension     Paroxysmal atrial fibrillation 2/28/2024    Sleep apnea     Type 2 diabetes mellitus with stage 3 chronic kidney disease, without long-term current use of insulin 4/2/2020       Past Surgical History:  Past Surgical History:   Procedure Laterality Date    ANKLE SURGERY Left     2    APPENDECTOMY      at age 20    CAROTID ENDARTERECTOMY Right 07/15/2015    CARPAL TUNNEL RELEASE Bilateral     COLONOSCOPY N/A 12/14/2018    Procedure: COLONOSCOPYSuprep;  Surgeon: Silver Selby MD;  Location: [facility] ENDO;  Service: Endoscopy;  Laterality: N/A;    EPIDURAL STEROID INJECTION N/A 2/26/2024    Procedure: CERVICAL C7/T1 IL SELENA;  Surgeon: Gokul Fung MD;  Location: [facility] PAIN MGT;  Service: Pain Management;  Laterality: N/A;  [phone]  2 WK F/U SERGEI    JOINT REPLACEMENT  9/2010    NASAL SEPTUM SURGERY      TOTAL KNEE ARTHROPLASTY Right     6 knee surgeries    TRANSESOPHAGEAL ECHOCARDIOGRAM WITH POSSIBLE CARDIOVERSION (ELIZABETH W/ POSS CARDIOVERSION) N/A 2/28/2024    Procedure: Transesophageal echo (ELIZABETH) intra-procedure log documentation;  Surgeon: Ahmet De La Cruz MD;  Location: [facility] CATH LAB/EP;  Service: Cardiology;  Laterality: N/A;       Family History:  Family History   Problem Relation Name Age of Onset    Brain cancer Mother Klarissa 67    Cancer Mother Klarissa     Hypertension Father Keanu     Lung cancer Father Keanu         x2 (PAUL initially- treated surgically only, then recurred distantly) (smoker, & had been exposed to many chemicals)    Cancer Father Keanu     Breast cancer Sister  58    Genetic Disorder Sister          monoallelic MUTYH mutation    Seizures Daughter      Cancer Maternal Grandfather Valerio      Brain cancer Paternal Grandfather  73    Breast cancer Maternal Cousin  57    Aneurysm Other mgm's father 48        brain    Ulcerative colitis Other brother's son        Social History:  Social History     Socioeconomic History    Marital status:    Tobacco Use    Smoking status: Never     Passive exposure: Never    Smokeless tobacco: Never   Substance and Sexual Activity    Alcohol use: Yes     Alcohol/week: 2.0 standard drinks of alcohol     Types: 2 Cans of beer per week     Comment: a week    Drug use: Never    Sexual activity: Yes     Partners: Female     Birth control/protection: None   Social History Narrative    5/11/2021: . He lives with his wife and 2 kids. (5 kids total). He has one dog, one cat, no more chickens at home. One dog recently passed away. No smokers at home.      Social Determinants of Health     Financial Resource Strain: Low Risk  (3/8/2024)    Overall Financial Resource Strain (CARDIA)     Difficulty of Paying Living Expenses: Not very hard   Food Insecurity: No Food Insecurity (3/8/2024)    Hunger Vital Sign     Worried About Running Out of Food in the Last Year: Never true     Ran Out of Food in the Last Year: Never true   Transportation Needs: No Transportation Needs (3/8/2024)    PRAPARE - Transportation     Lack of Transportation (Medical): No     Lack of Transportation (Non-Medical): No   Physical Activity: Unknown (3/8/2024)    Exercise Vital Sign     Days of Exercise per Week: 0 days   Stress: Stress Concern Present (3/8/2024)    [place] [employer] - Occupational Stress Questionnaire     Feeling of Stress : To some extent   Housing Stability: Low Risk  (3/8/2024)    Housing Stability Vital Sign     Unable to Pay for Housing in the Last Year: No     Number of Places Lived in the Last Year: 1     Unstable Housing in the Last Year: No       OBJECTIVE:    /64 (BP Location: Right arm, Patient Position: Sitting, BP Method: Medium  "(Automatic))   Pulse (!) 58   Resp 18   Ht 5' 10" (1.778 m)   Wt 87 kg (191 lb 12.8 oz)   SpO2 100%   BMI 27.52 kg/m²     PHYSICAL EXAMINATION:    General appearance: Well appearing, in no acute distress, alert and oriented x3.  Psych:  Mood and affect appropriate.  Skin: Skin color, texture, turgor normal, no rashes or lesions, in both upper and lower body.  Head/face:  Atraumatic, normocephalic. No palpable lymph nodes  Neck: No pain to palpation over the cervical paraspinous muscles.   Cor: RRR  Pulm: Symmetric chest rise, no respiratory distress noted.   Back: Straight leg raising in the sitting position is negative to radicular pain bilaterally. There is pain to palpation over the lumbar facet joints bilaterally. Limited ROM with pain on flexion and extension. Positive facet loading bilaterally.   Extremities:  No deformities, edema, or skin discoloration. Good capillary refill.  Musculoskeletal:  Bilateral upper and lower extremity strength is normal and symmetric.  No atrophy or tone abnormalities are noted.  Neuro: No loss of sensation is noted.  Gait: Normal.    ASSESSMENT: 64 y.o. year old male with neck and back pain, consistent with the followin. Chronic pain disorder        2. Lumbar radiculopathy        3. Lumbar spondylosis        4. DDD (degenerative disc disease), lumbar        5. Cervical radiculopathy        6. Cervical spondylosis        7. Encounter for monitoring opioid maintenance therapy  Pain Clinic Drug Screen            PLAN:     - Previous imaging was reviewed and discussed with the patient today.    - Schedule for L4/5 IL SELENA.     - We can repeat C7/T1 IL SELENA as needed.     - Continue Gabapentin.     - Pain contract signed today.     - Continue Percocet 10/325 mg BID PRN, #60.     - The patient is here today for a refill of current pain medications and they believe these provide effective pain control and improvements in quality of life.  The patient notes no serious side " effects, and feels the benefits outweigh the risks.  The patient was reminded of the pain contract that they signed previously as well as the risks and benefits of the medication including possible death.  The updated [place] Board  Pharmacy prescription monitoring program was reviewed, and the patient has been found to be compliant with current treatment plan. Medication management provided by Dr. Fung.     - UDS today.     - I have stressed the importance of physical activity and a home exercise plan to help with pain and improve health.    - RTC 2 weeks after above procedure.      The above plan and management options were discussed at length with patient. Patient is in agreement with the above and verbalized understanding.    Baylee Ni  05/01/2024

## 2024-05-01 NOTE — PROGRESS NOTES
Chronic patient Established Note (Follow up visit)      SUBJECTIVE:    Interval History 5/1/2024:  The patient returns to clinic today for follow up of neck and back pain. He reports increased low back pain. He denies any radicular leg pain but does have numbness into the lateral aspect of his right leg. This is worse from the knee to the top of his right foot. He does have numbness under the left foot. His pain is worse with standing and walking. He is no longer taking Eliquis. He is taking Gabapentin. He also takes Percocet for severe pain. He needs a refill. He denies any other health changes. His pain today is 8/10.    Interval History 3/25/2024:  Partha Curtis Jr. presents to the clinic for a follow-up appointment for neck pain. He is s/p C7/T1 IL SELENA on 2/26/2024. He reports 100% relief of his neck pain. He was admitted for atrial fibrillation after the last procedure. He was converted to sinus rhythm. He is now on Eliquis. He reports increased low back pain. He denies any radicular leg pain. His pain is worse as the day goes on. He previously had relief with ESIs. He is interested in repeating this in the future. He denies any weakness. He is taking Gabapentin. He takes Percocet intermittently for severe pain. He denies any other health changes. His pain today is 0/10.        HPI: Partha Curtis Jr. is a 63 y.o. male presents to pain clinic for evaluation of neck pain. Symptoms developed 4 weeks ago. Similar pain in 2022 that improved after an C7/T1 ILESI on 3/28/22, had been pain free since this procedure until 4 weeks ago. Original neck pain started in 2021 without inciting event. Denies trauma or injury to the area in the past 4 weeks upon return of symptoms. [facility] with [facility] Herrera     Original Pain Description:  The pain is located in the upper thoracic region just left of the midline with radiation into his left shoulder and proximal arm terminating above the elbow.  All of patients upper back/neck pain is on the left. The pain is described as aching, sharp, and throbbing. These exact symptoms were present prior to his 2022 ILESI. Chinyere ranges from 4 - 10. The current pain is 8. Exacerbating factors: Coughing/Sneezing and L arm abduction above the head, head forward flexion and head lateral bending to the left, and laying flat on the ground. Mitigating factors pillow. Symptoms interfere with daily activity and sleeping and work. 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 Works as a  for [employer]. The patient feels like symptoms have been worsening. Patient endorses weakness in his left arm with return of pain. Patient denies saddle anesthesia, bladder/bowel incontinence, acute or signifcant limb weakness, ataxia, or increased falls.     Pain Disability Index Review:      2/1/2024     2:36 PM 1/17/2024    10:38 AM   Last 3 PDI Scores   Pain Disability Index (PDI) 56 37       Pain Medications:  Gabapentin  Percocet     Opioid Contract: not applicable     report:  Reviewed and consistent with medication use as prescribed.    Pain Procedures:   2/26/2024- C7/T1 IL SELENA    Physical Therapy/Home Exercise: yes    Imaging:   MRI Cervical Spine 2/15/2024:  COMPARISON:  XR C-spine 03/02/2010.  CTA head neck 06/23/2022.     FINDINGS:  Alignment: Normal sagittal alignment.  Grade 1 retrolisthesis at C5-C6 and grade 1 anterolisthesis at C7-T1.     Vertebrae: Vertebral body heights are maintained.  No fracture or marrow infiltrative process.     Discs: Multilevel degenerative disc desiccation and height loss most pronounced at C5-C6 and C6-C7.     Cord: Normal cord signal intensity.     Cerebellar tonsils are in their expected location.  Visualized brainstem is normal. Vertebral artery flow voids are present.  Prevertebral soft tissues are normal.  No cervical lymph node enlargement.  Paraspinal musculature demonstrates normal bulk and  signal intensity.     Degenerative findings:     C2-C3: Thickening of the posterior longitudinal ligament.  No spinal canal stenosis or neural foraminal narrowing.     C3-C4: Thickening of the posterior longitudinal ligament, bilateral uncovertebral spurring and facet arthropathy.  No spinal canal stenosis.  Mild left neural foraminal narrowing.     C4-C5: Central/right paracentral disc extrusion with inferior migration which abuts and slightly posteriorly displaces the right ventral cord.  Bilateral uncovertebral spurring and facet arthropathy.  Mild spinal canal stenosis.  Mild left neural foraminal narrowing.     C5-C6: Posterior disc osteophyte complex, bilateral uncovertebral spurring and facet arthropathy.  Moderate spinal canal stenosis.  Severe bilateral neural foraminal narrowing.     C6-C7: Posterior disc osteophyte complex, bilateral uncovertebral spurring and facet arthropathy.  No spinal canal stenosis.  Mild right and moderate left neural foraminal narrowing.     C7-T1: No spinal canal stenosis or neural foraminal narrowing.     Impression:     Multilevel degenerative change of the cervical spine, detailed above.  Findings most pronounced at C5-C6 with moderate spinal canal stenosis and severe bilateral neural foraminal narrowing.    MRI Lumbar Spine 11/2/2023:  COMPARISON: MRI lumbar spine 6/28/2021     FINDINGS:   Spine Numbering: For purposes of this dictation, it is assumed that there are 5 non-rib-bearing, lumbar-type vertebrae, and the most caudal fully segmented lumbar vertebra is labeled L5.     Alignment: Straightening of normal lumbar lordosis. Unchanged dextrocurvature of the lumbar spine. Unchanged grade 1 left lateral listhesis of L2 on L3. Unchanged grade 1 right lateral listhesis of L3 on L4. Unchanged grade 1 retrolisthesis of L2 on L3.     Surgical: None.     Vertebral Bodies: Unchanged multilevel mild anterior osteophytosis.     Marrow Signal: Unchanged degenerative fatty marrow  placement at the L3-4 endplates. No marrow edema. No suspicious osseous lesions.     Intervertebral Discs: Unchanged multilevel disc dessication with loss of disc space height     Conus Medullaris: Terminates at L1     Cauda Equina: Unchanged bunching of the cauda equina at L3-4 and L4-5 due to thecal sac narrowing detailed below.     Paraspinal Soft Tissues: Normal     Intra-abdominal Findings: None.     Individual Levels:     T12-L1: Unchanged circumferential disc bulge with unchanged right paracentral disc herniation with cranial migration to the infrapedicular level of T12 measuring 1.6 x 0.6 cm (image 7, series 2). No thecal sac or neural foraminal narrowing.     L1-L2: Unchanged circumferential disc bulge with bulky left lateral osteophytosis. No spinal canal or foraminal narrowing.     L2-L3: Circumferential disc bulge, ligamentum flavum thickening, and epidural lipomatosis cause unchanged moderate thecal sac narrowing. Mild facet arthropathy and disc disease cause unchanged moderate bilateral neural foraminal narrowing.     L3-L4: Circumferential disc bulge, ligamentum flavum thickening, and epidural lipomatosis cause unchanged severe thecal sac narrowing. Moderate right and severe left facet arthropathy with disc disease cause unchanged mild right and moderate left neural foraminal narrowing.     L4-L5: Circumferential disc bulge, ligamentum flavum thickening, and epidural lipomatosis cause unchanged severe thecal sac narrowing. Severe facet arthropathy and disc disease cause unchanged severe right and moderate left neural foraminal narrowing with impingement of the exiting right L4 nerve root.     L5-S1: Unchanged small disc bulge and moderate facet arthropathy. No spinal canal or foraminal narrowing.     Impression    Unchanged severe multilevel lumbar spondylosis as detailed above.    Allergies:   Review of patient's allergies indicates:   Allergen Reactions    Stadol [butorphanol tartrate] Rash      "Swelling in face    Strawberries [strawberry] Rash       Current Medications:   Current Outpatient Medications   Medication Sig Dispense Refill    amitriptyline (ELAVIL) 75 MG tablet Take 1 tablet (75 mg total) by mouth every evening. 90 tablet 1    amLODIPine (NORVASC) 5 MG tablet Take 1 tablet (5 mg total) by mouth every evening. 30 tablet 11    aspirin (ECOTRIN) 81 MG EC tablet Take 81 mg by mouth every evening.      BD LUER-RUSS SYRINGE 3 mL 25 gauge x 1" Syrg USE ONE SYRINGE EVERY 14 DAYS WITH TESTOSTERONE 100 each 2    candesartan (ATACAND) 32 MG tablet Take 1 tablet (32 mg total) by mouth once daily. 90 tablet 3    cyanocobalamin (VITAMIN B-12) 1000 MCG tablet Take 1 tablet (1,000 mcg total) by mouth once daily. 90 tablet 4    ergocalciferol (ERGOCALCIFEROL) 50,000 unit Cap Take 1 capsule (50,000 Units total) by mouth every 7 days. 12 capsule 0    ezetimibe (ZETIA) 10 mg tablet Take 1 tablet (10 mg total) by mouth once daily. 90 tablet 3    gabapentin (NEURONTIN) 800 MG tablet Take 1 tablet (800 mg total) by mouth every evening. 90 tablet 1    glimepiride (AMARYL) 4 MG tablet Take 1 tablet (4 mg total) by mouth before breakfast. 90 tablet 1    hydrocortisone 2.5 % cream Apply topically 2 (two) times daily. (Patient taking differently: Apply topically 2 (two) times daily as needed.) 28 g 0    ipratropium (ATROVENT) 42 mcg (0.06 %) nasal spray 2 sprays by Nasal route 2 (two) times daily as needed for Rhinitis. 15 mL 0    metoprolol succinate (TOPROL-XL) 200 MG 24 hr tablet Take 1 tablet (200 mg total) by mouth once daily. (Patient taking differently: Take 200 mg by mouth every evening.) 90 tablet 3    oxyCODONE-acetaminophen (PERCOCET) 5-325 mg per tablet Take 1 tablet by mouth 2 (two) times daily as needed.      rosuvastatin (CRESTOR) 40 MG Tab Take 1 tablet (40 mg total) by mouth every evening. 90 tablet 3    semaglutide (OZEMPIC) 0.25 mg or 0.5 mg (2 mg/3 mL) pen injector Inject 0.5 mg into the skin every 7 " "days. (Patient taking differently: Inject 0.5 mg into the skin every Wednesday.) 12 mL 1    sod sulf-pot chloride-mag sulf (SUTAB) 1.479-0.188- 0.225 gram tablet Take 12 tablets by mouth once daily. Take according to package instructions with indicated amount of water. 24 tablet 0    spironolactone (ALDACTONE) 25 MG tablet Take 1 tablet (25 mg total) by mouth once daily. 90 tablet 1    syringe with needle, safety (BD INTEGRA SYRINGE) 3 mL 22 gauge x 1 1/2" Syrg Inject 1 Syringe as directed once a week. 6 each 3    testosterone cypionate (DEPOTESTOTERONE CYPIONATE) 200 mg/mL injection Inject 0.75 mLs (150 mg total) into the muscle every 14 (fourteen) days. 2 mL 5    valACYclovir (VALTREX) 1000 MG tablet TAKE 2 TABLETS BY MOUTH EVERY 12 HOURS (Patient taking differently: Take 2,000 mg by mouth every 12 (twelve) hours as needed.) 4 tablet 3     No current facility-administered medications for this visit.     Facility-Administered Medications Ordered in Other Visits   Medication Dose Route Frequency Provider Last Rate Last Admin    0.9%  NaCl infusion   Intravenous Continuous Kendell Hoffman MD        0.9%  NaCl infusion   Intravenous Continuous Gerardo Uribe MD           REVIEW OF SYSTEMS:    GENERAL:  No weight loss, malaise or fevers.  HEENT:  Negative for frequent or significant headaches.  NECK:  Negative for lumps, goiter, pain and significant neck swelling.  RESPIRATORY:  Negative for cough, wheezing or shortness of breath.  CARDIOVASCULAR:  Negative for chest pain, leg swelling or palpitations. HTN, AFib  GI:  Negative for abdominal discomfort, blood in stools or black stools or change in bowel habits.  MUSCULOSKELETAL:  See HPI.  SKIN:  Negative for lesions, rash, and itching.  PSYCH:  Negative for sleep disturbance, mood disorder and recent psychosocial stressors.  ENDO: Diabetes.   HEMATOLOGY/LYMPHOLOGY:  Negative for swollen nodes. Eliquis  NEURO:   No history of headaches, syncope, paralysis, seizures " or tremors.  All other reviewed and negative other than HPI.    Past Medical History:  Past Medical History:   Diagnosis Date    Allergy     Carotid artery occlusion     Chronic back pain     Colonic polyp     Genetic testing     MUTYH mutation-negative    Hyperlipidemia     Hypertension     Paroxysmal atrial fibrillation 2/28/2024    Sleep apnea     Type 2 diabetes mellitus with stage 3 chronic kidney disease, without long-term current use of insulin 4/2/2020       Past Surgical History:  Past Surgical History:   Procedure Laterality Date    ANKLE SURGERY Left     2    APPENDECTOMY      at age 20    CAROTID ENDARTERECTOMY Right 07/15/2015    CARPAL TUNNEL RELEASE Bilateral     COLONOSCOPY N/A 12/14/2018    Procedure: COLONOSCOPYSuprep;  Surgeon: Silver Selby MD;  Location: [facility] ENDO;  Service: Endoscopy;  Laterality: N/A;    EPIDURAL STEROID INJECTION N/A 2/26/2024    Procedure: CERVICAL C7/T1 IL SELENA;  Surgeon: Gokul Fung MD;  Location: [facility] PAIN MGT;  Service: Pain Management;  Laterality: N/A;  [phone]  2 WK F/U SERGEI    JOINT REPLACEMENT  9/2010    NASAL SEPTUM SURGERY      TOTAL KNEE ARTHROPLASTY Right     6 knee surgeries    TRANSESOPHAGEAL ECHOCARDIOGRAM WITH POSSIBLE CARDIOVERSION (ELIZABETH W/ POSS CARDIOVERSION) N/A 2/28/2024    Procedure: Transesophageal echo (ELIZABETH) intra-procedure log documentation;  Surgeon: Ahmet De La Cruz MD;  Location: [facility] CATH LAB/EP;  Service: Cardiology;  Laterality: N/A;       Family History:  Family History   Problem Relation Name Age of Onset    Brain cancer Mother Klarissa 67    Cancer Mother Klarissa     Hypertension Father Keanu     Lung cancer Father Keanu         x2 (PAUL initially- treated surgically only, then recurred distantly) (smoker, & had been exposed to many chemicals)    Cancer Father Keanu     Breast cancer Sister  58    Genetic Disorder Sister          monoallelic MUTYH mutation    Seizures Daughter      Cancer Maternal Grandfather Valerio      Brain cancer Paternal Grandfather  73    Breast cancer Maternal Cousin  57    Aneurysm Other mgm's father 48        brain    Ulcerative colitis Other brother's son        Social History:  Social History     Socioeconomic History    Marital status:    Tobacco Use    Smoking status: Never     Passive exposure: Never    Smokeless tobacco: Never   Substance and Sexual Activity    Alcohol use: Yes     Alcohol/week: 2.0 standard drinks of alcohol     Types: 2 Cans of beer per week     Comment: a week    Drug use: Never    Sexual activity: Yes     Partners: Female     Birth control/protection: None   Social History Narrative    5/11/2021: . He lives with his wife and 2 kids. (5 kids total). He has one dog, one cat, no more chickens at home. One dog recently passed away. No smokers at home.      Social Determinants of Health     Financial Resource Strain: Low Risk  (3/8/2024)    Overall Financial Resource Strain (CARDIA)     Difficulty of Paying Living Expenses: Not very hard   Food Insecurity: No Food Insecurity (3/8/2024)    Hunger Vital Sign     Worried About Running Out of Food in the Last Year: Never true     Ran Out of Food in the Last Year: Never true   Transportation Needs: No Transportation Needs (3/8/2024)    PRAPARE - Transportation     Lack of Transportation (Medical): No     Lack of Transportation (Non-Medical): No   Physical Activity: Unknown (3/8/2024)    Exercise Vital Sign     Days of Exercise per Week: 0 days   Stress: Stress Concern Present (3/8/2024)    [employer] - Occupational Stress Questionnaire     Feeling of Stress : To some extent   Housing Stability: Low Risk  (3/8/2024)    Housing Stability Vital Sign     Unable to Pay for Housing in the Last Year: No     Number of Places Lived in the Last Year: 1     Unstable Housing in the Last Year: No       OBJECTIVE:    /64 (BP Location: Right arm, Patient Position: Sitting, BP Method: Medium  "(Automatic))   Pulse (!) 58   Resp 18   Ht 5' 10" (1.778 m)   Wt 87 kg (191 lb 12.8 oz)   SpO2 100%   BMI 27.52 kg/m²     PHYSICAL EXAMINATION:    General appearance: Well appearing, in no acute distress, alert and oriented x3.  Psych:  Mood and affect appropriate.  Skin: Skin color, texture, turgor normal, no rashes or lesions, in both upper and lower body.  Head/face:  Atraumatic, normocephalic. No palpable lymph nodes  Neck: No pain to palpation over the cervical paraspinous muscles.   Cor: RRR  Pulm: Symmetric chest rise, no respiratory distress noted.   Back: Straight leg raising in the sitting position is negative to radicular pain bilaterally. There is pain to palpation over the lumbar facet joints bilaterally. Limited ROM with pain on flexion and extension. Positive facet loading bilaterally.   Extremities:  No deformities, edema, or skin discoloration. Good capillary refill.  Musculoskeletal:  Bilateral upper and lower extremity strength is normal and symmetric.  No atrophy or tone abnormalities are noted.  Neuro: No loss of sensation is noted.  Gait: Normal.    ASSESSMENT: 64 y.o. year old male with neck and back pain, consistent with the followin. Chronic pain disorder        2. Lumbar radiculopathy        3. Lumbar spondylosis        4. DDD (degenerative disc disease), lumbar        5. Cervical radiculopathy        6. Cervical spondylosis        7. Encounter for monitoring opioid maintenance therapy  Pain Clinic Drug Screen            PLAN:     - Previous imaging was reviewed and discussed with the patient today.    - Schedule for L4/5 IL SELENA.     - We can repeat C7/T1 IL SELENA as needed.     - Continue Gabapentin.     - Pain contract signed today.     - Continue Percocet 10/325 mg BID PRN, #60.     - The patient is here today for a refill of current pain medications and they believe these provide effective pain control and improvements in quality of life.  The patient notes no serious side " effects, and feels the benefits outweigh the risks.  The patient was reminded of the pain contract that they signed previously as well as the risks and benefits of the medication including possible death.  The updated [place] Board  Pharmacy prescription monitoring program was reviewed, and the patient has been found to be compliant with current treatment plan. Medication management provided by Dr. Fung.     - UDS today.     - I have stressed the importance of physical activity and a home exercise plan to help with pain and improve health.    - RTC 2 weeks after above procedure.      The above plan and management options were discussed at length with patient. Patient is in agreement with the above and verbalized understanding.    Baylee Ni  05/01/2024

## 2024-05-06 LAB
1OH-MIDAZOLAM UR QL SCN: NOT DETECTED
6MAM UR QL: NOT DETECTED
7AMINOCLONAZEPAM UR QL: NOT DETECTED
A-OH ALPRAZ UR QL: NOT DETECTED
ALPRAZ UR QL: NOT DETECTED
AMPHET UR QL SCN: NOT DETECTED
ANNOTATION COMMENT IMP: NORMAL
BARBITURATES UR QL: NEGATIVE
BUPRENORPHINE UR QL: NOT DETECTED
BZE UR QL: NEGATIVE
CARBOXYTHC UR QL: NEGATIVE
CARISOPRODOL UR QL: NEGATIVE
CLONAZEPAM UR QL: NOT DETECTED
CODEINE UR QL: NOT DETECTED
CREAT UR-MCNC: 122.9 MG/DL (ref 20–400)
DIAZEPAM UR QL: NOT DETECTED
ETHYL GLUCURONIDE UR QL: NEGATIVE
FENTANYL UR QL: NOT DETECTED
GABAPENTIN: PRESENT
HYDROCODONE UR QL: NOT DETECTED
HYDROMORPHONE UR QL: NOT DETECTED
LORAZEPAM UR QL: NOT DETECTED
MDA UR QL: NOT DETECTED
MDEA UR QL: NOT DETECTED
MDMA UR QL: NOT DETECTED
ME-PHENIDATE UR QL: NOT DETECTED
METHADONE UR QL: NEGATIVE
METHAMPHET UR QL: NOT DETECTED
MIDAZOLAM UR QL SCN: NOT DETECTED
MORPHINE UR QL: NOT DETECTED
NALOXONE: NOT DETECTED
NORBUPRENORPHINE UR QL CFM: NOT DETECTED
NORDIAZEPAM UR QL: NOT DETECTED
NORFENTANYL UR QL: NOT DETECTED
NORHYDROCODONE UR QL CFM: NOT DETECTED
NORMEPERIDINE UR QL CFM: NOT DETECTED
NOROXYCODONE UR QL CFM: PRESENT
NOROXYMORPHONE UR QL SCN: NOT DETECTED
OXAZEPAM UR QL: NOT DETECTED
OXYCODONE UR QL: PRESENT
OXYMORPHONE UR QL: PRESENT
PATHOLOGY STUDY: NORMAL
PCP UR QL: NEGATIVE
PHENTERMINE UR QL: NOT DETECTED
PREGABALIN: NOT DETECTED
SERVICE CMNT-IMP: NORMAL
TAPENTADOL UR QL SCN: NOT DETECTED
TAPENTADOL UR QL SCN: NOT DETECTED
TEMAZEPAM UR QL: NOT DETECTED
TRAMADOL UR QL: NEGATIVE
ZOLPIDEM PHENYL-4-CARB UR QL SCN: NOT DETECTED
ZOLPIDEM UR QL: NOT DETECTED

## 2024-05-09 DIAGNOSIS — G89.29 CHRONIC PAIN OF BOTH KNEES: Primary | ICD-10-CM

## 2024-05-09 DIAGNOSIS — M25.562 CHRONIC PAIN OF BOTH KNEES: Primary | ICD-10-CM

## 2024-05-09 DIAGNOSIS — M25.561 CHRONIC PAIN OF BOTH KNEES: Primary | ICD-10-CM

## 2024-05-10 ENCOUNTER — HOSPITAL ENCOUNTER (OUTPATIENT)
Dept: RADIOLOGY | Facility: HOSPITAL | Age: 64
Discharge: HOME | End: 2024-05-10
Attending: ORTHOPAEDIC SURGERY
Payer: COMMERCIAL

## 2024-05-10 DIAGNOSIS — M25.561 CHRONIC PAIN OF BOTH KNEES: ICD-10-CM

## 2024-05-10 DIAGNOSIS — G89.29 CHRONIC PAIN OF BOTH KNEES: ICD-10-CM

## 2024-05-10 DIAGNOSIS — M25.562 CHRONIC PAIN OF BOTH KNEES: ICD-10-CM

## 2024-05-10 PROCEDURE — 73562 X-RAY EXAM OF KNEE 3: CPT | Mod: TC,50

## 2024-05-10 PROCEDURE — 73562 X-RAY EXAM OF KNEE 3: CPT | Mod: 26,,, | Performed by: RADIOLOGY

## 2024-05-13 ENCOUNTER — LAB VISIT (OUTPATIENT)
Dept: LAB | Facility: HOSPITAL | Age: 64
End: 2024-05-13
Attending: FAMILY MEDICINE
Payer: COMMERCIAL

## 2024-05-13 DIAGNOSIS — E11.22 TYPE 2 DIABETES MELLITUS WITH STAGE 3A CHRONIC KIDNEY DISEASE, WITHOUT LONG-TERM CURRENT USE OF INSULIN: ICD-10-CM

## 2024-05-13 DIAGNOSIS — E29.1 HYPOGONADISM IN MALE: ICD-10-CM

## 2024-05-13 DIAGNOSIS — N18.31 TYPE 2 DIABETES MELLITUS WITH STAGE 3A CHRONIC KIDNEY DISEASE, WITHOUT LONG-TERM CURRENT USE OF INSULIN: ICD-10-CM

## 2024-05-13 LAB
ALBUMIN SERPL BCP-MCNC: 4.2 G/DL (ref 3.5–5.2)
ALP SERPL-CCNC: 63 U/L (ref 55–135)
ALT SERPL W/O P-5'-P-CCNC: 52 U/L (ref 10–44)
ANION GAP SERPL CALC-SCNC: 12 MMOL/L (ref 8–16)
AST SERPL-CCNC: 37 U/L (ref 10–40)
BASOPHILS # BLD AUTO: 0.03 K/UL (ref 0–0.2)
BASOPHILS NFR BLD: 0.4 % (ref 0–1.9)
BILIRUB SERPL-MCNC: 0.6 MG/DL (ref 0.1–1)
BUN SERPL-MCNC: 13 MG/DL (ref 8–23)
CALCIUM SERPL-MCNC: 9.9 MG/DL (ref 8.7–10.5)
CHLORIDE SERPL-SCNC: 103 MMOL/L (ref 95–110)
CO2 SERPL-SCNC: 22 MMOL/L (ref 23–29)
CREAT SERPL-MCNC: 1.7 MG/DL (ref 0.5–1.4)
DIFFERENTIAL METHOD BLD: ABNORMAL
EOSINOPHIL # BLD AUTO: 0.4 K/UL (ref 0–0.5)
EOSINOPHIL NFR BLD: 4.5 % (ref 0–8)
ERYTHROCYTE [DISTWIDTH] IN BLOOD BY AUTOMATED COUNT: 14.3 % (ref 11.5–14.5)
EST. GFR  (NO RACE VARIABLE): 44.5 ML/MIN/1.73 M^2
ESTIMATED AVG GLUCOSE: 146 MG/DL (ref 68–131)
GLUCOSE SERPL-MCNC: 132 MG/DL (ref 70–110)
HBA1C MFR BLD: 6.7 % (ref 4–5.6)
HCT VFR BLD AUTO: 55.4 % (ref 40–54)
HGB BLD-MCNC: 18.4 G/DL (ref 14–18)
IMM GRANULOCYTES # BLD AUTO: 0.02 K/UL (ref 0–0.04)
IMM GRANULOCYTES NFR BLD AUTO: 0.3 % (ref 0–0.5)
LYMPHOCYTES # BLD AUTO: 2.1 K/UL (ref 1–4.8)
LYMPHOCYTES NFR BLD: 25.9 % (ref 18–48)
MCH RBC QN AUTO: 32.1 PG (ref 27–31)
MCHC RBC AUTO-ENTMCNC: 33.2 G/DL (ref 32–36)
MCV RBC AUTO: 97 FL (ref 82–98)
MONOCYTES # BLD AUTO: 0.7 K/UL (ref 0.3–1)
MONOCYTES NFR BLD: 9.1 % (ref 4–15)
NEUTROPHILS # BLD AUTO: 4.7 K/UL (ref 1.8–7.7)
NEUTROPHILS NFR BLD: 59.8 % (ref 38–73)
NRBC BLD-RTO: 0 /100 WBC
PLATELET # BLD AUTO: 253 K/UL (ref 150–450)
PMV BLD AUTO: 9.8 FL (ref 9.2–12.9)
POTASSIUM SERPL-SCNC: 5.7 MMOL/L (ref 3.5–5.1)
PROT SERPL-MCNC: 7.5 G/DL (ref 6–8.4)
RBC # BLD AUTO: 5.74 M/UL (ref 4.6–6.2)
SODIUM SERPL-SCNC: 137 MMOL/L (ref 136–145)
TESTOST SERPL-MCNC: 127 NG/DL (ref 304–1227)
WBC # BLD AUTO: 7.92 K/UL (ref 3.9–12.7)

## 2024-05-13 PROCEDURE — 84403 ASSAY OF TOTAL TESTOSTERONE: CPT | Performed by: INTERNAL MEDICINE

## 2024-05-13 PROCEDURE — 85025 COMPLETE CBC W/AUTO DIFF WBC: CPT | Performed by: FAMILY MEDICINE

## 2024-05-13 PROCEDURE — 80053 COMPREHEN METABOLIC PANEL: CPT | Performed by: FAMILY MEDICINE

## 2024-05-13 PROCEDURE — 36415 COLL VENOUS BLD VENIPUNCTURE: CPT | Mod: PO | Performed by: INTERNAL MEDICINE

## 2024-05-13 PROCEDURE — 83036 HEMOGLOBIN GLYCOSYLATED A1C: CPT | Performed by: FAMILY MEDICINE

## 2024-05-14 ENCOUNTER — PATIENT MESSAGE (OUTPATIENT)
Dept: FAMILY MEDICINE | Facility: CLINIC | Age: 64
End: 2024-05-14
Payer: COMMERCIAL

## 2024-05-14 ENCOUNTER — PATIENT MESSAGE (OUTPATIENT)
Dept: ENDOCRINOLOGY | Facility: CLINIC | Age: 64
End: 2024-05-14
Payer: COMMERCIAL

## 2024-05-15 ENCOUNTER — OFFICE VISIT (OUTPATIENT)
Dept: FAMILY MEDICINE | Facility: CLINIC | Age: 64
End: 2024-05-15
Payer: COMMERCIAL

## 2024-05-15 VITALS
DIASTOLIC BLOOD PRESSURE: 66 MMHG | HEIGHT: 70 IN | TEMPERATURE: 98 F | OXYGEN SATURATION: 98 % | BODY MASS INDEX: 27.84 KG/M2 | HEART RATE: 63 BPM | SYSTOLIC BLOOD PRESSURE: 118 MMHG | WEIGHT: 194.44 LBS

## 2024-05-15 DIAGNOSIS — E87.5 HYPERKALEMIA: ICD-10-CM

## 2024-05-15 DIAGNOSIS — E11.22 TYPE 2 DIABETES MELLITUS WITH STAGE 3A CHRONIC KIDNEY DISEASE, WITHOUT LONG-TERM CURRENT USE OF INSULIN: Primary | ICD-10-CM

## 2024-05-15 DIAGNOSIS — N18.31 TYPE 2 DIABETES MELLITUS WITH STAGE 3A CHRONIC KIDNEY DISEASE, WITHOUT LONG-TERM CURRENT USE OF INSULIN: Primary | ICD-10-CM

## 2024-05-15 DIAGNOSIS — Z12.11 COLON CANCER SCREENING: ICD-10-CM

## 2024-05-15 DIAGNOSIS — N17.9 AKI (ACUTE KIDNEY INJURY): ICD-10-CM

## 2024-05-15 DIAGNOSIS — I10 ESSENTIAL HYPERTENSION: ICD-10-CM

## 2024-05-15 PROCEDURE — 3061F NEG MICROALBUMINURIA REV: CPT | Mod: CPTII,S$GLB,, | Performed by: FAMILY MEDICINE

## 2024-05-15 PROCEDURE — 99214 OFFICE O/P EST MOD 30 MIN: CPT | Mod: S$GLB,,, | Performed by: FAMILY MEDICINE

## 2024-05-15 PROCEDURE — 99999 PR PBB SHADOW E&M-EST. PATIENT-LVL V: CPT | Mod: PBBFAC,,, | Performed by: FAMILY MEDICINE

## 2024-05-15 PROCEDURE — 3008F BODY MASS INDEX DOCD: CPT | Mod: CPTII,S$GLB,, | Performed by: FAMILY MEDICINE

## 2024-05-15 PROCEDURE — 3066F NEPHROPATHY DOC TX: CPT | Mod: CPTII,S$GLB,, | Performed by: FAMILY MEDICINE

## 2024-05-15 PROCEDURE — 3044F HG A1C LEVEL LT 7.0%: CPT | Mod: CPTII,S$GLB,, | Performed by: FAMILY MEDICINE

## 2024-05-15 PROCEDURE — 3074F SYST BP LT 130 MM HG: CPT | Mod: CPTII,S$GLB,, | Performed by: FAMILY MEDICINE

## 2024-05-15 PROCEDURE — 4010F ACE/ARB THERAPY RXD/TAKEN: CPT | Mod: CPTII,S$GLB,, | Performed by: FAMILY MEDICINE

## 2024-05-15 PROCEDURE — 1159F MED LIST DOCD IN RCRD: CPT | Mod: CPTII,S$GLB,, | Performed by: FAMILY MEDICINE

## 2024-05-15 PROCEDURE — 3078F DIAST BP <80 MM HG: CPT | Mod: CPTII,S$GLB,, | Performed by: FAMILY MEDICINE

## 2024-05-15 RX ORDER — SEMAGLUTIDE 1.34 MG/ML
1 INJECTION, SOLUTION SUBCUTANEOUS
Qty: 9 ML | Refills: 3 | Status: SHIPPED | OUTPATIENT
Start: 2024-05-15 | End: 2025-05-15

## 2024-05-15 RX ORDER — CLONIDINE HYDROCHLORIDE 0.1 MG/1
0.1 TABLET ORAL 3 TIMES DAILY
Qty: 270 TABLET | Refills: 0 | Status: SHIPPED | OUTPATIENT
Start: 2024-05-15 | End: 2025-05-15

## 2024-05-15 NOTE — PROGRESS NOTES
Subjective:       Patient ID: Partha Curtis Jr. is a 64 y.o. male.    Chief Complaint: Diabetes    Jarvis is a 64 y.o. male who presents today for f/u    PAF: he stopped anticoagulation  HTN: taking metoprolol, amlodipine, candesartan. Drinking about 120-150 oz/day. Still having HANANE and hyperkalemia with aldactone. Was hospitalized twice due to HANANE from chlorthalidone. Noticing some leg swelling, likely from amlodipine.   DM: amaryl 4 mg and ozempic 0.5 mg. Metformin stopped due to HANANE  DLD: on crestor and zetia.   Low T: seeing endocrine. Hgb high.   HANANE/CKD: mild    Had numbness, restarted/increase elavil. Didn't help. Has back injections scheduled.     Still having issues falling asleep.       Review of Systems   Constitutional:  Positive for fatigue. Negative for chills and fever.   Respiratory:  Negative for shortness of breath.    Cardiovascular:  Negative for chest pain.   Gastrointestinal:  Negative for nausea and vomiting.   Neurological:  Negative for dizziness, light-headedness and headaches.   Psychiatric/Behavioral:  Positive for sleep disturbance. Negative for suicidal ideas.                Results for orders placed or performed in visit on 05/13/24   CBC Auto Differential   Result Value Ref Range    WBC 7.92 3.90 - 12.70 K/uL    RBC 5.74 4.60 - 6.20 M/uL    Hemoglobin 18.4 (H) 14.0 - 18.0 g/dL    Hematocrit 55.4 (H) 40.0 - 54.0 %    MCV 97 82 - 98 fL    MCH 32.1 (H) 27.0 - 31.0 pg    MCHC 33.2 32.0 - 36.0 g/dL    RDW 14.3 11.5 - 14.5 %    Platelets 253 150 - 450 K/uL    MPV 9.8 9.2 - 12.9 fL    Immature Granulocytes 0.3 0.0 - 0.5 %    Gran # (ANC) 4.7 1.8 - 7.7 K/uL    Immature Grans (Abs) 0.02 0.00 - 0.04 K/uL    Lymph # 2.1 1.0 - 4.8 K/uL    Mono # 0.7 0.3 - 1.0 K/uL    Eos # 0.4 0.0 - 0.5 K/uL    Baso # 0.03 0.00 - 0.20 K/uL    nRBC 0 0 /100 WBC    Gran % 59.8 38.0 - 73.0 %    Lymph % 25.9 18.0 - 48.0 %    Mono % 9.1 4.0 - 15.0 %    Eosinophil % 4.5 0.0 - 8.0 %    Basophil % 0.4 0.0 - 1.9 %     "Differential Method Automated    Comprehensive Metabolic Panel   Result Value Ref Range    Sodium 137 136 - 145 mmol/L    Potassium 5.7 (H) 3.5 - 5.1 mmol/L    Chloride 103 95 - 110 mmol/L    CO2 22 (L) 23 - 29 mmol/L    Glucose 132 (H) 70 - 110 mg/dL    BUN 13 8 - 23 mg/dL    Creatinine 1.7 (H) 0.5 - 1.4 mg/dL    Calcium 9.9 8.7 - 10.5 mg/dL    Total Protein 7.5 6.0 - 8.4 g/dL    Albumin 4.2 3.5 - 5.2 g/dL    Total Bilirubin 0.6 0.1 - 1.0 mg/dL    Alkaline Phosphatase 63 55 - 135 U/L    AST 37 10 - 40 U/L    ALT 52 (H) 10 - 44 U/L    eGFR 44.5 (A) >60 mL/min/1.73 m^2    Anion Gap 12 8 - 16 mmol/L   Hemoglobin A1C   Result Value Ref Range    Hemoglobin A1C 6.7 (H) 4.0 - 5.6 %    Estimated Avg Glucose 146 (H) 68 - 131 mg/dL   Testosterone   Result Value Ref Range    Testosterone, Total 127 (L) 304 - 1227 ng/dL       Objective:     Vitals:    05/15/24 1003   BP: 118/66   Pulse: 63   Temp: 97.7 °F (36.5 °C)   TempSrc: Oral   SpO2: 98%   Weight: 88.2 kg (194 lb 7.1 oz)   Height: 5' 10" (1.778 m)        Physical Exam  Constitutional:       General: He is not in acute distress.     Appearance: He is not ill-appearing, toxic-appearing or diaphoretic.   Cardiovascular:      Rate and Rhythm: Normal rate and regular rhythm.      Heart sounds: No murmur heard.     No gallop.   Pulmonary:      Effort: Pulmonary effort is normal.      Breath sounds: Normal breath sounds.   Musculoskeletal:         General: Swelling present.      Comments: Trace pedal edema   Neurological:      General: No focal deficit present.      Mental Status: He is alert.   Psychiatric:         Mood and Affect: Mood normal.         Behavior: Behavior normal.         Thought Content: Thought content normal.         Judgment: Judgment normal.         Assessment:       1. Type 2 diabetes mellitus with stage 3a chronic kidney disease, without long-term current use of insulin    2. HANANE (acute kidney injury)    3. Hyperkalemia    4. Essential hypertension    5. " Colon cancer screening        Plan:       Stop aldactone  Stop amlodipine  Continue metoprolol  Continue candesartan  Start clonidine 0.1 mg nightly x 3 days  After 3 days increase to 0.1 mg twice daily  After 3 days increase to 0.1 mg three times daily if needed to keep BP below 130/80  If BP is still high, may need to increase to 0.2 or higher does.     Continue gabapentin  Continue elavil  No further change for medications for insomnia    Low T leading to fatigue?  Likely can not increase due to high Hgb    Continue crestor  Continue zetia    For diabetes, increase ozempic to 1 mg  Continue amaryl 4 mg.   Consider decrease to 2 mg pending response to ozempic  Continue off metformin due to repeated HANANE.     Continue vitamin D continue b12.     F/u with specialists.     Labs in 1 week  F/u 1 month.         Type 2 diabetes mellitus with stage 3a chronic kidney disease, without long-term current use of insulin  -     semaglutide (OZEMPIC) 1 mg/dose (4 mg/3 mL); Inject 1 mg into the skin every 7 days.  Dispense: 9 mL; Refill: 3    HANANE (acute kidney injury)  -     CBC Auto Differential; Future; Expected date: 05/15/2024  -     Comprehensive Metabolic Panel; Future; Expected date: 05/15/2024    Hyperkalemia  -     CBC Auto Differential; Future; Expected date: 05/15/2024  -     Comprehensive Metabolic Panel; Future; Expected date: 05/15/2024    Essential hypertension  -     cloNIDine (CATAPRES) 0.1 MG tablet; Take 1 tablet (0.1 mg total) by mouth 3 (three) times daily.  Dispense: 270 tablet; Refill: 0    Colon cancer screening  -     Ambulatory referral/consult to Endo Procedure ; Future; Expected date: 05/16/2024

## 2024-05-15 NOTE — PATIENT INSTRUCTIONS
Stop aldactone  Stop amlodipine  Continue metoprolol  Continue candesartan  Start clonidine 0.1 mg nightly x 3 days  After 3 days increase to 0.1 mg twice daily  After 3 days increase to 0.1 mg three times daily if needed to keep BP below 130/80  If BP is still high, may need to increase to 0.2 or higher does.     Continue gabapentin  Continue elavil  No further change for medications for insomnia    Low T leading to fatigue?  Likely can not increase due to high Hgb    Continue crestor  Continue zetia    For diabetes, increase ozempic to 1 mg  Continue amaryl 4 mg.   Consider decrease to 2 mg pending response to ozempic  Continue off metformin due to repeated HANANE.     Continue vitamin D continue b12.     F/u with specialists.     Labs in 1 week  F/u 1 month.     Lab Results   Component Value Date    HGBA1C 6.7 (H) 05/13/2024    HGBA1C 6.0 (H) 02/09/2024    HGBA1C 7.3 (H) 10/09/2023

## 2024-05-21 ENCOUNTER — LAB VISIT (OUTPATIENT)
Dept: LAB | Facility: HOSPITAL | Age: 64
End: 2024-05-21
Attending: FAMILY MEDICINE
Payer: COMMERCIAL

## 2024-05-21 DIAGNOSIS — E87.5 HYPERKALEMIA: ICD-10-CM

## 2024-05-21 DIAGNOSIS — N17.9 AKI (ACUTE KIDNEY INJURY): ICD-10-CM

## 2024-05-21 LAB
ALBUMIN SERPL BCP-MCNC: 3.6 G/DL (ref 3.5–5.2)
ALP SERPL-CCNC: 55 U/L (ref 55–135)
ALT SERPL W/O P-5'-P-CCNC: 40 U/L (ref 10–44)
ANION GAP SERPL CALC-SCNC: 8 MMOL/L (ref 8–16)
AST SERPL-CCNC: 31 U/L (ref 10–40)
BASOPHILS # BLD AUTO: 0.04 K/UL (ref 0–0.2)
BASOPHILS NFR BLD: 0.5 % (ref 0–1.9)
BILIRUB SERPL-MCNC: 0.4 MG/DL (ref 0.1–1)
BUN SERPL-MCNC: 13 MG/DL (ref 8–23)
CALCIUM SERPL-MCNC: 9.6 MG/DL (ref 8.7–10.5)
CHLORIDE SERPL-SCNC: 105 MMOL/L (ref 95–110)
CO2 SERPL-SCNC: 26 MMOL/L (ref 23–29)
CREAT SERPL-MCNC: 1.3 MG/DL (ref 0.5–1.4)
DIFFERENTIAL METHOD BLD: ABNORMAL
EOSINOPHIL # BLD AUTO: 0.4 K/UL (ref 0–0.5)
EOSINOPHIL NFR BLD: 5.2 % (ref 0–8)
ERYTHROCYTE [DISTWIDTH] IN BLOOD BY AUTOMATED COUNT: 13.7 % (ref 11.5–14.5)
EST. GFR  (NO RACE VARIABLE): >60 ML/MIN/1.73 M^2
GLUCOSE SERPL-MCNC: 145 MG/DL (ref 70–110)
HCT VFR BLD AUTO: 47.7 % (ref 40–54)
HGB BLD-MCNC: 16.1 G/DL (ref 14–18)
IMM GRANULOCYTES # BLD AUTO: 0.01 K/UL (ref 0–0.04)
IMM GRANULOCYTES NFR BLD AUTO: 0.1 % (ref 0–0.5)
LYMPHOCYTES # BLD AUTO: 2.1 K/UL (ref 1–4.8)
LYMPHOCYTES NFR BLD: 26.2 % (ref 18–48)
MCH RBC QN AUTO: 32.1 PG (ref 27–31)
MCHC RBC AUTO-ENTMCNC: 33.8 G/DL (ref 32–36)
MCV RBC AUTO: 95 FL (ref 82–98)
MONOCYTES # BLD AUTO: 0.7 K/UL (ref 0.3–1)
MONOCYTES NFR BLD: 8.4 % (ref 4–15)
NEUTROPHILS # BLD AUTO: 4.7 K/UL (ref 1.8–7.7)
NEUTROPHILS NFR BLD: 59.6 % (ref 38–73)
NRBC BLD-RTO: 0 /100 WBC
PLATELET # BLD AUTO: 240 K/UL (ref 150–450)
PMV BLD AUTO: 9.7 FL (ref 9.2–12.9)
POTASSIUM SERPL-SCNC: 4.6 MMOL/L (ref 3.5–5.1)
PROT SERPL-MCNC: 6.4 G/DL (ref 6–8.4)
RBC # BLD AUTO: 5.02 M/UL (ref 4.6–6.2)
SODIUM SERPL-SCNC: 139 MMOL/L (ref 136–145)
WBC # BLD AUTO: 7.86 K/UL (ref 3.9–12.7)

## 2024-05-21 PROCEDURE — 80053 COMPREHEN METABOLIC PANEL: CPT | Performed by: FAMILY MEDICINE

## 2024-05-21 PROCEDURE — 85025 COMPLETE CBC W/AUTO DIFF WBC: CPT | Performed by: FAMILY MEDICINE

## 2024-05-21 PROCEDURE — 36415 COLL VENOUS BLD VENIPUNCTURE: CPT | Mod: PO | Performed by: FAMILY MEDICINE

## 2024-05-22 ENCOUNTER — PATIENT MESSAGE (OUTPATIENT)
Dept: ORTHOPEDICS | Facility: CLINIC | Age: 64
End: 2024-05-22

## 2024-05-22 ENCOUNTER — OFFICE VISIT (OUTPATIENT)
Dept: ORTHOPEDICS | Facility: CLINIC | Age: 64
End: 2024-05-22
Payer: COMMERCIAL

## 2024-05-22 VITALS — WEIGHT: 197 LBS | HEIGHT: 70 IN | BODY MASS INDEX: 28.2 KG/M2

## 2024-05-22 DIAGNOSIS — F40.240 CLAUSTROPHOBIA: Primary | ICD-10-CM

## 2024-05-22 DIAGNOSIS — Z96.659 HISTORY OF KNEE REPLACEMENT, UNSPECIFIED LATERALITY: ICD-10-CM

## 2024-05-22 PROCEDURE — 99213 OFFICE O/P EST LOW 20 MIN: CPT | Mod: S$GLB,,, | Performed by: ORTHOPAEDIC SURGERY

## 2024-05-22 PROCEDURE — 3044F HG A1C LEVEL LT 7.0%: CPT | Mod: CPTII,S$GLB,, | Performed by: ORTHOPAEDIC SURGERY

## 2024-05-22 PROCEDURE — 1159F MED LIST DOCD IN RCRD: CPT | Mod: CPTII,S$GLB,, | Performed by: ORTHOPAEDIC SURGERY

## 2024-05-22 PROCEDURE — 3008F BODY MASS INDEX DOCD: CPT | Mod: CPTII,S$GLB,, | Performed by: ORTHOPAEDIC SURGERY

## 2024-05-22 PROCEDURE — 3061F NEG MICROALBUMINURIA REV: CPT | Mod: CPTII,S$GLB,, | Performed by: ORTHOPAEDIC SURGERY

## 2024-05-22 PROCEDURE — 3066F NEPHROPATHY DOC TX: CPT | Mod: CPTII,S$GLB,, | Performed by: ORTHOPAEDIC SURGERY

## 2024-05-22 PROCEDURE — 99999 PR PBB SHADOW E&M-EST. PATIENT-LVL IV: CPT | Mod: PBBFAC,,, | Performed by: ORTHOPAEDIC SURGERY

## 2024-05-22 PROCEDURE — 4010F ACE/ARB THERAPY RXD/TAKEN: CPT | Mod: CPTII,S$GLB,, | Performed by: ORTHOPAEDIC SURGERY

## 2024-05-22 RX ORDER — DIAZEPAM 10 MG/1
10 TABLET ORAL
Qty: 2 TABLET | Refills: 0 | Status: SHIPPED | OUTPATIENT
Start: 2024-05-22 | End: 2024-06-21

## 2024-05-22 NOTE — PROGRESS NOTES
Subjective:      Patient ID: Partha Curtis Jr. is a 64 y.o. male.    Chief Complaint: Pain of the Left Knee      HPI  Partha Curtis Jr. is a 64 year old male here with a 3 month history of left knee pain. 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  Several surgeries on the right knee including replacement in 2010.  This was complicated by an infection however he is doing well with the right knee.  There was not a history of recent trauma.  The pain is moderate and intermittent. The pain is located in the medial, anterior aspect of the knee. There is not radiation to the leg  There is popping.  It happens when he squats.  He can not tell if it is kneecap or not.   The pain is described as sharp, then a soreness. The patient has not had prior left knee surgery. It is aggravated by squatting  It is alleviated by rest. There is not numbness or tingling of the lower extremity.  There is back pain.  He  has not tried medications or injections.   He does not have difficulty getting in or out of a car, getting dressed, or going up or down stairs.  The patient does not use an assistive device. 64  Past Medical History:   Diagnosis Date    Allergy     Carotid artery occlusion     Chronic back pain     Colonic polyp     Genetic testing     MUTYH mutation-negative    Hyperlipidemia     Hypertension     Paroxysmal atrial fibrillation 2/28/2024    Sleep apnea     Type 2 diabetes mellitus with stage 3 chronic kidney disease, without long-term current use of insulin 4/2/2020     Past Surgical History:   Procedure Laterality Date    ANKLE SURGERY Left     2    APPENDECTOMY      at age 20    CAROTID ENDARTERECTOMY Right 07/15/2015    CARPAL TUNNEL RELEASE Bilateral     COLONOSCOPY N/A 12/14/2018    Procedure: COLONOSCOPYSuprep;  Surgeon: Silver Selby MD;  Location: [facility];  Service: Endoscopy;  Laterality: N/A;    EPIDURAL STEROID INJECTION N/A 2/26/2024    Procedure: CERVICAL C7/T1 IL SELENA;  Surgeon: Gokul Fung MD;   Location: [facility] PAIN MGT;  Service: Pain Management;  Laterality: N/A;  [phone]  2 WK F/U SERGEI    JOINT REPLACEMENT  9/2010    NASAL SEPTUM SURGERY      TOTAL KNEE ARTHROPLASTY Right     6 knee surgeries    TRANSESOPHAGEAL ECHOCARDIOGRAM WITH POSSIBLE CARDIOVERSION (ELIZABETH W/ POSS CARDIOVERSION) N/A 2/28/2024    Procedure: Transesophageal echo (ELIZABETH) intra-procedure log documentation;  Surgeon: Ahmet De La Cruz MD;  Location: [facility] CATH LAB/EP;  Service: Cardiology;  Laterality: N/A;     Family History   Problem Relation Name Age of Onset    Brain cancer Mother Klarissa 67    Cancer Mother Klarissa     Hypertension Father Keanu     Lung cancer Father Keanu         x2 (PAUL initially- treated surgically only, then recurred distantly) (smoker, & had been exposed to many chemicals)    Cancer Father Keanu     Breast cancer Sister  58    Genetic Disorder Sister          monoallelic MUTYH mutation    Seizures Daughter      Cancer Maternal Grandfather Valerio     Brain cancer Paternal Grandfather  73    Breast cancer Maternal Cousin  57    Aneurysm Other mgm's father 48        brain    Ulcerative colitis Other brother's son      Social History     Socioeconomic History    Marital status:    Tobacco Use    Smoking status: Never     Passive exposure: Never    Smokeless tobacco: Never   Substance and Sexual Activity    Alcohol use: Yes     Alcohol/week: 2.0 standard drinks of alcohol     Types: 2 Cans of beer per week     Comment: a week    Drug use: Never    Sexual activity: Yes     Partners: Female     Birth control/protection: None   Social History Narrative    5/11/2021: . He lives with his wife and 2 kids. (5 kids total). He has one dog, one cat, no more chickens at home. One dog recently passed away. No smokers at home.      Social Determinants of Health     Financial Resource Strain: Low Risk  (3/8/2024)    Overall Financial Resource Strain (CARDIA)     Difficulty of Paying Living Expenses:  "Not very hard   Food Insecurity: No Food Insecurity (3/8/2024)    Hunger Vital Sign     Worried About Running Out of Food in the Last Year: Never true     Ran Out of Food in the Last Year: Never true   Transportation Needs: No Transportation Needs (3/8/2024)    PRAPARE - Transportation     Lack of Transportation (Medical): No     Lack of Transportation (Non-Medical): No   Physical Activity: Unknown (3/8/2024)    Exercise Vital Sign     Days of Exercise per Week: 0 days   Stress: Stress Concern Present (3/8/2024)    [employer] - Occupational Stress Questionnaire     Feeling of Stress : To some extent   Housing Stability: Low Risk  (3/8/2024)    Housing Stability Vital Sign     Unable to Pay for Housing in the Last Year: No     Number of Places Lived in the Last Year: 1     Unstable Housing in the Last Year: No     Current Outpatient Medications on File Prior to Visit   Medication Sig Dispense Refill    amitriptyline (ELAVIL) 75 MG tablet Take 1 tablet (75 mg total) by mouth every evening. 90 tablet 1    aspirin (ECOTRIN) 81 MG EC tablet Take 81 mg by mouth every evening.      BD LUER-RUSS SYRINGE 3 mL 25 gauge x 1" Syrg USE ONE SYRINGE EVERY 14 DAYS WITH TESTOSTERONE 100 each 2    candesartan (ATACAND) 32 MG tablet Take 1 tablet (32 mg total) by mouth once daily. 90 tablet 3    cloNIDine (CATAPRES) 0.1 MG tablet Take 1 tablet (0.1 mg total) by mouth 3 (three) times daily. 270 tablet 0    cyanocobalamin (VITAMIN B-12) 1000 MCG tablet Take 1 tablet (1,000 mcg total) by mouth once daily. 90 tablet 4    ergocalciferol (ERGOCALCIFEROL) 50,000 unit Cap Take 1 capsule (50,000 Units total) by mouth every 7 days. 12 capsule 0    ezetimibe (ZETIA) 10 mg tablet Take 1 tablet (10 mg total) by mouth once daily. 90 tablet 3    gabapentin (NEURONTIN) 800 MG tablet Take 1 tablet (800 mg total) by mouth every evening. 90 tablet 1    glimepiride (AMARYL) 4 MG tablet Take 1 tablet (4 mg total) by mouth " "before breakfast. 90 tablet 1    hydrocortisone 2.5 % cream Apply topically 2 (two) times daily. (Patient taking differently: Apply topically 2 (two) times daily as needed.) 28 g 0    ipratropium (ATROVENT) 42 mcg (0.06 %) nasal spray 2 sprays by Nasal route 2 (two) times daily as needed for Rhinitis. 15 mL 0    metoprolol succinate (TOPROL-XL) 200 MG 24 hr tablet Take 1 tablet (200 mg total) by mouth once daily. (Patient taking differently: Take 200 mg by mouth every evening.) 90 tablet 3    oxyCODONE-acetaminophen (PERCOCET)  mg per tablet Take 1 tablet by mouth every 12 (twelve) hours as needed for Pain. 60 tablet 0    rosuvastatin (CRESTOR) 40 MG Tab Take 1 tablet (40 mg total) by mouth every evening. 90 tablet 3    semaglutide (OZEMPIC) 1 mg/dose (4 mg/3 mL) Inject 1 mg into the skin every 7 days. 9 mL 3    sod sulf-pot chloride-mag sulf (SUTAB) 1.479-0.188- 0.225 gram tablet Take 12 tablets by mouth once daily. Take according to package instructions with indicated amount of water. 24 tablet 0    syringe with needle, safety (BD INTEGRA SYRINGE) 3 mL 22 gauge x 1 1/2" Syrg Inject 1 Syringe as directed once a week. 6 each 3    testosterone cypionate (DEPOTESTOTERONE CYPIONATE) 200 mg/mL injection Inject 0.75 mLs (150 mg total) into the muscle every 14 (fourteen) days. 2 mL 5    valACYclovir (VALTREX) 1000 MG tablet TAKE 2 TABLETS BY MOUTH EVERY 12 HOURS (Patient taking differently: Take 2,000 mg by mouth every 12 (twelve) hours as needed.) 4 tablet 3     Current Facility-Administered Medications on File Prior to Visit   Medication Dose Route Frequency Provider Last Rate Last Admin    0.9%  NaCl infusion   Intravenous Continuous Kendell Hoffman MD        0.9%  NaCl infusion   Intravenous Continuous Gerardo Uribe MD         Review of patient's allergies indicates:   Allergen Reactions    Stadol [butorphanol tartrate] Rash     Swelling in face    Strawberries [strawberry] Rash       Review of Systems " "  Constitutional: Negative for chills, fever and night sweats.   HENT:  Negative for hearing loss.    Eyes:  Negative for blurred vision and double vision.   Cardiovascular:  Negative for chest pain, claudication and leg swelling.   Respiratory:  Negative for shortness of breath.    Endocrine: Negative for polydipsia, polyphagia and polyuria.   Hematologic/Lymphatic: Negative for adenopathy and bleeding problem. Does not bruise/bleed easily.   Skin:  Negative for poor wound healing.   Gastrointestinal:  Negative for diarrhea and heartburn.   Genitourinary:  Negative for bladder incontinence.   Neurological:  Negative for focal weakness, headaches, numbness, paresthesias and sensory change.   Psychiatric/Behavioral:  The patient is not nervous/anxious.    Allergic/Immunologic: Negative for persistent infections.         Objective:      Body mass index is 28.26 kg/m².  Vitals:    05/22/24 0947   Weight: 89.4 kg (196 lb 15.7 oz)   Height: 5' 10" (1.778 m)         General    Constitutional: He is oriented to person, place, and time. He appears well-developed and well-nourished.   HENT:   Head: Normocephalic and atraumatic.   Eyes: EOM are normal.   Cardiovascular:  Normal rate.            Pulmonary/Chest: Effort normal.   Neurological: He is alert and oriented to person, place, and time.   Psychiatric: He has a normal mood and affect. His behavior is normal.     General Musculoskeletal Exam   Gait: normal       Right Knee Exam     Inspection   Erythema: absent  Scars: present  Swelling: absent  Effusion: absent  Deformity: absent  Bruising: absent    Tenderness   The patient is experiencing no tenderness.     Range of Motion   Extension:  0   Flexion:  130     Tests   Ligament Examination   Lachman: normal (-1 to 2mm)   MCL - Valgus: normal (0 to 2mm)  LCL - Varus: normal  Patella   Passive Patellar Tilt: neutral    Other   Sensation: normal    Left Knee Exam     Inspection   Erythema: absent  Scars: absent  Swelling: " "present  Effusion: absent  Deformity: absent  Bruising: absent    Tenderness   The patient tender to palpation of the patellar tendon.    Range of Motion   Extension:  0   Flexion:  130     Tests   Meniscus   Jason:  Medial - positive   Stability   Lachman: normal (-1 to 2mm)   MCL - Valgus: normal (0 to 2mm)  LCL - Varus: normal (0 to 2mm)  Patella   Passive Patellar Tilt: neutral    Other   Sensation: normal    Muscle Strength   Right Lower Extremity   Hip Abduction: 5/5   Quadriceps:  5/5   Hamstrin/5   Left Lower Extremity   Hip Abduction: 5/5   Quadriceps:  5/5   Hamstrin/5     Reflexes     Left Side  Quadriceps:  2+    Right Side   Quadriceps:  2+    Vascular Exam     Right Pulses  Dorsalis Pedis:      2+          Left Pulses  Dorsalis Pedis:      2+          Edema  Right Lower Leg: absent  Left Lower Leg: absent    Knee radiographs taken today and reviewed by me demonstrate a well-fixed and positioned right total knee arthroplasty.  There was minimal arthritic change of the left knee.  No fracture.          Assessment:       Encounter Diagnosis   Name Primary?    History of knee replacement, unspecified laterality           Plan:       Partha"Jarvis" was seen today for pain.    Diagnoses and all orders for this visit:    History of knee replacement, unspecified laterality  -     Ambulatory referral/consult to Orthopedics  -     MRI Knee Without Contrast Left; Future    Options were discussed at length.  He has had pain for greater than three months.  There is no relief with activity modification or NSAIDS.  Heis having mechanical symptoms.  An MRI is indicated.  F/U after the MRI.                 "

## 2024-05-23 ENCOUNTER — HOSPITAL ENCOUNTER (OUTPATIENT)
Facility: OTHER | Age: 64
Discharge: HOME | End: 2024-05-23
Attending: ANESTHESIOLOGY | Admitting: ANESTHESIOLOGY
Payer: COMMERCIAL

## 2024-05-23 VITALS
BODY MASS INDEX: 27.2 KG/M2 | OXYGEN SATURATION: 98 % | TEMPERATURE: 98 F | RESPIRATION RATE: 16 BRPM | WEIGHT: 190 LBS | SYSTOLIC BLOOD PRESSURE: 123 MMHG | DIASTOLIC BLOOD PRESSURE: 79 MMHG | HEIGHT: 70 IN | HEART RATE: 72 BPM

## 2024-05-23 DIAGNOSIS — M51.36 DDD (DEGENERATIVE DISC DISEASE), LUMBAR: Primary | ICD-10-CM

## 2024-05-23 DIAGNOSIS — G89.29 CHRONIC PAIN: ICD-10-CM

## 2024-05-23 DIAGNOSIS — M54.16 LUMBAR RADICULOPATHY: ICD-10-CM

## 2024-05-23 LAB — POCT GLUCOSE: 141 MG/DL (ref 70–110)

## 2024-05-23 PROCEDURE — 82947 ASSAY GLUCOSE BLOOD QUANT: CPT | Performed by: ANESTHESIOLOGY

## 2024-05-23 PROCEDURE — 62323 NJX INTERLAMINAR LMBR/SAC: CPT | Performed by: ANESTHESIOLOGY

## 2024-05-23 PROCEDURE — 25000003 PHARM REV CODE 250: Performed by: STUDENT IN AN ORGANIZED HEALTH CARE EDUCATION/TRAINING PROGRAM

## 2024-05-23 PROCEDURE — A4216 STERILE WATER/SALINE, 10 ML: HCPCS | Performed by: ANESTHESIOLOGY

## 2024-05-23 PROCEDURE — 63600175 PHARM REV CODE 636 W HCPCS: Performed by: ANESTHESIOLOGY

## 2024-05-23 PROCEDURE — 25000003 PHARM REV CODE 250: Performed by: ANESTHESIOLOGY

## 2024-05-23 PROCEDURE — 25500020 PHARM REV CODE 255: Performed by: ANESTHESIOLOGY

## 2024-05-23 PROCEDURE — 62323 NJX INTERLAMINAR LMBR/SAC: CPT | Mod: ,,, | Performed by: ANESTHESIOLOGY

## 2024-05-23 RX ORDER — MIDAZOLAM HYDROCHLORIDE 1 MG/ML
INJECTION INTRAMUSCULAR; INTRAVENOUS
Status: DISCONTINUED | OUTPATIENT
Start: 2024-05-23 | End: 2024-05-23 | Stop reason: HOSPADM

## 2024-05-23 RX ORDER — LIDOCAINE HYDROCHLORIDE 20 MG/ML
INJECTION, SOLUTION INFILTRATION; PERINEURAL
Status: DISCONTINUED | OUTPATIENT
Start: 2024-05-23 | End: 2024-05-23 | Stop reason: HOSPADM

## 2024-05-23 RX ORDER — SODIUM CHLORIDE 9 MG/ML
INJECTION, SOLUTION INTRAMUSCULAR; INTRAVENOUS; SUBCUTANEOUS
Status: DISCONTINUED | OUTPATIENT
Start: 2024-05-23 | End: 2024-05-23 | Stop reason: HOSPADM

## 2024-05-23 RX ORDER — LIDOCAINE HYDROCHLORIDE 10 MG/ML
INJECTION, SOLUTION EPIDURAL; INFILTRATION; INTRACAUDAL; PERINEURAL
Status: DISCONTINUED | OUTPATIENT
Start: 2024-05-23 | End: 2024-05-23 | Stop reason: HOSPADM

## 2024-05-23 RX ORDER — DEXAMETHASONE SODIUM PHOSPHATE 10 MG/ML
INJECTION INTRAMUSCULAR; INTRAVENOUS
Status: DISCONTINUED | OUTPATIENT
Start: 2024-05-23 | End: 2024-05-23 | Stop reason: HOSPADM

## 2024-05-23 RX ORDER — FENTANYL CITRATE 50 UG/ML
INJECTION, SOLUTION INTRAMUSCULAR; INTRAVENOUS
Status: DISCONTINUED | OUTPATIENT
Start: 2024-05-23 | End: 2024-05-23 | Stop reason: HOSPADM

## 2024-05-23 RX ORDER — SODIUM CHLORIDE 9 MG/ML
INJECTION, SOLUTION INTRAVENOUS CONTINUOUS
Status: DISCONTINUED | OUTPATIENT
Start: 2024-05-24 | End: 2024-05-23 | Stop reason: HOSPADM

## 2024-05-23 NOTE — OP NOTE
Lumbar Interlaminar Epidural Steroid Injection under Fluoroscopic Guidance    The procedure, risks, benefits, and options were discussed with the patient. There are no contraindications to the procedure. The patent expressed understanding and agreed to the procedure. Informed written consent was obtained prior to the start of the procedure and can be found in the patient's chart.    PATIENT NAME: Partha Curtis Jr.   MRN: [id number]     DATE OF PROCEDURE: 05/23/2024    PROCEDURE: Lumbar Interlaminar Epidural Steroid Injection L4/L5 under Fluoroscopic Guidance    PRE-OP DIAGNOSIS: Lumbar radiculopathy [M54.16] Lumbar radiculopathy [M54.16]    POST-OP DIAGNOSIS: Same    PHYSICIAN: Gokul Fung MD    ASSISTANTS: Eduardo Mejía MD  PM&R      MEDICATIONS INJECTED: Preservative-free Decadron 10mg with 4cc of Lidocaine 1% MPF and preservative free normal saline    LOCAL ANESTHETIC INJECTED: Xylocaine 2%     SEDATION: Versed 2mg and Fentanyl 50mcg                                                                                                                                                                                     Conscious sedation ordered by M.D. Patient re-evaluation prior to administration of conscious sedation. No changes noted in patient's status from initial evaluation. The patient's vital signs were monitored by RN and patient remained hemodynamically stable throughout the procedure.    Event Time In   Sedation Start 0838   Sedation End 0847       ESTIMATED BLOOD LOSS: None    COMPLICATIONS: None    TECHNIQUE: Time-out was performed to identify the patient and procedure to be performed. With the patient laying in a prone position, the surgical area was prepped and draped in the usual sterile fashion using ChloraPrep and a fenestrated drape. The level was determined under fluoroscopy guidance. Skin anesthesia was achieved by injecting Lidocaine 2% over the injection site. The interlaminar space was then  approached with a 20 gauge,  3.5 inch Tuohy needle that was introduced under fluoroscopic guidance in the AP, lateral and/or contralateral oblique imaging. Once the Ligamentum flavum was encountered loss of resistance to saline was used to enter the epidural space. With positive loss of resistance and negative aspiration for CSF or Blood, contrast dye Omnipaque (300mg/mL) was injected to confirm placement and there was no vascular runoff. 3 mL of the medication mixture listed above was injected slowly. Displacement of the radio opaque contrast after injection of the medication confirmed that the medication went into the area of the epidural space. The needles were removed and bleeding was nil. A sterile dressing was applied. No specimens collected. The patient tolerated the procedure well.     PRE-PROCEDURE PAIN SCORE: 7-10/10    POST-PROCEDURE PAIN SCORE: 7/10    The patient was monitored after the procedure in the recovery area. They were given post-procedure and discharge instructions to follow at home. The patient was discharged in a stable condition.        Gokul Fung MD

## 2024-05-23 NOTE — DISCHARGE SUMMARY
"Discharge Note  Short Stay      SUMMARY     Admit Date: 5/23/2024    Attending Physician: Gokul Fung      Discharge Physician: Gokul Fung      Discharge Date: 5/23/2024 8:42 AM    Procedure(s) (LRB):  LUMBAR L4/5 IL SELENA *ASPIRIN OTC* HOLD FOR 5 DAYS (N/A)    Final Diagnosis: Lumbar radiculopathy [M54.16]    Disposition: Home or self care    Patient Instructions:   Current Discharge Medication List        CONTINUE these medications which have NOT CHANGED    Details   amitriptyline (ELAVIL) 75 MG tablet Take 1 tablet (75 mg total) by mouth every evening.  Qty: 90 tablet, Refills: 1    Associated Diagnoses: Insomnia, unspecified type      aspirin (ECOTRIN) 81 MG EC tablet Take 81 mg by mouth every evening.      BD LUER-RUSS SYRINGE 3 mL 25 gauge x 1" Syrg USE ONE SYRINGE EVERY 14 DAYS WITH TESTOSTERONE  Qty: 100 each, Refills: 2      candesartan (ATACAND) 32 MG tablet Take 1 tablet (32 mg total) by mouth once daily.  Qty: 90 tablet, Refills: 3    Associated Diagnoses: Essential hypertension      cloNIDine (CATAPRES) 0.1 MG tablet Take 1 tablet (0.1 mg total) by mouth 3 (three) times daily.  Qty: 270 tablet, Refills: 0    Comments: .  Associated Diagnoses: Essential hypertension      cyanocobalamin (VITAMIN B-12) 1000 MCG tablet Take 1 tablet (1,000 mcg total) by mouth once daily.  Qty: 90 tablet, Refills: 4    Associated Diagnoses: B12 deficiency      diazePAM (VALIUM) 10 MG Tab Take 1 tablet (10 mg total) by mouth On call Procedure (take 1 tablet 1 hour prior to MRI. Repeat x1 if needed).  Qty: 2 tablet, Refills: 0    Associated Diagnoses: Claustrophobia      ergocalciferol (ERGOCALCIFEROL) 50,000 unit Cap Take 1 capsule (50,000 Units total) by mouth every 7 days.  Qty: 12 capsule, Refills: 0    Associated Diagnoses: Vitamin D deficiency      ezetimibe (ZETIA) 10 mg tablet Take 1 tablet (10 mg total) by mouth once daily.  Qty: 90 tablet, Refills: 3    Associated Diagnoses: Dyslipidemia      gabapentin (NEURONTIN) 800 " MG tablet Take 1 tablet (800 mg total) by mouth every evening.  Qty: 90 tablet, Refills: 1    Associated Diagnoses: Cervical radiculopathy      glimepiride (AMARYL) 4 MG tablet Take 1 tablet (4 mg total) by mouth before breakfast.  Qty: 90 tablet, Refills: 1    Associated Diagnoses: Type 2 diabetes mellitus with stage 3a chronic kidney disease, without long-term current use of insulin      hydrocortisone 2.5 % cream Apply topically 2 (two) times daily.  Qty: 28 g, Refills: 0    Associated Diagnoses: Hemorrhoids, unspecified hemorrhoid type      ipratropium (ATROVENT) 42 mcg (0.06 %) nasal spray 2 sprays by Nasal route 2 (two) times daily as needed for Rhinitis.  Qty: 15 mL, Refills: 0    Associated Diagnoses: Influenza A      metoprolol succinate (TOPROL-XL) 200 MG 24 hr tablet Take 1 tablet (200 mg total) by mouth once daily.  Qty: 90 tablet, Refills: 3    Comments: .  Associated Diagnoses: Essential hypertension      oxyCODONE-acetaminophen (PERCOCET)  mg per tablet Take 1 tablet by mouth every 12 (twelve) hours as needed for Pain.  Qty: 60 tablet, Refills: 0    Comments: Greater than 7 day supply, medically necessary.  Associated Diagnoses: Cervical radiculopathy; Cervical spondylosis; DDD (degenerative disc disease), cervical; Lumbar radiculopathy      rosuvastatin (CRESTOR) 40 MG Tab Take 1 tablet (40 mg total) by mouth every evening.  Qty: 90 tablet, Refills: 3    Associated Diagnoses: Dyslipidemia      semaglutide (OZEMPIC) 1 mg/dose (4 mg/3 mL) Inject 1 mg into the skin every 7 days.  Qty: 9 mL, Refills: 3    Associated Diagnoses: Type 2 diabetes mellitus with stage 3a chronic kidney disease, without long-term current use of insulin      sod sulf-pot chloride-mag sulf (SUTAB) 1.479-0.188- 0.225 gram tablet Take 12 tablets by mouth once daily. Take according to package instructions with indicated amount of water.  Qty: 24 tablet, Refills: 0    Comments: Coupon code BIN:[id number] PCN: [id number] Group: [id number]  "Member ID: [id number]  Associated Diagnoses: Colon cancer screening      syringe with needle, safety (BD INTEGRA SYRINGE) 3 mL 22 gauge x 1 1/2" Syrg Inject 1 Syringe as directed once a week.  Qty: 6 each, Refills: 3      testosterone cypionate (DEPOTESTOTERONE CYPIONATE) 200 mg/mL injection Inject 0.75 mLs (150 mg total) into the muscle every 14 (fourteen) days.  Qty: 2 mL, Refills: 5    Comments: Please include syringes and needles for the testosterone.  Associated Diagnoses: Hypogonadism in male      valACYclovir (VALTREX) 1000 MG tablet TAKE 2 TABLETS BY MOUTH EVERY 12 HOURS  Qty: 4 tablet, Refills: 3                 Discharge Diagnosis: Lumbar radiculopathy [M54.16]  Condition on Discharge: Stable with no complications to procedure   Diet on Discharge: Same as before.  Activity: as per instruction sheet.  Discharge to: Home with a responsible adult.  Follow up: 2-4 weeks       Please call my office or pager at [phone] if experienced any weakness or loss of sensation, fever > 101.5, pain uncontrolled with oral medications, persistent nausea/vomiting/or diarrhea, redness or drainage from the incisions, or any other worrisome concerns. If physician on call was not reached or could not communicate with our office for any reason please go to the nearest emergency department      "

## 2024-05-23 NOTE — DISCHARGE INSTRUCTIONS

## 2024-05-27 ENCOUNTER — PATIENT MESSAGE (OUTPATIENT)
Dept: ENDOCRINOLOGY | Facility: CLINIC | Age: 64
End: 2024-05-27
Payer: COMMERCIAL

## 2024-05-27 ENCOUNTER — TELEPHONE (OUTPATIENT)
Dept: ENDOCRINOLOGY | Facility: CLINIC | Age: 64
End: 2024-05-27
Payer: COMMERCIAL

## 2024-05-27 DIAGNOSIS — E29.1 HYPOGONADISM IN MALE: Primary | ICD-10-CM

## 2024-05-27 RX ORDER — TESTOSTERONE CYPIONATE 200 MG/ML
200 INJECTION, SOLUTION INTRAMUSCULAR
Qty: 2 ML | Refills: 5 | Status: SHIPPED | OUTPATIENT
Start: 2024-05-27

## 2024-05-27 NOTE — TELEPHONE ENCOUNTER
Called patient to discuss his labs. His recent testosterone lab was done 10 days after his last testosterone injection.  Recent Hemoglobin and hematocrit are normal.  Currently off the spironolactone and may have been related to dehydration. Discussed increasing his testosterone to 1 mL every 14 days and will repeat his labs in 3 months.

## 2024-06-10 ENCOUNTER — OFFICE VISIT (OUTPATIENT)
Dept: PAIN MEDICINE | Facility: CLINIC | Age: 64
End: 2024-06-10
Payer: COMMERCIAL

## 2024-06-10 ENCOUNTER — HOSPITAL ENCOUNTER (OUTPATIENT)
Dept: RADIOLOGY | Facility: HOSPITAL | Age: 64
Discharge: HOME | End: 2024-06-10
Attending: ORTHOPAEDIC SURGERY
Payer: COMMERCIAL

## 2024-06-10 VITALS
DIASTOLIC BLOOD PRESSURE: 77 MMHG | HEART RATE: 63 BPM | WEIGHT: 189.63 LBS | OXYGEN SATURATION: 100 % | BODY MASS INDEX: 27.15 KG/M2 | SYSTOLIC BLOOD PRESSURE: 129 MMHG | RESPIRATION RATE: 12 BRPM | HEIGHT: 70 IN

## 2024-06-10 DIAGNOSIS — M47.816 LUMBAR SPONDYLOSIS: ICD-10-CM

## 2024-06-10 DIAGNOSIS — M50.30 DDD (DEGENERATIVE DISC DISEASE), CERVICAL: ICD-10-CM

## 2024-06-10 DIAGNOSIS — M47.812 CERVICAL SPONDYLOSIS: ICD-10-CM

## 2024-06-10 DIAGNOSIS — M54.16 LUMBAR RADICULOPATHY: ICD-10-CM

## 2024-06-10 DIAGNOSIS — Z96.659 HISTORY OF KNEE REPLACEMENT, UNSPECIFIED LATERALITY: ICD-10-CM

## 2024-06-10 DIAGNOSIS — G89.4 CHRONIC PAIN DISORDER: Primary | ICD-10-CM

## 2024-06-10 DIAGNOSIS — M51.36 DDD (DEGENERATIVE DISC DISEASE), LUMBAR: ICD-10-CM

## 2024-06-10 DIAGNOSIS — M54.12 CERVICAL RADICULOPATHY: ICD-10-CM

## 2024-06-10 PROCEDURE — 3008F BODY MASS INDEX DOCD: CPT | Mod: CPTII,S$GLB,, | Performed by: NURSE PRACTITIONER

## 2024-06-10 PROCEDURE — 1160F RVW MEDS BY RX/DR IN RCRD: CPT | Mod: CPTII,S$GLB,, | Performed by: NURSE PRACTITIONER

## 2024-06-10 PROCEDURE — 3074F SYST BP LT 130 MM HG: CPT | Mod: CPTII,S$GLB,, | Performed by: NURSE PRACTITIONER

## 2024-06-10 PROCEDURE — 3044F HG A1C LEVEL LT 7.0%: CPT | Mod: CPTII,S$GLB,, | Performed by: NURSE PRACTITIONER

## 2024-06-10 PROCEDURE — 3066F NEPHROPATHY DOC TX: CPT | Mod: CPTII,S$GLB,, | Performed by: NURSE PRACTITIONER

## 2024-06-10 PROCEDURE — 1159F MED LIST DOCD IN RCRD: CPT | Mod: CPTII,S$GLB,, | Performed by: NURSE PRACTITIONER

## 2024-06-10 PROCEDURE — 3061F NEG MICROALBUMINURIA REV: CPT | Mod: CPTII,S$GLB,, | Performed by: NURSE PRACTITIONER

## 2024-06-10 PROCEDURE — 73721 MRI JNT OF LWR EXTRE W/O DYE: CPT | Mod: 26,LT,, | Performed by: RADIOLOGY

## 2024-06-10 PROCEDURE — 99999 PR PBB SHADOW E&M-EST. PATIENT-LVL V: CPT | Mod: PBBFAC,,, | Performed by: NURSE PRACTITIONER

## 2024-06-10 PROCEDURE — 73721 MRI JNT OF LWR EXTRE W/O DYE: CPT | Mod: TC,LT

## 2024-06-10 PROCEDURE — 4010F ACE/ARB THERAPY RXD/TAKEN: CPT | Mod: CPTII,S$GLB,, | Performed by: NURSE PRACTITIONER

## 2024-06-10 PROCEDURE — 3078F DIAST BP <80 MM HG: CPT | Mod: CPTII,S$GLB,, | Performed by: NURSE PRACTITIONER

## 2024-06-10 PROCEDURE — 99214 OFFICE O/P EST MOD 30 MIN: CPT | Mod: S$GLB,,, | Performed by: NURSE PRACTITIONER

## 2024-06-10 NOTE — PROGRESS NOTES
Chronic patient Established Note (Follow up visit)      SUBJECTIVE:    Interval History 6/10/2024:  The patient returns to clinic today for follow up of neck and back pain. He is s/p L4/5 IL SELENA on 5/23/2024. He reports 60% relief. He reports low back pain. He denies any radicular leg pain. His back pain is worse with moving from sitting to standing. He continues to report neck pain. He is taking Gabapentin. He also takes Percocet for severe pain as needed with benefit. He denies any adverse effects. He denies any other health changes. His pain today is 5/10.      Interval History 5/1/2024:  The patient returns to clinic today for follow up of neck and back pain. He reports increased low back pain. He denies any radicular leg pain but does have numbness into the lateral aspect of his right leg. This is worse from the knee to the top of his right foot. He does have numbness under the left foot. His pain is worse with standing and walking. He is no longer taking Eliquis. He is taking Gabapentin. He also takes Percocet for severe pain. He needs a refill. He denies any other health changes. His pain today is 8/10.    Interval History 3/25/2024:  Partha Curtis Jr. presents to the clinic for a follow-up appointment for neck pain. He is s/p C7/T1 IL SELENA on 2/26/2024. He reports 100% relief of his neck pain. He was admitted for atrial fibrillation after the last procedure. He was converted to sinus rhythm. He is now on Eliquis. He reports increased low back pain. He denies any radicular leg pain. His pain is worse as the day goes on. He previously had relief with ESIs. He is interested in repeating this in the future. He denies any weakness. He is taking Gabapentin. He takes Percocet intermittently for severe pain. He denies any other health changes. His pain today is 0/10.      HPI: Partha Curtis Jr. is a 63 y.o. male presents to pain clinic for evaluation of neck pain. Symptoms developed 4 weeks ago. Similar pain in  2022 that improved after an C7/T1 ILESI on 3/28/22, had been pain free since this procedure until 4 weeks ago. Original neck pain started in 2021 without inciting event. Denies trauma or injury to the area in the past 4 weeks upon return of symptoms. Pain management previously at [facility] with Dr. Herrera     Original Pain Description:  The pain is located in the upper thoracic region just left of the midline with radiation into his left shoulder and proximal arm terminating above the elbow. All of patients upper back/neck pain is on the left. The pain is described as aching, sharp, and throbbing. These exact symptoms were present prior to his 2022 ILESI. Chinyere ranges from 4 - 10. The current pain is 8. Exacerbating factors: Coughing/Sneezing and L arm abduction above the head, head forward flexion and head lateral bending to the left, and laying flat on the ground. Mitigating factors pillow. Symptoms interfere with daily activity and sleeping and work. 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 Works as a  for [employer]. The patient feels like symptoms have been worsening. Patient endorses weakness in his left arm with return of pain. Patient denies saddle anesthesia, bladder/bowel incontinence, acute or signifcant limb weakness, ataxia, or increased falls.     Pain Disability Index Review:      6/10/2024    11:10 AM 2/1/2024     2:36 PM 1/17/2024    10:38 AM   Last 3 PDI Scores   Pain Disability Index (PDI) 63 [phone]       Pain Medications:  Gabapentin  Percocet     Opioid Contract: not applicable     report:  Reviewed and consistent with medication use as prescribed.    Pain Procedures:   2/26/2024- C7/T1 IL SELENA  5/23/2024- L4/5 IL SELENA- 60% relief    Physical Therapy/Home Exercise: yes    Imaging:   MRI Cervical Spine 2/15/2024:  COMPARISON:  XR C-spine 03/02/2010.  CTA head neck 06/23/2022.     FINDINGS:  Alignment: Normal sagittal alignment.  Grade 1  retrolisthesis at C5-C6 and grade 1 anterolisthesis at C7-T1.     Vertebrae: Vertebral body heights are maintained.  No fracture or marrow infiltrative process.     Discs: Multilevel degenerative disc desiccation and height loss most pronounced at C5-C6 and C6-C7.     Cord: Normal cord signal intensity.     Cerebellar tonsils are in their expected location.  Visualized brainstem is normal. Vertebral artery flow voids are present.  Prevertebral soft tissues are normal.  No cervical lymph node enlargement.  Paraspinal musculature demonstrates normal bulk and signal intensity.     Degenerative findings:     C2-C3: Thickening of the posterior longitudinal ligament.  No spinal canal stenosis or neural foraminal narrowing.     C3-C4: Thickening of the posterior longitudinal ligament, bilateral uncovertebral spurring and facet arthropathy.  No spinal canal stenosis.  Mild left neural foraminal narrowing.     C4-C5: Central/right paracentral disc extrusion with inferior migration which abuts and slightly posteriorly displaces the right ventral cord.  Bilateral uncovertebral spurring and facet arthropathy.  Mild spinal canal stenosis.  Mild left neural foraminal narrowing.     C5-C6: Posterior disc osteophyte complex, bilateral uncovertebral spurring and facet arthropathy.  Moderate spinal canal stenosis.  Severe bilateral neural foraminal narrowing.     C6-C7: Posterior disc osteophyte complex, bilateral uncovertebral spurring and facet arthropathy.  No spinal canal stenosis.  Mild right and moderate left neural foraminal narrowing.     C7-T1: No spinal canal stenosis or neural foraminal narrowing.     Impression:     Multilevel degenerative change of the cervical spine, detailed above.  Findings most pronounced at C5-C6 with moderate spinal canal stenosis and severe bilateral neural foraminal narrowing.    MRI Lumbar Spine 11/2/2023:  COMPARISON: MRI lumbar spine 6/28/2021     FINDINGS:   Spine Numbering: For purposes of  this dictation, it is assumed that there are 5 non-rib-bearing, lumbar-type vertebrae, and the most caudal fully segmented lumbar vertebra is labeled L5.     Alignment: Straightening of normal lumbar lordosis. Unchanged dextrocurvature of the lumbar spine. Unchanged grade 1 left lateral listhesis of L2 on L3. Unchanged grade 1 right lateral listhesis of L3 on L4. Unchanged grade 1 retrolisthesis of L2 on L3.     Surgical: None.     Vertebral Bodies: Unchanged multilevel mild anterior osteophytosis.     Marrow Signal: Unchanged degenerative fatty marrow placement at the L3-4 endplates. No marrow edema. No suspicious osseous lesions.     Intervertebral Discs: Unchanged multilevel disc dessication with loss of disc space height     Conus Medullaris: Terminates at L1     Cauda Equina: Unchanged bunching of the cauda equina at L3-4 and L4-5 due to thecal sac narrowing detailed below.     Paraspinal Soft Tissues: Normal     Intra-abdominal Findings: None.     Individual Levels:     T12-L1: Unchanged circumferential disc bulge with unchanged right paracentral disc herniation with cranial migration to the infrapedicular level of T12 measuring 1.6 x 0.6 cm (image 7, series 2). No thecal sac or neural foraminal narrowing.     L1-L2: Unchanged circumferential disc bulge with bulky left lateral osteophytosis. No spinal canal or foraminal narrowing.     L2-L3: Circumferential disc bulge, ligamentum flavum thickening, and epidural lipomatosis cause unchanged moderate thecal sac narrowing. Mild facet arthropathy and disc disease cause unchanged moderate bilateral neural foraminal narrowing.     L3-L4: Circumferential disc bulge, ligamentum flavum thickening, and epidural lipomatosis cause unchanged severe thecal sac narrowing. Moderate right and severe left facet arthropathy with disc disease cause unchanged mild right and moderate left neural foraminal narrowing.     L4-L5: Circumferential disc bulge, ligamentum flavum  "thickening, and epidural lipomatosis cause unchanged severe thecal sac narrowing. Severe facet arthropathy and disc disease cause unchanged severe right and moderate left neural foraminal narrowing with impingement of the exiting right L4 nerve root.     L5-S1: Unchanged small disc bulge and moderate facet arthropathy. No spinal canal or foraminal narrowing.     Impression    Unchanged severe multilevel lumbar spondylosis as detailed above.    Allergies:   Review of patient's allergies indicates:   Allergen Reactions    Stadol [butorphanol tartrate] Rash     Swelling in face    Strawberries [strawberry] Rash       Current Medications:   Current Outpatient Medications   Medication Sig Dispense Refill    amitriptyline (ELAVIL) 75 MG tablet Take 1 tablet (75 mg total) by mouth every evening. 90 tablet 1    aspirin (ECOTRIN) 81 MG EC tablet Take 81 mg by mouth every evening.      BD LUER-RUSS SYRINGE 3 mL 25 gauge x 1" Syrg USE ONE SYRINGE EVERY 14 DAYS WITH TESTOSTERONE 100 each 2    candesartan (ATACAND) 32 MG tablet Take 1 tablet (32 mg total) by mouth once daily. 90 tablet 3    cloNIDine (CATAPRES) 0.1 MG tablet Take 1 tablet (0.1 mg total) by mouth 3 (three) times daily. 270 tablet 0    cyanocobalamin (VITAMIN B-12) 1000 MCG tablet Take 1 tablet (1,000 mcg total) by mouth once daily. 90 tablet 4    diazePAM (VALIUM) 10 MG Tab Take 1 tablet (10 mg total) by mouth On call Procedure (take 1 tablet 1 hour prior to MRI. Repeat x1 if needed). 2 tablet 0    ergocalciferol (ERGOCALCIFEROL) 50,000 unit Cap Take 1 capsule (50,000 Units total) by mouth every 7 days. 12 capsule 0    ezetimibe (ZETIA) 10 mg tablet Take 1 tablet (10 mg total) by mouth once daily. 90 tablet 3    gabapentin (NEURONTIN) 800 MG tablet Take 1 tablet (800 mg total) by mouth every evening. 90 tablet 1    glimepiride (AMARYL) 4 MG tablet Take 1 tablet (4 mg total) by mouth before breakfast. 90 tablet 1    hydrocortisone 2.5 % cream Apply topically 2 " "(two) times daily. (Patient taking differently: Apply topically 2 (two) times daily as needed.) 28 g 0    ipratropium (ATROVENT) 42 mcg (0.06 %) nasal spray 2 sprays by Nasal route 2 (two) times daily as needed for Rhinitis. 15 mL 0    metoprolol succinate (TOPROL-XL) 200 MG 24 hr tablet Take 1 tablet (200 mg total) by mouth once daily. (Patient taking differently: Take 200 mg by mouth every evening.) 90 tablet 3    rosuvastatin (CRESTOR) 40 MG Tab Take 1 tablet (40 mg total) by mouth every evening. 90 tablet 3    semaglutide (OZEMPIC) 1 mg/dose (4 mg/3 mL) Inject 1 mg into the skin every 7 days. 9 mL 3    sod sulf-pot chloride-mag sulf (SUTAB) 1.479-0.188- 0.225 gram tablet Take 12 tablets by mouth once daily. Take according to package instructions with indicated amount of water. 24 tablet 0    syringe with needle, safety (BD INTEGRA SYRINGE) 3 mL 22 gauge x 1 1/2" Syrg Inject 1 Syringe as directed once a week. 6 each 3    testosterone cypionate (DEPOTESTOTERONE CYPIONATE) 200 mg/mL injection Inject 1 mL (200 mg total) into the muscle every 14 (fourteen) days. 2 mL 5    valACYclovir (VALTREX) 1000 MG tablet TAKE 2 TABLETS BY MOUTH EVERY 12 HOURS (Patient taking differently: Take 2,000 mg by mouth every 12 (twelve) hours as needed.) 4 tablet 3     No current facility-administered medications for this visit.     Facility-Administered Medications Ordered in Other Visits   Medication Dose Route Frequency Provider Last Rate Last Admin    0.9%  NaCl infusion   Intravenous Continuous Kendell Hoffman MD        0.9%  NaCl infusion   Intravenous Continuous Gerardo Uribe MD           REVIEW OF SYSTEMS:    GENERAL:  No weight loss, malaise or fevers.  HEENT:  Negative for frequent or significant headaches.  NECK:  Negative for lumps, goiter, pain and significant neck swelling.  RESPIRATORY:  Negative for cough, wheezing or shortness of breath.  CARDIOVASCULAR:  Negative for chest pain, leg swelling or palpitations. HTN, " AFib  GI:  Negative for abdominal discomfort, blood in stools or black stools or change in bowel habits.  MUSCULOSKELETAL:  See HPI.  SKIN:  Negative for lesions, rash, and itching.  PSYCH:  Negative for sleep disturbance, mood disorder and recent psychosocial stressors.  ENDO: Diabetes.   HEMATOLOGY/LYMPHOLOGY:  Negative for swollen nodes. Eliquis  NEURO:   No history of headaches, syncope, paralysis, seizures or tremors.  All other reviewed and negative other than HPI.    Past Medical History:  Past Medical History:   Diagnosis Date    Allergy     Carotid artery occlusion     Chronic back pain     Colonic polyp     Genetic testing     MUTYH mutation-negative    Hyperlipidemia     Hypertension     Paroxysmal atrial fibrillation 2/28/2024    Sleep apnea     Type 2 diabetes mellitus with stage 3 chronic kidney disease, without long-term current use of insulin 4/2/2020       Past Surgical History:  Past Surgical History:   Procedure Laterality Date    ANKLE SURGERY Left     2    APPENDECTOMY      at age 20    CAROTID ENDARTERECTOMY Right 07/15/2015    CARPAL TUNNEL RELEASE Bilateral     COLONOSCOPY N/A 12/14/2018    Procedure: COLONOSCOPYSuprep;  Surgeon: Silver Selby MD;  Location: [facility];  Service: Endoscopy;  Laterality: N/A;    EPIDURAL STEROID INJECTION N/A 2/26/2024    Procedure: CERVICAL C7/T1 IL SELENA;  Surgeon: Gokul Fung MD;  Location: [facility]IN [facility]GT;  Service: Pain Management;  Laterality: N/A;  [phone]  2 WK F/U SERGEI    EPIDURAL STEROID INJECTION N/A 5/23/2024    Procedure: LUMBAR L4/5 IL SELENA *ASPIRIN OTC* HOLD FOR 5 DAYS;  Surgeon: Gokul Fung MD;  Location: [facility]IN [facility];  Service: Pain Management;  Laterality: N/A;  [phone]  2 WK F/U NOHELIA    JOINT REPLACEMENT  9/2010    NASAL SEPTUM SURGERY      TOTAL KNEE ARTHROPLASTY Right     6 knee surgeries    TRANSESOPHAGEAL ECHOCARDIOGRAM WITH POSSIBLE CARDIOVERSION (ELIZABETH W/ POSS CARDIOVERSION) N/A 2/28/2024    Procedure: Transesophageal echo  (ELIZABETH) intra-procedure log documentation;  Surgeon: Ahmet De La Cruz MD;  Location: [facility] CATH LAB/EP;  Service: Cardiology;  Laterality: N/A;       Family History:  Family History   Problem Relation Name Age of Onset    Brain cancer Mother Klarissa 67    Cancer Mother Klarissa     Hypertension Father Keanu     Lung cancer Father Keanu         x2 (PAUL initially- treated surgically only, then recurred distantly) (smoker, & had been exposed to many chemicals)    Cancer Father Keanu     Breast cancer Sister  58    Genetic Disorder Sister          monoallelic MUTYH mutation    Seizures Daughter      Cancer Maternal Grandfather Valerio     Brain cancer Paternal Grandfather  73    Breast cancer Maternal Cousin  57    Aneurysm Other mgm's father 48        brain    Ulcerative colitis Other brother's son        Social History:  Social History     Socioeconomic History    Marital status:    Tobacco Use    Smoking status: Never     Passive exposure: Never    Smokeless tobacco: Never   Substance and Sexual Activity    Alcohol use: Yes     Alcohol/week: 2.0 standard drinks of alcohol     Types: 2 Cans of beer per week     Comment: a week    Drug use: Never    Sexual activity: Yes     Partners: Female     Birth control/protection: None   Social History Narrative    5/11/2021: . He lives with his wife and 2 kids. (5 kids total). He has one dog, one cat, no more chickens at home. One dog recently passed away. No smokers at home.      Social Determinants of Health     Financial Resource Strain: Low Risk  (3/8/2024)    Overall Financial Resource Strain (CARDIA)     Difficulty of Paying Living Expenses: Not very hard   Food Insecurity: No Food Insecurity (3/8/2024)    Hunger Vital Sign     Worried About Running Out of Food in the Last Year: Never true     Ran Out of Food in the Last Year: Never true   Transportation Needs: No Transportation Needs (3/8/2024)    PRAPARE - Transportation     Lack of  "Transportation (Medical): No     Lack of Transportation (Non-Medical): No   Physical Activity: Unknown (3/8/2024)    Exercise Vital Sign     Days of Exercise per Week: 0 days   Stress: Stress Concern Present (3/8/2024)    [employer] - Occupational Stress Questionnaire     Feeling of Stress : To some extent   Housing Stability: Low Risk  (3/8/2024)    Housing Stability Vital Sign     Unable to Pay for Housing in the Last Year: No     Number of Places Lived in the Last Year: 1     Unstable Housing in the Last Year: No       OBJECTIVE:    /77 (BP Location: Right arm, Patient Position: Sitting, BP Method: Medium (Automatic))   Pulse 63   Resp 12   Ht 5' 10" (1.778 m)   Wt 86 kg (189 lb 9.5 oz)   SpO2 100%   BMI 27.20 kg/m²     PHYSICAL EXAMINATION:    General appearance: Well appearing, in no acute distress, alert and oriented x3.  Psych:  Mood and affect appropriate.  Skin: Skin color, texture, turgor normal, no rashes or lesions, in both upper and lower body.  Head/face:  Atraumatic, normocephalic. No palpable lymph nodes  Neck: No pain to palpation over the cervical paraspinous muscles.   Cor: RRR  Pulm: Symmetric chest rise, no respiratory distress noted.   Back: Straight leg raising in the sitting position is negative to radicular pain bilaterally. There is pain to palpation over the lumbar facet joints bilaterally, R>L. Limited ROM with pain on flexion and extension. Positive facet loading bilaterally, R>L.   Extremities:  No deformities, edema, or skin discoloration. Good capillary refill.  Musculoskeletal:  Bilateral upper and lower extremity strength is normal and symmetric.  No atrophy or tone abnormalities are noted.  Neuro: No loss of sensation is noted.  Gait: Normal.    ASSESSMENT: 64 y.o. year old male with neck and back pain, consistent with the followin. Chronic pain disorder        2. Lumbar radiculopathy        3. Lumbar spondylosis        4. DDD " (degenerative disc disease), lumbar        5. Cervical radiculopathy        6. Cervical spondylosis        7. DDD (degenerative disc disease), cervical                PLAN:     - Previous imaging was reviewed and discussed with the patient today.    - He is s/p L4/5 IL SELENA with benefit. We can repeat this as needed.     - We can repeat C7/T1 IL SELENA as needed.     - Continue Gabapentin.     - Pain contract signed 5/1/2024.     - Continue Percocet 10/325 mg BID PRN, #60.     - The patient is here today for a refill of current pain medications and they believe these provide effective pain control and improvements in quality of life.  The patient notes no serious side effects, and feels the benefits outweigh the risks.  The patient was reminded of the pain contract that they signed previously as well as the risks and benefits of the medication including possible death.  The updated [place] Board [employer] prescription monitoring program was reviewed, and the patient has been found to be compliant with current treatment plan. Medication management provided by Dr. Fung.     - UDS from 5/1/2024 reviewed and consistent.     - I have stressed the importance of physical activity and a home exercise plan to help with pain and improve health.    - RTC in 3 months or sooner if needed.    The above plan and management options were discussed at length with patient. Patient is in agreement with the above and verbalized understanding.    Baylee Ni  06/10/2024

## 2024-06-11 ENCOUNTER — TELEPHONE (OUTPATIENT)
Dept: ORTHOPEDICS | Facility: CLINIC | Age: 64
End: 2024-06-11
Payer: COMMERCIAL

## 2024-06-11 RX ORDER — OXYCODONE AND ACETAMINOPHEN 10; 325 MG/1; MG/1
1 TABLET ORAL EVERY 12 HOURS PRN
Qty: 60 TABLET | Refills: 0 | Status: SHIPPED | OUTPATIENT
Start: 2024-06-11

## 2024-06-11 NOTE — TELEPHONE ENCOUNTER
Called patient to discuss recent imaging steps. Left vm for patient explaining the results as well as the plan to see Dr. Washington per Dr. Bowden. I have also sent a message to Dr. Washington's staff to assist with scheduling appointment for patient.     ----- Message from Valerio Bowden MD sent at 6/11/2024  6:58 AM CDT -----  Please call pt.  He has a medial meniscus tear.  There is no arthritis.  Please refer to dr. Washington for a possible scope.

## 2024-06-12 ENCOUNTER — TELEPHONE (OUTPATIENT)
Dept: SPORTS MEDICINE | Facility: CLINIC | Age: 64
End: 2024-06-12
Payer: COMMERCIAL

## 2024-06-12 ENCOUNTER — PATIENT MESSAGE (OUTPATIENT)
Dept: SPORTS MEDICINE | Facility: CLINIC | Age: 64
End: 2024-06-12
Payer: COMMERCIAL

## 2024-06-12 NOTE — TELEPHONE ENCOUNTER
----- Message from Ar Oliver MA sent at 6/11/2024  9:34 AM CDT -----  Regarding: FW: Scheduling    ----- Message -----  From: William Davis Jr., MA  Sent: 6/11/2024   8:39 AM CDT  To: [facility] Staff  Subject: Scheduling                                       Good Morning,    Pt is being referred to Dr. Washington for a medial meniscus tear in the left knee. Pt has had a recent MRI completed on the left knee. Please contact patient to assist with scheduling thank you!    Sincerely,   William Davis Jr.  Medical Assistant   [facility]  Phone: [phone]  Fax: [phone]

## 2024-06-15 DIAGNOSIS — G47.00 INSOMNIA, UNSPECIFIED TYPE: ICD-10-CM

## 2024-06-15 NOTE — TELEPHONE ENCOUNTER
Care Due:                  Date            Visit Type   Department     Provider  --------------------------------------------------------------------------------                                EP -                              PRIMARY      [facility]  Last Visit: 05-      CARE ([facility])   [facility]       Rocco Cavazos                              EP -                              PRIMARY      [facility]  Next Visit: 06-      CARE ([facility])   MEDICINE       Rocco  Madrecha                                                            Last  Test          Frequency    Reason                     Performed    Due Date  --------------------------------------------------------------------------------    Vitamin D...  12 months..  ergocalciferol...........  07- 06-    [employer] Embedded Care Due Messages. Reference number: [id number].   6/15/2024 10:52:00 AM CDT

## 2024-06-16 RX ORDER — TRAZODONE HYDROCHLORIDE 50 MG/1
50 TABLET ORAL NIGHTLY
Qty: 90 TABLET | Refills: 0 | OUTPATIENT
Start: 2024-06-16

## 2024-06-16 NOTE — TELEPHONE ENCOUNTER
Refill Decision Note   Partha Curtis  is requesting a refill authorization.  Brief Assessment and Rationale for Refill:  Quick Discontinue     Medication Therapy Plan:  The original prescription was discontinued on 4/26/2024 by Rocco Cavazos DO.      Comments:     Note composed:3:13 AM 06/16/2024

## 2024-06-16 NOTE — PROGRESS NOTES
NEW PATIENT    HISTORY OF PRESENT ILLNESS   64 y.o. Male with a history of left knee pain who was squatting down and felt a pop in his left knee.  He felt significant pain and had difficulty extending his knee.  He states he is very apprehensive to squat or rotate.  He points to the posterior medial aspect of his knee as the source of pain..      Previous hx of TKA on right knee   Previous ACL, meniscus repair  on right knee     + mechanical symptoms, + instability    Is affecting ADLs.  Pain is 3/10 at it's worst.        PAST MEDICAL HISTORY    Past Medical History:   Diagnosis Date    Allergy     Carotid artery occlusion     Chronic back pain     Colonic polyp     Genetic testing     MUTYH mutation-negative    Hyperlipidemia     Hypertension     Paroxysmal atrial fibrillation 2/28/2024    Sleep apnea     Type 2 diabetes mellitus with stage 3 chronic kidney disease, without long-term current use of insulin 4/2/2020       PAST SURGICAL HISTORY     Past Surgical History:   Procedure Laterality Date    ANKLE SURGERY Left     2    APPENDECTOMY      at age 20    CAROTID ENDARTERECTOMY Right 07/15/2015    CARPAL TUNNEL RELEASE Bilateral     COLONOSCOPY N/A 12/14/2018    Procedure: COLONOSCOPYSuprep;  Surgeon: Silver Selby MD;  Location: [facility];  Service: Endoscopy;  Laterality: N/A;    EPIDURAL STEROID INJECTION N/A 2/26/2024    Procedure: CERVICAL C7/T1 IL SELENA;  Surgeon: Gokul Fung MD;  Location: [facility];  Service: Pain Management;  Laterality: N/A;  [phone]  2 WK F/U SERGEI    EPIDURAL STEROID INJECTION N/A 5/23/2024    Procedure: LUMBAR L4/5 IL SELENA *ASPIRIN OTC* HOLD FOR 5 DAYS;  Surgeon: Gokul Fung MD;  Location: [facility];  Service: Pain Management;  Laterality: N/A;  [phone]  2 WK F/U NOHELIA    JOINT REPLACEMENT  9/2010    NASAL SEPTUM SURGERY      TOTAL KNEE ARTHROPLASTY Right     6 knee surgeries    TRANSESOPHAGEAL ECHOCARDIOGRAM WITH POSSIBLE CARDIOVERSION (ELIZABETH W/ POSS CARDIOVERSION)  N/A 2/28/2024    Procedure: Transesophageal echo (ELIZABETH) intra-procedure log documentation;  Surgeon: Ahmet De La Cruz MD;  Location: [facility] CATH LAB/EP;  Service: Cardiology;  Laterality: N/A;       FAMILY HISTORY    Family History   Problem Relation Name Age of Onset    Brain cancer Mother Klarissa 67    Cancer Mother Klarissa     Hypertension Father Keanu     Lung cancer Father Keanu         x2 (PAUL initially- treated surgically only, then recurred distantly) (smoker, & had been exposed to many chemicals)    Cancer Father Keanu     Breast cancer Sister  58    Genetic Disorder Sister          monoallelic MUTYH mutation    Seizures Daughter      Cancer Maternal Grandfather Valerio     Brain cancer Paternal Grandfather  73    Breast cancer Maternal Cousin  57    Aneurysm Other mgm's father 48        brain    Ulcerative colitis Other brother's son        SOCIAL HISTORY    Social History     Socioeconomic History    Marital status:    Tobacco Use    Smoking status: Never     Passive exposure: Never    Smokeless tobacco: Never   Substance and Sexual Activity    Alcohol use: Yes     Alcohol/week: 2.0 standard drinks of alcohol     Types: 2 Cans of beer per week     Comment: a week    Drug use: Never    Sexual activity: Yes     Partners: Female     Birth control/protection: None   Social History Narrative    5/11/2021: . He lives with his wife and 2 kids. (5 kids total). He has one dog, one cat, no more chickens at home. One dog recently passed away. No smokers at home.      Social Determinants of Health     Financial Resource Strain: Low Risk  (3/8/2024)    Overall Financial Resource Strain (CARDIA)     Difficulty of Paying Living Expenses: Not very hard   Food Insecurity: No Food Insecurity (3/8/2024)    Hunger Vital Sign     Worried About Running Out of Food in the Last Year: Never true     Ran Out of Food in the Last Year: Never true   Transportation Needs: No Transportation Needs (3/8/2024)  "   PRAPARE - Transportation     Lack of Transportation (Medical): No     Lack of Transportation (Non-Medical): No   Physical Activity: Unknown (3/8/2024)    Exercise Vital Sign     Days of Exercise per Week: 0 days   Stress: Stress Concern Present (3/8/2024)    [employer] - Occupational Stress Questionnaire     Feeling of Stress : To some extent   Housing Stability: Low Risk  (3/8/2024)    Housing Stability Vital Sign     Unable to Pay for Housing in the Last Year: No     Number of Places Lived in the Last Year: 1     Unstable Housing in the Last Year: No       MEDICATIONS      Current Outpatient Medications:     amitriptyline (ELAVIL) 75 MG tablet, Take 1 tablet (75 mg total) by mouth every evening., Disp: 90 tablet, Rfl: 1    aspirin (ECOTRIN) 81 MG EC tablet, Take 81 mg by mouth every evening., Disp: , Rfl:     BD LUER-RUSS SYRINGE 3 mL 25 gauge x 1" Syrg, USE ONE SYRINGE EVERY 14 DAYS WITH TESTOSTERONE, Disp: 100 each, Rfl: 2    candesartan (ATACAND) 32 MG tablet, Take 1 tablet (32 mg total) by mouth once daily., Disp: 90 tablet, Rfl: 3    cloNIDine (CATAPRES) 0.1 MG tablet, Take 1 tablet (0.1 mg total) by mouth 3 (three) times daily., Disp: 270 tablet, Rfl: 0    cloNIDine 0.1 mg/24 hr td ptwk (CATAPRES) 0.1 mg/24 hr, Place 1 patch onto the skin every 7 days., Disp: 4 patch, Rfl: 0    cyanocobalamin (VITAMIN B-12) 1000 MCG tablet, Take 1 tablet (1,000 mcg total) by mouth once daily., Disp: 90 tablet, Rfl: 4    diazePAM (VALIUM) 10 MG Tab, Take 1 tablet (10 mg total) by mouth On call Procedure (take 1 tablet 1 hour prior to MRI. Repeat x1 if needed)., Disp: 2 tablet, Rfl: 0    ergocalciferol (ERGOCALCIFEROL) 50,000 unit Cap, Take 1 capsule (50,000 Units total) by mouth every 7 days., Disp: 12 capsule, Rfl: 0    ezetimibe (ZETIA) 10 mg tablet, Take 1 tablet (10 mg total) by mouth once daily., Disp: 90 tablet, Rfl: 3    gabapentin (NEURONTIN) 800 MG tablet, Take 1 tablet (800 mg total) " "by mouth every evening., Disp: 90 tablet, Rfl: 1    glimepiride (AMARYL) 4 MG tablet, Take 1 tablet (4 mg total) by mouth before breakfast., Disp: 90 tablet, Rfl: 1    hydrocortisone 2.5 % cream, Apply topically 2 (two) times daily. (Patient taking differently: Apply topically 2 (two) times daily as needed.), Disp: 28 g, Rfl: 0    ipratropium (ATROVENT) 42 mcg (0.06 %) nasal spray, 2 sprays by Nasal route 2 (two) times daily as needed for Rhinitis., Disp: 15 mL, Rfl: 0    metoprolol succinate (TOPROL-XL) 200 MG 24 hr tablet, Take 1 tablet (200 mg total) by mouth once daily. (Patient taking differently: Take 200 mg by mouth every evening.), Disp: 90 tablet, Rfl: 3    oxyCODONE-acetaminophen (PERCOCET)  mg per tablet, Take 1 tablet by mouth every 12 (twelve) hours as needed for Pain., Disp: 60 tablet, Rfl: 0    rosuvastatin (CRESTOR) 40 MG Tab, Take 1 tablet (40 mg total) by mouth every evening., Disp: 90 tablet, Rfl: 3    semaglutide (OZEMPIC) 1 mg/dose (4 mg/3 mL), Inject 1 mg into the skin every 7 days., Disp: 9 mL, Rfl: 3    sod sulf-pot chloride-mag sulf (SUTAB) 1.479-0.188- 0.225 gram tablet, Take 12 tablets by mouth once daily. Take according to package instructions with indicated amount of water., Disp: 24 tablet, Rfl: 0    syringe with needle, safety (BD INTEGRA SYRINGE) 3 mL 22 gauge x 1 1/2" Syrg, Inject 1 Syringe as directed once a week., Disp: 6 each, Rfl: 3    testosterone cypionate (DEPOTESTOTERONE CYPIONATE) 200 mg/mL injection, Inject 1 mL (200 mg total) into the muscle every 14 (fourteen) days., Disp: 2 mL, Rfl: 5    valACYclovir (VALTREX) 1000 MG tablet, TAKE 2 TABLETS BY MOUTH EVERY 12 HOURS (Patient taking differently: Take 2,000 mg by mouth every 12 (twelve) hours as needed.), Disp: 4 tablet, Rfl: 3  No current facility-administered medications for this visit.    Facility-Administered Medications Ordered in Other Visits:     0.9%  NaCl infusion, , Intravenous, Continuous, Kendell Hoffman, " "MD    0.9%  NaCl infusion, , Intravenous, Continuous, Gerardo Uribe MD    ALLERGIES     Review of patient's allergies indicates:   Allergen Reactions    Stadol [butorphanol tartrate] Rash     Swelling in face    Strawberries [strawberry] Rash         REVIEW OF SYSTEMS   Constitution: Negative. Negative for chills, fever and night sweats.   HENT: Negative for congestion and headaches.    Eyes: Negative for blurred vision, left vision loss and right vision loss.   Cardiovascular: Negative for chest pain and syncope.   Respiratory: Negative for cough and shortness of breath.    Endocrine: Negative for polydipsia, polyphagia and polyuria.   Hematologic/Lymphatic: Negative for bleeding problem. Does not bruise/bleed easily.   Skin: Negative for dry skin, itching and rash.   Musculoskeletal: Negative for falls. Positive for left pain   Gastrointestinal: Negative for abdominal pain and bowel incontinence.   Genitourinary: Negative for bladder incontinence and nocturia.   Neurological: Negative for disturbances in coordination, loss of balance and seizures.   Psychiatric/Behavioral: Negative for depression. The patient does not have insomnia.    Allergic/Immunologic: Negative for hives and persistent infections.     PHYSICAL EXAMINATION    Vitals: Ht 5' 10" (1.778 m)   Wt 91.2 kg (201 lb 1 oz)   BMI 28.85 kg/m²     General: The patient appears active and healthy with no apparent physical problems.  No disturbance of mood or affect is demonstrated. Alert and Oriented.    HEENT: Eyes normal, pupils equally round, nose normal.    Resp: Equal and symmetrical chest rises. No wheezing    CV: Regular rate    Neck: Supple; nonpainful range of motion.    Extremities: no cyanosis, clubbing, edema, or diffuse swelling.  Palpable pulses, good capillary refill of the digits.  No coolness, discoloration, edema or obvious varicosities.    Skin: no lesions noted.    Lymphatic: no detected adenopathy in the upper or lower " extremities.    Neurologic: normal mental status, normal reflexes, normal gait and balance.  Patient is alert and oriented to person, place and time.  No flaccidity or spasticity is noted.  No motor or sensory deficits are noted.  Light touch is intact    Orthopaedic: KNEE EXAM - LEFT    Inspection:   Normal skin color and appearance with no scars, no ecchymosis and no effusion.      ROM:   0° - 135°.      Palpation:   There is no tenderness along medial plica, medial collateral ligament, pes bursa, lateral joint line, iliotibial band, lateral collateral ligament, popliteal fossa, patellar tendon, or quadriceps tendon. + tenderness medial joint line    Stability: - anterior drawer, - Lachman, - pivot shift and - posterior drawer.      No instability with varus or valgus stress at 0° or 30°. Negative dial  test at 30° and 90°.    Tests:   + Jasons test.  - patellar compression - grind test, - patellofemoral crepitus.  There is no patellar apprehension.     - J Sign. - Sofia's. - patellar tilt. - Radha. Lateral patella translation  2 quadrants. Medial patella translation 2 quadrants    Motor:   Quadriceps strength is 5/5 and hamstrings strength is 5/5. Hamstrings show no tightness.      Neuro:   Distal neurovascular status is normal    Vascular: Negative Homans and no palpable popliteal cords. 2+ pedal pulse with brisk cap refill    Gait Normal      IMAGING  MRI Knee Without Contrast Left  Narrative: EXAMINATION:  MRI KNEE WITHOUT CONTRAST LEFT    CLINICAL HISTORY:  Knee pain, chronic, negative xray (Age >= 5y);Presence of unspecified artificial knee joint    TECHNIQUE:  Multiplanar, multisequence images were performed about the LEFT knee.    COMPARISON:  05/10/2024    FINDINGS:  **MENISCI:    Medial meniscus: Predominantly longitudinal/vertical tear of the posterior horn body medial meniscus.    Lateral meniscus: Intact anterior horn, body, and posterior horn.    Meniscal root attachment: Intact    Popliteal  hiatus, superior and inferior meniscal fascicles:Nonvisualized without secondary signs such as pericapsular edema.    **LIGAMENTS:    Anterior cruciate ligament: Continuous, with normal signal.    Posterior cruciate ligament: Continuous, with normal signal.    Medial collateral ligament: Intact.    Lateral collateral ligament and  biceps femoris: Intact.    Iliotibial band (ITB): No evidence for ITB syndrome.    Popliteus tendon and popliteofibular ligament: Intact.    Posterior medial and posterior lateral corners: Intact.    **TENDONS:    Quadriceps tendon: Intact.    Patella tendon: Intact.    Joint fluid: Physiologic.    Hoffa's fat pad: Normal.    Intact medial retinaculum/ MPFL.    Intact lateral retinaculum.    **CARTILAGE:    Patellofemoral:Preserved medial and  lateral patellar facet cartilage.Median ridge demonstrates no focal abnormality.  Preserved trochlear groove cartilage.  Preservedanterior medial and anterior lateral femoral trochlea facet cartilage.    Medial tibiofemoral: Articular cartilage is preserved without focal defects or subchondral marrow edema.Internal aspect of medial femoral condyle cartilage is intact.    Lateral tibiofemoral: Articular cartilage is preserved without focal defects or subchondral marrow edema.Cartilage at posterior medial aspect of lateral tibial plateau is intact.    **OTHER:    Bone marrow: No fracture or marrow replacing process.    Miscellaneous: The gastrocnemius muscles are normal.Proximal tibia-fibula joint relationships preserved. No evident plica thickening.  Impression: Vertical/longitudinal tear of posterior horn and body medial meniscus.    Electronically signed by: Partha Ulrich MD  Date:    06/11/2024  Time:    03:56          IMPRESSION       ICD-10-CM ICD-9-CM   1. Bucket-handle tear of medial meniscus of left knee as current injury, initial encounter  S83.212A 836.0       MEDICATIONS PRESCRIBED      none    RECOMMENDATIONS     Surgical versus  non-surgical options discussed today; left knee arthroscopy with marrow stimulation and medial meniscus repair versus menisectomy  The patient elected to proceed with operative intervention after all risks, benefits, and alternatives were discussed with the patient.  The risks of surgery include bleeding, scar formation, injuries to nerves, arteries and veins, need for additional surgeries, blood clots, infections, inability to return to the same level of the performance and the risk of anesthesia.   We discussed the nature of his injury as well as the treatment options.  I did recommend a meniscal repair if possible.  He understands there is a possibility that the repair will not heal.  However, I did recommend if the quality of the tissue as well as the location are amenable to a repair, I did recommend a repair.      All questions were answered, pt will contact us for questions or concerns in the interim.

## 2024-06-19 ENCOUNTER — OFFICE VISIT (OUTPATIENT)
Dept: FAMILY MEDICINE | Facility: CLINIC | Age: 64
End: 2024-06-19
Payer: COMMERCIAL

## 2024-06-19 VITALS
SYSTOLIC BLOOD PRESSURE: 124 MMHG | OXYGEN SATURATION: 95 % | DIASTOLIC BLOOD PRESSURE: 68 MMHG | TEMPERATURE: 99 F | HEART RATE: 85 BPM | WEIGHT: 201.06 LBS | BODY MASS INDEX: 28.78 KG/M2 | HEIGHT: 70 IN

## 2024-06-19 DIAGNOSIS — N17.9 AKI (ACUTE KIDNEY INJURY): ICD-10-CM

## 2024-06-19 DIAGNOSIS — E87.5 HYPERKALEMIA: ICD-10-CM

## 2024-06-19 DIAGNOSIS — E11.22 TYPE 2 DIABETES MELLITUS WITH STAGE 3A CHRONIC KIDNEY DISEASE, WITHOUT LONG-TERM CURRENT USE OF INSULIN: ICD-10-CM

## 2024-06-19 DIAGNOSIS — I10 ESSENTIAL HYPERTENSION: Primary | ICD-10-CM

## 2024-06-19 DIAGNOSIS — N18.31 TYPE 2 DIABETES MELLITUS WITH STAGE 3A CHRONIC KIDNEY DISEASE, WITHOUT LONG-TERM CURRENT USE OF INSULIN: ICD-10-CM

## 2024-06-19 PROCEDURE — 3066F NEPHROPATHY DOC TX: CPT | Mod: CPTII,S$GLB,, | Performed by: FAMILY MEDICINE

## 2024-06-19 PROCEDURE — 3074F SYST BP LT 130 MM HG: CPT | Mod: CPTII,S$GLB,, | Performed by: FAMILY MEDICINE

## 2024-06-19 PROCEDURE — 99999 PR PBB SHADOW E&M-EST. PATIENT-LVL V: CPT | Mod: PBBFAC,,, | Performed by: FAMILY MEDICINE

## 2024-06-19 PROCEDURE — 4010F ACE/ARB THERAPY RXD/TAKEN: CPT | Mod: CPTII,S$GLB,, | Performed by: FAMILY MEDICINE

## 2024-06-19 PROCEDURE — 3044F HG A1C LEVEL LT 7.0%: CPT | Mod: CPTII,S$GLB,, | Performed by: FAMILY MEDICINE

## 2024-06-19 PROCEDURE — 1159F MED LIST DOCD IN RCRD: CPT | Mod: CPTII,S$GLB,, | Performed by: FAMILY MEDICINE

## 2024-06-19 PROCEDURE — 3078F DIAST BP <80 MM HG: CPT | Mod: CPTII,S$GLB,, | Performed by: FAMILY MEDICINE

## 2024-06-19 PROCEDURE — 3061F NEG MICROALBUMINURIA REV: CPT | Mod: CPTII,S$GLB,, | Performed by: FAMILY MEDICINE

## 2024-06-19 PROCEDURE — 99214 OFFICE O/P EST MOD 30 MIN: CPT | Mod: S$GLB,,, | Performed by: FAMILY MEDICINE

## 2024-06-19 PROCEDURE — 3008F BODY MASS INDEX DOCD: CPT | Mod: CPTII,S$GLB,, | Performed by: FAMILY MEDICINE

## 2024-06-19 RX ORDER — CLONIDINE 0.1 MG/24H
1 PATCH, EXTENDED RELEASE TRANSDERMAL
Qty: 2 PATCH | Refills: 0 | Status: SHIPPED | OUTPATIENT
Start: 2024-06-19 | End: 2024-06-20 | Stop reason: SDUPTHER

## 2024-06-19 NOTE — PATIENT INSTRUCTIONS
Doing well with clonidine  Would agree to change to patch  Patch takes 2-3 days to take effect  Start patch   Continue oral clonidine twice daily for 48-72 hours  Measure Bp frequently  After 7 days, update me, may need to refill at 0.2 mg/week  Will start low and slow    Consider 1/2 gabapentin? May lead to leg swelling  Try compression socks consistently    Consider EP? Not taking anticoagulation.

## 2024-06-19 NOTE — PROGRESS NOTES
"Subjective:       Patient ID: Partha Curtis Jr. is a 64 y.o. male.    Chief Complaint: Follow-up and Hypertension    Jarvis is a 64 y.o. male who presents today for f/u on his BP    HTN: taking candesartan 32 mg, clonidine 0.1 mg BID, and metoprolol. Off aldactone and chlorthalidone. Was hospitalized twice due to HANANE from chlorthalidone. Amlodipine stopped due to leg swelling. Off this medication, still swelling, but not as bad as before.     PAF: he stopped anticoagulation. Will continue metoprolol.     DM: amaryl 4 mg and ozempic 1 mg. Metformin stopped due to HANANE  DLD: on crestor and zetia.   Low T: seeing endocrine.   HANANE/CKD: better off diuretics.     Insomnia: taking elavil 75 mg. Also gabapentin 800 mg for sleep and back pain. Takes melatonin 2-3 times a week. He can sleep 6-7 hours.   Back pain/numbness: better    Nott waking up tired.     Weight up slightly.       Review of Systems   Constitutional:  Negative for chills and fever.   Respiratory:  Negative for shortness of breath.    Cardiovascular:  Positive for leg swelling. Negative for chest pain.   Gastrointestinal:  Negative for nausea and vomiting.   Neurological:  Negative for dizziness, light-headedness and headaches.                 Objective:     Vitals:    06/19/24 1004   BP: 124/68   BP Location: Left arm   Patient Position: Sitting   BP Method: Large (Manual)   Pulse: 85   Temp: 98.5 °F (36.9 °C)   TempSrc: Oral   SpO2: 95%   Weight: 91.2 kg (201 lb 1 oz)   Height: 5' 10" (1.778 m)        Physical Exam  Constitutional:       General: He is not in acute distress.     Appearance: He is not ill-appearing, toxic-appearing or diaphoretic.   Cardiovascular:      Rate and Rhythm: Normal rate and regular rhythm.   Pulmonary:      Effort: Pulmonary effort is normal.      Breath sounds: Normal breath sounds.   Skin:     Comments: Sunburn noted on the face/neck     Neurological:      Mental Status: He is alert.   Psychiatric:         Mood and Affect: Mood " normal.         Behavior: Behavior normal.         Thought Content: Thought content normal.         Judgment: Judgment normal.         Assessment:       1. Essential hypertension    2. Hyperkalemia    3. HANANE (acute kidney injury)    4. Type 2 diabetes mellitus with stage 3a chronic kidney disease, without long-term current use of insulin        Plan:       Doing well with clonidine  Would agree to change to patch  Patch takes 2-3 days to take effect  Start patch   Continue oral clonidine twice daily for 48-72 hours  Measure Bp frequently  After 7 days, update me, may need to refill at 0.2 mg/week  Will start low and slow    Consider 1/2 gabapentin? May lead to leg swelling  Try compression socks consistently    Consider EP? Not taking anticoagulation.     F/u 2 months. Labs prior.     Update me frequently regarding BP.       Essential hypertension  -     cloNIDine 0.1 mg/24 hr td ptwk (CATAPRES) 0.1 mg/24 hr; Place 1 patch onto the skin every 7 days.  Dispense: 2 patch; Refill: 0    Hyperkalemia  -     Comprehensive Metabolic Panel; Future; Expected date: 06/19/2024    HANANE (acute kidney injury)  -     Comprehensive Metabolic Panel; Future; Expected date: 06/19/2024    Type 2 diabetes mellitus with stage 3a chronic kidney disease, without long-term current use of insulin  -     CBC Auto Differential; Future; Expected date: 06/19/2024  -     Comprehensive Metabolic Panel; Future; Expected date: 06/19/2024  -     Hemoglobin A1C; Future; Expected date: 06/19/2024

## 2024-06-20 ENCOUNTER — TELEPHONE (OUTPATIENT)
Dept: FAMILY MEDICINE | Facility: CLINIC | Age: 64
End: 2024-06-20
Payer: COMMERCIAL

## 2024-06-20 DIAGNOSIS — I10 ESSENTIAL HYPERTENSION: ICD-10-CM

## 2024-06-20 RX ORDER — CLONIDINE 0.1 MG/24H
1 PATCH, EXTENDED RELEASE TRANSDERMAL
Qty: 4 PATCH | Refills: 0 | Status: SHIPPED | OUTPATIENT
Start: 2024-06-20 | End: 2025-06-20

## 2024-06-20 NOTE — TELEPHONE ENCOUNTER
----- Message from Milana Jim sent at 6/20/2024  9:59 AM CDT -----  Type:  Pharmacy Calling to Clarify an RX    Name of Caller:Maria with [facility]   Pharmacy Name:[facility] [id number] - [address] [place] [address] Street   Phone: [phone]  Fax: [phone]  Prescription Name:cloNIDine 0.1 mg/24 hr td ptwk (CATAPRES) 0.1 mg/24 hr  What do they need to clarify?:please resend only comes in 4 pack script was written 2  Best Call Back Number:  Additional Information: please resend

## 2024-06-24 ENCOUNTER — OFFICE VISIT (OUTPATIENT)
Dept: SPORTS MEDICINE | Facility: CLINIC | Age: 64
End: 2024-06-24
Payer: COMMERCIAL

## 2024-06-24 VITALS — BODY MASS INDEX: 28.78 KG/M2 | HEIGHT: 70 IN | WEIGHT: 201.06 LBS

## 2024-06-24 DIAGNOSIS — S83.212A BUCKET-HANDLE TEAR OF MEDIAL MENISCUS OF LEFT KNEE AS CURRENT INJURY, INITIAL ENCOUNTER: Primary | ICD-10-CM

## 2024-06-24 PROCEDURE — 1159F MED LIST DOCD IN RCRD: CPT | Mod: CPTII,S$GLB,, | Performed by: ORTHOPAEDIC SURGERY

## 2024-06-24 PROCEDURE — 4010F ACE/ARB THERAPY RXD/TAKEN: CPT | Mod: CPTII,S$GLB,, | Performed by: ORTHOPAEDIC SURGERY

## 2024-06-24 PROCEDURE — 3061F NEG MICROALBUMINURIA REV: CPT | Mod: CPTII,S$GLB,, | Performed by: ORTHOPAEDIC SURGERY

## 2024-06-24 PROCEDURE — 3044F HG A1C LEVEL LT 7.0%: CPT | Mod: CPTII,S$GLB,, | Performed by: ORTHOPAEDIC SURGERY

## 2024-06-24 PROCEDURE — 99999 PR PBB SHADOW E&M-EST. PATIENT-LVL III: CPT | Mod: PBBFAC,,, | Performed by: ORTHOPAEDIC SURGERY

## 2024-06-24 PROCEDURE — 3066F NEPHROPATHY DOC TX: CPT | Mod: CPTII,S$GLB,, | Performed by: ORTHOPAEDIC SURGERY

## 2024-06-24 PROCEDURE — 3008F BODY MASS INDEX DOCD: CPT | Mod: CPTII,S$GLB,, | Performed by: ORTHOPAEDIC SURGERY

## 2024-06-24 PROCEDURE — 99204 OFFICE O/P NEW MOD 45 MIN: CPT | Mod: S$GLB,,, | Performed by: ORTHOPAEDIC SURGERY

## 2024-06-27 ENCOUNTER — TELEPHONE (OUTPATIENT)
Dept: FAMILY MEDICINE | Facility: CLINIC | Age: 64
End: 2024-06-27
Payer: COMMERCIAL

## 2024-06-27 ENCOUNTER — PATIENT MESSAGE (OUTPATIENT)
Dept: FAMILY MEDICINE | Facility: CLINIC | Age: 64
End: 2024-06-27
Payer: COMMERCIAL

## 2024-06-27 DIAGNOSIS — S83.212A BUCKET-HANDLE TEAR OF MEDIAL MENISCUS OF LEFT KNEE AS CURRENT INJURY, INITIAL ENCOUNTER: Primary | ICD-10-CM

## 2024-06-27 NOTE — TELEPHONE ENCOUNTER
----- Message from Rocco Cavazos DO sent at 6/27/2024 11:53 AM CDT -----  Regarding: RE: Surgery Clearance  Oma,   Please schedule preop after 7/13/2024    Ar, if any is the preop labs/imaging/EKG requirements    -   ----- Message -----  From: Ar Oliver MA  Sent: 6/27/2024  11:48 AM CDT  To: [facility] Staff  Subject: Surgery Clearance                                Good Morning,    Mr. Curtis is scheduled for left knee surgery with Dr. Washington on 8/13/2024.    Medical clearance is indicated prior to this. The anesthesia team will require explicit documentation regarding clearance status and perioperative medical recommendations. The medical clearance is good for 30 days and will need to be within this window of the scheduled surgery date.     Thank you for your help & let me know if I can assist in any way!        Ar Oliver MA  Medical Assistant - Dr. Kai Washington  [facility]

## 2024-07-09 ENCOUNTER — TELEPHONE (OUTPATIENT)
Dept: ENDOSCOPY | Facility: HOSPITAL | Age: 64
End: 2024-07-09

## 2024-07-09 ENCOUNTER — PATIENT MESSAGE (OUTPATIENT)
Dept: ENDOSCOPY | Facility: HOSPITAL | Age: 64
End: 2024-07-09

## 2024-07-09 NOTE — TELEPHONE ENCOUNTER
Attempted to contact patient to schedule colonoscopy. The patient did not answer the call.  Left voice message  requesting a call back at [phone] to get procedure scheduled.

## 2024-07-15 ENCOUNTER — PATIENT MESSAGE (OUTPATIENT)
Dept: FAMILY MEDICINE | Facility: CLINIC | Age: 64
End: 2024-07-15
Payer: COMMERCIAL

## 2024-07-15 ENCOUNTER — PATIENT OUTREACH (OUTPATIENT)
Dept: ADMINISTRATIVE | Facility: HOSPITAL | Age: 64
End: 2024-07-15
Payer: COMMERCIAL

## 2024-07-15 ENCOUNTER — PATIENT MESSAGE (OUTPATIENT)
Dept: ADMINISTRATIVE | Facility: HOSPITAL | Age: 64
End: 2024-07-15
Payer: COMMERCIAL

## 2024-07-15 DIAGNOSIS — I10 ESSENTIAL HYPERTENSION: Primary | ICD-10-CM

## 2024-07-15 RX ORDER — CLONIDINE 0.3 MG/24H
1 PATCH, EXTENDED RELEASE TRANSDERMAL
Qty: 4 PATCH | Refills: 0 | Status: SHIPPED | OUTPATIENT
Start: 2024-07-15 | End: 2025-07-15

## 2024-07-15 NOTE — PROGRESS NOTES
Population Health Chart Review & Patient Outreach Details      Additional [facility] Health Notes:               Updates Requested / Reviewed:      Updated Care Coordination Note, Care Everywhere, and Immunizations Reconciliation Completed or Queried: [place]         Health Maintenance Topics Overdue:      VB Score: 2     Colon Cancer Screening  Foot Exam    RSV Vaccine                  Health Maintenance Topic(s) Outreach Outcomes & Actions Taken:    Colorectal Cancer Screening - Outreach Outcomes & Actions Taken  : Outreach on 07/09/24 via telephone and portal by endo . Number to endo scheduling provided again today via portal.

## 2024-07-17 ENCOUNTER — TELEPHONE (OUTPATIENT)
Dept: ENDOSCOPY | Facility: HOSPITAL | Age: 64
End: 2024-07-17
Payer: COMMERCIAL

## 2024-07-17 NOTE — TELEPHONE ENCOUNTER
Attempted to contact patient re: missed PAT appt to schedule colonoscopy. The patient did not answer the call. Left voice message requesting a call back at # [phone] to get procedure scheduled.

## 2024-07-31 ENCOUNTER — OFFICE VISIT (OUTPATIENT)
Dept: FAMILY MEDICINE | Facility: CLINIC | Age: 64
End: 2024-07-31
Payer: COMMERCIAL

## 2024-07-31 VITALS
TEMPERATURE: 97 F | SYSTOLIC BLOOD PRESSURE: 132 MMHG | OXYGEN SATURATION: 96 % | WEIGHT: 199.06 LBS | DIASTOLIC BLOOD PRESSURE: 82 MMHG | HEART RATE: 78 BPM | BODY MASS INDEX: 28.5 KG/M2 | HEIGHT: 70 IN

## 2024-07-31 DIAGNOSIS — R21 RASH: ICD-10-CM

## 2024-07-31 DIAGNOSIS — I10 ESSENTIAL HYPERTENSION: ICD-10-CM

## 2024-07-31 DIAGNOSIS — N18.31 TYPE 2 DIABETES MELLITUS WITH STAGE 3A CHRONIC KIDNEY DISEASE, WITHOUT LONG-TERM CURRENT USE OF INSULIN: ICD-10-CM

## 2024-07-31 DIAGNOSIS — E11.22 TYPE 2 DIABETES MELLITUS WITH STAGE 3A CHRONIC KIDNEY DISEASE, WITHOUT LONG-TERM CURRENT USE OF INSULIN: ICD-10-CM

## 2024-07-31 DIAGNOSIS — Z01.818 PREOPERATIVE CLEARANCE: Primary | ICD-10-CM

## 2024-07-31 PROCEDURE — 4010F ACE/ARB THERAPY RXD/TAKEN: CPT | Mod: CPTII,S$GLB,, | Performed by: FAMILY MEDICINE

## 2024-07-31 PROCEDURE — 3044F HG A1C LEVEL LT 7.0%: CPT | Mod: CPTII,S$GLB,, | Performed by: FAMILY MEDICINE

## 2024-07-31 PROCEDURE — 99214 OFFICE O/P EST MOD 30 MIN: CPT | Mod: S$GLB,,, | Performed by: FAMILY MEDICINE

## 2024-07-31 PROCEDURE — 1159F MED LIST DOCD IN RCRD: CPT | Mod: CPTII,S$GLB,, | Performed by: FAMILY MEDICINE

## 2024-07-31 PROCEDURE — 3066F NEPHROPATHY DOC TX: CPT | Mod: CPTII,S$GLB,, | Performed by: FAMILY MEDICINE

## 2024-07-31 PROCEDURE — 3075F SYST BP GE 130 - 139MM HG: CPT | Mod: CPTII,S$GLB,, | Performed by: FAMILY MEDICINE

## 2024-07-31 PROCEDURE — 3061F NEG MICROALBUMINURIA REV: CPT | Mod: CPTII,S$GLB,, | Performed by: FAMILY MEDICINE

## 2024-07-31 PROCEDURE — 1160F RVW MEDS BY RX/DR IN RCRD: CPT | Mod: CPTII,S$GLB,, | Performed by: FAMILY MEDICINE

## 2024-07-31 PROCEDURE — 3008F BODY MASS INDEX DOCD: CPT | Mod: CPTII,S$GLB,, | Performed by: FAMILY MEDICINE

## 2024-07-31 PROCEDURE — 3079F DIAST BP 80-89 MM HG: CPT | Mod: CPTII,S$GLB,, | Performed by: FAMILY MEDICINE

## 2024-07-31 PROCEDURE — 99999 PR PBB SHADOW E&M-EST. PATIENT-LVL V: CPT | Mod: PBBFAC,,, | Performed by: FAMILY MEDICINE

## 2024-07-31 RX ORDER — CLONIDINE HYDROCHLORIDE 0.1 MG/1
0.1 TABLET ORAL 3 TIMES DAILY
Qty: 270 TABLET | Refills: 0 | Status: SHIPPED | OUTPATIENT
Start: 2024-07-31 | End: 2025-07-31

## 2024-07-31 RX ORDER — IBUPROFEN 200 MG
16 TABLET ORAL
Qty: 30 TABLET | Refills: 12 | Status: SHIPPED | OUTPATIENT
Start: 2024-07-31 | End: 2025-07-31

## 2024-07-31 RX ORDER — GLIMEPIRIDE 4 MG/1
2 TABLET ORAL
Start: 2024-07-31

## 2024-07-31 RX ORDER — HYDROCORTISONE 25 MG/G
CREAM TOPICAL 2 TIMES DAILY
Qty: 28 G | Refills: 0 | Status: SHIPPED | OUTPATIENT
Start: 2024-07-31

## 2024-07-31 NOTE — PATIENT INSTRUCTIONS
HOLD ozempic after today's dose  Cut amaryl to 2 mg, contact me if sugars are too high.     Has had extensive cardiac workup recently given medical issues.  No further labs or EKG at this time.      No AMRYL am of surgery. Do take your clonidine in the AM with a sip of water.     No supplements starting now until after the surgery.   Do not think testosterone needs to be held (Association of Testosterone Replacement Therapy and the Incidence of a Composite of Postoperative In-hospital Mortality and Cardiovascular Events in Men Undergoing Noncardiac Surgery  Anesthesiology  American Society of Anesthesiologists (asahq.org))    Need to closely monitor BP during and after surgery.     Rash, unclear. Due to clonidine vs other? Rosacea? Try hydrocortisone cream. If not working, try metrogel, refer to derm    Cleared for surgery.

## 2024-07-31 NOTE — PROGRESS NOTES
Subjective:       Patient ID: Partha Curtis Jr. is a 64 y.o. male.    Chief Complaint: Pre-op Exam    Partha Curtis Jr. is a 64 y.o. male who presents to the office today for a preoperative consultation at the request of surgeon Dr. Washington who plans on performing ARTHROSCOPY,KNEE,WITH MENISCUS REPAIR on August 13. This consultation is requested for the specific conditions prompting preoperative evaluation (i.e. because of potential affect on operative risk): complex medical issue. Planned anesthesia is general. The patient has the following known anesthesia issues: none. Patient has a bleeding risk of: no recent abnormal bleeding and no remote history of abnormal bleeding. Patient does not have objections to receiving blood products if needed.    HTN: patch is not working. Will return to clonidine 0.1 mg TID.  Had been taking 0.3 mg patch with 0.1 mg of clonidine daily. But he doesn't like this regimen. Was feeling fine of clonidine 0.1 mg TID.     He reports this morning, his sugar dropped to 66. He took glucose tablets. This improved the sugars. First episode in 1 year. Only 2 episodes overall. No cough fever or congestion. Denies any change in stress. Will decrease amaryl 2 mg.     Has a rash on his face. He thinks it was a sunburn but it never improved. It doesn't itch normally but sometimes it feels itchy and the skin flakes off when he scratches it. He is not sure of what triggered this but he has had multiple medication changes over the last 6 months.     Has a history of atrial fibrillation s/p ablation, not on anticoagulation.           Review of Systems   Constitutional:  Negative for chills and fever.   Respiratory:  Negative for chest tightness and shortness of breath.    Cardiovascular:  Negative for chest pain.   Gastrointestinal:  Negative for nausea and vomiting.   Musculoskeletal:  Positive for arthralgias. Negative for back pain.   Skin:  Positive for rash.   Neurological:  Negative for  "dizziness, numbness and headaches.             Objective:     Vitals:    07/31/24 1034   BP: 132/82   Pulse: 78   Temp: 96.9 °F (36.1 °C)   SpO2: 96%   Weight: 90.3 kg (199 lb 1.2 oz)   Height: 5' 10" (1.778 m)        Physical Exam  Constitutional:       General: He is not in acute distress.     Appearance: He is not ill-appearing, toxic-appearing or diaphoretic.   Cardiovascular:      Rate and Rhythm: Normal rate and regular rhythm.   Pulmonary:      Effort: Pulmonary effort is normal.      Breath sounds: Normal breath sounds.   Skin:     Findings: Rash present.      Comments: Rash noted on the face.  Not noted on arms or legs or chest or back.      Neurological:      Mental Status: He is alert.   Psychiatric:         Mood and Affect: Mood normal.         Behavior: Behavior normal.         Thought Content: Thought content normal.         Judgment: Judgment normal.                       Assessment:       1. Preoperative clearance    2. Essential hypertension    3. Type 2 diabetes mellitus with stage 3a chronic kidney disease, without long-term current use of insulin    4. Rash        Plan:         64 y.o. male with planned surgery as above.    Known risk factors for perioperative complications: Diabetes mellitus    Difficulty with intubation is not anticipated.    Cardiac Risk Estimation: per the Revised Cardiac Risk Index (Circ. 100:1043, 1999), the patient's risk factors for cardiac complications include none, putting him in: RCI RISK CLASS I (0 risk factors, risk of major cardiac compl. appr. 0.5%)    Current medications which may produce withdrawal symptoms if withheld perioperatively: clonidine, elavil.       Plan:    1. Preoperative workup as follows none.  2. Change in medication regimen before surgery: see below.  3. Prophylaxis for cardiac events with perioperative beta-blockers: should be considered, specific regimen per anesthesia.  4. Invasive hemodynamic monitoring perioperatively: at the discretion of " anesthesiologist.  5. Deep vein thrombosis prophylaxis postoperatively:regimen to be chosen by surgical team.  6. Surveillance for postoperative MI with ECG immediately postoperatively and on postoperative days 1 and 2 AND troponin levels 24 hours postoperatively and on day 4 or hospital discharge (whichever comes first): should be considered.    HOLD ozempic after today's dose  Cut amaryl to 2 mg, contact me if sugars are too high.     Has had extensive cardiac workup recently given medical issues.  No further labs or EKG at this time.      No AMRYL am of surgery. Do take your clonidine in the AM with a sip of water.     No vitamins or OTC supplements starting now until after the surgery.   Do not think testosterone needs to be held (Association of Testosterone Replacement Therapy and the Incidence of a Composite of Postoperative In-hospital Mortality and Cardiovascular Events in Men Undergoing Noncardiac Surgery  Anesthesiology  American Society of Anesthesiologists (asahq.org))    Need to closely monitor BP during and after surgery.     Rash, unclear. Due to clonidine vs other? Rosacea? Try hydrocortisone cream. If not working, try metrogel, refer to derm    Cleared for surgery. If there are any cardiac questions, please discuss with patient's cardiologist.     Preoperative clearance    Essential hypertension  -     cloNIDine (CATAPRES) 0.1 MG tablet; Take 1 tablet (0.1 mg total) by mouth 3 (three) times daily.  Dispense: 270 tablet; Refill: 0    Type 2 diabetes mellitus with stage 3a chronic kidney disease, without long-term current use of insulin  -     glimepiride (AMARYL) 4 MG tablet; Take 0.5 tablets (2 mg total) by mouth before breakfast.    Rash  -     hydrocortisone 2.5 % cream; Apply topically 2 (two) times daily.  Dispense: 28 g; Refill: 0  -     Ambulatory referral/consult to Dermatology; Future; Expected date: 08/07/2024    Other orders  -     glucose 4 GM chewable tablet; Take 4 tablets (16 g  total) by mouth as needed for Low blood sugar.  Dispense: 30 tablet; Refill: 12

## 2024-08-05 ENCOUNTER — PATIENT MESSAGE (OUTPATIENT)
Dept: PREADMISSION TESTING | Facility: HOSPITAL | Age: 64
End: 2024-08-05
Payer: COMMERCIAL

## 2024-08-05 NOTE — ANESTHESIA PAT ROS NOTE
08/05/2024  Partha Curtis Jr. is a 64 y.o., male.      Pre-op Assessment    I have reviewed the Patient Summary Reports.       I have reviewed the Medications.     Review of Systems  Anesthesia Hx:  No problems with previous Anesthesia   History of prior surgery of interest to airway management or planning:  Previous anesthesia: General, MAC 7/15/2015  right Carotid Endarterectomy with general anesthesia.  Procedure performed at an [facility].      2/28/2024  ELIZABETH with MAC.  Procedure performed at an [facility]. Airway issues documented on chart review include mask, easy, GETA, easy direct laryngoscopy , view on direct laryngoscopy Grade I      Denies Personal Hx of Anesthesia complications.                    Social:  Non-Smoker, Social Alcohol Use 2 cans of beer per week      Hematology/Oncology:    Oncology Normal    -- Denies Anemia:               Hematology Comments: Vitamin D deficiency,  B12 deficiency                    EENT/Dental:   Allergies, Nasal Septum Surgery,  Bilateral hearing loss          Cardiovascular:  Exercise tolerance: good   Hypertension, well controlled    Denies CAD.    Denies CABG/stent. Dysrhythmias atrial fibrillation  Denies Angina.     hyperlipidemia  Denies PAINTING.  ECG has been reviewed. H/O Paroxysmal A-Fib,  S/P ELIZABETH, Cardioversion,   Carotid Artery Occlusion, S/P right Carotid Endarterectomy-2015,  Septal infarct noted, not present on echocardiography- per Cardiology                         Pulmonary:    Denies COPD.  Denies Asthma.   Denies Shortness of breath.  Sleep Apnea, CPAP           Education provided regarding risk of obstructive sleep apnea            Renal/:  Chronic Renal Disease, CKD   Stage 3 CKD,  Microalbuminuria due to type 2 diabetes mellitus             Hepatic/GI:      Denies GERD. Denies Liver Disease.  H/O Colonic  Polyp,  Transaminitis,  Appendectomy          Musculoskeletal:     Bucket-handle tear of medial meniscus of left knee,  H/O Right TKA,   6 surgeries to right knee,   Chronic back pain, S/P SELENA's, cervical & lumbar,  Herniation of intervertebral disc,  DDD lumbar spine,   Left Ankle surgery X2,  Tendinopathy of rotator cuff, left       Spine Disorders: lumbar and cervical Chronic Pain, Disc disease and Degenerative disease           Neurological:    Denies CVA. Neuromuscular Disease,   Denies Headaches. Denies Seizures.    Bilateral Carpal Tunnel Syndrome, S/P CTR,  Cervical radiculopathy,  Chronic bilateral low back pain without sciatica,  Myofascial pain syndrome                            Endocrine:  Diabetes, well controlled, type 2 Denies Hypothyroidism.  HA1C = 6.7 on 5/13/2024,  Hypogonadism in male,  Long-term current use of testosterone replacement therapy        Dermatological:  Rash to face   Psych:   anxiety  Insomnia              Past Medical History:   Diagnosis Date    Allergy     Carotid artery occlusion     Chronic back pain     Colonic polyp     Genetic testing     MUTYH mutation-negative    Hyperlipidemia     Hypertension     Paroxysmal atrial fibrillation 2/28/2024    Sleep apnea     Type 2 diabetes mellitus with stage 3 chronic kidney disease, without long-term current use of insulin 4/2/2020     Past Surgical History:   Procedure Laterality Date    ANKLE SURGERY Left     2    APPENDECTOMY      at age 20    CAROTID ENDARTERECTOMY Right 07/15/2015    CARPAL TUNNEL RELEASE Bilateral     COLONOSCOPY N/A 12/14/2018    Procedure: COLONOSCOPYSuprep;  Surgeon: Silver Selby MD;  Location: [facility] ENDO;  Service: Endoscopy;  Laterality: N/A;    EPIDURAL STEROID INJECTION N/A 2/26/2024    Procedure: CERVICAL C7/T1 IL SELENA;  Surgeon: Gokul Fung MD;  Location: [facility] PAIN MGT;  Service: Pain Management;  Laterality: N/A;  [phone]  2 WK F/U SERGEI    EPIDURAL STEROID INJECTION N/A 5/23/2024    Procedure:  LUMBAR L4/5 IL SELENA *ASPIRIN OTC* HOLD FOR 5 DAYS;  Surgeon: Gokul Fung MD;  Location: [facility] PAIN MGT;  Service: Pain Management;  Laterality: N/A;  [phone]  2 WK F/U NOHELIA    JOINT REPLACEMENT  9/2010    NASAL SEPTUM SURGERY      TOTAL KNEE ARTHROPLASTY Right     6 knee surgeries    TRANSESOPHAGEAL ECHOCARDIOGRAM WITH POSSIBLE CARDIOVERSION (ELIZABETH W/ POSS CARDIOVERSION) N/A 2/28/2024    Procedure: Transesophageal echo (ELIZABETH) intra-procedure log documentation;  Surgeon: Ahmet De La Cruz MD;  Location: [facility] CATH LAB/EP;  Service: Cardiology;  Laterality: N/A;       Anesthesia Assessment: Preoperative EQUATION    Planned Procedure: Procedure(s) (LRB):  ARTHROSCOPY,KNEE,WITH MENISCUS REPAIR (Left)  ARTHROSCOPY, KNEE, WITH CHONDROPLASTY (Left)  Requested Anesthesia Type:General  Surgeon: Kai Washington MD  Service: Orthopedics  Known or anticipated Date of Surgery:8/13/2024    Surgeon notes: reviewed    Electronic QUestionnaire Assessment completed via nurse interview with patient.        Triage considerations:     The patient has no apparent active cardiac condition (No unstable coronary Syndrome such as severe unstable angina or recent [<1 month] myocardial infarction, decompensated CHF, severe valvular   disease or significant arrhythmia)    Previous anesthesia records:GETA, MAC, Easy airway, Easy intubation, and No problems    [facility]   Subspecialty notes: Cardiology: General, Spine Service    Other important co-morbidities: A FIB, DM2, HLD, HTN, and ALLA       EKG 3/4/2024:  Vent. Rate : 060 BPM     Atrial Rate : 060 BPM      P-R Int : 184 ms          QRS Dur : 084 ms       QT Int : 418 ms       P-R-T Axes : 047 -01 032 degrees      QTc Int : 418 ms   Normal sinus rhythm   Anteroseptal infarct (cited on or before 21-JUN-2022)   Abnormal ECG   When compared with ECG of 28-FEB-2024 11:34,   Criteria for Inferior infarct are no longer Present   Confirmed by Emerson  Reuben TAYLOR JR., MD ([id number]) on 3/4/2024 3:55:43 PM       ELIZABETH 2/28/2024:  Summary     Left Ventricle: There is normal systolic function.    Right Ventricle: Normal right ventricular cavity size. Systolic function is normal.    Mitral Valve: There is mild regurgitation.    No obvious thrombus noted on interatrial septum or in left atrial appendage.    Echo TTE 2/28/2024:   Summary     Left Ventricle: The left ventricle is normal in size. Normal wall thickness. There is concentric remodeling. There is low normal systolic function with a visually estimated ejection fraction of 50 - 55%. Unable to assess diastolic function due to atrial fibrillation.    Right Ventricle: Normal right ventricular cavity size. Wall thickness is normal. Right ventricle wall motion  is normal. Systolic function is normal.    Aortic Valve: The aortic valve is a trileaflet valve. There is mild aortic valve sclerosis.    Mitral Valve: There is mild regurgitation.    Pulmonary Artery: The estimated pulmonary artery systolic pressure is 26 mmHg.    IVC/SVC: Normal venous pressure at 3 mmHg.      Pharmacological Stress Echo 3/9/2018:  EKG Conclusions:   1. The EKG portion of this study is negative for ischemia at a peak heart rate of 141 bpm (87% of predicted).   2. Blood pressure remained stable throughout the protocol  (Presenting BP: 125/87 Peak BP: 91/49).   3. No significant arrhythmias were present.   4. There were no symptoms of chest discomfort or significant dyspnea throughout the protocol.     CONCLUSIONS     1 - Normal left ventricular systolic function (EF 60-65%).     2 - Normal left ventricular diastolic function.     3 - Normal right ventricular systolic function .   No evidence of stress induced myocardial ischemia.        Tests already available:  Results have been reviewed.             Instructions given. (See in Nurse's note)  Preop medication instructions sent via portal message.     Optimization:  Anesthesia Preop Clinic Assessment  Not Indicated    Medical Opinion Indicated: Yes, PCP       Sub-specialist consult indicated: No      Plan:  Consultation:Patient's PCP for a statement of optimization             Patient  has previously scheduled Medical Appointment: 7/31/2024    Navigation: Patient is cleared/ optimized for surgery. Closely monitor BP during and after surgery.       Ht: 5'10  Wt: 90.3 kg (199 lb)  BMI: 28.56  Vaccinated

## 2024-08-07 ENCOUNTER — OFFICE VISIT (OUTPATIENT)
Dept: SPORTS MEDICINE | Facility: CLINIC | Age: 64
End: 2024-08-07
Payer: COMMERCIAL

## 2024-08-07 VITALS
BODY MASS INDEX: 28.85 KG/M2 | WEIGHT: 201.5 LBS | DIASTOLIC BLOOD PRESSURE: 85 MMHG | HEIGHT: 70 IN | HEART RATE: 69 BPM | SYSTOLIC BLOOD PRESSURE: 135 MMHG

## 2024-08-07 DIAGNOSIS — S83.212A BUCKET-HANDLE TEAR OF MEDIAL MENISCUS OF LEFT KNEE AS CURRENT INJURY, INITIAL ENCOUNTER: Primary | ICD-10-CM

## 2024-08-07 PROCEDURE — 99499 UNLISTED E&M SERVICE: CPT | Mod: S$GLB,,, | Performed by: PHYSICIAN ASSISTANT

## 2024-08-07 PROCEDURE — 99999 PR PBB SHADOW E&M-EST. PATIENT-LVL V: CPT | Mod: PBBFAC,,, | Performed by: PHYSICIAN ASSISTANT

## 2024-08-07 RX ORDER — CEFAZOLIN SODIUM 2 G/50ML
2 SOLUTION INTRAVENOUS
OUTPATIENT
Start: 2024-08-07

## 2024-08-07 RX ORDER — HYDROCODONE BITARTRATE AND ACETAMINOPHEN 7.5; 325 MG/1; MG/1
1 TABLET ORAL EVERY 6 HOURS PRN
Qty: 20 TABLET | Refills: 0 | Status: SHIPPED | OUTPATIENT
Start: 2024-08-07

## 2024-08-07 RX ORDER — MUPIROCIN 20 MG/G
OINTMENT TOPICAL
OUTPATIENT
Start: 2024-08-07

## 2024-08-07 RX ORDER — ASPIRIN 81 MG/1
81 TABLET ORAL DAILY
Qty: 28 TABLET | Refills: 0 | Status: SHIPPED | OUTPATIENT
Start: 2024-08-07

## 2024-08-07 NOTE — H&P (VIEW-ONLY)
H&P    History 8/7/2024:  Jarvis returns here today for follow-up evaluation of his left knee and to complete his preoperative paperwork.  He continues to have pain and functional limitations despite conservative treatment.  He is scheduled to undergo a left knee arthroscopy with medial meniscal repair and marrow stimulation versus meniscectomy on August 13, 2024.    History 6/24/2024:  64 y.o. Male with a history of left knee pain who was squatting down and felt a pop in his left knee.  He felt significant pain and had difficulty extending his knee.  He states he is very apprehensive to squat or rotate.  He points to the posterior medial aspect of his knee as the source of pain..        Previous hx of TKA on right knee   Previous ACL, meniscus repair  on right knee     + mechanical symptoms, + instability    Is affecting ADLs.  Pain is 3/10 at it's worst.        PAST MEDICAL HISTORY    Past Medical History:   Diagnosis Date    Allergy     Carotid artery occlusion     Chronic back pain     Colonic polyp     Genetic testing     MUTYH mutation-negative    Hyperlipidemia     Hypertension     Paroxysmal atrial fibrillation 2/28/2024    Sleep apnea     Type 2 diabetes mellitus with stage 3 chronic kidney disease, without long-term current use of insulin 4/2/2020       PAST SURGICAL HISTORY     Past Surgical History:   Procedure Laterality Date    ANKLE SURGERY Left     2    APPENDECTOMY      at age 20    CAROTID ENDARTERECTOMY Right 07/15/2015    CARPAL TUNNEL RELEASE Bilateral     COLONOSCOPY N/A 12/14/2018    Procedure: COLONOSCOPYSuprep;  Surgeon: Silver Selby MD;  Location: [facility] ENDO;  Service: Endoscopy;  Laterality: N/A;    EPIDURAL STEROID INJECTION N/A 2/26/2024    Procedure: CERVICAL C7/T1 IL SELENA;  Surgeon: Gokul Fung MD;  Location: [facility] PAIN MGT;  Service: Pain Management;  Laterality: N/A;  [phone]  2 WK F/U SERGEI    EPIDURAL STEROID INJECTION N/A 5/23/2024    Procedure: LUMBAR L4/5 IL SELENA *ASPIRIN  OTC* HOLD FOR 5 DAYS;  Surgeon: Gokul Fung MD;  Location: [facility] PAIN MGT;  Service: Pain Management;  Laterality: N/A;  [phone]  2 WK F/U NOHELIA    JOINT REPLACEMENT  9/2010    NASAL SEPTUM SURGERY      TOTAL KNEE ARTHROPLASTY Right     6 knee surgeries    TRANSESOPHAGEAL ECHOCARDIOGRAM WITH POSSIBLE CARDIOVERSION (ELIZABETH W/ POSS CARDIOVERSION) N/A 2/28/2024    Procedure: Transesophageal echo (ELIZABETH) intra-procedure log documentation;  Surgeon: Ahmet De La Cruz MD;  Location: [facility] CATH LAB/EP;  Service: Cardiology;  Laterality: N/A;       FAMILY HISTORY    Family History   Problem Relation Name Age of Onset    Brain cancer Mother Klarissa 67    Cancer Mother Klarissa     Hypertension Father Keanu     Lung cancer Father Keanu         x2 (PAUL initially- treated surgically only, then recurred distantly) (smoker, & had been exposed to many chemicals)    Cancer Father Keanu     Breast cancer Sister  58    Genetic Disorder Sister          monoallelic MUTYH mutation    Seizures Daughter      Cancer Maternal Grandfather Valerio     Brain cancer Paternal Grandfather  73    Breast cancer Maternal Cousin  57    Aneurysm Other mgm's father 48        brain    Ulcerative colitis Other brother's son        SOCIAL HISTORY    Social History     Socioeconomic History    Marital status:    Tobacco Use    Smoking status: Never     Passive exposure: Never    Smokeless tobacco: Never   Substance and Sexual Activity    Alcohol use: Yes     Alcohol/week: 2.0 standard drinks of alcohol     Types: 2 Cans of beer per week     Comment: a week    Drug use: Never    Sexual activity: Yes     Partners: Female     Birth control/protection: None   Social History Narrative    5/11/2021: . He lives with his wife and 2 kids. (5 kids total). He has one dog, one cat, no more chickens at home. One dog recently passed away. No smokers at home.      Social Determinants of Health     Financial Resource Strain: Low Risk  (3/8/2024)  "   Overall Financial Resource Strain (CARDIA)     Difficulty of Paying Living Expenses: Not very hard   Food Insecurity: No Food Insecurity (3/8/2024)    Hunger Vital Sign     Worried About Running Out of Food in the Last Year: Never true     Ran Out of Food in the Last Year: Never true   Transportation Needs: No Transportation Needs (3/8/2024)    PRAPARE - Transportation     Lack of Transportation (Medical): No     Lack of Transportation (Non-Medical): No   Physical Activity: Unknown (3/8/2024)    Exercise Vital Sign     Days of Exercise per Week: 0 days   Stress: Stress Concern Present (3/8/2024)    [employer] - Occupational Stress Questionnaire     Feeling of Stress : To some extent   Housing Stability: Low Risk  (3/8/2024)    Housing Stability Vital Sign     Unable to Pay for Housing in the Last Year: No     Number of Places Lived in the Last Year: 1     Unstable Housing in the Last Year: No       MEDICATIONS      Current Outpatient Medications:     amitriptyline (ELAVIL) 75 MG tablet, Take 1 tablet (75 mg total) by mouth every evening., Disp: 90 tablet, Rfl: 1    aspirin (ECOTRIN) 81 MG EC tablet, Take 81 mg by mouth every evening., Disp: , Rfl:     BD LUER-RUSS SYRINGE 3 mL 25 gauge x 1" Syrg, USE ONE SYRINGE EVERY 14 DAYS WITH TESTOSTERONE, Disp: 100 each, Rfl: 2    candesartan (ATACAND) 32 MG tablet, Take 1 tablet (32 mg total) by mouth once daily., Disp: 90 tablet, Rfl: 3    cloNIDine (CATAPRES) 0.1 MG tablet, Take 1 tablet (0.1 mg total) by mouth 3 (three) times daily., Disp: 270 tablet, Rfl: 0    cyanocobalamin (VITAMIN B-12) 1000 MCG tablet, Take 1 tablet (1,000 mcg total) by mouth once daily., Disp: 90 tablet, Rfl: 4    ergocalciferol (ERGOCALCIFEROL) 50,000 unit Cap, Take 1 capsule (50,000 Units total) by mouth every 7 days., Disp: 12 capsule, Rfl: 0    ezetimibe (ZETIA) 10 mg tablet, Take 1 tablet (10 mg total) by mouth once daily., Disp: 90 tablet, Rfl: 3    gabapentin " "(NEURONTIN) 800 MG tablet, Take 1 tablet (800 mg total) by mouth every evening., Disp: 90 tablet, Rfl: 1    glimepiride (AMARYL) 4 MG tablet, Take 0.5 tablets (2 mg total) by mouth before breakfast., Disp: , Rfl:     glucose 4 GM chewable tablet, Take 4 tablets (16 g total) by mouth as needed for Low blood sugar., Disp: 30 tablet, Rfl: 12    hydrocortisone 2.5 % cream, Apply topically 2 (two) times daily., Disp: 28 g, Rfl: 0    ipratropium (ATROVENT) 42 mcg (0.06 %) nasal spray, 2 sprays by Nasal route 2 (two) times daily as needed for Rhinitis., Disp: 15 mL, Rfl: 0    metoprolol succinate (TOPROL-XL) 200 MG 24 hr tablet, Take 1 tablet (200 mg total) by mouth once daily. (Patient taking differently: Take 200 mg by mouth every evening.), Disp: 90 tablet, Rfl: 3    oxyCODONE-acetaminophen (PERCOCET)  mg per tablet, Take 1 tablet by mouth every 12 (twelve) hours as needed for Pain., Disp: 60 tablet, Rfl: 0    rosuvastatin (CRESTOR) 40 MG Tab, Take 1 tablet (40 mg total) by mouth every evening., Disp: 90 tablet, Rfl: 3    semaglutide (OZEMPIC) 1 mg/dose (4 mg/3 mL), Inject 1 mg into the skin every 7 days., Disp: 9 mL, Rfl: 3    sod sulf-pot chloride-mag sulf (SUTAB) 1.479-0.188- 0.225 gram tablet, Take 12 tablets by mouth once daily. Take according to package instructions with indicated amount of water., Disp: 24 tablet, Rfl: 0    syringe with needle, safety (BD INTEGRA SYRINGE) 3 mL 22 gauge x 1 1/2" Syrg, Inject 1 Syringe as directed once a week., Disp: 6 each, Rfl: 3    testosterone cypionate (DEPOTESTOTERONE CYPIONATE) 200 mg/mL injection, Inject 1 mL (200 mg total) into the muscle every 14 (fourteen) days., Disp: 2 mL, Rfl: 5    valACYclovir (VALTREX) 1000 MG tablet, TAKE 2 TABLETS BY MOUTH EVERY 12 HOURS (Patient taking differently: Take 2,000 mg by mouth every 12 (twelve) hours as needed.), Disp: 4 tablet, Rfl: 3    diazePAM (VALIUM) 10 MG Tab, Take 1 tablet (10 mg total) by mouth On call Procedure (take 1 " "tablet 1 hour prior to MRI. Repeat x1 if needed)., Disp: 2 tablet, Rfl: 0  No current facility-administered medications for this visit.    Facility-Administered Medications Ordered in Other Visits:     0.9%  NaCl infusion, , Intravenous, Continuous, Kendell Hoffman MD    0.9%  NaCl infusion, , Intravenous, Continuous, Gerardo Uribe MD    ALLERGIES     Review of patient's allergies indicates:   Allergen Reactions    Stadol [butorphanol tartrate] Rash     Swelling in face    Strawberries [strawberry] Rash         REVIEW OF SYSTEMS   Constitution: Negative. Negative for chills, fever and night sweats.   HENT: Negative for congestion and headaches.    Eyes: Negative for blurred vision, left vision loss and right vision loss.   Cardiovascular: Negative for chest pain and syncope.   Respiratory: Negative for cough and shortness of breath.    Endocrine: Negative for polydipsia, polyphagia and polyuria.   Hematologic/Lymphatic: Negative for bleeding problem. Does not bruise/bleed easily.   Skin: Negative for dry skin, itching and rash.   Musculoskeletal: Negative for falls. Positive for left pain   Gastrointestinal: Negative for abdominal pain and bowel incontinence.   Genitourinary: Negative for bladder incontinence and nocturia.   Neurological: Negative for disturbances in coordination, loss of balance and seizures.   Psychiatric/Behavioral: Negative for depression. The patient does not have insomnia.    Allergic/Immunologic: Negative for hives and persistent infections.     PHYSICAL EXAMINATION    Vitals: /85   Pulse 69   Ht 5' 10" (1.778 m)   Wt 91.4 kg (201 lb 8 oz)   BMI 28.91 kg/m²     General: The patient appears active and healthy with no apparent physical problems.  No disturbance of mood or affect is demonstrated. Alert and Oriented.    HEENT: Eyes normal, pupils equally round, nose normal.    Resp: Equal and symmetrical chest rises. No wheezing    CV: Regular rate    Neck: Supple; nonpainful range " of motion.    Extremities: no cyanosis, clubbing, edema, or diffuse swelling.  Palpable pulses, good capillary refill of the digits.  No coolness, discoloration, edema or obvious varicosities.    Skin: no lesions noted.    Lymphatic: no detected adenopathy in the upper or lower extremities.    Neurologic: normal mental status, normal reflexes, normal gait and balance.  Patient is alert and oriented to person, place and time.  No flaccidity or spasticity is noted.  No motor or sensory deficits are noted.  Light touch is intact    Orthopaedic: KNEE EXAM - LEFT    Inspection:   Normal skin color and appearance with no scars, no ecchymosis and no effusion.      ROM:   0° - 135°.      Palpation:   There is no tenderness along medial plica, medial collateral ligament, pes bursa, lateral joint line, iliotibial band, lateral collateral ligament, popliteal fossa, patellar tendon, or quadriceps tendon. + tenderness medial joint line    Stability: - anterior drawer, - Lachman, - pivot shift and - posterior drawer.      No instability with varus or valgus stress at 0° or 30°. Negative dial  test at 30° and 90°.    Tests:   + Jasons test.  - patellar compression - grind test, - patellofemoral crepitus.  There is no patellar apprehension.     - J Sign. - Sofia's. - patellar tilt. - Radha. Lateral patella translation  2 quadrants. Medial patella translation 2 quadrants    Motor:   Quadriceps strength is 5/5 and hamstrings strength is 5/5. Hamstrings show no tightness.      Neuro:   Distal neurovascular status is normal    Vascular: Negative Homans and no palpable popliteal cords. 2+ pedal pulse with brisk cap refill    Gait Normal      IMAGING  MRI Knee Without Contrast Left  Narrative: EXAMINATION:  MRI KNEE WITHOUT CONTRAST LEFT    CLINICAL HISTORY:  Knee pain, chronic, negative xray (Age >= 5y);Presence of unspecified artificial knee joint    TECHNIQUE:  Multiplanar, multisequence images were performed about the LEFT  knee.    COMPARISON:  05/10/2024    FINDINGS:  **MENISCI:    Medial meniscus: Predominantly longitudinal/vertical tear of the posterior horn body medial meniscus.    Lateral meniscus: Intact anterior horn, body, and posterior horn.    Meniscal root attachment: Intact    Popliteal hiatus, superior and inferior meniscal fascicles:Nonvisualized without secondary signs such as pericapsular edema.    **LIGAMENTS:    Anterior cruciate ligament: Continuous, with normal signal.    Posterior cruciate ligament: Continuous, with normal signal.    Medial collateral ligament: Intact.    Lateral collateral ligament and  biceps femoris: Intact.    Iliotibial band (ITB): No evidence for ITB syndrome.    Popliteus tendon and popliteofibular ligament: Intact.    Posterior medial and posterior lateral corners: Intact.    **TENDONS:    Quadriceps tendon: Intact.    Patella tendon: Intact.    Joint fluid: Physiologic.    Hoffa's fat pad: Normal.    Intact medial retinaculum/ MPFL.    Intact lateral retinaculum.    **CARTILAGE:    Patellofemoral:Preserved medial and  lateral patellar facet cartilage.Median ridge demonstrates no focal abnormality.  Preserved trochlear groove cartilage.  Preservedanterior medial and anterior lateral femoral trochlea facet cartilage.    Medial tibiofemoral: Articular cartilage is preserved without focal defects or subchondral marrow edema.Internal aspect of medial femoral condyle cartilage is intact.    Lateral tibiofemoral: Articular cartilage is preserved without focal defects or subchondral marrow edema.Cartilage at posterior medial aspect of lateral tibial plateau is intact.    **OTHER:    Bone marrow: No fracture or marrow replacing process.    Miscellaneous: The gastrocnemius muscles are normal.Proximal tibia-fibula joint relationships preserved. No evident plica thickening.  Impression: Vertical/longitudinal tear of posterior horn and body medial meniscus.    Electronically signed by: Partha Ulrich  MD  Date:    06/11/2024  Time:    03:56          IMPRESSION       ICD-10-CM ICD-9-CM   1. Bucket-handle tear of medial meniscus of left knee as current injury, initial encounter  S83.212A 836.0         MEDICATIONS PRESCRIBED      Aspirin 81 mg  Hydrocodone 7.5/325 mg    RECOMMENDATIONS     Surgical versus non-surgical options discussed today; left knee arthroscopy with marrow stimulation and medial meniscus repair versus menisectomy  The patient elected to proceed with operative intervention after all risks, benefits, and alternatives were discussed with the patient.  The risks of surgery include bleeding, scar formation, injuries to nerves, arteries and veins, need for additional surgeries, blood clots, infections, inability to return to the same level of the performance and the risk of anesthesia.  Informed consent was obtained  Physical therapy was ordered - [facility]   We discussed the nature of his injury as well as the treatment options.  I did recommend a meniscal repair if possible.  He understands there is a possibility that the repair will not heal.  However, I did recommend if the quality of the tissue as well as the location are amenable to a repair, I did recommend a repair.      All questions were answered, pt will contact us for questions or concerns in the interim.        Robi Arechiga PA-C, [facility]

## 2024-08-12 ENCOUNTER — ANESTHESIA EVENT (OUTPATIENT)
Dept: SURGERY | Facility: HOSPITAL | Age: 64
End: 2024-08-12
Payer: COMMERCIAL

## 2024-08-12 ENCOUNTER — TELEPHONE (OUTPATIENT)
Dept: SPORTS MEDICINE | Facility: CLINIC | Age: 64
End: 2024-08-12
Payer: COMMERCIAL

## 2024-08-13 ENCOUNTER — HOSPITAL ENCOUNTER (OUTPATIENT)
Facility: HOSPITAL | Age: 64
Discharge: HOME | End: 2024-08-13
Attending: ORTHOPAEDIC SURGERY | Admitting: ORTHOPAEDIC SURGERY
Payer: COMMERCIAL

## 2024-08-13 ENCOUNTER — ANESTHESIA (OUTPATIENT)
Dept: SURGERY | Facility: HOSPITAL | Age: 64
End: 2024-08-13
Payer: COMMERCIAL

## 2024-08-13 VITALS
BODY MASS INDEX: 28.77 KG/M2 | WEIGHT: 201 LBS | HEIGHT: 70 IN | TEMPERATURE: 98 F | SYSTOLIC BLOOD PRESSURE: 151 MMHG | HEART RATE: 63 BPM | RESPIRATION RATE: 17 BRPM | OXYGEN SATURATION: 98 % | DIASTOLIC BLOOD PRESSURE: 82 MMHG

## 2024-08-13 DIAGNOSIS — S83.212A BUCKET-HANDLE TEAR OF MEDIAL MENISCUS OF LEFT KNEE AS CURRENT INJURY, INITIAL ENCOUNTER: ICD-10-CM

## 2024-08-13 DIAGNOSIS — S83.212D BUCKET-HANDLE TEAR OF MEDIAL MENISCUS OF LEFT KNEE AS CURRENT INJURY, SUBSEQUENT ENCOUNTER: Primary | ICD-10-CM

## 2024-08-13 LAB
POCT GLUCOSE: 130 MG/DL (ref 70–110)
POCT GLUCOSE: 130 MG/DL (ref 70–110)

## 2024-08-13 PROCEDURE — 82962 GLUCOSE BLOOD TEST: CPT | Performed by: ORTHOPAEDIC SURGERY

## 2024-08-13 PROCEDURE — 94761 N-INVAS EAR/PLS OXIMETRY MLT: CPT

## 2024-08-13 PROCEDURE — 63600175 PHARM REV CODE 636 W HCPCS: Performed by: ORTHOPAEDIC SURGERY

## 2024-08-13 PROCEDURE — 71000015 HC POSTOP RECOV 1ST HR: Performed by: ORTHOPAEDIC SURGERY

## 2024-08-13 PROCEDURE — 71000033 HC RECOVERY, INTIAL HOUR: Performed by: ORTHOPAEDIC SURGERY

## 2024-08-13 PROCEDURE — 29879 ARTHRS KNE SRG ABRASJ ARTHRP: CPT | Mod: 51,LT,, | Performed by: ORTHOPAEDIC SURGERY

## 2024-08-13 PROCEDURE — 37000009 HC ANESTHESIA EA ADD 15 MINS: Performed by: ORTHOPAEDIC SURGERY

## 2024-08-13 PROCEDURE — 27200651 HC AIRWAY, LMA: Performed by: NURSE ANESTHETIST, CERTIFIED REGISTERED

## 2024-08-13 PROCEDURE — 63600175 PHARM REV CODE 636 W HCPCS: Performed by: PHYSICIAN ASSISTANT

## 2024-08-13 PROCEDURE — 99900035 HC TECH TIME PER 15 MIN (STAT)

## 2024-08-13 PROCEDURE — 25000003 PHARM REV CODE 250: Performed by: PHYSICIAN ASSISTANT

## 2024-08-13 PROCEDURE — 36000710: Performed by: ORTHOPAEDIC SURGERY

## 2024-08-13 PROCEDURE — 63600175 PHARM REV CODE 636 W HCPCS: Performed by: ANESTHESIOLOGY

## 2024-08-13 PROCEDURE — 36000711: Performed by: ORTHOPAEDIC SURGERY

## 2024-08-13 PROCEDURE — 29882 ARTHRS KNE SRG MNISC RPR M/L: CPT | Mod: LT,,, | Performed by: ORTHOPAEDIC SURGERY

## 2024-08-13 PROCEDURE — 25000003 PHARM REV CODE 250: Performed by: ORTHOPAEDIC SURGERY

## 2024-08-13 PROCEDURE — 25000003 PHARM REV CODE 250: Performed by: ANESTHESIOLOGY

## 2024-08-13 PROCEDURE — 27201423 OPTIME MED/SURG SUP & DEVICES STERILE SUPPLY: Performed by: ORTHOPAEDIC SURGERY

## 2024-08-13 PROCEDURE — 37000008 HC ANESTHESIA 1ST 15 MINUTES: Performed by: ORTHOPAEDIC SURGERY

## 2024-08-13 PROCEDURE — 71000039 HC RECOVERY, EACH ADD'L HOUR: Performed by: ORTHOPAEDIC SURGERY

## 2024-08-13 PROCEDURE — C1713 ANCHOR/SCREW BN/BN,TIS/BN: HCPCS | Performed by: ORTHOPAEDIC SURGERY

## 2024-08-13 PROCEDURE — 25000003 PHARM REV CODE 250: Performed by: NURSE ANESTHETIST, CERTIFIED REGISTERED

## 2024-08-13 PROCEDURE — 63600175 PHARM REV CODE 636 W HCPCS: Performed by: NURSE ANESTHETIST, CERTIFIED REGISTERED

## 2024-08-13 PROCEDURE — 25000003 PHARM REV CODE 250

## 2024-08-13 DEVICE — FAST-FIX 360 REVERSED CURVE                                    MENISCAL REPAIR SYSTEM
Type: IMPLANTABLE DEVICE | Site: KNEE | Status: FUNCTIONAL
Brand: FAST-FIX

## 2024-08-13 RX ORDER — DEXAMETHASONE SODIUM PHOSPHATE 4 MG/ML
INJECTION, SOLUTION INTRA-ARTICULAR; INTRALESIONAL; INTRAMUSCULAR; INTRAVENOUS; SOFT TISSUE
Status: DISCONTINUED | OUTPATIENT
Start: 2024-08-13 | End: 2024-08-13

## 2024-08-13 RX ORDER — BACITRACIN ZINC 500 UNIT/G
OINTMENT (GRAM) TOPICAL
Status: DISCONTINUED | OUTPATIENT
Start: 2024-08-13 | End: 2024-08-13 | Stop reason: HOSPADM

## 2024-08-13 RX ORDER — OXYCODONE HYDROCHLORIDE 5 MG/1
5 TABLET ORAL
Status: DISCONTINUED | OUTPATIENT
Start: 2024-08-13 | End: 2024-08-13 | Stop reason: HOSPADM

## 2024-08-13 RX ORDER — FAMOTIDINE 10 MG/ML
INJECTION INTRAVENOUS
Status: DISCONTINUED | OUTPATIENT
Start: 2024-08-13 | End: 2024-08-13

## 2024-08-13 RX ORDER — CELECOXIB 200 MG/1
400 CAPSULE ORAL
Status: DISCONTINUED | OUTPATIENT
Start: 2024-08-13 | End: 2024-08-13

## 2024-08-13 RX ORDER — FENTANYL CITRATE 50 UG/ML
25 INJECTION, SOLUTION INTRAMUSCULAR; INTRAVENOUS EVERY 5 MIN PRN
Status: DISCONTINUED | OUTPATIENT
Start: 2024-08-13 | End: 2024-08-13 | Stop reason: HOSPADM

## 2024-08-13 RX ORDER — EPHEDRINE SULFATE 50 MG/ML
INJECTION, SOLUTION INTRAVENOUS
Status: DISCONTINUED | OUTPATIENT
Start: 2024-08-13 | End: 2024-08-13

## 2024-08-13 RX ORDER — GLUCAGON 1 MG
1 KIT INJECTION
Status: DISCONTINUED | OUTPATIENT
Start: 2024-08-13 | End: 2024-08-13 | Stop reason: HOSPADM

## 2024-08-13 RX ORDER — HALOPERIDOL 5 MG/ML
0.5 INJECTION INTRAMUSCULAR EVERY 10 MIN PRN
Status: DISCONTINUED | OUTPATIENT
Start: 2024-08-13 | End: 2024-08-13 | Stop reason: HOSPADM

## 2024-08-13 RX ORDER — VASOPRESSIN 20 [USP'U]/ML
INJECTION, SOLUTION INTRAMUSCULAR; SUBCUTANEOUS
Status: DISCONTINUED | OUTPATIENT
Start: 2024-08-13 | End: 2024-08-13

## 2024-08-13 RX ORDER — SODIUM CHLORIDE 0.9 % (FLUSH) 0.9 %
10 SYRINGE (ML) INJECTION
Status: DISCONTINUED | OUTPATIENT
Start: 2024-08-13 | End: 2024-08-13 | Stop reason: HOSPADM

## 2024-08-13 RX ORDER — ACETAMINOPHEN 500 MG
1000 TABLET ORAL
Status: COMPLETED | OUTPATIENT
Start: 2024-08-13 | End: 2024-08-13

## 2024-08-13 RX ORDER — KETAMINE HCL IN 0.9 % NACL 50 MG/5 ML
SYRINGE (ML) INTRAVENOUS
Status: DISCONTINUED | OUTPATIENT
Start: 2024-08-13 | End: 2024-08-13

## 2024-08-13 RX ORDER — MUPIROCIN 20 MG/G
OINTMENT TOPICAL
Status: DISCONTINUED | OUTPATIENT
Start: 2024-08-13 | End: 2024-08-13 | Stop reason: HOSPADM

## 2024-08-13 RX ORDER — LIDOCAINE HYDROCHLORIDE 20 MG/ML
INJECTION INTRAVENOUS
Status: DISCONTINUED | OUTPATIENT
Start: 2024-08-13 | End: 2024-08-13

## 2024-08-13 RX ORDER — MIDAZOLAM HYDROCHLORIDE 1 MG/ML
INJECTION INTRAMUSCULAR; INTRAVENOUS
Status: DISCONTINUED | OUTPATIENT
Start: 2024-08-13 | End: 2024-08-13

## 2024-08-13 RX ORDER — FENTANYL CITRATE 50 UG/ML
INJECTION, SOLUTION INTRAMUSCULAR; INTRAVENOUS
Status: DISCONTINUED | OUTPATIENT
Start: 2024-08-13 | End: 2024-08-13

## 2024-08-13 RX ORDER — PHENYLEPHRINE HYDROCHLORIDE 10 MG/ML
INJECTION INTRAVENOUS
Status: DISCONTINUED | OUTPATIENT
Start: 2024-08-13 | End: 2024-08-13

## 2024-08-13 RX ORDER — PROPOFOL 10 MG/ML
VIAL (ML) INTRAVENOUS
Status: DISCONTINUED | OUTPATIENT
Start: 2024-08-13 | End: 2024-08-13

## 2024-08-13 RX ORDER — METHOCARBAMOL 500 MG/1
1000 TABLET, FILM COATED ORAL ONCE
Status: COMPLETED | OUTPATIENT
Start: 2024-08-13 | End: 2024-08-13

## 2024-08-13 RX ORDER — BUPIVACAINE HYDROCHLORIDE 2.5 MG/ML
INJECTION, SOLUTION EPIDURAL; INFILTRATION; INTRACAUDAL
Status: DISCONTINUED | OUTPATIENT
Start: 2024-08-13 | End: 2024-08-13 | Stop reason: HOSPADM

## 2024-08-13 RX ORDER — ONDANSETRON HYDROCHLORIDE 2 MG/ML
4 INJECTION, SOLUTION INTRAVENOUS DAILY PRN
Status: DISCONTINUED | OUTPATIENT
Start: 2024-08-13 | End: 2024-08-13 | Stop reason: HOSPADM

## 2024-08-13 RX ORDER — EPINEPHRINE 1 MG/ML
INJECTION, SOLUTION, CONCENTRATE INTRAVENOUS
Status: DISCONTINUED | OUTPATIENT
Start: 2024-08-13 | End: 2024-08-13 | Stop reason: HOSPADM

## 2024-08-13 RX ADMIN — DEXAMETHASONE SODIUM PHOSPHATE 8 MG: 4 INJECTION, SOLUTION INTRAMUSCULAR; INTRAVENOUS at 07:08

## 2024-08-13 RX ADMIN — EPHEDRINE SULFATE 5 MG: 50 INJECTION INTRAVENOUS at 07:08

## 2024-08-13 RX ADMIN — SODIUM CHLORIDE: 0.9 INJECTION, SOLUTION INTRAVENOUS at 06:08

## 2024-08-13 RX ADMIN — MUPIROCIN: 20 OINTMENT TOPICAL at 06:08

## 2024-08-13 RX ADMIN — MIDAZOLAM HYDROCHLORIDE 2 MG: 2 INJECTION, SOLUTION INTRAMUSCULAR; INTRAVENOUS at 06:08

## 2024-08-13 RX ADMIN — PHENYLEPHRINE HYDROCHLORIDE 50 MCG: 10 INJECTION INTRAVENOUS at 07:08

## 2024-08-13 RX ADMIN — VASOPRESSIN 1 UNITS: 20 INJECTION INTRAVENOUS at 07:08

## 2024-08-13 RX ADMIN — FAMOTIDINE 20 MG: 10 INJECTION, SOLUTION INTRAVENOUS at 07:08

## 2024-08-13 RX ADMIN — FENTANYL CITRATE 25 MCG: 50 INJECTION, SOLUTION INTRAMUSCULAR; INTRAVENOUS at 09:08

## 2024-08-13 RX ADMIN — SODIUM CHLORIDE, SODIUM GLUCONATE, SODIUM ACETATE, POTASSIUM CHLORIDE, MAGNESIUM CHLORIDE, SODIUM PHOSPHATE, DIBASIC, AND POTASSIUM PHOSPHATE: .53; .5; .37; .037; .03; .012; .00082 INJECTION, SOLUTION INTRAVENOUS at 07:08

## 2024-08-13 RX ADMIN — METHOCARBAMOL 1000 MG: 500 TABLET ORAL at 09:08

## 2024-08-13 RX ADMIN — OXYCODONE 5 MG: 5 TABLET ORAL at 08:08

## 2024-08-13 RX ADMIN — GLYCOPYRROLATE 0.2 MG: 0.2 INJECTION, SOLUTION INTRAMUSCULAR; INTRAVENOUS at 07:08

## 2024-08-13 RX ADMIN — EPHEDRINE SULFATE 10 MG: 50 INJECTION INTRAVENOUS at 07:08

## 2024-08-13 RX ADMIN — Medication 20 MG: at 07:08

## 2024-08-13 RX ADMIN — VASOPRESSIN 0.5 UNITS: 20 INJECTION INTRAVENOUS at 07:08

## 2024-08-13 RX ADMIN — ACETAMINOPHEN 1000 MG: 500 TABLET ORAL at 06:08

## 2024-08-13 RX ADMIN — CEFAZOLIN 2 G: 2 INJECTION, POWDER, FOR SOLUTION INTRAMUSCULAR; INTRAVENOUS at 07:08

## 2024-08-13 RX ADMIN — VASOPRESSIN 0.25 UNITS: 20 INJECTION INTRAVENOUS at 07:08

## 2024-08-13 RX ADMIN — LIDOCAINE HYDROCHLORIDE 100 MG: 20 INJECTION INTRAVENOUS at 07:08

## 2024-08-13 RX ADMIN — PROPOFOL 200 MG: 10 INJECTION, EMULSION INTRAVENOUS at 07:08

## 2024-08-13 RX ADMIN — FENTANYL CITRATE 50 MCG: 50 INJECTION, SOLUTION INTRAMUSCULAR; INTRAVENOUS at 07:08

## 2024-08-13 NOTE — OP NOTE
[facility] [place])  Surgery Department  Operative Note    SUMMARY     Date of Procedure: 8/13/2024     Pre-Operative Diagnosis:   Left Knee Tear, Medial meniscus, acute S83.249A  Left Knee Chondromalacia, (excludes patella) M94.29      Post-Operative Diagnosis:   Left Knee Tear, Medial meniscus, acute S83.249A  Left Knee Chondromalacia, (excludes patella) M94.29    Procedure:  1. Left Knee Arthroscopy, with meniscus repair (medial OR lateral) 15168  2.  Left Knee Arthroscopy, with Microfracture 38603 - marrow stimulation procedure  3.  Left Knee Arthroscopy, debridement/shaving of articular cartilage (chondroplasty) 24868 -     Surgeons and Role:     * Kai Washington MD - Primary    Assistant: Warren Alegre MD - Fellow      Anesthesia: General    Complications: None    Tourniquet: None    Implants:   Implant Name Type Inv. Item Serial No.  Lot No. LRB No. Used Action   SYS FASTFIX REV CURVED  - [id number]  SYS FASTFIX REV CURVED   [employer] [id number] Left 1 Implanted   SYS FASTFIX REV CURVED  - [id number]  SYS FASTFIX REV CURVED   [employer] [id number] Left 1 Implanted   SYS FASTFIX REV CURVED  - [id number]  SYS FASTFIX REV CURVED   [employer] [id number] Left 1 Implanted   [employer] 1 [id number] - [id number]  [employer] STYLE 1 [id number]  CATHERINE,INC 3 [id number] Left 1 Implanted and Explanted       Arthroscopic Findings:   Patellofemoral Compartment  Medial Patella Cartilage: Intact  Central Patella Cartilage:Intact  Lateral Patella Cartilage: Intact  Trochlea Cartilage:Intact  Plica: absent  Medial Gutter: Clear of loose bodies or debris  Lateral Gutter:Clear of loose bodies or debris    Ligaments  ACL:Intact  PCL:Intact    Medial Compartment:  Femoral Cartilage:  Grade 2 chondral changes with a loose chondral flap the medial weight-bearing zone of the medial femoral condyle measuring a proximally 1 x 2 cm  Tibial  Cartilage:Intact  Meniscus:  Peripheral meniscal tear with unstable meniscal tibial component involving the red red zone measuring a proximally 2 cm    Lateral Compartment:  Femoral Cartilage: Intact  Tibial Cartilage:Intact  Meniscus:Intact    Exam Under Anesthesia:  Ligamentously stable    Indication for Procedure: 64 y.o. Male with a history of left knee pain who was squatting down and felt a pop in his left knee.  He felt significant pain and had difficulty extending his knee.  He states he is very apprehensive to squat or rotate.  He points to the posterior medial aspect of his knee as the source of pain.  His history, physical and imaging was consistent with an acute peripheral medial meniscus tear.  We discussed the risks, benefits and alternatives of surgery.  He elected to proceed with surgical intervention.  We discussed a potential meniscal repair versus debridement.  He understood if it does not heal after meniscal repair that a meniscal debridement would be indicated.  I advised him I would make a decision at surgery based on the tear pattern and quality of the tissue.     Surgery in Detail:  The patient was marked identified in the holding room.  He was brought back to the operating room and placed in the supine position.  After general endotracheal anesthesia was administered the left lower extremity was prepped and draped in usual sterile fashion for a knee arthroscopy. The contralateral leg was secured and well padded. Preoperative antibiotics were given within 1 hour the procedure.  A time-out was taken prior starting. We used 0.25% Marcaine and epinephrine for local periportal anesthetic.  We created an anterior lateral portal and under direct visualization an anterior medial portal was created.    Probe was introduced into the medial compartment.  I identified the loose chondral flaps of the medial femoral condyle in addition to the unstable posterior horn medial meniscus tear.  This extended  more anterior to the horn of the posterior medial meniscus.  This involved the entire meniscal tibial component in the red red zone.  A gold handle rasp was brought in to prepare the area of the meniscus.  Shaver was then brought in to debride the loose chondral flap from the medial femoral condyle in addition to preparing the undersurface of the meniscus.  We then brought in our fast fix device and placed 3 vertical mattress sutures to repair the meniscal tibial component and reduce the medial meniscus back down to the tibia.  Probe was then introduced to make sure we had good stability and no anterior subluxation of the meniscus.  We then moved to the notch and debrided the tissue anterior to the ACL.  A marrow stimulation was then performed for augmentation of the medial meniscal repair.    The arthroscopes were removed from the joint. The patient was dressed with Adaptic, bacitracin, 4x4s, Webril and an Ace wrap from the toes up to the proximal thigh.  The patient was extubated and taken recovery in satisfactory condition.      Post-Op Plan:  Peripheral meniscal repair protocol    Attestation: I was present and scrubbed for the entire procedure.

## 2024-08-13 NOTE — ANESTHESIA POSTPROCEDURE EVALUATION
Anesthesia Post Evaluation    Patient: Partha Curtis JrRoland    Procedure(s) Performed: Procedure(s) (LRB):  ARTHROSCOPY,KNEE,WITH MENISCUS REPAIR (Left)  ARTHROSCOPY, KNEE, WITH CHONDROPLASTY (Left)  ARTHROSCOPY, KNEE, WITH  MICROFRACTURE (Left)    Final Anesthesia Type: general      Patient location during evaluation: PACU  Patient participation: Yes- Able to Participate  Level of consciousness: awake and alert and oriented  Post-procedure vital signs: reviewed and stable  Pain management: adequate  Airway patency: patent    PONV status at discharge: No PONV  Anesthetic complications: no      Cardiovascular status: hemodynamically stable  Respiratory status: nasal cannula  Hydration status: euvolemic  Follow-up not needed.          Vitals Value Taken Time   /82 08/13/24 1002   Temp 36.7 °C (98.1 °F) 08/13/24 1000   Pulse 64 08/13/24 1012   Resp 5 08/13/24 1012   SpO2 97 % 08/13/24 1012   Vitals shown include unfiled device data.      Event Time   Out of Recovery 10:00:00         Pain/Meggan Score: Pain Rating Prior to Med Admin: 7 (8/13/2024  9:44 AM)  Pain Rating Post Med Admin: 5 (8/13/2024  9:57 AM)  Meggan Score: 10 (8/13/2024  9:57 AM)

## 2024-08-13 NOTE — BRIEF OP NOTE
[facility]  Brief Operative Note    Surgery Date: 8/13/2024     Surgeon(s) and Role:     * Kai Washington MD - Primary    Assisting Provider:     * Warren Alegre MD - First Assist    Pre-Operative Diagnosis: Bucket-handle tear of medial meniscus of left knee as current injury, initial encounter [S83.212A]    Post-Operative Diagnosis: Post-Op Diagnosis Codes:     * Bucket-handle tear of medial meniscus of left knee as current injury, initial encounter [S83.212A]    Procedure(s) (LRB):  ARTHROSCOPY,KNEE,WITH MENISCUS REPAIR (Left)  ARTHROSCOPY, KNEE, WITH CHONDROPLASTY (Left)  ARTHROSCOPY, KNEE, WITH  MICROFRACTURE (Left)    Anesthesia: General    Operative Findings: See Op note.    Estimated Blood Loss: * No values recorded between 8/13/2024  7:23 AM and 8/13/2024  7:48 AM *         Specimens:   Specimen (24h ago, onward)      None              Discharge Note    OUTCOME: Patient tolerated treatment/procedure well without complication and is now ready for discharge.    DISPOSITION: Home or Self Care    FINAL DIAGNOSIS:  Post-Op Diagnosis Codes:     * Bucket-handle tear of medial meniscus of left knee as current injury, initial encounter [S83.212A]    FOLLOWUP: In clinic    DISCHARGE INSTRUCTIONS: See attached. TTWB LLE with knee brace locked in extension

## 2024-08-13 NOTE — TRANSFER OF CARE
"Anesthesia Transfer of Care Note    Patient: Partha Curtis JrRoland    Procedure(s) Performed: Procedure(s) (LRB):  ARTHROSCOPY,KNEE,WITH MENISCUS REPAIR (Left)  ARTHROSCOPY, KNEE, WITH CHONDROPLASTY (Left)  ARTHROSCOPY, KNEE, WITH  MICROFRACTURE (Left)    Patient location: PACU    Anesthesia Type: general    Transport from OR: Transported from OR on 6-10 L/min O2 by face mask with adequate spontaneous ventilation    Post pain: adequate analgesia    Post assessment: no apparent anesthetic complications and tolerated procedure well    Level of consciousness: sedated    Nausea/Vomiting: no nausea/vomiting    Complications: none    Transfer of care protocol was followed      Last vitals: Visit Vitals  /61   Pulse 70   Temp 36.7 °C (98.1 °F) (Oral)   Resp 18   Ht 5' 10" (1.778 m)   Wt 91.2 kg (201 lb)   SpO2 97%   BMI 28.84 kg/m²     "

## 2024-08-13 NOTE — ANESTHESIA PROCEDURE NOTES
Intubation    Date/Time: 8/13/2024 7:05 AM    Performed by: Mari Murillo CRNA  Authorized by: Mari Murillo CRNA    Intubation:     Induction:  Intravenous    Intubated:  Postinduction    Mask Ventilation:  Easy with oral airway    Attempts:  1    Attempted By:  CRNA    Difficult Airway Encountered?: No      Complications:  None    Airway Device:  Supraglottic airway/LMA    Airway Device Size:  5.0    Style/Cuff Inflation:  Cuffed    Placement Verified By:  Capnometry    Complicating Factors:  None    Findings Post-Intubation:  BS equal bilateral

## 2024-08-13 NOTE — DISCHARGE INSTRUCTIONS
POST-OPERATIVE DISCHARGE INSTRUCTIONS    Diet: Ice chips, clear liquids, and then diet as tolerated.  Drink plenty of liquids after surgery.  Ice the area at least three times a day (20 minutes per session) if possible.    Elevate the extremity above the level of the heart to help reduce swelling.  Remain toe touch weightbearing until further notice.  Pain medication can be taken every four to six hours as needed.  It is helpful to take pain medication prior to physical therapy. If pain is not controlled, please take 800 mg ibuprofen every 8 hours as needed unless contraindicated (NSAID allergy, kidney disease, heart disease, currently taking blood thinners, etc.).  Any activity that requires precise thinking or accuracy should be avoided for a minimum of 72 hours after surgery and while on narcotic pain medication.  This includes operating machinery and/or driving a vehicle.  All sutures will be removed approximately 10-14 days from the time of surgery. Leave steri-strips (skin tapes) in place until sutures are removed.  If skin glue is used instead of stitches, do not apply any ointments or solutions to the incision.  Keep the incision dry.  The skin glue will peel off in 3-4 weeks.  Dressing change directions, unless otherwise instructed:   ?  Meniscus Repair: Change dressing on post op day #3 and then daily thereafter. Use gauze with an ACE wrap, and then the brace.    All casts, splints, braces, slings, crutches, abduction pillows, etc. are to be worn as instructed.  Morgan Hose or Sequential Compression Devices are often used following surgery to help with swelling and prevent blood clots.  They can be removed for 2-3 hours daily as needed.  If the hose becomes uncomfortable after a few days, switch to an Ace Wrap if desired.  Keep the incision dry for 10-14 days.  A waterproof dressing ([facility] or [facility]) can be used to shower.  No bath, pool, or hot tub until instructed.  You should notify our office if you  notice any of the following:  A temperature greater than 101 F  Severe or increasing pain  Excessive drainage or redness of the incision  Calf swelling and pain  Chest pain or shortness of breath  Your post-op follow up appointment will be approximately 14 days from the time of surgery.  If you do not have an appointment scheduled, please call our office and schedule within 1-2 days.   If you have any problems or questions, please call our office at [phone].

## 2024-08-13 NOTE — ANESTHESIA PREPROCEDURE EVALUATION
08/13/2024  Partha Curtis Jr. is a 64 y.o., male.    Procedures:      ARTHROSCOPY,KNEE,WITH MENISCUS REPAIR (Left) - NO REGIONAL BLOCK      ARTHROSCOPY, KNEE, WITH CHONDROPLASTY (Left)   Anesthesia type: General   Diagnosis: Bucket-handle tear of medial meniscus of left knee as current injury, initial encounter [R50.085Y]     Patient Active Problem List   Diagnosis    Carotid stenosis    Essential hypertension    Dyslipidemia    S/P carotid endarterectomy    Long-term current use of testosterone replacement therapy    Bilateral hearing loss    BMI 27.0-27.9,adult    Type 2 diabetes mellitus with stage 3 chronic kidney disease, without long-term current use of insulin    Chronic bilateral low back pain without sciatica    Anxiety    Vitamin D deficiency    Cervical radiculopathy    Hypogonadism in male    Herniation of intervertebral disc    B12 deficiency    Abnormal electrocardiogram    ALLA (obstructive sleep apnea)    Overweight (BMI 25.0-29.9)    Insomnia    Low testosterone in male    Decreased range of motion of neck    Decreased range of motion of left shoulder    Neck pain    Myofascial pain syndrome    Tendinopathy of rotator cuff, left    Microalbuminuria due to type 2 diabetes mellitus    Atrial fibrillation with RVR    Leukocytosis    Transaminitis     Past Surgical History:   Procedure Laterality Date    ANKLE SURGERY Left     2    APPENDECTOMY      at age 20    CAROTID ENDARTERECTOMY Right 07/15/2015    CARPAL TUNNEL RELEASE Bilateral     COLONOSCOPY N/A 12/14/2018    Procedure: COLONOSCOPYSuprep;  Surgeon: Silver Selby MD;  Location: [facility] ENDO;  Service: Endoscopy;  Laterality: N/A;    EPIDURAL STEROID INJECTION N/A 2/26/2024    Procedure: CERVICAL C7/T1 IL SELENA;  Surgeon: Gokul Fung MD;  Location: [facility];  Service: Pain Management;   Laterality: N/A;  [phone]  2 WK F/U SERGEI    EPIDURAL STEROID INJECTION N/A 5/23/2024    Procedure: LUMBAR L4/5 IL SELENA *ASPIRIN OTC* HOLD FOR 5 DAYS;  Surgeon: Gokul Fung MD;  Location: [facility] PAIN MGT;  Service: Pain Management;  Laterality: N/A;  [phone]  2 WK F/U NOHELIA    JOINT REPLACEMENT  9/2010    NASAL SEPTUM SURGERY      TOTAL KNEE ARTHROPLASTY Right     6 knee surgeries    TRANSESOPHAGEAL ECHOCARDIOGRAM WITH POSSIBLE CARDIOVERSION (ELIZABETH W/ POSS CARDIOVERSION) N/A 2/28/2024    Procedure: Transesophageal echo (ELIZABETH) intra-procedure log documentation;  Surgeon: Ahmet De La Cruz MD;  Location: [facility] CATH LAB/EP;  Service: Cardiology;  Laterality: N/A;     Current Discharge Medication List        CONTINUE these medications which have NOT CHANGED    Details   !! aspirin (ECOTRIN) 81 MG EC tablet Take 81 mg by mouth every evening.      candesartan (ATACAND) 32 MG tablet Take 1 tablet (32 mg total) by mouth once daily.  Qty: 90 tablet, Refills: 3    Associated Diagnoses: Essential hypertension      cloNIDine (CATAPRES) 0.1 MG tablet Take 1 tablet (0.1 mg total) by mouth 3 (three) times daily.  Qty: 270 tablet, Refills: 0    Comments: .  Associated Diagnoses: Essential hypertension      ezetimibe (ZETIA) 10 mg tablet Take 1 tablet (10 mg total) by mouth once daily.  Qty: 90 tablet, Refills: 3    Associated Diagnoses: Dyslipidemia      gabapentin (NEURONTIN) 800 MG tablet Take 1 tablet (800 mg total) by mouth every evening.  Qty: 90 tablet, Refills: 1    Associated Diagnoses: Cervical radiculopathy      glimepiride (AMARYL) 4 MG tablet Take 0.5 tablets (2 mg total) by mouth before breakfast.    Associated Diagnoses: Type 2 diabetes mellitus with stage 3a chronic kidney disease, without long-term current use of insulin      glucose 4 GM chewable tablet Take 4 tablets (16 g total) by mouth as needed for Low blood sugar.  Qty: 30 tablet, Refills: 12      HYDROcodone-acetaminophen (NORCO) 7.5-325 mg per  "tablet Take 1 tablet by mouth every 6 (six) hours as needed for Pain.  Qty: 20 tablet, Refills: 0    Comments: n/a   Associated Diagnoses: Bucket-handle tear of medial meniscus of left knee as current injury, initial encounter      hydrocortisone 2.5 % cream Apply topically 2 (two) times daily.  Qty: 28 g, Refills: 0    Associated Diagnoses: Rash      metoprolol succinate (TOPROL-XL) 200 MG 24 hr tablet Take 1 tablet (200 mg total) by mouth once daily.  Qty: 90 tablet, Refills: 3    Comments: .  Associated Diagnoses: Essential hypertension      rosuvastatin (CRESTOR) 40 MG Tab Take 1 tablet (40 mg total) by mouth every evening.  Qty: 90 tablet, Refills: 3    Associated Diagnoses: Dyslipidemia      amitriptyline (ELAVIL) 75 MG tablet Take 1 tablet (75 mg total) by mouth every evening.  Qty: 90 tablet, Refills: 1    Associated Diagnoses: Insomnia, unspecified type      !! aspirin (ECOTRIN) 81 MG EC tablet Take 1 tablet (81 mg total) by mouth once daily.  Qty: 28 tablet, Refills: 0    Associated Diagnoses: Bucket-handle tear of medial meniscus of left knee as current injury, initial encounter      BD LUER-RUSS SYRINGE 3 mL 25 gauge x 1" Syrg USE ONE SYRINGE EVERY 14 DAYS WITH TESTOSTERONE  Qty: 100 each, Refills: 2      cyanocobalamin (VITAMIN B-12) 1000 MCG tablet Take 1 tablet (1,000 mcg total) by mouth once daily.  Qty: 90 tablet, Refills: 4    Associated Diagnoses: B12 deficiency      diazePAM (VALIUM) 10 MG Tab Take 1 tablet (10 mg total) by mouth On call Procedure (take 1 tablet 1 hour prior to MRI. Repeat x1 if needed).  Qty: 2 tablet, Refills: 0    Associated Diagnoses: Claustrophobia      ergocalciferol (ERGOCALCIFEROL) 50,000 unit Cap Take 1 capsule (50,000 Units total) by mouth every 7 days.  Qty: 12 capsule, Refills: 0    Associated Diagnoses: Vitamin D deficiency      ipratropium (ATROVENT) 42 mcg (0.06 %) nasal spray 2 sprays by Nasal route 2 (two) times daily as needed for Rhinitis.  Qty: 15 mL, Refills: " "0    Associated Diagnoses: Influenza A      semaglutide (OZEMPIC) 1 mg/dose (4 mg/3 mL) Inject 1 mg into the skin every 7 days.  Qty: 9 mL, Refills: 3    Associated Diagnoses: Type 2 diabetes mellitus with stage 3a chronic kidney disease, without long-term current use of insulin      sod sulf-pot chloride-mag sulf (SUTAB) 1.479-0.188- 0.225 gram tablet Take 12 tablets by mouth once daily. Take according to package instructions with indicated amount of water.  Qty: 24 tablet, Refills: 0    Comments: Coupon code BIN:[id number] PCN: [id number] Group: [id number] Member ID: [id number]  Associated Diagnoses: Colon cancer screening      syringe with needle, safety (BD INTEGRA SYRINGE) 3 mL 22 gauge x 1 1/2" Syrg Inject 1 Syringe as directed once a week.  Qty: 6 each, Refills: 3      testosterone cypionate (DEPOTESTOTERONE CYPIONATE) 200 mg/mL injection Inject 1 mL (200 mg total) into the muscle every 14 (fourteen) days.  Qty: 2 mL, Refills: 5    Comments: Please include syringes and needles for the testosterone.  Associated Diagnoses: Hypogonadism in male      valACYclovir (VALTREX) 1000 MG tablet TAKE 2 TABLETS BY MOUTH EVERY 12 HOURS  Qty: 4 tablet, Refills: 3       !! - Potential duplicate medications found. Please discuss with provider.              Pre-op Assessment    I have reviewed the Patient Summary Reports.     I have reviewed the Nursing Notes. I have reviewed the NPO Status.   I have reviewed the Medications.     Review of Systems  Anesthesia Hx:  No problems with previous Anesthesia   History of prior surgery of interest to airway management or planning:  Previous anesthesia: General, MAC 7/15/2015  right Carotid Endarterectomy with general anesthesia.  Procedure performed at an [facility].      2/28/2024  ELIZABETH with MAC.  Procedure performed at an [facility]. Airway issues documented on chart review include mask, easy, GETA, easy direct laryngoscopy , view on direct laryngoscopy Grade I      Denies Personal Hx " of Anesthesia complications.                    Social:  Non-Smoker, Social Alcohol Use 2 cans of beer per week      Hematology/Oncology:    Oncology Normal    -- Denies Anemia:               Hematology Comments: Vitamin D deficiency,  B12 deficiency                    EENT/Dental:   Allergies, Nasal Septum Surgery,  Bilateral hearing loss          Cardiovascular:  Exercise tolerance: good   Hypertension, well controlled    Denies CAD.    Denies CABG/stent. Dysrhythmias atrial fibrillation  Denies Angina.     hyperlipidemia  Denies PAINTING.  ECG has been reviewed. H/O Paroxysmal A-Fib,  S/P ELIZABETH, Cardioversion,   Carotid Artery Occlusion, S/P right Carotid Endarterectomy-2015,  Septal infarct noted, not present on echocardiography- per Cardiology                         Pulmonary:    Denies COPD.  Denies Asthma.   Denies Shortness of breath.  Sleep Apnea, CPAP           Education provided regarding risk of obstructive sleep apnea            Renal/:  Chronic Renal Disease, CKD   Stage 3 CKD,  Microalbuminuria due to type 2 diabetes mellitus             Hepatic/GI:      Denies GERD. Denies Liver Disease.  H/O Colonic Polyp,  Transaminitis,  Appendectomy          Musculoskeletal:     Bucket-handle tear of medial meniscus of left knee,  H/O Right TKA,   6 surgeries to right knee,   Chronic back pain, S/P SELENA's, cervical & lumbar,  Herniation of intervertebral disc,  DDD lumbar spine,   Left Ankle surgery X2,  Tendinopathy of rotator cuff, left       Spine Disorders: lumbar and cervical Chronic Pain, Disc disease and Degenerative disease           Neurological:    Denies CVA. Neuromuscular Disease,   Denies Headaches. Denies Seizures.    Bilateral Carpal Tunnel Syndrome, S/P CTR,  Cervical radiculopathy,  Chronic bilateral low back pain without sciatica,  Myofascial pain syndrome                            Endocrine:  Diabetes, well controlled, type 2 Denies Hypothyroidism.  HA1C = 6.7 on 5/13/2024,  Hypogonadism in  male,  Long-term current use of testosterone replacement therapy        Dermatological:  Rash to face   Psych:   anxiety  Insomnia           Physical Exam    Airway:  Mallampati: II / II  Mouth Opening: Normal  TM Distance: Normal  Tongue: Normal  Neck ROM: Normal ROM    Dental:  Intact  Past Medical History:   Diagnosis Date    Allergy     Carotid artery occlusion     Chronic back pain     Colonic polyp     Genetic testing     MUTYH mutation-negative    Hyperlipidemia     Hypertension     Paroxysmal atrial fibrillation 2/28/2024    Sleep apnea     Type 2 diabetes mellitus with stage 3 chronic kidney disease, without long-term current use of insulin 4/2/2020     Past Surgical History:   Procedure Laterality Date    ANKLE SURGERY Left     2    APPENDECTOMY      at age 20    CAROTID ENDARTERECTOMY Right 07/15/2015    CARPAL TUNNEL RELEASE Bilateral     COLONOSCOPY N/A 12/14/2018    Procedure: COLONOSCOPYSuprep;  Surgeon: Silver Selby MD;  Location: [facility] ENDO;  Service: Endoscopy;  Laterality: N/A;    EPIDURAL STEROID INJECTION N/A 2/26/2024    Procedure: CERVICAL C7/T1 IL SELENA;  Surgeon: Gokul Fung MD;  Location: [facility] PAIN MGT;  Service: Pain Management;  Laterality: N/A;  [phone]  2 WK F/U SERGEI    EPIDURAL STEROID INJECTION N/A 5/23/2024    Procedure: LUMBAR L4/5 IL SELENA *ASPIRIN OTC* HOLD FOR 5 DAYS;  Surgeon: Gokul Fung MD;  Location: [facility] PAIN MGT;  Service: Pain Management;  Laterality: N/A;  [phone]  2 WK F/U NOHELIA    JOINT REPLACEMENT  9/2010    NASAL SEPTUM SURGERY      TOTAL KNEE ARTHROPLASTY Right     6 knee surgeries    TRANSESOPHAGEAL ECHOCARDIOGRAM WITH POSSIBLE CARDIOVERSION (ELIZABETH W/ POSS CARDIOVERSION) N/A 2/28/2024    Procedure: Transesophageal echo (ELIZABETH) intra-procedure log documentation;  Surgeon: Ahmet De La Cruz MD;  Location: [facility] CATH LAB/EP;  Service: Cardiology;  Laterality: N/A;       Anesthesia Assessment: Preoperative EQUATION    Planned Procedure:  Procedure(s) (LRB):  ARTHROSCOPY,KNEE,WITH MENISCUS REPAIR (Left)  ARTHROSCOPY, KNEE, WITH CHONDROPLASTY (Left)  Requested Anesthesia Type:General  Surgeon: Kai Washington MD  Service: Orthopedics  Known or anticipated Date of Surgery:8/13/2024    Surgeon notes: reviewed    Electronic QUestionnaire Assessment completed via nurse interview with patient.        Triage considerations:     The patient has no apparent active cardiac condition (No unstable coronary Syndrome such as severe unstable angina or recent [<1 month] myocardial infarction, decompensated CHF, severe valvular   disease or significant arrhythmia)    Previous anesthesia records:GETA, MAC, Easy airway, Easy intubation, and No problems    Last PCP note: within 1 month , within [facility]   Subspecialty notes: Cardiology: General, Spine Service    Other important co-morbidities: A FIB, DM2, HLD, HTN, and ALLA       EKG 3/4/2024:  Vent. Rate : 060 BPM     Atrial Rate : 060 BPM      P-R Int : 184 ms          QRS Dur : 084 ms       QT Int : 418 ms       P-R-T Axes : 047 -01 032 degrees      QTc Int : 418 ms   Normal sinus rhythm   Anteroseptal infarct (cited on or before 21-JUN-2022)   Abnormal ECG   When compared with ECG of 28-FEB-2024 11:34,   Criteria for Inferior infarct are no longer Present   Confirmed by Reuben Norman JR., MD ([id number]) on 3/4/2024 3:55:43 PM       ELIZABETH 2/28/2024:  Summary     Left Ventricle: There is normal systolic function.    Right Ventricle: Normal right ventricular cavity size. Systolic function is normal.    Mitral Valve: There is mild regurgitation.    No obvious thrombus noted on interatrial septum or in left atrial appendage.    Echo TTE 2/28/2024:   Summary     Left Ventricle: The left ventricle is normal in size. Normal wall thickness. There is concentric remodeling. There is low normal systolic function with a visually estimated ejection fraction of 50 - 55%. Unable to assess diastolic function due to atrial  fibrillation.    Right Ventricle: Normal right ventricular cavity size. Wall thickness is normal. Right ventricle wall motion  is normal. Systolic function is normal.    Aortic Valve: The aortic valve is a trileaflet valve. There is mild aortic valve sclerosis.    Mitral Valve: There is mild regurgitation.    Pulmonary Artery: The estimated pulmonary artery systolic pressure is 26 mmHg.    IVC/SVC: Normal venous pressure at 3 mmHg.      Pharmacological Stress Echo 3/9/2018:  EKG Conclusions:   1. The EKG portion of this study is negative for ischemia at a peak heart rate of 141 bpm (87% of predicted).   2. Blood pressure remained stable throughout the protocol  (Presenting BP: 125/87 Peak BP: 91/49).   3. No significant arrhythmias were present.   4. There were no symptoms of chest discomfort or significant dyspnea throughout the protocol.     CONCLUSIONS     1 - Normal left ventricular systolic function (EF 60-65%).     2 - Normal left ventricular diastolic function.     3 - Normal right ventricular systolic function .   No evidence of stress induced myocardial ischemia.        Tests already available:  Results have been reviewed.             Instructions given. (See in Nurse's note)  Preop medication instructions sent via portal message.     Optimization:  Anesthesia Preop Clinic Assessment Not Indicated    Medical Opinion Indicated: Yes, PCP       Sub-specialist consult indicated: No      Plan:  Consultation:Patient's PCP for a statement of optimization             Patient  has previously scheduled Medical Appointment: 7/31/2024    Navigation: Patient is cleared/ optimized for surgery. Closely monitor BP during and after surgery.       Ht: 5'10  Wt: 90.3 kg (199 lb)  BMI: 28.56  Vaccinated    Anesthesia Plan  Type of Anesthesia, risks & benefits discussed:    Anesthesia Type: Gen ETT, Gen Supraglottic Airway  Intra-op Monitoring Plan: Standard ASA Monitors  Post Op Pain Control Plan: multimodal analgesia and  IV/PO Opioids PRN  Induction:  IV  Airway Plan: Direct  Informed Consent: Informed consent signed with the Patient and all parties understand the risks and agree with anesthesia plan.  All questions answered.   ASA Score: 2    Ready For Surgery From Anesthesia Perspective.   .

## 2024-08-13 NOTE — PLAN OF CARE
"Nursing pre-op complete. Pt calm, will continue to monitor. Call light within reach. Spouse at bedside, will keep pt's belongings. Needs crutches, Ht 5'10".   "

## 2024-08-14 ENCOUNTER — CLINICAL SUPPORT (OUTPATIENT)
Dept: REHABILITATION | Facility: HOSPITAL | Age: 64
End: 2024-08-14
Payer: COMMERCIAL

## 2024-08-14 ENCOUNTER — PATIENT MESSAGE (OUTPATIENT)
Dept: SPORTS MEDICINE | Facility: CLINIC | Age: 64
End: 2024-08-14
Payer: COMMERCIAL

## 2024-08-14 ENCOUNTER — PATIENT MESSAGE (OUTPATIENT)
Dept: PAIN MEDICINE | Facility: CLINIC | Age: 64
End: 2024-08-14
Payer: COMMERCIAL

## 2024-08-14 DIAGNOSIS — M25.662 DECREASED RANGE OF MOTION OF LEFT LOWER EXTREMITY: ICD-10-CM

## 2024-08-14 DIAGNOSIS — M47.812 CERVICAL SPONDYLOSIS: ICD-10-CM

## 2024-08-14 DIAGNOSIS — R26.9 GAIT ABNORMALITY: Primary | ICD-10-CM

## 2024-08-14 DIAGNOSIS — G89.4 CHRONIC PAIN DISORDER: ICD-10-CM

## 2024-08-14 DIAGNOSIS — M51.36 DDD (DEGENERATIVE DISC DISEASE), LUMBAR: ICD-10-CM

## 2024-08-14 DIAGNOSIS — M50.30 DDD (DEGENERATIVE DISC DISEASE), CERVICAL: ICD-10-CM

## 2024-08-14 DIAGNOSIS — M54.16 LUMBAR RADICULOPATHY: ICD-10-CM

## 2024-08-14 DIAGNOSIS — M54.12 CERVICAL RADICULOPATHY: ICD-10-CM

## 2024-08-14 DIAGNOSIS — R29.898 DECREASED STRENGTH OF LOWER EXTREMITY: ICD-10-CM

## 2024-08-14 DIAGNOSIS — S83.212A BUCKET-HANDLE TEAR OF MEDIAL MENISCUS OF LEFT KNEE AS CURRENT INJURY, INITIAL ENCOUNTER: ICD-10-CM

## 2024-08-14 DIAGNOSIS — M47.816 LUMBAR SPONDYLOSIS: ICD-10-CM

## 2024-08-14 PROCEDURE — 97161 PT EVAL LOW COMPLEX 20 MIN: CPT

## 2024-08-14 PROCEDURE — 97110 THERAPEUTIC EXERCISES: CPT

## 2024-08-14 RX ORDER — OXYCODONE AND ACETAMINOPHEN 10; 325 MG/1; MG/1
1 TABLET ORAL EVERY 12 HOURS PRN
Qty: 60 TABLET | Refills: 0 | Status: SHIPPED | OUTPATIENT
Start: 2024-08-14

## 2024-08-14 NOTE — TELEPHONE ENCOUNTER
Patient requesting refill on: Oxycodone  Last office visit: 06/10/24   shows last refill on: 06/12/24  Patient does have a pain contract on file with [facility] department  Patient last UDS: 05/01/24 was consistent with current therapy.             CODEINE  Not Detected        Comment: INTERPRETIVE INFORMATION: Codeine, U  Positive Cutoff: 40 ng/mL  Methodology: Mass Spectrometry   MORPHINE  Not Detected        Comment: INTERPRETIVE INFORMATION:Morphine, U  Positive Cutoff: 20 ng/mL  Methodology: Mass Spectrometry   6-ACETYLMORPHINE  Not Detected        Comment: INTERPRETIVE INFORMATION:6-acetylmorphine, U  Positive Cutoff: 20 ng/mL  Methodology: Mass Spectrometry   OXYCODONE  Present        Comment: INTERPRETIVE INFORMATION:Oxycodone, U  Positive Cutoff: 40 ng/mL  Methodology: Mass Spectrometry   NOROYXCODONE  Present        Comment: INTERPRETIVE INFORMATION:Noroxycodone, U  Positive Cutoff: 100 ng/mL  Methodology: Mass Spectrometry   OXYMORPHONE  Present        Comment: INTERPRETIVE INFORMATION:Oxymorphone, U  Positive Cutoff: 40 ng/mL  Methodology: Mass Spectrometry   NOROXYMORPHONE  Not Detected        Comment: INTERPRETIVE INFORMATION:Noroxymorphone, U  Positive Cutoff: 100 ng/mL  Methodology: Mass Spectrometry   HYDROCODONE  Not Detected        Comment: INTERPRETIVE INFORMATION:Hydrocodone, U  Positive Cutoff: 40 ng/mL  Methodology: Mass Spectrometry   NORHYDROCODONE  Not Detected        Comment: INTERPRETIVE INFORMATION:Norhydrocodone, U  Positive Cutoff: 100 ng/mL  Methodology: Mass Spectrometry   HYDROMORPHONE  Not Detected        Comment: INTERPRETIVE INFORMATION:Hydromorphone, U  Positive Cutoff: 20 ng/mL  Methodology: Mass Spectrometry   BUPRENORPHINE  Not Detected        Comment: INTERPRETIVE INFORMATION:Buprenorphine, U  Positive Cutoff: 5 ng/mL  Methodology: Mass Spectrometry   NORUBPRENORPHINE  Not Detected        Comment: INTERPRETIVE INFORMATION:Norbuprenorphine, U  Positive  Cutoff: 20 ng/mL  Methodology: Mass Spectrometry   FENTANYL  Not Detected        Comment: INTERPRETIVE INFORMATION:Fentanyl, U  Positive Cutoff: 2 ng/mL  Methodology: Mass Spectrometry   NORFENTANYL  Not Detected        Comment: INTERPRETIVE INFORMATION:Norfentanyl, U  Positive Cutoff: 2 ng/mL  Methodology: Mass Spectrometry   MEPERIDINE METABOLITE  Not Detected        Comment: INTERPRETIVE INFORMATION:Meperidine metabolite, U  Positive Cutoff: 50 ng/mL  Methodology: Mass Spectrometry   TAPENTADOL  Not Detected        Comment: INTERPRETIVE INFORMATION:Tapentadol, U  Positive Cutoff: 100 ng/mL  Methodology: Mass Spectrometry   TAPENTADOL-O-SULF  Not Detected        Comment: INTERPRETIVE INFORMATION:Tapentadol-o-Sulf, U  Positive Cutoff: 200 ng/mL  Methodology: Mass Spectrometry   METHADONE  Negative        Comment: Presumptive negative by immunoassay. Testing by mass  spectrometry is available on request.  INTERPRETIVE INFORMATION: Methadone Screen, U  Positive Cutoff: 150 ng/mL  Methodology: Immunoassay   TRAMADOL  Negative        Comment: Presumptive negative by immunoassay. Testing by mass  spectrometry is available on request.  INTERPRETIVE INFORMATION:Tramadol Screen, U  Positive Cutoff: 100 ng/mL  Methodology: Immunoassay   AMPHETAMINE  Not Detected        Comment: INTERPRETIVE INFORMATION:Amphetamine, U  Positive Cutoff: 50 ng/mL  Methodology: Mass Spectrometry   METHAMPHETAMINE  Not Detected        Comment: INTERPRETIVE INFORMATION:Methamphetamine, U  Positive Cutoff: 200 ng/mL  Methodology: Mass Spectrometry   MDMA- ECSTASY  Not Detected        Comment: INTERPRETIVE INFORMATION:MDMA, U  Positive Cutoff: 200 ng/mL  Methodology: Mass Spectrometry   MDA  Not Detected        Comment: INTERPRETIVE INFORMATION:MDA, U  Positive Cutoff: 200 ng/mL  Methodology: Mass Spectrometry   MDEA- Simin  Not Detected        Comment: INTERPRETIVE INFORMATION:MDEA, U  Positive Cutoff: 200 ng/mL  Methodology: Mass Spectrometry    METHYLPHENIDATE  Not Detected        Comment: INTERPRETIVE INFORMATION:Methylphenidate, U  Positive Cutoff: 100 ng/mL  Methodology: Mass Spectrometry   PHENTERMINE  Not Detected        Comment: INTERPRETIVE INFORMATION:Phentermine, U  Positive Cutoff: 100 ng/mL  Methodology: Mass Spectrometry   BENZOYLECGONINE  Negative        Comment: Presumptive negative by immunoassay. Testing by mass  spectrometry is available on request.  INTERPRETIVE INFORMATION:Cocaine Screen, U  Positive Cutoff: 150 ng/mL  Methodology: Immunoassay   ALPRAZOLAM  Not Detected        Comment: INTERPRETIVE INFORMATION:Alprazolam, U  Positive Cutoff: 40 ng/mL  Methodology: Mass Spectrometry   ALPHA-OH-ALPRAZOLAM  Not Detected        Comment: INTERPRETIVE INFORMATION:Alpha-OH-Alprazolam, U  Positive Cutoff: 20 ng/mL  Methodology: Mass Spectrometry   CLONAZEPAM  Not Detected        Comment: INTERPRETIVE INFORMATION:Clonazepam, U  Positive Cutoff: 20 ng/mL  Methodology: Mass Spectrometry   7-AMINOCLONAZEPAM  Not Detected        Comment: INTERPRETIVE INFORMATION:7-Aminoclonazepam, U  Positive Cutoff: 40 ng/mL  Methodology: Mass Spectrometry   DIAZEPAM  Not Detected        Comment: INTERPRETIVE INFORMATION:Diazepam, U  Positive Cutoff: 50 ng/mL  Methodology: Mass Spectrometry   NORDIAZEPAM  Not Detected        Comment: INTERPRETIVE INFORMATION:Nordiazepam, U  Positive Cutoff: 50 ng/mL  Methodology: Mass Spectrometry   OXAZEPAM  Not Detected        Comment: INTERPRETIVE INFORMATION:Oxazepam, U  Positive Cutoff: 50 ng/mL  Methodology: Mass Spectrometry   TEMAZEPAM  Not Detected        Comment: INTERPRETIVE INFORMATION:Temazepam, U  Positive Cutoff: 50 ng/mL  Methodology: Mass Spectrometry   Lorazepam  Not Detected        Comment: INTERPRETIVE INFORMATION:Lorazepam, U  Positive Cutoff: 60 ng/mL  Methodology: Mass Spectrometry   MIDAZOLAM  Not Detected        Comment: INTERPRETIVE INFORMATION:Midazolam, U  Positive Cutoff: 20 ng/mL  Methodology: Mass  Spectrometry   ZOLPIDEM  Not Detected        Comment: INTERPRETIVE INFORMATION:Zolpidem, U  Positive Cutoff: 20 ng/mL  Methodology: Mass Spectrometry   BARBITURATES  Negative        Comment: Presumptive negative by immunoassay. Testing by mass  spectrometry is available on request.  INTERPRETIVE INFORMATION:Barbiturates Screen, U  Positive Cutoff: 200 ng/mL  Methodology: Immunoassay   Creatinine, Urine 20.0 - 400.0 mg/dL 122.9 75.0 R 116.0 R 27.0 R 157.0 R 156.0 R, CM   ETHYL GLUCURONIDE  Negative        Comment: Presumptive negative by immunoassay. Testing by mass  spectrometry is available on request.  INTERPRETIVE INFORMATION:Ethyl Glucuronide Screen, U  Positive Cutoff: 500 ng/mL  Methodology: Immunoassay   MARIJUANA METABOLITE  Negative        Comment: Presumptive negative by immunoassay. Testing by mass  spectrometry is available on request.  INTERPRETIVE INFORMATION: THC (Cannabinoids) Screen, U  Positive Cutoff: 50 ng/mL  Methodology: Immunoassay   PCP  Negative        Comment: Presumptive negative by immunoassay. Testing by mass  spectrometry is available on request.  INTERPRETIVE INFORMATION:Phencyclidine Screen, U  Positive Cutoff: 25 ng/mL  Methodology: Immunoassay   CARISOPRODOL  Negative        Comment: Presumptive negative by immunoassay. Testing by mass  spectrometry is available on request.  INTERPRETIVE INFORMATION: Carisoprodol Screen, U  Positive Cutoff: 100 ng/mL  Methodology: Immunoassay  The carisoprodol immunoassay has cross-reactivity to  carisoprodol and meprobamate.   Naloxone  Not Detected        Comment: INTERPRETIVE INFORMATION:Naloxone, U  Positive Cutoff: 100 ng/mL  Methodology: Mass Spectrometry   Gabapentin  Present        Comment: INTERPRETIVE INFORMATION:Gabapentin, U  Positive Cutoff: 3,000 ng/mL  Methodology: Mass Spectrometry   Pregabalin  Not Detected        Comment: INTERPRETIVE INFORMATION:Pregabalin, U  Positive Cutoff: 3,000 ng/mL  Methodology: Mass Spectrometry    Alpha-OH-Midazolam  Not Detected        Comment: INTERPRETIVE INFORMATION:Alpha-OH-Midazolam, U  Positive Cutoff: 20 ng/mL  Methodology: Mass Spectrometry   Zolpidem Metabolite  Not Detected

## 2024-08-15 PROBLEM — R26.9 GAIT ABNORMALITY: Status: ACTIVE | Noted: 2024-08-15

## 2024-08-15 PROBLEM — M25.669 DECREASED RANGE OF MOTION OF LOWER EXTREMITY: Status: ACTIVE | Noted: 2024-08-15

## 2024-08-15 PROBLEM — R29.898 DECREASED STRENGTH OF LOWER EXTREMITY: Status: ACTIVE | Noted: 2024-08-15

## 2024-08-15 NOTE — PLAN OF CARE
[facility]   Physical Therapy Initial Evaluation      Name: Partha Curtis Jr.  Clinic Number: [id number]    Therapy Diagnosis:   Encounter Diagnoses   Name Primary?    Bucket-handle tear of medial meniscus of left knee as current injury, initial encounter     Gait abnormality Yes    Decreased range of motion of left lower extremity     Decreased strength of lower extremity      Physician: Robi Arechiga PA-C   Surgeon: Dr. Washington    Physician Orders: PT Eval and Treat   Medical Diagnosis from Referral: Bucket-handle tear of medial meniscus of left knee as current injury, initial encounter [S83.212A]   Evaluation Date: 8/14/2024  Authorization Period Expiration: 8/7/2025  Plan of Care Expiration: 2/25/2025  Progress Note Due: 9/20/2024  Visit # / Visits authorized: 1/1   FOTO: /3 (Provide at next session)    Date of Surgery: 8/13/2024  Return to MD: 8/28/2024, 10/2/2024    Procedure:  1. Left Knee Arthroscopy, with meniscus repair (medial OR lateral) 40863  2.  Left Knee Arthroscopy, with Microfracture 37922 - marrow stimulation procedure  3.  Left Knee Arthroscopy, debridement/shaving of articular cartilage (chondroplasty) 42805 -     Post-Op Plan:  Peripheral meniscal repair protocol     Precautions: Standard and Weightbearing     Time In: 9:00 am   Time Out: 9:51 am   Total Appointment Time (timed & untimed codes): 51 minutes    Subjective     Date of onset: L Knee Surgery on 8/13/2024    History of current condition - Jarvis reports:  He squatted down about 3 months ago while at home and felt and hear a loud pop in his knee and immediately had difficulty moving and getting up. He went to the doctor and had an MRI which showed a meniscus tear. He underwent surgery yesterday. He has been in a lot of pain since surgery and difficulty sleeping. Has been taking the pain medication and icing often. He reports a history of knee surgeries on his right knee and left ankle surgery years  ago. He has a history of being in the hospital for a-fibrillation as well. His goals are to improve the pain in his knee and be able to walk and ride his bike again without difficulty. He will be going to [place] for his daughters birthday September 20th for a week. He is working on getting a scooter or accommodations as he knows he want be able to do all of the walking that entails.    Imaging:   MRI Knee (6/10/2024):  Bucket-handle tear of medial meniscus of left knee as current injury, initial encounter [S83.212A]     Prior Therapy: Yes, not for this   Social History: Lives with his family  Occupation:    Prior Level of Function: Independent in all ADLs and job duties   Current Level of Function: Nonweightbearing, ambulating with crutches and t-scope brace. Difficulty with ADLs and job duties.     Pain:  Current 8/10, worst 10/10, best 8/10   Location: left knee    Description: Aching, Throbbing, and Tight  Aggravating Factors: Standing and Walking  Easing Factors: pain medication, ice, and rest    Patients goals: To be able to walk without difficulty or pain as [place] [employer] [place] and need to walk a ton for that. He would also like to be able to ride his bike outside as that is his favorite form of exercise.       Medical History:   Past Medical History:   Diagnosis Date    Allergy     Carotid artery occlusion     Chronic back pain     Colonic polyp     Genetic testing     MUTYH mutation-negative    Hyperlipidemia     Hypertension     Paroxysmal atrial fibrillation 2/28/2024    Sleep apnea     Type 2 diabetes mellitus with stage 3 chronic kidney disease, without long-term current use of insulin 4/2/2020       Surgical History:   Partha Curtis Jr.  has a past surgical history that includes Total knee arthroplasty (Right); Ankle surgery (Left); Carotid endarterectomy (Right, 07/15/2015); Nasal septum surgery; Appendectomy; Carpal tunnel release (Bilateral); Colonoscopy (N/A,  12/14/2018); Joint replacement (9/2010); Epidural steroid injection (N/A, 2/26/2024); transesophageal echocardiogram with possible cardioversion (jameson w/ poss cardioversion) (N/A, 2/28/2024); Epidural steroid injection (N/A, 5/23/2024); arthroscopy,knee,with meniscus repair (Left, 8/13/2024); Arthroscopic chondroplasty of knee joint (Left, 8/13/2024); and arthroscopy, knee, with abrasion arthroplasty or microfracture (Left, 8/13/2024).    Medications:   Partha has a current medication list which includes the following prescription(s): amitriptyline, aspirin, bd luer-dequan syringe, candesartan, clonidine, cyanocobalamin, ergocalciferol, ezetimibe, gabapentin, glimepiride, glucose, hydrocodone-acetaminophen, hydrocortisone, ipratropium, metoprolol succinate, oxycodone-acetaminophen, rosuvastatin, ozempic, sutab, bd integra syringe, testosterone cypionate, and valacyclovir, and the following Facility-Administered Medications: 0.9% nacl and 0.9% nacl.    Allergies:   Review of patient's allergies indicates:   Allergen Reactions    Stadol [butorphanol tartrate] Rash     Swelling in face    Strawberries [strawberry] Rash        Objective      Observation: Patient presents to physical therapy with an overall pleasant general affect who does not appear to be in any acute distress. Patient presents with bilateral axillary crutches and t-scope brace locked in extension and post-op dressing in place.     Gait: Patient ambulates into clinic with t-scope brace locked in extension with swing through gait pattern.     Sensation: Intact on right lower extremity in dermatomal pattern L2-S2. Difficulty assessing left lower extremity secondary to post-op dressing, will assess at follow-up sessions.     Range of Motion:  Knee    Right AROM/PROM Left AROM/PROM   Flexion 120 degrees / 125 degrees  NT degrees / 60 degrees    Extension 0 degrees / 0 degrees  -2 degrees (lack) / -2 degrees (lack)     Lower Extremity Strength (MMT): Not  "formally assessed secondary to post-op status and increased pain, to be formally assessed at follow-up sessions when appropriate.     Quad Set: fair    Joint Mobility: Difficulty fully assessing secondary to post-op dressing, limited as expected with post-op status      Palpation: Difficulty fully assessing secondary to post-op dressing, as expected with post-op status     Edema:   Girth Measurement Right Left    6 cm below  36 cm  cm   Joint line 38 cm  cm   10 cm above  40 cm  cm    *Will further assess left lower extremity at follow-up sessions secondary to limitations with post-op dressing.     Functional Tests: Not assessed secondary to post-op status and pain, further assessment at follow-up sessions when appropriate.   - DL Squat:  - SLS:  - SL Squat    Intake Outcome Measure for FOTO Knee Survey    Therapist reviewed FOTO scores for Partha Curtis Jr. on 8/14/2024.   FOTO documents entered into [facility] - see Media section.    Intake Score: TBD%          Treatment     Total Treatment time (time-based codes) separate from Evaluation: 24 minutes     Jarvis received the treatments listed below:      therapeutic exercises to develop strength, endurance, ROM, flexibility, posture, and core stabilization for 24 minutes including:    Pt education on PT POC, Prognosis, and HEP   Pt education on early post-op goals, s/s infection and DVT   Heel prop x 3'   Longsitting HS/Gastroc stretch with strap 3 x 20" holds   Ankle pumps BlueTB 1 x 15 reps   Quad sets 1 x 10 x 5" holds   Heel slides passively with strap 1 x 10 reps within protocol     manual therapy techniques: Joint mobilizations and Soft tissue Mobilization were applied to the: Knee for 00 minutes, including:      neuromuscular re-education activities to improve: Balance, Coordination, Kinesthetic, Sense, Proprioception, and Posture for 00 minutes. The following activities were included:      therapeutic activities to improve functional performance for 00 minutes, " including:      gait training to improve functional mobility and safety for 00 minutes, including:      Patient Education and Home Exercises     Education provided:   - PT POC, Prognosis, and HEP   - Early post-op goals: importance of knee extension, edema management, quad activation/strength, and gait mechanics  - Post-op precautions, weightbearing status and mobility precautions   - Signs and symptoms of DVT and infection      Written Home Exercises Provided: yes. Exercises were reviewed and Jarvis was able to demonstrate them prior to the end of the session.  Jarvis demonstrated good  understanding of the education provided. See EMR under Patient Instructions for exercises provided during therapy sessions.    Assessment     Partha is a 64 y.o. male referred to outpatient Physical Therapy with a medical diagnosis of Bucket-handle tear of medial meniscus of left knee as current injury, initial encounter [S83.212A]. Patient presents with signs and symptoms consistent with post-op status. Patient presents with decreased range of motion, decreased strength, gait abnormality, pain, edema, and functional limitations in ADLs, ambulation, and getting up and down / squatting. Patient would benefit from skilled PT intervention to address above stated deficits and improve overall functional mobility and return to prior level of function.     Patient prognosis is Good.   Patient will benefit from skilled outpatient Physical Therapy to address the deficits stated above and in the chart below, provide patient /family education, and to maximize patientt's level of independence.     Plan of care discussed with patient: Yes  Patient's spiritual, cultural and educational needs considered and patient is agreeable to the plan of care and goals as stated below:     Anticipated Barriers for therapy: Scheduling, co-morbidities     Medical Necessity is demonstrated by the following  History  Co-morbidities and personal factors that may  impact the plan of care [] LOW: no personal factors / co-morbidities  [] MODERATE: 1-2 personal factors / co-morbidities  [x] HIGH: 3+ personal factors / co-morbidities    Moderate / High Support Documentation: Age, BMI, anxiety, HTN, hx of a-fib, diabetes     Examination  Body Structures and Functions, activity limitations and participation restrictions that may impact the plan of care [] LOW: addressing 1-2 elements  [] MODERATE: 3+ elements  [x] HIGH: 3+ elements (please support below)    Moderate / High Support Documentation: range of motion, strength, gait, posture, edema, pain, motor control and neuromuscular re-education     Clinical Presentation [x] LOW: stable  [] MODERATE: Evolving  [] HIGH: Unstable     Decision Making/ Complexity Score: low       Goals:  Short Term Goals: 6 weeks   Patient will be independent in initial HEP to help supplement PT.  Patient will improve knee extension range of motion to 0 degrees to help with gait mechanics.  Patient will improve knee flexion range of motion to >/= 90 degrees to help with ADLs.   Patient will improve pain at its worst to </= 5/10 to help improve quality of life.     Long Term Goals: 12-24 weeks   Patient will be independent in updated HEP to help supplement PT and maintain gains made in PT.   Patient will improve FOTO score to >/= predicted to show improvement in condition.   Patient will improve knee extension strength to >/= 4+/5 to help with ADLs.  Patient goal: Patient will be able to return to riding his bicycle without difficulty.   Plan     Plan of care Certification: 8/14/2024 to 2/25/2025.    Outpatient Physical Therapy 1-2 times weekly for 24 weeks to include the following interventions: Electrical Stimulation , Gait Training, Manual Therapy, Moist Heat/ Ice, Neuromuscular Re-ed, Patient Education, Self Care, Therapeutic Activities, and Therapeutic Exercise.     Flakita Thomas, PT, DPT, OCS

## 2024-08-16 ENCOUNTER — LAB VISIT (OUTPATIENT)
Dept: LAB | Facility: HOSPITAL | Age: 64
End: 2024-08-16
Attending: FAMILY MEDICINE
Payer: COMMERCIAL

## 2024-08-16 ENCOUNTER — CLINICAL SUPPORT (OUTPATIENT)
Dept: REHABILITATION | Facility: HOSPITAL | Age: 64
End: 2024-08-16
Payer: COMMERCIAL

## 2024-08-16 DIAGNOSIS — R29.898 DECREASED STRENGTH OF LOWER EXTREMITY: ICD-10-CM

## 2024-08-16 DIAGNOSIS — N17.9 AKI (ACUTE KIDNEY INJURY): ICD-10-CM

## 2024-08-16 DIAGNOSIS — N18.31 TYPE 2 DIABETES MELLITUS WITH STAGE 3A CHRONIC KIDNEY DISEASE, WITHOUT LONG-TERM CURRENT USE OF INSULIN: ICD-10-CM

## 2024-08-16 DIAGNOSIS — R26.9 GAIT ABNORMALITY: Primary | ICD-10-CM

## 2024-08-16 DIAGNOSIS — E87.5 HYPERKALEMIA: ICD-10-CM

## 2024-08-16 DIAGNOSIS — E11.22 TYPE 2 DIABETES MELLITUS WITH STAGE 3A CHRONIC KIDNEY DISEASE, WITHOUT LONG-TERM CURRENT USE OF INSULIN: ICD-10-CM

## 2024-08-16 DIAGNOSIS — M25.662 DECREASED RANGE OF MOTION OF LEFT LOWER EXTREMITY: ICD-10-CM

## 2024-08-16 LAB
ALBUMIN SERPL BCP-MCNC: 3.7 G/DL (ref 3.5–5.2)
ALP SERPL-CCNC: 60 U/L (ref 55–135)
ALT SERPL W/O P-5'-P-CCNC: 34 U/L (ref 10–44)
ANION GAP SERPL CALC-SCNC: 8 MMOL/L (ref 8–16)
AST SERPL-CCNC: 36 U/L (ref 10–40)
BASOPHILS # BLD AUTO: 0.06 K/UL (ref 0–0.2)
BASOPHILS NFR BLD: 0.6 % (ref 0–1.9)
BILIRUB SERPL-MCNC: 0.5 MG/DL (ref 0.1–1)
BUN SERPL-MCNC: 9 MG/DL (ref 8–23)
CALCIUM SERPL-MCNC: 8.9 MG/DL (ref 8.7–10.5)
CHLORIDE SERPL-SCNC: 101 MMOL/L (ref 95–110)
CO2 SERPL-SCNC: 28 MMOL/L (ref 23–29)
CREAT SERPL-MCNC: 1.4 MG/DL (ref 0.5–1.4)
DIFFERENTIAL METHOD BLD: ABNORMAL
EOSINOPHIL # BLD AUTO: 0.3 K/UL (ref 0–0.5)
EOSINOPHIL NFR BLD: 2.3 % (ref 0–8)
ERYTHROCYTE [DISTWIDTH] IN BLOOD BY AUTOMATED COUNT: 13.6 % (ref 11.5–14.5)
EST. GFR  (NO RACE VARIABLE): 56.1 ML/MIN/1.73 M^2
ESTIMATED AVG GLUCOSE: 143 MG/DL (ref 68–131)
GLUCOSE SERPL-MCNC: 110 MG/DL (ref 70–110)
HBA1C MFR BLD: 6.6 % (ref 4–5.6)
HCT VFR BLD AUTO: 55.1 % (ref 40–54)
HGB BLD-MCNC: 17.7 G/DL (ref 14–18)
IMM GRANULOCYTES # BLD AUTO: 0.02 K/UL (ref 0–0.04)
IMM GRANULOCYTES NFR BLD AUTO: 0.2 % (ref 0–0.5)
LYMPHOCYTES # BLD AUTO: 2.5 K/UL (ref 1–4.8)
LYMPHOCYTES NFR BLD: 23.3 % (ref 18–48)
MCH RBC QN AUTO: 29.6 PG (ref 27–31)
MCHC RBC AUTO-ENTMCNC: 32.1 G/DL (ref 32–36)
MCV RBC AUTO: 92 FL (ref 82–98)
MONOCYTES # BLD AUTO: 1.3 K/UL (ref 0.3–1)
MONOCYTES NFR BLD: 11.5 % (ref 4–15)
NEUTROPHILS # BLD AUTO: 6.8 K/UL (ref 1.8–7.7)
NEUTROPHILS NFR BLD: 62.1 % (ref 38–73)
NRBC BLD-RTO: 0 /100 WBC
PLATELET # BLD AUTO: 254 K/UL (ref 150–450)
PMV BLD AUTO: 9.5 FL (ref 9.2–12.9)
POTASSIUM SERPL-SCNC: 4.3 MMOL/L (ref 3.5–5.1)
PROT SERPL-MCNC: 6.7 G/DL (ref 6–8.4)
RBC # BLD AUTO: 5.98 M/UL (ref 4.6–6.2)
SODIUM SERPL-SCNC: 137 MMOL/L (ref 136–145)
WBC # BLD AUTO: 10.9 K/UL (ref 3.9–12.7)

## 2024-08-16 PROCEDURE — 85025 COMPLETE CBC W/AUTO DIFF WBC: CPT | Performed by: FAMILY MEDICINE

## 2024-08-16 PROCEDURE — 97140 MANUAL THERAPY 1/> REGIONS: CPT | Performed by: PHYSICAL THERAPIST

## 2024-08-16 PROCEDURE — 36415 COLL VENOUS BLD VENIPUNCTURE: CPT | Mod: PO | Performed by: FAMILY MEDICINE

## 2024-08-16 PROCEDURE — 97112 NEUROMUSCULAR REEDUCATION: CPT | Performed by: PHYSICAL THERAPIST

## 2024-08-16 PROCEDURE — 83036 HEMOGLOBIN GLYCOSYLATED A1C: CPT | Performed by: FAMILY MEDICINE

## 2024-08-16 PROCEDURE — 80053 COMPREHEN METABOLIC PANEL: CPT | Performed by: FAMILY MEDICINE

## 2024-08-16 PROCEDURE — 97110 THERAPEUTIC EXERCISES: CPT | Performed by: PHYSICAL THERAPIST

## 2024-08-20 ENCOUNTER — OFFICE VISIT (OUTPATIENT)
Dept: FAMILY MEDICINE | Facility: CLINIC | Age: 64
End: 2024-08-20
Payer: COMMERCIAL

## 2024-08-20 ENCOUNTER — CLINICAL SUPPORT (OUTPATIENT)
Dept: REHABILITATION | Facility: HOSPITAL | Age: 64
End: 2024-08-20
Payer: COMMERCIAL

## 2024-08-20 VITALS
HEIGHT: 70 IN | WEIGHT: 198.88 LBS | SYSTOLIC BLOOD PRESSURE: 138 MMHG | HEART RATE: 82 BPM | DIASTOLIC BLOOD PRESSURE: 80 MMHG | OXYGEN SATURATION: 98 % | BODY MASS INDEX: 28.47 KG/M2

## 2024-08-20 DIAGNOSIS — I10 ESSENTIAL HYPERTENSION: ICD-10-CM

## 2024-08-20 DIAGNOSIS — R26.9 GAIT ABNORMALITY: Primary | ICD-10-CM

## 2024-08-20 DIAGNOSIS — E78.5 DYSLIPIDEMIA: Chronic | ICD-10-CM

## 2024-08-20 DIAGNOSIS — M25.662 DECREASED RANGE OF MOTION OF LEFT LOWER EXTREMITY: ICD-10-CM

## 2024-08-20 DIAGNOSIS — G47.00 INSOMNIA, UNSPECIFIED TYPE: ICD-10-CM

## 2024-08-20 DIAGNOSIS — N18.31 TYPE 2 DIABETES MELLITUS WITH STAGE 3A CHRONIC KIDNEY DISEASE, WITHOUT LONG-TERM CURRENT USE OF INSULIN: Primary | ICD-10-CM

## 2024-08-20 DIAGNOSIS — R29.898 DECREASED STRENGTH OF LOWER EXTREMITY: ICD-10-CM

## 2024-08-20 DIAGNOSIS — E11.22 TYPE 2 DIABETES MELLITUS WITH STAGE 3A CHRONIC KIDNEY DISEASE, WITHOUT LONG-TERM CURRENT USE OF INSULIN: Primary | ICD-10-CM

## 2024-08-20 PROCEDURE — 1160F RVW MEDS BY RX/DR IN RCRD: CPT | Mod: CPTII,S$GLB,, | Performed by: FAMILY MEDICINE

## 2024-08-20 PROCEDURE — 3044F HG A1C LEVEL LT 7.0%: CPT | Mod: CPTII,S$GLB,, | Performed by: FAMILY MEDICINE

## 2024-08-20 PROCEDURE — 3075F SYST BP GE 130 - 139MM HG: CPT | Mod: CPTII,S$GLB,, | Performed by: FAMILY MEDICINE

## 2024-08-20 PROCEDURE — 99214 OFFICE O/P EST MOD 30 MIN: CPT | Mod: S$GLB,,, | Performed by: FAMILY MEDICINE

## 2024-08-20 PROCEDURE — 3079F DIAST BP 80-89 MM HG: CPT | Mod: CPTII,S$GLB,, | Performed by: FAMILY MEDICINE

## 2024-08-20 PROCEDURE — 3061F NEG MICROALBUMINURIA REV: CPT | Mod: CPTII,S$GLB,, | Performed by: FAMILY MEDICINE

## 2024-08-20 PROCEDURE — 97140 MANUAL THERAPY 1/> REGIONS: CPT

## 2024-08-20 PROCEDURE — 4010F ACE/ARB THERAPY RXD/TAKEN: CPT | Mod: CPTII,S$GLB,, | Performed by: FAMILY MEDICINE

## 2024-08-20 PROCEDURE — 97112 NEUROMUSCULAR REEDUCATION: CPT

## 2024-08-20 PROCEDURE — 97110 THERAPEUTIC EXERCISES: CPT

## 2024-08-20 PROCEDURE — 99999 PR PBB SHADOW E&M-EST. PATIENT-LVL V: CPT | Mod: PBBFAC,,, | Performed by: FAMILY MEDICINE

## 2024-08-20 PROCEDURE — 1159F MED LIST DOCD IN RCRD: CPT | Mod: CPTII,S$GLB,, | Performed by: FAMILY MEDICINE

## 2024-08-20 PROCEDURE — 3008F BODY MASS INDEX DOCD: CPT | Mod: CPTII,S$GLB,, | Performed by: FAMILY MEDICINE

## 2024-08-20 PROCEDURE — 3066F NEPHROPATHY DOC TX: CPT | Mod: CPTII,S$GLB,, | Performed by: FAMILY MEDICINE

## 2024-08-20 RX ORDER — SEMAGLUTIDE 2.68 MG/ML
2 INJECTION, SOLUTION SUBCUTANEOUS
Qty: 9 ML | Refills: 3 | Status: SHIPPED | OUTPATIENT
Start: 2024-08-20 | End: 2025-08-20

## 2024-08-20 RX ORDER — SPIRONOLACTONE 25 MG/1
25 TABLET ORAL
COMMUNITY
Start: 2024-06-15

## 2024-08-20 RX ORDER — FLUTICASONE PROPIONATE 50 MCG
2 SPRAY, SUSPENSION (ML) NASAL
COMMUNITY
Start: 2024-06-13

## 2024-08-20 RX ORDER — CLONIDINE HYDROCHLORIDE 0.1 MG/1
0.1 TABLET ORAL 2 TIMES DAILY
Qty: 180 TABLET | Refills: 3 | Status: SHIPPED | OUTPATIENT
Start: 2024-08-20 | End: 2025-08-20

## 2024-08-20 NOTE — PROGRESS NOTES
[facility]   Physical Therapy Treatment Note      Name: Partha Curtis Jr.  Clinic Number: [id number]    Therapy Diagnosis:   Encounter Diagnoses   Name Primary?    Gait abnormality Yes    Decreased range of motion of left lower extremity     Decreased strength of lower extremity      Physician: Raffy Ascencio MD    Visit Date: 8/20/2024    Physician Orders: PT Eval and Treat   Medical Diagnosis from Referral: Bucket-handle tear of medial meniscus of left knee as current injury, initial encounter [S83.212A]   Evaluation Date: 8/14/2024  Authorization Period Expiration: 8/7/2025  Plan of Care Expiration: 2/25/2025  Progress Note Due: 9/20/2024  Visit # / Visits authorized: 4/20  This POC: 3    FOTO: 1/3 (8/20/24)     Date of Surgery: 8/13/2024  Return to MD: 8/28/2024, 10/2/2024     Procedure:  1. Left Knee Arthroscopy, with meniscus repair (medial OR lateral) 53213  2.  Left Knee Arthroscopy, with Microfracture 52372 - marrow stimulation procedure  3.  Left Knee Arthroscopy, debridement/shaving of articular cartilage (chondroplasty) 60895 -      Post-Op Plan:  Peripheral meniscal repair protocol      Precautions: Standard and Weightbearing     PTA Visit #: 0/5     Time In: 2:56 pm   Time Out: 3:48 pm   Total Billable Time: 52 minutes    Subjective     Pt reports: Pain is better, not having as much. Has been working hard doing his HEP. He did slip and fall on Sunday. He went to get up and pushed up using his crutches which were on a dryer sheet on hard wood floor and he slipped. His knee feels fine and he was in a brace but just wanted to let PT know.   He was compliant with home exercise program.  Response to previous treatment: Improved mobility  Functional change: Ongoing    Pain: 2/10  Location: left knee      Objective      Objective Measures updated at progress report unless specified.     8/20/2024: Patient ambulates into clinic with bilateral axillary crutches and t-scope  "brace intact locked in extension.     Range of Motion:  Knee     Right AROM/PROM Left AROM/PROM   Flexion 120 degrees / 125 degrees  NT degrees / 95 degrees    Extension 0 degrees / 0 degrees  0 degrees  / 0 degrees      Edema Measurements   Left    6 cm below 37 cm   Joint line 38.5 cm   10 cm above  40.5 cm    *Above and below measurements based off of inferior patella pole     Treatment     *Pt is 1 weeks 0 days s/p as of 8/20/2024.    Physical Therapy Fabienne assisted with todays treatment. Fabienne was in direct line of sight supervision by supervising PT during this time.     Jarvis received the treatments listed below:      therapeutic exercises to develop strength, endurance, ROM, and flexibility for 12 minutes including:    Assessment as above   Heel prop x 5'   Longsitting HS/Gastroc stretch with strap 5 x 20" holds  Heel slides with strap within protocol x 3'     manual therapy techniques: Joint mobilizations were applied to the: knee for 14 minutes, including:    Patellar mobilizations all planes III-IV  Fat pad mobilizations  EOM knee flexion passive within protocol     neuromuscular re-education activities to improve: Balance, Coordination, Kinesthetic, and Proprioception for 26 minutes. The following activities were included:    Neuromuscular Electrical Stimulation: Russian 10" on 10-20" off   - Quad sets with towel x 6'   - Standing SLR x 6'     Sidelying hip abduction with brace 3 x 10 reps   Standing hip extension RedTB 3 x 10 reps   Standing hip abduction with RedTB 3 x 10 reps     Not Performed:  Seated SLR outside of brace 5x  Seated SLR in brace 10 x    therapeutic activities to improve functional performance for 00  minutes, including:      gait training to improve functional mobility and safety for 00  minutes, including:        Patient Education and Home Exercises       Education provided:   - PT POC, Prognosis, and HEP   - Early post-op goals: importance of knee extension, edema " management, quad activation/strength, and gait mechanics  - Post-op precautions, weightbearing status and mobility precautions   - Signs and symptoms of DVT and infection       Written Home Exercises Provided: yes. Exercises were reviewed and Jarvis was able to demonstrate them prior to the end of the session.  Jarvis demonstrated good  understanding of the education provided. See EMR under Patient Instructions for exercises provided during therapy sessions.    Assessment     Jarvis tolerated therapy session well. Continued good carryover with range of motion and good knee extension at 0 at start of session with following heel prop and stretch able to achieve hyperextension. Continued use of NMES to help improve quad activation/strength with good response. Added standing SLRs as well with good tolerance. Further proximal strengthening as well. Will continue to monitor and progress as able.     Jarvis Is progressing well towards his goals.   Pt prognosis is Excellent.     Pt will continue to benefit from skilled outpatient physical therapy to address the deficits listed in the problem list box on initial evaluation, provide pt/family education and to maximize pt's level of independence in the home and community environment.     Pt's spiritual, cultural and educational needs considered and pt agreeable to plan of care and goals.     Anticipated Barriers for therapy: Scheduling, co-morbidities     Goals:  Short Term Goals: 6 weeks   Patient will be independent in initial HEP to help supplement PT.  Patient will improve knee extension range of motion to 0 degrees to help with gait mechanics.  Patient will improve knee flexion range of motion to >/= 90 degrees to help with ADLs.   Patient will improve pain at its worst to </= 5/10 to help improve quality of life.      Long Term Goals: 12-24 weeks   Patient will be independent in updated HEP to help supplement PT and maintain gains made in PT.   Patient will improve FOTO score to  >/= predicted to show improvement in condition.   Patient will improve knee extension strength to >/= 4+/5 to help with ADLs.  Patient goal: Patient will be able to return to riding his bicycle without difficulty.   Plan      Plan of care Certification: 8/14/2024 to 2/25/2025.     Outpatient Physical Therapy 1-2 times weekly for 24 weeks to include the following interventions: Electrical Stimulation , Gait Training, Manual Therapy, Moist Heat/ Ice, Neuromuscular Re-ed, Patient Education, Self Care, Therapeutic Activities, and Therapeutic Exercise.        Flakita Thomas, PT, DPT, OCS

## 2024-08-20 NOTE — PATIENT INSTRUCTIONS
DM: stop amaryl. Increase ozempic to 2 mg  HTN: continue candesartan, metoprolol, aldactone, and clonidine BID.   DLD: continue zetia and crestor  Back pain/insomnia: continue elavil and gabapentin    Monitor fluid intake with higher ozempic and aldactone  Kidney function is a little low. No NSAIDS such as ibuprofen or naproxen. Avoid Red meats. Drink a lot of water. I will continue monitoring kidney function    Message sent to Janice Parsons to discuss colonoscopy.     F/u 4 months, labs prior. Annual in 8 months.

## 2024-08-20 NOTE — PROGRESS NOTES
Subjective:       Patient ID: Partha Curtis Jr. is a 64 y.o. male.    Chief Complaint: Diabetes    Jarvis is a 64 y.o. male who presents today for f/u    PAF: stopped anticoagulation.   HTN: taking clonidine 01 mg twice daily. Never needed TID. Also taking candesartan, and aldactone and metoprolol 200 mg XR. Clonidine patch not tolerated and caused facial rash. This resolved after stopping the patch.   DM: on ozempic 1 mg and amaryl 2 mg. Not having low. No nausea, vomiting, abdominal pain.   DLD: on crestor aned zetia  Low T: managed by endocrinology.     Back pain/insomnia: taking gabapentin and elavil 75 mg.       Review of Systems   Constitutional:  Negative for chills and fever.   Respiratory:  Negative for chest tightness and shortness of breath.    Cardiovascular:  Negative for chest pain and palpitations.   Gastrointestinal:  Negative for nausea and vomiting.   Musculoskeletal:  Positive for arthralgias, back pain and gait problem.   Neurological:  Negative for dizziness, light-headedness and headaches.               Results for orders placed or performed in visit on 08/16/24   CBC Auto Differential   Result Value Ref Range    WBC 10.90 3.90 - 12.70 K/uL    RBC 5.98 4.60 - 6.20 M/uL    Hemoglobin 17.7 14.0 - 18.0 g/dL    Hematocrit 55.1 (H) 40.0 - 54.0 %    MCV 92 82 - 98 fL    MCH 29.6 27.0 - 31.0 pg    MCHC 32.1 32.0 - 36.0 g/dL    RDW 13.6 11.5 - 14.5 %    Platelets 254 150 - 450 K/uL    MPV 9.5 9.2 - 12.9 fL    Immature Granulocytes 0.2 0.0 - 0.5 %    Gran # (ANC) 6.8 1.8 - 7.7 K/uL    Immature Grans (Abs) 0.02 0.00 - 0.04 K/uL    Lymph # 2.5 1.0 - 4.8 K/uL    Mono # 1.3 (H) 0.3 - 1.0 K/uL    Eos # 0.3 0.0 - 0.5 K/uL    Baso # 0.06 0.00 - 0.20 K/uL    nRBC 0 0 /100 WBC    Gran % 62.1 38.0 - 73.0 %    Lymph % 23.3 18.0 - 48.0 %    Mono % 11.5 4.0 - 15.0 %    Eosinophil % 2.3 0.0 - 8.0 %    Basophil % 0.6 0.0 - 1.9 %    Differential Method Automated    Comprehensive Metabolic Panel   Result Value Ref Range  "   Sodium 137 136 - 145 mmol/L    Potassium 4.3 3.5 - 5.1 mmol/L    Chloride 101 95 - 110 mmol/L    CO2 28 23 - 29 mmol/L    Glucose 110 70 - 110 mg/dL    BUN 9 8 - 23 mg/dL    Creatinine 1.4 0.5 - 1.4 mg/dL    Calcium 8.9 8.7 - 10.5 mg/dL    Total Protein 6.7 6.0 - 8.4 g/dL    Albumin 3.7 3.5 - 5.2 g/dL    Total Bilirubin 0.5 0.1 - 1.0 mg/dL    Alkaline Phosphatase 60 55 - 135 U/L    AST 36 10 - 40 U/L    ALT 34 10 - 44 U/L    eGFR 56.1 (A) >60 mL/min/1.73 m^2    Anion Gap 8 8 - 16 mmol/L   Hemoglobin A1C   Result Value Ref Range    Hemoglobin A1C 6.6 (H) 4.0 - 5.6 %    Estimated Avg Glucose 143 (H) 68 - 131 mg/dL     *Note: Due to a large number of results and/or encounters for the requested time period, some results have not been displayed. A complete set of results can be found in Results Review.       Objective:     Vitals:    08/20/24 0813   BP: 138/80   BP Location: Left arm   Patient Position: Sitting   BP Method: Large (Manual)   Pulse: 82   SpO2: 98%   Weight: 90.2 kg (198 lb 13.7 oz)   Height: 5' 10" (1.778 m)        Physical Exam  Vitals and nursing note reviewed.   Constitutional:       General: He is not in acute distress.     Appearance: He is not ill-appearing, toxic-appearing or diaphoretic.   Cardiovascular:      Rate and Rhythm: Normal rate and regular rhythm.   Pulmonary:      Effort: Pulmonary effort is normal.      Breath sounds: Normal breath sounds.   Abdominal:      Palpations: Abdomen is soft.      Tenderness: There is no abdominal tenderness.   Musculoskeletal:         General: Deformity present.      Comments: Left leg in a brace   Skin:     Findings: No rash.      Comments: Rash appears resolved  See picture below   Neurological:      General: No focal deficit present.      Mental Status: He is alert.   Psychiatric:         Mood and Affect: Mood normal.         Behavior: Behavior normal.         Thought Content: Thought content normal.         Judgment: Judgment normal.       "       Assessment:       1. Type 2 diabetes mellitus with stage 3a chronic kidney disease, without long-term current use of insulin    2. Dyslipidemia    3. Essential hypertension    4. Insomnia, unspecified type        Plan:       DM: stop amaryl. Increase ozempic to 2 mg  HTN: continue candesartan, metoprolol, aldactone, and clonidine BID.   DLD: continue zetia and crestor  Back pain/insomnia: continue elavil and gabapentin    Monitor fluid intake with higher ozempic and aldactone  Kidney function is a little low. No NSAIDS such as ibuprofen or naproxen. Avoid Red meats. Drink a lot of water. I will continue monitoring kidney function    Message sent to Janice Parsons to discuss colonoscopy.     F/u 4 months, labs prior. Annual in 8 months.     Type 2 diabetes mellitus with stage 3a chronic kidney disease, without long-term current use of insulin  -     semaglutide (OZEMPIC) 2 mg/dose (8 mg/3 mL) PnIj; Inject 2 mg into the skin every 7 days.  Dispense: 9 mL; Refill: 3  -     CBC Auto Differential; Future; Expected date: 08/20/2024  -     Comprehensive Metabolic Panel; Future; Expected date: 08/20/2024  -     Hemoglobin A1C; Future; Expected date: 08/20/2024    Dyslipidemia    Essential hypertension  -     cloNIDine (CATAPRES) 0.1 MG tablet; Take 1 tablet (0.1 mg total) by mouth 2 (two) times daily.  Dispense: 180 tablet; Refill: 3    Insomnia, unspecified type

## 2024-08-23 ENCOUNTER — CLINICAL SUPPORT (OUTPATIENT)
Dept: REHABILITATION | Facility: HOSPITAL | Age: 64
End: 2024-08-23
Payer: COMMERCIAL

## 2024-08-23 ENCOUNTER — TELEPHONE (OUTPATIENT)
Dept: ENDOSCOPY | Facility: HOSPITAL | Age: 64
End: 2024-08-23
Payer: COMMERCIAL

## 2024-08-23 DIAGNOSIS — R29.898 DECREASED STRENGTH OF LOWER EXTREMITY: ICD-10-CM

## 2024-08-23 DIAGNOSIS — M25.662 DECREASED RANGE OF MOTION OF LEFT LOWER EXTREMITY: ICD-10-CM

## 2024-08-23 DIAGNOSIS — Z12.11 SPECIAL SCREENING FOR MALIGNANT NEOPLASMS, COLON: ICD-10-CM

## 2024-08-23 DIAGNOSIS — R26.9 GAIT ABNORMALITY: Primary | ICD-10-CM

## 2024-08-23 DIAGNOSIS — Z86.010 HISTORY OF COLON POLYPS: Primary | ICD-10-CM

## 2024-08-23 PROCEDURE — 97112 NEUROMUSCULAR REEDUCATION: CPT

## 2024-08-23 PROCEDURE — 97140 MANUAL THERAPY 1/> REGIONS: CPT

## 2024-08-23 NOTE — TELEPHONE ENCOUNTER
Spoke to patient to schedule procedure(s) Colonoscopy       Physician to perform procedure(s) Dr. NIELS Morrow  Date of Procedure (s) 10/2/24  Arrival Time 11:45 AM  Time of Procedure(s) 12:45 PM   Location of Procedure(s) [place] [address]nd Floor  Type of Rx Prep sent to patient: [facility]  Instructions provided to patient via [facility]    Patient was informed on the following information and verbalized understanding. Screening questionnaire reviewed with patient and complete. If procedure requires anesthesia, a responsible adult needs to be present to accompany the patient home, patient cannot drive after receiving anesthesia. Appointment details are tentative, especially check-in time. Patient will receive a prep-op call 7 days prior to confirm check-in time for procedure. If applicable the patient should contact their pharmacy to verify Rx for procedure prep is ready for pick-up. Patient was advised to call the scheduling department at [phone] if pharmacy states no Rx is available. Patient was advised to call the endoscopy scheduling department if any questions or concerns arise.      [facility] Scheduling Department

## 2024-08-23 NOTE — PROGRESS NOTES
"[facility]   Physical Therapy Treatment Note      Name: Partha Curtis Jr.  Clinic Number: [id number]    Therapy Diagnosis:   Encounter Diagnoses   Name Primary?    Gait abnormality Yes    Decreased range of motion of left lower extremity     Decreased strength of lower extremity      Physician: Raffy Ascencio MD  Surgeon: Dr. Washington    Visit Date: 8/23/2024  Physician Orders: PT Eval and Treat   Medical Diagnosis from Referral: Bucket-handle tear of medial meniscus of left knee as current injury, initial encounter [S83.212A]   Evaluation Date: 8/14/2024  Authorization Period Expiration: 8/7/2025  Plan of Care Expiration: 2/25/2025  Progress Note Due: 9/20/2024  Visit # / Visits authorized: 5/20  This POC: 4    FOTO: 1/3 (8/20/24)     Date of Surgery: 8/13/2024  Return to MD: 8/28/2024, 10/2/2024     Procedure:  1. Left Knee Arthroscopy, with meniscus repair (medial OR lateral) 15082  2.  Left Knee Arthroscopy, with Microfracture 28990 - marrow stimulation procedure  3.  Left Knee Arthroscopy, debridement/shaving of articular cartilage (chondroplasty) 15346 -      Post-Op Plan:  Peripheral meniscal repair protocol      Precautions: Standard and Weightbearing     PTA Visit #: 0/5     Time In: 1:22 pm   Time Out: 2:20 pm   Total Billable Time: 58 minutes    Subjective     Pt reports: He is doing ok, knee has been feeling better. He has been doing his exercises at home. He just has some discomfort on the outsides of his knee at the joint. Feels more "like a bruise". He is tired of the crutches and ready to be done with them. He notes putting some weight through his leg at times such as getting in and out of car but no pain with it.   He was compliant with home exercise program.  Response to previous treatment: Improved mobility  Functional change: Ongoing    Pain: 2/10  Location: left knee      Objective      Objective Measures updated at progress report unless " "specified.    8/23/2024: Patient ambulates into clinic with bilateral axillary crutches and t-scope brace intact locked in extension.     Range of Motion:  Knee     Right AROM/PROM Left AROM/PROM   Flexion 120 degrees / 125 degrees  NT degrees / 95 degrees    Extension 0 degrees / 0 degrees  0 degrees  / +1 degrees (hyper)     Edema Measurements (8/20/2024)   Left    6 cm below 37 cm   Joint line 38.5 cm   10 cm above  40.5 cm    *Above and below measurements based off of inferior patella pole     Treatment     *Pt is 1 weeks 3 days s/p as of 8/23/2024.     Physical Therapy Technjose david assisted with todays treatment. Fabienne was in direct line of sight supervision by supervising PT during this time.     Jarvis received the treatments listed below:      therapeutic exercises to develop strength, endurance, ROM, and flexibility for 05 minutes including:    Assessment as above   Longsitting HS/Gastroc stretch with strap 5 x 20" holds  Ankle resisted PF BlackTB 3 x 15 reps     Not Today:  Heel prop x 5'   Heel slides with strap within protocol x 3'     manual therapy techniques: Joint mobilizations were applied to the: knee for 08 minutes, including:    Patellar mobilizations all planes III-IV  Fat pad mobilizations  EOM knee flexion passive within protocol     neuromuscular re-education activities to improve: Balance, Coordination, Kinesthetic, and Proprioception for 45 minutes. The following activities were included:    Pt education on precautions, gait pattern, not overdoing it   Neuromuscular Electrical Stimulation: Russian 10" on 10-20" off   - Quad sets with towel x 5'   - SLR (2 reps PT assist)  - Standing SLR x 5'     Standing hip extension GreenTB 3 x 10 reps   Standing hip abduction with GreenTB 3 x 10 reps   PT assist SLR 1 x 5 reps     Weight shifts 25% with bilateral crutches 1 x 15 x 3" holds   Ambulating bilateral axillary crutches with brace locked 2-point gait 25% WB x 5'     Not Performed:  Seated " SLR outside of brace 5x  Seated SLR in brace 10 x  - Quad sets with towel x 6'   - Standing SLR x 6'   Sidelying hip abduction with brace 3 x 10 reps     therapeutic activities to improve functional performance for 00 minutes, including:      gait training to improve functional mobility and safety for 00 minutes, including:        Patient Education and Home Exercises       Education provided:   - PT POC, Prognosis, and HEP   - Early post-op goals: importance of knee extension, edema management, quad activation/strength, and gait mechanics  - Post-op precautions, weightbearing status and mobility precautions   - Signs and symptoms of DVT and infection       Written Home Exercises Provided: yes. Exercises were reviewed and Jarvis was able to demonstrate them prior to the end of the session.  Jarvis demonstrated good  understanding of the education provided. See EMR under Patient Instructions for exercises provided during therapy sessions.    Assessment     Jarvis is continuing to progress well with PT. He demonstrates improvement in quad activation with good contraction into full extension and no pain. Able to perform a SLR without a quad lag, fatigued with the second eccentrically and some discomfort but able to perform a few more reps with PT assist. Continued NMES maximizing quad activation/strength. Progressed with partial weightbearing 25% with good tolerance. Overall progressing well for this stage of rehab, will continue to monitor and progress as able within protocol.     Jarvis Is progressing well towards his goals.   Pt prognosis is Excellent.     Pt will continue to benefit from skilled outpatient physical therapy to address the deficits listed in the problem list box on initial evaluation, provide pt/family education and to maximize pt's level of independence in the home and community environment.     Pt's spiritual, cultural and educational needs considered and pt agreeable to plan of care and goals.      Anticipated Barriers for therapy: Scheduling, co-morbidities     Goals:  Short Term Goals: 6 weeks (Progressing, not met)  Patient will be independent in initial HEP to help supplement PT. - MET  Patient will improve knee extension range of motion to 0 degrees to help with gait mechanics. - MET  Patient will improve knee flexion range of motion to >/= 90 degrees to help with ADLs. - MET  Patient will improve pain at its worst to </= 5/10 to help improve quality of life.      Long Term Goals: 12-24 weeks (Progressing, not met)  Patient will be independent in updated HEP to help supplement PT and maintain gains made in PT.   Patient will improve FOTO score to >/= predicted to show improvement in condition.   Patient will improve knee extension strength to >/= 4+/5 to help with ADLs.  Patient goal: Patient will be able to return to riding his bicycle without difficulty.   Plan   Plan of care Certification: 8/14/2024 to 2/25/2025.     Outpatient Physical Therapy 1-2 times weekly for 24 weeks to include the following interventions: Electrical Stimulation , Gait Training, Manual Therapy, Moist Heat/ Ice, Neuromuscular Re-ed, Patient Education, Self Care, Therapeutic Activities, and Therapeutic Exercise.     Flakita Thomas, PT, DPT, OCS

## 2024-08-26 ENCOUNTER — CLINICAL SUPPORT (OUTPATIENT)
Dept: REHABILITATION | Facility: HOSPITAL | Age: 64
End: 2024-08-26
Payer: COMMERCIAL

## 2024-08-26 DIAGNOSIS — R29.898 DECREASED STRENGTH OF LOWER EXTREMITY: ICD-10-CM

## 2024-08-26 DIAGNOSIS — M25.662 DECREASED RANGE OF MOTION OF LEFT LOWER EXTREMITY: ICD-10-CM

## 2024-08-26 DIAGNOSIS — R26.9 GAIT ABNORMALITY: Primary | ICD-10-CM

## 2024-08-26 PROCEDURE — 97112 NEUROMUSCULAR REEDUCATION: CPT | Mod: CQ

## 2024-08-26 PROCEDURE — 97140 MANUAL THERAPY 1/> REGIONS: CPT | Mod: CQ

## 2024-08-26 NOTE — PROGRESS NOTES
[facility]   Physical Therapy Treatment Note      Name: Partha Curtis Jr.  Clinic Number: [id number]    Therapy Diagnosis:   Encounter Diagnoses   Name Primary?    Gait abnormality Yes    Decreased range of motion of left lower extremity     Decreased strength of lower extremity      Physician: Raffy Ascencio MD  Surgeon: Dr. Washington    Visit Date: 8/26/2024  Physician Orders: PT Eval and Treat   Medical Diagnosis from Referral: Bucket-handle tear of medial meniscus of left knee as current injury, initial encounter [S83.212A]   Evaluation Date: 8/14/2024  Authorization Period Expiration: 8/7/2025  Plan of Care Expiration: 2/25/2025  Progress Note Due: 9/20/2024  Visit # / Visits authorized: 5/20  This POC: 4    FOTO: 1/3 (8/20/24)     Date of Surgery: 8/13/2024  Return to MD: 8/28/2024, 10/2/2024     Procedure:  1. Left Knee Arthroscopy, with meniscus repair (medial OR lateral) 97046  2.  Left Knee Arthroscopy, with Microfracture 61985 - marrow stimulation procedure  3.  Left Knee Arthroscopy, debridement/shaving of articular cartilage (chondroplasty) 69005 -      Post-Op Plan:  Peripheral meniscal repair protocol      Precautions: Standard and Weightbearing     PTA Visit #: 0/5     Time In: 2:30 pm   Time Out: 3:25 pm   Total Billable Time: 37 minutes    Subjective     Pt reports: Doing good. No pain  He was compliant with home exercise program.  Response to previous treatment: Improved mobility  Functional change: Ongoing    Pain: 2/10  Location: left knee      Objective      Objective Measures updated at progress report unless specified.    8/23/2024: Patient ambulates into clinic with bilateral axillary crutches and t-scope brace intact locked in extension.     Range of Motion:  Knee     Right AROM/PROM Left AROM/PROM   Flexion 120 degrees / 125 degrees  NT degrees / 95 degrees    Extension 0 degrees / 0 degrees  0 degrees  / +1 degrees (hyper)     Edema Measurements  "(8/20/2024)   Left    6 cm below 37 cm   Joint line 38.5 cm   10 cm above  40.5 cm    *Above and below measurements based off of inferior patella pole     Treatment     *Pt is 1 weeks 6 days s/p as of 8/26/2024.     Physical Therapy Fabienne assisted with todays treatment. Fabienne was in direct line of sight supervision by supervising PT during this time.     Jarvis received the treatments listed below:      therapeutic exercises to develop strength, endurance, ROM, and flexibility for 05 minutes including:    Assessment as above   Longsitting HS/Gastroc stretch with strap 5 x 20" holds  Ankle resisted PF BlackTB 3 x 15 reps   Heel prop x 5 min    Not Today:  Heel prop x 5'   Heel slides with strap within protocol x 3'     manual therapy techniques: Joint mobilizations were applied to the: knee for 08 minutes, including:    Patellar mobilizations all planes III-IV  Fat pad mobilizations  EOM knee flexion passive within protocol     neuromuscular re-education activities to improve: Balance, Coordination, Kinesthetic, and Proprioception for 42 minutes. The following activities were included:    Pt education on precautions, gait pattern, not overdoing it   Neuromuscular Electrical Stimulation: Russian 10" on 10-20" off   - Quad sets with strap x 5'   - QS x 5'  - Standing SLR x 5'     Sidelying hip abduction 3x10          therapeutic activities to improve functional performance for 00 minutes, including:      gait training to improve functional mobility and safety for 00 minutes, including:        Patient Education and Home Exercises       Education provided:   - PT POC, Prognosis, and HEP   - Early post-op goals: importance of knee extension, edema management, quad activation/strength, and gait mechanics  - Post-op precautions, weightbearing status and mobility precautions   - Signs and symptoms of DVT and infection       Written Home Exercises Provided: yes. Exercises were reviewed and Jarvis was able to " demonstrate them prior to the end of the session.  Jarvis demonstrated good  understanding of the education provided. See EMR under Patient Instructions for exercises provided during therapy sessions.    Assessment     Jarvis's ROM is with in protocol limits. Quad strength is improving. Able to actively extend knee into hyper. No lag with SLR. Will cont to progress per protocol.    Jarvis Is progressing well towards his goals.   Pt prognosis is Excellent.     Pt will continue to benefit from skilled outpatient physical therapy to address the deficits listed in the problem list box on initial evaluation, provide pt/family education and to maximize pt's level of independence in the home and community environment.     Pt's spiritual, cultural and educational needs considered and pt agreeable to plan of care and goals.     Anticipated Barriers for therapy: Scheduling, co-morbidities     Goals:  Short Term Goals: 6 weeks (Progressing, not met)  Patient will be independent in initial HEP to help supplement PT. - MET  Patient will improve knee extension range of motion to 0 degrees to help with gait mechanics. - MET  Patient will improve knee flexion range of motion to >/= 90 degrees to help with ADLs. - MET  Patient will improve pain at its worst to </= 5/10 to help improve quality of life.      Long Term Goals: 12-24 weeks (Progressing, not met)  Patient will be independent in updated HEP to help supplement PT and maintain gains made in PT.   Patient will improve FOTO score to >/= predicted to show improvement in condition.   Patient will improve knee extension strength to >/= 4+/5 to help with ADLs.  Patient goal: Patient will be able to return to riding his bicycle without difficulty.   Plan   Plan of care Certification: 8/14/2024 to 2/25/2025.     Outpatient Physical Therapy 1-2 times weekly for 24 weeks to include the following interventions: Electrical Stimulation , Gait Training, Manual Therapy, Moist Heat/ Ice,  Neuromuscular Re-ed, Patient Education, Self Care, Therapeutic Activities, and Therapeutic Exercise.     Elly Ho, PTA,

## 2024-08-28 ENCOUNTER — PATIENT MESSAGE (OUTPATIENT)
Dept: REHABILITATION | Facility: HOSPITAL | Age: 64
End: 2024-08-28
Payer: COMMERCIAL

## 2024-08-28 ENCOUNTER — OFFICE VISIT (OUTPATIENT)
Dept: SPORTS MEDICINE | Facility: CLINIC | Age: 64
End: 2024-08-28
Payer: COMMERCIAL

## 2024-08-28 VITALS
SYSTOLIC BLOOD PRESSURE: 174 MMHG | DIASTOLIC BLOOD PRESSURE: 96 MMHG | BODY MASS INDEX: 29.31 KG/M2 | HEIGHT: 70 IN | HEART RATE: 62 BPM | WEIGHT: 204.69 LBS

## 2024-08-28 DIAGNOSIS — Z98.890 S/P ARTHROSCOPIC SURGERY OF LEFT KNEE: Primary | ICD-10-CM

## 2024-08-28 DIAGNOSIS — S83.212A BUCKET-HANDLE TEAR OF MEDIAL MENISCUS OF LEFT KNEE AS CURRENT INJURY, INITIAL ENCOUNTER: ICD-10-CM

## 2024-08-28 DIAGNOSIS — M54.12 CERVICAL RADICULOPATHY: ICD-10-CM

## 2024-08-28 DIAGNOSIS — E53.8 B12 DEFICIENCY: ICD-10-CM

## 2024-08-28 PROCEDURE — 3044F HG A1C LEVEL LT 7.0%: CPT | Mod: CPTII,S$GLB,, | Performed by: ORTHOPAEDIC SURGERY

## 2024-08-28 PROCEDURE — 3061F NEG MICROALBUMINURIA REV: CPT | Mod: CPTII,S$GLB,, | Performed by: ORTHOPAEDIC SURGERY

## 2024-08-28 PROCEDURE — 1159F MED LIST DOCD IN RCRD: CPT | Mod: CPTII,S$GLB,, | Performed by: ORTHOPAEDIC SURGERY

## 2024-08-28 PROCEDURE — 3066F NEPHROPATHY DOC TX: CPT | Mod: CPTII,S$GLB,, | Performed by: ORTHOPAEDIC SURGERY

## 2024-08-28 PROCEDURE — 3080F DIAST BP >= 90 MM HG: CPT | Mod: CPTII,S$GLB,, | Performed by: ORTHOPAEDIC SURGERY

## 2024-08-28 PROCEDURE — 4010F ACE/ARB THERAPY RXD/TAKEN: CPT | Mod: CPTII,S$GLB,, | Performed by: ORTHOPAEDIC SURGERY

## 2024-08-28 PROCEDURE — 99024 POSTOP FOLLOW-UP VISIT: CPT | Mod: S$GLB,,, | Performed by: ORTHOPAEDIC SURGERY

## 2024-08-28 PROCEDURE — 99999 PR PBB SHADOW E&M-EST. PATIENT-LVL IV: CPT | Mod: PBBFAC,,, | Performed by: ORTHOPAEDIC SURGERY

## 2024-08-28 PROCEDURE — 3077F SYST BP >= 140 MM HG: CPT | Mod: CPTII,S$GLB,, | Performed by: ORTHOPAEDIC SURGERY

## 2024-08-28 RX ORDER — GABAPENTIN 800 MG/1
800 TABLET ORAL NIGHTLY
Qty: 90 TABLET | Refills: 1 | Status: SHIPPED | OUTPATIENT
Start: 2024-08-28

## 2024-08-28 RX ORDER — LANOLIN ALCOHOL/MO/W.PET/CERES
1000 CREAM (GRAM) TOPICAL DAILY
Qty: 90 TABLET | Refills: 4 | Status: SHIPPED | OUTPATIENT
Start: 2024-08-28

## 2024-08-28 RX ORDER — VALACYCLOVIR HYDROCHLORIDE 1 G/1
2000 TABLET, FILM COATED ORAL EVERY 12 HOURS
Qty: 4 TABLET | Refills: 3 | Status: CANCELLED | OUTPATIENT
Start: 2024-08-28

## 2024-08-28 RX ORDER — VALACYCLOVIR HYDROCHLORIDE 1 G/1
2000 TABLET, FILM COATED ORAL EVERY 12 HOURS
Qty: 4 TABLET | Refills: 3 | Status: SHIPPED | OUTPATIENT
Start: 2024-08-28

## 2024-08-28 NOTE — TELEPHONE ENCOUNTER
No care due was identified.  [facility] Embedded Care Due Messages. Reference number: [id number].   8/28/2024 7:26:22 AM CDT

## 2024-08-28 NOTE — PROGRESS NOTES
POST-OPERATIVE EXAMINATION    64 y.o. Male who returns for follow after surgery.  He is 2 weeks s/p     Procedure: 8/13/24  1. Left Knee Arthroscopy, with meniscus repair (medial OR lateral) 92034  2.  Left Knee Arthroscopy, with Microfracture 65517 - marrow stimulation procedure  3.  Left Knee Arthroscopy, debridement/shaving of articular cartilage (chondroplasty) 05907 -       He is doing well without any issues.       PHYSICAL EXAMINATION:  There were no vitals taken for this visit.  General: Well-developed well-nourished 64 y.o. male in no acute distress   Cardiovascular: Regular rhythm   Lungs: No labored breathing or wheezing appreciated   Neuro: Alert and oriented ×3   Psychiatric: well oriented to person, place and time, demonstrates normal mood and affect   Skin: No rashes, lesions or ulcers, normal temperature, turgor, and texture on involved extremity    ORTHOPEDIC EXAM:  Normal post-operative swelling  Normal post-operative scarring  Strength: Grossly intact  ROM: as expected  Tests: none today     ASSESSMENT:      ICD-10-CM ICD-9-CM   1. S/P arthroscopic surgery of left knee  Z98.890 V45.89   2. Bucket-handle tear of medial meniscus of left knee as current injury, initial encounter  S83.212A 836.0         PLAN:       Sutures removed today  Continue physical therapy  RTC in 1 month with Robi Arechiga PA-C  Begin partial weight-bearing with the knee locked in extension

## 2024-08-28 NOTE — TELEPHONE ENCOUNTER
Refill Routing Note   Medication(s) are not appropriate for processing by [facility] for the following reason(s):        Outside of protocol: non-delegated    [facility] action(s):  Route      Medication Therapy Plan:         Appointments  past 12m or future 3m with PCP    Date Provider   Last Visit   8/20/2024 Rocco Cavazos DO   Next Visit   12/20/2024 Rocco Cavazos DO   ED visits in past 90 days: 0        Note composed:1:18 PM 08/28/2024

## 2024-09-03 ENCOUNTER — CLINICAL SUPPORT (OUTPATIENT)
Dept: REHABILITATION | Facility: HOSPITAL | Age: 64
End: 2024-09-03
Payer: COMMERCIAL

## 2024-09-03 DIAGNOSIS — R29.898 DECREASED STRENGTH OF LOWER EXTREMITY: ICD-10-CM

## 2024-09-03 DIAGNOSIS — M25.662 DECREASED RANGE OF MOTION OF LEFT LOWER EXTREMITY: ICD-10-CM

## 2024-09-03 DIAGNOSIS — R26.9 GAIT ABNORMALITY: Primary | ICD-10-CM

## 2024-09-03 PROCEDURE — 97112 NEUROMUSCULAR REEDUCATION: CPT

## 2024-09-03 PROCEDURE — 97140 MANUAL THERAPY 1/> REGIONS: CPT

## 2024-09-04 ENCOUNTER — OFFICE VISIT (OUTPATIENT)
Dept: CARDIOLOGY | Facility: CLINIC | Age: 64
End: 2024-09-04
Payer: COMMERCIAL

## 2024-09-04 VITALS
HEIGHT: 70 IN | DIASTOLIC BLOOD PRESSURE: 70 MMHG | SYSTOLIC BLOOD PRESSURE: 107 MMHG | BODY MASS INDEX: 28.5 KG/M2 | WEIGHT: 199.06 LBS | HEART RATE: 69 BPM

## 2024-09-04 DIAGNOSIS — Z98.890 S/P CAROTID ENDARTERECTOMY: ICD-10-CM

## 2024-09-04 DIAGNOSIS — I10 ESSENTIAL HYPERTENSION: Primary | ICD-10-CM

## 2024-09-04 DIAGNOSIS — I65.23 BILATERAL CAROTID ARTERY STENOSIS: ICD-10-CM

## 2024-09-04 DIAGNOSIS — E78.5 DYSLIPIDEMIA: Chronic | ICD-10-CM

## 2024-09-04 PROCEDURE — 3074F SYST BP LT 130 MM HG: CPT | Mod: CPTII,S$GLB,, | Performed by: INTERNAL MEDICINE

## 2024-09-04 PROCEDURE — 93000 ELECTROCARDIOGRAM COMPLETE: CPT | Mod: S$GLB,,, | Performed by: INTERNAL MEDICINE

## 2024-09-04 PROCEDURE — 99999 PR PBB SHADOW E&M-EST. PATIENT-LVL IV: CPT | Mod: PBBFAC,,, | Performed by: INTERNAL MEDICINE

## 2024-09-04 PROCEDURE — 3078F DIAST BP <80 MM HG: CPT | Mod: CPTII,S$GLB,, | Performed by: INTERNAL MEDICINE

## 2024-09-04 PROCEDURE — 4010F ACE/ARB THERAPY RXD/TAKEN: CPT | Mod: CPTII,S$GLB,, | Performed by: INTERNAL MEDICINE

## 2024-09-04 PROCEDURE — 99214 OFFICE O/P EST MOD 30 MIN: CPT | Mod: 25,S$GLB,, | Performed by: INTERNAL MEDICINE

## 2024-09-04 PROCEDURE — 3008F BODY MASS INDEX DOCD: CPT | Mod: CPTII,S$GLB,, | Performed by: INTERNAL MEDICINE

## 2024-09-04 PROCEDURE — 3066F NEPHROPATHY DOC TX: CPT | Mod: CPTII,S$GLB,, | Performed by: INTERNAL MEDICINE

## 2024-09-04 PROCEDURE — 1159F MED LIST DOCD IN RCRD: CPT | Mod: CPTII,S$GLB,, | Performed by: INTERNAL MEDICINE

## 2024-09-04 PROCEDURE — 3061F NEG MICROALBUMINURIA REV: CPT | Mod: CPTII,S$GLB,, | Performed by: INTERNAL MEDICINE

## 2024-09-04 PROCEDURE — 3044F HG A1C LEVEL LT 7.0%: CPT | Mod: CPTII,S$GLB,, | Performed by: INTERNAL MEDICINE

## 2024-09-04 NOTE — PROGRESS NOTES
Subjective:   09/04/2024     Patient ID:  Partha Curtis Jr. is a 64 y.o. male who presents for evaulation of Follow-up and Pre-op Exam      Comes in doing well, just had meniscus tear surgery, not having problems with chest pains or tightness, no PND or orthopnea.      Hypercholesterolemia is present.  He does have borderline diabetes.  Status post carotid surgery in 2015.  LDL goal less than 55, currently on rosuvastatin 40+ ezetimibe 10, he has lab ordered already in December, will add a lipid profile to that.      Had atrial fibrillation x1, no recurrences, off of Eliquis.  Maintain sinus rhythm, follows with AliveCor device.          Recommendations made on lab:   1. Lipoprotein small a is normal.    2. Borderline diabetes present  3. Triglycerides elevated at 218, HDL 31 LDL 76.  April lipoprotein B is 86.       Will maximize statin therapy, continue rosuvastatin 40 mg nightly, add ezetimibe 10 mg daily.  Aggressive weight loss, dietary management to controlled diabetes, is borderline.      Prior note reviewed:  Recent presentation with atrial fib with rapid response, CHADS2 Vasc score was 3.  Cardioverted to sinus rhythm with ELIZABETH guidance.  Discharged on Eliquis.  Has maintain sinus rhythm since discharge.  No palpitations.      Hypertension was poorly controlled, his amlodipine dose had been decreased due to lower extremity swelling.  He had become dehydrated while taking hydrochlorothiazide 25 mg daily.  He is now back on 5 mg of amlodipine a day.  Blood pressures remain poorly controlled.  Will add low-dose spironolactone.    Mixed hyperlipidemia is present, patient currently on rosuvastatin 40 mg nightly.  Will repeat.      Status post carotid endarterectomy in 2015.  LDL goal less than 55.      Assessment and Plan:    Atrial fibrillation with RVR  Comments:  Currently in sinus rhythm after cardioversion   Continue Eliquis  Orders:  -     Ambulatory referral/consult to Cardiology  -     IN OFFICE EKG  12-LEAD (to Muse)    Essential hypertension  Comments:  Elevated, continue amlodipine 5 mg daily,  Add spironolactone 25 mg tablets, 1/2 tablet daily    Dyslipidemia  Comments:  Recheck lipids    Bilateral carotid artery stenosis    S/P carotid endarterectomy    Abnormal electrocardiogram  Comments:  Septal infarct noted, not present on echocardiography         Follow up in about 6 months (around 9/4/2024).          Recent cardiology consult:     62yo male with DMII, HLP,  HTN, leukocytosis, carotid stenosis who presented to the ER with complaints of palpitations. He was seen yesterday for routine outpatient appt for evaluation of neck pain. His HR was noted to be elevated and in afib therefore he was sent to the ER for evaluation. He reports palpitations since the weekend  but no chest pain. He proceed with the procedure with no issues but his HR remained elevated. He was seen by his PCP and had outpatient labs and EKG with notation of afib and was sent to the ER. He denies any history of afib. In the ER, labs CBC and BMP WNL. Total bili 0.5 AST 66 down from 96  down from 129  Troponin 0.011. EKG afib with  and was given Cardizem 20mg IV in the ER and resumed on home dose of Toprol XL. Admitted to [facility] consulted for evaluation of afib   Echocardiogram:  · The estimated ejection fraction is 65%.  · The left ventricle is normal in size with normal systolic function.  · Normal left ventricular diastolic function.  · Normal right ventricular size with normal right ventricular systolic   function.  · Normal central venous pressure (3 mmHg).        Assessment and Plan:     * Atrial fibrillation with RVR  - presented with palpitations  - EKG with afib HR in 120s; given IV Cardizem 20mg in the ER and continued on oral Toprol home dose of 200mg daily  - remains in afib this AM; AM EKG with afib with HR 110s; started on Eliquis  - CHADSVAS score 3 (HTN, PAD, DMII) will  continue Eliquis  - echo today; given conitnued afib with symptoms and slight RVR will proceed with ELIZABETH/cardioversion today; will place NPO; continue Eliquis; discussed need for continuation of DOAC for 4-6 weeks without interruption post cardioversion      Leukocytosis  - WBCs 18K upon admission; WBCs down to 11K this AM  - urine culture pending     Essential hypertension  - SBP 140s-190s overnight  - on Atacand 32 daily and Toprol   daily  as an outpatient   - continued while admitted  - goal BP less than 130/80  - BP not fully controlled; will monitor and consider adding additional agent if BP remains above goal          Recent hospital discharge note reviewed:  Patient Name: Partha Curtis Jr.  MRN: [id number]  WENDY: [id number]  Patient Class: OP- Observation  Admission Date: 2/27/2024  Hospital Length of Stay: 0 days  Discharge Date and Time:  02/29/2024 9:52 AM  Attending Physician: Shaji Vicente MD   Discharging Provider: Shaji Vicente MD  Primary Care Provider: Rocco Cavazos DO     Primary Care Team: Networked reference to record PCT      HPI:   Partha Curtis is a 63-year-old male with a past medical history of hypertension, back pain, who presents with palpitations.     Patient states that over the past 48 hours he has had significant palpitations with no chest pain.  This has never happened before.  As a result, he decided to go to his doctor's office for further evaluation and also for some neck pain.  He was given pain medications for his neck pain and an EKG was done.  The EKG showed new onset atrial fibrillation with RVR and the patient was told to come to the ER for further management.     Triage vitals were significant for tachycardia, elevated blood pressures.  Review of systems significant for neck pain and chest palpitations.  Physical exam significant for tachycardia.       Initial CBC remarkable for leukocytosis. CMP significant for transaminitis, elevated glucose. PT/INR within  "normal range.  BNP elevated.       UA unremarkable.  Urine culture pending.     Chest x-ray without evidence of acute cardiopulmonary issues.     EKG shows AFib with RVR.     Patient given diltiazem with improvement in heart rates.     Patient admitted for new onset atrial fibrillation with RVR.     Procedure(s) (LRB):  Transesophageal echo (ELIZABETH) intra-procedure log documentation (N/A)       Hospital Course:   2/28 63-year-old male with a past medical history of hypertension, DMII, chronic back pain was admitted for new onset A. Fib with RVR s/p Successful DCCV   Patient was admitted was admitted for palpitation. He was found to be in A. Fib with RVR. He was evaluated by cardiology and had a successful DCCV. Post procedure he was doing well, denies any acute new concern. During the course of this admission serial routine labs with elevated LFTs. He had an acute hepatitis panel which came back negative. His diabetes has been well controlled. He is stable for DC home per cardiology and will follow up with PCP and cardiology as outpatient.         Past Medical History:   Diagnosis Date    Allergy     Carotid artery occlusion     Chronic back pain     Colonic polyp     Genetic testing     MUTYH mutation-negative    Hyperlipidemia     Hypertension     Paroxysmal atrial fibrillation 2/28/2024    Sleep apnea     Type 2 diabetes mellitus with stage 3 chronic kidney disease, without long-term current use of insulin 4/2/2020       Review of patient's allergies indicates:   Allergen Reactions    Stadol [butorphanol tartrate] Rash     Swelling in face    Strawberries [strawberry] Rash         Current Outpatient Medications:     amitriptyline (ELAVIL) 75 MG tablet, Take 1 tablet (75 mg total) by mouth every evening., Disp: 90 tablet, Rfl: 1    aspirin (ECOTRIN) 81 MG EC tablet, Take 1 tablet (81 mg total) by mouth once daily., Disp: 28 tablet, Rfl: 0    BD LUER-RUSS SYRINGE 3 mL 25 gauge x 1" Syrg, USE ONE SYRINGE EVERY 14 DAYS " WITH TESTOSTERONE, Disp: 100 each, Rfl: 2    candesartan (ATACAND) 32 MG tablet, Take 1 tablet (32 mg total) by mouth once daily., Disp: 90 tablet, Rfl: 3    cloNIDine (CATAPRES) 0.1 MG tablet, Take 1 tablet (0.1 mg total) by mouth 2 (two) times daily., Disp: 180 tablet, Rfl: 3    cyanocobalamin (VITAMIN B-12) 1000 MCG tablet, Take 1 tablet (1,000 mcg total) by mouth once daily., Disp: 90 tablet, Rfl: 4    ergocalciferol (ERGOCALCIFEROL) 50,000 unit Cap, Take 1 capsule (50,000 Units total) by mouth every 7 days., Disp: 12 capsule, Rfl: 0    ezetimibe (ZETIA) 10 mg tablet, Take 1 tablet (10 mg total) by mouth once daily., Disp: 90 tablet, Rfl: 3    fluticasone propionate (FLONASE) 50 mcg/actuation nasal spray, 2 sprays by Each Nostril route., Disp: , Rfl:     gabapentin (NEURONTIN) 800 MG tablet, Take 1 tablet (800 mg total) by mouth every evening., Disp: 90 tablet, Rfl: 1    glucose 4 GM chewable tablet, Take 4 tablets (16 g total) by mouth as needed for Low blood sugar., Disp: 30 tablet, Rfl: 12    HYDROcodone-acetaminophen (NORCO) 7.5-325 mg per tablet, Take 1 tablet by mouth every 6 (six) hours as needed for Pain., Disp: 20 tablet, Rfl: 0    ipratropium (ATROVENT) 42 mcg (0.06 %) nasal spray, 2 sprays by Nasal route 2 (two) times daily as needed for Rhinitis., Disp: 15 mL, Rfl: 0    metoprolol succinate (TOPROL-XL) 200 MG 24 hr tablet, Take 1 tablet (200 mg total) by mouth once daily. (Patient taking differently: Take 200 mg by mouth every evening.), Disp: 90 tablet, Rfl: 3    oxyCODONE-acetaminophen (PERCOCET)  mg per tablet, Take 1 tablet by mouth every 12 (twelve) hours as needed for Pain., Disp: 60 tablet, Rfl: 0    rosuvastatin (CRESTOR) 40 MG Tab, Take 1 tablet (40 mg total) by mouth every evening., Disp: 90 tablet, Rfl: 3    semaglutide (OZEMPIC) 2 mg/dose (8 mg/3 mL) PnIj, Inject 2 mg into the skin every 7 days., Disp: 9 mL, Rfl: 3    sod sulf-pot chloride-mag sulf (SUTAB) 1.479-0.188- 0.225 gram  "tablet, Take 12 tablets by mouth once daily. Take according to package instructions with indicated amount of water., Disp: 24 tablet, Rfl: 0    syringe with needle, safety (BD INTEGRA SYRINGE) 3 mL 22 gauge x 1 1/2" Syrg, Inject 1 Syringe as directed once a week., Disp: 6 each, Rfl: 3    testosterone cypionate (DEPOTESTOTERONE CYPIONATE) 200 mg/mL injection, Inject 1 mL (200 mg total) into the muscle every 14 (fourteen) days., Disp: 2 mL, Rfl: 5    valACYclovir (VALTREX) 1000 MG tablet, Take 2 tablets (2,000 mg total) by mouth every 12 (twelve) hours., Disp: 4 tablet, Rfl: 3  No current facility-administered medications for this visit.    Facility-Administered Medications Ordered in Other Visits:     0.9%  NaCl infusion, , Intravenous, Continuous, Kendell Hoffman MD    0.9%  NaCl infusion, , Intravenous, Continuous, Gerardo Uribe MD     Objective:   Review of Systems   Cardiovascular:  Negative for chest pain, claudication, cyanosis, dyspnea on exertion, irregular heartbeat, leg swelling, near-syncope, orthopnea, palpitations, paroxysmal nocturnal dyspnea and syncope.         Vitals:    09/04/24 0854   BP: 107/70   Pulse: 69       Wt Readings from Last 3 Encounters:   09/04/24 90.3 kg (199 lb 1.2 oz)   08/28/24 92.8 kg (204 lb 11.2 oz)   08/20/24 90.2 kg (198 lb 13.7 oz)     Temp Readings from Last 3 Encounters:   08/13/24 98.1 °F (36.7 °C) (Temporal)   07/31/24 96.9 °F (36.1 °C)   06/19/24 98.5 °F (36.9 °C) (Oral)     BP Readings from Last 3 Encounters:   09/04/24 107/70   08/28/24 (!) 174/96   08/20/24 138/80     Pulse Readings from Last 3 Encounters:   09/04/24 69   08/28/24 62   08/20/24 82             Physical Exam  Vitals reviewed.   Constitutional:       General: He is not in acute distress.     Appearance: He is well-developed.   HENT:      Head: Normocephalic and atraumatic.      Nose: Nose normal.   Eyes:      Conjunctiva/sclera: Conjunctivae normal.      Pupils: Pupils are equal, round, and reactive " to light.   Neck:      Vascular: No carotid bruit or JVD.   Cardiovascular:      Rate and Rhythm: Normal rate and regular rhythm.      Pulses: Normal pulses and intact distal pulses.      Heart sounds: Normal heart sounds. No murmur heard.     No friction rub. No gallop.   Pulmonary:      Effort: Pulmonary effort is normal. No respiratory distress.      Breath sounds: Normal breath sounds. No wheezing or rales.   Chest:      Chest wall: No tenderness.   Abdominal:      General: Bowel sounds are normal. There is no distension.      Palpations: Abdomen is soft.      Tenderness: There is no abdominal tenderness.   Musculoskeletal:         General: No tenderness or deformity. Normal range of motion.      Cervical back: Normal range of motion and neck supple.      Right lower leg: No edema.      Left lower leg: No edema.   Skin:     General: Skin is warm and dry.      Findings: No erythema or rash.   Neurological:      Mental Status: He is alert and oriented to person, place, and time.      Cranial Nerves: No cranial nerve deficit.      Motor: No abnormal muscle tone.      Coordination: Coordination normal.   Psychiatric:         Behavior: Behavior normal.         Thought Content: Thought content normal.         Judgment: Judgment normal.           Lab Results   Component Value Date    CHOL 151 03/11/2024    CHOL 124 07/05/2023    CHOL 110 (L) 06/21/2022     Lab Results   Component Value Date    HDL 31 (L) 03/11/2024    HDL 27 (L) 07/05/2023    HDL 24 (L) 06/21/2022     Lab Results   Component Value Date    LDLCALC 76.4 03/11/2024    LDLCALC 56.0 (L) 07/05/2023    LDLCALC 30.0 (L) 06/21/2022     Lab Results   Component Value Date    ALT 34 08/16/2024    AST 36 08/16/2024    AST 31 05/21/2024    AST 37 05/13/2024     Lab Results   Component Value Date    CREATININE 1.4 08/16/2024    BUN 9 08/16/2024     08/16/2024    K 4.3 08/16/2024    CO2 28 08/16/2024    CO2 26 05/21/2024    CO2 22 (L) 05/13/2024     Lab Results    Component Value Date    HGB 17.7 08/16/2024    HCT 55.1 (H) 08/16/2024    HCT 47.7 05/21/2024    HCT 55.4 (H) 05/13/2024           EKG today shows sinus rhythm with anteroseptal infarct, unchanged, no evidence for infarct on recent echocardiogram.              Assessment and Plan:     Essential hypertension  Comments:  Blood pressure well controlled  If he loses weight with G LP 1 receptor agonist therapy, may need to decrease clonidine  Orders:  -     IN OFFICE EKG 12-LEAD (to Muse)    Bilateral carotid artery stenosis  Comments:  Status post carotid endarterectomy   Asymptomatic    Dyslipidemia  Comments:  LDL goal less than 55   Lab in 3 months on max dose statin plus ezetimibe  Orders:  -     Apolipoprotein B; Future; Expected date: 12/16/2024  -     Lipid Panel; Future; Expected date: 12/16/2024    S/P carotid endarterectomy           No follow-ups on file.          Future Appointments   Date Time Provider Department Center   9/5/2024  3:00 PM Flakita Thomas, PT [facility] OP [address]   9/10/2024 10:00 AM Baylee Ni, NP [facility]   9/25/2024 10:30 AM Robi Arechiga PA-C [facility]   11/22/2024  1:00 PM Mary Hurt MD [facility]   12/16/2024  7:00 AM LAB, [facility]   12/20/2024  8:00 AM Rocco Cavazos DO [facility]   4/21/2025  9:00 [facility]

## 2024-09-05 ENCOUNTER — CLINICAL SUPPORT (OUTPATIENT)
Dept: REHABILITATION | Facility: HOSPITAL | Age: 64
End: 2024-09-05
Payer: COMMERCIAL

## 2024-09-05 DIAGNOSIS — R29.898 DECREASED STRENGTH OF LOWER EXTREMITY: ICD-10-CM

## 2024-09-05 DIAGNOSIS — R26.9 GAIT ABNORMALITY: Primary | ICD-10-CM

## 2024-09-05 DIAGNOSIS — M25.662 DECREASED RANGE OF MOTION OF LEFT LOWER EXTREMITY: ICD-10-CM

## 2024-09-05 LAB
OHS QRS DURATION: 86 MS
OHS QTC CALCULATION: 416 MS

## 2024-09-05 PROCEDURE — 97112 NEUROMUSCULAR REEDUCATION: CPT

## 2024-09-05 PROCEDURE — 97110 THERAPEUTIC EXERCISES: CPT

## 2024-09-05 PROCEDURE — 97140 MANUAL THERAPY 1/> REGIONS: CPT

## 2024-09-05 NOTE — PROGRESS NOTES
[facility]   Physical Therapy Treatment Note      Name: Partha Curtis Jr.  Clinic Number: [id number]    Therapy Diagnosis:   Encounter Diagnoses   Name Primary?    Gait abnormality Yes    Decreased range of motion of left lower extremity     Decreased strength of lower extremity      Physician: Raffy Ascencio MD  Surgeon: Dr. Washington    Visit Date: 9/5/2024  Physician Orders: PT Eval and Treat   Medical Diagnosis from Referral: Bucket-handle tear of medial meniscus of left knee as current injury, initial encounter [S83.212A]   Evaluation Date: 8/14/2024  Authorization Period Expiration: 8/7/2025  Plan of Care Expiration: 2/25/2025  Progress Note Due: 9/20/2024  Visit # / Visits authorized: 5/20  This POC: 4    FOTO: 1/3 (8/20/24)     Date of Surgery: 8/13/2024  Return to MD: 8/28/2024, 10/2/2024     Procedure:  1. Left Knee Arthroscopy, with meniscus repair (medial OR lateral) 67325  2.  Left Knee Arthroscopy, with Microfracture 96048 - marrow stimulation procedure  3.  Left Knee Arthroscopy, debridement/shaving of articular cartilage (chondroplasty) 44053 -      Post-Op Plan:  Peripheral meniscal repair protocol      Precautions: Standard and Weightbearing     PTA Visit #: 0/5     Time In: 2:30 pm   Time Out: 3:25 pm   Total Billable Time: 37 minutes    Subjective     Pt reports: Doing good. No pain  He was compliant with home exercise program.  Response to previous treatment: Improved mobility  Functional change: Ongoing    Pain: 2/10  Location: left knee      Objective      Objective Measures updated at progress report unless specified.    8/23/2024: Patient ambulates into clinic with bilateral axillary crutches and t-scope brace intact locked in extension.     Range of Motion:  Knee     Right AROM/PROM Left AROM/PROM   Flexion 120 degrees / 125 degrees  NT degrees / 95 degrees    Extension 0 degrees / 0 degrees  0 degrees  / +1 degrees (hyper)     Edema Measurements  "(8/20/2024)   Left    6 cm below 37 cm   Joint line 38.5 cm   10 cm above  40.5 cm    *Above and below measurements based off of inferior patella pole     Treatment     *Pt is 1 weeks 6 days s/p as of 8/26/2024.     Physical Therapy Fabienne assisted with todays treatment. Fabienne was in direct line of sight supervision by supervising PT during this time.     Jarvis received the treatments listed below:      therapeutic exercises to develop strength, endurance, ROM, and flexibility for 05 minutes including:    Assessment as above   Longsitting HS/Gastroc stretch with strap 5 x 20" holds  Heel prop x 5 min    Not Today:  Heel prop x 5'   Heel slides with strap within protocol x 3'   Ankle resisted PF BlackTB 3 x 15 reps     manual therapy techniques: Joint mobilizations were applied to the: knee for *** minutes, including:    Patellar mobilizations all planes III-IV  Fat pad mobilizations  EOM knee flexion passive within protocol     neuromuscular re-education activities to improve: Balance, Coordination, Kinesthetic, and Proprioception for *** minutes. The following activities were included:    Pt education on precautions, gait pattern, not overdoing it   Neuromuscular Electrical Stimulation: Russian 10" on 10-20" off   - Quad sets with strap x 5'   - QS x 5'  - Standing SLR x 5'     Sidelying hip abduction 3x10    therapeutic activities to improve functional performance for 00 minutes, including:      gait training to improve functional mobility and safety for 00 minutes, including:        Patient Education and Home Exercises       Education provided:   - PT POC, Prognosis, and HEP   - Early post-op goals: importance of knee extension, edema management, quad activation/strength, and gait mechanics  - Post-op precautions, weightbearing status and mobility precautions   - Signs and symptoms of DVT and infection       Written Home Exercises Provided: yes. Exercises were reviewed and Jarvis was able to demonstrate " them prior to the end of the session.  Jarvis demonstrated good  understanding of the education provided. See EMR under Patient Instructions for exercises provided during therapy sessions.    Assessment     ***        Jarvis's ROM is with in protocol limits. Quad strength is improving. Able to actively extend knee into hyper. No lag with SLR. Will cont to progress per protocol.    Jarvis Is progressing well towards his goals.   Pt prognosis is Excellent.     Pt will continue to benefit from skilled outpatient physical therapy to address the deficits listed in the problem list box on initial evaluation, provide pt/family education and to maximize pt's level of independence in the home and community environment.     Pt's spiritual, cultural and educational needs considered and pt agreeable to plan of care and goals.     Anticipated Barriers for therapy: Scheduling, co-morbidities     Goals:  Short Term Goals: 6 weeks (Progressing, not met)  Patient will be independent in initial HEP to help supplement PT. - MET  Patient will improve knee extension range of motion to 0 degrees to help with gait mechanics. - MET  Patient will improve knee flexion range of motion to >/= 90 degrees to help with ADLs. - MET  Patient will improve pain at its worst to </= 5/10 to help improve quality of life.      Long Term Goals: 12-24 weeks (Progressing, not met)  Patient will be independent in updated HEP to help supplement PT and maintain gains made in PT.   Patient will improve FOTO score to >/= predicted to show improvement in condition.   Patient will improve knee extension strength to >/= 4+/5 to help with ADLs.  Patient goal: Patient will be able to return to riding his bicycle without difficulty.   Plan   Plan of care Certification: 8/14/2024 to 2/25/2025.     Outpatient Physical Therapy 1-2 times weekly for 24 weeks to include the following interventions: Electrical Stimulation , Gait Training, Manual Therapy, Moist Heat/ Ice,  Neuromuscular Re-ed, Patient Education, Self Care, Therapeutic Activities, and Therapeutic Exercise.     Flakita Thomas, PT, DPT, OCS

## 2024-09-05 NOTE — PROGRESS NOTES
[facility]   Physical Therapy Treatment Note      Name: Partha Curtis Jr.  Clinic Number: [id number]    Therapy Diagnosis:   Encounter Diagnoses   Name Primary?    Gait abnormality Yes    Decreased range of motion of left lower extremity     Decreased strength of lower extremity      Physician: Raffy Ascencio MD  Surgeon: Dr. Washington    Visit Date: 9/5/2024  Physician Orders: PT Eval and Treat   Medical Diagnosis from Referral: Bucket-handle tear of medial meniscus of left knee as current injury, initial encounter [S83.212A]   Evaluation Date: 8/14/2024  Authorization Period Expiration: 8/7/2025  Plan of Care Expiration: 2/25/2025  Progress Note Due: 9/20/2024  Visit # / Visits authorized: 8/20  This POC: 6    FOTO: 1/3 (8/20/24)     Date of Surgery: 8/13/2024  Return to MD: 8/28/2024, 10/2/2024     Procedure:  1. Left Knee Arthroscopy, with meniscus repair (medial OR lateral) 85678  2.  Left Knee Arthroscopy, with Microfracture 39534 - marrow stimulation procedure  3.  Left Knee Arthroscopy, debridement/shaving of articular cartilage (chondroplasty) 65367 -      Post-Op Plan:  Peripheral meniscal repair protocol      Precautions: Standard and Weightbearing     PTA Visit #: 0/5     Time In: 3:02 pm    Time Out: 3:55 pm   Total Billable Time: 53 minutes    Subjective     Pt reports: Doing pretty good, no pain in his knee.   He was compliant with home exercise program.  Response to previous treatment: Improved mobility  Functional change: Ongoing    Pain: 2/10  Location: left knee      Objective      Objective Measures updated at progress report unless specified.    9/5/2024: Patient ambulates into clinic with bilateral axillary crutches and t-scope brace intact unlocked.     Range of Motion:  Knee     Right AROM/PROM Left AROM/PROM   Flexion 120 degrees / 125 degrees  NT degrees / 105 degrees    Extension 0 degrees / 0 degrees  +1 degrees  / +1 degrees (hyper)     Treatment  "    *Pt is 3 weeks 2 days s/p as of 9/5/2024.     Physical Therapy Fabienne assisted with todays treatment. Fabienne was in direct line of sight supervision by supervising PT during this time.     Jarvis received the treatments listed below:      therapeutic exercises to develop strength, endurance, ROM, and flexibility for 12 minutes including:    Assessment as above   Longsitting HS/Gastroc stretch with strap 5 x 20" holds  Heel prop x 5 min  Standing hip abduction 3 x 10 reps     Not Today:  Heel prop x 5'   Heel slides with strap within protocol x 3'   Ankle resisted PF BlackTB 3 x 15 reps     manual therapy techniques: Joint mobilizations were applied to the: knee for 12 minutes, including:    Patellar mobilizations all planes III-IV  Fat pad mobilizations  EOM knee flexion passive within protocol     neuromuscular re-education activities to improve: Balance, Coordination, Kinesthetic, and Proprioception for 29 minutes. The following activities were included:    Pt education on precautions, gait pattern, not overdoing it   Neuromuscular Electrical Stimulation: Russian 10" on 10-20" off   - Quad sets x 2'   - TKE with GreenTB x 5'  - SLR x 5'     Knee ext isometrics ~60 deg 2 x 10 x 5" holds     Not Today:  - Quad sets with strap x 5'   - QS x 5'  - Standing SLR x 5'   Sidelying hip abduction 3x10    therapeutic activities to improve functional performance for 00 minutes, including:      gait training to improve functional mobility and safety for 00 minutes, including:        Patient Education and Home Exercises       Education provided:   - PT POC, Prognosis, and HEP   - Early post-op goals: importance of knee extension, edema management, quad activation/strength, and gait mechanics  - Post-op precautions, weightbearing status and mobility precautions   - Signs and symptoms of DVT and infection       Written Home Exercises Provided: yes. Exercises were reviewed and Jarvis was able to demonstrate them prior to " "the end of the session.  Jarvis demonstrated good  understanding of the education provided. See EMR under Patient Instructions for exercises provided during therapy sessions.    Assessment     "Jarvis" is continuing to progress well with no adverse effects. He continues with good range of motion and quad control. Continued use of NMES to maximize quad activation and strength. Continued progression with hip strengthening and quad activation with isometrics and TKEs. Educated on importance of continued use of crutches and precautions following surgery to not overload his knee especially with upcoming trip. Will continue to monitor and progress as able.     Jarvis Is progressing well towards his goals.   Pt prognosis is Excellent.     Pt will continue to benefit from skilled outpatient physical therapy to address the deficits listed in the problem list box on initial evaluation, provide pt/family education and to maximize pt's level of independence in the home and community environment.     Pt's spiritual, cultural and educational needs considered and pt agreeable to plan of care and goals.     Anticipated Barriers for therapy: Scheduling, co-morbidities     Goals:  Short Term Goals: 6 weeks (Progressing, not met)  Patient will be independent in initial HEP to help supplement PT. - MET  Patient will improve knee extension range of motion to 0 degrees to help with gait mechanics. - MET  Patient will improve knee flexion range of motion to >/= 90 degrees to help with ADLs. - MET  Patient will improve pain at its worst to </= 5/10 to help improve quality of life.      Long Term Goals: 12-24 weeks (Progressing, not met)  Patient will be independent in updated HEP to help supplement PT and maintain gains made in PT.   Patient will improve FOTO score to >/= predicted to show improvement in condition.   Patient will improve knee extension strength to >/= 4+/5 to help with ADLs.  Patient goal: Patient will be able to return to " riding his bicycle without difficulty.   Plan   Plan of care Certification: 8/14/2024 to 2/25/2025.     Outpatient Physical Therapy 1-2 times weekly for 24 weeks to include the following interventions: Electrical Stimulation , Gait Training, Manual Therapy, Moist Heat/ Ice, Neuromuscular Re-ed, Patient Education, Self Care, Therapeutic Activities, and Therapeutic Exercise.     Flakita Thomas, PT, DPT, OCS   Co-treated by: Juan Daniel Soriano, PT, DPT

## 2024-09-09 ENCOUNTER — CLINICAL SUPPORT (OUTPATIENT)
Dept: REHABILITATION | Facility: HOSPITAL | Age: 64
End: 2024-09-09
Payer: COMMERCIAL

## 2024-09-09 DIAGNOSIS — R29.898 DECREASED STRENGTH OF LOWER EXTREMITY: ICD-10-CM

## 2024-09-09 DIAGNOSIS — M25.662 DECREASED RANGE OF MOTION OF LEFT LOWER EXTREMITY: ICD-10-CM

## 2024-09-09 DIAGNOSIS — R26.9 GAIT ABNORMALITY: Primary | ICD-10-CM

## 2024-09-09 PROCEDURE — 97110 THERAPEUTIC EXERCISES: CPT | Mod: CQ

## 2024-09-09 PROCEDURE — 97140 MANUAL THERAPY 1/> REGIONS: CPT | Mod: CQ

## 2024-09-09 PROCEDURE — 97112 NEUROMUSCULAR REEDUCATION: CPT | Mod: CQ

## 2024-09-09 NOTE — PROGRESS NOTES
[facility]   Physical Therapy Treatment Note      Name: Partha Curtis Jr.  Clinic Number: [id number]    Therapy Diagnosis:   Encounter Diagnoses   Name Primary?    Gait abnormality Yes    Decreased range of motion of left lower extremity     Decreased strength of lower extremity      Physician: Raffy Ascencio MD  Surgeon: Dr. Washington    Visit Date: 9/9/2024  Physician Orders: PT Eval and Treat   Medical Diagnosis from Referral: Bucket-handle tear of medial meniscus of left knee as current injury, initial encounter [S83.212A]   Evaluation Date: 8/14/2024  Authorization Period Expiration: 8/7/2025  Plan of Care Expiration: 2/25/2025  Progress Note Due: 9/20/2024  Visit # / Visits authorized: 9/20  This POC: 6    FOTO: 1/3 (8/20/24)     Date of Surgery: 8/13/2024  Return to MD: 8/28/2024, 10/2/2024     Procedure:  1. Left Knee Arthroscopy, with meniscus repair (medial OR lateral) 45605  2.  Left Knee Arthroscopy, with Microfracture 31165 - marrow stimulation procedure  3.  Left Knee Arthroscopy, debridement/shaving of articular cartilage (chondroplasty) 89969 -      Post-Op Plan:  Peripheral meniscal repair protocol      Precautions: Standard and Weightbearing     PTA Visit #: 0/5     Time In: 2:30 pm    Time Out: 3:30 pm   Total Billable Time: 60 minutes (PT tech extender used)    Subjective     Pt reports: knee is doing good. Putting 50% WB through L LE   He was compliant with home exercise program.  Response to previous treatment: Improved mobility  Functional change: Ongoing    Pain: 2/10  Location: left knee      Objective      Objective Measures updated at progress report unless specified.    9/5/2024: Patient ambulates into clinic with bilateral axillary crutches and t-scope brace intact unlocked.     Range of Motion:  Knee     Right AROM/PROM Left AROM/PROM   Flexion 120 degrees / 125 degrees  NT degrees / 105 degrees    Extension 0 degrees / 0 degrees  +1 degrees  / +1  "degrees (hyper)     Treatment     *Pt is 3 weeks 6 days s/p as of 9/5/2024.     Physical Therapy Fabienne assisted with todays treatment. Fabienne was in direct line of sight supervision by supervising PT during this time.     Jarvis received the treatments listed below:      therapeutic exercises to develop strength, endurance, ROM, and flexibility for 15 minutes including:    Assessment as above   Longsitting HS/Gastroc stretch with strap 5 x 20" holds-np  Heel prop x 5 min  Standing heel raises 50% WB 4x8    Not Today:  Heel prop x 5'   Heel slides with strap within protocol x 3'   Ankle resisted PF BlackTB 3 x 15 reps     manual therapy techniques: Joint mobilizations were applied to the: knee for 10 minutes, including:    Patellar mobilizations all planes III-IV  Fat pad mobilizations  Extension hinge    neuromuscular re-education activities to improve: Balance, Coordination, Kinesthetic, and Proprioception for 35 minutes. The following activities were included:    QS with strap into hyper 3x10 5"  QS without strap 3x10 5"  Seated SLR 3x10   SL hip abduction 5" 3x10  DL shuttle #25      therapeutic activities to improve functional performance for 00 minutes, including:      gait training to improve functional mobility and safety for 00 minutes, including:        Patient Education and Home Exercises       Education provided:   - PT POC, Prognosis, and HEP   - Early post-op goals: importance of knee extension, edema management, quad activation/strength, and gait mechanics  - Post-op precautions, weightbearing status and mobility precautions   - Signs and symptoms of DVT and infection       Written Home Exercises Provided: yes. Exercises were reviewed and Jarvis was able to demonstrate them prior to the end of the session.  Jarvis demonstrated good  understanding of the education provided. See EMR under Patient Instructions for exercises provided during therapy sessions.    Assessment     "Jarvis" is continuing to " progress well with no adverse effects. Knee flex to 90 degrees with pain of restriction. No lag with seated SLR. Progressed to 1 crutch with ambulating. Added function shuttle to work on squatting with keeping WB restrictions.    Jarvis Is progressing well towards his goals.   Pt prognosis is Excellent.     Pt will continue to benefit from skilled outpatient physical therapy to address the deficits listed in the problem list box on initial evaluation, provide pt/family education and to maximize pt's level of independence in the home and community environment.     Pt's spiritual, cultural and educational needs considered and pt agreeable to plan of care and goals.     Anticipated Barriers for therapy: Scheduling, co-morbidities     Goals:  Short Term Goals: 6 weeks (Progressing, not met)  Patient will be independent in initial HEP to help supplement PT. - MET  Patient will improve knee extension range of motion to 0 degrees to help with gait mechanics. - MET  Patient will improve knee flexion range of motion to >/= 90 degrees to help with ADLs. - MET  Patient will improve pain at its worst to </= 5/10 to help improve quality of life.      Long Term Goals: 12-24 weeks (Progressing, not met)  Patient will be independent in updated HEP to help supplement PT and maintain gains made in PT.   Patient will improve FOTO score to >/= predicted to show improvement in condition.   Patient will improve knee extension strength to >/= 4+/5 to help with ADLs.  Patient goal: Patient will be able to return to riding his bicycle without difficulty.   Plan   Plan of care Certification: 8/14/2024 to 2/25/2025.     Outpatient Physical Therapy 1-2 times weekly for 24 weeks to include the following interventions: Electrical Stimulation , Gait Training, Manual Therapy, Moist Heat/ Ice, Neuromuscular Re-ed, Patient Education, Self Care, Therapeutic Activities, and Therapeutic Exercise.     Elly Ho, NEO,

## 2024-09-10 ENCOUNTER — OFFICE VISIT (OUTPATIENT)
Dept: PAIN MEDICINE | Facility: CLINIC | Age: 64
End: 2024-09-10
Payer: COMMERCIAL

## 2024-09-10 VITALS
WEIGHT: 197.56 LBS | SYSTOLIC BLOOD PRESSURE: 132 MMHG | BODY MASS INDEX: 28.34 KG/M2 | OXYGEN SATURATION: 99 % | TEMPERATURE: 98 F | HEART RATE: 65 BPM | DIASTOLIC BLOOD PRESSURE: 80 MMHG

## 2024-09-10 DIAGNOSIS — M50.30 DDD (DEGENERATIVE DISC DISEASE), CERVICAL: ICD-10-CM

## 2024-09-10 DIAGNOSIS — M54.12 CERVICAL RADICULOPATHY: ICD-10-CM

## 2024-09-10 DIAGNOSIS — M51.36 DDD (DEGENERATIVE DISC DISEASE), LUMBAR: ICD-10-CM

## 2024-09-10 DIAGNOSIS — Z79.891 ENCOUNTER FOR MONITORING OPIOID MAINTENANCE THERAPY: ICD-10-CM

## 2024-09-10 DIAGNOSIS — Z51.81 ENCOUNTER FOR MONITORING OPIOID MAINTENANCE THERAPY: ICD-10-CM

## 2024-09-10 DIAGNOSIS — M47.812 CERVICAL SPONDYLOSIS: ICD-10-CM

## 2024-09-10 DIAGNOSIS — G89.4 CHRONIC PAIN DISORDER: Primary | ICD-10-CM

## 2024-09-10 DIAGNOSIS — M47.816 LUMBAR SPONDYLOSIS: ICD-10-CM

## 2024-09-10 DIAGNOSIS — M54.16 LUMBAR RADICULOPATHY: ICD-10-CM

## 2024-09-10 PROCEDURE — 80364 OPIOID &OPIATE ANALOG 5/MORE: CPT | Performed by: NURSE PRACTITIONER

## 2024-09-10 PROCEDURE — 3075F SYST BP GE 130 - 139MM HG: CPT | Mod: CPTII,S$GLB,, | Performed by: NURSE PRACTITIONER

## 2024-09-10 PROCEDURE — 80307 DRUG TEST PRSMV CHEM ANLYZR: CPT | Performed by: NURSE PRACTITIONER

## 2024-09-10 PROCEDURE — 1159F MED LIST DOCD IN RCRD: CPT | Mod: CPTII,S$GLB,, | Performed by: NURSE PRACTITIONER

## 2024-09-10 PROCEDURE — 3008F BODY MASS INDEX DOCD: CPT | Mod: CPTII,S$GLB,, | Performed by: NURSE PRACTITIONER

## 2024-09-10 PROCEDURE — 3066F NEPHROPATHY DOC TX: CPT | Mod: CPTII,S$GLB,, | Performed by: NURSE PRACTITIONER

## 2024-09-10 PROCEDURE — 3061F NEG MICROALBUMINURIA REV: CPT | Mod: CPTII,S$GLB,, | Performed by: NURSE PRACTITIONER

## 2024-09-10 PROCEDURE — 99999 PR PBB SHADOW E&M-EST. PATIENT-LVL IV: CPT | Mod: PBBFAC,,, | Performed by: NURSE PRACTITIONER

## 2024-09-10 PROCEDURE — 4010F ACE/ARB THERAPY RXD/TAKEN: CPT | Mod: CPTII,S$GLB,, | Performed by: NURSE PRACTITIONER

## 2024-09-10 PROCEDURE — 3079F DIAST BP 80-89 MM HG: CPT | Mod: CPTII,S$GLB,, | Performed by: NURSE PRACTITIONER

## 2024-09-10 PROCEDURE — 3044F HG A1C LEVEL LT 7.0%: CPT | Mod: CPTII,S$GLB,, | Performed by: NURSE PRACTITIONER

## 2024-09-10 PROCEDURE — 99214 OFFICE O/P EST MOD 30 MIN: CPT | Mod: S$GLB,,, | Performed by: NURSE PRACTITIONER

## 2024-09-10 PROCEDURE — 80347 BENZODIAZEPINES 13 OR MORE: CPT | Performed by: NURSE PRACTITIONER

## 2024-09-10 PROCEDURE — 1160F RVW MEDS BY RX/DR IN RCRD: CPT | Mod: CPTII,S$GLB,, | Performed by: NURSE PRACTITIONER

## 2024-09-10 NOTE — PROGRESS NOTES
Chronic patient Established Note (Follow up visit)      SUBJECTIVE:    Interval History 9/10/2024:  The patient returns to clinic today for follow up of neck and back pain. Since last visit, he underwent left knee arthroscopy on 8/13/2024. He is currently participating in physical therapy. He reports increased mid and low back pain. He denies any radicular leg pain. His pain is worse with moving from sitting to standing. He is taking Gabapentin. He also takes Percocet as needed for severe pain. He denies any adverse effects. He denies any other health changes. His pain today is 8/10.    Interval History 6/10/2024:  The patient returns to clinic today for follow up of neck and back pain. He is s/p L4/5 IL SELENA on 5/23/2024. He reports 60% relief. He reports low back pain. He denies any radicular leg pain. His back pain is worse with moving from sitting to standing. He continues to report neck pain. He is taking Gabapentin. He also takes Percocet for severe pain as needed with benefit. He denies any adverse effects. He denies any other health changes. His pain today is 5/10.      Interval History 5/1/2024:  The patient returns to clinic today for follow up of neck and back pain. He reports increased low back pain. He denies any radicular leg pain but does have numbness into the lateral aspect of his right leg. This is worse from the knee to the top of his right foot. He does have numbness under the left foot. His pain is worse with standing and walking. He is no longer taking Eliquis. He is taking Gabapentin. He also takes Percocet for severe pain. He needs a refill. He denies any other health changes. His pain today is 8/10.    Interval History 3/25/2024:  Partha Curtis Jr. presents to the clinic for a follow-up appointment for neck pain. He is s/p C7/T1 IL SELENA on 2/26/2024. He reports 100% relief of his neck pain. He was admitted for atrial fibrillation after the last procedure. He was converted to sinus rhythm. He  is now on Eliquis. He reports increased low back pain. He denies any radicular leg pain. His pain is worse as the day goes on. He previously had relief with ESIs. He is interested in repeating this in the future. He denies any weakness. He is taking Gabapentin. He takes Percocet intermittently for severe pain. He denies any other health changes. His pain today is 0/10.      HPI: Partha Curtis Jr. is a 63 y.o. male presents to pain clinic for evaluation of neck pain. Symptoms developed 4 weeks ago. Similar pain in 2022 that improved after an C7/T1 ILESI on 3/28/22, had been pain free since this procedure until 4 weeks ago. Original neck pain started in 2021 without inciting event. Denies trauma or injury to the area in the past 4 weeks upon return of symptoms. [facility] management previously at [facility] with Dr. Herrera     Original Pain Description:  The pain is located in the upper thoracic region just left of the midline with radiation into his left shoulder and proximal arm terminating above the elbow. All of patients upper back/neck pain is on the left. The pain is described as aching, sharp, and throbbing. These exact symptoms were present prior to his 2022 ILESI. Chinyere ranges from 4 - 10. The current pain is 8. Exacerbating factors: Coughing/Sneezing and L arm abduction above the head, head forward flexion and head lateral bending to the left, and laying flat on the ground. Mitigating factors pillow. Symptoms interfere with daily activity and sleeping and work. 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 Works as a  for [employer]. The patient feels like symptoms have been worsening. Patient endorses weakness in his left arm with return of pain. Patient denies saddle anesthesia, bladder/bowel incontinence, acute or signifcant limb weakness, ataxia, or increased falls.     Pain Disability Index Review:      9/10/2024    10:02 AM 6/10/2024    11:10 AM 2/1/2024      2:36 PM   Last 3 PDI Scores   Pain Disability Index (PDI) [phone]       Pain Medications:  Gabapentin  Percocet     Opioid Contract: not applicable     report:  Reviewed and consistent with medication use as prescribed.    Pain Procedures:   2/26/2024- C7/T1 IL SELENA  5/23/2024- L4/5 IL SELENA- 60% relief    Physical Therapy/Home Exercise: yes    Imaging:   MRI Cervical Spine 2/15/2024:  COMPARISON:  XR C-spine 03/02/2010.  CTA head neck 06/23/2022.     FINDINGS:  Alignment: Normal sagittal alignment.  Grade 1 retrolisthesis at C5-C6 and grade 1 anterolisthesis at C7-T1.     Vertebrae: Vertebral body heights are maintained.  No fracture or marrow infiltrative process.     Discs: Multilevel degenerative disc desiccation and height loss most pronounced at C5-C6 and C6-C7.     Cord: Normal cord signal intensity.     Cerebellar tonsils are in their expected location.  Visualized brainstem is normal. Vertebral artery flow voids are present.  Prevertebral soft tissues are normal.  No cervical lymph node enlargement.  Paraspinal musculature demonstrates normal bulk and signal intensity.     Degenerative findings:     C2-C3: Thickening of the posterior longitudinal ligament.  No spinal canal stenosis or neural foraminal narrowing.     C3-C4: Thickening of the posterior longitudinal ligament, bilateral uncovertebral spurring and facet arthropathy.  No spinal canal stenosis.  Mild left neural foraminal narrowing.     C4-C5: Central/right paracentral disc extrusion with inferior migration which abuts and slightly posteriorly displaces the right ventral cord.  Bilateral uncovertebral spurring and facet arthropathy.  Mild spinal canal stenosis.  Mild left neural foraminal narrowing.     C5-C6: Posterior disc osteophyte complex, bilateral uncovertebral spurring and facet arthropathy.  Moderate spinal canal stenosis.  Severe bilateral neural foraminal narrowing.     C6-C7: Posterior disc osteophyte complex, bilateral uncovertebral  spurring and facet arthropathy.  No spinal canal stenosis.  Mild right and moderate left neural foraminal narrowing.     C7-T1: No spinal canal stenosis or neural foraminal narrowing.     Impression:     Multilevel degenerative change of the cervical spine, detailed above.  Findings most pronounced at C5-C6 with moderate spinal canal stenosis and severe bilateral neural foraminal narrowing.    MRI Lumbar Spine 11/2/2023:  COMPARISON: MRI lumbar spine 6/28/2021     FINDINGS:   Spine Numbering: For purposes of this dictation, it is assumed that there are 5 non-rib-bearing, lumbar-type vertebrae, and the most caudal fully segmented lumbar vertebra is labeled L5.     Alignment: Straightening of normal lumbar lordosis. Unchanged dextrocurvature of the lumbar spine. Unchanged grade 1 left lateral listhesis of L2 on L3. Unchanged grade 1 right lateral listhesis of L3 on L4. Unchanged grade 1 retrolisthesis of L2 on L3.     Surgical: None.     Vertebral Bodies: Unchanged multilevel mild anterior osteophytosis.     Marrow Signal: Unchanged degenerative fatty marrow placement at the L3-4 endplates. No marrow edema. No suspicious osseous lesions.     Intervertebral Discs: Unchanged multilevel disc dessication with loss of disc space height     Conus Medullaris: Terminates at L1     Cauda Equina: Unchanged bunching of the cauda equina at L3-4 and L4-5 due to thecal sac narrowing detailed below.     Paraspinal Soft Tissues: Normal     Intra-abdominal Findings: None.     Individual Levels:     T12-L1: Unchanged circumferential disc bulge with unchanged right paracentral disc herniation with cranial migration to the infrapedicular level of T12 measuring 1.6 x 0.6 cm (image 7, series 2). No thecal sac or neural foraminal narrowing.     L1-L2: Unchanged circumferential disc bulge with bulky left lateral osteophytosis. No spinal canal or foraminal narrowing.     L2-L3: Circumferential disc bulge, ligamentum flavum thickening, and  "epidural lipomatosis cause unchanged moderate thecal sac narrowing. Mild facet arthropathy and disc disease cause unchanged moderate bilateral neural foraminal narrowing.     L3-L4: Circumferential disc bulge, ligamentum flavum thickening, and epidural lipomatosis cause unchanged severe thecal sac narrowing. Moderate right and severe left facet arthropathy with disc disease cause unchanged mild right and moderate left neural foraminal narrowing.     L4-L5: Circumferential disc bulge, ligamentum flavum thickening, and epidural lipomatosis cause unchanged severe thecal sac narrowing. Severe facet arthropathy and disc disease cause unchanged severe right and moderate left neural foraminal narrowing with impingement of the exiting right L4 nerve root.     L5-S1: Unchanged small disc bulge and moderate facet arthropathy. No spinal canal or foraminal narrowing.     Impression    Unchanged severe multilevel lumbar spondylosis as detailed above.    Allergies:   Review of patient's allergies indicates:   Allergen Reactions    Stadol [butorphanol tartrate] Rash     Swelling in face    Strawberries [strawberry] Rash       Current Medications:   Current Outpatient Medications   Medication Sig Dispense Refill    amitriptyline (ELAVIL) 75 MG tablet Take 1 tablet (75 mg total) by mouth every evening. 90 tablet 1    aspirin (ECOTRIN) 81 MG EC tablet Take 1 tablet (81 mg total) by mouth once daily. 28 tablet 0    BD LUER-RUSS SYRINGE 3 mL 25 gauge x 1" Syrg USE ONE SYRINGE EVERY 14 DAYS WITH TESTOSTERONE 100 each 2    candesartan (ATACAND) 32 MG tablet Take 1 tablet (32 mg total) by mouth once daily. 90 tablet 3    cloNIDine (CATAPRES) 0.1 MG tablet Take 1 tablet (0.1 mg total) by mouth 2 (two) times daily. 180 tablet 3    cyanocobalamin (VITAMIN B-12) 1000 MCG tablet Take 1 tablet (1,000 mcg total) by mouth once daily. 90 tablet 4    ergocalciferol (ERGOCALCIFEROL) 50,000 unit Cap Take 1 capsule (50,000 Units total) by mouth every 7 " "days. 12 capsule 0    ezetimibe (ZETIA) 10 mg tablet Take 1 tablet (10 mg total) by mouth once daily. 90 tablet 3    fluticasone propionate (FLONASE) 50 mcg/actuation nasal spray 2 sprays by Each Nostril route.      gabapentin (NEURONTIN) 800 MG tablet Take 1 tablet (800 mg total) by mouth every evening. 90 tablet 1    glucose 4 GM chewable tablet Take 4 tablets (16 g total) by mouth as needed for Low blood sugar. 30 tablet 12    HYDROcodone-acetaminophen (NORCO) 7.5-325 mg per tablet Take 1 tablet by mouth every 6 (six) hours as needed for Pain. 20 tablet 0    ipratropium (ATROVENT) 42 mcg (0.06 %) nasal spray 2 sprays by Nasal route 2 (two) times daily as needed for Rhinitis. 15 mL 0    metoprolol succinate (TOPROL-XL) 200 MG 24 hr tablet Take 1 tablet (200 mg total) by mouth once daily. (Patient taking differently: Take 200 mg by mouth every evening.) 90 tablet 3    oxyCODONE-acetaminophen (PERCOCET)  mg per tablet Take 1 tablet by mouth every 12 (twelve) hours as needed for Pain. 60 tablet 0    rosuvastatin (CRESTOR) 40 MG Tab Take 1 tablet (40 mg total) by mouth every evening. 90 tablet 3    semaglutide (OZEMPIC) 2 mg/dose (8 mg/3 mL) PnIj Inject 2 mg into the skin every 7 days. 9 mL 3    sod sulf-pot chloride-mag sulf (SUTAB) 1.479-0.188- 0.225 gram tablet Take 12 tablets by mouth once daily. Take according to package instructions with indicated amount of water. 24 tablet 0    syringe with needle, safety (BD INTEGRA SYRINGE) 3 mL 22 gauge x 1 1/2" Syrg Inject 1 Syringe as directed once a week. 6 each 3    testosterone cypionate (DEPOTESTOTERONE CYPIONATE) 200 mg/mL injection Inject 1 mL (200 mg total) into the muscle every 14 (fourteen) days. 2 mL 5    valACYclovir (VALTREX) 1000 MG tablet Take 2 tablets (2,000 mg total) by mouth every 12 (twelve) hours. 4 tablet 3     No current facility-administered medications for this visit.     Facility-Administered Medications Ordered in Other Visits   Medication " Dose Route Frequency Provider Last Rate Last Admin    0.9%  NaCl infusion   Intravenous Continuous Kendell Hoffman MD        0.9%  NaCl infusion   Intravenous Continuous Gerardo Uribe MD           REVIEW OF SYSTEMS:    GENERAL:  No weight loss, malaise or fevers.  HEENT:  Negative for frequent or significant headaches.  NECK:  Negative for lumps, goiter, pain and significant neck swelling.  RESPIRATORY:  Negative for cough, wheezing or shortness of breath.  CARDIOVASCULAR:  Negative for chest pain, leg swelling or palpitations. HTN, AFib  GI:  Negative for abdominal discomfort, blood in stools or black stools or change in bowel habits.  MUSCULOSKELETAL:  See HPI.  SKIN:  Negative for lesions, rash, and itching.  PSYCH:  Negative for sleep disturbance, mood disorder and recent psychosocial stressors.  ENDO: Diabetes.   HEMATOLOGY/LYMPHOLOGY:  Negative for swollen nodes. Eliquis  NEURO:   No history of headaches, syncope, paralysis, seizures or tremors.  All other reviewed and negative other than HPI.    Past Medical History:  Past Medical History:   Diagnosis Date    Allergy     Carotid artery occlusion     Chronic back pain     Colonic polyp     Genetic testing     MUTYH mutation-negative    Hyperlipidemia     Hypertension     Paroxysmal atrial fibrillation 2/28/2024    Sleep apnea     Type 2 diabetes mellitus with stage 3 chronic kidney disease, without long-term current use of insulin 4/2/2020       Past Surgical History:  Past Surgical History:   Procedure Laterality Date    ANKLE SURGERY Left     2    APPENDECTOMY      at age 20    ARTHROSCOPIC CHONDROPLASTY OF KNEE JOINT Left 8/13/2024    Procedure: ARTHROSCOPY, KNEE, WITH CHONDROPLASTY;  Surgeon: Kai Washington MD;  Location: [facility] OR;  Service: Orthopedics;  Laterality: Left;    ARTHROSCOPY, KNEE, WITH ABRASION ARTHROPLASTY OR MICROFRACTURE Left 8/13/2024    Procedure: ARTHROSCOPY, KNEE, WITH  MICROFRACTURE;  Surgeon: Kai Washington MD;  Location:  [facility] OR;  Service: Orthopedics;  Laterality: Left;    ARTHROSCOPY,KNEE,WITH MENISCUS REPAIR Left 8/13/2024    Procedure: ARTHROSCOPY,KNEE,WITH MENISCUS REPAIR;  Surgeon: Kai Washington MD;  Location: [facility] OR;  Service: Orthopedics;  Laterality: Left;  NO REGIONAL BLOCK    CAROTID ENDARTERECTOMY Right 07/15/2015    CARPAL TUNNEL RELEASE Bilateral     COLONOSCOPY N/A 12/14/2018    Procedure: COLONOSCOPYSuprep;  Surgeon: Silver Selby MD;  Location: [facility] ENDO;  Service: Endoscopy;  Laterality: N/A;    EPIDURAL STEROID INJECTION N/A 2/26/2024    Procedure: CERVICAL C7/T1 IL SELENA;  Surgeon: Gokul Fung MD;  Location: [facility] PAIN MGT;  Service: Pain Management;  Laterality: N/A;  [phone]  2 WK F/U SERGEI    EPIDURAL STEROID INJECTION N/A 5/23/2024    Procedure: LUMBAR L4/5 IL SELENA *ASPIRIN OTC* HOLD FOR 5 DAYS;  Surgeon: oGkul Fung MD;  Location: [facility] PAIN MGT;  Service: Pain Management;  Laterality: N/A;  [phone]  2 WK F/U NOHELIA    JOINT REPLACEMENT  9/2010    NASAL SEPTUM SURGERY      TOTAL KNEE ARTHROPLASTY Right     6 knee surgeries    TRANSESOPHAGEAL ECHOCARDIOGRAM WITH POSSIBLE CARDIOVERSION (ELIZABETH W/ POSS CARDIOVERSION) N/A 2/28/2024    Procedure: Transesophageal echo (ELIZABETH) intra-procedure log documentation;  Surgeon: Ahmet De La Cruz MD;  Location: [facility] CATH LAB/EP;  Service: Cardiology;  Laterality: N/A;       Family History:  Family History   Problem Relation Name Age of Onset    Brain cancer Mother Klarissa 67    Cancer Mother Klarissa     Hypertension Father Keanu     Lung cancer Father Keanu         x2 (PAUL initially- treated surgically only, then recurred distantly) (smoker, & had been exposed to many chemicals)    Cancer Father Keanu     Breast cancer Sister  58    Genetic Disorder Sister          monoallelic MUTYH mutation    Seizures Daughter      Cancer Maternal Grandfather Valerio     Brain cancer Paternal Grandfather  73    Breast cancer Maternal Cousin  57    Aneurysm Other mgm's  father 48        brain    Ulcerative colitis Other brother's son        Social History:  Social History     Socioeconomic History    Marital status:    Tobacco Use    Smoking status: Never     Passive exposure: Never    Smokeless tobacco: Never   Substance and Sexual Activity    Alcohol use: Yes     Alcohol/week: 2.0 standard drinks of alcohol     Types: 2 Cans of beer per week     Comment: a week    Drug use: Never    Sexual activity: Yes     Partners: Female     Birth control/protection: None   Social History Narrative    5/11/2021: . He lives with his wife and 2 kids. (5 kids total). He has one dog, one cat, no more chickens at home. One dog recently passed away. No smokers at home.      Social Determinants of Health     Financial Resource Strain: Low Risk  (3/8/2024)    Overall Financial Resource Strain (CARDIA)     Difficulty of Paying Living Expenses: Not very hard   Food Insecurity: No Food Insecurity (3/8/2024)    Hunger Vital Sign     Worried About Running Out of Food in the Last Year: Never true     Ran Out of Food in the Last Year: Never true   Transportation Needs: No Transportation Needs (3/8/2024)    PRAPARE - Transportation     Lack of Transportation (Medical): No     Lack of Transportation (Non-Medical): No   Physical Activity: Unknown (3/8/2024)    Exercise Vital Sign     Days of Exercise per Week: 0 days   Stress: Stress Concern Present (3/8/2024)    [employer] - Occupational Stress Questionnaire     Feeling of Stress : To some extent   Housing Stability: Low Risk  (3/8/2024)    Housing Stability Vital Sign     Unable to Pay for Housing in the Last Year: No     Number of Places Lived in the Last Year: 1     Unstable Housing in the Last Year: No       OBJECTIVE:    /80   Pulse 65   Temp 97.9 °F (36.6 °C)   Wt 89.6 kg (197 lb 8.5 oz)   SpO2 99%   BMI 28.34 kg/m²     PHYSICAL EXAMINATION:    General appearance: Well appearing, in no acute  distress, alert and oriented x3.  Psych:  Mood and affect appropriate.  Skin: Skin color, texture, turgor normal, no rashes or lesions, in both upper and lower body.  Head/face:  Atraumatic, normocephalic. No palpable lymph nodes  Neck: No pain to palpation over the cervical paraspinous muscles.   Cor: RRR  Pulm: Symmetric chest rise, no respiratory distress noted.   Back: Straight leg raising in the sitting position is negative to radicular pain bilaterally. There is pain to palpation over the lumbar facet joints bilaterally. Limited ROM with pain on flexion and extension. Positive facet loading bilaterally.   Extremities:  No deformities, edema, or skin discoloration. Good capillary refill.  Musculoskeletal:  Bilateral upper and lower extremity strength is normal and symmetric.  No atrophy or tone abnormalities are noted.  Neuro: No loss of sensation is noted.  Gait: Normal.    ASSESSMENT: 64 y.o. year old male with neck and back pain, consistent with the followin. Chronic pain disorder        2. Lumbar radiculopathy        3. Lumbar spondylosis        4. DDD (degenerative disc disease), lumbar        5. Cervical radiculopathy        6. Cervical spondylosis        7. DDD (degenerative disc disease), cervical        8. Encounter for monitoring opioid maintenance therapy  Pain Clinic Drug Screen                PLAN:     - Previous imaging was reviewed and discussed with the patient today.    - We can repeat L4/5 IL SELENA as needed.     - We can repeat C7/T1 IL SELENA as needed.     - Continue Gabapentin.     - Pain contract signed 2024.     - Continue Percocet 10/325 mg BID PRN, #60. He does not need a refill today.     - The patient is here today for a refill of current pain medications and they believe these provide effective pain control and improvements in quality of life.  The patient notes no serious side effects, and feels the benefits outweigh the risks.  The patient was reminded of the pain contract  that they signed previously as well as the risks and benefits of the medication including possible death.  The updated [place] Board  Pharmacy prescription monitoring program was reviewed, and the patient has been found to be compliant with current treatment plan. Medication management provided by Dr. Fung.     - UDS from 5/1/2024 reviewed and consistent. Repeat UDS today.     - I have stressed the importance of physical activity and a home exercise plan to help with pain and improve health.    - RTC in 3 months or sooner if needed.    The above plan and management options were discussed at length with patient. Patient is in agreement with the above and verbalized understanding.    Baylee Ni  09/10/2024

## 2024-09-12 ENCOUNTER — TELEPHONE (OUTPATIENT)
Dept: SPORTS MEDICINE | Facility: CLINIC | Age: 64
End: 2024-09-12
Payer: COMMERCIAL

## 2024-09-12 NOTE — TELEPHONE ENCOUNTER
Returned pt's call about wanting to reschedule appointment. Received no answer. I left a message saying I would reschedule their appointment and that they could call back/message later on if this new appointment does not work.

## 2024-09-17 ENCOUNTER — CLINICAL SUPPORT (OUTPATIENT)
Dept: REHABILITATION | Facility: HOSPITAL | Age: 64
End: 2024-09-17
Payer: COMMERCIAL

## 2024-09-17 DIAGNOSIS — R26.9 GAIT ABNORMALITY: Primary | ICD-10-CM

## 2024-09-17 DIAGNOSIS — M25.662 DECREASED RANGE OF MOTION OF LEFT LOWER EXTREMITY: ICD-10-CM

## 2024-09-17 DIAGNOSIS — R29.898 DECREASED STRENGTH OF LOWER EXTREMITY: ICD-10-CM

## 2024-09-17 PROCEDURE — 97140 MANUAL THERAPY 1/> REGIONS: CPT

## 2024-09-17 PROCEDURE — 97110 THERAPEUTIC EXERCISES: CPT

## 2024-09-17 PROCEDURE — 97112 NEUROMUSCULAR REEDUCATION: CPT

## 2024-09-17 NOTE — PROGRESS NOTES
[facility]   Physical Therapy Treatment Note      Name: Partha Curtis Jr.  Clinic Number: [id number]    Therapy Diagnosis:   Encounter Diagnoses   Name Primary?    Gait abnormality Yes    Decreased range of motion of left lower extremity     Decreased strength of lower extremity        Physician: Raffy Ascencio MD  Surgeon: Dr. Washington    Visit Date: 9/17/2024  Physician Orders: PT Eval and Treat   Medical Diagnosis from Referral: Bucket-handle tear of medial meniscus of left knee as current injury, initial encounter [S83.212A]   Evaluation Date: 8/14/2024  Authorization Period Expiration: 8/7/2025  Plan of Care Expiration: 2/25/2025  Progress Note Due: 9/20/2024  Visit # / Visits authorized: 9/20  This POC: 6    FOTO: 1/3 (8/20/24)     Date of Surgery: 8/13/2024  Return to MD: 8/28/2024, 10/2/2024     Procedure:  1. Left Knee Arthroscopy, with meniscus repair (medial OR lateral) 85240  2.  Left Knee Arthroscopy, with Microfracture 15769 - marrow stimulation procedure  3.  Left Knee Arthroscopy, debridement/shaving of articular cartilage (chondroplasty) 64043 -      Post-Op Plan:  Peripheral meniscal repair protocol      Precautions: Standard and Weightbearing     PTA Visit #: 0/5     Time In: 3:00 pm   Time Out: 3:54 pm  Total Billable Time: 54 minutes (PT tech extender used)    Subjective     Pt reports: Doing well, no issues with his knee.  He was compliant with home exercise program.  Response to previous treatment: Improved mobility  Functional change: Ongoing    Pain: 2/10  Location: left knee      Objective      Objective Measures updated at progress report unless specified.    9/5/2024: Patient ambulates into clinic with bilateral axillary crutches and t-scope brace intact unlocked.     Range of Motion:  Knee     Right AROM/PROM Left AROM/PROM   Flexion 120 degrees / 125 degrees  NT degrees / 105 degrees    Extension 0 degrees / 0 degrees  +1 degrees  / +1 degrees  "(hyper)     Treatment     *Pt is 3 weeks 6 days s/p as of 9/5/2024.     Physical Therapy Fabienne assisted with todays treatment. Fabienne was in direct line of sight supervision by supervising PT during this time.     Jarvis received the treatments listed below:      therapeutic exercises to develop strength, endurance, ROM, and flexibility for 10 minutes including:    Assessment as above   Longsitting HS/Gastroc stretch with strap 5 x 20" holds  Standing hip AB/Hip extension RedTB 2 x 15 reps each    Not Today:  Heel prop x 5'   Heel slides with strap within protocol x 3'   Ankle resisted PF BlackTB 3 x 15 reps   Heel prop x 5 min  Standing heel raises 50% WB 4x8    manual therapy techniques: Joint mobilizations were applied to the: knee for 10 minutes, including:    Patellar mobilizations all planes III-IV  Fat pad mobilizations  Extension hinge    neuromuscular re-education activities to improve: Balance, Coordination, Kinesthetic, and Proprioception for 34 minutes. The following activities were included:    Quad set with strap into hyper 10 x 10" holds   Quad set into SLR 3 x 8 reps   Sidelying hip abduction 3 x 10 reps   DL shuttle press modified range 3 x 10 reps 50#   SL shuttle press 12.5# 3 x 12 reps   TKE with GreenTB 2 x 10 x 5" holds     Not Today:  QS with strap into hyper 3x10 5"  QS without strap 3x10 5"  Seated SLR 3x10   SL hip abduction 5" 3x10  DL shuttle #25    therapeutic activities to improve functional performance for 00 minutes, including:      gait training to improve functional mobility and safety for 00 minutes, including:        Patient Education and Home Exercises       Education provided:   - PT POC, Prognosis, and HEP   - Early post-op goals: importance of knee extension, edema management, quad activation/strength, and gait mechanics  - Post-op precautions, weightbearing status and mobility precautions   - Signs and symptoms of DVT and infection       Written Home Exercises " "Provided: yes. Exercises were reviewed and Jarvis was able to demonstrate them prior to the end of the session.  Jarvis demonstrated good  understanding of the education provided. See EMR under Patient Instructions for exercises provided during therapy sessions.    Assessment     "Jarvis" continues with good knee range of motion and quad activation. Continued focus on progressing with strengthening and increased weightbearing tolerance with good response. Able to progress with increased weight on shuttle with no adverse effects. Further hip/glute strengthening as well with good tolerance. Will continue to monitor and progress as able.     Jarvis Is progressing well towards his goals.   Pt prognosis is Excellent.     Pt will continue to benefit from skilled outpatient physical therapy to address the deficits listed in the problem list box on initial evaluation, provide pt/family education and to maximize pt's level of independence in the home and community environment.     Pt's spiritual, cultural and educational needs considered and pt agreeable to plan of care and goals.     Anticipated Barriers for therapy: Scheduling, co-morbidities     Goals:  Short Term Goals: 6 weeks (Progressing, not met)  Patient will be independent in initial HEP to help supplement PT. - MET  Patient will improve knee extension range of motion to 0 degrees to help with gait mechanics. - MET  Patient will improve knee flexion range of motion to >/= 90 degrees to help with ADLs. - MET  Patient will improve pain at its worst to </= 5/10 to help improve quality of life. - MET     Long Term Goals: 12-24 weeks (Progressing, not met)  Patient will be independent in updated HEP to help supplement PT and maintain gains made in PT.   Patient will improve FOTO score to >/= predicted to show improvement in condition.   Patient will improve knee extension strength to >/= 4+/5 to help with ADLs.  Patient goal: Patient will be able to return to riding his bicycle " without difficulty.   Plan   Plan of care Certification: 8/14/2024 to 2/25/2025.     Outpatient Physical Therapy 1-2 times weekly for 24 weeks to include the following interventions: Electrical Stimulation , Gait Training, Manual Therapy, Moist Heat/ Ice, Neuromuscular Re-ed, Patient Education, Self Care, Therapeutic Activities, and Therapeutic Exercise.     Flakita Thomas, PT, DPT, OCS  Co-treated by: Heath Del Angel, SPT

## 2024-09-19 ENCOUNTER — CLINICAL SUPPORT (OUTPATIENT)
Dept: REHABILITATION | Facility: HOSPITAL | Age: 64
End: 2024-09-19
Payer: COMMERCIAL

## 2024-09-19 DIAGNOSIS — R29.898 DECREASED STRENGTH OF LOWER EXTREMITY: ICD-10-CM

## 2024-09-19 DIAGNOSIS — R26.9 GAIT ABNORMALITY: Primary | ICD-10-CM

## 2024-09-19 DIAGNOSIS — M25.662 DECREASED RANGE OF MOTION OF LEFT LOWER EXTREMITY: ICD-10-CM

## 2024-09-19 PROCEDURE — 97110 THERAPEUTIC EXERCISES: CPT

## 2024-09-19 PROCEDURE — 97112 NEUROMUSCULAR REEDUCATION: CPT

## 2024-09-19 NOTE — PROGRESS NOTES
[facility]   Physical Therapy Treatment Note      Name: Partha Curtis Jr.  Clinic Number: [id number]    Therapy Diagnosis:   Encounter Diagnoses   Name Primary?    Gait abnormality Yes    Decreased range of motion of left lower extremity     Decreased strength of lower extremity      Physician: Raffy Ascencio MD  Surgeon: Dr. Washington    Visit Date: 9/19/2024  Physician Orders: PT Eval and Treat   Medical Diagnosis from Referral: Bucket-handle tear of medial meniscus of left knee as current injury, initial encounter [S83.212A]   Evaluation Date: 8/14/2024  Authorization Period Expiration: 8/7/2025  Plan of Care Expiration: 2/25/2025  Progress Note Due: 9/20/2024  Visit # / Visits authorized: 11/20  This POC: 9    FOTO: 1/3 (8/20/24)     Date of Surgery: 8/13/2024  Return to MD: 8/28/2024, 10/2/2024     Procedure:  1. Left Knee Arthroscopy, with meniscus repair (medial OR lateral) 87495  2.  Left Knee Arthroscopy, with Microfracture 04153 - marrow stimulation procedure  3.  Left Knee Arthroscopy, debridement/shaving of articular cartilage (chondroplasty) 76050 -      Post-Op Plan:  Peripheral meniscal repair protocol      Precautions: Standard and Weightbearing     PTA Visit #: 0/5     Time In: 3:00 pm   Time Out: 3:50 pm  Total Billable Time: 50 minutes     Subjective     Pt reports: Doing well, feels better walking with one crutch. Leaves on his trip tomorrow and will be gone for a week.    He was compliant with home exercise program.  Response to previous treatment: Improved mobility  Functional change: Ongoing    Pain: 2/10  Location: left knee      Objective      Objective Measures updated at progress report unless specified.    9/19/2024: Patient ambulates into clinic with single axillary crutch and t-scope brace unlocked 0-30 deg. He demonstrates good nadege and heel to toe contact.     Range of Motion:  Knee     Right AROM/PROM Left AROM/PROM   Flexion 120 degrees / 125  "degrees  NT degrees / 110 degrees    Extension 0 degrees / 0 degrees  +1 degrees  / +1 degrees (hyper)     Treatment     *Pt is 5 weeks 2 days s/p as of 9/19/2024.     Jarvis received the treatments listed below:      therapeutic exercises to develop strength, endurance, ROM, and flexibility for 09 minutes including:    Assessment as above   Longsitting HS/Gastroc stretch with strap 5 x 20" holds  Standing hip abduction with brace RedTB 3 x 10   Standing hip extension with brace RedTB 3 x 10 reps     Not Today:  Heel prop x 5'   Heel slides with strap within protocol x 3'   Ankle resisted PF BlackTB 3 x 15 reps     manual therapy techniques: Joint mobilizations were applied to the: knee for 05 minutes, including:    Patellar mobilizations all planes III-IV  Fat pad mobilizations  Extension hinge    neuromuscular re-education activities to improve: Balance, Coordination, Kinesthetic, and Proprioception for 36 minutes. The following activities were included:    Pt education on importance of not overloading and limiting increased walking on trip   Quad sets with strap into hyper 10 x 10" holds  Quad set into SLR with PT assist to ensure TKE 3 x 8 reps   DL shuttle press mod range 3 x 10 reps 62.5#   SL shuttle press mod range 3 x 8 reps 25#   DL heel raises 2 x 15 reps   TKE with GreenTB 2 x 10 x 3-5" holds     Not Performed:  QS with strap into hyper 3x10 5"  QS without strap 3x10 5"  SL hip abduction 5" 3x10    therapeutic activities to improve functional performance for 00 minutes, including:      gait training to improve functional mobility and safety for 00 minutes, including:        Patient Education and Home Exercises       Education provided:   - PT POC, Prognosis, and HEP   - Early post-op goals: importance of knee extension, edema management, quad activation/strength, and gait mechanics  - Post-op precautions, weightbearing status and mobility precautions   - Signs and symptoms of DVT and infection     " "  Written Home Exercises Provided: yes. Exercises were reviewed and Jarvis was able to demonstrate them prior to the end of the session.  Jarvis demonstrated good  understanding of the education provided. See EMR under Patient Instructions for exercises provided during therapy sessions.    Assessment     "Jarvis" continues with good range of motion and good quad contraction with no reports of pain. He continues to be challenged and progressed with further quad strengthening with good tolerance. Continued focus on improving weightbearing tolerance within tolerance and ensuring not to overload. Limited increased loading and progressions secondary to patient leaving on vacation tomorrow where he will be doing a lot of walking. Increased time spent continuing to educate patient on importance of not overloading his knee on vacation and managing his swelling to ensure no set backs following vacation. Overall progressing well and will further assess following trip. Will continue to monitor and progress as able.     Jarvis Is progressing well towards his goals.   Pt prognosis is Excellent.     Pt will continue to benefit from skilled outpatient physical therapy to address the deficits listed in the problem list box on initial evaluation, provide pt/family education and to maximize pt's level of independence in the home and community environment.     Pt's spiritual, cultural and educational needs considered and pt agreeable to plan of care and goals.     Anticipated Barriers for therapy: Scheduling, co-morbidities     Goals:  Short Term Goals: 6 weeks   Patient will be independent in initial HEP to help supplement PT. - MET  Patient will improve knee extension range of motion to 0 degrees to help with gait mechanics. - MET  Patient will improve knee flexion range of motion to >/= 90 degrees to help with ADLs. - MET  Patient will improve pain at its worst to </= 5/10 to help improve quality of life. - MET     Long Term Goals: 12-24 " weeks (Progressing, not met)  Patient will be independent in updated HEP to help supplement PT and maintain gains made in PT.   Patient will improve FOTO score to >/= predicted to show improvement in condition.   Patient will improve knee extension strength to >/= 4+/5 to help with ADLs.  Patient goal: Patient will be able to return to riding his bicycle without difficulty.   Plan   Plan of care Certification: 8/14/2024 to 2/25/2025.     Outpatient Physical Therapy 1-2 times weekly for 24 weeks to include the following interventions: Electrical Stimulation , Gait Training, Manual Therapy, Moist Heat/ Ice, Neuromuscular Re-ed, Patient Education, Self Care, Therapeutic Activities, and Therapeutic Exercise.     Flakita Thomas, PT, DPT, OCS

## 2024-09-23 ENCOUNTER — PATIENT MESSAGE (OUTPATIENT)
Dept: PAIN MEDICINE | Facility: CLINIC | Age: 64
End: 2024-09-23
Payer: COMMERCIAL

## 2024-09-23 DIAGNOSIS — M54.16 LUMBAR RADICULOPATHY: Primary | ICD-10-CM

## 2024-09-25 ENCOUNTER — TELEPHONE (OUTPATIENT)
Dept: ENDOSCOPY | Facility: HOSPITAL | Age: 64
End: 2024-09-25
Payer: COMMERCIAL

## 2024-09-25 NOTE — TELEPHONE ENCOUNTER
Contacted Pt for Endoscopy precall to confirm scheduled procedure(s) Colonoscopy on 10/2/24. Pt did not answer. Left voicemail for pt to call Endoscopy Scheduling to confirm.

## 2024-09-28 NOTE — PROGRESS NOTES
[facility]   Physical Therapy Treatment Note      Name: Partha Curtis Jr.  Clinic Number: [id number]    Therapy Diagnosis:   Encounter Diagnoses   Name Primary?    Gait abnormality Yes    Decreased range of motion of left lower extremity     Decreased strength of lower extremity      Physician: Raffy Ascencio MD  Surgeon: Dr. Washington    Visit Date: 9/3/2024  Physician Orders: PT Eval and Treat   Medical Diagnosis from Referral: Bucket-handle tear of medial meniscus of left knee as current injury, initial encounter [S83.212A]   Evaluation Date: 8/14/2024  Authorization Period Expiration: 8/7/2025  Plan of Care Expiration: 2/25/2025  Progress Note Due: 9/20/2024  Visit # / Visits authorized: 5/20  This POC: 4    FOTO: 1/3 (8/20/24)     Date of Surgery: 8/13/2024  Return to MD: 8/28/2024, 10/2/2024     Procedure:  1. Left Knee Arthroscopy, with meniscus repair (medial OR lateral) 87894  2.  Left Knee Arthroscopy, with Microfracture 96344 - marrow stimulation procedure  3.  Left Knee Arthroscopy, debridement/shaving of articular cartilage (chondroplasty) 74357 -      Post-Op Plan:  Peripheral meniscal repair protocol      Precautions: Standard and Weightbearing     PTA Visit #: 0/5     Time In: 2:00 pm   Time Out: 2:54 pm    Total Billable Time: 54 minutes (40)    Subjective     Pt reports: Doing well, no complaints.   He was compliant with home exercise program.  Response to previous treatment: Improved mobility  Functional change: Ongoing    Pain: 2/10  Location: left knee      Objective      Objective Measures updated at progress report unless specified.    8/23/2024: Patient ambulates into clinic with bilateral axillary crutches and t-scope brace intact locked in extension.     Range of Motion:  Knee     Right AROM/PROM Left AROM/PROM   Flexion 120 degrees / 125 degrees  NT degrees / 95 degrees    Extension 0 degrees / 0 degrees  0 degrees  / +1 degrees (hyper)     Edema  "Measurements (8/20/2024)   Left    6 cm below 37 cm   Joint line 38.5 cm   10 cm above  40.5 cm    *Above and below measurements based off of inferior patella pole     Treatment     *Pt is 1 weeks 6 days s/p as of 8/26/2024.     Physical Therapy Fabienne assisted with todays treatment. Fabienne was in direct line of sight supervision by supervising PT during this time.     Jarvis received the treatments listed below:      therapeutic exercises to develop strength, endurance, ROM, and flexibility for 06 minutes including:    Assessment as above   Longsitting HS/Gastroc stretch with strap 5 x 20" holds  Heel slides with strap within protocol x 3'     Not Today:  Heel prop x 5'   Ankle resisted PF BlackTB 3 x 15 reps   Heel prop x 5 min    manual therapy techniques: Joint mobilizations were applied to the: knee for 12 minutes, including:    Patellar mobilizations all planes III-IV  Fat pad mobilizations  EOM knee flexion passive within protocol     neuromuscular re-education activities to improve: Balance, Coordination, Kinesthetic, and Proprioception for 36 minutes. The following activities were included:    Pt education on precautions, gait pattern, not overdoing it   Neuromuscular Electrical Stimulation: Russian 10" on 10-20" off   - Quad sets into hyper x 6'  - Supine SLR x 6'     Sidelying hip abduction 3 x 10 reps   Standing hip AB and Ext with RedTB 3 x 10 reps     Not Today:   - Quad sets with strap x 5'   - QS x 5'  - Standing SLR x 5'     therapeutic activities to improve functional performance for 00 minutes, including:      gait training to improve functional mobility and safety for 00 minutes, including:        Patient Education and Home Exercises       Education provided:   - PT POC, Prognosis, and HEP   - Early post-op goals: importance of knee extension, edema management, quad activation/strength, and gait mechanics  - Post-op precautions, weightbearing status and mobility precautions   - Signs and " symptoms of DVT and infection       Written Home Exercises Provided: yes. Exercises were reviewed and Jarvis was able to demonstrate them prior to the end of the session.  Jarvis demonstrated good  understanding of the education provided. See EMR under Patient Instructions for exercises provided during therapy sessions.    Assessment     Jarvis continues with good range of motion and no pain. Continued use of NMES maximizing quad activation and strength with good tolerance. Further hip/glute strengthening added today with no adverse effects. Will continue to monitor and progress as able within protocol limits.     Jarvis Is progressing well towards his goals.   Pt prognosis is Excellent.     Pt will continue to benefit from skilled outpatient physical therapy to address the deficits listed in the problem list box on initial evaluation, provide pt/family education and to maximize pt's level of independence in the home and community environment.     Pt's spiritual, cultural and educational needs considered and pt agreeable to plan of care and goals.     Anticipated Barriers for therapy: Scheduling, co-morbidities     Goals:  Short Term Goals: 6 weeks (Progressing, not met)  Patient will be independent in initial HEP to help supplement PT. - MET  Patient will improve knee extension range of motion to 0 degrees to help with gait mechanics. - MET  Patient will improve knee flexion range of motion to >/= 90 degrees to help with ADLs. - MET  Patient will improve pain at its worst to </= 5/10 to help improve quality of life.      Long Term Goals: 12-24 weeks (Progressing, not met)  Patient will be independent in updated HEP to help supplement PT and maintain gains made in PT.   Patient will improve FOTO score to >/= predicted to show improvement in condition.   Patient will improve knee extension strength to >/= 4+/5 to help with ADLs.  Patient goal: Patient will be able to return to riding his bicycle without difficulty.   Plan    Plan of care Certification: 8/14/2024 to 2/25/2025.     Outpatient Physical Therapy 1-2 times weekly for 24 weeks to include the following interventions: Electrical Stimulation , Gait Training, Manual Therapy, Moist Heat/ Ice, Neuromuscular Re-ed, Patient Education, Self Care, Therapeutic Activities, and Therapeutic Exercise.     Flakita Thomas, PT, DPT, OCS

## 2024-09-30 DIAGNOSIS — M54.16 LUMBAR RADICULOPATHY: Primary | ICD-10-CM

## 2024-10-02 ENCOUNTER — ANESTHESIA (OUTPATIENT)
Dept: ENDOSCOPY | Facility: HOSPITAL | Age: 64
End: 2024-10-02
Payer: COMMERCIAL

## 2024-10-02 ENCOUNTER — HOSPITAL ENCOUNTER (OUTPATIENT)
Facility: HOSPITAL | Age: 64
Discharge: HOME | End: 2024-10-02
Attending: INTERNAL MEDICINE | Admitting: INTERNAL MEDICINE
Payer: COMMERCIAL

## 2024-10-02 ENCOUNTER — PATIENT MESSAGE (OUTPATIENT)
Dept: PAIN MEDICINE | Facility: OTHER | Age: 64
End: 2024-10-02
Payer: COMMERCIAL

## 2024-10-02 ENCOUNTER — TELEPHONE (OUTPATIENT)
Dept: PAIN MEDICINE | Facility: CLINIC | Age: 64
End: 2024-10-02
Payer: COMMERCIAL

## 2024-10-02 ENCOUNTER — ANESTHESIA EVENT (OUTPATIENT)
Dept: ENDOSCOPY | Facility: HOSPITAL | Age: 64
End: 2024-10-02
Payer: COMMERCIAL

## 2024-10-02 VITALS
BODY MASS INDEX: 27.3 KG/M2 | RESPIRATION RATE: 12 BRPM | DIASTOLIC BLOOD PRESSURE: 96 MMHG | HEART RATE: 80 BPM | TEMPERATURE: 99 F | SYSTOLIC BLOOD PRESSURE: 171 MMHG | WEIGHT: 195 LBS | HEIGHT: 71 IN | OXYGEN SATURATION: 97 %

## 2024-10-02 DIAGNOSIS — Z86.0100 PERSONAL HISTORY OF COLONIC POLYPS: ICD-10-CM

## 2024-10-02 LAB
GLUCOSE SERPL-MCNC: 112 MG/DL (ref 70–110)
POCT GLUCOSE: 112 MG/DL (ref 70–110)

## 2024-10-02 PROCEDURE — 88305 TISSUE EXAM BY PATHOLOGIST: CPT | Performed by: PATHOLOGY

## 2024-10-02 PROCEDURE — 63600175 PHARM REV CODE 636 W HCPCS: Performed by: NURSE ANESTHETIST, CERTIFIED REGISTERED

## 2024-10-02 PROCEDURE — 25000003 PHARM REV CODE 250: Performed by: INTERNAL MEDICINE

## 2024-10-02 PROCEDURE — 45385 COLONOSCOPY W/LESION REMOVAL: CPT | Mod: 33 | Performed by: INTERNAL MEDICINE

## 2024-10-02 PROCEDURE — 88305 TISSUE EXAM BY PATHOLOGIST: CPT | Mod: 26,,, | Performed by: PATHOLOGY

## 2024-10-02 PROCEDURE — 37000009 HC ANESTHESIA EA ADD 15 MINS: Performed by: INTERNAL MEDICINE

## 2024-10-02 PROCEDURE — 27201089 HC SNARE, DISP (ANY): Performed by: INTERNAL MEDICINE

## 2024-10-02 PROCEDURE — 25000003 PHARM REV CODE 250: Performed by: NURSE ANESTHETIST, CERTIFIED REGISTERED

## 2024-10-02 PROCEDURE — 45385 COLONOSCOPY W/LESION REMOVAL: CPT | Mod: 33,,, | Performed by: INTERNAL MEDICINE

## 2024-10-02 PROCEDURE — 37000008 HC ANESTHESIA 1ST 15 MINUTES: Performed by: INTERNAL MEDICINE

## 2024-10-02 RX ORDER — PROPOFOL 10 MG/ML
VIAL (ML) INTRAVENOUS
Status: DISCONTINUED | OUTPATIENT
Start: 2024-10-02 | End: 2024-10-02

## 2024-10-02 RX ORDER — SODIUM CHLORIDE 9 MG/ML
INJECTION, SOLUTION INTRAVENOUS CONTINUOUS
Status: DISCONTINUED | OUTPATIENT
Start: 2024-10-02 | End: 2024-10-02 | Stop reason: HOSPADM

## 2024-10-02 RX ORDER — LIDOCAINE HYDROCHLORIDE 20 MG/ML
INJECTION, SOLUTION EPIDURAL; INFILTRATION; INTRACAUDAL; PERINEURAL
Status: DISCONTINUED | OUTPATIENT
Start: 2024-10-02 | End: 2024-10-02

## 2024-10-02 RX ORDER — PROPOFOL 10 MG/ML
VIAL (ML) INTRAVENOUS CONTINUOUS PRN
Status: DISCONTINUED | OUTPATIENT
Start: 2024-10-02 | End: 2024-10-02

## 2024-10-02 RX ORDER — SODIUM CHLORIDE 0.9 % (FLUSH) 0.9 %
10 SYRINGE (ML) INJECTION
Status: DISCONTINUED | OUTPATIENT
Start: 2024-10-02 | End: 2024-10-02 | Stop reason: HOSPADM

## 2024-10-02 RX ADMIN — SODIUM CHLORIDE: 9 INJECTION, SOLUTION INTRAVENOUS at 01:10

## 2024-10-02 RX ADMIN — LIDOCAINE HYDROCHLORIDE 100 MG: 20 INJECTION, SOLUTION EPIDURAL; INFILTRATION; INTRACAUDAL; PERINEURAL at 01:10

## 2024-10-02 RX ADMIN — PROPOFOL 100 MG: 10 INJECTION, EMULSION INTRAVENOUS at 01:10

## 2024-10-02 RX ADMIN — SODIUM CHLORIDE: 0.9 INJECTION, SOLUTION INTRAVENOUS at 01:10

## 2024-10-02 RX ADMIN — PROPOFOL 150 MCG/KG/MIN: 10 INJECTION, EMULSION INTRAVENOUS at 01:10

## 2024-10-02 RX ADMIN — PROPOFOL 30 MG: 10 INJECTION, EMULSION INTRAVENOUS at 01:10

## 2024-10-02 NOTE — TELEPHONE ENCOUNTER
----- Message from Sarah Beth sent at 10/2/2024  1:38 PM CDT -----  Please schedule patient for _2 week follow-up with SERGEI after procedure on _10/21.

## 2024-10-02 NOTE — TELEPHONE ENCOUNTER
Staff tried to contact patient to schedule 2 week follow up after procedure. Staff left a voicemail

## 2024-10-02 NOTE — ANESTHESIA PREPROCEDURE EVALUATION
10/02/2024  Partha Curtis Jr. is a 64 y.o., male here for a colonoscopy.       Pre-op Assessment    I have reviewed the Patient Summary Reports.    I have reviewed the NPO Status.   I have reviewed the Medications.     Review of Systems  Anesthesia Hx:  No problems with previous Anesthesia               Denies Personal Hx of Anesthesia complications.                    Social:  Non-Smoker, No Alcohol Use       Cardiovascular:     Hypertension               Hx of carotid stenosis treated in 2015 with carotid endarterectomy  Hx of afib treated with cardioversion                         Pulmonary:        Sleep Apnea                Renal/:  Chronic Renal Disease, CKD                Neurological:    Neuromuscular Disease,                                   Endocrine:  Diabetes               Physical Exam  General: Well nourished, Cooperative, Alert and Oriented    Airway:  Mallampati: II   Mouth Opening: Normal  TM Distance: Normal  Tongue: Normal  Neck ROM: Normal ROM    Dental:  Intact    Chest/Lungs:  Clear to auscultation, Normal Respiratory Rate    Heart:  Rate: Normal  Rhythm: Regular Rhythm        Anesthesia Plan  Type of Anesthesia, risks & benefits discussed:    Anesthesia Type: Gen Natural Airway  Intra-op Monitoring Plan: Standard ASA Monitors  Post Op Pain Control Plan: multimodal analgesia  Induction:  IV  Informed Consent: Informed consent signed with the Patient and all parties understand the risks and agree with anesthesia plan.  All questions answered.   ASA Score: 2    Ready For Surgery From Anesthesia Perspective.     .

## 2024-10-02 NOTE — PROVATION PATIENT INSTRUCTIONS
Discharge Summary/Instructions after an Endoscopic Procedure  Patient Name: Partha Curtis  Patient MRN: [id number]  Patient YOB: 1960  Wednesday, October 2, 2024  Heath Morrow MD  Dear patient,  As a result of recent federal legislation (The Federal Cures Act), you may   receive lab or pathology results from your procedure in your [facility]   account before your physician is able to contact you. Your physician or   their representative will relay the results to you with their   recommendations at their soonest availability.  Thank you,  Your health is very important to us during the Covid Crisis. Following your   procedure today, you will receive a daily text for 2 weeks asking about   signs or symptoms of Covid 19.  Please respond to this text when you   receive it so we can follow up and keep you as safe as possible.   RESTRICTIONS:  During your procedure today, you received medications for sedation.  These   medications may affect your judgment, balance and coordination.  Therefore,   for 24 hours, you have the following restrictions:   - DO NOT drive a car, operate machinery, make legal/financial decisions,   sign important papers or drink alcohol.    ACTIVITY:  Today: no heavy lifting, straining or running due to procedural   sedation/anesthesia.  The following day: return to full activity including work.  DIET:  Eat and drink normally unless instructed otherwise.     TREATMENT FOR COMMON SIDE EFFECTS:  - Mild abdominal pain, nausea, belching, bloating or excessive gas:  rest,   eat lightly and use a heating pad.  - Sore Throat: treat with throat lozenges and/or gargle with warm salt   water.  - Because air was used during the procedure, expelling large amounts of air   from your rectum or belching is normal.  - If a bowel prep was taken, you may not have a bowel movement for 1-3 days.    This is normal.  SYMPTOMS TO WATCH FOR AND REPORT TO YOUR PHYSICIAN:  1. Abdominal pain or bloating, other  than gas cramps.  2. Chest pain.  3. Back pain.  4. Signs of infection such as: chills or fever occurring within 24 hours   after the procedure.  5. Rectal bleeding, which would show as bright red, maroon, or black stools.   (A tablespoon of blood from the rectum is not serious, especially if   hemorrhoids are present.)  6. Vomiting.  7. Weakness or dizziness.  GO DIRECTLY TO THE NEAREST EMERGENCY ROOM IF YOU HAVE ANY OF THE FOLLOWING:      Difficulty breathing              Chills and/or fever over 101 F   Persistent vomiting and/or vomiting blood   Severe abdominal pain   Severe chest pain   Black, tarry stools   Bleeding- more than one tablespoon   Any other symptom or condition that you feel may need urgent attention  Your doctor recommends these additional instructions:  If any biopsies were taken, your doctors clinic will contact you in 1 to 2   weeks with any results.  - Discharge patient to home.   - Patient has a contact number available for emergencies.  The signs and   symptoms of potential delayed complications were discussed with the   patient.  Return to normal activities tomorrow.  Written discharge   instructions were provided to the patient.   - Resume previous diet.   - Continue present medications.   - Await pathology results.   - Repeat colonoscopy in 5 years for surveillance.  For questions, problems or results please call your physician - Heath Morrow MD.  EMERGENCY PHONE NUMBER: [phone],  LAB RESULTS: [phone]  IF A COMPLICATION OR EMERGENCY SITUATION ARISES AND YOU ARE UNABLE TO REACH   YOUR PHYSICIAN - GO DIRECTLY TO THE EMERGENCY ROOM.  Heath Morrow MD  10/2/2024 2:15:51 PM  This report has been verified and signed electronically.  Dear patient,  As a result of recent federal legislation (The Federal Cures Act), you may   receive lab or pathology results from your procedure in your [facility]   account before your physician is able to contact you. Your physician or    their representative will relay the results to you with their   recommendations at their soonest availability.  Thank you,  PROVATION

## 2024-10-02 NOTE — H&P
Short Stay Endoscopy History and Physical    PCP - Rocco Cavazos, DO    Procedure - Colonoscopy  ASA - per anesthesia  Mallampati - per anesthesia  History of Anesthesia problems - no  Family history Anesthesia problems - no   Plan of anesthesia - General    HPI:  This is a 64 y.o. male here for evaluation of : asymptomatic screening exam  Hx polyps      ROS:  Constitutional: No fevers, chills, No weight loss  CV: No chest pain  Pulm: No cough, No shortness of breath  GI: see HPI  Derm: No rash    Medical History:  has a past medical history of Allergy, Carotid artery occlusion, Chronic back pain, Colonic polyp, Genetic testing, Hyperlipidemia, Hypertension, Paroxysmal atrial fibrillation (2/28/2024), Sleep apnea, and Type 2 diabetes mellitus with stage 3 chronic kidney disease, without long-term current use of insulin (4/2/2020).    Surgical History:  has a past surgical history that includes Total knee arthroplasty (Right); Ankle surgery (Left); Carotid endarterectomy (Right, 07/15/2015); Nasal septum surgery; Appendectomy; Carpal tunnel release (Bilateral); Colonoscopy (N/A, 12/14/2018); Joint replacement (9/2010); Epidural steroid injection (N/A, 2/26/2024); transesophageal echocardiogram with possible cardioversion (jameson w/ poss cardioversion) (N/A, 2/28/2024); Epidural steroid injection (N/A, 5/23/2024); arthroscopy,knee,with meniscus repair (Left, 8/13/2024); Arthroscopic chondroplasty of knee joint (Left, 8/13/2024); and arthroscopy, knee, with abrasion arthroplasty or microfracture (Left, 8/13/2024).    Family History: family history includes Aneurysm (age of onset: 48) in an other family member; Brain cancer (age of onset: 67) in his mother; Brain cancer (age of onset: 73) in his paternal grandfather; Breast cancer (age of onset: 57) in his maternal cousin; Breast cancer (age of onset: 58) in his sister; Cancer in his father, maternal grandfather, and mother; Genetic Disorder in his sister;  "Hypertension in his father; Lung cancer in his father; Seizures in his daughter; Ulcerative colitis in an other family member.. Otherwise no colon cancer, inflammatory bowel disease, or GI malignancies.    Social History:  reports that he has never smoked. He has never been exposed to tobacco smoke. He has never used smokeless tobacco. He reports current alcohol use of about 2.0 standard drinks of alcohol per week. He reports that he does not use drugs.    Review of patient's allergies indicates:   Allergen Reactions    Stadol [butorphanol tartrate] Rash     Swelling in face    Strawberries [strawberry] Rash       Medications:   Medications Prior to Admission   Medication Sig Dispense Refill Last Dose/Taking    amitriptyline (ELAVIL) 75 MG tablet Take 1 tablet (75 mg total) by mouth every evening. 90 tablet 1 Past Week    aspirin (ECOTRIN) 81 MG EC tablet Take 1 tablet (81 mg total) by mouth once daily. 28 tablet 0 Past Week    BD LUER-RUSS SYRINGE 3 mL 25 gauge x 1" Syrg USE ONE SYRINGE EVERY 14 DAYS WITH TESTOSTERONE 100 each 2 Past Week    candesartan (ATACAND) 32 MG tablet Take 1 tablet (32 mg total) by mouth once daily. 90 tablet 3 Past Week    cloNIDine (CATAPRES) 0.1 MG tablet Take 1 tablet (0.1 mg total) by mouth 2 (two) times daily. 180 tablet 3 10/2/2024 at  7:00 AM    cyanocobalamin (VITAMIN B-12) 1000 MCG tablet Take 1 tablet (1,000 mcg total) by mouth once daily. 90 tablet 4 Past Week    ergocalciferol (ERGOCALCIFEROL) 50,000 unit Cap Take 1 capsule (50,000 Units total) by mouth every 7 days. 12 capsule 0 Past Week    ezetimibe (ZETIA) 10 mg tablet Take 1 tablet (10 mg total) by mouth once daily. 90 tablet 3 Past Week    gabapentin (NEURONTIN) 800 MG tablet Take 1 tablet (800 mg total) by mouth every evening. 90 tablet 1 Past Week    metoprolol succinate (TOPROL-XL) 200 MG 24 hr tablet Take 1 tablet (200 mg total) by mouth once daily. (Patient taking differently: Take 200 mg by mouth every evening.) 90 " "tablet 3 Past Week    oxyCODONE-acetaminophen (PERCOCET)  mg per tablet Take 1 tablet by mouth every 12 (twelve) hours as needed for Pain. 60 tablet 0 10/1/2024    rosuvastatin (CRESTOR) 40 MG Tab Take 1 tablet (40 mg total) by mouth every evening. 90 tablet 3 Past Week    sod sulf-pot chloride-mag sulf (SUTAB) 1.479-0.188- 0.225 gram tablet Take 12 tablets by mouth once daily. Take according to package instructions with indicated amount of water. 24 tablet 0 10/2/2024    syringe with needle, safety (BD INTEGRA SYRINGE) 3 mL 22 gauge x 1 1/2" Syrg Inject 1 Syringe as directed once a week. 6 each 3 Past Week    testosterone cypionate (DEPOTESTOTERONE CYPIONATE) 200 mg/mL injection Inject 1 mL (200 mg total) into the muscle every 14 (fourteen) days. 2 mL 5 Past Month    fluticasone propionate (FLONASE) 50 mcg/actuation nasal spray 2 sprays by Each Nostril route.   More than a month    glucose 4 GM chewable tablet Take 4 tablets (16 g total) by mouth as needed for Low blood sugar. 30 tablet 12 More than a month    HYDROcodone-acetaminophen (NORCO) 7.5-325 mg per tablet Take 1 tablet by mouth every 6 (six) hours as needed for Pain. 20 tablet 0     ipratropium (ATROVENT) 42 mcg (0.06 %) nasal spray 2 sprays by Nasal route 2 (two) times daily as needed for Rhinitis. 15 mL 0 More than a month    semaglutide (OZEMPIC) 2 mg/dose (8 mg/3 mL) PnIj Inject 2 mg into the skin every 7 days. 9 mL 3 9/24/2024    valACYclovir (VALTREX) 1000 MG tablet Take 2 tablets (2,000 mg total) by mouth every 12 (twelve) hours. 4 tablet 3 More than a month         Physical Exam:    Vital Signs:   Vitals:    10/02/24 1304   BP: (!) 186/94   Pulse: 80   Resp: 18   Temp: 98.2 °F (36.8 °C)       General Appearance: Well appearing in no acute distress  Eyes:    No scleral icterus  ENT: Neck supple, Lips, mucosa, and tongue normal; teeth and gums normal  Abdomen: Soft, non tender, non distended with positive bowel sounds. No hepatosplenomegaly, " ascites, or mass.  Extremities: 2+ pulses, no clubbing, cyanosis or edema  Skin: No rash      Labs:  Lab Results   Component Value Date    WBC 10.90 08/16/2024    HGB 17.7 08/16/2024    HCT 55.1 (H) 08/16/2024     08/16/2024    CHOL 151 03/11/2024    TRIG 218 (H) 03/11/2024    HDL 31 (L) 03/11/2024    ALT 34 08/16/2024    AST 36 08/16/2024     08/16/2024    K 4.3 08/16/2024     08/16/2024    CREATININE 1.4 08/16/2024    BUN 9 08/16/2024    CO2 28 08/16/2024    TSH 1.266 02/27/2024    PSA 0.75 03/11/2024    INR 1.1 02/27/2024    HGBA1C 6.6 (H) 08/16/2024       I have explained the risks and benefits of endoscopy procedures to the patient including but not limited to bleeding, perforation, infection, and death.  The patient was asked if they understand and allowed to ask any further questions to their satisfaction.    Heath Morrow MD

## 2024-10-02 NOTE — TRANSFER OF CARE
"Anesthesia Transfer of Care Note    Patient: Partha Curtis Jr.    Procedure(s) Performed: Procedure(s) (LRB):  COLONOSCOPY (N/A)    Patient location: GI    Anesthesia Type: general    Transport from OR: Transported from OR on room air with adequate spontaneous ventilation    Post pain: adequate analgesia    Post assessment: no apparent anesthetic complications and tolerated procedure well    Post vital signs: stable    Level of consciousness: awake and alert    Nausea/Vomiting: no nausea/vomiting    Complications: none    Transfer of care protocol was followed      Last vitals: Visit Vitals  BP (!) 186/94 (BP Location: Left arm, Patient Position: Lying)   Pulse 80   Temp 36.8 °C (98.2 °F) (Skin)   Resp 18   Ht 5' 11" (1.803 m)   Wt 88.5 kg (195 lb)   SpO2 99%   BMI 27.20 kg/m²     "

## 2024-10-03 ENCOUNTER — DOCUMENTATION ONLY (OUTPATIENT)
Dept: REHABILITATION | Facility: HOSPITAL | Age: 64
End: 2024-10-03
Payer: COMMERCIAL

## 2024-10-03 DIAGNOSIS — E55.9 VITAMIN D DEFICIENCY: ICD-10-CM

## 2024-10-03 RX ORDER — ERGOCALCIFEROL 1.25 MG/1
50000 CAPSULE ORAL
Qty: 12 CAPSULE | Refills: 0 | Status: SHIPPED | OUTPATIENT
Start: 2024-10-03

## 2024-10-03 NOTE — PROGRESS NOTES
PT called patient after patient had not arrived for 15 minutes after visit was scheduled to begin.   Communication: No answer and voicemail left for patient with number to call back. Patient educated on importance of therapy with his post op status and he missed his appointment earlier this week as well. Will attempt to follow-up and patient scheduled for next week as well.   Patient Confirmed Next Appointment: No  Number of No-Shows To Date: 2    Flakita Thomas, PT, DPT, OCS

## 2024-10-03 NOTE — TELEPHONE ENCOUNTER
Refill Routing Note   Medication(s) are not appropriate for processing by [facility] for the following reason(s):        Outside of protocol    [facility] action(s):  Route               Appointments  past 12m or future 3m with PCP    Date Provider   Last Visit   8/20/2024 Rocco Cavazos DO   Next Visit   12/20/2024 Rocco Cavazos,    ED visits in past 90 days: 0        Note composed:4:17 PM 10/03/2024

## 2024-10-03 NOTE — TELEPHONE ENCOUNTER
No care due was identified.  [facility] Embedded Care Due Messages. Reference number: [id number].   10/03/2024 8:36:43 AM CDT

## 2024-10-03 NOTE — ANESTHESIA POSTPROCEDURE EVALUATION
Anesthesia Post Evaluation    Patient: Partha Curtis     Procedure(s) Performed: Procedure(s) (LRB):  COLONOSCOPY (N/A)    Final Anesthesia Type: general      Patient location during evaluation: GI PACU  Patient participation: Yes- Able to Participate  Level of consciousness: awake and alert, oriented and awake  Post-procedure vital signs: reviewed and stable  Pain management: adequate  Airway patency: patent    PONV status at discharge: No PONV  Anesthetic complications: no      Cardiovascular status: stable  Respiratory status: unassisted and room air  Hydration status: euvolemic  Follow-up not needed.              Vitals Value Taken Time   /96 10/02/24 1450   Temp 37.1 °C (98.8 °F) 10/02/24 1420   Pulse 80 10/02/24 1450   Resp 12 10/02/24 1450   SpO2 97 % 10/02/24 1450         Event Time   Out of Recovery 15:02:05         Pain/Meggan Score: No data recorded

## 2024-10-07 ENCOUNTER — CLINICAL SUPPORT (OUTPATIENT)
Dept: REHABILITATION | Facility: HOSPITAL | Age: 64
End: 2024-10-07
Payer: COMMERCIAL

## 2024-10-07 DIAGNOSIS — R29.898 DECREASED STRENGTH OF LOWER EXTREMITY: ICD-10-CM

## 2024-10-07 DIAGNOSIS — M25.662 DECREASED RANGE OF MOTION OF LEFT LOWER EXTREMITY: ICD-10-CM

## 2024-10-07 DIAGNOSIS — R26.9 GAIT ABNORMALITY: Primary | ICD-10-CM

## 2024-10-07 LAB
FINAL PATHOLOGIC DIAGNOSIS: NORMAL
GROSS: NORMAL
Lab: NORMAL

## 2024-10-07 PROCEDURE — 97140 MANUAL THERAPY 1/> REGIONS: CPT

## 2024-10-07 PROCEDURE — 97112 NEUROMUSCULAR REEDUCATION: CPT

## 2024-10-07 PROCEDURE — 97110 THERAPEUTIC EXERCISES: CPT

## 2024-10-07 NOTE — PROGRESS NOTES
[facility]   Physical Therapy Treatment Note      Name: Partha Curtis Jr.  Clinic Number: [id number]    Therapy Diagnosis:   Encounter Diagnoses   Name Primary?    Gait abnormality Yes    Decreased range of motion of left lower extremity     Decreased strength of lower extremity      Physician: Raffy Ascencio MD  Surgeon: Dr. [place]    Visit Date: 10/7/2024  Physician Orders: PT Eval and Treat   Medical Diagnosis from Referral: Bucket-handle tear of medial meniscus of left knee as current injury, initial encounter [S83.212A]   Evaluation Date: 8/14/2024  Authorization Period Expiration: 8/7/2025  Plan of Care Expiration: 2/25/2025  Progress Note Due: 9/20/2024  Visit # / Visits authorized: 12/20  This POC: 11    FOTO: 1/3 (8/20/24)     Date of Surgery: 8/13/2024  Return to MD: 8/28/2024, 10/2/2024     Procedure:  1. Left Knee Arthroscopy, with meniscus repair (medial OR lateral) 99760  2.  Left Knee Arthroscopy, with Microfracture 36263 - marrow stimulation procedure  3.  Left Knee Arthroscopy, debridement/shaving of articular cartilage (chondroplasty) 55193 -      Post-Op Plan:  Peripheral meniscal repair protocol      Precautions: Standard and Weightbearing     PTA Visit #: 0/5     Time In: 2:29 pm    Time Out: 3:30 pm   Total Billable Time: 61 minutes     Subjective     Pt reports: Doing better today. Knee did fine on the trip, he did use the crutch and a wheelchair as well. His back has been the main problem which is why he missed last session. He has had a lot of pain on the left side of his back into his hip/groin area. Sees the PA for 6 week follow-up on Wednesday.  He was compliant with home exercise program.  Response to previous treatment: Improved mobility  Functional change: Ongoing    Pain: 2/10  Location: left knee      Objective      Objective Measures updated at progress report unless specified.    9/19/2024: Patient ambulates into clinic with single axillary  "crutch and t-scope brace unlocked 0-30 deg. He demonstrates good nadege and heel to toe contact.     Range of Motion:  Knee     Right AROM/PROM Left AROM/PROM   Flexion 120 degrees / 125 degrees  NT degrees / 110 degrees    Extension 0 degrees / 0 degrees  +1 degrees  / +1 degrees (hyper)     Treatment     *Pt is 5 weeks 2 days s/p as of 9/19/2024.     Jarvis received the treatments listed below:      therapeutic exercises to develop strength, endurance, ROM, and flexibility for 13 minutes including:    Assessment as above   Heel prop x 3'   Longsitting HS/Gastroc stretch with strap 5 x 20" holds  Standing hip abduction with brace RedTB 3 x 10   Standing hip extension with brace RedTB 3 x 10 reps     Not Today:  Heel slides with strap within protocol x 3'   Ankle resisted PF BlackTB 3 x 15 reps     manual therapy techniques: Joint mobilizations were applied to the: knee for 12 minutes, including:    Patellar mobilizations all planes III-IV  Fat pad mobilizations  Extension hinge    neuromuscular re-education activities to improve: Balance, Coordination, Kinesthetic, and Proprioception for 36 minutes. The following activities were included:    Pt education on importance of not overloading and limiting increased walking on trip   Quad sets with strap into hyper 10 x 10" holds  Quad set into SLR with PT assist to ensure TKE 3 x 8 reps   DL shuttle press mod range 3 x 10 reps 75#   SL shuttle press mod range 3 x 8 reps 37.5#   SL Balance blue foam with TKE RedTB 2 x 10 reps 3" holds   Lateral band walks RedTB 3 laps on turf     Not Performed:  QS with strap into hyper 3x10 5"  QS without strap 3x10 5"  SL hip abduction 5" 3x10  DL heel raises 2 x 15 reps   TKE with GreenTB 2 x 10 x 3-5" holds     therapeutic activities to improve functional performance for 00 minutes, including:      gait training to improve functional mobility and safety for 00 minutes, including:        Patient Education and Home Exercises   " "    Education provided:   - PT POC, Prognosis, and HEP   - Early post-op goals: importance of knee extension, edema management, quad activation/strength, and gait mechanics  - Post-op precautions, weightbearing status and mobility precautions   - Signs and symptoms of DVT and infection       Written Home Exercises Provided: yes. Exercises were reviewed and Jarvis was able to demonstrate them prior to the end of the session.  Jarvis demonstrated good  understanding of the education provided. See EMR under Patient Instructions for exercises provided during therapy sessions.    Assessment     "Jarvis" presents to today's session following vacation. He presents with good range of motion and good quad activation. Continued focus on improving quad strength into terminal knee extension with good tolerance. Further challenged with improving hip strength as well today. Added single limb stability work with terminal knee extension with no adverse effects. Will continue to monitor and progress as able.     Jarvis Is progressing well towards his goals.   Pt prognosis is Excellent.     Pt will continue to benefit from skilled outpatient physical therapy to address the deficits listed in the problem list box on initial evaluation, provide pt/family education and to maximize pt's level of independence in the home and community environment.     Pt's spiritual, cultural and educational needs considered and pt agreeable to plan of care and goals.     Anticipated Barriers for therapy: Scheduling, co-morbidities     Goals:  Short Term Goals: 6 weeks   Patient will be independent in initial HEP to help supplement PT. - MET  Patient will improve knee extension range of motion to 0 degrees to help with gait mechanics. - MET  Patient will improve knee flexion range of motion to >/= 90 degrees to help with ADLs. - MET  Patient will improve pain at its worst to </= 5/10 to help improve quality of life. - MET     Long Term Goals: 12-24 weeks " (Progressing, not met)  Patient will be independent in updated HEP to help supplement PT and maintain gains made in PT.   Patient will improve FOTO score to >/= predicted to show improvement in condition.   Patient will improve knee extension strength to >/= 4+/5 to help with ADLs.  Patient goal: Patient will be able to return to riding his bicycle without difficulty.   Plan   Plan of care Certification: 8/14/2024 to 2/25/2025.     Outpatient Physical Therapy 1-2 times weekly for 24 weeks to include the following interventions: Electrical Stimulation , Gait Training, Manual Therapy, Moist Heat/ Ice, Neuromuscular Re-ed, Patient Education, Self Care, Therapeutic Activities, and Therapeutic Exercise.     Flakita Thomas, PT, DPT, OCS   Co-treated by: Heath Del Angel, SPT                   Have Your Skin Lesions Been Treated?: not been treated Is This A New Presentation, Or A Follow-Up?: Skin Lesions

## 2024-10-09 ENCOUNTER — OFFICE VISIT (OUTPATIENT)
Dept: SPORTS MEDICINE | Facility: CLINIC | Age: 64
End: 2024-10-09
Payer: COMMERCIAL

## 2024-10-09 ENCOUNTER — CLINICAL SUPPORT (OUTPATIENT)
Dept: REHABILITATION | Facility: HOSPITAL | Age: 64
End: 2024-10-09
Payer: COMMERCIAL

## 2024-10-09 VITALS
BODY MASS INDEX: 27.47 KG/M2 | DIASTOLIC BLOOD PRESSURE: 95 MMHG | HEART RATE: 59 BPM | SYSTOLIC BLOOD PRESSURE: 175 MMHG | WEIGHT: 196.19 LBS | HEIGHT: 71 IN

## 2024-10-09 DIAGNOSIS — Z98.890 S/P ARTHROSCOPIC SURGERY OF LEFT KNEE: Primary | ICD-10-CM

## 2024-10-09 DIAGNOSIS — M25.662 DECREASED RANGE OF MOTION OF LEFT LOWER EXTREMITY: ICD-10-CM

## 2024-10-09 DIAGNOSIS — R26.9 GAIT ABNORMALITY: Primary | ICD-10-CM

## 2024-10-09 DIAGNOSIS — R29.898 DECREASED STRENGTH OF LOWER EXTREMITY: ICD-10-CM

## 2024-10-09 PROCEDURE — 1159F MED LIST DOCD IN RCRD: CPT | Mod: CPTII,S$GLB,, | Performed by: PHYSICIAN ASSISTANT

## 2024-10-09 PROCEDURE — 97112 NEUROMUSCULAR REEDUCATION: CPT | Mod: CQ

## 2024-10-09 PROCEDURE — 4010F ACE/ARB THERAPY RXD/TAKEN: CPT | Mod: CPTII,S$GLB,, | Performed by: PHYSICIAN ASSISTANT

## 2024-10-09 PROCEDURE — 3077F SYST BP >= 140 MM HG: CPT | Mod: CPTII,S$GLB,, | Performed by: PHYSICIAN ASSISTANT

## 2024-10-09 PROCEDURE — 97530 THERAPEUTIC ACTIVITIES: CPT | Mod: CQ

## 2024-10-09 PROCEDURE — 1160F RVW MEDS BY RX/DR IN RCRD: CPT | Mod: CPTII,S$GLB,, | Performed by: PHYSICIAN ASSISTANT

## 2024-10-09 PROCEDURE — 3061F NEG MICROALBUMINURIA REV: CPT | Mod: CPTII,S$GLB,, | Performed by: PHYSICIAN ASSISTANT

## 2024-10-09 PROCEDURE — 99999 PR PBB SHADOW E&M-EST. PATIENT-LVL IV: CPT | Mod: PBBFAC,,, | Performed by: PHYSICIAN ASSISTANT

## 2024-10-09 PROCEDURE — 3044F HG A1C LEVEL LT 7.0%: CPT | Mod: CPTII,S$GLB,, | Performed by: PHYSICIAN ASSISTANT

## 2024-10-09 PROCEDURE — 3080F DIAST BP >= 90 MM HG: CPT | Mod: CPTII,S$GLB,, | Performed by: PHYSICIAN ASSISTANT

## 2024-10-09 PROCEDURE — 99024 POSTOP FOLLOW-UP VISIT: CPT | Mod: S$GLB,,, | Performed by: PHYSICIAN ASSISTANT

## 2024-10-09 PROCEDURE — 3066F NEPHROPATHY DOC TX: CPT | Mod: CPTII,S$GLB,, | Performed by: PHYSICIAN ASSISTANT

## 2024-10-09 NOTE — PROGRESS NOTES
"POST-OPERATIVE EXAMINATION    64 y.o. Male who returns for follow after surgery.  He is 8 weeks s/p:     Procedure: 8/13/24  1.  Left Knee Arthroscopy, with meniscus repair (medial OR lateral) 26356  2.  Left Knee Arthroscopy, with Microfracture 00706 - marrow stimulation procedure  3.  Left Knee Arthroscopy, debridement/shaving of articular cartilage (chondroplasty) 51780 -       He is doing well without any issues.       PHYSICAL EXAMINATION:  BP (!) 175/95 (BP Location: Right arm, Patient Position: Sitting)   Pulse (!) 59   Ht 5' 11" (1.803 m)   Wt 89 kg (196 lb 3.4 oz)   BMI 27.37 kg/m²   General: Well-developed well-nourished 64 y.o. male in no acute distress   Cardiovascular: Regular rhythm   Lungs: No labored breathing or wheezing appreciated   Neuro: Alert and oriented ×3   Psychiatric: well oriented to person, place and time, demonstrates normal mood and affect   Skin: No rashes, lesions or ulcers, normal temperature, turgor, and texture on involved extremity    ORTHOPEDIC EXAM:  Normal post-operative swelling  Normal post-operative scarring  Strength: Grossly intact  ROM: 0-130  Tests: none today     ASSESSMENT:      ICD-10-CM ICD-9-CM   1. S/P arthroscopic surgery of left knee  Z98.890 V45.89           PLAN:       Wean from crutch and brace  Continue physical therapy  RTC in 6 weeks with MD Robi Moyer PA-C, [facility]          "

## 2024-10-13 ENCOUNTER — PATIENT MESSAGE (OUTPATIENT)
Dept: PAIN MEDICINE | Facility: CLINIC | Age: 64
End: 2024-10-13
Payer: COMMERCIAL

## 2024-10-13 DIAGNOSIS — M51.369 DDD (DEGENERATIVE DISC DISEASE), LUMBAR: ICD-10-CM

## 2024-10-13 DIAGNOSIS — M50.30 DDD (DEGENERATIVE DISC DISEASE), CERVICAL: ICD-10-CM

## 2024-10-13 DIAGNOSIS — M54.12 CERVICAL RADICULOPATHY: ICD-10-CM

## 2024-10-13 DIAGNOSIS — G89.4 CHRONIC PAIN DISORDER: ICD-10-CM

## 2024-10-13 DIAGNOSIS — M47.816 LUMBAR SPONDYLOSIS: ICD-10-CM

## 2024-10-13 DIAGNOSIS — M54.16 LUMBAR RADICULOPATHY: ICD-10-CM

## 2024-10-13 DIAGNOSIS — M47.812 CERVICAL SPONDYLOSIS: ICD-10-CM

## 2024-10-14 RX ORDER — OXYCODONE AND ACETAMINOPHEN 10; 325 MG/1; MG/1
1 TABLET ORAL EVERY 12 HOURS PRN
Qty: 60 TABLET | Refills: 0 | Status: SHIPPED | OUTPATIENT
Start: 2024-10-14

## 2024-10-14 NOTE — TELEPHONE ENCOUNTER
Patient requesting refill on: Oxycodone  Last office visit: 09/10/24    shows last refill on: 08/20/24  Patient does have a pain contract on file with [facility] department  Patient last UDS: 09/10/24 was consistent with current therapy.                 CODEINE  Not Detected Not Detected CM        Comment: INTERPRETIVE INFORMATION: Codeine, U  Positive Cutoff: 40 ng/mL  Methodology: Mass Spectrometry   MORPHINE  Not Detected Not Detected CM        Comment: INTERPRETIVE INFORMATION:Morphine, U  Positive Cutoff: 20 ng/mL  Methodology: Mass Spectrometry   6-ACETYLMORPHINE  Not Detected Not Detected CM        Comment: INTERPRETIVE INFORMATION:6-acetylmorphine, U  Positive Cutoff: 20 ng/mL  Methodology: Mass Spectrometry   OXYCODONE  Present Present CM        Comment: INTERPRETIVE INFORMATION:Oxycodone, U  Positive Cutoff: 40 ng/mL  Methodology: Mass Spectrometry   NOROYXCODONE  Present Present CM        Comment: INTERPRETIVE INFORMATION:Noroxycodone, U  Positive Cutoff: 100 ng/mL  Methodology: Mass Spectrometry   OXYMORPHONE  Present Present CM        Comment: INTERPRETIVE INFORMATION:Oxymorphone, U  Positive Cutoff: 40 ng/mL  Methodology: Mass Spectrometry   NOROXYMORPHONE  Present Not Detected CM        Comment: INTERPRETIVE INFORMATION:Noroxymorphone, U  Positive Cutoff: 100 ng/mL  Methodology: Mass Spectrometry   HYDROCODONE  Not Detected Not Detected CM        Comment: INTERPRETIVE INFORMATION:Hydrocodone, U  Positive Cutoff: 40 ng/mL  Methodology: Mass Spectrometry   NORHYDROCODONE  Not Detected Not Detected CM        Comment: INTERPRETIVE INFORMATION:Norhydrocodone, U  Positive Cutoff: 100 ng/mL  Methodology: Mass Spectrometry   HYDROMORPHONE  Not Detected Not Detected CM        Comment: INTERPRETIVE INFORMATION:Hydromorphone, U  Positive Cutoff: 20 ng/mL  Methodology: Mass Spectrometry   BUPRENORPHINE  Not Detected Not Detected CM        Comment: INTERPRETIVE INFORMATION:Buprenorphine,  U  Positive Cutoff: 5 ng/mL  Methodology: Mass Spectrometry   NORUBPRENORPHINE  Not Detected Not Detected CM        Comment: INTERPRETIVE INFORMATION:Norbuprenorphine, U  Positive Cutoff: 20 ng/mL  Methodology: Mass Spectrometry   FENTANYL  Not Detected Not Detected CM        Comment: INTERPRETIVE INFORMATION:Fentanyl, U  Positive Cutoff: 2 ng/mL  Methodology: Mass Spectrometry   NORFENTANYL  Not Detected Not Detected CM        Comment: INTERPRETIVE INFORMATION:Norfentanyl, U  Positive Cutoff: 2 ng/mL  Methodology: Mass Spectrometry   MEPERIDINE METABOLITE  Not Detected Not Detected CM        Comment: INTERPRETIVE INFORMATION:Meperidine metabolite, U  Positive Cutoff: 50 ng/mL  Methodology: Mass Spectrometry   TAPENTADOL  Not Detected Not Detected CM        Comment: INTERPRETIVE INFORMATION:Tapentadol, U  Positive Cutoff: 100 ng/mL  Methodology: Mass Spectrometry   TAPENTADOL-O-SULF  Not Detected Not Detected CM        Comment: INTERPRETIVE INFORMATION:Tapentadol-o-Sulf, U  Positive Cutoff: 200 ng/mL  Methodology: Mass Spectrometry   METHADONE  Negative Negative CM        Comment: Presumptive negative by immunoassay. Testing by mass  spectrometry is available on request.  INTERPRETIVE INFORMATION: Methadone Screen, U  Positive Cutoff: 150 ng/mL  Methodology: Immunoassay   TRAMADOL  Negative Negative CM        Comment: Presumptive negative by immunoassay. Testing by mass  spectrometry is available on request.  INTERPRETIVE INFORMATION:Tramadol Screen, U  Positive Cutoff: 100 ng/mL  Methodology: Immunoassay   AMPHETAMINE  Not Detected Not Detected CM        Comment: INTERPRETIVE INFORMATION:Amphetamine, U  Positive Cutoff: 50 ng/mL  Methodology: Mass Spectrometry   METHAMPHETAMINE  Not Detected Not Detected CM        Comment: INTERPRETIVE INFORMATION:Methamphetamine, U  Positive Cutoff: 200 ng/mL  Methodology: Mass Spectrometry   MDMA- ECSTASY  Not Detected Not Detected CM        Comment: INTERPRETIVE  INFORMATION:MDMA, U  Positive Cutoff: 200 ng/mL  Methodology: Mass Spectrometry   MDA  Not Detected Not Detected CM        Comment: INTERPRETIVE INFORMATION:MDA, U  Positive Cutoff: 200 ng/mL  Methodology: Mass Spectrometry   MDEA- Simin  Not Detected Not Detected CM        Comment: INTERPRETIVE INFORMATION:MDEA, U  Positive Cutoff: 200 ng/mL  Methodology: Mass Spectrometry   METHYLPHENIDATE  Not Detected Not Detected CM        Comment: INTERPRETIVE INFORMATION:Methylphenidate, U  Positive Cutoff: 100 ng/mL  Methodology: Mass Spectrometry   PHENTERMINE  Not Detected Not Detected CM        Comment: INTERPRETIVE INFORMATION:Phentermine, U  Positive Cutoff: 100 ng/mL  Methodology: Mass Spectrometry   BENZOYLECGONINE  Negative Negative CM        Comment: Presumptive negative by immunoassay. Testing by mass  spectrometry is available on request.  INTERPRETIVE INFORMATION:Cocaine Screen, U  Positive Cutoff: 150 ng/mL  Methodology: Immunoassay   ALPRAZOLAM  Not Detected Not Detected CM        Comment: INTERPRETIVE INFORMATION:Alprazolam, U  Positive Cutoff: 40 ng/mL  Methodology: Mass Spectrometry   ALPHA-OH-ALPRAZOLAM  Not Detected Not Detected CM        Comment: INTERPRETIVE INFORMATION:Alpha-OH-Alprazolam, U  Positive Cutoff: 20 ng/mL  Methodology: Mass Spectrometry   CLONAZEPAM  Not Detected Not Detected CM        Comment: INTERPRETIVE INFORMATION:Clonazepam, U  Positive Cutoff: 20 ng/mL  Methodology: Mass Spectrometry   7-AMINOCLONAZEPAM  Not Detected Not Detected CM        Comment: INTERPRETIVE INFORMATION:7-Aminoclonazepam, U  Positive Cutoff: 40 ng/mL  Methodology: Mass Spectrometry   DIAZEPAM  Not Detected Not Detected CM        Comment: INTERPRETIVE INFORMATION:Diazepam, U  Positive Cutoff: 50 ng/mL  Methodology: Mass Spectrometry   NORDIAZEPAM  Not Detected Not Detected CM        Comment: INTERPRETIVE INFORMATION:Nordiazepam, U  Positive Cutoff: 50 ng/mL  Methodology: Mass Spectrometry   OXAZEPAM  Not Detected Not  Detected CM        Comment: INTERPRETIVE INFORMATION:Oxazepam, U  Positive Cutoff: 50 ng/mL  Methodology: Mass Spectrometry   TEMAZEPAM  Not Detected Not Detected CM        Comment: INTERPRETIVE INFORMATION:Temazepam, U  Positive Cutoff: 50 ng/mL  Methodology: Mass Spectrometry   Lorazepam  Not Detected Not Detected CM        Comment: INTERPRETIVE INFORMATION:Lorazepam, U  Positive Cutoff: 60 ng/mL  Methodology: Mass Spectrometry   MIDAZOLAM  Not Detected Not Detected CM        Comment: INTERPRETIVE INFORMATION:Midazolam, U  Positive Cutoff: 20 ng/mL  Methodology: Mass Spectrometry   ZOLPIDEM  Not Detected Not Detected CM        Comment: INTERPRETIVE INFORMATION:Zolpidem, U  Positive Cutoff: 20 ng/mL  Methodology: Mass Spectrometry   BARBITURATES  Negative Negative CM        Comment: Presumptive negative by immunoassay. Testing by mass  spectrometry is available on request.  INTERPRETIVE INFORMATION:Barbiturates Screen, U  Positive Cutoff: 200 ng/mL  Methodology: Immunoassay   Creatinine, Urine 20.0 - 400.0 mg/dL 182.8 122.9 75.0 R 116.0 R 27.0 R 157.0 R 156.0 R, CM   ETHYL GLUCURONIDE  Negative Negative CM        Comment: Presumptive negative by immunoassay. Testing by mass  spectrometry is available on request.  INTERPRETIVE INFORMATION:Ethyl Glucuronide Screen, U  Positive Cutoff: 500 ng/mL  Methodology: Immunoassay   MARIJUANA METABOLITE  Negative Negative CM        Comment: Presumptive negative by immunoassay. Testing by mass  spectrometry is available on request.  INTERPRETIVE INFORMATION: THC (Cannabinoids) Screen, U  Positive Cutoff: 50 ng/mL  Methodology: Immunoassay   PCP  Negative Negative CM        Comment: Presumptive negative by immunoassay. Testing by mass  spectrometry is available on request.  INTERPRETIVE INFORMATION:Phencyclidine Screen, U  Positive Cutoff: 25 ng/mL  Methodology: Immunoassay   CARISOPRODOL  Negative Negative CM        Comment: Presumptive negative by immunoassay. Testing by  mass  spectrometry is available on request.  INTERPRETIVE INFORMATION: Carisoprodol Screen, U  Positive Cutoff: 100 ng/mL  Methodology: Immunoassay  The carisoprodol immunoassay has cross-reactivity to  carisoprodol and meprobamate.   Naloxone  Not Detected Not Detected CM        Comment: INTERPRETIVE INFORMATION:Naloxone, U  Positive Cutoff: 100 ng/mL  Methodology: Mass Spectrometry   Gabapentin  Present Present CM        Comment: INTERPRETIVE INFORMATION:Gabapentin, U  Positive Cutoff: 3,000 ng/mL  Methodology: Mass Spectrometry   Pregabalin  Not Detected Not Detected CM        Comment: INTERPRETIVE INFORMATION:Pregabalin, U  Positive Cutoff: 3,000 ng/mL  Methodology: Mass Spectrometry   Alpha-OH-Midazolam  Not Detected Not Detected CM        Comment: INTERPRETIVE INFORMATION:Alpha-OH-Midazolam, U  Positive Cutoff: 20 ng/mL  Methodology: Mass Spectrometry   Zolpidem Metabolite  Not Detected Not Detected CM

## 2024-10-15 ENCOUNTER — CLINICAL SUPPORT (OUTPATIENT)
Dept: REHABILITATION | Facility: HOSPITAL | Age: 64
End: 2024-10-15
Payer: COMMERCIAL

## 2024-10-15 DIAGNOSIS — M25.662 DECREASED RANGE OF MOTION OF LEFT LOWER EXTREMITY: ICD-10-CM

## 2024-10-15 DIAGNOSIS — R26.9 GAIT ABNORMALITY: Primary | ICD-10-CM

## 2024-10-15 DIAGNOSIS — R29.898 DECREASED STRENGTH OF LOWER EXTREMITY: ICD-10-CM

## 2024-10-15 PROCEDURE — 97112 NEUROMUSCULAR REEDUCATION: CPT | Mod: CQ

## 2024-10-15 PROCEDURE — 97530 THERAPEUTIC ACTIVITIES: CPT | Mod: CQ

## 2024-10-15 NOTE — PROGRESS NOTES
[facility]   Physical Therapy Treatment Note      Name: Partha Curtis Jr.  Clinic Number: [id number]    Therapy Diagnosis:   Encounter Diagnoses   Name Primary?    Gait abnormality Yes    Decreased range of motion of left lower extremity     Decreased strength of lower extremity      Physician: Raffy Ascencio MD  Surgeon: Dr. Washington    Visit Date: 10/15/2024  Physician Orders: PT Eval and Treat   Medical Diagnosis from Referral: Bucket-handle tear of medial meniscus of left knee as current injury, initial encounter [S83.212A]   Evaluation Date: 8/14/2024  Authorization Period Expiration: 8/7/2025  Plan of Care Expiration: 2/25/2025  Progress Note Due: 9/20/2024  Visit # / Visits authorized: 14/20  This POC: 12   FOTO: 1/3 (8/20/24)     Date of Surgery: 8/13/2024  Return to MD: 8/28/2024, 10/2/2024     Procedure:  1. Left Knee Arthroscopy, with meniscus repair (medial OR lateral) 27492  2.  Left Knee Arthroscopy, with Microfracture 23757 - marrow stimulation procedure  3.  Left Knee Arthroscopy, debridement/shaving of articular cartilage (chondroplasty) 43136 -      Post-Op Plan:  Peripheral meniscal repair protocol      Precautions: Standard and Weightbearing     PTA Visit #: 0/5     Time In: 0755 a  Time Out: 0855 am   Total Billable Time: 60 minutes     Subjective     Pt reports: I feel the weakness in my leg with going up and down incline/decline.  He was compliant with home exercise program.  Response to previous treatment: Improved mobility  Functional change: Ongoing    Pain: 2/10  Location: left knee      Objective      Objective Measures updated at progress report unless specified.    9/19/2024: Patient ambulates into clinic with single axillary crutch and t-scope brace unlocked 0-30 deg. He demonstrates good nadege and heel to toe contact.     Range of Motion:  Knee     Right AROM/PROM Left AROM/PROM   Flexion 120 degrees / 125 degrees  NT degrees / 110 degrees   "  Extension 0 degrees / 0 degrees  +1 degrees  / +1 degrees (hyper)     Treatment     *Pt is 8 weeks 1 days s/p as of 10/9/2024.     Jarvis received the treatments listed below:      therapeutic exercises to develop strength, endurance, ROM, and flexibility for 00 minutes including:      manual therapy techniques: Joint mobilizations were applied to the: knee for 00 minutes, including:    Patellar mobilizations all planes III-IV  Fat pad mobilizations  Extension hinge    neuromuscular re-education activities to improve: Balance, Coordination, Kinesthetic, and Proprioception for 15 minutes. The following activities were included:    SLR 3x10 #3  Heel raises ecc 5x8  SLS 3x30"    therapeutic activities to improve functional performance for 45 minutes, including:  Pt education  Bike lvl 6 x10 min  DL shuttle #100 3x10  SL shuttle #50  3x10  Lateral walks GTB around knees  Step up 6" 4x10  STS with YTB around knees 3x10  LAQ #7.5 3x10    gait training to improve functional mobility and safety for 00 minutes, including:        Patient Education and Home Exercises       Education provided:   - PT POC, Prognosis, and HEP   - Early post-op goals: importance of knee extension, edema management, quad activation/strength, and gait mechanics  - Post-op precautions, weightbearing status and mobility precautions   - Signs and symptoms of DVT and infection       Written Home Exercises Provided: yes. Exercises were reviewed and Jarvis was able to demonstrate them prior to the end of the session.  Jarvis demonstrated good  understanding of the education provided. See EMR under Patient Instructions for exercises provided during therapy sessions.    Assessment     "Jarvis" continued with glut and LE strengthening. Fatigue noted with lateral walks. Will cont to progress as tolerated.           Jarvis Is progressing well towards his goals.   Pt prognosis is Excellent.     Pt will continue to benefit from skilled outpatient physical therapy to " address the deficits listed in the problem list box on initial evaluation, provide pt/family education and to maximize pt's level of independence in the home and community environment.     Pt's spiritual, cultural and educational needs considered and pt agreeable to plan of care and goals.     Anticipated Barriers for therapy: Scheduling, co-morbidities     Goals:  Short Term Goals: 6 weeks   Patient will be independent in initial HEP to help supplement PT. - MET  Patient will improve knee extension range of motion to 0 degrees to help with gait mechanics. - MET  Patient will improve knee flexion range of motion to >/= 90 degrees to help with ADLs. - MET  Patient will improve pain at its worst to </= 5/10 to help improve quality of life. - MET     Long Term Goals: 12-24 weeks (Progressing, not met)  Patient will be independent in updated HEP to help supplement PT and maintain gains made in PT.   Patient will improve FOTO score to >/= predicted to show improvement in condition.   Patient will improve knee extension strength to >/= 4+/5 to help with ADLs.  Patient goal: Patient will be able to return to riding his bicycle without difficulty.   Plan   Plan of care Certification: 8/14/2024 to 2/25/2025.     Outpatient Physical Therapy 1-2 times weekly for 24 weeks to include the following interventions: Electrical Stimulation , Gait Training, Manual Therapy, Moist Heat/ Ice, Neuromuscular Re-ed, Patient Education, Self Care, Therapeutic Activities, and Therapeutic Exercise.     Elly Ho PTA,

## 2024-10-18 ENCOUNTER — CLINICAL SUPPORT (OUTPATIENT)
Dept: REHABILITATION | Facility: HOSPITAL | Age: 64
End: 2024-10-18
Payer: COMMERCIAL

## 2024-10-18 DIAGNOSIS — R26.9 GAIT ABNORMALITY: Primary | ICD-10-CM

## 2024-10-18 DIAGNOSIS — R29.898 DECREASED STRENGTH OF LOWER EXTREMITY: ICD-10-CM

## 2024-10-18 DIAGNOSIS — M25.662 DECREASED RANGE OF MOTION OF BOTH LOWER EXTREMITIES: ICD-10-CM

## 2024-10-18 DIAGNOSIS — M25.661 DECREASED RANGE OF MOTION OF BOTH LOWER EXTREMITIES: ICD-10-CM

## 2024-10-18 PROCEDURE — 97140 MANUAL THERAPY 1/> REGIONS: CPT

## 2024-10-18 PROCEDURE — 97530 THERAPEUTIC ACTIVITIES: CPT

## 2024-10-21 ENCOUNTER — HOSPITAL ENCOUNTER (OUTPATIENT)
Facility: OTHER | Age: 64
Discharge: HOME | End: 2024-10-21
Attending: ANESTHESIOLOGY | Admitting: ANESTHESIOLOGY
Payer: COMMERCIAL

## 2024-10-21 VITALS
BODY MASS INDEX: 27.2 KG/M2 | OXYGEN SATURATION: 100 % | RESPIRATION RATE: 16 BRPM | WEIGHT: 190 LBS | TEMPERATURE: 98 F | HEIGHT: 70 IN | SYSTOLIC BLOOD PRESSURE: 160 MMHG | DIASTOLIC BLOOD PRESSURE: 79 MMHG | HEART RATE: 67 BPM

## 2024-10-21 DIAGNOSIS — G89.29 CHRONIC PAIN: ICD-10-CM

## 2024-10-21 DIAGNOSIS — M54.16 LUMBAR RADICULOPATHY: Primary | ICD-10-CM

## 2024-10-21 LAB — POCT GLUCOSE: 139 MG/DL (ref 70–110)

## 2024-10-21 PROCEDURE — 99152 MOD SED SAME PHYS/QHP 5/>YRS: CPT | Mod: ,,, | Performed by: ANESTHESIOLOGY

## 2024-10-21 PROCEDURE — 63600175 PHARM REV CODE 636 W HCPCS: Performed by: ANESTHESIOLOGY

## 2024-10-21 PROCEDURE — A4216 STERILE WATER/SALINE, 10 ML: HCPCS | Performed by: ANESTHESIOLOGY

## 2024-10-21 PROCEDURE — 62323 NJX INTERLAMINAR LMBR/SAC: CPT | Performed by: ANESTHESIOLOGY

## 2024-10-21 PROCEDURE — 99152 MOD SED SAME PHYS/QHP 5/>YRS: CPT | Performed by: ANESTHESIOLOGY

## 2024-10-21 PROCEDURE — 25500020 PHARM REV CODE 255: Performed by: ANESTHESIOLOGY

## 2024-10-21 PROCEDURE — 62323 NJX INTERLAMINAR LMBR/SAC: CPT | Mod: ,,, | Performed by: ANESTHESIOLOGY

## 2024-10-21 PROCEDURE — 25000003 PHARM REV CODE 250: Performed by: ANESTHESIOLOGY

## 2024-10-21 RX ORDER — SODIUM CHLORIDE 9 MG/ML
INJECTION, SOLUTION INTRAVENOUS CONTINUOUS
Status: DISCONTINUED | OUTPATIENT
Start: 2024-10-21 | End: 2024-10-21 | Stop reason: HOSPADM

## 2024-10-21 RX ORDER — LIDOCAINE HYDROCHLORIDE 20 MG/ML
INJECTION, SOLUTION INFILTRATION; PERINEURAL
Status: DISCONTINUED | OUTPATIENT
Start: 2024-10-21 | End: 2024-10-21 | Stop reason: HOSPADM

## 2024-10-21 RX ORDER — LIDOCAINE HYDROCHLORIDE 10 MG/ML
INJECTION, SOLUTION EPIDURAL; INFILTRATION; INTRACAUDAL; PERINEURAL
Status: DISCONTINUED | OUTPATIENT
Start: 2024-10-21 | End: 2024-10-21 | Stop reason: HOSPADM

## 2024-10-21 RX ORDER — MIDAZOLAM HYDROCHLORIDE 1 MG/ML
INJECTION INTRAMUSCULAR; INTRAVENOUS
Status: DISCONTINUED | OUTPATIENT
Start: 2024-10-21 | End: 2024-10-21 | Stop reason: HOSPADM

## 2024-10-21 RX ORDER — FENTANYL CITRATE 50 UG/ML
INJECTION, SOLUTION INTRAMUSCULAR; INTRAVENOUS
Status: DISCONTINUED | OUTPATIENT
Start: 2024-10-21 | End: 2024-10-21 | Stop reason: HOSPADM

## 2024-10-21 RX ORDER — DEXAMETHASONE SODIUM PHOSPHATE 10 MG/ML
INJECTION INTRAMUSCULAR; INTRAVENOUS
Status: DISCONTINUED | OUTPATIENT
Start: 2024-10-21 | End: 2024-10-21 | Stop reason: HOSPADM

## 2024-10-21 RX ORDER — SODIUM CHLORIDE 9 MG/ML
INJECTION, SOLUTION INTRAMUSCULAR; INTRAVENOUS; SUBCUTANEOUS
Status: DISCONTINUED | OUTPATIENT
Start: 2024-10-21 | End: 2024-10-21 | Stop reason: HOSPADM

## 2024-10-21 NOTE — OP NOTE
Lumbar Interlaminar Epidural Steroid Injection under Fluoroscopic Guidance    The procedure, risks, benefits, and options were discussed with the patient. There are no contraindications to the procedure. The patent expressed understanding and agreed to the procedure. Informed written consent was obtained prior to the start of the procedure and can be found in the patient's chart.    PATIENT NAME: Partha Curtis Jr.   MRN: [id number]     DATE OF PROCEDURE: 10/21/2024    PROCEDURE: Lumbar Interlaminar Epidural Steroid Injection L5/S1 under Fluoroscopic Guidance    PRE-OP DIAGNOSIS: Lumbar radiculopathy [M54.16] Lumbar radiculopathy [M54.16]    POST-OP DIAGNOSIS: Same    PHYSICIAN: Sofia Sheikh MD    ASSISTANTS: Devin Zheng MD     MEDICATIONS INJECTED: Preservative-free Decadron 10mg with 4cc of Lidocaine 1% MPF and preservative free normal saline    LOCAL ANESTHETIC INJECTED: Xylocaine 2%     SEDATION: Versed 2mg and Fentanyl 50mcg                                                                                                                                                                                     Conscious sedation ordered by M.D. Patient re-evaluation prior to administration of conscious sedation. No changes noted in patient's status from initial evaluation. The patient's vital signs were monitored by RN and patient remained hemodynamically stable throughout the procedure.    Event Time In   Sedation Start 0818   Sedation End 0829       ESTIMATED BLOOD LOSS: None    COMPLICATIONS: None    TECHNIQUE: Time-out was performed to identify the patient and procedure to be performed. With the patient laying in a prone position, the surgical area was prepped and draped in the usual sterile fashion using ChloraPrep and a fenestrated drape. The level was determined under fluoroscopy guidance. Skin anesthesia was achieved by injecting Lidocaine 2% over the injection site. The interlaminar space was then approached  with a 18 gauge,  3.5 inch Tuohy needle that was introduced under fluoroscopic guidance in the AP, lateral and/or contralateral oblique imaging. Once the Ligamentum flavum was encountered loss of resistance to air was used to enter the epidural space. With positive loss of resistance and negative aspiration for CSF or Blood, contrast dye Omnipaque (300mg/mL) was injected to confirm placement and there was no vascular runoff. 4 mL of the medication mixture listed above was injected slowly. Displacement of the radio opaque contrast after injection of the medication confirmed that the medication went into the area of the epidural space. The needles were removed and bleeding was nil. A sterile dressing was applied. No specimens collected. The patient tolerated the procedure well.   The patient was monitored after the procedure in the recovery area. They were given post-procedure and discharge instructions to follow at home. The patient was discharged in a stable condition.    Devin Zheng MD     I reviewed and edited the fellow's note. I conducted my own interview and physical examination. I agree with the findings. I was present and supervising all critical portions of the procedure.

## 2024-10-21 NOTE — H&P
HPI  Patient presenting for Procedure(s) (LRB):  LUMBAR L5/S1 IL SELENA *ASPIRIN OTC* HOLD FOR 5 DAYS  **EISSA PT** (N/A)     Patient on Anti-coagulation No    No health changes since previous encounter    Past Medical History:   Diagnosis Date    Allergy     Carotid artery occlusion     Chronic back pain     Colonic polyp     Genetic testing     MUTYH mutation-negative    Hyperlipidemia     Hypertension     Paroxysmal atrial fibrillation 2/28/2024    Sleep apnea     Type 2 diabetes mellitus with stage 3 chronic kidney disease, without long-term current use of insulin 4/2/2020     Past Surgical History:   Procedure Laterality Date    ANKLE SURGERY Left     2    APPENDECTOMY      at age 20    ARTHROSCOPIC CHONDROPLASTY OF KNEE JOINT Left 8/13/2024    Procedure: ARTHROSCOPY, KNEE, WITH CHONDROPLASTY;  Surgeon: Kai Washington MD;  Location: [facility] OR;  Service: Orthopedics;  Laterality: Left;    ARTHROSCOPY, KNEE, WITH ABRASION ARTHROPLASTY OR MICROFRACTURE Left 8/13/2024    Procedure: ARTHROSCOPY, KNEE, WITH  MICROFRACTURE;  Surgeon: Kai Washington MD;  Location: [facility] OR;  Service: Orthopedics;  Laterality: Left;    ARTHROSCOPY,KNEE,WITH MENISCUS REPAIR Left 8/13/2024    Procedure: ARTHROSCOPY,KNEE,WITH MENISCUS REPAIR;  Surgeon: Kai Washington MD;  Location: [facility] OR;  Service: Orthopedics;  Laterality: Left;  NO REGIONAL BLOCK    CAROTID ENDARTERECTOMY Right 07/15/2015    CARPAL TUNNEL RELEASE Bilateral     COLONOSCOPY N/A 12/14/2018    Procedure: COLONOSCOPYSuprep;  Surgeon: Silver Selby MD;  Location: [facility] ENDO;  Service: Endoscopy;  Laterality: N/A;    COLONOSCOPY N/A 10/2/2024    Procedure: COLONOSCOPY;  Surgeon: Heath Morrow MD;  Location: [facility] ENDO;  Service: Endoscopy;  Laterality: N/A;  Ref by Leonor Oneill, pt has unopened Sutab, portal - PC  9/25/24-LVM for precall, portal-DS. 10/1/24 Pt. called and confirmed arrival time.EC    EPIDURAL STEROID INJECTION N/A 2/26/2024    Procedure:  CERVICAL C7/T1 IL SELENA;  Surgeon: Gokul Fung MD;  Location: [facility] PAIN MGT;  Service: Pain Management;  Laterality: N/A;  [phone]  2 WK F/U SERGEI    EPIDURAL STEROID INJECTION N/A 5/23/2024    Procedure: LUMBAR L4/5 IL SELENA *ASPIRIN OTC* HOLD FOR 5 DAYS;  Surgeon: Gokul Fung MD;  Location: [facility] PAIN MGT;  Service: Pain Management;  Laterality: N/A;  [phone]  2 WK F/U NOHELIA    JOINT REPLACEMENT  9/2010    NASAL SEPTUM SURGERY      TOTAL KNEE ARTHROPLASTY Right     6 knee surgeries    TRANSESOPHAGEAL ECHOCARDIOGRAM WITH POSSIBLE CARDIOVERSION (ELIZABETH W/ POSS CARDIOVERSION) N/A 2/28/2024    Procedure: Transesophageal echo (ELIZABETH) intra-procedure log documentation;  Surgeon: Ahmet De La Cruz MD;  Location: [facility] CATH LAB/EP;  Service: Cardiology;  Laterality: N/A;     Review of patient's allergies indicates:   Allergen Reactions    Stadol [butorphanol tartrate] Rash     Swelling in face    Strawberries [strawberry] Rash      No current facility-administered medications for this encounter.     Facility-Administered Medications Ordered in Other Encounters   Medication    0.9%  NaCl infusion    0.9%  NaCl infusion       PMHx, PSHx, Allergies, Medications reviewed in epic    ROS negative except pain complaints in HPI    OBJECTIVE:    There were no vitals taken for this visit.    PHYSICAL EXAMINATION:    GENERAL: Well appearing, in no acute distress, alert and oriented x3.  PSYCH:  Mood and affect appropriate.  SKIN: Skin color, texture, turgor normal, no rashes or lesions which will impact the procedure.  CV: RRR with palpation of the radial artery.  PULM: No evidence of respiratory difficulty, symmetric chest rise. Clear to auscultation.  NEURO: Cranial nerves grossly intact.    Plan:    Proceed with procedure as planned Procedure(s) (LRB):  LUMBAR L5/S1 IL SELENA *ASPIRIN OTC* HOLD FOR 5 DAYS  **ANGELICA PT** (N/A)    Devin Zheng  10/21/2024

## 2024-10-21 NOTE — DISCHARGE INSTRUCTIONS

## 2024-10-21 NOTE — DISCHARGE SUMMARY
"Discharge Note  Short Stay      SUMMARY     Admit Date: 10/21/2024    Attending Physician: Sofia Sheikh MD    Discharge Physician: Sofia Sheikh MD      Discharge Date: 10/21/2024 8:30 AM    Procedure(s) (LRB):  LUMBAR L5/S1 IL SELENA *ASPIRIN OTC* HOLD FOR 5 DAYS  **EISSA PT** (N/A)    Final Diagnosis:  Lumbar radiculopathy [M54.16]      Disposition: Home or self care    Patient Instructions:   Current Discharge Medication List        CONTINUE these medications which have NOT CHANGED    Details   amitriptyline (ELAVIL) 75 MG tablet Take 1 tablet (75 mg total) by mouth every evening.  Qty: 90 tablet, Refills: 1    Associated Diagnoses: Insomnia, unspecified type      aspirin (ECOTRIN) 81 MG EC tablet Take 1 tablet (81 mg total) by mouth once daily.  Qty: 28 tablet, Refills: 0    Associated Diagnoses: Bucket-handle tear of medial meniscus of left knee as current injury, initial encounter      BD LUER-RUSS SYRINGE 3 mL 25 gauge x 1" Syrg USE ONE SYRINGE EVERY 14 DAYS WITH TESTOSTERONE  Qty: 100 each, Refills: 2      candesartan (ATACAND) 32 MG tablet Take 1 tablet (32 mg total) by mouth once daily.  Qty: 90 tablet, Refills: 3    Associated Diagnoses: Essential hypertension      cloNIDine (CATAPRES) 0.1 MG tablet Take 1 tablet (0.1 mg total) by mouth 2 (two) times daily.  Qty: 180 tablet, Refills: 3    Comments: .  Associated Diagnoses: Essential hypertension      cyanocobalamin (VITAMIN B-12) 1000 MCG tablet Take 1 tablet (1,000 mcg total) by mouth once daily.  Qty: 90 tablet, Refills: 4    Associated Diagnoses: B12 deficiency      ergocalciferol (ERGOCALCIFEROL) 50,000 unit Cap Take 1 capsule by mouth once a week  Qty: 12 capsule, Refills: 0    Associated Diagnoses: Vitamin D deficiency      ezetimibe (ZETIA) 10 mg tablet Take 1 tablet (10 mg total) by mouth once daily.  Qty: 90 tablet, Refills: 3    Associated Diagnoses: Dyslipidemia      fluticasone propionate (FLONASE) 50 mcg/actuation nasal spray 2 sprays by " Each Nostril route.      gabapentin (NEURONTIN) 800 MG tablet Take 1 tablet (800 mg total) by mouth every evening.  Qty: 90 tablet, Refills: 1    Associated Diagnoses: Cervical radiculopathy      glucose 4 GM chewable tablet Take 4 tablets (16 g total) by mouth as needed for Low blood sugar.  Qty: 30 tablet, Refills: 12      HYDROcodone-acetaminophen (NORCO) 7.5-325 mg per tablet Take 1 tablet by mouth every 6 (six) hours as needed for Pain.  Qty: 20 tablet, Refills: 0    Comments: n/a   Associated Diagnoses: Bucket-handle tear of medial meniscus of left knee as current injury, initial encounter      ipratropium (ATROVENT) 42 mcg (0.06 %) nasal spray 2 sprays by Nasal route 2 (two) times daily as needed for Rhinitis.  Qty: 15 mL, Refills: 0    Associated Diagnoses: Influenza A      metoprolol succinate (TOPROL-XL) 200 MG 24 hr tablet Take 1 tablet (200 mg total) by mouth once daily.  Qty: 90 tablet, Refills: 3    Comments: .  Associated Diagnoses: Essential hypertension      oxyCODONE-acetaminophen (PERCOCET)  mg per tablet Take 1 tablet by mouth every 12 (twelve) hours as needed for Pain.  Qty: 60 tablet, Refills: 0    Comments: Greater than 7 day supply that is medically necessary.  Associated Diagnoses: Chronic pain disorder; Lumbar radiculopathy; Lumbar spondylosis; DDD (degenerative disc disease), lumbar; Cervical radiculopathy; Cervical spondylosis; DDD (degenerative disc disease), cervical      rosuvastatin (CRESTOR) 40 MG Tab Take 1 tablet (40 mg total) by mouth every evening.  Qty: 90 tablet, Refills: 3    Associated Diagnoses: Dyslipidemia      semaglutide (OZEMPIC) 2 mg/dose (8 mg/3 mL) PnIj Inject 2 mg into the skin every 7 days.  Qty: 9 mL, Refills: 3    Associated Diagnoses: Type 2 diabetes mellitus with stage 3a chronic kidney disease, without long-term current use of insulin      sod sulf-pot chloride-mag sulf (SUTAB) 1.479-0.188- 0.225 gram tablet Take 12 tablets by mouth once daily. Take  "according to package instructions with indicated amount of water.  Qty: 24 tablet, Refills: 0    Comments: Coupon code [id number] PCN: [id number] Group: [id number] Member ID: [id number]  Associated Diagnoses: Colon cancer screening      syringe with needle, safety (BD INTEGRA SYRINGE) 3 mL 22 gauge x 1 1/2" Syrg Inject 1 Syringe as directed once a week.  Qty: 6 each, Refills: 3      testosterone cypionate (DEPOTESTOTERONE CYPIONATE) 200 mg/mL injection Inject 1 mL (200 mg total) into the muscle every 14 (fourteen) days.  Qty: 2 mL, Refills: 5    Comments: Please include syringes and needles for the testosterone.  Associated Diagnoses: Hypogonadism in male      valACYclovir (VALTREX) 1000 MG tablet Take 2 tablets (2,000 mg total) by mouth every 12 (twelve) hours.  Qty: 4 tablet, Refills: 3                 Discharge Diagnosis: Same as above  Condition on Discharge: Stable with no complications to procedure   Diet on Discharge: Same as before.  Activity: as per instruction sheet.  Discharge to: Home with a responsible adult.  Follow up: 2-4 weeks       Please call my office or pager at [phone] if experienced any weakness or loss of sensation, fever > 101.5, pain uncontrolled with oral medications, persistent nausea/vomiting/or diarrhea, redness or drainage from the incisions, or any other worrisome concerns. If physician on call was not reached or could not communicate with our office for any reason please go to the nearest emergency department     Devin Zheng MD    "

## 2024-10-23 ENCOUNTER — PATIENT MESSAGE (OUTPATIENT)
Dept: ENDOCRINOLOGY | Facility: CLINIC | Age: 64
End: 2024-10-23
Payer: COMMERCIAL

## 2024-10-23 ENCOUNTER — LAB VISIT (OUTPATIENT)
Dept: LAB | Facility: HOSPITAL | Age: 64
End: 2024-10-23
Attending: FAMILY MEDICINE
Payer: COMMERCIAL

## 2024-10-23 DIAGNOSIS — E29.1 HYPOGONADISM IN MALE: ICD-10-CM

## 2024-10-23 LAB
ALBUMIN SERPL BCP-MCNC: 4.1 G/DL (ref 3.5–5.2)
ALP SERPL-CCNC: 65 U/L (ref 40–150)
ALT SERPL W/O P-5'-P-CCNC: 41 U/L (ref 10–44)
AST SERPL-CCNC: 28 U/L (ref 10–40)
BILIRUB DIRECT SERPL-MCNC: 0.2 MG/DL (ref 0.1–0.3)
BILIRUB SERPL-MCNC: 0.5 MG/DL (ref 0.1–1)
HCT VFR BLD AUTO: 55.5 % (ref 40–54)
HGB BLD-MCNC: 18.1 G/DL (ref 14–18)
PROT SERPL-MCNC: 7.1 G/DL (ref 6–8.4)

## 2024-10-23 PROCEDURE — 85014 HEMATOCRIT: CPT | Performed by: INTERNAL MEDICINE

## 2024-10-23 PROCEDURE — 80076 HEPATIC FUNCTION PANEL: CPT | Performed by: INTERNAL MEDICINE

## 2024-10-23 PROCEDURE — 85018 HEMOGLOBIN: CPT | Performed by: INTERNAL MEDICINE

## 2024-10-23 PROCEDURE — 36415 COLL VENOUS BLD VENIPUNCTURE: CPT | Mod: PO | Performed by: INTERNAL MEDICINE

## 2024-10-23 PROCEDURE — 84403 ASSAY OF TOTAL TESTOSTERONE: CPT | Performed by: INTERNAL MEDICINE

## 2024-10-23 PROCEDURE — 82040 ASSAY OF SERUM ALBUMIN: CPT | Performed by: INTERNAL MEDICINE

## 2024-10-23 PROCEDURE — 84270 ASSAY OF SEX HORMONE GLOBUL: CPT | Performed by: INTERNAL MEDICINE

## 2024-10-25 ENCOUNTER — CLINICAL SUPPORT (OUTPATIENT)
Dept: REHABILITATION | Facility: HOSPITAL | Age: 64
End: 2024-10-25
Payer: COMMERCIAL

## 2024-10-25 DIAGNOSIS — M25.662 DECREASED RANGE OF MOTION OF LEFT LOWER EXTREMITY: ICD-10-CM

## 2024-10-25 DIAGNOSIS — R26.9 GAIT ABNORMALITY: Primary | ICD-10-CM

## 2024-10-25 DIAGNOSIS — R29.898 DECREASED STRENGTH OF LOWER EXTREMITY: ICD-10-CM

## 2024-10-25 PROCEDURE — 97112 NEUROMUSCULAR REEDUCATION: CPT

## 2024-10-25 PROCEDURE — 97530 THERAPEUTIC ACTIVITIES: CPT

## 2024-10-25 NOTE — PROGRESS NOTES
[facility]   Physical Therapy Re-Assessment / Treatment Note      Name: Partha Curtis Jr.  Clinic Number: [id number]    Therapy Diagnosis:   Encounter Diagnoses   Name Primary?    Gait abnormality Yes    Decreased range of motion of left lower extremity     Decreased strength of lower extremity      Physician: Raffy Ascencio MD  Surgeon: Dr. Washington    Visit Date: 10/25/2024  Physician Orders: PT Eval and Treat   Medical Diagnosis from Referral: Bucket-handle tear of medial meniscus of left knee as current injury, initial encounter [S83.212A]   Evaluation Date: 8/14/2024  Authorization Period Expiration: 8/7/2025  Plan of Care Expiration: 2/25/2025  Progress Note Due: 9/20/2024  Visit # / Visits authorized: 15/20  This POC: 12   FOTO: 2/3 (8/20/24), (10/25/2024)     Date of Surgery: 8/13/2024  Return to MD: 8/28/2024, 10/2/2024     Procedure:  1. Left Knee Arthroscopy, with meniscus repair (medial OR lateral) 15358  2.  Left Knee Arthroscopy, with Microfracture 04774 - marrow stimulation procedure  3.  Left Knee Arthroscopy, debridement/shaving of articular cartilage (chondroplasty) 85113 -      Post-Op Plan:  Peripheral meniscal repair protocol      Precautions: Standard and Weightbearing     PTA Visit #: 0/5     Time In: 08:00 am   Time Out: 8:53 am    Total Billable Time: 53 minutes     Subjective     Pt reports: Feeling much better since he had the injection in his back.   He was compliant with home exercise program.  Response to previous treatment: Improved mobility  Functional change: Ongoing    Pain: 2/10  Location: left knee      Objective      Objective Measures updated at progress report unless specified.    Observation 10/25/2024: Patient ambulates into clinic independently with no assistance and displays good nadege.     Range of Motion   Knee   Right AROM/PROM Left AROM/PROM   Flexion 120 degrees / 125 degrees  130 degrees / 130 degrees    Extension 0 degrees / 0  "degrees  +1 degrees / +1 degrees (hyper)     Lower Extremity Strength (MMT):   Right  Left    Hip Flexion 4/5 4/5   Hip Abduction 4-/5 4-/5   Knee Flexion 4/5 4/5   Knee Extension 4+/5 4/5   Dorsiflexion  4/5  4+/5     FOTO = 86 (initial = 46)    Treatment     *Pt is 10 weeks 3 days s/p as of 10/25/2024.     Jarvis received the treatments listed below:      therapeutic exercises to develop strength, endurance, ROM, and flexibility for 00 minutes including:      manual therapy techniques: Joint mobilizations were applied to the: knee for 03 minutes, including:    Patellar mobilizations all planes III-IV  Fat pad mobilizations    Not Today:  Extension hinge    neuromuscular re-education activities to improve: Balance, Coordination, Kinesthetic, and Proprioception for 27 minutes. The following activities were included:    Assessment as above   SLR 2 x 12 reps   LAQ 5# 3 x 8 x 3" holds   DL heel raises slow and control 3 x 12-15 reps   SLS 3 x 30" with 2-finger support  Mini squat on fitter 3 x 8 reps     therapeutic activities to improve functional performance for 23 minutes, including:    Pt education  Upright bike x 8' for functional mobility and cardiovascular endurance   Step up 5-inch with GreenTB TKE 3 x 8 x 3" holds   Lateral band walks GreenTB 3 laps on [employer]   2-up-1-down shuttle 62.7-75# 4 x 5 reps     Not Today:  DL shuttle #100 3x10  SL shuttle #50  3x10  STS with YTB around knees 3x10    gait training to improve functional mobility and safety for 00 minutes, including:    Patient Education and Home Exercises       Education provided:   - PT POC, Prognosis, and HEP   - Early post-op goals: importance of knee extension, edema management, quad activation/strength, and gait mechanics  - Post-op precautions, weightbearing status and mobility precautions   - Signs and symptoms of DVT and infection       Written Home Exercises Provided: yes. Exercises were reviewed and Jarvis was able to demonstrate them prior to the " "end of the session.  Jarvis demonstrated good  understanding of the education provided. See EMR under Patient Instructions for exercises provided during therapy sessions.    Assessment     "Jarvis" presented to today's session with significant improvement in symptoms in his back following injection on Monday. Able to tolerate increased lower extremity exercises today with no adverse effects. Continued progression with working on quad strengthening and functional activities. Continues to be challenged with hip strengthening fatiguing quickly as well as single limb stability and will continue to benefit from emphasis in the future. Able to add eccentric strengthening as well with good challenge. Will continue to monitor and progress as able.     Jarvis Is progressing well towards his goals.   Pt prognosis is Excellent.     Pt will continue to benefit from skilled outpatient physical therapy to address the deficits listed in the problem list box on initial evaluation, provide pt/family education and to maximize pt's level of independence in the home and community environment.     Pt's spiritual, cultural and educational needs considered and pt agreeable to plan of care and goals.     Anticipated Barriers for therapy: Scheduling, co-morbidities     Goals:  Short Term Goals: 6 weeks   Patient will be independent in initial HEP to help supplement PT. - MET  Patient will improve knee extension range of motion to 0 degrees to help with gait mechanics. - MET  Patient will improve knee flexion range of motion to >/= 90 degrees to help with ADLs. - MET  Patient will improve pain at its worst to </= 5/10 to help improve quality of life. - MET     Long Term Goals: 12-24 weeks (Progressing, not met)  Patient will be independent in updated HEP to help supplement PT and maintain gains made in PT.   Patient will improve FOTO score to >/= predicted to show improvement in condition.   Patient will improve knee extension strength to >/= 4+/5 " to help with ADLs.  Patient goal: Patient will be able to return to riding his bicycle without difficulty.   Plan   Plan of care Certification: 8/14/2024 to 2/25/2025.     Outpatient Physical Therapy 1-2 times weekly for 24 weeks to include the following interventions: Electrical Stimulation , Gait Training, Manual Therapy, Moist Heat/ Ice, Neuromuscular Re-ed, Patient Education, Self Care, Therapeutic Activities, and Therapeutic Exercise.     Flakita Thomas, PT, DPT, OCS

## 2024-10-30 ENCOUNTER — CLINICAL SUPPORT (OUTPATIENT)
Dept: REHABILITATION | Facility: HOSPITAL | Age: 64
End: 2024-10-30
Payer: COMMERCIAL

## 2024-10-30 ENCOUNTER — PATIENT MESSAGE (OUTPATIENT)
Dept: SPORTS MEDICINE | Facility: CLINIC | Age: 64
End: 2024-10-30

## 2024-10-30 ENCOUNTER — OFFICE VISIT (OUTPATIENT)
Dept: SPORTS MEDICINE | Facility: CLINIC | Age: 64
End: 2024-10-30
Payer: COMMERCIAL

## 2024-10-30 VITALS — HEIGHT: 70 IN | BODY MASS INDEX: 27.21 KG/M2 | WEIGHT: 190.06 LBS

## 2024-10-30 DIAGNOSIS — Z98.890 S/P ARTHROSCOPIC SURGERY OF LEFT KNEE: Primary | ICD-10-CM

## 2024-10-30 DIAGNOSIS — Z98.890 S/P MEDIAL MENISCUS REPAIR OF LEFT KNEE: ICD-10-CM

## 2024-10-30 DIAGNOSIS — M25.562 ACUTE PAIN OF LEFT KNEE: Primary | ICD-10-CM

## 2024-10-30 DIAGNOSIS — M25.662 DECREASED RANGE OF MOTION OF LEFT LOWER EXTREMITY: ICD-10-CM

## 2024-10-30 DIAGNOSIS — R29.898 DECREASED STRENGTH OF LOWER EXTREMITY: ICD-10-CM

## 2024-10-30 DIAGNOSIS — R26.9 GAIT ABNORMALITY: Primary | ICD-10-CM

## 2024-10-30 DIAGNOSIS — M25.511 ARTHRALGIA OF RIGHT ACROMIOCLAVICULAR JOINT: ICD-10-CM

## 2024-10-30 PROCEDURE — 99999 PR PBB SHADOW E&M-EST. PATIENT-LVL IV: CPT | Mod: PBBFAC,,, | Performed by: PHYSICIAN ASSISTANT

## 2024-10-30 PROCEDURE — 97140 MANUAL THERAPY 1/> REGIONS: CPT

## 2024-10-30 PROCEDURE — 97112 NEUROMUSCULAR REEDUCATION: CPT

## 2024-10-31 LAB
ALBUMIN SERPL-MCNC: 4.2 G/DL (ref 3.6–5.1)
SHBG SERPL-SCNC: 21 NMOL/L (ref 22–77)

## 2024-10-31 RX ORDER — METHYLPREDNISOLONE 4 MG/1
TABLET ORAL
Qty: 21 EACH | Refills: 0 | Status: SHIPPED | OUTPATIENT
Start: 2024-10-31 | End: 2024-11-21

## 2024-11-01 ENCOUNTER — CLINICAL SUPPORT (OUTPATIENT)
Dept: REHABILITATION | Facility: HOSPITAL | Age: 64
End: 2024-11-01
Payer: COMMERCIAL

## 2024-11-01 DIAGNOSIS — M25.662 DECREASED RANGE OF MOTION OF LEFT LOWER EXTREMITY: ICD-10-CM

## 2024-11-01 DIAGNOSIS — R26.9 GAIT ABNORMALITY: Primary | ICD-10-CM

## 2024-11-01 DIAGNOSIS — R29.898 DECREASED STRENGTH OF LOWER EXTREMITY: ICD-10-CM

## 2024-11-01 PROCEDURE — 97112 NEUROMUSCULAR REEDUCATION: CPT

## 2024-11-05 ENCOUNTER — PATIENT MESSAGE (OUTPATIENT)
Dept: ENDOCRINOLOGY | Facility: CLINIC | Age: 64
End: 2024-11-05
Payer: COMMERCIAL

## 2024-11-05 ENCOUNTER — CLINICAL SUPPORT (OUTPATIENT)
Dept: REHABILITATION | Facility: HOSPITAL | Age: 64
End: 2024-11-05
Payer: COMMERCIAL

## 2024-11-05 ENCOUNTER — PATIENT MESSAGE (OUTPATIENT)
Dept: SPORTS MEDICINE | Facility: CLINIC | Age: 64
End: 2024-11-05
Payer: COMMERCIAL

## 2024-11-05 DIAGNOSIS — R29.898 DECREASED STRENGTH OF LOWER EXTREMITY: ICD-10-CM

## 2024-11-05 DIAGNOSIS — R26.9 GAIT ABNORMALITY: Primary | ICD-10-CM

## 2024-11-05 DIAGNOSIS — M25.662 DECREASED RANGE OF MOTION OF LEFT LOWER EXTREMITY: ICD-10-CM

## 2024-11-05 PROCEDURE — 97112 NEUROMUSCULAR REEDUCATION: CPT | Mod: CQ

## 2024-11-05 PROCEDURE — 97140 MANUAL THERAPY 1/> REGIONS: CPT | Mod: CQ

## 2024-11-05 NOTE — PROGRESS NOTES
[facility]   Physical Therapy Daily Treatment Note      Name: Partha Curtis Jr.  Clinic Number: [id number]    Therapy Diagnosis:   Encounter Diagnoses   Name Primary?    Gait abnormality Yes    Decreased range of motion of left lower extremity     Decreased strength of lower extremity      Physician: Raffy Ascencio MD  Surgeon: Dr. Washington    Visit Date: 11/5/2024  Physician Orders: PT Eval and Treat   Medical Diagnosis from Referral: Bucket-handle tear of medial meniscus of left knee as current injury, initial encounter [S83.212A]   Evaluation Date: 8/14/2024  Authorization Period Expiration: 8/7/2025  Plan of Care Expiration: 2/25/2025  Progress Note Due: 9/20/2024  Visit # / Visits authorized: 19/30  This POC: 15   FOTO: 2/3 (8/20/24), (10/25/2024)     Date of Surgery: 8/13/2024  Return to MD: 8/28/2024, 10/2/2024     Procedure:  1. Left Knee Arthroscopy, with meniscus repair (medial OR lateral) 69483  2.  Left Knee Arthroscopy, with Microfracture 40951 - marrow stimulation procedure  3.  Left Knee Arthroscopy, debridement/shaving of articular cartilage (chondroplasty) 79713 -      Post-Op Plan:  Peripheral meniscal repair protocol      Precautions: Standard and Weightbearing     PTA Visit #: 0/5     Time In: 08:00 am   Time Out: 9:00 am  Total Billable Time: 60 minutes     Subjective     Pt reports: L knee is doing better since steroid but now media R knee hurts and is swollen.   He was compliant with home exercise program.  Response to previous treatment: Improved mobility  Functional change: Ongoing    Pain: 2/10  Location: left knee      Objective      Objective Measures updated at progress report unless specified.    Observation 10/25/2024: Patient ambulates into clinic independently with no assistance and displays good nadege.     Range of Motion   Knee   Right AROM/PROM Left AROM/PROM   Flexion 120 degrees / 125 degrees  130 degrees / 130 degrees    Extension 0  "degrees / 0 degrees  +1 degrees / +1 degrees (hyper)     Lower Extremity Strength (MMT):   Right  Left    Hip Flexion 4/5 4/5   Hip Abduction 4-/5 4-/5   Knee Flexion 4/5 4/5   Knee Extension 4+/5 4/5   Dorsiflexion  4/5  4+/5     FOTO = 86 (initial = 46)    Treatment     *Pt is 12 weeks 0 days s/p as of 11/1/2024.     Jarvis received the treatments listed below:      therapeutic exercises to develop strength, endurance, ROM, and flexibility for 00 minutes including:      manual therapy techniques: Joint mobilizations were applied to the: knee for 20 minutes, including:    Patellar mobilizations all planes III-IV  Fat pad mobilizations  EOM knee distraction   PT assessment    Not Today:  Extension hinge    neuromuscular re-education activities to improve: Balance, Coordination, Kinesthetic, and Proprioception for 40 minutes. The following activities were included:    Assessment as above   Neuromuscular Electrical Stimulation: Russian 10" on 30-50" off    - Standing TKE RedTB x 8'   - LAQ isometric ~70 deg x 8'     Quad set into SLR supine 2 x 10 reps   HS isometric 45" contraction/45" relax x 5 rds    Not Performed:  LAQ mod range 2 x 10 reps   Standing hip extension and abduction 2 x 12-15 reps each RedTB     LAQ 5# 3 x 8 x 3" holds   DL heel raises slow and control 3 x 12-15 reps   SLS 3 x 30" with 2-finger support  Mini squat on fitter 3 x 8 reps     therapeutic activities to improve functional performance for 00 minutes, including:    Not Today:  DL shuttle #100 3x10  SL shuttle #50  3x10  STS with YTB around knees 3x10  Upright bike x 8' for functional mobility and cardiovascular endurance   Step up 5-inch with GreenTB TKE 3 x 8 x 3" holds   Lateral band walks GreenTB 3 laps on turf   2-up-1-down shuttle 62.7-75# 4 x 5 reps     gait training to improve functional mobility and safety for 00 minutes, including:    Patient Education and Home Exercises       Education provided:   - PT POC, Prognosis, and HEP   - " "Early post-op goals: importance of knee extension, edema management, quad activation/strength, and gait mechanics  - Post-op precautions, weightbearing status and mobility precautions   - Signs and symptoms of DVT and infection       Written Home Exercises Provided: yes. Exercises were reviewed and Aminata was able to demonstrate them prior to the end of the session.  Aminata demonstrated good  understanding of the education provided. See EMR under Patient Instructions for exercises provided during therapy sessions.    Assessment     "Aminata" presents with medial R knee swelling today. L knee feels good since taking steroids. PT Flakita Thomas assessed Aminata's R knee. Tried HS isometrics but did not reduce symptoms. Worked on L quad strength with and without NMES . No pain today with L end range end flexion or extension.     Aminata Is progressing well towards his goals.   Pt prognosis is Excellent.     Pt will continue to benefit from skilled outpatient physical therapy to address the deficits listed in the problem list box on initial evaluation, provide pt/family education and to maximize pt's level of independence in the home and community environment.     Pt's spiritual, cultural and educational needs considered and pt agreeable to plan of care and goals.     Anticipated Barriers for therapy: Scheduling, co-morbidities     Goals:  Short Term Goals: 6 weeks   Patient will be independent in initial HEP to help supplement PT. - MET  Patient will improve knee extension range of motion to 0 degrees to help with gait mechanics. - MET  Patient will improve knee flexion range of motion to >/= 90 degrees to help with ADLs. - MET  Patient will improve pain at its worst to </= 5/10 to help improve quality of life. - MET     Long Term Goals: 12-24 weeks (Progressing, not met)  Patient will be independent in updated HEP to help supplement PT and maintain gains made in PT.   Patient will improve FOTO score to >/= predicted to show " improvement in condition.   Patient will improve knee extension strength to >/= 4+/5 to help with ADLs.  Patient goal: Patient will be able to return to riding his bicycle without difficulty.   Plan   Plan of care Certification: 8/14/2024 to 2/25/2025.     Outpatient Physical Therapy 1-2 times weekly for 24 weeks to include the following interventions: Electrical Stimulation , Gait Training, Manual Therapy, Moist Heat/ Ice, Neuromuscular Re-ed, Patient Education, Self Care, Therapeutic Activities, and Therapeutic Exercise.     Elly Ho PTA,

## 2024-11-06 ENCOUNTER — HOSPITAL ENCOUNTER (OUTPATIENT)
Dept: RADIOLOGY | Facility: HOSPITAL | Age: 64
Discharge: HOME | End: 2024-11-06
Attending: ORTHOPAEDIC SURGERY
Payer: COMMERCIAL

## 2024-11-06 ENCOUNTER — OFFICE VISIT (OUTPATIENT)
Dept: SPORTS MEDICINE | Facility: CLINIC | Age: 64
End: 2024-11-06
Payer: COMMERCIAL

## 2024-11-06 VITALS
HEART RATE: 73 BPM | HEIGHT: 70 IN | SYSTOLIC BLOOD PRESSURE: 150 MMHG | BODY MASS INDEX: 27.21 KG/M2 | DIASTOLIC BLOOD PRESSURE: 88 MMHG | WEIGHT: 190.06 LBS

## 2024-11-06 DIAGNOSIS — Z98.890 S/P ARTHROSCOPIC SURGERY OF LEFT KNEE: ICD-10-CM

## 2024-11-06 DIAGNOSIS — S76.311A RUPTURE OF RIGHT HAMSTRING TENDON, INITIAL ENCOUNTER: Primary | ICD-10-CM

## 2024-11-06 PROCEDURE — 3044F HG A1C LEVEL LT 7.0%: CPT | Mod: CPTII,S$GLB,, | Performed by: ORTHOPAEDIC SURGERY

## 2024-11-06 PROCEDURE — 73562 X-RAY EXAM OF KNEE 3: CPT | Mod: TC,LT

## 2024-11-06 PROCEDURE — 4010F ACE/ARB THERAPY RXD/TAKEN: CPT | Mod: CPTII,S$GLB,, | Performed by: ORTHOPAEDIC SURGERY

## 2024-11-06 PROCEDURE — 99214 OFFICE O/P EST MOD 30 MIN: CPT | Mod: 24,S$GLB,, | Performed by: ORTHOPAEDIC SURGERY

## 2024-11-06 PROCEDURE — 99999 PR PBB SHADOW E&M-EST. PATIENT-LVL III: CPT | Mod: PBBFAC,,, | Performed by: ORTHOPAEDIC SURGERY

## 2024-11-06 PROCEDURE — 3061F NEG MICROALBUMINURIA REV: CPT | Mod: CPTII,S$GLB,, | Performed by: ORTHOPAEDIC SURGERY

## 2024-11-06 PROCEDURE — 3077F SYST BP >= 140 MM HG: CPT | Mod: CPTII,S$GLB,, | Performed by: ORTHOPAEDIC SURGERY

## 2024-11-06 PROCEDURE — 3008F BODY MASS INDEX DOCD: CPT | Mod: CPTII,S$GLB,, | Performed by: ORTHOPAEDIC SURGERY

## 2024-11-06 PROCEDURE — 3066F NEPHROPATHY DOC TX: CPT | Mod: CPTII,S$GLB,, | Performed by: ORTHOPAEDIC SURGERY

## 2024-11-06 PROCEDURE — 3079F DIAST BP 80-89 MM HG: CPT | Mod: CPTII,S$GLB,, | Performed by: ORTHOPAEDIC SURGERY

## 2024-11-06 NOTE — PROGRESS NOTES
ESTABLISHED PATIENT    HISTORY OF PRESENT ILLNESS   64 y.o. Male with a history of right knee pain for whom I previously performed a left knee meniscus repair. He has a history of a right knee replacement in 2010.  He states that his physical therapist recommended he come and see us due to pain and swelling that began 11/5/24. He states that this came on atraumatic, but he recalls several weeks ago he was he was squatting and got stuck in a squat position, but he doesn't recall immediate pain or popping after or during this event     - mechanical symptoms, - instability        PAST MEDICAL HISTORY    Past Medical History:   Diagnosis Date    Allergy     Carotid artery occlusion     Chronic back pain     Colonic polyp     Genetic testing     MUTYH mutation-negative    Hyperlipidemia     Hypertension     Paroxysmal atrial fibrillation 2/28/2024    Sleep apnea     Type 2 diabetes mellitus with stage 3 chronic kidney disease, without long-term current use of insulin 4/2/2020       PAST SURGICAL HISTORY     Past Surgical History:   Procedure Laterality Date    ANKLE SURGERY Left     2    APPENDECTOMY      at age 20    ARTHROSCOPIC CHONDROPLASTY OF KNEE JOINT Left 8/13/2024    Procedure: ARTHROSCOPY, KNEE, WITH CHONDROPLASTY;  Surgeon: Kai Washington MD;  Location: [facility] OR;  Service: Orthopedics;  Laterality: Left;    ARTHROSCOPY, KNEE, WITH ABRASION ARTHROPLASTY OR MICROFRACTURE Left 8/13/2024    Procedure: ARTHROSCOPY, KNEE, WITH  MICROFRACTURE;  Surgeon: Kai Washington MD;  Location: [facility] OR;  Service: Orthopedics;  Laterality: Left;    ARTHROSCOPY,KNEE,WITH MENISCUS REPAIR Left 8/13/2024    Procedure: ARTHROSCOPY,KNEE,WITH MENISCUS REPAIR;  Surgeon: Kai Washington MD;  Location: [facility] OR;  Service: Orthopedics;  Laterality: Left;  NO REGIONAL BLOCK    CAROTID ENDARTERECTOMY Right 07/15/2015    CARPAL TUNNEL RELEASE Bilateral     COLONOSCOPY N/A 12/14/2018    Procedure: COLONOSCOPYSuprep;  Surgeon: Silver PUCKETT  MD Bal;  Location: [facility] ENDO;  Service: Endoscopy;  Laterality: N/A;    COLONOSCOPY N/A 10/2/2024    Procedure: COLONOSCOPY;  Surgeon: Heath Morrow MD;  Location: [facility] ENDO;  Service: Endoscopy;  Laterality: N/A;  Ref by Leonor Oneill, pt has unopened [facility], portal - PC  9/25/24-LVM for precall, portal-DS. 10/1/24 Pt. called and confirmed arrival time.EC    EPIDURAL STEROID INJECTION N/A 2/26/2024    Procedure: CERVICAL C7/T1 IL SELENA;  Surgeon: Gokul Fung MD;  Location: [facility] PAIN MGT;  Service: Pain Management;  Laterality: N/A;  [phone]  2 WK F/U SERGEI    EPIDURAL STEROID INJECTION N/A 5/23/2024    Procedure: LUMBAR L4/5 IL SELENA *ASPIRIN OTC* HOLD FOR 5 DAYS;  Surgeon: Gokul Fung MD;  Location: [facility] PAIN MGT;  Service: Pain Management;  Laterality: N/A;  [phone]  2 WK F/U NOHELIA    EPIDURAL STEROID INJECTION N/A 10/21/2024    Procedure: LUMBAR L5/S1 IL SELENA *ASPIRIN OTC* HOLD FOR 5 DAYS  **ANGELICA PT**;  Surgeon: Sofia Sheikh MD;  Location: [facility] PAIN MGT;  Service: Pain Management;  Laterality: N/A;  [phone]  2 WK F/U NOHELIA    JOINT REPLACEMENT  9/2010    NASAL SEPTUM SURGERY      TOTAL KNEE ARTHROPLASTY Right     6 knee surgeries    TRANSESOPHAGEAL ECHOCARDIOGRAM WITH POSSIBLE CARDIOVERSION (ELIZABETH W/ POSS CARDIOVERSION) N/A 2/28/2024    Procedure: Transesophageal echo (ELIZABETH) intra-procedure log documentation;  Surgeon: Ahmet De La Cruz MD;  Location: [facility] CATH LAB/EP;  Service: Cardiology;  Laterality: N/A;       FAMILY HISTORY    Family History   Problem Relation Name Age of Onset    Brain cancer Mother Klarissa 67    Cancer Mother Klarissa     Hypertension Father Keanu     Lung cancer Father Keanu         x2 (PAUL initially- treated surgically only, then recurred distantly) (smoker, & had been exposed to many chemicals)    Cancer Father Keanu     Breast cancer Sister  58    Genetic Disorder Sister          monoallelic MUTYH mutation    Seizures Daughter      Cancer Maternal  Grandfather Valerio     Brain cancer Paternal Grandfather  73    Breast cancer Maternal Cousin  57    Aneurysm Other mgm's father 48        brain    Ulcerative colitis Other brother's son        SOCIAL HISTORY    Social History     Socioeconomic History    Marital status:    Tobacco Use    Smoking status: Never     Passive exposure: Never    Smokeless tobacco: Never   Substance and Sexual Activity    Alcohol use: Yes     Alcohol/week: 2.0 standard drinks of alcohol     Types: 2 Cans of beer per week     Comment: 6 beers per month    Drug use: Never    Sexual activity: Yes     Partners: Female     Birth control/protection: None   Social History Narrative    5/11/2021: . He lives with his wife and 2 kids. (5 kids total). He has one dog, one cat, no more chickens at home. One dog recently passed away. No smokers at home.      Social Drivers of Health     Financial Resource Strain: Low Risk  (3/8/2024)    Overall Financial Resource Strain (CARDIA)     Difficulty of Paying Living Expenses: Not very hard   Food Insecurity: No Food Insecurity (3/8/2024)    Hunger Vital Sign     Worried About Running Out of Food in the Last Year: Never true     Ran Out of Food in the Last Year: Never true   Transportation Needs: No Transportation Needs (3/8/2024)    PRAPARE - Transportation     Lack of Transportation (Medical): No     Lack of Transportation (Non-Medical): No   Physical Activity: Unknown (3/8/2024)    Exercise Vital Sign     Days of Exercise per Week: 0 days   Stress: Stress Concern Present (3/8/2024)    [employer] [place] [employer] - Occupational Stress Questionnaire     Feeling of Stress : To some extent   Housing Stability: Low Risk  (3/8/2024)    Housing Stability Vital Sign     Unable to Pay for Housing in the Last Year: No     Number of Places Lived in the Last Year: 1     Unstable Housing in the Last Year: No       MEDICATIONS      Current Outpatient Medications:     amitriptyline  "(ELAVIL) 75 MG tablet, Take 1 tablet by mouth in the evening, Disp: 90 tablet, Rfl: 3    aspirin (ECOTRIN) 81 MG EC tablet, Take 1 tablet (81 mg total) by mouth once daily., Disp: 28 tablet, Rfl: 0    BD LUER-RUSS SYRINGE 3 mL 25 gauge x 1" Syrg, USE ONE SYRINGE EVERY 14 DAYS WITH TESTOSTERONE, Disp: 100 each, Rfl: 2    candesartan (ATACAND) 32 MG tablet, Take 1 tablet (32 mg total) by mouth once daily., Disp: 90 tablet, Rfl: 3    cloNIDine (CATAPRES) 0.1 MG tablet, Take 1 tablet (0.1 mg total) by mouth 2 (two) times daily., Disp: 180 tablet, Rfl: 3    cyanocobalamin (VITAMIN B-12) 1000 MCG tablet, Take 1 tablet (1,000 mcg total) by mouth once daily., Disp: 90 tablet, Rfl: 4    ergocalciferol (ERGOCALCIFEROL) 50,000 unit Cap, Take 1 capsule by mouth once a week, Disp: 12 capsule, Rfl: 0    ezetimibe (ZETIA) 10 mg tablet, Take 1 tablet (10 mg total) by mouth once daily., Disp: 90 tablet, Rfl: 3    fluticasone propionate (FLONASE) 50 mcg/actuation nasal spray, 2 sprays by Each Nostril route., Disp: , Rfl:     gabapentin (NEURONTIN) 800 MG tablet, Take 1 tablet (800 mg total) by mouth every evening., Disp: 90 tablet, Rfl: 1    glucose 4 GM chewable tablet, Take 4 tablets (16 g total) by mouth as needed for Low blood sugar., Disp: 30 tablet, Rfl: 12    HYDROcodone-acetaminophen (NORCO) 7.5-325 mg per tablet, Take 1 tablet by mouth every 6 (six) hours as needed for Pain., Disp: 20 tablet, Rfl: 0    ipratropium (ATROVENT) 42 mcg (0.06 %) nasal spray, 2 sprays by Nasal route 2 (two) times daily as needed for Rhinitis., Disp: 15 mL, Rfl: 0    methylPREDNISolone (MEDROL DOSEPACK) 4 mg tablet, use as directed, Disp: 21 each, Rfl: 0    metoprolol succinate (TOPROL-XL) 200 MG 24 hr tablet, Take 1 tablet (200 mg total) by mouth once daily. (Patient taking differently: Take 200 mg by mouth every evening.), Disp: 90 tablet, Rfl: 3    oxyCODONE-acetaminophen (PERCOCET)  mg per tablet, Take 1 tablet by mouth every 12 (twelve) " "hours as needed for Pain., Disp: 60 tablet, Rfl: 0    rosuvastatin (CRESTOR) 40 MG Tab, Take 1 tablet (40 mg total) by mouth every evening., Disp: 90 tablet, Rfl: 3    semaglutide (OZEMPIC) 2 mg/dose (8 mg/3 mL) PnIj, Inject 2 mg into the skin every 7 days., Disp: 9 mL, Rfl: 3    sod sulf-pot chloride-mag sulf (SUTAB) 1.479-0.188- 0.225 gram tablet, Take 12 tablets by mouth once daily. Take according to package instructions with indicated amount of water., Disp: 24 tablet, Rfl: 0    syringe with needle, safety (BD INTEGRA SYRINGE) 3 mL 22 gauge x 1 1/2" Syrg, Inject 1 Syringe as directed once a week., Disp: 6 each, Rfl: 3    testosterone cypionate (DEPOTESTOTERONE CYPIONATE) 200 mg/mL injection, Inject 1 mL (200 mg total) into the muscle every 14 (fourteen) days., Disp: 2 mL, Rfl: 5    valACYclovir (VALTREX) 1000 MG tablet, Take 2 tablets (2,000 mg total) by mouth every 12 (twelve) hours., Disp: 4 tablet, Rfl: 3  No current facility-administered medications for this visit.    Facility-Administered Medications Ordered in Other Visits:     0.9%  NaCl infusion, , Intravenous, Continuous, Kendell Hoffman MD    0.9%  NaCl infusion, , Intravenous, Continuous, Gerardo Uribe MD    ALLERGIES     Review of patient's allergies indicates:   Allergen Reactions    Stadol [butorphanol tartrate] Rash     Swelling in face    Strawberries [strawberry] Rash         REVIEW OF SYSTEMS   Constitution: Negative. Negative for chills, fever and night sweats.   HENT: Negative for congestion and headaches.    Eyes: Negative for blurred vision, left vision loss and right vision loss.   Cardiovascular: Negative for chest pain and syncope.   Respiratory: Negative for cough and shortness of breath.    Endocrine: Negative for polydipsia, polyphagia and polyuria.   Hematologic/Lymphatic: Negative for bleeding problem. Does not bruise/bleed easily.   Skin: Negative for dry skin, itching and rash.   Musculoskeletal: Negative for falls. Positive " "for right knee pain   Gastrointestinal: Negative for abdominal pain and bowel incontinence.   Genitourinary: Negative for bladder incontinence and nocturia.   Neurological: Negative for disturbances in coordination, loss of balance and seizures.   Psychiatric/Behavioral: Negative for depression. The patient does not have insomnia.    Allergic/Immunologic: Negative for hives and persistent infections.     PHYSICAL EXAMINATION    Vitals: BP (!) 150/88   Pulse 73   Ht 5' 10" (1.778 m)   Wt 86.2 kg (190 lb 0.6 oz)   BMI 27.27 kg/m²     General: The patient appears active and healthy with no apparent physical problems.  No disturbance of mood or affect is demonstrated. Alert and Oriented.    HEENT: Eyes normal, pupils equally round, nose normal.    Resp: Equal and symmetrical chest rises. No wheezing    CV: Regular rate    Neck: Supple; nonpainful range of motion.    Extremities: no cyanosis, clubbing, edema, or diffuse swelling.  Palpable pulses, good capillary refill of the digits.  No coolness, discoloration, edema or obvious varicosities.    Skin: no lesions noted.    Lymphatic: no detected adenopathy in the upper or lower extremities.    Neurologic: normal mental status, normal reflexes, normal gait and balance.  Patient is alert and oriented to person, place and time.  No flaccidity or spasticity is noted.  No motor or sensory deficits are noted.  Light touch is intact    Orthopaedic: KNEE EXAM - RIGHT    Inspection:   Normal skin color and appearance with well-healed scars, ecchymosis over hamstring and no effusion.      ROM:   0° - 135°.      Palpation:   There is no tenderness along medial joint line, medial plica, medial collateral ligament, pes bursa, lateral joint line, iliotibial band, lateral collateral ligament, popliteal fossa, patellar tendon, or quadriceps tendon. + tenderness medial distal hamstring tendons    Stability: - anterior drawer, - Lachman, - pivot shift and - posterior drawer.      No " instability with varus or valgus stress at 0° or 30°. Negative dial  test at 30° and 90°.    Tests:   - Jasons test.  - patellar compression - grind test, - patellofemoral crepitus.  There is no patellar apprehension.     - J Sign. - Sofia's. - patellar tilt. - Radha. Lateral patella translation  2 quadrants. Medial patella translation 2 quadrants    Motor:   Quadriceps strength is 5/5 and hamstrings strength is 5/5. Hamstrings show no tightness.      Neuro:   Distal neurovascular status is normal    Vascular: Negative Homans and no palpable popliteal cords. 2+ pedal pulse with brisk cap refill    Gait Normal      IMAGING    None today  IMPRESSION       ICD-10-CM ICD-9-CM   1. Rupture of right hamstring tendon, initial encounter  S76.311A 843.8   2. S/P arthroscopic surgery of left knee  Z98.890 V45.89       MEDICATIONS PRESCRIBED      None     RECOMMENDATIONS     Physical therapy order placed today for hamstring   He will reach out to us in 3 weeks if he feels he needs to be seen again  If his symptoms persist, I advised him I would perform an ultrasound evaluation of the posterior aspect of his right leg      All questions were answered, pt will contact us for questions or concerns in the interim.

## 2024-11-08 ENCOUNTER — CLINICAL SUPPORT (OUTPATIENT)
Dept: REHABILITATION | Facility: HOSPITAL | Age: 64
End: 2024-11-08
Payer: COMMERCIAL

## 2024-11-08 DIAGNOSIS — M25.661 DECREASED RANGE OF MOTION OF BOTH LOWER EXTREMITIES: ICD-10-CM

## 2024-11-08 DIAGNOSIS — R29.898 DECREASED STRENGTH OF LOWER EXTREMITY: ICD-10-CM

## 2024-11-08 DIAGNOSIS — S76.311A RUPTURE OF RIGHT HAMSTRING TENDON, INITIAL ENCOUNTER: ICD-10-CM

## 2024-11-08 DIAGNOSIS — R26.9 GAIT ABNORMALITY: Primary | ICD-10-CM

## 2024-11-08 DIAGNOSIS — M25.662 DECREASED RANGE OF MOTION OF BOTH LOWER EXTREMITIES: ICD-10-CM

## 2024-11-08 PROCEDURE — 97110 THERAPEUTIC EXERCISES: CPT

## 2024-11-08 PROCEDURE — 97112 NEUROMUSCULAR REEDUCATION: CPT

## 2024-11-08 PROCEDURE — 97140 MANUAL THERAPY 1/> REGIONS: CPT

## 2024-11-09 NOTE — PROGRESS NOTES
[facility]   Physical Therapy Daily Treatment Note      Name: Partha Curtis Jr.  Clinic Number: [id number]    Therapy Diagnosis:   Encounter Diagnoses   Name Primary?    Rupture of right hamstring tendon, initial encounter     Gait abnormality Yes    Decreased range of motion of both lower extremities     Decreased strength of lower extremity      Physician: Raffy Ascencio MD  Surgeon: Dr. Washington    Visit Date: 11/8/2024  Physician Orders: PT Eval and Treat   Medical Diagnosis from Referral: Bucket-handle tear of medial meniscus of left knee as current injury, initial encounter [S83.212A]   Evaluation Date: 8/14/2024  Authorization Period Expiration: 8/7/2025  Plan of Care Expiration: 2/25/2025  Progress Note Due: 9/20/2024  Visit # / Visits authorized: 19/30  This POC: 15   FOTO: 2/3 (8/20/24), (10/25/2024)     Date of Surgery: 8/13/2024  Return to MD: 8/28/2024, 10/2/2024     Procedure:  1. Left Knee Arthroscopy, with meniscus repair (medial OR lateral) 77840  2.  Left Knee Arthroscopy, with Microfracture 11688 - marrow stimulation procedure  3.  Left Knee Arthroscopy, debridement/shaving of articular cartilage (chondroplasty) 36744 -      Post-Op Plan:  Peripheral meniscal repair protocol      Precautions: Standard and Weightbearing     PTA Visit #: 0/5     Time In: 08:00 am   Time Out: 08:58 am   Total Billable Time: 58 minutes     Subjective     Pt reports: He saw the doctor on Wednesday and he wants him to start on some different medicine and told him he is pretty sure he tore his hamstring tendon on the right knee and with the bruising looks like 7-10 days old so he might have injured it and not know it when he squat because his left knee was bothering him so much. The swelling is still there but not as bad. His doctor put in a referral for his right knee as well so that we could address both of his legs in PT.   He was compliant with home exercise program.  Response to  "previous treatment: Improved mobility  Functional change: Ongoing    Pain: 2/10  Location: left knee      Objective      Objective Measures updated at progress report unless specified.    Observation 10/25/2024: Patient ambulates into clinic independently with no assistance and displays good nadege.     Range of Motion   Knee   Right AROM/PROM Left AROM/PROM   Flexion 120 degrees / 125 degrees  130 degrees / 130 degrees    Extension 0 degrees / 0 degrees  +1 degrees / +1 degrees (hyper)     Lower Extremity Strength (MMT):   Right  Left    Hip Flexion 4/5 4/5   Hip Abduction 4-/5 4-/5   Knee Flexion 4/5 4/5   Knee Extension 4+/5 4/5   Dorsiflexion  4/5  4+/5     FOTO = 86 (initial = 46)    Observation 11/8/2024: Patient ambulates into clinic with antalgic gait with notable decreased stance time on right lower extremity. Patient with notable ecchymosis distal hamstring insertion on right lower extremity with tenderness to palpation.     Range of Motion:  Right AROM: 0-2-110 degrees  Right PROM: 0-0-114 degrees    Left AROM: 0-0-130 degrees   Left PROM:5-0-135 degrees     Treatment     *Pt is 13 weeks 0 days s/p as of 11/8/2024.     Jarvis received the treatments listed below:      therapeutic exercises to develop strength, endurance, ROM, and flexibility for 10 minutes including:    Sidelying hip abduction on R with quad sets 2 x 12 reps   Seated Hip ER with RedTB 2 x 12 reps on L   - secondary to patient having R sh pain laying on R    manual therapy techniques: Joint mobilizations were applied to the: knee for 10 minutes, including:    Patellar mobilizations all planes III-IV  Fat pad mobilizations  EOM knee distraction     Not Today:  Extension hinge    neuromuscular re-education activities to improve: Balance, Coordination, Kinesthetic, and Proprioception for 38 minutes. The following activities were included:    Assessment as above   Neuromuscular Electrical Stimulation: Russian 10" on 30-50" off  - Quad sets with " "strap x 6'   - LAQ isometric ~70 deg x 8'     HS isometric contractions on L 10 x 10" holds   (L) LAQ 3# 3 x 10 x 3" holds   Standing TKE with RedPowerband on L with opp march 2 x 10 x 3" holds     Not Performed:  LAQ mod range 2 x 10 reps   Standing hip extension and abduction 2 x 12-15 reps each RedTB   Quad set into SLR supine 2 x 10 reps   HS isometric 45" contraction/45" relax x 5 rds  LAQ 5# 3 x 8 x 3" holds   DL heel raises slow and control 3 x 12-15 reps   SLS 3 x 30" with 2-finger support  Mini squat on fitter 3 x 8 reps     therapeutic activities to improve functional performance for 00 minutes, including:    Not Today:  DL shuttle #100 3x10  SL shuttle #50  3x10  STS with YTB around knees 3x10  Upright bike x 8' for functional mobility and cardiovascular endurance   Step up 5-inch with GreenTB TKE 3 x 8 x 3" holds   Lateral band walks GreenTB 3 laps on turf   2-up-1-down shuttle 62.7-75# 4 x 5 reps     gait training to improve functional mobility and safety for 00 minutes, including:    Patient Education and Home Exercises       Education provided:   - PT POC, Prognosis, and HEP   - Early post-op goals: importance of knee extension, edema management, quad activation/strength, and gait mechanics  - Post-op precautions, weightbearing status and mobility precautions   - Signs and symptoms of DVT and infection       Written Home Exercises Provided: yes. Exercises were reviewed and Jarvis was able to demonstrate them prior to the end of the session.  Jarvis demonstrated good  understanding of the education provided. See EMR under Patient Instructions for exercises provided during therapy sessions.    Assessment     "Jarvis" continues with right knee pain with improvements in tenderness medial knee but continues with tenderness to palpation of posterior knee at hamstring tendon insertion and notable ecchymosis present. Continued utilization of HS isomterics to help with his right knee symptoms and continued " progression with left knee strengthening with utilization of NMES to maximize quad activation. Limited loading to not increase swelling and continue to allow symptoms to improve following significant overload with deep squat a couple of weeks ago. Will continue to monitor and progress as able with improving mobility and strength of both knees to help improve overall functional mobility and gait mechanics.     Jarvis Is progressing well towards his goals.   Pt prognosis is Excellent.     Pt will continue to benefit from skilled outpatient physical therapy to address the deficits listed in the problem list box on initial evaluation, provide pt/family education and to maximize pt's level of independence in the home and community environment.     Pt's spiritual, cultural and educational needs considered and pt agreeable to plan of care and goals.     Anticipated Barriers for therapy: Scheduling, co-morbidities     Goals:  Short Term Goals: 6 weeks   Patient will be independent in initial HEP to help supplement PT. - MET  Patient will improve knee extension range of motion to 0 degrees to help with gait mechanics. - MET  Patient will improve knee flexion range of motion to >/= 90 degrees to help with ADLs. - MET  Patient will improve pain at its worst to </= 5/10 to help improve quality of life. - MET     Long Term Goals: 12-24 weeks (Progressing, not met)  Patient will be independent in updated HEP to help supplement PT and maintain gains made in PT.   Patient will improve FOTO score to >/= predicted to show improvement in condition.   Patient will improve knee extension strength to >/= 4+/5 to help with ADLs.  Patient goal: Patient will be able to return to riding his bicycle without difficulty.   Plan   Plan of care Certification: 8/14/2024 to 2/25/2025.     Outpatient Physical Therapy 1-2 times weekly for 24 weeks to include the following interventions: Electrical Stimulation , Gait Training, Manual Therapy, Moist  Heat/ Ice, Neuromuscular Re-ed, Patient Education, Self Care, Therapeutic Activities, and Therapeutic Exercise.     Flakita Thomas, PT, DPT, OCS

## 2024-11-12 ENCOUNTER — CLINICAL SUPPORT (OUTPATIENT)
Dept: REHABILITATION | Facility: HOSPITAL | Age: 64
End: 2024-11-12
Payer: COMMERCIAL

## 2024-11-12 DIAGNOSIS — M25.661 DECREASED RANGE OF MOTION OF BOTH LOWER EXTREMITIES: ICD-10-CM

## 2024-11-12 DIAGNOSIS — R26.9 GAIT ABNORMALITY: Primary | ICD-10-CM

## 2024-11-12 DIAGNOSIS — M25.662 DECREASED RANGE OF MOTION OF BOTH LOWER EXTREMITIES: ICD-10-CM

## 2024-11-12 DIAGNOSIS — R29.898 DECREASED STRENGTH OF LOWER EXTREMITY: ICD-10-CM

## 2024-11-12 PROCEDURE — 97140 MANUAL THERAPY 1/> REGIONS: CPT | Mod: CQ

## 2024-11-12 PROCEDURE — 97112 NEUROMUSCULAR REEDUCATION: CPT | Mod: CQ

## 2024-11-12 PROCEDURE — 97530 THERAPEUTIC ACTIVITIES: CPT | Mod: CQ

## 2024-11-12 NOTE — PROGRESS NOTES
[facility]   Physical Therapy Daily Treatment Note      Name: Partha Curtis Jr.  Clinic Number: [id number]    Therapy Diagnosis:   Encounter Diagnoses   Name Primary?    Gait abnormality Yes    Decreased range of motion of both lower extremities     Decreased strength of lower extremity      Physician: Raffy Ascencio MD  Surgeon: Dr. Washington    Visit Date: 11/12/2024  Physician Orders: PT Eval and Treat   Medical Diagnosis from Referral: Bucket-handle tear of medial meniscus of left knee as current injury, initial encounter [S83.212A]   Evaluation Date: 8/14/2024  Authorization Period Expiration: 8/7/2025  Plan of Care Expiration: 2/25/2025  Progress Note Due: 9/20/2024  Visit # / Visits authorized: 21/30  This POC: 15   FOTO: 2/3 (8/20/24), (10/25/2024)     Date of Surgery: 8/13/2024  Return to MD: 8/28/2024, 10/2/2024     Procedure:  1. Left Knee Arthroscopy, with meniscus repair (medial OR lateral) 55102  2.  Left Knee Arthroscopy, with Microfracture 71038 - marrow stimulation procedure  3.  Left Knee Arthroscopy, debridement/shaving of articular cartilage (chondroplasty) 73089 -      Post-Op Plan:  Peripheral meniscal repair protocol      Precautions: Standard and Weightbearing     PTA Visit #: 0/5     Time In: 08:05 am   Time Out: 9:00 am   Total Billable Time: 55 minutes (PT extender used this session)    Subjective     Pt reports: Both knees are feeling better  He was compliant with home exercise program.  Response to previous treatment: Improved mobility  Functional change: Ongoing    Pain: 2/10  Location: left knee      Objective      Objective Measures updated at progress report unless specified.    Observation 10/25/2024: Patient ambulates into clinic independently with no assistance and displays good nadege.     Range of Motion   Knee   Right AROM/PROM Left AROM/PROM   Flexion 120 degrees / 125 degrees  130 degrees / 130 degrees    Extension 0 degrees / 0 degrees   "+1 degrees / +1 degrees (hyper)     Lower Extremity Strength (MMT):   Right  Left    Hip Flexion 4/5 4/5   Hip Abduction 4-/5 4-/5   Knee Flexion 4/5 4/5   Knee Extension 4+/5 4/5   Dorsiflexion  4/5  4+/5     FOTO = 86 (initial = 46)    Observation 11/8/2024: Patient ambulates into clinic with antalgic gait with notable decreased stance time on right lower extremity. Patient with notable ecchymosis distal hamstring insertion on right lower extremity with tenderness to palpation.     Range of Motion:  Right AROM: 0-2-110 degrees  Right PROM: 0-0-114 degrees    Left AROM: 0-0-130 degrees   Left PROM:5-0-135 degrees     Treatment     *Pt is 13 weeks 4 days s/p as of 11/12/2024.     Jarvis received the treatments listed below:      therapeutic exercises to develop strength, endurance, ROM, and flexibility for 00 minutes including:    Sidelying hip abduction on R with quad sets 2 x 12 reps   Seated Hip ER with RedTB 2 x 12 reps on L   - secondary to patient having R sh pain laying on R    manual therapy techniques: Joint mobilizations were applied to the: knee for 10 minutes, including:    Patellar mobilizations all planes III-IV  Fat pad mobilizations  EOM knee distraction     Not Today:  Extension hinge    neuromuscular re-education activities to improve: Balance, Coordination, Kinesthetic, and Proprioception for 30 minutes. The following activities were included:    Neuromuscular Electrical Stimulation: Russian 10" on 30-50" off  - Quad sets with strap x 6'     - LAQ tempo 3/3/3 #3 3x10    HS isometric contractions on L 10 x 10" holds          Not Performed:  Standing TKE with RedPowerband on L with opp march 2 x 10 x 3" holds  LAQ mod range 2 x 10 reps   Standing hip extension and abduction 2 x 12-15 reps each RedTB   Quad set into SLR supine 2 x 10 reps   HS isometric 45" contraction/45" relax x 5 rds  LAQ 5# 3 x 8 x 3" holds   DL heel raises slow and control 3 x 12-15 reps   SLS 3 x 30" with 2-finger support  Mini " "squat on fitter 3 x 8 reps     therapeutic activities to improve functional performance for 20 minutes, including:  DL shuttle #100 3x10  SL shuttle #50  3x10  STS with YTB around knees 3x10  Lateral band walks GreenTB 3 laps on turf     Not Today:  Upright bike x 8' for functional mobility and cardiovascular endurance   Step up 5-inch with GreenTB TKE 3 x 8 x 3" holds     2-up-1-down shuttle 62.7-75# 4 x 5 reps     gait training to improve functional mobility and safety for 00 minutes, including:    Patient Education and Home Exercises       Education provided:   - PT POC, Prognosis, and HEP   - Early post-op goals: importance of knee extension, edema management, quad activation/strength, and gait mechanics  - Post-op precautions, weightbearing status and mobility precautions   - Signs and symptoms of DVT and infection       Written Home Exercises Provided: yes. Exercises were reviewed and Jarvis was able to demonstrate them prior to the end of the session.  Jarvis demonstrated good  understanding of the education provided. See EMR under Patient Instructions for exercises provided during therapy sessions.    Assessment     "Jarvis" was able to be progressed today 2* B LEs feeling better and not experiencing pain. Reintroduced previous session functional interventions. Will cont to progress as tolerated.     Jarvis Is progressing well towards his goals.   Pt prognosis is Excellent.     Pt will continue to benefit from skilled outpatient physical therapy to address the deficits listed in the problem list box on initial evaluation, provide pt/family education and to maximize pt's level of independence in the home and community environment.     Pt's spiritual, cultural and educational needs considered and pt agreeable to plan of care and goals.     Anticipated Barriers for therapy: Scheduling, co-morbidities     Goals:  Short Term Goals: 6 weeks   Patient will be independent in initial HEP to help supplement PT. - MET  Patient " will improve knee extension range of motion to 0 degrees to help with gait mechanics. - MET  Patient will improve knee flexion range of motion to >/= 90 degrees to help with ADLs. - MET  Patient will improve pain at its worst to </= 5/10 to help improve quality of life. - MET     Long Term Goals: 12-24 weeks (Progressing, not met)  Patient will be independent in updated HEP to help supplement PT and maintain gains made in PT.   Patient will improve FOTO score to >/= predicted to show improvement in condition.   Patient will improve knee extension strength to >/= 4+/5 to help with ADLs.  Patient goal: Patient will be able to return to riding his bicycle without difficulty.   Plan   Plan of care Certification: 8/14/2024 to 2/25/2025.     Outpatient Physical Therapy 1-2 times weekly for 24 weeks to include the following interventions: Electrical Stimulation , Gait Training, Manual Therapy, Moist Heat/ Ice, Neuromuscular Re-ed, Patient Education, Self Care, Therapeutic Activities, and Therapeutic Exercise.     Elly Ho PTA,

## 2024-11-15 ENCOUNTER — CLINICAL SUPPORT (OUTPATIENT)
Dept: REHABILITATION | Facility: HOSPITAL | Age: 64
End: 2024-11-15
Payer: COMMERCIAL

## 2024-11-15 DIAGNOSIS — M25.661 DECREASED RANGE OF MOTION OF BOTH LOWER EXTREMITIES: ICD-10-CM

## 2024-11-15 DIAGNOSIS — R26.9 GAIT ABNORMALITY: Primary | ICD-10-CM

## 2024-11-15 DIAGNOSIS — R29.898 DECREASED STRENGTH OF LOWER EXTREMITY: ICD-10-CM

## 2024-11-15 DIAGNOSIS — M25.662 DECREASED RANGE OF MOTION OF BOTH LOWER EXTREMITIES: ICD-10-CM

## 2024-11-15 PROCEDURE — 97530 THERAPEUTIC ACTIVITIES: CPT

## 2024-11-15 PROCEDURE — 97112 NEUROMUSCULAR REEDUCATION: CPT

## 2024-11-15 NOTE — PROGRESS NOTES
[facility]   Physical Therapy Daily Treatment Note      Name: Partha Curtis Jr.  Clinic Number: [id number]    Therapy Diagnosis:   Encounter Diagnoses   Name Primary?    Gait abnormality Yes    Decreased range of motion of both lower extremities     Decreased strength of lower extremity      Physician: Raffy Ascencio MD  Surgeon: Dr. Washington    Visit Date: 11/15/2024  Physician Orders: PT Eval and Treat   Medical Diagnosis from Referral: Bucket-handle tear of medial meniscus of left knee as current injury, initial encounter [S83.212A]   Evaluation Date: 8/14/2024  Authorization Period Expiration: 8/7/2025  Plan of Care Expiration: 2/25/2025  Progress Note Due: 9/20/2024  Visit # / Visits authorized: 22/30  This POC: 20   FOTO: 2/3 (8/20/24), (10/25/2024)     Date of Surgery: 8/13/2024  Return to MD: 8/28/2024, 10/2/2024     Procedure:  1. Left Knee Arthroscopy, with meniscus repair (medial OR lateral) 27042  2.  Left Knee Arthroscopy, with Microfracture 82347 - marrow stimulation procedure  3.  Left Knee Arthroscopy, debridement/shaving of articular cartilage (chondroplasty) 67941 -      Post-Op Plan:  Peripheral meniscal repair protocol      Precautions: Standard and Weightbearing     PTA Visit #: 0/5     Time In: 08:00 am   Time Out: 9:00 am   Total Billable Time: 60 minutes     Subjective     Pt reports: He is starting to feel a lot better. Not having the tenderness today in his right knee.   He was compliant with home exercise program.  Response to previous treatment: Improved mobility  Functional change: Ongoing    Pain: 2/10  Location: left knee      Objective      Objective Measures updated at progress report unless specified.    Observation 10/25/2024: Patient ambulates into clinic independently with no assistance and displays good nadege.     Range of Motion   Knee   Right AROM/PROM Left AROM/PROM   Flexion 120 degrees / 125 degrees  130 degrees / 130 degrees   "  Extension 0 degrees / 0 degrees  +1 degrees / +1 degrees (hyper)     Lower Extremity Strength (MMT):   Right  Left    Hip Flexion 4/5 4/5   Hip Abduction 4-/5 4-/5   Knee Flexion 4/5 4/5   Knee Extension 4+/5 4/5   Dorsiflexion  4/5  4+/5     FOTO = 86 (initial = 46)    Observation 11/8/2024: Patient ambulates into clinic with antalgic gait with notable decreased stance time on right lower extremity. Patient with notable ecchymosis distal hamstring insertion on right lower extremity with tenderness to palpation.     Range of Motion:  Right AROM: 0-2-110 degrees  Right PROM: 0-0-114 degrees    Left AROM: 0-0-130 degrees   Left PROM:5-0-135 degrees     11/15/2024:  Knee ext isometric at 60  - Left: 45.5 lbs, 48.1 lbs --> average 46.8 lbs    Treatment     *Pt is 13 weeks 3 days s/p as of 11/15/2024.     Jarvis received the treatments listed below:      therapeutic exercises to develop strength, endurance, ROM, and flexibility for 00 minutes including:    Sidelying hip abduction on R with quad sets 2 x 12 reps   Seated Hip ER with RedTB 2 x 12 reps on L   - secondary to patient having R sh pain laying on R    manual therapy techniques: Joint mobilizations were applied to the: knee for 05 minutes, including:    Patellar mobilizations all planes III-IV  Fat pad mobilizations    Not Today:  Extension hinge  EOM knee distraction   neuromuscular re-education activities to improve: Balance, Coordination, Kinesthetic, and Proprioception for 30 minutes. The following activities were included:    Pt education   Neuromuscular Electrical Stimulation: Russian 10" on 30-50" off  - LAQ isometric ~60 deg x 12 min     LAQ tempo 3/3/3 with 3# 2 x 12 reps   (R) HS isometric contractions on L 10 x 10" holds   DL HS bridge 6-inch 2 x 10 x 3" holds     Not Performed:  Standing TKE with RedPowerband on L with opp march 2 x 10 x 3" holds  LAQ mod range 2 x 10 reps   Standing hip extension and abduction 2 x 12-15 reps each RedTB   Quad set " "into SLR supine 2 x 10 reps   HS isometric 45" contraction/45" relax x 5 rds  LAQ 5# 3 x 8 x 3" holds   DL heel raises slow and control 3 x 12-15 reps   SLS 3 x 30" with 2-finger support  Mini squat on fitter 3 x 8 reps     therapeutic activities to improve functional performance for 25 minutes, including:    DL shuttle press #100 3 x 10 reps   - emphasis on slow and controlled  SL shuttle press 75# 3 x 8 reps   Step up on 4-inch with GreenTB TKE 3 x 8 reps   Standing hip AB with GreenTB 2 x 12 reps     Not Today:  Upright bike x 8' for functional mobility and cardiovascular endurance   Step up 5-inch with GreenTB TKE 3 x 8 x 3" holds   2-up-1-down shuttle 62.7-75# 4 x 5 reps   STS with YTB around knees 3x10  Lateral band walks GreenTB 3 laps on turf     gait training to improve functional mobility and safety for 00 minutes, including:    Patient Education and Home Exercises       Education provided:   - PT POC, Prognosis, and HEP   - Early post-op goals: importance of knee extension, edema management, quad activation/strength, and gait mechanics  - Post-op precautions, weightbearing status and mobility precautions   - Signs and symptoms of DVT and infection       Written Home Exercises Provided: yes. Exercises were reviewed and Jarvis was able to demonstrate them prior to the end of the session.  Jarvis demonstrated good  understanding of the education provided. See EMR under Patient Instructions for exercises provided during therapy sessions.    Assessment     "Jarvis" presented to today's therapy session with significant improvement in symptoms in bilateral knees. Continued focus on progressing with hamstring work on right and quad strength on left. Continued NMES to maximize quad activation with good tolerance. Able to progress with double leg hamstring bridges as well with good tolerance. Continued progression with gentle loading and return to progression with functional activities. Tolerated all interventions well " and will continue to monitor and progress as able/tolerated.     Jarvis Is progressing well towards his goals.   Pt prognosis is Excellent.     Pt will continue to benefit from skilled outpatient physical therapy to address the deficits listed in the problem list box on initial evaluation, provide pt/family education and to maximize pt's level of independence in the home and community environment.     Pt's spiritual, cultural and educational needs considered and pt agreeable to plan of care and goals.     Anticipated Barriers for therapy: Scheduling, co-morbidities     Goals:  Short Term Goals: 6 weeks   Patient will be independent in initial HEP to help supplement PT. - MET  Patient will improve knee extension range of motion to 0 degrees to help with gait mechanics. - MET  Patient will improve knee flexion range of motion to >/= 90 degrees to help with ADLs. - MET  Patient will improve pain at its worst to </= 5/10 to help improve quality of life. - MET     Long Term Goals: 12-24 weeks (Progressing, not met)  Patient will be independent in updated HEP to help supplement PT and maintain gains made in PT.   Patient will improve FOTO score to >/= predicted to show improvement in condition.   Patient will improve knee extension strength to >/= 4+/5 to help with ADLs.  Patient goal: Patient will be able to return to riding his bicycle without difficulty.   Plan   Plan of care Certification: 8/14/2024 to 2/25/2025.     Outpatient Physical Therapy 1-2 times weekly for 24 weeks to include the following interventions: Electrical Stimulation , Gait Training, Manual Therapy, Moist Heat/ Ice, Neuromuscular Re-ed, Patient Education, Self Care, Therapeutic Activities, and Therapeutic Exercise.     Flakita Thomas, PT, DPT, OCS   Co-treated by: Heath Del Angel, SPT

## 2024-11-18 ENCOUNTER — PATIENT MESSAGE (OUTPATIENT)
Dept: ADMINISTRATIVE | Facility: HOSPITAL | Age: 64
End: 2024-11-18
Payer: COMMERCIAL

## 2024-11-20 ENCOUNTER — OFFICE VISIT (OUTPATIENT)
Dept: SPORTS MEDICINE | Facility: CLINIC | Age: 64
End: 2024-11-20
Payer: COMMERCIAL

## 2024-11-20 VITALS
BODY MASS INDEX: 27.32 KG/M2 | WEIGHT: 190.81 LBS | HEIGHT: 70 IN | DIASTOLIC BLOOD PRESSURE: 79 MMHG | HEART RATE: 60 BPM | SYSTOLIC BLOOD PRESSURE: 121 MMHG

## 2024-11-20 DIAGNOSIS — S76.311A RUPTURE OF RIGHT HAMSTRING TENDON, INITIAL ENCOUNTER: Primary | ICD-10-CM

## 2024-11-20 DIAGNOSIS — Z96.651 PRESENCE OF RIGHT ARTIFICIAL KNEE JOINT: ICD-10-CM

## 2024-11-20 DIAGNOSIS — M25.461 EFFUSION OF RIGHT KNEE: ICD-10-CM

## 2024-11-20 PROCEDURE — 99999 PR PBB SHADOW E&M-EST. PATIENT-LVL IV: CPT | Mod: PBBFAC,,, | Performed by: ORTHOPAEDIC SURGERY

## 2024-11-20 NOTE — PROGRESS NOTES
ESTABLISHED PATIENT    History 11/20/2024  Jarvis returns for follow up evaluation of his right hamstring and right knee. He is still having pain and swelling    History 11/6/2024   64 y.o. Male with a history of right knee pain for whom I previously performed a left knee meniscus repair. He has a history of a right knee replacement in 2010.  He states that his physical therapist recommended he come and see us due to pain and swelling that began 11/5/24. He states that this came on atraumatic, but he recalls several weeks ago he was he was squatting and got stuck in a squat position, but he doesn't recall immediate pain or popping after or during this event     - mechanical symptoms, - instability        PAST MEDICAL HISTORY    Past Medical History:   Diagnosis Date    Allergy     Carotid artery occlusion     Chronic back pain     Colonic polyp     Genetic testing     MUTYH mutation-negative    Hyperlipidemia     Hypertension     Paroxysmal atrial fibrillation 2/28/2024    Sleep apnea     Type 2 diabetes mellitus with stage 3 chronic kidney disease, without long-term current use of insulin 4/2/2020       PAST SURGICAL HISTORY     Past Surgical History:   Procedure Laterality Date    ANKLE SURGERY Left     2    APPENDECTOMY      at age 20    ARTHROSCOPIC CHONDROPLASTY OF KNEE JOINT Left 8/13/2024    Procedure: ARTHROSCOPY, KNEE, WITH CHONDROPLASTY;  Surgeon: Kai Washington MD;  Location: [facility] OR;  Service: Orthopedics;  Laterality: Left;    ARTHROSCOPY, KNEE, WITH ABRASION ARTHROPLASTY OR MICROFRACTURE Left 8/13/2024    Procedure: ARTHROSCOPY, KNEE, WITH  MICROFRACTURE;  Surgeon: Kai Washington MD;  Location: [facility] OR;  Service: Orthopedics;  Laterality: Left;    ARTHROSCOPY,KNEE,WITH MENISCUS REPAIR Left 8/13/2024    Procedure: ARTHROSCOPY,KNEE,WITH MENISCUS REPAIR;  Surgeon: Kai Washington MD;  Location: [facility] OR;  Service: Orthopedics;  Laterality: Left;  NO REGIONAL BLOCK    CAROTID ENDARTERECTOMY Right  07/15/2015    CARPAL TUNNEL RELEASE Bilateral     COLONOSCOPY N/A 12/14/2018    Procedure: COLONOSCOPYSuprep;  Surgeon: Silver Selby MD;  Location: [facility] ENDO;  Service: Endoscopy;  Laterality: N/A;    COLONOSCOPY N/A 10/2/2024    Procedure: COLONOSCOPY;  Surgeon: Heath Morrow MD;  Location: [facility] ENDO;  Service: Endoscopy;  Laterality: N/A;  Ref by Dr STEVE Cavazos, Ozempic, pt has unopened Sutab, portal - PC  9/25/24-LVM for precall, portal-DS. 10/1/24 Pt. called and confirmed arrival time.EC    EPIDURAL STEROID INJECTION N/A 2/26/2024    Procedure: CERVICAL C7/T1 IL SELENA;  Surgeon: Gokul Fung MD;  Location: [facility] PAIN MGT;  Service: Pain Management;  Laterality: N/A;  [phone]  2 WK F/U SERGEI    EPIDURAL STEROID INJECTION N/A 5/23/2024    Procedure: LUMBAR L4/5 IL SELENA *ASPIRIN OTC* HOLD FOR 5 DAYS;  Surgeon: Gokul Fung MD;  Location: [facility] PAIN MGT;  Service: Pain Management;  Laterality: N/A;  [phone]  2 WK F/U NOHELIA    EPIDURAL STEROID INJECTION N/A 10/21/2024    Procedure: LUMBAR L5/S1 IL SELENA *ASPIRIN OTC* HOLD FOR 5 DAYS  **ANGELICA PT**;  Surgeon: Sofia Sheikh MD;  Location: [facility] PAIN MGT;  Service: Pain Management;  Laterality: N/A;  [phone]  2 WK F/U NOHELIA    JOINT REPLACEMENT  9/2010    NASAL SEPTUM SURGERY      TOTAL KNEE ARTHROPLASTY Right     6 knee surgeries    TRANSESOPHAGEAL ECHOCARDIOGRAM WITH POSSIBLE CARDIOVERSION (ELIZABETH W/ POSS CARDIOVERSION) N/A 2/28/2024    Procedure: Transesophageal echo (ELIZABETH) intra-procedure log documentation;  Surgeon: Ahmet De La Cruz MD;  Location: [facility] CATH LAB/EP;  Service: Cardiology;  Laterality: N/A;       FAMILY HISTORY    Family History   Problem Relation Name Age of Onset    Brain cancer Mother Klarissa 67    Cancer Mother Klarissa     Hypertension Father Keanu     Lung cancer Father Keanu         x2 (PAUL initially- treated surgically only, then recurred distantly) (smoker, & had been exposed to many chemicals)    Cancer Father Keanu      Breast cancer Sister  58    Genetic Disorder Sister          monoallelic MUTYH mutation    Seizures Daughter      Cancer Maternal Grandfather Valerio     Brain cancer Paternal Grandfather  73    Breast cancer Maternal Cousin  57    Aneurysm Other mgm's father 48        brain    Ulcerative colitis Other brother's son        SOCIAL HISTORY    Social History     Socioeconomic History    Marital status:    Tobacco Use    Smoking status: Never     Passive exposure: Never    Smokeless tobacco: Never   Substance and Sexual Activity    Alcohol use: Yes     Alcohol/week: 2.0 standard drinks of alcohol     Types: 2 Cans of beer per week     Comment: 6 beers per month    Drug use: Never    Sexual activity: Yes     Partners: Female     Birth control/protection: None   Social History Narrative    5/11/2021: . He lives with his wife and 2 kids. (5 kids total). He has one dog, one cat, no more chickens at home. One dog recently passed away. No smokers at home.      Social Drivers of Health     Financial Resource Strain: Low Risk  (3/8/2024)    Overall Financial Resource Strain (CARDIA)     Difficulty of Paying Living Expenses: Not very hard   Food Insecurity: No Food Insecurity (3/8/2024)    Hunger Vital Sign     Worried About Running Out of Food in the Last Year: Never true     Ran Out of Food in the Last Year: Never true   Transportation Needs: No Transportation Needs (3/8/2024)    PRAPARE - Transportation     Lack of Transportation (Medical): No     Lack of Transportation (Non-Medical): No   Physical Activity: Unknown (3/8/2024)    Exercise Vital Sign     Days of Exercise per Week: 0 days   Stress: Stress Concern Present (3/8/2024)    [employer] - Occupational Stress Questionnaire     Feeling of Stress : To some extent   Housing Stability: Low Risk  (3/8/2024)    Housing Stability Vital Sign     Unable to Pay for Housing in the Last Year: No     Number of Places Lived in the Last  "Year: 1     Unstable Housing in the Last Year: No       MEDICATIONS      Current Outpatient Medications:     amitriptyline (ELAVIL) 75 MG tablet, Take 1 tablet by mouth in the evening, Disp: 90 tablet, Rfl: 3    aspirin (ECOTRIN) 81 MG EC tablet, Take 1 tablet (81 mg total) by mouth once daily., Disp: 28 tablet, Rfl: 0    BD LUER-RUSS SYRINGE 3 mL 25 gauge x 1" Syrg, USE ONE SYRINGE EVERY 14 DAYS WITH TESTOSTERONE, Disp: 100 each, Rfl: 2    candesartan (ATACAND) 32 MG tablet, Take 1 tablet (32 mg total) by mouth once daily., Disp: 90 tablet, Rfl: 3    cloNIDine (CATAPRES) 0.1 MG tablet, Take 1 tablet (0.1 mg total) by mouth 2 (two) times daily., Disp: 180 tablet, Rfl: 3    cyanocobalamin (VITAMIN B-12) 1000 MCG tablet, Take 1 tablet (1,000 mcg total) by mouth once daily., Disp: 90 tablet, Rfl: 4    ergocalciferol (ERGOCALCIFEROL) 50,000 unit Cap, Take 1 capsule by mouth once a week, Disp: 12 capsule, Rfl: 0    ezetimibe (ZETIA) 10 mg tablet, Take 1 tablet (10 mg total) by mouth once daily., Disp: 90 tablet, Rfl: 3    fluticasone propionate (FLONASE) 50 mcg/actuation nasal spray, 2 sprays by Each Nostril route., Disp: , Rfl:     gabapentin (NEURONTIN) 800 MG tablet, Take 1 tablet (800 mg total) by mouth every evening., Disp: 90 tablet, Rfl: 1    glucose 4 GM chewable tablet, Take 4 tablets (16 g total) by mouth as needed for Low blood sugar., Disp: 30 tablet, Rfl: 12    HYDROcodone-acetaminophen (NORCO) 7.5-325 mg per tablet, Take 1 tablet by mouth every 6 (six) hours as needed for Pain., Disp: 20 tablet, Rfl: 0    ipratropium (ATROVENT) 42 mcg (0.06 %) nasal spray, 2 sprays by Nasal route 2 (two) times daily as needed for Rhinitis., Disp: 15 mL, Rfl: 0    methylPREDNISolone (MEDROL DOSEPACK) 4 mg tablet, use as directed, Disp: 21 each, Rfl: 0    metoprolol succinate (TOPROL-XL) 200 MG 24 hr tablet, Take 1 tablet (200 mg total) by mouth once daily. (Patient taking differently: Take 200 mg by mouth every evening.), " "Disp: 90 tablet, Rfl: 3    oxyCODONE-acetaminophen (PERCOCET)  mg per tablet, Take 1 tablet by mouth every 12 (twelve) hours as needed for Pain., Disp: 60 tablet, Rfl: 0    rosuvastatin (CRESTOR) 40 MG Tab, Take 1 tablet (40 mg total) by mouth every evening., Disp: 90 tablet, Rfl: 3    semaglutide (OZEMPIC) 2 mg/dose (8 mg/3 mL) PnIj, Inject 2 mg into the skin every 7 days., Disp: 9 mL, Rfl: 3    sod sulf-pot chloride-mag sulf (SUTAB) 1.479-0.188- 0.225 gram tablet, Take 12 tablets by mouth once daily. Take according to package instructions with indicated amount of water., Disp: 24 tablet, Rfl: 0    syringe with needle, safety (BD INTEGRA SYRINGE) 3 mL 22 gauge x 1 1/2" Syrg, Inject 1 Syringe as directed once a week., Disp: 6 each, Rfl: 3    testosterone cypionate (DEPOTESTOTERONE CYPIONATE) 200 mg/mL injection, Inject 1 mL (200 mg total) into the muscle every 14 (fourteen) days., Disp: 2 mL, Rfl: 5    valACYclovir (VALTREX) 1000 MG tablet, Take 2 tablets (2,000 mg total) by mouth every 12 (twelve) hours., Disp: 4 tablet, Rfl: 3  No current facility-administered medications for this visit.    Facility-Administered Medications Ordered in Other Visits:     0.9%  NaCl infusion, , Intravenous, Continuous, Kendell Hoffman MD    0.9%  NaCl infusion, , Intravenous, Continuous, Gerardo Uribe MD    ALLERGIES     Review of patient's allergies indicates:   Allergen Reactions    Stadol [butorphanol tartrate] Rash     Swelling in face    Strawberries [strawberry] Rash         REVIEW OF SYSTEMS   Constitution: Negative. Negative for chills, fever and night sweats.   HENT: Negative for congestion and headaches.    Eyes: Negative for blurred vision, left vision loss and right vision loss.   Cardiovascular: Negative for chest pain and syncope.   Respiratory: Negative for cough and shortness of breath.    Endocrine: Negative for polydipsia, polyphagia and polyuria.   Hematologic/Lymphatic: Negative for bleeding problem. " "Does not bruise/bleed easily.   Skin: Negative for dry skin, itching and rash.   Musculoskeletal: Negative for falls. Positive for right knee pain   Gastrointestinal: Negative for abdominal pain and bowel incontinence.   Genitourinary: Negative for bladder incontinence and nocturia.   Neurological: Negative for disturbances in coordination, loss of balance and seizures.   Psychiatric/Behavioral: Negative for depression. The patient does not have insomnia.    Allergic/Immunologic: Negative for hives and persistent infections.     PHYSICAL EXAMINATION    Vitals: /79   Pulse 60   Ht 5' 10" (1.778 m)   Wt 86.5 kg (190 lb 12.9 oz)   BMI 27.38 kg/m²     General: The patient appears active and healthy with no apparent physical problems.  No disturbance of mood or affect is demonstrated. Alert and Oriented.    HEENT: Eyes normal, pupils equally round, nose normal.    Resp: Equal and symmetrical chest rises. No wheezing    CV: Regular rate    Neck: Supple; nonpainful range of motion.    Extremities: no cyanosis, clubbing, edema, or diffuse swelling.  Palpable pulses, good capillary refill of the digits.  No coolness, discoloration, edema or obvious varicosities.    Skin: no lesions noted.    Lymphatic: no detected adenopathy in the upper or lower extremities.    Neurologic: normal mental status, normal reflexes, normal gait and balance.  Patient is alert and oriented to person, place and time.  No flaccidity or spasticity is noted.  No motor or sensory deficits are noted.  Light touch is intact    Orthopaedic: KNEE EXAM - RIGHT    Inspection:   Normal skin color and appearance with well-healed scars, ecchymosis over hamstring and no effusion.      ROM:   0° - 135°.      Palpation:   There is no tenderness along medial joint line, medial plica, medial collateral ligament, pes bursa, lateral joint line, iliotibial band, lateral collateral ligament, popliteal fossa, patellar tendon, or quadriceps tendon. + tenderness " medial distal hamstring tendons    Stability: - anterior drawer, - Lachman, - pivot shift and - posterior drawer.      No instability with varus or valgus stress at 0° or 30°. Negative dial  test at 30° and 90°.    Tests:   - Jasons test.  - patellar compression - grind test, - patellofemoral crepitus.  There is no patellar apprehension.     - J Sign. - Sofia's. - patellar tilt. - Radha. Lateral patella translation  2 quadrants. Medial patella translation 2 quadrants    Motor:   Quadriceps strength is 5/5 and hamstrings strength is 5/5. Hamstrings show no tightness.      Neuro:   Distal neurovascular status is normal    Vascular: Negative Homans and no palpable popliteal cords. 2+ pedal pulse with brisk cap refill    Gait Normal      IMAGING    PROCEDURE:  DIAGNOSTIC ULTRASOUND performed on 11/20/2024  FOCUSED:  1. Diagnostic Extremity - [facility]-Sports Ultrasound was recommended due to right knee posterior pain.  2. Diagnostic Extremity - [facility]-Sports Ultrasound Performed: Kai Washington Extremity Study: right knee.    TECHNIQUE: Real time ultrasound examination of the right knee was performed with SonGaston Labste Edge 2, 9-L MHz linear probe(s).  Multiple long axis and short axis sonographic images of the knee were taken.    FINDINGS: The images are of adequate diagnostic quality with identification of all echogenic structures made except for the vascular structures unless otherwise noted.     There appears to be an area of hematoma at the posterior medial aspect of the knee.  The hamstring tendons appear intact.  This appears to be multiloculated.      Ultrasound images were directly reviewed with the patient and then a treatment plan was discussed.          IMPRESSION       ICD-10-CM ICD-9-CM   1. Rupture of right hamstring tendon, initial encounter  S76.311A 843.8   2. Effusion of right knee  M25.461 719.06       MEDICATIONS PRESCRIBED      None     RECOMMENDATIONS     MRI of the right knee with metal suppression  We  discussed a possible open excision of the consolidated hematoma.  He will follow up after the MRI      All questions were answered, pt will contact us for questions or concerns in the interim.

## 2024-11-21 NOTE — PROGRESS NOTES
ESTABLISHED PATIENT    History 11/25/2024  Jarvis returns to clinic today to discuss results of his MRI for his right posterior knee pain.     History 11/20/2024  Jarvis returns for follow up evaluation of his right hamstring and right knee. He is still having pain and swelling    History 11/6/2024   64 y.o. Male with a history of right knee pain for whom I previously performed a left knee meniscus repair. He has a history of a right knee replacement in 2010.  He states that his physical therapist recommended he come and see us due to pain and swelling that began 11/5/24. He states that this came on atraumatic, but he recalls several weeks ago he was he was squatting and got stuck in a squat position, but he doesn't recall immediate pain or popping after or during this event     - mechanical symptoms, - instability        PAST MEDICAL HISTORY    Past Medical History:   Diagnosis Date    Allergy     Carotid artery occlusion     Chronic back pain     Colonic polyp     Genetic testing     MUTYH mutation-negative    Hyperlipidemia     Hypertension     Paroxysmal atrial fibrillation 2/28/2024    Sleep apnea     Type 2 diabetes mellitus with stage 3 chronic kidney disease, without long-term current use of insulin 4/2/2020       PAST SURGICAL HISTORY     Past Surgical History:   Procedure Laterality Date    ANKLE SURGERY Left     2    APPENDECTOMY      at age 20    ARTHROSCOPIC CHONDROPLASTY OF KNEE JOINT Left 8/13/2024    Procedure: ARTHROSCOPY, KNEE, WITH CHONDROPLASTY;  Surgeon: Kai Washington MD;  Location: [facility] OR;  Service: Orthopedics;  Laterality: Left;    ARTHROSCOPY, KNEE, WITH ABRASION ARTHROPLASTY OR MICROFRACTURE Left 8/13/2024    Procedure: ARTHROSCOPY, KNEE, WITH  MICROFRACTURE;  Surgeon: Kai Washington MD;  Location: [facility] OR;  Service: Orthopedics;  Laterality: Left;    ARTHROSCOPY,KNEE,WITH MENISCUS REPAIR Left 8/13/2024    Procedure: ARTHROSCOPY,KNEE,WITH MENISCUS REPAIR;  Surgeon: Kai Washington MD;   Location: [facility] OR;  Service: Orthopedics;  Laterality: Left;  NO REGIONAL BLOCK    CAROTID ENDARTERECTOMY Right 07/15/2015    CARPAL TUNNEL RELEASE Bilateral     COLONOSCOPY N/A 12/14/2018    Procedure: COLONOSCOPYSuprep;  Surgeon: Silver Selby MD;  Location: [facility] ENDO;  Service: Endoscopy;  Laterality: N/A;    COLONOSCOPY N/A 10/2/2024    Procedure: COLONOSCOPY;  Surgeon: Heath Morrow MD;  Location: [facility] ENDO;  Service: Endoscopy;  Laterality: N/A;  Ref by Leonor Oneill, pt has unopened Sutab, portal - PC  9/25/24-LVM for precall, portal-DS. 10/1/24 Pt. called and confirmed arrival time.EC    EPIDURAL STEROID INJECTION N/A 2/26/2024    Procedure: CERVICAL C7/T1 IL SELENA;  Surgeon: Gokul Fung MD;  Location: [facility] PAIN MGT;  Service: Pain Management;  Laterality: N/A;  [phone]  2 WK F/U SERGEI    EPIDURAL STEROID INJECTION N/A 5/23/2024    Procedure: LUMBAR L4/5 IL SELENA *ASPIRIN OTC* HOLD FOR 5 DAYS;  Surgeon: Gokul Fung MD;  Location: [facility] PAIN MGT;  Service: Pain Management;  Laterality: N/A;  [phone]  2 WK F/U NOHELIA    EPIDURAL STEROID INJECTION N/A 10/21/2024    Procedure: LUMBAR L5/S1 IL SELENA *ASPIRIN OTC* HOLD FOR 5 DAYS  **EISSA PT**;  Surgeon: Sofia Sheikh MD;  Location: [facility] PAIN MGT;  Service: Pain Management;  Laterality: N/A;  [phone]  2 WK F/U NOHELIA    JOINT REPLACEMENT  9/2010    NASAL SEPTUM SURGERY      TOTAL KNEE ARTHROPLASTY Right     6 knee surgeries    TRANSESOPHAGEAL ECHOCARDIOGRAM WITH POSSIBLE CARDIOVERSION (ELIZABETH W/ POSS CARDIOVERSION) N/A 2/28/2024    Procedure: Transesophageal echo (ELIZABETH) intra-procedure log documentation;  Surgeon: Ahmet De La Cruz MD;  Location: [facility] CATH LAB/EP;  Service: Cardiology;  Laterality: N/A;       FAMILY HISTORY    Family History   Problem Relation Name Age of Onset    Brain cancer Mother Klarissa 67    Cancer Mother Klarissa     Hypertension Father Keanu     Lung cancer Father Keanu         x2 (PAUL initially- treated  surgically only, then recurred distantly) (smoker, & had been exposed to many chemicals)    Cancer Father Keanu     Breast cancer Sister  58    Genetic Disorder Sister          monoallelic MUTYH mutation    Seizures Daughter      Cancer Maternal Grandfather Valerio     Brain cancer Paternal Grandfather  73    Breast cancer Maternal Cousin  57    Aneurysm Other mgm's father 48        brain    Ulcerative colitis Other brother's son        SOCIAL HISTORY    Social History     Socioeconomic History    Marital status:    Tobacco Use    Smoking status: Never     Passive exposure: Never    Smokeless tobacco: Never   Substance and Sexual Activity    Alcohol use: Yes     Alcohol/week: 2.0 standard drinks of alcohol     Types: 2 Cans of beer per week     Comment: 6 beers per month    Drug use: Never    Sexual activity: Yes     Partners: Female     Birth control/protection: None   Social History Narrative    5/11/2021: . He lives with his wife and 2 kids. (5 kids total). He has one dog, one cat, no more chickens at home. One dog recently passed away. No smokers at home.      Social Drivers of Health     Financial Resource Strain: Low Risk  (3/8/2024)    Overall Financial Resource Strain (CARDIA)     Difficulty of Paying Living Expenses: Not very hard   Food Insecurity: No Food Insecurity (3/8/2024)    Hunger Vital Sign     Worried About Running Out of Food in the Last Year: Never true     Ran Out of Food in the Last Year: Never true   Transportation Needs: No Transportation Needs (3/8/2024)    PRAPARE - Transportation     Lack of Transportation (Medical): No     Lack of Transportation (Non-Medical): No   Physical Activity: Unknown (3/8/2024)    Exercise Vital Sign     Days of Exercise per Week: 0 days   Stress: Stress Concern Present (3/8/2024)    [employer] - Occupational Stress Questionnaire     Feeling of Stress : To some extent   Housing Stability: Low Risk  (3/8/2024)     "Housing Stability Vital Sign     Unable to Pay for Housing in the Last Year: No     Number of Places Lived in the Last Year: 1     Unstable Housing in the Last Year: No       MEDICATIONS      Current Outpatient Medications:     amitriptyline (ELAVIL) 75 MG tablet, Take 1 tablet by mouth in the evening, Disp: 90 tablet, Rfl: 3    aspirin (ECOTRIN) 81 MG EC tablet, Take 1 tablet (81 mg total) by mouth once daily., Disp: 28 tablet, Rfl: 0    BD LUER-RUSS SYRINGE 3 mL 25 gauge x 1" Syrg, USE ONE SYRINGE EVERY 14 DAYS WITH TESTOSTERONE, Disp: 100 each, Rfl: 2    candesartan (ATACAND) 32 MG tablet, Take 1 tablet (32 mg total) by mouth once daily., Disp: 90 tablet, Rfl: 3    cloNIDine (CATAPRES) 0.1 MG tablet, Take 1 tablet (0.1 mg total) by mouth 2 (two) times daily., Disp: 180 tablet, Rfl: 3    cyanocobalamin (VITAMIN B-12) 1000 MCG tablet, Take 1 tablet (1,000 mcg total) by mouth once daily., Disp: 90 tablet, Rfl: 4    ergocalciferol (ERGOCALCIFEROL) 50,000 unit Cap, Take 1 capsule by mouth once a week, Disp: 12 capsule, Rfl: 0    ezetimibe (ZETIA) 10 mg tablet, Take 1 tablet (10 mg total) by mouth once daily., Disp: 90 tablet, Rfl: 3    fluticasone propionate (FLONASE) 50 mcg/actuation nasal spray, 2 sprays by Each Nostril route., Disp: , Rfl:     gabapentin (NEURONTIN) 800 MG tablet, Take 1 tablet (800 mg total) by mouth every evening., Disp: 90 tablet, Rfl: 1    glucose 4 GM chewable tablet, Take 4 tablets (16 g total) by mouth as needed for Low blood sugar., Disp: 30 tablet, Rfl: 12    HYDROcodone-acetaminophen (NORCO) 7.5-325 mg per tablet, Take 1 tablet by mouth every 6 (six) hours as needed for Pain., Disp: 20 tablet, Rfl: 0    hydrocortisone 2.5 % cream, Apply to face twice daily for flares as needed, Disp: 40 g, Rfl: 2    ipratropium (ATROVENT) 42 mcg (0.06 %) nasal spray, 2 sprays by Nasal route 2 (two) times daily as needed for Rhinitis., Disp: 15 mL, Rfl: 0    ketoconazole (NIZORAL) 2 % cream, Apply twice " "daily to affected area of skin, Disp: 60 g, Rfl: 2    metoprolol succinate (TOPROL-XL) 200 MG 24 hr tablet, Take 1 tablet (200 mg total) by mouth once daily. (Patient taking differently: Take 200 mg by mouth every evening.), Disp: 90 tablet, Rfl: 3    oxyCODONE-acetaminophen (PERCOCET)  mg per tablet, Take 1 tablet by mouth every 12 (twelve) hours as needed for Pain., Disp: 60 tablet, Rfl: 0    rosuvastatin (CRESTOR) 40 MG Tab, Take 1 tablet (40 mg total) by mouth every evening., Disp: 90 tablet, Rfl: 3    semaglutide (OZEMPIC) 2 mg/dose (8 mg/3 mL) PnIj, Inject 2 mg into the skin every 7 days., Disp: 9 mL, Rfl: 3    sod sulf-pot chloride-mag sulf (SUTAB) 1.479-0.188- 0.225 gram tablet, Take 12 tablets by mouth once daily. Take according to package instructions with indicated amount of water., Disp: 24 tablet, Rfl: 0    syringe with needle, safety (BD INTEGRA SYRINGE) 3 mL 22 gauge x 1 1/2" Syrg, Inject 1 Syringe as directed once a week., Disp: 6 each, Rfl: 3    testosterone cypionate (DEPOTESTOTERONE CYPIONATE) 200 mg/mL injection, Inject 1 mL (200 mg total) into the muscle every 14 (fourteen) days., Disp: 2 mL, Rfl: 5    valACYclovir (VALTREX) 1000 MG tablet, Take 2 tablets (2,000 mg total) by mouth every 12 (twelve) hours., Disp: 4 tablet, Rfl: 3  No current facility-administered medications for this visit.    Facility-Administered Medications Ordered in Other Visits:     0.9%  NaCl infusion, , Intravenous, Continuous, Kendell Hoffman MD    0.9%  NaCl infusion, , Intravenous, Continuous, Gerardo Uribe MD    ALLERGIES     Review of patient's allergies indicates:   Allergen Reactions    Stadol [butorphanol tartrate] Rash     Swelling in face    Strawberries [strawberry] Rash         REVIEW OF SYSTEMS   Constitution: Negative. Negative for chills, fever and night sweats.   HENT: Negative for congestion and headaches.    Eyes: Negative for blurred vision, left vision loss and right vision loss. " "  Cardiovascular: Negative for chest pain and syncope.   Respiratory: Negative for cough and shortness of breath.    Endocrine: Negative for polydipsia, polyphagia and polyuria.   Hematologic/Lymphatic: Negative for bleeding problem. Does not bruise/bleed easily.   Skin: Negative for dry skin, itching and rash.   Musculoskeletal: Negative for falls. Positive for right knee pain   Gastrointestinal: Negative for abdominal pain and bowel incontinence.   Genitourinary: Negative for bladder incontinence and nocturia.   Neurological: Negative for disturbances in coordination, loss of balance and seizures.   Psychiatric/Behavioral: Negative for depression. The patient does not have insomnia.    Allergic/Immunologic: Negative for hives and persistent infections.     PHYSICAL EXAMINATION    Vitals: /74   Pulse 80   Ht 5' 10" (1.778 m)   Wt 86 kg (189 lb 7.8 oz)   BMI 27.19 kg/m²     General: The patient appears active and healthy with no apparent physical problems.  No disturbance of mood or affect is demonstrated. Alert and Oriented.    HEENT: Eyes normal, pupils equally round, nose normal.    Resp: Equal and symmetrical chest rises. No wheezing    CV: Regular rate    Neck: Supple; nonpainful range of motion.    Extremities: no cyanosis, clubbing, edema, or diffuse swelling.  Palpable pulses, good capillary refill of the digits.  No coolness, discoloration, edema or obvious varicosities.    Skin: no lesions noted.    Lymphatic: no detected adenopathy in the upper or lower extremities.    Neurologic: normal mental status, normal reflexes, normal gait and balance.  Patient is alert and oriented to person, place and time.  No flaccidity or spasticity is noted.  No motor or sensory deficits are noted.  Light touch is intact    Orthopaedic: KNEE EXAM - RIGHT    Inspection:   Normal skin color and appearance with well-healed scars, ecchymosis over hamstring and no effusion.      ROM:   0° - 135°.      Palpation:   There " is no tenderness along medial joint line, medial plica, medial collateral ligament, pes bursa, lateral joint line, iliotibial band, lateral collateral ligament, popliteal fossa, patellar tendon, or quadriceps tendon. + tenderness medial distal hamstring tendons    Stability: - anterior drawer, - Lachman, - pivot shift and - posterior drawer.      No instability with varus or valgus stress at 0° or 30°. Negative dial  test at 30° and 90°.    Tests:   - Jasons test.  - patellar compression - grind test, - patellofemoral crepitus.  There is no patellar apprehension.     - J Sign. - Sofia's. - patellar tilt. - Radha. Lateral patella translation  2 quadrants. Medial patella translation 2 quadrants    Motor:   Quadriceps strength is 5/5 and hamstrings strength is 5/5. Hamstrings show no tightness.      Neuro:   Distal neurovascular status is normal    Vascular: Negative Homans and no palpable popliteal cords. 2+ pedal pulse with brisk cap refill    Gait Normal      MRI Knee Without Contrast Right  Narrative: EXAMINATION:  MRI KNEE WITHOUT CONTRAST RIGHT    CLINICAL HISTORY:  Knee replacement, soft-tissue abnormality suspected;Effusion;Presence of right artificial knee joint    TECHNIQUE:  Multiplanar, multisequence images were performed about the RIGHT knee.    COMPARISON:  11/6/2024    FINDINGS:  Initial radiographs for comparison dated 11/6/2024 demonstrate total knee arthroplasty.  As compared to 05/10/2024 there appears to be progressive osseous remodeling/osseous irregularity at the lateral aspect of the distal femur.      Periprosthetic Bone: No evidence for stress reaction or fracture..  No evidence for hyperintensity of marrow, fracture line, or periosteal thickening.    Bone-Implant Interface: Complete osseous integration manifest by direct contact between a sharply demarcated implant and the surrounding trabecular bone without separation.    Synovium: Nonspecific synovitis manifest by simple joint fluid of  uniform fluid signal intensity and a thickened synovial wall lining measuring approximately 2 mm.  Polyethylene wear induced synovitis not excluded given denser synovial proliferations in the superior aspect of suprapatellar bursa (series 5, image 14).  Dense synovial proliferation as identified on coronal series 7, image 21 with erosive changes and osseous response at the level of the lateral femoral condyle measuring 2 x 7 cm.  Additional erosive changes at the level of the lateral tibial margin on coronal series 7, image 18 this is also seen posterior femur level (sagittal series 4, image 14 axial series 3, image 12) measuring 3.5 x 5.2 cm    Fluid collection, septated identified level of the medial knee, adjacent to pes anserine best identified series 6, image 25 coronal axial series 3, image 17 sagittal series 4, image 30, measuring 2.6 x 5.4 x 2.1 cm present as well as a complex collection adjacent to the posterior tibiofibular joint 2.7 x 1.2 x 2.4 cm best identified coronal series 6, image 25 axial series 3, image 31.  Constellation of these findings is consistent with polyethylene wear/ lateral femoral osteolysis and polyethylene granulomatous response.  CT could further evaluate for osteolysis    Arthrofibrosis: Scarring/fibrotic change at the level of the anterior interval and Hoffa's fat pad.    Hematoma: None    Muscles/Tendons: No definite tear/tendinosis/atrophy.  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 Effusion of right knee  M25.461 719.06   2. Presence of right artificial knee joint  Z96.651 V43.65   3. Synovitis of right knee  M65.961 727.09         MEDICATIONS PRESCRIBED      None      RECOMMENDATIONS     We had a long discussion concerning nonsurgical and surgical management.  It is clear based on his MR and radiographs that he has thinning of this poly and has a polyethylene wear induced synovitis with multi loculated pseudocysts in the posterior aspect of the knee and medial aspect of the knee.  I will get him set up with 1 of my total joint partners for possible poly exchange and open synovectomy.      All questions were answered, pt will contact us for questions or concerns in the interim.

## 2024-11-22 ENCOUNTER — OFFICE VISIT (OUTPATIENT)
Dept: DERMATOLOGY | Facility: CLINIC | Age: 64
End: 2024-11-22
Payer: COMMERCIAL

## 2024-11-22 DIAGNOSIS — R21 RASH: ICD-10-CM

## 2024-11-22 DIAGNOSIS — L21.9 SEBORRHEIC DERMATITIS: ICD-10-CM

## 2024-11-22 PROCEDURE — 99999 PR PBB SHADOW E&M-EST. PATIENT-LVL IV: CPT | Mod: PBBFAC,,, | Performed by: STUDENT IN AN ORGANIZED HEALTH CARE EDUCATION/TRAINING PROGRAM

## 2024-11-22 RX ORDER — KETOCONAZOLE 20 MG/G
CREAM TOPICAL
Qty: 60 G | Refills: 2 | Status: SHIPPED | OUTPATIENT
Start: 2024-11-22

## 2024-11-22 RX ORDER — HYDROCORTISONE 25 MG/G
CREAM TOPICAL
Qty: 40 G | Refills: 2 | Status: SHIPPED | OUTPATIENT
Start: 2024-11-22

## 2024-11-22 NOTE — PROGRESS NOTES
Subjective:      Patient ID:  Partha Curtis Jr. is a 64 y.o. male who presents for No chief complaint on file.    64 y.o. male presents today for a rash.    Last office visit 3/5/2021 with Dr. Christiano Abreu. Facial rash   Location: face   Duration: started on clonidine 3 months ago- when facial redness started   Symptoms: red, was scaly but HC cream helped with this. Not itchy.  Current treatments: washes face with murali spring body wash   Prior treatments tried: hydrocortisone for 1 week 2x/day; not applying anymore   Other pertinent history:           Review of Systems    Objective:   Physical Exam   Skin:   Areas Examined (abnormalities noted in diagram):   Head / Face Inspection Performed            Diagram Legend     Erythematous scaling macule/papule c/w actinic keratosis       Vascular papule c/w angioma      Pigmented verrucoid papule/plaque c/w seborrheic keratosis      Yellow umbilicated papule c/w sebaceous hyperplasia      Irregularly shaped tan macule c/w lentigo     1-2 mm smooth white papules consistent with Milia      Movable subcutaneous cyst with punctum c/w epidermal inclusion cyst      Subcutaneous movable cyst c/w pilar cyst      Firm pink to brown papule c/w dermatofibroma      Pedunculated fleshy papule(s) c/w skin tag(s)      Evenly pigmented macule c/w junctional nevus     Mildly variegated pigmented, slightly irregular-bordered macule c/w mildly atypical nevus      Flesh colored to evenly pigmented papule c/w intradermal nevus       Pink pearly papule/plaque c/w basal cell carcinoma      Erythematous hyperkeratotic cursted plaque c/w SCC      Surgical scar with no sign of skin cancer recurrence      Open and closed comedones      Inflammatory papules and pustules      Verrucoid papule consistent consistent with wart     Erythematous eczematous patches and plaques     Dystrophic onycholytic nail with subungual debris c/w onychomycosis     Umbilicated papule    Erythematous-base heme-crusted  tan verrucoid plaque consistent with inflamed seborrheic keratosis     Erythematous Silvery Scaling Plaque c/w Psoriasis     See annotation      Assessment / Plan:        Seborrheic dermatitis  Glabella, NLFs/beard region with erythematous patches with greasy scale most c/w seborrheic dermatitis  Reviewed chronic nature of condtion, waxing/waning course  Reviewed natural hx and etiology of condition  Start ketoconazole cream BID to AA of face and 1-2 x daily for maintenance  Start hydrocortisone 2.5% cream BID to AA of face x 2 weeks as needed for flares  If not improved with above, can try zoryve foam    RTC prn if condition worsens or fails to improve

## 2024-11-24 ENCOUNTER — HOSPITAL ENCOUNTER (OUTPATIENT)
Dept: RADIOLOGY | Facility: HOSPITAL | Age: 64
Discharge: HOME | End: 2024-11-24
Attending: ORTHOPAEDIC SURGERY
Payer: COMMERCIAL

## 2024-11-24 DIAGNOSIS — Z96.651 PRESENCE OF RIGHT ARTIFICIAL KNEE JOINT: ICD-10-CM

## 2024-11-24 PROCEDURE — 73721 MRI JNT OF LWR EXTRE W/O DYE: CPT | Mod: TC,RT

## 2024-11-24 PROCEDURE — 73721 MRI JNT OF LWR EXTRE W/O DYE: CPT | Mod: 26,RT,, | Performed by: RADIOLOGY

## 2024-11-25 ENCOUNTER — OFFICE VISIT (OUTPATIENT)
Dept: SPORTS MEDICINE | Facility: CLINIC | Age: 64
End: 2024-11-25
Payer: COMMERCIAL

## 2024-11-25 VITALS
BODY MASS INDEX: 27.13 KG/M2 | WEIGHT: 189.5 LBS | DIASTOLIC BLOOD PRESSURE: 74 MMHG | SYSTOLIC BLOOD PRESSURE: 108 MMHG | HEIGHT: 70 IN | HEART RATE: 80 BPM

## 2024-11-25 DIAGNOSIS — M25.461 EFFUSION OF RIGHT KNEE: Primary | ICD-10-CM

## 2024-11-25 DIAGNOSIS — Z96.651 PRESENCE OF RIGHT ARTIFICIAL KNEE JOINT: ICD-10-CM

## 2024-11-25 DIAGNOSIS — M65.961 SYNOVITIS OF RIGHT KNEE: ICD-10-CM

## 2024-11-25 PROCEDURE — 4010F ACE/ARB THERAPY RXD/TAKEN: CPT | Mod: CPTII,S$GLB,, | Performed by: ORTHOPAEDIC SURGERY

## 2024-11-25 PROCEDURE — 3061F NEG MICROALBUMINURIA REV: CPT | Mod: CPTII,S$GLB,, | Performed by: ORTHOPAEDIC SURGERY

## 2024-11-25 PROCEDURE — 99999 PR PBB SHADOW E&M-EST. PATIENT-LVL V: CPT | Mod: PBBFAC,,, | Performed by: ORTHOPAEDIC SURGERY

## 2024-11-25 PROCEDURE — 3078F DIAST BP <80 MM HG: CPT | Mod: CPTII,S$GLB,, | Performed by: ORTHOPAEDIC SURGERY

## 2024-11-25 PROCEDURE — 3008F BODY MASS INDEX DOCD: CPT | Mod: CPTII,S$GLB,, | Performed by: ORTHOPAEDIC SURGERY

## 2024-11-25 PROCEDURE — 3066F NEPHROPATHY DOC TX: CPT | Mod: CPTII,S$GLB,, | Performed by: ORTHOPAEDIC SURGERY

## 2024-11-25 PROCEDURE — 3074F SYST BP LT 130 MM HG: CPT | Mod: CPTII,S$GLB,, | Performed by: ORTHOPAEDIC SURGERY

## 2024-11-25 PROCEDURE — 1159F MED LIST DOCD IN RCRD: CPT | Mod: CPTII,S$GLB,, | Performed by: ORTHOPAEDIC SURGERY

## 2024-11-25 PROCEDURE — 3044F HG A1C LEVEL LT 7.0%: CPT | Mod: CPTII,S$GLB,, | Performed by: ORTHOPAEDIC SURGERY

## 2024-11-25 PROCEDURE — 99214 OFFICE O/P EST MOD 30 MIN: CPT | Mod: S$GLB,,, | Performed by: ORTHOPAEDIC SURGERY

## 2024-11-27 ENCOUNTER — PATIENT MESSAGE (OUTPATIENT)
Dept: ORTHOPEDICS | Facility: CLINIC | Age: 64
End: 2024-11-27
Payer: COMMERCIAL

## 2024-12-04 ENCOUNTER — TELEPHONE (OUTPATIENT)
Dept: ENDOCRINOLOGY | Facility: CLINIC | Age: 64
End: 2024-12-04
Payer: COMMERCIAL

## 2024-12-04 NOTE — TELEPHONE ENCOUNTER
Called patient no answer, left message to contact us to schedule appt.    ----- Message from Tammy Ness MD sent at 12/2/2024 10:10 AM CST -----    Patient missed his virtual appointment on 11/26/2024.  Please inform patient that he would need a yearly follow-up appointment, last visit in 09/2023.  We can do a virtual visit if he prefers or schedule follow-up appointment in the clinic.  We can discuss his labs and testosterone dose during the visit.

## 2024-12-06 ENCOUNTER — OFFICE VISIT (OUTPATIENT)
Dept: ORTHOPEDICS | Facility: CLINIC | Age: 64
End: 2024-12-06
Payer: COMMERCIAL

## 2024-12-06 ENCOUNTER — PATIENT MESSAGE (OUTPATIENT)
Dept: ORTHOPEDICS | Facility: CLINIC | Age: 64
End: 2024-12-06

## 2024-12-06 VITALS — BODY MASS INDEX: 27.27 KG/M2 | HEIGHT: 70 IN | WEIGHT: 190.5 LBS

## 2024-12-06 DIAGNOSIS — Z96.651 PRESENCE OF RIGHT ARTIFICIAL KNEE JOINT: ICD-10-CM

## 2024-12-06 DIAGNOSIS — M65.961 SYNOVITIS OF RIGHT KNEE: ICD-10-CM

## 2024-12-06 DIAGNOSIS — T84.022A INSTABILITY OF INTERNAL RIGHT KNEE PROSTHESIS, INITIAL ENCOUNTER: Primary | ICD-10-CM

## 2024-12-06 DIAGNOSIS — M25.461 EFFUSION OF RIGHT KNEE: ICD-10-CM

## 2024-12-06 PROCEDURE — 99999 PR PBB SHADOW E&M-EST. PATIENT-LVL III: CPT | Mod: PBBFAC,,, | Performed by: STUDENT IN AN ORGANIZED HEALTH CARE EDUCATION/TRAINING PROGRAM

## 2024-12-06 NOTE — PROGRESS NOTES
Assessment:  Problem List Items Addressed This Visit    None  Visit Diagnoses       Instability of internal right knee prosthesis, initial encounter    -  Primary    Relevant Orders    HME - OTHER    Effusion of right knee        Presence of right artificial knee joint        Synovitis of right knee                 Plan:  - Did aspiration of knee fluid for lab testing to evaluate for infection.  Performed aspiration of the right knee joint to send fluid for laboratory analysis, including a-Defensin and Synovasure panel  - Prescribed hinged knee brace to provide stability for knee.  - Follow up in about 1.5 weeks to review lab results and discuss next steps in treatment.            Patient ID: Jarvis Curtis Jr. is a 64 y.o. male.  Chief Complaint   Patient presents with    Right Knee - Pain        History of Present Illness    HPI:  Mr. Curtis presents today for evaluation of right knee pain and swelling that began in early November. He had a right total knee done in 2010 by Dr. Walsh at [facility].  He had a acute TJ I postoperatively due to stab infection and this was treated with irrigation and debridement with poly exchange followed by a course of IV antibiotics.  He states that since then he has not had any issues with the knee.  He has done very well since he recovered from the washout in completed physical therapy.  However, about 2 months ago he began having atraumatic knee pain primarily in the medial aspect of the knee.  He rates the pain as 6/10 and rates his knee is 60% of normal.  He has pain after walking 2-3 blocks.  He does not require an assistive device navigate stairs 1 step at a time must use his arms to get up from a chair.    Of note, he has had multiple prior surgeries on the right knee going back to complete meniscectomy done 40 years ago followed by synthetic ACL reconstruction he had other surgeries done in the 90s.   Infectious workup has not been done. He is referred here  by Dr. Rainey have been following him for left knee meniscus repair which was done in August of 2024.     Mr. Curtis presents for evaluation of right knee pain that started in early November while recovering from left knee arthroscopic surgery. He had a right total knee replacement in 2010 performed by Dr. Dimas at [facility]. The knee was doing well until November, with no pain or operational issues. The pain came on suddenly during physical therapy. Mr. Curtis was referred to Dr. Rainey, who ordered an MRI. Dr. Rainey suggested the padding between the joints may have worn down, causing irritation.    Mr. Curtis has a complex surgical history on the right knee, including a meniscectomy in 1978, ACL and PCL repair, and multiple subsequent surgeries. Following the 2010 knee replacement, he developed a staph infection, requiring multiple drainages and another surgery in November 2010 to clean out the knee. Mr. Curtis received IV antibiotics after this procedure.    Currently, the patient reports pain inside the knee, with swelling in the back that worsens throughout the day. He notes that the swelling increases significantly by the end of the day. Mr. Curtis also reports tenderness behind the knee. He denies any issues with stairs but admits to taking them one step at a time. He states that upon rising, the knee is very stiff and painful, but standing for long periods does not cause as much discomfort.    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 He reports increased stiffness and pain after periods of sitting.    Mr. Curtsi denies any numbness or tingling in the knee. Mr. Curtis denies any issues with the knee buckling or giving out since recovering from the 2010 infection and subsequent rehabilitation.    SURGICAL HISTORY:  - Right total knee replacement: September 2010, complicated by staph infection requiring drainage and  second surgery in November 2010  - Right knee meniscectomy: 1978  - Right knee ACL and PCL repair  - Right knee experimental artificial vortex ligament surgery for ACL: early 1980s  - Left knee arthroscopic surgery    WORK STATUS:  - Works as a  at [employer], a food   - Job involves both desk work and being on feet (about 50-50 split)  - One set of stairs at work, used daily (up and down)  - Standing for long periods does not bother the patient as much as sitting for extended periods  - After sitting for a while doing desk work, the knee becomes stiff and painful when getting up               Past Medical History:  Past Medical History:   Diagnosis Date    Allergy     Carotid artery occlusion     Chronic back pain     Colonic polyp     Genetic testing     MUTYH mutation-negative    Hyperlipidemia     Hypertension     Paroxysmal atrial fibrillation 2/28/2024    Sleep apnea     Type 2 diabetes mellitus with stage 3 chronic kidney disease, without long-term current use of insulin 4/2/2020        Surgical History:  Past Surgical History:   Procedure Laterality Date    ANKLE SURGERY Left     2    APPENDECTOMY      at age 20    ARTHROSCOPIC CHONDROPLASTY OF KNEE JOINT Left 8/13/2024    Procedure: ARTHROSCOPY, KNEE, WITH CHONDROPLASTY;  Surgeon: Kai Washington MD;  Location: [facility] OR;  Service: Orthopedics;  Laterality: Left;    ARTHROSCOPY, KNEE, WITH ABRASION ARTHROPLASTY OR MICROFRACTURE Left 8/13/2024    Procedure: ARTHROSCOPY, KNEE, WITH  MICROFRACTURE;  Surgeon: Kai Washington MD;  Location: [facility] OR;  Service: Orthopedics;  Laterality: Left;    ARTHROSCOPY,KNEE,WITH MENISCUS REPAIR Left 8/13/2024    Procedure: ARTHROSCOPY,KNEE,WITH MENISCUS REPAIR;  Surgeon: Kai Washington MD;  Location: [facility] OR;  Service: Orthopedics;  Laterality: Left;  NO REGIONAL BLOCK    CAROTID ENDARTERECTOMY Right 07/15/2015    CARPAL TUNNEL RELEASE Bilateral     COLONOSCOPY N/A 12/14/2018     Procedure: COLONOSCOPYSuprep;  Surgeon: Silver Selby MD;  Location: [facility] ENDO;  Service: Endoscopy;  Laterality: N/A;    COLONOSCOPY N/A 10/2/2024    Procedure: COLONOSCOPY;  Surgeon: Heath Morrow MD;  Location: [facility] ENDO;  Service: Endoscopy;  Laterality: N/A;  Ref by Dr STEVE Cavazos, Ozempic, pt has unopened Sutab, portal - PC  9/25/24-LVM for precall, portal-DS. 10/1/24 Pt. called and confirmed arrival time.EC    EPIDURAL STEROID INJECTION N/A 2/26/2024    Procedure: CERVICAL C7/T1 IL SELENA;  Surgeon: Gokul Fung MD;  Location: [facility] PAIN MGT;  Service: Pain Management;  Laterality: N/A;  [phone]  2 WK F/U SERGEI    EPIDURAL STEROID INJECTION N/A 5/23/2024    Procedure: LUMBAR L4/5 IL SELENA *ASPIRIN OTC* HOLD FOR 5 DAYS;  Surgeon: Gokul Fung MD;  Location: [facility] PAIN MGT;  Service: Pain Management;  Laterality: N/A;  [phone]  2 WK F/U NOHELIA    EPIDURAL STEROID INJECTION N/A 10/21/2024    Procedure: LUMBAR L5/S1 IL SELENA *ASPIRIN OTC* HOLD FOR 5 DAYS  **ANGELICA PT**;  Surgeon: Sofia Sheikh MD;  Location: [facility] PAIN MGT;  Service: Pain Management;  Laterality: N/A;  [phone]  2 WK F/U NOHELIA    JOINT REPLACEMENT  9/2010    NASAL SEPTUM SURGERY      TOTAL KNEE ARTHROPLASTY Right     6 knee surgeries    TRANSESOPHAGEAL ECHOCARDIOGRAM WITH POSSIBLE CARDIOVERSION (ELIZABETH W/ POSS CARDIOVERSION) N/A 2/28/2024    Procedure: Transesophageal echo (ELIZABETH) intra-procedure log documentation;  Surgeon: Ahmet De La Cruz MD;  Location: [facility] CATH LAB/EP;  Service: Cardiology;  Laterality: N/A;        Social History:  He reports that he has never smoked. He has never been exposed to tobacco smoke. He has never used smokeless tobacco. He reports current alcohol use of about 2.0 standard drinks of alcohol per week. He reports that he does not use drugs.     Family History:  family history includes Aneurysm (age of onset: 48) in an other family member; Brain cancer (age of onset: 67) in his mother; Brain cancer (age of  "onset: 73) in his paternal grandfather; Breast cancer (age of onset: 57) in his maternal cousin; Breast cancer (age of onset: 58) in his sister; Cancer in his father, maternal grandfather, and mother; Genetic Disorder in his sister; Hypertension in his father; Lung cancer in his father; Seizures in his daughter; Ulcerative colitis in an other family member.     Current Outpatient Medications on File Prior to Visit   Medication Sig Dispense Refill    amitriptyline (ELAVIL) 75 MG tablet Take 1 tablet by mouth in the evening 90 tablet 3    aspirin (ECOTRIN) 81 MG EC tablet Take 1 tablet (81 mg total) by mouth once daily. 28 tablet 0    BD LUER-RUSS SYRINGE 3 mL 25 gauge x 1" Syrg USE ONE SYRINGE EVERY 14 DAYS WITH TESTOSTERONE 100 each 2    candesartan (ATACAND) 32 MG tablet Take 1 tablet (32 mg total) by mouth once daily. 90 tablet 3    cloNIDine (CATAPRES) 0.1 MG tablet Take 1 tablet (0.1 mg total) by mouth 2 (two) times daily. 180 tablet 3    cyanocobalamin (VITAMIN B-12) 1000 MCG tablet Take 1 tablet (1,000 mcg total) by mouth once daily. 90 tablet 4    ergocalciferol (ERGOCALCIFEROL) 50,000 unit Cap Take 1 capsule by mouth once a week 12 capsule 0    ezetimibe (ZETIA) 10 mg tablet Take 1 tablet (10 mg total) by mouth once daily. 90 tablet 3    fluticasone propionate (FLONASE) 50 mcg/actuation nasal spray 2 sprays by Each Nostril route.      gabapentin (NEURONTIN) 800 MG tablet Take 1 tablet (800 mg total) by mouth every evening. 90 tablet 1    glucose 4 GM chewable tablet Take 4 tablets (16 g total) by mouth as needed for Low blood sugar. 30 tablet 12    HYDROcodone-acetaminophen (NORCO) 7.5-325 mg per tablet Take 1 tablet by mouth every 6 (six) hours as needed for Pain. 20 tablet 0    hydrocortisone 2.5 % cream Apply to face twice daily for flares as needed 40 g 2    ipratropium (ATROVENT) 42 mcg (0.06 %) nasal spray 2 sprays by Nasal route 2 (two) times daily as needed for Rhinitis. 15 mL 0    ketoconazole " "(NIZORAL) 2 % cream Apply twice daily to affected area of skin 60 g 2    metoprolol succinate (TOPROL-XL) 200 MG 24 hr tablet Take 1 tablet (200 mg total) by mouth once daily. (Patient taking differently: Take 200 mg by mouth every evening.) 90 tablet 3    oxyCODONE-acetaminophen (PERCOCET)  mg per tablet Take 1 tablet by mouth every 12 (twelve) hours as needed for Pain. 60 tablet 0    rosuvastatin (CRESTOR) 40 MG Tab Take 1 tablet (40 mg total) by mouth every evening. 90 tablet 3    semaglutide (OZEMPIC) 2 mg/dose (8 mg/3 mL) PnIj Inject 2 mg into the skin every 7 days. 9 mL 3    sod sulf-pot chloride-mag sulf (SUTAB) 1.479-0.188- 0.225 gram tablet Take 12 tablets by mouth once daily. Take according to package instructions with indicated amount of water. 24 tablet 0    syringe with needle, safety (BD INTEGRA SYRINGE) 3 mL 22 gauge x 1 1/2" Syrg Inject 1 Syringe as directed once a week. 6 each 3    testosterone cypionate (DEPOTESTOTERONE CYPIONATE) 200 mg/mL injection Inject 1 mL (200 mg total) into the muscle every 14 (fourteen) days. 2 mL 5    valACYclovir (VALTREX) 1000 MG tablet Take 2 tablets (2,000 mg total) by mouth every 12 (twelve) hours. 4 tablet 3     Current Facility-Administered Medications on File Prior to Visit   Medication Dose Route Frequency Provider Last Rate Last Admin    0.9%  NaCl infusion   Intravenous Continuous Kendell Hoffman MD        0.9%  NaCl infusion   Intravenous Continuous Gerardo Uribe MD         Review of patient's allergies indicates:   Allergen Reactions    Stadol [butorphanol tartrate] Rash     Swelling in face    Strawberries [strawberry] Rash          Physical exam:  Height 5' 10" (1.778 m), weight 86.4 kg (190 lb 7.6 oz).  General: no apparent distress    Gait: + antalgic,  no use of assistive devices    R knee:   TTP at the pes insertion. Tenderness to palpation at medial hamstring. Not TTP at the joint line and not at the patella  Skin: intact, moderate effusion; " Well-healed midline incision. Well-healed lateral distal femur incision. Well-healed lateral proximal tibial incision. Well-healed two cm chart incision at the joint line.  Range of motion: 5 to 110  Strength: 5/5 with extension, 5/5 with flexion  Ligament exam: Stable to varus valgus stress, but with medial-sided pain to both and Moderate AP laxity at 90 degrees flexion, but stable in extension  Neurovascular: WWP,  Light touch sensation intact    Relevant Results:  Imaging:  Plain x-rays of the R knee were obtained on 11/6/24and independently reviewed by me today, 12/6/2024 , and demonstrate   Cemented Juan Jose triathlon primary tibial base plate  and CR femur with resurface patella. There is  no sign of loosening, components are in appropriate position, neutral alignment.  There is what appears to be heterotopic ossification of the lateral femoral condyle          This note was generated with the assistance of ambient listening technology. Verbal consent was obtained by the patient and accompanying visitor(s) for the recording of patient appointment to facilitate this note. I attest to having reviewed and edited the generated note for accuracy, though some syntax or spelling errors may persist. Please contact the author of this note for any clarification.

## 2024-12-09 ENCOUNTER — PATIENT MESSAGE (OUTPATIENT)
Dept: FAMILY MEDICINE | Facility: CLINIC | Age: 64
End: 2024-12-09
Payer: COMMERCIAL

## 2024-12-09 ENCOUNTER — TELEPHONE (OUTPATIENT)
Dept: PAIN MEDICINE | Facility: CLINIC | Age: 64
End: 2024-12-09

## 2024-12-09 ENCOUNTER — PATIENT MESSAGE (OUTPATIENT)
Dept: PAIN MEDICINE | Facility: CLINIC | Age: 64
End: 2024-12-09

## 2024-12-09 ENCOUNTER — PATIENT MESSAGE (OUTPATIENT)
Dept: PAIN MEDICINE | Facility: OTHER | Age: 64
End: 2024-12-09
Payer: COMMERCIAL

## 2024-12-09 ENCOUNTER — OFFICE VISIT (OUTPATIENT)
Dept: PAIN MEDICINE | Facility: CLINIC | Age: 64
End: 2024-12-09
Payer: COMMERCIAL

## 2024-12-09 VITALS
TEMPERATURE: 98 F | WEIGHT: 189.63 LBS | SYSTOLIC BLOOD PRESSURE: 157 MMHG | HEIGHT: 70 IN | RESPIRATION RATE: 18 BRPM | HEART RATE: 70 BPM | OXYGEN SATURATION: 100 % | DIASTOLIC BLOOD PRESSURE: 96 MMHG | BODY MASS INDEX: 27.15 KG/M2

## 2024-12-09 DIAGNOSIS — M47.816 LUMBAR SPONDYLOSIS: ICD-10-CM

## 2024-12-09 DIAGNOSIS — G89.4 CHRONIC PAIN DISORDER: Primary | ICD-10-CM

## 2024-12-09 DIAGNOSIS — M54.16 LUMBAR RADICULOPATHY: ICD-10-CM

## 2024-12-09 DIAGNOSIS — M47.812 CERVICAL SPONDYLOSIS: ICD-10-CM

## 2024-12-09 DIAGNOSIS — M50.30 DDD (DEGENERATIVE DISC DISEASE), CERVICAL: ICD-10-CM

## 2024-12-09 DIAGNOSIS — G89.4 CHRONIC PAIN DISORDER: ICD-10-CM

## 2024-12-09 DIAGNOSIS — M54.12 CERVICAL RADICULOPATHY: ICD-10-CM

## 2024-12-09 DIAGNOSIS — M54.16 LUMBAR RADICULOPATHY: Primary | ICD-10-CM

## 2024-12-09 DIAGNOSIS — M51.369 DDD (DEGENERATIVE DISC DISEASE), LUMBAR: ICD-10-CM

## 2024-12-09 PROCEDURE — 1159F MED LIST DOCD IN RCRD: CPT | Mod: CPTII,S$GLB,, | Performed by: NURSE PRACTITIONER

## 2024-12-09 PROCEDURE — 99214 OFFICE O/P EST MOD 30 MIN: CPT | Mod: S$GLB,,, | Performed by: NURSE PRACTITIONER

## 2024-12-09 PROCEDURE — 3044F HG A1C LEVEL LT 7.0%: CPT | Mod: CPTII,S$GLB,, | Performed by: NURSE PRACTITIONER

## 2024-12-09 PROCEDURE — 1160F RVW MEDS BY RX/DR IN RCRD: CPT | Mod: CPTII,S$GLB,, | Performed by: NURSE PRACTITIONER

## 2024-12-09 PROCEDURE — 99999 PR PBB SHADOW E&M-EST. PATIENT-LVL V: CPT | Mod: PBBFAC,,, | Performed by: NURSE PRACTITIONER

## 2024-12-09 PROCEDURE — 3080F DIAST BP >= 90 MM HG: CPT | Mod: CPTII,S$GLB,, | Performed by: NURSE PRACTITIONER

## 2024-12-09 PROCEDURE — 3061F NEG MICROALBUMINURIA REV: CPT | Mod: CPTII,S$GLB,, | Performed by: NURSE PRACTITIONER

## 2024-12-09 PROCEDURE — 4010F ACE/ARB THERAPY RXD/TAKEN: CPT | Mod: CPTII,S$GLB,, | Performed by: NURSE PRACTITIONER

## 2024-12-09 PROCEDURE — 3066F NEPHROPATHY DOC TX: CPT | Mod: CPTII,S$GLB,, | Performed by: NURSE PRACTITIONER

## 2024-12-09 PROCEDURE — 3008F BODY MASS INDEX DOCD: CPT | Mod: CPTII,S$GLB,, | Performed by: NURSE PRACTITIONER

## 2024-12-09 PROCEDURE — 3077F SYST BP >= 140 MM HG: CPT | Mod: CPTII,S$GLB,, | Performed by: NURSE PRACTITIONER

## 2024-12-09 RX ORDER — OXYCODONE AND ACETAMINOPHEN 10; 325 MG/1; MG/1
1 TABLET ORAL EVERY 12 HOURS PRN
Qty: 60 TABLET | Refills: 0 | Status: SHIPPED | OUTPATIENT
Start: 2024-12-09

## 2024-12-09 RX ORDER — GABAPENTIN 800 MG/1
800 TABLET ORAL NIGHTLY
Qty: 90 TABLET | Refills: 1 | Status: SHIPPED | OUTPATIENT
Start: 2024-12-09

## 2024-12-09 NOTE — PROGRESS NOTES
Chronic patient Established Note (Follow up visit)      SUBJECTIVE:    Interval History 12/9/2024:  The patient returns to clinic today for follow up of neck and back pain. Since last visit, he has developed right knee pain. This is limiting his progression in physical therapy. He did have the right knee drained recently. He reports worsened low back pain. He attributes this to the way he is walking. He reports intermittent radiating pain into the hip and leg. His pain is worse with standing and walking. He is taking Gabapentin and Percocet. He denies any adverse effects. He denies any other health changes. His pain today is 8/10.    Interval History 9/10/2024:  The patient returns to clinic today for follow up of neck and back pain. Since last visit, he underwent left knee arthroscopy on 8/13/2024. He is currently participating in physical therapy. He reports increased mid and low back pain. He denies any radicular leg pain. His pain is worse with moving from sitting to standing. He is taking Gabapentin. He also takes Percocet as needed for severe pain. He denies any adverse effects. He denies any other health changes. His pain today is 8/10.    Interval History 6/10/2024:  The patient returns to clinic today for follow up of neck and back pain. He is s/p L4/5 IL SELENA on 5/23/2024. He reports 60% relief. He reports low back pain. He denies any radicular leg pain. His back pain is worse with moving from sitting to standing. He continues to report neck pain. He is taking Gabapentin. He also takes Percocet for severe pain as needed with benefit. He denies any adverse effects. He denies any other health changes. His pain today is 5/10.      Interval History 5/1/2024:  The patient returns to clinic today for follow up of neck and back pain. He reports increased low back pain. He denies any radicular leg pain but does have numbness into the lateral aspect of his right leg. This is worse from the knee to the top of his  right foot. He does have numbness under the left foot. His pain is worse with standing and walking. He is no longer taking Eliquis. He is taking Gabapentin. He also takes Percocet for severe pain. He needs a refill. He denies any other health changes. His pain today is 8/10.    Interval History 3/25/2024:  Partha Curtis Jr. presents to the clinic for a follow-up appointment for neck pain. He is s/p C7/T1 IL SELENA on 2/26/2024. He reports 100% relief of his neck pain. He was admitted for atrial fibrillation after the last procedure. He was converted to sinus rhythm. He is now on Eliquis. He reports increased low back pain. He denies any radicular leg pain. His pain is worse as the day goes on. He previously had relief with ESIs. He is interested in repeating this in the future. He denies any weakness. He is taking Gabapentin. He takes Percocet intermittently for severe pain. He denies any other health changes. His pain today is 0/10.      HPI: Partha Curtis Jr. is a 63 y.o. male presents to pain clinic for evaluation of neck pain. Symptoms developed 4 weeks ago. Similar pain in 2022 that improved after an C7/T1 ILESI on 3/28/22, had been pain free since this procedure until 4 weeks ago. Original neck pain started in 2021 without inciting event. Denies trauma or injury to the area in the past 4 weeks upon return of symptoms. Pain management previously at [facility] with Dr. Herrera     Original Pain Description:  The pain is located in the upper thoracic region just left of the midline with radiation into his left shoulder and proximal arm terminating above the elbow. All of patients upper back/neck pain is on the left. The pain is described as aching, sharp, and throbbing. These exact symptoms were present prior to his 2022 ILESI. Chinyere ranges from 4 - 10. The current pain is 8. Exacerbating factors: Coughing/Sneezing and L arm abduction above the head, head forward flexion and head lateral bending to the left, and  laying flat on the ground. Mitigating factors pillow. Symptoms interfere with daily activity and sleeping and work. 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 Works as a  for [employer]. The patient feels like symptoms have been worsening. Patient endorses weakness in his left arm with return of pain. Patient denies saddle anesthesia, bladder/bowel incontinence, acute or signifcant limb weakness, ataxia, or increased falls.     Pain Disability Index Review:      12/9/2024     9:58 AM 9/10/2024    10:02 AM 6/10/2024    11:10 AM   Last 3 PDI Scores   Pain Disability Index (PDI) 56 [phone] 63       Pain Medications:  Gabapentin  Percocet     Opioid Contract: not applicable     report:  Reviewed and consistent with medication use as prescribed.    Pain Procedures:   2/26/2024- C7/T1 IL SELENA  5/23/2024- L4/5 IL SELENA- 60% relief    Physical Therapy/Home Exercise: yes    Imaging:   MRI Cervical Spine 2/15/2024:  COMPARISON:  XR C-spine 03/02/2010.  CTA head neck 06/23/2022.     FINDINGS:  Alignment: Normal sagittal alignment.  Grade 1 retrolisthesis at C5-C6 and grade 1 anterolisthesis at C7-T1.     Vertebrae: Vertebral body heights are maintained.  No fracture or marrow infiltrative process.     Discs: Multilevel degenerative disc desiccation and height loss most pronounced at C5-C6 and C6-C7.     Cord: Normal cord signal intensity.     Cerebellar tonsils are in their expected location.  Visualized brainstem is normal. Vertebral artery flow voids are present.  Prevertebral soft tissues are normal.  No cervical lymph node enlargement.  Paraspinal musculature demonstrates normal bulk and signal intensity.     Degenerative findings:     C2-C3: Thickening of the posterior longitudinal ligament.  No spinal canal stenosis or neural foraminal narrowing.     C3-C4: Thickening of the posterior longitudinal ligament, bilateral uncovertebral spurring and facet arthropathy.  No spinal  canal stenosis.  Mild left neural foraminal narrowing.     C4-C5: Central/right paracentral disc extrusion with inferior migration which abuts and slightly posteriorly displaces the right ventral cord.  Bilateral uncovertebral spurring and facet arthropathy.  Mild spinal canal stenosis.  Mild left neural foraminal narrowing.     C5-C6: Posterior disc osteophyte complex, bilateral uncovertebral spurring and facet arthropathy.  Moderate spinal canal stenosis.  Severe bilateral neural foraminal narrowing.     C6-C7: Posterior disc osteophyte complex, bilateral uncovertebral spurring and facet arthropathy.  No spinal canal stenosis.  Mild right and moderate left neural foraminal narrowing.     C7-T1: No spinal canal stenosis or neural foraminal narrowing.     Impression:     Multilevel degenerative change of the cervical spine, detailed above.  Findings most pronounced at C5-C6 with moderate spinal canal stenosis and severe bilateral neural foraminal narrowing.    MRI Lumbar Spine 11/2/2023:  COMPARISON: MRI lumbar spine 6/28/2021     FINDINGS:   Spine Numbering: For purposes of this dictation, it is assumed that there are 5 non-rib-bearing, lumbar-type vertebrae, and the most caudal fully segmented lumbar vertebra is labeled L5.     Alignment: Straightening of normal lumbar lordosis. Unchanged dextrocurvature of the lumbar spine. Unchanged grade 1 left lateral listhesis of L2 on L3. Unchanged grade 1 right lateral listhesis of L3 on L4. Unchanged grade 1 retrolisthesis of L2 on L3.     Surgical: None.     Vertebral Bodies: Unchanged multilevel mild anterior osteophytosis.     Marrow Signal: Unchanged degenerative fatty marrow placement at the L3-4 endplates. No marrow edema. No suspicious osseous lesions.     Intervertebral Discs: Unchanged multilevel disc dessication with loss of disc space height     Conus Medullaris: Terminates at L1     Cauda Equina: Unchanged bunching of the cauda equina at L3-4 and L4-5 due to  thecal sac narrowing detailed below.     Paraspinal Soft Tissues: Normal     Intra-abdominal Findings: None.     Individual Levels:     T12-L1: Unchanged circumferential disc bulge with unchanged right paracentral disc herniation with cranial migration to the infrapedicular level of T12 measuring 1.6 x 0.6 cm (image 7, series 2). No thecal sac or neural foraminal narrowing.     L1-L2: Unchanged circumferential disc bulge with bulky left lateral osteophytosis. No spinal canal or foraminal narrowing.     L2-L3: Circumferential disc bulge, ligamentum flavum thickening, and epidural lipomatosis cause unchanged moderate thecal sac narrowing. Mild facet arthropathy and disc disease cause unchanged moderate bilateral neural foraminal narrowing.     L3-L4: Circumferential disc bulge, ligamentum flavum thickening, and epidural lipomatosis cause unchanged severe thecal sac narrowing. Moderate right and severe left facet arthropathy with disc disease cause unchanged mild right and moderate left neural foraminal narrowing.     L4-L5: Circumferential disc bulge, ligamentum flavum thickening, and epidural lipomatosis cause unchanged severe thecal sac narrowing. Severe facet arthropathy and disc disease cause unchanged severe right and moderate left neural foraminal narrowing with impingement of the exiting right L4 nerve root.     L5-S1: Unchanged small disc bulge and moderate facet arthropathy. No spinal canal or foraminal narrowing.     Impression    Unchanged severe multilevel lumbar spondylosis as detailed above.    Allergies:   Review of patient's allergies indicates:   Allergen Reactions    Stadol [butorphanol tartrate] Rash     Swelling in face    Strawberries [strawberry] Rash       Current Medications:   Current Outpatient Medications   Medication Sig Dispense Refill    amitriptyline (ELAVIL) 75 MG tablet Take 1 tablet by mouth in the evening 90 tablet 3    aspirin (ECOTRIN) 81 MG EC tablet Take 1 tablet (81 mg total) by  "mouth once daily. 28 tablet 0    BD LUER-RUSS SYRINGE 3 mL 25 gauge x 1" Syrg USE ONE SYRINGE EVERY 14 DAYS WITH TESTOSTERONE 100 each 2    candesartan (ATACAND) 32 MG tablet Take 1 tablet (32 mg total) by mouth once daily. 90 tablet 3    cloNIDine (CATAPRES) 0.1 MG tablet Take 1 tablet (0.1 mg total) by mouth 2 (two) times daily. 180 tablet 3    cyanocobalamin (VITAMIN B-12) 1000 MCG tablet Take 1 tablet (1,000 mcg total) by mouth once daily. 90 tablet 4    ergocalciferol (ERGOCALCIFEROL) 50,000 unit Cap Take 1 capsule by mouth once a week 12 capsule 0    ezetimibe (ZETIA) 10 mg tablet Take 1 tablet (10 mg total) by mouth once daily. 90 tablet 3    fluticasone propionate (FLONASE) 50 mcg/actuation nasal spray 2 sprays by Each Nostril route.      gabapentin (NEURONTIN) 800 MG tablet Take 1 tablet (800 mg total) by mouth every evening. 90 tablet 1    glucose 4 GM chewable tablet Take 4 tablets (16 g total) by mouth as needed for Low blood sugar. 30 tablet 12    HYDROcodone-acetaminophen (NORCO) 7.5-325 mg per tablet Take 1 tablet by mouth every 6 (six) hours as needed for Pain. 20 tablet 0    hydrocortisone 2.5 % cream Apply to face twice daily for flares as needed 40 g 2    ipratropium (ATROVENT) 42 mcg (0.06 %) nasal spray 2 sprays by Nasal route 2 (two) times daily as needed for Rhinitis. 15 mL 0    ketoconazole (NIZORAL) 2 % cream Apply twice daily to affected area of skin 60 g 2    metoprolol succinate (TOPROL-XL) 200 MG 24 hr tablet Take 1 tablet (200 mg total) by mouth once daily. (Patient taking differently: Take 200 mg by mouth every evening.) 90 tablet 3    oxyCODONE-acetaminophen (PERCOCET)  mg per tablet Take 1 tablet by mouth every 12 (twelve) hours as needed for Pain. 60 tablet 0    rosuvastatin (CRESTOR) 40 MG Tab Take 1 tablet (40 mg total) by mouth every evening. 90 tablet 3    semaglutide (OZEMPIC) 2 mg/dose (8 mg/3 mL) PnIj Inject 2 mg into the skin every 7 days. 9 mL 3    sod sulf-pot " "chloride-mag sulf (SUTAB) 1.479-0.188- 0.225 gram tablet Take 12 tablets by mouth once daily. Take according to package instructions with indicated amount of water. 24 tablet 0    syringe with needle, safety (BD INTEGRA SYRINGE) 3 mL 22 gauge x 1 1/2" Syrg Inject 1 Syringe as directed once a week. 6 each 3    testosterone cypionate (DEPOTESTOTERONE CYPIONATE) 200 mg/mL injection Inject 1 mL (200 mg total) into the muscle every 14 (fourteen) days. 2 mL 5    valACYclovir (VALTREX) 1000 MG tablet Take 2 tablets (2,000 mg total) by mouth every 12 (twelve) hours. 4 tablet 3     No current facility-administered medications for this visit.     Facility-Administered Medications Ordered in Other Visits   Medication Dose Route Frequency Provider Last Rate Last Admin    0.9%  NaCl infusion   Intravenous Continuous Kendell Hoffman MD        0.9%  NaCl infusion   Intravenous Continuous Gerardo Uribe MD           REVIEW OF SYSTEMS:    GENERAL:  No weight loss, malaise or fevers.  HEENT:  Negative for frequent or significant headaches.  NECK:  Negative for lumps, goiter, pain and significant neck swelling.  RESPIRATORY:  Negative for cough, wheezing or shortness of breath.  CARDIOVASCULAR:  Negative for chest pain, leg swelling or palpitations. HTN, AFib  GI:  Negative for abdominal discomfort, blood in stools or black stools or change in bowel habits.  MUSCULOSKELETAL:  See HPI.  SKIN:  Negative for lesions, rash, and itching.  PSYCH:  Negative for sleep disturbance, mood disorder and recent psychosocial stressors.  ENDO: Diabetes.   HEMATOLOGY/LYMPHOLOGY:  Negative for swollen nodes. Eliquis  NEURO:   No history of headaches, syncope, paralysis, seizures or tremors.  All other reviewed and negative other than HPI.    Past Medical History:  Past Medical History:   Diagnosis Date    Allergy     Carotid artery occlusion     Chronic back pain     Colonic polyp     Genetic testing     MUTYH mutation-negative    Hyperlipidemia     " Hypertension     Paroxysmal atrial fibrillation 2/28/2024    Sleep apnea     Type 2 diabetes mellitus with stage 3 chronic kidney disease, without long-term current use of insulin 4/2/2020       Past Surgical History:  Past Surgical History:   Procedure Laterality Date    ANKLE SURGERY Left     2    APPENDECTOMY      at age 20    ARTHROSCOPIC CHONDROPLASTY OF KNEE JOINT Left 8/13/2024    Procedure: ARTHROSCOPY, KNEE, WITH CHONDROPLASTY;  Surgeon: Kai Washington MD;  Location: [facility] OR;  Service: Orthopedics;  Laterality: Left;    ARTHROSCOPY, KNEE, WITH ABRASION ARTHROPLASTY OR MICROFRACTURE Left 8/13/2024    Procedure: ARTHROSCOPY, KNEE, WITH  MICROFRACTURE;  Surgeon: Kai Washington MD;  Location: [facility] OR;  Service: Orthopedics;  Laterality: Left;    ARTHROSCOPY,KNEE,WITH MENISCUS REPAIR Left 8/13/2024    Procedure: ARTHROSCOPY,KNEE,WITH MENISCUS REPAIR;  Surgeon: Kai Washington MD;  Location: [facility] OR;  Service: Orthopedics;  Laterality: Left;  NO REGIONAL BLOCK    CAROTID ENDARTERECTOMY Right 07/15/2015    CARPAL TUNNEL RELEASE Bilateral     COLONOSCOPY N/A 12/14/2018    Procedure: COLONOSCOPYSuprep;  Surgeon: Silver Selby MD;  Location: [facility] ENDO;  Service: Endoscopy;  Laterality: N/A;    COLONOSCOPY N/A 10/2/2024    Procedure: COLONOSCOPY;  Surgeon: Heath Morrow MD;  Location: [facility] ENDO;  Service: Endoscopy;  Laterality: N/A;  Ref by Leonor Oneill, pt has unopened Sutab, portal - PC  9/25/24-LVM for precall, portal-DS. 10/1/24 Pt. called and confirmed arrival time.EC    EPIDURAL STEROID INJECTION N/A 2/26/2024    Procedure: CERVICAL C7/T1 IL SELENA;  Surgeon: Gokul Fung MD;  Location: [facility] PAIN MGT;  Service: Pain Management;  Laterality: N/A;  [phone]  2 WK F/U SERGEI    EPIDURAL STEROID INJECTION N/A 5/23/2024    Procedure: LUMBAR L4/5 IL SELENA *ASPIRIN OTC* HOLD FOR 5 DAYS;  Surgeon: Gokul Fung MD;  Location: [facility] PAIN MGT;  Service: Pain Management;  Laterality: N/A;   [phone]  2 WK F/U NOHELIA    EPIDURAL STEROID INJECTION N/A 10/21/2024    Procedure: LUMBAR L5/S1 IL SELENA *ASPIRIN OTC* HOLD FOR 5 DAYS  **EISSA PT**;  Surgeon: Sofia Sheikh MD;  Location: [facility] PAIN MGT;  Service: Pain Management;  Laterality: N/A;  [phone]  2 WK F/U NOHELIA    JOINT REPLACEMENT  9/2010    NASAL SEPTUM SURGERY      TOTAL KNEE ARTHROPLASTY Right     6 knee surgeries    TRANSESOPHAGEAL ECHOCARDIOGRAM WITH POSSIBLE CARDIOVERSION (ELIZABETH W/ POSS CARDIOVERSION) N/A 2/28/2024    Procedure: Transesophageal echo (ELIZABETH) intra-procedure log documentation;  Surgeon: Ahmet De La Cruz MD;  Location: [facility] CATH LAB/EP;  Service: Cardiology;  Laterality: N/A;       Family History:  Family History   Problem Relation Name Age of Onset    Brain cancer Mother Klarissa 67    Cancer Mother Klarissa     Hypertension Father Keanu     Lung cancer Father Keanu         x2 (PAUL initially- treated surgically only, then recurred distantly) (smoker, & had been exposed to many chemicals)    Cancer Father Keanu     Breast cancer Sister  58    Genetic Disorder Sister          monoallelic MUTYH mutation    Seizures Daughter      Cancer Maternal Grandfather Valerio     Brain cancer Paternal Grandfather  73    Breast cancer Maternal Cousin  57    Aneurysm Other mgm's father 48        brain    Ulcerative colitis Other brother's son        Social History:  Social History     Socioeconomic History    Marital status:    Tobacco Use    Smoking status: Never     Passive exposure: Never    Smokeless tobacco: Never   Substance and Sexual Activity    Alcohol use: Yes     Alcohol/week: 2.0 standard drinks of alcohol     Types: 2 Cans of beer per week     Comment: 6 beers per month    Drug use: Never    Sexual activity: Yes     Partners: Female     Birth control/protection: None   Social History Narrative    5/11/2021: . He lives with his wife and 2 kids. (5 kids total). He has one dog, one cat, no more chickens at  "home. One dog recently passed away. No smokers at home.      Social Drivers of Health     Financial Resource Strain: Low Risk  (3/8/2024)    Overall Financial Resource Strain (CARDIA)     Difficulty of Paying Living Expenses: Not very hard   Food Insecurity: No Food Insecurity (3/8/2024)    Hunger Vital Sign     Worried About Running Out of Food in the Last Year: Never true     Ran Out of Food in the Last Year: Never true   Transportation Needs: No Transportation Needs (3/8/2024)    PRAPARE - Transportation     Lack of Transportation (Medical): No     Lack of Transportation (Non-Medical): No   Physical Activity: Unknown (3/8/2024)    Exercise Vital Sign     Days of Exercise per Week: 0 days   Stress: Stress Concern Present (3/8/2024)    [employer] [place] [employer] - Occupational Stress Questionnaire     Feeling of Stress : To some extent   Housing Stability: Low Risk  (3/8/2024)    Housing Stability Vital Sign     Unable to Pay for Housing in the Last Year: No     Number of Places Lived in the Last Year: 1     Unstable Housing in the Last Year: No       OBJECTIVE:    BP (!) 157/96   Pulse 70   Temp 98 °F (36.7 °C)   Resp 18   Ht 5' 10" (1.778 m)   Wt 86 kg (189 lb 9.5 oz)   SpO2 100%   BMI 27.20 kg/m²     PHYSICAL EXAMINATION:    General appearance: Well appearing, in no acute distress, alert and oriented x3.  Psych:  Mood and affect appropriate.  Skin: Skin color, texture, turgor normal, no rashes or lesions, in both upper and lower body.  Head/face:  Atraumatic, normocephalic. No palpable lymph nodes  Neck: No pain to palpation over the cervical paraspinous muscles.   Cor: RRR  Pulm: Symmetric chest rise, no respiratory distress noted.   Back: Straight leg raising in the sitting position is negative to radicular pain bilaterally. There is pain to palpation over the lumbar facet joints bilaterally. Limited ROM with pain on flexion and extension. Positive facet loading bilaterally.   Extremities: "  No deformities, edema, or skin discoloration. Good capillary refill.  Musculoskeletal:  Bilateral upper and lower extremity strength is normal and symmetric.  No atrophy or tone abnormalities are noted.  Neuro: No loss of sensation is noted.  Gait: Normal.    ASSESSMENT: 64 y.o. year old male with neck and back pain, consistent with the followin. Chronic pain disorder        2. Lumbar radiculopathy  gabapentin (NEURONTIN) 800 MG tablet    CANCELED: Procedure Order to Pain Management      3. Lumbar spondylosis        4. Cervical radiculopathy  gabapentin (NEURONTIN) 800 MG tablet      5. Cervical spondylosis        6. DDD (degenerative disc disease), cervical                    PLAN:     - Previous imaging was reviewed and discussed with the patient today.    - Schedule for L4/5 IL SELENA.     - We can repeat C7/T1 IL SELENA as needed.     - Continue Gabapentin.     - Pain contract signed 2024.     - Continue Percocet 10/325 mg BID PRN, #60. Refill provided.     - The patient is here today for a refill of current pain medications and they believe these provide effective pain control and improvements in quality of life.  The patient notes no serious side effects, and feels the benefits outweigh the risks.  The patient was reminded of the pain contract that they signed previously as well as the risks and benefits of the medication including possible death.  The updated [place] Board [employer] prescription monitoring program was reviewed, and the patient has been found to be compliant with current treatment plan. Medication management provided by Dr. Fung.     - UDS from 9/10/2024 reviewed and consistent.     - I have stressed the importance of physical activity and a home exercise plan to help with pain and improve health.    - RTC 2 weeks after above procedure.     The above plan and management options were discussed at length with patient. Patient is in agreement with the above and verbalized  understanding.    Baylee Ni  12/09/2024

## 2024-12-09 NOTE — H&P (VIEW-ONLY)
Chronic patient Established Note (Follow up visit)      SUBJECTIVE:    Interval History 12/9/2024:  The patient returns to clinic today for follow up of neck and back pain. Since last visit, he has developed right knee pain. This is limiting his progression in physical therapy. He did have the right knee drained recently. He reports worsened low back pain. He attributes this to the way he is walking. He reports intermittent radiating pain into the hip and leg. His pain is worse with standing and walking. He is taking Gabapentin and Percocet. He denies any adverse effects. He denies any other health changes. His pain today is 8/10.    Interval History 9/10/2024:  The patient returns to clinic today for follow up of neck and back pain. Since last visit, he underwent left knee arthroscopy on 8/13/2024. He is currently participating in physical therapy. He reports increased mid and low back pain. He denies any radicular leg pain. His pain is worse with moving from sitting to standing. He is taking Gabapentin. He also takes Percocet as needed for severe pain. He denies any adverse effects. He denies any other health changes. His pain today is 8/10.    Interval History 6/10/2024:  The patient returns to clinic today for follow up of neck and back pain. He is s/p L4/5 IL SELENA on 5/23/2024. He reports 60% relief. He reports low back pain. He denies any radicular leg pain. His back pain is worse with moving from sitting to standing. He continues to report neck pain. He is taking Gabapentin. He also takes Percocet for severe pain as needed with benefit. He denies any adverse effects. He denies any other health changes. His pain today is 5/10.      Interval History 5/1/2024:  The patient returns to clinic today for follow up of neck and back pain. He reports increased low back pain. He denies any radicular leg pain but does have numbness into the lateral aspect of his right leg. This is worse from the knee to the top of his  right foot. He does have numbness under the left foot. His pain is worse with standing and walking. He is no longer taking Eliquis. He is taking Gabapentin. He also takes Percocet for severe pain. He needs a refill. He denies any other health changes. His pain today is 8/10.    Interval History 3/25/2024:  Partha Curtis Jr. presents to the clinic for a follow-up appointment for neck pain. He is s/p C7/T1 IL SELENA on 2/26/2024. He reports 100% relief of his neck pain. He was admitted for atrial fibrillation after the last procedure. He was converted to sinus rhythm. He is now on Eliquis. He reports increased low back pain. He denies any radicular leg pain. His pain is worse as the day goes on. He previously had relief with ESIs. He is interested in repeating this in the future. He denies any weakness. He is taking Gabapentin. He takes Percocet intermittently for severe pain. He denies any other health changes. His pain today is 0/10.      HPI: Partha Curtis Jr. is a 63 y.o. male presents to pain clinic for evaluation of neck pain. Symptoms developed 4 weeks ago. Similar pain in 2022 that improved after an C7/T1 ILESI on 3/28/22, had been pain free since this procedure until 4 weeks ago. Original neck pain started in 2021 without inciting event. Denies trauma or injury to the area in the past 4 weeks upon return of symptoms. Pain management previously at [facility] with Dr. Herrera     Original Pain Description:  The pain is located in the upper thoracic region just left of the midline with radiation into his left shoulder and proximal arm terminating above the elbow. All of patients upper back/neck pain is on the left. The pain is described as aching, sharp, and throbbing. These exact symptoms were present prior to his 2022 ILESI. Chinyere ranges from 4 - 10. The current pain is 8. Exacerbating factors: Coughing/Sneezing and L arm abduction above the head, head forward flexion and head lateral bending to the left, and  laying flat on the ground. Mitigating factors pillow. Symptoms interfere with daily activity and sleeping and work. 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 Works as a  for [employer]. The patient feels like symptoms have been worsening. Patient endorses weakness in his left arm with return of pain. Patient denies saddle anesthesia, bladder/bowel incontinence, acute or signifcant limb weakness, ataxia, or increased falls.     Pain Disability Index Review:      12/9/2024     9:58 AM 9/10/2024    10:02 AM 6/10/2024    11:10 AM   Last 3 PDI Scores   Pain Disability Index (PDI) 56 [phone] 63       Pain Medications:  Gabapentin  Percocet     Opioid Contract: not applicable     report:  Reviewed and consistent with medication use as prescribed.    Pain Procedures:   2/26/2024- C7/T1 IL SELENA  5/23/2024- L4/5 IL SELENA- 60% relief    Physical Therapy/Home Exercise: yes    Imaging:   MRI Cervical Spine 2/15/2024:  COMPARISON:  XR C-spine 03/02/2010.  CTA head neck 06/23/2022.     FINDINGS:  Alignment: Normal sagittal alignment.  Grade 1 retrolisthesis at C5-C6 and grade 1 anterolisthesis at C7-T1.     Vertebrae: Vertebral body heights are maintained.  No fracture or marrow infiltrative process.     Discs: Multilevel degenerative disc desiccation and height loss most pronounced at C5-C6 and C6-C7.     Cord: Normal cord signal intensity.     Cerebellar tonsils are in their expected location.  Visualized brainstem is normal. Vertebral artery flow voids are present.  Prevertebral soft tissues are normal.  No cervical lymph node enlargement.  Paraspinal musculature demonstrates normal bulk and signal intensity.     Degenerative findings:     C2-C3: Thickening of the posterior longitudinal ligament.  No spinal canal stenosis or neural foraminal narrowing.     C3-C4: Thickening of the posterior longitudinal ligament, bilateral uncovertebral spurring and facet arthropathy.  No spinal  canal stenosis.  Mild left neural foraminal narrowing.     C4-C5: Central/right paracentral disc extrusion with inferior migration which abuts and slightly posteriorly displaces the right ventral cord.  Bilateral uncovertebral spurring and facet arthropathy.  Mild spinal canal stenosis.  Mild left neural foraminal narrowing.     C5-C6: Posterior disc osteophyte complex, bilateral uncovertebral spurring and facet arthropathy.  Moderate spinal canal stenosis.  Severe bilateral neural foraminal narrowing.     C6-C7: Posterior disc osteophyte complex, bilateral uncovertebral spurring and facet arthropathy.  No spinal canal stenosis.  Mild right and moderate left neural foraminal narrowing.     C7-T1: No spinal canal stenosis or neural foraminal narrowing.     Impression:     Multilevel degenerative change of the cervical spine, detailed above.  Findings most pronounced at C5-C6 with moderate spinal canal stenosis and severe bilateral neural foraminal narrowing.    MRI Lumbar Spine 11/2/2023:  COMPARISON: MRI lumbar spine 6/28/2021     FINDINGS:   Spine Numbering: For purposes of this dictation, it is assumed that there are 5 non-rib-bearing, lumbar-type vertebrae, and the most caudal fully segmented lumbar vertebra is labeled L5.     Alignment: Straightening of normal lumbar lordosis. Unchanged dextrocurvature of the lumbar spine. Unchanged grade 1 left lateral listhesis of L2 on L3. Unchanged grade 1 right lateral listhesis of L3 on L4. Unchanged grade 1 retrolisthesis of L2 on L3.     Surgical: None.     Vertebral Bodies: Unchanged multilevel mild anterior osteophytosis.     Marrow Signal: Unchanged degenerative fatty marrow placement at the L3-4 endplates. No marrow edema. No suspicious osseous lesions.     Intervertebral Discs: Unchanged multilevel disc dessication with loss of disc space height     Conus Medullaris: Terminates at L1     Cauda Equina: Unchanged bunching of the cauda equina at L3-4 and L4-5 due to  thecal sac narrowing detailed below.     Paraspinal Soft Tissues: Normal     Intra-abdominal Findings: None.     Individual Levels:     T12-L1: Unchanged circumferential disc bulge with unchanged right paracentral disc herniation with cranial migration to the infrapedicular level of T12 measuring 1.6 x 0.6 cm (image 7, series 2). No thecal sac or neural foraminal narrowing.     L1-L2: Unchanged circumferential disc bulge with bulky left lateral osteophytosis. No spinal canal or foraminal narrowing.     L2-L3: Circumferential disc bulge, ligamentum flavum thickening, and epidural lipomatosis cause unchanged moderate thecal sac narrowing. Mild facet arthropathy and disc disease cause unchanged moderate bilateral neural foraminal narrowing.     L3-L4: Circumferential disc bulge, ligamentum flavum thickening, and epidural lipomatosis cause unchanged severe thecal sac narrowing. Moderate right and severe left facet arthropathy with disc disease cause unchanged mild right and moderate left neural foraminal narrowing.     L4-L5: Circumferential disc bulge, ligamentum flavum thickening, and epidural lipomatosis cause unchanged severe thecal sac narrowing. Severe facet arthropathy and disc disease cause unchanged severe right and moderate left neural foraminal narrowing with impingement of the exiting right L4 nerve root.     L5-S1: Unchanged small disc bulge and moderate facet arthropathy. No spinal canal or foraminal narrowing.     Impression    Unchanged severe multilevel lumbar spondylosis as detailed above.    Allergies:   Review of patient's allergies indicates:   Allergen Reactions    Stadol [butorphanol tartrate] Rash     Swelling in face    Strawberries [strawberry] Rash       Current Medications:   Current Outpatient Medications   Medication Sig Dispense Refill    amitriptyline (ELAVIL) 75 MG tablet Take 1 tablet by mouth in the evening 90 tablet 3    aspirin (ECOTRIN) 81 MG EC tablet Take 1 tablet (81 mg total) by  "mouth once daily. 28 tablet 0    BD LUER-RUSS SYRINGE 3 mL 25 gauge x 1" Syrg USE ONE SYRINGE EVERY 14 DAYS WITH TESTOSTERONE 100 each 2    candesartan (ATACAND) 32 MG tablet Take 1 tablet (32 mg total) by mouth once daily. 90 tablet 3    cloNIDine (CATAPRES) 0.1 MG tablet Take 1 tablet (0.1 mg total) by mouth 2 (two) times daily. 180 tablet 3    cyanocobalamin (VITAMIN B-12) 1000 MCG tablet Take 1 tablet (1,000 mcg total) by mouth once daily. 90 tablet 4    ergocalciferol (ERGOCALCIFEROL) 50,000 unit Cap Take 1 capsule by mouth once a week 12 capsule 0    ezetimibe (ZETIA) 10 mg tablet Take 1 tablet (10 mg total) by mouth once daily. 90 tablet 3    fluticasone propionate (FLONASE) 50 mcg/actuation nasal spray 2 sprays by Each Nostril route.      gabapentin (NEURONTIN) 800 MG tablet Take 1 tablet (800 mg total) by mouth every evening. 90 tablet 1    glucose 4 GM chewable tablet Take 4 tablets (16 g total) by mouth as needed for Low blood sugar. 30 tablet 12    HYDROcodone-acetaminophen (NORCO) 7.5-325 mg per tablet Take 1 tablet by mouth every 6 (six) hours as needed for Pain. 20 tablet 0    hydrocortisone 2.5 % cream Apply to face twice daily for flares as needed 40 g 2    ipratropium (ATROVENT) 42 mcg (0.06 %) nasal spray 2 sprays by Nasal route 2 (two) times daily as needed for Rhinitis. 15 mL 0    ketoconazole (NIZORAL) 2 % cream Apply twice daily to affected area of skin 60 g 2    metoprolol succinate (TOPROL-XL) 200 MG 24 hr tablet Take 1 tablet (200 mg total) by mouth once daily. (Patient taking differently: Take 200 mg by mouth every evening.) 90 tablet 3    oxyCODONE-acetaminophen (PERCOCET)  mg per tablet Take 1 tablet by mouth every 12 (twelve) hours as needed for Pain. 60 tablet 0    rosuvastatin (CRESTOR) 40 MG Tab Take 1 tablet (40 mg total) by mouth every evening. 90 tablet 3    semaglutide (OZEMPIC) 2 mg/dose (8 mg/3 mL) PnIj Inject 2 mg into the skin every 7 days. 9 mL 3    sod sulf-pot " "chloride-mag sulf (SUTAB) 1.479-0.188- 0.225 gram tablet Take 12 tablets by mouth once daily. Take according to package instructions with indicated amount of water. 24 tablet 0    syringe with needle, safety (BD INTEGRA SYRINGE) 3 mL 22 gauge x 1 1/2" Syrg Inject 1 Syringe as directed once a week. 6 each 3    testosterone cypionate (DEPOTESTOTERONE CYPIONATE) 200 mg/mL injection Inject 1 mL (200 mg total) into the muscle every 14 (fourteen) days. 2 mL 5    valACYclovir (VALTREX) 1000 MG tablet Take 2 tablets (2,000 mg total) by mouth every 12 (twelve) hours. 4 tablet 3     No current facility-administered medications for this visit.     Facility-Administered Medications Ordered in Other Visits   Medication Dose Route Frequency Provider Last Rate Last Admin    0.9%  NaCl infusion   Intravenous Continuous Kendell Hoffman MD        0.9%  NaCl infusion   Intravenous Continuous Gerardo Uribe MD           REVIEW OF SYSTEMS:    GENERAL:  No weight loss, malaise or fevers.  HEENT:  Negative for frequent or significant headaches.  NECK:  Negative for lumps, goiter, pain and significant neck swelling.  RESPIRATORY:  Negative for cough, wheezing or shortness of breath.  CARDIOVASCULAR:  Negative for chest pain, leg swelling or palpitations. HTN, AFib  GI:  Negative for abdominal discomfort, blood in stools or black stools or change in bowel habits.  MUSCULOSKELETAL:  See HPI.  SKIN:  Negative for lesions, rash, and itching.  PSYCH:  Negative for sleep disturbance, mood disorder and recent psychosocial stressors.  ENDO: Diabetes.   HEMATOLOGY/LYMPHOLOGY:  Negative for swollen nodes. Eliquis  NEURO:   No history of headaches, syncope, paralysis, seizures or tremors.  All other reviewed and negative other than HPI.    Past Medical History:  Past Medical History:   Diagnosis Date    Allergy     Carotid artery occlusion     Chronic back pain     Colonic polyp     Genetic testing     MUTYH mutation-negative    Hyperlipidemia     " Hypertension     Paroxysmal atrial fibrillation 2/28/2024    Sleep apnea     Type 2 diabetes mellitus with stage 3 chronic kidney disease, without long-term current use of insulin 4/2/2020       Past Surgical History:  Past Surgical History:   Procedure Laterality Date    ANKLE SURGERY Left     2    APPENDECTOMY      at age 20    ARTHROSCOPIC CHONDROPLASTY OF KNEE JOINT Left 8/13/2024    Procedure: ARTHROSCOPY, KNEE, WITH CHONDROPLASTY;  Surgeon: Kai Washington MD;  Location: [facility] OR;  Service: Orthopedics;  Laterality: Left;    ARTHROSCOPY, KNEE, WITH ABRASION ARTHROPLASTY OR MICROFRACTURE Left 8/13/2024    Procedure: ARTHROSCOPY, KNEE, WITH  MICROFRACTURE;  Surgeon: Kai Washington MD;  Location: [facility] OR;  Service: Orthopedics;  Laterality: Left;    ARTHROSCOPY,KNEE,WITH MENISCUS REPAIR Left 8/13/2024    Procedure: ARTHROSCOPY,KNEE,WITH MENISCUS REPAIR;  Surgeon: Kai Washington MD;  Location: [facility] OR;  Service: Orthopedics;  Laterality: Left;  NO REGIONAL BLOCK    CAROTID ENDARTERECTOMY Right 07/15/2015    CARPAL TUNNEL RELEASE Bilateral     COLONOSCOPY N/A 12/14/2018    Procedure: COLONOSCOPYSuprep;  Surgeon: Silver Selby MD;  Location: [facility] ENDO;  Service: Endoscopy;  Laterality: N/A;    COLONOSCOPY N/A 10/2/2024    Procedure: COLONOSCOPY;  Surgeon: Heath Morrow MD;  Location: [facility] ENDO;  Service: Endoscopy;  Laterality: N/A;  Ref by Leonor Oneill, pt has unopened Sutab, portal - PC  9/25/24-LVM for precall, portal-DS. 10/1/24 Pt. called and confirmed arrival time.EC    EPIDURAL STEROID INJECTION N/A 2/26/2024    Procedure: CERVICAL C7/T1 IL SELENA;  Surgeon: Gokul Fung MD;  Location: [facility] PAIN MGT;  Service: Pain Management;  Laterality: N/A;  [phone]  2 WK F/U SERGEI    EPIDURAL STEROID INJECTION N/A 5/23/2024    Procedure: LUMBAR L4/5 IL SELENA *ASPIRIN OTC* HOLD FOR 5 DAYS;  Surgeon: Gokul Fung MD;  Location: [facility] PAIN MGT;  Service: Pain Management;  Laterality: N/A;   [phone]  2 WK F/U NOHELIA    EPIDURAL STEROID INJECTION N/A 10/21/2024    Procedure: LUMBAR L5/S1 IL SELENA *ASPIRIN OTC* HOLD FOR 5 DAYS  **EISSA PT**;  Surgeon: Sofia Sheikh MD;  Location: [facility] PAIN MGT;  Service: Pain Management;  Laterality: N/A;  [phone]  2 WK F/U NOHELIA    JOINT REPLACEMENT  9/2010    NASAL SEPTUM SURGERY      TOTAL KNEE ARTHROPLASTY Right     6 knee surgeries    TRANSESOPHAGEAL ECHOCARDIOGRAM WITH POSSIBLE CARDIOVERSION (ELIZABETH W/ POSS CARDIOVERSION) N/A 2/28/2024    Procedure: Transesophageal echo (ELIZABETH) intra-procedure log documentation;  Surgeon: Ahmet De La Cruz MD;  Location: [facility] CATH LAB/EP;  Service: Cardiology;  Laterality: N/A;       Family History:  Family History   Problem Relation Name Age of Onset    Brain cancer Mother Klarissa 67    Cancer Mother Klarissa     Hypertension Father Keanu     Lung cancer Father Keanu         x2 (PAUL initially- treated surgically only, then recurred distantly) (smoker, & had been exposed to many chemicals)    Cancer Father Keanu     Breast cancer Sister  58    Genetic Disorder Sister          monoallelic MUTYH mutation    Seizures Daughter      Cancer Maternal Grandfather Valerio     Brain cancer Paternal Grandfather  73    Breast cancer Maternal Cousin  57    Aneurysm Other mgm's father 48        brain    Ulcerative colitis Other brother's son        Social History:  Social History     Socioeconomic History    Marital status:    Tobacco Use    Smoking status: Never     Passive exposure: Never    Smokeless tobacco: Never   Substance and Sexual Activity    Alcohol use: Yes     Alcohol/week: 2.0 standard drinks of alcohol     Types: 2 Cans of beer per week     Comment: 6 beers per month    Drug use: Never    Sexual activity: Yes     Partners: Female     Birth control/protection: None   Social History Narrative    5/11/2021: . He lives with his wife and 2 kids. (5 kids total). He has one dog, one cat, no more chickens at  "home. One dog recently passed away. No smokers at home.      Social Drivers of Health     Financial Resource Strain: Low Risk  (3/8/2024)    Overall Financial Resource Strain (CARDIA)     Difficulty of Paying Living Expenses: Not very hard   Food Insecurity: No Food Insecurity (3/8/2024)    Hunger Vital Sign     Worried About Running Out of Food in the Last Year: Never true     Ran Out of Food in the Last Year: Never true   Transportation Needs: No Transportation Needs (3/8/2024)    PRAPARE - Transportation     Lack of Transportation (Medical): No     Lack of Transportation (Non-Medical): No   Physical Activity: Unknown (3/8/2024)    Exercise Vital Sign     Days of Exercise per Week: 0 days   Stress: Stress Concern Present (3/8/2024)    [employer] [place] [employer] - Occupational Stress Questionnaire     Feeling of Stress : To some extent   Housing Stability: Low Risk  (3/8/2024)    Housing Stability Vital Sign     Unable to Pay for Housing in the Last Year: No     Number of Places Lived in the Last Year: 1     Unstable Housing in the Last Year: No       OBJECTIVE:    BP (!) 157/96   Pulse 70   Temp 98 °F (36.7 °C)   Resp 18   Ht 5' 10" (1.778 m)   Wt 86 kg (189 lb 9.5 oz)   SpO2 100%   BMI 27.20 kg/m²     PHYSICAL EXAMINATION:    General appearance: Well appearing, in no acute distress, alert and oriented x3.  Psych:  Mood and affect appropriate.  Skin: Skin color, texture, turgor normal, no rashes or lesions, in both upper and lower body.  Head/face:  Atraumatic, normocephalic. No palpable lymph nodes  Neck: No pain to palpation over the cervical paraspinous muscles.   Cor: RRR  Pulm: Symmetric chest rise, no respiratory distress noted.   Back: Straight leg raising in the sitting position is negative to radicular pain bilaterally. There is pain to palpation over the lumbar facet joints bilaterally. Limited ROM with pain on flexion and extension. Positive facet loading bilaterally.   Extremities: "  No deformities, edema, or skin discoloration. Good capillary refill.  Musculoskeletal:  Bilateral upper and lower extremity strength is normal and symmetric.  No atrophy or tone abnormalities are noted.  Neuro: No loss of sensation is noted.  Gait: Normal.    ASSESSMENT: 64 y.o. year old male with neck and back pain, consistent with the followin. Chronic pain disorder        2. Lumbar radiculopathy  gabapentin (NEURONTIN) 800 MG tablet    CANCELED: Procedure Order to Pain Management      3. Lumbar spondylosis        4. Cervical radiculopathy  gabapentin (NEURONTIN) 800 MG tablet      5. Cervical spondylosis        6. DDD (degenerative disc disease), cervical                    PLAN:     - Previous imaging was reviewed and discussed with the patient today.    - Schedule for L4/5 IL SELENA.     - We can repeat C7/T1 IL SELENA as needed.     - Continue Gabapentin.     - Pain contract signed 2024.     - Continue Percocet 10/325 mg BID PRN, #60. Refill provided.     - The patient is here today for a refill of current pain medications and they believe these provide effective pain control and improvements in quality of life.  The patient notes no serious side effects, and feels the benefits outweigh the risks.  The patient was reminded of the pain contract that they signed previously as well as the risks and benefits of the medication including possible death.  The updated [place] Board [employer] prescription monitoring program was reviewed, and the patient has been found to be compliant with current treatment plan. Medication management provided by Dr. Fung.     - UDS from 9/10/2024 reviewed and consistent.     - I have stressed the importance of physical activity and a home exercise plan to help with pain and improve health.    - RTC 2 weeks after above procedure.     The above plan and management options were discussed at length with patient. Patient is in agreement with the above and verbalized  understanding.    Baylee Ni  12/09/2024

## 2024-12-09 NOTE — TELEPHONE ENCOUNTER
----- Message from [employer] sent at 12/9/2024  9:20 AM CST -----  Name of Who is Calling: MEL BULLARD JR. [[id number]]          What is the request in detail: Pt advised he will be 15-20 min late for appointment and would like to know if he can still be seen.           Can the clinic reply by [facility]: No          What Number to Call Back if not in [facility]: [phone]

## 2024-12-10 ENCOUNTER — LAB VISIT (OUTPATIENT)
Dept: LAB | Facility: HOSPITAL | Age: 64
End: 2024-12-10
Attending: FAMILY MEDICINE
Payer: COMMERCIAL

## 2024-12-10 ENCOUNTER — PATIENT MESSAGE (OUTPATIENT)
Dept: ORTHOPEDICS | Facility: CLINIC | Age: 64
End: 2024-12-10
Payer: COMMERCIAL

## 2024-12-10 DIAGNOSIS — N18.31 TYPE 2 DIABETES MELLITUS WITH STAGE 3A CHRONIC KIDNEY DISEASE, WITHOUT LONG-TERM CURRENT USE OF INSULIN: ICD-10-CM

## 2024-12-10 DIAGNOSIS — T84.022A INSTABILITY OF INTERNAL RIGHT KNEE PROSTHESIS, INITIAL ENCOUNTER: Primary | ICD-10-CM

## 2024-12-10 DIAGNOSIS — E78.5 DYSLIPIDEMIA: Chronic | ICD-10-CM

## 2024-12-10 DIAGNOSIS — E11.22 TYPE 2 DIABETES MELLITUS WITH STAGE 3A CHRONIC KIDNEY DISEASE, WITHOUT LONG-TERM CURRENT USE OF INSULIN: ICD-10-CM

## 2024-12-10 LAB
ALBUMIN SERPL BCP-MCNC: 4 G/DL (ref 3.5–5.2)
ALP SERPL-CCNC: 77 U/L (ref 40–150)
ALT SERPL W/O P-5'-P-CCNC: 53 U/L (ref 10–44)
ANION GAP SERPL CALC-SCNC: 10 MMOL/L (ref 8–16)
AST SERPL-CCNC: 37 U/L (ref 10–40)
BASOPHILS # BLD AUTO: 0.04 K/UL (ref 0–0.2)
BASOPHILS NFR BLD: 0.5 % (ref 0–1.9)
BILIRUB SERPL-MCNC: 0.6 MG/DL (ref 0.1–1)
BUN SERPL-MCNC: 13 MG/DL (ref 8–23)
CALCIUM SERPL-MCNC: 9.3 MG/DL (ref 8.7–10.5)
CHLORIDE SERPL-SCNC: 101 MMOL/L (ref 95–110)
CHOLEST SERPL-MCNC: 96 MG/DL (ref 120–199)
CHOLEST/HDLC SERPL: 3.3 {RATIO} (ref 2–5)
CO2 SERPL-SCNC: 28 MMOL/L (ref 23–29)
CREAT SERPL-MCNC: 1.5 MG/DL (ref 0.5–1.4)
DIFFERENTIAL METHOD BLD: ABNORMAL
EOSINOPHIL # BLD AUTO: 0.3 K/UL (ref 0–0.5)
EOSINOPHIL NFR BLD: 3 % (ref 0–8)
ERYTHROCYTE [DISTWIDTH] IN BLOOD BY AUTOMATED COUNT: 15.3 % (ref 11.5–14.5)
EST. GFR  (NO RACE VARIABLE): 51.7 ML/MIN/1.73 M^2
ESTIMATED AVG GLUCOSE: 212 MG/DL (ref 68–131)
GLUCOSE SERPL-MCNC: 160 MG/DL (ref 70–110)
HBA1C MFR BLD: 9 % (ref 4–5.6)
HCT VFR BLD AUTO: 53.5 % (ref 40–54)
HDLC SERPL-MCNC: 29 MG/DL (ref 40–75)
HDLC SERPL: 30.2 % (ref 20–50)
HGB BLD-MCNC: 17.9 G/DL (ref 14–18)
IMM GRANULOCYTES # BLD AUTO: 0.01 K/UL (ref 0–0.04)
IMM GRANULOCYTES NFR BLD AUTO: 0.1 % (ref 0–0.5)
LDLC SERPL CALC-MCNC: 37.6 MG/DL (ref 63–159)
LYMPHOCYTES # BLD AUTO: 2.8 K/UL (ref 1–4.8)
LYMPHOCYTES NFR BLD: 32.9 % (ref 18–48)
MCH RBC QN AUTO: 30.2 PG (ref 27–31)
MCHC RBC AUTO-ENTMCNC: 33.5 G/DL (ref 32–36)
MCV RBC AUTO: 90 FL (ref 82–98)
MONOCYTES # BLD AUTO: 0.9 K/UL (ref 0.3–1)
MONOCYTES NFR BLD: 10.4 % (ref 4–15)
NEUTROPHILS # BLD AUTO: 4.5 K/UL (ref 1.8–7.7)
NEUTROPHILS NFR BLD: 53.1 % (ref 38–73)
NONHDLC SERPL-MCNC: 67 MG/DL
NRBC BLD-RTO: 0 /100 WBC
PLATELET # BLD AUTO: 279 K/UL (ref 150–450)
PMV BLD AUTO: 9.9 FL (ref 9.2–12.9)
POTASSIUM SERPL-SCNC: 4.2 MMOL/L (ref 3.5–5.1)
PROT SERPL-MCNC: 7.1 G/DL (ref 6–8.4)
RBC # BLD AUTO: 5.92 M/UL (ref 4.6–6.2)
SODIUM SERPL-SCNC: 139 MMOL/L (ref 136–145)
TRIGL SERPL-MCNC: 147 MG/DL (ref 30–150)
WBC # BLD AUTO: 8.55 K/UL (ref 3.9–12.7)

## 2024-12-10 PROCEDURE — 80061 LIPID PANEL: CPT | Performed by: INTERNAL MEDICINE

## 2024-12-10 PROCEDURE — 82172 ASSAY OF APOLIPOPROTEIN: CPT | Performed by: INTERNAL MEDICINE

## 2024-12-10 PROCEDURE — 85025 COMPLETE CBC W/AUTO DIFF WBC: CPT | Performed by: FAMILY MEDICINE

## 2024-12-10 PROCEDURE — 80053 COMPREHEN METABOLIC PANEL: CPT | Performed by: FAMILY MEDICINE

## 2024-12-10 PROCEDURE — 83036 HEMOGLOBIN GLYCOSYLATED A1C: CPT | Performed by: FAMILY MEDICINE

## 2024-12-10 PROCEDURE — 36415 COLL VENOUS BLD VENIPUNCTURE: CPT | Mod: PO | Performed by: FAMILY MEDICINE

## 2024-12-12 ENCOUNTER — LAB VISIT (OUTPATIENT)
Dept: LAB | Facility: HOSPITAL | Age: 64
End: 2024-12-12
Attending: STUDENT IN AN ORGANIZED HEALTH CARE EDUCATION/TRAINING PROGRAM
Payer: COMMERCIAL

## 2024-12-12 ENCOUNTER — OFFICE VISIT (OUTPATIENT)
Dept: FAMILY MEDICINE | Facility: CLINIC | Age: 64
End: 2024-12-12
Payer: COMMERCIAL

## 2024-12-12 VITALS
WEIGHT: 190.25 LBS | HEIGHT: 70 IN | SYSTOLIC BLOOD PRESSURE: 122 MMHG | DIASTOLIC BLOOD PRESSURE: 78 MMHG | HEART RATE: 60 BPM | OXYGEN SATURATION: 96 % | BODY MASS INDEX: 27.24 KG/M2

## 2024-12-12 DIAGNOSIS — T84.022A INSTABILITY OF INTERNAL RIGHT KNEE PROSTHESIS, INITIAL ENCOUNTER: ICD-10-CM

## 2024-12-12 DIAGNOSIS — N18.31 TYPE 2 DIABETES MELLITUS WITH STAGE 3A CHRONIC KIDNEY DISEASE, WITHOUT LONG-TERM CURRENT USE OF INSULIN: ICD-10-CM

## 2024-12-12 DIAGNOSIS — E11.22 TYPE 2 DIABETES MELLITUS WITH STAGE 3A CHRONIC KIDNEY DISEASE, WITHOUT LONG-TERM CURRENT USE OF INSULIN: ICD-10-CM

## 2024-12-12 LAB
APO B SERPL-MCNC: 58 MG/DL
CRP SERPL-MCNC: <0.3 MG/L (ref 0–8.2)
ERYTHROCYTE [SEDIMENTATION RATE] IN BLOOD BY PHOTOMETRIC METHOD: 7 MM/HR (ref 0–23)
GLUCOSE SERPL-MCNC: 227 MG/DL (ref 70–110)

## 2024-12-12 PROCEDURE — 85652 RBC SED RATE AUTOMATED: CPT | Performed by: STUDENT IN AN ORGANIZED HEALTH CARE EDUCATION/TRAINING PROGRAM

## 2024-12-12 PROCEDURE — 86140 C-REACTIVE PROTEIN: CPT | Performed by: STUDENT IN AN ORGANIZED HEALTH CARE EDUCATION/TRAINING PROGRAM

## 2024-12-12 PROCEDURE — 36415 COLL VENOUS BLD VENIPUNCTURE: CPT | Mod: PO | Performed by: STUDENT IN AN ORGANIZED HEALTH CARE EDUCATION/TRAINING PROGRAM

## 2024-12-12 PROCEDURE — 99999 PR PBB SHADOW E&M-EST. PATIENT-LVL V: CPT | Mod: PBBFAC,,,

## 2024-12-12 RX ORDER — BLOOD-GLUCOSE SENSOR
EACH MISCELLANEOUS
Qty: 4 EACH | Refills: 11 | Status: SHIPPED | OUTPATIENT
Start: 2024-12-12

## 2024-12-12 RX ORDER — SEMAGLUTIDE 2.68 MG/ML
2 INJECTION, SOLUTION SUBCUTANEOUS
Qty: 9 ML | Refills: 3 | Status: SHIPPED | OUTPATIENT
Start: 2024-12-12 | End: 2025-12-12

## 2024-12-12 RX ORDER — GLIMEPIRIDE 2 MG/1
2 TABLET ORAL
Qty: 90 TABLET | Refills: 3 | Status: SHIPPED | OUTPATIENT
Start: 2024-12-12 | End: 2025-12-12

## 2024-12-12 NOTE — PATIENT INSTRUCTIONS
Continue ozempic 2 mg  Start back 2 mg amaryl   Continue taking blood glucose daily  If BG starts to go below goal, stop amaryl. Contact office for appt     Follow up in 3 months, labs prior      Dexcom orders placed

## 2024-12-12 NOTE — PROGRESS NOTES
"Subjective     Patient ID: Partha Curtis Jr. is a 64 y.o. male.    Chief Complaint: Diabetes      HPI  DM: He is taking Ozempic 2 mg weekly. Down 10 labs     A1C up from 6.6 to 9.0          Since steroid injection in his back and medrol dose pack, his blood sugar has been elevated . In 200s. States that his daily BG went up with oral steroids. Has been off of 1 month.     Having another steroid injection in 2 weeks.   BG in office 227  Takes glucose daily. Fasting used to be 100. Now 150+    Review of Systems   Constitutional:  Negative for fatigue and fever.   Respiratory:  Negative for cough, shortness of breath and wheezing.    Cardiovascular:  Negative for chest pain and palpitations.   Gastrointestinal:  Negative for diarrhea, nausea and vomiting.   Neurological:  Negative for syncope, weakness, light-headedness and headaches.          Objective     Vitals:    12/12/24 1057   BP: 122/78   Pulse: 60   SpO2: 96%   Weight: 86.3 kg (190 lb 4.1 oz)   Height: 5' 10" (1.778 m)   PainSc:   7   PainLoc: Knee      Physical Exam  Constitutional:       General: He is not in acute distress.  HENT:      Head: Normocephalic and atraumatic.      Nose: No congestion or rhinorrhea.   Cardiovascular:      Rate and Rhythm: Normal rate and regular rhythm.   Pulmonary:      Effort: Pulmonary effort is normal. No respiratory distress.      Breath sounds: Normal breath sounds. No wheezing.   Abdominal:      General: Bowel sounds are normal. There is no distension.      Tenderness: There is no abdominal tenderness.   Musculoskeletal:      Right lower leg: No edema.      Left lower leg: No edema.   Lymphadenopathy:      Cervical: No cervical adenopathy.   Neurological:      Mental Status: He is alert and oriented to person, place, and time.              Assessment and Plan     1. Type 2 diabetes mellitus with stage 3a chronic kidney disease, without long-term current use of insulin  -     semaglutide (OZEMPIC) 2 mg/dose (8 mg/3 mL) " PnIj; Inject 2 mg into the skin every 7 days.  Dispense: 9 mL; Refill: 3  -     POCT Glucose, Hand-Held Device  -     CBC Auto Differential; Future; Expected date: 12/12/2024  -     Comprehensive Metabolic Panel; Future; Expected date: 12/12/2024  -     Hemoglobin A1C; Future; Expected date: 12/12/2024  -     blood-glucose sensor (DEXCOM G7 SENSOR) Allison; To check sugars up to 3 times daily  Dispense: 4 each; Refill: 11    Other orders  -     glimepiride (AMARYL) 2 MG tablet; Take 1 tablet (2 mg total) by mouth before breakfast.  Dispense: 90 tablet; Refill: 3        Continue ozempic 2 mg  Start back 2 mg amaryl   Continue taking blood glucose daily  If BG starts to go below goal, stop amaryl. Contact office for appt     Follow up in 3 months, labs prior      Dexcom orders placed        40  minutes of total time spent on the encounter, which includes face to face time and non-face to face time preparing to see the patient (eg, review of tests), Obtaining and/or reviewing separately obtained history, Documenting clinical information in the electronic or other health record, Independently interpreting results (not separately reported) and communicating results to the patient/family/caregiver, or Care coordination (not separately reported).      Rosario Holt PA-C  Family Practice/Internal Medicine   Office Phone: [phone]

## 2024-12-13 DIAGNOSIS — E55.9 VITAMIN D DEFICIENCY: ICD-10-CM

## 2024-12-13 RX ORDER — ERGOCALCIFEROL 1.25 MG/1
50000 CAPSULE ORAL
Qty: 12 CAPSULE | Refills: 0 | Status: SHIPPED | OUTPATIENT
Start: 2024-12-13

## 2024-12-13 NOTE — TELEPHONE ENCOUNTER
No care due was identified.  [facility] Embedded Care Due Messages. Reference number: [id number].   12/13/2024 5:23:11 AM CST

## 2024-12-14 DIAGNOSIS — E78.5 DYSLIPIDEMIA: Chronic | ICD-10-CM

## 2024-12-16 RX ORDER — EZETIMIBE 10 MG/1
10 TABLET ORAL
Qty: 90 TABLET | Refills: 0 | Status: SHIPPED | OUTPATIENT
Start: 2024-12-16

## 2024-12-18 ENCOUNTER — TELEPHONE (OUTPATIENT)
Dept: ORTHOPEDICS | Facility: CLINIC | Age: 64
End: 2024-12-18
Payer: COMMERCIAL

## 2024-12-20 ENCOUNTER — OFFICE VISIT (OUTPATIENT)
Dept: ORTHOPEDICS | Facility: CLINIC | Age: 64
End: 2024-12-20
Payer: COMMERCIAL

## 2024-12-20 ENCOUNTER — TELEPHONE (OUTPATIENT)
Dept: SPORTS MEDICINE | Facility: CLINIC | Age: 64
End: 2024-12-20
Payer: COMMERCIAL

## 2024-12-20 DIAGNOSIS — M70.51 PES ANSERINUS BURSITIS OF RIGHT KNEE: ICD-10-CM

## 2024-12-20 DIAGNOSIS — Z96.651 PAIN DUE TO TOTAL RIGHT KNEE REPLACEMENT, SUBSEQUENT ENCOUNTER: Primary | ICD-10-CM

## 2024-12-20 DIAGNOSIS — T84.84XD PAIN DUE TO TOTAL RIGHT KNEE REPLACEMENT, SUBSEQUENT ENCOUNTER: Primary | ICD-10-CM

## 2024-12-20 PROCEDURE — 3066F NEPHROPATHY DOC TX: CPT | Mod: CPTII,S$GLB,, | Performed by: STUDENT IN AN ORGANIZED HEALTH CARE EDUCATION/TRAINING PROGRAM

## 2024-12-20 PROCEDURE — 1160F RVW MEDS BY RX/DR IN RCRD: CPT | Mod: CPTII,S$GLB,, | Performed by: STUDENT IN AN ORGANIZED HEALTH CARE EDUCATION/TRAINING PROGRAM

## 2024-12-20 PROCEDURE — 99214 OFFICE O/P EST MOD 30 MIN: CPT | Mod: S$GLB,,, | Performed by: STUDENT IN AN ORGANIZED HEALTH CARE EDUCATION/TRAINING PROGRAM

## 2024-12-20 PROCEDURE — 3052F HG A1C>EQUAL 8.0%<EQUAL 9.0%: CPT | Mod: CPTII,S$GLB,, | Performed by: STUDENT IN AN ORGANIZED HEALTH CARE EDUCATION/TRAINING PROGRAM

## 2024-12-20 PROCEDURE — 3061F NEG MICROALBUMINURIA REV: CPT | Mod: CPTII,S$GLB,, | Performed by: STUDENT IN AN ORGANIZED HEALTH CARE EDUCATION/TRAINING PROGRAM

## 2024-12-20 PROCEDURE — 4010F ACE/ARB THERAPY RXD/TAKEN: CPT | Mod: CPTII,S$GLB,, | Performed by: STUDENT IN AN ORGANIZED HEALTH CARE EDUCATION/TRAINING PROGRAM

## 2024-12-20 PROCEDURE — 1159F MED LIST DOCD IN RCRD: CPT | Mod: CPTII,S$GLB,, | Performed by: STUDENT IN AN ORGANIZED HEALTH CARE EDUCATION/TRAINING PROGRAM

## 2024-12-20 PROCEDURE — 99999 PR PBB SHADOW E&M-EST. PATIENT-LVL IV: CPT | Mod: PBBFAC,,, | Performed by: STUDENT IN AN ORGANIZED HEALTH CARE EDUCATION/TRAINING PROGRAM

## 2024-12-20 NOTE — TELEPHONE ENCOUNTER
----- Message from Med Assistant Tolentino sent at 12/20/2024  2:03 PM CST -----  Regarding: PES BURSA INJECTION  Good afternoon, this patient needs to be scheduled for an US guided pes bursa right knee injection. Patient would like to be seen before the end of year if possible. Thank you.

## 2024-12-20 NOTE — TELEPHONE ENCOUNTER
Spoke to the Pt about the referral that we received for his injection from Dr Cano.  We discussed the details of this appt.  He confirmed those details and will check the portal.

## 2024-12-22 PROBLEM — M47.816 LUMBAR SPONDYLOSIS: Status: ACTIVE | Noted: 2024-12-22

## 2024-12-22 NOTE — PROGRESS NOTES
Assessment:  Problem List Items Addressed This Visit    None  Visit Diagnoses       Pain due to total right knee replacement, subsequent encounter    -  Primary    Relevant Orders    CT Knee Without Contrast Right    Pes anserinus bursitis of right knee        Relevant Orders    CT Knee Without Contrast Right             Plan:   We discussed that his symptoms are c/w pes bursitis and that his knee pain is reproduced with valgus stress and he does have slight lateral laxity in flexion although this is typically physiologic and there is no obvious pathologic laxity. The sudden onset of symptoms after a strenuous work-out a couple mos ago could represent a medial ligamentous strain and associated pain/inflammation in the pes tendons as secondary stabilizers. To be thorough, however, given the MRI read stating concern for osteolysis we will order a CT scan with MARS to better evaluate. However, we discussed that the MRI findings are more likely related to his atypical anatomy due to complex history of multiple knee surgeries.   - Recommend wearing a more supportive hinged knee brace (T-scope or T-rom type) while up and moving for a few weeks to help take pressure off the ligaments   - Ordered CT of the knee to eval for osteolysis, although there is low clinical suspicion  - Recommend ultrasound-guided injection for pes bursitis, to be scheduled after the CT is completed. Will be scheduled with non-op Sports team.   -If the CT were to reveal significant osteolysis or an obvious structural problem with his TKA that would warrant revision, then we would cancel the pes bursa injection and have him f/u after the CT to discuss the results. Otherwise, he will follow up after CT and pes bursa injection are completed to reassess the knee for improvement after the use of the TROM and the injection.            Patient ID: Jarvis TAYLOR Ever Brooke is a 64 y.o. male.  Chief Complaint   Patient presents with    Right Knee - Pain         History of Present Illness    HPI:  Mr. Curtis presents today for follow-up evaluation of right knee pain and swelling.  At prior visit we performed aspiration of the knee which was sent for Synovasure panel and this was negative for infection. As a reminder, he had a right total knee done in 2010 by Dr. Walsh at [facility].  He had a acute TJ I postoperatively due to stab infection and this was treated with irrigation and debridement with poly exchange followed by a course of IV antibiotics.  He states that since then he has not had any issues with the knee.  He has done very well since he recovered from the washout in completed physical therapy.  However, about 2 months ago he began having atraumatic knee pain primarily in the medial aspect of the knee.  He rates the pain as 6/10 and rates his knee is 60% of normal.  He has pain after walking 2-3 blocks.  He does not require an assistive device navigate stairs 1 step at a time must use his arms to get up from a chair.    Of note, he has had multiple prior surgeries on the right knee going back to complete meniscectomy done 40 years ago followed by synthetic ACL reconstruction he had other surgeries done in the 90s.   He was referred here by Dr. Gonzalez who has been following him for left knee meniscus repair which was done in August of 2024.    He reports wearing a hinged knee brace as previously discussed. Despite the brace, he reports continued pain and a new onset of limping. He describes that the knee has swollen significantly, similar to its condition before previous interventions. The pain is primarily on the medial side of the knee, particularly in the area of the pes tendons. He notes decreased sensation in the skin around the knee.  He recalls the onset of current symptoms after having surgery on his left knee. While attempting to squat during therapy, due to lack of strength in his left leg, all his weight went onto his right leg,  "causing his right knee to flex back completely. He had difficulty getting up, and by that afternoon, the knee had swollen significantly. This swelling has persisted since then.  Mr. Curtis describes an uncomfortable sensation while walking, likening it to feeling encased in a balloon. He notes that the brace seems to exacerbate this sensation due to the pressure it applies. Despite this discomfort, he does not feel that the knee is unstable or slipping.  Okay  Mr. Curtis denies feeling instability in the knee. Mr. Curtis denies any recent trauma or injury to the knee.    SURGICAL HISTORY:  - Meniscectomy right knee: 1978  - ACL and PCL reconstruction right knee: late 1970s or early 1980s  - Experimental artificial Hereford-Brian ligament reconstruction for the meniscus right knee: performed at the [facility] in [place], [place]  - Total knee replacement right knee: approximately 15 years ago  - Recent surgery on left knee    WORK STATUS:  - Mr. Curtis is still working  - Mr. Curtis turned 65 in March, which may affect his work status or insurance coverage in the near future    SOCIAL HISTORY:  - Smoking: Mr. Curtis mentioned having surgery "15 years ago", suggesting possible smoking cessation at least that long ago            Past Medical History:  Past Medical History:   Diagnosis Date    Allergy     Carotid artery occlusion     Chronic back pain     Colonic polyp     Genetic testing     MUTYH mutation-negative    Hyperlipidemia     Hypertension     Paroxysmal atrial fibrillation 2/28/2024    Sleep apnea     Type 2 diabetes mellitus with stage 3 chronic kidney disease, without long-term current use of insulin 4/2/2020        Surgical History:  Past Surgical History:   Procedure Laterality Date    ANKLE SURGERY Left     2    APPENDECTOMY      at age 20    ARTHROSCOPIC CHONDROPLASTY OF KNEE JOINT Left 8/13/2024    Procedure: ARTHROSCOPY, KNEE, WITH CHONDROPLASTY;  Surgeon: Kai Washington MD;  Location: [facility];  Service: " Orthopedics;  Laterality: Left;    ARTHROSCOPY, KNEE, WITH ABRASION ARTHROPLASTY OR MICROFRACTURE Left 8/13/2024    Procedure: ARTHROSCOPY, KNEE, WITH  MICROFRACTURE;  Surgeon: Kai Washington MD;  Location: [facility] OR;  Service: Orthopedics;  Laterality: Left;    ARTHROSCOPY,KNEE,WITH MENISCUS REPAIR Left 8/13/2024    Procedure: ARTHROSCOPY,KNEE,WITH MENISCUS REPAIR;  Surgeon: Kai Washington MD;  Location: [facility] OR;  Service: Orthopedics;  Laterality: Left;  NO REGIONAL BLOCK    CAROTID ENDARTERECTOMY Right 07/15/2015    CARPAL TUNNEL RELEASE Bilateral     COLONOSCOPY N/A 12/14/2018    Procedure: COLONOSCOPYSuprep;  Surgeon: Silver Selby MD;  Location: [facility] ENDO;  Service: Endoscopy;  Laterality: N/A;    COLONOSCOPY N/A 10/2/2024    Procedure: COLONOSCOPY;  Surgeon: Heath Morrow MD;  Location: [facility] ENDO;  Service: Endoscopy;  Laterality: N/A;  [facility] unopened Sutab, portal - PC  9/25/24-LVM for precall, portal-DS. 10/1/24 Pt. called and confirmed arrival time.EC    EPIDURAL STEROID INJECTION N/A 2/26/2024    Procedure: CERVICAL C7/T1 IL SELENA;  Surgeon: Gokul Fung MD;  Location: [facility] PA[facility]GT;  Service: Pain Management;  Laterality: N/A;  [phone]  2 WK F/U SERGEI    EPIDURAL STEROID INJECTION N/A 5/23/2024    Procedure: LUMBAR L4/5 IL SELENA *ASPIRIN OTC* HOLD FOR 5 DAYS;  Surgeon: Gokul Fung MD;  Location: [facility];  Service: Pain Management;  Laterality: N/A;  [phone]  2 WK F/U NOHELIA    EPIDURAL STEROID INJECTION N/A 10/21/2024    Procedure: LUMBAR L5/S1 IL SELENA *ASPIRIN OTC* HOLD FOR 5 DAYS  **ANGELICA PT**;  Surgeon: Sofia Sheikh MD;  Location: [facility]GT;  Service: Pain Management;  Laterality: N/A;  [phone]  2 WK F/U NOHELIA    JOINT REPLACEMENT  9/2010    NASAL SEPTUM SURGERY      TOTAL KNEE ARTHROPLASTY Right     6 knee surgeries    TRANSESOPHAGEAL ECHOCARDIOGRAM WITH POSSIBLE CARDIOVERSION (ELIZABETH W/ POSS CARDIOVERSION) N/A 2/28/2024    Procedure:  "Transesophageal echo (ELIZABETH) intra-procedure log documentation;  Surgeon: Ahmet De La Cruz MD;  Location: [facility] CATH LAB/EP;  Service: Cardiology;  Laterality: N/A;        Social History:  He reports that he has never smoked. He has never been exposed to tobacco smoke. He has never used smokeless tobacco. He reports current alcohol use of about 2.0 standard drinks of alcohol per week. He reports that he does not use drugs.     Family History:  family history includes Aneurysm (age of onset: 48) in an other family member; Brain cancer (age of onset: 67) in his mother; Brain cancer (age of onset: 73) in his paternal grandfather; Breast cancer (age of onset: 57) in his maternal cousin; Breast cancer (age of onset: 58) in his sister; Cancer in his father, maternal grandfather, and mother; Genetic Disorder in his sister; Hypertension in his father; Lung cancer in his father; Seizures in his daughter; Ulcerative colitis in an other family member.     Current Outpatient Medications on File Prior to Visit   Medication Sig Dispense Refill    amitriptyline (ELAVIL) 75 MG tablet Take 1 tablet by mouth in the evening 90 tablet 3    aspirin (ECOTRIN) 81 MG EC tablet Take 1 tablet (81 mg total) by mouth once daily. 28 tablet 0    BD LUER-RUSS SYRINGE 3 mL 25 gauge x 1" Syrg USE ONE SYRINGE EVERY 14 DAYS WITH TESTOSTERONE 100 each 2    blood-glucose sensor (DEXCOM G7 SENSOR) Allison To check sugars up to 3 times daily 4 each 11    candesartan (ATACAND) 32 MG tablet Take 1 tablet (32 mg total) by mouth once daily. 90 tablet 3    cloNIDine (CATAPRES) 0.1 MG tablet Take 1 tablet (0.1 mg total) by mouth 2 (two) times daily. 180 tablet 3    cyanocobalamin (VITAMIN B-12) 1000 MCG tablet Take 1 tablet (1,000 mcg total) by mouth once daily. 90 tablet 4    ergocalciferol (ERGOCALCIFEROL) 50,000 unit Cap Take 1 capsule by mouth once a week 12 capsule 0    ezetimibe (ZETIA) 10 mg tablet Take 1 tablet by mouth once daily 90 tablet 0    " "fluticasone propionate (FLONASE) 50 mcg/actuation nasal spray 2 sprays by Each Nostril route.      gabapentin (NEURONTIN) 800 MG tablet Take 1 tablet (800 mg total) by mouth every evening. 90 tablet 1    glimepiride (AMARYL) 2 MG tablet Take 1 tablet (2 mg total) by mouth before breakfast. 90 tablet 3    glucose 4 GM chewable tablet Take 4 tablets (16 g total) by mouth as needed for Low blood sugar. 30 tablet 12    hydrocortisone 2.5 % cream Apply to face twice daily for flares as needed 40 g 2    ipratropium (ATROVENT) 42 mcg (0.06 %) nasal spray 2 sprays by Nasal route 2 (two) times daily as needed for Rhinitis. 15 mL 0    ketoconazole (NIZORAL) 2 % cream Apply twice daily to affected area of skin 60 g 2    metoprolol succinate (TOPROL-XL) 200 MG 24 hr tablet Take 1 tablet (200 mg total) by mouth once daily. (Patient taking differently: Take 200 mg by mouth every evening.) 90 tablet 3    oxyCODONE-acetaminophen (PERCOCET)  mg per tablet Take 1 tablet by mouth every 12 (twelve) hours as needed for Pain. 60 tablet 0    rosuvastatin (CRESTOR) 40 MG Tab Take 1 tablet (40 mg total) by mouth every evening. 90 tablet 3    semaglutide (OZEMPIC) 2 mg/dose (8 mg/3 mL) PnIj Inject 2 mg into the skin every 7 days. 9 mL 3    sod sulf-pot chloride-mag sulf (SUTAB) 1.479-0.188- 0.225 gram tablet Take 12 tablets by mouth once daily. Take according to package instructions with indicated amount of water. 24 tablet 0    syringe with needle, safety (BD INTEGRA SYRINGE) 3 mL 22 gauge x 1 1/2" Syrg Inject 1 Syringe as directed once a week. 6 each 3    testosterone cypionate (DEPOTESTOTERONE CYPIONATE) 200 mg/mL injection Inject 1 mL (200 mg total) into the muscle every 14 (fourteen) days. 2 mL 5    valACYclovir (VALTREX) 1000 MG tablet Take 2 tablets (2,000 mg total) by mouth every 12 (twelve) hours. 4 tablet 3     Current Facility-Administered Medications on File Prior to Visit   Medication Dose Route Frequency Provider Last " Rate Last Admin    0.9%  NaCl infusion   Intravenous Continuous Kendell Hoffman MD        0.9%  NaCl infusion   Intravenous Continuous Gerardo Uribe MD         Review of patient's allergies indicates:   Allergen Reactions    Stadol [butorphanol tartrate] Rash     Swelling in face    Strawberries [strawberry] Rash          Physical exam:  There were no vitals taken for this visit.  General: no apparent distress    Gait: + antalgic, no use of assistive devices    R knee:   TTP at the pes insertion. Tenderness to palpation at medial hamstring. Not TTP at the joint line and not at the patella  Skin: intact, mild effusion, swelling most notably medially at pes bursa   Well-healed midline incision. Well-healed lateral distal femur incision. Well-healed lateral proximal tibial incision. Well-healed two cm incision at the joint line.  Range of motion: 10 to 110  Strength: 5/5 with extension, 5/5 with flexion  Ligament exam:    In Flexion: Few mm lateral laxity, few mm AP laxity. Pain at the medial joint line and medial femoral condyle with varus stress. Pain at the Pes, MCL and semimembranosus with valgus stress.  In MidFlexion: stable to AP stress, pain with varus/valgus stress similar to above, a few mm laxity to varus/valgus stress  In maximal Extension: stable to AP and varus/valgus stress, but pain with varus/valgus stress as noted above  Neurovascular: WWP,  Diminished sensation over lateral/anterior knee (lateral to prior incisions), otherwise Light touch sensation intact       Relevant Results:  Imaging:  Plain x-rays of the R knee were obtained on 11/6/24 and independently reviewed by me today, 12/20/2024 , and demonstrate a cemented Hope triathlon primary tibial base plate  and CR femur with resurfaced patella. There is no sign of loosening, components are in appropriate position, neutral alignment.  There is what appears to be heterotopic ossification of the lateral femoral condyle     Labs:  ESR 7, CRP  <0.3  Aspiration (12/6/24): 162 WBCs, 17.6% PMNs, Cx: NGF, Neg a-Defensin        This note was generated with the assistance of ambient listening technology. Verbal consent was obtained by the patient and accompanying visitor(s) for the recording of patient appointment to facilitate this note. I attest to having reviewed and edited the generated note for accuracy, though some syntax or spelling errors may persist. Please contact the author of this note for any clarification.

## 2024-12-23 ENCOUNTER — HOSPITAL ENCOUNTER (OUTPATIENT)
Facility: OTHER | Age: 64
Discharge: HOME | End: 2024-12-23
Attending: ANESTHESIOLOGY | Admitting: ANESTHESIOLOGY
Payer: COMMERCIAL

## 2024-12-23 ENCOUNTER — HOSPITAL ENCOUNTER (OUTPATIENT)
Dept: RADIOLOGY | Facility: HOSPITAL | Age: 64
Discharge: HOME | End: 2024-12-23
Attending: STUDENT IN AN ORGANIZED HEALTH CARE EDUCATION/TRAINING PROGRAM | Admitting: ANESTHESIOLOGY
Payer: COMMERCIAL

## 2024-12-23 VITALS
HEIGHT: 70 IN | HEART RATE: 67 BPM | SYSTOLIC BLOOD PRESSURE: 181 MMHG | DIASTOLIC BLOOD PRESSURE: 87 MMHG | RESPIRATION RATE: 18 BRPM | OXYGEN SATURATION: 100 % | WEIGHT: 185 LBS | TEMPERATURE: 98 F | BODY MASS INDEX: 26.48 KG/M2

## 2024-12-23 DIAGNOSIS — G89.29 CHRONIC PAIN: ICD-10-CM

## 2024-12-23 DIAGNOSIS — T84.84XD PAIN DUE TO TOTAL RIGHT KNEE REPLACEMENT, SUBSEQUENT ENCOUNTER: ICD-10-CM

## 2024-12-23 DIAGNOSIS — M70.51 PES ANSERINUS BURSITIS OF RIGHT KNEE: ICD-10-CM

## 2024-12-23 DIAGNOSIS — M47.816 LUMBAR SPONDYLOSIS: Primary | ICD-10-CM

## 2024-12-23 DIAGNOSIS — Z96.651 PAIN DUE TO TOTAL RIGHT KNEE REPLACEMENT, SUBSEQUENT ENCOUNTER: ICD-10-CM

## 2024-12-23 LAB — POCT GLUCOSE: 115 MG/DL (ref 70–110)

## 2024-12-23 PROCEDURE — 62323 NJX INTERLAMINAR LMBR/SAC: CPT | Mod: ,,, | Performed by: ANESTHESIOLOGY

## 2024-12-23 PROCEDURE — 63600175 PHARM REV CODE 636 W HCPCS: Performed by: ANESTHESIOLOGY

## 2024-12-23 PROCEDURE — 62323 NJX INTERLAMINAR LMBR/SAC: CPT | Performed by: ANESTHESIOLOGY

## 2024-12-23 PROCEDURE — 73700 CT LOWER EXTREMITY W/O DYE: CPT | Mod: TC,RT

## 2024-12-23 PROCEDURE — 73700 CT LOWER EXTREMITY W/O DYE: CPT | Mod: 26,RT,, | Performed by: RADIOLOGY

## 2024-12-23 PROCEDURE — 25500020 PHARM REV CODE 255: Performed by: ANESTHESIOLOGY

## 2024-12-23 RX ORDER — SODIUM CHLORIDE 9 MG/ML
INJECTION, SOLUTION INTRAVENOUS CONTINUOUS
Status: DISCONTINUED | OUTPATIENT
Start: 2024-12-23 | End: 2024-12-23 | Stop reason: HOSPADM

## 2024-12-23 RX ORDER — FENTANYL CITRATE 50 UG/ML
INJECTION, SOLUTION INTRAMUSCULAR; INTRAVENOUS
Status: DISCONTINUED | OUTPATIENT
Start: 2024-12-23 | End: 2024-12-23 | Stop reason: HOSPADM

## 2024-12-23 RX ORDER — LIDOCAINE HYDROCHLORIDE 20 MG/ML
INJECTION, SOLUTION INFILTRATION; PERINEURAL
Status: DISCONTINUED | OUTPATIENT
Start: 2024-12-23 | End: 2024-12-23 | Stop reason: HOSPADM

## 2024-12-23 RX ORDER — MIDAZOLAM HYDROCHLORIDE 1 MG/ML
INJECTION INTRAMUSCULAR; INTRAVENOUS
Status: DISCONTINUED | OUTPATIENT
Start: 2024-12-23 | End: 2024-12-23 | Stop reason: HOSPADM

## 2024-12-23 RX ORDER — LIDOCAINE HYDROCHLORIDE 10 MG/ML
INJECTION, SOLUTION EPIDURAL; INFILTRATION; INTRACAUDAL; PERINEURAL
Status: DISCONTINUED | OUTPATIENT
Start: 2024-12-23 | End: 2024-12-23 | Stop reason: HOSPADM

## 2024-12-23 RX ORDER — DEXAMETHASONE SODIUM PHOSPHATE 10 MG/ML
INJECTION INTRAMUSCULAR; INTRAVENOUS
Status: DISCONTINUED | OUTPATIENT
Start: 2024-12-23 | End: 2024-12-23 | Stop reason: HOSPADM

## 2024-12-23 NOTE — DISCHARGE INSTRUCTIONS

## 2024-12-23 NOTE — OP NOTE
Lumbar Interlaminar Epidural Steroid Injection under Fluoroscopic Guidance    The procedure, risks, benefits, and options were discussed with the patient. There are no contraindications to the procedure. The patent expressed understanding and agreed to the procedure. Informed written consent was obtained prior to the start of the procedure and can be found in the patient's chart.    PATIENT NAME: Partha Curtis Jr.   MRN: [id number]     DATE OF PROCEDURE: 12/23/2024    PROCEDURE: Lumbar Interlaminar Epidural Steroid Injection L4/L5 under Fluoroscopic Guidance    PRE-OP DIAGNOSIS: Lumbar radiculopathy [M54.16] Lumbar radiculopathy [M54.16]    POST-OP DIAGNOSIS: Same    PHYSICIAN: Gokul Fung MD    ASSISTANTS: BRANDON Pedroza DO  Pain Fellow [employer] 2024      MEDICATIONS INJECTED: Preservative-free Decadron 10mg with 4cc of Lidocaine 1% MPF and preservative free normal saline    LOCAL ANESTHETIC INJECTED: Xylocaine 2%     SEDATION: Versed 2mg and Fentanyl 50mcg                                                                                                                                                                                     Conscious sedation ordered by M.D. Patient re-evaluation prior to administration of conscious sedation. No changes noted in patient's status from initial evaluation. The patient's vital signs were monitored by RN and patient remained hemodynamically stable throughout the procedure.    Event Time In   Sedation Start 1433   Sedation End 1437       ESTIMATED BLOOD LOSS: None    COMPLICATIONS: None    TECHNIQUE: Time-out was performed to identify the patient and procedure to be performed. With the patient laying in a prone position, the surgical area was prepped and draped in the usual sterile fashion using ChloraPrep and a fenestrated drape. The level was determined under fluoroscopy guidance. Skin anesthesia was achieved by injecting Lidocaine 2% over the injection site. The interlaminar  space was then approached with a 20 gauge,  3.5 inch Tuohy needle that was introduced under fluoroscopic guidance in the AP, lateral and/or contralateral oblique imaging. Once the Ligamentum flavum was encountered loss of resistance to saline was used to enter the epidural space. With positive loss of resistance and negative aspiration for CSF or Blood, contrast dye Omnipaque (300mg/mL) was injected to confirm placement and there was no vascular runoff. 4 mL of the medication mixture listed above was injected slowly. Displacement of the radio opaque contrast after injection of the medication confirmed that the medication went into the area of the epidural space. The needles were removed and bleeding was nil. A sterile dressing was applied. No specimens collected. The patient tolerated the procedure well.     PRE-PROCEDURE PAIN SCORE: 5-10/10    POST-PROCEDURE PAIN SCORE: 0/10    The patient was monitored after the procedure in the recovery area. They were given post-procedure and discharge instructions to follow at home. The patient was discharged in a stable condition.        Gokul Fung MD

## 2024-12-23 NOTE — DISCHARGE SUMMARY
"Discharge Note  Short Stay      SUMMARY     Admit Date: 12/23/2024    Attending Physician: Gokul Fung      Discharge Physician: Gokul Fung      Discharge Date: 12/23/2024 2:36 PM    Procedure(s) (LRB):  LUMBAR L4.5 IL SELENA *ASPIRIN OTC* HOLD FOR 5 DAYS (N/A)    Final Diagnosis: Lumbar radiculopathy [M54.16]    Disposition: Home or self care    Patient Instructions:   Current Discharge Medication List        CONTINUE these medications which have NOT CHANGED    Details   aspirin (ECOTRIN) 81 MG EC tablet Take 1 tablet (81 mg total) by mouth once daily.  Qty: 28 tablet, Refills: 0    Associated Diagnoses: Bucket-handle tear of medial meniscus of left knee as current injury, initial encounter      amitriptyline (ELAVIL) 75 MG tablet Take 1 tablet by mouth in the evening  Qty: 90 tablet, Refills: 3    Associated Diagnoses: Insomnia, unspecified type      BD LUER-RUSS SYRINGE 3 mL 25 gauge x 1" Syrg USE ONE SYRINGE EVERY 14 DAYS WITH TESTOSTERONE  Qty: 100 each, Refills: 2      blood-glucose sensor (DEXCOM G7 SENSOR) Allison To check sugars up to 3 times daily  Qty: 4 each, Refills: 11    Comments: Labile sugars, both highs and low. Now on recent oral and injectable steroids for back which is significantly affecting sugars.  Associated Diagnoses: Type 2 diabetes mellitus with stage 3a chronic kidney disease, without long-term current use of insulin      candesartan (ATACAND) 32 MG tablet Take 1 tablet (32 mg total) by mouth once daily.  Qty: 90 tablet, Refills: 3    Associated Diagnoses: Essential hypertension      cloNIDine (CATAPRES) 0.1 MG tablet Take 1 tablet (0.1 mg total) by mouth 2 (two) times daily.  Qty: 180 tablet, Refills: 3    Comments: .  Associated Diagnoses: Essential hypertension      cyanocobalamin (VITAMIN B-12) 1000 MCG tablet Take 1 tablet (1,000 mcg total) by mouth once daily.  Qty: 90 tablet, Refills: 4    Associated Diagnoses: B12 deficiency      ergocalciferol (ERGOCALCIFEROL) 50,000 unit Cap Take 1 " capsule by mouth once a week  Qty: 12 capsule, Refills: 0    Associated Diagnoses: Vitamin D deficiency      ezetimibe (ZETIA) 10 mg tablet Take 1 tablet by mouth once daily  Qty: 90 tablet, Refills: 0    Associated Diagnoses: Dyslipidemia      fluticasone propionate (FLONASE) 50 mcg/actuation nasal spray 2 sprays by Each Nostril route.      gabapentin (NEURONTIN) 800 MG tablet Take 1 tablet (800 mg total) by mouth every evening.  Qty: 90 tablet, Refills: 1    Associated Diagnoses: Lumbar radiculopathy; Cervical radiculopathy      glimepiride (AMARYL) 2 MG tablet Take 1 tablet (2 mg total) by mouth before breakfast.  Qty: 90 tablet, Refills: 3      glucose 4 GM chewable tablet Take 4 tablets (16 g total) by mouth as needed for Low blood sugar.  Qty: 30 tablet, Refills: 12      hydrocortisone 2.5 % cream Apply to face twice daily for flares as needed  Qty: 40 g, Refills: 2    Associated Diagnoses: Seborrheic dermatitis      ipratropium (ATROVENT) 42 mcg (0.06 %) nasal spray 2 sprays by Nasal route 2 (two) times daily as needed for Rhinitis.  Qty: 15 mL, Refills: 0    Associated Diagnoses: Influenza A      ketoconazole (NIZORAL) 2 % cream Apply twice daily to affected area of skin  Qty: 60 g, Refills: 2    Associated Diagnoses: Seborrheic dermatitis      metoprolol succinate (TOPROL-XL) 200 MG 24 hr tablet Take 1 tablet (200 mg total) by mouth once daily.  Qty: 90 tablet, Refills: 3    Comments: .  Associated Diagnoses: Essential hypertension      oxyCODONE-acetaminophen (PERCOCET)  mg per tablet Take 1 tablet by mouth every 12 (twelve) hours as needed for Pain.  Qty: 60 tablet, Refills: 0    Comments: Greater than 7 day supply that is medically necessary.  Associated Diagnoses: Chronic pain disorder; Lumbar radiculopathy; Lumbar spondylosis; DDD (degenerative disc disease), lumbar; Cervical radiculopathy; Cervical spondylosis; DDD (degenerative disc disease), cervical      rosuvastatin (CRESTOR) 40 MG Tab Take  "1 tablet (40 mg total) by mouth every evening.  Qty: 90 tablet, Refills: 3    Associated Diagnoses: Dyslipidemia      semaglutide (OZEMPIC) 2 mg/dose (8 mg/3 mL) PnIj Inject 2 mg into the skin every 7 days.  Qty: 9 mL, Refills: 3    Associated Diagnoses: Type 2 diabetes mellitus with stage 3a chronic kidney disease, without long-term current use of insulin      sod sulf-pot chloride-mag sulf (SUTAB) 1.479-0.188- 0.225 gram tablet Take 12 tablets by mouth once daily. Take according to package instructions with indicated amount of water.  Qty: 24 tablet, Refills: 0    Comments: Coupon code BIN:[id number] PCN: [id number] Group: [id number] Member ID: [id number]  Associated Diagnoses: Colon cancer screening      syringe with needle, safety (BD INTEGRA SYRINGE) 3 mL 22 gauge x 1 1/2" Syrg Inject 1 Syringe as directed once a week.  Qty: 6 each, Refills: 3      testosterone cypionate (DEPOTESTOTERONE CYPIONATE) 200 mg/mL injection Inject 1 mL (200 mg total) into the muscle every 14 (fourteen) days.  Qty: 2 mL, Refills: 5    Comments: Please include syringes and needles for the testosterone.  Associated Diagnoses: Hypogonadism in male      valACYclovir (VALTREX) 1000 MG tablet Take 2 tablets (2,000 mg total) by mouth every 12 (twelve) hours.  Qty: 4 tablet, Refills: 3                 Discharge Diagnosis: Lumbar radiculopathy [M54.16]  Condition on Discharge: Stable with no complications to procedure   Diet on Discharge: Same as before.  Activity: as per instruction sheet.  Discharge to: Home with a responsible adult.  Follow up: 2-4 weeks       Please call my office or pager at [phone] if experienced any weakness or loss of sensation, fever > 101.5, pain uncontrolled with oral medications, persistent nausea/vomiting/or diarrhea, redness or drainage from the incisions, or any other worrisome concerns. If physician on call was not reached or could not communicate with our office for any reason please go to the nearest emergency " department

## 2024-12-23 NOTE — INTERVAL H&P NOTE
The patient has been examined and the H&P has been reviewed:    I concur with the findings and no changes have occurred since H&P was written.    Procedure risks, benefits and alternative options discussed and understood by patient/family.          Active Hospital Problems    Diagnosis  POA    Lumbar spondylosis [M47.816]  Unknown      Resolved Hospital Problems   No resolved problems to display.

## 2024-12-31 NOTE — PROGRESS NOTES
[facility]   Physical Therapy Treatment Note      Name: Partha Curtis Jr.  Clinic Number: [id number]    Therapy Diagnosis:   Encounter Diagnoses   Name Primary?    Gait abnormality Yes    Decreased range of motion of both lower extremities     Decreased strength of lower extremity      Physician: Raffy Ascencio MD  Surgeon: Dr. Washington    Visit Date: 10/18/2024  Physician Orders: PT Eval and Treat   Medical Diagnosis from Referral: Bucket-handle tear of medial meniscus of left knee as current injury, initial encounter [S83.212A]   Evaluation Date: 8/14/2024  Authorization Period Expiration: 8/7/2025  Plan of Care Expiration: 2/25/2025  Progress Note Due: 9/20/2024  Visit # / Visits authorized: 15/20  This POC: 12   FOTO: 1/3 (8/20/24)     Date of Surgery: 8/13/2024  Return to MD: 8/28/2024, 10/2/2024     Procedure:  1. Left Knee Arthroscopy, with meniscus repair (medial OR lateral) 57860  2.  Left Knee Arthroscopy, with Microfracture 38528 - marrow stimulation procedure  3.  Left Knee Arthroscopy, debridement/shaving of articular cartilage (chondroplasty) 26634 -      Post-Op Plan:  Peripheral meniscal repair protocol      Precautions: Standard and Weightbearing     PTA Visit #: 0/5     Time In: 9:00 am  Time Out: 10:00 am   Total Billable Time: 60 minutes     Subjective     Pt reports: still feeling some weakness, especially with going up and down stairs.   He was compliant with home exercise program.  Response to previous treatment: Improved mobility  Functional change: Ongoing    Pain: 2/10  Location: left knee      Objective      Objective Measures updated at progress report unless specified.    9/19/2024: Patient ambulates into clinic with single axillary crutch and t-scope brace unlocked 0-30 deg. He demonstrates good nadege and heel to toe contact.     Range of Motion:  Knee     Right AROM/PROM Left AROM/PROM   Flexion 120 degrees / 125 degrees  NT degrees / 110 degrees   "  Extension 0 degrees / 0 degrees  +1 degrees  / +1 degrees (hyper)     Treatment     *Pt is 8 weeks 1 days s/p as of 10/9/2024.     Jarvis received the treatments listed below:      therapeutic exercises to develop strength, endurance, ROM, and flexibility for 00 minutes including:      manual therapy techniques: Joint mobilizations were applied to the: knee for 00 minutes, including:    Patellar mobilizations all planes III-IV  Fat pad mobilizations  Extension hinge    neuromuscular re-education activities to improve: Balance, Coordination, Kinesthetic, and Proprioception for 13 minutes. The following activities were included:    SLR 3x10 #3  Heel raises ecc 5x8  SLS 3x30"    therapeutic activities to improve functional performance for 47 minutes, including:  Pt education  Bike lvl 6 x10 min  DL shuttle #100 3x10  SL shuttle #50  3x10  Lateral walks GTB around knees  Step up 6" 4x10  Lateral step down 2" 2 x 6   STS with YTB around knees 3x10  LAQ #7.5 3x10    gait training to improve functional mobility and safety for 00 minutes, including:        Patient Education and Home Exercises       Education provided:   - PT POC, Prognosis, and HEP   - Early post-op goals: importance of knee extension, edema management, quad activation/strength, and gait mechanics  - Post-op precautions, weightbearing status and mobility precautions   - Signs and symptoms of DVT and infection       Written Home Exercises Provided: yes. Exercises were reviewed and Jarvis was able to demonstrate them prior to the end of the session.  Jarvis demonstrated good  understanding of the education provided. See EMR under Patient Instructions for exercises provided during therapy sessions.    Assessment     Jarvis still having some weakness and challenged with quad loading and with eccentric quad control. Still fatigues quickly with exercises. Continued to progress with LE strengthening. Will continue to monitor and progress as able.       Jarvis Is " progressing well towards his goals.   Pt prognosis is Excellent.     Pt will continue to benefit from skilled outpatient physical therapy to address the deficits listed in the problem list box on initial evaluation, provide pt/family education and to maximize pt's level of independence in the home and community environment.     Pt's spiritual, cultural and educational needs considered and pt agreeable to plan of care and goals.     Anticipated Barriers for therapy: Scheduling, co-morbidities     Goals:  Short Term Goals: 6 weeks   Patient will be independent in initial HEP to help supplement PT. - MET  Patient will improve knee extension range of motion to 0 degrees to help with gait mechanics. - MET  Patient will improve knee flexion range of motion to >/= 90 degrees to help with ADLs. - MET  Patient will improve pain at its worst to </= 5/10 to help improve quality of life. - MET     Long Term Goals: 12-24 weeks (Progressing, not met)  Patient will be independent in updated HEP to help supplement PT and maintain gains made in PT.   Patient will improve FOTO score to >/= predicted to show improvement in condition.   Patient will improve knee extension strength to >/= 4+/5 to help with ADLs.  Patient goal: Patient will be able to return to riding his bicycle without difficulty.   Plan   Plan of care Certification: 8/14/2024 to 2/25/2025.     Outpatient Physical Therapy 1-2 times weekly for 24 weeks to include the following interventions: Electrical Stimulation , Gait Training, Manual Therapy, Moist Heat/ Ice, Neuromuscular Re-ed, Patient Education, Self Care, Therapeutic Activities, and Therapeutic Exercise.     Flakita Thomas, PT,

## 2025-01-08 ENCOUNTER — OFFICE VISIT (OUTPATIENT)
Dept: SPORTS MEDICINE | Facility: CLINIC | Age: 65
End: 2025-01-08
Payer: COMMERCIAL

## 2025-01-08 VITALS — HEART RATE: 65 BPM | SYSTOLIC BLOOD PRESSURE: 160 MMHG | DIASTOLIC BLOOD PRESSURE: 87 MMHG

## 2025-01-08 DIAGNOSIS — M70.51 PES ANSERINUS BURSITIS OF RIGHT KNEE: Primary | ICD-10-CM

## 2025-01-08 PROCEDURE — 99999 PR PBB SHADOW E&M-EST. PATIENT-LVL IV: CPT | Mod: PBBFAC,,, | Performed by: NEUROMUSCULOSKELETAL MEDICINE & OMM

## 2025-01-08 PROCEDURE — 99499 UNLISTED E&M SERVICE: CPT | Mod: S$GLB,,, | Performed by: NEUROMUSCULOSKELETAL MEDICINE & OMM

## 2025-01-08 PROCEDURE — 20611 DRAIN/INJ JOINT/BURSA W/US: CPT | Mod: RT,S$GLB,, | Performed by: NEUROMUSCULOSKELETAL MEDICINE & OMM

## 2025-01-08 RX ORDER — TRIAMCINOLONE ACETONIDE 40 MG/ML
40 INJECTION, SUSPENSION INTRA-ARTICULAR; INTRAMUSCULAR
Status: COMPLETED | OUTPATIENT
Start: 2025-01-08 | End: 2025-01-08

## 2025-01-08 RX ADMIN — TRIAMCINOLONE ACETONIDE 40 MG: 40 INJECTION, SUSPENSION INTRA-ARTICULAR; INTRAMUSCULAR at 01:01

## 2025-01-08 NOTE — PROGRESS NOTES
Subjective:     Partha Curtis Jr.     Chief Complaint   Patient presents with    Right Knee - Pain       Jarvis is a 64 y.o. male coming in today for their ultrasound guided corticosteroid injection to the right knee pes anserine bursa as recommended by Dr. Cano.   Objective:     VITAL SIGNS: BP (!) 160/87 (Patient Position: Sitting)   Pulse 65       Large Joint Aspiration/Injection  right Pes Anersinus Bursa     Performed by: LUCILA FRENCH  Authorized by: LUCILA FRENCH  Consent Done?: Yes (Verbal)  Indications: Pain  Site marked: The procedure site was marked   Timeout: Prior to procedure the correct patient, procedure, and site was verified      Location: Pes Anserine, right  Prep: Patient was prepped with Chlorhexidine and alcohol.  Skin anesthetic: Ethyl Chloride spray was used prior to skin puncture.  Ultrasound Guidance for needle placement: yes  Procedure: After local anesthetic was applied, the 22G, 1.5 needle was used to enter pes anserine bursa under US guidance. A 2 cc mixture of 1 cc of 40 mg/ml triamcinolone acetonide and 1 cc of 1% Lidocaine was injected into the pes anersine bursa.   Approach: medial  Medications: 40 mg triamcinolone acetonide 40 mg/mL  Patient tolerance: Patient tolerated the procedure well with no immediate complications     Ultrasound guidance was used for needle localization with SonAutoparts24te Edge 2, 9-L MHz linear probe(s). Images were saved and stored for documentation. The pes anserinus bursa and tendons were well visualized, noting a tibial cortical irregularity deep to the bursa from prior hardware removal. Short and long axis images of the pes anserinus bursa and tendons were taken prior to injection. Dynamic visualization of the needle(s) was continuous throughout the procedure and maintained good position and correct needle placement.        Triamcinolone:  NDC: [address]-6  LOT: [id number]  EXP: 06/2026      Assessment:      Encounter Diagnosis   Name Primary?     Pes anserinus bursitis of right knee Yes        Plan:     1. US guided right knee pes anserine bursa corticosteroid injection performed today (see details above).  Patient instructed to keep pain diary over the next 7 days to determine the amount (if any) of pain relief received from today's injection.   - of note underlying tibial cortical irregularity from prior hardware removal may be causing irritation to the pes anserine tendons and bursa region.  2. Follow-up with Dr. Cano as scheduled.   3. Patient agreeable to today's plan and all questions were answered     This note is dictated using the M*Modal Fluency Direct word recognition program. There are word recognition mistakes that are occasionally missed on review.

## 2025-01-09 ENCOUNTER — OFFICE VISIT (OUTPATIENT)
Dept: PAIN MEDICINE | Facility: CLINIC | Age: 65
End: 2025-01-09
Payer: COMMERCIAL

## 2025-01-09 VITALS
DIASTOLIC BLOOD PRESSURE: 87 MMHG | RESPIRATION RATE: 17 BRPM | BODY MASS INDEX: 26.54 KG/M2 | TEMPERATURE: 98 F | SYSTOLIC BLOOD PRESSURE: 139 MMHG | WEIGHT: 184.94 LBS | OXYGEN SATURATION: 100 %

## 2025-01-09 DIAGNOSIS — M47.816 LUMBAR SPONDYLOSIS: ICD-10-CM

## 2025-01-09 DIAGNOSIS — M47.812 CERVICAL SPONDYLOSIS: ICD-10-CM

## 2025-01-09 DIAGNOSIS — G89.4 CHRONIC PAIN DISORDER: ICD-10-CM

## 2025-01-09 DIAGNOSIS — M51.362 DEGENERATION OF INTERVERTEBRAL DISC OF LUMBAR REGION WITH DISCOGENIC BACK PAIN AND LOWER EXTREMITY PAIN: ICD-10-CM

## 2025-01-09 DIAGNOSIS — Z51.81 ENCOUNTER FOR MONITORING OPIOID MAINTENANCE THERAPY: ICD-10-CM

## 2025-01-09 DIAGNOSIS — Z79.891 ENCOUNTER FOR MONITORING OPIOID MAINTENANCE THERAPY: ICD-10-CM

## 2025-01-09 DIAGNOSIS — M50.30 DDD (DEGENERATIVE DISC DISEASE), CERVICAL: ICD-10-CM

## 2025-01-09 DIAGNOSIS — G89.4 CHRONIC PAIN DISORDER: Primary | ICD-10-CM

## 2025-01-09 DIAGNOSIS — M54.12 CERVICAL RADICULOPATHY: ICD-10-CM

## 2025-01-09 DIAGNOSIS — M51.369 DDD (DEGENERATIVE DISC DISEASE), LUMBAR: ICD-10-CM

## 2025-01-09 DIAGNOSIS — M54.16 LUMBAR RADICULOPATHY: ICD-10-CM

## 2025-01-09 PROCEDURE — 3079F DIAST BP 80-89 MM HG: CPT | Mod: CPTII,S$GLB,, | Performed by: NURSE PRACTITIONER

## 2025-01-09 PROCEDURE — 3072F LOW RISK FOR RETINOPATHY: CPT | Mod: CPTII,S$GLB,, | Performed by: NURSE PRACTITIONER

## 2025-01-09 PROCEDURE — 3008F BODY MASS INDEX DOCD: CPT | Mod: CPTII,S$GLB,, | Performed by: NURSE PRACTITIONER

## 2025-01-09 PROCEDURE — 3075F SYST BP GE 130 - 139MM HG: CPT | Mod: CPTII,S$GLB,, | Performed by: NURSE PRACTITIONER

## 2025-01-09 PROCEDURE — 99999 PR PBB SHADOW E&M-EST. PATIENT-LVL V: CPT | Mod: PBBFAC,,, | Performed by: NURSE PRACTITIONER

## 2025-01-09 PROCEDURE — 1159F MED LIST DOCD IN RCRD: CPT | Mod: CPTII,S$GLB,, | Performed by: NURSE PRACTITIONER

## 2025-01-09 PROCEDURE — 1160F RVW MEDS BY RX/DR IN RCRD: CPT | Mod: CPTII,S$GLB,, | Performed by: NURSE PRACTITIONER

## 2025-01-09 PROCEDURE — 99214 OFFICE O/P EST MOD 30 MIN: CPT | Mod: S$GLB,,, | Performed by: NURSE PRACTITIONER

## 2025-01-09 RX ORDER — OXYCODONE AND ACETAMINOPHEN 10; 325 MG/1; MG/1
1 TABLET ORAL EVERY 12 HOURS PRN
Qty: 60 TABLET | Refills: 0 | Status: SHIPPED | OUTPATIENT
Start: 2025-01-09

## 2025-01-09 NOTE — PROGRESS NOTES
Chronic patient Established Note (Follow up visit)      SUBJECTIVE:    Interval History 1/9/2025:  The patient returns to clinic today for follow up of pain. He is s/p L4/5 IL SELENA on 12/23/2024. He reports 50% relief of his pain. He reports intermittent low back pain. He continues to report neck pain. He reports increased joint pain today due to the cold weather. He continues to report right knee pain. He did have an injection into the knee yesterday. He does have a follow up with Orthopedics tomorrow. He is taking Gabapentin. He is taking Percocet as needed with benefit. He denies any other health changes. His pain today is 7/10.    Interval History 12/9/2024:  The patient returns to clinic today for follow up of neck and back pain. Since last visit, he has developed right knee pain. This is limiting his progression in physical therapy. He did have the right knee drained recently. He reports worsened low back pain. He attributes this to the way he is walking. He reports intermittent radiating pain into the hip and leg. His pain is worse with standing and walking. He is taking Gabapentin and Percocet. He denies any adverse effects. He denies any other health changes. His pain today is 8/10.    Interval History 9/10/2024:  The patient returns to clinic today for follow up of neck and back pain. Since last visit, he underwent left knee arthroscopy on 8/13/2024. He is currently participating in physical therapy. He reports increased mid and low back pain. He denies any radicular leg pain. His pain is worse with moving from sitting to standing. He is taking Gabapentin. He also takes Percocet as needed for severe pain. He denies any adverse effects. He denies any other health changes. His pain today is 8/10.    Interval History 6/10/2024:  The patient returns to clinic today for follow up of neck and back pain. He is s/p L4/5 IL SELENA on 5/23/2024. He reports 60% relief. He reports low back pain. He denies any radicular  leg pain. His back pain is worse with moving from sitting to standing. He continues to report neck pain. He is taking Gabapentin. He also takes Percocet for severe pain as needed with benefit. He denies any adverse effects. He denies any other health changes. His pain today is 5/10.      Interval History 5/1/2024:  The patient returns to clinic today for follow up of neck and back pain. He reports increased low back pain. He denies any radicular leg pain but does have numbness into the lateral aspect of his right leg. This is worse from the knee to the top of his right foot. He does have numbness under the left foot. His pain is worse with standing and walking. He is no longer taking Eliquis. He is taking Gabapentin. He also takes Percocet for severe pain. He needs a refill. He denies any other health changes. His pain today is 8/10.    Interval History 3/25/2024:  Partha Curtis Jr. presents to the clinic for a follow-up appointment for neck pain. He is s/p C7/T1 IL SELENA on 2/26/2024. He reports 100% relief of his neck pain. He was admitted for atrial fibrillation after the last procedure. He was converted to sinus rhythm. He is now on Eliquis. He reports increased low back pain. He denies any radicular leg pain. His pain is worse as the day goes on. He previously had relief with ESIs. He is interested in repeating this in the future. He denies any weakness. He is taking Gabapentin. He takes Percocet intermittently for severe pain. He denies any other health changes. His pain today is 0/10.      HPI: Partha Curtis Jr. is a 63 y.o. male presents to pain clinic for evaluation of neck pain. Symptoms developed 4 weeks ago. Similar pain in 2022 that improved after an C7/T1 ILESI on 3/28/22, had been pain free since this procedure until 4 weeks ago. Original neck pain started in 2021 without inciting event. Denies trauma or injury to the area in the past 4 weeks upon return of symptoms. Pain management previously at  [facility] with Dr. Herrera     Original Pain Description:  The pain is located in the upper thoracic region just left of the midline with radiation into his left shoulder and proximal arm terminating above the elbow. All of patients upper back/neck pain is on the left. The pain is described as aching, sharp, and throbbing. These exact symptoms were present prior to his 2022 ILESI. Chinyere ranges from 4 - 10. The current pain is 8. Exacerbating factors: Coughing/Sneezing and L arm abduction above the head, head forward flexion and head lateral bending to the left, and laying flat on the ground. Mitigating factors pillow. Symptoms interfere with daily activity and sleeping and work. 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 Works as a  for [employer]. The patient feels like symptoms have been worsening. Patient endorses weakness in his left arm with return of pain. Patient denies saddle anesthesia, bladder/bowel incontinence, acute or signifcant limb weakness, ataxia, or increased falls.     Pain Disability Index Review:      1/9/2025    10:44 AM 12/9/2024     9:58 AM 9/10/2024    10:02 AM   Last 3 PDI Scores   Pain Disability Index (PDI) [phone]       Pain Medications:  Gabapentin  Percocet     Opioid Contract: not applicable     report:  Reviewed and consistent with medication use as prescribed.    Pain Procedures:   2/26/2024- C7/T1 IL SELENA  5/23/2024- L4/5 IL SELENA- 60% relief  12/23/2024- L4/5 IL SELENA- 50% relief     Physical Therapy/Home Exercise: yes    Imaging:   MRI Cervical Spine 2/15/2024:  COMPARISON:  XR C-spine 03/02/2010.  CTA head neck 06/23/2022.     FINDINGS:  Alignment: Normal sagittal alignment.  Grade 1 retrolisthesis at C5-C6 and grade 1 anterolisthesis at C7-T1.     Vertebrae: Vertebral body heights are maintained.  No fracture or marrow infiltrative process.     Discs: Multilevel degenerative disc desiccation and height loss most pronounced at  C5-C6 and C6-C7.     Cord: Normal cord signal intensity.     Cerebellar tonsils are in their expected location.  Visualized brainstem is normal. Vertebral artery flow voids are present.  Prevertebral soft tissues are normal.  No cervical lymph node enlargement.  Paraspinal musculature demonstrates normal bulk and signal intensity.     Degenerative findings:     C2-C3: Thickening of the posterior longitudinal ligament.  No spinal canal stenosis or neural foraminal narrowing.     C3-C4: Thickening of the posterior longitudinal ligament, bilateral uncovertebral spurring and facet arthropathy.  No spinal canal stenosis.  Mild left neural foraminal narrowing.     C4-C5: Central/right paracentral disc extrusion with inferior migration which abuts and slightly posteriorly displaces the right ventral cord.  Bilateral uncovertebral spurring and facet arthropathy.  Mild spinal canal stenosis.  Mild left neural foraminal narrowing.     C5-C6: Posterior disc osteophyte complex, bilateral uncovertebral spurring and facet arthropathy.  Moderate spinal canal stenosis.  Severe bilateral neural foraminal narrowing.     C6-C7: Posterior disc osteophyte complex, bilateral uncovertebral spurring and facet arthropathy.  No spinal canal stenosis.  Mild right and moderate left neural foraminal narrowing.     C7-T1: No spinal canal stenosis or neural foraminal narrowing.     Impression:     Multilevel degenerative change of the cervical spine, detailed above.  Findings most pronounced at C5-C6 with moderate spinal canal stenosis and severe bilateral neural foraminal narrowing.    MRI Lumbar Spine 11/2/2023:  COMPARISON: MRI lumbar spine 6/28/2021     FINDINGS:   Spine Numbering: For purposes of this dictation, it is assumed that there are 5 non-rib-bearing, lumbar-type vertebrae, and the most caudal fully segmented lumbar vertebra is labeled L5.     Alignment: Straightening of normal lumbar lordosis. Unchanged dextrocurvature of the lumbar  spine. Unchanged grade 1 left lateral listhesis of L2 on L3. Unchanged grade 1 right lateral listhesis of L3 on L4. Unchanged grade 1 retrolisthesis of L2 on L3.     Surgical: None.     Vertebral Bodies: Unchanged multilevel mild anterior osteophytosis.     Marrow Signal: Unchanged degenerative fatty marrow placement at the L3-4 endplates. No marrow edema. No suspicious osseous lesions.     Intervertebral Discs: Unchanged multilevel disc dessication with loss of disc space height     Conus Medullaris: Terminates at L1     Cauda Equina: Unchanged bunching of the cauda equina at L3-4 and L4-5 due to thecal sac narrowing detailed below.     Paraspinal Soft Tissues: Normal     Intra-abdominal Findings: None.     Individual Levels:     T12-L1: Unchanged circumferential disc bulge with unchanged right paracentral disc herniation with cranial migration to the infrapedicular level of T12 measuring 1.6 x 0.6 cm (image 7, series 2). No thecal sac or neural foraminal narrowing.     L1-L2: Unchanged circumferential disc bulge with bulky left lateral osteophytosis. No spinal canal or foraminal narrowing.     L2-L3: Circumferential disc bulge, ligamentum flavum thickening, and epidural lipomatosis cause unchanged moderate thecal sac narrowing. Mild facet arthropathy and disc disease cause unchanged moderate bilateral neural foraminal narrowing.     L3-L4: Circumferential disc bulge, ligamentum flavum thickening, and epidural lipomatosis cause unchanged severe thecal sac narrowing. Moderate right and severe left facet arthropathy with disc disease cause unchanged mild right and moderate left neural foraminal narrowing.     L4-L5: Circumferential disc bulge, ligamentum flavum thickening, and epidural lipomatosis cause unchanged severe thecal sac narrowing. Severe facet arthropathy and disc disease cause unchanged severe right and moderate left neural foraminal narrowing with impingement of the exiting right L4 nerve root.  "    L5-S1: Unchanged small disc bulge and moderate facet arthropathy. No spinal canal or foraminal narrowing.     Impression    Unchanged severe multilevel lumbar spondylosis as detailed above.    Allergies:   Review of patient's allergies indicates:   Allergen Reactions    Stadol [butorphanol tartrate] Rash     Swelling in face    Strawberries [strawberry] Rash       Current Medications:   Current Outpatient Medications   Medication Sig Dispense Refill    amitriptyline (ELAVIL) 75 MG tablet Take 1 tablet by mouth in the evening 90 tablet 3    aspirin (ECOTRIN) 81 MG EC tablet Take 1 tablet (81 mg total) by mouth once daily. 28 tablet 0    BD LUER-RUSS SYRINGE 3 mL 25 gauge x 1" Syrg USE ONE SYRINGE EVERY 14 DAYS WITH TESTOSTERONE 100 each 2    blood-glucose sensor (DEXCOM G7 SENSOR) Allison To check sugars up to 3 times daily 4 each 11    candesartan (ATACAND) 32 MG tablet Take 1 tablet (32 mg total) by mouth once daily. 90 tablet 3    cloNIDine (CATAPRES) 0.1 MG tablet Take 1 tablet (0.1 mg total) by mouth 2 (two) times daily. 180 tablet 3    cyanocobalamin (VITAMIN B-12) 1000 MCG tablet Take 1 tablet (1,000 mcg total) by mouth once daily. 90 tablet 4    ergocalciferol (ERGOCALCIFEROL) 50,000 unit Cap Take 1 capsule by mouth once a week 12 capsule 0    ezetimibe (ZETIA) 10 mg tablet Take 1 tablet by mouth once daily 90 tablet 0    fluticasone propionate (FLONASE) 50 mcg/actuation nasal spray 2 sprays by Each Nostril route.      gabapentin (NEURONTIN) 800 MG tablet Take 1 tablet (800 mg total) by mouth every evening. 90 tablet 1    glimepiride (AMARYL) 2 MG tablet Take 1 tablet (2 mg total) by mouth before breakfast. 90 tablet 3    glucose 4 GM chewable tablet Take 4 tablets (16 g total) by mouth as needed for Low blood sugar. 30 tablet 12    hydrocortisone 2.5 % cream Apply to face twice daily for flares as needed 40 g 2    ipratropium (ATROVENT) 42 mcg (0.06 %) nasal spray 2 sprays by Nasal route 2 (two) times daily " "as needed for Rhinitis. 15 mL 0    ketoconazole (NIZORAL) 2 % cream Apply twice daily to affected area of skin 60 g 2    metoprolol succinate (TOPROL-XL) 200 MG 24 hr tablet Take 1 tablet (200 mg total) by mouth once daily. (Patient taking differently: Take 200 mg by mouth every evening.) 90 tablet 3    oxyCODONE-acetaminophen (PERCOCET)  mg per tablet Take 1 tablet by mouth every 12 (twelve) hours as needed for Pain. 60 tablet 0    rosuvastatin (CRESTOR) 40 MG Tab Take 1 tablet (40 mg total) by mouth every evening. 90 tablet 3    semaglutide (OZEMPIC) 2 mg/dose (8 mg/3 mL) PnIj Inject 2 mg into the skin every 7 days. 9 mL 3    sod sulf-pot chloride-mag sulf (SUTAB) 1.479-0.188- 0.225 gram tablet Take 12 tablets by mouth once daily. Take according to package instructions with indicated amount of water. 24 tablet 0    syringe with needle, safety (BD INTEGRA SYRINGE) 3 mL 22 gauge x 1 1/2" Syrg Inject 1 Syringe as directed once a week. 6 each 3    testosterone cypionate (DEPOTESTOTERONE CYPIONATE) 200 mg/mL injection Inject 1 mL (200 mg total) into the muscle every 14 (fourteen) days. 2 mL 5    valACYclovir (VALTREX) 1000 MG tablet Take 2 tablets (2,000 mg total) by mouth every 12 (twelve) hours. 4 tablet 3     No current facility-administered medications for this visit.     Facility-Administered Medications Ordered in Other Visits   Medication Dose Route Frequency Provider Last Rate Last Admin    0.9%  NaCl infusion   Intravenous Continuous Kendell Hoffman MD        0.9%  NaCl infusion   Intravenous Continuous Gerardo Uribe MD           REVIEW OF SYSTEMS:    GENERAL:  No weight loss, malaise or fevers.  HEENT:  Negative for frequent or significant headaches.  NECK:  Negative for lumps, goiter, pain and significant neck swelling.  RESPIRATORY:  Negative for cough, wheezing or shortness of breath.  CARDIOVASCULAR:  Negative for chest pain, leg swelling or palpitations. HTN, AFib  GI:  Negative for abdominal " discomfort, blood in stools or black stools or change in bowel habits.  MUSCULOSKELETAL:  See HPI.  SKIN:  Negative for lesions, rash, and itching.  PSYCH:  Negative for sleep disturbance, mood disorder and recent psychosocial stressors.  ENDO: Diabetes.   HEMATOLOGY/LYMPHOLOGY:  Negative for swollen nodes. Eliquis  NEURO:   No history of headaches, syncope, paralysis, seizures or tremors.  All other reviewed and negative other than HPI.    Past Medical History:  Past Medical History:   Diagnosis Date    Allergy     Carotid artery occlusion     Chronic back pain     Colonic polyp     Genetic testing     MUTYH mutation-negative    Hyperlipidemia     Hypertension     Paroxysmal atrial fibrillation 2/28/2024    Sleep apnea     Type 2 diabetes mellitus with stage 3 chronic kidney disease, without long-term current use of insulin 4/2/2020       Past Surgical History:  Past Surgical History:   Procedure Laterality Date    ANKLE SURGERY Left     2    APPENDECTOMY      at age 20    ARTHROSCOPIC CHONDROPLASTY OF KNEE JOINT Left 8/13/2024    Procedure: ARTHROSCOPY, KNEE, WITH CHONDROPLASTY;  Surgeon: Kai Washington MD;  Location: [facility] OR;  Service: Orthopedics;  Laterality: Left;    ARTHROSCOPY, KNEE, WITH ABRASION ARTHROPLASTY OR MICROFRACTURE Left 8/13/2024    Procedure: ARTHROSCOPY, KNEE, WITH  MICROFRACTURE;  Surgeon: Kai Washington MD;  Location: [facility] OR;  Service: Orthopedics;  Laterality: Left;    ARTHROSCOPY,KNEE,WITH MENISCUS REPAIR Left 8/13/2024    Procedure: ARTHROSCOPY,KNEE,WITH MENISCUS REPAIR;  Surgeon: Kai Washington MD;  Location: [facility] OR;  Service: Orthopedics;  Laterality: Left;  NO REGIONAL BLOCK    CAROTID ENDARTERECTOMY Right 07/15/2015    CARPAL TUNNEL RELEASE Bilateral     COLONOSCOPY N/A 12/14/2018    Procedure: COLONOSCOPYSuprep;  Surgeon: Silver Selby MD;  Location: [facility];  Service: Endoscopy;  Laterality: N/A;    COLONOSCOPY N/A 10/2/2024    Procedure: COLONOSCOPY;  Surgeon:  Heath Morrow MD;  Location: [facility] ENDO;  Service: Endoscopy;  Laterality: N/A;  Ref by Lenoor Oneill, pt has unopened Sutab, portal - PC  9/25/24-LVM for precall, portal-DS. 10/1/24 Pt. called and confirmed arrival time.EC    EPIDURAL STEROID INJECTION N/A 2/26/2024    Procedure: CERVICAL C7/T1 IL SELENA;  Surgeon: Gokul Fung MD;  Location: [facility] PAIN MGT;  Service: Pain Management;  Laterality: N/A;  [phone]  2 WK F/U SERGEI    EPIDURAL STEROID INJECTION N/A 5/23/2024    Procedure: LUMBAR L4/5 IL SELENA *ASPIRIN OTC* HOLD FOR 5 DAYS;  Surgeon: Gokul Fung MD;  Location: [facility] PAIN MGT;  Service: Pain Management;  Laterality: N/A;  [phone]  2 WK F/U NOHELIA    EPIDURAL STEROID INJECTION N/A 10/21/2024    Procedure: LUMBAR L5/S1 IL SELENA *ASPIRIN OTC* HOLD FOR 5 DAYS  **ANGELICA PT**;  Surgeon: Sofia Sheikh MD;  Location: [facility] PAIN MGT;  Service: Pain Management;  Laterality: N/A;  [phone]  2 WK F/U NOHELIA    INJECTION OF ANESTHETIC AGENT AROUND NERVE N/A 12/23/2024    Procedure: LUMBAR L4.5 IL SELENA *ASPIRIN OTC* HOLD FOR 5 DAYS;  Surgeon: Gokul Fung MD;  Location: [facility] PAIN MGT;  Service: Pain Management;  Laterality: N/A;  2 WK F/U NOHELIA    JOINT REPLACEMENT  9/2010    NASAL SEPTUM SURGERY      TOTAL KNEE ARTHROPLASTY Right     6 knee surgeries    TRANSESOPHAGEAL ECHOCARDIOGRAM WITH POSSIBLE CARDIOVERSION (ELIZABETH W/ POSS CARDIOVERSION) N/A 2/28/2024    Procedure: Transesophageal echo (ELIZABETH) intra-procedure log documentation;  Surgeon: Ahmet De La Cruz MD;  Location: [facility] CATH LAB/EP;  Service: Cardiology;  Laterality: N/A;       Family History:  Family History   Problem Relation Name Age of Onset    Brain cancer Mother Klarissa 67    Cancer Mother Klarissa     Hypertension Father Keanu     Lung cancer Father Keanu         x2 (PAUL initially- treated surgically only, then recurred distantly) (smoker, & had been exposed to many chemicals)    Cancer Father Keanu     Breast cancer Sister  58     Genetic Disorder Sister          monoallelic MUTYH mutation    Seizures Daughter      Cancer Maternal Grandfather Valerio     Brain cancer Paternal Grandfather  73    Breast cancer Maternal Cousin  57    Aneurysm Other mgm's father 48        brain    Ulcerative colitis Other brother's son        Social History:  Social History     Socioeconomic History    Marital status:    Tobacco Use    Smoking status: Never     Passive exposure: Never    Smokeless tobacco: Never   Substance and Sexual Activity    Alcohol use: Yes     Alcohol/week: 2.0 standard drinks of alcohol     Types: 2 Cans of beer per week     Comment: 6 beers per month    Drug use: Never    Sexual activity: Yes     Partners: Female     Birth control/protection: None   Social History Narrative    5/11/2021: . He lives with his wife and 2 kids. (5 kids total). He has one dog, one cat, no more chickens at home. One dog recently passed away. No smokers at home.      Social Drivers of Health     Financial Resource Strain: Low Risk  (3/8/2024)    Overall Financial Resource Strain (CARDIA)     Difficulty of Paying Living Expenses: Not very hard   Food Insecurity: No Food Insecurity (3/8/2024)    Hunger Vital Sign     Worried About Running Out of Food in the Last Year: Never true     Ran Out of Food in the Last Year: Never true   Transportation Needs: No Transportation Needs (3/8/2024)    PRAPARE - Transportation     Lack of Transportation (Medical): No     Lack of Transportation (Non-Medical): No   Physical Activity: Unknown (3/8/2024)    Exercise Vital Sign     Days of Exercise per Week: 0 days   Stress: Stress Concern Present (3/8/2024)    [employer] [place] [employer] - Occupational Stress Questionnaire     Feeling of Stress : To some extent   Housing Stability: Low Risk  (3/8/2024)    Housing Stability Vital Sign     Unable to Pay for Housing in the Last Year: No     Number of Places Lived in the Last Year: 1     Unstable Housing  in the Last Year: No       OBJECTIVE:    /87   Temp 98.3 °F (36.8 °C)   Resp 17   Wt 83.9 kg (184 lb 15.5 oz)   SpO2 100%   BMI 26.54 kg/m²     PHYSICAL EXAMINATION:    General appearance: Well appearing, in no acute distress, alert and oriented x3.  Psych:  Mood and affect appropriate.  Skin: Skin color, texture, turgor normal, no rashes or lesions, in both upper and lower body.  Head/face:  Atraumatic, normocephalic. No palpable lymph nodes  Neck: No pain to palpation over the cervical paraspinous muscles.   Cor: RRR  Pulm: Symmetric chest rise, no respiratory distress noted.   Back: Straight leg raising in the sitting position is negative to radicular pain bilaterally. There is pain to palpation over the lumbar facet joints bilaterally. Limited ROM with pain on flexion and extension. Positive facet loading bilaterally.   Extremities:  No deformities, edema, or skin discoloration. Good capillary refill.  Musculoskeletal:  Bilateral upper and lower extremity strength is normal and symmetric.  No atrophy or tone abnormalities are noted.  Neuro: No loss of sensation is noted.  Gait: Normal.    ASSESSMENT: 64 y.o. year old male with neck and back pain, consistent with the followin. Chronic pain disorder        2. Lumbar radiculopathy        3. Lumbar spondylosis        4. Degeneration of intervertebral disc of lumbar region with discogenic back pain and lower extremity pain        5. Cervical radiculopathy        6. Cervical spondylosis        7. DDD (degenerative disc disease), cervical        8. Encounter for monitoring opioid maintenance therapy              PLAN:     - Previous imaging was reviewed and discussed with the patient today.    - He is s/p L4/5 IL SELENA with benefit. We can repeat this as needed.      - We can repeat C7/T1 IL SELENA as needed.     - Continue Gabapentin.     - Pain contract signed 2024.     - Continue Percocet 10/325 mg BID PRN, #60. Refill provided.     - The patient  is here today for a refill of current pain medications and they believe these provide effective pain control and improvements in quality of life.  The patient notes no serious side effects, and feels the benefits outweigh the risks.  The patient was reminded of the pain contract that they signed previously as well as the risks and benefits of the medication including possible death.  The updated [place] [employer] prescription monitoring program was reviewed, and the patient has been found to be compliant with current treatment plan. Medication management provided by Dr. Fung.     - UDS from 9/10/2024 reviewed and consistent.     - I have stressed the importance of physical activity and a home exercise plan to help with pain and improve health.    - RTC in 3 months.     The above plan and management options were discussed at length with patient. Patient is in agreement with the above and verbalized understanding.    Baylee Ni  01/09/2025

## 2025-01-10 ENCOUNTER — OFFICE VISIT (OUTPATIENT)
Dept: ORTHOPEDICS | Facility: CLINIC | Age: 65
End: 2025-01-10
Payer: COMMERCIAL

## 2025-01-10 ENCOUNTER — LAB VISIT (OUTPATIENT)
Dept: LAB | Facility: HOSPITAL | Age: 65
End: 2025-01-10
Attending: STUDENT IN AN ORGANIZED HEALTH CARE EDUCATION/TRAINING PROGRAM
Payer: COMMERCIAL

## 2025-01-10 DIAGNOSIS — Z96.651 PAIN DUE TO TOTAL RIGHT KNEE REPLACEMENT, SUBSEQUENT ENCOUNTER: ICD-10-CM

## 2025-01-10 DIAGNOSIS — T84.84XD PAIN DUE TO TOTAL RIGHT KNEE REPLACEMENT, SUBSEQUENT ENCOUNTER: ICD-10-CM

## 2025-01-10 DIAGNOSIS — Z96.651 PAIN DUE TO TOTAL RIGHT KNEE REPLACEMENT, SUBSEQUENT ENCOUNTER: Primary | ICD-10-CM

## 2025-01-10 DIAGNOSIS — M70.51 PES ANSERINUS BURSITIS OF RIGHT KNEE: ICD-10-CM

## 2025-01-10 DIAGNOSIS — T84.84XD PAIN DUE TO TOTAL RIGHT KNEE REPLACEMENT, SUBSEQUENT ENCOUNTER: Primary | ICD-10-CM

## 2025-01-10 PROCEDURE — 1160F RVW MEDS BY RX/DR IN RCRD: CPT | Mod: CPTII,S$GLB,, | Performed by: STUDENT IN AN ORGANIZED HEALTH CARE EDUCATION/TRAINING PROGRAM

## 2025-01-10 PROCEDURE — 99214 OFFICE O/P EST MOD 30 MIN: CPT | Mod: S$GLB,,, | Performed by: STUDENT IN AN ORGANIZED HEALTH CARE EDUCATION/TRAINING PROGRAM

## 2025-01-10 PROCEDURE — 3072F LOW RISK FOR RETINOPATHY: CPT | Mod: CPTII,S$GLB,, | Performed by: STUDENT IN AN ORGANIZED HEALTH CARE EDUCATION/TRAINING PROGRAM

## 2025-01-10 PROCEDURE — 82495 ASSAY OF CHROMIUM: CPT | Performed by: STUDENT IN AN ORGANIZED HEALTH CARE EDUCATION/TRAINING PROGRAM

## 2025-01-10 PROCEDURE — 36415 COLL VENOUS BLD VENIPUNCTURE: CPT | Performed by: STUDENT IN AN ORGANIZED HEALTH CARE EDUCATION/TRAINING PROGRAM

## 2025-01-10 PROCEDURE — 83018 HEAVY METAL QUAN EACH NES: CPT | Performed by: STUDENT IN AN ORGANIZED HEALTH CARE EDUCATION/TRAINING PROGRAM

## 2025-01-10 PROCEDURE — 99999 PR PBB SHADOW E&M-EST. PATIENT-LVL III: CPT | Mod: PBBFAC,,, | Performed by: STUDENT IN AN ORGANIZED HEALTH CARE EDUCATION/TRAINING PROGRAM

## 2025-01-10 PROCEDURE — 1159F MED LIST DOCD IN RCRD: CPT | Mod: CPTII,S$GLB,, | Performed by: STUDENT IN AN ORGANIZED HEALTH CARE EDUCATION/TRAINING PROGRAM

## 2025-01-13 NOTE — PROGRESS NOTES
Assessment:  Problem List Items Addressed This Visit    None  Visit Diagnoses       Pain due to total right knee replacement, subsequent encounter    -  Primary    Relevant Orders    Cobalt, Serum    Chromium Level    Pes anserinus bursitis of right knee                 Plan:   We discussed that his pes bursitis symptoms have improved since the injection, but that his exam is now suggestive of coronal plane laxity, specifically medial laxity to valgus stress.  We again discussed that the sudden onset of symptoms after a strenuous work-out in late October could represent a medial ligamentous strain and associated pain/inflammation in the secondary stabilizers. We further discussed that his CT does not show any definitive mechanical problem, although the polyethylene bearing could appear to be thin on some cuts. However, we discussed that sequential imaging over time, showing decreasing poly thickness, is the preferred way to diagnose polyethylene wear.  Unfortunately we only have x-rays from the last 6-8 months.  He states that he was last seen by his primary surgeon a year or two following surgery and that he did not have any x-rays of the knee between then and his presentation here last year.  Therefore we discussed and agreed upon the following plan:  - Ordered metal level labs (cobalt and chromium) to check for wear   - Advised to wear a hinge knee brace (T-scope or TROM brace) for another 1 or 2 weeks while walking around, especially at work where the patient is on their feet a lot.   - Requested x-ray from 2010 or 2011 for comparison with current imaging to assess potential wear of knee replacement over time- he will obtain from [facility] orthopedics and drop off at the  for us to review (will present in our Joints conference)           Patient ID: Jarvis TAYLOR Reggiekate WilsonRoland is a 64 y.o. male.  Chief Complaint   Patient presents with    Right Knee - Pain, Swelling        History of Present Illness   "  HPI:  Mr. Curtis presents today for follow-up evaluation of right knee pain and swelling.  At prior visits we performed aspiration of the knee which was sent for Synovasure panel and this was negative for infection. We then ordered a CT scan of the knee and US guided pes bursa injection. He has completed those and is here to f/u.    Mr. Curtis had a recent corticosteroid injection into the pes anserine bursa two days ago, which has improved some of the tenderness in that area but has not resolved all symptoms. He tried wearing a hinged knee brace for a few weeks but did not find it particularly helpful. Mr. Curtis has attempted both ice and heat therapy without significant improvement. Mr. Curtis denies any sensation of knee instability or slipping, stating that it's not a slipping sensation but rather pain with weight-bearing. Mr. Curtis reports he is not engaging in any strenuous activities, just normal walking.     Mr. Curtis presents for follow-up of knee pain that started at the end of October, approximately 2.5 months ago, following a squatting incident while changing a fuse in his son's car. He reports pain primarily on the inside of the knee, particularly with weight-bearing activities. Mr. Curtis describes a "sloshing feeling" in the knee, which he attributes to fluid causing unusual pressure. He feels it the most when putting weight on the knee. Mr. Curtis notes difficulty with squatting, which he is now avoiding. He reports limited range of motion in the affected knee, with his calf unable to fully touch the back of his thigh.      12/20/24: As a reminder, he had a right total knee done in 2010 by Dr. Walsh at [facility].  He had a acute TJ I postoperatively due to stab infection and this was treated with irrigation and debridement with poly exchange followed by a course of IV antibiotics.  He states that since then he has not had any issues with the knee.  He has done very well since he " recovered from the washout in completed physical therapy.  However, about 2 months ago he began having atraumatic knee pain primarily in the medial aspect of the knee.  He rates the pain as 6/10 and rates his knee is 60% of normal.  He has pain after walking 2-3 blocks.  He does not require an assistive device navigate stairs 1 step at a time must use his arms to get up from a chair.    Of note, he has had multiple prior surgeries on the right knee going back to complete meniscectomy done 40 years ago followed by synthetic ACL reconstruction he had other surgeries done in the 90s.   He was referred here by Dr. Gonzalez who has been following him for left knee meniscus repair which was done in August of 2024.    He reports wearing a hinged knee brace as previously discussed. Despite the brace, he reports continued pain and a new onset of limping. He describes that the knee has swollen significantly, similar to its condition before previous interventions. The pain is primarily on the medial side of the knee, particularly in the area of the pes tendons. He notes decreased sensation in the skin around the knee.  He recalls the onset of current symptoms after having surgery on his left knee. While attempting to squat during therapy, due to lack of strength in his left leg, all his weight went onto his right leg, causing his right knee to flex back completely. He had difficulty getting up, and by that afternoon, the knee had swollen significantly. This swelling has persisted since then.  Mr. Curtis describes an uncomfortable sensation while walking, likening it to feeling encased in a balloon. He notes that the brace seems to exacerbate this sensation due to the pressure it applies. Despite this discomfort, he does not feel that the knee is unstable or slipping.  Okay  Mr. Curtis denies feeling instability in the knee. Mr. Curtis denies any recent trauma or injury to the knee.    SURGICAL HISTORY:  - Meniscectomy right knee:  1978  - ACL and PCL reconstruction right knee: late 1970s or early 1980s  - Experimental artificial Lawtons-Brian ligament reconstruction for the meniscus right knee: performed at the [facility] in [place], [place]  - Total knee replacement right knee: approximately 15 years ago  - Recent surgery on left knee    WORK STATUS:  - Employed as a food   - Job involves both walking around and computer work  - On feet a lot at work  - Knee pain affecting mobility at work, particularly when walking and weight-bearing  - Currently not engaging in activities outside of normal walking due to pain            Past Medical History:  Past Medical History:   Diagnosis Date    Allergy     Carotid artery occlusion     Chronic back pain     Colonic polyp     Genetic testing     MUTYH mutation-negative    Hyperlipidemia     Hypertension     Paroxysmal atrial fibrillation 2/28/2024    Sleep apnea     Type 2 diabetes mellitus with stage 3 chronic kidney disease, without long-term current use of insulin 4/2/2020        Surgical History:  Past Surgical History:   Procedure Laterality Date    ANKLE SURGERY Left     2    APPENDECTOMY      at age 20    ARTHROSCOPIC CHONDROPLASTY OF KNEE JOINT Left 8/13/2024    Procedure: ARTHROSCOPY, KNEE, WITH CHONDROPLASTY;  Surgeon: Kai Washington MD;  Location: [facility] OR;  Service: Orthopedics;  Laterality: Left;    ARTHROSCOPY, KNEE, WITH ABRASION ARTHROPLASTY OR MICROFRACTURE Left 8/13/2024    Procedure: ARTHROSCOPY, KNEE, WITH  MICROFRACTURE;  Surgeon: Kai Washington MD;  Location: [facility] OR;  Service: Orthopedics;  Laterality: Left;    ARTHROSCOPY,KNEE,WITH MENISCUS REPAIR Left 8/13/2024    Procedure: ARTHROSCOPY,KNEE,WITH MENISCUS REPAIR;  Surgeon: Kai Washington MD;  Location: [facility] OR;  Service: Orthopedics;  Laterality: Left;  NO REGIONAL BLOCK    CAROTID ENDARTERECTOMY Right 07/15/2015    CARPAL TUNNEL RELEASE Bilateral     COLONOSCOPY N/A 12/14/2018    Procedure:  COLONOSCOPYSuprep;  Surgeon: Silver Selby MD;  Location: [facility] ENDO;  Service: Endoscopy;  Laterality: N/A;    COLONOSCOPY N/A 10/2/2024    Procedure: COLONOSCOPY;  Surgeon: Heath Morrow MD;  Location: [facility] ENDO;  Service: Endoscopy;  Laterality: N/A;  Ref by Dr STEVE Cavazos, Ozempic, pt has unopened Sutab, portal - PC  9/25/24-LVM for precall, portal-DS. 10/1/24 Pt. called and confirmed arrival time.EC    EPIDURAL STEROID INJECTION N/A 2/26/2024    Procedure: CERVICAL C7/T1 IL SELENA;  Surgeon: Gokul Fung MD;  Location: [facility] PAIN MGT;  Service: Pain Management;  Laterality: N/A;  [phone]  2 WK F/U SERGEI    EPIDURAL STEROID INJECTION N/A 5/23/2024    Procedure: LUMBAR L4/5 IL SELENA *ASPIRIN OTC* HOLD FOR 5 DAYS;  Surgeon: Gokul Fung MD;  Location: [facility]GT;  Service: Pain Management;  Laterality: N/A;  [phone]  2 WK F/U NOHELIA    EPIDURAL STEROID INJECTION N/A 10/21/2024    Procedure: LUMBAR L5/S1 IL SELENA *ASPIRIN OTC* HOLD FOR 5 DAYS  **ANGELICA PT**;  Surgeon: Sofia Sheikh MD;  Location: [facility] PAIN [facility]GT;  Service: Pain Management;  Laterality: N/A;  [phone]  2 WK F/U NOHELIA    INJECTION OF ANESTHETIC AGENT AROUND NERVE N/A 12/23/2024    Procedure: LUMBAR L4.5 IL SELENA *ASPIRIN OTC* HOLD FOR 5 DAYS;  Surgeon: Gokul Fung MD;  Location: [facility]GT;  Service: Pain Management;  Laterality: N/A;  2 WK F/U NOHELIA    JOINT REPLACEMENT  9/2010    NASAL SEPTUM SURGERY      TOTAL KNEE ARTHROPLASTY Right     6 knee surgeries    TRANSESOPHAGEAL ECHOCARDIOGRAM WITH POSSIBLE CARDIOVERSION (ELIZABETH W/ POSS CARDIOVERSION) N/A 2/28/2024    Procedure: Transesophageal echo (ELIZABETH) intra-procedure log documentation;  Surgeon: Ahmet De La Cruz MD;  Location: [facility] CATH LAB/EP;  Service: Cardiology;  Laterality: N/A;        Social History:  He reports that he has never smoked. He has never been exposed to tobacco smoke. He has never used smokeless tobacco. He reports current alcohol use of about 2.0 standard drinks of  "alcohol per week. He reports that he does not use drugs.     Family History:  family history includes Aneurysm (age of onset: 48) in an other family member; Brain cancer (age of onset: 67) in his mother; Brain cancer (age of onset: 73) in his paternal grandfather; Breast cancer (age of onset: 57) in his maternal cousin; Breast cancer (age of onset: 58) in his sister; Cancer in his father, maternal grandfather, and mother; Genetic Disorder in his sister; Hypertension in his father; Lung cancer in his father; Seizures in his daughter; Ulcerative colitis in an other family member.     Current Outpatient Medications on File Prior to Visit   Medication Sig Dispense Refill    amitriptyline (ELAVIL) 75 MG tablet Take 1 tablet by mouth in the evening 90 tablet 3    aspirin (ECOTRIN) 81 MG EC tablet Take 1 tablet (81 mg total) by mouth once daily. 28 tablet 0    BD LUER-RUSS SYRINGE 3 mL 25 gauge x 1" Syrg USE ONE SYRINGE EVERY 14 DAYS WITH TESTOSTERONE 100 each 2    blood-glucose sensor (DEXCOM G7 SENSOR) Allison To check sugars up to 3 times daily 4 each 11    candesartan (ATACAND) 32 MG tablet Take 1 tablet (32 mg total) by mouth once daily. 90 tablet 3    cloNIDine (CATAPRES) 0.1 MG tablet Take 1 tablet (0.1 mg total) by mouth 2 (two) times daily. 180 tablet 3    cyanocobalamin (VITAMIN B-12) 1000 MCG tablet Take 1 tablet (1,000 mcg total) by mouth once daily. 90 tablet 4    ergocalciferol (ERGOCALCIFEROL) 50,000 unit Cap Take 1 capsule by mouth once a week 12 capsule 0    ezetimibe (ZETIA) 10 mg tablet Take 1 tablet by mouth once daily 90 tablet 0    fluticasone propionate (FLONASE) 50 mcg/actuation nasal spray 2 sprays by Each Nostril route.      gabapentin (NEURONTIN) 800 MG tablet Take 1 tablet (800 mg total) by mouth every evening. 90 tablet 1    glimepiride (AMARYL) 2 MG tablet Take 1 tablet (2 mg total) by mouth before breakfast. 90 tablet 3    glucose 4 GM chewable tablet Take 4 tablets (16 g total) by mouth as " "needed for Low blood sugar. 30 tablet 12    hydrocortisone 2.5 % cream Apply to face twice daily for flares as needed 40 g 2    ipratropium (ATROVENT) 42 mcg (0.06 %) nasal spray 2 sprays by Nasal route 2 (two) times daily as needed for Rhinitis. 15 mL 0    ketoconazole (NIZORAL) 2 % cream Apply twice daily to affected area of skin 60 g 2    metoprolol succinate (TOPROL-XL) 200 MG 24 hr tablet Take 1 tablet (200 mg total) by mouth once daily. (Patient taking differently: Take 200 mg by mouth every evening.) 90 tablet 3    oxyCODONE-acetaminophen (PERCOCET)  mg per tablet Take 1 tablet by mouth every 12 (twelve) hours as needed for Pain. 60 tablet 0    rosuvastatin (CRESTOR) 40 MG Tab Take 1 tablet (40 mg total) by mouth every evening. 90 tablet 3    semaglutide (OZEMPIC) 2 mg/dose (8 mg/3 mL) PnIj Inject 2 mg into the skin every 7 days. 9 mL 3    sod sulf-pot chloride-mag sulf (SUTAB) 1.479-0.188- 0.225 gram tablet Take 12 tablets by mouth once daily. Take according to package instructions with indicated amount of water. 24 tablet 0    syringe with needle, safety (BD INTEGRA SYRINGE) 3 mL 22 gauge x 1 1/2" Syrg Inject 1 Syringe as directed once a week. 6 each 3    testosterone cypionate (DEPOTESTOTERONE CYPIONATE) 200 mg/mL injection Inject 1 mL (200 mg total) into the muscle every 14 (fourteen) days. 2 mL 5    valACYclovir (VALTREX) 1000 MG tablet Take 2 tablets (2,000 mg total) by mouth every 12 (twelve) hours. 4 tablet 3     Current Facility-Administered Medications on File Prior to Visit   Medication Dose Route Frequency Provider Last Rate Last Admin    0.9%  NaCl infusion   Intravenous Continuous Kendell Hoffman MD        0.9%  NaCl infusion   Intravenous Continuous Gerardo Uribe MD         Review of patient's allergies indicates:   Allergen Reactions    Stadol [butorphanol tartrate] Rash     Swelling in face    Strawberries [strawberry] Rash          Physical exam:  There were no vitals taken for " this visit.  General: no apparent distress    Gait: + antalgic, no use of assistive devices    R knee:   NOT TTP at the pes insertion. Mild Tenderness to palpation at medial hamstring and MCL. Not TTP at the patella.   Skin: Healed incisions including anteromedial, anterior lateral, and direct lateral. Otherwise intact, mild effusion, some swelling medially at pes bursa (improved from prior)  Well-healed midline incision. Well-healed lateral distal femur incision. Well-healed lateral proximal tibial incision. Well-healed two cm incision at the joint line.  Range of motion: 4 to 100  Strength: 5/5 with extension, 5/5 with flexion  Ligament exam:   Medial laxity with Valgus stress in flexion and extension- 20 degrees. Stable leg on varus stress, not correctable past neutral alignment. Pain at the medial joint line, MCL and semimembranosus with valgus stress.   Neurovascular: WWP, 2+ PT pulse, Diminished sensation over lateral/anterior knee (lateral to prior incisions), otherwise Light touch sensation intact       Relevant Results:  Imaging:  Plain x-rays of the R knee were obtained on 11/6/24 and independently reviewed by me today, 12/20/2024 , and demonstrate a cemented [place]zana triathlon primary tibial base plate  and CR femur with resurfaced patella. There is no sign of loosening, components are in appropriate position, neutral alignment.  There is what appears to be heterotopic ossification of the lateral femoral condyle    CT R knee (12/23/24)   FINDINGS:  BONE: Postoperative changes from right total knee arthroplasty. Metal artifact degrades evaluation of adjacent structures. No fracture.  There are regions of osteolysis in the lateral aspects of the distal femur and proximal tibia (images 94 and 77 of series 201).  Additional postoperative changes from ACL reconstruction.  JOINT: There is a joint effusion with masslike areas of synovial thickening, for example along the lateral femoral condyle (401:98).  There  is a 2.7 x 2.3 cm partially calcified mass along the posterior aspect of the distal femur (3:282).  There is a 4.6 x 1.4 cm multi septated fluid collection in the posteromedial knee (3:326), which appears extracapsular.  Additional fluid collection along the posterior aspect of the proximal tibiofibular joint measuring 2.7 x 1.4 cm (3:519).  SOFT TISSUE: Regional tendons are grossly intact. Normal muscle bulk.  MISCELLANEOUS: No popliteal lymphadenopathy.  Impression:  Right total knee arthroplasty.  Signs of adverse local tissue reaction including osteolysis, a joint effusion, and masslike areas of synovial thickening.  Joint infection cannot be excluded.  4.6 cm fluid collection in the posteromedial knee, which could represent bursitis or capsular dehiscence.     Labs:  ESR 7, CRP <0.3  Aspiration (12/6/24): 162 WBCs, 17.6% PMNs, Cx: NGF, Neg a-Defensin        This note was generated with the assistance of ambient listening technology. Verbal consent was obtained by the patient and accompanying visitor(s) for the recording of patient appointment to facilitate this note. I attest to having reviewed and edited the generated note for accuracy, though some syntax or spelling errors may persist. Please contact the author of this note for any clarification.

## 2025-01-15 ENCOUNTER — PATIENT MESSAGE (OUTPATIENT)
Dept: ORTHOPEDICS | Facility: CLINIC | Age: 65
End: 2025-01-15
Payer: COMMERCIAL

## 2025-01-16 LAB
COBALT SERPL-MCNC: 0.3 NG/ML
CR SERPL-MCNC: 0.2 NG/ML

## 2025-01-29 ENCOUNTER — PATIENT MESSAGE (OUTPATIENT)
Dept: ENDOCRINOLOGY | Facility: CLINIC | Age: 65
End: 2025-01-29
Payer: COMMERCIAL

## 2025-02-04 ENCOUNTER — PATIENT MESSAGE (OUTPATIENT)
Dept: ENDOCRINOLOGY | Facility: CLINIC | Age: 65
End: 2025-02-04

## 2025-02-09 ENCOUNTER — PATIENT MESSAGE (OUTPATIENT)
Dept: PAIN MEDICINE | Facility: CLINIC | Age: 65
End: 2025-02-09
Payer: COMMERCIAL

## 2025-02-09 DIAGNOSIS — M47.816 LUMBAR SPONDYLOSIS: Primary | ICD-10-CM

## 2025-02-11 NOTE — TELEPHONE ENCOUNTER
Spoke with patient regarding pain. He is having worsened axial low back pain, left side greater than right. He denies any radicular leg pain at this time. His pain is worse with activity. He does endorse morning stiffness. Discussed MBB, he agreed to proceed. Orders placed.

## 2025-02-12 ENCOUNTER — PATIENT MESSAGE (OUTPATIENT)
Dept: PAIN MEDICINE | Facility: OTHER | Age: 65
End: 2025-02-12
Payer: COMMERCIAL

## 2025-02-12 DIAGNOSIS — M47.816 LUMBAR SPONDYLOSIS: Primary | ICD-10-CM

## 2025-02-13 ENCOUNTER — PATIENT MESSAGE (OUTPATIENT)
Dept: PAIN MEDICINE | Facility: OTHER | Age: 65
End: 2025-02-13
Payer: COMMERCIAL

## 2025-02-20 ENCOUNTER — PATIENT MESSAGE (OUTPATIENT)
Dept: PAIN MEDICINE | Facility: OTHER | Age: 65
End: 2025-02-20
Payer: COMMERCIAL

## 2025-02-24 ENCOUNTER — HOSPITAL ENCOUNTER (OUTPATIENT)
Facility: OTHER | Age: 65
Discharge: HOME | End: 2025-02-24
Attending: ANESTHESIOLOGY | Admitting: ANESTHESIOLOGY
Payer: COMMERCIAL

## 2025-02-24 VITALS
HEART RATE: 63 BPM | WEIGHT: 190 LBS | TEMPERATURE: 98 F | SYSTOLIC BLOOD PRESSURE: 168 MMHG | BODY MASS INDEX: 26.6 KG/M2 | HEIGHT: 71 IN | DIASTOLIC BLOOD PRESSURE: 67 MMHG | RESPIRATION RATE: 18 BRPM | OXYGEN SATURATION: 97 %

## 2025-02-24 DIAGNOSIS — G89.29 CHRONIC PAIN: ICD-10-CM

## 2025-02-24 DIAGNOSIS — M47.816 LUMBAR SPONDYLOSIS: Primary | ICD-10-CM

## 2025-02-24 LAB — POCT GLUCOSE: 118 MG/DL (ref 70–110)

## 2025-02-24 PROCEDURE — 64493 INJ PARAVERT F JNT L/S 1 LEV: CPT | Mod: 50 | Performed by: ANESTHESIOLOGY

## 2025-02-24 PROCEDURE — 64493 INJ PARAVERT F JNT L/S 1 LEV: CPT | Mod: 50,,, | Performed by: ANESTHESIOLOGY

## 2025-02-24 PROCEDURE — 63600175 PHARM REV CODE 636 W HCPCS: Performed by: ANESTHESIOLOGY

## 2025-02-24 PROCEDURE — 64494 INJ PARAVERT F JNT L/S 2 LEV: CPT | Mod: 50 | Performed by: ANESTHESIOLOGY

## 2025-02-24 PROCEDURE — 25000003 PHARM REV CODE 250: Performed by: ANESTHESIOLOGY

## 2025-02-24 PROCEDURE — 64494 INJ PARAVERT F JNT L/S 2 LEV: CPT | Mod: 50,,, | Performed by: ANESTHESIOLOGY

## 2025-02-24 RX ORDER — BUPIVACAINE HYDROCHLORIDE 2.5 MG/ML
INJECTION, SOLUTION EPIDURAL; INFILTRATION; INTRACAUDAL
Status: DISCONTINUED | OUTPATIENT
Start: 2025-02-24 | End: 2025-02-24 | Stop reason: HOSPADM

## 2025-02-24 RX ORDER — LIDOCAINE HYDROCHLORIDE 20 MG/ML
INJECTION, SOLUTION INFILTRATION; PERINEURAL
Status: DISCONTINUED | OUTPATIENT
Start: 2025-02-24 | End: 2025-02-24 | Stop reason: HOSPADM

## 2025-02-24 RX ORDER — ALPRAZOLAM 0.5 MG/1
1 TABLET, ORALLY DISINTEGRATING ORAL ONCE
Status: COMPLETED | OUTPATIENT
Start: 2025-02-24 | End: 2025-02-24

## 2025-02-24 RX ORDER — SODIUM CHLORIDE 9 MG/ML
INJECTION, SOLUTION INTRAVENOUS CONTINUOUS
Status: DISCONTINUED | OUTPATIENT
Start: 2025-02-24 | End: 2025-02-24 | Stop reason: HOSPADM

## 2025-02-24 RX ADMIN — ALPRAZOLAM 1 MG: 0.5 TABLET, ORALLY DISINTEGRATING ORAL at 09:02

## 2025-02-24 NOTE — DISCHARGE SUMMARY
"Discharge Note  Short Stay      SUMMARY     Admit Date: 2/24/2025    Attending Physician: Gokul Fung MD    Discharge Physician: Gokul Fung MD      Discharge Date: 2/24/2025 10:55 AM    Procedure(s) (LRB):  BLOCK, NERVE BILATERAL L3, 4, 5 MEDIAL BRANCH (Bilateral)    Final Diagnosis: Lumbar spondylosis [M47.816]    Disposition: Home or self care    Patient Instructions:   Current Discharge Medication List        CONTINUE these medications which have NOT CHANGED    Details   amitriptyline (ELAVIL) 75 MG tablet Take 1 tablet by mouth in the evening  Qty: 90 tablet, Refills: 3    Associated Diagnoses: Insomnia, unspecified type      aspirin (ECOTRIN) 81 MG EC tablet Take 1 tablet (81 mg total) by mouth once daily.  Qty: 28 tablet, Refills: 0    Associated Diagnoses: Bucket-handle tear of medial meniscus of left knee as current injury, initial encounter      BD LUER-RUSS SYRINGE 3 mL 25 gauge x 1" Syrg USE ONE SYRINGE EVERY 14 DAYS WITH TESTOSTERONE  Qty: 100 each, Refills: 2      blood-glucose sensor (DEXCOM G7 SENSOR) Allison To check sugars up to 3 times daily  Qty: 4 each, Refills: 11    Comments: Labile sugars, both highs and low. Now on recent oral and injectable steroids for back which is significantly affecting sugars.  Associated Diagnoses: Type 2 diabetes mellitus with stage 3a chronic kidney disease, without long-term current use of insulin      candesartan (ATACAND) 32 MG tablet Take 1 tablet (32 mg total) by mouth once daily.  Qty: 90 tablet, Refills: 3    Associated Diagnoses: Essential hypertension      cloNIDine (CATAPRES) 0.1 MG tablet Take 1 tablet (0.1 mg total) by mouth 2 (two) times daily.  Qty: 180 tablet, Refills: 3    Comments: .  Associated Diagnoses: Essential hypertension      cyanocobalamin (VITAMIN B-12) 1000 MCG tablet Take 1 tablet (1,000 mcg total) by mouth once daily.  Qty: 90 tablet, Refills: 4    Associated Diagnoses: B12 deficiency      ergocalciferol (ERGOCALCIFEROL) 50,000 unit Cap " Take 1 capsule by mouth once a week  Qty: 12 capsule, Refills: 0    Associated Diagnoses: Vitamin D deficiency      ezetimibe (ZETIA) 10 mg tablet Take 1 tablet by mouth once daily  Qty: 90 tablet, Refills: 0    Associated Diagnoses: Dyslipidemia      fluticasone propionate (FLONASE) 50 mcg/actuation nasal spray 2 sprays by Each Nostril route.      gabapentin (NEURONTIN) 800 MG tablet Take 1 tablet (800 mg total) by mouth every evening.  Qty: 90 tablet, Refills: 1    Associated Diagnoses: Lumbar radiculopathy; Cervical radiculopathy      glimepiride (AMARYL) 2 MG tablet Take 1 tablet (2 mg total) by mouth before breakfast.  Qty: 90 tablet, Refills: 3      glucose 4 GM chewable tablet Take 4 tablets (16 g total) by mouth as needed for Low blood sugar.  Qty: 30 tablet, Refills: 12      hydrocortisone 2.5 % cream Apply to face twice daily for flares as needed  Qty: 40 g, Refills: 2    Associated Diagnoses: Seborrheic dermatitis      ipratropium (ATROVENT) 42 mcg (0.06 %) nasal spray 2 sprays by Nasal route 2 (two) times daily as needed for Rhinitis.  Qty: 15 mL, Refills: 0    Associated Diagnoses: Influenza A      ketoconazole (NIZORAL) 2 % cream Apply twice daily to affected area of skin  Qty: 60 g, Refills: 2    Associated Diagnoses: Seborrheic dermatitis      metoprolol succinate (TOPROL-XL) 200 MG 24 hr tablet Take 1 tablet (200 mg total) by mouth once daily.  Qty: 90 tablet, Refills: 3    Comments: .  Associated Diagnoses: Essential hypertension      oxyCODONE-acetaminophen (PERCOCET)  mg per tablet Take 1 tablet by mouth every 12 (twelve) hours as needed for Pain.  Qty: 60 tablet, Refills: 0    Comments: Greater than 7 day supply that is medically necessary.  Associated Diagnoses: Chronic pain disorder; Lumbar radiculopathy; Lumbar spondylosis; DDD (degenerative disc disease), lumbar; Cervical radiculopathy; Cervical spondylosis; DDD (degenerative disc disease), cervical      rosuvastatin (CRESTOR) 40 MG  "Tab TAKE 1 TABLET BY MOUTH ONCE DAILY IN THE EVENING  Qty: 90 tablet, Refills: 1    Associated Diagnoses: Dyslipidemia      semaglutide (OZEMPIC) 2 mg/dose (8 mg/3 mL) PnIj Inject 2 mg into the skin every 7 days.  Qty: 9 mL, Refills: 3    Associated Diagnoses: Type 2 diabetes mellitus with stage 3a chronic kidney disease, without long-term current use of insulin      sod sulf-pot chloride-mag sulf (SUTAB) 1.479-0.188- 0.225 gram tablet Take 12 tablets by mouth once daily. Take according to package instructions with indicated amount of water.  Qty: 24 tablet, Refills: 0    Comments: Coupon code [id number] PCN: [id number] Group: [id number] Member ID: [id number]  Associated Diagnoses: Colon cancer screening      syringe with needle, safety (BD INTEGRA SYRINGE) 3 mL 22 gauge x 1 1/2" Syrg Inject 1 Syringe as directed once a week.  Qty: 6 each, Refills: 3      testosterone cypionate (DEPOTESTOTERONE CYPIONATE) 200 mg/mL injection Inject 1 mL (200 mg total) into the muscle every 14 (fourteen) days.  Qty: 2 mL, Refills: 5    Comments: Please include syringes and needles for the testosterone.  Associated Diagnoses: Hypogonadism in male      valACYclovir (VALTREX) 1000 MG tablet Take 2 tablets (2,000 mg total) by mouth every 12 (twelve) hours.  Qty: 4 tablet, Refills: 3                 Discharge Diagnosis: Lumbar spondylosis [M47.816]  Condition on Discharge: Stable with no complications to procedure   Diet on Discharge: Same as before.  Activity: as per instruction sheet.  Discharge to: Home with a responsible adult.  Follow up: 2-4 weeks       Please call my office or pager at [phone] if experienced any weakness or loss of sensation, fever > 101.5, pain uncontrolled with oral medications, persistent nausea/vomiting/or diarrhea, redness or drainage from the incisions, or any other worrisome concerns. If physician on call was not reached or could not communicate with our office for any reason please go to the nearest emergency " department     Vipul Pinto MD

## 2025-02-24 NOTE — OP NOTE
Diagnostic Lumbar Medial Branch Block Under Fluoroscopy    The procedure, risks, benefits, and options were discussed with the patient. There are no contraindications to the procedure. The patent expressed understanding and agreed to the procedure. Informed written consent was obtained prior to the start of the procedure and can be found in the patient's chart.    PATIENT NAME: Partha Curtis Jr.   MRN: [id number]     DATE OF PROCEDURE: 02/24/2025                                           PROCEDURE:  Diagnostic Bilateral L3, L4, and L5 Lumbar Medial Branch Block under Fluoroscopy    PRE-OP DIAGNOSIS: Lumbar spondylosis [M47.816] Lumbar spondylosis [M47.816]    POST-OP DIAGNOSIS: Same    PHYSICIAN: Gokul Fung MD    ASSISTANTS: Vipul Pinto MD  [facility] Pain Fellow    MEDICATIONS INJECTED:  Bupivicaine 0.25%    LOCAL ANESTHETIC INJECTED:   Xylocaine 2%    SEDATION: None    ESTIMATED BLOOD LOSS:  None    COMPLICATIONS:  None.    INTERVAL HISTORY: Patient has clinical and imaging findings suggestive of facet mediated pain.    TECHNIQUE: Time-out was performed to identify the patient and procedure to be performed. With the patient laying in a prone position, the surgical area was prepped and draped in the usual sterile fashion using ChloraPrep and fenestrated drape. The levels were determined under fluoroscopic guidance. Skin anesthesia was achieved by injecting Lidocaine 2% over the injection sites. A 22 gauge, 3.5 inch needle was introduced into the medial branch nerves at the junctions of the superior articular process and the transverse processes of the targeted sites using AP, lateral and/or contralateral oblique fluoroscopic imaging. After negative aspiration for blood or CSF was confirmed, 1 mL of the anesthetic listed above was then slowly injected at each site. The needles were removed and bleeding was nil. A sterile dressing was applied. No specimens collected. The patient tolerated the procedure  well.    PRE-PROCEDURE PAIN SCORE: 8/10    POST-PROCEDURE PAIN SCORE: 1/10    The patient was monitored after the procedure in the recovery area. They were given post-procedure and discharge instructions to follow at home. The patient was discharged in a stable condition.    Vipul Pinto MD     I reviewed and edited the fellow's note. I conducted my own interview and physical examination. I agree with the findings. I was present and supervising all critical portions of the procedure.

## 2025-02-24 NOTE — H&P
HPI  Patient presenting for Procedure(s) (LRB):  BLOCK, NERVE BILATERAL L3, 4, 5 MEDIAL BRANCH (Bilateral)     Patient on Anti-coagulation No    No health changes since previous encounter    Past Medical History:   Diagnosis Date    Allergy     Carotid artery occlusion     Chronic back pain     Colonic polyp     Genetic testing     MUTYH mutation-negative    Hyperlipidemia     Hypertension     Paroxysmal atrial fibrillation 2/28/2024    Sleep apnea     Type 2 diabetes mellitus with stage 3 chronic kidney disease, without long-term current use of insulin 4/2/2020     Past Surgical History:   Procedure Laterality Date    ANKLE SURGERY Left     2    APPENDECTOMY      at age 20    ARTHROSCOPIC CHONDROPLASTY OF KNEE JOINT Left 8/13/2024    Procedure: ARTHROSCOPY, KNEE, WITH CHONDROPLASTY;  Surgeon: Kai Washington MD;  Location: [facility] OR;  Service: Orthopedics;  Laterality: Left;    ARTHROSCOPY, KNEE, WITH ABRASION ARTHROPLASTY OR MICROFRACTURE Left 8/13/2024    Procedure: ARTHROSCOPY, KNEE, WITH  MICROFRACTURE;  Surgeon: Kai Washington MD;  Location: [facility] OR;  Service: Orthopedics;  Laterality: Left;    ARTHROSCOPY,KNEE,WITH MENISCUS REPAIR Left 8/13/2024    Procedure: ARTHROSCOPY,KNEE,WITH MENISCUS REPAIR;  Surgeon: Kai Washington MD;  Location: [facility] OR;  Service: Orthopedics;  Laterality: Left;  NO REGIONAL BLOCK    CAROTID ENDARTERECTOMY Right 07/15/2015    CARPAL TUNNEL RELEASE Bilateral     COLONOSCOPY N/A 12/14/2018    Procedure: COLONOSCOPYSuprep;  Surgeon: Silver Selby MD;  Location: [facility] ENDO;  Service: Endoscopy;  Laterality: N/A;    COLONOSCOPY N/A 10/2/2024    Procedure: COLONOSCOPY;  Surgeon: Heath Morrow MD;  Location: [facility] ENDO;  Service: Endoscopy;  Laterality: N/A;  Ref by Leonor Oneill, pt has unopened Sutab, portal - PC  9/25/24-LVM for precall, portal-DS. 10/1/24 Pt. called and confirmed arrival time.EC    EPIDURAL STEROID INJECTION N/A 2/26/2024    Procedure: CERVICAL  C7/T1 IL SELENA;  Surgeon: Gokul Fung MD;  Location: [facility], [place], operated by [facility] PAIN [facility]GT;  Service: Pain Management;  Laterality: N/A;  [phone]  2 WK F/U SERGEI    EPIDURAL STEROID INJECTION N/A 5/23/2024    Procedure: LUMBAR L4/5 IL SELENA *ASPIRIN OTC* HOLD FOR 5 DAYS;  Surgeon: Gokul Fung MD;  Location: [facility], [place], operated by [facility] PAIN [facility]GT;  Service: Pain Management;  Laterality: N/A;  [phone]  2 WK F/U NOHELIA    EPIDURAL STEROID INJECTION N/A 10/21/2024    Procedure: LUMBAR L5/S1 IL SELENA *ASPIRIN OTC* HOLD FOR 5 DAYS  **EISSA PT**;  Surgeon: Sofia Sheikh MD;  Location: [facility], [place], operated by [facility]IN [facility]GT;  Service: Pain Management;  Laterality: N/A;  [phone]  2 WK F/U NOHELIA    INJECTION OF ANESTHETIC AGENT AROUND NERVE N/A 12/23/2024    Procedure: LUMBAR L4.5 IL SELENA *ASPIRIN OTC* HOLD FOR 5 DAYS;  Surgeon: Gokul Fung MD;  Location: [facility], [place], operated by [facility] PAIN [facility]GT;  Service: Pain Management;  Laterality: N/A;  2 WK F/U NOHELIA    JOINT REPLACEMENT  9/2010    NASAL SEPTUM SURGERY      TOTAL KNEE ARTHROPLASTY Right     6 knee surgeries    TRANSESOPHAGEAL ECHOCARDIOGRAM WITH POSSIBLE CARDIOVERSION (ELIZABETH W/ POSS CARDIOVERSION) N/A 2/28/2024    Procedure: Transesophageal echo (ELIZABETH) intra-procedure log documentation;  Surgeon: Ahmet De La Cruz MD;  Location: [facility] CATH LAB/EP;  Service: Cardiology;  Laterality: N/A;     Review of patient's allergies indicates:   Allergen Reactions    Stadol [butorphanol tartrate] Rash     Swelling in face    Strawberries [strawberry] Rash      No current facility-administered medications for this encounter.     Facility-Administered Medications Ordered in Other Encounters   Medication    0.9%  NaCl infusion    0.9%  NaCl infusion       PMHx, PSHx, Allergies, Medications reviewed in epic    ROS negative except pain complaints in HPI    OBJECTIVE:    There were no vitals taken for this visit.    PHYSICAL EXAMINATION:    GENERAL: Well appearing, in no acute distress, alert and oriented x3.  PSYCH:  Mood and affect appropriate.  SKIN: Skin color, texture,  turgor normal, no rashes or lesions which will impact the procedure.  CV: RRR with palpation of the radial artery.  PULM: No evidence of respiratory difficulty, symmetric chest rise. Clear to auscultation.  NEURO: Cranial nerves grossly intact.    Plan:    Proceed with procedure as planned Procedure(s) (LRB):  BLOCK, NERVE BILATERAL L3, 4, 5 MEDIAL BRANCH (Bilateral)    Gerardo Uribe  02/24/2025

## 2025-02-24 NOTE — DISCHARGE INSTRUCTIONS

## 2025-02-25 ENCOUNTER — OFFICE VISIT (OUTPATIENT)
Dept: ENDOCRINOLOGY | Facility: CLINIC | Age: 65
End: 2025-02-25
Payer: COMMERCIAL

## 2025-02-25 DIAGNOSIS — N18.31 TYPE 2 DIABETES MELLITUS WITH STAGE 3A CHRONIC KIDNEY DISEASE, WITHOUT LONG-TERM CURRENT USE OF INSULIN: ICD-10-CM

## 2025-02-25 DIAGNOSIS — E11.22 TYPE 2 DIABETES MELLITUS WITH STAGE 3A CHRONIC KIDNEY DISEASE, WITHOUT LONG-TERM CURRENT USE OF INSULIN: ICD-10-CM

## 2025-02-25 DIAGNOSIS — E29.1 HYPOGONADISM IN MALE: ICD-10-CM

## 2025-02-25 PROCEDURE — 3072F LOW RISK FOR RETINOPATHY: CPT | Mod: CPTII,95,, | Performed by: INTERNAL MEDICINE

## 2025-02-25 PROCEDURE — 1159F MED LIST DOCD IN RCRD: CPT | Mod: CPTII,95,, | Performed by: INTERNAL MEDICINE

## 2025-02-25 PROCEDURE — 98006 SYNCH AUDIO-VIDEO EST MOD 30: CPT | Mod: 95,,, | Performed by: INTERNAL MEDICINE

## 2025-02-25 PROCEDURE — 1160F RVW MEDS BY RX/DR IN RCRD: CPT | Mod: CPTII,95,, | Performed by: INTERNAL MEDICINE

## 2025-02-25 PROCEDURE — G2211 COMPLEX E/M VISIT ADD ON: HCPCS | Mod: 95,,, | Performed by: INTERNAL MEDICINE

## 2025-02-25 RX ORDER — TESTOSTERONE CYPIONATE 200 MG/ML
150 INJECTION, SOLUTION INTRAMUSCULAR
Qty: 2 ML | Refills: 5 | Status: SHIPPED | OUTPATIENT
Start: 2025-02-25

## 2025-02-25 NOTE — PROGRESS NOTES
The patient location is: work    Visit type: audiovisual    Face to Face time with patient: 12 mins    30 minutes of total time spent on the encounter, which includes face to face time and non-face to face time preparing to see the patient (eg, review of tests), Obtaining and/or reviewing separately obtained history, Documenting clinical information in the electronic or other health record, Independently interpreting results (not separately reported) and communicating results to the patient/family/caregiver, or Care coordination (not separately reported).     Each patient to whom he or she provides medical services by telemedicine is:  (1) informed of the relationship between the physician and patient and the respective role of any other health care provider with respect to management of the patient; and (2) notified that he or she may decline to receive medical services by telemedicine and may withdraw from such care at any time.    Notes:      Subjective:      Chief Complaint:  Hypogonadism    History of Present Illness    Partha Curtis is a 64 y.o. male who presents for follow-up of hypogonadism.  Patient was last seen in the clinic on 09/26/2023.      Hypogonadism    Dx in 2015, presented with fatigue, decreased libido.   Patient was on testosterone injection 200 mg every 14 days.  Recent testosterone, hemoglobin and hematocrit were elevated and was recommended to hold off on his testosterone.  Repeat hemoglobin and hematocrit normal.    Patient was on testosterone pellets for a few years, was following with urology.   Tried testosterone gel, says it did not work well.     Sleep apnea: CPAP, not very compliant. Tired various combinations - is a side sleeper.   Liver disease: Denies, transient elevation in LFTs.     Headaches: Denies  Loss of peripheral vision: Denies  Galactorrhea: Denies  Urinary symptoms: Denies    He denies use of anabolic steroids, pelvic trauma or surgery, head trauma     Exercise: None  due to back pain. Had steroid injection/nerve block recently. Was biking 3-4 times a week, 8-12 miles per day.      Has 3 children.     Family history:  No family history low testosterone.     Weight:  Wt Readings from Last 3 Encounters:   02/24/25 86.2 kg (190 lb)   01/09/25 83.9 kg (184 lb 15.5 oz)   12/23/24 83.9 kg (185 lb)     There is no height or weight on file to calculate BMI.    Lab Results   Component Value Date    HCT 53.5 12/10/2024    HCT 55.5 (H) 10/23/2024    HCT 55.1 (H) 08/16/2024    TOTALTESTOST 127 (L) 05/13/2024    TOTALTESTOST 141 (L) 03/11/2024    TOTALTESTOST 1003 02/09/2024    TESTOSTERONE 1296 (H) 10/23/2024    TESTOSTERONE 334.9 (H) 10/23/2024    PSADIAG 0.39 01/23/2023    PSA 0.75 03/11/2024    PSA 0.61 06/21/2022    HDL 29 (L) 12/10/2024    HDL 31 (L) 03/11/2024    TRIG 147 12/10/2024    TRIG 218 (H) 03/11/2024    ALT 53 (H) 12/10/2024    ALT 41 10/23/2024    ALT 34 08/16/2024    AST 37 12/10/2024    AST 28 10/23/2024    AST 36 08/16/2024       Patient has history of type 2 diabetes, controlled on glimepiride and Ozempic. He follows up with his PCP.      ROS:   As above    Objective:     There were no vitals taken for this visit.  There is no height or weight on file to calculate BMI.      Physical Exam  Constitutional:       General: He is not in acute distress.     Appearance: He is not ill-appearing.   Eyes:      Conjunctiva/sclera: Conjunctivae normal.   Pulmonary:      Effort: Pulmonary effort is normal.   Neurological:      Mental Status: He is alert and oriented to person, place, and time.       Lab Review:   Lab Results   Component Value Date    HGBA1C 9.0 (H) 12/10/2024     Lab Results   Component Value Date    CHOL 96 (L) 12/10/2024    HDL 29 (L) 12/10/2024    LDLCALC 37.6 (L) 12/10/2024    TRIG 147 12/10/2024    CHOLHDL 30.2 12/10/2024     Lab Results   Component Value Date     12/10/2024    K 4.2 12/10/2024     12/10/2024    CO2 28 12/10/2024     (H)  12/10/2024    BUN 13 12/10/2024    CREATININE 1.5 (H) 12/10/2024    CALCIUM 9.3 12/10/2024    PROT 7.1 12/10/2024    ALBUMIN 4.0 12/10/2024    BILITOT 0.6 12/10/2024    ALKPHOS 77 12/10/2024    AST 37 12/10/2024    ALT 53 (H) 12/10/2024    ANIONGAP 10 12/10/2024    EGFRNORACEVR 51.7 (A) 12/10/2024    TSH 1.266 02/27/2024      Vit D, 25-Hydroxy   Date Value Ref Range Status   07/05/2023 53 30 - 96 ng/mL Final     Comment:     Vitamin D deficiency.........<10 ng/mL                              Vitamin D insufficiency......10-29 ng/mL       Vitamin D sufficiency........> or equal to 30 ng/mL  Vitamin D toxicity............>100 ng/mL         Assessment and Plan     1. Hypogonadism in male  Assessment & Plan:    Patient had presented with fatigue and decreased libido  Patient was on testosterone injection 200 mg every 14 days.  Recent testosterone, hemoglobin and hematocrit was elevated and was recommended to hold off on his testosterone.  Repeat hemoglobin and hematocrit normal.    Discussed decreasing his testosterone to 150 mg every 14 days.  Repeat labs in 8 weeks.    H/o of sleep apnea, has difficulty using his CPAP.  Recent BMI of 26.     I discussed the potential adverse effects of testosterone including risk of cardiovascular events, BPH, prostate cancer, erythrocytosis and venous thromboembolism.   Monitor PSA, LFTs, HGB, HCT yearly.  Monitor urinary and cardiovascular symptoms        Orders:  -     testosterone cypionate (DEPOTESTOTERONE CYPIONATE) 200 mg/mL injection; Inject 0.75 mLs (150 mg total) into the muscle every 14 (fourteen) days.  Dispense: 2 mL; Refill: 5  -     Testosterone; Future  -     PSA, Screening; Future  -     Hematocrit; Future  -     Hemoglobin; Future    2. Type 2 diabetes mellitus with stage 3a chronic kidney disease, without long-term current use of insulin  Assessment & Plan:    Continue follow-up with PCP.           Follow up in about 1 year (around 2/25/2026).     Tammy FUNK Rai, MD

## 2025-02-25 NOTE — ASSESSMENT & PLAN NOTE
Patient had presented with fatigue and decreased libido  Patient was on testosterone injection 200 mg every 14 days.  Recent testosterone, hemoglobin and hematocrit was elevated and was recommended to hold off on his testosterone.  Repeat hemoglobin and hematocrit normal.    Discussed decreasing his testosterone to 150 mg every 14 days.  Repeat labs in 8 weeks.    H/o of sleep apnea, has difficulty using his CPAP.  Recent BMI of 26.     I discussed the potential adverse effects of testosterone including risk of cardiovascular events, BPH, prostate cancer, erythrocytosis and venous thromboembolism.   Monitor PSA, LFTs, HGB, HCT yearly.  Monitor urinary and cardiovascular symptoms

## 2025-02-27 ENCOUNTER — PATIENT MESSAGE (OUTPATIENT)
Dept: PAIN MEDICINE | Facility: CLINIC | Age: 65
End: 2025-02-27
Payer: COMMERCIAL

## 2025-02-27 DIAGNOSIS — M47.816 LUMBAR SPONDYLOSIS: Primary | ICD-10-CM

## 2025-02-27 NOTE — TELEPHONE ENCOUNTER
Medial Branch Block Diary   lumbar    1    Pre procedure pain level: 10  Percentage of relief within 24 hours of procedure: 80%  Post procedure level: 0  Current pain level:0  Any Improvements in ADL: bathing, dressing, eating, transferring, using toilet, and walking

## 2025-02-28 ENCOUNTER — TELEPHONE (OUTPATIENT)
Dept: PAIN MEDICINE | Facility: CLINIC | Age: 65
End: 2025-02-28
Payer: COMMERCIAL

## 2025-02-28 ENCOUNTER — PATIENT MESSAGE (OUTPATIENT)
Dept: PAIN MEDICINE | Facility: OTHER | Age: 65
End: 2025-02-28
Payer: COMMERCIAL

## 2025-02-28 DIAGNOSIS — M47.816 LUMBAR SPONDYLOSIS: Primary | ICD-10-CM

## 2025-02-28 NOTE — TELEPHONE ENCOUNTER
----- Message from Marta sent at 2/28/2025  1:35 PM CST -----  Type:  Patient Returning CallWho Called: Who Left Message for Patient: Does the patient know what this is regarding?: missed call Best Call Back Number: [phone] Information:

## 2025-02-28 NOTE — TELEPHONE ENCOUNTER
Staff called pt about message that was left with the call center. Pt said he missed a call I assume it is from the procedure area.

## 2025-03-10 ENCOUNTER — HOSPITAL ENCOUNTER (OUTPATIENT)
Facility: OTHER | Age: 65
Discharge: HOME | End: 2025-03-10
Attending: ANESTHESIOLOGY | Admitting: ANESTHESIOLOGY
Payer: COMMERCIAL

## 2025-03-10 VITALS
DIASTOLIC BLOOD PRESSURE: 91 MMHG | SYSTOLIC BLOOD PRESSURE: 189 MMHG | OXYGEN SATURATION: 99 % | HEART RATE: 68 BPM | RESPIRATION RATE: 18 BRPM

## 2025-03-10 DIAGNOSIS — G89.29 CHRONIC PAIN: ICD-10-CM

## 2025-03-10 DIAGNOSIS — M47.816 LUMBAR SPONDYLOSIS: Primary | ICD-10-CM

## 2025-03-10 PROCEDURE — 64493 INJ PARAVERT F JNT L/S 1 LEV: CPT | Mod: 50,,, | Performed by: ANESTHESIOLOGY

## 2025-03-10 PROCEDURE — 64494 INJ PARAVERT F JNT L/S 2 LEV: CPT | Mod: 50 | Performed by: ANESTHESIOLOGY

## 2025-03-10 PROCEDURE — 25000003 PHARM REV CODE 250: Performed by: ANESTHESIOLOGY

## 2025-03-10 PROCEDURE — 64494 INJ PARAVERT F JNT L/S 2 LEV: CPT | Mod: 50,,, | Performed by: ANESTHESIOLOGY

## 2025-03-10 PROCEDURE — 64493 INJ PARAVERT F JNT L/S 1 LEV: CPT | Mod: 50 | Performed by: ANESTHESIOLOGY

## 2025-03-10 PROCEDURE — 63600175 PHARM REV CODE 636 W HCPCS: Performed by: ANESTHESIOLOGY

## 2025-03-10 RX ORDER — SODIUM CHLORIDE 9 MG/ML
INJECTION, SOLUTION INTRAVENOUS CONTINUOUS
Status: DISCONTINUED | OUTPATIENT
Start: 2025-03-10 | End: 2025-03-10 | Stop reason: HOSPADM

## 2025-03-10 RX ORDER — BUPIVACAINE HYDROCHLORIDE 2.5 MG/ML
INJECTION, SOLUTION EPIDURAL; INFILTRATION; INTRACAUDAL; PERINEURAL
Status: DISCONTINUED | OUTPATIENT
Start: 2025-03-10 | End: 2025-03-10 | Stop reason: HOSPADM

## 2025-03-10 RX ORDER — ALPRAZOLAM 0.5 MG/1
1 TABLET, ORALLY DISINTEGRATING ORAL ONCE
Status: COMPLETED | OUTPATIENT
Start: 2025-03-10 | End: 2025-03-10

## 2025-03-10 RX ORDER — LIDOCAINE HYDROCHLORIDE 20 MG/ML
INJECTION, SOLUTION INFILTRATION; PERINEURAL
Status: DISCONTINUED | OUTPATIENT
Start: 2025-03-10 | End: 2025-03-10 | Stop reason: HOSPADM

## 2025-03-10 RX ADMIN — ALPRAZOLAM 1 MG: 0.5 TABLET, ORALLY DISINTEGRATING ORAL at 01:03

## 2025-03-10 NOTE — DISCHARGE INSTRUCTIONS

## 2025-03-10 NOTE — DISCHARGE SUMMARY
"Discharge Note  Short Stay      SUMMARY     Admit Date: 3/10/2025    Attending Physician: Gokul Fung      Discharge Physician: Gokul Fung      Discharge Date: 3/10/2025 2:11 PM    Procedure(s) (LRB):  BLOCK, NERVE BILATERAL L3, L4, L5 MEDIAL BRANCH (Bilateral)    Final Diagnosis: Lumbar spondylosis [M47.816]    Disposition: Home or self care    Patient Instructions:   Current Discharge Medication List        CONTINUE these medications which have NOT CHANGED    Details   amitriptyline (ELAVIL) 75 MG tablet Take 1 tablet by mouth in the evening  Qty: 90 tablet, Refills: 3    Associated Diagnoses: Insomnia, unspecified type      aspirin (ECOTRIN) 81 MG EC tablet Take 1 tablet (81 mg total) by mouth once daily.  Qty: 28 tablet, Refills: 0    Associated Diagnoses: Bucket-handle tear of medial meniscus of left knee as current injury, initial encounter      BD LUER-RUSS SYRINGE 3 mL 25 gauge x 1" Syrg USE ONE SYRINGE EVERY 14 DAYS WITH TESTOSTERONE  Qty: 100 each, Refills: 2      blood-glucose sensor (DEXCOM G7 SENSOR) Allison To check sugars up to 3 times daily  Qty: 4 each, Refills: 11    Comments: Labile sugars, both highs and low. Now on recent oral and injectable steroids for back which is significantly affecting sugars.  Associated Diagnoses: Type 2 diabetes mellitus with stage 3a chronic kidney disease, without long-term current use of insulin      candesartan (ATACAND) 32 MG tablet Take 1 tablet (32 mg total) by mouth once daily.  Qty: 90 tablet, Refills: 3    Associated Diagnoses: Essential hypertension      cloNIDine (CATAPRES) 0.1 MG tablet Take 1 tablet (0.1 mg total) by mouth 2 (two) times daily.  Qty: 180 tablet, Refills: 3    Comments: .  Associated Diagnoses: Essential hypertension      cyanocobalamin (VITAMIN B-12) 1000 MCG tablet Take 1 tablet (1,000 mcg total) by mouth once daily.  Qty: 90 tablet, Refills: 4    Associated Diagnoses: B12 deficiency      ergocalciferol (ERGOCALCIFEROL) 50,000 unit Cap Take 1 " capsule by mouth once a week  Qty: 12 capsule, Refills: 0    Associated Diagnoses: Vitamin D deficiency      ezetimibe (ZETIA) 10 mg tablet Take 1 tablet by mouth once daily  Qty: 90 tablet, Refills: 0    Associated Diagnoses: Dyslipidemia      fluticasone propionate (FLONASE) 50 mcg/actuation nasal spray 2 sprays by Each Nostril route.      gabapentin (NEURONTIN) 800 MG tablet Take 1 tablet (800 mg total) by mouth every evening.  Qty: 90 tablet, Refills: 1    Associated Diagnoses: Lumbar radiculopathy; Cervical radiculopathy      glimepiride (AMARYL) 2 MG tablet Take 1 tablet (2 mg total) by mouth before breakfast.  Qty: 90 tablet, Refills: 3      glucose 4 GM chewable tablet Take 4 tablets (16 g total) by mouth as needed for Low blood sugar.  Qty: 30 tablet, Refills: 12      hydrocortisone 2.5 % cream Apply to face twice daily for flares as needed  Qty: 40 g, Refills: 2    Associated Diagnoses: Seborrheic dermatitis      ipratropium (ATROVENT) 42 mcg (0.06 %) nasal spray 2 sprays by Nasal route 2 (two) times daily as needed for Rhinitis.  Qty: 15 mL, Refills: 0    Associated Diagnoses: Influenza A      ketoconazole (NIZORAL) 2 % cream Apply twice daily to affected area of skin  Qty: 60 g, Refills: 2    Associated Diagnoses: Seborrheic dermatitis      metoprolol succinate (TOPROL-XL) 200 MG 24 hr tablet Take 1 tablet (200 mg total) by mouth once daily.  Qty: 90 tablet, Refills: 3    Comments: .  Associated Diagnoses: Essential hypertension      oxyCODONE-acetaminophen (PERCOCET)  mg per tablet Take 1 tablet by mouth every 12 (twelve) hours as needed for Pain.  Qty: 60 tablet, Refills: 0    Comments: Greater than 7 day supply that is medically necessary.  Associated Diagnoses: Chronic pain disorder; Lumbar radiculopathy; Lumbar spondylosis; DDD (degenerative disc disease), lumbar; Cervical radiculopathy; Cervical spondylosis; DDD (degenerative disc disease), cervical      rosuvastatin (CRESTOR) 40 MG Tab TAKE  "1 TABLET BY MOUTH ONCE DAILY IN THE EVENING  Qty: 90 tablet, Refills: 1    Associated Diagnoses: Dyslipidemia      semaglutide (OZEMPIC) 2 mg/dose (8 mg/3 mL) PnIj Inject 2 mg into the skin every 7 days.  Qty: 9 mL, Refills: 3    Associated Diagnoses: Type 2 diabetes mellitus with stage 3a chronic kidney disease, without long-term current use of insulin      sod sulf-pot chloride-mag sulf (SUTAB) 1.479-0.188- 0.225 gram tablet Take 12 tablets by mouth once daily. Take according to package instructions with indicated amount of water.  Qty: 24 tablet, Refills: 0    Comments: Coupon code BIN:[id number] PCN: [id number] Group: [id number] Member ID: [id number]  Associated Diagnoses: Colon cancer screening      syringe with needle, safety (BD INTEGRA SYRINGE) 3 mL 22 gauge x 1 1/2" Syrg Inject 1 Syringe as directed once a week.  Qty: 6 each, Refills: 3      testosterone cypionate (DEPOTESTOTERONE CYPIONATE) 200 mg/mL injection Inject 0.75 mLs (150 mg total) into the muscle every 14 (fourteen) days.  Qty: 2 mL, Refills: 5    Comments: Please include syringes and needles for the testosterone.  Associated Diagnoses: Hypogonadism in male      valACYclovir (VALTREX) 1000 MG tablet Take 2 tablets (2,000 mg total) by mouth every 12 (twelve) hours.  Qty: 4 tablet, Refills: 3                 Discharge Diagnosis: Lumbar spondylosis [M47.816]  Condition on Discharge: Stable with no complications to procedure   Diet on Discharge: Same as before.  Activity: as per instruction sheet.  Discharge to: Home with a responsible adult.  Follow up: 2-4 weeks       Please call my office or pager at [phone] if experienced any weakness or loss of sensation, fever > 101.5, pain uncontrolled with oral medications, persistent nausea/vomiting/or diarrhea, redness or drainage from the incisions, or any other worrisome concerns. If physician on call was not reached or could not communicate with our office for any reason please go to the nearest emergency " department

## 2025-03-10 NOTE — OP NOTE
Diagnostic Lumbar Medial Branch Block Under Fluoroscopy    The procedure, risks, benefits, and options were discussed with the patient. There are no contraindications to the procedure. The patent expressed understanding and agreed to the procedure. Informed written consent was obtained prior to the start of the procedure and can be found in the patient's chart.    PATIENT NAME: Partha Curtis Jr.   MRN: [id number]     DATE OF PROCEDURE: 03/10/2025                                           PROCEDURE:  Diagnostic Bilateral L3, L4, and L5 Lumbar Medial Branch Block under Fluoroscopy    PRE-OP DIAGNOSIS: Lumbar spondylosis [M47.816] Lumbar spondylosis [M47.816]    POST-OP DIAGNOSIS: Same    PHYSICIAN: Gokul Fung MD    ASSISTANTS: William Mack MD  [facility]     MEDICATIONS INJECTED:  Bupivicaine 0.25%    LOCAL ANESTHETIC INJECTED:   Xylocaine 2%    SEDATION: None    ESTIMATED BLOOD LOSS:  None    COMPLICATIONS:  None.    INTERVAL HISTORY: Patient has clinical and imaging findings suggestive of facet mediated pain.    TECHNIQUE: Time-out was performed to identify the patient and procedure to be performed. With the patient laying in a prone position, the surgical area was prepped and draped in the usual sterile fashion using ChloraPrep and fenestrated drape. The levels were determined under fluoroscopic guidance. Skin anesthesia was achieved by injecting Lidocaine 2% over the injection sites. A 22 gauge, 3.5 inch needle was introduced into the medial branch nerves at the junctions of the superior articular process and the transverse processes of the targeted sites using AP, lateral and/or contralateral oblique fluoroscopic imaging. After negative aspiration for blood or CSF was confirmed, 1 mL of the anesthetic listed above was then slowly injected at each site. The needles were removed and bleeding was nil. A sterile dressing was applied. No specimens collected. The patient tolerated the procedure  well.    PRE-PROCEDURE PAIN SCORE: 8-10/10    POST-PROCEDURE PAIN SCORE: 0/10    The patient was monitored after the procedure in the recovery area. They were given post-procedure and discharge instructions to follow at home. The patient was discharged in a stable condition.    Gokul Fung MD

## 2025-03-10 NOTE — H&P
HPI  Patient presenting for Procedure(s) (LRB):  BLOCK, NERVE BILATERAL L3, L4, L5 MEDIAL BRANCH (Bilateral)     Patient on Anti-coagulation No    No health changes since previous encounter    Past Medical History:   Diagnosis Date    Allergy     Carotid artery occlusion     Chronic back pain     Colonic polyp     Genetic testing     MUTYH mutation-negative    Hyperlipidemia     Hypertension     Paroxysmal atrial fibrillation 2/28/2024    Sleep apnea     Type 2 diabetes mellitus with stage 3 chronic kidney disease, without long-term current use of insulin 4/2/2020     Past Surgical History:   Procedure Laterality Date    ANKLE SURGERY Left     2    APPENDECTOMY      at age 20    ARTHROSCOPIC CHONDROPLASTY OF KNEE JOINT Left 8/13/2024    Procedure: ARTHROSCOPY, KNEE, WITH CHONDROPLASTY;  Surgeon: Kai Washington MD;  Location: [facility] OR;  Service: Orthopedics;  Laterality: Left;    ARTHROSCOPY, KNEE, WITH ABRASION ARTHROPLASTY OR MICROFRACTURE Left 8/13/2024    Procedure: ARTHROSCOPY, KNEE, WITH  MICROFRACTURE;  Surgeon: Kai Washington MD;  Location: [facility] OR;  Service: Orthopedics;  Laterality: Left;    ARTHROSCOPY,KNEE,WITH MENISCUS REPAIR Left 8/13/2024    Procedure: ARTHROSCOPY,KNEE,WITH MENISCUS REPAIR;  Surgeon: Kai Washington MD;  Location: [facility] OR;  Service: Orthopedics;  Laterality: Left;  NO REGIONAL BLOCK    CAROTID ENDARTERECTOMY Right 07/15/2015    CARPAL TUNNEL RELEASE Bilateral     COLONOSCOPY N/A 12/14/2018    Procedure: COLONOSCOPYSuprep;  Surgeon: Silver Selby MD;  Location: [facility] ENDO;  Service: Endoscopy;  Laterality: N/A;    COLONOSCOPY N/A 10/2/2024    Procedure: COLONOSCOPY;  Surgeon: Heath Morrow MD;  Location: [facility] ENDO;  Service: Endoscopy;  Laterality: N/A;  Ref by Leonor Oneill, pt has unopened Sutab, portal - PC  9/25/24-LVM for precall, portal-DS. 10/1/24 Pt. called and confirmed arrival time.EC    EPIDURAL STEROID INJECTION N/A 2/26/2024    Procedure: CERVICAL  C7/T1 IL SELENA;  Surgeon: Gokul Fung MD;  Location: [facility] PAIN MGT;  Service: Pain Management;  Laterality: N/A;  [phone]  2 WK F/U SERGEI    EPIDURAL STEROID INJECTION N/A 5/23/2024    Procedure: LUMBAR L4/5 IL SELENA *ASPIRIN OTC* HOLD FOR 5 DAYS;  Surgeon: Gokul Fung MD;  Location: [facility] PAIN MGT;  Service: Pain Management;  Laterality: N/A;  [phone]  2 WK F/U NOHELIA    EPIDURAL STEROID INJECTION N/A 10/21/2024    Procedure: LUMBAR L5/S1 IL SELENA *ASPIRIN OTC* HOLD FOR 5 DAYS  **EISSA PT**;  Surgeon: Sofia Sheikh MD;  Location: [facility] PAIN MGT;  Service: Pain Management;  Laterality: N/A;  [phone]  2 WK F/U NOHELIA    INJECTION OF ANESTHETIC AGENT AROUND NERVE N/A 12/23/2024    Procedure: LUMBAR L4.5 IL SELENA *ASPIRIN OTC* HOLD FOR 5 DAYS;  Surgeon: Gokul Fung MD;  Location: [facility] PAIN MGT;  Service: Pain Management;  Laterality: N/A;  2 WK F/U NOHELIA    INJECTION OF ANESTHETIC AGENT AROUND NERVE Bilateral 2/24/2025    Procedure: BLOCK, NERVE BILATERAL L3, 4, 5 MEDIAL BRANCH;  Surgeon: Gokul Fung MD;  Location: [facility] PAIN MGT;  Service: Pain Management;  Laterality: Bilateral;  2 WK F/U NOHELIA    JOINT REPLACEMENT  9/2010    NASAL SEPTUM SURGERY      TOTAL KNEE ARTHROPLASTY Right     6 knee surgeries    TRANSESOPHAGEAL ECHOCARDIOGRAM WITH POSSIBLE CARDIOVERSION (ELIZABETH W/ POSS CARDIOVERSION) N/A 2/28/2024    Procedure: Transesophageal echo (ELIZABETH) intra-procedure log documentation;  Surgeon: Ahmet De La Cruz MD;  Location: [facility] CATH LAB/EP;  Service: Cardiology;  Laterality: N/A;     Review of patient's allergies indicates:   Allergen Reactions    Stadol [butorphanol tartrate] Rash     Swelling in face    Strawberries [strawberry] Rash      Current Facility-Administered Medications   Medication    0.9% NaCl infusion    alprazolam ODT dissolvable tablet 1 mg     Facility-Administered Medications Ordered in Other Encounters   Medication    0.9%  NaCl infusion    0.9%  NaCl infusion       PMHx, PSHx, Allergies,  Medications reviewed in epic    ROS negative except pain complaints in HPI    OBJECTIVE:    There were no vitals taken for this visit.    PHYSICAL EXAMINATION:    GENERAL: Well appearing, in no acute distress, alert and oriented x3.  PSYCH:  Mood and affect appropriate.  SKIN: Skin color, texture, turgor normal, no rashes or lesions which will impact the procedure.  CV: RRR with palpation of the radial artery.  PULM: No evidence of respiratory difficulty, symmetric chest rise. Clear to auscultation.  NEURO: Cranial nerves grossly intact.    Plan:    Proceed with procedure as planned Procedure(s) (LRB):  BLOCK, NERVE BILATERAL L3, L4, L5 MEDIAL BRANCH (Bilateral)    William Mack  03/10/2025

## 2025-03-11 LAB — POCT GLUCOSE: 133 MG/DL (ref 70–110)

## 2025-03-13 ENCOUNTER — PATIENT MESSAGE (OUTPATIENT)
Dept: PAIN MEDICINE | Facility: CLINIC | Age: 65
End: 2025-03-13
Payer: COMMERCIAL

## 2025-03-13 DIAGNOSIS — M47.816 LUMBAR SPONDYLOSIS: Primary | ICD-10-CM

## 2025-03-14 ENCOUNTER — PATIENT MESSAGE (OUTPATIENT)
Dept: PAIN MEDICINE | Facility: OTHER | Age: 65
End: 2025-03-14
Payer: COMMERCIAL

## 2025-03-18 ENCOUNTER — OFFICE VISIT (OUTPATIENT)
Dept: PAIN MEDICINE | Facility: CLINIC | Age: 65
End: 2025-03-18
Payer: COMMERCIAL

## 2025-03-18 ENCOUNTER — LAB VISIT (OUTPATIENT)
Dept: LAB | Facility: OTHER | Age: 65
End: 2025-03-18
Attending: NURSE PRACTITIONER
Payer: COMMERCIAL

## 2025-03-18 ENCOUNTER — RESULTS FOLLOW-UP (OUTPATIENT)
Dept: FAMILY MEDICINE | Facility: CLINIC | Age: 65
End: 2025-03-18

## 2025-03-18 VITALS
DIASTOLIC BLOOD PRESSURE: 84 MMHG | HEART RATE: 59 BPM | BODY MASS INDEX: 28.1 KG/M2 | SYSTOLIC BLOOD PRESSURE: 156 MMHG | WEIGHT: 201.5 LBS

## 2025-03-18 DIAGNOSIS — M54.16 LUMBAR RADICULOPATHY: ICD-10-CM

## 2025-03-18 DIAGNOSIS — Z79.891 ENCOUNTER FOR MONITORING OPIOID MAINTENANCE THERAPY: ICD-10-CM

## 2025-03-18 DIAGNOSIS — M47.816 LUMBAR SPONDYLOSIS: ICD-10-CM

## 2025-03-18 DIAGNOSIS — Z51.81 ENCOUNTER FOR MONITORING OPIOID MAINTENANCE THERAPY: ICD-10-CM

## 2025-03-18 DIAGNOSIS — M47.812 CERVICAL SPONDYLOSIS: ICD-10-CM

## 2025-03-18 DIAGNOSIS — E11.22 TYPE 2 DIABETES MELLITUS WITH STAGE 3A CHRONIC KIDNEY DISEASE, WITHOUT LONG-TERM CURRENT USE OF INSULIN: ICD-10-CM

## 2025-03-18 DIAGNOSIS — M51.362 DEGENERATION OF INTERVERTEBRAL DISC OF LUMBAR REGION WITH DISCOGENIC BACK PAIN AND LOWER EXTREMITY PAIN: ICD-10-CM

## 2025-03-18 DIAGNOSIS — M54.12 CERVICAL RADICULOPATHY: ICD-10-CM

## 2025-03-18 DIAGNOSIS — M50.30 DDD (DEGENERATIVE DISC DISEASE), CERVICAL: ICD-10-CM

## 2025-03-18 DIAGNOSIS — N18.31 TYPE 2 DIABETES MELLITUS WITH STAGE 3A CHRONIC KIDNEY DISEASE, WITHOUT LONG-TERM CURRENT USE OF INSULIN: ICD-10-CM

## 2025-03-18 DIAGNOSIS — G89.4 CHRONIC PAIN DISORDER: Primary | ICD-10-CM

## 2025-03-18 LAB
ALBUMIN SERPL BCP-MCNC: 4 G/DL (ref 3.5–5.2)
ALP SERPL-CCNC: 71 U/L (ref 40–150)
ALT SERPL W/O P-5'-P-CCNC: 41 U/L (ref 10–44)
ANION GAP SERPL CALC-SCNC: 7 MMOL/L (ref 8–16)
AST SERPL-CCNC: 32 U/L (ref 10–40)
BASOPHILS # BLD AUTO: 0.04 K/UL (ref 0–0.2)
BASOPHILS NFR BLD: 0.6 % (ref 0–1.9)
BILIRUB SERPL-MCNC: 0.6 MG/DL (ref 0.1–1)
BUN SERPL-MCNC: 13 MG/DL (ref 8–23)
CALCIUM SERPL-MCNC: 9.4 MG/DL (ref 8.7–10.5)
CHLORIDE SERPL-SCNC: 104 MMOL/L (ref 95–110)
CO2 SERPL-SCNC: 25 MMOL/L (ref 23–29)
CREAT SERPL-MCNC: 1.4 MG/DL (ref 0.5–1.4)
DIFFERENTIAL METHOD BLD: ABNORMAL
EOSINOPHIL # BLD AUTO: 0.2 K/UL (ref 0–0.5)
EOSINOPHIL NFR BLD: 2.8 % (ref 0–8)
ERYTHROCYTE [DISTWIDTH] IN BLOOD BY AUTOMATED COUNT: 14.4 % (ref 11.5–14.5)
EST. GFR  (NO RACE VARIABLE): 56 ML/MIN/1.73 M^2
ESTIMATED AVG GLUCOSE: 154 MG/DL (ref 68–131)
GLUCOSE SERPL-MCNC: 196 MG/DL (ref 70–110)
HBA1C MFR BLD: 7 % (ref 4–5.6)
HCT VFR BLD AUTO: 51.5 % (ref 40–54)
HGB BLD-MCNC: 17 G/DL (ref 14–18)
IMM GRANULOCYTES # BLD AUTO: 0.02 K/UL (ref 0–0.04)
IMM GRANULOCYTES NFR BLD AUTO: 0.3 % (ref 0–0.5)
LYMPHOCYTES # BLD AUTO: 1.6 K/UL (ref 1–4.8)
LYMPHOCYTES NFR BLD: 23.7 % (ref 18–48)
MCH RBC QN AUTO: 32.3 PG (ref 27–31)
MCHC RBC AUTO-ENTMCNC: 33 G/DL (ref 32–36)
MCV RBC AUTO: 98 FL (ref 82–98)
MONOCYTES # BLD AUTO: 0.5 K/UL (ref 0.3–1)
MONOCYTES NFR BLD: 6.9 % (ref 4–15)
NEUTROPHILS # BLD AUTO: 4.4 K/UL (ref 1.8–7.7)
NEUTROPHILS NFR BLD: 65.7 % (ref 38–73)
NRBC BLD-RTO: 0 /100 WBC
PLATELET # BLD AUTO: 222 K/UL (ref 150–450)
PMV BLD AUTO: 9.1 FL (ref 9.2–12.9)
POTASSIUM SERPL-SCNC: 5 MMOL/L (ref 3.5–5.1)
PROT SERPL-MCNC: 7.2 G/DL (ref 6–8.4)
RBC # BLD AUTO: 5.27 M/UL (ref 4.6–6.2)
SODIUM SERPL-SCNC: 136 MMOL/L (ref 136–145)
WBC # BLD AUTO: 6.67 K/UL (ref 3.9–12.7)

## 2025-03-18 PROCEDURE — 80053 COMPREHEN METABOLIC PANEL: CPT

## 2025-03-18 PROCEDURE — 80355 GABAPENTIN NON-BLOOD: CPT | Performed by: NURSE PRACTITIONER

## 2025-03-18 PROCEDURE — 99999 PR PBB SHADOW E&M-EST. PATIENT-LVL IV: CPT | Mod: PBBFAC,,, | Performed by: NURSE PRACTITIONER

## 2025-03-18 PROCEDURE — 83036 HEMOGLOBIN GLYCOSYLATED A1C: CPT

## 2025-03-18 PROCEDURE — 85025 COMPLETE CBC W/AUTO DIFF WBC: CPT

## 2025-03-18 PROCEDURE — 36415 COLL VENOUS BLD VENIPUNCTURE: CPT

## 2025-03-18 NOTE — PROGRESS NOTES
Chronic patient Established Note (Follow up visit)      SUBJECTIVE:    Interval History 3/18/2025:  The patient returns to clinic today for follow up of back pain. He is s/p bilateral L3,4,5 MBB on 2/24/2025 and 3/10/2025. He reports 90% relief for one day with each block. He was able to stand for longer periods of time. His pain did return to baseline. He continues to report low back pain. This is worse with prolonged standing and walking. He does endorse morning stiffness. He continues to report neck pain. He is taking Gabapentin. He also takes Percocet as needed for severe pain. He denies any other health changes. His pain today is 9/10.     Interval History 1/9/2025:  The patient returns to clinic today for follow up of pain. He is s/p L4/5 IL SELENA on 12/23/2024. He reports 50% relief of his pain. He reports intermittent low back pain. He continues to report neck pain. He reports increased joint pain today due to the cold weather. He continues to report right knee pain. He did have an injection into the knee yesterday. He does have a follow up with Orthopedics tomorrow. He is taking Gabapentin. He is taking Percocet as needed with benefit. He denies any other health changes. His pain today is 7/10.    Interval History 12/9/2024:  The patient returns to clinic today for follow up of neck and back pain. Since last visit, he has developed right knee pain. This is limiting his progression in physical therapy. He did have the right knee drained recently. He reports worsened low back pain. He attributes this to the way he is walking. He reports intermittent radiating pain into the hip and leg. His pain is worse with standing and walking. He is taking Gabapentin and Percocet. He denies any adverse effects. He denies any other health changes. His pain today is 8/10.    Interval History 9/10/2024:  The patient returns to clinic today for follow up of neck and back pain. Since last visit, he underwent left knee arthroscopy  on 8/13/2024. He is currently participating in physical therapy. He reports increased mid and low back pain. He denies any radicular leg pain. His pain is worse with moving from sitting to standing. He is taking Gabapentin. He also takes Percocet as needed for severe pain. He denies any adverse effects. He denies any other health changes. His pain today is 8/10.    Interval History 6/10/2024:  The patient returns to clinic today for follow up of neck and back pain. He is s/p L4/5 IL SELENA on 5/23/2024. He reports 60% relief. He reports low back pain. He denies any radicular leg pain. His back pain is worse with moving from sitting to standing. He continues to report neck pain. He is taking Gabapentin. He also takes Percocet for severe pain as needed with benefit. He denies any adverse effects. He denies any other health changes. His pain today is 5/10.      Interval History 5/1/2024:  The patient returns to clinic today for follow up of neck and back pain. He reports increased low back pain. He denies any radicular leg pain but does have numbness into the lateral aspect of his right leg. This is worse from the knee to the top of his right foot. He does have numbness under the left foot. His pain is worse with standing and walking. He is no longer taking Eliquis. He is taking Gabapentin. He also takes Percocet for severe pain. He needs a refill. He denies any other health changes. His pain today is 8/10.    Interval History 3/25/2024:  Partha Curtis Jr. presents to the clinic for a follow-up appointment for neck pain. He is s/p C7/T1 IL SELENA on 2/26/2024. He reports 100% relief of his neck pain. He was admitted for atrial fibrillation after the last procedure. He was converted to sinus rhythm. He is now on Eliquis. He reports increased low back pain. He denies any radicular leg pain. His pain is worse as the day goes on. He previously had relief with ESIs. He is interested in repeating this in the future. He denies  any weakness. He is taking Gabapentin. He takes Percocet intermittently for severe pain. He denies any other health changes. His pain today is 0/10.      HPI: Partha Curtis Jr. is a 63 y.o. male presents to pain clinic for evaluation of neck pain. Symptoms developed 4 weeks ago. Similar pain in 2022 that improved after an C7/T1 ILESI on 3/28/22, had been pain free since this procedure until 4 weeks ago. Original neck pain started in 2021 without inciting event. Denies trauma or injury to the area in the past 4 weeks upon return of symptoms. Pain management previously at [facility] with Dr. Herrera     Original Pain Description:  The pain is located in the upper thoracic region just left of the midline with radiation into his left shoulder and proximal arm terminating above the elbow. All of patients upper back/neck pain is on the left. The pain is described as aching, sharp, and throbbing. These exact symptoms were present prior to his 2022 ILESI. Chinyere ranges from 4 - 10. The current pain is 8. Exacerbating factors: Coughing/Sneezing and L arm abduction above the head, head forward flexion and head lateral bending to the left, and laying flat on the ground. Mitigating factors pillow. Symptoms interfere with daily activity and sleeping and work. 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 Works as a  for [employer]. The patient feels like symptoms have been worsening. Patient endorses weakness in his left arm with return of pain. Patient denies saddle anesthesia, bladder/bowel incontinence, acute or signifcant limb weakness, ataxia, or increased falls.     Pain Disability Index Review:      3/18/2025     9:26 AM 1/9/2025    10:44 AM 12/9/2024     9:58 AM   Last 3 PDI Scores   Pain Disability Index (PDI) 55 [phone] 56       Pain Medications:  Gabapentin  Percocet     Opioid Contract: not applicable     report:  Reviewed and consistent with medication use as  prescribed.    Pain Procedures:   2/26/2024- C7/T1 IL SELENA  5/23/2024- L4/5 IL SELENA- 60% relief  12/23/2024- L4/5 IL SELENA- 50% relief   2/24/2025- Bilateral L3,4,5 MBB  3/10/2025- Bilateral L3,4,5 MBB    Physical Therapy/Home Exercise: yes    Imaging:   MRI Cervical Spine 2/15/2024:  COMPARISON:  XR C-spine 03/02/2010.  CTA head neck 06/23/2022.     FINDINGS:  Alignment: Normal sagittal alignment.  Grade 1 retrolisthesis at C5-C6 and grade 1 anterolisthesis at C7-T1.     Vertebrae: Vertebral body heights are maintained.  No fracture or marrow infiltrative process.     Discs: Multilevel degenerative disc desiccation and height loss most pronounced at C5-C6 and C6-C7.     Cord: Normal cord signal intensity.     Cerebellar tonsils are in their expected location.  Visualized brainstem is normal. Vertebral artery flow voids are present.  Prevertebral soft tissues are normal.  No cervical lymph node enlargement.  Paraspinal musculature demonstrates normal bulk and signal intensity.     Degenerative findings:     C2-C3: Thickening of the posterior longitudinal ligament.  No spinal canal stenosis or neural foraminal narrowing.     C3-C4: Thickening of the posterior longitudinal ligament, bilateral uncovertebral spurring and facet arthropathy.  No spinal canal stenosis.  Mild left neural foraminal narrowing.     C4-C5: Central/right paracentral disc extrusion with inferior migration which abuts and slightly posteriorly displaces the right ventral cord.  Bilateral uncovertebral spurring and facet arthropathy.  Mild spinal canal stenosis.  Mild left neural foraminal narrowing.     C5-C6: Posterior disc osteophyte complex, bilateral uncovertebral spurring and facet arthropathy.  Moderate spinal canal stenosis.  Severe bilateral neural foraminal narrowing.     C6-C7: Posterior disc osteophyte complex, bilateral uncovertebral spurring and facet arthropathy.  No spinal canal stenosis.  Mild right and moderate left neural foraminal  narrowing.     C7-T1: No spinal canal stenosis or neural foraminal narrowing.     Impression:     Multilevel degenerative change of the cervical spine, detailed above.  Findings most pronounced at C5-C6 with moderate spinal canal stenosis and severe bilateral neural foraminal narrowing.    MRI Lumbar Spine 11/2/2023:  COMPARISON: MRI lumbar spine 6/28/2021     FINDINGS:   Spine Numbering: For purposes of this dictation, it is assumed that there are 5 non-rib-bearing, lumbar-type vertebrae, and the most caudal fully segmented lumbar vertebra is labeled L5.     Alignment: Straightening of normal lumbar lordosis. Unchanged dextrocurvature of the lumbar spine. Unchanged grade 1 left lateral listhesis of L2 on L3. Unchanged grade 1 right lateral listhesis of L3 on L4. Unchanged grade 1 retrolisthesis of L2 on L3.     Surgical: None.     Vertebral Bodies: Unchanged multilevel mild anterior osteophytosis.     Marrow Signal: Unchanged degenerative fatty marrow placement at the L3-4 endplates. No marrow edema. No suspicious osseous lesions.     Intervertebral Discs: Unchanged multilevel disc dessication with loss of disc space height     Conus Medullaris: Terminates at L1     Cauda Equina: Unchanged bunching of the cauda equina at L3-4 and L4-5 due to thecal sac narrowing detailed below.     Paraspinal Soft Tissues: Normal     Intra-abdominal Findings: None.     Individual Levels:     T12-L1: Unchanged circumferential disc bulge with unchanged right paracentral disc herniation with cranial migration to the infrapedicular level of T12 measuring 1.6 x 0.6 cm (image 7, series 2). No thecal sac or neural foraminal narrowing.     L1-L2: Unchanged circumferential disc bulge with bulky left lateral osteophytosis. No spinal canal or foraminal narrowing.     L2-L3: Circumferential disc bulge, ligamentum flavum thickening, and epidural lipomatosis cause unchanged moderate thecal sac narrowing. Mild facet arthropathy and disc disease  "cause unchanged moderate bilateral neural foraminal narrowing.     L3-L4: Circumferential disc bulge, ligamentum flavum thickening, and epidural lipomatosis cause unchanged severe thecal sac narrowing. Moderate right and severe left facet arthropathy with disc disease cause unchanged mild right and moderate left neural foraminal narrowing.     L4-L5: Circumferential disc bulge, ligamentum flavum thickening, and epidural lipomatosis cause unchanged severe thecal sac narrowing. Severe facet arthropathy and disc disease cause unchanged severe right and moderate left neural foraminal narrowing with impingement of the exiting right L4 nerve root.     L5-S1: Unchanged small disc bulge and moderate facet arthropathy. No spinal canal or foraminal narrowing.     Impression    Unchanged severe multilevel lumbar spondylosis as detailed above.    Allergies:   Review of patient's allergies indicates:   Allergen Reactions    Stadol [butorphanol tartrate] Rash     Swelling in face    Strawberries [strawberry] Rash       Current Medications:   Current Outpatient Medications   Medication Sig Dispense Refill    amitriptyline (ELAVIL) 75 MG tablet Take 1 tablet by mouth in the evening 90 tablet 3    aspirin (ECOTRIN) 81 MG EC tablet Take 1 tablet (81 mg total) by mouth once daily. 28 tablet 0    BD LUER-RUSS SYRINGE 3 mL 25 gauge x 1" Syrg USE ONE SYRINGE EVERY 14 DAYS WITH TESTOSTERONE 100 each 2    blood-glucose sensor (DEXCOM G7 SENSOR) Allison To check sugars up to 3 times daily 4 each 11    candesartan (ATACAND) 32 MG tablet Take 1 tablet (32 mg total) by mouth once daily. 90 tablet 3    cloNIDine (CATAPRES) 0.1 MG tablet Take 1 tablet (0.1 mg total) by mouth 2 (two) times daily. 180 tablet 3    cyanocobalamin (VITAMIN B-12) 1000 MCG tablet Take 1 tablet (1,000 mcg total) by mouth once daily. 90 tablet 4    ergocalciferol (ERGOCALCIFEROL) 50,000 unit Cap Take 1 capsule by mouth once a week 12 capsule 0    ezetimibe (ZETIA) 10 mg " "tablet Take 1 tablet by mouth once daily 90 tablet 0    fluticasone propionate (FLONASE) 50 mcg/actuation nasal spray 2 sprays by Each Nostril route.      gabapentin (NEURONTIN) 800 MG tablet Take 1 tablet (800 mg total) by mouth every evening. 90 tablet 1    glimepiride (AMARYL) 2 MG tablet Take 1 tablet (2 mg total) by mouth before breakfast. 90 tablet 3    glucose 4 GM chewable tablet Take 4 tablets (16 g total) by mouth as needed for Low blood sugar. 30 tablet 12    hydrocortisone 2.5 % cream Apply to face twice daily for flares as needed 40 g 2    ipratropium (ATROVENT) 42 mcg (0.06 %) nasal spray 2 sprays by Nasal route 2 (two) times daily as needed for Rhinitis. 15 mL 0    ketoconazole (NIZORAL) 2 % cream Apply twice daily to affected area of skin 60 g 2    metoprolol succinate (TOPROL-XL) 200 MG 24 hr tablet Take 1 tablet (200 mg total) by mouth once daily. (Patient taking differently: Take 200 mg by mouth every evening.) 90 tablet 3    oxyCODONE-acetaminophen (PERCOCET)  mg per tablet Take 1 tablet by mouth every 12 (twelve) hours as needed for Pain. 60 tablet 0    rosuvastatin (CRESTOR) 40 MG Tab TAKE 1 TABLET BY MOUTH ONCE DAILY IN THE EVENING 90 tablet 1    semaglutide (OZEMPIC) 2 mg/dose (8 mg/3 mL) PnIj Inject 2 mg into the skin every 7 days. 9 mL 3    sod sulf-pot chloride-mag sulf (SUTAB) 1.479-0.188- 0.225 gram tablet Take 12 tablets by mouth once daily. Take according to package instructions with indicated amount of water. 24 tablet 0    syringe with needle, safety (BD INTEGRA SYRINGE) 3 mL 22 gauge x 1 1/2" Syrg Inject 1 Syringe as directed once a week. 6 each 3    testosterone cypionate (DEPOTESTOTERONE CYPIONATE) 200 mg/mL injection Inject 0.75 mLs (150 mg total) into the muscle every 14 (fourteen) days. 2 mL 5    valACYclovir (VALTREX) 1000 MG tablet Take 2 tablets (2,000 mg total) by mouth every 12 (twelve) hours. 4 tablet 3     No current facility-administered medications for this " visit.     Facility-Administered Medications Ordered in Other Visits   Medication Dose Route Frequency Provider Last Rate Last Admin    0.9%  NaCl infusion   Intravenous Continuous Kendell Hoffman MD        0.9%  NaCl infusion   Intravenous Continuous Gerardo Uribe MD           REVIEW OF SYSTEMS:    GENERAL:  No weight loss, malaise or fevers.  HEENT:  Negative for frequent or significant headaches.  NECK:  Negative for lumps, goiter, pain and significant neck swelling.  RESPIRATORY:  Negative for cough, wheezing or shortness of breath.  CARDIOVASCULAR:  Negative for chest pain, leg swelling or palpitations. HTN, AFib  GI:  Negative for abdominal discomfort, blood in stools or black stools or change in bowel habits.  MUSCULOSKELETAL:  See HPI.  SKIN:  Negative for lesions, rash, and itching.  PSYCH:  Negative for sleep disturbance, mood disorder and recent psychosocial stressors.  ENDO: Diabetes.   HEMATOLOGY/LYMPHOLOGY:  Negative for swollen nodes. Eliquis  NEURO:   No history of headaches, syncope, paralysis, seizures or tremors.  All other reviewed and negative other than HPI.    Past Medical History:  Past Medical History:   Diagnosis Date    Allergy     Carotid artery occlusion     Chronic back pain     Colonic polyp     Genetic testing     MUTYH mutation-negative    Hyperlipidemia     Hypertension     Paroxysmal atrial fibrillation 2/28/2024    Sleep apnea     Type 2 diabetes mellitus with stage 3 chronic kidney disease, without long-term current use of insulin 4/2/2020       Past Surgical History:  Past Surgical History:   Procedure Laterality Date    ANKLE SURGERY Left     2    APPENDECTOMY      at age 20    ARTHROSCOPIC CHONDROPLASTY OF KNEE JOINT Left 8/13/2024    Procedure: ARTHROSCOPY, KNEE, WITH CHONDROPLASTY;  Surgeon: Kai Washington MD;  Location: [facility] OR;  Service: Orthopedics;  Laterality: Left;    ARTHROSCOPY, KNEE, WITH ABRASION ARTHROPLASTY OR MICROFRACTURE Left 8/13/2024    Procedure:  ARTHROSCOPY, KNEE, WITH  MICROFRACTURE;  Surgeon: Kai Washington MD;  Location: [facility] OR;  Service: Orthopedics;  Laterality: Left;    ARTHROSCOPY,KNEE,WITH MENISCUS REPAIR Left 8/13/2024    Procedure: ARTHROSCOPY,KNEE,WITH MENISCUS REPAIR;  Surgeon: Kai Washington MD;  Location: [facility] OR;  Service: Orthopedics;  Laterality: Left;  NO REGIONAL BLOCK    CAROTID ENDARTERECTOMY Right 07/15/2015    CARPAL TUNNEL RELEASE Bilateral     COLONOSCOPY N/A 12/14/2018    Procedure: COLONOSCOPYSuprep;  Surgeon: Silver Selby MD;  Location: [facility] ENDO;  Service: Endoscopy;  Laterality: N/A;    COLONOSCOPY N/A 10/2/2024    Procedure: COLONOSCOPY;  Surgeon: Heath Morrow MD;  Location: [facility] ENDO;  Service: Endoscopy;  Laterality: N/A;  Ref by Dr STEVE Cavazos, [facility], pt has unopened Sutab, portal - PC  9/25/24-LVM for precall, portal-DS. 10/1/24 Pt. called and confirmed arrival time.EC    EPIDURAL STEROID INJECTION N/A 2/26/2024    Procedure: CERVICAL C7/T1 IL SELENA;  Surgeon: Gokul Fung MD;  Location: [facility] PAIN MGT;  Service: Pain Management;  Laterality: N/A;  [phone]  2 WK F/U SERGEI    EPIDURAL STEROID INJECTION N/A 5/23/2024    Procedure: LUMBAR L4/5 IL SELENA *ASPIRIN OTC* HOLD FOR 5 DAYS;  Surgeon: Gokul Fung MD;  Location: [facility] PAIN MGT;  Service: Pain Management;  Laterality: N/A;  [phone]  2 WK F/U NOHELIA    EPIDURAL STEROID INJECTION N/A 10/21/2024    Procedure: LUMBAR L5/S1 IL SELENA *ASPIRIN OTC* HOLD FOR 5 DAYS  **ANGELICA PT**;  Surgeon: Sofia Sheikh MD;  Location: [facility] PAIN MGT;  Service: Pain Management;  Laterality: N/A;  [phone]  2 WK F/U NOHELIA    INJECTION OF ANESTHETIC AGENT AROUND NERVE N/A 12/23/2024    Procedure: LUMBAR L4.5 IL SELENA *ASPIRIN OTC* HOLD FOR 5 DAYS;  Surgeon: Gokul Fung MD;  Location: [facility] PAIN MGT;  Service: Pain Management;  Laterality: N/A;  2 WK F/U NOHELIA    INJECTION OF ANESTHETIC AGENT AROUND NERVE Bilateral 2/24/2025    Procedure: BLOCK, NERVE BILATERAL L3, 4, 5 MEDIAL  BRANCH;  Surgeon: Gokul Fung MD;  Location: [facility] PAIN MGT;  Service: Pain Management;  Laterality: Bilateral;  2 WK F/U NOHELIA    INJECTION OF ANESTHETIC AGENT AROUND NERVE Bilateral 3/10/2025    Procedure: BLOCK, NERVE BILATERAL L3, L4, L5 MEDIAL BRANCH;  Surgeon: Gokul Fung MD;  Location: [facility] PAIN MGT;  Service: Pain Management;  Laterality: Bilateral;    JOINT REPLACEMENT  9/2010    NASAL SEPTUM SURGERY      TOTAL KNEE ARTHROPLASTY Right     6 knee surgeries    TRANSESOPHAGEAL ECHOCARDIOGRAM WITH POSSIBLE CARDIOVERSION (ELIZABETH W/ POSS CARDIOVERSION) N/A 2/28/2024    Procedure: Transesophageal echo (ELIZABETH) intra-procedure log documentation;  Surgeon: Ahmet De La Cruz MD;  Location: [facility] CATH LAB/EP;  Service: Cardiology;  Laterality: N/A;       Family History:  Family History   Problem Relation Name Age of Onset    Brain cancer Mother Klarissa 67    Cancer Mother Klarissa     Hypertension Father Keanu     Lung cancer Father Keanu         x2 (PAUL initially- treated surgically only, then recurred distantly) (smoker, & had been exposed to many chemicals)    Cancer Father Keanu     Breast cancer Sister  58    Genetic Disorder Sister          monoallelic MUTYH mutation    Seizures Daughter      Cancer Maternal Grandfather Valerio     Brain cancer Paternal Grandfather  73    Breast cancer Maternal Cousin  57    Aneurysm Other mgm's father 48        brain    Ulcerative colitis Other brother's son        Social History:  Social History     Socioeconomic History    Marital status:    Tobacco Use    Smoking status: Never     Passive exposure: Never    Smokeless tobacco: Never   Substance and Sexual Activity    Alcohol use: Yes     Alcohol/week: 2.0 standard drinks of alcohol     Types: 2 Cans of beer per week     Comment: 6 beers per month    Drug use: Never    Sexual activity: Yes     Partners: Female     Birth control/protection: None   Social History Narrative    5/11/2021: . He lives with his  wife and 2 kids. (5 kids total). He has one dog, one cat, no more chickens at home. One dog recently passed away. No smokers at home.      Social Drivers of Health     Financial Resource Strain: Low Risk  (3/8/2024)    Overall Financial Resource Strain (CARDIA)     Difficulty of Paying Living Expenses: Not very hard   Food Insecurity: No Food Insecurity (3/8/2024)    Hunger Vital Sign     Worried About Running Out of Food in the Last Year: Never true     Ran Out of Food in the Last Year: Never true   Transportation Needs: No Transportation Needs (3/8/2024)    PRAPARE - Transportation     Lack of Transportation (Medical): No     Lack of Transportation (Non-Medical): No   Physical Activity: Unknown (3/8/2024)    Exercise Vital Sign     Days of Exercise per Week: 0 days   Stress: Stress Concern Present (3/8/2024)    [employer] - Occupational Stress Questionnaire     Feeling of Stress : To some extent   Housing Stability: Low Risk  (3/8/2024)    Housing Stability Vital Sign     Unable to Pay for Housing in the Last Year: No     Number of Places Lived in the Last Year: 1     Unstable Housing in the Last Year: No       OBJECTIVE:    BP (!) 156/84 (Patient Position: Sitting)   Pulse (!) 59   Wt 91.4 kg (201 lb 8 oz)   BMI 28.10 kg/m²     PHYSICAL EXAMINATION:    General appearance: Well appearing, in no acute distress, alert and oriented x3.  Psych:  Mood and affect appropriate.  Skin: Skin color, texture, turgor normal, no rashes or lesions, in both upper and lower body.  Head/face:  Atraumatic, normocephalic. No palpable lymph nodes  Neck: No pain to palpation over the cervical paraspinous muscles.   Cor: RRR  Pulm: Symmetric chest rise, no respiratory distress noted.   Back: Straight leg raising in the sitting position is negative to radicular pain bilaterally. There is pain to palpation over the lumbar facet joints bilaterally. Limited ROM with pain on flexion and extension. Positive  facet loading bilaterally.   Extremities:  No deformities, edema, or skin discoloration. Good capillary refill.  Musculoskeletal:  Bilateral upper and lower extremity strength is normal and symmetric.  No atrophy or tone abnormalities are noted.  Neuro: No loss of sensation is noted.  Gait: Normal.    ASSESSMENT: 65 y.o. year old male with neck and back pain, consistent with the followin. Chronic pain disorder        2. Lumbar spondylosis        3. Degeneration of intervertebral disc of lumbar region with discogenic back pain and lower extremity pain        4. Lumbar radiculopathy        5. Cervical radiculopathy        6. Cervical spondylosis        7. DDD (degenerative disc disease), cervical        8. Encounter for monitoring opioid maintenance therapy  Pain Clinic Drug Screen              PLAN:     - Previous imaging was reviewed and discussed with the patient today.    - Will schedule patient for bilateral RFA at L3 to L5. The patient is s/p bilateral MBB at L3 to L5 with 90% relief short-term. During that time, the patient's functional ability improved and was able to be more active without significant limitation by pain. Current pain is axial and non-radiating. Pain prior to procedure was 10/10. Pain after procedure was 0/10.     - We can repeat C7/T1 IL SELENA as needed.     - Continue Gabapentin.     - Pain contract signed 2024.     - Continue Percocet 10/325 mg BID PRN, #60. Refill provided.     - The patient is here today for a refill of current pain medications and they believe these provide effective pain control and improvements in quality of life.  The patient notes no serious side effects, and feels the benefits outweigh the risks.  The patient was reminded of the pain contract that they signed previously as well as the risks and benefits of the medication including possible death.  The updated [place] Board of [employer] prescription monitoring program was reviewed, and the patient has been  found to be compliant with current treatment plan. Medication management provided by Dr. Fung.     - UDS from 9/10/2024 reviewed and consistent. Repeat UDS today.      - I have stressed the importance of physical activity and a home exercise plan to help with pain and improve health.    - RTC in 3 months.     The above plan and management options were discussed at length with patient. Patient is in agreement with the above and verbalized understanding.    Baylee Ni  03/18/2025

## 2025-03-19 ENCOUNTER — OFFICE VISIT (OUTPATIENT)
Dept: FAMILY MEDICINE | Facility: CLINIC | Age: 65
End: 2025-03-19
Payer: COMMERCIAL

## 2025-03-19 VITALS
WEIGHT: 198.44 LBS | OXYGEN SATURATION: 97 % | SYSTOLIC BLOOD PRESSURE: 124 MMHG | BODY MASS INDEX: 27.78 KG/M2 | HEART RATE: 76 BPM | TEMPERATURE: 97 F | HEIGHT: 71 IN | DIASTOLIC BLOOD PRESSURE: 72 MMHG

## 2025-03-19 DIAGNOSIS — E11.22 TYPE 2 DIABETES MELLITUS WITH STAGE 3A CHRONIC KIDNEY DISEASE, WITHOUT LONG-TERM CURRENT USE OF INSULIN: Primary | ICD-10-CM

## 2025-03-19 DIAGNOSIS — I48.91 ATRIAL FIBRILLATION WITH RVR: ICD-10-CM

## 2025-03-19 DIAGNOSIS — I10 ESSENTIAL HYPERTENSION: ICD-10-CM

## 2025-03-19 DIAGNOSIS — Z00.00 VISIT FOR WELL MAN HEALTH CHECK: ICD-10-CM

## 2025-03-19 DIAGNOSIS — R20.0 NUMBNESS: ICD-10-CM

## 2025-03-19 DIAGNOSIS — N18.31 TYPE 2 DIABETES MELLITUS WITH STAGE 3A CHRONIC KIDNEY DISEASE, WITHOUT LONG-TERM CURRENT USE OF INSULIN: Primary | ICD-10-CM

## 2025-03-19 DIAGNOSIS — E78.5 DYSLIPIDEMIA: Chronic | ICD-10-CM

## 2025-03-19 DIAGNOSIS — Z98.890 S/P CAROTID ENDARTERECTOMY: ICD-10-CM

## 2025-03-19 DIAGNOSIS — E55.9 VITAMIN D DEFICIENCY: ICD-10-CM

## 2025-03-19 DIAGNOSIS — E53.8 B12 DEFICIENCY: ICD-10-CM

## 2025-03-19 DIAGNOSIS — M54.17 LUMBOSACRAL RADICULOPATHY AT L5: ICD-10-CM

## 2025-03-19 DIAGNOSIS — Z96.659 HISTORY OF KNEE REPLACEMENT, UNSPECIFIED LATERALITY: ICD-10-CM

## 2025-03-19 PROBLEM — R74.01 TRANSAMINITIS: Status: RESOLVED | Noted: 2024-02-28 | Resolved: 2025-03-19

## 2025-03-19 PROBLEM — D72.829 LEUKOCYTOSIS: Status: RESOLVED | Noted: 2024-02-28 | Resolved: 2025-03-19

## 2025-03-19 PROCEDURE — 1160F RVW MEDS BY RX/DR IN RCRD: CPT | Mod: CPTII,S$GLB,, | Performed by: FAMILY MEDICINE

## 2025-03-19 PROCEDURE — 3074F SYST BP LT 130 MM HG: CPT | Mod: CPTII,S$GLB,, | Performed by: FAMILY MEDICINE

## 2025-03-19 PROCEDURE — 99214 OFFICE O/P EST MOD 30 MIN: CPT | Mod: S$GLB,,, | Performed by: FAMILY MEDICINE

## 2025-03-19 PROCEDURE — 1101F PT FALLS ASSESS-DOCD LE1/YR: CPT | Mod: CPTII,S$GLB,, | Performed by: FAMILY MEDICINE

## 2025-03-19 PROCEDURE — 3288F FALL RISK ASSESSMENT DOCD: CPT | Mod: CPTII,S$GLB,, | Performed by: FAMILY MEDICINE

## 2025-03-19 PROCEDURE — 99999 PR PBB SHADOW E&M-EST. PATIENT-LVL V: CPT | Mod: PBBFAC,,, | Performed by: FAMILY MEDICINE

## 2025-03-19 PROCEDURE — 3051F HG A1C>EQUAL 7.0%<8.0%: CPT | Mod: CPTII,S$GLB,, | Performed by: FAMILY MEDICINE

## 2025-03-19 PROCEDURE — 1157F ADVNC CARE PLAN IN RCRD: CPT | Mod: CPTII,S$GLB,, | Performed by: FAMILY MEDICINE

## 2025-03-19 PROCEDURE — 3078F DIAST BP <80 MM HG: CPT | Mod: CPTII,S$GLB,, | Performed by: FAMILY MEDICINE

## 2025-03-19 PROCEDURE — 3072F LOW RISK FOR RETINOPATHY: CPT | Mod: CPTII,S$GLB,, | Performed by: FAMILY MEDICINE

## 2025-03-19 PROCEDURE — 3008F BODY MASS INDEX DOCD: CPT | Mod: CPTII,S$GLB,, | Performed by: FAMILY MEDICINE

## 2025-03-19 PROCEDURE — 1159F MED LIST DOCD IN RCRD: CPT | Mod: CPTII,S$GLB,, | Performed by: FAMILY MEDICINE

## 2025-03-19 RX ORDER — CELECOXIB 100 MG/1
100 CAPSULE ORAL DAILY
Qty: 7 CAPSULE | Refills: 0 | Status: SHIPPED | OUTPATIENT
Start: 2025-03-19 | End: 2025-03-26

## 2025-03-19 RX ORDER — GLIMEPIRIDE 1 MG/1
1 TABLET ORAL
Start: 2025-03-19 | End: 2026-03-19

## 2025-03-19 RX ORDER — DULOXETIN HYDROCHLORIDE 30 MG/1
30 CAPSULE, DELAYED RELEASE ORAL DAILY
Qty: 30 CAPSULE | Refills: 1 | Status: SHIPPED | OUTPATIENT
Start: 2025-03-19 | End: 2026-03-19

## 2025-03-19 RX ORDER — LIDOCAINE 50 MG/G
1 PATCH TOPICAL DAILY
Qty: 30 PATCH | Refills: 11 | Status: SHIPPED | OUTPATIENT
Start: 2025-03-19

## 2025-03-19 NOTE — PATIENT INSTRUCTIONS
DM: continue amaryl 1 mg. Continue ozempic to 2 mg  HTN: continue candesartan, metoprolol, and clonidine BID. Increase to TID when in pain.   DLD: continue zetia and crestor  Back pain/insomnia: continue elavil and gabapentin  Add cymbalta for back pain. Try 30 mg  Consider increasing up to 60 mg. Update me in 1 month     Monitor fluid intake     Kidney function is a little low. No NSAIDS such as ibuprofen or naproxen. Avoid Red meats. Drink a lot of water. I will continue monitoring kidney function  Despite above statement, given severe pain, will try 7 days of celebrex. Aware of the risks.   Will try to get lidocaine patch covered  Consider water PT? Declined for now.     Continue vitamin D   Continue b12     F/u 3 months, labs prior annual. With jesus.

## 2025-03-19 NOTE — PROGRESS NOTES
Subjective:       Patient ID: Partha Curtis Jr. is a 65 y.o. male.    Chief Complaint: Diabetes    Jarvis is a 65 y.o. male who presents today for f/u    PAF: stopped anticoagulation. Aware of risks.   HTN: taking clonidine 0.1 mg  twice daily. Also taking candesartan, and metoprolol 200 mg XR. Clonidine patch not tolerated and caused facial rash. This resolved after stopping the patch. Using digital htn.   DM: on ozempic 2 mg and amaryl 1 mg. No nausea. No vomiting. No trouble swallowing. No dehydration.   DLD: on crestor and zetia  Low T: managed by endocrinology.     Insomnia: still taking elavil. But still not sleeping. He thinks its helping, but still having some issues sleeping, may be due to pain.     Back pain: seeing pain management. He can't take gabapentin during the day. Taking 800 mg nightly. Has not tried lidocaine patches. On percocet.       Review of Systems   Constitutional:  Negative for chills, diaphoresis and fever.   Respiratory:  Negative for shortness of breath.    Cardiovascular:  Negative for chest pain.   Gastrointestinal:  Negative for nausea and vomiting.   Musculoskeletal:  Positive for back pain.   Skin:  Negative for rash.   Neurological:  Negative for dizziness, light-headedness and headaches.             Results for orders placed or performed in visit on 03/18/25   CBC Auto Differential    Collection Time: 03/18/25 10:20 AM   Result Value Ref Range    WBC 6.67 3.90 - 12.70 K/uL    RBC 5.27 4.60 - 6.20 M/uL    Hemoglobin 17.0 14.0 - 18.0 g/dL    Hematocrit 51.5 40.0 - 54.0 %    MCV 98 82 - 98 fL    MCH 32.3 (H) 27.0 - 31.0 pg    MCHC 33.0 32.0 - 36.0 g/dL    RDW 14.4 11.5 - 14.5 %    Platelets 222 150 - 450 K/uL    MPV 9.1 (L) 9.2 - 12.9 fL    Immature Granulocytes 0.3 0.0 - 0.5 %    Gran # (ANC) 4.4 1.8 - 7.7 K/uL    Immature Grans (Abs) 0.02 0.00 - 0.04 K/uL    Lymph # 1.6 1.0 - 4.8 K/uL    Mono # 0.5 0.3 - 1.0 K/uL    Eos # 0.2 0.0 - 0.5 K/uL    Baso # 0.04 0.00 - 0.20 K/uL    nRBC  "0 0 /100 WBC    Gran % 65.7 38.0 - 73.0 %    Lymph % 23.7 18.0 - 48.0 %    Mono % 6.9 4.0 - 15.0 %    Eosinophil % 2.8 0.0 - 8.0 %    Basophil % 0.6 0.0 - 1.9 %    Differential Method Automated    Comprehensive Metabolic Panel    Collection Time: 03/18/25 10:20 AM   Result Value Ref Range    Sodium 136 136 - 145 mmol/L    Potassium 5.0 3.5 - 5.1 mmol/L    Chloride 104 95 - 110 mmol/L    CO2 25 23 - 29 mmol/L    Glucose 196 (H) 70 - 110 mg/dL    BUN 13 8 - 23 mg/dL    Creatinine 1.4 0.5 - 1.4 mg/dL    Calcium 9.4 8.7 - 10.5 mg/dL    Total Protein 7.2 6.0 - 8.4 g/dL    Albumin 4.0 3.5 - 5.2 g/dL    Total Bilirubin 0.6 0.1 - 1.0 mg/dL    Alkaline Phosphatase 71 40 - 150 U/L    AST 32 10 - 40 U/L    ALT 41 10 - 44 U/L    eGFR 56 (A) >60 mL/min/1.73 m^2    Anion Gap 7 (L) 8 - 16 mmol/L   Hemoglobin A1C    Collection Time: 03/18/25 10:20 AM   Result Value Ref Range    Hemoglobin A1C 7.0 (H) 4.0 - 5.6 %    Estimated Avg Glucose 154 (H) 68 - 131 mg/dL     *Note: Due to a large number of results and/or encounters for the requested time period, some results have not been displayed. A complete set of results can be found in Results Review.       Objective:     Vitals:    03/19/25 1027   BP: 124/72   BP Location: Left arm   Patient Position: Sitting   Pulse: 76   Temp: 97 °F (36.1 °C)   TempSrc: Temporal   SpO2: 97%   Weight: 90 kg (198 lb 6.6 oz)   Height: 5' 11" (1.803 m)        Physical Exam  Vitals and nursing note reviewed.   Constitutional:       General: He is not in acute distress.     Appearance: He is obese. He is not ill-appearing, toxic-appearing or diaphoretic.   Cardiovascular:      Rate and Rhythm: Normal rate and regular rhythm.   Pulmonary:      Effort: Pulmonary effort is normal. No respiratory distress.      Breath sounds: Normal breath sounds. No wheezing.   Abdominal:      General: There is no distension.      Palpations: Abdomen is soft.      Tenderness: There is no abdominal tenderness.   Musculoskeletal: "         General: No swelling.   Neurological:      General: No focal deficit present.      Mental Status: He is alert.   Psychiatric:         Mood and Affect: Mood normal.         Behavior: Behavior normal.         Thought Content: Thought content normal.         Judgment: Judgment normal.         Assessment:       1. Type 2 diabetes mellitus with stage 3a chronic kidney disease, without long-term current use of insulin    2. Lumbosacral radiculopathy at L5    3. Numbness    4. History of knee replacement, unspecified laterality    5. Essential hypertension    6. Dyslipidemia    7. Vitamin D deficiency    8. B12 deficiency    9. Atrial fibrillation with RVR    10. Visit for [facility] check    11. S/P carotid endarterectomy        Plan:           DM: continue amaryl 1 mg. Continue ozempic to 2 mg  HTN: continue candesartan, metoprolol, and clonidine BID. Increase to TID when in pain.   DLD: continue zetia and crestor  Back pain/insomnia: continue elavil and gabapentin  Add cymbalta for back pain. Try 30 mg  Consider increasing up to 60 mg. Update me in 1 month     Monitor fluid intake     Kidney function is a little low. No NSAIDS such as ibuprofen or naproxen. Avoid Red meats. Drink a lot of water. I will continue monitoring kidney function  Despite above statement, given severe pain, will try 7 days of celebrex. Aware of the risks.   Will try to get lidocaine patch covered  Consider water PT? Declined for now.     Continue vitamin D   Continue b12    Consider airpods for hearing aids. Defer f/u testing for now.      F/u 4 months, labs prior annual. With jesus.     Type 2 diabetes mellitus with stage 3a chronic kidney disease, without long-term current use of insulin  -     glimepiride (AMARYL) 1 MG tablet; Take 1 tablet (1 mg total) by mouth before breakfast.  -     Ambulatory referral/consult to Optometry; Future; Expected date: 03/26/2025    Lumbosacral radiculopathy at L5  -     LIDOcaine (LIDODERM) 5 %;  Place 1 patch onto the skin once daily. Remove & Discard patch within 12 hours or as directed by MD  Dispense: 30 patch; Refill: 11  -     DULoxetine (CYMBALTA) 30 MG capsule; Take 1 capsule (30 mg total) by mouth once daily.  Dispense: 30 capsule; Refill: 1  -     Hemoglobin A1C; Future; Expected date: 03/19/2025  -     celecoxib (CELEBREX) 100 MG capsule; Take 1 capsule (100 mg total) by mouth once daily. for 7 days  Dispense: 7 capsule; Refill: 0    Numbness  -     LIDOcaine (LIDODERM) 5 %; Place 1 patch onto the skin once daily. Remove & Discard patch within 12 hours or as directed by MD  Dispense: 30 patch; Refill: 11  -     DULoxetine (CYMBALTA) 30 MG capsule; Take 1 capsule (30 mg total) by mouth once daily.  Dispense: 30 capsule; Refill: 1  -     celecoxib (CELEBREX) 100 MG capsule; Take 1 capsule (100 mg total) by mouth once daily. for 7 days  Dispense: 7 capsule; Refill: 0    History of knee replacement, unspecified laterality  -     LIDOcaine (LIDODERM) 5 %; Place 1 patch onto the skin once daily. Remove & Discard patch within 12 hours or as directed by MD  Dispense: 30 patch; Refill: 11    Essential hypertension    Dyslipidemia    Vitamin D deficiency  -     Vitamin D; Future; Expected date: 03/19/2025    B12 deficiency  -     TSH; Future; Expected date: 03/19/2025    Atrial fibrillation with RVR    Visit for [facility] health check  -     CBC Auto Differential; Future; Expected date: 03/19/2025  -     Comprehensive Metabolic Panel; Future; Expected date: 03/19/2025  -     Hemoglobin A1C; Future; Expected date: 03/19/2025  -     Lipid Panel; Future; Expected date: 03/19/2025  -     TSH; Future; Expected date: 03/19/2025  -     Vitamin B12; Future; Expected date: 03/19/2025  -     Vitamin D; Future; Expected date: 03/19/2025  -     PSA, Screening; Future; Expected date: 03/19/2025  -     Microalbumin/Creatinine Ratio, Urine; Future; Expected date: 03/19/2025    S/P carotid endarterectomy  -     Ambulatory  referral/consult to Vascular Surgery; Future; Expected date: 03/26/2025  -     VAS US Carotid Bilateral; Future

## 2025-03-20 ENCOUNTER — PATIENT MESSAGE (OUTPATIENT)
Dept: FAMILY MEDICINE | Facility: CLINIC | Age: 65
End: 2025-03-20
Payer: COMMERCIAL

## 2025-03-21 ENCOUNTER — LAB VISIT (OUTPATIENT)
Dept: LAB | Facility: HOSPITAL | Age: 65
End: 2025-03-21
Payer: COMMERCIAL

## 2025-03-21 DIAGNOSIS — E29.1 HYPOGONADISM IN MALE: ICD-10-CM

## 2025-03-21 LAB
COMPLEXED PSA SERPL-MCNC: 0.59 NG/ML (ref 0–4)
HCT VFR BLD AUTO: 54 % (ref 40–54)
HGB BLD-MCNC: 17.6 G/DL (ref 14–18)
TESTOST SERPL-MCNC: 260 NG/DL (ref 304–1227)

## 2025-03-21 PROCEDURE — 84153 ASSAY OF PSA TOTAL: CPT | Performed by: INTERNAL MEDICINE

## 2025-03-21 PROCEDURE — 36415 COLL VENOUS BLD VENIPUNCTURE: CPT | Performed by: INTERNAL MEDICINE

## 2025-03-21 PROCEDURE — 85014 HEMATOCRIT: CPT | Performed by: INTERNAL MEDICINE

## 2025-03-21 PROCEDURE — 84403 ASSAY OF TOTAL TESTOSTERONE: CPT | Performed by: INTERNAL MEDICINE

## 2025-03-21 PROCEDURE — 85018 HEMOGLOBIN: CPT | Performed by: INTERNAL MEDICINE

## 2025-03-22 LAB

## 2025-03-24 ENCOUNTER — HOSPITAL ENCOUNTER (OUTPATIENT)
Dept: VASCULAR SURGERY | Facility: CLINIC | Age: 65
Discharge: HOME | End: 2025-03-24
Attending: FAMILY MEDICINE
Payer: COMMERCIAL

## 2025-03-24 ENCOUNTER — PATIENT MESSAGE (OUTPATIENT)
Dept: ENDOCRINOLOGY | Facility: CLINIC | Age: 65
End: 2025-03-24
Payer: COMMERCIAL

## 2025-03-24 DIAGNOSIS — Z98.890 S/P CAROTID ENDARTERECTOMY: ICD-10-CM

## 2025-03-24 DIAGNOSIS — I65.23 BILATERAL CAROTID ARTERY STENOSIS: Primary | ICD-10-CM

## 2025-03-24 PROCEDURE — 93880 EXTRACRANIAL BILAT STUDY: CPT | Mod: S$GLB,,, | Performed by: SURGERY

## 2025-03-25 ENCOUNTER — RESULTS FOLLOW-UP (OUTPATIENT)
Dept: FAMILY MEDICINE | Facility: CLINIC | Age: 65
End: 2025-03-25

## 2025-03-27 ENCOUNTER — HOSPITAL ENCOUNTER (OUTPATIENT)
Facility: OTHER | Age: 65
Discharge: HOME | End: 2025-03-27
Attending: ANESTHESIOLOGY | Admitting: ANESTHESIOLOGY
Payer: COMMERCIAL

## 2025-03-27 VITALS
HEART RATE: 59 BPM | WEIGHT: 190 LBS | BODY MASS INDEX: 27.2 KG/M2 | DIASTOLIC BLOOD PRESSURE: 77 MMHG | RESPIRATION RATE: 18 BRPM | HEIGHT: 70 IN | SYSTOLIC BLOOD PRESSURE: 173 MMHG | TEMPERATURE: 98 F | OXYGEN SATURATION: 97 %

## 2025-03-27 DIAGNOSIS — M47.816 LUMBAR SPONDYLOSIS: Primary | ICD-10-CM

## 2025-03-27 DIAGNOSIS — G89.29 CHRONIC PAIN: ICD-10-CM

## 2025-03-27 LAB — POCT GLUCOSE: 140 MG/DL (ref 70–110)

## 2025-03-27 PROCEDURE — 99152 MOD SED SAME PHYS/QHP 5/>YRS: CPT | Performed by: ANESTHESIOLOGY

## 2025-03-27 PROCEDURE — 64635 DESTROY LUMB/SAC FACET JNT: CPT | Mod: 50,,, | Performed by: ANESTHESIOLOGY

## 2025-03-27 PROCEDURE — 64636 DESTROY L/S FACET JNT ADDL: CPT | Mod: 50,,, | Performed by: ANESTHESIOLOGY

## 2025-03-27 PROCEDURE — 64635 DESTROY LUMB/SAC FACET JNT: CPT | Mod: 50 | Performed by: ANESTHESIOLOGY

## 2025-03-27 PROCEDURE — 99153 MOD SED SAME PHYS/QHP EA: CPT | Performed by: ANESTHESIOLOGY

## 2025-03-27 PROCEDURE — 99152 MOD SED SAME PHYS/QHP 5/>YRS: CPT | Mod: ,,, | Performed by: ANESTHESIOLOGY

## 2025-03-27 PROCEDURE — 64636 DESTROY L/S FACET JNT ADDL: CPT | Mod: 50 | Performed by: ANESTHESIOLOGY

## 2025-03-27 PROCEDURE — 63600175 PHARM REV CODE 636 W HCPCS: Performed by: ANESTHESIOLOGY

## 2025-03-27 RX ORDER — FENTANYL CITRATE 50 UG/ML
INJECTION, SOLUTION INTRAMUSCULAR; INTRAVENOUS
Status: DISCONTINUED | OUTPATIENT
Start: 2025-03-27 | End: 2025-03-27 | Stop reason: HOSPADM

## 2025-03-27 RX ORDER — LIDOCAINE HYDROCHLORIDE 20 MG/ML
INJECTION, SOLUTION INFILTRATION; PERINEURAL
Status: DISCONTINUED | OUTPATIENT
Start: 2025-03-27 | End: 2025-03-27 | Stop reason: HOSPADM

## 2025-03-27 RX ORDER — MIDAZOLAM HYDROCHLORIDE 1 MG/ML
INJECTION INTRAMUSCULAR; INTRAVENOUS
Status: DISCONTINUED | OUTPATIENT
Start: 2025-03-27 | End: 2025-03-27 | Stop reason: HOSPADM

## 2025-03-27 RX ORDER — BUPIVACAINE HYDROCHLORIDE 2.5 MG/ML
INJECTION, SOLUTION EPIDURAL; INFILTRATION; INTRACAUDAL; PERINEURAL
Status: DISCONTINUED | OUTPATIENT
Start: 2025-03-27 | End: 2025-03-27 | Stop reason: HOSPADM

## 2025-03-27 RX ORDER — DEXAMETHASONE SODIUM PHOSPHATE 10 MG/ML
INJECTION, SOLUTION INTRA-ARTICULAR; INTRALESIONAL; INTRAMUSCULAR; INTRAVENOUS; SOFT TISSUE
Status: DISCONTINUED | OUTPATIENT
Start: 2025-03-27 | End: 2025-03-27 | Stop reason: HOSPADM

## 2025-03-27 RX ORDER — SODIUM CHLORIDE 9 MG/ML
INJECTION, SOLUTION INTRAVENOUS CONTINUOUS
Status: DISCONTINUED | OUTPATIENT
Start: 2025-03-27 | End: 2025-03-27 | Stop reason: HOSPADM

## 2025-03-27 NOTE — DISCHARGE SUMMARY
"Discharge Note  Short Stay      SUMMARY     Admit Date: 3/27/2025    Attending Physician: Gokul Fung      Discharge Physician: Gokul Fung      Discharge Date: 3/27/2025 8:35 AM    Procedure(s) (LRB):  RADIOFREQUENCY ABLATION BILATERAL L3, 4, 5 (Bilateral)    Final Diagnosis: Lumbar spondylosis [M47.816]    Disposition: Home or self care    Patient Instructions:   Current Discharge Medication List        CONTINUE these medications which have NOT CHANGED    Details   amitriptyline (ELAVIL) 75 MG tablet Take 1 tablet by mouth in the evening  Qty: 90 tablet, Refills: 3    Associated Diagnoses: Insomnia, unspecified type      aspirin (ECOTRIN) 81 MG EC tablet Take 1 tablet (81 mg total) by mouth once daily.  Qty: 28 tablet, Refills: 0    Associated Diagnoses: Bucket-handle tear of medial meniscus of left knee as current injury, initial encounter      BD LUER-RUSS SYRINGE 3 mL 25 gauge x 1" Syrg USE ONE SYRINGE EVERY 14 DAYS WITH TESTOSTERONE  Qty: 100 each, Refills: 2      blood-glucose sensor (DEXCOM G7 SENSOR) Allison To check sugars up to 3 times daily  Qty: 4 each, Refills: 11    Comments: Labile sugars, both highs and low. Now on recent oral and injectable steroids for back which is significantly affecting sugars.  Associated Diagnoses: Type 2 diabetes mellitus with stage 3a chronic kidney disease, without long-term current use of insulin      candesartan (ATACAND) 32 MG tablet Take 1 tablet (32 mg total) by mouth once daily.  Qty: 90 tablet, Refills: 3    Associated Diagnoses: Essential hypertension      cloNIDine (CATAPRES) 0.1 MG tablet Take 1 tablet (0.1 mg total) by mouth 2 (two) times daily.  Qty: 180 tablet, Refills: 3    Comments: .  Associated Diagnoses: Essential hypertension      cyanocobalamin (VITAMIN B-12) 1000 MCG tablet Take 1 tablet (1,000 mcg total) by mouth once daily.  Qty: 90 tablet, Refills: 4    Associated Diagnoses: B12 deficiency      DULoxetine (CYMBALTA) 30 MG capsule Take 1 capsule (30 mg " total) by mouth once daily.  Qty: 30 capsule, Refills: 1    Associated Diagnoses: Lumbosacral radiculopathy at L5; Numbness      ergocalciferol (ERGOCALCIFEROL) 50,000 unit Cap Take 1 capsule by mouth once a week  Qty: 12 capsule, Refills: 0    Associated Diagnoses: Vitamin D deficiency      ezetimibe (ZETIA) 10 mg tablet Take 1 tablet by mouth once daily  Qty: 90 tablet, Refills: 0    Associated Diagnoses: Dyslipidemia      fluticasone propionate (FLONASE) 50 mcg/actuation nasal spray 2 sprays by Each Nostril route.      gabapentin (NEURONTIN) 800 MG tablet Take 1 tablet (800 mg total) by mouth every evening.  Qty: 90 tablet, Refills: 1    Associated Diagnoses: Lumbar radiculopathy; Cervical radiculopathy      glimepiride (AMARYL) 1 MG tablet Take 1 tablet (1 mg total) by mouth before breakfast.    Associated Diagnoses: Type 2 diabetes mellitus with stage 3a chronic kidney disease, without long-term current use of insulin      glucose 4 GM chewable tablet Take 4 tablets (16 g total) by mouth as needed for Low blood sugar.  Qty: 30 tablet, Refills: 12      hydrocortisone 2.5 % cream Apply to face twice daily for flares as needed  Qty: 40 g, Refills: 2    Associated Diagnoses: Seborrheic dermatitis      ipratropium (ATROVENT) 42 mcg (0.06 %) nasal spray 2 sprays by Nasal route 2 (two) times daily as needed for Rhinitis.  Qty: 15 mL, Refills: 0    Associated Diagnoses: Influenza A      ketoconazole (NIZORAL) 2 % cream Apply twice daily to affected area of skin  Qty: 60 g, Refills: 2    Associated Diagnoses: Seborrheic dermatitis      LIDOcaine (LIDODERM) 5 % Place 1 patch onto the skin once daily. Remove & Discard patch within 12 hours or as directed by MD  Qty: 30 patch, Refills: 11    Comments: Please message me regarding coverage. Has failed opioids, maximized gabapentin. Has a fib, would like to avoid nsaids.  Associated Diagnoses: Lumbosacral radiculopathy at L5; Numbness; History of knee replacement, unspecified  "laterality      metoprolol succinate (TOPROL-XL) 200 MG 24 hr tablet Take 1 tablet (200 mg total) by mouth once daily.  Qty: 90 tablet, Refills: 3    Comments: .  Associated Diagnoses: Essential hypertension      oxyCODONE-acetaminophen (PERCOCET)  mg per tablet Take 1 tablet by mouth every 12 (twelve) hours as needed for Pain.  Qty: 60 tablet, Refills: 0    Comments: Greater than 7 day supply that is medically necessary.  Associated Diagnoses: Chronic pain disorder; Lumbar radiculopathy; Lumbar spondylosis; DDD (degenerative disc disease), lumbar; Cervical radiculopathy; Cervical spondylosis; DDD (degenerative disc disease), cervical      rosuvastatin (CRESTOR) 40 MG Tab TAKE 1 TABLET BY MOUTH ONCE DAILY IN THE EVENING  Qty: 90 tablet, Refills: 1    Associated Diagnoses: Dyslipidemia      semaglutide (OZEMPIC) 2 mg/dose (8 mg/3 mL) PnIj Inject 2 mg into the skin every 7 days.  Qty: 9 mL, Refills: 3    Associated Diagnoses: Type 2 diabetes mellitus with stage 3a chronic kidney disease, without long-term current use of insulin      sod sulf-pot chloride-mag sulf (SUTAB) 1.479-0.188- 0.225 gram tablet Take 12 tablets by mouth once daily. Take according to package instructions with indicated amount of water.  Qty: 24 tablet, Refills: 0    Comments: Coupon code [id number] PCN: [id number] Group: [id number] Member ID: [id number]  Associated Diagnoses: Colon cancer screening      syringe with needle, safety (BD INTEGRA SYRINGE) 3 mL 22 gauge x 1 1/2" Syrg Inject 1 Syringe as directed once a week.  Qty: 6 each, Refills: 3      testosterone cypionate (DEPOTESTOTERONE CYPIONATE) 200 mg/mL injection Inject 0.75 mLs (150 mg total) into the muscle every 14 (fourteen) days.  Qty: 2 mL, Refills: 5    Comments: Please include syringes and needles for the testosterone.  Associated Diagnoses: Hypogonadism in male      valACYclovir (VALTREX) 1000 MG tablet Take 2 tablets (2,000 mg total) by mouth every 12 (twelve) hours.  Qty: 4 tablet, " Refills: 3           STOP taking these medications       celecoxib (CELEBREX) 100 MG capsule Comments:   Reason for Stopping:                   Discharge Diagnosis: Lumbar spondylosis [M47.816]  Condition on Discharge: Stable with no complications to procedure   Diet on Discharge: Same as before.  Activity: as per instruction sheet.  Discharge to: Home with a responsible adult.  Follow up: 2-4 weeks       Please call my office or pager at [phone] if experienced any weakness or loss of sensation, fever > 101.5, pain uncontrolled with oral medications, persistent nausea/vomiting/or diarrhea, redness or drainage from the incisions, or any other worrisome concerns. If physician on call was not reached or could not communicate with our office for any reason please go to the nearest emergency department

## 2025-03-27 NOTE — DISCHARGE INSTRUCTIONS

## 2025-03-27 NOTE — OP NOTE
Therapeutic Lumbar Medial Branch Radiofrequency Ablation under Fluoroscopy     The procedure, risks, benefits, and options were discussed with the patient. There are no contraindications to the procedure. The patent expressed understanding and agreed to the procedure. Informed written consent was obtained prior to the start of the procedure and can be found in the patient's chart.        PATIENT NAME: Partha Curtis Jr.   MRN: [id number]     DATE OF PROCEDURE: 03/27/2025     PROCEDURE:  Bilateral L3, L4, and L5 Lumbar Radiofrequency Ablation under Fluoroscopy    PRE-OP DIAGNOSIS: Lumbar spondylosis [M47.816] Lumbar spondylosis [M47.816]    POST-OP DIAGNOSIS: Same    PHYSICIAN: Gokul Fung MD    ASSISTANTS: Phuong Nguyen, DO [facility]      MEDICATIONS INJECTED:  Preservative-free Decadron 10mg with 9cc of Bupivicaine 0.25%    LOCAL ANESTHETIC INJECTED:   Xylocaine 2%    SEDATION: Versed 2mg and Fentanyl 50mcg                                                                                                                                                                                     Conscious sedation ordered by M.D. Patient re-evaluation prior to administration of conscious sedation. No changes noted in patient's status from initial evaluation. The patient's vital signs were monitored by RN and patient remained hemodynamically stable throughout the procedure.    Event Time In   Sedation Start 0831   Sedation End 0859       ESTIMATED BLOOD LOSS:  None    COMPLICATIONS:  None     INTERVAL HISTORY: Patient has clinical and imaging findings suggestive of facet mediated pain. Patients has completed 2 previous diagnostic medial branch blocks at specified levels with at least 80% relief for the expected duration of the local anesthetic utilized.    TECHNIQUE: Time-out was performed to identify the patient and procedure to be performed. With the patient laying in a prone position, the surgical area was prepped  and draped in the usual sterile fashion using ChloraPrep and fenestrated drape. The levels were determined under fluoroscopic guidance. Skin anesthesia was achieved by injecting Lidocaine 2% over the injection sites. A 20 gauge 10mm curved active tip needle was introduced to the anatomic local of the medial branch at each of the above levels using AP, lateral and/or contralateral oblique fluoroscopic imaging. Then sensory and motor testing was performed to confirm that the needle tips were in the correct location. After negative aspiration for blood or CSF was confirmed, 1 mL of the lidocaine 2% listed above was injected slowly at each site. This was followed by thermal lesioning at 80 degrees celsius for 90 seconds. That was followed by slowly injecting 1.5 mL of the medication mixture listed above at each site. The needles were removed and bleeding was nil. A sterile dressing was applied. No specimens collected. The patient tolerated the procedure well and did not have any procedure related motor deficit at the conclusion of the procedure.    PRE-PROCEDURE PAIN SCORE: 7-10/10    POST-PROCEDURE PAIN SCORE: 0/10    The patient was monitored after the procedure in the recovery area. They were given post-procedure and discharge instructions to follow at home. The patient was discharged in a stable condition.    Gokul Fung MD

## 2025-03-27 NOTE — H&P
HPI  Patient presenting for Procedure(s) (LRB):  RADIOFREQUENCY ABLATION BILATERAL L3, 4, 5 (Bilateral)     Patient on Anti-coagulation No    No health changes since previous encounter    Past Medical History:   Diagnosis Date    Allergy     Carotid artery occlusion     Chronic back pain     Colonic polyp     Genetic testing     MUTYH mutation-negative    Hyperlipidemia     Hypertension     Paroxysmal atrial fibrillation 2/28/2024    Sleep apnea     Type 2 diabetes mellitus with stage 3 chronic kidney disease, without long-term current use of insulin 4/2/2020     Past Surgical History:   Procedure Laterality Date    ANKLE SURGERY Left     2    APPENDECTOMY      at age 20    ARTHROSCOPIC CHONDROPLASTY OF KNEE JOINT Left 8/13/2024    Procedure: ARTHROSCOPY, KNEE, WITH CHONDROPLASTY;  Surgeon: Kai Washington MD;  Location: [facility] OR;  Service: Orthopedics;  Laterality: Left;    ARTHROSCOPY, KNEE, WITH ABRASION ARTHROPLASTY OR MICROFRACTURE Left 8/13/2024    Procedure: ARTHROSCOPY, KNEE, WITH  MICROFRACTURE;  Surgeon: Kai Washington MD;  Location: [facility] OR;  Service: Orthopedics;  Laterality: Left;    ARTHROSCOPY,KNEE,WITH MENISCUS REPAIR Left 8/13/2024    Procedure: ARTHROSCOPY,KNEE,WITH MENISCUS REPAIR;  Surgeon: Kai Washington MD;  Location: [facility] OR;  Service: Orthopedics;  Laterality: Left;  NO REGIONAL BLOCK    CAROTID ENDARTERECTOMY Right 07/15/2015    CARPAL TUNNEL RELEASE Bilateral     COLONOSCOPY N/A 12/14/2018    Procedure: COLONOSCOPYSuprep;  Surgeon: Silver Selby MD;  Location: [facility] ENDO;  Service: Endoscopy;  Laterality: N/A;    COLONOSCOPY N/A 10/2/2024    Procedure: COLONOSCOPY;  Surgeon: Heath Morrow MD;  Location: [facility] ENDO;  Service: Endoscopy;  Laterality: N/A;  Ref by Leonor Oneill, pt has unopened Sutab, portal - PC  9/25/24-LVM for precall, portal-DS. 10/1/24 Pt. called and confirmed arrival time.EC    EPIDURAL STEROID INJECTION N/A 2/26/2024    Procedure: CERVICAL C7/T1  IL SELENA;  Surgeon: Gokul Fung MD;  Location: [facility] PAIN MGT;  Service: Pain Management;  Laterality: N/A;  [phone]  2 WK F/U SERGEI    EPIDURAL STEROID INJECTION N/A 5/23/2024    Procedure: LUMBAR L4/5 IL SELENA *ASPIRIN OTC* HOLD FOR 5 DAYS;  Surgeon: Gokul Fung MD;  Location: [facility] PAIN MGT;  Service: Pain Management;  Laterality: N/A;  [phone]  2 WK F/U NOHELIA    EPIDURAL STEROID INJECTION N/A 10/21/2024    Procedure: LUMBAR L5/S1 IL SELENA *ASPIRIN OTC* HOLD FOR 5 DAYS  **EISSA PT**;  Surgeon: Sofia Sheikh MD;  Location: [facility] PAIN MGT;  Service: Pain Management;  Laterality: N/A;  [phone]  2 WK F/U NOHELIA    INJECTION OF ANESTHETIC AGENT AROUND NERVE N/A 12/23/2024    Procedure: LUMBAR L4.5 IL SELENA *ASPIRIN OTC* HOLD FOR 5 DAYS;  Surgeon: Gokul Fung MD;  Location: [facility] PAIN MGT;  Service: Pain Management;  Laterality: N/A;  2 WK F/U NOHELIA    INJECTION OF ANESTHETIC AGENT AROUND NERVE Bilateral 2/24/2025    Procedure: BLOCK, NERVE BILATERAL L3, 4, 5 MEDIAL BRANCH;  Surgeon: Gokul Fung MD;  Location: [facility] PAIN MGT;  Service: Pain Management;  Laterality: Bilateral;  2 WK F/U NOHELIA    INJECTION OF ANESTHETIC AGENT AROUND NERVE Bilateral 3/10/2025    Procedure: BLOCK, NERVE BILATERAL L3, L4, L5 MEDIAL BRANCH;  Surgeon: Gokul Fung MD;  Location: [facility] PAIN MGT;  Service: Pain Management;  Laterality: Bilateral;    JOINT REPLACEMENT  9/2010    NASAL SEPTUM SURGERY      TOTAL KNEE ARTHROPLASTY Right     6 knee surgeries    TRANSESOPHAGEAL ECHOCARDIOGRAM WITH POSSIBLE CARDIOVERSION (ELIZABETH W/ POSS CARDIOVERSION) N/A 2/28/2024    Procedure: Transesophageal echo (ELIZABETH) intra-procedure log documentation;  Surgeon: Ahmet De La Cruz MD;  Location: [facility] CATH LAB/EP;  Service: Cardiology;  Laterality: N/A;     Review of patient's allergies indicates:   Allergen Reactions    Stadol [butorphanol tartrate] Rash     Swelling in face    Strawberries [strawberry] Rash      Current Facility-Administered Medications  "  Medication    0.9% NaCl infusion     Facility-Administered Medications Ordered in Other Encounters   Medication    0.9%  NaCl infusion    0.9%  NaCl infusion       PMHx, PSHx, Allergies, Medications reviewed in epic    ROS negative except pain complaints in HPI    OBJECTIVE:    BP (!) 177/85   Pulse 75   Temp 98.4 °F (36.9 °C) (Oral)   Resp 15   Ht 5' 10" (1.778 m)   Wt 86.2 kg (190 lb)   SpO2 95%   BMI 27.26 kg/m²     PHYSICAL EXAMINATION:    GENERAL: Well appearing, in no acute distress, alert and oriented x3.  PSYCH:  Mood and affect appropriate.  SKIN: Skin color, texture, turgor normal, no rashes or lesions which will impact the procedure.  CV: RRR with palpation of the radial artery.  PULM: No evidence of respiratory difficulty, symmetric chest rise. Clear to auscultation.  NEURO: Cranial nerves grossly intact.    Plan:    Proceed with procedure as planned Procedure(s) (LRB):  RADIOFREQUENCY ABLATION BILATERAL L3, 4, 5 (Bilateral)    Donaldo Turner  03/27/2025           "

## 2025-04-01 ENCOUNTER — OFFICE VISIT (OUTPATIENT)
Dept: VASCULAR SURGERY | Facility: CLINIC | Age: 65
End: 2025-04-01
Payer: COMMERCIAL

## 2025-04-01 VITALS
TEMPERATURE: 98 F | HEIGHT: 70 IN | BODY MASS INDEX: 28.72 KG/M2 | WEIGHT: 200.63 LBS | HEART RATE: 68 BPM | DIASTOLIC BLOOD PRESSURE: 83 MMHG | SYSTOLIC BLOOD PRESSURE: 138 MMHG

## 2025-04-01 DIAGNOSIS — Z98.890 S/P CAROTID ENDARTERECTOMY: ICD-10-CM

## 2025-04-01 DIAGNOSIS — I65.29 STENOSIS OF CAROTID ARTERY, UNSPECIFIED LATERALITY: Primary | ICD-10-CM

## 2025-04-01 PROCEDURE — 99999 PR PBB SHADOW E&M-EST. PATIENT-LVL V: CPT | Mod: PBBFAC,,, | Performed by: SURGERY

## 2025-04-01 NOTE — LETTER
[facility] 5th Fl  [address]  [place] [address]  Phone: [phone]  Fax: [phone] April 2, 2025        Rocco Cavazos DO  [address] [facility] [address]    Patient: Partha Curtis Jr.   MR Number: [id number]   YOB: 1960   Date of Visit: 4/1/2025     Dear Dr. Cavazos:    Thank you for referring Partha Curtis to me for evaluation. Attached are the relevant portions of my assessment and plan of care.    If you have questions, please do not hesitate to call me. I look forward to following Partha along with you.    Sincerely,    CURT Bone III, MD   Professor and Chief  Vascular and Endovascular Surgery  Vice-Chair, Department of Surgery  [facility]     WCS/hcr

## 2025-04-01 NOTE — PROGRESS NOTES
VASCULAR SURGERY SERVICE    CHIEF COMPLAINT:  Status post right carotid endarterectomy 07/15/2015    HISTORY OF PRESENT ILLNESS: Partha Curtis Jr. is a 65 y.o. male whom I performed a right carotid endarterectomy 715 2015 for a 99% stenosis.  I have not seen him in over 3 years.   He denies interval stroke TIA or amaurosis.    Since I last saw him in 2022 he had an episode of AFib and states he was cardioverted was on anticoagulation but now on aspirin monotherapy only, including a statin.  He is a never smoker    Past Medical History:   Diagnosis Date    Allergy     Carotid artery occlusion     Chronic back pain     Colonic polyp     Genetic testing     MUTYH mutation-negative    Hyperlipidemia     Hypertension     Paroxysmal atrial fibrillation 2/28/2024    Sleep apnea     Type 2 diabetes mellitus with stage 3 chronic kidney disease, without long-term current use of insulin 4/2/2020       Past Surgical History:   Procedure Laterality Date    ANKLE SURGERY Left     2    APPENDECTOMY      at age 20    ARTHROSCOPIC CHONDROPLASTY OF KNEE JOINT Left 8/13/2024    Procedure: ARTHROSCOPY, KNEE, WITH CHONDROPLASTY;  Surgeon: Kai Washington MD;  Location: [facility] OR;  Service: Orthopedics;  Laterality: Left;    ARTHROSCOPY, KNEE, WITH ABRASION ARTHROPLASTY OR MICROFRACTURE Left 8/13/2024    Procedure: ARTHROSCOPY, KNEE, WITH  MICROFRACTURE;  Surgeon: Kai Washington MD;  Location: [facility] OR;  Service: Orthopedics;  Laterality: Left;    ARTHROSCOPY,KNEE,WITH MENISCUS REPAIR Left 8/13/2024    Procedure: ARTHROSCOPY,KNEE,WITH MENISCUS REPAIR;  Surgeon: Kai Washington MD;  Location: [facility] OR;  Service: Orthopedics;  Laterality: Left;  NO REGIONAL BLOCK    CAROTID ENDARTERECTOMY Right 07/15/2015    CARPAL TUNNEL RELEASE Bilateral     COLONOSCOPY N/A 12/14/2018    Procedure: COLONOSCOPYSuprep;  Surgeon: Silver Selby MD;  Location: [facility];  Service: Endoscopy;  Laterality: N/A;    COLONOSCOPY N/A 10/2/2024     Procedure: COLONOSCOPY;  Surgeon: Heath Morrow MD;  Location: [facility] ENDO;  Service: Endoscopy;  Laterality: N/A;  Ref by Leonor Oneill, pt has unopened Sutab, portal - PC  9/25/24-LVM for precall, portal-DS. 10/1/24 Pt. called and confirmed arrival time.EC    EPIDURAL STEROID INJECTION N/A 2/26/2024    Procedure: CERVICAL C7/T1 IL SELENA;  Surgeon: Gokul Fung MD;  Location: [facility] PAIN MGT;  Service: Pain Management;  Laterality: N/A;  [phone]  2 WK F/U SERGEI    EPIDURAL STEROID INJECTION N/A 5/23/2024    Procedure: LUMBAR L4/5 IL SELENA *ASPIRIN OTC* HOLD FOR 5 DAYS;  Surgeon: Gokul Fung MD;  Location: [facility] PAIN MGT;  Service: Pain Management;  Laterality: N/A;  [phone]  2 WK F/U NOHELIA    EPIDURAL STEROID INJECTION N/A 10/21/2024    Procedure: LUMBAR L5/S1 IL SELENA *ASPIRIN OTC* HOLD FOR 5 DAYS  **ANGELICA PT**;  Surgeon: Sofia Sheikh MD;  Location: [facility] PAIN MGT;  Service: Pain Management;  Laterality: N/A;  [phone]  2 WK F/U NOHELIA    INJECTION OF ANESTHETIC AGENT AROUND NERVE N/A 12/23/2024    Procedure: LUMBAR L4.5 IL SELENA *ASPIRIN OTC* HOLD FOR 5 DAYS;  Surgeon: Gokul Fung MD;  Location: [facility] PAIN MGT;  Service: Pain Management;  Laterality: N/A;  2 WK F/U NOHELIA    INJECTION OF ANESTHETIC AGENT AROUND NERVE Bilateral 2/24/2025    Procedure: BLOCK, NERVE BILATERAL L3, 4, 5 MEDIAL BRANCH;  Surgeon: Gokul Fung MD;  Location: [facility] PAIN MGT;  Service: Pain Management;  Laterality: Bilateral;  2 WK F/U NOHELIA    INJECTION OF ANESTHETIC AGENT AROUND NERVE Bilateral 3/10/2025    Procedure: BLOCK, NERVE BILATERAL L3, L4, L5 MEDIAL BRANCH;  Surgeon: Gokul Fung MD;  Location: [facility] PAIN MGT;  Service: Pain Management;  Laterality: Bilateral;    JOINT REPLACEMENT  9/2010    NASAL SEPTUM SURGERY      RADIOFREQUENCY ABLATION Bilateral 3/27/2025    Procedure: RADIOFREQUENCY ABLATION BILATERAL L3, 4, 5;  Surgeon: Gokul Fung MD;  [facility];  Service: Pain Management;  Laterality:  Bilateral;    TOTAL KNEE ARTHROPLASTY Right     6 knee surgeries    TRANSESOPHAGEAL ECHOCARDIOGRAM WITH POSSIBLE CARDIOVERSION (ELIZABETH W/ POSS CARDIOVERSION) N/A 2/28/2024    Procedure: Transesophageal echo (ELIZABETH) intra-procedure log documentation;  Surgeon: Ahmet De La Cruz MD;  Location: [facility] CATH LAB/EP;  Service: Cardiology;  Laterality: N/A;       Current Medications[1]    Review of patient's allergies indicates:   Allergen Reactions    Stadol [butorphanol tartrate] Rash     Swelling in face    Strawberries [strawberry] Rash       Family History   Problem Relation Name Age of Onset    Brain cancer Mother Klarissa 67    Cancer Mother Klarissa     Hypertension Father Keanu     Lung cancer Father Keanu         x2 (PAUL initially- treated surgically only, then recurred distantly) (smoker, & had been exposed to many chemicals)    Cancer Father Keanu     Breast cancer Sister  58    Genetic Disorder Sister          monoallelic MUTYH mutation    Seizures Daughter      Cancer Maternal Grandfather Valerio     Brain cancer Paternal Grandfather  73    Breast cancer Maternal Cousin  57    Aneurysm Other mgm's father 48        brain    Ulcerative colitis Other brother's son        Social History[2]    REVIEW OF SYSTEMS:  General: No chills, fever, malaise, changes in weight  HEENT: No visual changes, difficulty hearing  Cardiovascular: No chest pain, palpitations, claudication  Pulmonary: No dyspnea, cough, wheezing  Gastrointestinal: No nausea, vomiting, diarrhea, constipation  Genitourinary: No dysuria, low urine output, hematuria  Endocrine: No polydipsia, polyphagia  Hematologic: No fatigue with exertion, pica, pallor  Musculoskeletal: No extremity or joint pain, no back pain, no difficulty with ambulation  Neurologic: no seizures, no headaches, no weakness  Psychiatric: no mood disturbance    PHYSICAL EXAM:   /83 (BP Location: Right arm, Patient Position: Sitting)   Pulse 68   Temp 98.1 °F (36.7 °C) (Oral)   " Ht 5' 10" (1.778 m)   Wt 91 kg (200 lb 9.9 oz)   BMI 28.79 kg/m²   Constitutional:  Alert,   Well-appearing  In no distress.   Neurological: Normal speech  no focal findings  CN II - XII grossly intact.  Neuro completely intact   Psychiatric: Mood and affect appropriate and symmetric.   HEENT: Normocephalic / atraumatic  PERRLA  Midline trachea  Right cervical scar   Cardiac: Regular rate and rhythm.   Pulmonary: Normal pulmonary effort.   Abdomen: Soft, not distended.     Skin: Warm and well perfused.    Vascular:  2+ radial pulses bilaterally   Extremities/  Musculoskeletal: No edema.        IMAGING:  Recent carotid duplex was personally reviewed demonstrates no evidence of carotid stenosis bilaterally    IMPRESSION:  Near 10 year follow-up, right carotid endarterectomy with continued primary patency and no restenosis    PLAN:  Continue aspirin statin  Follow up in 2 years with a carotid duplex    LEAH Bone III, MD, FACS  Professor and Chief, Vascular and Endovascular Surgery           [1]   Current Outpatient Medications:     amitriptyline (ELAVIL) 75 MG tablet, Take 1 tablet by mouth in the evening, Disp: 90 tablet, Rfl: 3    aspirin (ECOTRIN) 81 MG EC tablet, Take 1 tablet (81 mg total) by mouth once daily., Disp: 28 tablet, Rfl: 0    BD LUER-RUSS SYRINGE 3 mL 25 gauge x 1" Syrg, USE ONE SYRINGE EVERY 14 DAYS WITH TESTOSTERONE, Disp: 100 each, Rfl: 2    blood-glucose sensor (DEXCOM G7 SENSOR) Allison, To check sugars up to 3 times daily, Disp: 4 each, Rfl: 11    candesartan (ATACAND) 32 MG tablet, Take 1 tablet (32 mg total) by mouth once daily., Disp: 90 tablet, Rfl: 3    cloNIDine (CATAPRES) 0.1 MG tablet, Take 1 tablet (0.1 mg total) by mouth 2 (two) times daily., Disp: 180 tablet, Rfl: 3    cyanocobalamin (VITAMIN B-12) 1000 MCG tablet, Take 1 tablet (1,000 mcg total) by mouth once daily., Disp: 90 tablet, Rfl: 4    DULoxetine (CYMBALTA) 30 MG capsule, Take 1 capsule (30 mg total) by mouth once " daily., Disp: 30 capsule, Rfl: 1    ergocalciferol (ERGOCALCIFEROL) 50,000 unit Cap, Take 1 capsule by mouth once a week, Disp: 12 capsule, Rfl: 0    ezetimibe (ZETIA) 10 mg tablet, Take 1 tablet by mouth once daily, Disp: 90 tablet, Rfl: 0    fluticasone propionate (FLONASE) 50 mcg/actuation nasal spray, 2 sprays by Each Nostril route., Disp: , Rfl:     gabapentin (NEURONTIN) 800 MG tablet, Take 1 tablet (800 mg total) by mouth every evening., Disp: 90 tablet, Rfl: 1    glimepiride (AMARYL) 1 MG tablet, Take 1 tablet (1 mg total) by mouth before breakfast., Disp: , Rfl:     glucose 4 GM chewable tablet, Take 4 tablets (16 g total) by mouth as needed for Low blood sugar., Disp: 30 tablet, Rfl: 12    hydrocortisone 2.5 % cream, Apply to face twice daily for flares as needed, Disp: 40 g, Rfl: 2    ipratropium (ATROVENT) 42 mcg (0.06 %) nasal spray, 2 sprays by Nasal route 2 (two) times daily as needed for Rhinitis., Disp: 15 mL, Rfl: 0    ketoconazole (NIZORAL) 2 % cream, Apply twice daily to affected area of skin, Disp: 60 g, Rfl: 2    LIDOcaine (LIDODERM) 5 %, Place 1 patch onto the skin once daily. Remove & Discard patch within 12 hours or as directed by MD, Disp: 30 patch, Rfl: 11    metoprolol succinate (TOPROL-XL) 200 MG 24 hr tablet, Take 1 tablet (200 mg total) by mouth once daily. (Patient taking differently: Take 200 mg by mouth every evening.), Disp: 90 tablet, Rfl: 3    oxyCODONE-acetaminophen (PERCOCET)  mg per tablet, Take 1 tablet by mouth every 12 (twelve) hours as needed for Pain., Disp: 60 tablet, Rfl: 0    rosuvastatin (CRESTOR) 40 MG Tab, TAKE 1 TABLET BY MOUTH ONCE DAILY IN THE EVENING, Disp: 90 tablet, Rfl: 1    semaglutide (OZEMPIC) 2 mg/dose (8 mg/3 mL) PnIj, Inject 2 mg into the skin every 7 days., Disp: 9 mL, Rfl: 3    sod sulf-pot chloride-mag sulf (SUTAB) 1.479-0.188- 0.225 gram tablet, Take 12 tablets by mouth once daily. Take according to package instructions with indicated amount  "of water., Disp: 24 tablet, Rfl: 0    syringe with needle, safety (BD INTEGRA SYRINGE) 3 mL 22 gauge x 1 1/2" Syrg, Inject 1 Syringe as directed once a week., Disp: 6 each, Rfl: 3    testosterone cypionate (DEPOTESTOTERONE CYPIONATE) 200 mg/mL injection, Inject 0.75 mLs (150 mg total) into the muscle every 14 (fourteen) days., Disp: 2 mL, Rfl: 5    valACYclovir (VALTREX) 1000 MG tablet, Take 2 tablets (2,000 mg total) by mouth every 12 (twelve) hours., Disp: 4 tablet, Rfl: 3  No current facility-administered medications for this visit.    Facility-Administered Medications Ordered in Other Visits:     0.9%  NaCl infusion, , Intravenous, Continuous, Kendell Hoffman MD    0.9%  NaCl infusion, , Intravenous, Continuous, Gerardo Uribe MD  [2]   Social History  Tobacco Use    Smoking status: Never     Passive exposure: Never    Smokeless tobacco: Never   Substance Use Topics    Alcohol use: Yes     Alcohol/week: 2.0 standard drinks of alcohol     Types: 2 Cans of beer per week     Comment: 6 beers per month    Drug use: Never     "

## 2025-04-05 DIAGNOSIS — E55.9 VITAMIN D DEFICIENCY: ICD-10-CM

## 2025-04-05 NOTE — TELEPHONE ENCOUNTER
No care due was identified.  [facility] Embedded Care Due Messages. Reference number: [id number].   4/05/2025 6:27:05 PM CDT

## 2025-04-07 DIAGNOSIS — E55.9 VITAMIN D DEFICIENCY: ICD-10-CM

## 2025-04-07 RX ORDER — ERGOCALCIFEROL 1.25 MG/1
50000 CAPSULE ORAL
Qty: 12 CAPSULE | Refills: 0 | Status: SHIPPED | OUTPATIENT
Start: 2025-04-07

## 2025-04-07 RX ORDER — ERGOCALCIFEROL 1.25 MG/1
50000 CAPSULE ORAL
Qty: 12 CAPSULE | Refills: 0 | OUTPATIENT
Start: 2025-04-07

## 2025-04-07 NOTE — TELEPHONE ENCOUNTER
No care due was identified.  [facility] Embedded Care Due Messages. Reference number: [id number].   4/07/2025 6:53:36 AM CDT

## 2025-04-07 NOTE — TELEPHONE ENCOUNTER
Refill Decision Note   Partha Curtis  is requesting a refill authorization.  Brief Assessment and Rationale for Refill:  Quick Discontinue     Medication Therapy Plan:  Receipt confirmed by pharmacy (4/7/2025  8:26 AM CDT)      Comments:     Note composed:11:17 AM 04/07/2025

## 2025-04-11 DIAGNOSIS — I10 ESSENTIAL HYPERTENSION: ICD-10-CM

## 2025-04-11 RX ORDER — CANDESARTAN 32 MG/1
32 TABLET ORAL
Qty: 90 TABLET | Refills: 3 | Status: SHIPPED | OUTPATIENT
Start: 2025-04-11

## 2025-04-11 NOTE — TELEPHONE ENCOUNTER
Refill Decision Note   Partha Curtis  is requesting a refill authorization.  Brief Assessment and Rationale for Refill:  Approve     Medication Therapy Plan:         Comments:     Note composed:3:40 PM 04/11/2025

## 2025-04-11 NOTE — TELEPHONE ENCOUNTER
No care due was identified.  [facility] Embedded Care Due Messages. Reference number: [id number].   4/11/2025 2:10:48 PM CDT

## 2025-04-13 DIAGNOSIS — I10 ESSENTIAL HYPERTENSION: ICD-10-CM

## 2025-04-13 RX ORDER — METOPROLOL SUCCINATE 200 MG/1
200 TABLET, EXTENDED RELEASE ORAL DAILY
Qty: 90 TABLET | Refills: 3 | Status: SHIPPED | OUTPATIENT
Start: 2025-04-13

## 2025-04-14 NOTE — TELEPHONE ENCOUNTER
No care due was identified.  [facility] Embedded Care Due Messages. Reference number: [id number].   4/13/2025 9:41:19 PM CDT

## 2025-04-14 NOTE — TELEPHONE ENCOUNTER
Refill Decision Note   Partha Curtis  is requesting a refill authorization.  Brief Assessment and Rationale for Refill:  Approve     Medication Therapy Plan:         Comments:     Note composed:10:57 PM 04/13/2025

## 2025-04-15 ENCOUNTER — OFFICE VISIT (OUTPATIENT)
Dept: PAIN MEDICINE | Facility: CLINIC | Age: 65
End: 2025-04-15
Payer: COMMERCIAL

## 2025-04-15 VITALS
TEMPERATURE: 98 F | DIASTOLIC BLOOD PRESSURE: 88 MMHG | WEIGHT: 200.63 LBS | HEART RATE: 69 BPM | BODY MASS INDEX: 28.79 KG/M2 | OXYGEN SATURATION: 99 % | SYSTOLIC BLOOD PRESSURE: 163 MMHG

## 2025-04-15 DIAGNOSIS — G89.4 CHRONIC PAIN DISORDER: ICD-10-CM

## 2025-04-15 DIAGNOSIS — M54.16 LUMBAR RADICULOPATHY: ICD-10-CM

## 2025-04-15 DIAGNOSIS — M50.30 DDD (DEGENERATIVE DISC DISEASE), CERVICAL: ICD-10-CM

## 2025-04-15 DIAGNOSIS — M51.369 DDD (DEGENERATIVE DISC DISEASE), LUMBAR: ICD-10-CM

## 2025-04-15 DIAGNOSIS — M54.12 CERVICAL RADICULOPATHY: ICD-10-CM

## 2025-04-15 DIAGNOSIS — M47.812 CERVICAL SPONDYLOSIS: ICD-10-CM

## 2025-04-15 DIAGNOSIS — M47.816 LUMBAR SPONDYLOSIS: ICD-10-CM

## 2025-04-15 DIAGNOSIS — G89.4 CHRONIC PAIN DISORDER: Primary | ICD-10-CM

## 2025-04-15 DIAGNOSIS — M51.362 DEGENERATION OF INTERVERTEBRAL DISC OF LUMBAR REGION WITH DISCOGENIC BACK PAIN AND LOWER EXTREMITY PAIN: ICD-10-CM

## 2025-04-15 PROCEDURE — 3072F LOW RISK FOR RETINOPATHY: CPT | Mod: CPTII,S$GLB,, | Performed by: NURSE PRACTITIONER

## 2025-04-15 PROCEDURE — 99999 PR PBB SHADOW E&M-EST. PATIENT-LVL III: CPT | Mod: PBBFAC,,, | Performed by: NURSE PRACTITIONER

## 2025-04-15 PROCEDURE — 1159F MED LIST DOCD IN RCRD: CPT | Mod: CPTII,S$GLB,, | Performed by: NURSE PRACTITIONER

## 2025-04-15 PROCEDURE — 3079F DIAST BP 80-89 MM HG: CPT | Mod: CPTII,S$GLB,, | Performed by: NURSE PRACTITIONER

## 2025-04-15 PROCEDURE — 3008F BODY MASS INDEX DOCD: CPT | Mod: CPTII,S$GLB,, | Performed by: NURSE PRACTITIONER

## 2025-04-15 PROCEDURE — 4010F ACE/ARB THERAPY RXD/TAKEN: CPT | Mod: CPTII,S$GLB,, | Performed by: NURSE PRACTITIONER

## 2025-04-15 PROCEDURE — 3288F FALL RISK ASSESSMENT DOCD: CPT | Mod: CPTII,S$GLB,, | Performed by: NURSE PRACTITIONER

## 2025-04-15 PROCEDURE — 1160F RVW MEDS BY RX/DR IN RCRD: CPT | Mod: CPTII,S$GLB,, | Performed by: NURSE PRACTITIONER

## 2025-04-15 PROCEDURE — 99214 OFFICE O/P EST MOD 30 MIN: CPT | Mod: S$GLB,,, | Performed by: NURSE PRACTITIONER

## 2025-04-15 PROCEDURE — 3077F SYST BP >= 140 MM HG: CPT | Mod: CPTII,S$GLB,, | Performed by: NURSE PRACTITIONER

## 2025-04-15 PROCEDURE — 1157F ADVNC CARE PLAN IN RCRD: CPT | Mod: CPTII,S$GLB,, | Performed by: NURSE PRACTITIONER

## 2025-04-15 PROCEDURE — 3051F HG A1C>EQUAL 7.0%<8.0%: CPT | Mod: CPTII,S$GLB,, | Performed by: NURSE PRACTITIONER

## 2025-04-15 PROCEDURE — 1101F PT FALLS ASSESS-DOCD LE1/YR: CPT | Mod: CPTII,S$GLB,, | Performed by: NURSE PRACTITIONER

## 2025-04-15 NOTE — PROGRESS NOTES
Chronic patient Established Note (Follow up visit)      SUBJECTIVE:    Interval History 4/15/2025:  The patient returns to clinic today for follow up of back pain. He is s/p bilateral L3,4,5 RFA on 3/27/2025. He reports limited relief at this time. He continues to report low back pain. This is worse with prolonged standing and walking. He denies any radicular leg pain. He continues to report neck pain. He does report benefit with Gabapentin and Percocet. He denies any adverse effects. He denies any other health changes. His pain today is 9/10.     Interval History 3/18/2025:  The patient returns to clinic today for follow up of back pain. He is s/p bilateral L3,4,5 MBB on 2/24/2025 and 3/10/2025. He reports 90% relief for one day with each block. He was able to stand for longer periods of time. His pain did return to baseline. He continues to report low back pain. This is worse with prolonged standing and walking. He does endorse morning stiffness. He continues to report neck pain. He is taking Gabapentin. He also takes Percocet as needed for severe pain. He denies any other health changes. His pain today is 9/10.     Interval History 1/9/2025:  The patient returns to clinic today for follow up of pain. He is s/p L4/5 IL SELENA on 12/23/2024. He reports 50% relief of his pain. He reports intermittent low back pain. He continues to report neck pain. He reports increased joint pain today due to the cold weather. He continues to report right knee pain. He did have an injection into the knee yesterday. He does have a follow up with Orthopedics tomorrow. He is taking Gabapentin. He is taking Percocet as needed with benefit. He denies any other health changes. His pain today is 7/10.    Interval History 12/9/2024:  The patient returns to clinic today for follow up of neck and back pain. Since last visit, he has developed right knee pain. This is limiting his progression in physical therapy. He did have the right knee drained  recently. He reports worsened low back pain. He attributes this to the way he is walking. He reports intermittent radiating pain into the hip and leg. His pain is worse with standing and walking. He is taking Gabapentin and Percocet. He denies any adverse effects. He denies any other health changes. His pain today is 8/10.    Interval History 9/10/2024:  The patient returns to clinic today for follow up of neck and back pain. Since last visit, he underwent left knee arthroscopy on 8/13/2024. He is currently participating in physical therapy. He reports increased mid and low back pain. He denies any radicular leg pain. His pain is worse with moving from sitting to standing. He is taking Gabapentin. He also takes Percocet as needed for severe pain. He denies any adverse effects. He denies any other health changes. His pain today is 8/10.    Interval History 6/10/2024:  The patient returns to clinic today for follow up of neck and back pain. He is s/p L4/5 IL SELENA on 5/23/2024. He reports 60% relief. He reports low back pain. He denies any radicular leg pain. His back pain is worse with moving from sitting to standing. He continues to report neck pain. He is taking Gabapentin. He also takes Percocet for severe pain as needed with benefit. He denies any adverse effects. He denies any other health changes. His pain today is 5/10.      Interval History 5/1/2024:  The patient returns to clinic today for follow up of neck and back pain. He reports increased low back pain. He denies any radicular leg pain but does have numbness into the lateral aspect of his right leg. This is worse from the knee to the top of his right foot. He does have numbness under the left foot. His pain is worse with standing and walking. He is no longer taking Eliquis. He is taking Gabapentin. He also takes Percocet for severe pain. He needs a refill. He denies any other health changes. His pain today is 8/10.    Interval History 3/25/2024:  Partha TAYLOR  Ever Brooke presents to the clinic for a follow-up appointment for neck pain. He is s/p C7/T1 IL SELENA on 2/26/2024. He reports 100% relief of his neck pain. He was admitted for atrial fibrillation after the last procedure. He was converted to sinus rhythm. He is now on Eliquis. He reports increased low back pain. He denies any radicular leg pain. His pain is worse as the day goes on. He previously had relief with ESIs. He is interested in repeating this in the future. He denies any weakness. He is taking Gabapentin. He takes Percocet intermittently for severe pain. He denies any other health changes. His pain today is 0/10.      HPI: Partha Curtis Jr. is a 63 y.o. male presents to pain clinic for evaluation of neck pain. Symptoms developed 4 weeks ago. Similar pain in 2022 that improved after an C7/T1 ILESI on 3/28/22, had been pain free since this procedure until 4 weeks ago. Original neck pain started in 2021 without inciting event. Denies trauma or injury to the area in the past 4 weeks upon return of symptoms. [facility] at [facility] with Dr. Herrera     Original Pain Description:  The pain is located in the upper thoracic region just left of the midline with radiation into his left shoulder and proximal arm terminating above the elbow. All of patients upper back/neck pain is on the left. The pain is described as aching, sharp, and throbbing. These exact symptoms were present prior to his 2022 ILESI. Chinyere ranges from 4 - 10. The current pain is 8. Exacerbating factors: Coughing/Sneezing and L arm abduction above the head, head forward flexion and head lateral bending to the left, and laying flat on the ground. Mitigating factors pillow. Symptoms interfere with daily activity and sleeping and work. 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 Works as a  for [employer]. The patient feels like symptoms have been worsening. Patient endorses weakness in  his left arm with return of pain. Patient denies saddle anesthesia, bladder/bowel incontinence, acute or signifcant limb weakness, ataxia, or increased falls.     Pain Disability Index Review:      4/15/2025     3:08 PM 3/18/2025     9:26 AM 1/9/2025    10:44 AM   Last 3 PDI Scores   Pain Disability Index (PDI) 63 [phone]       Pain Medications:  Gabapentin  Percocet     Opioid Contract: not applicable     report:  Reviewed and consistent with medication use as prescribed.    Pain Procedures:   2/26/2024- C7/T1 IL SELENA  5/23/2024- L4/5 IL SELENA- 60% relief  12/23/2024- L4/5 IL SELENA- 50% relief   2/24/2025- Bilateral L3,4,5 MBB  3/10/2025- Bilateral L3,4,5 MBB  3/27/2025- Bilateral L3,4,5 RFA    Physical Therapy/Home Exercise: yes    Imaging:   MRI Cervical Spine 2/15/2024:  COMPARISON:  XR C-spine 03/02/2010.  CTA head neck 06/23/2022.     FINDINGS:  Alignment: Normal sagittal alignment.  Grade 1 retrolisthesis at C5-C6 and grade 1 anterolisthesis at C7-T1.     Vertebrae: Vertebral body heights are maintained.  No fracture or marrow infiltrative process.     Discs: Multilevel degenerative disc desiccation and height loss most pronounced at C5-C6 and C6-C7.     Cord: Normal cord signal intensity.     Cerebellar tonsils are in their expected location.  Visualized brainstem is normal. Vertebral artery flow voids are present.  Prevertebral soft tissues are normal.  No cervical lymph node enlargement.  Paraspinal musculature demonstrates normal bulk and signal intensity.     Degenerative findings:     C2-C3: Thickening of the posterior longitudinal ligament.  No spinal canal stenosis or neural foraminal narrowing.     C3-C4: Thickening of the posterior longitudinal ligament, bilateral uncovertebral spurring and facet arthropathy.  No spinal canal stenosis.  Mild left neural foraminal narrowing.     C4-C5: Central/right paracentral disc extrusion with inferior migration which abuts and slightly posteriorly displaces the  right ventral cord.  Bilateral uncovertebral spurring and facet arthropathy.  Mild spinal canal stenosis.  Mild left neural foraminal narrowing.     C5-C6: Posterior disc osteophyte complex, bilateral uncovertebral spurring and facet arthropathy.  Moderate spinal canal stenosis.  Severe bilateral neural foraminal narrowing.     C6-C7: Posterior disc osteophyte complex, bilateral uncovertebral spurring and facet arthropathy.  No spinal canal stenosis.  Mild right and moderate left neural foraminal narrowing.     C7-T1: No spinal canal stenosis or neural foraminal narrowing.     Impression:     Multilevel degenerative change of the cervical spine, detailed above.  Findings most pronounced at C5-C6 with moderate spinal canal stenosis and severe bilateral neural foraminal narrowing.    MRI Lumbar Spine 11/2/2023:  COMPARISON: MRI lumbar spine 6/28/2021     FINDINGS:   Spine Numbering: For purposes of this dictation, it is assumed that there are 5 non-rib-bearing, lumbar-type vertebrae, and the most caudal fully segmented lumbar vertebra is labeled L5.     Alignment: Straightening of normal lumbar lordosis. Unchanged dextrocurvature of the lumbar spine. Unchanged grade 1 left lateral listhesis of L2 on L3. Unchanged grade 1 right lateral listhesis of L3 on L4. Unchanged grade 1 retrolisthesis of L2 on L3.     Surgical: None.     Vertebral Bodies: Unchanged multilevel mild anterior osteophytosis.     Marrow Signal: Unchanged degenerative fatty marrow placement at the L3-4 endplates. No marrow edema. No suspicious osseous lesions.     Intervertebral Discs: Unchanged multilevel disc dessication with loss of disc space height     Conus Medullaris: Terminates at L1     Cauda Equina: Unchanged bunching of the cauda equina at L3-4 and L4-5 due to thecal sac narrowing detailed below.     Paraspinal Soft Tissues: Normal     Intra-abdominal Findings: None.     Individual Levels:     T12-L1: Unchanged circumferential disc bulge  "with unchanged right paracentral disc herniation with cranial migration to the infrapedicular level of T12 measuring 1.6 x 0.6 cm (image 7, series 2). No thecal sac or neural foraminal narrowing.     L1-L2: Unchanged circumferential disc bulge with bulky left lateral osteophytosis. No spinal canal or foraminal narrowing.     L2-L3: Circumferential disc bulge, ligamentum flavum thickening, and epidural lipomatosis cause unchanged moderate thecal sac narrowing. Mild facet arthropathy and disc disease cause unchanged moderate bilateral neural foraminal narrowing.     L3-L4: Circumferential disc bulge, ligamentum flavum thickening, and epidural lipomatosis cause unchanged severe thecal sac narrowing. Moderate right and severe left facet arthropathy with disc disease cause unchanged mild right and moderate left neural foraminal narrowing.     L4-L5: Circumferential disc bulge, ligamentum flavum thickening, and epidural lipomatosis cause unchanged severe thecal sac narrowing. Severe facet arthropathy and disc disease cause unchanged severe right and moderate left neural foraminal narrowing with impingement of the exiting right L4 nerve root.     L5-S1: Unchanged small disc bulge and moderate facet arthropathy. No spinal canal or foraminal narrowing.     Impression    Unchanged severe multilevel lumbar spondylosis as detailed above.    Allergies:   Review of patient's allergies indicates:   Allergen Reactions    Stadol [butorphanol tartrate] Rash     Swelling in face    Strawberries [strawberry] Rash       Current Medications:   Current Outpatient Medications   Medication Sig Dispense Refill    amitriptyline (ELAVIL) 75 MG tablet Take 1 tablet by mouth in the evening 90 tablet 3    aspirin (ECOTRIN) 81 MG EC tablet Take 1 tablet (81 mg total) by mouth once daily. 28 tablet 0    BD LUER-RUSS SYRINGE 3 mL 25 gauge x 1" Syrg USE ONE SYRINGE EVERY 14 DAYS WITH TESTOSTERONE 100 each 2    blood-glucose sensor (DEXCOM G7 SENSOR) " Allison To check sugars up to 3 times daily 4 each 11    candesartan (ATACAND) 32 MG tablet Take 1 tablet by mouth once daily 90 tablet 3    cloNIDine (CATAPRES) 0.1 MG tablet Take 1 tablet (0.1 mg total) by mouth 2 (two) times daily. 180 tablet 3    cyanocobalamin (VITAMIN B-12) 1000 MCG tablet Take 1 tablet (1,000 mcg total) by mouth once daily. 90 tablet 4    DULoxetine (CYMBALTA) 30 MG capsule Take 1 capsule (30 mg total) by mouth once daily. 30 capsule 1    ergocalciferol (ERGOCALCIFEROL) 50,000 unit Cap Take 1 capsule (50,000 Units total) by mouth every 7 days. 12 capsule 0    ezetimibe (ZETIA) 10 mg tablet Take 1 tablet by mouth once daily 90 tablet 0    fluticasone propionate (FLONASE) 50 mcg/actuation nasal spray 2 sprays by Each Nostril route.      gabapentin (NEURONTIN) 800 MG tablet Take 1 tablet (800 mg total) by mouth every evening. 90 tablet 1    glimepiride (AMARYL) 1 MG tablet Take 1 tablet (1 mg total) by mouth before breakfast.      glucose 4 GM chewable tablet Take 4 tablets (16 g total) by mouth as needed for Low blood sugar. 30 tablet 12    hydrocortisone 2.5 % cream Apply to face twice daily for flares as needed 40 g 2    ipratropium (ATROVENT) 42 mcg (0.06 %) nasal spray 2 sprays by Nasal route 2 (two) times daily as needed for Rhinitis. 15 mL 0    ketoconazole (NIZORAL) 2 % cream Apply twice daily to affected area of skin 60 g 2    LIDOcaine (LIDODERM) 5 % Place 1 patch onto the skin once daily. Remove & Discard patch within 12 hours or as directed by MD 30 patch 11    metoprolol succinate (TOPROL-XL) 200 MG 24 hr tablet Take 1 tablet (200 mg total) by mouth once daily. 90 tablet 3    oxyCODONE-acetaminophen (PERCOCET)  mg per tablet Take 1 tablet by mouth every 12 (twelve) hours as needed for Pain. 60 tablet 0    rosuvastatin (CRESTOR) 40 MG Tab TAKE 1 TABLET BY MOUTH ONCE DAILY IN THE EVENING 90 tablet 1    semaglutide (OZEMPIC) 2 mg/dose (8 mg/3 mL) PnIj Inject 2 mg into the skin every  "7 days. 9 mL 3    sod sulf-pot chloride-mag sulf (SUTAB) 1.479-0.188- 0.225 gram tablet Take 12 tablets by mouth once daily. Take according to package instructions with indicated amount of water. 24 tablet 0    syringe with needle, safety (BD INTEGRA SYRINGE) 3 mL 22 gauge x 1 1/2" Syrg Inject 1 Syringe as directed once a week. 6 each 3    testosterone cypionate (DEPOTESTOTERONE CYPIONATE) 200 mg/mL injection Inject 0.75 mLs (150 mg total) into the muscle every 14 (fourteen) days. 2 mL 5    valACYclovir (VALTREX) 1000 MG tablet Take 2 tablets (2,000 mg total) by mouth every 12 (twelve) hours. 4 tablet 3     No current facility-administered medications for this visit.     Facility-Administered Medications Ordered in Other Visits   Medication Dose Route Frequency Provider Last Rate Last Admin    0.9%  NaCl infusion   Intravenous Continuous Kendell Hoffman MD        0.9%  NaCl infusion   Intravenous Continuous Gerardo Uribe MD           REVIEW OF SYSTEMS:    GENERAL:  No weight loss, malaise or fevers.  HEENT:  Negative for frequent or significant headaches.  NECK:  Negative for lumps, goiter, pain and significant neck swelling.  RESPIRATORY:  Negative for cough, wheezing or shortness of breath.  CARDIOVASCULAR:  Negative for chest pain, leg swelling or palpitations. HTN, AFib  GI:  Negative for abdominal discomfort, blood in stools or black stools or change in bowel habits.  MUSCULOSKELETAL:  See HPI.  SKIN:  Negative for lesions, rash, and itching.  PSYCH:  Negative for sleep disturbance, mood disorder and recent psychosocial stressors.  ENDO: Diabetes.   HEMATOLOGY/LYMPHOLOGY:  Negative for swollen nodes. Eliquis  NEURO:   No history of headaches, syncope, paralysis, seizures or tremors.  All other reviewed and negative other than HPI.    Past Medical History:  Past Medical History:   Diagnosis Date    Allergy     Carotid artery occlusion     Chronic back pain     Colonic polyp     Genetic testing     MUTYH " mutation-negative    Hyperlipidemia     Hypertension     Paroxysmal atrial fibrillation 2/28/2024    Sleep apnea     Type 2 diabetes mellitus with stage 3 chronic kidney disease, without long-term current use of insulin 4/2/2020       Past Surgical History:  Past Surgical History:   Procedure Laterality Date    ANKLE SURGERY Left     2    APPENDECTOMY      at age 20    ARTHROSCOPIC CHONDROPLASTY OF KNEE JOINT Left 8/13/2024    Procedure: ARTHROSCOPY, KNEE, WITH CHONDROPLASTY;  Surgeon: Kai Washington MD;  Location: [facility] OR;  Service: Orthopedics;  Laterality: Left;    ARTHROSCOPY, KNEE, WITH ABRASION ARTHROPLASTY OR MICROFRACTURE Left 8/13/2024    Procedure: ARTHROSCOPY, KNEE, WITH  MICROFRACTURE;  Surgeon: Kai Washington MD;  Location: [facility] OR;  Service: Orthopedics;  Laterality: Left;    ARTHROSCOPY,KNEE,WITH MENISCUS REPAIR Left 8/13/2024    Procedure: ARTHROSCOPY,KNEE,WITH MENISCUS REPAIR;  Surgeon: Kai Washington MD;  Location: [facility] OR;  Service: Orthopedics;  Laterality: Left;  NO REGIONAL BLOCK    CAROTID ENDARTERECTOMY Right 07/15/2015    CARPAL TUNNEL RELEASE Bilateral     COLONOSCOPY N/A 12/14/2018    Procedure: COLONOSCOPYSuprep;  Surgeon: Silver Selby MD;  Location: [facility] ENDO;  Service: Endoscopy;  Laterality: N/A;    COLONOSCOPY N/A 10/2/2024    Procedure: COLONOSCOPY;  Surgeon: Heath Morrow MD;  Location: [facility] ENDO;  Service: Endoscopy;  Laterality: N/A;  [facility] Sutab, portal - PC  9/25/24-LVM for precall, portal-DS. 10/1/24 Pt. called and confirmed arrival time.EC    EPIDURAL STEROID INJECTION N/A 2/26/2024    Procedure: CERVICAL C7/T1 IL SELENA;  Surgeon: Gokul Fung MD;  Location: [facility] PAIN MGT;  Service: Pain Management;  Laterality: N/A;  [phone]  2 WK F/U SERGEI    EPIDURAL STEROID INJECTION N/A 5/23/2024    Procedure: LUMBAR L4/5 IL SELENA *ASPIRIN OTC* HOLD FOR 5 DAYS;  Surgeon: Gokul Fung MD;  Location: [facility] PAIN MGT;  Service: Pain  Management;  Laterality: N/A;  [phone]  2 WK F/U NOHELIA    EPIDURAL STEROID INJECTION N/A 10/21/2024    Procedure: LUMBAR L5/S1 IL SELENA *ASPIRIN OTC* HOLD FOR 5 DAYS  **EISSA PT**;  Surgeon: Sofia Sheikh MD;  Location: [facility] PAIN MGT;  Service: Pain Management;  Laterality: N/A;  [phone]  2 WK F/U NOHELIA    INJECTION OF ANESTHETIC AGENT AROUND NERVE N/A 12/23/2024    Procedure: LUMBAR L4.5 IL SELENA *ASPIRIN OTC* HOLD FOR 5 DAYS;  Surgeon: Gokul Fung MD;  Location: [facility] PAIN MGT;  Service: Pain Management;  Laterality: N/A;  2 WK F/U NOHELIA    INJECTION OF ANESTHETIC AGENT AROUND NERVE Bilateral 2/24/2025    Procedure: BLOCK, NERVE BILATERAL L3, 4, 5 MEDIAL BRANCH;  Surgeon: Gokul Fung MD;  Location: [facility] PAIN MGT;  Service: Pain Management;  Laterality: Bilateral;  2 WK F/U NOHELIA    INJECTION OF ANESTHETIC AGENT AROUND NERVE Bilateral 3/10/2025    Procedure: BLOCK, NERVE BILATERAL L3, L4, L5 MEDIAL BRANCH;  Surgeon: Gokul Fung MD;  Location: [facility] PAIN MGT;  Service: Pain Management;  Laterality: Bilateral;    JOINT REPLACEMENT  9/2010    NASAL SEPTUM SURGERY      RADIOFREQUENCY ABLATION Bilateral 3/27/2025    Procedure: RADIOFREQUENCY ABLATION BILATERAL L3, 4, 5;  Surgeon: Gokul Fung MD;  Location: [facility] PAIN MGT;  Service: Pain Management;  Laterality: Bilateral;    TOTAL KNEE ARTHROPLASTY Right     6 knee surgeries    TRANSESOPHAGEAL ECHOCARDIOGRAM WITH POSSIBLE CARDIOVERSION (ELIZABETH W/ POSS CARDIOVERSION) N/A 2/28/2024    Procedure: Transesophageal echo (ELIZABETH) intra-procedure log documentation;  Surgeon: Ahmet De La Cruz MD;  Location: [facility] CATH LAB/EP;  Service: Cardiology;  Laterality: N/A;       Family History:  Family History   Problem Relation Name Age of Onset    Brain cancer Mother Klarissa 67    Cancer Mother Klarissa     Hypertension Father Keanu     Lung cancer Father Keanu         x2 (PAUL initially- treated surgically only, then recurred distantly) (smoker, & had been exposed to many  chemicals)    Cancer Father Keanu     Breast cancer Sister  58    Genetic Disorder Sister          monoallelic MUTYH mutation    Seizures Daughter      Cancer Maternal Grandfather Valerio     Brain cancer Paternal Grandfather  73    Breast cancer Maternal Cousin  57    Aneurysm Other mgm's father 48        brain    Ulcerative colitis Other brother's son        Social History:  Social History     Socioeconomic History    Marital status:    Tobacco Use    Smoking status: Never     Passive exposure: Never    Smokeless tobacco: Never   Substance and Sexual Activity    Alcohol use: Yes     Alcohol/week: 2.0 standard drinks of alcohol     Types: 2 Cans of beer per week     Comment: 6 beers per month    Drug use: Never    Sexual activity: Yes     Partners: Female     Birth control/protection: None   Social History Narrative    5/11/2021: . He lives with his wife and 2 kids. (5 kids total). He has one dog, one cat, no more chickens at home. One dog recently passed away. No smokers at home.      Social Drivers of Health     Financial Resource Strain: Low Risk  (3/8/2024)    Overall Financial Resource Strain (CARDIA)     Difficulty of Paying Living Expenses: Not very hard   Food Insecurity: No Food Insecurity (3/8/2024)    Hunger Vital Sign     Worried About Running Out of Food in the Last Year: Never true     Ran Out of Food in the Last Year: Never true   Transportation Needs: No Transportation Needs (3/8/2024)    PRAPARE - Transportation     Lack of Transportation (Medical): No     Lack of Transportation (Non-Medical): No   Physical Activity: Unknown (3/8/2024)    Exercise Vital Sign     Days of Exercise per Week: 0 days   Stress: Stress Concern Present (3/8/2024)    [employer] - Occupational Stress Questionnaire     Feeling of Stress : To some extent   Housing Stability: Low Risk  (3/8/2024)    Housing Stability Vital Sign     Unable to Pay for Housing in the Last Year: No      Number of Places Lived in the Last Year: 1     Unstable Housing in the Last Year: No       OBJECTIVE:    BP (!) 163/88 (Patient Position: Sitting)   Pulse 69   Temp 98.4 °F (36.9 °C)   Wt 91 kg (200 lb 9.9 oz)   SpO2 99%   BMI 28.79 kg/m²     PHYSICAL EXAMINATION:    General appearance: Well appearing, in no acute distress, alert and oriented x3.  Psych:  Mood and affect appropriate.  Skin: Skin color, texture, turgor normal, no rashes or lesions, in both upper and lower body.  Head/face:  Atraumatic, normocephalic. No palpable lymph nodes  Neck: No pain to palpation over the cervical paraspinous muscles.   Cor: RRR  Pulm: Symmetric chest rise, no respiratory distress noted.   Back: Straight leg raising in the sitting position is negative to radicular pain bilaterally. There is pain to palpation over the lumbar facet joints bilaterally. Limited ROM with pain on flexion and extension. Positive facet loading bilaterally.   Extremities:  No deformities, edema, or skin discoloration. Good capillary refill.  Musculoskeletal:  Bilateral upper and lower extremity strength is normal and symmetric.  No atrophy or tone abnormalities are noted.  Neuro: No loss of sensation is noted.  Gait: Normal.    ASSESSMENT: 65 y.o. year old male with neck and back pain, consistent with the followin. Chronic pain disorder        2. Lumbar spondylosis        3. Lumbar radiculopathy        4. Degeneration of intervertebral disc of lumbar region with discogenic back pain and lower extremity pain        5. Cervical radiculopathy        6. Cervical spondylosis        7. DDD (degenerative disc disease), cervical                  PLAN:     - Previous imaging was reviewed and discussed with the patient today.    - He is s/p bilateral L3,4,5 RFA with limited relief at this time. We discussed that it can take 4-6 weeks to see full benefit.     - If limited relief, consider updated lumbar MRI.     - We can repeat C7/T1 IL SELENA as  needed.     - Continue Gabapentin.     - Pain contract signed 5/1/2024.     - Continue Percocet 10/325 mg BID PRN, #60. Refill provided.     - The patient is here today for a refill of current pain medications and they believe these provide effective pain control and improvements in quality of life.  The patient notes no serious side effects, and feels the benefits outweigh the risks.  The patient was reminded of the pain contract that they signed previously as well as the risks and benefits of the medication including possible death.  The updated [place] [employer] prescription monitoring program was reviewed, and the patient has been found to be compliant with current treatment plan. Medication management provided by Dr. Fung.     - UDS from 3/18/2025 reviewed and consistent.     - I have stressed the importance of physical activity and a home exercise plan to help with pain and improve health.    - RTC in 2 months.     The above plan and management options were discussed at length with patient. Patient is in agreement with the above and verbalized understanding.    Baylee Ni  04/15/2025

## 2025-04-16 RX ORDER — OXYCODONE AND ACETAMINOPHEN 10; 325 MG/1; MG/1
1 TABLET ORAL EVERY 12 HOURS PRN
Qty: 60 TABLET | Refills: 0 | Status: SHIPPED | OUTPATIENT
Start: 2025-04-16

## 2025-05-13 ENCOUNTER — PATIENT MESSAGE (OUTPATIENT)
Dept: FAMILY MEDICINE | Facility: CLINIC | Age: 65
End: 2025-05-13
Payer: COMMERCIAL

## 2025-05-14 ENCOUNTER — OFFICE VISIT (OUTPATIENT)
Dept: OPTOMETRY | Facility: CLINIC | Age: 65
End: 2025-05-14
Payer: COMMERCIAL

## 2025-05-14 DIAGNOSIS — N18.31 TYPE 2 DIABETES MELLITUS WITH STAGE 3A CHRONIC KIDNEY DISEASE, WITHOUT LONG-TERM CURRENT USE OF INSULIN: ICD-10-CM

## 2025-05-14 DIAGNOSIS — H52.4 PRESBYOPIA: ICD-10-CM

## 2025-05-14 DIAGNOSIS — E11.9 TYPE 2 DIABETES MELLITUS WITHOUT RETINOPATHY: Primary | ICD-10-CM

## 2025-05-14 DIAGNOSIS — E11.22 TYPE 2 DIABETES MELLITUS WITH STAGE 3A CHRONIC KIDNEY DISEASE, WITHOUT LONG-TERM CURRENT USE OF INSULIN: ICD-10-CM

## 2025-05-14 DIAGNOSIS — H25.13 NUCLEAR SCLEROSIS, BILATERAL: ICD-10-CM

## 2025-05-14 DIAGNOSIS — Z13.5 GLAUCOMA SCREENING: ICD-10-CM

## 2025-05-14 PROCEDURE — 2023F DILAT RTA XM W/O RTNOPTHY: CPT | Mod: CPTII,S$GLB,, | Performed by: OPTOMETRIST

## 2025-05-14 PROCEDURE — 3051F HG A1C>EQUAL 7.0%<8.0%: CPT | Mod: CPTII,S$GLB,, | Performed by: OPTOMETRIST

## 2025-05-14 PROCEDURE — 1159F MED LIST DOCD IN RCRD: CPT | Mod: CPTII,S$GLB,, | Performed by: OPTOMETRIST

## 2025-05-14 PROCEDURE — 92015 DETERMINE REFRACTIVE STATE: CPT | Mod: S$GLB,,, | Performed by: OPTOMETRIST

## 2025-05-14 PROCEDURE — 99999 PR PBB SHADOW E&M-EST. PATIENT-LVL IV: CPT | Mod: PBBFAC,,, | Performed by: OPTOMETRIST

## 2025-05-14 PROCEDURE — 92014 COMPRE OPH EXAM EST PT 1/>: CPT | Mod: S$GLB,,, | Performed by: OPTOMETRIST

## 2025-05-14 PROCEDURE — 4010F ACE/ARB THERAPY RXD/TAKEN: CPT | Mod: CPTII,S$GLB,, | Performed by: OPTOMETRIST

## 2025-05-14 PROCEDURE — 1160F RVW MEDS BY RX/DR IN RCRD: CPT | Mod: CPTII,S$GLB,, | Performed by: OPTOMETRIST

## 2025-05-14 PROCEDURE — 3288F FALL RISK ASSESSMENT DOCD: CPT | Mod: CPTII,S$GLB,, | Performed by: OPTOMETRIST

## 2025-05-14 PROCEDURE — 1157F ADVNC CARE PLAN IN RCRD: CPT | Mod: CPTII,S$GLB,, | Performed by: OPTOMETRIST

## 2025-05-14 PROCEDURE — 1101F PT FALLS ASSESS-DOCD LE1/YR: CPT | Mod: CPTII,S$GLB,, | Performed by: OPTOMETRIST

## 2025-05-14 NOTE — PROGRESS NOTES
HPI    Pt is here for routine eye exam    Pt states no eye pain or discomfort. No flashes or floaters. Pt states   that near vision has gotten harder, but distance vision seems stable. No   other complaints. Wants to check overall eye health and possibly get a new   Rx  Last edited by Puma Tolbert MA on 5/14/2025  8:10 AM.            Assessment /Plan     For exam results, see Encounter Report.    Type 2 diabetes mellitus without retinopathy    Type 2 diabetes mellitus with stage 3a chronic kidney disease, without long-term current use of insulin  -     Ambulatory referral/consult to Optometry    Nuclear sclerosis, bilateral    Glaucoma screening    Presbyopia      Small cats OU--pt happy w otc readers  DM- WITHOUT RETINOPATHY.  Advised yearly DFE     PLAN:    Rtc 1 yr

## 2025-05-16 DIAGNOSIS — M54.17 LUMBOSACRAL RADICULOPATHY AT L5: ICD-10-CM

## 2025-05-16 DIAGNOSIS — R20.0 NUMBNESS: ICD-10-CM

## 2025-05-16 RX ORDER — DULOXETIN HYDROCHLORIDE 30 MG/1
30 CAPSULE, DELAYED RELEASE ORAL
Qty: 90 CAPSULE | Refills: 3 | Status: SHIPPED | OUTPATIENT
Start: 2025-05-16

## 2025-05-16 NOTE — TELEPHONE ENCOUNTER
Refill Routing Note   Medication(s) are not appropriate for processing by [facility] for the following reason(s):        Required vitals abnormal  New or recently adjusted medication    [facility] action(s):  Defer      Medication Therapy Plan: FLOS      Appointments  past 12m or future 3m with PCP    Date Provider   Last Visit   3/19/2025 Rocco Cavazos DO   Next Visit   Visit date not found Rocco Cavazos DO   ED visits in past 90 days: 0        Note composed:8:40 AM 05/16/2025

## 2025-05-16 NOTE — TELEPHONE ENCOUNTER
Care Due:                  Date            Visit Type   Department     Provider  --------------------------------------------------------------------------------                                EP -                              PRIMARY      [facility]  Last Visit: 03-      CARE ([facility])   MEDICINE       Rocco Cavazos  Next Visit: None Scheduled  None         None Found                                                            Last  Test          Frequency    Reason                     Performed    Due Date  --------------------------------------------------------------------------------    Vitamin D...  12 months..  ergocalciferol...........  07- 06-    Health [facility] Embedded Care Due Messages. Reference number: [id number].   5/16/2025 6:16:25 AM CDT

## 2025-05-19 ENCOUNTER — PATIENT MESSAGE (OUTPATIENT)
Dept: FAMILY MEDICINE | Facility: CLINIC | Age: 65
End: 2025-05-19
Payer: COMMERCIAL

## 2025-05-21 ENCOUNTER — OFFICE VISIT (OUTPATIENT)
Dept: FAMILY MEDICINE | Facility: CLINIC | Age: 65
End: 2025-05-21
Payer: COMMERCIAL

## 2025-05-21 VITALS
OXYGEN SATURATION: 96 % | DIASTOLIC BLOOD PRESSURE: 82 MMHG | HEART RATE: 69 BPM | WEIGHT: 198.19 LBS | HEIGHT: 70 IN | SYSTOLIC BLOOD PRESSURE: 142 MMHG | BODY MASS INDEX: 28.37 KG/M2

## 2025-05-21 DIAGNOSIS — I10 ESSENTIAL HYPERTENSION: Primary | ICD-10-CM

## 2025-05-21 PROCEDURE — 99214 OFFICE O/P EST MOD 30 MIN: CPT | Mod: S$GLB,,,

## 2025-05-21 PROCEDURE — 4010F ACE/ARB THERAPY RXD/TAKEN: CPT | Mod: CPTII,S$GLB,,

## 2025-05-21 PROCEDURE — 1157F ADVNC CARE PLAN IN RCRD: CPT | Mod: CPTII,S$GLB,,

## 2025-05-21 PROCEDURE — 3079F DIAST BP 80-89 MM HG: CPT | Mod: CPTII,S$GLB,,

## 2025-05-21 PROCEDURE — 3288F FALL RISK ASSESSMENT DOCD: CPT | Mod: CPTII,S$GLB,,

## 2025-05-21 PROCEDURE — 99999 PR PBB SHADOW E&M-EST. PATIENT-LVL III: CPT | Mod: PBBFAC,,,

## 2025-05-21 PROCEDURE — 1159F MED LIST DOCD IN RCRD: CPT | Mod: CPTII,S$GLB,,

## 2025-05-21 PROCEDURE — 1160F RVW MEDS BY RX/DR IN RCRD: CPT | Mod: CPTII,S$GLB,,

## 2025-05-21 PROCEDURE — 3077F SYST BP >= 140 MM HG: CPT | Mod: CPTII,S$GLB,,

## 2025-05-21 PROCEDURE — 3008F BODY MASS INDEX DOCD: CPT | Mod: CPTII,S$GLB,,

## 2025-05-21 PROCEDURE — G2211 COMPLEX E/M VISIT ADD ON: HCPCS | Mod: S$GLB,,,

## 2025-05-21 PROCEDURE — 3051F HG A1C>EQUAL 7.0%<8.0%: CPT | Mod: CPTII,S$GLB,,

## 2025-05-21 PROCEDURE — 1101F PT FALLS ASSESS-DOCD LE1/YR: CPT | Mod: CPTII,S$GLB,,

## 2025-05-21 RX ORDER — HYDRALAZINE HYDROCHLORIDE 10 MG/1
10 TABLET, FILM COATED ORAL 3 TIMES DAILY
Qty: 90 TABLET | Refills: 0 | Status: SHIPPED | OUTPATIENT
Start: 2025-05-21 | End: 2025-06-20

## 2025-05-21 NOTE — PATIENT INSTRUCTIONS
Stop clonidine  Start hydralazine three times a day    Take blood pressure at home     Appt with Rosario next week    Continue candesartan and metoprolol

## 2025-05-21 NOTE — PROGRESS NOTES
"Subjective     Patient ID: Partha Curtis Jr. is a 65 y.o. male.    Chief Complaint: B/P Medication follow up      HPI    HTN: he is taking  candesartan, metoprolol, and clonidine BID.  Blood pressure in clinic 142/82    States that he has facial rash since starting clonidine. Has worsened with increase. Saw derm. Medications did not help. Would like to get off of.  Was on chlorthalidone. This was stopped due to HANANE     Amlodipine did not help blood pressure and he was having swelling      DLD: on zetia and crestor     Review of Systems   Constitutional:  Negative for fatigue and fever.   Respiratory:  Negative for cough, shortness of breath and wheezing.    Cardiovascular:  Negative for chest pain, palpitations and leg swelling.   Gastrointestinal:  Negative for diarrhea, nausea and vomiting.   Integumentary:  Positive for rash.   Neurological:  Negative for dizziness, light-headedness and headaches.          Objective     Vitals:    05/21/25 0951   BP: (!) 142/82   Pulse: 69   SpO2: 96%   Weight: 89.9 kg (198 lb 3.1 oz)   Height: 5' 10" (1.778 m)   PainSc:   7   PainLoc: Back      Physical Exam  Constitutional:       General: He is not in acute distress.     Appearance: He is not toxic-appearing.   HENT:      Head: Normocephalic and atraumatic.   Eyes:      General:         Right eye: No discharge.         Left eye: No discharge.      Conjunctiva/sclera: Conjunctivae normal.   Cardiovascular:      Rate and Rhythm: Normal rate and regular rhythm.   Pulmonary:      Effort: Pulmonary effort is normal. No respiratory distress.      Breath sounds: Normal breath sounds. No wheezing.   Musculoskeletal:      Right lower leg: No edema.      Left lower leg: No edema.   Lymphadenopathy:      Cervical: No cervical adenopathy.   Skin:     Findings: Rash present.   Neurological:      Mental Status: He is alert and oriented to person, place, and time.              Assessment and Plan     1. Essential hypertension  -     " hydrALAZINE (APRESOLINE) 10 MG tablet; Take 1 tablet (10 mg total) by mouth 3 (three) times daily.  Dispense: 90 tablet; Refill: 0      HTN: He is taking clonidine candesartan and metoprolol  Clonidine causes facial rash. Amlodipine and chlorthalidone not tolerated.  Will start hydralazine TID. Will get him back next week to check blood pressure          Stop clonidine  Start hydralazine three times a day    Take blood pressure at home     Appt with Rosario next week    Continue candesartan and metoprolol    30 minutes of total time spent on the encounter, which includes face to face time and non-face to face time preparing to see the patient (eg, review of tests), Obtaining and/or reviewing separately obtained history, Documenting clinical information in the electronic or other health record, Independently interpreting results (not separately reported) and communicating results to the patient/family/caregiver, or Care coordination (not separately reported).      Rosario Holt PA-C  Family Practice/Internal Medicine   Office Phone: [phone]

## 2025-05-27 ENCOUNTER — HOSPITAL ENCOUNTER (OUTPATIENT)
Facility: HOSPITAL | Age: 65
LOS: 1 days | Discharge: HOME | End: 2025-05-28
Attending: STUDENT IN AN ORGANIZED HEALTH CARE EDUCATION/TRAINING PROGRAM | Admitting: FAMILY MEDICINE
Payer: COMMERCIAL

## 2025-05-27 DIAGNOSIS — I48.91 A-FIB: ICD-10-CM

## 2025-05-27 DIAGNOSIS — I48.91 ATRIAL FIBRILLATION: ICD-10-CM

## 2025-05-27 DIAGNOSIS — I10 ESSENTIAL HYPERTENSION: ICD-10-CM

## 2025-05-27 DIAGNOSIS — I48.91 ATRIAL FIBRILLATION WITH RAPID VENTRICULAR RESPONSE: Primary | ICD-10-CM

## 2025-05-27 DIAGNOSIS — I48.91 ATRIAL FIBRILLATION WITH RVR: ICD-10-CM

## 2025-05-27 LAB
ABSOLUTE EOSINOPHIL (OHS): 0.08 K/UL
ABSOLUTE EOSINOPHIL (OHS): 0.12 K/UL
ABSOLUTE MONOCYTE (OHS): 0.84 K/UL (ref 0.3–1)
ABSOLUTE MONOCYTE (OHS): 1.06 K/UL (ref 0.3–1)
ABSOLUTE NEUTROPHIL COUNT (OHS): 10.06 K/UL (ref 1.8–7.7)
ABSOLUTE NEUTROPHIL COUNT (OHS): 10.33 K/UL (ref 1.8–7.7)
ALBUMIN SERPL BCP-MCNC: 4.5 G/DL (ref 3.5–5.2)
ALP SERPL-CCNC: 83 UNIT/L (ref 40–150)
ALT SERPL W/O P-5'-P-CCNC: 45 UNIT/L (ref 10–44)
ANION GAP (OHS): 12 MMOL/L (ref 8–16)
AORTIC SIZE INDEX (SOV): 1.9 CM/M2
AORTIC SIZE INDEX: 1.7 CM/M2
APICAL FOUR CHAMBER EJECTION FRACTION: 64 %
APICAL TWO CHAMBER EJECTION FRACTION: 49 %
APTT PPP: 28.8 SECONDS (ref 21–32)
APTT PPP: 58.7 SECONDS (ref 21–32)
ASCENDING AORTA: 3.5 CM
AST SERPL-CCNC: 28 UNIT/L (ref 11–45)
AV INDEX (PROSTH): 0.54
AV MEAN GRADIENT: 5 MMHG
AV PEAK GRADIENT: 9 MMHG
AV VALVE AREA BY VELOCITY RATIO: 2.3 CM²
AV VALVE AREA: 2.1 CM²
AV VELOCITY RATIO: 0.6
BASOPHILS # BLD AUTO: 0.07 K/UL
BASOPHILS # BLD AUTO: 0.11 K/UL
BASOPHILS NFR BLD AUTO: 0.5 %
BASOPHILS NFR BLD AUTO: 0.8 %
BILIRUB SERPL-MCNC: 0.6 MG/DL (ref 0.1–1)
BSA FOR ECHO PROCEDURE: 2.09 M2
BUN SERPL-MCNC: 12 MG/DL (ref 8–23)
CALCIUM SERPL-MCNC: 9.5 MG/DL (ref 8.7–10.5)
CHLORIDE SERPL-SCNC: 101 MMOL/L (ref 95–110)
CO2 SERPL-SCNC: 23 MMOL/L (ref 23–29)
CREAT SERPL-MCNC: 1.7 MG/DL (ref 0.5–1.4)
CV ECHO LV RWT: 0.51 CM
DOP CALC AO PEAK VEL: 1.5 M/S
DOP CALC AO VTI: 25 CM
DOP CALC LVOT AREA: 3.8 CM2
DOP CALC LVOT DIAMETER: 2.2 CM
DOP CALC LVOT PEAK VEL: 0.9 M/S
DOP CALC LVOT STROKE VOLUME: 51.7 CM3
DOP CALCLVOT PEAK VEL VTI: 13.6 CM
E/E' RATIO: 13 M/S
ECHO LV POSTERIOR WALL: 1 CM (ref 0.6–1.1)
ERYTHROCYTE [DISTWIDTH] IN BLOOD BY AUTOMATED COUNT: 14.2 % (ref 11.5–14.5)
ERYTHROCYTE [DISTWIDTH] IN BLOOD BY AUTOMATED COUNT: 14.2 % (ref 11.5–14.5)
FRACTIONAL SHORTENING: 28.2 % (ref 28–44)
GFR SERPLBLD CREATININE-BSD FMLA CKD-EPI: 44 ML/MIN/1.73/M2
GLUCOSE SERPL-MCNC: 245 MG/DL (ref 70–110)
HCT VFR BLD AUTO: 56.2 % (ref 40–54)
HCT VFR BLD AUTO: 57.4 % (ref 40–54)
HGB BLD-MCNC: 19.1 GM/DL (ref 14–18)
HGB BLD-MCNC: 19.5 GM/DL (ref 14–18)
IMM GRANULOCYTES # BLD AUTO: 0.05 K/UL (ref 0–0.04)
IMM GRANULOCYTES # BLD AUTO: 0.07 K/UL (ref 0–0.04)
IMM GRANULOCYTES NFR BLD AUTO: 0.4 % (ref 0–0.5)
IMM GRANULOCYTES NFR BLD AUTO: 0.5 % (ref 0–0.5)
INR PPP: 1.1 (ref 0.8–1.2)
INR PPP: 1.2 (ref 0.8–1.2)
INTERVENTRICULAR SEPTUM: 1.4 CM (ref 0.6–1.1)
LEFT ATRIUM AREA SYSTOLIC (APICAL 2 CHAMBER): 14.39 CM2
LEFT ATRIUM AREA SYSTOLIC (APICAL 4 CHAMBER): 12.7 CM2
LEFT ATRIUM VOLUME INDEX MOD: 15 ML/M2
LEFT ATRIUM VOLUME MOD: 30 ML
LEFT INTERNAL DIMENSION IN SYSTOLE: 2.8 CM (ref 2.1–4)
LEFT VENTRICLE DIASTOLIC VOLUME INDEX: 32.04 ML/M2
LEFT VENTRICLE DIASTOLIC VOLUME: 66 ML
LEFT VENTRICLE END DIASTOLIC VOLUME APICAL 2 CHAMBER: 44.98 ML
LEFT VENTRICLE END DIASTOLIC VOLUME APICAL 4 CHAMBER: 61.54 ML
LEFT VENTRICLE END SYSTOLIC VOLUME APICAL 2 CHAMBER: 32.7 ML
LEFT VENTRICLE END SYSTOLIC VOLUME APICAL 4 CHAMBER: 26.96 ML
LEFT VENTRICLE MASS INDEX: 77.3 G/M2
LEFT VENTRICLE SYSTOLIC VOLUME INDEX: 14.6 ML/M2
LEFT VENTRICLE SYSTOLIC VOLUME: 30 ML
LEFT VENTRICULAR INTERNAL DIMENSION IN DIASTOLE: 3.9 CM (ref 3.5–6)
LEFT VENTRICULAR MASS: 159.3 G
LV LATERAL E/E' RATIO: 11.3 M/S
LV SEPTAL E/E' RATIO: 15 M/S
LVED V (TEICH): 65.95 ML
LVES V (TEICH): 29.82 ML
LVOT MG: 2.2 MMHG
LVOT MV: 0.72 CM/S
LYMPHOCYTES # BLD AUTO: 2.17 K/UL (ref 1–4.8)
LYMPHOCYTES # BLD AUTO: 2.74 K/UL (ref 1–4.8)
MAGNESIUM SERPL-MCNC: 2 MG/DL (ref 1.6–2.6)
MCH RBC QN AUTO: 31.3 PG (ref 27–31)
MCH RBC QN AUTO: 31.6 PG (ref 27–31)
MCHC RBC AUTO-ENTMCNC: 34 G/DL (ref 32–36)
MCHC RBC AUTO-ENTMCNC: 34 G/DL (ref 32–36)
MCV RBC AUTO: 92 FL (ref 82–98)
MCV RBC AUTO: 93 FL (ref 82–98)
MV PEAK E VEL: 0.9 M/S
NUCLEATED RBC (/100WBC) (OHS): 0 /100 WBC
NUCLEATED RBC (/100WBC) (OHS): 0 /100 WBC
OHS CV RV/LV RATIO: 0.69 CM
OHS LV EJECTION FRACTION SIMPSONS BIPLANE MOD: 58 %
PISA MRMAX VEL: 3.18 M/S
PISA TR MAX VEL: 2 M/S
PLATELET # BLD AUTO: 284 K/UL (ref 150–450)
PLATELET # BLD AUTO: 298 K/UL (ref 150–450)
PMV BLD AUTO: 9 FL (ref 9.2–12.9)
PMV BLD AUTO: 9 FL (ref 9.2–12.9)
POTASSIUM SERPL-SCNC: 4.1 MMOL/L (ref 3.5–5.1)
PROT SERPL-MCNC: 8.3 GM/DL (ref 6–8.4)
PROTHROMBIN TIME: 13 SECONDS (ref 9–12.5)
PROTHROMBIN TIME: 13.5 SECONDS (ref 9–12.5)
RA PRESSURE ESTIMATED: 3 MMHG
RA VOL SYS: 25.02 ML
RBC # BLD AUTO: 6.11 M/UL (ref 4.6–6.2)
RBC # BLD AUTO: 6.17 M/UL (ref 4.6–6.2)
RELATIVE EOSINOPHIL (OHS): 0.6 %
RELATIVE EOSINOPHIL (OHS): 0.9 %
RELATIVE LYMPHOCYTE (OHS): 16 % (ref 18–48)
RELATIVE LYMPHOCYTE (OHS): 19.4 % (ref 18–48)
RELATIVE MONOCYTE (OHS): 6.2 % (ref 4–15)
RELATIVE MONOCYTE (OHS): 7.5 % (ref 4–15)
RELATIVE NEUTROPHIL (OHS): 71.2 % (ref 38–73)
RELATIVE NEUTROPHIL (OHS): 76 % (ref 38–73)
RIGHT ATRIAL AREA: 11.7 CM2
RIGHT ATRIUM END SYSTOLIC VOLUME APICAL 4 CHAMBER INDEX BSA: 11.34 ML/M2
RIGHT ATRIUM VOLUME AREA LENGTH APICAL 4 CHAMBER: 23.36 ML
RIGHT VENTRICLE DIASTOLIC BASEL DIMENSION: 2.7 CM
RV TB RVSP: 5 MMHG
RV TISSUE DOPPLER FREE WALL SYSTOLIC VELOCITY 1 (APICAL 4 CHAMBER VIEW): 14.28 CM/S
SINUS: 4 CM
SODIUM SERPL-SCNC: 136 MMOL/L (ref 136–145)
STJ: 3.4 CM
TDI LATERAL: 0.08 M/S
TDI SEPTAL: 0.06 M/S
TDI: 0.07 M/S
TR MAX PG: 16 MMHG
TRICUSPID ANNULAR PLANE SYSTOLIC EXCURSION: 2 CM
TROPONIN I SERPL DL<=0.01 NG/ML-MCNC: <0.006 NG/ML
TROPONIN I SERPL DL<=0.01 NG/ML-MCNC: <0.006 NG/ML
TSH SERPL-ACNC: 1.21 UIU/ML (ref 0.4–4)
TV REST PULMONARY ARTERY PRESSURE: 19 MMHG
WBC # BLD AUTO: 13.58 K/UL (ref 3.9–12.7)
WBC # BLD AUTO: 14.12 K/UL (ref 3.9–12.7)
Z-SCORE OF LEFT VENTRICULAR DIMENSION IN END DIASTOLE: -4.67
Z-SCORE OF LEFT VENTRICULAR DIMENSION IN END SYSTOLE: -2.43

## 2025-05-27 PROCEDURE — 96366 THER/PROPH/DIAG IV INF ADDON: CPT

## 2025-05-27 PROCEDURE — 93005 ELECTROCARDIOGRAM TRACING: CPT

## 2025-05-27 PROCEDURE — 99214 OFFICE O/P EST MOD 30 MIN: CPT | Mod: ,,, | Performed by: NURSE PRACTITIONER

## 2025-05-27 PROCEDURE — 25000003 PHARM REV CODE 250: Performed by: NURSE PRACTITIONER

## 2025-05-27 PROCEDURE — 85025 COMPLETE CBC W/AUTO DIFF WBC: CPT | Performed by: STUDENT IN AN ORGANIZED HEALTH CARE EDUCATION/TRAINING PROGRAM

## 2025-05-27 PROCEDURE — 63600175 PHARM REV CODE 636 W HCPCS: Performed by: FAMILY MEDICINE

## 2025-05-27 PROCEDURE — 96376 TX/PRO/DX INJ SAME DRUG ADON: CPT

## 2025-05-27 PROCEDURE — 85730 THROMBOPLASTIN TIME PARTIAL: CPT | Performed by: NURSE PRACTITIONER

## 2025-05-27 PROCEDURE — 80053 COMPREHEN METABOLIC PANEL: CPT | Performed by: STUDENT IN AN ORGANIZED HEALTH CARE EDUCATION/TRAINING PROGRAM

## 2025-05-27 PROCEDURE — 85730 THROMBOPLASTIN TIME PARTIAL: CPT | Mod: 91 | Performed by: FAMILY MEDICINE

## 2025-05-27 PROCEDURE — 36415 COLL VENOUS BLD VENIPUNCTURE: CPT | Performed by: NURSE PRACTITIONER

## 2025-05-27 PROCEDURE — 96375 TX/PRO/DX INJ NEW DRUG ADDON: CPT

## 2025-05-27 PROCEDURE — 99291 CRITICAL CARE FIRST HOUR: CPT

## 2025-05-27 PROCEDURE — 93010 ELECTROCARDIOGRAM REPORT: CPT | Mod: 76,,, | Performed by: INTERNAL MEDICINE

## 2025-05-27 PROCEDURE — G0378 HOSPITAL OBSERVATION PER HR: HCPCS

## 2025-05-27 PROCEDURE — 85025 COMPLETE CBC W/AUTO DIFF WBC: CPT | Mod: 91 | Performed by: NURSE PRACTITIONER

## 2025-05-27 PROCEDURE — 84484 ASSAY OF TROPONIN QUANT: CPT | Mod: 91 | Performed by: NURSE PRACTITIONER

## 2025-05-27 PROCEDURE — 84484 ASSAY OF TROPONIN QUANT: CPT | Mod: 59 | Performed by: STUDENT IN AN ORGANIZED HEALTH CARE EDUCATION/TRAINING PROGRAM

## 2025-05-27 PROCEDURE — 25000003 PHARM REV CODE 250: Performed by: FAMILY MEDICINE

## 2025-05-27 PROCEDURE — 85610 PROTHROMBIN TIME: CPT | Mod: 59 | Performed by: STUDENT IN AN ORGANIZED HEALTH CARE EDUCATION/TRAINING PROGRAM

## 2025-05-27 PROCEDURE — 96367 TX/PROPH/DG ADDL SEQ IV INF: CPT

## 2025-05-27 PROCEDURE — 83735 ASSAY OF MAGNESIUM: CPT | Performed by: STUDENT IN AN ORGANIZED HEALTH CARE EDUCATION/TRAINING PROGRAM

## 2025-05-27 PROCEDURE — 96361 HYDRATE IV INFUSION ADD-ON: CPT

## 2025-05-27 PROCEDURE — 96365 THER/PROPH/DIAG IV INF INIT: CPT

## 2025-05-27 PROCEDURE — 84443 ASSAY THYROID STIM HORMONE: CPT | Performed by: STUDENT IN AN ORGANIZED HEALTH CARE EDUCATION/TRAINING PROGRAM

## 2025-05-27 PROCEDURE — 36415 COLL VENOUS BLD VENIPUNCTURE: CPT | Performed by: STUDENT IN AN ORGANIZED HEALTH CARE EDUCATION/TRAINING PROGRAM

## 2025-05-27 PROCEDURE — 25000003 PHARM REV CODE 250: Performed by: STUDENT IN AN ORGANIZED HEALTH CARE EDUCATION/TRAINING PROGRAM

## 2025-05-27 PROCEDURE — 63600175 PHARM REV CODE 636 W HCPCS: Performed by: NURSE PRACTITIONER

## 2025-05-27 PROCEDURE — 85610 PROTHROMBIN TIME: CPT | Mod: 91 | Performed by: NURSE PRACTITIONER

## 2025-05-27 RX ORDER — ATORVASTATIN CALCIUM 40 MG/1
80 TABLET, FILM COATED ORAL NIGHTLY
Status: DISCONTINUED | OUTPATIENT
Start: 2025-05-27 | End: 2025-05-28 | Stop reason: HOSPADM

## 2025-05-27 RX ORDER — AMITRIPTYLINE HYDROCHLORIDE 25 MG/1
75 TABLET, FILM COATED ORAL NIGHTLY
Status: DISCONTINUED | OUTPATIENT
Start: 2025-05-27 | End: 2025-05-28 | Stop reason: HOSPADM

## 2025-05-27 RX ORDER — DILTIAZEM HCL 1 MG/ML
0-15 INJECTION, SOLUTION INTRAVENOUS CONTINUOUS
Status: DISCONTINUED | OUTPATIENT
Start: 2025-05-27 | End: 2025-05-27

## 2025-05-27 RX ORDER — DILTIAZEM HCL/D5W 125 MG/125
10 PLASTIC BAG, INJECTION (ML) INTRAVENOUS CONTINUOUS
Status: DISCONTINUED | OUTPATIENT
Start: 2025-05-27 | End: 2025-05-28

## 2025-05-27 RX ORDER — DULOXETIN HYDROCHLORIDE 30 MG/1
30 CAPSULE, DELAYED RELEASE ORAL DAILY
Status: DISCONTINUED | OUTPATIENT
Start: 2025-05-27 | End: 2025-05-28 | Stop reason: HOSPADM

## 2025-05-27 RX ORDER — HYDRALAZINE HYDROCHLORIDE 10 MG/1
10 TABLET, FILM COATED ORAL 3 TIMES DAILY
Status: DISCONTINUED | OUTPATIENT
Start: 2025-05-27 | End: 2025-05-28 | Stop reason: HOSPADM

## 2025-05-27 RX ORDER — GABAPENTIN 400 MG/1
800 CAPSULE ORAL NIGHTLY
Status: DISCONTINUED | OUTPATIENT
Start: 2025-05-27 | End: 2025-05-28 | Stop reason: HOSPADM

## 2025-05-27 RX ORDER — VALACYCLOVIR HYDROCHLORIDE 500 MG/1
1000 TABLET, FILM COATED ORAL EVERY 12 HOURS
Status: DISCONTINUED | OUTPATIENT
Start: 2025-05-27 | End: 2025-05-28 | Stop reason: HOSPADM

## 2025-05-27 RX ORDER — ONDANSETRON HYDROCHLORIDE 2 MG/ML
8 INJECTION, SOLUTION INTRAVENOUS EVERY 8 HOURS PRN
Status: DISCONTINUED | OUTPATIENT
Start: 2025-05-27 | End: 2025-05-28 | Stop reason: HOSPADM

## 2025-05-27 RX ORDER — METOPROLOL TARTRATE 1 MG/ML
5 INJECTION, SOLUTION INTRAVENOUS EVERY 5 MIN PRN
Status: COMPLETED | OUTPATIENT
Start: 2025-05-27 | End: 2025-05-27

## 2025-05-27 RX ORDER — HEPARIN SODIUM,PORCINE/D5W 25000/250
0-40 INTRAVENOUS SOLUTION INTRAVENOUS CONTINUOUS
Status: DISCONTINUED | OUTPATIENT
Start: 2025-05-27 | End: 2025-05-28

## 2025-05-27 RX ORDER — ASPIRIN 81 MG/1
81 TABLET ORAL DAILY
Status: DISCONTINUED | OUTPATIENT
Start: 2025-05-27 | End: 2025-05-28 | Stop reason: HOSPADM

## 2025-05-27 RX ORDER — DILTIAZEM HYDROCHLORIDE 5 MG/ML
10 INJECTION INTRAVENOUS
Status: COMPLETED | OUTPATIENT
Start: 2025-05-27 | End: 2025-05-27

## 2025-05-27 RX ORDER — SODIUM CHLORIDE 9 MG/ML
1000 INJECTION, SOLUTION INTRAVENOUS
Status: COMPLETED | OUTPATIENT
Start: 2025-05-27 | End: 2025-05-27

## 2025-05-27 RX ORDER — EZETIMIBE 10 MG/1
10 TABLET ORAL DAILY
Status: DISCONTINUED | OUTPATIENT
Start: 2025-05-27 | End: 2025-05-28 | Stop reason: HOSPADM

## 2025-05-27 RX ORDER — OXYCODONE AND ACETAMINOPHEN 10; 325 MG/1; MG/1
1 TABLET ORAL EVERY 12 HOURS PRN
Refills: 0 | Status: DISCONTINUED | OUTPATIENT
Start: 2025-05-27 | End: 2025-05-28 | Stop reason: HOSPADM

## 2025-05-27 RX ORDER — VALSARTAN 160 MG/1
320 TABLET ORAL DAILY
Status: DISCONTINUED | OUTPATIENT
Start: 2025-05-27 | End: 2025-05-28 | Stop reason: HOSPADM

## 2025-05-27 RX ORDER — METOPROLOL SUCCINATE 50 MG/1
200 TABLET, EXTENDED RELEASE ORAL DAILY
Status: DISCONTINUED | OUTPATIENT
Start: 2025-05-27 | End: 2025-05-28 | Stop reason: HOSPADM

## 2025-05-27 RX ADMIN — VALACYCLOVIR HYDROCHLORIDE 1000 MG: 500 TABLET, FILM COATED ORAL at 08:05

## 2025-05-27 RX ADMIN — SODIUM CHLORIDE 12.5 MG: 9 INJECTION, SOLUTION INTRAVENOUS at 02:05

## 2025-05-27 RX ADMIN — EZETIMIBE 10 MG: 10 TABLET ORAL at 01:05

## 2025-05-27 RX ADMIN — Medication 5 MG/HR: at 11:05

## 2025-05-27 RX ADMIN — OXYCODONE AND ACETAMINOPHEN 1 TABLET: 10; 325 TABLET ORAL at 08:05

## 2025-05-27 RX ADMIN — GABAPENTIN 800 MG: 400 CAPSULE ORAL at 08:05

## 2025-05-27 RX ADMIN — HEPARIN SODIUM 12 UNITS/KG/HR: 10000 INJECTION, SOLUTION INTRAVENOUS at 02:05

## 2025-05-27 RX ADMIN — DULOXETINE HYDROCHLORIDE 30 MG: 30 CAPSULE, DELAYED RELEASE ORAL at 01:05

## 2025-05-27 RX ADMIN — Medication 10 MG/HR: at 02:05

## 2025-05-27 RX ADMIN — HYDRALAZINE HYDROCHLORIDE 10 MG: 10 TABLET ORAL at 08:05

## 2025-05-27 RX ADMIN — METOPROLOL SUCCINATE 200 MG: 50 TABLET, EXTENDED RELEASE ORAL at 01:05

## 2025-05-27 RX ADMIN — ASPIRIN 81 MG: 81 TABLET, COATED ORAL at 01:05

## 2025-05-27 RX ADMIN — DILTIAZEM HYDROCHLORIDE 10 MG: 5 INJECTION INTRAVENOUS at 10:05

## 2025-05-27 RX ADMIN — Medication 10 MG/HR: at 08:05

## 2025-05-27 RX ADMIN — ATORVASTATIN CALCIUM 80 MG: 40 TABLET, FILM COATED ORAL at 08:05

## 2025-05-27 RX ADMIN — HYDRALAZINE HYDROCHLORIDE 10 MG: 10 TABLET ORAL at 03:05

## 2025-05-27 RX ADMIN — AMITRIPTYLINE HYDROCHLORIDE 75 MG: 25 TABLET, FILM COATED ORAL at 08:05

## 2025-05-27 RX ADMIN — SODIUM CHLORIDE 1000 ML: 9 INJECTION, SOLUTION INTRAVENOUS at 10:05

## 2025-05-27 RX ADMIN — METOROPROLOL TARTRATE 5 MG: 5 INJECTION, SOLUTION INTRAVENOUS at 09:05

## 2025-05-27 RX ADMIN — ONDANSETRON 8 MG: 2 INJECTION INTRAMUSCULAR; INTRAVENOUS at 04:05

## 2025-05-27 RX ADMIN — VALSARTAN 320 MG: 160 TABLET, FILM COATED ORAL at 01:05

## 2025-05-27 RX ADMIN — SODIUM CHLORIDE 12.5 MG: 9 INJECTION, SOLUTION INTRAVENOUS at 08:05

## 2025-05-27 NOTE — CONSULTS
[facility] - Emergency Dept  Cardiology  Consult Note    Patient Name: Partha Curtis Jr.  MRN: [id number]  Admission Date: 5/27/2025  Hospital Length of Stay: 0 days  Code Status: Prior   Attending Provider: Valerie Dolan MD   Consulting Provider: Ruslan Gallegos NP  Primary Care Physician: Rocco Cavazos DO  Principal Problem:<principal problem not specified>    Patient information was obtained from patient, past medical records, and ER records.     Inpatient consult to [facility]iology-[facility]  Consult performed by: Ruslan Gallegos NP  Consult ordered by: Valerie Dolan MD        Subjective:     Chief Complaint:  Elevated HR, chest pain      HPI:   65 year old male with EBONY s/p CEA (right), HLD, HTN, DM, CKD, pAF, ALLA (untreated- has attempted numerous masks). Patient is not on OAC at the present. Patient reports palpitations, chest discomfort. No diaphoresis, SOB, NV. He reports that his home device alerted him to AF so he proceeded to the ED for further evaluation. Patient was noted to be in AF RVR which has improved with Cardizem gtt and continuation of home BB. Initiated heparin gtt. CHADSVASC 4. TTE pending. NPO p MN for ELIZABETH/DCCV with possible NM stress test to follow.     Past Medical History:   Diagnosis Date    Allergy     Carotid artery occlusion     Cataract     Chronic back pain     Colonic polyp     Genetic testing     MUTYH mutation-negative    Hyperlipidemia     Hypertension     Paroxysmal atrial fibrillation 02/28/2024    Sleep apnea     Type 2 diabetes mellitus with stage 3 chronic kidney disease, without long-term current use of insulin 04/02/2020       Past Surgical History:   Procedure Laterality Date    ANKLE SURGERY Left     2    APPENDECTOMY      at age 20    ARTHROSCOPIC CHONDROPLASTY OF KNEE JOINT Left 08/13/2024    Procedure: ARTHROSCOPY, KNEE, WITH CHONDROPLASTY;  Surgeon: Kai Washington MD;  Location: [facility] OR;  Service: Orthopedics;  Laterality: Left;    ARTHROSCOPY, KNEE,  WITH ABRASION ARTHROPLASTY OR MICROFRACTURE Left 08/13/2024    Procedure: ARTHROSCOPY, KNEE, WITH  MICROFRACTURE;  Surgeon: Kai Washington MD;  Location: [facility] OR;  Service: Orthopedics;  Laterality: Left;    ARTHROSCOPY,KNEE,WITH MENISCUS REPAIR Left 08/13/2024    Procedure: ARTHROSCOPY,KNEE,WITH MENISCUS REPAIR;  Surgeon: Kai Washington MD;  Location: [facility] OR;  Service: Orthopedics;  Laterality: Left;  NO REGIONAL BLOCK    CAROTID ENDARTERECTOMY Right 07/15/2015    CARPAL TUNNEL RELEASE Bilateral     COLONOSCOPY N/A 12/14/2018    Procedure: COLONOSCOPYSuprep;  Surgeon: Silver Selby MD;  Location: [facility] ENDO;  Service: Endoscopy;  Laterality: N/A;    COLONOSCOPY N/A 10/02/2024    Procedure: COLONOSCOPY;  Surgeon: Heath Morrow MD;  Location: [facility] ENDO;  Service: Endoscopy;  Laterality: N/A;  Ref by Dr STEVE Cavazos [facility], pt has unopened Sutab, portal - PC  9/25/24-LVM for precall, portal-DS. 10/1/24 Pt. called and confirmed arrival time.EC    EPIDURAL STEROID INJECTION N/A 02/26/2024    Procedure: CERVICAL C7/T1 IL SELENA;  Surgeon: Gokul Fung MD;  Location: [facility] PAIN MGT;  Service: Pain Management;  Laterality: N/A;  [phone]  2 WK F/U SERGEI    EPIDURAL STEROID INJECTION N/A 05/23/2024    Procedure: LUMBAR L4/5 IL SELENA *ASPIRIN OTC* HOLD FOR 5 DAYS;  Surgeon: Gokul Fung MD;  Location: [facility] PAIN MGT;  Service: Pain Management;  Laterality: N/A;  [phone]  2 WK F/U NOHELIA    EPIDURAL STEROID INJECTION N/A 10/21/2024    Procedure: LUMBAR L5/S1 IL SELENA *ASPIRIN OTC* HOLD FOR 5 DAYS  **ANGELICA PT**;  Surgeon: Sofia Sheikh MD;  Location: [facility] PAIN MGT;  Service: Pain Management;  Laterality: N/A;  [phone]  2 WK F/U NOHELIA    INJECTION OF ANESTHETIC AGENT AROUND NERVE N/A 12/23/2024    Procedure: LUMBAR L4.5 IL SELENA *ASPIRIN OTC* HOLD FOR 5 DAYS;  Surgeon: Gokul Fung MD;  [facility];  Service: Pain Management;  Laterality: N/A;  2 WK F/U NOHELIA    INJECTION OF ANESTHETIC AGENT AROUND  "NERVE Bilateral 02/24/2025    Procedure: BLOCK, NERVE BILATERAL L3, 4, 5 MEDIAL BRANCH;  Surgeon: Gokul Fung MD;  Location: [facility] PAIN MGT;  Service: Pain Management;  Laterality: Bilateral;  2 WK F/U NOHELIA    INJECTION OF ANESTHETIC AGENT AROUND NERVE Bilateral 03/10/2025    Procedure: BLOCK, NERVE BILATERAL L3, L4, L5 MEDIAL BRANCH;  Surgeon: Gokul Fung MD;  Location: [facility] PAIN MGT;  Service: Pain Management;  Laterality: Bilateral;    JOINT REPLACEMENT  09/2010    NASAL SEPTUM SURGERY      RADIOFREQUENCY ABLATION Bilateral 03/27/2025    Procedure: RADIOFREQUENCY ABLATION BILATERAL L3, 4, 5;  Surgeon: Gokul Fung MD;  Location: [facility] PAIN MGT;  Service: Pain Management;  Laterality: Bilateral;    TOTAL KNEE ARTHROPLASTY Right     6 knee surgeries    TRANSESOPHAGEAL ECHOCARDIOGRAM WITH POSSIBLE CARDIOVERSION (ELIZABETH W/ POSS CARDIOVERSION) N/A 02/28/2024    Procedure: Transesophageal echo (ELIZABETH) intra-procedure log documentation;  Surgeon: Ahmet De La Cruz MD;  Location: [facility] CATH LAB/EP;  Service: Cardiology;  Laterality: N/A;       Review of patient's allergies indicates:   Allergen Reactions    Stadol [butorphanol tartrate] Rash     Swelling in face    Strawberries [strawberry] Rash       Current Facility-Administered Medications on File Prior to Encounter   Medication    0.9%  NaCl infusion    0.9%  NaCl infusion     Current Outpatient Medications on File Prior to Encounter   Medication Sig    amitriptyline (ELAVIL) 75 MG tablet Take 1 tablet by mouth in the evening    aspirin (ECOTRIN) 81 MG EC tablet Take 1 tablet (81 mg total) by mouth once daily.    BD LUER-RUSS SYRINGE 3 mL 25 gauge x 1" Syrg USE ONE SYRINGE EVERY 14 DAYS WITH TESTOSTERONE    blood-glucose sensor (DEXCOM G7 SENSOR) Allison To check sugars up to 3 times daily    candesartan (ATACAND) 32 MG tablet Take 1 tablet by mouth once daily    cyanocobalamin (VITAMIN B-12) 1000 MCG tablet Take 1 tablet (1,000 mcg total) by mouth once daily.    " "DULoxetine (CYMBALTA) 30 MG capsule Take 1 capsule by mouth once daily    ergocalciferol (ERGOCALCIFEROL) 50,000 unit Cap Take 1 capsule (50,000 Units total) by mouth every 7 days.    ezetimibe (ZETIA) 10 mg tablet Take 1 tablet by mouth once daily    fluticasone propionate (FLONASE) 50 mcg/actuation nasal spray 2 sprays by Each Nostril route.    gabapentin (NEURONTIN) 800 MG tablet Take 1 tablet (800 mg total) by mouth every evening.    glimepiride (AMARYL) 1 MG tablet Take 1 tablet (1 mg total) by mouth before breakfast.    glucose 4 GM chewable tablet Take 4 tablets (16 g total) by mouth as needed for Low blood sugar.    hydrALAZINE (APRESOLINE) 10 MG tablet Take 1 tablet (10 mg total) by mouth 3 (three) times daily.    hydrocortisone 2.5 % cream Apply to face twice daily for flares as needed    ipratropium (ATROVENT) 42 mcg (0.06 %) nasal spray 2 sprays by Nasal route 2 (two) times daily as needed for Rhinitis.    ketoconazole (NIZORAL) 2 % cream Apply twice daily to affected area of skin    LIDOcaine (LIDODERM) 5 % Place 1 patch onto the skin once daily. Remove & Discard patch within 12 hours or as directed by MD    metoprolol succinate (TOPROL-XL) 200 MG 24 hr tablet Take 1 tablet (200 mg total) by mouth once daily.    oxyCODONE-acetaminophen (PERCOCET)  mg per tablet Take 1 tablet by mouth every 12 (twelve) hours as needed for Pain.    rosuvastatin (CRESTOR) 40 MG Tab TAKE 1 TABLET BY MOUTH ONCE DAILY IN THE EVENING    semaglutide (OZEMPIC) 2 mg/dose (8 mg/3 mL) PnIj Inject 2 mg into the skin every 7 days.    sod sulf-pot chloride-mag sulf (SUTAB) 1.479-0.188- 0.225 gram tablet Take 12 tablets by mouth once daily. Take according to package instructions with indicated amount of water.    syringe with needle, safety (BD INTEGRA SYRINGE) 3 mL 22 gauge x 1 1/2" Syrg Inject 1 Syringe as directed once a week.    testosterone cypionate (DEPOTESTOTERONE CYPIONATE) 200 mg/mL injection Inject 0.75 mLs (150 mg " total) into the muscle every 14 (fourteen) days.    valACYclovir (VALTREX) 1000 MG tablet Take 2 tablets (2,000 mg total) by mouth every 12 (twelve) hours.    [DISCONTINUED] cloNIDine (CATAPRES) 0.1 MG tablet Take 1 tablet (0.1 mg total) by mouth 2 (two) times daily.     Family History       Problem Relation (Age of Onset)    Aneurysm Other (48)    Brain cancer Mother (67), Paternal Grandfather (73)    Breast cancer Sister (58), Maternal Cousin (57)    Cancer Mother, Father, Maternal Grandfather    Genetic Disorder Sister    Hypertension Father    Lung cancer Father    Seizures Daughter    Ulcerative colitis Other          Tobacco Use    Smoking status: Never     Passive exposure: Never    Smokeless tobacco: Never   Substance and Sexual Activity    Alcohol use: Yes     Alcohol/week: 2.0 standard drinks of alcohol     Types: 2 Cans of beer per week     Comment: 6 beers per month    Drug use: Never    Sexual activity: Yes     Partners: Female     Birth control/protection: None     Review of Systems   Constitutional: Negative for diaphoresis.   HENT: Negative.     Eyes: Negative.    Cardiovascular:  Positive for chest pain, irregular heartbeat and palpitations. Negative for orthopnea, paroxysmal nocturnal dyspnea and syncope.   Respiratory: Negative.  Negative for cough and shortness of breath.    Endocrine: Negative.    Hematologic/Lymphatic: Negative.    Gastrointestinal:  Negative for nausea and vomiting.   Genitourinary: Negative.    Neurological: Negative.  Negative for dizziness.   Psychiatric/Behavioral: Negative.     Allergic/Immunologic: Negative.      Objective:     Vital Signs (Most Recent):  Temp: 98 °F (36.7 °C) (05/27/25 0906)  Pulse: (!) 122 (05/27/25 1332)  Resp: 19 (05/27/25 1317)  BP: (!) 155/105 (05/27/25 1317)  SpO2: 95 % (05/27/25 1317) Vital Signs (24h Range):  Temp:  [98 °F (36.7 °C)] 98 °F (36.7 °C)  Pulse:  [100-161] 122  Resp:  [10-22] 19  SpO2:  [95 %-100 %] 95 %  BP: (116-185)/()  "155/105     Weight: 88.5 kg (195 lb)  Body mass index is 27.98 kg/m².    SpO2: 95 %       No intake or output data in the 24 hours ending 05/27/25 1345    Lines/Drains/Airways       Peripheral Intravenous Line  Duration                  Peripheral IV - Single Lumen 05/27/25 0725 20 G Left Antecubital <1 day         Peripheral IV - Single Lumen 05/27/25 1119 20 G Right Antecubital <1 day                     Physical Exam  Constitutional:       General: He is not in acute distress.     Appearance: He is not diaphoretic.   HENT:      Head: Atraumatic.   Eyes:      General:         Right eye: No discharge.         Left eye: No discharge.   Cardiovascular:      Rate and Rhythm: Tachycardia present. Rhythm irregular.   Pulmonary:      Effort: Pulmonary effort is normal.      Breath sounds: No rales.   Abdominal:      General: Bowel sounds are normal.      Palpations: Abdomen is soft.   Skin:     General: Skin is warm and dry.   Neurological:      Mental Status: He is alert and oriented to person, place, and time.   Psychiatric:         Mood and Affect: Mood normal.         Behavior: Behavior normal.         Thought Content: Thought content normal.         Judgment: Judgment normal.          Significant Labs: BMP:   Recent Labs   Lab 05/27/25 0927 05/27/25  1144   *  --      --    K 4.1  --      --    CO2 23  --    BUN 12  --    CREATININE 1.7*  --    CALCIUM 9.5  --    MG  --  2.0   , CMP   Recent Labs   Lab 05/27/25 0927      K 4.1      CO2 23   *   BUN 12   CREATININE 1.7*   CALCIUM 9.5   PROT 8.3   ALBUMIN 4.5   BILITOT 0.6   ALKPHOS 83   AST 28   ALT 45*   ANIONGAP 12   , CBC   Recent Labs   Lab 05/27/25 0927   WBC 13.58*   HGB 19.5*   HCT 57.4*      , INR   Recent Labs   Lab 05/27/25 0927   INR 1.2   PROTIME 13.5*   , Lipid Panel No results for input(s): "CHOL", "HDL", "LDLCALC", "TRIG", "CHOLHDL" in the last 48 hours., Troponin No results for input(s): "TROPONINIHS" " in the last 48 hours., and All pertinent lab results from the last 24 hours have been reviewed.    Significant Imaging: Echocardiogram: Transthoracic echo (TTE) complete (Cupid Only):   Results for orders placed or performed during the hospital encounter of 02/27/24   Echo   Result Value Ref Range    Ellis's Biplane MOD Ejection Fraction 39 %    LVOT stroke volume 43.14 cm3    LVIDd 4.25 3.5 - 6.0 cm    LV Systolic Volume 36.44 mL    LVIDs 3.05 2.1 - 4.0 cm    LV Diastolic Volume 80.79 mL    IVS 0.91 0.6 - 1.1 cm    LVOT diameter 2.14 cm    LVOT area 3.6 cm2    FS 28 28 - 44 %    Left Ventricle Relative Wall Thickness 0.55 cm    PW 1.16 (A) 0.6 - 1.1 cm    LV mass 146.76 g    TR Max Hammad 2.39 m/s    IVRT 99.90 msec    PV Peak S Hammad 0.18 m/s    PV Peak D Hammad 0.13 m/s    Pulm vein S/D ratio 1.38     LVOT peak hammad 0.73 m/s    Left Ventricular Outflow Tract Mean Velocity 0.61 cm/s    Left Ventricular Outflow Tract Mean Gradient 1.54 mmHg    RVDD 3.58 cm    RV S' 15.58 cm/s    TAPSE 1.10 cm    LA size 3.69 cm    Left Atrium Minor Axis 5.50 cm    Left Atrium Major Axis 5.39 cm    LA Vol (MOD) 55.10 cm3    RA Major Axis 5.01 cm    RA Width 3.53 cm    AV mean gradient 3 mmHg    AV peak gradient 4 mmHg    Ao peak hammad 1.03 m/s    Ao VTI 19.90 cm    LVOT peak VTI 12.00 cm    AV valve area 2.17 cm²    AV Velocity Ratio 0.71     AV index (prosthetic) 0.60     MALISSA by Velocity Ratio 2.55 cm²    MV mean gradient 2 mmHg    MV peak gradient 5 mmHg    MV valve area by continuity eq 2.58 cm2    MV VTI 16.7 cm    Triscuspid Valve Regurgitation Peak Gradient 23 mmHg    Sinus 3.57 cm    STJ 3.46 cm    Ascending aorta 3.6 cm    BSA 2.03 m2    LV Systolic Volume Index 18.1 mL/m2    LV Diastolic Volume Index 40.19 mL/m2    LV Mass Index 73 g/m2    LANA (MOD) 27.4 mL/m2    ZLVIDS -1.34     ZLVIDD -3.26     LANA 33.1 mL/m2    LA Vol 66.60 cm3    LA WIDTH 3.9 cm    TV resting pulmonary artery pressure 26 mmHg    RV TB RVSP 5 mmHg    Est. RA  pres 3 mmHg    Narrative      Left Ventricle: The left ventricle is normal in size. Normal wall   thickness. There is concentric remodeling. There is low normal systolic   function with a visually estimated ejection fraction of 50 - 55%. Unable   to assess diastolic function due to atrial fibrillation.    Right Ventricle: Normal right ventricular cavity size. Wall thickness   is normal. Right ventricle wall motion  is normal. Systolic function is   normal.    Aortic Valve: The aortic valve is a trileaflet valve. There is mild   aortic valve sclerosis.    Mitral Valve: There is mild regurgitation.    Pulmonary Artery: The estimated pulmonary artery systolic pressure is   26 mmHg.    IVC/SVC: Normal venous pressure at 3 mmHg.       Assessment and Plan:     Atrial fibrillation with RVR  Patient has paroxysmal (<7 days) atrial fibrillation. Patient is currently in atrial fibrillation. [id number] Score: 3. The patients heart rate in the last 24 hours is as follows:  Pulse  Min: 100  Max: 161     Antiarrhythmics  metoprolol succinate (TOPROL-XL) 24 hr tablet 200 mg, Daily, Oral  diltiaZEM 125 mg in dextrose 5% 125 mL infusion (non-titrating), Continuous, Intravenous    Anticoagulants  heparin 25,000 units in dextrose 5% (100 units/ml) IV bolus from bag LOW INTENSITY nomogram - [facility], Once, Intravenous  heparin 25,000 units in dextrose 5% 250 mL (100 units/mL) infusion LOW INTENSITY nomogram - [facility], Continuous, Intravenous  heparin 25,000 units in dextrose 5% (100 units/ml) IV bolus from bag LOW INTENSITY nomogram - [facility], As needed (PRN), Intravenous  heparin 25,000 units in dextrose 5% (100 units/ml) IV bolus from bag LOW INTENSITY nomogram - [facility], As needed (PRN), Intravenous    Plan  - Replete lytes with a goal of K>4, Mg >2  - Patient is anticoagulated, see medications listed above.  - Continue BB and Cardizem gtt  -NPO p MN for ELIZABETH/DCCV tomorrow  -Will convert to DOAC once testing complete including stress test- trend  troponin     ALLA (obstructive sleep apnea)  Evaluate for alternate mask as OP  Patient noncompliance     S/P carotid endarterectomy  Stable  Continue asa, statin    Essential hypertension  Patient's blood pressure range in the last 24 hours was: BP  Min: 116/83  Max: 185/101.The patient's inpatient anti-hypertensive regimen is listed below:  Current Antihypertensives  valsartan tablet 320 mg, Daily, Oral  hydrALAZINE tablet 10 mg, 3 times daily, Oral  metoprolol succinate (TOPROL-XL) 24 hr tablet 200 mg, Daily, Oral  diltiaZEM 125 mg in dextrose 5% 125 mL infusion (non-titrating), Continuous, Intravenous    Plan  - BP is controlled, no changes needed to their regimen          VTE Risk Mitigation (From admission, onward)           Ordered     heparin 25,000 units in dextrose 5% (100 units/ml) IV bolus from bag LOW INTENSITY nomogram - [facility]  As needed (PRN)        Question:  Heparin Infusion Adjustment (DO NOT MODIFY ANSWER)  Answer:  [email or web address]\Images\Pharmacy\HeparinInfusions\heparin LOW INTENSITY nomogram for [facility] [id number]    05/27/25 1335     heparin 25,000 units in dextrose 5% (100 units/ml) IV bolus from bag LOW INTENSITY nomogram - [facility]  As needed (PRN)        Question:  Heparin Infusion Adjustment (DO NOT MODIFY ANSWER)  Answer:  [email or web address]\Images\Pharmacy\HeparinInfusions\heparin LOW INTENSITY nomogram for [facility] [id number]    05/27/25 1335     heparin 25,000 units in dextrose 5% (100 units/ml) IV bolus from bag LOW INTENSITY nomogram - [facility]  Once        Question:  Heparin Infusion Adjustment (DO NOT MODIFY ANSWER)  Answer:  [email or web address]\Images\Pharmacy\HeparinInfusions\heparin LOW INTENSITY nomogram for [facility] [id number]    05/27/25 1335     heparin 25,000 units in dextrose 5% 250 mL (100 units/mL) infusion LOW INTENSITY nomogram - [facility]  Continuous        Question:  Begin at (units/kg/hr)  Answer:  12    05/27/25 0058                    Thank you for your consult. I will follow-up with patient.  Please contact us if you have any additional questions.    Ruslan Gallegos NP  Cardiology   [facility] - Emergency Dept

## 2025-05-27 NOTE — H&P
"[facility] Medicine  History & Physical    Patient Name: Partha Curtis Jr.  MRN: [id number]  Patient Class: IP- Inpatient  Admission Date: 5/27/2025  Attending Physician: Valerie Dolan MD  Primary Care Provider: Rocco Cavazos DO      Patient information was obtained from patient, past medical records, and ER records.     Subjective:     Principal Problem:Atrial fibrillation with RVR    Chief Complaint:   Chief Complaint   Patient presents with    Atrial Fibrillation     C/O new onset Afib w RVR with associated clammy, L shoulder pains, and SOB. Pt has home EKG which showed Afib. Reports possible onset yesterday, since pt reports increased fatigue, but didn't check until today. Pt took 2 ASA 81mg PTA. Hx of Afib w RVR and needed cardioversion 2024        HPI: Partha Curtis Jr. is a 65 y.o. male with a past medical history of  has a past medical history of Carotid artery occlusion, Cataract, Chronic back pain, Colonic polyp, Hypertension, Paroxysmal atrial fibrillation (02/28/2024), Sleep apnea, and Type 2 diabetes mellitus with stage 3 chronic kidney disease, without long-term current use of insulin (04/02/2020) who presented to the ED for evaluation of a- fib. He says he has a device which alerted him that his heart rate was off. He also had symptoms. It started with lightheadedness since yesterday morning. When he woke up on the way to work he started to feel the same. He says this happened in February of last year and he was admitted here for it. He was "shocked" and did not require meds upon discharge. He believes he recently saw cardiology for f/u this year (chart review shows a Sept 2024 f/u with cards).  Denies any chest pain.  Denies any recent illness.  Adherent with all other medications.      In the ED, he had afib RVR (160s) with accelerated HTN. He was placed on a Cardizem drip.  consulted to admit.     Past Medical History:   Diagnosis Date    Allergy     Carotid " artery occlusion     Cataract     Chronic back pain     Colonic polyp     Genetic testing     MUTYH mutation-negative    Hyperlipidemia     Hypertension     Paroxysmal atrial fibrillation 02/28/2024    Sleep apnea     Type 2 diabetes mellitus with stage 3 chronic kidney disease, without long-term current use of insulin 04/02/2020       Past Surgical History:   Procedure Laterality Date    ANKLE SURGERY Left     2    APPENDECTOMY      at age 20    ARTHROSCOPIC CHONDROPLASTY OF KNEE JOINT Left 08/13/2024    Procedure: ARTHROSCOPY, KNEE, WITH CHONDROPLASTY;  Surgeon: Kai Washington MD;  Location: [facility] OR;  Service: Orthopedics;  Laterality: Left;    ARTHROSCOPY, KNEE, WITH ABRASION ARTHROPLASTY OR MICROFRACTURE Left 08/13/2024    Procedure: ARTHROSCOPY, KNEE, WITH  MICROFRACTURE;  Surgeon: Kai Washington MD;  Location: [facility] OR;  Service: Orthopedics;  Laterality: Left;    ARTHROSCOPY,KNEE,WITH MENISCUS REPAIR Left 08/13/2024    Procedure: ARTHROSCOPY,KNEE,WITH MENISCUS REPAIR;  Surgeon: Kai Washington MD;  Location: [facility] OR;  Service: Orthopedics;  Laterality: Left;  NO REGIONAL BLOCK    CAROTID ENDARTERECTOMY Right 07/15/2015    CARPAL TUNNEL RELEASE Bilateral     COLONOSCOPY N/A 12/14/2018    Procedure: COLONOSCOPYSuprep;  Surgeon: Silver Selby MD;  Location: [facility] ENDO;  Service: Endoscopy;  Laterality: N/A;    COLONOSCOPY N/A 10/02/2024    Procedure: COLONOSCOPY;  Surgeon: Heath Morrow MD;  Location: [facility] ENDO;  Service: Endoscopy;  Laterality: N/A;  Ref by Leonor Oneill, pt has unopened Sutab, portal - PC  9/25/24-LVM for precall, portal-DS. 10/1/24 Pt. called and confirmed arrival time.EC    EPIDURAL STEROID INJECTION N/A 02/26/2024    Procedure: CERVICAL C7/T1 IL SELENA;  Surgeon: Gokul Fung MD;  Location: [facility] PAIN MGT;  Service: Pain Management;  Laterality: N/A;  [phone]  2 WK F/U SERGEI    EPIDURAL STEROID INJECTION N/A 05/23/2024    Procedure: LUMBAR L4/5 IL SELENA *ASPIRIN OTC*  HOLD FOR 5 DAYS;  Surgeon: Gokul Fung MD;  Location: [facility] PAIN MGT;  Service: Pain Management;  Laterality: N/A;  [phone]  2 WK F/U NOHELIA    EPIDURAL STEROID INJECTION N/A 10/21/2024    Procedure: LUMBAR L5/S1 IL SELENA *ASPIRIN OTC* HOLD FOR 5 DAYS  **EISSA PT**;  Surgeon: Sofia Sheikh MD;  Location: [facility] PAIN MGT;  Service: Pain Management;  Laterality: N/A;  [phone]  2 WK F/U NOHELIA    INJECTION OF ANESTHETIC AGENT AROUND NERVE N/A 12/23/2024    Procedure: LUMBAR L4.5 IL SELENA *ASPIRIN OTC* HOLD FOR 5 DAYS;  Surgeon: Gokul Fung MD;  Location: [facility] PAIN MGT;  Service: Pain Management;  Laterality: N/A;  2 WK F/U NOHELIA    INJECTION OF ANESTHETIC AGENT AROUND NERVE Bilateral 02/24/2025    Procedure: BLOCK, NERVE BILATERAL L3, 4, 5 MEDIAL BRANCH;  Surgeon: Gokul Fung MD;  Location: [facility] PAIN MGT;  Service: Pain Management;  Laterality: Bilateral;  2 WK F/U NOHELIA    INJECTION OF ANESTHETIC AGENT AROUND NERVE Bilateral 03/10/2025    Procedure: BLOCK, NERVE BILATERAL L3, L4, L5 MEDIAL BRANCH;  Surgeon: Gokul Fung MD;  Location: [facility] PAIN MGT;  Service: Pain Management;  Laterality: Bilateral;    JOINT REPLACEMENT  09/2010    NASAL SEPTUM SURGERY      RADIOFREQUENCY ABLATION Bilateral 03/27/2025    Procedure: RADIOFREQUENCY ABLATION BILATERAL L3, 4, 5;  Surgeon: Gokul Fung MD;  Location: [facility] PAIN MGT;  Service: Pain Management;  Laterality: Bilateral;    TOTAL KNEE ARTHROPLASTY Right     6 knee surgeries    TRANSESOPHAGEAL ECHOCARDIOGRAM WITH POSSIBLE CARDIOVERSION (ELIZABETH W/ POSS CARDIOVERSION) N/A 02/28/2024    Procedure: Transesophageal echo (ELIZABETH) intra-procedure log documentation;  Surgeon: Ahmet De La Cruz MD;  Location: [facility] CATH LAB/EP;  Service: Cardiology;  Laterality: N/A;       Review of patient's allergies indicates:   Allergen Reactions    Stadol [butorphanol tartrate] Rash     Swelling in face    Strawberries [strawberry] Rash       Current Facility-Administered Medications on File Prior  "to Encounter   Medication    0.9%  NaCl infusion    0.9%  NaCl infusion     Current Outpatient Medications on File Prior to Encounter   Medication Sig    amitriptyline (ELAVIL) 75 MG tablet Take 1 tablet by mouth in the evening    aspirin (ECOTRIN) 81 MG EC tablet Take 1 tablet (81 mg total) by mouth once daily.    BD LUER-RUSS SYRINGE 3 mL 25 gauge x 1" Syrg USE ONE SYRINGE EVERY 14 DAYS WITH TESTOSTERONE    blood-glucose sensor (DEXCOM G7 SENSOR) Allison To check sugars up to 3 times daily    candesartan (ATACAND) 32 MG tablet Take 1 tablet by mouth once daily    cyanocobalamin (VITAMIN B-12) 1000 MCG tablet Take 1 tablet (1,000 mcg total) by mouth once daily.    DULoxetine (CYMBALTA) 30 MG capsule Take 1 capsule by mouth once daily    ergocalciferol (ERGOCALCIFEROL) 50,000 unit Cap Take 1 capsule (50,000 Units total) by mouth every 7 days.    ezetimibe (ZETIA) 10 mg tablet Take 1 tablet by mouth once daily    fluticasone propionate (FLONASE) 50 mcg/actuation nasal spray 2 sprays by Each Nostril route.    gabapentin (NEURONTIN) 800 MG tablet Take 1 tablet (800 mg total) by mouth every evening.    glimepiride (AMARYL) 1 MG tablet Take 1 tablet (1 mg total) by mouth before breakfast.    glucose 4 GM chewable tablet Take 4 tablets (16 g total) by mouth as needed for Low blood sugar.    hydrALAZINE (APRESOLINE) 10 MG tablet Take 1 tablet (10 mg total) by mouth 3 (three) times daily.    hydrocortisone 2.5 % cream Apply to face twice daily for flares as needed    ipratropium (ATROVENT) 42 mcg (0.06 %) nasal spray 2 sprays by Nasal route 2 (two) times daily as needed for Rhinitis.    ketoconazole (NIZORAL) 2 % cream Apply twice daily to affected area of skin    LIDOcaine (LIDODERM) 5 % Place 1 patch onto the skin once daily. Remove & Discard patch within 12 hours or as directed by MD    metoprolol succinate (TOPROL-XL) 200 MG 24 hr tablet Take 1 tablet (200 mg total) by mouth once daily.    oxyCODONE-acetaminophen (PERCOCET) " " mg per tablet Take 1 tablet by mouth every 12 (twelve) hours as needed for Pain.    rosuvastatin (CRESTOR) 40 MG Tab TAKE 1 TABLET BY MOUTH ONCE DAILY IN THE EVENING    semaglutide (OZEMPIC) 2 mg/dose (8 mg/3 mL) PnIj Inject 2 mg into the skin every 7 days.    sod sulf-pot chloride-mag sulf (SUTAB) 1.479-0.188- 0.225 gram tablet Take 12 tablets by mouth once daily. Take according to package instructions with indicated amount of water.    syringe with needle, safety (BD INTEGRA SYRINGE) 3 mL 22 gauge x 1 1/2" Syrg Inject 1 Syringe as directed once a week.    testosterone cypionate (DEPOTESTOTERONE CYPIONATE) 200 mg/mL injection Inject 0.75 mLs (150 mg total) into the muscle every 14 (fourteen) days.    valACYclovir (VALTREX) 1000 MG tablet Take 2 tablets (2,000 mg total) by mouth every 12 (twelve) hours.    [DISCONTINUED] cloNIDine (CATAPRES) 0.1 MG tablet Take 1 tablet (0.1 mg total) by mouth 2 (two) times daily.     Family History       Problem Relation (Age of Onset)    Aneurysm Other (48)    Brain cancer Mother (67), Paternal Grandfather (73)    Breast cancer Sister (58), Maternal Cousin (57)    Cancer Mother, Father, Maternal Grandfather    Genetic Disorder Sister    Hypertension Father    Lung cancer Father    Seizures Daughter    Ulcerative colitis Other          Tobacco Use    Smoking status: Never     Passive exposure: Never    Smokeless tobacco: Never   Substance and Sexual Activity    Alcohol use: Yes     Alcohol/week: 2.0 standard drinks of alcohol     Types: 2 Cans of beer per week     Comment: 6 beers per month    Drug use: Never    Sexual activity: Yes     Partners: Female     Birth control/protection: None     Review of Systems   Constitutional:  Negative for activity change, appetite change, chills, diaphoresis, fatigue and fever.   HENT:  Negative for congestion, sinus pressure, sore throat and tinnitus.    Eyes:  Negative for visual disturbance.   Respiratory:  Positive for shortness of " breath. Negative for cough, chest tightness and wheezing.    Cardiovascular:  Negative for chest pain, palpitations and leg swelling.   Gastrointestinal:  Negative for abdominal distention, abdominal pain, nausea and vomiting.   Endocrine: Negative for polydipsia, polyphagia and polyuria.   Genitourinary:  Negative for dysuria.   Musculoskeletal:  Negative for arthralgias and back pain.   Skin:  Negative for rash and wound.   Neurological:  Positive for light-headedness. Negative for dizziness, syncope and headaches.   Psychiatric/Behavioral:  Negative for confusion.      Objective:     Vital Signs (Most Recent):  Temp: 98.1 °F (36.7 °C) (05/27/25 1541)  Pulse: 101 (05/27/25 1730)  Resp: (!) 38 (05/27/25 1730)  BP: (!) 165/97 (05/27/25 1730)  SpO2: (!) 92 % (05/27/25 1730) Vital Signs (24h Range):  Temp:  [98 °F (36.7 °C)-98.1 °F (36.7 °C)] 98.1 °F (36.7 °C)  Pulse:  [] 101  Resp:  [10-45] 38  SpO2:  [92 %-100 %] 92 %  BP: (116-187)/() 165/97     Weight: 88 kg (194 lb 0.1 oz)  Body mass index is 27.84 kg/m².     Physical Exam  Vitals and nursing note reviewed.   Constitutional:       General: He is not in acute distress.     Appearance: Normal appearance. He is well-developed. He is obese. He is not ill-appearing or toxic-appearing.   HENT:      Head: Normocephalic and atraumatic.      Right Ear: External ear normal.      Left Ear: External ear normal.      Nose: Nose normal.      Mouth/Throat:      Pharynx: Uvula midline.   Eyes:      General: Lids are normal.      Extraocular Movements: EOM normal.      Conjunctiva/sclera: Conjunctivae normal.      Pupils: Pupils are equal, round, and reactive to light.   Neck:      Thyroid: No thyroid mass.      Vascular: No JVD.      Trachea: Trachea normal.   Cardiovascular:      Rate and Rhythm: Tachycardia present. Rhythm irregular.      Heart sounds: Normal heart sounds, S1 normal and S2 normal.   Pulmonary:      Effort: Pulmonary effort is normal.      Breath  sounds: Normal breath sounds.   Abdominal:      General: Bowel sounds are normal. There is no distension.      Palpations: Abdomen is soft.      Tenderness: There is no abdominal tenderness.   Musculoskeletal:      Cervical back: Full passive range of motion without pain, normal range of motion and neck supple.      Right lower leg: No edema.      Left lower leg: No edema.   Neurological:      Mental Status: He is alert.      Cranial Nerves: No cranial nerve deficit.      Sensory: No sensory deficit.   Psychiatric:         Speech: Speech normal.         Behavior: Behavior normal. Behavior is cooperative.         Thought Content: Thought content normal.              CRANIAL NERVES     CN III, IV, VI   Pupils are equal, round, and reactive to light.  Extraocular motions are normal.        Significant Labs: All pertinent labs within the past 24 hours have been reviewed.  Recent Lab Results  (Last 5 results in the past 24 hours)        05/27/25  1523   05/27/25  1428   05/27/25  1350   05/27/25  1144   05/27/25  0927        A2C EF 49               A4C EF 64               Albumin         4.5       ALP         83       ALT         45       Anion Gap         12       Ascending aorta 3.5               Ao peak angie 1.5               ASI 1.7               ASI 1.9               Ao VTI 25.0               PTT     28.8  Comment: Refer to local heparin nomogram for intensity/dose specific therapeutic range.           AST         28       AV valve area 2.1               MALISSA by Velocity Ratio 2.3               AV mean gradient 5               AV index (prosthetic) 0.54               AV peak gradient 9               AV Velocity Ratio 0.60               Baso #     0.11     0.07       Basophil %     0.8     0.5       BILIRUBIN TOTAL         0.6  Comment: For infants and newborns, interpretation of results should be based   on gestational age, weight and in agreement with clinical   observations.    Premature Infant recommended reference  ranges:   0-24 hours:  <8.0 mg/dL   24-48 hours: <12.0 mg/dL   3-5 days:    <15.0 mg/dL   6-29 days:   <15.0 mg/dL       BSA 2.09               BUN         12       Calcium         9.5       Chloride         101       CO2         23       Creatinine         1.7       Left Ventricle Relative Wall Thickness 0.51               E/E' ratio 13               eGFR         44  Comment: Estimated GFR calculated using the CKD-EPI creatinine (2021) equation.       Eos #     0.08     0.12       Eos %     0.6     0.9       FS 28.2               Glucose         245       Gran # (ANC)     10.06     10.33       Hematocrit     56.2     57.4       Hemoglobin     19.1     19.5       Immature Grans (Abs)     0.07  Comment: Mild elevation in immature granulocytes is non specific and can be seen in a variety of conditions including stress response, acute inflammation, trauma and pregnancy. Correlation with other laboratory and clinical findings is essential.     0.05  Comment: Mild elevation in immature granulocytes is non specific and can be seen in a variety of conditions including stress response, acute inflammation, trauma and pregnancy. Correlation with other laboratory and clinical findings is essential.       Immature Granulocytes     0.5     0.4       INR     1.1  Comment: Coumadin Therapy:    2.0 - 3.0 for INR for all indicators except mechanical heart valves    and antiphospholipid syndromes which should use 2.5 - 3.5.     1.2  Comment: Coumadin Therapy:    2.0 - 3.0 for INR for all indicators except mechanical heart valves    and antiphospholipid syndromes which should use 2.5 - 3.5.       IVSd 1.4               LA area A2C 14.39               LA area A4C 12.70               LA Vol 30               LANA (MOD) 15               LVOT area 3.8               LV LATERAL E/E' RATIO 11.3               LV SEPTAL E/E' RATIO 15.0               LV EDV BP 66               LV Diastolic Volume Index 32.04               Left Ventricular End  Diastolic Volume by Teichholz Method 65.95               LV EDV A2C [id number]               LV EDV A4C 61.54               Left Ventricular End Systolic Volume by Teichholz Method 29.82               LV ESV A2C 32.70               LV ESV A4C 26.96               LVIDd 3.9               LVIDs 2.8               LV mass 159.3               LV Mass Index 77.3               Left Ventricular Outflow Tract Mean Gradient 2.20               Left Ventricular Outflow Tract Mean Velocity 0.72               LVOT diameter 2.2               LVOT peak hammad 0.9               LVOT stroke volume 51.7               LVOT peak VTI 13.6               LV ESV BP 30               LV Systolic Volume Index 14.6               Lymph #     2.74     2.17       Lymph %     19.4     16.0       Magnesium        2.0  Comment: Specimen slightly hemolyzed.         MCH     31.3     31.6       MCHC     34.0     34.0       MCV     92     93       Mean e' 0.07               Mono #     1.06     0.84       Mono %     7.5     6.2       MPV     9.0     9.0       Mr max hammad 3.18               MV Peak E Hammad 0.90               Neut %     71.2     76.0       nRBC     0     0       Ellis's Biplane MOD Ejection Fraction 58               Platelet Count     284     298       Potassium         4.1       PROTEIN TOTAL         8.3       PT     13.0     13.5       PW 1.0               RA vol index 11.34               RA Vol 25.02               RA Area 11.7               Est. RA pres 3               RA area length vol 23.36               RBC     6.11     6.17       RDW     14.2     14.2       RV S' 14.28               RV TB RVSP 5               RV/LV Ratio 0.69               RV- mejia basal diam 2.7               Sinus 4.00               Sodium         136       STJ 3.4               TAPSE 2.0               TDI SEPTAL 0.06               TDI LATERAL 0.08               Triscuspid Valve Regurgitation Peak Gradient 16               TR Max Hammad 2.0               Troponin  I   <0.006  Comment: The reference interval for Troponin I represents the 99th percentile cutoff for our facility and is consistent with 3rd generation assay performance.       <0.006  Comment: The reference interval for Troponin I represents the 99th percentile cutoff for our facility and is consistent with 3rd generation assay performance.       TSH         1.207       TV resting pulmonary artery pressure 19               WBC     14.12     13.58       ZLVIDD -4.67               ZLVIDS -2.43                                      Significant Imaging: I have reviewed all pertinent imaging results/findings within the past 24 hours.    Assessment/Plan:     Assessment & Plan  Atrial fibrillation with RVR  Patient has paroxysmal (<7 days) atrial fibrillation. Patient is currently in atrial fibrillation. [id number] Score: 3. The patients heart rate in the last 24 hours is as follows:  Pulse  Min: 88  Max: 161     Antiarrhythmics  metoprolol succinate (TOPROL-XL) 24 hr tablet 200 mg, Daily, Oral  diltiaZEM 125 mg in dextrose 5% 125 mL infusion (non-titrating), Continuous, Intravenous    Anticoagulants  heparin 25,000 units in dextrose 5% 250 mL (100 units/mL) infusion LOW INTENSITY nomogram - [facility], Continuous, Intravenous  heparin 25,000 units in dextrose 5% (100 units/ml) IV bolus from bag LOW INTENSITY nomogram - [facility], As needed (PRN), Intravenous  heparin 25,000 units in dextrose 5% (100 units/ml) IV bolus from bag LOW INTENSITY nomogram - [facility], As needed (PRN), Intravenous    Plan  - Replete lytes with a goal of K>4, Mg >2  - Patient is not anticoagulated   - Patient's afib is currently uncontrolled. Will adjust treatment as follows: discussed with cards. See recs below  - NPO p MN for ELIZABETH/DCCV with possible NM stress test to follow.       Essential hypertension  Patient's blood pressure range in the last 24 hours was: BP  Min: 116/83  Max: 187/98.The patient's inpatient anti-hypertensive regimen is listed below:  Current  Antihypertensives  valsartan tablet 320 mg, Daily, Oral  hydrALAZINE tablet 10 mg, 3 times daily, Oral  metoprolol succinate (TOPROL-XL) 24 hr tablet 200 mg, Daily, Oral  diltiaZEM 125 mg in dextrose 5% 125 mL infusion (non-titrating), Continuous, Intravenous    Plan  - BP is uncontrolled, will adjust as follows: resume home meds  - continue Dilt drip  S/P carotid endarterectomy  Per records:  right carotid endarterectomy 715 2015 for a 99% stenosis.  Continue asprin/ statin  Type 2 diabetes mellitus with stage 3 chronic kidney disease, without long-term current use of insulin  Creatine stable for now. BMP reviewed- noted Estimated Creatinine Clearance: 48.4 mL/min (A) (based on SCr of 1.7 mg/dL (H)). according to latest data. Based on current GFR, CKD stage is stage 3 - GFR 30-59.  Monitor UOP and serial BMP and adjust therapy as needed. Renally dose meds. Avoid nephrotoxic medications and procedures.    Patient's FSGs are controlled on current medication regimen.  Last A1c reviewed-   Lab Results   Component Value Date    HGBA1C 7.0 (H) 03/18/2025     Current correctional scale  none  Diabetic diet, excellent disease control     Chronic bilateral low back pain without sciatica  Per chart review:  He is s/p bilateral L3,4,5 MBB on 2/24/2025 and 3/10/2025.  Pain Medications:  Gabapentin  Percocet     Will continue   ALLA (obstructive sleep apnea)  CPAP        VTE Risk Mitigation (From admission, onward)           Ordered     heparin 25,000 units in dextrose 5% (100 units/ml) IV bolus from bag LOW INTENSITY nomogram - [facility]  As needed (PRN)        Question:  Heparin Infusion Adjustment (DO NOT MODIFY ANSWER)  Answer:  [email or web address]\Images\Pharmacy\HeparinInfusions\heparin LOW INTENSITY nomogram for [facility] [id number]    05/27/25 1335     heparin 25,000 units in dextrose 5% (100 units/ml) IV bolus from bag LOW INTENSITY nomogram - [facility]  As needed (PRN)        Question:  Heparin Infusion Adjustment (DO NOT MODIFY ANSWER)   Answer:  [email or web address]\Images\Pharmacy\HeparinInfusions\heparin LOW INTENSITY nomogram for [facility] [id number]    05/27/25 133     heparin 25,000 units in dextrose 5% 250 mL (100 units/mL) infusion LOW INTENSITY nomogram - [facility]  Continuous        Question:  Begin at (units/kg/hr)  Answer:  12    05/27/25 6068                    Valerie Dolan MD  Department of Hospital Medicine  [facility] - Emergency Dept

## 2025-05-27 NOTE — ED NOTES
Patient ambulating to the bathroom. Strong, steady gait. Patient reports immediate relief of SOB following med admin.

## 2025-05-27 NOTE — SUBJECTIVE & OBJECTIVE
Past Medical History:   Diagnosis Date    Allergy     Carotid artery occlusion     Cataract     Chronic back pain     Colonic polyp     Genetic testing     MUTYH mutation-negative    Hyperlipidemia     Hypertension     Paroxysmal atrial fibrillation 02/28/2024    Sleep apnea     Type 2 diabetes mellitus with stage 3 chronic kidney disease, without long-term current use of insulin 04/02/2020       Past Surgical History:   Procedure Laterality Date    ANKLE SURGERY Left     2    APPENDECTOMY      at age 20    ARTHROSCOPIC CHONDROPLASTY OF KNEE JOINT Left 08/13/2024    Procedure: ARTHROSCOPY, KNEE, WITH CHONDROPLASTY;  Surgeon: Kai Washington MD;  Location: [facility] OR;  Service: Orthopedics;  Laterality: Left;    ARTHROSCOPY, KNEE, WITH ABRASION ARTHROPLASTY OR MICROFRACTURE Left 08/13/2024    Procedure: ARTHROSCOPY, KNEE, WITH  MICROFRACTURE;  Surgeon: Kai Washington MD;  Location: [facility] OR;  Service: Orthopedics;  Laterality: Left;    ARTHROSCOPY,KNEE,WITH MENISCUS REPAIR Left 08/13/2024    Procedure: ARTHROSCOPY,KNEE,WITH MENISCUS REPAIR;  Surgeon: Kai Washington MD;  Location: [facility] OR;  Service: Orthopedics;  Laterality: Left;  NO REGIONAL BLOCK    CAROTID ENDARTERECTOMY Right 07/15/2015    CARPAL TUNNEL RELEASE Bilateral     COLONOSCOPY N/A 12/14/2018    Procedure: COLONOSCOPYSuprep;  Surgeon: Silver Selby MD;  Location: [facility];  Service: Endoscopy;  Laterality: N/A;    COLONOSCOPY N/A 10/02/2024    Procedure: COLONOSCOPY;  Surgeon: Heath Morrow MD;  Location: [facility] ENDO;  Service: Endoscopy;  Laterality: N/A;  [facility] S[facility]ab, portal - PC  9/25/24-LVM for precall, portal-DS. 10/1/24 Pt. called and confirmed arrival time.EC    EPIDURAL STEROID INJECTION N/A 02/26/2024    Procedure: CERVICAL C7/T1 IL SELENA;  Surgeon: Gokul Fung MD;  Location: [facility] PAIN MGT;  Service: Pain Management;  Laterality: N/A;  [phone]  2 WK F/U SERGEI    EPIDURAL STEROID INJECTION  N/A 05/23/2024    Procedure: LUMBAR L4/5 IL SELENA *ASPIRIN OTC* HOLD FOR 5 DAYS;  Surgeon: Gokul Fung MD;  Location: [facility] PAIN MGT;  Service: Pain Management;  Laterality: N/A;  [phone]  2 WK F/U NOHELIA    EPIDURAL STEROID INJECTION N/A 10/21/2024    Procedure: LUMBAR L5/S1 IL SELENA *ASPIRIN OTC* HOLD FOR 5 DAYS  **EISSA PT**;  Surgeon: Sofia Sheikh MD;  Location: [facility] PAIN MGT;  Service: Pain Management;  Laterality: N/A;  [phone]  2 WK F/U NOHELIA    INJECTION OF ANESTHETIC AGENT AROUND NERVE N/A 12/23/2024    Procedure: LUMBAR L4.5 IL SELENA *ASPIRIN OTC* HOLD FOR 5 DAYS;  Surgeon: Gokul Fung MD;  Location: [facility] PAIN MGT;  Service: Pain Management;  Laterality: N/A;  2 WK F/U NOHELIA    INJECTION OF ANESTHETIC AGENT AROUND NERVE Bilateral 02/24/2025    Procedure: BLOCK, NERVE BILATERAL L3, 4, 5 MEDIAL BRANCH;  Surgeon: Gokul Fung MD;  Location: [facility] PAIN MGT;  Service: Pain Management;  Laterality: Bilateral;  2 WK F/U NOHELIA    INJECTION OF ANESTHETIC AGENT AROUND NERVE Bilateral 03/10/2025    Procedure: BLOCK, NERVE BILATERAL L3, L4, L5 MEDIAL BRANCH;  Surgeon: Gokul Fung MD;  Location: [facility] PAIN MGT;  Service: Pain Management;  Laterality: Bilateral;    JOINT REPLACEMENT  09/2010    NASAL SEPTUM SURGERY      RADIOFREQUENCY ABLATION Bilateral 03/27/2025    Procedure: RADIOFREQUENCY ABLATION BILATERAL L3, 4, 5;  Surgeon: Gokul Fung MD;  Location: [facility] PAIN MGT;  Service: Pain Management;  Laterality: Bilateral;    TOTAL KNEE ARTHROPLASTY Right     6 knee surgeries    TRANSESOPHAGEAL ECHOCARDIOGRAM WITH POSSIBLE CARDIOVERSION (ELIZABETH W/ POSS CARDIOVERSION) N/A 02/28/2024    Procedure: Transesophageal echo (ELIZABETH) intra-procedure log documentation;  Surgeon: Ahmet De La Cruz MD;  Location: [facility] CATH LAB/EP;  Service: Cardiology;  Laterality: N/A;       Review of patient's allergies indicates:   Allergen Reactions    Stadol [butorphanol tartrate] Rash     Swelling in face    Strawberries [strawberry] Rash  "      Current Facility-Administered Medications on File Prior to Encounter   Medication    0.9%  NaCl infusion    0.9%  NaCl infusion     Current Outpatient Medications on File Prior to Encounter   Medication Sig    amitriptyline (ELAVIL) 75 MG tablet Take 1 tablet by mouth in the evening    aspirin (ECOTRIN) 81 MG EC tablet Take 1 tablet (81 mg total) by mouth once daily.    BD LUER-RUSS SYRINGE 3 mL 25 gauge x 1" Syrg USE ONE SYRINGE EVERY 14 DAYS WITH TESTOSTERONE    blood-glucose sensor (DEXCOM G7 SENSOR) Allison To check sugars up to 3 times daily    candesartan (ATACAND) 32 MG tablet Take 1 tablet by mouth once daily    cyanocobalamin (VITAMIN B-12) 1000 MCG tablet Take 1 tablet (1,000 mcg total) by mouth once daily.    DULoxetine (CYMBALTA) 30 MG capsule Take 1 capsule by mouth once daily    ergocalciferol (ERGOCALCIFEROL) 50,000 unit Cap Take 1 capsule (50,000 Units total) by mouth every 7 days.    ezetimibe (ZETIA) 10 mg tablet Take 1 tablet by mouth once daily    fluticasone propionate (FLONASE) 50 mcg/actuation nasal spray 2 sprays by Each Nostril route.    gabapentin (NEURONTIN) 800 MG tablet Take 1 tablet (800 mg total) by mouth every evening.    glimepiride (AMARYL) 1 MG tablet Take 1 tablet (1 mg total) by mouth before breakfast.    glucose 4 GM chewable tablet Take 4 tablets (16 g total) by mouth as needed for Low blood sugar.    hydrALAZINE (APRESOLINE) 10 MG tablet Take 1 tablet (10 mg total) by mouth 3 (three) times daily.    hydrocortisone 2.5 % cream Apply to face twice daily for flares as needed    ipratropium (ATROVENT) 42 mcg (0.06 %) nasal spray 2 sprays by Nasal route 2 (two) times daily as needed for Rhinitis.    ketoconazole (NIZORAL) 2 % cream Apply twice daily to affected area of skin    LIDOcaine (LIDODERM) 5 % Place 1 patch onto the skin once daily. Remove & Discard patch within 12 hours or as directed by MD    metoprolol succinate (TOPROL-XL) 200 MG 24 hr tablet Take 1 tablet (200 mg " "total) by mouth once daily.    oxyCODONE-acetaminophen (PERCOCET)  mg per tablet Take 1 tablet by mouth every 12 (twelve) hours as needed for Pain.    rosuvastatin (CRESTOR) 40 MG Tab TAKE 1 TABLET BY MOUTH ONCE DAILY IN THE EVENING    semaglutide (OZEMPIC) 2 mg/dose (8 mg/3 mL) PnIj Inject 2 mg into the skin every 7 days.    sod sulf-pot chloride-mag sulf (SUTAB) 1.479-0.188- 0.225 gram tablet Take 12 tablets by mouth once daily. Take according to package instructions with indicated amount of water.    syringe with needle, safety (BD INTEGRA SYRINGE) 3 mL 22 gauge x 1 1/2" Syrg Inject 1 Syringe as directed once a week.    testosterone cypionate (DEPOTESTOTERONE CYPIONATE) 200 mg/mL injection Inject 0.75 mLs (150 mg total) into the muscle every 14 (fourteen) days.    valACYclovir (VALTREX) 1000 MG tablet Take 2 tablets (2,000 mg total) by mouth every 12 (twelve) hours.    [DISCONTINUED] cloNIDine (CATAPRES) 0.1 MG tablet Take 1 tablet (0.1 mg total) by mouth 2 (two) times daily.     Family History       Problem Relation (Age of Onset)    Aneurysm Other (48)    Brain cancer Mother (67), Paternal Grandfather (73)    Breast cancer Sister (58), Maternal Cousin (57)    Cancer Mother, Father, Maternal Grandfather    Genetic Disorder Sister    Hypertension Father    Lung cancer Father    Seizures Daughter    Ulcerative colitis Other          Tobacco Use    Smoking status: Never     Passive exposure: Never    Smokeless tobacco: Never   Substance and Sexual Activity    Alcohol use: Yes     Alcohol/week: 2.0 standard drinks of alcohol     Types: 2 Cans of beer per week     Comment: 6 beers per month    Drug use: Never    Sexual activity: Yes     Partners: Female     Birth control/protection: None     Review of Systems   Constitutional: Negative for diaphoresis.   HENT: Negative.     Eyes: Negative.    Cardiovascular:  Positive for chest pain, irregular heartbeat and palpitations. Negative for orthopnea, paroxysmal " nocturnal dyspnea and syncope.   Respiratory: Negative.  Negative for cough and shortness of breath.    Endocrine: Negative.    Hematologic/Lymphatic: Negative.    Gastrointestinal:  Negative for nausea and vomiting.   Genitourinary: Negative.    Neurological: Negative.  Negative for dizziness.   Psychiatric/Behavioral: Negative.     Allergic/Immunologic: Negative.      Objective:     Vital Signs (Most Recent):  Temp: 98 °F (36.7 °C) (05/27/25 0906)  Pulse: (!) 122 (05/27/25 1332)  Resp: 19 (05/27/25 1317)  BP: (!) 155/105 (05/27/25 1317)  SpO2: 95 % (05/27/25 1317) Vital Signs (24h Range):  Temp:  [98 °F (36.7 °C)] 98 °F (36.7 °C)  Pulse:  [100-161] 122  Resp:  [10-22] 19  SpO2:  [95 %-100 %] 95 %  BP: (116-185)/() 155/105     Weight: 88.5 kg (195 lb)  Body mass index is 27.98 kg/m².    SpO2: 95 %       No intake or output data in the 24 hours ending 05/27/25 1345    Lines/Drains/Airways       Peripheral Intravenous Line  Duration                  Peripheral IV - Single Lumen 05/27/25 0725 20 G Left Antecubital <1 day         Peripheral IV - Single Lumen 05/27/25 1119 20 G Right Antecubital <1 day                     Physical Exam  Constitutional:       General: He is not in acute distress.     Appearance: He is not diaphoretic.   HENT:      Head: Atraumatic.   Eyes:      General:         Right eye: No discharge.         Left eye: No discharge.   Cardiovascular:      Rate and Rhythm: Tachycardia present. Rhythm irregular.   Pulmonary:      Effort: Pulmonary effort is normal.      Breath sounds: No rales.   Abdominal:      General: Bowel sounds are normal.      Palpations: Abdomen is soft.   Skin:     General: Skin is warm and dry.   Neurological:      Mental Status: He is alert and oriented to person, place, and time.   Psychiatric:         Mood and Affect: Mood normal.         Behavior: Behavior normal.         Thought Content: Thought content normal.         Judgment: Judgment normal.          Significant  "Labs: BMP:   Recent Labs   Lab 05/27/25 0927 05/27/25  1144   *  --      --    K 4.1  --      --    CO2 23  --    BUN 12  --    CREATININE 1.7*  --    CALCIUM 9.5  --    MG  --  2.0   , CMP   Recent Labs   Lab 05/27/25 0927      K 4.1      CO2 23   *   BUN 12   CREATININE 1.7*   CALCIUM 9.5   PROT 8.3   ALBUMIN 4.5   BILITOT 0.6   ALKPHOS 83   AST 28   ALT 45*   ANIONGAP 12   , CBC   Recent Labs   Lab 05/27/25 0927   WBC 13.58*   HGB 19.5*   HCT 57.4*      , INR   Recent Labs   Lab 05/27/25 0927   INR 1.2   PROTIME 13.5*   , Lipid Panel No results for input(s): "CHOL", "HDL", "LDLCALC", "TRIG", "CHOLHDL" in the last 48 hours., Troponin No results for input(s): "TROPONINIHS" in the last 48 hours., and All pertinent lab results from the last 24 hours have been reviewed.    Significant Imaging: Echocardiogram: Transthoracic echo (TTE) complete (Cupid Only):   Results for orders placed or performed during the hospital encounter of 02/27/24   Echo   Result Value Ref Range    Ellis's Biplane MOD Ejection Fraction 39 %    LVOT stroke volume 43.14 cm3    LVIDd 4.25 3.5 - 6.0 cm    LV Systolic Volume 36.44 mL    LVIDs 3.05 2.1 - 4.0 cm    LV Diastolic Volume 80.79 mL    IVS 0.91 0.6 - 1.1 cm    LVOT diameter 2.14 cm    LVOT area 3.6 cm2    FS 28 28 - 44 %    Left Ventricle Relative Wall Thickness 0.55 cm    PW 1.16 (A) 0.6 - 1.1 cm    LV mass 146.76 g    TR Max Hammad 2.39 m/s    IVRT 99.90 msec    PV Peak S Hammad 0.18 m/s    PV Peak D Hammad 0.13 m/s    Pulm vein S/D ratio 1.38     LVOT peak hammad 0.73 m/s    Left Ventricular Outflow Tract Mean Velocity 0.61 cm/s    Left Ventricular Outflow Tract Mean Gradient 1.54 mmHg    RVDD 3.58 cm    RV S' 15.58 cm/s    TAPSE 1.10 cm    LA size 3.69 cm    Left Atrium Minor Axis 5.50 cm    Left Atrium Major Axis 5.39 cm    LA Vol (MOD) 55.10 cm3    RA Major Axis 5.01 cm    RA Width 3.53 cm    AV mean gradient 3 mmHg    AV peak gradient 4 mmHg    " Ao peak angie 1.03 m/s    Ao VTI 19.90 cm    LVOT peak VTI 12.00 cm    AV valve area 2.17 cm²    AV Velocity Ratio 0.71     AV index (prosthetic) 0.60     MALISSA by Velocity Ratio 2.55 cm²    MV mean gradient 2 mmHg    MV peak gradient 5 mmHg    MV valve area by continuity eq 2.58 cm2    MV VTI 16.7 cm    Triscuspid Valve Regurgitation Peak Gradient 23 mmHg    Sinus 3.57 cm    STJ 3.46 cm    Ascending aorta 3.6 cm    BSA 2.03 m2    LV Systolic Volume Index 18.1 mL/m2    LV Diastolic Volume Index 40.19 mL/m2    LV Mass Index 73 g/m2    LANA (MOD) 27.4 mL/m2    ZLVIDS -1.34     ZLVIDD -3.26     LANA 33.1 mL/m2    LA Vol 66.60 cm3    LA WIDTH 3.9 cm    TV resting pulmonary artery pressure 26 mmHg    RV TB RVSP 5 mmHg    Est. RA pres 3 mmHg    Narrative      Left Ventricle: The left ventricle is normal in size. Normal wall   thickness. There is concentric remodeling. There is low normal systolic   function with a visually estimated ejection fraction of 50 - 55%. Unable   to assess diastolic function due to atrial fibrillation.    Right Ventricle: Normal right ventricular cavity size. Wall thickness   is normal. Right ventricle wall motion  is normal. Systolic function is   normal.    Aortic Valve: The aortic valve is a trileaflet valve. There is mild   aortic valve sclerosis.    Mitral Valve: There is mild regurgitation.    Pulmonary Artery: The estimated pulmonary artery systolic pressure is   26 mmHg.    IVC/SVC: Normal venous pressure at 3 mmHg.

## 2025-05-27 NOTE — ED PROVIDER NOTES
NAME:  Partha Curtis Jr.  CSN:     [id number]  MRN:    [id number]  ADMIT DATE: 5/27/2025        eMERGENCY dEPARTMENT eNCOUnter    CHIEF COMPLAINT    Chief Complaint   Patient presents with    Atrial Fibrillation     C/O new onset Afib w RVR with associated clammy, L shoulder pains, and SOB. Pt has home EKG which showed Afib. Reports possible onset yesterday, since pt reports increased fatigue, but didn't check until today. Pt took 2 ASA 81mg PTA. Hx of Afib w RVR and needed cardioversion 2024       HPI    Partha Curtis Jr. is a 65 y.o. male with a past medical history of  has a past medical history of Allergy, Carotid artery occlusion, Cataract, Chronic back pain, Colonic polyp, Genetic testing, Hyperlipidemia, Hypertension, Paroxysmal atrial fibrillation (02/28/2024), Sleep apnea, and Type 2 diabetes mellitus with stage 3 chronic kidney disease, without long-term current use of insulin (04/02/2020).     he presents to the ED due to lightheadedness since yesterday morning.  States that he by yesterday afternoon and went to sleep.  When he woke up on the way to work he started to feel the same.  Checked his heart rate at home with the EKG monitor that he has that showed AFib with RVR.  Has had this in the past and came in.  Is not on DOAC as he states that he typically is not in atrial fibrillation to be on this.  Denies any chest pain.  Denies any recent illness.  Adherent with all other medications.  Has had to be hospitalized for this in the past.      HPI       PAST MEDICAL HISTORY  Past Medical History:   Diagnosis Date    Allergy     Carotid artery occlusion     Cataract     Chronic back pain     Colonic polyp     Genetic testing     MUTYH mutation-negative    Hyperlipidemia     Hypertension     Paroxysmal atrial fibrillation 02/28/2024    Sleep apnea     Type 2 diabetes mellitus with stage 3 chronic kidney disease, without long-term current use of insulin 04/02/2020       SURGICAL HISTORY    Past Surgical  History:   Procedure Laterality Date    ANKLE SURGERY Left     2    APPENDECTOMY      at age 20    ARTHROSCOPIC CHONDROPLASTY OF KNEE JOINT Left 08/13/2024    Procedure: ARTHROSCOPY, KNEE, WITH CHONDROPLASTY;  Surgeon: Kai Washington MD;  Location: [facility] OR;  Service: Orthopedics;  Laterality: Left;    ARTHROSCOPY, KNEE, WITH ABRASION ARTHROPLASTY OR MICROFRACTURE Left 08/13/2024    Procedure: ARTHROSCOPY, KNEE, WITH  MICROFRACTURE;  Surgeon: Kai Washington MD;  Location: [facility] OR;  Service: Orthopedics;  Laterality: Left;    ARTHROSCOPY,KNEE,WITH MENISCUS REPAIR Left 08/13/2024    Procedure: ARTHROSCOPY,KNEE,WITH MENISCUS REPAIR;  Surgeon: Kai Washington MD;  Location: [facility] OR;  Service: Orthopedics;  Laterality: Left;  NO REGIONAL BLOCK    CAROTID ENDARTERECTOMY Right 07/15/2015    CARPAL TUNNEL RELEASE Bilateral     COLONOSCOPY N/A 12/14/2018    Procedure: COLONOSCOPYSuprep;  Surgeon: Silver Selby MD;  Location: [facility] ENDO;  Service: Endoscopy;  Laterality: N/A;    COLONOSCOPY N/A 10/02/2024    Procedure: COLONOSCOPY;  Surgeon: Heath Morrow MD;  Location: [facility] ENDO;  Service: Endoscopy;  Laterality: N/A;  [facility] has unopened Sutab, portal - PC  9/25/24-LVM for precall, portal-DS. 10/1/24 Pt. called and confirmed arrival time.EC    EPIDURAL STEROID INJECTION N/A 02/26/2024    Procedure: CERVICAL C7/T1 IL SELENA;  Surgeon: Gokul Fung MD;  Location: [facility] [place] PAIN MGT;  Service: Pain Management;  Laterality: N/A;  [phone]  2 WK F/U SERGEI    EPIDURAL STEROID INJECTION N/A 05/23/2024    Procedure: LUMBAR L4/5 IL SELENA *ASPIRIN OTC* HOLD FOR 5 DAYS;  Surgeon: Gokul Fung MD;  Location: [facility] [place] PAIN MGT;  Service: Pain Management;  Laterality: N/A;  [phone]  2 WK F/U NOHELIA    EPIDURAL STEROID INJECTION N/A 10/21/2024    Procedure: LUMBAR L5/S1 IL SELENA *ASPIRIN OTC* HOLD FOR 5 DAYS  **EISSA PT**;  Surgeon: Sofia Sheikh MD;  [facility];  Service: Pain Management;   Laterality: N/A;  [phone]  2 WK F/U NOHELIA    INJECTION OF ANESTHETIC AGENT AROUND NERVE N/A 12/23/2024    Procedure: LUMBAR L4.5 IL SELENA *ASPIRIN OTC* HOLD FOR 5 DAYS;  Surgeon: Gokul Fung MD;  Location: [facility] PAIN MGT;  Service: Pain Management;  Laterality: N/A;  2 WK F/U NOHELIA    INJECTION OF ANESTHETIC AGENT AROUND NERVE Bilateral 02/24/2025    Procedure: BLOCK, NERVE BILATERAL L3, 4, 5 MEDIAL BRANCH;  Surgeon: Gokul Fung MD;  Location: [facility] PAIN MGT;  Service: Pain Management;  Laterality: Bilateral;  2 WK F/U NOHELIA    INJECTION OF ANESTHETIC AGENT AROUND NERVE Bilateral 03/10/2025    Procedure: BLOCK, NERVE BILATERAL L3, L4, L5 MEDIAL BRANCH;  Surgeon: Gokul Fung MD;  Location: [facility] PAIN MGT;  Service: Pain Management;  Laterality: Bilateral;    JOINT REPLACEMENT  09/2010    NASAL SEPTUM SURGERY      RADIOFREQUENCY ABLATION Bilateral 03/27/2025    Procedure: RADIOFREQUENCY ABLATION BILATERAL L3, 4, 5;  Surgeon: Gokul Fung MD;  Location: [facility] PAIN MGT;  Service: Pain Management;  Laterality: Bilateral;    TOTAL KNEE ARTHROPLASTY Right     6 knee surgeries    TRANSESOPHAGEAL ECHOCARDIOGRAM WITH POSSIBLE CARDIOVERSION (ELIZABETH W/ POSS CARDIOVERSION) N/A 02/28/2024    Procedure: Transesophageal echo (ELIZABETH) intra-procedure log documentation;  Surgeon: Ahmet De La Cruz MD;  Location: [facility] CATH LAB/EP;  Service: Cardiology;  Laterality: N/A;       FAMILY HISTORY    Family History   Problem Relation Name Age of Onset    Brain cancer Mother Klarissa 67    Cancer Mother Klarissa     Hypertension Father Keanu     Lung cancer Father Keanu         x2 (PAUL initially- treated surgically only, then recurred distantly) (smoker, & had been exposed to many chemicals)    Cancer Father Keanu     Breast cancer Sister  58    Genetic Disorder Sister          monoallelic MUTYH mutation    Seizures Daughter      Cancer Maternal Grandfather Valerio     Brain cancer Paternal Grandfather  73    Breast cancer Maternal Cousin   57    Aneurysm Other mgm's father 48        brain    Ulcerative colitis Other brother's son        SOCIAL HISTORY    Social History     Socioeconomic History    Marital status:    Tobacco Use    Smoking status: Never     Passive exposure: Never    Smokeless tobacco: Never   Substance and Sexual Activity    Alcohol use: Yes     Alcohol/week: 2.0 standard drinks of alcohol     Types: 2 Cans of beer per week     Comment: 6 beers per month    Drug use: Never    Sexual activity: Yes     Partners: Female     Birth control/protection: None   Social History Narrative    5/11/2021: . He lives with his wife and 2 kids. (5 kids total). He has one dog, one cat, no more chickens at home. One dog recently passed away. No smokers at home.      Social Drivers of Health     Financial Resource Strain: Low Risk  (3/8/2024)    Overall Financial Resource Strain (CARDIA)     Difficulty of Paying Living Expenses: Not very hard   Food Insecurity: No Food Insecurity (3/8/2024)    Hunger Vital Sign     Worried About Running Out of Food in the Last Year: Never true     Ran Out of Food in the Last Year: Never true   Transportation Needs: No Transportation Needs (3/8/2024)    PRAPARE - Transportation     Lack of Transportation (Medical): No     Lack of Transportation (Non-Medical): No   Physical Activity: Unknown (3/8/2024)    Exercise Vital Sign     Days of Exercise per Week: 0 days   Stress: Stress Concern Present (3/8/2024)    [employer] - Occupational Stress Questionnaire     Feeling of Stress : To some extent   Housing Stability: Low Risk  (3/8/2024)    Housing Stability Vital Sign     Unable to Pay for Housing in the Last Year: No     Number of Places Lived in the Last Year: 1     Unstable Housing in the Last Year: No       MEDICATIONS  Current Outpatient Medications   Medication Instructions    amitriptyline (ELAVIL) 75 mg, Oral, Nightly    aspirin (ECOTRIN) 81 mg, Oral, Daily    BD  "LUER-RUSS SYRINGE 3 mL 25 gauge x 1" Syrg USE ONE SYRINGE EVERY 14 DAYS WITH TESTOSTERONE    blood-glucose sensor (DEXCOM G7 SENSOR) Allison To check sugars up to 3 times daily    candesartan (ATACAND) 32 mg, Oral    cloNIDine (CATAPRES) 0.1 mg, Oral, 2 times daily    cyanocobalamin (VITAMIN B-12) 1,000 mcg, Oral, Daily    DULoxetine (CYMBALTA) 30 mg, Oral    ergocalciferol (ERGOCALCIFEROL) 50,000 Units, Oral, Every 7 days    ezetimibe (ZETIA) 10 mg, Oral    fluticasone propionate (FLONASE) 50 mcg/actuation nasal spray 2 sprays    gabapentin (NEURONTIN) 800 mg, Oral, Nightly    glimepiride (AMARYL) 1 mg, Oral, Before breakfast    glucose 16 g, Oral, As needed (PRN)    hydrALAZINE (APRESOLINE) 10 mg, Oral, 3 times daily    hydrocortisone 2.5 % cream Apply to face twice daily for flares as needed    ipratropium (ATROVENT) 42 mcg (0.06 %) nasal spray 2 sprays, Nasal, 2 times daily PRN    ketoconazole (NIZORAL) 2 % cream Apply twice daily to affected area of skin    LIDOcaine (LIDODERM) 5 % 1 patch, Transdermal, Daily, Remove & Discard patch within 12 hours or as directed by MD    metoprolol succinate (TOPROL-XL) 200 mg, Oral, Daily    oxyCODONE-acetaminophen (PERCOCET)  mg per tablet 1 tablet, Oral, Every 12 hours PRN    OZEMPIC 2 mg, Subcutaneous, Every 7 days    rosuvastatin (CRESTOR) 40 mg, Oral, Nightly    sod sulf-pot chloride-mag sulf (SUTAB) 1.479-0.188- 0.225 gram tablet 12 tablets, Oral, Daily, Take according to package instructions with indicated amount of water.    syringe with needle, safety (BD INTEGRA SYRINGE) 3 mL 22 gauge x 1 1/2" Syrg 1 Syringe, Injection, Weekly    testosterone cypionate (DEPOTESTOTERONE CYPIONATE) 150 mg, Intramuscular, Every 14 days    valACYclovir (VALTREX) 2,000 mg, Oral, Every 12 hours       ALLERGIES    Review of patient's allergies indicates:   Allergen Reactions    Stadol [butorphanol tartrate] Rash     Swelling in face    Strawberries [strawberry] Rash         REVIEW OF " "SYSTEMS   Review of Systems       PHYSICAL EXAM    Reviewed Triage Note    VITAL SIGNS:   ED Triage Vitals [05/27/25 0906]   Encounter Vitals Group      BP (!) 144/77      Systolic BP Percentile       Diastolic BP Percentile       Pulse (!) 159      Resp (!) 22      Temp 98 °F (36.7 °C)      Temp src       SpO2 97 %      Weight 195 lb      Height 5' 10"      Head Circumference       Peak Flow       Pain Score       Pain Loc       Pain Education       Exclude from Growth Chart        Patient Vitals for the past 24 hrs:   BP Temp Pulse Resp SpO2 Height Weight   05/27/25 1223 (!) 165/90 -- (!) 113 13 97 % -- --   05/27/25 1208 (!) 169/103 -- (!) 113 10 96 % -- --   05/27/25 1153 (!) 169/105 -- (!) 117 17 97 % -- --   05/27/25 1138 (!) 158/93 -- (!) 130 12 100 % -- --   05/27/25 1122 (!) 182/114 -- (!) 118 16 100 % -- --   05/27/25 1113 -- -- (!) 118 18 97 % -- --   05/27/25 1101 (!) 180/73 -- 105 19 99 % -- --   05/27/25 1045 (!) 149/84 -- 106 18 100 % -- --   05/27/25 1040 (!) 180/106 -- -- -- -- -- --   05/27/25 1032 (!) 182/121 -- (!) 140 16 100 % -- --   05/27/25 1006 -- -- (!) 123 15 99 % -- --   05/27/25 1001 (!) 180/90 -- (!) 132 16 96 % -- --   05/27/25 0956 (!) 185/101 -- (!) 139 -- -- -- --   05/27/25 0950 (!) 162/94 -- (!) 149 13 95 % -- --   05/27/25 0945 116/83 -- (!) 154 -- -- -- --   05/27/25 0939 (!) 168/117 -- (!) 149 15 96 % -- --   05/27/25 0934 (!) 140/92 -- (!) 161 -- -- -- --   05/27/25 0906 (!) 144/77 98 °F (36.7 °C) (!) 159 (!) 22 97 % 5' 10" (1.778 m) 88.5 kg (195 lb)       Physical Exam    Nursing note and vitals reviewed.  Constitutional: He appears well-developed and well-nourished.   HENT:   Head: Normocephalic and atraumatic.   Eyes: EOM are normal. Pupils are equal, round, and reactive to light.   Neck: Neck supple.   Normal range of motion.  Cardiovascular:  An irregularly irregular rhythm present.   Tachycardia present.         Pulmonary/Chest: Breath sounds normal. No respiratory " distress.   Abdominal: Abdomen is soft. There is no abdominal tenderness.   Musculoskeletal:         General: Normal range of motion.      Cervical back: Normal range of motion and neck supple.     Neurological: He is alert and oriented to person, place, and time.   Skin: Skin is warm and dry.   Psychiatric: He has a normal mood and affect.          EKG     Interpreted by EM physician if performed:         ECG Results              Repeat EKG 12-lead (In process)        Collection Time Result Time QRS Duration [facility] QTC Calculation    05/27/25 10:53:36 05/27/25 11:08:05 74 428                     In process by Interface, Lab In [facility] (05/27/25 11:08:14)                   Narrative:    Test Reason : I48.91,    Vent. Rate : 126 BPM     Atrial Rate :  94 BPM     P-R Int :    ms          QRS Dur :  74 ms      QT Int : 296 ms       P-R-T Axes :    -20  25 degrees    QTcB Int : 428 ms    Atrial fibrillation with rapid ventricular response  Anteroseptal infarct (cited on or before 21-Jun-2022)  Abnormal ECG  When compared with ECG of 27-May-2025 09:02,  No significant change was found    Referred By: AAAREFERRAL SELF           Confirmed By:                                      EKG 12-lead (In process)        Collection Time Result Time QRS Duration [facility] QTC Calculation    05/27/25 09:02:38 05/27/25 11:06:21 90 458                     In process by Interface, Lab In [facility] (05/27/25 11:06:31)                   Narrative:    Test Reason : I48.91,    Vent. Rate : 159 BPM     Atrial Rate :    BPM     P-R Int :    ms          QRS Dur :  90 ms      QT Int : 282 ms       P-R-T Axes :      3  56 degrees    QTcB Int : 458 ms    Atrial fibrillation with rapid ventricular response  Anteroseptal infarct (cited on or before 21-Jun-2022)  Abnormal ECG  When compared with ECG of 04-Sep-2024 09:15,  Atrial fibrillation has replaced Sinus rhythm  Vent. rate has increased by  86 bpm    Referred By: AAAREFERRAL SELF           Confirmed By:                                        LABS  Pertinent labs reviewed. (See chart for details)   Labs Reviewed   COMPREHENSIVE METABOLIC PANEL - Abnormal       Result Value    Sodium 136      Potassium 4.1      Chloride 101      CO2 23      Glucose 245 (*)     BUN 12      Creatinine 1.7 (*)     Calcium 9.5      Protein Total 8.3      Albumin 4.5      Bilirubin Total 0.6      ALP 83      AST 28      ALT 45 (*)     Anion Gap 12      eGFR 44 (*)    PROTIME-INR - Abnormal    PT 13.5 (*)     INR 1.2     CBC WITH DIFFERENTIAL - Abnormal    WBC 13.58 (*)     RBC 6.17      HGB 19.5 (*)     HCT 57.4 (*)     MCV 93      MCH 31.6 (*)     MCHC 34.0      RDW 14.2      Platelet Count 298      MPV 9.0 (*)     Nucleated RBC 0      Neut % 76.0 (*)     Lymph % 16.0 (*)     Mono % 6.2      Eos % 0.9      Basophil % 0.5      Imm Grans % 0.4      Neut # 10.33 (*)     Lymph # 2.17      Mono # 0.84      Eos # 0.12      Baso # 0.07      Imm Grans # 0.05 (*)    TROPONIN I - Normal    Troponin-I <0.006     TSH - Normal    TSH 1.207     MAGNESIUM - Normal    Magnesium  2.0     CBC W/ AUTO DIFFERENTIAL    Narrative:     The following orders were created for panel order CBC auto differential.  Procedure                               Abnormality         Status                     ---------                               -----------         ------                     CBC with Differential[[id number]]       Abnormal            Final result                 Please view results for these tests on the individual orders.         RADIOLOGY          Imaging Results    None           PROCEDURES    Critical Care    Date/Time: 5/27/2025 12:27 PM    Performed by: Graciela Liu DO  Authorized by: Graciela Liu DO  Direct patient critical care time: 15 minutes  Additional history critical care time: 10 minutes  Ordering / reviewing critical care time: 10 minutes  Documentation critical care time: 10 minutes  Consulting other physicians critical care time: 7  minutes  Total critical care time (exclusive of procedural time) : 52 minutes  Critical care was necessary to treat or prevent imminent or life-threatening deterioration of the following conditions: cardiac failure.  Comments: AFib with RVR requiring multiple doses of IV metoprolol, Cardizem as well as Cardizem infusion            ED COURSE & MEDICAL DECISION MAKING    Pertinent Labs & Imaging studies reviewed. (See chart for details and specific orders.)          Summary of review of records:   PCP visit May 21st.  Blood pressure medications changed at that time.  Started on hydralazine, clonidine discontinued.    Status post bilateral carotid endarterectomy with follow up ultrasound on March 24th    Cardiology visit in September; notes history of AFib without Ringer's this has as suche is off Eliquis.     Medical Decision Making  Amount and/or Complexity of Data Reviewed  External Data Reviewed: labs, radiology, ECG and notes.  Labs: ordered. Decision-making details documented in ED Course.  ECG/medicine tests: ordered and independent interpretation performed. Decision-making details documented in ED Course.    Risk  Prescription drug management.  Decision regarding hospitalization.      Partha Curtis Jr. is a 65 y.o. male who presents with atrial fibrillation with rapid ventricular rate believes symptoms started yesterday.  Has not on DOAC or any systemic anticoagulation at this time.  Compliant with all medications.    Differential includes but is not limited to atrial fibrillation with RVR, electrolyte, HANANE amongst others.            Medications   diltiaZEM 125 mg in D5W 125 mL infusion (15 mg/hr Intravenous Rate/Dose Change 5/27/25 1224)   metoprolol injection 5 mg (5 mg Intravenous Given 5/27/25 0956)   0.9% NaCl infusion (1,000 mLs Intravenous New Bag 5/27/25 1010)   diltiaZEM injection 10 mg (10 mg Intravenous Given 5/27/25 1040)       ED Course as of 05/27/25 1225   Tue May 27, 2025   0914 EKG  12-lead  Atrial fibrillation with rapid ventricular response at a rate of 159.  No evidence of acute ischemia.  QTC of 458.  Independently interpreted by me. [HL]   1005 Creatinine(!): 1.7  HANANE versus CKD, given this in concentrated hemoglobin will fluids [HL]   1005 Glucose(!): 245  Hyperglycemia consistent with diabetes.  No evidence of DKA bicarb, no anion gap [HL]   1005 CBC auto differential(!)  Possible component of dehydration given H&H, will give a L of fluids [HL]   1013 Patient remains in AFib with a RVR and a rate in the 140s after 3 pushes of metoprolol.  Will initiate Cardizem with likely hospitalization for ongoing drip and subsequent titration. [HL]   1053 Repeat EKG 12-lead  Atrial fibrillation with rapid ventricular response at a rate of 126.  No evidence of ischemia.  QTC of 428.  Independently interpreted by me. [HL]   1113 Discussed with Dr. Arias, [facility] who accepts patient for hospitalization to the ICU at this time for ongoing medical management with goal of rate controlled. [HL]      ED Course User Index  [HL] Graciela Liu,              FINAL IMPRESSION    Final diagnoses:  [I48.91] A-fib  [I48.91] Atrial fibrillation with rapid ventricular response       DISPOSITION  Patient admitted in stable condition             DISCLAIMER: This note was prepared with [employer] voice recognition transcription software. Garbled syntax, mangled pronouns, and other bizarre constructions may be attributed to that software system.             Graciela Liu DO  05/27/25 1238

## 2025-05-27 NOTE — ASSESSMENT & PLAN NOTE
Patient's blood pressure range in the last 24 hours was: BP  Min: 116/83  Max: 185/101.The patient's inpatient anti-hypertensive regimen is listed below:  Current Antihypertensives  valsartan tablet 320 mg, Daily, Oral  hydrALAZINE tablet 10 mg, 3 times daily, Oral  metoprolol succinate (TOPROL-XL) 24 hr tablet 200 mg, Daily, Oral  diltiaZEM 125 mg in dextrose 5% 125 mL infusion (non-titrating), Continuous, Intravenous    Plan  - BP is controlled, no changes needed to their regimen

## 2025-05-27 NOTE — ED TRIAGE NOTES
Patient arrives to the ED w/ complaints of A Fib. Reports experiencing fatigue, dizziness, neck pain yesterday. Reports onset ~ 10:00 yesterday. Denies assessing EKG yesterday due to fatigue. Reports waking up today w/ new onset of SOB, diaphoresis, upper left back pain. Denies fatigue, dizziness, neck pain today. Reports Hx of dysrhythmias. Reports last yr had similar symptoms and required conversion. Denies CP, headache, numbness, tingling, N/V. Reports taking 2 ASA 81 today PTA. Patient AAOx4. Calm, cooperative. NAD noted.

## 2025-05-27 NOTE — ASSESSMENT & PLAN NOTE
Patient's blood pressure range in the last 24 hours was: BP  Min: 116/83  Max: 187/98.The patient's inpatient anti-hypertensive regimen is listed below:  Current Antihypertensives  valsartan tablet 320 mg, Daily, Oral  hydrALAZINE tablet 10 mg, 3 times daily, Oral  metoprolol succinate (TOPROL-XL) 24 hr tablet 200 mg, Daily, Oral  diltiaZEM 125 mg in dextrose 5% 125 mL infusion (non-titrating), Continuous, Intravenous    Plan  - BP is uncontrolled, will adjust as follows: resume home meds  - continue Dilt drip

## 2025-05-27 NOTE — HPI
65 year old male with EBONY s/p CEA (right), HLD, HTN, DM, CKD, pAF, ALLA (untreated- has attempted numerous masks). Patient is not on OAC at the present. Patient reports palpitations, chest discomfort. No diaphoresis, SOB, NV. He reports that his home device alerted him to AF so he proceeded to the ED for further evaluation. Patient was noted to be in AF RVR which has improved with Cardizem gtt and continuation of home BB. Initiated heparin gtt. CHADSVASC 4. TTE pending. NPO p MN for ELIZABETH/DCCV with possible NM stress test to follow.

## 2025-05-27 NOTE — ASSESSMENT & PLAN NOTE
Per chart review:  He is s/p bilateral L3,4,5 MBB on 2/24/2025 and 3/10/2025.  Pain Medications:  Gabapentin  Percocet     Will continue

## 2025-05-27 NOTE — ASSESSMENT & PLAN NOTE
Patient has paroxysmal (<7 days) atrial fibrillation. Patient is currently in atrial fibrillation. [id number] Score: 3. The patients heart rate in the last 24 hours is as follows:  Pulse  Min: 100  Max: 161     Antiarrhythmics  metoprolol succinate (TOPROL-XL) 24 hr tablet 200 mg, Daily, Oral  diltiaZEM 125 mg in dextrose 5% 125 mL infusion (non-titrating), Continuous, Intravenous    Anticoagulants  heparin 25,000 units in dextrose 5% (100 units/ml) IV bolus from bag LOW INTENSITY nomogram - [facility], Once, Intravenous  heparin 25,000 units in dextrose 5% 250 mL (100 units/mL) infusion LOW INTENSITY nomogram - [facility], Continuous, Intravenous  heparin 25,000 units in dextrose 5% (100 units/ml) IV bolus from bag LOW INTENSITY nomogram - [facility], As needed (PRN), Intravenous  heparin 25,000 units in dextrose 5% (100 units/ml) IV bolus from bag LOW INTENSITY nomogram - [facility], As needed (PRN), Intravenous    Plan  - Replete lytes with a goal of K>4, Mg >2  - Patient is anticoagulated, see medications listed above.  - Continue BB and Cardizem gtt  -NPO p MN for ELIZABETH/DCCV tomorrow  -Will convert to DOAC once testing complete including stress test- trend troponin

## 2025-05-27 NOTE — EICU
"Virtual ICU Admission    Admit Date: 2025  LOS: 0  Code Status: Prior   : 1960  Bed: K555/K555 A:     Diagnosis: Atrial fibrillation with RVR    Patient  has a past medical history of Allergy, Carotid artery occlusion, Cataract, Chronic back pain, Colonic polyp, Genetic testing, Hyperlipidemia, Hypertension, Paroxysmal atrial fibrillation, Sleep apnea, and Type 2 diabetes mellitus with stage 3 chronic kidney disease, without long-term current use of insulin.    Last VS: BP (!) 170/105   Pulse (!) 114   Temp 98.1 °F (36.7 °C) (Oral)   Resp (!) 24   Ht 5' 10" (1.778 m)   Wt 88 kg (194 lb 0.1 oz)   SpO2 96%   BMI 27.84 kg/m²       VICU Review    VICU nurse assessment :  [place] IRA completed, LDA documentation reconciliation completed, and VTE prophylaxis review        Nursing orders placed : IP MOOKIE Peripheral IV Access         "

## 2025-05-27 NOTE — ASSESSMENT & PLAN NOTE
Creatine stable for now. BMP reviewed- noted Estimated Creatinine Clearance: 48.4 mL/min (A) (based on SCr of 1.7 mg/dL (H)). according to latest data. Based on current GFR, CKD stage is stage 3 - GFR 30-59.  Monitor UOP and serial BMP and adjust therapy as needed. Renally dose meds. Avoid nephrotoxic medications and procedures.    Patient's FSGs are controlled on current medication regimen.  Last A1c reviewed-   Lab Results   Component Value Date    HGBA1C 7.0 (H) 03/18/2025     Current correctional scale  none  Diabetic diet, excellent disease control

## 2025-05-27 NOTE — ASSESSMENT & PLAN NOTE
Patient has paroxysmal (<7 days) atrial fibrillation. Patient is currently in atrial fibrillation. [id number] Score: 3. The patients heart rate in the last 24 hours is as follows:  Pulse  Min: 88  Max: 161     Antiarrhythmics  metoprolol succinate (TOPROL-XL) 24 hr tablet 200 mg, Daily, Oral  diltiaZEM 125 mg in dextrose 5% 125 mL infusion (non-titrating), Continuous, Intravenous    Anticoagulants  heparin 25,000 units in dextrose 5% 250 mL (100 units/mL) infusion LOW INTENSITY nomogram - [facility], Continuous, Intravenous  heparin 25,000 units in dextrose 5% (100 units/ml) IV bolus from bag LOW INTENSITY nomogram - [facility], As needed (PRN), Intravenous  heparin 25,000 units in dextrose 5% (100 units/ml) IV bolus from bag LOW INTENSITY nomogram - [facility], As needed (PRN), Intravenous    Plan  - Replete lytes with a goal of K>4, Mg >2  - Patient is not anticoagulated   - Patient's afib is currently uncontrolled. Will adjust treatment as follows: discussed with cards. See recs below  - NPO p MN for ELIZABETH/DCCV with possible NM stress test to follow.

## 2025-05-28 ENCOUNTER — ANESTHESIA EVENT (OUTPATIENT)
Dept: SURGERY | Facility: HOSPITAL | Age: 65
End: 2025-05-28
Payer: COMMERCIAL

## 2025-05-28 ENCOUNTER — ANESTHESIA (OUTPATIENT)
Dept: SURGERY | Facility: HOSPITAL | Age: 65
End: 2025-05-28
Payer: COMMERCIAL

## 2025-05-28 VITALS
RESPIRATION RATE: 18 BRPM | DIASTOLIC BLOOD PRESSURE: 96 MMHG | HEART RATE: 80 BPM | BODY MASS INDEX: 27.77 KG/M2 | SYSTOLIC BLOOD PRESSURE: 160 MMHG | TEMPERATURE: 98 F | HEIGHT: 70 IN | OXYGEN SATURATION: 94 % | WEIGHT: 194 LBS

## 2025-05-28 LAB
ABSOLUTE EOSINOPHIL (OHS): 0.22 K/UL
ABSOLUTE MONOCYTE (OHS): 1.17 K/UL (ref 0.3–1)
ABSOLUTE NEUTROPHIL COUNT (OHS): 7.44 K/UL (ref 1.8–7.7)
AORTIC SIZE INDEX (SOV): 1.8 CM/M2
AORTIC SIZE INDEX: 1.5 CM/M2
APTT PPP: 48.1 SECONDS (ref 21–32)
ASCENDING AORTA: 3.1 CM
BASOPHILS # BLD AUTO: 0.04 K/UL
BASOPHILS NFR BLD AUTO: 0.3 %
BSA FOR ECHO PROCEDURE: 2.09 M2
ERYTHROCYTE [DISTWIDTH] IN BLOOD BY AUTOMATED COUNT: 14 % (ref 11.5–14.5)
HCT VFR BLD AUTO: 55 % (ref 40–54)
HGB BLD-MCNC: 18.9 GM/DL (ref 14–18)
IMM GRANULOCYTES # BLD AUTO: 0.03 K/UL (ref 0–0.04)
IMM GRANULOCYTES NFR BLD AUTO: 0.2 % (ref 0–0.5)
LYMPHOCYTES # BLD AUTO: 3.6 K/UL (ref 1–4.8)
MCH RBC QN AUTO: 31.7 PG (ref 27–31)
MCHC RBC AUTO-ENTMCNC: 34.4 G/DL (ref 32–36)
MCV RBC AUTO: 92 FL (ref 82–98)
NUCLEATED RBC (/100WBC) (OHS): 0 /100 WBC
OHS QRS DURATION: 74 MS
OHS QRS DURATION: 76 MS
OHS QRS DURATION: 90 MS
OHS QTC CALCULATION: 428 MS
OHS QTC CALCULATION: 439 MS
OHS QTC CALCULATION: 458 MS
PLATELET # BLD AUTO: 300 K/UL (ref 150–450)
PMV BLD AUTO: 9.1 FL (ref 9.2–12.9)
POCT GLUCOSE: 171 MG/DL (ref 70–110)
POCT GLUCOSE: 175 MG/DL (ref 70–110)
RBC # BLD AUTO: 5.97 M/UL (ref 4.6–6.2)
RELATIVE EOSINOPHIL (OHS): 1.8 %
RELATIVE LYMPHOCYTE (OHS): 28.8 % (ref 18–48)
RELATIVE MONOCYTE (OHS): 9.4 % (ref 4–15)
RELATIVE NEUTROPHIL (OHS): 59.5 % (ref 38–73)
SINUS: 3.8 CM
WBC # BLD AUTO: 12.5 K/UL (ref 3.9–12.7)

## 2025-05-28 PROCEDURE — 85025 COMPLETE CBC W/AUTO DIFF WBC: CPT | Performed by: NURSE PRACTITIONER

## 2025-05-28 PROCEDURE — 37000008 HC ANESTHESIA 1ST 15 MINUTES: Performed by: INTERNAL MEDICINE

## 2025-05-28 PROCEDURE — 96366 THER/PROPH/DIAG IV INF ADDON: CPT

## 2025-05-28 PROCEDURE — 25000003 PHARM REV CODE 250: Performed by: INTERNAL MEDICINE

## 2025-05-28 PROCEDURE — 25000003 PHARM REV CODE 250: Performed by: ANESTHESIOLOGY

## 2025-05-28 PROCEDURE — 25000003 PHARM REV CODE 250

## 2025-05-28 PROCEDURE — 25000003 PHARM REV CODE 250: Performed by: FAMILY MEDICINE

## 2025-05-28 PROCEDURE — G0378 HOSPITAL OBSERVATION PER HR: HCPCS

## 2025-05-28 PROCEDURE — 85730 THROMBOPLASTIN TIME PARTIAL: CPT | Performed by: FAMILY MEDICINE

## 2025-05-28 PROCEDURE — 36415 COLL VENOUS BLD VENIPUNCTURE: CPT | Performed by: NURSE PRACTITIONER

## 2025-05-28 PROCEDURE — 37000009 HC ANESTHESIA EA ADD 15 MINS: Performed by: INTERNAL MEDICINE

## 2025-05-28 PROCEDURE — 63600175 PHARM REV CODE 636 W HCPCS

## 2025-05-28 RX ORDER — LIDOCAINE HYDROCHLORIDE 20 MG/ML
INJECTION INTRAVENOUS
Status: DISCONTINUED | OUTPATIENT
Start: 2025-05-28 | End: 2025-05-28

## 2025-05-28 RX ORDER — HYDRALAZINE HYDROCHLORIDE 25 MG/1
25 TABLET, FILM COATED ORAL EVERY 8 HOURS
Qty: 90 TABLET | Refills: 0 | Status: SHIPPED | OUTPATIENT
Start: 2025-05-28 | End: 2025-06-27

## 2025-05-28 RX ORDER — GLUCAGON 1 MG
1 KIT INJECTION
Status: DISCONTINUED | OUTPATIENT
Start: 2025-05-28 | End: 2025-05-28 | Stop reason: HOSPADM

## 2025-05-28 RX ORDER — DILTIAZEM HCL 1 MG/ML
0-15 INJECTION, SOLUTION INTRAVENOUS CONTINUOUS
Status: DISCONTINUED | OUTPATIENT
Start: 2025-05-28 | End: 2025-05-28 | Stop reason: HOSPADM

## 2025-05-28 RX ORDER — PROPOFOL 10 MG/ML
VIAL (ML) INTRAVENOUS
Status: DISCONTINUED | OUTPATIENT
Start: 2025-05-28 | End: 2025-05-28

## 2025-05-28 RX ORDER — PHENYLEPHRINE HYDROCHLORIDE 10 MG/ML
INJECTION INTRAVENOUS
Status: DISCONTINUED | OUTPATIENT
Start: 2025-05-28 | End: 2025-05-28

## 2025-05-28 RX ORDER — INSULIN ASPART 100 [IU]/ML
0-10 INJECTION, SOLUTION INTRAVENOUS; SUBCUTANEOUS EVERY 6 HOURS PRN
Status: DISCONTINUED | OUTPATIENT
Start: 2025-05-28 | End: 2025-05-28 | Stop reason: HOSPADM

## 2025-05-28 RX ADMIN — DULOXETINE HYDROCHLORIDE 30 MG: 30 CAPSULE, DELAYED RELEASE ORAL at 09:05

## 2025-05-28 RX ADMIN — ASPIRIN 81 MG: 81 TABLET, COATED ORAL at 09:05

## 2025-05-28 RX ADMIN — APIXABAN 5 MG: 5 TABLET, FILM COATED ORAL at 09:05

## 2025-05-28 RX ADMIN — PROPOFOL 20 MG: 10 INJECTION, EMULSION INTRAVENOUS at 08:05

## 2025-05-28 RX ADMIN — HYDRALAZINE HYDROCHLORIDE 10 MG: 10 TABLET ORAL at 09:05

## 2025-05-28 RX ADMIN — SODIUM CHLORIDE: 0.9 INJECTION, SOLUTION INTRAVENOUS at 07:05

## 2025-05-28 RX ADMIN — PHENYLEPHRINE HYDROCHLORIDE 100 MCG: 10 INJECTION INTRAVENOUS at 08:05

## 2025-05-28 RX ADMIN — VALACYCLOVIR HYDROCHLORIDE 1000 MG: 500 TABLET, FILM COATED ORAL at 09:05

## 2025-05-28 RX ADMIN — METOPROLOL SUCCINATE 200 MG: 50 TABLET, EXTENDED RELEASE ORAL at 09:05

## 2025-05-28 RX ADMIN — PROPOFOL 30 MG: 10 INJECTION, EMULSION INTRAVENOUS at 08:05

## 2025-05-28 RX ADMIN — VALSARTAN 320 MG: 160 TABLET, FILM COATED ORAL at 09:05

## 2025-05-28 RX ADMIN — EZETIMIBE 10 MG: 10 TABLET ORAL at 09:05

## 2025-05-28 RX ADMIN — PROPOFOL 70 MG: 10 INJECTION, EMULSION INTRAVENOUS at 08:05

## 2025-05-28 RX ADMIN — LIDOCAINE HYDROCHLORIDE 100 MG: 20 INJECTION, SOLUTION INTRAVENOUS at 08:05

## 2025-05-28 NOTE — EICU
"Intervention Initiated From:  COR / EICU    Edie intervened regarding:  Rounding (Video assessment)    Virtual ICU Admission    Admit Date: 2025  LOS: 1  Code Status: Full Code   : 1960  Bed: K555/K555 A:     Diagnosis: Atrial fibrillation with RVR    Patient  has a past medical history of Allergy, Carotid artery occlusion, Cataract, Chronic back pain, Colonic polyp, Genetic testing, Hyperlipidemia, Hypertension, Paroxysmal atrial fibrillation, Sleep apnea, and Type 2 diabetes mellitus with stage 3 chronic kidney disease, without long-term current use of insulin.    Last VS: BP (!) 165/97   Pulse 101   Temp 98.1 °F (36.7 °C) (Oral)   Resp (!) 21   Ht 5' 10" (1.778 m)   Wt 88 kg (194 lb 0.1 oz)   SpO2 (!) 92%   BMI 27.84 kg/m²       VICU Review    VICU nurse assessment :  [place] completed, LDA documentation reconciliation completed, Skin care/wounds LDA reconciliation, and VTE prophylaxis review           "

## 2025-05-28 NOTE — PLAN OF CARE
05/28/25 1439   Discharge Planning   Assessment Type Discharge Planning Brief Assessment   Resource/Environmental Concerns none   Support Systems Spouse/significant other   Equipment Currently Used at Home none   Current Living Arrangements home   Patient/Family Anticipates Transition to home with family   Patient/Family Anticipated Services at Transition none   DME Needed Upon Discharge  none   Discharge Plan A Home with family     Pt lives with his wife and she will provide transport upon discharge. Pt was IADL's prior to admission.    Future Appointments   Date Time Provider Department Center   6/16/2025  7:00 AM LYNDSEY JAIN [facility] [place]   6/16/2025  9:45 AM LAB, NATHALIA KENH [facility] [place]   6/18/2025  9:20 AM Baylee Ni, LISETH [facility]   6/19/2025 10:30 AM Rosario Holt PA-C [facility] MED [place]   9/29/2025  9:00 AM LAB, NATHALIA KEN[facility] [place]     Fabiana Palma RN, [facility]  Case Management- Nathalia  [phone]

## 2025-05-28 NOTE — PLAN OF CARE
05/28/25 1501   Final Note   Assessment Type Final Discharge Note   Anticipated Discharge Disposition Home   Hospital Resources/Appts/Education Provided Appointments scheduled and added to AVS   Post-Acute Status   Discharge Delays None known at this time     Pt will dc home today, clear by CM. Wife to provide transport.    Future Appointments   Date Time Provider Department Center   6/16/2025  7:00 AM LYNDSEY JAIN [place]   6/16/2025  9:45 AM LAB, NATHALIA KENH LAB [place]   6/18/2025  9:20 AM Baylee Ni, NP [facility] PAINMGT [facility]   6/19/2025 10:30 AM Rosario Holt PA-C [facility] MED [place]   9/29/2025  9:00 AM LAB, NATHALIA KENH LAB [place]     Fabiana Palma RN, [facility]  Case Management- N[place]  [phone]

## 2025-05-28 NOTE — HPI
"Partha Curtis Jr. is a 65 y.o. male with a past medical history of  has a past medical history of Carotid artery occlusion, Cataract, Chronic back pain, Colonic polyp, Hypertension, Paroxysmal atrial fibrillation (02/28/2024), Sleep apnea, and Type 2 diabetes mellitus with stage 3 chronic kidney disease, without long-term current use of insulin (04/02/2020) who presented to the ED for evaluation of a- fib. He says he has a device which alerted him that his heart rate was off. He also had symptoms. It started with lightheadedness since yesterday morning. When he woke up on the way to work he started to feel the same. He says this happened in February of last year and he was admitted here for it. He was "shocked" and did not require meds upon discharge. He believes he recently saw cardiology for f/u this year (chart review shows a Sept 2024 f/u with cards).  Denies any chest pain.  Denies any recent illness.  Adherent with all other medications.      In the ED, he had afib RVR (160s) with accelerated HTN. He was placed on a Cardizem drip.  consulted to admit.   "

## 2025-05-28 NOTE — SUBJECTIVE & OBJECTIVE
Past Medical History:   Diagnosis Date    Allergy     Carotid artery occlusion     Cataract     Chronic back pain     Colonic polyp     Genetic testing     MUTYH mutation-negative    Hyperlipidemia     Hypertension     Paroxysmal atrial fibrillation 02/28/2024    Sleep apnea     Type 2 diabetes mellitus with stage 3 chronic kidney disease, without long-term current use of insulin 04/02/2020       Past Surgical History:   Procedure Laterality Date    ANKLE SURGERY Left     2    APPENDECTOMY      at age 20    ARTHROSCOPIC CHONDROPLASTY OF KNEE JOINT Left 08/13/2024    Procedure: ARTHROSCOPY, KNEE, WITH CHONDROPLASTY;  Surgeon: Kai Washington MD;  Location: [facility] OR;  Service: Orthopedics;  Laterality: Left;    ARTHROSCOPY, KNEE, WITH ABRASION ARTHROPLASTY OR MICROFRACTURE Left 08/13/2024    Procedure: ARTHROSCOPY, KNEE, WITH  MICROFRACTURE;  Surgeon: Kai Washington MD;  Location: [facility] OR;  Service: Orthopedics;  Laterality: Left;    ARTHROSCOPY,KNEE,WITH MENISCUS REPAIR Left 08/13/2024    Procedure: ARTHROSCOPY,KNEE,WITH MENISCUS REPAIR;  Surgeon: Kai Washington MD;  Location: [facility] OR;  Service: Orthopedics;  Laterality: Left;  NO REGIONAL BLOCK    CAROTID ENDARTERECTOMY Right 07/15/2015    CARPAL TUNNEL RELEASE Bilateral     COLONOSCOPY N/A 12/14/2018    Procedure: COLONOSCOPYSuprep;  Surgeon: Silver Selby MD;  Location: [facility];  Service: Endoscopy;  Laterality: N/A;    COLONOSCOPY N/A 10/02/2024    Procedure: COLONOSCOPY;  Surgeon: Heath Morrow MD;  Location: [facility] ENDO;  Service: Endoscopy;  Laterality: N/A;  [facility] has unopened [place], portal - PC  9/25/24-LVM for precall, portal-DS. 10/1/24 Pt. called and confirmed arrival time.EC    EPIDURAL STEROID INJECTION N/A 02/26/2024    Procedure: CERVICAL C7/T1 IL SELENA;  Surgeon: Gokul Fung MD;  Location: [facility] PAIN MGT;  Service: Pain Management;  Laterality: N/A;  [phone]  2 WK F/U SERGEI    EPIDURAL STEROID INJECTION  N/A 05/23/2024    Procedure: LUMBAR L4/5 IL SELENA *ASPIRIN OTC* HOLD FOR 5 DAYS;  Surgeon: Gokul Fung MD;  Location: [facility] PAIN MGT;  Service: Pain Management;  Laterality: N/A;  [phone]  2 WK F/U NOHELIA    EPIDURAL STEROID INJECTION N/A 10/21/2024    Procedure: LUMBAR L5/S1 IL SELENA *ASPIRIN OTC* HOLD FOR 5 DAYS  **EISSA PT**;  Surgeon: Sofia Sheikh MD;  Location: [facility] PAIN MGT;  Service: Pain Management;  Laterality: N/A;  [phone]  2 WK F/U NOHELIA    INJECTION OF ANESTHETIC AGENT AROUND NERVE N/A 12/23/2024    Procedure: LUMBAR L4.5 IL SELENA *ASPIRIN OTC* HOLD FOR 5 DAYS;  Surgeon: Gokul Fung MD;  Location: [facility] PAIN MGT;  Service: Pain Management;  Laterality: N/A;  2 WK F/U NOHELIA    INJECTION OF ANESTHETIC AGENT AROUND NERVE Bilateral 02/24/2025    Procedure: BLOCK, NERVE BILATERAL L3, 4, 5 MEDIAL BRANCH;  Surgeon: Gokul Fung MD;  Location: [facility] PAIN MGT;  Service: Pain Management;  Laterality: Bilateral;  2 WK F/U NOHELIA    INJECTION OF ANESTHETIC AGENT AROUND NERVE Bilateral 03/10/2025    Procedure: BLOCK, NERVE BILATERAL L3, L4, L5 MEDIAL BRANCH;  Surgeon: Gokul Fung MD;  Location: [facility] PAIN MGT;  Service: Pain Management;  Laterality: Bilateral;    JOINT REPLACEMENT  09/2010    NASAL SEPTUM SURGERY      RADIOFREQUENCY ABLATION Bilateral 03/27/2025    Procedure: RADIOFREQUENCY ABLATION BILATERAL L3, 4, 5;  Surgeon: Gokul Fung MD;  Location: [facility] PAIN MGT;  Service: Pain Management;  Laterality: Bilateral;    TOTAL KNEE ARTHROPLASTY Right     6 knee surgeries    TRANSESOPHAGEAL ECHOCARDIOGRAM WITH POSSIBLE CARDIOVERSION (ELIZABETH W/ POSS CARDIOVERSION) N/A 02/28/2024    Procedure: Transesophageal echo (ELIZABETH) intra-procedure log documentation;  Surgeon: Ahmet De La Cruz MD;  Location: [facility] CATH LAB/EP;  Service: Cardiology;  Laterality: N/A;       Review of patient's allergies indicates:   Allergen Reactions    Stadol [butorphanol tartrate] Rash     Swelling in face    Strawberries [strawberry] Rash  "      Current Facility-Administered Medications on File Prior to Encounter   Medication    0.9%  NaCl infusion    0.9%  NaCl infusion     Current Outpatient Medications on File Prior to Encounter   Medication Sig    amitriptyline (ELAVIL) 75 MG tablet Take 1 tablet by mouth in the evening    aspirin (ECOTRIN) 81 MG EC tablet Take 1 tablet (81 mg total) by mouth once daily.    BD LUER-RUSS SYRINGE 3 mL 25 gauge x 1" Syrg USE ONE SYRINGE EVERY 14 DAYS WITH TESTOSTERONE    blood-glucose sensor (DEXCOM G7 SENSOR) Allison To check sugars up to 3 times daily    candesartan (ATACAND) 32 MG tablet Take 1 tablet by mouth once daily    cyanocobalamin (VITAMIN B-12) 1000 MCG tablet Take 1 tablet (1,000 mcg total) by mouth once daily.    DULoxetine (CYMBALTA) 30 MG capsule Take 1 capsule by mouth once daily    ergocalciferol (ERGOCALCIFEROL) 50,000 unit Cap Take 1 capsule (50,000 Units total) by mouth every 7 days.    ezetimibe (ZETIA) 10 mg tablet Take 1 tablet by mouth once daily    fluticasone propionate (FLONASE) 50 mcg/actuation nasal spray 2 sprays by Each Nostril route.    gabapentin (NEURONTIN) 800 MG tablet Take 1 tablet (800 mg total) by mouth every evening.    glimepiride (AMARYL) 1 MG tablet Take 1 tablet (1 mg total) by mouth before breakfast.    glucose 4 GM chewable tablet Take 4 tablets (16 g total) by mouth as needed for Low blood sugar.    hydrALAZINE (APRESOLINE) 10 MG tablet Take 1 tablet (10 mg total) by mouth 3 (three) times daily.    hydrocortisone 2.5 % cream Apply to face twice daily for flares as needed    ipratropium (ATROVENT) 42 mcg (0.06 %) nasal spray 2 sprays by Nasal route 2 (two) times daily as needed for Rhinitis.    ketoconazole (NIZORAL) 2 % cream Apply twice daily to affected area of skin    LIDOcaine (LIDODERM) 5 % Place 1 patch onto the skin once daily. Remove & Discard patch within 12 hours or as directed by MD    metoprolol succinate (TOPROL-XL) 200 MG 24 hr tablet Take 1 tablet (200 mg " "total) by mouth once daily.    oxyCODONE-acetaminophen (PERCOCET)  mg per tablet Take 1 tablet by mouth every 12 (twelve) hours as needed for Pain.    rosuvastatin (CRESTOR) 40 MG Tab TAKE 1 TABLET BY MOUTH ONCE DAILY IN THE EVENING    semaglutide (OZEMPIC) 2 mg/dose (8 mg/3 mL) PnIj Inject 2 mg into the skin every 7 days.    sod sulf-pot chloride-mag sulf (SUTAB) 1.479-0.188- 0.225 gram tablet Take 12 tablets by mouth once daily. Take according to package instructions with indicated amount of water.    syringe with needle, safety (BD INTEGRA SYRINGE) 3 mL 22 gauge x 1 1/2" Syrg Inject 1 Syringe as directed once a week.    testosterone cypionate (DEPOTESTOTERONE CYPIONATE) 200 mg/mL injection Inject 0.75 mLs (150 mg total) into the muscle every 14 (fourteen) days.    valACYclovir (VALTREX) 1000 MG tablet Take 2 tablets (2,000 mg total) by mouth every 12 (twelve) hours.    [DISCONTINUED] cloNIDine (CATAPRES) 0.1 MG tablet Take 1 tablet (0.1 mg total) by mouth 2 (two) times daily.     Family History       Problem Relation (Age of Onset)    Aneurysm Other (48)    Brain cancer Mother (67), Paternal Grandfather (73)    Breast cancer Sister (58), Maternal Cousin (57)    Cancer Mother, Father, Maternal Grandfather    Genetic Disorder Sister    Hypertension Father    Lung cancer Father    Seizures Daughter    Ulcerative colitis Other          Tobacco Use    Smoking status: Never     Passive exposure: Never    Smokeless tobacco: Never   Substance and Sexual Activity    Alcohol use: Yes     Alcohol/week: 2.0 standard drinks of alcohol     Types: 2 Cans of beer per week     Comment: 6 beers per month    Drug use: Never    Sexual activity: Yes     Partners: Female     Birth control/protection: None     Review of Systems   Constitutional:  Negative for activity change, appetite change, chills, diaphoresis, fatigue and fever.   HENT:  Negative for congestion, sinus pressure, sore throat and tinnitus.    Eyes:  Negative for " visual disturbance.   Respiratory:  Positive for shortness of breath. Negative for cough, chest tightness and wheezing.    Cardiovascular:  Negative for chest pain, palpitations and leg swelling.   Gastrointestinal:  Negative for abdominal distention, abdominal pain, nausea and vomiting.   Endocrine: Negative for polydipsia, polyphagia and polyuria.   Genitourinary:  Negative for dysuria.   Musculoskeletal:  Negative for arthralgias and back pain.   Skin:  Negative for rash and wound.   Neurological:  Positive for light-headedness. Negative for dizziness, syncope and headaches.   Psychiatric/Behavioral:  Negative for confusion.      Objective:     Vital Signs (Most Recent):  Temp: 98.1 °F (36.7 °C) (05/27/25 1541)  Pulse: 101 (05/27/25 1730)  Resp: (!) 38 (05/27/25 1730)  BP: (!) 165/97 (05/27/25 1730)  SpO2: (!) 92 % (05/27/25 1730) Vital Signs (24h Range):  Temp:  [98 °F (36.7 °C)-98.1 °F (36.7 °C)] 98.1 °F (36.7 °C)  Pulse:  [] 101  Resp:  [10-45] 38  SpO2:  [92 %-100 %] 92 %  BP: (116-187)/() 165/97     Weight: 88 kg (194 lb 0.1 oz)  Body mass index is 27.84 kg/m².     Physical Exam  Vitals and nursing note reviewed.   Constitutional:       General: He is not in acute distress.     Appearance: Normal appearance. He is well-developed. He is obese. He is not ill-appearing or toxic-appearing.   HENT:      Head: Normocephalic and atraumatic.      Right Ear: External ear normal.      Left Ear: External ear normal.      Nose: Nose normal.      Mouth/Throat:      Pharynx: Uvula midline.   Eyes:      General: Lids are normal.      Extraocular Movements: EOM normal.      Conjunctiva/sclera: Conjunctivae normal.      Pupils: Pupils are equal, round, and reactive to light.   Neck:      Thyroid: No thyroid mass.      Vascular: No JVD.      Trachea: Trachea normal.   Cardiovascular:      Rate and Rhythm: Tachycardia present. Rhythm irregular.      Heart sounds: Normal heart sounds, S1 normal and S2 normal.    Pulmonary:      Effort: Pulmonary effort is normal.      Breath sounds: Normal breath sounds.   Abdominal:      General: Bowel sounds are normal. There is no distension.      Palpations: Abdomen is soft.      Tenderness: There is no abdominal tenderness.   Musculoskeletal:      Cervical back: Full passive range of motion without pain, normal range of motion and neck supple.      Right lower leg: No edema.      Left lower leg: No edema.   Neurological:      Mental Status: He is alert.      Cranial Nerves: No cranial nerve deficit.      Sensory: No sensory deficit.   Psychiatric:         Speech: Speech normal.         Behavior: Behavior normal. Behavior is cooperative.         Thought Content: Thought content normal.              CRANIAL NERVES     CN III, IV, VI   Pupils are equal, round, and reactive to light.  Extraocular motions are normal.        Significant Labs: All pertinent labs within the past 24 hours have been reviewed.  Recent Lab Results  (Last 5 results in the past 24 hours)        05/27/25  1523   05/27/25  1428   05/27/25  1350   05/27/25  1144   05/27/25  0927        A2C EF 49               A4C EF 64               Albumin         4.5       ALP         83       ALT         45       Anion Gap         12       Ascending aorta 3.5               Ao peak angie 1.5               ASI 1.7               ASI 1.9               Ao VTI 25.0               PTT     28.8  Comment: Refer to local heparin nomogram for intensity/dose specific therapeutic range.           AST         28       AV valve area 2.1               MALISSA by Velocity Ratio 2.3               AV mean gradient 5               AV index (prosthetic) 0.54               AV peak gradient 9               AV Velocity Ratio 0.60               Baso #     0.11     0.07       Basophil %     0.8     0.5       BILIRUBIN TOTAL         0.6  Comment: For infants and newborns, interpretation of results should be based   on gestational age, weight and in agreement with  clinical   observations.    Premature Infant recommended reference ranges:   0-24 hours:  <8.0 mg/dL   24-48 hours: <12.0 mg/dL   3-5 days:    <15.0 mg/dL   6-29 days:   <15.0 mg/dL       BSA 2.09               BUN         12       Calcium         9.5       Chloride         101       CO2         23       Creatinine         1.7       Left Ventricle Relative Wall Thickness 0.51               E/E' ratio 13               eGFR         44  Comment: Estimated GFR calculated using the CKD-EPI creatinine (2021) equation.       Eos #     0.08     0.12       Eos %     0.6     0.9       FS 28.2               Glucose         245       Gran # (ANC)     10.06     10.33       Hematocrit     56.2     57.4       Hemoglobin     19.1     19.5       Immature Grans (Abs)     0.07  Comment: Mild elevation in immature granulocytes is non specific and can be seen in a variety of conditions including stress response, acute inflammation, trauma and pregnancy. Correlation with other laboratory and clinical findings is essential.     0.05  Comment: Mild elevation in immature granulocytes is non specific and can be seen in a variety of conditions including stress response, acute inflammation, trauma and pregnancy. Correlation with other laboratory and clinical findings is essential.       Immature Granulocytes     0.5     0.4       INR     1.1  Comment: Coumadin Therapy:    2.0 - 3.0 for INR for all indicators except mechanical heart valves    and antiphospholipid syndromes which should use 2.5 - 3.5.     1.2  Comment: Coumadin Therapy:    2.0 - 3.0 for INR for all indicators except mechanical heart valves    and antiphospholipid syndromes which should use 2.5 - 3.5.       IVSd 1.4               LA area A2C 14.39               LA area A4C 12.70               LA Vol 30               LANA (MOD) 15               LVOT area 3.8               LV LATERAL E/E' RATIO 11.3               LV SEPTAL E/E' RATIO 15.0               LV EDV BP 66               LV  Diastolic Volume Index 32.04               Left Ventricular End Diastolic Volume by Teichholz Method 65.95               LV EDV A2C [id number]               LV EDV A4C 61.54               Left Ventricular End Systolic Volume by Teichholz Method 29.82               LV ESV A2C 32.70               LV ESV A4C 26.96               LVIDd 3.9               LVIDs 2.8               LV mass 159.3               LV Mass Index 77.3               Left Ventricular Outflow Tract Mean Gradient 2.20               Left Ventricular Outflow Tract Mean Velocity 0.72               LVOT diameter 2.2               LVOT peak hammad 0.9               LVOT stroke volume 51.7               LVOT peak VTI 13.6               LV ESV BP 30               LV Systolic Volume Index 14.6               Lymph #     2.74     2.17       Lymph %     19.4     16.0       Magnesium        2.0  Comment: Specimen slightly hemolyzed.         MCH     31.3     31.6       MCHC     34.0     34.0       MCV     92     93       Mean e' 0.07               Mono #     1.06     0.84       Mono %     7.5     6.2       MPV     9.0     9.0       Mr max hammad 3.18               MV Peak E Hammad 0.90               Neut %     71.2     76.0       nRBC     0     0       Ellis's Biplane MOD Ejection Fraction 58               Platelet Count     284     298       Potassium         4.1       PROTEIN TOTAL         8.3       PT     13.0     13.5       PW 1.0               RA vol index 11.34               RA Vol 25.02               RA Area 11.7               Est. RA pres 3               RA area length vol 23.36               RBC     6.11     6.17       RDW     14.2     14.2       RV S' 14.28               RV TB RVSP 5               RV/LV Ratio 0.69               RV- mejia basal diam 2.7               Sinus 4.00               Sodium         136       STJ 3.4               TAPSE 2.0               TDI SEPTAL 0.06               TDI LATERAL 0.08               Triscuspid Valve Regurgitation Peak  Gradient 16               TR Max Hamamd 2.0               Troponin I   <0.006  Comment: The reference interval for Troponin I represents the 99th percentile cutoff for our facility and is consistent with 3rd generation assay performance.       <0.006  Comment: The reference interval for Troponin I represents the 99th percentile cutoff for our facility and is consistent with 3rd generation assay performance.       TSH         1.207       TV resting pulmonary artery pressure 19               WBC     14.12     13.58       ZLVIDD -4.67               ZLVIDS -2.43                                      Significant Imaging: I have reviewed all pertinent imaging results/findings within the past 24 hours.

## 2025-05-28 NOTE — TRANSFER OF CARE
"Anesthesia Transfer of Care Note    Patient: Partha Curtis Jr.    Procedure(s) Performed: Procedure(s) (LRB):  Transesophageal echo (ELIZABETH) intra-procedure log documentation (N/A)    Patient location: ICU    Anesthesia Type: general    Transport from OR: Transported from OR on room air with adequate spontaneous ventilation    Post pain: adequate analgesia    Post assessment: no apparent anesthetic complications and tolerated procedure well    Post vital signs: stable    Level of consciousness: awake and responds to stimulation    Nausea/Vomiting: no nausea/vomiting    Complications: none    Transfer of care protocol was followed      Last vitals: Visit Vitals  BP (!) 116/78   Pulse 55   Temp 36.5 °C (97.7 °F) (Oral)   Resp (!) 24   Ht 5' 10" (1.778 m)   Wt 88 kg (194 lb 0.1 oz)   SpO2 95%   BMI 27.84 kg/m²     "

## 2025-05-28 NOTE — PLAN OF CARE
Care Plan    POC reviewed with Partha Curtis Jr. and family. Patient and Family verbalized understanding. Questions and concerns addressed. No acute events today. Pt progressing toward goals. Plan for ELIZABETH/cardioversion. PRN oxycodone and phenergan given x1. Will continue to monitor. See below and flowsheets for full assessment and VS info.     Neuro:  [place] Coma Scale  Best Eye Response: 4-->(E4) spontaneous  Best Motor Response: 6-->(M6) obeys commands  Best Verbal Response: 5-->(V5) oriented  [place] Coma Scale Score: 15  Assessment Qualifiers: No eye obstruction present, Patient not sedated/intubated  Pupil PERRLA: yes     24 hr Temp:  [98 °F (36.7 °C)-98.5 °F (36.9 °C)]     CV:   Rhythm: atrial rhythm    Resp:   Plan: N/A    GI/:     Diet/Nutrition Received: low saturated fat/low cholesterol  Last Bowel Movement: 05/27/25  Voiding Characteristics: voids spontaneously without difficulty    Intake/Output Summary (Last 24 hours) at 5/28/2025 0442  Last data filed at 5/28/2025 0405  Gross per 24 hour   Intake 1468.64 ml   Output 875 ml   Net 593.64 ml          Labs/Accuchecks:  Recent Labs   Lab 05/28/25  0312   WBC 12.50   RBC 5.97   HGB 18.9*   HCT 55.0*         Recent Labs   Lab 05/27/25  0927      K 4.1   CO2 23      BUN 12   CREATININE 1.7*   ALKPHOS 83   ALT 45*   AST 28   BILITOT 0.6      Recent Labs   Lab 05/27/25  1350 05/27/25 2025 05/28/25  0312   PROTIME 13.0*  --   --    INR 1.1  --   --    APTT 28.8   < > 48.1*    < > = values in this interval not displayed.      Recent Labs   Lab 05/27/25  1428   TROPONINI <0.006       Electrolytes: N/A  Accuchecks: none    Gtts:   dilTIAZem  10 mg/hr Intravenous Continuous 10 mL/hr at 05/28/25 0405 10 mg/hr at 05/28/25 0405    heparin (porcine) in D5W  0-40 Units/kg/hr Intravenous Continuous 10.6 mL/hr at 05/28/25 0405 12 Units/kg/hr at 05/28/25 0405       LDA/Wounds:    Solomon Risk Assessment  Sensory Perception: 4-->no  impairment  Moisture: 4-->rarely moist  Activity: 3-->walks occasionally  Mobility: 4-->no limitation  Nutrition: 3-->adequate  Friction and Shear: 3-->no apparent problem  Solomon Score: 21  Is your solomon score 12 or less? no      Problem: Wound  Goal: Skin Health and Integrity  Intervention: Optimize Skin Protection  Flowsheets (Taken 5/28/2025 0440)  Pressure Reduction Techniques: frequent weight shift encouraged  Pressure Reduction Devices: pressure-redistributing mattress utilized  Skin Protection:   incontinence pads utilized   pulse oximeter probe site changed  Head of Bed (HOB) Positioning: HOB at 30-45 degrees     Problem: Dysrhythmia  Goal: Normalized Cardiac Rhythm  Intervention: Monitor and Manage Cardiac Rhythm Effect  Flowsheets (Taken 5/28/2025 0440)  VTE Prevention/Management:   ROM (active) performed   dorsiflexion/plantar flexion performed   fluids promoted   bleeding precautions maintained   bleeding risk assessed

## 2025-05-28 NOTE — EICU
Virtual ICU Quality Rounds    Admit Date: 5/27/2025  Hospital Day: 1    ICU Day: 15h    24H Vital Sign Range:  Temp:  [97.7 °F (36.5 °C)-98.5 °F (36.9 °C)]   Pulse:  []   Resp:  [10-45]   BP: (101-189)/()   SpO2:  [90 %-100 %]     VICU Surveillance Screening                    LDA reconciliation : Yes

## 2025-05-28 NOTE — ANESTHESIA POSTPROCEDURE EVALUATION
Anesthesia Post Evaluation    Patient: Partha Curtis Jr.    Procedure(s) Performed: Procedure(s) (LRB):  Transesophageal echo (ELIZABETH) intra-procedure log documentation (N/A)    Final Anesthesia Type: general      Patient location during evaluation: ICU  Patient participation: Yes- Able to Participate  Level of consciousness: responds to stimulation  Post-procedure vital signs: reviewed and stable  Pain management: adequate  Airway patency: patent  ALLA mitigation strategies: Multimodal analgesia  PONV status at discharge: No PONV  Anesthetic complications: no      Cardiovascular status: hemodynamically stable  Respiratory status: spontaneous ventilation and room air  Hydration status: euvolemic  Follow-up not needed.              Vitals Value Taken Time   /79 05/28/25 10:50   Temp 36.5 °C (97.7 °F) 05/28/25 07:01   Pulse 77 05/28/25 10:51   Resp 18 05/28/25 09:00   SpO2 94 % 05/28/25 10:51   Vitals shown include unfiled device data.      No case tracking events are documented in the log.      Pain/Meggan Score: Pain Rating Prior to Med Admin: 7 (5/27/2025  8:40 PM)  Pain Rating Post Med Admin: 3 (5/27/2025  9:36 PM)

## 2025-05-28 NOTE — ANESTHESIA PREPROCEDURE EVALUATION
05/28/2025  Partha Curtis Jr. is a 65 y.o., male.    Procedure(s) (LRB):  Transesophageal echo (ELIZABETH) intra-procedure log documentation (N/A)    Afib with RVR. On diltiazem and heparin infusions.     Active Problem List with Overview Notes    Diagnosis Date Noted    Lumbar spondylosis 12/22/2024    Gait abnormality 08/15/2024    Decreased range of motion of lower extremity 08/15/2024    Decreased strength of lower extremity 08/15/2024    Bucket-handle tear of medial meniscus of left knee as current injury 08/13/2024    Atrial fibrillation with RVR 02/28/2024    Microalbuminuria due to type 2 diabetes mellitus 02/27/2024    Decreased range of motion of neck 01/30/2024    Decreased range of motion of left shoulder 01/30/2024    Neck pain 01/30/2024    Myofascial pain syndrome 01/30/2024    Tendinopathy of rotator cuff, left 01/30/2024    Low testosterone in male 10/11/2023    Overweight (BMI 25.0-29.9) 06/27/2023    Insomnia 06/27/2023    ALLA (obstructive sleep apnea)     Abnormal electrocardiogram 06/30/2022    B12 deficiency 06/27/2022    Cervical radiculopathy 03/10/2022    Hypogonadism in male 02/24/2022    Herniation of intervertebral disc 02/24/2022    Chronic bilateral low back pain without sciatica 02/10/2021    Anxiety 02/10/2021    Vitamin D deficiency 02/10/2021    Type 2 diabetes mellitus with stage 3 chronic kidney disease, without long-term current use of insulin 04/02/2020    Long-term current use of testosterone replacement therapy 03/13/2018    Bilateral hearing loss 03/13/2018    BMI 27.0-27.9,adult 03/13/2018    S/P carotid endarterectomy 08/11/2015    Essential hypertension 10/22/2014    Dyslipidemia 10/22/2014    Carotid stenosis 07/01/2014           Pre-op Assessment    I have reviewed the Patient Summary Reports.    I have reviewed the NPO Status.   I have reviewed the Medications.      Review of Systems  Anesthesia Hx:  No problems with previous Anesthesia                Cardiovascular:     Hypertension    Dysrhythmias atrial fibrillation                                     Pulmonary:        Sleep Apnea                Renal/:  Chronic Renal Disease                Neurological:    Neuromuscular Disease,                                   Endocrine:  Diabetes               Physical Exam  General: Alert and Cooperative    Airway:  Mallampati: III / II  Mouth Opening: Normal  TM Distance: Normal  Tongue: Normal    Dental:  Any loose or missing teeth verified with patient  Chest/Lungs:  Normal Respiratory Rate    Heart:  Rate: Normal        Anesthesia Plan  Type of Anesthesia, risks & benefits discussed:    Anesthesia Type: Gen Natural Airway  Intra-op Monitoring Plan: Standard ASA Monitors  Post Op Pain Control Plan: multimodal analgesia  Induction:  IV  Airway Plan: Direct, Post-Induction  Informed Consent: Informed consent signed with the Patient and all parties understand the risks and agree with anesthesia plan.  All questions answered.   ASA Score: 3  Day of Surgery Review of History & Physical: H&P Update referred to the surgeon/provider.    Ready For Surgery From Anesthesia Perspective.     .

## 2025-05-29 NOTE — DISCHARGE SUMMARY
"[facility] Medicine  Discharge Summary      Patient Name: Partha Curtis Jr.  MRN: [id number]  WENDY: [id number]  Patient Class: OP- Observation  Admission Date: 5/27/2025  Hospital Length of Stay: 1 days  Discharge Date and Time: 5/28/2025  4:11 PM  Discharging Provider: Valerie Dolan MD  Primary Care Provider: Rocco Cavazos DO      Primary Care Team: [facility] PHYSICIAN TEAM 2    HPI:   Partha Curtis Jr. is a 65 y.o. male with a past medical history of  has a past medical history of Carotid artery occlusion, Cataract, Chronic back pain, Colonic polyp, Hypertension, Paroxysmal atrial fibrillation (02/28/2024), Sleep apnea, and Type 2 diabetes mellitus with stage 3 chronic kidney disease, without long-term current use of insulin (04/02/2020) who presented to the ED for evaluation of a- fib. He says he has a device which alerted him that his heart rate was off. He also had symptoms. It started with lightheadedness since yesterday morning. When he woke up on the way to work he started to feel the same. He says this happened in February of last year and he was admitted here for it. He was "shocked" and did not require meds upon discharge. He believes he recently saw cardiology for f/u this year (chart review shows a Sept 2024 f/u with cards).  Denies any chest pain.  Denies any recent illness.  Adherent with all other medications.      In the ED, he had afib RVR (160s) with accelerated HTN. He was placed on a Cardizem drip.  consulted to admit.     Procedure(s) (LRB):  Transesophageal echo (ELIZABETH) intra-procedure log documentation (N/A)      Hospital Course:   He was admitted to the ICU on diltiazem drip. Cardiology performed ELIZABETH cardioversion the following morning. He returned to [facility]. He remained HDS and was cleared for discharge.      Goals of Care Treatment Preferences:  Code Status: Full Code      SDOH Screening:  The patient declined to be screened for utility " difficulties, food insecurity, transport difficulties, housing insecurity, and interpersonal safety, so no concerns could be identified this admission.     Consults:   Consults (From admission, onward)          Status Ordering Provider     Inpatient consult to Cardiology-[facility]  Once        Provider:  Albin Mcclelland MD    Completed GARRICK SHAFFER            Assessment & Plan  Atrial fibrillation with RVR  S/p ELIZABETH cardioversion.  Continue BB/ Eliquis  Essential hypertension  Stable   Increased home dose hydralazine  Fu with PCP  S/P carotid endarterectomy  Continue asprin/ statin  Type 2 diabetes mellitus with stage 3 chronic kidney disease, without long-term current use of insulin  Stable  Continue chronic home meds  Chronic bilateral low back pain without sciatica  Chronic and stable   ALLA (obstructive sleep apnea)  CPAP      Final Active Diagnoses:    Diagnosis Date Noted POA    PRINCIPAL PROBLEM:  Atrial fibrillation with RVR [I48.91] 02/28/2024 Yes    ALLA (obstructive sleep apnea) [G47.33]  Yes    Chronic bilateral low back pain without sciatica [M54.50, G89.29] 02/10/2021 Yes    Type 2 diabetes mellitus with stage 3 chronic kidney disease, without long-term current use of insulin [E11.22, N18.30] 04/02/2020 Yes    S/P carotid endarterectomy [Z98.890] 08/11/2015 Not Applicable    Essential hypertension [I10] 10/22/2014 Yes      Problems Resolved During this Admission:       Discharged Condition: stable    Disposition: Home or Self Care    Follow Up:   Follow-up Information       Rocco Cavazos DO Follow up in 1 week(s).    Specialty: Family Medicine  Why: BP follow up  Contact information:  [address]  [place] [address] [phone]                           Patient Instructions:      Diet Cardiac     Activity as tolerated       Significant Diagnostic Studies: Cardiac Graphics: Echocardiogram: Transthoracic echo (TTE) complete (Cupid Only):   Results for orders placed or performed during the  hospital encounter of 05/27/25   Echo   Result Value Ref Range    BSA 2.09 m2    Ellis's Biplane MOD Ejection Fraction 58 %    A2C EF 49 %    A4C EF 64 %    LVOT stroke volume 51.7 cm3    LVIDd 3.9 3.5 - 6.0 cm    LV Systolic Volume 30 mL    LV Systolic Volume Index 14.6 mL/m2    LVIDs 2.8 2.1 - 4.0 cm    LV ESV A2C 32.70 mL    LV Diastolic Volume 66 mL    LV ESV A4C 26.96 mL    LV Diastolic Volume Index 32.04 mL/m2    LV EDV A2C 44.351824638973122 mL    LV EDV A4C 61.54 mL    Left Ventricular End Systolic Volume by Teichholz Method 29.82 mL    Left Ventricular End Diastolic Volume by Teichholz Method 65.95 mL    IVS 1.4 (A) 0.6 - 1.1 cm    LVOT diameter 2.2 cm    LVOT area 3.8 cm2    FS 28.2 28 - 44 %    Left Ventricle Relative Wall Thickness 0.51 cm    PW 1.0 0.6 - 1.1 cm    LV mass 159.3 g    LV Mass Index 77.3 g/m2    MV Peak E Hammad 0.90 m/s    TDI LATERAL 0.08 m/s    TDI SEPTAL 0.06 m/s    E/E' ratio 13 m/s    TR Max Hammad 2.0 m/s    LV SEPTAL E/E' RATIO 15.0 m/s    LV LATERAL E/E' RATIO 11.3 m/s    LVOT peak hammad 0.9 m/s    Left Ventricular Outflow Tract Mean Velocity 0.72 cm/s    Left Ventricular Outflow Tract Mean Gradient 2.20 mmHg    RV- mejia basal diam 2.7 cm    RV S' 14.28 cm/s    TAPSE 2.0 cm    RV/LV Ratio 0.69 cm    LA Vol (MOD) 30 mL    LANA (MOD) 15 mL/m2    RA area length vol 23.36 mL    RA Area 11.7 cm2    RA vol index 11.34 mL/m2    RA Vol 25.02 mL    AV mean gradient 5 mmHg    AV peak gradient 9 mmHg    Ao peak hammad 1.5 m/s    Ao VTI 25.0 cm    LVOT peak VTI 13.6 cm    AV valve area 2.1 cm²    AV Velocity Ratio 0.60     AV index (prosthetic) 0.54     MALISSA by Velocity Ratio 2.3 cm²    Mr max hammad 3.18 m/s    Triscuspid Valve Regurgitation Peak Gradient 16 mmHg    Sinus 4.00 cm    ASI 1.9 cm/m2    STJ 3.4 cm    Ascending aorta 3.5 cm    ASI 1.7 cm/m2    Mean e' 0.07 m/s    ZLVIDS -2.43     ZLVIDD -4.67     LA area A4C 12.70 cm2    LA area A2C 14.39 cm2    TV resting pulmonary artery pressure 19 mmHg     "RV TB RVSP 5 mmHg    Est. RA pres 3 mmHg    Narrative      Left Ventricle: Moderately increased wall thickness. There is moderate   concentric hypertrophy. Normal wall motion. There is normal systolic   function with a visually estimated ejection fraction of 55 - 60%.   Quantitated ejection fraction is 58%. Unable to assess diastolic function   due to atrial fibrillation. Normal left ventricular filling pressure.    Right Ventricle: The right ventricle is normal in size Systolic   function is normal.    Left Atrium: The left atrium is normal in size    Mitral Valve: There is mild regurgitation.    Tricuspid Valve: There is no significant regurgitation.    Aorta: The aortic root is mildly dilated measuring 4.00 cm. The   ascending aorta is mildly dilated measuring 3.5 cm.    Pulmonary Artery: The estimated pulmonary artery systolic pressure is   19 mmHg.    IVC/SVC: Normal venous pressure at 3 mmHg.    Pericardium: There is no pericardial effusion.         Pending Diagnostic Studies:       None           Medications:  Reconciled Home Medications:      Medication List        START taking these medications      apixaban 5 mg Tab  Commonly known as: ELIQUIS  Take 1 tablet (5 mg total) by mouth 2 (two) times daily.            CHANGE how you take these medications      hydrALAZINE 25 MG tablet  Commonly known as: APRESOLINE  Take 1 tablet (25 mg total) by mouth every 8 (eight) hours.  What changed:   medication strength  how much to take  when to take this            CONTINUE taking these medications      amitriptyline 75 MG tablet  Commonly known as: ELAVIL  Take 1 tablet by mouth in the evening     aspirin 81 MG EC tablet  Commonly known as: ECOTRIN  Take 1 tablet (81 mg total) by mouth once daily.     BD INTEGRA SYRINGE 3 mL 22 gauge x 1 1/2" Syrg  Generic drug: syringe with needle, safety  Inject 1 Syringe as directed once a week.     BD LUER-RUSS SYRINGE 3 mL 25 gauge x 1" Syrg  Generic drug: syringe with needle  USE " ONE SYRINGE EVERY 14 DAYS WITH TESTOSTERONE     candesartan 32 MG tablet  Commonly known as: ATACAND  Take 1 tablet by mouth once daily     cyanocobalamin 1000 MCG tablet  Commonly known as: VITAMIN B-12  Take 1 tablet (1,000 mcg total) by mouth once daily.     DEXCOM G7 SENSOR Allison  Generic drug: blood-glucose sensor  To check sugars up to 3 times daily     DULoxetine 30 MG capsule  Commonly known as: CYMBALTA  Take 1 capsule by mouth once daily     ergocalciferol 50,000 unit Cap  Commonly known as: ERGOCALCIFEROL  Take 1 capsule (50,000 Units total) by mouth every 7 days.     ezetimibe 10 mg tablet  Commonly known as: ZETIA  Take 1 tablet by mouth once daily     fluticasone propionate 50 mcg/actuation nasal spray  Commonly known as: FLONASE  2 sprays by Each Nostril route.     gabapentin 800 MG tablet  Commonly known as: NEURONTIN  Take 1 tablet (800 mg total) by mouth every evening.     glimepiride 1 MG tablet  Commonly known as: AMARYL  Take 1 tablet (1 mg total) by mouth before breakfast.     glucose 4 GM chewable tablet  Take 4 tablets (16 g total) by mouth as needed for Low blood sugar.     hydrocortisone 2.5 % cream  Apply to face twice daily for flares as needed     ipratropium 42 mcg (0.06 %) nasal spray  Commonly known as: ATROVENT  2 sprays by Nasal route 2 (two) times daily as needed for Rhinitis.     ketoconazole 2 % cream  Commonly known as: NIZORAL  Apply twice daily to affected area of skin     LIDOcaine 5 %  Commonly known as: LIDODERM  Place 1 patch onto the skin once daily. Remove & Discard patch within 12 hours or as directed by MD     metoprolol succinate 200 MG 24 hr tablet  Commonly known as: TOPROL-XL  Take 1 tablet (200 mg total) by mouth once daily.     oxyCODONE-acetaminophen  mg per tablet  Commonly known as: PERCOCET  Take 1 tablet by mouth every 12 (twelve) hours as needed for Pain.     OZEMPIC 2 mg/dose (8 mg/3 mL) Pnij  Generic drug: semaglutide  Inject 2 mg into the skin every  7 days.     rosuvastatin 40 MG Tab  Commonly known as: CRESTOR  TAKE 1 TABLET BY MOUTH ONCE DAILY IN THE EVENING     SUTAB 1.479-0.188- 0.225 gram tablet  Generic drug: sod sulf-pot chloride-mag sulf  Take 12 tablets by mouth once daily. Take according to package instructions with indicated amount of water.     testosterone cypionate 200 mg/mL injection  Commonly known as: DEPOTESTOTERONE CYPIONATE  Inject 0.75 mLs (150 mg total) into the muscle every 14 (fourteen) days.     valACYclovir 1000 MG tablet  Commonly known as: VALTREX  Take 2 tablets (2,000 mg total) by mouth every 12 (twelve) hours.              Indwelling Lines/Drains at time of discharge:   Lines/Drains/Airways       None                   Time spent on the discharge of patient: 31 minutes  Physical exam stable       Valerie Dolan MD  Department of Hospital Medicine  [place] - Intensive Care

## 2025-05-29 NOTE — HOSPITAL COURSE
He was admitted to the ICU on diltiazem drip. Cardiology performed ELIZABETH cardioversion the following morning. He returned to [facility]. He remained HDS and was cleared for discharge.

## 2025-05-30 ENCOUNTER — PATIENT MESSAGE (OUTPATIENT)
Dept: PRIMARY CARE CLINIC | Facility: CLINIC | Age: 65
End: 2025-05-30
Payer: COMMERCIAL

## 2025-05-30 ENCOUNTER — PATIENT OUTREACH (OUTPATIENT)
Dept: ADMINISTRATIVE | Facility: CLINIC | Age: 65
End: 2025-05-30
Payer: COMMERCIAL

## 2025-05-30 DIAGNOSIS — I10 PRIMARY HYPERTENSION: Primary | ICD-10-CM

## 2025-05-30 RX ORDER — AMLODIPINE BESYLATE 10 MG/1
10 TABLET ORAL DAILY
Qty: 90 TABLET | Refills: 3 | Status: SHIPPED | OUTPATIENT
Start: 2025-05-30 | End: 2026-05-30

## 2025-05-30 NOTE — TELEPHONE ENCOUNTER
Called and spoke to pt he verbalized the he is taking Metoprolol 200 mg daily, Candesartan 32 mg daily  And  the Hydralazine THREE times a day. He says the is not taking any cold medication or cough syrup

## 2025-05-30 NOTE — PROGRESS NOTES
C3 nurse attempted to contact Partha Curtis Jr.  for a TCC post hospital discharge follow up call. No answer, LVM . The patient has a scheduled HOSFU appointment with Skylar Pelletier on 6/4/25 @ 1500.

## 2025-05-30 NOTE — TELEPHONE ENCOUNTER
Called and spoke to pt he says he is not having any symptoms except fatigue. He says that these bp reading are from him just getting around with no physical exertion.

## 2025-05-30 NOTE — PROGRESS NOTES
C3 nurse spoke with Partha Curtis Jr.  for a TCC post hospital discharge follow up call. The patient has a scheduled HOSFU appointment with Skylar Pelletier on 6/4/25 @ 1500.

## 2025-06-04 ENCOUNTER — LAB VISIT (OUTPATIENT)
Dept: LAB | Facility: HOSPITAL | Age: 65
End: 2025-06-04
Attending: INTERNAL MEDICINE
Payer: COMMERCIAL

## 2025-06-04 ENCOUNTER — OFFICE VISIT (OUTPATIENT)
Dept: PRIMARY CARE CLINIC | Facility: CLINIC | Age: 65
End: 2025-06-04
Payer: COMMERCIAL

## 2025-06-04 VITALS
WEIGHT: 199.06 LBS | HEIGHT: 70 IN | DIASTOLIC BLOOD PRESSURE: 73 MMHG | OXYGEN SATURATION: 96 % | BODY MASS INDEX: 28.5 KG/M2 | TEMPERATURE: 98 F | SYSTOLIC BLOOD PRESSURE: 114 MMHG | HEART RATE: 72 BPM

## 2025-06-04 DIAGNOSIS — Z79.01 ANTICOAGULATED: ICD-10-CM

## 2025-06-04 DIAGNOSIS — I48.91 ATRIAL FIBRILLATION WITH RVR: Primary | ICD-10-CM

## 2025-06-04 DIAGNOSIS — R53.83 FATIGUE, UNSPECIFIED TYPE: ICD-10-CM

## 2025-06-04 DIAGNOSIS — N17.9 AKI (ACUTE KIDNEY INJURY): ICD-10-CM

## 2025-06-04 DIAGNOSIS — G47.33 OSA (OBSTRUCTIVE SLEEP APNEA): ICD-10-CM

## 2025-06-04 DIAGNOSIS — E11.22 TYPE 2 DIABETES MELLITUS WITH STAGE 3A CHRONIC KIDNEY DISEASE, WITHOUT LONG-TERM CURRENT USE OF INSULIN: ICD-10-CM

## 2025-06-04 DIAGNOSIS — N18.31 TYPE 2 DIABETES MELLITUS WITH STAGE 3A CHRONIC KIDNEY DISEASE, WITHOUT LONG-TERM CURRENT USE OF INSULIN: ICD-10-CM

## 2025-06-04 LAB
ABSOLUTE EOSINOPHIL (OHS): 0.25 K/UL
ABSOLUTE MONOCYTE (OHS): 0.96 K/UL (ref 0.3–1)
ABSOLUTE NEUTROPHIL COUNT (OHS): 6.54 K/UL (ref 1.8–7.7)
ALBUMIN SERPL BCP-MCNC: 4.6 G/DL (ref 3.5–5.2)
ALP SERPL-CCNC: 85 UNIT/L (ref 40–150)
ALT SERPL W/O P-5'-P-CCNC: 59 UNIT/L (ref 10–44)
ANION GAP (OHS): 8 MMOL/L (ref 8–16)
AST SERPL-CCNC: 38 UNIT/L (ref 11–45)
BASOPHILS # BLD AUTO: 0.05 K/UL
BASOPHILS NFR BLD AUTO: 0.5 %
BILIRUB SERPL-MCNC: 0.6 MG/DL (ref 0.1–1)
BUN SERPL-MCNC: 13 MG/DL (ref 8–23)
CALCIUM SERPL-MCNC: 9.8 MG/DL (ref 8.7–10.5)
CHLORIDE SERPL-SCNC: 102 MMOL/L (ref 95–110)
CO2 SERPL-SCNC: 27 MMOL/L (ref 23–29)
CREAT SERPL-MCNC: 1.5 MG/DL (ref 0.5–1.4)
ERYTHROCYTE [DISTWIDTH] IN BLOOD BY AUTOMATED COUNT: 14.6 % (ref 11.5–14.5)
GFR SERPLBLD CREATININE-BSD FMLA CKD-EPI: 51 ML/MIN/1.73/M2
GLUCOSE SERPL-MCNC: 148 MG/DL (ref 70–110)
HCT VFR BLD AUTO: 56.3 % (ref 40–54)
HGB BLD-MCNC: 18.4 GM/DL (ref 14–18)
IMM GRANULOCYTES # BLD AUTO: 0.04 K/UL (ref 0–0.04)
IMM GRANULOCYTES NFR BLD AUTO: 0.4 % (ref 0–0.5)
LYMPHOCYTES # BLD AUTO: 2.42 K/UL (ref 1–4.8)
MCH RBC QN AUTO: 30.7 PG (ref 27–31)
MCHC RBC AUTO-ENTMCNC: 32.7 G/DL (ref 32–36)
MCV RBC AUTO: 94 FL (ref 82–98)
NUCLEATED RBC (/100WBC) (OHS): 0 /100 WBC
PLATELET # BLD AUTO: 322 K/UL (ref 150–450)
PMV BLD AUTO: 9.4 FL (ref 9.2–12.9)
POTASSIUM SERPL-SCNC: 5.1 MMOL/L (ref 3.5–5.1)
PROT SERPL-MCNC: 8.2 GM/DL (ref 6–8.4)
RBC # BLD AUTO: 5.99 M/UL (ref 4.6–6.2)
RELATIVE EOSINOPHIL (OHS): 2.4 %
RELATIVE LYMPHOCYTE (OHS): 23.6 % (ref 18–48)
RELATIVE MONOCYTE (OHS): 9.4 % (ref 4–15)
RELATIVE NEUTROPHIL (OHS): 63.7 % (ref 38–73)
SODIUM SERPL-SCNC: 137 MMOL/L (ref 136–145)
WBC # BLD AUTO: 10.26 K/UL (ref 3.9–12.7)

## 2025-06-04 PROCEDURE — 99496 TRANSJ CARE MGMT HIGH F2F 7D: CPT | Mod: S$GLB,,, | Performed by: INTERNAL MEDICINE

## 2025-06-04 PROCEDURE — 4010F ACE/ARB THERAPY RXD/TAKEN: CPT | Mod: CPTII,S$GLB,, | Performed by: INTERNAL MEDICINE

## 2025-06-04 PROCEDURE — 1157F ADVNC CARE PLAN IN RCRD: CPT | Mod: CPTII,S$GLB,, | Performed by: INTERNAL MEDICINE

## 2025-06-04 PROCEDURE — 85025 COMPLETE CBC W/AUTO DIFF WBC: CPT

## 2025-06-04 PROCEDURE — 80053 COMPREHEN METABOLIC PANEL: CPT

## 2025-06-04 PROCEDURE — 36415 COLL VENOUS BLD VENIPUNCTURE: CPT

## 2025-06-04 PROCEDURE — 3051F HG A1C>EQUAL 7.0%<8.0%: CPT | Mod: CPTII,S$GLB,, | Performed by: INTERNAL MEDICINE

## 2025-06-04 PROCEDURE — 1111F DSCHRG MED/CURRENT MED MERGE: CPT | Mod: CPTII,S$GLB,, | Performed by: INTERNAL MEDICINE

## 2025-06-04 PROCEDURE — 1159F MED LIST DOCD IN RCRD: CPT | Mod: CPTII,S$GLB,, | Performed by: INTERNAL MEDICINE

## 2025-06-04 PROCEDURE — 3078F DIAST BP <80 MM HG: CPT | Mod: CPTII,S$GLB,, | Performed by: INTERNAL MEDICINE

## 2025-06-04 PROCEDURE — 3074F SYST BP LT 130 MM HG: CPT | Mod: CPTII,S$GLB,, | Performed by: INTERNAL MEDICINE

## 2025-06-04 PROCEDURE — 3288F FALL RISK ASSESSMENT DOCD: CPT | Mod: CPTII,S$GLB,, | Performed by: INTERNAL MEDICINE

## 2025-06-04 PROCEDURE — 99999 PR PBB SHADOW E&M-EST. PATIENT-LVL V: CPT | Mod: PBBFAC,,, | Performed by: INTERNAL MEDICINE

## 2025-06-04 PROCEDURE — 1101F PT FALLS ASSESS-DOCD LE1/YR: CPT | Mod: CPTII,S$GLB,, | Performed by: INTERNAL MEDICINE

## 2025-06-04 RX ORDER — GLIMEPIRIDE 2 MG/1
2 TABLET ORAL
Start: 2025-06-04 | End: 2026-06-04

## 2025-06-04 RX ORDER — BLOOD-GLUCOSE SENSOR
EACH MISCELLANEOUS
Qty: 4 EACH | Refills: 11 | Status: SHIPPED | OUTPATIENT
Start: 2025-06-04 | End: 2025-06-10 | Stop reason: ALTCHOICE

## 2025-06-04 NOTE — PROGRESS NOTES
[facility]   New Visit Progress Note   Recent Hospital Discharge     PRESENTING HISTORY     Chief Complaint/Reason for Admission:  Follow up Hospital Discharge   PCP: Rocco Cavazos DO    History of Present Illness:  Mr. Partha Curtis Jr. is a 65 y.o. male who was recently admitted to the hospital.    [facility] Medicine  Discharge Summary        Patient Name: Partha Curtis Jr.  MRN: [id number]  WENDY: [id number]  Patient Class: OP- Observation  Admission Date: 5/27/2025  Hospital Length of Stay: 1 days  Discharge Date and Time: 5/28/2025  4:11 PM  Discharging Provider: Valerie Dolan MD  Primary Care Provider: Rocco Cavazos DO  ___________________________________________________________________    Today:  [facility] to [facility] for [facility] hospital follow up.  Recently hospitalized for management of A fib RVR.  Admitted to [facility] Medicine service with Cardiology consultation.  Cardizem infusion initiated.  ELIZABETH/Cardioversion performed 5/28/25.   Discharged on Toprol XL and Eliquis.    Known hx A fib; had Cardioversion 2/2024.  Maintained on Eliquis for some time but patient later self discontinued.    Patient unaccompanied today.  Ambulatory and independent with ADL's.  Brought medication bottles for review.  Epic medcard updated to reflect current regimen.       Review of Systems  General ROS: negative for chills, fever or weight loss  Psychological ROS: negative for hallucination, depression or suicidal ideation  Ophthalmic ROS: negative for blurry vision, photophobia or eye pain  ENT ROS: negative for epistaxis, sore throat or rhinorrhea  Respiratory ROS: no cough, shortness of breath, or wheezing  Cardiovascular ROS: no chest pain or dyspnea on exertion  Gastrointestinal ROS: no abdominal pain, change in bowel habits, or black/ bloody stools  Genito-Urinary ROS: no dysuria, trouble voiding, or hematuria  Musculoskeletal ROS: negative for gait disturbance or  muscular weakness  Neurological ROS: no syncope or seizures; no ataxia  Dermatological ROS: negative for pruritis, rash and jaundice          PAST HISTORY:     Past Medical History:   Diagnosis Date    Allergy     Carotid artery occlusion     Cataract     Chronic back pain     Colonic polyp     Genetic testing     MUTYH mutation-negative    Hyperlipidemia     Hypertension     Paroxysmal atrial fibrillation 02/28/2024    Sleep apnea     Type 2 diabetes mellitus with stage 3 chronic kidney disease, without long-term current use of insulin 04/02/2020       Past Surgical History:   Procedure Laterality Date    ANKLE SURGERY Left     2    APPENDECTOMY      at age 20    ARTHROSCOPIC CHONDROPLASTY OF KNEE JOINT Left 08/13/2024    Procedure: ARTHROSCOPY, KNEE, WITH CHONDROPLASTY;  Surgeon: Kai Washington MD;  Location: [facility] OR;  Service: Orthopedics;  Laterality: Left;    ARTHROSCOPY, KNEE, WITH ABRASION ARTHROPLASTY OR MICROFRACTURE Left 08/13/2024    Procedure: ARTHROSCOPY, KNEE, WITH  MICROFRACTURE;  Surgeon: Kai Washington MD;  Location: [facility] OR;  Service: Orthopedics;  Laterality: Left;    ARTHROSCOPY,KNEE,WITH MENISCUS REPAIR Left 08/13/2024    Procedure: ARTHROSCOPY,KNEE,WITH MENISCUS REPAIR;  Surgeon: Kai Washington MD;  Location: [facility] OR;  Service: Orthopedics;  Laterality: Left;  NO REGIONAL BLOCK    CAROTID ENDARTERECTOMY Right 07/15/2015    CARPAL TUNNEL RELEASE Bilateral     COLONOSCOPY N/A 12/14/2018    Procedure: COLONOSCOPYSuprep;  Surgeon: Silver Selby MD;  Location: [facility];  Service: Endoscopy;  Laterality: N/A;    COLONOSCOPY N/A 10/02/2024    Procedure: COLONOSCOPY;  Surgeon: Heath Morrow MD;  Location: [facility] ENDO;  Service: Endoscopy;  Laterality: N/A;  Ref by Leonor Oneill, pt has unopened Sutab, portal - PC  9/25/24-LVM for precall, portal-DS. 10/1/24 Pt. called and confirmed arrival time.EC    EPIDURAL STEROID INJECTION N/A 02/26/2024    Procedure: CERVICAL C7/T1 IL  SELENA;  Surgeon: Gokul Fung MD;  Location: [facility] PAIN MGT;  Service: Pain Management;  Laterality: N/A;  [phone]  2 WK F/U SERGEI    EPIDURAL STEROID INJECTION N/A 05/23/2024    Procedure: LUMBAR L4/5 IL SELENA *ASPIRIN OTC* HOLD FOR 5 DAYS;  Surgeon: Gokul Fung MD;  Location: [facility] PAIN MGT;  Service: Pain Management;  Laterality: N/A;  [phone]  2 WK F/U NOHELIA    EPIDURAL STEROID INJECTION N/A 10/21/2024    Procedure: LUMBAR L5/S1 IL SELENA *ASPIRIN OTC* HOLD FOR 5 DAYS  **EISSA PT**;  Surgeon: Sofia Sheikh MD;  Location: [facility] PAIN MGT;  Service: Pain Management;  Laterality: N/A;  [phone]  2 WK F/U NOHELIA    INJECTION OF ANESTHETIC AGENT AROUND NERVE N/A 12/23/2024    Procedure: LUMBAR L4.5 IL SELENA *ASPIRIN OTC* HOLD FOR 5 DAYS;  Surgeon: Gokul Fung MD;  Location: [facility] PAIN MGT;  Service: Pain Management;  Laterality: N/A;  2 WK F/U NOHELIA    INJECTION OF ANESTHETIC AGENT AROUND NERVE Bilateral 02/24/2025    Procedure: BLOCK, NERVE BILATERAL L3, 4, 5 MEDIAL BRANCH;  Surgeon: Gokul Fung MD;  Location: [facility] PAIN MGT;  Service: Pain Management;  Laterality: Bilateral;  2 WK F/U NOHELIA    INJECTION OF ANESTHETIC AGENT AROUND NERVE Bilateral 03/10/2025    Procedure: BLOCK, NERVE BILATERAL L3, L4, L5 MEDIAL BRANCH;  Surgeon: Gokul Fung MD;  Location: [facility] PAIN MGT;  Service: Pain Management;  Laterality: Bilateral;    JOINT REPLACEMENT  09/2010    NASAL SEPTUM SURGERY      RADIOFREQUENCY ABLATION Bilateral 03/27/2025    Procedure: RADIOFREQUENCY ABLATION BILATERAL L3, 4, 5;  Surgeon: Gokul Fung MD;  Location: [facility] PAIN MGT;  Service: Pain Management;  Laterality: Bilateral;    TOTAL KNEE ARTHROPLASTY Right     6 knee surgeries    TRANSESOPHAGEAL ECHOCARDIOGRAM WITH POSSIBLE CARDIOVERSION (ELIZABETH W/ POSS CARDIOVERSION) N/A 02/28/2024    Procedure: Transesophageal echo (ELIZABETH) intra-procedure log documentation;  Surgeon: Ahmet De La Cruz MD;  Location: [facility] CATH LAB/EP;  Service: Cardiology;  Laterality: N/A;  "   TRANSESOPHAGEAL ECHOCARDIOGRAM WITH POSSIBLE CARDIOVERSION (ELIZABETH W/ POSS CARDIOVERSION) N/A 5/28/2025    Procedure: Transesophageal echo (ELIZABETH) intra-procedure log documentation;  Surgeon: Valerio Jaquez MD;  Location: [facility] OR;  Service: Cardiology;  Laterality: N/A;       Family History   Problem Relation Name Age of Onset    Brain cancer Mother Klarissa 67    Cancer Mother Klarissa     Hypertension Father Kenau     Lung cancer Father Keanu         x2 (PAUL initially- treated surgically only, then recurred distantly) (smoker, & had been exposed to many chemicals)    Cancer Father Keanu     Breast cancer Sister  58    Genetic Disorder Sister          monoallelic MUTYH mutation    Seizures Daughter      Cancer Maternal Grandfather Valerio     Brain cancer Paternal Grandfather  73    Breast cancer Maternal Cousin  57    Aneurysm Other mgm's father 48        brain    Ulcerative colitis Other brother's son          MEDICATIONS & ALLERGIES:     Medications Ordered Prior to Encounter[1]     Review of patient's allergies indicates:   Allergen Reactions    Stadol [butorphanol tartrate] Rash     Swelling in face    Strawberries [strawberry] Rash       OBJECTIVE:     Vital Signs:  /73 (BP Location: Left arm, Patient Position: Sitting)   Pulse 72   Temp 97.9 °F (36.6 °C) (Oral)   Ht 5' 10" (1.778 m)   Wt 90.3 kg (199 lb 1.2 oz)   SpO2 96%   BMI 28.56 kg/m²   Wt Readings from Last 3 Encounters:   06/04/25 1511 90.3 kg (199 lb 1.2 oz)   05/27/25 1541 88 kg (194 lb 0.1 oz)   05/27/25 1523 88.5 kg (195 lb)   05/27/25 0906 88.5 kg (195 lb)   05/21/25 0951 89.9 kg (198 lb 3.1 oz)     Body mass index is 28.56 kg/m².        Physical Exam:  /73 (BP Location: Left arm, Patient Position: Sitting)   Pulse 72   Temp 97.9 °F (36.6 °C) (Oral)   Ht 5' 10" (1.778 m)   Wt 90.3 kg (199 lb 1.2 oz)   SpO2 96%   BMI 28.56 kg/m²   General appearance: alert, cooperative, no distress  Constitutional:Oriented to person, " place, and time  + appears well-developed and well-nourished.   HEENT: Normocephalic, atraumatic, neck symmetrical, no nasal discharge   Eyes: conjunctivae/corneas clear, PERRL, EOM's intact  Lungs: clear to auscultation bilaterally, no dullness to percussion bilaterally  Heart: regular rate and rhythm without rub; no displacement of the PMI   Abdomen: soft, non-tender; bowel sounds normoactive; no organomegaly  Extremities: extremities symmetric; no clubbing, cyanosis, or edema  Integument: Skin color, texture, turgor normal; no rashes; hair distrubution normal  Neurologic: Alert and oriented X 3, normal strength, normal coordination and gait  Psychiatric: no pressured speech; normal affect; no evidence of impaired cognition     Laboratory  Lab Results   Component Value Date    WBC 12.50 05/28/2025    HGB 18.9 (H) 05/28/2025    HCT 55.0 (H) 05/28/2025    MCV 92 05/28/2025     05/28/2025     BMP  Lab Results   Component Value Date     05/27/2025    K 4.1 05/27/2025     05/27/2025    CO2 23 05/27/2025    BUN 12 05/27/2025    CREATININE 1.7 (H) 05/27/2025    CALCIUM 9.5 05/27/2025    ANIONGAP 12 05/27/2025    EGFRNORACEVR 44 (L) 05/27/2025     Lab Results   Component Value Date    ALT 45 (H) 05/27/2025    AST 28 05/27/2025    ALKPHOS 83 05/27/2025    BILITOT 0.6 05/27/2025     Lab Results   Component Value Date    INR 1.1 05/27/2025    INR 1.2 05/27/2025    INR 1.1 02/27/2024    PROTIME 13.0 (H) 05/27/2025    PROTIME 13.5 (H) 05/27/2025     Lab Results   Component Value Date    HGBA1C 7.0 (H) 03/18/2025       Diagnostic Results:    ELIZABETH/Cardioversion 5/27/25:    Left Ventricle: There is normal systolic function.    Right Ventricle: Systolic function is normal.    Left Atrium: The left atrial appendage appears normal. Appendage velocity is normal at greater than 40 cm/sec. There is no thrombus in the left atrial appendage.    Mitral Valve: There is mild regurgitation.    Pulmonary Artery: Pulmonary  artery pressure could not be estimated.    TTE 5/27/25:    Left Ventricle: Moderately increased wall thickness. There is moderate concentric hypertrophy. Normal wall motion. There is normal systolic function with a visually estimated ejection fraction of 55 - 60%. Quantitated ejection fraction is 58%. Unable to assess diastolic function due to atrial fibrillation. Normal left ventricular filling pressure.    Right Ventricle: The right ventricle is normal in size Systolic function is normal.    Left Atrium: The left atrium is normal in size    Mitral Valve: There is mild regurgitation.    Tricuspid Valve: There is no significant regurgitation.    Aorta: The aortic root is mildly dilated measuring 4.00 cm. The ascending aorta is mildly dilated measuring 3.5 cm.    Pulmonary Artery: The estimated pulmonary artery systolic pressure is 19 mmHg.    IVC/SVC: Normal venous pressure at 3 mmHg.    Pericardium: There is no pericardial effusion.       TRANSITION OF CARE:     [facility] On [facility] Contact Note: 5/30/25   Family and/or Caretaker present at visit?  No.  Diagnostic tests reviewed/disposition: I have reviewed all completed as well as pending diagnostic tests at the time of discharge.  Disease/illness education: Yes  Home health/community services discussion/referrals: Patient does not have home health established from hospital visit.  They do not need home health.  If needed, we will set up home health for the patient.   Establishment or re-establishment of referral orders for community resources: No other necessary community resources.   Discussion with other health care providers: No discussion with other health care providers necessary.     ASSESSMENT & PLAN:       Atrial fibrillation with RVR  Anticoagulated  - recent hospitalization as above  - successful cardioversion 5/28/25  - continue current medication regimen  - see Cardiology team 6/20/25   -     apixaban (ELIQUIS) 5 mg Tab; Take 1 tablet (5 mg total) by mouth  2 (two) times daily.  Dispense: 180 tablet; Refill: 3  -     Ambulatory referral/consult to Cardiology; Future; Expected date: 06/11/2025    ALLA (obstructive sleep apnea)  - POS home sleep study 8/2022  - lost to sleep medicine follow up  - see sleep medicine team 9/5/25 (virtual)       Type 2 diabetes mellitus with stage 3a chronic kidney disease, without long-term current use of insulin  - HgBA1C 7.0  - insurance denied Dexcom   - continue current regimen   -     glimepiride (AMARYL) 2 MG tablet; Take 1 tablet (2 mg total) by mouth before breakfast.    HANANE (acute kidney injury)  -     Comprehensive Metabolic Panel; Future; Expected date: 06/04/2025    Fatigue, unspecified type  -     CBC Auto Differential; Future; Expected date: 06/05/2025    Patient will be released from hospital follow up clinic.  Follow up as detailed below.     Instructions for the patient:      Scheduled Follow-up :  Future Appointments   Date Time Provider Department Center   6/10/2025  3:00 PM APPOINTMENT LABNATHALIA [facility] LAB [facility]   6/16/2025  7:00 AM SPECIMENLYNDSEY [facility] LAB [place]   6/16/2025  9:45 AM LAB, NATHALIA [facility] [place]   6/18/2025  9:20 AM Baylee Ni, LISETH [facility]   6/19/2025 10:30 AM Rosario Holt PA-C [facility]   6/20/2025 11:00 AM Albin Mcclelland MD [facility] CARDIO [facility]   9/5/2025  4:30 PM Jay Prince FNP-BC [facility] SLEEP [facility]   9/29/2025  9:00 [facility]       Post Visit Medication List:     Medication List            Accurate as of June 4, 2025 11:59 PM. If you have any questions, ask your nurse or doctor.                CHANGE how you take these medications      glimepiride 2 MG tablet  Commonly known as: AMARYL  Take 1 tablet (2 mg total) by mouth before breakfast.  What changed:   medication strength  how much to take  Changed by: Skylar Pelletier MD            CONTINUE taking these medications      amitriptyline 75 MG  "tablet  Commonly known as: ELAVIL  Take 1 tablet by mouth in the evening     amLODIPine 10 MG tablet  Commonly known as: NORVASC  Take 1 tablet (10 mg total) by mouth once daily.     apixaban 5 mg Tab  Commonly known as: ELIQUIS  Take 1 tablet (5 mg total) by mouth 2 (two) times daily.     aspirin 81 MG EC tablet  Commonly known as: ECOTRIN  Take 1 tablet (81 mg total) by mouth once daily.     BD INTEGRA SYRINGE 3 mL 22 gauge x 1 1/2" Syrg  Generic drug: syringe with needle, safety  Inject 1 Syringe as directed once a week.     BD LUER-RUSS SYRINGE 3 mL 25 gauge x 1" Syrg  Generic drug: syringe with needle  USE ONE SYRINGE EVERY 14 DAYS WITH TESTOSTERONE     candesartan 32 MG tablet  Commonly known as: ATACAND  Take 1 tablet by mouth once daily     cyanocobalamin 1000 MCG tablet  Commonly known as: VITAMIN B-12  Take 1 tablet (1,000 mcg total) by mouth once daily.     DULoxetine 30 MG capsule  Commonly known as: CYMBALTA  Take 1 capsule by mouth once daily     ergocalciferol 50,000 unit Cap  Commonly known as: ERGOCALCIFEROL  Take 1 capsule (50,000 Units total) by mouth every 7 days.     ezetimibe 10 mg tablet  Commonly known as: ZETIA  Take 1 tablet by mouth once daily     fluticasone propionate 50 mcg/actuation nasal spray  Commonly known as: FLONASE     gabapentin 800 MG tablet  Commonly known as: NEURONTIN  Take 1 tablet (800 mg total) by mouth every evening.     glucose 4 GM chewable tablet  Take 4 tablets (16 g total) by mouth as needed for Low blood sugar.     hydrALAZINE 25 MG tablet  Commonly known as: APRESOLINE  Take 1 tablet (25 mg total) by mouth every 8 (eight) hours.     hydrocortisone 2.5 % cream  Apply to face twice daily for flares as needed     ipratropium 42 mcg (0.06 %) nasal spray  Commonly known as: ATROVENT  2 sprays by Nasal route 2 (two) times daily as needed for Rhinitis.     ketoconazole 2 % cream  Commonly known as: NIZORAL  Apply twice daily to affected area of skin     LIDOcaine 5 " %  Commonly known as: LIDODERM  Place 1 patch onto the skin once daily. Remove & Discard patch within 12 hours or as directed by MD     metoprolol succinate 200 MG 24 hr tablet  Commonly known as: TOPROL-XL  Take 1 tablet (200 mg total) by mouth once daily.     oxyCODONE-acetaminophen  mg per tablet  Commonly known as: PERCOCET  Take 1 tablet by mouth every 12 (twelve) hours as needed for Pain.     OZEMPIC 2 mg/dose (8 mg/3 mL) Pnij  Generic drug: semaglutide  Inject 2 mg into the skin every 7 days.     rosuvastatin 40 MG Tab  Commonly known as: CRESTOR  TAKE 1 TABLET BY MOUTH ONCE DAILY IN THE EVENING     testosterone cypionate 200 mg/mL injection  Commonly known as: DEPOTESTOTERONE CYPIONATE  Inject 0.75 mLs (150 mg total) into the muscle every 14 (fourteen) days.     valACYclovir 1000 MG tablet  Commonly known as: VALTREX  Take 2 tablets (2,000 mg total) by mouth every 12 (twelve) hours.            STOP taking these medications      DEXCOM G7 SENSOR Allison  Generic drug: blood-glucose sensor  Stopped by: Skylar Pelletier MD               Where to Get Your Medications        These medications were sent to [facility] [address] [place] [id number]      Phone: [phone]   apixaban 5 mg Tab       Information about where to get these medications is not yet available    Ask your nurse or doctor about these medications  glimepiride 2 MG tablet         Signing Physician:  Skylar Pelletier MD         [1]   Current Outpatient Medications on File Prior to Visit   Medication Sig Dispense Refill    amitriptyline (ELAVIL) 75 MG tablet Take 1 tablet by mouth in the evening 90 tablet 3    amLODIPine (NORVASC) 10 MG tablet Take 1 tablet (10 mg total) by mouth once daily. (Patient not taking: Reported on 5/30/2025) 90 tablet 3    apixaban (ELIQUIS) 5 mg Tab Take 1 tablet (5 mg total) by mouth 2 (two) times daily. 180 tablet 0    aspirin (ECOTRIN) 81 MG EC tablet Take 1  "tablet (81 mg total) by mouth once daily. 28 tablet 0    BD LUER-RUSS SYRINGE 3 mL 25 gauge x 1" Syrg USE ONE SYRINGE EVERY 14 DAYS WITH TESTOSTERONE 100 each 2    blood-glucose sensor (DEXCOM G7 SENSOR) Allison To check sugars up to 3 times daily 4 each 11    candesartan (ATACAND) 32 MG tablet Take 1 tablet by mouth once daily 90 tablet 3    cyanocobalamin (VITAMIN B-12) 1000 MCG tablet Take 1 tablet (1,000 mcg total) by mouth once daily. 90 tablet 4    DULoxetine (CYMBALTA) 30 MG capsule Take 1 capsule by mouth once daily 90 capsule 3    ergocalciferol (ERGOCALCIFEROL) 50,000 unit Cap Take 1 capsule (50,000 Units total) by mouth every 7 days. 12 capsule 0    ezetimibe (ZETIA) 10 mg tablet Take 1 tablet by mouth once daily 90 tablet 0    fluticasone propionate (FLONASE) 50 mcg/actuation nasal spray 2 sprays by Each Nostril route. (Patient taking differently: 2 sprays by Each Nostril route. PRN)      gabapentin (NEURONTIN) 800 MG tablet Take 1 tablet (800 mg total) by mouth every evening. 90 tablet 1    glimepiride (AMARYL) 1 MG tablet Take 1 tablet (1 mg total) by mouth before breakfast.      glucose 4 GM chewable tablet Take 4 tablets (16 g total) by mouth as needed for Low blood sugar. 30 tablet 12    hydrALAZINE (APRESOLINE) 25 MG tablet Take 1 tablet (25 mg total) by mouth every 8 (eight) hours. 90 tablet 0    hydrocortisone 2.5 % cream Apply to face twice daily for flares as needed 40 g 2    ipratropium (ATROVENT) 42 mcg (0.06 %) nasal spray 2 sprays by Nasal route 2 (two) times daily as needed for Rhinitis. 15 mL 0    ketoconazole (NIZORAL) 2 % cream Apply twice daily to affected area of skin 60 g 2    LIDOcaine (LIDODERM) 5 % Place 1 patch onto the skin once daily. Remove & Discard patch within 12 hours or as directed by MD Jauregui patch 11    metoprolol succinate (TOPROL-XL) 200 MG 24 hr tablet Take 1 tablet (200 mg total) by mouth once daily. 90 tablet 3    oxyCODONE-acetaminophen (PERCOCET)  mg per tablet " "Take 1 tablet by mouth every 12 (twelve) hours as needed for Pain. 60 tablet 0    rosuvastatin (CRESTOR) 40 MG Tab TAKE 1 TABLET BY MOUTH ONCE DAILY IN THE EVENING 90 tablet 1    semaglutide (OZEMPIC) 2 mg/dose (8 mg/3 mL) PnIj Inject 2 mg into the skin every 7 days. 9 mL 3    sod sulf-pot chloride-mag sulf (SUTAB) 1.479-0.188- 0.225 gram tablet Take 12 tablets by mouth once daily. Take according to package instructions with indicated amount of water. (Patient not taking: Reported on 5/30/2025) 24 tablet 0    syringe with needle, safety (BD INTEGRA SYRINGE) 3 mL 22 gauge x 1 1/2" Syrg Inject 1 Syringe as directed once a week. 6 each 3    testosterone cypionate (DEPOTESTOTERONE CYPIONATE) 200 mg/mL injection Inject 0.75 mLs (150 mg total) into the muscle every 14 (fourteen) days. 2 mL 5    valACYclovir (VALTREX) 1000 MG tablet Take 2 tablets (2,000 mg total) by mouth every 12 (twelve) hours. 4 tablet 3     Current Facility-Administered Medications on File Prior to Visit   Medication Dose Route Frequency Provider Last Rate Last Admin    0.9%  NaCl infusion   Intravenous Continuous Kendell Hoffman MD        0.9%  NaCl infusion   Intravenous Continuous Gerardo Uribe MD         "

## 2025-06-06 ENCOUNTER — PATIENT MESSAGE (OUTPATIENT)
Dept: ADMINISTRATIVE | Facility: HOSPITAL | Age: 65
End: 2025-06-06
Payer: COMMERCIAL

## 2025-06-09 ENCOUNTER — PATIENT MESSAGE (OUTPATIENT)
Dept: PRIMARY CARE CLINIC | Facility: CLINIC | Age: 65
End: 2025-06-09
Payer: COMMERCIAL

## 2025-06-10 ENCOUNTER — TELEPHONE (OUTPATIENT)
Dept: ADMINISTRATIVE | Facility: HOSPITAL | Age: 65
End: 2025-06-10
Payer: COMMERCIAL

## 2025-06-10 ENCOUNTER — LAB VISIT (OUTPATIENT)
Dept: LAB | Facility: HOSPITAL | Age: 65
End: 2025-06-10
Attending: INTERNAL MEDICINE
Payer: COMMERCIAL

## 2025-06-10 ENCOUNTER — PATIENT OUTREACH (OUTPATIENT)
Dept: ADMINISTRATIVE | Facility: HOSPITAL | Age: 65
End: 2025-06-10
Payer: COMMERCIAL

## 2025-06-10 VITALS — SYSTOLIC BLOOD PRESSURE: 118 MMHG | DIASTOLIC BLOOD PRESSURE: 58 MMHG

## 2025-06-10 DIAGNOSIS — N17.9 AKI (ACUTE KIDNEY INJURY): ICD-10-CM

## 2025-06-10 LAB
ANION GAP (OHS): 8 MMOL/L (ref 8–16)
BUN SERPL-MCNC: 13 MG/DL (ref 8–23)
CALCIUM SERPL-MCNC: 9.5 MG/DL (ref 8.7–10.5)
CHLORIDE SERPL-SCNC: 103 MMOL/L (ref 95–110)
CO2 SERPL-SCNC: 24 MMOL/L (ref 23–29)
CREAT SERPL-MCNC: 1.5 MG/DL (ref 0.5–1.4)
GFR SERPLBLD CREATININE-BSD FMLA CKD-EPI: 51 ML/MIN/1.73/M2
GLUCOSE SERPL-MCNC: 215 MG/DL (ref 70–110)
POTASSIUM SERPL-SCNC: 4.4 MMOL/L (ref 3.5–5.1)
SODIUM SERPL-SCNC: 135 MMOL/L (ref 136–145)

## 2025-06-10 PROCEDURE — 36415 COLL VENOUS BLD VENIPUNCTURE: CPT

## 2025-06-10 PROCEDURE — 80048 BASIC METABOLIC PNL TOTAL CA: CPT

## 2025-06-10 NOTE — TELEPHONE ENCOUNTER
I just checked it a short time ago and it's 118/58 pulse 63. I think it's too low now.   Thanks Jarvis pineda pt.

## 2025-06-12 ENCOUNTER — RESULTS FOLLOW-UP (OUTPATIENT)
Dept: PRIMARY CARE CLINIC | Facility: CLINIC | Age: 65
End: 2025-06-12

## 2025-06-16 ENCOUNTER — APPOINTMENT (OUTPATIENT)
Dept: LAB | Facility: HOSPITAL | Age: 65
End: 2025-06-16
Attending: FAMILY MEDICINE
Payer: COMMERCIAL

## 2025-06-18 ENCOUNTER — OFFICE VISIT (OUTPATIENT)
Dept: PAIN MEDICINE | Facility: CLINIC | Age: 65
End: 2025-06-18
Payer: COMMERCIAL

## 2025-06-18 VITALS
OXYGEN SATURATION: 100 % | WEIGHT: 191.81 LBS | HEIGHT: 70 IN | RESPIRATION RATE: 18 BRPM | TEMPERATURE: 98 F | DIASTOLIC BLOOD PRESSURE: 75 MMHG | HEART RATE: 67 BPM | SYSTOLIC BLOOD PRESSURE: 116 MMHG | BODY MASS INDEX: 27.46 KG/M2

## 2025-06-18 DIAGNOSIS — M47.816 LUMBAR SPONDYLOSIS: ICD-10-CM

## 2025-06-18 DIAGNOSIS — Z79.891 ENCOUNTER FOR MONITORING OPIOID MAINTENANCE THERAPY: ICD-10-CM

## 2025-06-18 DIAGNOSIS — G89.4 CHRONIC PAIN DISORDER: ICD-10-CM

## 2025-06-18 DIAGNOSIS — M51.369 DDD (DEGENERATIVE DISC DISEASE), LUMBAR: ICD-10-CM

## 2025-06-18 DIAGNOSIS — G89.4 CHRONIC PAIN DISORDER: Primary | ICD-10-CM

## 2025-06-18 DIAGNOSIS — M54.16 LUMBAR RADICULOPATHY: ICD-10-CM

## 2025-06-18 DIAGNOSIS — M47.812 CERVICAL SPONDYLOSIS: ICD-10-CM

## 2025-06-18 DIAGNOSIS — M50.30 DDD (DEGENERATIVE DISC DISEASE), CERVICAL: ICD-10-CM

## 2025-06-18 DIAGNOSIS — M54.12 CERVICAL RADICULOPATHY: ICD-10-CM

## 2025-06-18 DIAGNOSIS — Z51.81 ENCOUNTER FOR MONITORING OPIOID MAINTENANCE THERAPY: ICD-10-CM

## 2025-06-18 DIAGNOSIS — M51.360 DEGENERATION OF INTERVERTEBRAL DISC OF LUMBAR REGION WITH DISCOGENIC BACK PAIN: ICD-10-CM

## 2025-06-18 DIAGNOSIS — M54.9 DORSALGIA, UNSPECIFIED: ICD-10-CM

## 2025-06-18 PROCEDURE — 3066F NEPHROPATHY DOC TX: CPT | Mod: CPTII,S$GLB,, | Performed by: NURSE PRACTITIONER

## 2025-06-18 PROCEDURE — 3061F NEG MICROALBUMINURIA REV: CPT | Mod: CPTII,S$GLB,, | Performed by: NURSE PRACTITIONER

## 2025-06-18 PROCEDURE — 1111F DSCHRG MED/CURRENT MED MERGE: CPT | Mod: CPTII,S$GLB,, | Performed by: NURSE PRACTITIONER

## 2025-06-18 PROCEDURE — 1157F ADVNC CARE PLAN IN RCRD: CPT | Mod: CPTII,S$GLB,, | Performed by: NURSE PRACTITIONER

## 2025-06-18 PROCEDURE — 1101F PT FALLS ASSESS-DOCD LE1/YR: CPT | Mod: CPTII,S$GLB,, | Performed by: NURSE PRACTITIONER

## 2025-06-18 PROCEDURE — 99214 OFFICE O/P EST MOD 30 MIN: CPT | Mod: S$GLB,,, | Performed by: NURSE PRACTITIONER

## 2025-06-18 PROCEDURE — 1160F RVW MEDS BY RX/DR IN RCRD: CPT | Mod: CPTII,S$GLB,, | Performed by: NURSE PRACTITIONER

## 2025-06-18 PROCEDURE — 3288F FALL RISK ASSESSMENT DOCD: CPT | Mod: CPTII,S$GLB,, | Performed by: NURSE PRACTITIONER

## 2025-06-18 PROCEDURE — 99999 PR PBB SHADOW E&M-EST. PATIENT-LVL V: CPT | Mod: PBBFAC,,, | Performed by: NURSE PRACTITIONER

## 2025-06-18 PROCEDURE — 4010F ACE/ARB THERAPY RXD/TAKEN: CPT | Mod: CPTII,S$GLB,, | Performed by: NURSE PRACTITIONER

## 2025-06-18 PROCEDURE — 3074F SYST BP LT 130 MM HG: CPT | Mod: CPTII,S$GLB,, | Performed by: NURSE PRACTITIONER

## 2025-06-18 PROCEDURE — 3008F BODY MASS INDEX DOCD: CPT | Mod: CPTII,S$GLB,, | Performed by: NURSE PRACTITIONER

## 2025-06-18 PROCEDURE — 1159F MED LIST DOCD IN RCRD: CPT | Mod: CPTII,S$GLB,, | Performed by: NURSE PRACTITIONER

## 2025-06-18 PROCEDURE — 3078F DIAST BP <80 MM HG: CPT | Mod: CPTII,S$GLB,, | Performed by: NURSE PRACTITIONER

## 2025-06-18 PROCEDURE — 3051F HG A1C>EQUAL 7.0%<8.0%: CPT | Mod: CPTII,S$GLB,, | Performed by: NURSE PRACTITIONER

## 2025-06-18 RX ORDER — OXYCODONE AND ACETAMINOPHEN 10; 325 MG/1; MG/1
1 TABLET ORAL EVERY 12 HOURS PRN
Qty: 60 TABLET | Refills: 0 | Status: SHIPPED | OUTPATIENT
Start: 2025-06-18

## 2025-06-18 NOTE — PROGRESS NOTES
Chronic patient Established Note (Follow up visit)      SUBJECTIVE:    Interval History 6/18/2025:  The patient returns to clinic today for follow up of neck and back pain. Since last visit, he was admitted to ICU due to atrial fibrillation. He is now taking amlodipine and Eliquis. He now reports 75% relief from RFA. He did notice that it took 4-5 weeks to kick in. He reports intermittent neck and low back pain. He denies any radicular pain. He has been unable to tolerate riding his bike recently due to pain and cardiac issues. He is hoping to increase this. He is taking Gabapentin. He also takes Percocet as needed with benefit and without adverse effects. He denies any other health changes. His pain today is 5/10.    Interval History 4/15/2025:  The patient returns to clinic today for follow up of back pain. He is s/p bilateral L3,4,5 RFA on 3/27/2025. He reports limited relief at this time. He continues to report low back pain. This is worse with prolonged standing and walking. He denies any radicular leg pain. He continues to report neck pain. He does report benefit with Gabapentin and Percocet. He denies any adverse effects. He denies any other health changes. His pain today is 9/10.     Interval History 3/18/2025:  The patient returns to clinic today for follow up of back pain. He is s/p bilateral L3,4,5 MBB on 2/24/2025 and 3/10/2025. He reports 90% relief for one day with each block. He was able to stand for longer periods of time. His pain did return to baseline. He continues to report low back pain. This is worse with prolonged standing and walking. He does endorse morning stiffness. He continues to report neck pain. He is taking Gabapentin. He also takes Percocet as needed for severe pain. He denies any other health changes. His pain today is 9/10.     Interval History 1/9/2025:  The patient returns to clinic today for follow up of pain. He is s/p L4/5 IL SELENA on 12/23/2024. He reports 50% relief of his  pain. He reports intermittent low back pain. He continues to report neck pain. He reports increased joint pain today due to the cold weather. He continues to report right knee pain. He did have an injection into the knee yesterday. He does have a follow up with Orthopedics tomorrow. He is taking Gabapentin. He is taking Percocet as needed with benefit. He denies any other health changes. His pain today is 7/10.    Interval History 12/9/2024:  The patient returns to clinic today for follow up of neck and back pain. Since last visit, he has developed right knee pain. This is limiting his progression in physical therapy. He did have the right knee drained recently. He reports worsened low back pain. He attributes this to the way he is walking. He reports intermittent radiating pain into the hip and leg. His pain is worse with standing and walking. He is taking Gabapentin and Percocet. He denies any adverse effects. He denies any other health changes. His pain today is 8/10.    Interval History 9/10/2024:  The patient returns to clinic today for follow up of neck and back pain. Since last visit, he underwent left knee arthroscopy on 8/13/2024. He is currently participating in physical therapy. He reports increased mid and low back pain. He denies any radicular leg pain. His pain is worse with moving from sitting to standing. He is taking Gabapentin. He also takes Percocet as needed for severe pain. He denies any adverse effects. He denies any other health changes. His pain today is 8/10.    Interval History 6/10/2024:  The patient returns to clinic today for follow up of neck and back pain. He is s/p L4/5 IL SELENA on 5/23/2024. He reports 60% relief. He reports low back pain. He denies any radicular leg pain. His back pain is worse with moving from sitting to standing. He continues to report neck pain. He is taking Gabapentin. He also takes Percocet for severe pain as needed with benefit. He denies any adverse effects. He  denies any other health changes. His pain today is 5/10.      Interval History 5/1/2024:  The patient returns to clinic today for follow up of neck and back pain. He reports increased low back pain. He denies any radicular leg pain but does have numbness into the lateral aspect of his right leg. This is worse from the knee to the top of his right foot. He does have numbness under the left foot. His pain is worse with standing and walking. He is no longer taking Eliquis. He is taking Gabapentin. He also takes Percocet for severe pain. He needs a refill. He denies any other health changes. His pain today is 8/10.    Interval History 3/25/2024:  Partha Curtis Jr. presents to the clinic for a follow-up appointment for neck pain. He is s/p C7/T1 IL SELENA on 2/26/2024. He reports 100% relief of his neck pain. He was admitted for atrial fibrillation after the last procedure. He was converted to sinus rhythm. He is now on Eliquis. He reports increased low back pain. He denies any radicular leg pain. His pain is worse as the day goes on. He previously had relief with ESIs. He is interested in repeating this in the future. He denies any weakness. He is taking Gabapentin. He takes Percocet intermittently for severe pain. He denies any other health changes. His pain today is 0/10.      HPI: Partha Curtis Jr. is a 63 y.o. male presents to pain clinic for evaluation of neck pain. Symptoms developed 4 weeks ago. Similar pain in 2022 that improved after an C7/T1 ILESI on 3/28/22, had been pain free since this procedure until 4 weeks ago. Original neck pain started in 2021 without inciting event. Denies trauma or injury to the area in the past 4 weeks upon return of symptoms. [facility] Herrera     Original Pain Description:  The pain is located in the upper thoracic region just left of the midline with radiation into his left shoulder and proximal arm terminating above the elbow. All of  patients upper back/neck pain is on the left. The pain is described as aching, sharp, and throbbing. These exact symptoms were present prior to his 2022 ILESI. Chinyere ranges from 4 - 10. The current pain is 8. Exacerbating factors: Coughing/Sneezing and L arm abduction above the head, head forward flexion and head lateral bending to the left, and laying flat on the ground. Mitigating factors pillow. Symptoms interfere with daily activity and sleeping and work. 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 Works as a  for [employer]. The patient feels like symptoms have been worsening. Patient endorses weakness in his left arm with return of pain. Patient denies saddle anesthesia, bladder/bowel incontinence, acute or signifcant limb weakness, ataxia, or increased falls.     Pain Disability Index Review:      6/18/2025     9:31 AM 4/15/2025     3:08 PM 3/18/2025     9:26 AM   Last 3 PDI Scores   Pain Disability Index (PDI) [phone]       Pain Medications:  Gabapentin  Percocet     Opioid Contract: not applicable     report:  Reviewed and consistent with medication use as prescribed.    Pain Procedures:   2/26/2024- C7/T1 IL SELENA  5/23/2024- L4/5 IL SELENA- 60% relief  12/23/2024- L4/5 IL SELENA- 50% relief   2/24/2025- Bilateral L3,4,5 MBB  3/10/2025- Bilateral L3,4,5 MBB  3/27/2025- Bilateral L3,4,5 RFA- 75% relief     Physical Therapy/Home Exercise: yes    Imaging:   MRI Cervical Spine 2/15/2024:  COMPARISON:  XR C-spine 03/02/2010.  CTA head neck 06/23/2022.     FINDINGS:  Alignment: Normal sagittal alignment.  Grade 1 retrolisthesis at C5-C6 and grade 1 anterolisthesis at C7-T1.     Vertebrae: Vertebral body heights are maintained.  No fracture or marrow infiltrative process.     Discs: Multilevel degenerative disc desiccation and height loss most pronounced at C5-C6 and C6-C7.     Cord: Normal cord signal intensity.     Cerebellar tonsils are in their expected location.   Visualized brainstem is normal. Vertebral artery flow voids are present.  Prevertebral soft tissues are normal.  No cervical lymph node enlargement.  Paraspinal musculature demonstrates normal bulk and signal intensity.     Degenerative findings:     C2-C3: Thickening of the posterior longitudinal ligament.  No spinal canal stenosis or neural foraminal narrowing.     C3-C4: Thickening of the posterior longitudinal ligament, bilateral uncovertebral spurring and facet arthropathy.  No spinal canal stenosis.  Mild left neural foraminal narrowing.     C4-C5: Central/right paracentral disc extrusion with inferior migration which abuts and slightly posteriorly displaces the right ventral cord.  Bilateral uncovertebral spurring and facet arthropathy.  Mild spinal canal stenosis.  Mild left neural foraminal narrowing.     C5-C6: Posterior disc osteophyte complex, bilateral uncovertebral spurring and facet arthropathy.  Moderate spinal canal stenosis.  Severe bilateral neural foraminal narrowing.     C6-C7: Posterior disc osteophyte complex, bilateral uncovertebral spurring and facet arthropathy.  No spinal canal stenosis.  Mild right and moderate left neural foraminal narrowing.     C7-T1: No spinal canal stenosis or neural foraminal narrowing.     Impression:     Multilevel degenerative change of the cervical spine, detailed above.  Findings most pronounced at C5-C6 with moderate spinal canal stenosis and severe bilateral neural foraminal narrowing.    MRI Lumbar Spine 11/2/2023:  COMPARISON: MRI lumbar spine 6/28/2021     FINDINGS:   Spine Numbering: For purposes of this dictation, it is assumed that there are 5 non-rib-bearing, lumbar-type vertebrae, and the most caudal fully segmented lumbar vertebra is labeled L5.     Alignment: Straightening of normal lumbar lordosis. Unchanged dextrocurvature of the lumbar spine. Unchanged grade 1 left lateral listhesis of L2 on L3. Unchanged grade 1 right lateral listhesis of L3 on  L4. Unchanged grade 1 retrolisthesis of L2 on L3.     Surgical: None.     Vertebral Bodies: Unchanged multilevel mild anterior osteophytosis.     Marrow Signal: Unchanged degenerative fatty marrow placement at the L3-4 endplates. No marrow edema. No suspicious osseous lesions.     Intervertebral Discs: Unchanged multilevel disc dessication with loss of disc space height     Conus Medullaris: Terminates at L1     Cauda Equina: Unchanged bunching of the cauda equina at L3-4 and L4-5 due to thecal sac narrowing detailed below.     Paraspinal Soft Tissues: Normal     Intra-abdominal Findings: None.     Individual Levels:     T12-L1: Unchanged circumferential disc bulge with unchanged right paracentral disc herniation with cranial migration to the infrapedicular level of T12 measuring 1.6 x 0.6 cm (image 7, series 2). No thecal sac or neural foraminal narrowing.     L1-L2: Unchanged circumferential disc bulge with bulky left lateral osteophytosis. No spinal canal or foraminal narrowing.     L2-L3: Circumferential disc bulge, ligamentum flavum thickening, and epidural lipomatosis cause unchanged moderate thecal sac narrowing. Mild facet arthropathy and disc disease cause unchanged moderate bilateral neural foraminal narrowing.     L3-L4: Circumferential disc bulge, ligamentum flavum thickening, and epidural lipomatosis cause unchanged severe thecal sac narrowing. Moderate right and severe left facet arthropathy with disc disease cause unchanged mild right and moderate left neural foraminal narrowing.     L4-L5: Circumferential disc bulge, ligamentum flavum thickening, and epidural lipomatosis cause unchanged severe thecal sac narrowing. Severe facet arthropathy and disc disease cause unchanged severe right and moderate left neural foraminal narrowing with impingement of the exiting right L4 nerve root.     L5-S1: Unchanged small disc bulge and moderate facet arthropathy. No spinal canal or foraminal narrowing.  "    Impression    Unchanged severe multilevel lumbar spondylosis as detailed above.    Allergies:   Review of patient's allergies indicates:   Allergen Reactions    Stadol [butorphanol tartrate] Rash     Swelling in face    Strawberries [strawberry] Rash       Current Medications:   Current Outpatient Medications   Medication Sig Dispense Refill    amitriptyline (ELAVIL) 75 MG tablet Take 1 tablet by mouth in the evening 90 tablet 3    amLODIPine (NORVASC) 10 MG tablet Take 1 tablet (10 mg total) by mouth once daily. 90 tablet 3    apixaban (ELIQUIS) 5 mg Tab Take 1 tablet (5 mg total) by mouth 2 (two) times daily. 180 tablet 3    aspirin (ECOTRIN) 81 MG EC tablet Take 1 tablet (81 mg total) by mouth once daily. 28 tablet 0    BD LUER-RUSS SYRINGE 3 mL 25 gauge x 1" Syrg USE ONE SYRINGE EVERY 14 DAYS WITH TESTOSTERONE 100 each 2    candesartan (ATACAND) 32 MG tablet Take 1 tablet by mouth once daily 90 tablet 3    cyanocobalamin (VITAMIN B-12) 1000 MCG tablet Take 1 tablet (1,000 mcg total) by mouth once daily. 90 tablet 4    DULoxetine (CYMBALTA) 30 MG capsule Take 1 capsule by mouth once daily 90 capsule 3    ergocalciferol (ERGOCALCIFEROL) 50,000 unit Cap Take 1 capsule (50,000 Units total) by mouth every 7 days. 12 capsule 0    ezetimibe (ZETIA) 10 mg tablet Take 1 tablet by mouth once daily 90 tablet 0    fluticasone propionate (FLONASE) 50 mcg/actuation nasal spray 2 sprays by Each Nostril route.      gabapentin (NEURONTIN) 800 MG tablet Take 1 tablet (800 mg total) by mouth every evening. 90 tablet 1    glimepiride (AMARYL) 2 MG tablet Take 1 tablet (2 mg total) by mouth before breakfast.      glucose 4 GM chewable tablet Take 4 tablets (16 g total) by mouth as needed for Low blood sugar. 30 tablet 12    hydrALAZINE (APRESOLINE) 25 MG tablet Take 1 tablet (25 mg total) by mouth every 8 (eight) hours. 90 tablet 0    hydrocortisone 2.5 % cream Apply to face twice daily for flares as needed 40 g 2    ipratropium " "(ATROVENT) 42 mcg (0.06 %) nasal spray 2 sprays by Nasal route 2 (two) times daily as needed for Rhinitis. 15 mL 0    ketoconazole (NIZORAL) 2 % cream Apply twice daily to affected area of skin 60 g 2    metoprolol succinate (TOPROL-XL) 200 MG 24 hr tablet Take 1 tablet (200 mg total) by mouth once daily. 90 tablet 3    oxyCODONE-acetaminophen (PERCOCET)  mg per tablet Take 1 tablet by mouth every 12 (twelve) hours as needed for Pain. 60 tablet 0    rosuvastatin (CRESTOR) 40 MG Tab TAKE 1 TABLET BY MOUTH ONCE DAILY IN THE EVENING 90 tablet 1    semaglutide (OZEMPIC) 2 mg/dose (8 mg/3 mL) PnIj Inject 2 mg into the skin every 7 days. 9 mL 3    syringe with needle, safety (BD INTEGRA SYRINGE) 3 mL 22 gauge x 1 1/2" Syrg Inject 1 Syringe as directed once a week. 6 each 3    testosterone cypionate (DEPOTESTOTERONE CYPIONATE) 200 mg/mL injection Inject 0.75 mLs (150 mg total) into the muscle every 14 (fourteen) days. 2 mL 5    valACYclovir (VALTREX) 1000 MG tablet Take 2 tablets (2,000 mg total) by mouth every 12 (twelve) hours. 4 tablet 3    LIDOcaine (LIDODERM) 5 % Place 1 patch onto the skin once daily. Remove & Discard patch within 12 hours or as directed by MD 30 patch 11     No current facility-administered medications for this visit.     Facility-Administered Medications Ordered in Other Visits   Medication Dose Route Frequency Provider Last Rate Last Admin    0.9%  NaCl infusion   Intravenous Continuous Kendell Hoffman MD        0.9%  NaCl infusion   Intravenous Continuous Gerardo Uribe MD           REVIEW OF SYSTEMS:    GENERAL:  No weight loss, malaise or fevers.  HEENT:  Negative for frequent or significant headaches.  NECK:  Negative for lumps, goiter, pain and significant neck swelling.  RESPIRATORY:  Negative for cough, wheezing or shortness of breath.  CARDIOVASCULAR:  Negative for chest pain, leg swelling or palpitations. HTN, AFib  GI:  Negative for abdominal discomfort, blood in stools or black " stools or change in bowel habits.  MUSCULOSKELETAL:  See HPI.  SKIN:  Negative for lesions, rash, and itching.  PSYCH:  Negative for sleep disturbance, mood disorder and recent psychosocial stressors.  ENDO: Diabetes.   HEMATOLOGY/LYMPHOLOGY:  Negative for swollen nodes. Eliquis  NEURO:   No history of headaches, syncope, paralysis, seizures or tremors.  All other reviewed and negative other than HPI.    Past Medical History:  Past Medical History:   Diagnosis Date    Allergy     Carotid artery occlusion     Cataract     Chronic back pain     Colonic polyp     Genetic testing     MUTYH mutation-negative    Hyperlipidemia     Hypertension     Paroxysmal atrial fibrillation 02/28/2024    Sleep apnea     Type 2 diabetes mellitus with stage 3 chronic kidney disease, without long-term current use of insulin 04/02/2020       Past Surgical History:  Past Surgical History:   Procedure Laterality Date    ANKLE SURGERY Left     2    APPENDECTOMY      at age 20    ARTHROSCOPIC CHONDROPLASTY OF KNEE JOINT Left 08/13/2024    Procedure: ARTHROSCOPY, KNEE, WITH CHONDROPLASTY;  Surgeon: Kai Washington MD;  Location: [facility] OR;  Service: Orthopedics;  Laterality: Left;    ARTHROSCOPY, KNEE, WITH ABRASION ARTHROPLASTY OR MICROFRACTURE Left 08/13/2024    Procedure: ARTHROSCOPY, KNEE, WITH  MICROFRACTURE;  Surgeon: Kai Washington MD;  Location: [facility] OR;  Service: Orthopedics;  Laterality: Left;    ARTHROSCOPY,KNEE,WITH MENISCUS REPAIR Left 08/13/2024    Procedure: ARTHROSCOPY,KNEE,WITH MENISCUS REPAIR;  Surgeon: Kai Washington MD;  Location: [facility] OR;  Service: Orthopedics;  Laterality: Left;  NO REGIONAL BLOCK    CAROTID ENDARTERECTOMY Right 07/15/2015    CARPAL TUNNEL RELEASE Bilateral     COLONOSCOPY N/A 12/14/2018    Procedure: COLONOSCOPYSuprep;  Surgeon: Silver Selby MD;  Location: [facility] ENDO;  Service: Endoscopy;  Laterality: N/A;    COLONOSCOPY N/A 10/02/2024    Procedure: COLONOSCOPY;  Surgeon: Heath Morrow MD;   Location: [facility] ENDO;  Service: Endoscopy;  Laterality: N/A;  Ref by Leonor Oneill, pt has unopened Sutab, portal - PC  9/25/24-LVM for precall, portal-DS. 10/1/24 Pt. called and confirmed arrival time.EC    EPIDURAL STEROID INJECTION N/A 02/26/2024    Procedure: CERVICAL C7/T1 IL SELENA;  Surgeon: Gokul Fung MD;  Location: [facility] PAIN MGT;  Service: Pain Management;  Laterality: N/A;  [phone]  2 WK F/U SERGEI    EPIDURAL STEROID INJECTION N/A 05/23/2024    Procedure: LUMBAR L4/5 IL SELENA *ASPIRIN OTC* HOLD FOR 5 DAYS;  Surgeon: Gokul Fung MD;  Location: [facility] PAIN MGT;  Service: Pain Management;  Laterality: N/A;  [phone]  2 WK F/U NOHELIA    EPIDURAL STEROID INJECTION N/A 10/21/2024    Procedure: LUMBAR L5/S1 IL SELENA *ASPIRIN OTC* HOLD FOR 5 DAYS  **ANGELICA PT**;  Surgeon: Sofia Sheikh MD;  Location: [facility] PAIN MGT;  Service: Pain Management;  Laterality: N/A;  [phone]  2 WK F/U NOHELIA    INJECTION OF ANESTHETIC AGENT AROUND NERVE N/A 12/23/2024    Procedure: LUMBAR L4.5 IL SELENA *ASPIRIN OTC* HOLD FOR 5 DAYS;  Surgeon: Gokul Fung MD;  Location: [facility] PAIN MGT;  Service: Pain Management;  Laterality: N/A;  2 WK F/U NOHELIA    INJECTION OF ANESTHETIC AGENT AROUND NERVE Bilateral 02/24/2025    Procedure: BLOCK, NERVE BILATERAL L3, 4, 5 MEDIAL BRANCH;  Surgeon: Gokul Fung MD;  Location: [facility] PAIN MGT;  Service: Pain Management;  Laterality: Bilateral;  2 WK F/U NOHELIA    INJECTION OF ANESTHETIC AGENT AROUND NERVE Bilateral 03/10/2025    Procedure: BLOCK, NERVE BILATERAL L3, L4, L5 MEDIAL BRANCH;  Surgeon: Gokul Fung MD;  Location: [facility] PAIN MGT;  Service: Pain Management;  Laterality: Bilateral;    JOINT REPLACEMENT  09/2010    NASAL SEPTUM SURGERY      RADIOFREQUENCY ABLATION Bilateral 03/27/2025    Procedure: RADIOFREQUENCY ABLATION BILATERAL L3, 4, 5;  Surgeon: Gokul Fung MD;  Location: [facility] PAIN MGT;  Service: Pain Management;  Laterality: Bilateral;    TOTAL KNEE ARTHROPLASTY Right     6 knee  surgeries    TRANSESOPHAGEAL ECHOCARDIOGRAM WITH POSSIBLE CARDIOVERSION (ELIZABETH W/ POSS CARDIOVERSION) N/A 02/28/2024    Procedure: Transesophageal echo (ELIZABETH) intra-procedure log documentation;  Surgeon: Ahmet De La Cruz MD;  Location: [facility] CATH LAB/EP;  Service: Cardiology;  Laterality: N/A;    TRANSESOPHAGEAL ECHOCARDIOGRAM WITH POSSIBLE CARDIOVERSION (ELIZABETH W/ POSS CARDIOVERSION) N/A 5/28/2025    Procedure: Transesophageal echo (ELIZABETH) intra-procedure log documentation;  Surgeon: Valerio Jaquez MD;  Location: [facility] OR;  Service: Cardiology;  Laterality: N/A;       Family History:  Family History   Problem Relation Name Age of Onset    Brain cancer Mother Klarissa 67    Cancer Mother Klarissa     Hypertension Father Keanu     Lung cancer Father Keanu         x2 (PAUL initially- treated surgically only, then recurred distantly) (smoker, & had been exposed to many chemicals)    Cancer Father Keanu     Breast cancer Sister  58    Genetic Disorder Sister          monoallelic MUTYH mutation    Seizures Daughter      Cancer Maternal Grandfather Valerio     Brain cancer Paternal Grandfather  73    Breast cancer Maternal Cousin  57    Aneurysm Other mgm's father 48        brain    Ulcerative colitis Other brother's son        Social History:  Social History     Socioeconomic History    Marital status:    Tobacco Use    Smoking status: Never     Passive exposure: Never    Smokeless tobacco: Never   Substance and Sexual Activity    Alcohol use: Yes     Alcohol/week: 2.0 standard drinks of alcohol     Types: 2 Cans of beer per week     Comment: 6 beers per month    Drug use: Never    Sexual activity: Yes     Partners: Female     Birth control/protection: None   Social History Narrative    5/11/2021: . He lives with his wife and 2 kids. (5 kids total). He has one dog, one cat, no more chickens at home. One dog recently passed away. No smokers at home.      Social Drivers of Health     Financial  "Resource Strain: Patient Declined (5/27/2025)    Overall Financial Resource Strain (CARDIA)     Difficulty of Paying Living Expenses: Patient declined   Food Insecurity: Patient Declined (5/27/2025)    Hunger Vital Sign     Worried About Running Out of Food in the Last Year: Patient declined     Ran Out of Food in the Last Year: Patient declined   Transportation Needs: Patient Declined (5/27/2025)    PRAPARE - Transportation     Lack of Transportation (Medical): Patient declined     Lack of Transportation (Non-Medical): Patient declined   Physical Activity: Unknown (3/8/2024)    Exercise Vital Sign     Days of Exercise per Week: 0 days   Stress: Patient Declined (5/27/2025)    [employer] [place] [employer] - Occupational Stress Questionnaire     Feeling of Stress : Patient declined   Housing Stability: Patient Declined (5/27/2025)    Housing Stability Vital Sign     Unable to Pay for Housing in the Last Year: Patient declined     Homeless in the Last Year: Patient declined       OBJECTIVE:    /75 (BP Location: Right arm, Patient Position: Sitting)   Pulse 67   Temp 98 °F (36.7 °C) (Oral)   Resp 18   Ht 5' 10" (1.778 m)   Wt 87 kg (191 lb 12.8 oz)   SpO2 100%   BMI 27.52 kg/m²     PHYSICAL EXAMINATION:    General appearance: Well appearing, in no acute distress, alert and oriented x3.  Psych:  Mood and affect appropriate.  Skin: Skin color, texture, turgor normal, no rashes or lesions, in both upper and lower body.  Head/face:  Atraumatic, normocephalic. No palpable lymph nodes  Neck: No pain to palpation over the cervical paraspinous muscles.   Cor: RRR  Pulm: Symmetric chest rise, no respiratory distress noted.   Back: Straight leg raising in the sitting position is negative to radicular pain bilaterally. There is pain to palpation over the lumbar facet joints bilaterally. Limited ROM with mild pain pain on flexion and extension.   Extremities:  No deformities, edema, or skin discoloration. " Good capillary refill.  Musculoskeletal:  Bilateral upper and lower extremity strength is normal and symmetric.  No atrophy or tone abnormalities are noted.  Neuro: No loss of sensation is noted.  Gait: Normal.    ASSESSMENT: 65 y.o. year old male with neck and back pain, consistent with the followin. Chronic pain disorder        2. Lumbar spondylosis  X-Ray Lumbar Complete Including Flex And Ext      3. Degeneration of intervertebral disc of lumbar region with discogenic back pain        4. Dorsalgia, unspecified  MRI Lumbar Spine Without Contrast    X-Ray Lumbar Complete Including Flex And Ext      5. Cervical radiculopathy        6. Cervical spondylosis        7. DDD (degenerative disc disease), cervical        8. Encounter for monitoring opioid maintenance therapy                    PLAN:     - Previous imaging was reviewed and discussed with the patient today. Labs reviewed. Hospital notes reviewed.     - Obtain updated lumbar xray and MRI.     - He is s/p bilateral L3,4,5 RFA with 75% relief.      - We can repeat C7/T1 IL SELENA as needed.     - Continue Gabapentin.     - Pain contract signed 2024.     - Continue Percocet 10/325 mg BID PRN, #60. Refill provided.     - The patient is here today for a refill of current pain medications and they believe these provide effective pain control and improvements in quality of life.  The patient notes no serious side effects, and feels the benefits outweigh the risks.  The patient was reminded of the pain contract that they signed previously as well as the risks and benefits of the medication including possible death.  The updated [place] [employer] prescription monitoring program was reviewed, and the patient has been found to be compliant with current treatment plan. Medication management provided by Dr. Fung.     - UDS from 3/18/2025 reviewed and consistent.     - I have stressed the importance of physical activity and a home exercise plan to help  with pain and improve health.    - RTC after imaging.      The above plan and management options were discussed at length with patient. Patient is in agreement with the above and verbalized understanding.    Baylee Ni  06/18/2025

## 2025-06-19 ENCOUNTER — PATIENT MESSAGE (OUTPATIENT)
Dept: PAIN MEDICINE | Facility: CLINIC | Age: 65
End: 2025-06-19
Payer: COMMERCIAL

## 2025-06-19 ENCOUNTER — OFFICE VISIT (OUTPATIENT)
Dept: FAMILY MEDICINE | Facility: CLINIC | Age: 65
End: 2025-06-19
Payer: COMMERCIAL

## 2025-06-19 VITALS
DIASTOLIC BLOOD PRESSURE: 74 MMHG | SYSTOLIC BLOOD PRESSURE: 118 MMHG | HEIGHT: 70 IN | HEART RATE: 64 BPM | WEIGHT: 194.44 LBS | BODY MASS INDEX: 27.84 KG/M2 | OXYGEN SATURATION: 97 %

## 2025-06-19 DIAGNOSIS — E55.9 VITAMIN D DEFICIENCY: ICD-10-CM

## 2025-06-19 DIAGNOSIS — I10 ESSENTIAL HYPERTENSION: ICD-10-CM

## 2025-06-19 DIAGNOSIS — E53.8 B12 DEFICIENCY: ICD-10-CM

## 2025-06-19 DIAGNOSIS — Z00.00 VISIT FOR WELL MAN HEALTH CHECK: Primary | ICD-10-CM

## 2025-06-19 DIAGNOSIS — E11.22 TYPE 2 DIABETES MELLITUS WITH STAGE 3A CHRONIC KIDNEY DISEASE, WITHOUT LONG-TERM CURRENT USE OF INSULIN: ICD-10-CM

## 2025-06-19 DIAGNOSIS — N18.31 TYPE 2 DIABETES MELLITUS WITH STAGE 3A CHRONIC KIDNEY DISEASE, WITHOUT LONG-TERM CURRENT USE OF INSULIN: ICD-10-CM

## 2025-06-19 DIAGNOSIS — I48.91 ATRIAL FIBRILLATION WITH RVR: ICD-10-CM

## 2025-06-19 PROCEDURE — 99999 PR PBB SHADOW E&M-EST. PATIENT-LVL V: CPT | Mod: PBBFAC,,,

## 2025-06-19 NOTE — PROGRESS NOTES
Annual Wellness Visit  [facility]      SUBJECTIVE:     History of Present Illness:  Patient is a 65 y.o. male with HTN, anxiety, HLD, A-fib, DM, Vit D def, B12 deficiency, and low testosterone presents to clinic for an annual wellness visit.     HTN: he is taking  amlodipine 10 mg candesartan 32 mg, metoprolol 200 mg, and hydralazine 25 mg TID. Was having facial flushing with clonidine so d/c   Blood pressure in clinic 118/74    DLD: on zetia and crestor . At goal 6/2025    PAF: was in hospital last month for this. Had cardioversion. He is back on eliquis BID   Sees cardiology tomorrow   Denies CP, SOB, palpitations     DM: on ozempic 2 mg and amaryl 2 mg. No nausea. No vomiting. No trouble swallowing.  Was on 1 mg. Refilled 2 weeks ago at 2 mg and  he is taking 2 mg now.  Denies hypoglycemic symptoms   A1C up to 7.7. But just recently increase amaryl   This morning BG 96    Low T: managed by endocrinology. Has been off testosterone for a month     Labs reviewed     Taking Vit weekly and B-12 daily. At goal 6/2025  Foot exam in clinic     Immunizations UTD  Colonoscopy- due 2029  Last HgbA1C-   Hemoglobin A1C   Date Value Ref Range Status   03/18/2025 7.0 (H) 4.0 - 5.6 % Final     Comment:     ADA Screening Guidelines:  5.7-6.4%  Consistent with prediabetes  >or=6.5%  Consistent with diabetes    High levels of fetal hemoglobin interfere with the HbA1C  assay. Heterozygous hemoglobin variants (HbS, HgC, etc)do  not significantly interfere with this assay.   However, presence of multiple variants may affect accuracy.     12/10/2024 9.0 (H) 4.0 - 5.6 % Final     Comment:     ADA Screening Guidelines:  5.7-6.4%  Consistent with prediabetes  >or=6.5%  Consistent with diabetes    High levels of fetal hemoglobin interfere with the HbA1C  assay. Heterozygous hemoglobin variants (HbS, HgC, etc)do  not significantly interfere with this assay.   However, presence of multiple variants may  affect accuracy.     08/16/2024 6.6 (H) 4.0 - 5.6 % Final     Comment:     ADA Screening Guidelines:  5.7-6.4%  Consistent with prediabetes  >or=6.5%  Consistent with diabetes    High levels of fetal hemoglobin interfere with the HbA1C  assay. Heterozygous hemoglobin variants (HbS, HgC, etc)do  not significantly interfere with this assay.   However, presence of multiple variants may affect accuracy.       Hemoglobin A1c   Date Value Ref Range Status   06/16/2025 7.7 (H) 4.0 - 5.6 % Final     Comment:     ADA Screening Guidelines:  5.7-6.4%  Consistent with prediabetes  >=6.5%  Consistent with diabetes    High levels of fetal hemoglobin interfere with the HbA1C  assay. Heterozygous hemoglobin variants (HbS, HgC, etc)do  not significantly interfere with this assay.   However, presence of multiple variants may affect accuracy.      Last lipid panel-   Lab Results   Component Value Date    CHOL 93 (L) 06/16/2025    CHOL 96 (L) 12/10/2024    CHOL 151 03/11/2024     Lab Results   Component Value Date    HDL 31 (L) 06/16/2025    HDL 29 (L) 12/10/2024    HDL 31 (L) 03/11/2024     Lab Results   Component Value Date    LDLCALC 36.6 (L) 06/16/2025    LDLCALC 37.6 (L) 12/10/2024    LDLCALC 76.4 03/11/2024     Lab Results   Component Value Date    TRIG 127 06/16/2025    TRIG 147 12/10/2024    TRIG 218 (H) 03/11/2024       Lab Results   Component Value Date    CHOLHDL 33.3 06/16/2025    CHOLHDL 30.2 12/10/2024    CHOLHDL 20.5 03/11/2024          Review of Systems   Constitutional:  Negative for fever and malaise/fatigue.   Respiratory:  Negative for cough, shortness of breath and wheezing.    Cardiovascular:  Negative for chest pain, palpitations and leg swelling.   Gastrointestinal:  Negative for abdominal pain, diarrhea, nausea and vomiting.   Skin:  Negative for rash.   Neurological:  Negative for dizziness and headaches.            Review of patient's allergies indicates:   Allergen Reactions    Stadol [butorphanol tartrate]  Rash     Swelling in face    Strawberries [strawberry] Rash       Past Medical History:   Diagnosis Date    Allergy     Carotid artery occlusion     Cataract     Chronic back pain     Colonic polyp     Genetic testing     MUTYH mutation-negative    Hyperlipidemia     Hypertension     Paroxysmal atrial fibrillation 02/28/2024    Sleep apnea     Type 2 diabetes mellitus with stage 3 chronic kidney disease, without long-term current use of insulin 04/02/2020     Past Surgical History:   Procedure Laterality Date    ANKLE SURGERY Left     2    APPENDECTOMY      at age 20    ARTHROSCOPIC CHONDROPLASTY OF KNEE JOINT Left 08/13/2024    Procedure: ARTHROSCOPY, KNEE, WITH CHONDROPLASTY;  Surgeon: Kai Washington MD;  Location: [facility] OR;  Service: Orthopedics;  Laterality: Left;    ARTHROSCOPY, KNEE, WITH ABRASION ARTHROPLASTY OR MICROFRACTURE Left 08/13/2024    Procedure: ARTHROSCOPY, KNEE, WITH  MICROFRACTURE;  Surgeon: Kai Washington MD;  Location: [facility] OR;  Service: Orthopedics;  Laterality: Left;    ARTHROSCOPY,KNEE,WITH MENISCUS REPAIR Left 08/13/2024    Procedure: ARTHROSCOPY,KNEE,WITH MENISCUS REPAIR;  Surgeon: Kai Washington MD;  Location: [facility] OR;  Service: Orthopedics;  Laterality: Left;  NO REGIONAL BLOCK    CAROTID ENDARTERECTOMY Right 07/15/2015    CARPAL TUNNEL RELEASE Bilateral     COLONOSCOPY N/A 12/14/2018    Procedure: COLONOSCOPYSuprep;  Surgeon: Silver Selby MD;  Location: [facility];  Service: Endoscopy;  Laterality: N/A;    COLONOSCOPY N/A 10/02/2024    Procedure: COLONOSCOPY;  Surgeon: Heath Morrow MD;  Location: [facility] ENDO;  Service: Endoscopy;  Laterality: N/A;  Ref by Leonor Oneill, pt has unopened Sutab, portal - PC  9/25/24-LVM for precall, portal-DS. 10/1/24 Pt. called and confirmed arrival time.EC    EPIDURAL STEROID INJECTION N/A 02/26/2024    Procedure: CERVICAL C7/T1 IL SELENA;  Surgeon: Gokul Fung MD;  Location: [facility] PAIN MGT;  Service: Pain Management;  Laterality:  N/A;  [phone]  2 WK F/U SERGEI    EPIDURAL STEROID INJECTION N/A 05/23/2024    Procedure: LUMBAR L4/5 IL SELENA *ASPIRIN OTC* HOLD FOR 5 DAYS;  Surgeon: Gokul Fung MD;  Location: [facility] PAIN [facility]GT;  Service: Pain Management;  Laterality: N/A;  [phone]  2 WK F/U NOHELIA    EPIDURAL STEROID INJECTION N/A 10/21/2024    Procedure: LUMBAR L5/S1 IL SELENA *ASPIRIN OTC* HOLD FOR 5 DAYS  **EISSA PT**;  Surgeon: Sofia Sheikh MD;  Location: [facility] PAIN [facility]GT;  Service: Pain Management;  Laterality: N/A;  [phone]  2 WK F/U NOHELIA    INJECTION OF ANESTHETIC AGENT AROUND NERVE N/A 12/23/2024    Procedure: LUMBAR L4.5 IL SELENA *ASPIRIN OTC* HOLD FOR 5 DAYS;  Surgeon: Gokul Fung MD;  Location: [facility] PAIN [facility]GT;  Service: Pain Management;  Laterality: N/A;  2 WK F/U NOHELIA    INJECTION OF ANESTHETIC AGENT AROUND NERVE Bilateral 02/24/2025    Procedure: BLOCK, NERVE BILATERAL L3, 4, 5 MEDIAL BRANCH;  Surgeon: Gokul Fung MD;  Location: [facility] PAIN [facility]GT;  Service: Pain Management;  Laterality: Bilateral;  2 WK F/U NOHELIA    INJECTION OF ANESTHETIC AGENT AROUND NERVE Bilateral 03/10/2025    Procedure: BLOCK, NERVE BILATERAL L3, L4, L5 MEDIAL BRANCH;  Surgeon: Gokul Fung MD;  Location: [facility] PAIN [facility]GT;  Service: Pain Management;  Laterality: Bilateral;    JOINT REPLACEMENT  09/2010    NASAL SEPTUM SURGERY      RADIOFREQUENCY ABLATION Bilateral 03/27/2025    Procedure: RADIOFREQUENCY ABLATION BILATERAL L3, 4, 5;  Surgeon: Gokul Fung MD;  Location: [facility] PAIN [facility]GT;  Service: Pain Management;  Laterality: Bilateral;    TOTAL KNEE ARTHROPLASTY Right     6 knee surgeries    TRANSESOPHAGEAL ECHOCARDIOGRAM WITH POSSIBLE CARDIOVERSION (ELIZABETH W/ POSS CARDIOVERSION) N/A 02/28/2024    Procedure: Transesophageal echo (ELIZABETH) intra-procedure log documentation;  Surgeon: Ahmet De La Cruz MD;  Location: [facility] CATH LAB/EP;  Service: Cardiology;  Laterality: N/A;    TRANSESOPHAGEAL ECHOCARDIOGRAM WITH POSSIBLE CARDIOVERSION (ELIZABETH W/ POSS CARDIOVERSION) N/A  5/28/2025    Procedure: Transesophageal echo (ELIZABETH) intra-procedure log documentation;  Surgeon: Valerio Jaquez MD;  Location: [facility] OR;  Service: Cardiology;  Laterality: N/A;     Family History   Problem Relation Name Age of Onset    Brain cancer Mother Klarissa 67    Cancer Mother Klarissa     Hypertension Father Keanu     Lung cancer Father Keanu         x2 (PAUL initially- treated surgically only, then recurred distantly) (smoker, & had been exposed to many chemicals)    Cancer Father Keanu     Breast cancer Sister  58    Genetic Disorder Sister          monoallelic MUTYH mutation    Seizures Daughter      Cancer Maternal Grandfather Valerio     Brain cancer Paternal Grandfather  73    Breast cancer Maternal Cousin  57    Aneurysm Other mgm's father 48        brain    Ulcerative colitis Other brother's son      Social History[1]       OBJECTIVE:     Vital Signs (Most Recent)  Vitals:    06/19/25 1024   BP: 118/74   Pulse: 64      Body mass index is 27.9 kg/m².       Physical Exam  Vitals reviewed.   Constitutional:       General: He is not in acute distress.     Appearance: He is not toxic-appearing.   HENT:      Head: Normocephalic and atraumatic.   Eyes:      General:         Right eye: No discharge.         Left eye: No discharge.      Conjunctiva/sclera: Conjunctivae normal.   Cardiovascular:      Rate and Rhythm: Normal rate and regular rhythm.   Pulmonary:      Effort: Pulmonary effort is normal. No respiratory distress.      Breath sounds: Normal breath sounds. No wheezing.   Musculoskeletal:      Right lower leg: No edema.      Left lower leg: No edema.   Lymphadenopathy:      Cervical: No cervical adenopathy.   Skin:     Findings: No rash.   Neurological:      Mental Status: He is alert and oriented to person, place, and time.            ASSESSMENT/PLAN:   65 y.o.male presents to clinic for annual wellness visit.     Visit for well man health check    Essential hypertension  -     CBC Auto  Differential; Future; Expected date: 06/19/2025  -     Comprehensive Metabolic Panel; Future; Expected date: 06/19/2025  -     Hemoglobin A1C; Future; Expected date: 06/19/2025    Type 2 diabetes mellitus with stage 3a chronic kidney disease, without long-term current use of insulin  -     CBC Auto Differential; Future; Expected date: 06/19/2025  -     Comprehensive Metabolic Panel; Future; Expected date: 06/19/2025  -     Hemoglobin A1C; Future; Expected date: 06/19/2025    Vitamin D deficiency    B12 deficiency    Atrial fibrillation with RVR       HTN: well controlled on current regimen. No more flushing off clonodine. No leg swelling with amlodipine. Will continue amlodipine 10 mg candesartan 32 mg, metoprolol 200 mg, and hydralazine 25 mg TID    DM: Pt recently increased Amaryl to 2 mg. A1C was elevated. On Ozempic 2 mg weekly/ Will continue this and repeat A1C in 3 months to see if improve with increase in Amaryl. Pt advised to decrease if BG begins to drop.   Placed dexcom today. If he likes will start Stelo for DM2 pts.     A-fib: Has appt with cardiology tomorrow. On eliquis    On B-12 and Vit D      Continue amlodipine 10 mg candesartan 32 mg, metoprolol 200 mg, and hydralazine 25 mg TID    Continue zetia and crestor     Continue ozempic 2 mg and amaryl 2 mg. If BG starts to get otilio, decrease to 1 mg     Continue eliquis    Appt with Rosario in 3 months, labs prior  Appt with Dr. Cavazos in January    Dexcom     Follow-up:     I spent a total of 30 minutes on the day of the visit.This includes face to face time and non-face to face time preparing to see the patient (eg, review of tests), obtaining and/or reviewing separately obtained history, documenting clinical information in the electronic or other health record, independently interpreting results and communicating results to the patient/family/caregiver, or care coordinator.      Rosario Holt PA-C             [1]   Social History  Tobacco Use     Smoking status: Never     Passive exposure: Never    Smokeless tobacco: Never   Substance Use Topics    Alcohol use: Yes     Alcohol/week: 2.0 standard drinks of alcohol     Types: 2 Cans of beer per week     Comment: 6 beers per month    Drug use: Never

## 2025-06-19 NOTE — PATIENT INSTRUCTIONS
Continue amlodipine 10 mg candesartan 32 mg, metoprolol 200 mg, and hydralazine 25 mg TID    Continue zetia and crestor     Continue ozempic 2 mg and amaryl 2 mg. If BG starts to get otilio, decrease to 1 mg     Continue dennis    Appt with Rosario in 3 months, labs prior  Appt with Dr. Cavazos in January    [facility]

## 2025-06-20 ENCOUNTER — OFFICE VISIT (OUTPATIENT)
Dept: CARDIOLOGY | Facility: CLINIC | Age: 65
End: 2025-06-20
Payer: COMMERCIAL

## 2025-06-20 VITALS
OXYGEN SATURATION: 98 % | WEIGHT: 194.56 LBS | SYSTOLIC BLOOD PRESSURE: 137 MMHG | HEART RATE: 73 BPM | DIASTOLIC BLOOD PRESSURE: 81 MMHG | HEIGHT: 70 IN | BODY MASS INDEX: 27.85 KG/M2

## 2025-06-20 DIAGNOSIS — I65.23 BILATERAL CAROTID ARTERY STENOSIS: ICD-10-CM

## 2025-06-20 DIAGNOSIS — I10 ESSENTIAL HYPERTENSION: ICD-10-CM

## 2025-06-20 DIAGNOSIS — G47.33 OSA (OBSTRUCTIVE SLEEP APNEA): Primary | ICD-10-CM

## 2025-06-20 DIAGNOSIS — I48.91 ATRIAL FIBRILLATION WITH RVR: ICD-10-CM

## 2025-06-20 DIAGNOSIS — Z98.890 S/P CAROTID ENDARTERECTOMY: ICD-10-CM

## 2025-06-20 DIAGNOSIS — E78.5 DYSLIPIDEMIA: ICD-10-CM

## 2025-06-20 LAB
OHS QRS DURATION: 80 MS
OHS QTC CALCULATION: 418 MS

## 2025-06-20 PROCEDURE — 99999 PR PBB SHADOW E&M-EST. PATIENT-LVL V: CPT | Mod: PBBFAC,,, | Performed by: INTERNAL MEDICINE

## 2025-06-20 NOTE — PROGRESS NOTES
Subjective:   @Patient ID:  Partha Curtis Jr. is a 65 y.o. male who presents for evaluation f No chief complaint on file.      HPI:     Patient is a 65-year-old male, recently admitted to the hospital for atrial fibrillation/palpitations underwent cardioversion.  Now here to establish care with a cardiologist.  Active medical problems include        -history of paroxysmal atrial fibrillation has had cardioversion done twice in the last 2 years.  Recently went to the ER for palpitations now in sinus rhythm.  CHADS-VASc score of 4 with points for age, hypertension, diabetes, atherosclerotic carotid disease  -carotid artery stenosis status post right carotid endarterectomy.  March 2025 carotid ultrasound shows no significant residual stenosis.  On aspirin therapy  -hypertension previously uncontrolled now on hydralazine, amlodipine, candesartan, Toprol-XL  -sleep apnea, unable to wear CPAP machine.  Has generalized tiredness /fatigue throughout the day.  Heart rate controlled in normal sinus rhythm        Results for orders placed during the hospital encounter of 05/27/25    Echo    Interpretation Summary    Left Ventricle: Moderately increased wall thickness. There is moderate concentric hypertrophy. Normal wall motion. There is normal systolic function with a visually estimated ejection fraction of 55 - 60%. Quantitated ejection fraction is 58%. Unable to assess diastolic function due to atrial fibrillation. Normal left ventricular filling pressure.    Right Ventricle: The right ventricle is normal in size Systolic function is normal.    Left Atrium: The left atrium is normal in size    Mitral Valve: There is mild regurgitation.    Tricuspid Valve: There is no significant regurgitation.    Aorta: The aortic root is mildly dilated measuring 4.00 cm. The ascending aorta is mildly dilated measuring 3.5 cm.    Pulmonary Artery: The estimated pulmonary artery systolic pressure is 19 mmHg.    IVC/SVC: Normal venous  pressure at 3 mmHg.    Pericardium: There is no pericardial effusion.      No results found for this or any previous visit.      Results for orders placed during the hospital encounter of 02/27/24    Intra-Procedure Documentation    Narrative  ELIZABETH performed in the Invasive Lab  - See Procedure Log link below for nursing documentation  - See ELIZABETH order on Card Proc Tab for physician findings      Please document below the medical necessity for continuous telemetry monitoring or discontinue the current order if appropriate.    Current rhythm from flowsheet:               Patient Active Problem List    Diagnosis Date Noted    Anticoagulated 06/04/2025    Lumbar spondylosis 12/22/2024    Gait abnormality 08/15/2024    Decreased range of motion of lower extremity 08/15/2024    Decreased strength of lower extremity 08/15/2024    Bucket-handle tear of medial meniscus of left knee as current injury 08/13/2024    Atrial fibrillation with RVR 02/28/2024    Microalbuminuria due to type 2 diabetes mellitus 02/27/2024    Decreased range of motion of neck 01/30/2024    Decreased range of motion of left shoulder 01/30/2024    Neck pain 01/30/2024    Myofascial pain syndrome 01/30/2024    Tendinopathy of rotator cuff, left 01/30/2024    Low testosterone in male 10/11/2023    Overweight (BMI 25.0-29.9) 06/27/2023    Insomnia 06/27/2023    ALLA (obstructive sleep apnea)     Abnormal electrocardiogram 06/30/2022    B12 deficiency 06/27/2022    Cervical radiculopathy 03/10/2022    Hypogonadism in male 02/24/2022    Herniation of intervertebral disc 02/24/2022    Chronic bilateral low back pain without sciatica 02/10/2021    Anxiety 02/10/2021    Vitamin D deficiency 02/10/2021    Type 2 diabetes mellitus with stage 3 chronic kidney disease, without long-term current use of insulin 04/02/2020    Long-term current use of testosterone replacement therapy 03/13/2018    Bilateral hearing loss 03/13/2018    BMI 27.0-27.9,adult 03/13/2018     S/P carotid endarterectomy 08/11/2015    Essential hypertension 10/22/2014    Dyslipidemia 10/22/2014    Carotid stenosis 07/01/2014                    LAST HbA1c  Lab Results   Component Value Date    HGBA1C 7.7 (H) 06/16/2025       Lipid panel  Lab Results   Component Value Date    CHOL 93 (L) 06/16/2025    CHOL 96 (L) 12/10/2024    CHOL 151 03/11/2024     Lab Results   Component Value Date    HDL 31 (L) 06/16/2025    HDL 29 (L) 12/10/2024    HDL 31 (L) 03/11/2024     Lab Results   Component Value Date    LDLCALC 36.6 (L) 06/16/2025    LDLCALC 37.6 (L) 12/10/2024    LDLCALC 76.4 03/11/2024     Lab Results   Component Value Date    TRIG 127 06/16/2025    TRIG 147 12/10/2024    TRIG 218 (H) 03/11/2024     Lab Results   Component Value Date    CHOLHDL 33.3 06/16/2025    CHOLHDL 30.2 12/10/2024    CHOLHDL 20.5 03/11/2024            Review of Systems   Constitutional: Negative for chills and fever.   HENT:  Negative for hearing loss and nosebleeds.    Eyes:  Negative for blurred vision.   Cardiovascular:         As in HPI    Respiratory:  Negative for cough, hemoptysis and shortness of breath.    Endocrine: Negative for cold intolerance and polyuria.   Hematologic/Lymphatic: Negative for bleeding problem.   Skin:  Negative for itching.   Musculoskeletal:  Negative for falls.   Gastrointestinal:  Negative for abdominal pain and hematochezia.   Genitourinary:  Negative for hematuria.   Neurological:  Negative for dizziness and loss of balance.   Psychiatric/Behavioral:  Negative for altered mental status and depression.        Objective:   Physical Exam  Constitutional:       Appearance: He is well-developed.   HENT:      Head: Normocephalic and atraumatic.   Eyes:      Conjunctiva/sclera: Conjunctivae normal.   Neck:      Vascular: No carotid bruit or JVD.   Cardiovascular:      Rate and Rhythm: Normal rate and regular rhythm.      Pulses:           Carotid pulses are 2+ on the right side and 2+ on the left side.        Radial pulses are 2+ on the right side and 2+ on the left side.      Heart sounds: Normal heart sounds. No murmur heard.     No friction rub. No gallop.   Pulmonary:      Effort: Pulmonary effort is normal. No respiratory distress.      Breath sounds: Normal breath sounds. No stridor. No wheezing.   Abdominal:      General: Abdomen is flat.      Palpations: Abdomen is soft.   Musculoskeletal:      Cervical back: Neck supple.      Right lower leg: No edema.      Left lower leg: No edema.   Skin:     General: Skin is warm and dry.   Neurological:      Mental Status: He is alert and oriented to person, place, and time.   Psychiatric:         Behavior: Behavior normal.         Assessment:     1. ALLA (obstructive sleep apnea)    2. Atrial fibrillation with RVR    3. Essential hypertension    4. Bilateral carotid artery stenosis    5. Dyslipidemia    6. S/P carotid endarterectomy        Plan:         -euvolemic on examination.  Echocardiogram shows preserved left ventricular ejection fraction.  No major mitral abnormality except for mild MR.  Left atrial dimensions within normal range.  Has had 2 episodes of AFib requiring cardioversion.  Recommend referral to EP for possible ablation procedure  -referral to ENT for possible inspire device.  Likely uncontrolled sleep apnea contributing to his symptoms and recurrent AFib episodes  -blood pressure not controlled with antihypertensive medications.  Heart rate within normal range.  I am not changing any antihypertensive medications at this point.  -LDL at goal 36.  Continue Zetia  -follow up within 3 months.  Main do stress testing in the future if exertional tiredness and fatigue remains despite treatment of sleep apnea      Pertinent cardiac images and EKG reviewed independently.    Continue with current medical plan and lifestyle changes.  Return sooner for concerns or questions. If symptoms persist go to the ED  I have reviewed all pertinent data including patient's  "medical history in detail and updated the computerized patient record.     (Portions of this note were dictated using voice recognition software and may contain dictation related errors in spelling/grammar/syntax not found on text review)    Orders Placed This Encounter   Procedures    Ambulatory referral/consult to ENT     Standing Status:   Future     Expected Date:   6/27/2025     Expiration Date:   7/20/2026     Referral Priority:   Routine     Referral Type:   Consultation     Referral Reason:   Specialty Services Required     Requested Specialty:   Otolaryngology     Number of Visits Requested:   1    IN OFFICE EKG 12-LEAD (to Louise)       Follow up as scheduled.     He expressed verbal understanding and agreed with the plan    Patient's Medications   New Prescriptions    No medications on file   Previous Medications    AMITRIPTYLINE (ELAVIL) 75 MG TABLET    Take 1 tablet by mouth in the evening    AMLODIPINE (NORVASC) 10 MG TABLET    Take 1 tablet (10 mg total) by mouth once daily.    APIXABAN (ELIQUIS) 5 MG TAB    Take 1 tablet (5 mg total) by mouth 2 (two) times daily.    ASPIRIN (ECOTRIN) 81 MG EC TABLET    Take 1 tablet (81 mg total) by mouth once daily.    BD LUER-RUSS SYRINGE 3 ML 25 GAUGE X 1" SYRG    USE ONE SYRINGE EVERY 14 DAYS WITH TESTOSTERONE    CANDESARTAN (ATACAND) 32 MG TABLET    Take 1 tablet by mouth once daily    CYANOCOBALAMIN (VITAMIN B-12) 1000 MCG TABLET    Take 1 tablet (1,000 mcg total) by mouth once daily.    DULOXETINE (CYMBALTA) 30 MG CAPSULE    Take 1 capsule by mouth once daily    ERGOCALCIFEROL (ERGOCALCIFEROL) 50,000 UNIT CAP    Take 1 capsule (50,000 Units total) by mouth every 7 days.    EZETIMIBE (ZETIA) 10 MG TABLET    Take 1 tablet by mouth once daily    FLUTICASONE PROPIONATE (FLONASE) 50 MCG/ACTUATION NASAL SPRAY    2 sprays by Each Nostril route.    GABAPENTIN (NEURONTIN) 800 MG TABLET    Take 1 tablet (800 mg total) by mouth every evening.    GLIMEPIRIDE (AMARYL) 2 MG " "TABLET    Take 1 tablet (2 mg total) by mouth before breakfast.    GLUCOSE 4 GM CHEWABLE TABLET    Take 4 tablets (16 g total) by mouth as needed for Low blood sugar.    HYDRALAZINE (APRESOLINE) 25 MG TABLET    Take 1 tablet (25 mg total) by mouth every 8 (eight) hours.    HYDROCORTISONE 2.5 % CREAM    Apply to face twice daily for flares as needed    IPRATROPIUM (ATROVENT) 42 MCG (0.06 %) NASAL SPRAY    2 sprays by Nasal route 2 (two) times daily as needed for Rhinitis.    KETOCONAZOLE (NIZORAL) 2 % CREAM    Apply twice daily to affected area of skin    METOPROLOL SUCCINATE (TOPROL-XL) 200 MG 24 HR TABLET    Take 1 tablet (200 mg total) by mouth once daily.    OXYCODONE-ACETAMINOPHEN (PERCOCET)  MG PER TABLET    Take 1 tablet by mouth every 12 (twelve) hours as needed for Pain.    ROSUVASTATIN (CRESTOR) 40 MG TAB    TAKE 1 TABLET BY MOUTH ONCE DAILY IN THE EVENING    SEMAGLUTIDE (OZEMPIC) 2 MG/DOSE (8 MG/3 ML) PNIJ    Inject 2 mg into the skin every 7 days.    SYRINGE WITH NEEDLE, SAFETY (BD INTEGRA SYRINGE) 3 ML 22 GAUGE X 1 1/2" SYRG    Inject 1 Syringe as directed once a week.    TESTOSTERONE CYPIONATE (DEPOTESTOTERONE CYPIONATE) 200 MG/ML INJECTION    Inject 0.75 mLs (150 mg total) into the muscle every 14 (fourteen) days.    VALACYCLOVIR (VALTREX) 1000 MG TABLET    Take 2 tablets (2,000 mg total) by mouth every 12 (twelve) hours.   Modified Medications    No medications on file   Discontinued Medications    No medications on file        Albin Mcclelland M.D"

## 2025-06-25 ENCOUNTER — PATIENT MESSAGE (OUTPATIENT)
Dept: PAIN MEDICINE | Facility: CLINIC | Age: 65
End: 2025-06-25
Payer: COMMERCIAL

## 2025-06-25 ENCOUNTER — PATIENT MESSAGE (OUTPATIENT)
Dept: PRIMARY CARE CLINIC | Facility: CLINIC | Age: 65
End: 2025-06-25
Payer: COMMERCIAL

## 2025-06-25 ENCOUNTER — PATIENT MESSAGE (OUTPATIENT)
Dept: CARDIOLOGY | Facility: CLINIC | Age: 65
End: 2025-06-25
Payer: COMMERCIAL

## 2025-06-25 ENCOUNTER — HOSPITAL ENCOUNTER (OUTPATIENT)
Dept: RADIOLOGY | Facility: HOSPITAL | Age: 65
Discharge: HOME | End: 2025-06-25
Attending: NURSE PRACTITIONER
Payer: COMMERCIAL

## 2025-06-25 DIAGNOSIS — G89.4 CHRONIC PAIN DISORDER: Primary | ICD-10-CM

## 2025-06-25 DIAGNOSIS — I10 ESSENTIAL HYPERTENSION: ICD-10-CM

## 2025-06-25 DIAGNOSIS — R00.0 TACHYCARDIA: ICD-10-CM

## 2025-06-25 DIAGNOSIS — M47.816 LUMBAR SPONDYLOSIS: ICD-10-CM

## 2025-06-25 DIAGNOSIS — M54.9 DORSALGIA, UNSPECIFIED: ICD-10-CM

## 2025-06-25 DIAGNOSIS — M54.12 CERVICAL RADICULOPATHY: ICD-10-CM

## 2025-06-25 DIAGNOSIS — M47.812 CERVICAL SPONDYLOSIS: ICD-10-CM

## 2025-06-25 DIAGNOSIS — M54.16 LUMBAR RADICULOPATHY: ICD-10-CM

## 2025-06-25 DIAGNOSIS — I48.91 ATRIAL FIBRILLATION WITH RVR: ICD-10-CM

## 2025-06-25 DIAGNOSIS — I48.91 ATRIAL FIBRILLATION STATUS POST CARDIOVERSION: Primary | ICD-10-CM

## 2025-06-25 PROCEDURE — 72114 X-RAY EXAM L-S SPINE BENDING: CPT | Mod: 26,,, | Performed by: RADIOLOGY

## 2025-06-25 PROCEDURE — 72114 X-RAY EXAM L-S SPINE BENDING: CPT | Mod: TC

## 2025-06-25 RX ORDER — HYDRALAZINE HYDROCHLORIDE 25 MG/1
25 TABLET, FILM COATED ORAL EVERY 8 HOURS
Qty: 270 TABLET | Refills: 3 | Status: SHIPPED | OUTPATIENT
Start: 2025-06-25

## 2025-06-25 RX ORDER — DIAZEPAM 10 MG/1
TABLET ORAL
Qty: 1 TABLET | Refills: 0 | Status: SHIPPED | OUTPATIENT
Start: 2025-06-25

## 2025-06-28 ENCOUNTER — HOSPITAL ENCOUNTER (OUTPATIENT)
Dept: RADIOLOGY | Facility: HOSPITAL | Age: 65
Discharge: HOME | End: 2025-06-28
Attending: NURSE PRACTITIONER
Payer: COMMERCIAL

## 2025-06-28 DIAGNOSIS — M54.9 DORSALGIA, UNSPECIFIED: ICD-10-CM

## 2025-06-28 PROCEDURE — 72148 MRI LUMBAR SPINE W/O DYE: CPT | Mod: TC

## 2025-06-28 PROCEDURE — 72148 MRI LUMBAR SPINE W/O DYE: CPT | Mod: 26,,, | Performed by: RADIOLOGY

## 2025-06-30 ENCOUNTER — TELEPHONE (OUTPATIENT)
Dept: ELECTROPHYSIOLOGY | Facility: CLINIC | Age: 65
End: 2025-06-30
Payer: COMMERCIAL

## 2025-06-30 DIAGNOSIS — I48.91 ATRIAL FIBRILLATION WITH RVR: Primary | ICD-10-CM

## 2025-06-30 NOTE — TELEPHONE ENCOUNTER
Called pt to address new patient consult questions. Patient confirmed:    Have you ever seen a doctor outside of the [facility] system pertaining to your heart? If yes, where? no  Do you have a device implanted in your chest such as a pacemaker, defibrillator or loop recorder? no  Have you had any recent testing done for your heart such as holter, stress test, echo, heart cath or EKG? yes  Have you ever had surgery for an arrhythmia before such as cardioversion, ablation, EP study or tilt table test? No  Patient verbalized understanding. Thanks

## 2025-07-02 ENCOUNTER — OFFICE VISIT (OUTPATIENT)
Dept: PAIN MEDICINE | Facility: CLINIC | Age: 65
End: 2025-07-02
Payer: COMMERCIAL

## 2025-07-02 ENCOUNTER — PATIENT MESSAGE (OUTPATIENT)
Dept: PAIN MEDICINE | Facility: CLINIC | Age: 65
End: 2025-07-02

## 2025-07-02 VITALS
OXYGEN SATURATION: 99 % | HEART RATE: 68 BPM | HEIGHT: 70 IN | WEIGHT: 195.56 LBS | DIASTOLIC BLOOD PRESSURE: 73 MMHG | BODY MASS INDEX: 28 KG/M2 | SYSTOLIC BLOOD PRESSURE: 136 MMHG

## 2025-07-02 DIAGNOSIS — M48.061 SPINAL STENOSIS OF LUMBAR REGION WITHOUT NEUROGENIC CLAUDICATION: ICD-10-CM

## 2025-07-02 DIAGNOSIS — M54.51 VERTEBROGENIC LOW BACK PAIN: ICD-10-CM

## 2025-07-02 DIAGNOSIS — M54.16 LUMBAR RADICULOPATHY: Primary | ICD-10-CM

## 2025-07-02 DIAGNOSIS — Z01.818 PREOPERATIVE CLEARANCE: Primary | ICD-10-CM

## 2025-07-02 DIAGNOSIS — M47.816 LUMBAR SPONDYLOSIS: ICD-10-CM

## 2025-07-02 PROCEDURE — 4010F ACE/ARB THERAPY RXD/TAKEN: CPT | Mod: CPTII,S$GLB,, | Performed by: NURSE PRACTITIONER

## 2025-07-02 PROCEDURE — 99999 PR PBB SHADOW E&M-EST. PATIENT-LVL III: CPT | Mod: PBBFAC,,, | Performed by: NURSE PRACTITIONER

## 2025-07-02 PROCEDURE — 3061F NEG MICROALBUMINURIA REV: CPT | Mod: CPTII,S$GLB,, | Performed by: NURSE PRACTITIONER

## 2025-07-02 PROCEDURE — 3008F BODY MASS INDEX DOCD: CPT | Mod: CPTII,S$GLB,, | Performed by: NURSE PRACTITIONER

## 2025-07-02 PROCEDURE — 1101F PT FALLS ASSESS-DOCD LE1/YR: CPT | Mod: CPTII,S$GLB,, | Performed by: NURSE PRACTITIONER

## 2025-07-02 PROCEDURE — 3288F FALL RISK ASSESSMENT DOCD: CPT | Mod: CPTII,S$GLB,, | Performed by: NURSE PRACTITIONER

## 2025-07-02 PROCEDURE — 3066F NEPHROPATHY DOC TX: CPT | Mod: CPTII,S$GLB,, | Performed by: NURSE PRACTITIONER

## 2025-07-02 PROCEDURE — 3051F HG A1C>EQUAL 7.0%<8.0%: CPT | Mod: CPTII,S$GLB,, | Performed by: NURSE PRACTITIONER

## 2025-07-02 PROCEDURE — 3075F SYST BP GE 130 - 139MM HG: CPT | Mod: CPTII,S$GLB,, | Performed by: NURSE PRACTITIONER

## 2025-07-02 PROCEDURE — 1157F ADVNC CARE PLAN IN RCRD: CPT | Mod: CPTII,S$GLB,, | Performed by: NURSE PRACTITIONER

## 2025-07-02 PROCEDURE — 99214 OFFICE O/P EST MOD 30 MIN: CPT | Mod: S$GLB,,, | Performed by: NURSE PRACTITIONER

## 2025-07-02 PROCEDURE — 1159F MED LIST DOCD IN RCRD: CPT | Mod: CPTII,S$GLB,, | Performed by: NURSE PRACTITIONER

## 2025-07-02 PROCEDURE — 1160F RVW MEDS BY RX/DR IN RCRD: CPT | Mod: CPTII,S$GLB,, | Performed by: NURSE PRACTITIONER

## 2025-07-02 PROCEDURE — 3078F DIAST BP <80 MM HG: CPT | Mod: CPTII,S$GLB,, | Performed by: NURSE PRACTITIONER

## 2025-07-02 NOTE — H&P (VIEW-ONLY)
Chronic patient Established Note (Follow up visit)      SUBJECTIVE:    Interval History 7/2/2025:  The patient returns to clinic today for follow up of back pain. He is here today for imaging review. He continues to report low back pain, worse with prolonged standing and walking. He denies any radicular leg pain. He does have relief with sitting. He is taking Gabapentin. He does take Percocet as needed for severe pain. He denies any adverse effects. He does not have any history of depression,anxiety, or mood disorder. See additional information in plan. Pain today is 7/10.    Interval History 6/18/2025:  The patient returns to clinic today for follow up of neck and back pain. Since last visit, he was admitted to ICU due to atrial fibrillation. He is now taking amlodipine and Eliquis. He now reports 75% relief from RFA. He did notice that it took 4-5 weeks to kick in. He reports intermittent neck and low back pain. He denies any radicular pain. He has been unable to tolerate riding his bike recently due to pain and cardiac issues. He is hoping to increase this. He is taking Gabapentin. He also takes Percocet as needed with benefit and without adverse effects. He denies any other health changes. His pain today is 5/10.    Interval History 4/15/2025:  The patient returns to clinic today for follow up of back pain. He is s/p bilateral L3,4,5 RFA on 3/27/2025. He reports limited relief at this time. He continues to report low back pain. This is worse with prolonged standing and walking. He denies any radicular leg pain. He continues to report neck pain. He does report benefit with Gabapentin and Percocet. He denies any adverse effects. He denies any other health changes. His pain today is 9/10.     Interval History 3/18/2025:  The patient returns to clinic today for follow up of back pain. He is s/p bilateral L3,4,5 MBB on 2/24/2025 and 3/10/2025. He reports 90% relief for one day with each block. He was able to stand  for longer periods of time. His pain did return to baseline. He continues to report low back pain. This is worse with prolonged standing and walking. He does endorse morning stiffness. He continues to report neck pain. He is taking Gabapentin. He also takes Percocet as needed for severe pain. He denies any other health changes. His pain today is 9/10.     Interval History 1/9/2025:  The patient returns to clinic today for follow up of pain. He is s/p L4/5 IL SELENA on 12/23/2024. He reports 50% relief of his pain. He reports intermittent low back pain. He continues to report neck pain. He reports increased joint pain today due to the cold weather. He continues to report right knee pain. He did have an injection into the knee yesterday. He does have a follow up with Orthopedics tomorrow. He is taking Gabapentin. He is taking Percocet as needed with benefit. He denies any other health changes. His pain today is 7/10.    Interval History 12/9/2024:  The patient returns to clinic today for follow up of neck and back pain. Since last visit, he has developed right knee pain. This is limiting his progression in physical therapy. He did have the right knee drained recently. He reports worsened low back pain. He attributes this to the way he is walking. He reports intermittent radiating pain into the hip and leg. His pain is worse with standing and walking. He is taking Gabapentin and Percocet. He denies any adverse effects. He denies any other health changes. His pain today is 8/10.    Interval History 9/10/2024:  The patient returns to clinic today for follow up of neck and back pain. Since last visit, he underwent left knee arthroscopy on 8/13/2024. He is currently participating in physical therapy. He reports increased mid and low back pain. He denies any radicular leg pain. His pain is worse with moving from sitting to standing. He is taking Gabapentin. He also takes Percocet as needed for severe pain. He denies any  adverse effects. He denies any other health changes. His pain today is 8/10.    Interval History 6/10/2024:  The patient returns to clinic today for follow up of neck and back pain. He is s/p L4/5 IL SELENA on 5/23/2024. He reports 60% relief. He reports low back pain. He denies any radicular leg pain. His back pain is worse with moving from sitting to standing. He continues to report neck pain. He is taking Gabapentin. He also takes Percocet for severe pain as needed with benefit. He denies any adverse effects. He denies any other health changes. His pain today is 5/10.      Interval History 5/1/2024:  The patient returns to clinic today for follow up of neck and back pain. He reports increased low back pain. He denies any radicular leg pain but does have numbness into the lateral aspect of his right leg. This is worse from the knee to the top of his right foot. He does have numbness under the left foot. His pain is worse with standing and walking. He is no longer taking Eliquis. He is taking Gabapentin. He also takes Percocet for severe pain. He needs a refill. He denies any other health changes. His pain today is 8/10.    Interval History 3/25/2024:  Partha Curtis Jr. presents to the clinic for a follow-up appointment for neck pain. He is s/p C7/T1 IL SELENA on 2/26/2024. He reports 100% relief of his neck pain. He was admitted for atrial fibrillation after the last procedure. He was converted to sinus rhythm. He is now on Eliquis. He reports increased low back pain. He denies any radicular leg pain. His pain is worse as the day goes on. He previously had relief with ESIs. He is interested in repeating this in the future. He denies any weakness. He is taking Gabapentin. He takes Percocet intermittently for severe pain. He denies any other health changes. His pain today is 0/10.      HPI: Partha Curtis Jr. is a 63 y.o. male presents to pain clinic for evaluation of neck pain. Symptoms developed 4 weeks ago. Similar  pain in 2022 that improved after an C7/T1 ILESI on 3/28/22, had been pain free since this procedure until 4 weeks ago. Original neck pain started in 2021 without inciting event. Denies trauma or injury to the area in the past 4 weeks upon return of symptoms. Pain management previously at [facility] with Dr. Herrera     Original Pain Description:  The pain is located in the upper thoracic region just left of the midline with radiation into his left shoulder and proximal arm terminating above the elbow. All of patients upper back/neck pain is on the left. The pain is described as aching, sharp, and throbbing. These exact symptoms were present prior to his 2022 ILESI. Chinyere ranges from 4 - 10. The current pain is 8. Exacerbating factors: Coughing/Sneezing and L arm abduction above the head, head forward flexion and head lateral bending to the left, and laying flat on the ground. Mitigating factors pillow. Symptoms interfere with daily activity and sleeping and work. 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 Works as a  for [employer]. The patient feels like symptoms have been worsening. Patient endorses weakness in his left arm with return of pain. Patient denies saddle anesthesia, bladder/bowel incontinence, acute or signifcant limb weakness, ataxia, or increased falls.     Pain Disability Index Review:      7/2/2025    10:39 AM 6/18/2025     9:31 AM 4/15/2025     3:08 PM   Last 3 PDI Scores   Pain Disability Index (PDI) 32 [phone]       Pain Medications:  Gabapentin  Percocet     Opioid Contract: not applicable     report:  Reviewed and consistent with medication use as prescribed.    Pain Procedures:   2/26/2024- C7/T1 IL SELENA  5/23/2024- L4/5 IL SELENA- 60% relief  12/23/2024- L4/5 IL SELENA- 50% relief   2/24/2025- Bilateral L3,4,5 MBB  3/10/2025- Bilateral L3,4,5 MBB  3/27/2025- Bilateral L3,4,5 RFA- 75% relief     Physical Therapy/Home Exercise: yes    Imaging:   MRI  Lumbar Spine 6/28/2025:  COMPARISON:  Lumbar spine radiograph 06/25/2025     FINDINGS:  Alignment: Normal.     Vertebrae: No fracture or marrow infiltrative process.     Discs: Multilevel disc desiccation and height loss.  There are type 2 Modic changes about the L3-L4 disc space.  There are type 1 Modic changes about the L4-L5 disc space with associated Schmorl's nodes.     Cord: Conus terminates at L1-L2 and appears unremarkable.  Cauda equina appears unremarkable.     Degenerative findings:     T12-L1: Circumferential disc bulge with a right paracentral disc extrusion extending subligamentous superiorly.  No significant spinal canal stenosis or neural foraminal narrowing.     L1-L2: Circumferential disc bulge and mild facet arthropathy.  No spinal canal stenosis or neural foraminal narrowing.     L2-L3: Circumferential disc bulge and mild facet arthropathy result in mild-moderate spinal canal stenosis, and mild right and moderate left neural foraminal narrowing.     L3-L4: Circumferential disc bulge, moderate facet arthropathy, and thickening of the ligamentum flavum result in moderate spinal canal stenosis, and mild right and moderate left neural foraminal narrowing.     L4-L5: Circumferential disc bulge, severe facet arthropathy, and thickening of the ligamentum flavum result in severe spinal canal stenosis, and severe right and moderate left neural foraminal narrowing.     L5-S1: Mild facet arthropathy.  No spinal canal stenosis or neural foraminal narrowing.     Paraspinal muscles & soft tissues: Unremarkable.     Impression:     1. Degenerative changes of the lumbar spine as detailed above, most advanced at L4-L5 with severe spinal canal stenosis and neural foraminal narrowing.    MRI Cervical Spine 2/15/2024:  COMPARISON:  XR C-spine 03/02/2010.  CTA head neck 06/23/2022.     FINDINGS:  Alignment: Normal sagittal alignment.  Grade 1 retrolisthesis at C5-C6 and grade 1 anterolisthesis at C7-T1.      Vertebrae: Vertebral body heights are maintained.  No fracture or marrow infiltrative process.     Discs: Multilevel degenerative disc desiccation and height loss most pronounced at C5-C6 and C6-C7.     Cord: Normal cord signal intensity.     Cerebellar tonsils are in their expected location.  Visualized brainstem is normal. Vertebral artery flow voids are present.  Prevertebral soft tissues are normal.  No cervical lymph node enlargement.  Paraspinal musculature demonstrates normal bulk and signal intensity.     Degenerative findings:     C2-C3: Thickening of the posterior longitudinal ligament.  No spinal canal stenosis or neural foraminal narrowing.     C3-C4: Thickening of the posterior longitudinal ligament, bilateral uncovertebral spurring and facet arthropathy.  No spinal canal stenosis.  Mild left neural foraminal narrowing.     C4-C5: Central/right paracentral disc extrusion with inferior migration which abuts and slightly posteriorly displaces the right ventral cord.  Bilateral uncovertebral spurring and facet arthropathy.  Mild spinal canal stenosis.  Mild left neural foraminal narrowing.     C5-C6: Posterior disc osteophyte complex, bilateral uncovertebral spurring and facet arthropathy.  Moderate spinal canal stenosis.  Severe bilateral neural foraminal narrowing.     C6-C7: Posterior disc osteophyte complex, bilateral uncovertebral spurring and facet arthropathy.  No spinal canal stenosis.  Mild right and moderate left neural foraminal narrowing.     C7-T1: No spinal canal stenosis or neural foraminal narrowing.     Impression:     Multilevel degenerative change of the cervical spine, detailed above.  Findings most pronounced at C5-C6 with moderate spinal canal stenosis and severe bilateral neural foraminal narrowing.    MRI Lumbar Spine 11/2/2023:  COMPARISON: MRI lumbar spine 6/28/2021     FINDINGS:   Spine Numbering: For purposes of this dictation, it is assumed that there are 5 non-rib-bearing,  lumbar-type vertebrae, and the most caudal fully segmented lumbar vertebra is labeled L5.     Alignment: Straightening of normal lumbar lordosis. Unchanged dextrocurvature of the lumbar spine. Unchanged grade 1 left lateral listhesis of L2 on L3. Unchanged grade 1 right lateral listhesis of L3 on L4. Unchanged grade 1 retrolisthesis of L2 on L3.     Surgical: None.     Vertebral Bodies: Unchanged multilevel mild anterior osteophytosis.     Marrow Signal: Unchanged degenerative fatty marrow placement at the L3-4 endplates. No marrow edema. No suspicious osseous lesions.     Intervertebral Discs: Unchanged multilevel disc dessication with loss of disc space height     Conus Medullaris: Terminates at L1     Cauda Equina: Unchanged bunching of the cauda equina at L3-4 and L4-5 due to thecal sac narrowing detailed below.     Paraspinal Soft Tissues: Normal     Intra-abdominal Findings: None.     Individual Levels:     T12-L1: Unchanged circumferential disc bulge with unchanged right paracentral disc herniation with cranial migration to the infrapedicular level of T12 measuring 1.6 x 0.6 cm (image 7, series 2). No thecal sac or neural foraminal narrowing.     L1-L2: Unchanged circumferential disc bulge with bulky left lateral osteophytosis. No spinal canal or foraminal narrowing.     L2-L3: Circumferential disc bulge, ligamentum flavum thickening, and epidural lipomatosis cause unchanged moderate thecal sac narrowing. Mild facet arthropathy and disc disease cause unchanged moderate bilateral neural foraminal narrowing.     L3-L4: Circumferential disc bulge, ligamentum flavum thickening, and epidural lipomatosis cause unchanged severe thecal sac narrowing. Moderate right and severe left facet arthropathy with disc disease cause unchanged mild right and moderate left neural foraminal narrowing.     L4-L5: Circumferential disc bulge, ligamentum flavum thickening, and epidural lipomatosis cause unchanged severe thecal sac  "narrowing. Severe facet arthropathy and disc disease cause unchanged severe right and moderate left neural foraminal narrowing with impingement of the exiting right L4 nerve root.     L5-S1: Unchanged small disc bulge and moderate facet arthropathy. No spinal canal or foraminal narrowing.     Impression    Unchanged severe multilevel lumbar spondylosis as detailed above.    Allergies:   Review of patient's allergies indicates:   Allergen Reactions    Stadol [butorphanol tartrate] Rash     Swelling in face    Strawberries [strawberry] Rash       Current Medications:   Current Outpatient Medications   Medication Sig Dispense Refill    amitriptyline (ELAVIL) 75 MG tablet Take 1 tablet by mouth in the evening 90 tablet 3    amLODIPine (NORVASC) 10 MG tablet Take 1 tablet (10 mg total) by mouth once daily. 90 tablet 3    apixaban (ELIQUIS) 5 mg Tab Take 1 tablet (5 mg total) by mouth 2 (two) times daily. 180 tablet 3    aspirin (ECOTRIN) 81 MG EC tablet Take 1 tablet (81 mg total) by mouth once daily. 28 tablet 0    BD LUER-RUSS SYRINGE 3 mL 25 gauge x 1" Syrg USE ONE SYRINGE EVERY 14 DAYS WITH TESTOSTERONE 100 each 2    candesartan (ATACAND) 32 MG tablet Take 1 tablet by mouth once daily 90 tablet 3    cyanocobalamin (VITAMIN B-12) 1000 MCG tablet Take 1 tablet (1,000 mcg total) by mouth once daily. 90 tablet 4    diazePAM (VALIUM) 10 MG Tab On call to MRI do not drive to or from MRI/procedure & for 24 hours 1 tablet 0    DULoxetine (CYMBALTA) 30 MG capsule Take 1 capsule by mouth once daily 90 capsule 3    ergocalciferol (ERGOCALCIFEROL) 50,000 unit Cap Take 1 capsule (50,000 Units total) by mouth every 7 days. 12 capsule 0    ezetimibe (ZETIA) 10 mg tablet Take 1 tablet by mouth once daily 90 tablet 0    fluticasone propionate (FLONASE) 50 mcg/actuation nasal spray 2 sprays by Each Nostril route.      gabapentin (NEURONTIN) 800 MG tablet Take 1 tablet (800 mg total) by mouth every evening. 90 tablet 1    glimepiride " "(AMARYL) 2 MG tablet Take 1 tablet (2 mg total) by mouth before breakfast.      glucose 4 GM chewable tablet Take 4 tablets (16 g total) by mouth as needed for Low blood sugar. 30 tablet 12    hydrALAZINE (APRESOLINE) 25 MG tablet Take 1 tablet (25 mg total) by mouth every 8 (eight) hours. 270 tablet 3    hydrocortisone 2.5 % cream Apply to face twice daily for flares as needed 40 g 2    ipratropium (ATROVENT) 42 mcg (0.06 %) nasal spray 2 sprays by Nasal route 2 (two) times daily as needed for Rhinitis. 15 mL 0    ketoconazole (NIZORAL) 2 % cream Apply twice daily to affected area of skin 60 g 2    metoprolol succinate (TOPROL-XL) 200 MG 24 hr tablet Take 1 tablet (200 mg total) by mouth once daily. 90 tablet 3    oxyCODONE-acetaminophen (PERCOCET)  mg per tablet Take 1 tablet by mouth every 12 (twelve) hours as needed for Pain. 60 tablet 0    rosuvastatin (CRESTOR) 40 MG Tab TAKE 1 TABLET BY MOUTH ONCE DAILY IN THE EVENING 90 tablet 1    semaglutide (OZEMPIC) 2 mg/dose (8 mg/3 mL) PnIj Inject 2 mg into the skin every 7 days. 9 mL 3    syringe with needle, safety (BD INTEGRA SYRINGE) 3 mL 22 gauge x 1 1/2" Syrg Inject 1 Syringe as directed once a week. 6 each 3    testosterone cypionate (DEPOTESTOTERONE CYPIONATE) 200 mg/mL injection Inject 0.75 mLs (150 mg total) into the muscle every 14 (fourteen) days. 2 mL 5    valACYclovir (VALTREX) 1000 MG tablet Take 2 tablets (2,000 mg total) by mouth every 12 (twelve) hours. 4 tablet 3     No current facility-administered medications for this visit.     Facility-Administered Medications Ordered in Other Visits   Medication Dose Route Frequency Provider Last Rate Last Admin    0.9%  NaCl infusion   Intravenous Continuous Kendell Hoffman MD        0.9%  NaCl infusion   Intravenous Continuous Gerardo Uirbe MD           REVIEW OF SYSTEMS:    GENERAL:  No weight loss, malaise or fevers.  HEENT:  Negative for frequent or significant headaches.  NECK:  Negative for " lumps, goiter, pain and significant neck swelling.  RESPIRATORY:  Negative for cough, wheezing or shortness of breath.  CARDIOVASCULAR:  Negative for chest pain, leg swelling or palpitations. HTN, AFib  GI:  Negative for abdominal discomfort, blood in stools or black stools or change in bowel habits.  MUSCULOSKELETAL:  See HPI.  SKIN:  Negative for lesions, rash, and itching.  PSYCH:  Negative for sleep disturbance, mood disorder and recent psychosocial stressors.  ENDO: Diabetes.   HEMATOLOGY/LYMPHOLOGY:  Negative for swollen nodes. Eliquis  NEURO:   No history of headaches, syncope, paralysis, seizures or tremors.  All other reviewed and negative other than HPI.    Past Medical History:  Past Medical History:   Diagnosis Date    Allergy     Carotid artery occlusion     Cataract     Chronic back pain     Colonic polyp     Genetic testing     MUTYH mutation-negative    Hyperlipidemia     Hypertension     Paroxysmal atrial fibrillation 02/28/2024    Sleep apnea     Type 2 diabetes mellitus with stage 3 chronic kidney disease, without long-term current use of insulin 04/02/2020       Past Surgical History:  Past Surgical History:   Procedure Laterality Date    ANKLE SURGERY Left     2    APPENDECTOMY      at age 20    ARTHROSCOPIC CHONDROPLASTY OF KNEE JOINT Left 08/13/2024    Procedure: ARTHROSCOPY, KNEE, WITH CHONDROPLASTY;  Surgeon: Kai Washington MD;  Location: [facility] OR;  Service: Orthopedics;  Laterality: Left;    ARTHROSCOPY, KNEE, WITH ABRASION ARTHROPLASTY OR MICROFRACTURE Left 08/13/2024    Procedure: ARTHROSCOPY, KNEE, WITH  MICROFRACTURE;  Surgeon: Kai Washington MD;  Location: [facility] OR;  Service: Orthopedics;  Laterality: Left;    ARTHROSCOPY,KNEE,WITH MENISCUS REPAIR Left 08/13/2024    Procedure: ARTHROSCOPY,KNEE,WITH MENISCUS REPAIR;  Surgeon: Kai Washington MD;  Location: [facility] OR;  Service: Orthopedics;  Laterality: Left;  NO REGIONAL BLOCK    CAROTID ENDARTERECTOMY Right 07/15/2015    [facility]  TUNNEL RELEASE Bilateral     COLONOSCOPY N/A 12/14/2018    Procedure: COLONOSCOPYSuprep;  Surgeon: Silver Selby MD;  Location: [facility] ENDO;  Service: Endoscopy;  Laterality: N/A;    COLONOSCOPY N/A 10/02/2024    Procedure: COLONOSCOPY;  Surgeon: Heath Morrow MD;  Location: [facility] ENDO;  Service: Endoscopy;  Laterality: N/A;  Ref by Dr STEVE Cavazos, Ozempic, pt has unopened Sutab, portal - PC  9/25/24-LVM for precall, portal-DS. 10/1/24 Pt. called and confirmed arrival time.EC    EPIDURAL STEROID INJECTION N/A 02/26/2024    Procedure: CERVICAL C7/T1 IL SELENA;  Surgeon: Gokul Fung MD;  Location: [facility] PAIN MGT;  Service: Pain Management;  Laterality: N/A;  [phone]  2 WK F/U SERGEI    EPIDURAL STEROID INJECTION N/A 05/23/2024    Procedure: LUMBAR L4/5 IL SELENA *ASPIRIN OTC* HOLD FOR 5 DAYS;  Surgeon: Gokul Fung MD;  Location: [facility] PAIN MGT;  Service: Pain Management;  Laterality: N/A;  [phone]  2 WK F/U NOHELIA    EPIDURAL STEROID INJECTION N/A 10/21/2024    Procedure: LUMBAR L5/S1 IL SELENA *ASPIRIN OTC* HOLD FOR 5 DAYS  **ANGELICA PT**;  Surgeon: Sofia Sheikh MD;  Location: [facility] PAIN MGT;  Service: Pain Management;  Laterality: N/A;  [phone]  2 WK F/U NOHELIA    INJECTION OF ANESTHETIC AGENT AROUND NERVE N/A 12/23/2024    Procedure: LUMBAR L4.5 IL SELENA *ASPIRIN OTC* HOLD FOR 5 DAYS;  Surgeon: Gokul Fung MD;  Location: [facility] PAIN MGT;  Service: Pain Management;  Laterality: N/A;  2 WK F/U NOHELIA    INJECTION OF ANESTHETIC AGENT AROUND NERVE Bilateral 02/24/2025    Procedure: BLOCK, NERVE BILATERAL L3, 4, 5 MEDIAL BRANCH;  Surgeon: Gokul Fung MD;  Location: [facility] PAIN MGT;  Service: Pain Management;  Laterality: Bilateral;  2 WK F/U NOHELIA    INJECTION OF ANESTHETIC AGENT AROUND NERVE Bilateral 03/10/2025    Procedure: BLOCK, NERVE BILATERAL L3, L4, L5 MEDIAL BRANCH;  Surgeon: Gokul Fung MD;  Location: [facility] PAIN MGT;  Service: Pain Management;  Laterality: Bilateral;    JOINT REPLACEMENT  09/2010    NASAL  SEPTUM SURGERY      RADIOFREQUENCY ABLATION Bilateral 03/27/2025    Procedure: RADIOFREQUENCY ABLATION BILATERAL L3, 4, 5;  Surgeon: Gokul Fung MD;  Location: [facility] PAIN MGT;  Service: Pain Management;  Laterality: Bilateral;    TOTAL KNEE ARTHROPLASTY Right     6 knee surgeries    TRANSESOPHAGEAL ECHOCARDIOGRAM WITH POSSIBLE CARDIOVERSION (ELIZABETH W/ POSS CARDIOVERSION) N/A 02/28/2024    Procedure: Transesophageal echo (ELIZABETH) intra-procedure log documentation;  Surgeon: Ahmet De La Cruz MD;  Location: [facility] CATH LAB/EP;  Service: Cardiology;  Laterality: N/A;    TRANSESOPHAGEAL ECHOCARDIOGRAM WITH POSSIBLE CARDIOVERSION (ELIZABETH W/ POSS CARDIOVERSION) N/A 5/28/2025    Procedure: Transesophageal echo (ELIZABETH) intra-procedure log documentation;  Surgeon: Valerio Jaquez MD;  Location: [facility] OR;  Service: Cardiology;  Laterality: N/A;       Family History:  Family History   Problem Relation Name Age of Onset    Brain cancer Mother Klarissa 67    Cancer Mother Klarissa     Hypertension Father Keanu     Lung cancer Father Keanu         x2 (PAUL initially- treated surgically only, then recurred distantly) (smoker, & had been exposed to many chemicals)    Cancer Father Keanu     Breast cancer Sister  58    Genetic Disorder Sister          monoallelic MUTYH mutation    Seizures Daughter      Cancer Maternal Grandfather Valerio     Brain cancer Paternal Grandfather  73    Breast cancer Maternal Cousin  57    Aneurysm Other mgm's father 48        brain    Ulcerative colitis Other brother's son        Social History:  Social History     Socioeconomic History    Marital status:    Tobacco Use    Smoking status: Never     Passive exposure: Never    Smokeless tobacco: Never   Substance and Sexual Activity    Alcohol use: Yes     Alcohol/week: 2.0 standard drinks of alcohol     Types: 2 Cans of beer per week     Comment: 6 beers per month    Drug use: Never    Sexual activity: Yes     Partners: Female     Birth  "control/protection: None   Social History Narrative    5/11/2021: . He lives with his wife and 2 kids. (5 kids total). He has one dog, one cat, no more chickens at home. One dog recently passed away. No smokers at home.      Social Drivers of Health     Financial Resource Strain: Patient Declined (5/27/2025)    Overall Financial Resource Strain (CARDIA)     Difficulty of Paying Living Expenses: Patient declined   Food Insecurity: Patient Declined (5/27/2025)    Hunger Vital Sign     Worried About Running Out of Food in the Last Year: Patient declined     Ran Out of Food in the Last Year: Patient declined   Transportation Needs: Patient Declined (5/27/2025)    PRAPARE - Transportation     Lack of Transportation (Medical): Patient declined     Lack of Transportation (Non-Medical): Patient declined   Physical Activity: Unknown (3/8/2024)    Exercise Vital Sign     Days of Exercise per Week: 0 days   Stress: Patient Declined (5/27/2025)    [employer] - Occupational Stress Questionnaire     Feeling of Stress : Patient declined   Housing Stability: Patient Declined (5/27/2025)    Housing Stability Vital Sign     Unable to Pay for Housing in the Last Year: Patient declined     Homeless in the Last Year: Patient declined       OBJECTIVE:    /73   Pulse 68   Ht 5' 10" (1.778 m)   Wt 88.7 kg (195 lb 8.8 oz)   SpO2 99%   BMI 28.06 kg/m²     PHYSICAL EXAMINATION:    General appearance: Well appearing, in no acute distress, alert and oriented x3.  Psych:  Mood and affect appropriate.  Skin: Skin color, texture, turgor normal, no rashes or lesions, in both upper and lower body.  Head/face:  Atraumatic, normocephalic. No palpable lymph nodes  Neck: No pain to palpation over the cervical paraspinous muscles.   Cor: RRR  Pulm: Symmetric chest rise, no respiratory distress noted.   Back: Straight leg raising in the sitting position is negative to radicular pain bilaterally. There " is pain to palpation over the lumbar facet joints bilaterally. Limited ROM with mild pain pain on flexion and extension.   Extremities:  No deformities, edema, or skin discoloration. Good capillary refill.  Musculoskeletal:  Bilateral upper and lower extremity strength is normal and symmetric.  No atrophy or tone abnormalities are noted.  Neuro: No loss of sensation is noted.  Gait: Normal.    ASSESSMENT: 65 y.o. year old male with neck and back pain, consistent with the followin. Lumbar radiculopathy  Procedure Order to Pain Management      2. Lumbar spondylosis        3. Spinal stenosis of lumbar region without neurogenic claudication  Procedure Order to Pain Management      4. Vertebrogenic low back pain  Ambulatory referral/consult to Psychiatry                    PLAN:     - Previous imaging was reviewed and discussed with the patient today. Labs reviewed.     - Schedule for bilateral L5/S1 TF SELENA.     - Consult Psychiatry for testing prior to Intracept.     - Consider Intracept at L3,4,5   Partha Curtis Jr. has experienced chronic low back pain for over 6 months, which has not responded to conservative treatments, including physical therapy, medications, and injections. MRI findings show edema at the vertebral endplates of L3, L4, and L5, with pain localized to the lumbar spine without radiculopathy. The patient is an appropriate candidate for the Intracept procedure, as they have exhausted non-surgical treatments and continue to suffer from debilitating pain impacting their quality of life.    Partha Curtis Jr. has been thoroughly evaluated and presents with normal mental health, exhibiting no signs of psychological distress, mood disorders, or cognitive impairments that could impact the Intracept procedure's success. The patient has shown a clear understanding of the procedure, potential outcomes, and risks, demonstrating realistic expectations and effective coping mechanisms. Given their stable  mental health, Partha Curtis . is deemed an appropriate candidate for the Intracept procedure, with continuous support and monitoring planned throughout all phases to ensure their overall well-being.     - We can repeat C7/T1 IL SELENA as needed.     - Continue Gabapentin.     - Pain contract signed 5/1/2024.     - Continue Percocet 10/325 mg BID PRN, #60. Refill already sent.     - The patient is here today for a refill of current pain medications and they believe these provide effective pain control and improvements in quality of life.  The patient notes no serious side effects, and feels the benefits outweigh the risks.  The patient was reminded of the pain contract that they signed previously as well as the risks and benefits of the medication including possible death.  The updated [place] [employer] prescription monitoring program was reviewed, and the patient has been found to be compliant with current treatment plan. Medication management provided by Dr. Fung.     - UDS from 3/18/2025 reviewed and consistent.     - I have stressed the importance of physical activity and a home exercise plan to help with pain and improve health.    - RTC 2 weeks after above procedure.     The above plan and management options were discussed at length with patient. Patient is in agreement with the above and verbalized understanding.    Baylee Ni  07/02/2025

## 2025-07-02 NOTE — PROGRESS NOTES
Chronic patient Established Note (Follow up visit)      SUBJECTIVE:    Interval History 7/2/2025:  The patient returns to clinic today for follow up of back pain. He is here today for imaging review. He continues to report low back pain, worse with prolonged standing and walking. He denies any radicular leg pain. He does have relief with sitting. He is taking Gabapentin. He does take Percocet as needed for severe pain. He denies any adverse effects. He does not have any history of depression,anxiety, or mood disorder. See additional information in plan. Pain today is 7/10.    Interval History 6/18/2025:  The patient returns to clinic today for follow up of neck and back pain. Since last visit, he was admitted to ICU due to atrial fibrillation. He is now taking amlodipine and Eliquis. He now reports 75% relief from RFA. He did notice that it took 4-5 weeks to kick in. He reports intermittent neck and low back pain. He denies any radicular pain. He has been unable to tolerate riding his bike recently due to pain and cardiac issues. He is hoping to increase this. He is taking Gabapentin. He also takes Percocet as needed with benefit and without adverse effects. He denies any other health changes. His pain today is 5/10.    Interval History 4/15/2025:  The patient returns to clinic today for follow up of back pain. He is s/p bilateral L3,4,5 RFA on 3/27/2025. He reports limited relief at this time. He continues to report low back pain. This is worse with prolonged standing and walking. He denies any radicular leg pain. He continues to report neck pain. He does report benefit with Gabapentin and Percocet. He denies any adverse effects. He denies any other health changes. His pain today is 9/10.     Interval History 3/18/2025:  The patient returns to clinic today for follow up of back pain. He is s/p bilateral L3,4,5 MBB on 2/24/2025 and 3/10/2025. He reports 90% relief for one day with each block. He was able to stand  for longer periods of time. His pain did return to baseline. He continues to report low back pain. This is worse with prolonged standing and walking. He does endorse morning stiffness. He continues to report neck pain. He is taking Gabapentin. He also takes Percocet as needed for severe pain. He denies any other health changes. His pain today is 9/10.     Interval History 1/9/2025:  The patient returns to clinic today for follow up of pain. He is s/p L4/5 IL SELENA on 12/23/2024. He reports 50% relief of his pain. He reports intermittent low back pain. He continues to report neck pain. He reports increased joint pain today due to the cold weather. He continues to report right knee pain. He did have an injection into the knee yesterday. He does have a follow up with Orthopedics tomorrow. He is taking Gabapentin. He is taking Percocet as needed with benefit. He denies any other health changes. His pain today is 7/10.    Interval History 12/9/2024:  The patient returns to clinic today for follow up of neck and back pain. Since last visit, he has developed right knee pain. This is limiting his progression in physical therapy. He did have the right knee drained recently. He reports worsened low back pain. He attributes this to the way he is walking. He reports intermittent radiating pain into the hip and leg. His pain is worse with standing and walking. He is taking Gabapentin and Percocet. He denies any adverse effects. He denies any other health changes. His pain today is 8/10.    Interval History 9/10/2024:  The patient returns to clinic today for follow up of neck and back pain. Since last visit, he underwent left knee arthroscopy on 8/13/2024. He is currently participating in physical therapy. He reports increased mid and low back pain. He denies any radicular leg pain. His pain is worse with moving from sitting to standing. He is taking Gabapentin. He also takes Percocet as needed for severe pain. He denies any  adverse effects. He denies any other health changes. His pain today is 8/10.    Interval History 6/10/2024:  The patient returns to clinic today for follow up of neck and back pain. He is s/p L4/5 IL SELENA on 5/23/2024. He reports 60% relief. He reports low back pain. He denies any radicular leg pain. His back pain is worse with moving from sitting to standing. He continues to report neck pain. He is taking Gabapentin. He also takes Percocet for severe pain as needed with benefit. He denies any adverse effects. He denies any other health changes. His pain today is 5/10.      Interval History 5/1/2024:  The patient returns to clinic today for follow up of neck and back pain. He reports increased low back pain. He denies any radicular leg pain but does have numbness into the lateral aspect of his right leg. This is worse from the knee to the top of his right foot. He does have numbness under the left foot. His pain is worse with standing and walking. He is no longer taking Eliquis. He is taking Gabapentin. He also takes Percocet for severe pain. He needs a refill. He denies any other health changes. His pain today is 8/10.    Interval History 3/25/2024:  Partha Curtis Jr. presents to the clinic for a follow-up appointment for neck pain. He is s/p C7/T1 IL SELENA on 2/26/2024. He reports 100% relief of his neck pain. He was admitted for atrial fibrillation after the last procedure. He was converted to sinus rhythm. He is now on Eliquis. He reports increased low back pain. He denies any radicular leg pain. His pain is worse as the day goes on. He previously had relief with ESIs. He is interested in repeating this in the future. He denies any weakness. He is taking Gabapentin. He takes Percocet intermittently for severe pain. He denies any other health changes. His pain today is 0/10.      HPI: Partha Curtis Jr. is a 63 y.o. male presents to pain clinic for evaluation of neck pain. Symptoms developed 4 weeks ago. Similar  pain in 2022 that improved after an C7/T1 ILESI on 3/28/22, had been pain free since this procedure until 4 weeks ago. Original neck pain started in 2021 without inciting event. Denies trauma or injury to the area in the past 4 weeks upon return of symptoms. [facility] with Dr. Herrera     Original Pain Description:  The pain is located in the upper thoracic region just left of the midline with radiation into his left shoulder and proximal arm terminating above the elbow. All of patients upper back/neck pain is on the left. The pain is described as aching, sharp, and throbbing. These exact symptoms were present prior to his 2022 ILESI. Chinyere ranges from 4 - 10. The current pain is 8. Exacerbating factors: Coughing/Sneezing and L arm abduction above the head, head forward flexion and head lateral bending to the left, and laying flat on the ground. Mitigating factors pillow. Symptoms interfere with daily activity and sleeping and work. 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 Works as a  for [employer]. The patient feels like symptoms have been worsening. Patient endorses weakness in his left arm with return of pain. Patient denies saddle anesthesia, bladder/bowel incontinence, acute or signifcant limb weakness, ataxia, or increased falls.     Pain Disability Index Review:      7/2/2025    10:39 AM 6/18/2025     9:31 AM 4/15/2025     3:08 PM   Last 3 PDI Scores   Pain Disability Index (PDI) 32 [phone]       Pain Medications:  Gabapentin  Percocet     Opioid Contract: not applicable     report:  Reviewed and consistent with medication use as prescribed.    Pain Procedures:   2/26/2024- C7/T1 IL SELENA  5/23/2024- L4/5 IL SELENA- 60% relief  12/23/2024- L4/5 IL SELENA- 50% relief   2/24/2025- Bilateral L3,4,5 MBB  3/10/2025- Bilateral L3,4,5 MBB  3/27/2025- Bilateral L3,4,5 RFA- 75% relief     Physical Therapy/Home Exercise: yes    Imaging:   MRI  Lumbar Spine 6/28/2025:  COMPARISON:  Lumbar spine radiograph 06/25/2025     FINDINGS:  Alignment: Normal.     Vertebrae: No fracture or marrow infiltrative process.     Discs: Multilevel disc desiccation and height loss.  There are type 2 Modic changes about the L3-L4 disc space.  There are type 1 Modic changes about the L4-L5 disc space with associated Schmorl's nodes.     Cord: Conus terminates at L1-L2 and appears unremarkable.  Cauda equina appears unremarkable.     Degenerative findings:     T12-L1: Circumferential disc bulge with a right paracentral disc extrusion extending subligamentous superiorly.  No significant spinal canal stenosis or neural foraminal narrowing.     L1-L2: Circumferential disc bulge and mild facet arthropathy.  No spinal canal stenosis or neural foraminal narrowing.     L2-L3: Circumferential disc bulge and mild facet arthropathy result in mild-moderate spinal canal stenosis, and mild right and moderate left neural foraminal narrowing.     L3-L4: Circumferential disc bulge, moderate facet arthropathy, and thickening of the ligamentum flavum result in moderate spinal canal stenosis, and mild right and moderate left neural foraminal narrowing.     L4-L5: Circumferential disc bulge, severe facet arthropathy, and thickening of the ligamentum flavum result in severe spinal canal stenosis, and severe right and moderate left neural foraminal narrowing.     L5-S1: Mild facet arthropathy.  No spinal canal stenosis or neural foraminal narrowing.     Paraspinal muscles & soft tissues: Unremarkable.     Impression:     1. Degenerative changes of the lumbar spine as detailed above, most advanced at L4-L5 with severe spinal canal stenosis and neural foraminal narrowing.    MRI Cervical Spine 2/15/2024:  COMPARISON:  XR C-spine 03/02/2010.  CTA head neck 06/23/2022.     FINDINGS:  Alignment: Normal sagittal alignment.  Grade 1 retrolisthesis at C5-C6 and grade 1 anterolisthesis at C7-T1.      Vertebrae: Vertebral body heights are maintained.  No fracture or marrow infiltrative process.     Discs: Multilevel degenerative disc desiccation and height loss most pronounced at C5-C6 and C6-C7.     Cord: Normal cord signal intensity.     Cerebellar tonsils are in their expected location.  Visualized brainstem is normal. Vertebral artery flow voids are present.  Prevertebral soft tissues are normal.  No cervical lymph node enlargement.  Paraspinal musculature demonstrates normal bulk and signal intensity.     Degenerative findings:     C2-C3: Thickening of the posterior longitudinal ligament.  No spinal canal stenosis or neural foraminal narrowing.     C3-C4: Thickening of the posterior longitudinal ligament, bilateral uncovertebral spurring and facet arthropathy.  No spinal canal stenosis.  Mild left neural foraminal narrowing.     C4-C5: Central/right paracentral disc extrusion with inferior migration which abuts and slightly posteriorly displaces the right ventral cord.  Bilateral uncovertebral spurring and facet arthropathy.  Mild spinal canal stenosis.  Mild left neural foraminal narrowing.     C5-C6: Posterior disc osteophyte complex, bilateral uncovertebral spurring and facet arthropathy.  Moderate spinal canal stenosis.  Severe bilateral neural foraminal narrowing.     C6-C7: Posterior disc osteophyte complex, bilateral uncovertebral spurring and facet arthropathy.  No spinal canal stenosis.  Mild right and moderate left neural foraminal narrowing.     C7-T1: No spinal canal stenosis or neural foraminal narrowing.     Impression:     Multilevel degenerative change of the cervical spine, detailed above.  Findings most pronounced at C5-C6 with moderate spinal canal stenosis and severe bilateral neural foraminal narrowing.    MRI Lumbar Spine 11/2/2023:  COMPARISON: MRI lumbar spine 6/28/2021     FINDINGS:   Spine Numbering: For purposes of this dictation, it is assumed that there are 5 non-rib-bearing,  lumbar-type vertebrae, and the most caudal fully segmented lumbar vertebra is labeled L5.     Alignment: Straightening of normal lumbar lordosis. Unchanged dextrocurvature of the lumbar spine. Unchanged grade 1 left lateral listhesis of L2 on L3. Unchanged grade 1 right lateral listhesis of L3 on L4. Unchanged grade 1 retrolisthesis of L2 on L3.     Surgical: None.     Vertebral Bodies: Unchanged multilevel mild anterior osteophytosis.     Marrow Signal: Unchanged degenerative fatty marrow placement at the L3-4 endplates. No marrow edema. No suspicious osseous lesions.     Intervertebral Discs: Unchanged multilevel disc dessication with loss of disc space height     Conus Medullaris: Terminates at L1     Cauda Equina: Unchanged bunching of the cauda equina at L3-4 and L4-5 due to thecal sac narrowing detailed below.     Paraspinal Soft Tissues: Normal     Intra-abdominal Findings: None.     Individual Levels:     T12-L1: Unchanged circumferential disc bulge with unchanged right paracentral disc herniation with cranial migration to the infrapedicular level of T12 measuring 1.6 x 0.6 cm (image 7, series 2). No thecal sac or neural foraminal narrowing.     L1-L2: Unchanged circumferential disc bulge with bulky left lateral osteophytosis. No spinal canal or foraminal narrowing.     L2-L3: Circumferential disc bulge, ligamentum flavum thickening, and epidural lipomatosis cause unchanged moderate thecal sac narrowing. Mild facet arthropathy and disc disease cause unchanged moderate bilateral neural foraminal narrowing.     L3-L4: Circumferential disc bulge, ligamentum flavum thickening, and epidural lipomatosis cause unchanged severe thecal sac narrowing. Moderate right and severe left facet arthropathy with disc disease cause unchanged mild right and moderate left neural foraminal narrowing.     L4-L5: Circumferential disc bulge, ligamentum flavum thickening, and epidural lipomatosis cause unchanged severe thecal sac  "narrowing. Severe facet arthropathy and disc disease cause unchanged severe right and moderate left neural foraminal narrowing with impingement of the exiting right L4 nerve root.     L5-S1: Unchanged small disc bulge and moderate facet arthropathy. No spinal canal or foraminal narrowing.     Impression    Unchanged severe multilevel lumbar spondylosis as detailed above.    Allergies:   Review of patient's allergies indicates:   Allergen Reactions    Stadol [butorphanol tartrate] Rash     Swelling in face    Strawberries [strawberry] Rash       Current Medications:   Current Outpatient Medications   Medication Sig Dispense Refill    amitriptyline (ELAVIL) 75 MG tablet Take 1 tablet by mouth in the evening 90 tablet 3    amLODIPine (NORVASC) 10 MG tablet Take 1 tablet (10 mg total) by mouth once daily. 90 tablet 3    apixaban (ELIQUIS) 5 mg Tab Take 1 tablet (5 mg total) by mouth 2 (two) times daily. 180 tablet 3    aspirin (ECOTRIN) 81 MG EC tablet Take 1 tablet (81 mg total) by mouth once daily. 28 tablet 0    BD LUER-RUSS SYRINGE 3 mL 25 gauge x 1" Syrg USE ONE SYRINGE EVERY 14 DAYS WITH TESTOSTERONE 100 each 2    candesartan (ATACAND) 32 MG tablet Take 1 tablet by mouth once daily 90 tablet 3    cyanocobalamin (VITAMIN B-12) 1000 MCG tablet Take 1 tablet (1,000 mcg total) by mouth once daily. 90 tablet 4    diazePAM (VALIUM) 10 MG Tab On call to MRI do not drive to or from MRI/procedure & for 24 hours 1 tablet 0    DULoxetine (CYMBALTA) 30 MG capsule Take 1 capsule by mouth once daily 90 capsule 3    ergocalciferol (ERGOCALCIFEROL) 50,000 unit Cap Take 1 capsule (50,000 Units total) by mouth every 7 days. 12 capsule 0    ezetimibe (ZETIA) 10 mg tablet Take 1 tablet by mouth once daily 90 tablet 0    fluticasone propionate (FLONASE) 50 mcg/actuation nasal spray 2 sprays by Each Nostril route.      gabapentin (NEURONTIN) 800 MG tablet Take 1 tablet (800 mg total) by mouth every evening. 90 tablet 1    glimepiride " "(AMARYL) 2 MG tablet Take 1 tablet (2 mg total) by mouth before breakfast.      glucose 4 GM chewable tablet Take 4 tablets (16 g total) by mouth as needed for Low blood sugar. 30 tablet 12    hydrALAZINE (APRESOLINE) 25 MG tablet Take 1 tablet (25 mg total) by mouth every 8 (eight) hours. 270 tablet 3    hydrocortisone 2.5 % cream Apply to face twice daily for flares as needed 40 g 2    ipratropium (ATROVENT) 42 mcg (0.06 %) nasal spray 2 sprays by Nasal route 2 (two) times daily as needed for Rhinitis. 15 mL 0    ketoconazole (NIZORAL) 2 % cream Apply twice daily to affected area of skin 60 g 2    metoprolol succinate (TOPROL-XL) 200 MG 24 hr tablet Take 1 tablet (200 mg total) by mouth once daily. 90 tablet 3    oxyCODONE-acetaminophen (PERCOCET)  mg per tablet Take 1 tablet by mouth every 12 (twelve) hours as needed for Pain. 60 tablet 0    rosuvastatin (CRESTOR) 40 MG Tab TAKE 1 TABLET BY MOUTH ONCE DAILY IN THE EVENING 90 tablet 1    semaglutide (OZEMPIC) 2 mg/dose (8 mg/3 mL) PnIj Inject 2 mg into the skin every 7 days. 9 mL 3    syringe with needle, safety (BD INTEGRA SYRINGE) 3 mL 22 gauge x 1 1/2" Syrg Inject 1 Syringe as directed once a week. 6 each 3    testosterone cypionate (DEPOTESTOTERONE CYPIONATE) 200 mg/mL injection Inject 0.75 mLs (150 mg total) into the muscle every 14 (fourteen) days. 2 mL 5    valACYclovir (VALTREX) 1000 MG tablet Take 2 tablets (2,000 mg total) by mouth every 12 (twelve) hours. 4 tablet 3     No current facility-administered medications for this visit.     Facility-Administered Medications Ordered in Other Visits   Medication Dose Route Frequency Provider Last Rate Last Admin    0.9%  NaCl infusion   Intravenous Continuous Kendell Hoffman MD        0.9%  NaCl infusion   Intravenous Continuous Gerardo Uribe MD           REVIEW OF SYSTEMS:    GENERAL:  No weight loss, malaise or fevers.  HEENT:  Negative for frequent or significant headaches.  NECK:  Negative for " lumps, goiter, pain and significant neck swelling.  RESPIRATORY:  Negative for cough, wheezing or shortness of breath.  CARDIOVASCULAR:  Negative for chest pain, leg swelling or palpitations. HTN, AFib  GI:  Negative for abdominal discomfort, blood in stools or black stools or change in bowel habits.  MUSCULOSKELETAL:  See HPI.  SKIN:  Negative for lesions, rash, and itching.  PSYCH:  Negative for sleep disturbance, mood disorder and recent psychosocial stressors.  ENDO: Diabetes.   HEMATOLOGY/LYMPHOLOGY:  Negative for swollen nodes. Eliquis  NEURO:   No history of headaches, syncope, paralysis, seizures or tremors.  All other reviewed and negative other than HPI.    Past Medical History:  Past Medical History:   Diagnosis Date    Allergy     Carotid artery occlusion     Cataract     Chronic back pain     Colonic polyp     Genetic testing     MUTYH mutation-negative    Hyperlipidemia     Hypertension     Paroxysmal atrial fibrillation 02/28/2024    Sleep apnea     Type 2 diabetes mellitus with stage 3 chronic kidney disease, without long-term current use of insulin 04/02/2020       Past Surgical History:  Past Surgical History:   Procedure Laterality Date    ANKLE SURGERY Left     2    APPENDECTOMY      at age 20    ARTHROSCOPIC CHONDROPLASTY OF KNEE JOINT Left 08/13/2024    Procedure: ARTHROSCOPY, KNEE, WITH CHONDROPLASTY;  Surgeon: Kai Washington MD;  Location: [facility] OR;  Service: Orthopedics;  Laterality: Left;    ARTHROSCOPY, KNEE, WITH ABRASION ARTHROPLASTY OR MICROFRACTURE Left 08/13/2024    Procedure: ARTHROSCOPY, KNEE, WITH  MICROFRACTURE;  Surgeon: Kai Washington MD;  Location: [facility] OR;  Service: Orthopedics;  Laterality: Left;    ARTHROSCOPY,KNEE,WITH MENISCUS REPAIR Left 08/13/2024    Procedure: ARTHROSCOPY,KNEE,WITH MENISCUS REPAIR;  Surgeon: Kai Washington MD;  Location: [facility] OR;  Service: Orthopedics;  Laterality: Left;  NO REGIONAL BLOCK    CAROTID ENDARTERECTOMY Right 07/15/2015    [facility]  TUNNEL RELEASE Bilateral     COLONOSCOPY N/A 12/14/2018    Procedure: COLONOSCOPYSuprep;  Surgeon: Silver Selby MD;  Location: [facility] ENDO;  Service: Endoscopy;  Laterality: N/A;    COLONOSCOPY N/A 10/02/2024    Procedure: COLONOSCOPY;  Surgeon: Heath Morrow MD;  Location: [facility] ENDO;  Service: Endoscopy;  Laterality: N/A;  Ref by Dr STEVE Cavazos, Ozempic, pt has unopened Sutab, portal - PC  9/25/24-LVM for precall, portal-DS. 10/1/24 Pt. called and confirmed arrival time.EC    EPIDURAL STEROID INJECTION N/A 02/26/2024    Procedure: CERVICAL C7/T1 IL SELENA;  Surgeon: Gokul Fung MD;  Location: [facility] PAIN MGT;  Service: Pain Management;  Laterality: N/A;  [phone]  2 WK F/U SERGEI    EPIDURAL STEROID INJECTION N/A 05/23/2024    Procedure: LUMBAR L4/5 IL SELENA *ASPIRIN OTC* HOLD FOR 5 DAYS;  Surgeon: Gokul Fung MD;  Location: [facility] PAIN MGT;  Service: Pain Management;  Laterality: N/A;  [phone]  2 WK F/U NOHELIA    EPIDURAL STEROID INJECTION N/A 10/21/2024    Procedure: LUMBAR L5/S1 IL SELENA *ASPIRIN OTC* HOLD FOR 5 DAYS  **ANGELICA PT**;  Surgeon: Sofia Sheikh MD;  Location: [facility] PAIN MGT;  Service: Pain Management;  Laterality: N/A;  [phone]  2 WK F/U NOHELIA    INJECTION OF ANESTHETIC AGENT AROUND NERVE N/A 12/23/2024    Procedure: LUMBAR L4.5 IL SELENA *ASPIRIN OTC* HOLD FOR 5 DAYS;  Surgeon: Gokul Fung MD;  Location: [facility] PAIN MGT;  Service: Pain Management;  Laterality: N/A;  2 WK F/U NOHELIA    INJECTION OF ANESTHETIC AGENT AROUND NERVE Bilateral 02/24/2025    Procedure: BLOCK, NERVE BILATERAL L3, 4, 5 MEDIAL BRANCH;  Surgeon: Gokul Fung MD;  Location: [facility] PAIN MGT;  Service: Pain Management;  Laterality: Bilateral;  2 WK F/U NOHELIA    INJECTION OF ANESTHETIC AGENT AROUND NERVE Bilateral 03/10/2025    Procedure: BLOCK, NERVE BILATERAL L3, L4, L5 MEDIAL BRANCH;  Surgeon: Gokul Fung MD;  Location: [facility] PAIN MGT;  Service: Pain Management;  Laterality: Bilateral;    JOINT REPLACEMENT  09/2010    NASAL  SEPTUM SURGERY      RADIOFREQUENCY ABLATION Bilateral 03/27/2025    Procedure: RADIOFREQUENCY ABLATION BILATERAL L3, 4, 5;  Surgeon: Gokul Fung MD;  Location: [facility] PAIN MGT;  Service: Pain Management;  Laterality: Bilateral;    TOTAL KNEE ARTHROPLASTY Right     6 knee surgeries    TRANSESOPHAGEAL ECHOCARDIOGRAM WITH POSSIBLE CARDIOVERSION (ELIZABETH W/ POSS CARDIOVERSION) N/A 02/28/2024    Procedure: Transesophageal echo (ELIZABETH) intra-procedure log documentation;  Surgeon: Ahmet De La Cruz MD;  Location: [facility] CATH LAB/EP;  Service: Cardiology;  Laterality: N/A;    TRANSESOPHAGEAL ECHOCARDIOGRAM WITH POSSIBLE CARDIOVERSION (ELIZABETH W/ POSS CARDIOVERSION) N/A 5/28/2025    Procedure: Transesophageal echo (ELIZABETH) intra-procedure log documentation;  Surgeon: Valerio Jaquez MD;  Location: [facility] OR;  Service: Cardiology;  Laterality: N/A;       Family History:  Family History   Problem Relation Name Age of Onset    Brain cancer Mother Klarissa 67    Cancer Mother Klarissa     Hypertension Father Keanu     Lung cancer Father Keanu         x2 (PAUL initially- treated surgically only, then recurred distantly) (smoker, & had been exposed to many chemicals)    Cancer Father Keanu     Breast cancer Sister  58    Genetic Disorder Sister          monoallelic MUTYH mutation    Seizures Daughter      Cancer Maternal Grandfather Valerio     Brain cancer Paternal Grandfather  73    Breast cancer Maternal Cousin  57    Aneurysm Other mgm's father 48        brain    Ulcerative colitis Other brother's son        Social History:  Social History     Socioeconomic History    Marital status:    Tobacco Use    Smoking status: Never     Passive exposure: Never    Smokeless tobacco: Never   Substance and Sexual Activity    Alcohol use: Yes     Alcohol/week: 2.0 standard drinks of alcohol     Types: 2 Cans of beer per week     Comment: 6 beers per month    Drug use: Never    Sexual activity: Yes     Partners: Female     Birth  "control/protection: None   Social History Narrative    5/11/2021: . He lives with his wife and 2 kids. (5 kids total). He has one dog, one cat, no more chickens at home. One dog recently passed away. No smokers at home.      Social Drivers of Health     Financial Resource Strain: Patient Declined (5/27/2025)    Overall Financial Resource Strain (CARDIA)     Difficulty of Paying Living Expenses: Patient declined   Food Insecurity: Patient Declined (5/27/2025)    Hunger Vital Sign     Worried About Running Out of Food in the Last Year: Patient declined     Ran Out of Food in the Last Year: Patient declined   Transportation Needs: Patient Declined (5/27/2025)    PRAPARE - Transportation     Lack of Transportation (Medical): Patient declined     Lack of Transportation (Non-Medical): Patient declined   Physical Activity: Unknown (3/8/2024)    Exercise Vital Sign     Days of Exercise per Week: 0 days   Stress: Patient Declined (5/27/2025)    [employer] [employer] - Occupational Stress Questionnaire     Feeling of Stress : Patient declined   Housing Stability: Patient Declined (5/27/2025)    Housing Stability Vital Sign     Unable to Pay for Housing in the Last Year: Patient declined     Homeless in the Last Year: Patient declined       OBJECTIVE:    /73   Pulse 68   Ht 5' 10" (1.778 m)   Wt 88.7 kg (195 lb 8.8 oz)   SpO2 99%   BMI 28.06 kg/m²     PHYSICAL EXAMINATION:    General appearance: Well appearing, in no acute distress, alert and oriented x3.  Psych:  Mood and affect appropriate.  Skin: Skin color, texture, turgor normal, no rashes or lesions, in both upper and lower body.  Head/face:  Atraumatic, normocephalic. No palpable lymph nodes  Neck: No pain to palpation over the cervical paraspinous muscles.   Cor: RRR  Pulm: Symmetric chest rise, no respiratory distress noted.   Back: Straight leg raising in the sitting position is negative to radicular pain bilaterally. There " is pain to palpation over the lumbar facet joints bilaterally. Limited ROM with mild pain pain on flexion and extension.   Extremities:  No deformities, edema, or skin discoloration. Good capillary refill.  Musculoskeletal:  Bilateral upper and lower extremity strength is normal and symmetric.  No atrophy or tone abnormalities are noted.  Neuro: No loss of sensation is noted.  Gait: Normal.    ASSESSMENT: 65 y.o. year old male with neck and back pain, consistent with the followin. Lumbar radiculopathy  Procedure Order to Pain Management      2. Lumbar spondylosis        3. Spinal stenosis of lumbar region without neurogenic claudication  Procedure Order to Pain Management      4. Vertebrogenic low back pain  Ambulatory referral/consult to Psychiatry                    PLAN:     - Previous imaging was reviewed and discussed with the patient today. Labs reviewed.     - Schedule for bilateral L5/S1 TF SELENA.     - Consult Psychiatry for testing prior to Intracept.     - Consider Intracept at L3,4,5   Partha Curtis Jr. has experienced chronic low back pain for over 6 months, which has not responded to conservative treatments, including physical therapy, medications, and injections. MRI findings show edema at the vertebral endplates of L3, L4, and L5, with pain localized to the lumbar spine without radiculopathy. The patient is an appropriate candidate for the Intracept procedure, as they have exhausted non-surgical treatments and continue to suffer from debilitating pain impacting their quality of life.    Partha Curtis Jr. has been thoroughly evaluated and presents with normal mental health, exhibiting no signs of psychological distress, mood disorders, or cognitive impairments that could impact the Intracept procedure's success. The patient has shown a clear understanding of the procedure, potential outcomes, and risks, demonstrating realistic expectations and effective coping mechanisms. Given their stable  mental health, Partha Curtis . is deemed an appropriate candidate for the Intracept procedure, with continuous support and monitoring planned throughout all phases to ensure their overall well-being.     - We can repeat C7/T1 IL SELENA as needed.     - Continue Gabapentin.     - Pain contract signed 5/1/2024.     - Continue Percocet 10/325 mg BID PRN, #60. Refill already sent.     - The patient is here today for a refill of current pain medications and they believe these provide effective pain control and improvements in quality of life.  The patient notes no serious side effects, and feels the benefits outweigh the risks.  The patient was reminded of the pain contract that they signed previously as well as the risks and benefits of the medication including possible death.  The updated [place] Board of [employer]acy prescription monitoring program was reviewed, and the patient has been found to be compliant with current treatment plan. Medication management provided by Dr. Fung.     - UDS from 3/18/2025 reviewed and consistent.     - I have stressed the importance of physical activity and a home exercise plan to help with pain and improve health.    - RTC 2 weeks after above procedure.     The above plan and management options were discussed at length with patient. Patient is in agreement with the above and verbalized understanding.    Baylee Ni  07/02/2025

## 2025-07-03 ENCOUNTER — PATIENT MESSAGE (OUTPATIENT)
Dept: PAIN MEDICINE | Facility: CLINIC | Age: 65
End: 2025-07-03
Payer: COMMERCIAL

## 2025-07-08 ENCOUNTER — PATIENT MESSAGE (OUTPATIENT)
Dept: PSYCHIATRY | Facility: CLINIC | Age: 65
End: 2025-07-08
Payer: COMMERCIAL

## 2025-07-09 DIAGNOSIS — M54.16 LUMBAR RADICULOPATHY: ICD-10-CM

## 2025-07-09 DIAGNOSIS — M54.12 CERVICAL RADICULOPATHY: ICD-10-CM

## 2025-07-09 RX ORDER — VALACYCLOVIR HYDROCHLORIDE 1 G/1
2000 TABLET, FILM COATED ORAL EVERY 12 HOURS
Qty: 4 TABLET | Refills: 0 | Status: SHIPPED | OUTPATIENT
Start: 2025-07-09

## 2025-07-09 RX ORDER — GABAPENTIN 800 MG/1
800 TABLET ORAL NIGHTLY
Qty: 90 TABLET | Refills: 0 | Status: SHIPPED | OUTPATIENT
Start: 2025-07-09

## 2025-07-09 NOTE — TELEPHONE ENCOUNTER
No care due was identified.  [facility] Embedded Care Due Messages. Reference number: [id number].   7/09/2025 2:06:15 PM CDT

## 2025-07-10 ENCOUNTER — PATIENT MESSAGE (OUTPATIENT)
Dept: PAIN MEDICINE | Facility: CLINIC | Age: 65
End: 2025-07-10
Payer: COMMERCIAL

## 2025-07-11 ENCOUNTER — OFFICE VISIT (OUTPATIENT)
Dept: OTOLARYNGOLOGY | Facility: CLINIC | Age: 65
End: 2025-07-11
Payer: COMMERCIAL

## 2025-07-11 DIAGNOSIS — I48.91 ATRIAL FIBRILLATION, UNSPECIFIED TYPE: ICD-10-CM

## 2025-07-11 DIAGNOSIS — G47.33 OSA (OBSTRUCTIVE SLEEP APNEA): ICD-10-CM

## 2025-07-11 DIAGNOSIS — Z78.9 DIFFICULTY WITH CPAP USE: Primary | ICD-10-CM

## 2025-07-11 PROCEDURE — 3051F HG A1C>EQUAL 7.0%<8.0%: CPT | Mod: CPTII,95,, | Performed by: OTOLARYNGOLOGY

## 2025-07-11 PROCEDURE — 3066F NEPHROPATHY DOC TX: CPT | Mod: CPTII,95,, | Performed by: OTOLARYNGOLOGY

## 2025-07-11 PROCEDURE — 98001 SYNCH AUDIO-VIDEO NEW LOW 30: CPT | Mod: 95,,, | Performed by: OTOLARYNGOLOGY

## 2025-07-11 PROCEDURE — 1157F ADVNC CARE PLAN IN RCRD: CPT | Mod: CPTII,95,, | Performed by: OTOLARYNGOLOGY

## 2025-07-11 PROCEDURE — 4010F ACE/ARB THERAPY RXD/TAKEN: CPT | Mod: CPTII,95,, | Performed by: OTOLARYNGOLOGY

## 2025-07-11 PROCEDURE — 3061F NEG MICROALBUMINURIA REV: CPT | Mod: CPTII,95,, | Performed by: OTOLARYNGOLOGY
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
## 2025-07-13 DIAGNOSIS — E55.9 VITAMIN D DEFICIENCY: ICD-10-CM

## 2025-07-13 DIAGNOSIS — E78.5 DYSLIPIDEMIA: Chronic | ICD-10-CM

## 2025-07-13 RX ORDER — ERGOCALCIFEROL 1.25 MG/1
50000 CAPSULE ORAL
Qty: 12 CAPSULE | Refills: 0 | Status: SHIPPED | OUTPATIENT
Start: 2025-07-13

## 2025-07-13 NOTE — TELEPHONE ENCOUNTER
No care due was identified.  [facility] Embedded Care Due Messages. Reference number: [id number].   7/13/2025 11:53:16 AM CDT

## 2025-07-14 RX ORDER — EZETIMIBE 10 MG/1
10 TABLET ORAL
Qty: 90 TABLET | Refills: 0 | Status: SHIPPED | OUTPATIENT
Start: 2025-07-14

## 2025-07-15 ENCOUNTER — TELEPHONE (OUTPATIENT)
Dept: SLEEP MEDICINE | Facility: OTHER | Age: 65
End: 2025-07-15
Payer: COMMERCIAL

## 2025-07-17 ENCOUNTER — OFFICE VISIT (OUTPATIENT)
Dept: FAMILY MEDICINE | Facility: CLINIC | Age: 65
End: 2025-07-17
Payer: COMMERCIAL

## 2025-07-17 ENCOUNTER — PATIENT MESSAGE (OUTPATIENT)
Dept: PAIN MEDICINE | Facility: OTHER | Age: 65
End: 2025-07-17
Payer: COMMERCIAL

## 2025-07-17 ENCOUNTER — PATIENT MESSAGE (OUTPATIENT)
Dept: FAMILY MEDICINE | Facility: CLINIC | Age: 65
End: 2025-07-17

## 2025-07-17 VITALS
WEIGHT: 199.94 LBS | DIASTOLIC BLOOD PRESSURE: 72 MMHG | OXYGEN SATURATION: 95 % | BODY MASS INDEX: 28.62 KG/M2 | SYSTOLIC BLOOD PRESSURE: 130 MMHG | HEART RATE: 74 BPM | HEIGHT: 70 IN

## 2025-07-17 DIAGNOSIS — J30.9 ALLERGIC SINUSITIS: Primary | ICD-10-CM

## 2025-07-17 PROCEDURE — 3066F NEPHROPATHY DOC TX: CPT | Mod: CPTII,S$GLB,,

## 2025-07-17 PROCEDURE — 1160F RVW MEDS BY RX/DR IN RCRD: CPT | Mod: CPTII,S$GLB,,

## 2025-07-17 PROCEDURE — 1157F ADVNC CARE PLAN IN RCRD: CPT | Mod: CPTII,S$GLB,,

## 2025-07-17 PROCEDURE — 4010F ACE/ARB THERAPY RXD/TAKEN: CPT | Mod: CPTII,S$GLB,,

## 2025-07-17 PROCEDURE — 1159F MED LIST DOCD IN RCRD: CPT | Mod: CPTII,S$GLB,,

## 2025-07-17 PROCEDURE — 3051F HG A1C>EQUAL 7.0%<8.0%: CPT | Mod: CPTII,S$GLB,,

## 2025-07-17 PROCEDURE — 99999 PR PBB SHADOW E&M-EST. PATIENT-LVL III: CPT | Mod: PBBFAC,,,

## 2025-07-17 PROCEDURE — 3078F DIAST BP <80 MM HG: CPT | Mod: CPTII,S$GLB,,

## 2025-07-17 PROCEDURE — 3288F FALL RISK ASSESSMENT DOCD: CPT | Mod: CPTII,S$GLB,,

## 2025-07-17 PROCEDURE — 99214 OFFICE O/P EST MOD 30 MIN: CPT | Mod: S$GLB,,,

## 2025-07-17 PROCEDURE — 3008F BODY MASS INDEX DOCD: CPT | Mod: CPTII,S$GLB,,

## 2025-07-17 PROCEDURE — 3061F NEG MICROALBUMINURIA REV: CPT | Mod: CPTII,S$GLB,,

## 2025-07-17 PROCEDURE — 1101F PT FALLS ASSESS-DOCD LE1/YR: CPT | Mod: CPTII,S$GLB,,

## 2025-07-17 PROCEDURE — 3075F SYST BP GE 130 - 139MM HG: CPT | Mod: CPTII,S$GLB,,

## 2025-07-17 RX ORDER — AZELASTINE 1 MG/ML
1 SPRAY, METERED NASAL 2 TIMES DAILY
Qty: 30 ML | Refills: 0 | Status: SHIPPED | OUTPATIENT
Start: 2025-07-17 | End: 2026-07-17

## 2025-07-17 NOTE — PATIENT INSTRUCTIONS
Take fluticasone, ipratropium, and Astelin  Start OTC decongestant  Biofreeze for neck   Contact office if any new symptoms start    Follow up with PCP as needed

## 2025-07-17 NOTE — PROGRESS NOTES
"Subjective     Patient ID: Partha Curtis Jr. is a 65 y.o. male.    Chief Complaint: Otalgia      Otalgia   Pertinent negatives include no coughing, diarrhea, ear discharge, headaches, hearing loss, rhinorrhea or vomiting.       66 y/o male presents to clinic for left sided ear and throat pain and headache. States that  this started this morning.   He is having some sinus congestion   He has not taken anything for this yet  No one else in sick  Denies fever, rash, visual changes, pain when chewing, TTP      Review of Systems   Constitutional:  Negative for fatigue and fever.   HENT:  Positive for ear pain and sinus pressure/congestion. Negative for ear discharge, facial swelling, hearing loss, postnasal drip, rhinorrhea, tinnitus and trouble swallowing.    Eyes:  Negative for pain and visual disturbance.   Respiratory:  Negative for cough, shortness of breath and wheezing.    Cardiovascular:  Negative for chest pain, palpitations and leg swelling.   Gastrointestinal:  Negative for diarrhea, nausea and vomiting.   Neurological:  Negative for dizziness, light-headedness and headaches.          Objective     Vitals:    07/17/25 1420   BP: 130/72   Pulse: 74   SpO2: 95%   Weight: 90.7 kg (199 lb 15.3 oz)   Height: 5' 10" (1.778 m)   PainSc:   7   PainLoc: Generalized      Physical Exam  Vitals reviewed.   Constitutional:       General: He is not in acute distress.     Appearance: He is not toxic-appearing.   HENT:      Head: Normocephalic and atraumatic.      Right Ear: Tympanic membrane, ear canal and external ear normal.      Left Ear: Tympanic membrane, ear canal and external ear normal.      Nose: Congestion present. No rhinorrhea.      Mouth/Throat:      Pharynx: No oropharyngeal exudate or posterior oropharyngeal erythema.   Eyes:      General:         Right eye: No discharge.         Left eye: No discharge.      Conjunctiva/sclera: Conjunctivae normal.   Cardiovascular:      Rate and Rhythm: Normal rate and " regular rhythm.   Pulmonary:      Effort: Pulmonary effort is normal. No respiratory distress.      Breath sounds: Normal breath sounds. No wheezing.   Musculoskeletal:      Right lower leg: No edema.      Left lower leg: No edema.   Lymphadenopathy:      Cervical: No cervical adenopathy.   Skin:     Findings: No rash.   Neurological:      Mental Status: He is alert and oriented to person, place, and time.              Assessment and Plan     1. Allergic sinusitis  -     azelastine (ASTELIN) 137 mcg (0.1 %) nasal spray; 1 spray (137 mcg total) by Nasal route 2 (two) times daily.  Dispense: 30 mL; Refill: 0      Pt has nasal congestion on exam. No rash, TTP  Will start on astelin, fluticasone, and ipratropium  Also advised to start OTC oral decongestant     Take fluticasone, ipratropium, and Astelin  Start OTC decongestant  Biofreeze for neck   Contact office if any new symptoms start    Follow up with PCP as needed        30 minutes of total time spent on the encounter, which includes face to face time and non-face to face time preparing to see the patient (eg, review of tests), Obtaining and/or reviewing separately obtained history, Documenting clinical information in the electronic or other health record, Independently interpreting results (not separately reported) and communicating results to the patient/family/caregiver, or Care coordination (not separately reported).      Rosario Holt PA-C  Family Practice/Internal Medicine   Office Phone: [phone]

## 2025-07-23 ENCOUNTER — E-CONSULT (OUTPATIENT)
Dept: CARDIOLOGY | Facility: CLINIC | Age: 65
End: 2025-07-23
Payer: COMMERCIAL

## 2025-07-23 DIAGNOSIS — I48.91 ATRIAL FIBRILLATION STATUS POST CARDIOVERSION: Primary | ICD-10-CM

## 2025-07-24 ENCOUNTER — HOSPITAL ENCOUNTER (OUTPATIENT)
Facility: OTHER | Age: 65
Discharge: HOME | End: 2025-07-24
Attending: ANESTHESIOLOGY | Admitting: ANESTHESIOLOGY
Payer: COMMERCIAL

## 2025-07-24 VITALS
HEART RATE: 68 BPM | BODY MASS INDEX: 27.2 KG/M2 | RESPIRATION RATE: 18 BRPM | OXYGEN SATURATION: 96 % | WEIGHT: 190 LBS | DIASTOLIC BLOOD PRESSURE: 86 MMHG | SYSTOLIC BLOOD PRESSURE: 140 MMHG | HEIGHT: 70 IN | TEMPERATURE: 98 F

## 2025-07-24 DIAGNOSIS — G89.29 CHRONIC PAIN: ICD-10-CM

## 2025-07-24 DIAGNOSIS — M54.16 LUMBAR RADICULOPATHY: Primary | ICD-10-CM

## 2025-07-24 LAB — POCT GLUCOSE: 191 MG/DL (ref 70–110)

## 2025-07-24 PROCEDURE — 63600175 PHARM REV CODE 636 W HCPCS: Performed by: ANESTHESIOLOGY

## 2025-07-24 PROCEDURE — 64483 NJX AA&/STRD TFRM EPI L/S 1: CPT | Mod: 50,,, | Performed by: ANESTHESIOLOGY

## 2025-07-24 PROCEDURE — 25500020 PHARM REV CODE 255: Performed by: ANESTHESIOLOGY

## 2025-07-24 PROCEDURE — 64483 NJX AA&/STRD TFRM EPI L/S 1: CPT | Mod: 50 | Performed by: ANESTHESIOLOGY

## 2025-07-24 RX ORDER — LIDOCAINE HYDROCHLORIDE 10 MG/ML
INJECTION, SOLUTION EPIDURAL; INFILTRATION; INTRACAUDAL; PERINEURAL
Status: DISCONTINUED | OUTPATIENT
Start: 2025-07-24 | End: 2025-07-24 | Stop reason: HOSPADM

## 2025-07-24 RX ORDER — LIDOCAINE HYDROCHLORIDE 20 MG/ML
INJECTION, SOLUTION INFILTRATION; PERINEURAL
Status: DISCONTINUED | OUTPATIENT
Start: 2025-07-24 | End: 2025-07-24 | Stop reason: HOSPADM

## 2025-07-24 RX ORDER — SODIUM CHLORIDE 9 MG/ML
INJECTION, SOLUTION INTRAVENOUS CONTINUOUS
Status: DISCONTINUED | OUTPATIENT
Start: 2025-07-24 | End: 2025-07-24 | Stop reason: HOSPADM

## 2025-07-24 RX ORDER — MIDAZOLAM HYDROCHLORIDE 1 MG/ML
INJECTION INTRAMUSCULAR; INTRAVENOUS
Status: DISCONTINUED | OUTPATIENT
Start: 2025-07-24 | End: 2025-07-24 | Stop reason: HOSPADM

## 2025-07-24 RX ORDER — DEXAMETHASONE SODIUM PHOSPHATE 10 MG/ML
INJECTION, SOLUTION INTRA-ARTICULAR; INTRALESIONAL; INTRAMUSCULAR; INTRAVENOUS; SOFT TISSUE
Status: DISCONTINUED | OUTPATIENT
Start: 2025-07-24 | End: 2025-07-24 | Stop reason: HOSPADM

## 2025-07-24 RX ORDER — FENTANYL CITRATE 50 UG/ML
INJECTION, SOLUTION INTRAMUSCULAR; INTRAVENOUS
Status: DISCONTINUED | OUTPATIENT
Start: 2025-07-24 | End: 2025-07-24 | Stop reason: HOSPADM

## 2025-07-24 NOTE — DISCHARGE SUMMARY
"Discharge Note  Short Stay      SUMMARY     Admit Date: 7/24/2025    Attending Physician: Sofia Sheikh MD      Discharge Physician: Heath Moran      Discharge Date: 7/24/2025 10:41 AM    Procedure(s) (LRB):  LUMBAR TRANSFORAMINAL BILATERAL L5/S1 *ELIQUIS/ASPIRIN ECONSULT PENDING* *EISSA PT* (Bilateral)    Final Diagnosis: Lumbar radiculopathy [M54.16]    Disposition: Home or self care    Patient Instructions:   Current Discharge Medication List        CONTINUE these medications which have NOT CHANGED    Details   amitriptyline (ELAVIL) 75 MG tablet Take 1 tablet by mouth in the evening  Qty: 90 tablet, Refills: 3    Associated Diagnoses: Insomnia, unspecified type      amLODIPine (NORVASC) 10 MG tablet Take 1 tablet (10 mg total) by mouth once daily.  Qty: 90 tablet, Refills: 3    Comments: .  Associated Diagnoses: Primary hypertension      apixaban (ELIQUIS) 5 mg Tab Take 1 tablet (5 mg total) by mouth 2 (two) times daily.  Qty: 180 tablet, Refills: 3    Associated Diagnoses: Atrial fibrillation with RVR      aspirin (ECOTRIN) 81 MG EC tablet Take 1 tablet (81 mg total) by mouth once daily.  Qty: 28 tablet, Refills: 0    Associated Diagnoses: Bucket-handle tear of medial meniscus of left knee as current injury, initial encounter      azelastine (ASTELIN) 137 mcg (0.1 %) nasal spray 1 spray (137 mcg total) by Nasal route 2 (two) times daily.  Qty: 30 mL, Refills: 0    Associated Diagnoses: Allergic sinusitis      BD LUER-RUSS SYRINGE 3 mL 25 gauge x 1" Syrg USE ONE SYRINGE EVERY 14 DAYS WITH TESTOSTERONE  Qty: 100 each, Refills: 2      candesartan (ATACAND) 32 MG tablet Take 1 tablet by mouth once daily  Qty: 90 tablet, Refills: 3    Associated Diagnoses: Essential hypertension      cyanocobalamin (VITAMIN B-12) 1000 MCG tablet Take 1 tablet (1,000 mcg total) by mouth once daily.  Qty: 90 tablet, Refills: 4    Associated Diagnoses: B12 deficiency      diazePAM (VALIUM) 10 MG Tab On call to MRI do not drive to or " from MRI/procedure & for 24 hours  Qty: 1 tablet, Refills: 0    Associated Diagnoses: Chronic pain disorder; Lumbar radiculopathy; Lumbar spondylosis; Cervical spondylosis; Cervical radiculopathy      DULoxetine (CYMBALTA) 30 MG capsule Take 1 capsule by mouth once daily  Qty: 90 capsule, Refills: 3    Associated Diagnoses: Lumbosacral radiculopathy at L5; Numbness      ergocalciferol (ERGOCALCIFEROL) 50,000 unit Cap Take 1 capsule by mouth once a week  Qty: 12 capsule, Refills: 0    Associated Diagnoses: Vitamin D deficiency      ezetimibe (ZETIA) 10 mg tablet Take 1 tablet by mouth once daily  Qty: 90 tablet, Refills: 0    Associated Diagnoses: Dyslipidemia      fluticasone propionate (FLONASE) 50 mcg/actuation nasal spray 2 sprays by Each Nostril route.      gabapentin (NEURONTIN) 800 MG tablet Take 1 tablet by mouth in the evening  Qty: 90 tablet, Refills: 0    Associated Diagnoses: Lumbar radiculopathy; Cervical radiculopathy      glimepiride (AMARYL) 2 MG tablet Take 1 tablet (2 mg total) by mouth before breakfast.    Associated Diagnoses: Type 2 diabetes mellitus with stage 3a chronic kidney disease, without long-term current use of insulin      glucose 4 GM chewable tablet Take 4 tablets (16 g total) by mouth as needed for Low blood sugar.  Qty: 30 tablet, Refills: 12      hydrALAZINE (APRESOLINE) 25 MG tablet Take 1 tablet (25 mg total) by mouth every 8 (eight) hours.  Qty: 270 tablet, Refills: 3    Comments: .  Associated Diagnoses: Essential hypertension      hydrocortisone 2.5 % cream Apply to face twice daily for flares as needed  Qty: 40 g, Refills: 2    Associated Diagnoses: Seborrheic dermatitis      ipratropium (ATROVENT) 42 mcg (0.06 %) nasal spray 2 sprays by Nasal route 2 (two) times daily as needed for Rhinitis.  Qty: 15 mL, Refills: 0    Associated Diagnoses: Influenza A      ketoconazole (NIZORAL) 2 % cream Apply twice daily to affected area of skin  Qty: 60 g, Refills: 2    Associated  "Diagnoses: Seborrheic dermatitis      metoprolol succinate (TOPROL-XL) 200 MG 24 hr tablet Take 1 tablet (200 mg total) by mouth once daily.  Qty: 90 tablet, Refills: 3    Comments: .  Associated Diagnoses: Essential hypertension      oxyCODONE-acetaminophen (PERCOCET)  mg per tablet Take 1 tablet by mouth every 12 (twelve) hours as needed for Pain.  Qty: 60 tablet, Refills: 0    Comments: Greater than 7 day supply that is medically necessary.  Associated Diagnoses: Chronic pain disorder; Lumbar radiculopathy; Lumbar spondylosis; DDD (degenerative disc disease), lumbar; Cervical radiculopathy; Cervical spondylosis; DDD (degenerative disc disease), cervical      rosuvastatin (CRESTOR) 40 MG Tab TAKE 1 TABLET BY MOUTH ONCE DAILY IN THE EVENING  Qty: 90 tablet, Refills: 1    Associated Diagnoses: Dyslipidemia      semaglutide (OZEMPIC) 2 mg/dose (8 mg/3 mL) PnIj Inject 2 mg into the skin every 7 days.  Qty: 9 mL, Refills: 3    Associated Diagnoses: Type 2 diabetes mellitus with stage 3a chronic kidney disease, without long-term current use of insulin      syringe with needle, safety (BD INTEGRA SYRINGE) 3 mL 22 gauge x 1 1/2" Syrg Inject 1 Syringe as directed once a week.  Qty: 6 each, Refills: 3      testosterone cypionate (DEPOTESTOTERONE CYPIONATE) 200 mg/mL injection Inject 0.75 mLs (150 mg total) into the muscle every 14 (fourteen) days.  Qty: 2 mL, Refills: 5    Comments: Please include syringes and needles for the testosterone.  Associated Diagnoses: Hypogonadism in male      valACYclovir (VALTREX) 1000 MG tablet TAKE 2 TABLETS BY MOUTH EVERY 12 HOURS  Qty: 4 tablet, Refills: 0                 Discharge Diagnosis: Lumbar radiculopathy [M54.16]  Condition on Discharge: Stable with no complications to procedure   Diet on Discharge: Same as before.  Activity: as per instruction sheet.  Discharge to: Home with a responsible adult.  Follow up: 2-4 weeks       Please call my office or pager at [phone] if " experienced any weakness or loss of sensation, fever > 101.5, pain uncontrolled with oral medications, persistent nausea/vomiting/or diarrhea, redness or drainage from the incisions, or any other worrisome concerns. If physician on call was not reached or could not communicate with our office for any reason please go to the nearest emergency department

## 2025-07-24 NOTE — OP NOTE
Lumbar Transforaminal Epidural Steroid Injection under Fluoroscopic Guidance    The procedure, risks, benefits, and options were discussed with the patient. There are no contraindications to the procedure. The patent expressed understanding and agreed to the procedure. Informed written consent was obtained prior to the start of the procedure and can be found in the patient's chart.    PATIENT NAME: Partha Curtis Jr.   MRN: [id number]     DATE OF PROCEDURE: 07/24/2025    PROCEDURE:  Bilateral  L5/S1 Lumbar Transforaminal Epidural Steroid Injection under Fluoroscopic Guidance    PRE-OP DIAGNOSIS: Lumbar radiculopathy [M54.16] Lumbar radiculopathy [M54.16]    POST-OP DIAGNOSIS: Same    PHYSICIAN: Sofia Sheikh MD    ASSISTANTS: Sudeep Puente MD, Heath Moran DO       MEDICATIONS INJECTED: Preservative-free Decadron 10mg with 5cc of Lidocaine 1% MPF     LOCAL ANESTHETIC INJECTED: Xylocaine 2%     SEDATION: Versed 2mg and Fentanyl 50mcg                                                                                                                                                                                     Conscious sedation ordered by M.D. Patient re-evaluation prior to administration of conscious sedation. No changes noted in patient's status from initial evaluation. The patient's vital signs were monitored by RN and patient remained hemodynamically stable throughout the procedure.    Event Time In   Sedation Start 1031   Sedation End 1041       ESTIMATED BLOOD LOSS: None    COMPLICATIONS: None    TECHNIQUE: Time-out was performed to identify the patient and procedure to be performed. With the patient laying in a prone position, the surgical area was prepped and draped in the usual sterile fashion using ChloraPrep and a fenestrated drape.The levels were determined under fluoroscopy guidance. Skin anesthesia was achieved by injecting Lidocaine 2% over the injection sites. The transforaminal spaces were then  approached with a 22 gauge, 5 inch spinal quinke needle that was introduced under fluoroscopic guidance in the AP and Lateral views. Once the needle tip was in the area of the transforaminal space, and there was no blood, CSF or paraesthesias, contrast dye Omnipaque (300mg/mL) was injected to confirm placement and there was no vascular runoff. Fluoroscopic imaging in the AP and lateral views revealed a clear outline of the spinal nerve with proximal spread of agent through the neural foramen into the epidural space. 3 mL of the medication mixture listed above was injected slowly at each site. Displacement of the radio opaque contrast after injection of the medication confirmed that the medication went into the area of the transforaminal spaces. The needles were removed and bleeding was nil. A sterile dressing was applied. No specimens collected. The patient tolerated the procedure well.       The patient was monitored after the procedure in the recovery area. They were given post-procedure and discharge instructions to follow at home. The patient was discharged in a stable condition.    Heath Moran MD    I reviewed and edited the fellow's note. I conducted my own interview and physical examination. I agree with the findings. I was present and supervising all critical portions of the procedure.

## 2025-07-24 NOTE — DISCHARGE INSTRUCTIONS

## 2025-07-24 NOTE — CONSULTS
[facility] - [facility]  Response for E-Consult     Patient Name: Partha Curtis Jr.  MRN: [id number]  Primary Care Provider: Rocco Cavazos DO   Requesting Provider: Gokul Fung MD  Consults    Recommendation:  Can stop Eliquis 3 days prior to surgery    Additional future steps to consider:  None    Total time of Consultation: 25 minute    I did not speak to the requesting provider verbally about this.     *This eConsult is based on the clinical data available to me and is furnished without benefit of a physical examination. The eConsult will need to be interpreted in light of any clinical issues or changes in patient status not available to me at the time of filing this eConsults. Significant changes in patient condition or level of acuity should result in immediate formal consultation and reevaluation. Please alert me if you have further questions.    Thank you for this eConsult referral.     MD [facility] - Cardiology

## 2025-07-25 ENCOUNTER — TELEPHONE (OUTPATIENT)
Dept: SLEEP MEDICINE | Facility: OTHER | Age: 65
End: 2025-07-25
Payer: COMMERCIAL

## 2025-07-25 ENCOUNTER — HOSPITAL ENCOUNTER (OUTPATIENT)
Dept: SLEEP MEDICINE | Facility: HOSPITAL | Age: 65
Discharge: HOME | End: 2025-07-25
Attending: PSYCHIATRY & NEUROLOGY
Payer: COMMERCIAL

## 2025-07-25 DIAGNOSIS — I48.91 ATRIAL FIBRILLATION, UNSPECIFIED TYPE: ICD-10-CM

## 2025-07-25 DIAGNOSIS — G47.33 OSA (OBSTRUCTIVE SLEEP APNEA): ICD-10-CM

## 2025-07-25 DIAGNOSIS — Z78.9 DIFFICULTY WITH CPAP USE: ICD-10-CM

## 2025-07-25 PROCEDURE — 95810 POLYSOM 6/> YRS 4/> PARAM: CPT

## 2025-07-26 NOTE — PROGRESS NOTES
Education was done via explanation of sleep study process and procedure. All questions were answered.    Pt did not meet criteria for CPAP. Respiratory events were observed. REM sleep was obtained.    Low sat of 88% was observed in study. EKG revealed NSR. Soft to moderate snoring was heard. Thank you letter was given in a.m.

## 2025-07-28 ENCOUNTER — HOSPITAL ENCOUNTER (OUTPATIENT)
Facility: HOSPITAL | Age: 65
Discharge: HOME | End: 2025-07-29
Attending: EMERGENCY MEDICINE | Admitting: FAMILY MEDICINE
Payer: COMMERCIAL

## 2025-07-28 DIAGNOSIS — R00.2 PALPITATIONS: ICD-10-CM

## 2025-07-28 DIAGNOSIS — I48.91 ATRIAL FIBRILLATION WITH RVR: Primary | ICD-10-CM

## 2025-07-28 DIAGNOSIS — E78.5 DYSLIPIDEMIA: Chronic | ICD-10-CM

## 2025-07-28 DIAGNOSIS — I26.99 OTHER ACUTE PULMONARY EMBOLISM WITHOUT ACUTE COR PULMONALE: ICD-10-CM

## 2025-07-28 DIAGNOSIS — I26.99 PULMONARY EMBOLISM, UNSPECIFIED CHRONICITY, UNSPECIFIED PULMONARY EMBOLISM TYPE, UNSPECIFIED WHETHER ACUTE COR PULMONALE PRESENT: ICD-10-CM

## 2025-07-28 DIAGNOSIS — R07.9 CHEST PAIN: ICD-10-CM

## 2025-07-28 DIAGNOSIS — R06.02 SHORTNESS OF BREATH: ICD-10-CM

## 2025-07-28 LAB
ABSOLUTE EOSINOPHIL (OHS): 0.09 K/UL
ABSOLUTE MONOCYTE (OHS): 1.38 K/UL (ref 0.3–1)
ABSOLUTE NEUTROPHIL COUNT (OHS): 6.37 K/UL (ref 1.8–7.7)
ALBUMIN SERPL BCP-MCNC: 4 G/DL (ref 3.5–5.2)
ALP SERPL-CCNC: 75 UNIT/L (ref 40–150)
ALT SERPL W/O P-5'-P-CCNC: 35 UNIT/L (ref 10–44)
AMPHET UR QL SCN: NEGATIVE
ANION GAP (OHS): 11 MMOL/L (ref 8–16)
APICAL FOUR CHAMBER EJECTION FRACTION: 30 %
APICAL TWO CHAMBER EJECTION FRACTION: 59 %
AST SERPL-CCNC: 33 UNIT/L (ref 11–45)
BARBITURATE SCN PRESENT UR: NEGATIVE
BASOPHILS # BLD AUTO: 0.03 K/UL
BASOPHILS NFR BLD AUTO: 0.3 %
BENZODIAZ UR QL SCN: NEGATIVE
BILIRUB SERPL-MCNC: 0.6 MG/DL (ref 0.1–1)
BILIRUB UR QL STRIP.AUTO: NEGATIVE
BSA FOR ECHO PROCEDURE: 2.06 M2
BUN SERPL-MCNC: 19 MG/DL (ref 8–23)
CALCIUM SERPL-MCNC: 9 MG/DL (ref 8.7–10.5)
CANNABINOIDS UR QL SCN: NEGATIVE
CHLORIDE SERPL-SCNC: 100 MMOL/L (ref 95–110)
CLARITY UR: CLEAR
CO2 SERPL-SCNC: 26 MMOL/L (ref 23–29)
COCAINE UR QL SCN: NEGATIVE
COLOR UR AUTO: YELLOW
CREAT SERPL-MCNC: 1.5 MG/DL (ref 0.5–1.4)
CREAT UR-MCNC: 101.9 MG/DL (ref 23–375)
CV ECHO LV RWT: 0.68 CM
ECHO LV POSTERIOR WALL: 1.3 CM (ref 0.6–1.1)
ERYTHROCYTE [DISTWIDTH] IN BLOOD BY AUTOMATED COUNT: 15.9 % (ref 11.5–14.5)
ETHANOL SERPL-MCNC: <10 MG/DL
FRACTIONAL SHORTENING: 31.6 % (ref 28–44)
GFR SERPLBLD CREATININE-BSD FMLA CKD-EPI: 51 ML/MIN/1.73/M2
GLUCOSE SERPL-MCNC: 225 MG/DL (ref 70–110)
GLUCOSE UR QL STRIP: ABNORMAL
HCT VFR BLD AUTO: 51.2 % (ref 40–54)
HGB BLD-MCNC: 17.4 GM/DL (ref 14–18)
HGB UR QL STRIP: NEGATIVE
HOLD SPECIMEN: NORMAL
IMM GRANULOCYTES # BLD AUTO: 0.05 K/UL (ref 0–0.04)
IMM GRANULOCYTES NFR BLD AUTO: 0.5 % (ref 0–0.5)
INTERVENTRICULAR SEPTUM: 1.3 CM (ref 0.6–1.1)
KETONES UR QL STRIP: NEGATIVE
LEFT INTERNAL DIMENSION IN SYSTOLE: 2.6 CM (ref 2.1–4)
LEFT VENTRICLE DIASTOLIC VOLUME INDEX: 30.88 ML/M2
LEFT VENTRICLE DIASTOLIC VOLUME: 63 ML
LEFT VENTRICLE END DIASTOLIC VOLUME APICAL 2 CHAMBER: 34.61 ML
LEFT VENTRICLE END DIASTOLIC VOLUME APICAL 4 CHAMBER: 45.82 ML
LEFT VENTRICLE MASS INDEX: 84.8 G/M2
LEFT VENTRICLE SYSTOLIC VOLUME INDEX: 12.3 ML/M2
LEFT VENTRICLE SYSTOLIC VOLUME: 25 ML
LEFT VENTRICULAR INTERNAL DIMENSION IN DIASTOLE: 3.8 CM (ref 3.5–6)
LEFT VENTRICULAR MASS: 173.1 G
LEUKOCYTE ESTERASE UR QL STRIP: NEGATIVE
LVED V (TEICH): 63.1 ML
LVES V (TEICH): 25.36 ML
LYMPHOCYTES # BLD AUTO: 1.97 K/UL (ref 1–4.8)
MCH RBC QN AUTO: 31.3 PG (ref 27–31)
MCHC RBC AUTO-ENTMCNC: 34 G/DL (ref 32–36)
MCV RBC AUTO: 92 FL (ref 82–98)
METHADONE UR QL SCN: NEGATIVE
MICROSCOPIC COMMENT: NORMAL
NITRITE UR QL STRIP: NEGATIVE
NT-PROBNP SERPL-MCNC: 206 PG/ML
NUCLEATED RBC (/100WBC) (OHS): 0 /100 WBC
OHS CV CPX PATIENT HEIGHT IN: 70
OHS LV EJECTION FRACTION SIMPSONS BIPLANE MOD: 46 %
OPIATES UR QL SCN: NEGATIVE
PCP UR QL: NEGATIVE
PH UR STRIP: 6 [PH]
PISA TR MAX VEL: 2.1 M/S
PLATELET # BLD AUTO: 247 K/UL (ref 150–450)
PMV BLD AUTO: 9.3 FL (ref 9.2–12.9)
POCT GLUCOSE: 149 MG/DL (ref 70–110)
POCT GLUCOSE: 206 MG/DL (ref 70–110)
POTASSIUM SERPL-SCNC: 3.8 MMOL/L (ref 3.5–5.1)
PROT SERPL-MCNC: 7.4 GM/DL (ref 6–8.4)
PROT UR QL STRIP: NEGATIVE
PV MV: 0.62 M/S
PV PEAK GRADIENT: 2 MMHG
PV PEAK VELOCITY: 0.76 M/S
RA PRESSURE ESTIMATED: 3 MMHG
RA VOL SYS: 29.56 ML
RBC # BLD AUTO: 5.56 M/UL (ref 4.6–6.2)
RBC #/AREA URNS AUTO: 1 /HPF (ref 0–4)
RELATIVE EOSINOPHIL (OHS): 0.9 %
RELATIVE LYMPHOCYTE (OHS): 19.9 % (ref 18–48)
RELATIVE MONOCYTE (OHS): 14 % (ref 4–15)
RELATIVE NEUTROPHIL (OHS): 64.4 % (ref 38–73)
RIGHT ATRIAL AREA: 13.3 CM2
RIGHT ATRIUM END SYSTOLIC VOLUME APICAL 4 CHAMBER INDEX BSA: 13.76 ML/M2
RIGHT ATRIUM VOLUME AREA LENGTH APICAL 4 CHAMBER: 28.07 ML
RV TB RVSP: 5 MMHG
RV TISSUE DOPPLER FREE WALL SYSTOLIC VELOCITY 1 (APICAL 4 CHAMBER VIEW): 14.99 CM/S
SODIUM SERPL-SCNC: 137 MMOL/L (ref 136–145)
SP GR UR STRIP: 1.01
TR MAX PG: 18 MMHG
TRICUSPID ANNULAR PLANE SYSTOLIC EXCURSION: 1.2 CM
TROPONIN I SERPL HS-MCNC: 3 NG/L
TROPONIN I SERPL HS-MCNC: <3 NG/L
TSH SERPL-ACNC: 2.66 UIU/ML (ref 0.4–4)
TV REST PULMONARY ARTERY PRESSURE: 21 MMHG
UROBILINOGEN UR STRIP-ACNC: NEGATIVE EU/DL
WBC # BLD AUTO: 9.89 K/UL (ref 3.9–12.7)
WBC #/AREA URNS AUTO: <1 /HPF (ref 0–5)
Z-SCORE OF LEFT VENTRICULAR DIMENSION IN END DIASTOLE: -4.7
Z-SCORE OF LEFT VENTRICULAR DIMENSION IN END SYSTOLE: -2.85

## 2025-07-28 PROCEDURE — 96372 THER/PROPH/DIAG INJ SC/IM: CPT | Performed by: NURSE PRACTITIONER

## 2025-07-28 PROCEDURE — 63600175 PHARM REV CODE 636 W HCPCS: Performed by: EMERGENCY MEDICINE

## 2025-07-28 PROCEDURE — 83880 ASSAY OF NATRIURETIC PEPTIDE: CPT | Performed by: EMERGENCY MEDICINE

## 2025-07-28 PROCEDURE — 25000003 PHARM REV CODE 250: Performed by: NURSE PRACTITIONER

## 2025-07-28 PROCEDURE — 25500020 PHARM REV CODE 255: Performed by: EMERGENCY MEDICINE

## 2025-07-28 PROCEDURE — 96361 HYDRATE IV INFUSION ADD-ON: CPT

## 2025-07-28 PROCEDURE — G0378 HOSPITAL OBSERVATION PER HR: HCPCS

## 2025-07-28 PROCEDURE — 96374 THER/PROPH/DIAG INJ IV PUSH: CPT

## 2025-07-28 PROCEDURE — 80053 COMPREHEN METABOLIC PANEL: CPT | Performed by: EMERGENCY MEDICINE

## 2025-07-28 PROCEDURE — 99285 EMERGENCY DEPT VISIT HI MDM: CPT | Mod: 25

## 2025-07-28 PROCEDURE — 25000003 PHARM REV CODE 250: Performed by: EMERGENCY MEDICINE

## 2025-07-28 PROCEDURE — A4216 STERILE WATER/SALINE, 10 ML: HCPCS | Performed by: NURSE PRACTITIONER

## 2025-07-28 PROCEDURE — 80307 DRUG TEST PRSMV CHEM ANLYZR: CPT | Performed by: EMERGENCY MEDICINE

## 2025-07-28 PROCEDURE — 25500020 PHARM REV CODE 255: Performed by: STUDENT IN AN ORGANIZED HEALTH CARE EDUCATION/TRAINING PROGRAM

## 2025-07-28 PROCEDURE — 63600175 PHARM REV CODE 636 W HCPCS: Performed by: NURSE PRACTITIONER

## 2025-07-28 PROCEDURE — 84484 ASSAY OF TROPONIN QUANT: CPT | Performed by: EMERGENCY MEDICINE

## 2025-07-28 PROCEDURE — 93005 ELECTROCARDIOGRAM TRACING: CPT

## 2025-07-28 PROCEDURE — 82077 ASSAY SPEC XCP UR&BREATH IA: CPT | Performed by: EMERGENCY MEDICINE

## 2025-07-28 PROCEDURE — 84443 ASSAY THYROID STIM HORMONE: CPT | Performed by: EMERGENCY MEDICINE

## 2025-07-28 PROCEDURE — 93010 ELECTROCARDIOGRAM REPORT: CPT | Mod: ,,, | Performed by: STUDENT IN AN ORGANIZED HEALTH CARE EDUCATION/TRAINING PROGRAM

## 2025-07-28 PROCEDURE — 85025 COMPLETE CBC W/AUTO DIFF WBC: CPT | Performed by: EMERGENCY MEDICINE

## 2025-07-28 PROCEDURE — 81001 URINALYSIS AUTO W/SCOPE: CPT | Mod: XB | Performed by: EMERGENCY MEDICINE

## 2025-07-28 PROCEDURE — 84484 ASSAY OF TROPONIN QUANT: CPT | Mod: 91 | Performed by: NURSE PRACTITIONER

## 2025-07-28 RX ORDER — AMLODIPINE BESYLATE 5 MG/1
10 TABLET ORAL DAILY
Status: DISCONTINUED | OUTPATIENT
Start: 2025-07-29 | End: 2025-07-29 | Stop reason: HOSPADM

## 2025-07-28 RX ORDER — IBUPROFEN 200 MG
24 TABLET ORAL
Status: DISCONTINUED | OUTPATIENT
Start: 2025-07-28 | End: 2025-07-29 | Stop reason: HOSPADM

## 2025-07-28 RX ORDER — DILTIAZEM HYDROCHLORIDE 5 MG/ML
10 INJECTION INTRAVENOUS
Status: COMPLETED | OUTPATIENT
Start: 2025-07-28 | End: 2025-07-28

## 2025-07-28 RX ORDER — ATORVASTATIN CALCIUM 40 MG/1
80 TABLET, FILM COATED ORAL NIGHTLY
Status: DISCONTINUED | OUTPATIENT
Start: 2025-07-28 | End: 2025-07-29 | Stop reason: HOSPADM

## 2025-07-28 RX ORDER — SIMETHICONE 80 MG
1 TABLET,CHEWABLE ORAL 4 TIMES DAILY PRN
Status: DISCONTINUED | OUTPATIENT
Start: 2025-07-28 | End: 2025-07-29 | Stop reason: HOSPADM

## 2025-07-28 RX ORDER — ONDANSETRON HYDROCHLORIDE 2 MG/ML
4 INJECTION, SOLUTION INTRAVENOUS EVERY 8 HOURS PRN
Status: DISCONTINUED | OUTPATIENT
Start: 2025-07-28 | End: 2025-07-29 | Stop reason: HOSPADM

## 2025-07-28 RX ORDER — IPRATROPIUM BROMIDE AND ALBUTEROL SULFATE 2.5; .5 MG/3ML; MG/3ML
3 SOLUTION RESPIRATORY (INHALATION) EVERY 4 HOURS PRN
Status: DISCONTINUED | OUTPATIENT
Start: 2025-07-28 | End: 2025-07-29 | Stop reason: HOSPADM

## 2025-07-28 RX ORDER — ACETAMINOPHEN 325 MG/1
650 TABLET ORAL EVERY 4 HOURS PRN
Status: DISCONTINUED | OUTPATIENT
Start: 2025-07-28 | End: 2025-07-29 | Stop reason: HOSPADM

## 2025-07-28 RX ORDER — EZETIMIBE 10 MG/1
10 TABLET ORAL DAILY
Status: DISCONTINUED | OUTPATIENT
Start: 2025-07-28 | End: 2025-07-29 | Stop reason: HOSPADM

## 2025-07-28 RX ORDER — GABAPENTIN 400 MG/1
800 CAPSULE ORAL NIGHTLY
Status: DISCONTINUED | OUTPATIENT
Start: 2025-07-28 | End: 2025-07-29 | Stop reason: HOSPADM

## 2025-07-28 RX ORDER — DULOXETIN HYDROCHLORIDE 30 MG/1
30 CAPSULE, DELAYED RELEASE ORAL DAILY
Status: DISCONTINUED | OUTPATIENT
Start: 2025-07-28 | End: 2025-07-29 | Stop reason: HOSPADM

## 2025-07-28 RX ORDER — AMITRIPTYLINE HYDROCHLORIDE 25 MG/1
75 TABLET, FILM COATED ORAL NIGHTLY
Status: DISCONTINUED | OUTPATIENT
Start: 2025-07-28 | End: 2025-07-29 | Stop reason: HOSPADM

## 2025-07-28 RX ORDER — TALC
6 POWDER (GRAM) TOPICAL NIGHTLY PRN
Status: DISCONTINUED | OUTPATIENT
Start: 2025-07-28 | End: 2025-07-29 | Stop reason: HOSPADM

## 2025-07-28 RX ORDER — HYDRALAZINE HYDROCHLORIDE 25 MG/1
25 TABLET, FILM COATED ORAL EVERY 8 HOURS
Status: DISCONTINUED | OUTPATIENT
Start: 2025-07-28 | End: 2025-07-29 | Stop reason: HOSPADM

## 2025-07-28 RX ORDER — ONDANSETRON 8 MG/1
8 TABLET, ORALLY DISINTEGRATING ORAL EVERY 8 HOURS PRN
Status: DISCONTINUED | OUTPATIENT
Start: 2025-07-28 | End: 2025-07-29 | Stop reason: HOSPADM

## 2025-07-28 RX ORDER — METOPROLOL SUCCINATE 50 MG/1
200 TABLET, EXTENDED RELEASE ORAL DAILY
Status: DISCONTINUED | OUTPATIENT
Start: 2025-07-28 | End: 2025-07-29 | Stop reason: HOSPADM

## 2025-07-28 RX ORDER — ALUMINUM HYDROXIDE, MAGNESIUM HYDROXIDE, AND SIMETHICONE 1200; 120; 1200 MG/30ML; MG/30ML; MG/30ML
30 SUSPENSION ORAL 4 TIMES DAILY PRN
Status: DISCONTINUED | OUTPATIENT
Start: 2025-07-28 | End: 2025-07-29 | Stop reason: HOSPADM

## 2025-07-28 RX ORDER — INSULIN ASPART 100 [IU]/ML
0-5 INJECTION, SOLUTION INTRAVENOUS; SUBCUTANEOUS
Status: DISCONTINUED | OUTPATIENT
Start: 2025-07-28 | End: 2025-07-29 | Stop reason: HOSPADM

## 2025-07-28 RX ORDER — IBUPROFEN 200 MG
16 TABLET ORAL
Status: DISCONTINUED | OUTPATIENT
Start: 2025-07-28 | End: 2025-07-29 | Stop reason: HOSPADM

## 2025-07-28 RX ORDER — SODIUM CHLORIDE 0.9 % (FLUSH) 0.9 %
10 SYRINGE (ML) INJECTION EVERY 8 HOURS
Status: DISCONTINUED | OUTPATIENT
Start: 2025-07-28 | End: 2025-07-28

## 2025-07-28 RX ORDER — ASPIRIN 81 MG/1
81 TABLET ORAL DAILY
Status: DISCONTINUED | OUTPATIENT
Start: 2025-07-28 | End: 2025-07-29 | Stop reason: HOSPADM

## 2025-07-28 RX ORDER — AZELASTINE 1 MG/ML
1 SPRAY, METERED NASAL 2 TIMES DAILY
Status: DISCONTINUED | OUTPATIENT
Start: 2025-07-28 | End: 2025-07-29 | Stop reason: HOSPADM

## 2025-07-28 RX ORDER — ENOXAPARIN SODIUM 100 MG/ML
1 INJECTION SUBCUTANEOUS EVERY 12 HOURS
Status: DISCONTINUED | OUTPATIENT
Start: 2025-07-28 | End: 2025-07-29

## 2025-07-28 RX ORDER — GLUCAGON 1 MG
1 KIT INJECTION
Status: DISCONTINUED | OUTPATIENT
Start: 2025-07-28 | End: 2025-07-29 | Stop reason: HOSPADM

## 2025-07-28 RX ORDER — AMOXICILLIN 250 MG
1 CAPSULE ORAL 2 TIMES DAILY
Status: DISCONTINUED | OUTPATIENT
Start: 2025-07-28 | End: 2025-07-29 | Stop reason: HOSPADM

## 2025-07-28 RX ADMIN — EZETIMIBE 10 MG: 10 TABLET ORAL at 02:07

## 2025-07-28 RX ADMIN — HYDRALAZINE HYDROCHLORIDE 25 MG: 25 TABLET ORAL at 02:07

## 2025-07-28 RX ADMIN — DILTIAZEM HYDROCHLORIDE 10 MG: 5 INJECTION INTRAVENOUS at 10:07

## 2025-07-28 RX ADMIN — ATORVASTATIN CALCIUM 80 MG: 40 TABLET, FILM COATED ORAL at 08:07

## 2025-07-28 RX ADMIN — GABAPENTIN 800 MG: 400 CAPSULE ORAL at 08:07

## 2025-07-28 RX ADMIN — ASPIRIN 81 MG: 81 TABLET, COATED ORAL at 02:07

## 2025-07-28 RX ADMIN — ENOXAPARIN SODIUM 90 MG: 100 INJECTION SUBCUTANEOUS at 09:07

## 2025-07-28 RX ADMIN — METOPROLOL SUCCINATE 200 MG: 50 TABLET, EXTENDED RELEASE ORAL at 02:07

## 2025-07-28 RX ADMIN — HUMAN ALBUMIN MICROSPHERES AND PERFLUTREN 0.11 MG: 10; .22 INJECTION, SOLUTION INTRAVENOUS at 02:07

## 2025-07-28 RX ADMIN — SODIUM CHLORIDE 500 ML: 0.9 INJECTION, SOLUTION INTRAVENOUS at 12:07

## 2025-07-28 RX ADMIN — DULOXETINE 30 MG: 30 CAPSULE, DELAYED RELEASE ORAL at 02:07

## 2025-07-28 RX ADMIN — AMITRIPTYLINE HYDROCHLORIDE 75 MG: 25 TABLET, FILM COATED ORAL at 08:07

## 2025-07-28 RX ADMIN — AZELASTINE HYDROCHLORIDE 137 MCG: 137 SPRAY, METERED NASAL at 09:07

## 2025-07-28 RX ADMIN — IOHEXOL 100 ML: 350 INJECTION, SOLUTION INTRAVENOUS at 12:07

## 2025-07-28 RX ADMIN — SENNOSIDES AND DOCUSATE SODIUM 1 TABLET: 50; 8.6 TABLET ORAL at 08:07

## 2025-07-28 RX ADMIN — SODIUM CHLORIDE 10 ML: 9 INJECTION, SOLUTION INTRAMUSCULAR; INTRAVENOUS; SUBCUTANEOUS at 02:07

## 2025-07-28 NOTE — ASSESSMENT & PLAN NOTE
Patient's blood pressure range in the last 24 hours was: BP  Min: 95/64  Max: 158/77.The patient's inpatient anti-hypertensive regimen is listed below:  Current Antihypertensives  amLODIPine tablet 10 mg, Daily, Oral  hydrALAZINE tablet 25 mg, Every 8 hours, Oral  metoprolol succinate (TOPROL-XL) 24 hr tablet 200 mg, Daily, Oral    Plan  -will resume home meds with hold parameters

## 2025-07-28 NOTE — ASSESSMENT & PLAN NOTE
Patient has persistent (7 days or more) atrial fibrillation. Patient is currently in atrial fibrillation. [id number] Score: 3. The patients heart rate in the last 24 hours is as follows:  Pulse  Min: 80  Max: 127     Antiarrhythmics  metoprolol succinate (TOPROL-XL) 24 hr tablet 200 mg, Daily, Oral    Anticoagulants  enoxaparin injection 90 mg, Every 12 hours, Subcutaneous    Plan  - Replete lytes with a goal of K>4, Mg >2  - Patient is anticoagulated, see medications listed above.  - Patient's afib is currently uncontrolled. Will continue current treatment  - consulted cardiology--cardiology's plan below  - Replete lytes with a goal of K>4, Mg >2  - Patient is anticoagulated, see medications listed above.  - Patient's afib is currently controlled  - If HR sustains >120, start Cardizem gtt  - Convert Eliquis to Lovenox in the event thrombectomy is indicated  - Keep appointment with Dr Smith as scheduled in 2 weeks for ablation consideration

## 2025-07-28 NOTE — SUBJECTIVE & OBJECTIVE
Past Medical History:   Diagnosis Date    Allergy     Carotid artery occlusion     Cataract     Chronic back pain     Colonic polyp     Genetic testing     MUTYH mutation-negative    Hyperlipidemia     Hypertension     Paroxysmal atrial fibrillation 02/28/2024    Sleep apnea     Type 2 diabetes mellitus with stage 3 chronic kidney disease, without long-term current use of insulin 04/02/2020       Past Surgical History:   Procedure Laterality Date    ANKLE SURGERY Left     2    APPENDECTOMY      at age 20    ARTHROSCOPIC CHONDROPLASTY OF KNEE JOINT Left 08/13/2024    Procedure: ARTHROSCOPY, KNEE, WITH CHONDROPLASTY;  Surgeon: Kai Washington MD;  Location: [facility] OR;  Service: Orthopedics;  Laterality: Left;    ARTHROSCOPY, KNEE, WITH ABRASION ARTHROPLASTY OR MICROFRACTURE Left 08/13/2024    Procedure: ARTHROSCOPY, KNEE, WITH  MICROFRACTURE;  Surgeon: Kai Washington MD;  Location: [facility] OR;  Service: Orthopedics;  Laterality: Left;    ARTHROSCOPY,KNEE,WITH MENISCUS REPAIR Left 08/13/2024    Procedure: ARTHROSCOPY,KNEE,WITH MENISCUS REPAIR;  Surgeon: Kai Washington MD;  Location: [facility] OR;  Service: Orthopedics;  Laterality: Left;  NO REGIONAL BLOCK    CAROTID ENDARTERECTOMY Right 07/15/2015    CARPAL TUNNEL RELEASE Bilateral     COLONOSCOPY N/A 12/14/2018    Procedure: COLONOSCOPYSuprep;  Surgeon: Silver Selby MD;  Location: [facility];  Service: Endoscopy;  Laterality: N/A;    COLONOSCOPY N/A 10/02/2024    Procedure: COLONOSCOPY;  Surgeon: Heath Morrow MD;  Location: [facility] ENDO;  Service: Endoscopy;  Laterality: N/A;  [facility] pt has unopened Sutab, portal - PC  9/25/24-LVM for precall, portal-DS. 10/1/24 Pt. called and confirmed arrival time.EC    EPIDURAL STEROID INJECTION N/A 02/26/2024    Procedure: CERVICAL C7/T1 IL SELENA;  Surgeon: Gokul Fung MD;  Location: [facility] PAIN MGT;  Service: Pain Management;  Laterality: N/A;  [phone]  2 WK F/U SERGEI    EPIDURAL STEROID INJECTION  N/A 05/23/2024    Procedure: LUMBAR L4/5 IL SELENA *ASPIRIN OTC* HOLD FOR 5 DAYS;  Surgeon: Gokul Fung MD;  Location: [facility] PAIN MGT;  Service: Pain Management;  Laterality: N/A;  [phone]  2 WK F/U NOHELIA    EPIDURAL STEROID INJECTION N/A 10/21/2024    Procedure: LUMBAR L5/S1 IL SELENA *ASPIRIN OTC* HOLD FOR 5 DAYS  **EISSA PT**;  Surgeon: Sofia Sheikh MD;  Location: [facility] PAIN MGT;  Service: Pain Management;  Laterality: N/A;  [phone]  2 WK F/U NOHELIA    INJECTION OF ANESTHETIC AGENT AROUND NERVE N/A 12/23/2024    Procedure: LUMBAR L4.5 IL SELENA *ASPIRIN OTC* HOLD FOR 5 DAYS;  Surgeon: Gokul Fung MD;  Location: [facility] PAIN MGT;  Service: Pain Management;  Laterality: N/A;  2 WK F/U NOHELIA    INJECTION OF ANESTHETIC AGENT AROUND NERVE Bilateral 02/24/2025    Procedure: BLOCK, NERVE BILATERAL L3, 4, 5 MEDIAL BRANCH;  Surgeon: Gokul Fung MD;  Location: [facility] PAIN MGT;  Service: Pain Management;  Laterality: Bilateral;  2 WK F/U NOHELIA    INJECTION OF ANESTHETIC AGENT AROUND NERVE Bilateral 03/10/2025    Procedure: BLOCK, NERVE BILATERAL L3, L4, L5 MEDIAL BRANCH;  Surgeon: Gokul Fung MD;  Location: [facility] PAIN MGT;  Service: Pain Management;  Laterality: Bilateral;    INJECTION, SPINE, LUMBOSACRAL, TRANSFORAMINAL APPROACH Bilateral 7/24/2025    Procedure: LUMBAR TRANSFORAMINAL BILATERAL L5/S1 *ELIQUIS/ASPIRIN ECONSULT PENDING* *EISSA PT*;  Surgeon: Sofia Sheikh MD;  Location: [facility] PAIN MGT;  Service: Pain Management;  Laterality: Bilateral;  2 WK F/U SERGEI    JOINT REPLACEMENT  09/2010    NASAL SEPTUM SURGERY      RADIOFREQUENCY ABLATION Bilateral 03/27/2025    Procedure: RADIOFREQUENCY ABLATION BILATERAL L3, 4, 5;  Surgeon: Gokul Fung MD;  Location: [facility] PAIN MGT;  Service: Pain Management;  Laterality: Bilateral;    TOTAL KNEE ARTHROPLASTY Right     6 knee surgeries    TRANSESOPHAGEAL ECHOCARDIOGRAM WITH POSSIBLE CARDIOVERSION (ELIZABETH W/ POSS CARDIOVERSION) N/A 02/28/2024    Procedure: Transesophageal echo (ELIZABETH)  "intra-procedure log documentation;  Surgeon: Ahmet De La Cruz MD;  Location: [facility] CATH LAB/EP;  Service: Cardiology;  Laterality: N/A;    TRANSESOPHAGEAL ECHOCARDIOGRAM WITH POSSIBLE CARDIOVERSION (ELIZABETH W/ POSS CARDIOVERSION) N/A 5/28/2025    Procedure: Transesophageal echo (ELIZABETH) intra-procedure log documentation;  Surgeon: Valerio Jaquez MD;  Location: [facility] OR;  Service: Cardiology;  Laterality: N/A;       Review of patient's allergies indicates:   Allergen Reactions    Stadol [butorphanol tartrate] Rash     Swelling in face    Strawberries [strawberry] Rash       Current Facility-Administered Medications on File Prior to Encounter   Medication    0.9%  NaCl infusion    0.9%  NaCl infusion     Current Outpatient Medications on File Prior to Encounter   Medication Sig    amitriptyline (ELAVIL) 75 MG tablet Take 1 tablet by mouth in the evening    amLODIPine (NORVASC) 10 MG tablet Take 1 tablet (10 mg total) by mouth once daily.    apixaban (ELIQUIS) 5 mg Tab Take 1 tablet (5 mg total) by mouth 2 (two) times daily.    aspirin (ECOTRIN) 81 MG EC tablet Take 1 tablet (81 mg total) by mouth once daily.    azelastine (ASTELIN) 137 mcg (0.1 %) nasal spray 1 spray (137 mcg total) by Nasal route 2 (two) times daily.    BD LUER-RUSS SYRINGE 3 mL 25 gauge x 1" Syrg USE ONE SYRINGE EVERY 14 DAYS WITH TESTOSTERONE    candesartan (ATACAND) 32 MG tablet Take 1 tablet by mouth once daily    cyanocobalamin (VITAMIN B-12) 1000 MCG tablet Take 1 tablet (1,000 mcg total) by mouth once daily.    diazePAM (VALIUM) 10 MG Tab On call to MRI do not drive to or from MRI/procedure & for 24 hours    DULoxetine (CYMBALTA) 30 MG capsule Take 1 capsule by mouth once daily    ergocalciferol (ERGOCALCIFEROL) 50,000 unit Cap Take 1 capsule by mouth once a week    ezetimibe (ZETIA) 10 mg tablet Take 1 tablet by mouth once daily    fluticasone propionate (FLONASE) 50 mcg/actuation nasal spray 2 sprays by Each Nostril route.    gabapentin " "(NEURONTIN) 800 MG tablet Take 1 tablet by mouth in the evening    glimepiride (AMARYL) 2 MG tablet Take 1 tablet (2 mg total) by mouth before breakfast.    glucose 4 GM chewable tablet Take 4 tablets (16 g total) by mouth as needed for Low blood sugar.    hydrALAZINE (APRESOLINE) 25 MG tablet Take 1 tablet (25 mg total) by mouth every 8 (eight) hours.    hydrocortisone 2.5 % cream Apply to face twice daily for flares as needed    ipratropium (ATROVENT) 42 mcg (0.06 %) nasal spray 2 sprays by Nasal route 2 (two) times daily as needed for Rhinitis.    ketoconazole (NIZORAL) 2 % cream Apply twice daily to affected area of skin    metoprolol succinate (TOPROL-XL) 200 MG 24 hr tablet Take 1 tablet (200 mg total) by mouth once daily.    oxyCODONE-acetaminophen (PERCOCET)  mg per tablet Take 1 tablet by mouth every 12 (twelve) hours as needed for Pain.    rosuvastatin (CRESTOR) 40 MG Tab TAKE 1 TABLET BY MOUTH ONCE DAILY IN THE EVENING    semaglutide (OZEMPIC) 2 mg/dose (8 mg/3 mL) PnIj Inject 2 mg into the skin every 7 days.    syringe with needle, safety (BD INTEGRA SYRINGE) 3 mL 22 gauge x 1 1/2" Syrg Inject 1 Syringe as directed once a week.    testosterone cypionate (DEPOTESTOTERONE CYPIONATE) 200 mg/mL injection Inject 0.75 mLs (150 mg total) into the muscle every 14 (fourteen) days.    valACYclovir (VALTREX) 1000 MG tablet TAKE 2 TABLETS BY MOUTH EVERY 12 HOURS     Family History       Problem Relation (Age of Onset)    Aneurysm Other (48)    Brain cancer Mother (67), Paternal Grandfather (73)    Breast cancer Sister (58), Maternal Cousin (57)    Cancer Mother, Father, Maternal Grandfather    Genetic Disorder Sister    Hypertension Father    Lung cancer Father    Seizures Daughter    Ulcerative colitis Other          Tobacco Use    Smoking status: Never     Passive exposure: Never    Smokeless tobacco: Never   Substance and Sexual Activity    Alcohol use: Yes     Alcohol/week: 2.0 standard drinks of alcohol "     Types: 2 Cans of beer per week     Comment: 6 beers per month    Drug use: Never    Sexual activity: Yes     Partners: Female     Birth control/protection: None     Review of Systems   Constitutional:  Positive for activity change. Negative for appetite change.   HENT:  Negative for tinnitus.    Respiratory:  Positive for shortness of breath.    Cardiovascular:  Positive for chest pain. Negative for palpitations and leg swelling.   Gastrointestinal:  Negative for abdominal distention, abdominal pain, blood in stool, diarrhea, nausea and vomiting.   Genitourinary:  Negative for difficulty urinating and hematuria.   Musculoskeletal:  Negative for gait problem.   Skin:  Negative for rash and wound.   Neurological:  Negative for weakness.   Psychiatric/Behavioral:  Negative for confusion.      Objective:     Vital Signs (Most Recent):  Temp: 97.7 °F (36.5 °C) (07/28/25 0949)  Pulse: 100 (07/28/25 1439)  Resp: 10 (07/28/25 1319)  BP: 120/80 (07/28/25 1439)  SpO2: 95 % (07/28/25 1319) Vital Signs (24h Range):  Temp:  [97.7 °F (36.5 °C)] 97.7 °F (36.5 °C)  Pulse:  [] 100  Resp:  [10-32] 10  SpO2:  [90 %-96 %] 95 %  BP: ()/(63-90) 120/80     Weight: 86.2 kg (190 lb 0.6 oz)  Body mass index is 27.27 kg/m².     Physical Exam  Vitals and nursing note reviewed.   Constitutional:       Appearance: He is obese.   HENT:      Head: Normocephalic and atraumatic.      Mouth/Throat:      Mouth: Mucous membranes are moist.   Eyes:      Extraocular Movements: Extraocular movements intact.   Cardiovascular:      Rate and Rhythm: Normal rate and regular rhythm.      Pulses: Normal pulses.      Heart sounds: Normal heart sounds.   Pulmonary:      Effort: Pulmonary effort is normal.      Breath sounds: Normal breath sounds.   Abdominal:      General: Bowel sounds are normal. There is no distension.      Palpations: Abdomen is soft.      Tenderness: There is no abdominal tenderness. There is no guarding.   Musculoskeletal:          General: Normal range of motion.      Cervical back: Normal range of motion and neck supple.   Skin:     General: Skin is warm.      Capillary Refill: Capillary refill takes 2 to 3 seconds.   Neurological:      Mental Status: He is alert and oriented to person, place, and time.   Psychiatric:         Mood and Affect: Mood normal.         Behavior: Behavior normal.        sic        Significant Labs: All pertinent labs within the past 24 hours have been reviewed.    Significant Imaging: I have reviewed all pertinent imaging results/findings within the past 24 hours.

## 2025-07-28 NOTE — CONSULTS
[facility] - Emergency Dept  Cardiology  Consult Note    Patient Name: Partha Curtis Jr.  MRN: [id number]  Admission Date: 7/28/2025  Hospital Length of Stay: 0 days  Code Status: Full Code   Attending Provider: Mary Ellen Mc*   Consulting Provider: Ruslan Gallegos NP  Primary Care Physician: Rocco Cavazos DO  Principal Problem:Acute pulmonary embolism    Patient information was obtained from patient, past medical records, and ER records.     Inpatient consult to Cardiology  Consult performed by: Ruslan Gallegos NP  Consult ordered by: Javier Valentine MD      E-Consult to General Cardiology  Consult performed by: Ruslan Gallegos NP  Consult ordered by: Gokul Fung MD        Subjective:     Chief Complaint:  SOB, palpitations, CP, fatigue     HPI:   65-year-old male with HLD, PAF, CEA, DM, HTN, ALLA, CKD, chronic pain. Patient presented to the ED with CP, SOB, Palpitations which began yesterday ~2 AM. Symptoms resolved and he was able to go to the [place] with his wife and enjoy a day out of the house. Symptoms recurred this AM prompting him to proceed to the ED. Patient denies edema, SOB at present, syncope, dizziness, leg pain. He reports feeling better since presenting to the ED. Of note, patient had to hold his Eliquis for 3 days prior to a lumbar SELENA last week but has otherwise taken regularly. Patient noted to be in AFRVR in the ED which improved with IV Cardizem. Small PE suspected to LLL segmental branch. Troponin negative, pBNP negative, On 2L NC without hypoxia/tachycardia/hypotension. Limited TTE pending to evaluate RV function as well as venous US of BLE to r/o DVT. He has an appointment with EP scheduled for 08/13/2025.     Past Medical History:   Diagnosis Date    Allergy     Carotid artery occlusion     Cataract     Chronic back pain     Colonic polyp     Genetic testing     MUTYH mutation-negative    Hyperlipidemia     Hypertension     Paroxysmal atrial fibrillation 02/28/2024     Sleep apnea     Type 2 diabetes mellitus with stage 3 chronic kidney disease, without long-term current use of insulin 04/02/2020       Past Surgical History:   Procedure Laterality Date    ANKLE SURGERY Left     2    APPENDECTOMY      at age 20    ARTHROSCOPIC CHONDROPLASTY OF KNEE JOINT Left 08/13/2024    Procedure: ARTHROSCOPY, KNEE, WITH CHONDROPLASTY;  Surgeon: Kai Washington MD;  Location: [facility] OR;  Service: Orthopedics;  Laterality: Left;    ARTHROSCOPY, KNEE, WITH ABRASION ARTHROPLASTY OR MICROFRACTURE Left 08/13/2024    Procedure: ARTHROSCOPY, KNEE, WITH  MICROFRACTURE;  Surgeon: Kai Washington MD;  Location: [facility] OR;  Service: Orthopedics;  Laterality: Left;    ARTHROSCOPY,KNEE,WITH MENISCUS REPAIR Left 08/13/2024    Procedure: ARTHROSCOPY,KNEE,WITH MENISCUS REPAIR;  Surgeon: Kai Washington MD;  Location: [facility] OR;  Service: Orthopedics;  Laterality: Left;  NO REGIONAL BLOCK    CAROTID ENDARTERECTOMY Right 07/15/2015    CARPAL TUNNEL RELEASE Bilateral     COLONOSCOPY N/A 12/14/2018    Procedure: COLONOSCOPYSuprep;  Surgeon: Silver Selby MD;  Location: [facility] ENDO;  Service: Endoscopy;  Laterality: N/A;    COLONOSCOPY N/A 10/02/2024    Procedure: COLONOSCOPY;  Surgeon: Heath Morrow MD;  Location: [facility] ENDO;  Service: Endoscopy;  Laterality: N/A;  [facility] unopened S[facility], portal - PC  9/25/24-LVM for precall, portal-DS. 10/1/24 Pt. called and confirmed arrival time.EC    EPIDURAL STEROID INJECTION N/A 02/26/2024    Procedure: CERVICAL C7/T1 IL SELENA;  Surgeon: Gokul Fung MD;  Location: [facility] PAIN MGT;  Service: Pain Management;  Laterality: N/A;  [phone]  2 WK F/U SERGEI    EPIDURAL STEROID INJECTION N/A 05/23/2024    Procedure: LUMBAR L4/5 IL SELENA *ASPIRIN OTC* HOLD FOR 5 DAYS;  Surgeon: Gokul Fung MD;  Location: [facility] PAIN MGT;  Service: Pain Management;  Laterality: N/A;  [phone]  2 WK F/U NOHELIA    EPIDURAL STEROID INJECTION N/A 10/21/2024     Procedure: LUMBAR L5/S1 IL SELENA *ASPIRIN OTC* HOLD FOR 5 DAYS  **EISSA PT**;  Surgeon: Sofia Sheikh MD;  Location: [facility] PAIN MGT;  Service: Pain Management;  Laterality: N/A;  [phone]  2 WK F/U NOHELIA    INJECTION OF ANESTHETIC AGENT AROUND NERVE N/A 12/23/2024    Procedure: LUMBAR L4.5 IL SELENA *ASPIRIN OTC* HOLD FOR 5 DAYS;  Surgeon: Gokul Fung MD;  Location: [facility] PAIN MGT;  Service: Pain Management;  Laterality: N/A;  2 WK F/U NOHELIA    INJECTION OF ANESTHETIC AGENT AROUND NERVE Bilateral 02/24/2025    Procedure: BLOCK, NERVE BILATERAL L3, 4, 5 MEDIAL BRANCH;  Surgeon: Gokul Fung MD;  Location: [facility] PAIN MGT;  Service: Pain Management;  Laterality: Bilateral;  2 WK F/U NOHELIA    INJECTION OF ANESTHETIC AGENT AROUND NERVE Bilateral 03/10/2025    Procedure: BLOCK, NERVE BILATERAL L3, L4, L5 MEDIAL BRANCH;  Surgeon: Gokul Fung MD;  Location: [facility] PAIN MGT;  Service: Pain Management;  Laterality: Bilateral;    INJECTION, SPINE, LUMBOSACRAL, TRANSFORAMINAL APPROACH Bilateral 7/24/2025    Procedure: LUMBAR TRANSFORAMINAL BILATERAL L5/S1 *ELIQUIS/ASPIRIN ECONSULT PENDING* *ANGELICA PT*;  Surgeon: Sofia Sheikh MD;  Location: [facility] PAIN MGT;  Service: Pain Management;  Laterality: Bilateral;  2 WK F/U SERGEI    JOINT REPLACEMENT  09/2010    NASAL SEPTUM SURGERY      RADIOFREQUENCY ABLATION Bilateral 03/27/2025    Procedure: RADIOFREQUENCY ABLATION BILATERAL L3, 4, 5;  Surgeon: Gokul Fung MD;  Location: [facility] PAIN MGT;  Service: Pain Management;  Laterality: Bilateral;    TOTAL KNEE ARTHROPLASTY Right     6 knee surgeries    TRANSESOPHAGEAL ECHOCARDIOGRAM WITH POSSIBLE CARDIOVERSION (ELIZABETH W/ POSS CARDIOVERSION) N/A 02/28/2024    Procedure: Transesophageal echo (ELIZABETH) intra-procedure log documentation;  Surgeon: Ahmet De La Cruz MD;  Location: [facility] CATH LAB/EP;  Service: Cardiology;  Laterality: N/A;    TRANSESOPHAGEAL ECHOCARDIOGRAM WITH POSSIBLE CARDIOVERSION (ELIZABETH W/ POSS CARDIOVERSION) N/A 5/28/2025    Procedure:  "Transesophageal echo (ELIZABETH) intra-procedure log documentation;  Surgeon: Valerio Jaquez MD;  Location: [facility] OR;  Service: Cardiology;  Laterality: N/A;       Review of patient's allergies indicates:   Allergen Reactions    Stadol [butorphanol tartrate] Rash     Swelling in face    Strawberries [strawberry] Rash       Current Facility-Administered Medications on File Prior to Encounter   Medication    0.9%  NaCl infusion    0.9%  NaCl infusion     Current Outpatient Medications on File Prior to Encounter   Medication Sig    amitriptyline (ELAVIL) 75 MG tablet Take 1 tablet by mouth in the evening    amLODIPine (NORVASC) 10 MG tablet Take 1 tablet (10 mg total) by mouth once daily.    apixaban (ELIQUIS) 5 mg Tab Take 1 tablet (5 mg total) by mouth 2 (two) times daily.    aspirin (ECOTRIN) 81 MG EC tablet Take 1 tablet (81 mg total) by mouth once daily.    azelastine (ASTELIN) 137 mcg (0.1 %) nasal spray 1 spray (137 mcg total) by Nasal route 2 (two) times daily.    BD LUER-RUSS SYRINGE 3 mL 25 gauge x 1" Syrg USE ONE SYRINGE EVERY 14 DAYS WITH TESTOSTERONE    candesartan (ATACAND) 32 MG tablet Take 1 tablet by mouth once daily    cyanocobalamin (VITAMIN B-12) 1000 MCG tablet Take 1 tablet (1,000 mcg total) by mouth once daily.    diazePAM (VALIUM) 10 MG Tab On call to MRI do not drive to or from MRI/procedure & for 24 hours    DULoxetine (CYMBALTA) 30 MG capsule Take 1 capsule by mouth once daily    ergocalciferol (ERGOCALCIFEROL) 50,000 unit Cap Take 1 capsule by mouth once a week    ezetimibe (ZETIA) 10 mg tablet Take 1 tablet by mouth once daily    fluticasone propionate (FLONASE) 50 mcg/actuation nasal spray 2 sprays by Each Nostril route.    gabapentin (NEURONTIN) 800 MG tablet Take 1 tablet by mouth in the evening    glimepiride (AMARYL) 2 MG tablet Take 1 tablet (2 mg total) by mouth before breakfast.    glucose 4 GM chewable tablet Take 4 tablets (16 g total) by mouth as needed for Low blood sugar.    " "hydrALAZINE (APRESOLINE) 25 MG tablet Take 1 tablet (25 mg total) by mouth every 8 (eight) hours.    hydrocortisone 2.5 % cream Apply to face twice daily for flares as needed    ipratropium (ATROVENT) 42 mcg (0.06 %) nasal spray 2 sprays by Nasal route 2 (two) times daily as needed for Rhinitis.    ketoconazole (NIZORAL) 2 % cream Apply twice daily to affected area of skin    metoprolol succinate (TOPROL-XL) 200 MG 24 hr tablet Take 1 tablet (200 mg total) by mouth once daily.    oxyCODONE-acetaminophen (PERCOCET)  mg per tablet Take 1 tablet by mouth every 12 (twelve) hours as needed for Pain.    rosuvastatin (CRESTOR) 40 MG Tab TAKE 1 TABLET BY MOUTH ONCE DAILY IN THE EVENING    semaglutide (OZEMPIC) 2 mg/dose (8 mg/3 mL) PnIj Inject 2 mg into the skin every 7 days.    syringe with needle, safety (BD INTEGRA SYRINGE) 3 mL 22 gauge x 1 1/2" Syrg Inject 1 Syringe as directed once a week.    testosterone cypionate (DEPOTESTOTERONE CYPIONATE) 200 mg/mL injection Inject 0.75 mLs (150 mg total) into the muscle every 14 (fourteen) days.    valACYclovir (VALTREX) 1000 MG tablet TAKE 2 TABLETS BY MOUTH EVERY 12 HOURS     Family History       Problem Relation (Age of Onset)    Aneurysm Other (48)    Brain cancer Mother (67), Paternal Grandfather (73)    Breast cancer Sister (58), Maternal Cousin (57)    Cancer Mother, Father, Maternal Grandfather    Genetic Disorder Sister    Hypertension Father    Lung cancer Father    Seizures Daughter    Ulcerative colitis Other          Tobacco Use    Smoking status: Never     Passive exposure: Never    Smokeless tobacco: Never   Substance and Sexual Activity    Alcohol use: Yes     Alcohol/week: 2.0 standard drinks of alcohol     Types: 2 Cans of beer per week     Comment: 6 beers per month    Drug use: Never    Sexual activity: Yes     Partners: Female     Birth control/protection: None     Review of Systems   Constitutional: Positive for malaise/fatigue. Negative for " diaphoresis.   HENT: Negative.     Eyes: Negative.    Cardiovascular:  Positive for chest pain, dyspnea on exertion and irregular heartbeat. Negative for leg swelling, near-syncope, orthopnea, palpitations, paroxysmal nocturnal dyspnea and syncope.   Respiratory:  Positive for shortness of breath.    Endocrine: Negative.    Hematologic/Lymphatic: Negative.    Skin: Negative.    Gastrointestinal:  Negative for nausea and vomiting.   Genitourinary: Negative.    Neurological: Negative.  Negative for dizziness.   Psychiatric/Behavioral: Negative.     Allergic/Immunologic: Negative.      Objective:     Vital Signs (Most Recent):  Temp: 97.7 °F (36.5 °C) (07/28/25 0949)  Pulse: 100 (07/28/25 1439)  Resp: 10 (07/28/25 1319)  BP: 120/80 (07/28/25 1439)  SpO2: 95 % (07/28/25 1319) Vital Signs (24h Range):  Temp:  [97.7 °F (36.5 °C)] 97.7 °F (36.5 °C)  Pulse:  [] 100  Resp:  [10-32] 10  SpO2:  [90 %-96 %] 95 %  BP: ()/(63-90) 120/80     Weight: 86.2 kg (190 lb)  Body mass index is 27.26 kg/m².    SpO2: 95 %         Intake/Output Summary (Last 24 hours) at 7/28/2025 1441  Last data filed at 7/28/2025 1308  Gross per 24 hour   Intake 500 ml   Output --   Net 500 ml       Lines/Drains/Airways       Peripheral Intravenous Line  Duration             Peripheral IV Single Lumen 07/28/25 1009 20 G Left Antecubital <1 day                     Physical Exam  Constitutional:       General: He is not in acute distress.     Appearance: He is not diaphoretic.   HENT:      Head: Atraumatic.   Eyes:      General:         Right eye: No discharge.         Left eye: No discharge.   Cardiovascular:      Rate and Rhythm: Normal rate. Rhythm irregular.   Pulmonary:      Effort: Pulmonary effort is normal.      Breath sounds: Normal breath sounds. No rales.   Abdominal:      General: Bowel sounds are normal.      Palpations: Abdomen is soft.   Musculoskeletal:         General: No tenderness.      Right lower leg: No edema.      Left  "lower leg: No edema.   Skin:     General: Skin is warm and dry.   Neurological:      Mental Status: He is alert and oriented to person, place, and time.   Psychiatric:         Mood and Affect: Mood normal.         Behavior: Behavior normal.         Thought Content: Thought content normal.         Judgment: Judgment normal.          Significant Labs: BMP:   Recent Labs   Lab 07/28/25  1009   *      K 3.8      CO2 26   BUN 19   CREATININE 1.5*   CALCIUM 9.0   , CMP   Recent Labs   Lab 07/28/25  1009      K 3.8      CO2 26   *   BUN 19   CREATININE 1.5*   CALCIUM 9.0   PROT 7.4   ALBUMIN 4.0   BILITOT 0.6   ALKPHOS 75   AST 33   ALT 35   ANIONGAP 11   , CBC   Recent Labs   Lab 07/28/25  1009   WBC 9.89   HGB 17.4   HCT 51.2      , INR No results for input(s): "INR", "PROTIME" in the last 48 hours., Lipid Panel No results for input(s): "CHOL", "HDL", "LDLCALC", "TRIG", "CHOLHDL" in the last 48 hours., Troponin   Recent Labs   Lab 07/28/25  1009   TROPONINIHS <3   , and All pertinent lab results from the last 24 hours have been reviewed.    Significant Imaging: Echocardiogram: Transthoracic echo (TTE) complete (Cupid Only):   Results for orders placed or performed during the hospital encounter of 05/27/25   Echo   Result Value Ref Range    BSA 2.09 m2    Ellis's Biplane MOD Ejection Fraction 58 %    A2C EF 49 %    A4C EF 64 %    LVOT stroke volume 51.7 cm3    LVIDd 3.9 3.5 - 6.0 cm    LV Systolic Volume 30 mL    LV Systolic Volume Index 14.6 mL/m2    LVIDs 2.8 2.1 - 4.0 cm    LV ESV A2C 32.70 mL    LV Diastolic Volume 66 mL    LV ESV A4C 26.96 mL    LV Diastolic Volume Index 32.04 mL/m2    LV EDV A2C [id number] mL    LV EDV A4C 61.54 mL    Left Ventricular End Systolic Volume by Teichholz Method 29.82 mL    Left Ventricular End Diastolic Volume by Teichholz Method 65.95 mL    IVS 1.4 (A) 0.6 - 1.1 cm    LVOT diameter 2.2 cm    LVOT area 3.8 cm2    FS 28.2 28 - 44 % "    Left Ventricle Relative Wall Thickness 0.51 cm    PW 1.0 0.6 - 1.1 cm    LV mass 159.3 g    LV Mass Index 77.3 g/m2    MV Peak E Hammad 0.90 m/s    TDI LATERAL 0.08 m/s    TDI SEPTAL 0.06 m/s    E/E' ratio 13 m/s    TR Max Hammad 2.0 m/s    LV SEPTAL E/E' RATIO 15.0 m/s    LV LATERAL E/E' RATIO 11.3 m/s    LVOT peak hammad 0.9 m/s    Left Ventricular Outflow Tract Mean Velocity 0.72 cm/s    Left Ventricular Outflow Tract Mean Gradient 2.20 mmHg    RV- mejia basal diam 2.7 cm    RV S' 14.28 cm/s    TAPSE 2.0 cm    RV/LV Ratio 0.69 cm    LA Vol (MOD) 30 mL    LANA (MOD) 15 mL/m2    RA area length vol 23.36 mL    RA Area 11.7 cm2    RA vol index 11.34 mL/m2    RA Vol 25.02 mL    AV mean gradient 5 mmHg    AV peak gradient 9 mmHg    Ao peak hammad 1.5 m/s    Ao VTI 25.0 cm    LVOT peak VTI 13.6 cm    AV valve area 2.1 cm²    AV Velocity Ratio 0.60     AV index (prosthetic) 0.54     MALISSA by Velocity Ratio 2.3 cm²    Mr max hammad 3.18 m/s    Triscuspid Valve Regurgitation Peak Gradient 16 mmHg    Sinus 4.00 cm    ASI 1.9 cm/m2    STJ 3.4 cm    Ascending aorta 3.5 cm    ASI 1.7 cm/m2    Mean e' 0.07 m/s    ZLVIDS -2.43     ZLVIDD -4.67     LA area A4C 12.70 cm2    LA area A2C 14.39 cm2    TV resting pulmonary artery pressure 19 mmHg    RV TB RVSP 5 mmHg    Est. RA pres 3 mmHg    Narrative      Left Ventricle: Moderately increased wall thickness. There is moderate   concentric hypertrophy. Normal wall motion. There is normal systolic   function with a visually estimated ejection fraction of 55 - 60%.   Quantitated ejection fraction is 58%. Unable to assess diastolic function   due to atrial fibrillation. Normal left ventricular filling pressure.    Right Ventricle: The right ventricle is normal in size Systolic   function is normal.    Left Atrium: The left atrium is normal in size    Mitral Valve: There is mild regurgitation.    Tricuspid Valve: There is no significant regurgitation.    Aorta: The aortic root is mildly dilated  measuring 4.00 cm. The   ascending aorta is mildly dilated measuring 3.5 cm.    Pulmonary Artery: The estimated pulmonary artery systolic pressure is   19 mmHg.    IVC/SVC: Normal venous pressure at 3 mmHg.    Pericardium: There is no pericardial effusion.       Assessment and Plan:     * Acute pulmonary embolism  LLL segmental PE  Patient required to hold Eliquis x 3 D prior to LESI last week for chronic pain  US BLE to r/o DVT  Limited TTE to eval RV   Convert Eliquis to Lovenox for now in the event he will need thrombectomy, although low suspicion intervention will be required based on hemodynamic stability/ physical exam  Will convert back to Eliquis prior to DC  pBNP WNL, troponin negative, normotensive, on 2L NC with sat 95%, HR 90s-100s at present (AF).    Atrial fibrillation with RVR  Patient has paroxysmal (<7 days) atrial fibrillation. Patient is currently in atrial fibrillation. [id number] Score: 3. The patients heart rate in the last 24 hours is as follows:  Pulse  Min: 80  Max: 127     Antiarrhythmics  metoprolol succinate (TOPROL-XL) 24 hr tablet 200 mg, Daily, Oral    Anticoagulants  apixaban tablet 5 mg, 2 times daily, Oral    Plan  - Replete lytes with a goal of K>4, Mg >2  - Patient is anticoagulated, see medications listed above.  - Patient's afib is currently controlled  - If HR sustains >120, start Cardizem gtt  - Convert Eliquis to Lovenox in the event thrombectomy is indicated  - Keep appointment with Dr Smith as scheduled in 2 weeks for ablation consideration         Overweight (BMI 25.0-29.9)  Weight loss encouraged     ALLA (obstructive sleep apnea)  CPAP at     Essential hypertension  Patient's blood pressure range in the last 24 hours was: BP  Min: 95/64  Max: 158/77.The patient's inpatient anti-hypertensive regimen is listed below:  Current Antihypertensives  amLODIPine tablet 10 mg, Daily, Oral  hydrALAZINE tablet 25 mg, Every 8 hours, Oral  metoprolol succinate (TOPROL-XL) 24 hr  tablet 200 mg, Daily, Oral    Plan  - BP is controlled, no changes needed to their regimen        VTE Risk Mitigation (From admission, onward)           Ordered     apixaban tablet 5 mg  2 times daily         07/28/25 7570                    Thank you for your consult. I will follow-up with patient. Please contact us if you have any additional questions.    Ruslan Gallegos NP  Cardiology   [facility] - Emergency Dept

## 2025-07-28 NOTE — H&P
[facility]  [facility]    Patient Name: Partha Curtis Jr.  MRN: [id number]  Patient Class: OP- Observation  Admission Date: 7/28/2025  Attending Physician: Mary Ellen Mc*   Primary Care Provider: Rocco Cavazos DO         Patient information was obtained from patient, spouse/SO, past medical records, and ER records.     Subjective:     Principal Problem:Acute pulmonary embolism    Chief Complaint:   Chief Complaint   Patient presents with    Palpitations     Intermittent palpitations, chest pain, and shortness of breath that started ~Saturday. Hx afib, compliant with Rx. On Metoprolol and Eliquis.        HPI: Partha Curtis is a 65-year-old male past medical history of Afib, chronic back pain, hypertension , intermittent atrial fibrillation presenting secondary to palpitations and some difficulty catching his breath since yesterday at 2:00 a.m.. States that he is not have any chest pain and denies any shortness of breath just more difficulty catching breath has had to be cardioverted multiple times. Compliant with Eliquis. Compliant with his metoprolol. Compliant with all his medications. His cardiologist is Dr. Mcclelland. No recent fever chills runny nose cough congestion. Patient states that he had 1 alcoholic beverage on Saturday.  CTA chest PE study is concerning for a PE in the left lower lobe.  Cardiology was consulted.  He will be admitted to the hospital medicine service for further workup.    Past Medical History:   Diagnosis Date    Allergy     Carotid artery occlusion     Cataract     Chronic back pain     Colonic polyp     Genetic testing     MUTYH mutation-negative    Hyperlipidemia     Hypertension     Paroxysmal atrial fibrillation 02/28/2024    Sleep apnea     Type 2 diabetes mellitus with stage 3 chronic kidney disease, without long-term current use of insulin 04/02/2020       Past Surgical History:   Procedure Laterality Date    ANKLE SURGERY  Left     2    APPENDECTOMY      at age 20    ARTHROSCOPIC CHONDROPLASTY OF KNEE JOINT Left 08/13/2024    Procedure: ARTHROSCOPY, KNEE, WITH CHONDROPLASTY;  Surgeon: Kai Washington MD;  Location: [facility] OR;  Service: Orthopedics;  Laterality: Left;    ARTHROSCOPY, KNEE, WITH ABRASION ARTHROPLASTY OR MICROFRACTURE Left 08/13/2024    Procedure: ARTHROSCOPY, KNEE, WITH  MICROFRACTURE;  Surgeon: Kai Washington MD;  Location: [facility] OR;  Service: Orthopedics;  Laterality: Left;    ARTHROSCOPY,KNEE,WITH MENISCUS REPAIR Left 08/13/2024    Procedure: ARTHROSCOPY,KNEE,WITH MENISCUS REPAIR;  Surgeon: Kai Washington MD;  Location: [facility] OR;  Service: Orthopedics;  Laterality: Left;  NO REGIONAL BLOCK    CAROTID ENDARTERECTOMY Right 07/15/2015    CARPAL TUNNEL RELEASE Bilateral     COLONOSCOPY N/A 12/14/2018    Procedure: COLONOSCOPYSuprep;  Surgeon: Silver Selby MD;  Location: [facility] ENDO;  Service: Endoscopy;  Laterality: N/A;    COLONOSCOPY N/A 10/02/2024    Procedure: COLONOSCOPY;  Surgeon: Heath Morrow MD;  Location: [facility] ENDO;  Service: Endoscopy;  Laterality: N/A;  Ref by Leonor Oneill, pt has unopened Sutab, portal - PC  9/25/24-LVM for precall, portal-DS. 10/1/24 Pt. called and confirmed arrival time.EC    EPIDURAL STEROID INJECTION N/A 02/26/2024    Procedure: CERVICAL C7/T1 IL SELENA;  Surgeon: Gokul Fung MD;  Location: [facility] PAIN MGT;  Service: Pain Management;  Laterality: N/A;  [phone]  2 WK F/U SERGEI    EPIDURAL STEROID INJECTION N/A 05/23/2024    Procedure: LUMBAR L4/5 IL SELENA *ASPIRIN OTC* HOLD FOR 5 DAYS;  Surgeon: Gokul Fung MD;  Location: [facility] PAIN MGT;  Service: Pain Management;  Laterality: N/A;  [phone]  2 WK F/U NOHELIA    EPIDURAL STEROID INJECTION N/A 10/21/2024    Procedure: LUMBAR L5/S1 IL SELENA *ASPIRIN OTC* HOLD FOR 5 DAYS  **ANGELICA PT**;  Surgeon: Sofia Sheikh MD;  [facility];  Service: Pain Management;  Laterality: N/A;  [phone]  2 WK F/U NOHELIA     INJECTION OF ANESTHETIC AGENT AROUND NERVE N/A 12/23/2024    Procedure: LUMBAR L4.5 IL SELENA *ASPIRIN OTC* HOLD FOR 5 DAYS;  Surgeon: Gokul Fung MD;  Location: [facility] PAIN MGT;  Service: Pain Management;  Laterality: N/A;  2 WK F/U NOHELIA    INJECTION OF ANESTHETIC AGENT AROUND NERVE Bilateral 02/24/2025    Procedure: BLOCK, NERVE BILATERAL L3, 4, 5 MEDIAL BRANCH;  Surgeon: Gokul Fung MD;  Location: [facility] PAIN MGT;  Service: Pain Management;  Laterality: Bilateral;  2 WK F/U NOHELIA    INJECTION OF ANESTHETIC AGENT AROUND NERVE Bilateral 03/10/2025    Procedure: BLOCK, NERVE BILATERAL L3, L4, L5 MEDIAL BRANCH;  Surgeon: Gokul Fung MD;  Location: [facility] PAIN MGT;  Service: Pain Management;  Laterality: Bilateral;    INJECTION, SPINE, LUMBOSACRAL, TRANSFORAMINAL APPROACH Bilateral 7/24/2025    Procedure: LUMBAR TRANSFORAMINAL BILATERAL L5/S1 *ELIQUIS/ASPIRIN ECONSULT PENDING* *ANGELICA PT*;  Surgeon: Sofia Sheikh MD;  Location: [facility] PAIN MGT;  Service: Pain Management;  Laterality: Bilateral;  2 WK F/U SERGEI    JOINT REPLACEMENT  09/2010    NASAL SEPTUM SURGERY      RADIOFREQUENCY ABLATION Bilateral 03/27/2025    Procedure: RADIOFREQUENCY ABLATION BILATERAL L3, 4, 5;  Surgeon: Gokul Fung MD;  Location: [facility] PAIN MGT;  Service: Pain Management;  Laterality: Bilateral;    TOTAL KNEE ARTHROPLASTY Right     6 knee surgeries    TRANSESOPHAGEAL ECHOCARDIOGRAM WITH POSSIBLE CARDIOVERSION (ELIZABETH W/ POSS CARDIOVERSION) N/A 02/28/2024    Procedure: Transesophageal echo (ELIZABETH) intra-procedure log documentation;  Surgeon: Ahmet De La Cruz MD;  Location: [facility] CATH LAB/EP;  Service: Cardiology;  Laterality: N/A;    TRANSESOPHAGEAL ECHOCARDIOGRAM WITH POSSIBLE CARDIOVERSION (ELIZABETH W/ POSS CARDIOVERSION) N/A 5/28/2025    Procedure: Transesophageal echo (ELIZABETH) intra-procedure log documentation;  Surgeon: Valerio Jaquez MD;  Location: [facility] OR;  Service: Cardiology;  Laterality: N/A;       Review of patient's allergies indicates:  "  Allergen Reactions    Stadol [butorphanol tartrate] Rash     Swelling in face    Strawberries [strawberry] Rash       Current Facility-Administered Medications on File Prior to Encounter   Medication    0.9%  NaCl infusion    0.9%  NaCl infusion     Current Outpatient Medications on File Prior to Encounter   Medication Sig    amitriptyline (ELAVIL) 75 MG tablet Take 1 tablet by mouth in the evening    amLODIPine (NORVASC) 10 MG tablet Take 1 tablet (10 mg total) by mouth once daily.    apixaban (ELIQUIS) 5 mg Tab Take 1 tablet (5 mg total) by mouth 2 (two) times daily.    aspirin (ECOTRIN) 81 MG EC tablet Take 1 tablet (81 mg total) by mouth once daily.    azelastine (ASTELIN) 137 mcg (0.1 %) nasal spray 1 spray (137 mcg total) by Nasal route 2 (two) times daily.    BD LUER-RUSS SYRINGE 3 mL 25 gauge x 1" Syrg USE ONE SYRINGE EVERY 14 DAYS WITH TESTOSTERONE    candesartan (ATACAND) 32 MG tablet Take 1 tablet by mouth once daily    cyanocobalamin (VITAMIN B-12) 1000 MCG tablet Take 1 tablet (1,000 mcg total) by mouth once daily.    diazePAM (VALIUM) 10 MG Tab On call to MRI do not drive to or from MRI/procedure & for 24 hours    DULoxetine (CYMBALTA) 30 MG capsule Take 1 capsule by mouth once daily    ergocalciferol (ERGOCALCIFEROL) 50,000 unit Cap Take 1 capsule by mouth once a week    ezetimibe (ZETIA) 10 mg tablet Take 1 tablet by mouth once daily    fluticasone propionate (FLONASE) 50 mcg/actuation nasal spray 2 sprays by Each Nostril route.    gabapentin (NEURONTIN) 800 MG tablet Take 1 tablet by mouth in the evening    glimepiride (AMARYL) 2 MG tablet Take 1 tablet (2 mg total) by mouth before breakfast.    glucose 4 GM chewable tablet Take 4 tablets (16 g total) by mouth as needed for Low blood sugar.    hydrALAZINE (APRESOLINE) 25 MG tablet Take 1 tablet (25 mg total) by mouth every 8 (eight) hours.    hydrocortisone 2.5 % cream Apply to face twice daily for flares as needed    ipratropium (ATROVENT) 42 mcg " "(0.06 %) nasal spray 2 sprays by Nasal route 2 (two) times daily as needed for Rhinitis.    ketoconazole (NIZORAL) 2 % cream Apply twice daily to affected area of skin    metoprolol succinate (TOPROL-XL) 200 MG 24 hr tablet Take 1 tablet (200 mg total) by mouth once daily.    oxyCODONE-acetaminophen (PERCOCET)  mg per tablet Take 1 tablet by mouth every 12 (twelve) hours as needed for Pain.    rosuvastatin (CRESTOR) 40 MG Tab TAKE 1 TABLET BY MOUTH ONCE DAILY IN THE EVENING    semaglutide (OZEMPIC) 2 mg/dose (8 mg/3 mL) PnIj Inject 2 mg into the skin every 7 days.    syringe with needle, safety (BD INTEGRA SYRINGE) 3 mL 22 gauge x 1 1/2" Syrg Inject 1 Syringe as directed once a week.    testosterone cypionate (DEPOTESTOTERONE CYPIONATE) 200 mg/mL injection Inject 0.75 mLs (150 mg total) into the muscle every 14 (fourteen) days.    valACYclovir (VALTREX) 1000 MG tablet TAKE 2 TABLETS BY MOUTH EVERY 12 HOURS     Family History       Problem Relation (Age of Onset)    Aneurysm Other (48)    Brain cancer Mother (67), Paternal Grandfather (73)    Breast cancer Sister (58), Maternal Cousin (57)    Cancer Mother, Father, Maternal Grandfather    Genetic Disorder Sister    Hypertension Father    Lung cancer Father    Seizures Daughter    Ulcerative colitis Other          Tobacco Use    Smoking status: Never     Passive exposure: Never    Smokeless tobacco: Never   Substance and Sexual Activity    Alcohol use: Yes     Alcohol/week: 2.0 standard drinks of alcohol     Types: 2 Cans of beer per week     Comment: 6 beers per month    Drug use: Never    Sexual activity: Yes     Partners: Female     Birth control/protection: None     Review of Systems   Constitutional:  Positive for activity change. Negative for appetite change.   HENT:  Negative for tinnitus.    Respiratory:  Positive for shortness of breath.    Cardiovascular:  Positive for chest pain. Negative for palpitations and leg swelling.   Gastrointestinal:  " Negative for abdominal distention, abdominal pain, blood in stool, diarrhea, nausea and vomiting.   Genitourinary:  Negative for difficulty urinating and hematuria.   Musculoskeletal:  Negative for gait problem.   Skin:  Negative for rash and wound.   Neurological:  Negative for weakness.   Psychiatric/Behavioral:  Negative for confusion.      Objective:     Vital Signs (Most Recent):  Temp: 97.7 °F (36.5 °C) (07/28/25 0949)  Pulse: 100 (07/28/25 1439)  Resp: 10 (07/28/25 1319)  BP: 120/80 (07/28/25 1439)  SpO2: 95 % (07/28/25 1319) Vital Signs (24h Range):  Temp:  [97.7 °F (36.5 °C)] 97.7 °F (36.5 °C)  Pulse:  [] 100  Resp:  [10-32] 10  SpO2:  [90 %-96 %] 95 %  BP: ()/(63-90) 120/80     Weight: 86.2 kg (190 lb 0.6 oz)  Body mass index is 27.27 kg/m².     Physical Exam  Vitals and nursing note reviewed.   Constitutional:       Appearance: He is obese.   HENT:      Head: Normocephalic and atraumatic.      Mouth/Throat:      Mouth: Mucous membranes are moist.   Eyes:      Extraocular Movements: Extraocular movements intact.   Cardiovascular:      Rate and Rhythm: Normal rate and regular rhythm.      Pulses: Normal pulses.      Heart sounds: Normal heart sounds.   Pulmonary:      Effort: Pulmonary effort is normal.      Breath sounds: Normal breath sounds.   Abdominal:      General: Bowel sounds are normal. There is no distension.      Palpations: Abdomen is soft.      Tenderness: There is no abdominal tenderness. There is no guarding.   Musculoskeletal:         General: Normal range of motion.      Cervical back: Normal range of motion and neck supple.   Skin:     General: Skin is warm.      Capillary Refill: Capillary refill takes 2 to 3 seconds.   Neurological:      Mental Status: He is alert and oriented to person, place, and time.   Psychiatric:         Mood and Affect: Mood normal.         Behavior: Behavior normal.        sic        Significant Labs: All pertinent labs within the past 24 hours have  been reviewed.    Significant Imaging: I have reviewed all pertinent imaging results/findings within the past 24 hours.  Assessment/Plan:     Assessment & Plan  Acute pulmonary embolism  -consulting cardiology  -CTA chest concerning for PE  -bilateral lower extremity ultrasound pending  -patient has been compliant with his home dosing of Eliquis  -will convert Eliquis to Lovenox full dosing in the event he will need thrombectomy   Essential hypertension  Patient's blood pressure range in the last 24 hours was: BP  Min: 95/64  Max: 158/77.The patient's inpatient anti-hypertensive regimen is listed below:  Current Antihypertensives  amLODIPine tablet 10 mg, Daily, Oral  hydrALAZINE tablet 25 mg, Every 8 hours, Oral  metoprolol succinate (TOPROL-XL) 24 hr tablet 200 mg, Daily, Oral    Plan  -will resume home meds with hold parameters  Dyslipidemia  -resume home meds    BMI 27.0-27.9,adult  Body mass index is 27.27 kg/m². Morbid obesity complicates all aspects of disease management from diagnostic modalities to treatment. Weight loss encouraged and health benefits explained to patient.      Anxiety    -continue duloxetine 30 mg p.o. daily  ALLA (obstructive sleep apnea)  -chronic problem    Atrial fibrillation with RVR  Patient has persistent (7 days or more) atrial fibrillation. Patient is currently in atrial fibrillation. [id number] Score: 3. The patients heart rate in the last 24 hours is as follows:  Pulse  Min: 80  Max: 127     Antiarrhythmics  metoprolol succinate (TOPROL-XL) 24 hr tablet 200 mg, Daily, Oral    Anticoagulants  enoxaparin injection 90 mg, Every 12 hours, Subcutaneous    Plan  - Replete lytes with a goal of K>4, Mg >2  - Patient is anticoagulated, see medications listed above.  - Patient's afib is currently uncontrolled. Will continue current treatment  - consulted cardiology--cardiology's plan below  - Replete lytes with a goal of K>4, Mg >2  - Patient is anticoagulated, see medications listed  above.  - Patient's afib is currently controlled  - If HR sustains >120, start Cardizem gtt  - Convert Eliquis to Lovenox in the event thrombectomy is indicated  - Keep appointment with Dr Smith as scheduled in 2 weeks for ablation consideration       Anticoagulated  -continue Eliquis 5 mg p.o. b.i.d.  -holding Eliquis at this time and will start Lovenox full dose    VTE Risk Mitigation (From admission, onward)           Ordered     enoxaparin injection 90 mg  Every 12 hours         07/28/25 1535                                     On 07/28/2025, patient should be placed in hospital observation services under my care in collaboration with Dr. Mary Ellen Arias.           Naman Duvall NP  Department of Hospital Medicine  [place] - Emergency Dept

## 2025-07-28 NOTE — ASSESSMENT & PLAN NOTE
LLL segmental PE  Patient required to hold Eliquis x 3 D prior to LESI last week for chronic pain  US BLE to r/o DVT  Limited TTE to eval RV   Convert Eliquis to Lovenox for now in the event he will need thrombectomy, although low suspicion intervention will be required based on hemodynamic stability/ physical exam  Will convert back to Eliquis prior to DC  pBNP WNL, troponin negative, normotensive, on 2L NC with sat 95%, HR 90s-100s at present (AF).

## 2025-07-28 NOTE — ED PROVIDER NOTES
Encounter Date: 7/28/2025       History     Chief Complaint   Patient presents with    Palpitations     Intermittent palpitations, chest pain, and shortness of breath that started ~Saturday. Hx afib, compliant with Rx. On Metoprolol and Eliquis.       65-year-old male  past medical history  of Afib, chronic back pain, hypertension , intermittent atrial fibrillation presenting secondary to palpitations and some difficulty catching his breath since yesterday at 2:00 a.m..  States that he is not have any chest pain and denies any shortness of breath just more difficulty catching breath has had to be cardioverted  multiple times.  Compliant with Eliquis.  Compliant with his metoprolol.  Compliant with all his medications.  His cardiologist is Dr. Mcclelland.  No recent fever chills runny nose cough congestion.  Patient states that he had 1 alcoholic beverage on Saturday.  No change in his c        Review of patient's allergies indicates:   Allergen Reactions    Stadol [butorphanol tartrate] Rash     Swelling in face    Strawberries [strawberry] Rash     Past Medical History:   Diagnosis Date    Allergy     Carotid artery occlusion     Cataract     Chronic back pain     Colonic polyp     Genetic testing     MUTYH mutation-negative    Hyperlipidemia     Hypertension     Paroxysmal atrial fibrillation 02/28/2024    Sleep apnea     Type 2 diabetes mellitus with stage 3 chronic kidney disease, without long-term current use of insulin 04/02/2020     Past Surgical History:   Procedure Laterality Date    ANKLE SURGERY Left     2    APPENDECTOMY      at age 20    ARTHROSCOPIC CHONDROPLASTY OF KNEE JOINT Left 08/13/2024    Procedure: ARTHROSCOPY, KNEE, WITH CHONDROPLASTY;  Surgeon: Kai Washington MD;  Location: [facility];  Service: Orthopedics;  Laterality: Left;    ARTHROSCOPY, KNEE, WITH ABRASION ARTHROPLASTY OR MICROFRACTURE Left 08/13/2024    Procedure: ARTHROSCOPY, KNEE, WITH  MICROFRACTURE;  Surgeon: Kai Washington MD;   Location: [facility] OR;  Service: Orthopedics;  Laterality: Left;    ARTHROSCOPY,KNEE,WITH MENISCUS REPAIR Left 08/13/2024    Procedure: ARTHROSCOPY,KNEE,WITH MENISCUS REPAIR;  Surgeon: Kai Washington MD;  Location: [facility] OR;  Service: Orthopedics;  Laterality: Left;  NO REGIONAL BLOCK    CAROTID ENDARTERECTOMY Right 07/15/2015    CARPAL TUNNEL RELEASE Bilateral     COLONOSCOPY N/A 12/14/2018    Procedure: COLONOSCOPYSuprep;  Surgeon: Silver Selby MD;  Location: [facility] ENDO;  Service: Endoscopy;  Laterality: N/A;    COLONOSCOPY N/A 10/02/2024    Procedure: COLONOSCOPY;  Surgeon: Heath Morrow MD;  Location: [facility] ENDO;  Service: Endoscopy;  Laterality: N/A;  Ref by Leonor Oneill, pt has unopened Sutab, portal - PC  9/25/24-LVM for precall, portal-DS. 10/1/24 Pt. called and confirmed arrival time.EC    EPIDURAL STEROID INJECTION N/A 02/26/2024    Procedure: CERVICAL C7/T1 IL SELENA;  Surgeon: Gokul Fung MD;  Location: [facility] PAIN MGT;  Service: Pain Management;  Laterality: N/A;  [phone]  2 WK F/U SERGEI    EPIDURAL STEROID INJECTION N/A 05/23/2024    Procedure: LUMBAR L4/5 IL SELENA *ASPIRIN OTC* HOLD FOR 5 DAYS;  Surgeon: Gokul Fung MD;  Location: [facility] PAIN MGT;  Service: Pain Management;  Laterality: N/A;  [phone]  2 WK F/U NOHELIA    EPIDURAL STEROID INJECTION N/A 10/21/2024    Procedure: LUMBAR L5/S1 IL SELENA *ASPIRIN OTC* HOLD FOR 5 DAYS  **ANGELICA PT**;  Surgeon: Sofia Sheikh MD;  Location: [facility] PAIN MGT;  Service: Pain Management;  Laterality: N/A;  [phone]  2 WK F/U NOHELIA    INJECTION OF ANESTHETIC AGENT AROUND NERVE N/A 12/23/2024    Procedure: LUMBAR L4.5 IL SELENA *ASPIRIN OTC* HOLD FOR 5 DAYS;  Surgeon: Gokul Fung MD;  Location: [facility] PAIN MGT;  Service: Pain Management;  Laterality: N/A;  2 WK F/U NOHELIA    INJECTION OF ANESTHETIC AGENT AROUND NERVE Bilateral 02/24/2025    Procedure: BLOCK, NERVE BILATERAL L3, 4, 5 MEDIAL BRANCH;  Surgeon: Gokul Fung MD;  Location: [facility] PAIN MGT;   Service: Pain Management;  Laterality: Bilateral;  2 WK F/U NOHELIA    INJECTION OF ANESTHETIC AGENT AROUND NERVE Bilateral 03/10/2025    Procedure: BLOCK, NERVE BILATERAL L3, L4, L5 MEDIAL BRANCH;  Surgeon: Gokul Fung MD;  Location: [facility] PAIN MGT;  Service: Pain Management;  Laterality: Bilateral;    INJECTION, SPINE, LUMBOSACRAL, TRANSFORAMINAL APPROACH Bilateral 7/24/2025    Procedure: LUMBAR TRANSFORAMINAL BILATERAL L5/S1 *ELIQUIS/ASPIRIN ECONSULT PENDING* *ANGELICA PT*;  Surgeon: Sofia Sheikh MD;  Location: [facility] PAIN MGT;  Service: Pain Management;  Laterality: Bilateral;  2 WK F/U SERGEI    JOINT REPLACEMENT  09/2010    NASAL SEPTUM SURGERY      RADIOFREQUENCY ABLATION Bilateral 03/27/2025    Procedure: RADIOFREQUENCY ABLATION BILATERAL L3, 4, 5;  Surgeon: Gokul Fung MD;  Location: [facility] PAIN MGT;  Service: Pain Management;  Laterality: Bilateral;    TOTAL KNEE ARTHROPLASTY Right     6 knee surgeries    TRANSESOPHAGEAL ECHOCARDIOGRAM WITH POSSIBLE CARDIOVERSION (ELIZABETH W/ POSS CARDIOVERSION) N/A 02/28/2024    Procedure: Transesophageal echo (ELIZABETH) intra-procedure log documentation;  Surgeon: Ahmet De La Cruz MD;  Location: [facility] CATH LAB/EP;  Service: Cardiology;  Laterality: N/A;    TRANSESOPHAGEAL ECHOCARDIOGRAM WITH POSSIBLE CARDIOVERSION (ELIZABETH W/ POSS CARDIOVERSION) N/A 5/28/2025    Procedure: Transesophageal echo (ELIZABETH) intra-procedure log documentation;  Surgeon: Valerio Jaquez MD;  Location: [facility] OR;  Service: Cardiology;  Laterality: N/A;     Family History   Problem Relation Name Age of Onset    Brain cancer Mother Klarissa 67    Cancer Mother Klarissa     Hypertension Father Keanu     Lung cancer Father Keanu         x2 (PAUL initially- treated surgically only, then recurred distantly) (smoker, & had been exposed to many chemicals)    Cancer Father Keanu     Breast cancer Sister  58    Genetic Disorder Sister          monoallelic MUTYH mutation    Seizures Daughter      Cancer Maternal  Grandfather Valerio     Brain cancer Paternal Grandfather  73    Breast cancer Maternal Cousin  57    Aneurysm Other mgm's father 48        brain    Ulcerative colitis Other brother's son      Social History[1]  Review of Systems   Constitutional:  Negative for fever.   HENT:  Negative for sore throat.    Respiratory:  Positive for shortness of breath.    Cardiovascular:  Negative for chest pain.   Gastrointestinal:  Negative for nausea.   Genitourinary:  Negative for dysuria.   Musculoskeletal:  Negative for back pain.   Skin:  Negative for rash.   Neurological:  Negative for weakness.   Hematological:  Does not bruise/bleed easily.   All other systems reviewed and are negative.      Physical Exam     Initial Vitals [07/28/25 0949]   BP Pulse Resp Temp SpO2   (!) 137/90 (!) 114 18 97.7 °F (36.5 °C) 95 %      MAP       --         Physical Exam    Nursing note and vitals reviewed.  Constitutional: He appears well-developed and well-nourished.   HENT:   Head: Normocephalic and atraumatic. Mouth/Throat: Oropharynx is clear and moist.   Eyes: EOM are normal. Pupils are equal, round, and reactive to light.   Neck:   Normal range of motion.  Cardiovascular:              Irregularly irregular and tachycardic in the low 100s to 130s.   Pulmonary/Chest: Breath sounds normal. No stridor. No respiratory distress. He has no wheezes.   Abdominal: Abdomen is soft. Bowel sounds are normal. He exhibits no distension. There is no abdominal tenderness.   Musculoskeletal:         General: No tenderness or edema. Normal range of motion.      Cervical back: Normal range of motion.     Neurological: He is alert and oriented to person, place, and time. He has normal strength. GCS score is 15. GCS eye subscore is 4. GCS verbal subscore is 5. GCS motor subscore is 6.   Skin: Skin is warm and dry. Capillary refill takes less than 2 seconds.   Psychiatric: He has a normal mood and affect. Thought content normal.         ED Course    Procedures  Labs Reviewed   COMPREHENSIVE METABOLIC PANEL - Abnormal       Result Value    Sodium 137      Potassium 3.8      Chloride 100      CO2 26      Glucose 225 (*)     BUN 19      Creatinine 1.5 (*)     Calcium 9.0      Protein Total 7.4      Albumin 4.0      Bilirubin Total 0.6      ALP 75      AST 33      ALT 35      Anion Gap 11      eGFR 51 (*)    CBC WITH DIFFERENTIAL - Abnormal    WBC 9.89      RBC 5.56      HGB 17.4      HCT 51.2      MCV 92      MCH 31.3 (*)     MCHC 34.0      RDW 15.9 (*)     Platelet Count 247      MPV 9.3      Nucleated RBC 0      Neut % 64.4      Lymph % 19.9      Mono % 14.0      Eos % 0.9      Basophil % 0.3      Imm Grans % 0.5      Neut # 6.37      Lymph # 1.97      Mono # 1.38 (*)     Eos # 0.09      Baso # 0.03      Imm Grans # 0.05 (*)    URINALYSIS, REFLEX TO URINE CULTURE - Abnormal    Color, UA Yellow      Appearance, UA Clear      pH, UA 6.0      Spec Grav UA 1.010      Protein, UA Negative      Glucose, UA 4+ (*)     Ketones, UA Negative      Bilirubin, UA Negative      Blood, UA Negative      Nitrites, UA Negative      Urobilinogen, UA Negative      Leukocyte Esterase, UA Negative     TROPONIN I HIGH SENSITIVITY - Normal    Troponin High Sensitive <3     NT-PRO NATRIURETIC PEPTIDE - Normal    NT-proBNP 206      Narrative:     NOTE:  Access complete set of age - and/or gender-specific reference intervals for this test in the [facility] Collection Manual.   TSH - Normal    TSH 2.656     ALCOHOL,MEDICAL (ETHANOL) - Normal    Alcohol, Serum <10     DRUG SCREEN PANEL, URINE EMERGENCY - Normal    Benzodiazepine, Urine Negative      Methadone, Urine Negative      Cocaine, Urine Negative      Opiates, Urine Negative      Barbiturates, Urine Negative      Amphetamines, Urine Negative      THC Negative      Phencyclidine, Urine Negative      Urine Creatinine 101.9      Narrative:     This screen includes the following classes of drugs at the listed cut-off:  "    Benzodiazepines:        200 ng/ml   Methadone:              300 ng/ml   Cocaine metabolite:     300 ng/ml   Opiates:                300 ng/ml   Barbiturates:           200 ng/ml   Amphetamines:           1000 ng/ml   Marijuana metabs (THC): 50 ng/ml   Phencyclidine (PCP):    25 ng/ml     This is a screening test. If results do not correlate with clinical presentation, then a confirmatory send out test is advised.    This report is intended for use in clinical monitoring and management of patients. It is not intended for use in employment related drug testing."   TROPONIN I HIGH SENSITIVITY - Normal    Troponin High Sensitive 3     CBC W/ AUTO DIFFERENTIAL    Narrative:     The following orders were created for panel order CBC auto differential.  Procedure                               Abnormality         Status                     ---------                               -----------         ------                     CBC with Differential[[id number]]       Abnormal            Final result                 Please view results for these tests on the individual orders.   URINALYSIS MICROSCOPIC    RBC, UA 1      WBC, UA <1      Microscopic Comment       EXTRA TUBES    Narrative:     The following orders were created for panel order EXTRA TUBES.  Procedure                               Abnormality         Status                     ---------                               -----------         ------                     Light Blue Top Hold[[id number]]                             Final result                 Please view results for these tests on the individual orders.   LIGHT BLUE TOP HOLD    Extra Tube Hold for add-ons.     POCT GLUCOSE, HAND-HELD DEVICE     EKG Readings: (Independently Interpreted)     EKG showing   Afib with RVR with a rate of 109.  No ST-elevation major T-wave abnormality.  Old septal infarct. Normal axis QRS.  Abnormal EKG     ECG Results              EKG 12-lead (In process)        Collection Time " Result Time QRS Duration [facility] QTC Calculation    07/28/25 10:02:31 07/28/25 11:57:38 78 [id number]                     In process by Interface, Lab In [facility] (07/28/25 11:57:44)                   Narrative:    Test Reason : R06.02,    Vent. Rate : 116 BPM     Atrial Rate : 120 BPM     P-R Int :    ms          QRS Dur :  78 ms      QT Int : 276 ms       P-R-T Axes :    -17  45 degrees    QTcB Int : 383 ms    Atrial fibrillation with rapid ventricular response  Low voltage QRS  Cannot rule out Anterior infarct (cited on or before 21-Jun-2022)  Abnormal ECG  When compared with ECG of 28-Jul-2025 09:48,  Questionable change in initial forces of Septal leads    Referred By: AAAREFERRAL SELF           Confirmed By:                                      EKG 12-lead (In process)        Collection Time Result Time QRS Duration [facility] QTC Calculation    07/28/25 09:48:23 07/28/25 11:57:34 88 [id number]                     In process by Interface, Lab In [facility] (07/28/25 11:57:42)                   Narrative:    Test Reason : R00.2,    Vent. Rate : 109 BPM     Atrial Rate :    BPM     P-R Int :    ms          QRS Dur :  88 ms      QT Int : 314 ms       P-R-T Axes :      4  44 degrees    QTcB Int : 422 ms    Atrial fibrillation with rapid ventricular response  Septal infarct (cited on or before 21-Jun-2022)  Abnormal ECG  When compared with ECG of 20-Jun-2025 10:18,  Atrial fibrillation has replaced Sinus rhythm  Vent. rate has increased by  45 bpm  Criteria for Inferior infarct are no longer Present  Questionable change in initial forces of Lateral leads    Referred By: AAAREFERRAL SELF           Confirmed By:                                   Imaging Results              US Lower Extremity Veins Bilateral (Final result)  Result time 07/28/25 15:27:12      Final result by Gerardo Blandon MD (07/28/25 15:27:12)                   Impression:      No evidence of deep venous thrombosis in either lower extremity.      Electronically signed by: Gerardo  Shahnaz  Date:    07/28/2025  Time:    15:27               Narrative:    EXAMINATION:  US LOWER EXTREMITY VEINS BILATERAL    CLINICAL HISTORY:  pe;    TECHNIQUE:  Duplex and color flow Doppler and dynamic compression was performed of the bilateral lower extremity veins was performed.    COMPARISON:  None    FINDINGS:  Right thigh veins: The common femoral, femoral, popliteal, upper greater saphenous, and deep femoral veins are patent and free of thrombus. The veins are normally compressible and have normal phasic flow and augmentation response.    Right calf veins: The visualized calf veins are patent.    Left thigh veins: The common femoral, femoral, popliteal, upper greater saphenous, and deep femoral veins are patent and free of thrombus. The veins are normally compressible and have normal phasic flow and augmentation response.    Left calf veins: The visualized calf veins are patent.    Miscellaneous: None                                        CTA Chest Non-Coronary (PE Studies) (Final result)  Result time 07/28/25 13:26:28      Final result by Luis Armando Briseno MD (07/28/25 13:26:28)                   Impression:      1. Suspected pulmonary thromboembolism in a left lower lobe pulmonary arterial segmental branch.  2. Other incidental/nonemergent findings in the body of the report.  This report was flagged in Epic as abnormal.    COMMUNICATION  This critical result was discovered/received at 13:20 hours.  The critical information above was relayed directly by me via [employer] secure chat with confirmation to Dr. Miguel Valentine in the emergency department on 07/28/2025 at 13:25 hours.      Electronically signed by: Luis Armando Briseno MD  Date:    07/28/2025  Time:    13:26               Narrative:    EXAMINATION:  CTA CHEST NON CORONARY (PE STUDIES)    CLINICAL HISTORY:  Pulmonary embolism (PE) suspected, high prob;    TECHNIQUE:  Following the intravenous administration of 75 cc of Omnipaque 350 contrast material, 1.25 mm  contiguous axial images were acquired through the chest utilizing the CT pulmonary angiogram protocol.  2-D coronal and sagittal reconstructed images of the chest and sagittal oblique MIP images of the pulmonary arterial system were generated from the source data.    COMPARISON:  Chest radiograph same day and 01/09/2023, MRI lumbar spine 06/28/2025    FINDINGS:  Respiratory motion and beam hardening with streak artifact from overlying monitoring leads and contrast bolus in the SVC somewhat limits evaluation.    Pulmonary arterial system: Pulmonary arteries distribute normally.  Four main pulmonary veins draining to the left atrium.  Small filling defect seen within proximal segmental pulmonary arterial branch to the left lower lobe best seen on axial images 192 through 212 of series 3.  No large saddle pulmonary embolism, enlargement of the main pulmonary artery, or secondary findings of right ventricular strain.    Aorta and heart: The heart is normal in size.  No pericardial fluid.  Coronary arterial calcifications noted.  No cardiac thrombus seen.  Three vessel left-sided arch.  No thoracic aortic aneurysm.  No thoracic aortic dissection.    Airways: Unremarkable.    Mediastinum/Lymph nodes: No pathologically enlarged lymph nodes in the chest or axilla.  Esophagus is normal in course and caliber.    Lungs: Well expanded.  Azygous lobe configuration.  Mild dependent atelectasis.  No consolidation.  No concerning pulmonary nodules.    Pleura: No significant volume of pleural fluid or pneumothorax.  No pleural based masses.    Structures at the base of the neck are within normal limits.  Extrathoracic soft tissues are within normal limits.    Visualized upper abdominal soft tissues: No acute/significant abnormalities appreciated.    Bones: There is age-related minimal to mild degenerative change of the thoracic spine.  No acute or destructive osseous process seen.                                       X-Ray Chest AP  Portable (Final result)  Result time 07/28/25 10:39:53      Final result by Michael Fierro MD (07/28/25 10:39:53)                   Impression:      See above      Electronically signed by: Michael Fierro MD  Date:    07/28/2025  Time:    10:39               Narrative:    EXAMINATION:  XR CHEST AP PORTABLE    CLINICAL HISTORY:  CHF;    TECHNIQUE:  Single frontal view of the chest was performed.    COMPARISON:  No 02/27/2024 ne    FINDINGS:  Heart size normal.  No significant airspace consolidation or pleural effusion identified.  Old rib fractures noted on the right side as before .                                       Medications   amitriptyline tablet 75 mg (has no administration in time range)   amLODIPine tablet 10 mg (has no administration in time range)   aspirin EC tablet 81 mg (81 mg Oral Given 7/28/25 1440)   azelastine 137 mcg (0.1 %) nasal spray 137 mcg (has no administration in time range)   gabapentin capsule 800 mg (has no administration in time range)   hydrALAZINE tablet 25 mg (25 mg Oral Given 7/28/25 1440)   metoprolol succinate (TOPROL-XL) 24 hr tablet 200 mg (200 mg Oral Given 7/28/25 1439)   atorvastatin tablet 80 mg (has no administration in time range)   DULoxetine DR capsule 30 mg (30 mg Oral Given 7/28/25 1439)   ezetimibe tablet 10 mg (10 mg Oral Given 7/28/25 1440)   sodium chloride 0.9% flush 10 mL (has no administration in time range)   albuterol-ipratropium 2.5 mg-0.5 mg/3 mL nebulizer solution 3 mL (has no administration in time range)   melatonin tablet 6 mg (has no administration in time range)   ondansetron disintegrating tablet 8 mg (has no administration in time range)   ondansetron injection 4 mg (has no administration in time range)   senna-docusate 8.6-50 mg per tablet 1 tablet (has no administration in time range)   simethicone chewable tablet 80 mg (has no administration in time range)   aluminum-magnesium hydroxide-simethicone 200-200-20 mg/5 mL suspension 30 mL (has no  administration in time range)   acetaminophen tablet 650 mg (has no administration in time range)   glucose chewable tablet 24 g (has no administration in time range)   dextrose 50% injection 12.5 g (has no administration in time range)   dextrose 50% injection 25 g (has no administration in time range)   glucagon (human recombinant) injection 1 mg (has no administration in time range)   glucose chewable tablet 16 g (has no administration in time range)   insulin aspart U-100 pen 0-5 Units (has no administration in time range)   enoxaparin injection 90 mg (has no administration in time range)   diltiaZEM injection 10 mg (10 mg Intravenous Given 7/28/25 1040)   sodium chloride 0.9% bolus 500 mL 500 mL (0 mLs Intravenous Stopped 7/28/25 1308)   iohexoL (OMNIPAQUE 350) injection 100 mL (100 mLs Intravenous Given 7/28/25 1249)   perflutren protein-A microsphr 0.22 mg/mL IV susp (0.11 mg Intravenous Given 7/28/25 1430)     Medical Decision Making   Differential diagnosis includes but not including 2 new onset Afib with RVR, PE, ACS, arrhythmia, pneumonia, fluid overload, bronchitis     65-year-old male presenting secondary to paroxysmal Afib and tachycardic.  O2 sats started slowly decreasing down to 90%.  Ambulated patient became markedly tachycardic and more tachypneic.  Get CTA which showed PE.  Patient is compliant with his Eliquis.  Did contact Cardiology.  Patient already on anticoagulation.  I spoke with Hospital Medicine regarding adding heparin and they say they will contact Cardiology to get specific recommendations.  Placed on oxygen.  Admitted further workup management. Patient was given diltiazem IV which markedly improved his heart rate.    Please put in 35 minutes of critical care due to patient having a high risk of respiratory failure.   Separate from teaching and exclusive of procedure and ekg time  Includes:  Time at bedside  Time reviewing test results  Time discussing case with staff  Time  documenting the medical record  Time spent with family members  Time spent with consults  Management      Amount and/or Complexity of Data Reviewed  Labs: ordered.  Radiology: ordered.    Risk  OTC drugs.  Prescription drug management.                                          Clinical Impression:  Final diagnoses:  [R00.2] Palpitations  [R06.02] Shortness of breath  [R07.9] Chest pain  [I48.91] Atrial fibrillation with RVR (Primary)  [I26.99] Pulmonary embolism, unspecified chronicity, unspecified pulmonary embolism type, unspecified whether acute cor pulmonale present          ED Disposition Condition    Observation                       [1]   Social History  Tobacco Use    Smoking status: Never     Passive exposure: Never    Smokeless tobacco: Never   Substance Use Topics    Alcohol use: Yes     Alcohol/week: 2.0 standard drinks of alcohol     Types: 2 Cans of beer per week     Comment: 6 beers per month    Drug use: Never        Javier Valentine MD  07/28/25 [id number]

## 2025-07-28 NOTE — ASSESSMENT & PLAN NOTE
-continue Eliquis 5 mg p.o. b.i.d.  -holding Eliquis at this time and will start Lovenox full dose

## 2025-07-28 NOTE — HPI
Partha Curtis is a 65-year-old male past medical history of Afib, chronic back pain, hypertension , intermittent atrial fibrillation presenting secondary to palpitations and some difficulty catching his breath since yesterday at 2:00 a.m.. States that he is not have any chest pain and denies any shortness of breath just more difficulty catching breath has had to be cardioverted multiple times. Compliant with Eliquis. Compliant with his metoprolol. Compliant with all his medications. His cardiologist is Dr. Mcclelland. No recent fever chills runny nose cough congestion. Patient states that he had 1 alcoholic beverage on Saturday.  CTA chest PE study is concerning for a PE in the left lower lobe.  Cardiology was consulted.  He will be admitted to the hospital medicine service for further workup.

## 2025-07-28 NOTE — ASSESSMENT & PLAN NOTE
Patient has paroxysmal (<7 days) atrial fibrillation. Patient is currently in atrial fibrillation. [id number] Score: 3. The patients heart rate in the last 24 hours is as follows:  Pulse  Min: 80  Max: 127     Antiarrhythmics  metoprolol succinate (TOPROL-XL) 24 hr tablet 200 mg, Daily, Oral    Anticoagulants  apixaban tablet 5 mg, 2 times daily, Oral    Plan  - Replete lytes with a goal of K>4, Mg >2  - Patient is anticoagulated, see medications listed above.  - Patient's afib is currently controlled  - If HR sustains >120, start Cardizem gtt  - Convert Eliquis to Lovenox in the event thrombectomy is indicated  - Keep appointment with Dr Smith as scheduled in 2 weeks for ablation consideration

## 2025-07-28 NOTE — HPI
65-year-old male with HLD, PAF, CEA, DM, HTN, ALLA, CKD, chronic pain. Patient presented to the ED with CP, SOB, Palpitations which began yesterday ~2 AM. Symptoms resolved and he was able to go to the Movies with his wife and enjoy a day out of the house. Symptoms recurred this AM prompting him to proceed to the ED. Patient denies edema, SOB at present, syncope, dizziness, leg pain. He reports feeling better since presenting to the ED. Of note, patient had to hold his Eliquis for 3 days prior to a lumbar SELENA last week but has otherwise taken regularly. Patient noted to be in AFRVR in the ED which improved with IV Cardizem. Small PE suspected to LLL segmental branch. Troponin negative, pBNP negative, On 2L NC without hypoxia/tachycardia/hypotension. Limited TTE pending to evaluate RV function as well as venous US of BLE to r/o DVT. He has an appointment with EP scheduled for 08/13/2025.

## 2025-07-28 NOTE — ASSESSMENT & PLAN NOTE
-consulting cardiology  -CTA chest concerning for PE  -bilateral lower extremity ultrasound pending  -patient has been compliant with his home dosing of Eliquis  -will convert Eliquis to Lovenox full dosing in the event he will need thrombectomy

## 2025-07-28 NOTE — ASSESSMENT & PLAN NOTE
Body mass index is 27.27 kg/m². Morbid obesity complicates all aspects of disease management from diagnostic modalities to treatment. Weight loss encouraged and health benefits explained to patient.

## 2025-07-28 NOTE — SUBJECTIVE & OBJECTIVE
Past Medical History:   Diagnosis Date    Allergy     Carotid artery occlusion     Cataract     Chronic back pain     Colonic polyp     Genetic testing     MUTYH mutation-negative    Hyperlipidemia     Hypertension     Paroxysmal atrial fibrillation 02/28/2024    Sleep apnea     Type 2 diabetes mellitus with stage 3 chronic kidney disease, without long-term current use of insulin 04/02/2020       Past Surgical History:   Procedure Laterality Date    ANKLE SURGERY Left     2    APPENDECTOMY      at age 20    ARTHROSCOPIC CHONDROPLASTY OF KNEE JOINT Left 08/13/2024    Procedure: ARTHROSCOPY, KNEE, WITH CHONDROPLASTY;  Surgeon: Kai Washington MD;  Location: [facility] OR;  Service: Orthopedics;  Laterality: Left;    ARTHROSCOPY, KNEE, WITH ABRASION ARTHROPLASTY OR MICROFRACTURE Left 08/13/2024    Procedure: ARTHROSCOPY, KNEE, WITH  MICROFRACTURE;  Surgeon: Kai Washington MD;  Location: [facility] OR;  Service: Orthopedics;  Laterality: Left;    ARTHROSCOPY,KNEE,WITH MENISCUS REPAIR Left 08/13/2024    Procedure: ARTHROSCOPY,KNEE,WITH MENISCUS REPAIR;  Surgeon: Kai Washington MD;  Location: [facility] OR;  Service: Orthopedics;  Laterality: Left;  NO REGIONAL BLOCK    CAROTID ENDARTERECTOMY Right 07/15/2015    CARPAL TUNNEL RELEASE Bilateral     COLONOSCOPY N/A 12/14/2018    Procedure: COLONOSCOPYSuprep;  Surgeon: Silver Selby MD;  Location: [facility];  Service: Endoscopy;  Laterality: N/A;    COLONOSCOPY N/A 10/02/2024    Procedure: COLONOSCOPY;  Surgeon: Heath Morrow MD;  Location: [facility] ENDO;  Service: Endoscopy;  Laterality: N/A;  [facility] has unopened Sutab, portal - PC  9/25/24-LVM for precall, portal-DS. 10/1/24 Pt. called and confirmed arrival time.EC    EPIDURAL STEROID INJECTION N/A 02/26/2024    Procedure: CERVICAL C7/T1 IL SELENA;  Surgeon: Gokul Fung MD;  Location: [facility] PAIN MGT;  Service: Pain Management;  Laterality: N/A;  [phone]  2 WK F/U SERGEI    EPIDURAL STEROID INJECTION  N/A 05/23/2024    Procedure: LUMBAR L4/5 IL SELENA *ASPIRIN OTC* HOLD FOR 5 DAYS;  Surgeon: Gokul Fung MD;  Location: [facility] PAIN MGT;  Service: Pain Management;  Laterality: N/A;  [phone]  2 WK F/U NOHELIA    EPIDURAL STEROID INJECTION N/A 10/21/2024    Procedure: LUMBAR L5/S1 IL SELENA *ASPIRIN OTC* HOLD FOR 5 DAYS  **EISSA PT**;  Surgeon: Sofia Sheikh MD;  Location: [facility] PAIN [facility]GT;  Service: Pain Management;  Laterality: N/A;  [phone]  2 WK F/U NOHELIA    INJECTION OF ANESTHETIC AGENT AROUND NERVE N/A 12/23/2024    Procedure: LUMBAR L4.5 IL SELENA *ASPIRIN OTC* HOLD FOR 5 DAYS;  Surgeon: Gokul Fung MD;  Location: [facility] PAIN [facility]GT;  Service: Pain Management;  Laterality: N/A;  2 WK F/U NOHELIA    INJECTION OF ANESTHETIC AGENT AROUND NERVE Bilateral 02/24/2025    Procedure: BLOCK, NERVE BILATERAL L3, 4, 5 MEDIAL BRANCH;  Surgeon: Gokul Fung MD;  Location: [facility] PAIN [facility]GT;  Service: Pain Management;  Laterality: Bilateral;  2 WK F/U NOHELIA    INJECTION OF ANESTHETIC AGENT AROUND NERVE Bilateral 03/10/2025    Procedure: BLOCK, NERVE BILATERAL L3, L4, L5 MEDIAL BRANCH;  Surgeon: Gokul Fung MD;  Location: [facility];  Service: Pain Management;  Laterality: Bilateral;    INJECTION, SPINE, LUMBOSACRAL, TRANSFORAMINAL APPROACH Bilateral 7/24/2025    Procedure: LUMBAR TRANSFORAMINAL BILATERAL L5/S1 *ELIQUIS/ASPIRIN ECONSULT PENDING* *EISSA PT*;  Surgeon: Sofia Sheikh MD;  Location: [facility]GT;  Service: Pain Management;  Laterality: Bilateral;  2 WK F/U SERGEI    JOINT REPLACEMENT  09/2010    NASAL SEPTUM SURGERY      RADIOFREQUENCY ABLATION Bilateral 03/27/2025    Procedure: RADIOFREQUENCY ABLATION BILATERAL L3, 4, 5;  Surgeon: Gokul Fung MD;  Location: [facility];  Service: Pain Management;  Laterality: Bilateral;    TOTAL KNEE ARTHROPLASTY Right     6 knee surgeries    TRANSESOPHAGEAL ECHOCARDIOGRAM WITH POSSIBLE CARDIOVERSION (ELIZABETH W/ POSS CARDIOVERSION) N/A 02/28/2024    Procedure: Transesophageal echo (ELIZABETH)  "intra-procedure log documentation;  Surgeon: Ahmet De La Cruz MD;  Location: [facility] CATH LAB/EP;  Service: Cardiology;  Laterality: N/A;    TRANSESOPHAGEAL ECHOCARDIOGRAM WITH POSSIBLE CARDIOVERSION (ELIZABETH W/ POSS CARDIOVERSION) N/A 5/28/2025    Procedure: Transesophageal echo (ELIZABETH) intra-procedure log documentation;  Surgeon: Valerio Jaquez MD;  Location: [facility] OR;  Service: Cardiology;  Laterality: N/A;       Review of patient's allergies indicates:   Allergen Reactions    Stadol [butorphanol tartrate] Rash     Swelling in face    Strawberries [strawberry] Rash       Current Facility-Administered Medications on File Prior to Encounter   Medication    0.9%  NaCl infusion    0.9%  NaCl infusion     Current Outpatient Medications on File Prior to Encounter   Medication Sig    amitriptyline (ELAVIL) 75 MG tablet Take 1 tablet by mouth in the evening    amLODIPine (NORVASC) 10 MG tablet Take 1 tablet (10 mg total) by mouth once daily.    apixaban (ELIQUIS) 5 mg Tab Take 1 tablet (5 mg total) by mouth 2 (two) times daily.    aspirin (ECOTRIN) 81 MG EC tablet Take 1 tablet (81 mg total) by mouth once daily.    azelastine (ASTELIN) 137 mcg (0.1 %) nasal spray 1 spray (137 mcg total) by Nasal route 2 (two) times daily.    BD LUER-RUSS SYRINGE 3 mL 25 gauge x 1" Syrg USE ONE SYRINGE EVERY 14 DAYS WITH TESTOSTERONE    candesartan (ATACAND) 32 MG tablet Take 1 tablet by mouth once daily    cyanocobalamin (VITAMIN B-12) 1000 MCG tablet Take 1 tablet (1,000 mcg total) by mouth once daily.    diazePAM (VALIUM) 10 MG Tab On call to MRI do not drive to or from MRI/procedure & for 24 hours    DULoxetine (CYMBALTA) 30 MG capsule Take 1 capsule by mouth once daily    ergocalciferol (ERGOCALCIFEROL) 50,000 unit Cap Take 1 capsule by mouth once a week    ezetimibe (ZETIA) 10 mg tablet Take 1 tablet by mouth once daily    fluticasone propionate (FLONASE) 50 mcg/actuation nasal spray 2 sprays by Each Nostril route.    gabapentin " "(NEURONTIN) 800 MG tablet Take 1 tablet by mouth in the evening    glimepiride (AMARYL) 2 MG tablet Take 1 tablet (2 mg total) by mouth before breakfast.    glucose 4 GM chewable tablet Take 4 tablets (16 g total) by mouth as needed for Low blood sugar.    hydrALAZINE (APRESOLINE) 25 MG tablet Take 1 tablet (25 mg total) by mouth every 8 (eight) hours.    hydrocortisone 2.5 % cream Apply to face twice daily for flares as needed    ipratropium (ATROVENT) 42 mcg (0.06 %) nasal spray 2 sprays by Nasal route 2 (two) times daily as needed for Rhinitis.    ketoconazole (NIZORAL) 2 % cream Apply twice daily to affected area of skin    metoprolol succinate (TOPROL-XL) 200 MG 24 hr tablet Take 1 tablet (200 mg total) by mouth once daily.    oxyCODONE-acetaminophen (PERCOCET)  mg per tablet Take 1 tablet by mouth every 12 (twelve) hours as needed for Pain.    rosuvastatin (CRESTOR) 40 MG Tab TAKE 1 TABLET BY MOUTH ONCE DAILY IN THE EVENING    semaglutide (OZEMPIC) 2 mg/dose (8 mg/3 mL) PnIj Inject 2 mg into the skin every 7 days.    syringe with needle, safety (BD INTEGRA SYRINGE) 3 mL 22 gauge x 1 1/2" Syrg Inject 1 Syringe as directed once a week.    testosterone cypionate (DEPOTESTOTERONE CYPIONATE) 200 mg/mL injection Inject 0.75 mLs (150 mg total) into the muscle every 14 (fourteen) days.    valACYclovir (VALTREX) 1000 MG tablet TAKE 2 TABLETS BY MOUTH EVERY 12 HOURS     Family History       Problem Relation (Age of Onset)    Aneurysm Other (48)    Brain cancer Mother (67), Paternal Grandfather (73)    Breast cancer Sister (58), Maternal Cousin (57)    Cancer Mother, Father, Maternal Grandfather    Genetic Disorder Sister    Hypertension Father    Lung cancer Father    Seizures Daughter    Ulcerative colitis Other          Tobacco Use    Smoking status: Never     Passive exposure: Never    Smokeless tobacco: Never   Substance and Sexual Activity    Alcohol use: Yes     Alcohol/week: 2.0 standard drinks of alcohol "     Types: 2 Cans of beer per week     Comment: 6 beers per month    Drug use: Never    Sexual activity: Yes     Partners: Female     Birth control/protection: None     Review of Systems   Constitutional: Positive for malaise/fatigue. Negative for diaphoresis.   HENT: Negative.     Eyes: Negative.    Cardiovascular:  Positive for chest pain, dyspnea on exertion and irregular heartbeat. Negative for leg swelling, near-syncope, orthopnea, palpitations, paroxysmal nocturnal dyspnea and syncope.   Respiratory:  Positive for shortness of breath.    Endocrine: Negative.    Hematologic/Lymphatic: Negative.    Skin: Negative.    Gastrointestinal:  Negative for nausea and vomiting.   Genitourinary: Negative.    Neurological: Negative.  Negative for dizziness.   Psychiatric/Behavioral: Negative.     Allergic/Immunologic: Negative.      Objective:     Vital Signs (Most Recent):  Temp: 97.7 °F (36.5 °C) (07/28/25 0949)  Pulse: 100 (07/28/25 1439)  Resp: 10 (07/28/25 1319)  BP: 120/80 (07/28/25 1439)  SpO2: 95 % (07/28/25 1319) Vital Signs (24h Range):  Temp:  [97.7 °F (36.5 °C)] 97.7 °F (36.5 °C)  Pulse:  [] 100  Resp:  [10-32] 10  SpO2:  [90 %-96 %] 95 %  BP: ()/(63-90) 120/80     Weight: 86.2 kg (190 lb)  Body mass index is 27.26 kg/m².    SpO2: 95 %         Intake/Output Summary (Last 24 hours) at 7/28/2025 1441  Last data filed at 7/28/2025 1308  Gross per 24 hour   Intake 500 ml   Output --   Net 500 ml       Lines/Drains/Airways       Peripheral Intravenous Line  Duration             Peripheral IV Single Lumen 07/28/25 1009 20 G Left Antecubital <1 day                     Physical Exam  Constitutional:       General: He is not in acute distress.     Appearance: He is not diaphoretic.   HENT:      Head: Atraumatic.   Eyes:      General:         Right eye: No discharge.         Left eye: No discharge.   Cardiovascular:      Rate and Rhythm: Normal rate. Rhythm irregular.   Pulmonary:      Effort: Pulmonary  "effort is normal.      Breath sounds: Normal breath sounds. No rales.   Abdominal:      General: Bowel sounds are normal.      Palpations: Abdomen is soft.   Musculoskeletal:         General: No tenderness.      Right lower leg: No edema.      Left lower leg: No edema.   Skin:     General: Skin is warm and dry.   Neurological:      Mental Status: He is alert and oriented to person, place, and time.   Psychiatric:         Mood and Affect: Mood normal.         Behavior: Behavior normal.         Thought Content: Thought content normal.         Judgment: Judgment normal.          Significant Labs: BMP:   Recent Labs   Lab 07/28/25  1009   *      K 3.8      CO2 26   BUN 19   CREATININE 1.5*   CALCIUM 9.0   , CMP   Recent Labs   Lab 07/28/25  1009      K 3.8      CO2 26   *   BUN 19   CREATININE 1.5*   CALCIUM 9.0   PROT 7.4   ALBUMIN 4.0   BILITOT 0.6   ALKPHOS 75   AST 33   ALT 35   ANIONGAP 11   , CBC   Recent Labs   Lab 07/28/25  1009   WBC 9.89   HGB 17.4   HCT 51.2      , INR No results for input(s): "INR", "PROTIME" in the last 48 hours., Lipid Panel No results for input(s): "CHOL", "HDL", "LDLCALC", "TRIG", "CHOLHDL" in the last 48 hours., Troponin   Recent Labs   Lab 07/28/25  1009   TROPONINIHS <3   , and All pertinent lab results from the last 24 hours have been reviewed.    Significant Imaging: Echocardiogram: Transthoracic echo (TTE) complete (Cupid Only):   Results for orders placed or performed during the hospital encounter of 05/27/25   Echo   Result Value Ref Range    BSA 2.09 m2    Ellis's Biplane MOD Ejection Fraction 58 %    A2C EF 49 %    A4C EF 64 %    LVOT stroke volume 51.7 cm3    LVIDd 3.9 3.5 - 6.0 cm    LV Systolic Volume 30 mL    LV Systolic Volume Index 14.6 mL/m2    LVIDs 2.8 2.1 - 4.0 cm    LV ESV A2C 32.70 mL    LV Diastolic Volume 66 mL    LV ESV A4C 26.96 mL    LV Diastolic Volume Index 32.04 mL/m2    LV EDV A2C [id number] mL    LV " EDV A4C 61.54 mL    Left Ventricular End Systolic Volume by Teichholz Method 29.82 mL    Left Ventricular End Diastolic Volume by Teichholz Method 65.95 mL    IVS 1.4 (A) 0.6 - 1.1 cm    LVOT diameter 2.2 cm    LVOT area 3.8 cm2    FS 28.2 28 - 44 %    Left Ventricle Relative Wall Thickness 0.51 cm    PW 1.0 0.6 - 1.1 cm    LV mass 159.3 g    LV Mass Index 77.3 g/m2    MV Peak E Hammad 0.90 m/s    TDI LATERAL 0.08 m/s    TDI SEPTAL 0.06 m/s    E/E' ratio 13 m/s    TR Max Hammad 2.0 m/s    LV SEPTAL E/E' RATIO 15.0 m/s    LV LATERAL E/E' RATIO 11.3 m/s    LVOT peak hammad 0.9 m/s    Left Ventricular Outflow Tract Mean Velocity 0.72 cm/s    Left Ventricular Outflow Tract Mean Gradient 2.20 mmHg    RV- mejia basal diam 2.7 cm    RV S' 14.28 cm/s    TAPSE 2.0 cm    RV/LV Ratio 0.69 cm    LA Vol (MOD) 30 mL    LANA (MOD) 15 mL/m2    RA area length vol 23.36 mL    RA Area 11.7 cm2    RA vol index 11.34 mL/m2    RA Vol 25.02 mL    AV mean gradient 5 mmHg    AV peak gradient 9 mmHg    Ao peak hammad 1.5 m/s    Ao VTI 25.0 cm    LVOT peak VTI 13.6 cm    AV valve area 2.1 cm²    AV Velocity Ratio 0.60     AV index (prosthetic) 0.54     MALISSA by Velocity Ratio 2.3 cm²    Mr max hammad 3.18 m/s    Triscuspid Valve Regurgitation Peak Gradient 16 mmHg    Sinus 4.00 cm    ASI 1.9 cm/m2    STJ 3.4 cm    Ascending aorta 3.5 cm    ASI 1.7 cm/m2    Mean e' 0.07 m/s    ZLVIDS -2.43     ZLVIDD -4.67     LA area A4C 12.70 cm2    LA area A2C 14.39 cm2    TV resting pulmonary artery pressure 19 mmHg    RV TB RVSP 5 mmHg    Est. RA pres 3 mmHg    Narrative      Left Ventricle: Moderately increased wall thickness. There is moderate   concentric hypertrophy. Normal wall motion. There is normal systolic   function with a visually estimated ejection fraction of 55 - 60%.   Quantitated ejection fraction is 58%. Unable to assess diastolic function   due to atrial fibrillation. Normal left ventricular filling pressure.    Right Ventricle: The right ventricle is  normal in size Systolic   function is normal.    Left Atrium: The left atrium is normal in size    Mitral Valve: There is mild regurgitation.    Tricuspid Valve: There is no significant regurgitation.    Aorta: The aortic root is mildly dilated measuring 4.00 cm. The   ascending aorta is mildly dilated measuring 3.5 cm.    Pulmonary Artery: The estimated pulmonary artery systolic pressure is   19 mmHg.    IVC/SVC: Normal venous pressure at 3 mmHg.    Pericardium: There is no pericardial effusion.

## 2025-07-28 NOTE — HOSPITAL COURSE
07/28/2025 Per HPI   07/29/2025 Negative DVT study of BLE. LVEF with mild RV systolic function. No SOB. On RA with sat 90% on exam. Lovenox converted to Eliquis 10 mg BID x 7 D then resume 5 mg BID thereafter. HR 80s-100s. Troponin negative.

## 2025-07-28 NOTE — PROGRESS NOTES
Virtual nurse role explained to patient virtually via [employer]. Demographics, preferred pharmacy and patient portal status reviewed. Required admission documentation reviewed-and completed as necessary. Home medications discussed -flagging medications for removal that patient states they no longer take. Plan of care reviewed and discussed. Diagnosis specific CPG added to Care Plan template-if applicable. Active listening utilized addressing any concerns. All questions answered

## 2025-07-28 NOTE — ASSESSMENT & PLAN NOTE
Patient's blood pressure range in the last 24 hours was: BP  Min: 95/64  Max: 158/77.The patient's inpatient anti-hypertensive regimen is listed below:  Current Antihypertensives  amLODIPine tablet 10 mg, Daily, Oral  hydrALAZINE tablet 25 mg, Every 8 hours, Oral  metoprolol succinate (TOPROL-XL) 24 hr tablet 200 mg, Daily, Oral    Plan  - BP is controlled, no changes needed to their regimen

## 2025-07-29 VITALS
WEIGHT: 190.06 LBS | DIASTOLIC BLOOD PRESSURE: 78 MMHG | HEIGHT: 70 IN | HEART RATE: 107 BPM | OXYGEN SATURATION: 96 % | TEMPERATURE: 98 F | RESPIRATION RATE: 20 BRPM | SYSTOLIC BLOOD PRESSURE: 133 MMHG | BODY MASS INDEX: 27.21 KG/M2

## 2025-07-29 LAB
OHS QRS DURATION: 78 MS
OHS QRS DURATION: 88 MS
OHS QTC CALCULATION: 383 MS
OHS QTC CALCULATION: 422 MS
POCT GLUCOSE: 162 MG/DL (ref 70–110)
POCT GLUCOSE: 277 MG/DL (ref 70–110)

## 2025-07-29 PROCEDURE — G0378 HOSPITAL OBSERVATION PER HR: HCPCS

## 2025-07-29 PROCEDURE — 25000003 PHARM REV CODE 250: Performed by: NURSE PRACTITIONER

## 2025-07-29 PROCEDURE — 63600175 PHARM REV CODE 636 W HCPCS: Performed by: NURSE PRACTITIONER

## 2025-07-29 PROCEDURE — 96372 THER/PROPH/DIAG INJ SC/IM: CPT | Performed by: NURSE PRACTITIONER

## 2025-07-29 RX ORDER — EZETIMIBE 10 MG/1
10 TABLET ORAL DAILY
Qty: 90 TABLET | Refills: 1 | Status: SHIPPED | OUTPATIENT
Start: 2025-07-29

## 2025-07-29 RX ADMIN — METOPROLOL SUCCINATE 200 MG: 50 TABLET, EXTENDED RELEASE ORAL at 07:07

## 2025-07-29 RX ADMIN — ONDANSETRON 8 MG: 8 TABLET, ORALLY DISINTEGRATING ORAL at 02:07

## 2025-07-29 RX ADMIN — ASPIRIN 81 MG: 81 TABLET, COATED ORAL at 07:07

## 2025-07-29 RX ADMIN — SENNOSIDES AND DOCUSATE SODIUM 1 TABLET: 50; 8.6 TABLET ORAL at 07:07

## 2025-07-29 RX ADMIN — INSULIN ASPART 3 UNITS: 100 INJECTION, SOLUTION INTRAVENOUS; SUBCUTANEOUS at 11:07

## 2025-07-29 RX ADMIN — EZETIMIBE 10 MG: 10 TABLET ORAL at 07:07

## 2025-07-29 RX ADMIN — ENOXAPARIN SODIUM 90 MG: 100 INJECTION SUBCUTANEOUS at 08:07

## 2025-07-29 RX ADMIN — ALUMINUM HYDROXIDE, MAGNESIUM HYDROXIDE, AND SIMETHICONE 30 ML: 200; 200; 20 SUSPENSION ORAL at 02:07

## 2025-07-29 RX ADMIN — DULOXETINE 30 MG: 30 CAPSULE, DELAYED RELEASE ORAL at 07:07

## 2025-07-29 NOTE — ASSESSMENT & PLAN NOTE
Patient's blood pressure range in the last 24 hours was: BP  Min: 95/64  Max: 127/70.The patient's inpatient anti-hypertensive regimen is listed below:  Current Antihypertensives  amLODIPine tablet 10 mg, Daily, Oral  hydrALAZINE tablet 25 mg, Every 8 hours, Oral  metoprolol succinate (TOPROL-XL) 24 hr tablet 200 mg, Daily, Oral    Plan  - BP is controlled, no changes needed to their regimen

## 2025-07-29 NOTE — PROGRESS NOTES
[place] - Emergency Dept  Cardiology  Progress Note    Patient Name: Partha Curtis Jr.  MRN: [id number]  Admission Date: 7/28/2025  Hospital Length of Stay: 0 days  Code Status: Full Code   Attending Physician: Arpan Nava,*   Primary Care Physician: Rocco Cavazos DO  Expected Discharge Date:   Principal Problem:Acute pulmonary embolism    Subjective:     Hospital Course:   07/28/2025 Per HPI   07/29/2025 Negative DVT study of BLE. LVEF with mild RV systolic function. No SOB. On RA with sat 90% on exam. Lovenox converted to Eliquis 10 mg BID x 7 D then resume 5 mg BID thereafter. HR 80s-100s. Troponin negative.     Past Medical History:   Diagnosis Date    Allergy     Carotid artery occlusion     Cataract     Chronic back pain     Colonic polyp     Genetic testing     MUTYH mutation-negative    Hyperlipidemia     Hypertension     Paroxysmal atrial fibrillation 02/28/2024    Sleep apnea     Type 2 diabetes mellitus with stage 3 chronic kidney disease, without long-term current use of insulin 04/02/2020       Past Surgical History:   Procedure Laterality Date    ANKLE SURGERY Left     2    APPENDECTOMY      at age 20    ARTHROSCOPIC CHONDROPLASTY OF KNEE JOINT Left 08/13/2024    Procedure: ARTHROSCOPY, KNEE, WITH CHONDROPLASTY;  Surgeon: Kai Washington MD;  Location: [facility] OR;  Service: Orthopedics;  Laterality: Left;    ARTHROSCOPY, KNEE, WITH ABRASION ARTHROPLASTY OR MICROFRACTURE Left 08/13/2024    Procedure: ARTHROSCOPY, KNEE, WITH  MICROFRACTURE;  Surgeon: Kai Washington MD;  Location: [facility] OR;  Service: Orthopedics;  Laterality: Left;    ARTHROSCOPY,KNEE,WITH MENISCUS REPAIR Left 08/13/2024    Procedure: ARTHROSCOPY,KNEE,WITH MENISCUS REPAIR;  Surgeon: Kai Washington MD;  Location: [facility] OR;  Service: Orthopedics;  Laterality: Left;  NO REGIONAL BLOCK    CAROTID ENDARTERECTOMY Right 07/15/2015    CARPAL TUNNEL RELEASE Bilateral     COLONOSCOPY N/A 12/14/2018    Procedure: COLONOSCOPYSuprep;   Surgeon: Silver Selby MD;  Location: [facility] ENDO;  Service: Endoscopy;  Laterality: N/A;    COLONOSCOPY N/A 10/02/2024    Procedure: COLONOSCOPY;  Surgeon: Heath Morrow MD;  Location: [facility] ENDO;  Service: Endoscopy;  Laterality: N/A;  Ref by Dr STEVE Cavazos, Ozempic, pt has unopened Sutab, portal - PC  9/25/24-LVM for precall, portal-DS. 10/1/24 Pt. called and confirmed arrival time.EC    EPIDURAL STEROID INJECTION N/A 02/26/2024    Procedure: CERVICAL C7/T1 IL SELENA;  Surgeon: Gokul Fung MD;  Location: [facility] PAIN MGT;  Service: Pain Management;  Laterality: N/A;  [phone]  2 WK F/U SERGEI    EPIDURAL STEROID INJECTION N/A 05/23/2024    Procedure: LUMBAR L4/5 IL SELENA *ASPIRIN OTC* HOLD FOR 5 DAYS;  Surgeon: Gokul Fung MD;  Location: [facility] PAIN MGT;  Service: Pain Management;  Laterality: N/A;  [phone]  2 WK F/U NOHELIA    EPIDURAL STEROID INJECTION N/A 10/21/2024    Procedure: LUMBAR L5/S1 IL SELENA *ASPIRIN OTC* HOLD FOR 5 DAYS  **ANGELICA PT**;  Surgeon: Sofia Sheikh MD;  Location: [facility] PAIN MGT;  Service: Pain Management;  Laterality: N/A;  [phone]  2 WK F/U NOHELIA    INJECTION OF ANESTHETIC AGENT AROUND NERVE N/A 12/23/2024    Procedure: LUMBAR L4.5 IL SELENA *ASPIRIN OTC* HOLD FOR 5 DAYS;  Surgeon: Gokul Fung MD;  Location: [facility] PAIN MGT;  Service: Pain Management;  Laterality: N/A;  2 WK F/U NOHELIA    INJECTION OF ANESTHETIC AGENT AROUND NERVE Bilateral 02/24/2025    Procedure: BLOCK, NERVE BILATERAL L3, 4, 5 MEDIAL BRANCH;  Surgeon: Gokul Fung MD;  Location: [facility] PAIN MGT;  Service: Pain Management;  Laterality: Bilateral;  2 WK F/U NOHELIA    INJECTION OF ANESTHETIC AGENT AROUND NERVE Bilateral 03/10/2025    Procedure: BLOCK, NERVE BILATERAL L3, L4, L5 MEDIAL BRANCH;  Surgeon: Gokul Fung MD;  Location: [facility] PAIN MGT;  Service: Pain Management;  Laterality: Bilateral;    INJECTION, SPINE, LUMBOSACRAL, TRANSFORAMINAL APPROACH Bilateral 7/24/2025    Procedure: LUMBAR TRANSFORAMINAL BILATERAL L5/S1  "*ELIQUIS/ASPIRIN ECONSULT PENDING* *ANGELICA PT*;  Surgeon: Sfoia Sheikh MD;  Location: [facility] PAIN MGT;  Service: Pain Management;  Laterality: Bilateral;  2 WK F/U SERGEI    JOINT REPLACEMENT  09/2010    NASAL SEPTUM SURGERY      RADIOFREQUENCY ABLATION Bilateral 03/27/2025    Procedure: RADIOFREQUENCY ABLATION BILATERAL L3, 4, 5;  Surgeon: Gokul Fung MD;  Location: [facility] PAIN MGT;  Service: Pain Management;  Laterality: Bilateral;    TOTAL KNEE ARTHROPLASTY Right     6 knee surgeries    TRANSESOPHAGEAL ECHOCARDIOGRAM WITH POSSIBLE CARDIOVERSION (ELIZABETH W/ POSS CARDIOVERSION) N/A 02/28/2024    Procedure: Transesophageal echo (ELIZABETH) intra-procedure log documentation;  Surgeon: Ahmet De La Cruz MD;  Location: [facility] CATH LAB/EP;  Service: Cardiology;  Laterality: N/A;    TRANSESOPHAGEAL ECHOCARDIOGRAM WITH POSSIBLE CARDIOVERSION (ELIZABETH W/ POSS CARDIOVERSION) N/A 5/28/2025    Procedure: Transesophageal echo (ELIZABETH) intra-procedure log documentation;  Surgeon: Valerio Jaquez MD;  Location: [facility] OR;  Service: Cardiology;  Laterality: N/A;       Review of patient's allergies indicates:   Allergen Reactions    Stadol [butorphanol tartrate] Rash     Swelling in face    Strawberries [strawberry] Rash       Current Facility-Administered Medications on File Prior to Encounter   Medication    0.9%  NaCl infusion    0.9%  NaCl infusion     Current Outpatient Medications on File Prior to Encounter   Medication Sig    amitriptyline (ELAVIL) 75 MG tablet Take 1 tablet by mouth in the evening    amLODIPine (NORVASC) 10 MG tablet Take 1 tablet (10 mg total) by mouth once daily.    apixaban (ELIQUIS) 5 mg Tab Take 1 tablet (5 mg total) by mouth 2 (two) times daily.    aspirin (ECOTRIN) 81 MG EC tablet Take 1 tablet (81 mg total) by mouth once daily.    azelastine (ASTELIN) 137 mcg (0.1 %) nasal spray 1 spray (137 mcg total) by Nasal route 2 (two) times daily.    BD LUER-RUSS SYRINGE 3 mL 25 gauge x 1" Syrg USE ONE SYRINGE EVERY 14 DAYS " "WITH TESTOSTERONE    candesartan (ATACAND) 32 MG tablet Take 1 tablet by mouth once daily    cyanocobalamin (VITAMIN B-12) 1000 MCG tablet Take 1 tablet (1,000 mcg total) by mouth once daily.    DULoxetine (CYMBALTA) 30 MG capsule Take 1 capsule by mouth once daily    ergocalciferol (ERGOCALCIFEROL) 50,000 unit Cap Take 1 capsule by mouth once a week    ezetimibe (ZETIA) 10 mg tablet Take 1 tablet by mouth once daily    fluticasone propionate (FLONASE) 50 mcg/actuation nasal spray 2 sprays by Each Nostril route.    gabapentin (NEURONTIN) 800 MG tablet Take 1 tablet by mouth in the evening    glimepiride (AMARYL) 2 MG tablet Take 1 tablet (2 mg total) by mouth before breakfast.    glucose 4 GM chewable tablet Take 4 tablets (16 g total) by mouth as needed for Low blood sugar.    hydrALAZINE (APRESOLINE) 25 MG tablet Take 1 tablet (25 mg total) by mouth every 8 (eight) hours.    hydrocortisone 2.5 % cream Apply to face twice daily for flares as needed    ipratropium (ATROVENT) 42 mcg (0.06 %) nasal spray 2 sprays by Nasal route 2 (two) times daily as needed for Rhinitis.    ketoconazole (NIZORAL) 2 % cream Apply twice daily to affected area of skin    metoprolol succinate (TOPROL-XL) 200 MG 24 hr tablet Take 1 tablet (200 mg total) by mouth once daily.    oxyCODONE-acetaminophen (PERCOCET)  mg per tablet Take 1 tablet by mouth every 12 (twelve) hours as needed for Pain.    rosuvastatin (CRESTOR) 40 MG Tab TAKE 1 TABLET BY MOUTH ONCE DAILY IN THE EVENING    semaglutide (OZEMPIC) 2 mg/dose (8 mg/3 mL) PnIj Inject 2 mg into the skin every 7 days.    syringe with needle, safety (BD INTEGRA SYRINGE) 3 mL 22 gauge x 1 1/2" Syrg Inject 1 Syringe as directed once a week.    valACYclovir (VALTREX) 1000 MG tablet TAKE 2 TABLETS BY MOUTH EVERY 12 HOURS     Family History       Problem Relation (Age of Onset)    Aneurysm Other (48)    Brain cancer Mother (67), Paternal Grandfather (73)    Breast cancer Sister (58), Maternal " Cousin (57)    Cancer Mother, Father, Maternal Grandfather    Genetic Disorder Sister    Hypertension Father    Lung cancer Father    Seizures Daughter    Ulcerative colitis Other          Tobacco Use    Smoking status: Never     Passive exposure: Never    Smokeless tobacco: Never   Substance and Sexual Activity    Alcohol use: Yes     Alcohol/week: 2.0 standard drinks of alcohol     Types: 2 Cans of beer per week     Comment: 6 beers per month    Drug use: Never    Sexual activity: Yes     Partners: Female     Birth control/protection: None     Review of Systems   Constitutional: Negative for diaphoresis and malaise/fatigue.   HENT: Negative.     Eyes: Negative.    Cardiovascular:  Positive for irregular heartbeat. Negative for chest pain, dyspnea on exertion, leg swelling, near-syncope, orthopnea, palpitations, paroxysmal nocturnal dyspnea and syncope.   Respiratory:  Negative for shortness of breath.    Endocrine: Negative.    Hematologic/Lymphatic: Negative.    Skin: Negative.    Gastrointestinal:  Negative for nausea and vomiting.   Genitourinary: Negative.    Neurological: Negative.  Negative for dizziness.   Psychiatric/Behavioral: Negative.     Allergic/Immunologic: Negative.      Objective:     Vital Signs (Most Recent):  Temp: 97.9 °F (36.6 °C) (07/29/25 0700)  Pulse: 105 (07/29/25 0937)  Resp: 16 (07/29/25 0937)  BP: 123/75 (07/29/25 0937)  SpO2: 95 % (07/29/25 0937) Vital Signs (24h Range):  Temp:  [97.9 °F (36.6 °C)] 97.9 °F (36.6 °C)  Pulse:  [] 105  Resp:  [10-32] 16  SpO2:  [90 %-97 %] 95 %  BP: ()/(63-81) 123/75     Weight: 86.2 kg (190 lb 0.6 oz)  Body mass index is 27.27 kg/m².    SpO2: 95 %         Intake/Output Summary (Last 24 hours) at 7/29/2025 1003  Last data filed at 7/29/2025 0945  Gross per 24 hour   Intake 500 ml   Output 1000 ml   Net -500 ml       Lines/Drains/Airways       Peripheral Intravenous Line  Duration             Peripheral IV Single Lumen 07/28/25 1009 20 G Left  "Antecubital <1 day                     Physical Exam  Constitutional:       General: He is not in acute distress.     Appearance: He is not diaphoretic.   HENT:      Head: Atraumatic.   Eyes:      General:         Right eye: No discharge.         Left eye: No discharge.   Cardiovascular:      Rate and Rhythm: Normal rate. Rhythm irregular.   Pulmonary:      Effort: Pulmonary effort is normal.      Breath sounds: Normal breath sounds. No rales.   Abdominal:      General: Bowel sounds are normal.      Palpations: Abdomen is soft.   Musculoskeletal:         General: No tenderness.      Right lower leg: No edema.      Left lower leg: No edema.   Skin:     General: Skin is warm and dry.   Neurological:      Mental Status: He is alert and oriented to person, place, and time.   Psychiatric:         Mood and Affect: Mood normal.         Behavior: Behavior normal.         Thought Content: Thought content normal.         Judgment: Judgment normal.          Significant Labs: BMP:   Recent Labs   Lab 07/28/25  1009   *      K 3.8      CO2 26   BUN 19   CREATININE 1.5*   CALCIUM 9.0   , CMP   Recent Labs   Lab 07/28/25  1009      K 3.8      CO2 26   *   BUN 19   CREATININE 1.5*   CALCIUM 9.0   PROT 7.4   ALBUMIN 4.0   BILITOT 0.6   ALKPHOS 75   AST 33   ALT 35   ANIONGAP 11   , CBC   Recent Labs   Lab 07/28/25  1009   WBC 9.89   HGB 17.4   HCT 51.2      , INR No results for input(s): "INR", "PROTIME" in the last 48 hours., Lipid Panel No results for input(s): "CHOL", "HDL", "LDLCALC", "TRIG", "CHOLHDL" in the last 48 hours., Troponin   Recent Labs   Lab 07/28/25  1009 07/28/25  1442   TROPONINIHS <3 3   , and All pertinent lab results from the last 24 hours have been reviewed.    Significant Imaging: Echocardiogram: Transthoracic echo (TTE) complete (Cupid Only):   Results for orders placed or performed during the hospital encounter of 07/28/25   Echo   Result Value Ref Range    BSA " 2.06 m2    [facility] CV CPX PATIENT HEIGHT IN 70     Ellis's Biplane MOD Ejection Fraction 46 %    A2C EF 59 %    A4C EF 30 %    LVIDd 3.8 3.5 - 6.0 cm    LV Systolic Volume 25 mL    LV Systolic Volume Index 12.3 mL/m2    LVIDs 2.6 2.1 - 4.0 cm    LV Diastolic Volume 63 mL    LV Diastolic Volume Index 30.88 mL/m2    LV EDV A2C [id number] mL    LV EDV A4C 45.82 mL    Left Ventricular End Systolic Volume by Teichholz Method 25.36 mL    Left Ventricular End Diastolic Volume by Teichholz Method 63.10 mL    IVS 1.3 (A) 0.6 - 1.1 cm    FS 31.6 28 - 44 %    Left Ventricle Relative Wall Thickness 0.68 cm    PW 1.3 (A) 0.6 - 1.1 cm    LV mass 173.1 g    LV Mass Index 84.8 g/m2    TR Max Hammad 2.1 m/s    RV S' 14.99 cm/s    TAPSE 1.2 cm    RA area length vol 28.07 mL    RA Area 13.3 cm2    RA vol index 13.76 mL/m2    RA Vol 29.56 mL    Triscuspid Valve Regurgitation Peak Gradient 18 mmHg    PV PEAK VELOCITY 0.76 m/s    PV peak gradient 2 mmHg    Pulmonary Valve Mean Velocity 0.62 m/s    ZLVIDS -2.85     ZLVIDD -4.70     TV resting pulmonary artery pressure 21 mmHg    RV TB RVSP 5 mmHg    Est. RA pres 3 mmHg    Narrative      Left Ventricle: Mildly increased wall thickness. There is concentric   remodeling. There is normal systolic function with a visually estimated   ejection fraction of 55 - 60%.    Right Ventricle: The right ventricle is normal in size. Systolic   function is reduced.TAPSE is 1.2 cm.    Pulmonary Artery: The estimated pulmonary artery systolic pressure is   21 mmHg.    IVC/SVC: Normal venous pressure at 3 mmHg.       Assessment and Plan:     Brief HPI: Patient seen this morning on rounds without cardiac complaint. Hemodynamically stable.     * Acute pulmonary embolism  LLL segmental PE  Patient required to hold Eliquis x 3 D prior to LESI last week for chronic pain  US BLE to r/o DVT negative  Limited TTE to eval RV - mildly reduced  Converted back to Eliquis 10 mg BID x 7 D then 5 mg BID  thereafter  Patient hemodynamically stable on RA, no indication for thrombectomy  BNP and troponin negative  Patient appropriate for DC from CV perspective    Atrial fibrillation with RVR  Patient has paroxysmal (<7 days) atrial fibrillation. Patient is currently in atrial fibrillation. [id number] Score: 3. The patients heart rate in the last 24 hours is as follows:  Pulse  Min: 76  Max: 127     Antiarrhythmics  metoprolol succinate (TOPROL-XL) 24 hr tablet 200 mg, Daily, Oral    Anticoagulants  apixaban tablet 10 mg, 2 times daily, Oral x 7 D given PE then resume 5 mg BID thereafter    Plan  - Replete lytes with a goal of K>4, Mg >2  - Patient is anticoagulated, see medications listed above.  - Patient's afib is currently controlled  - Keep appointment with Dr Smith as scheduled in 2 weeks for ablation consideration   -CPAP at         Overweight (BMI 25.0-29.9)  Weight loss encouraged     ALLA (obstructive sleep apnea)  CPAP at     Essential hypertension  Patient's blood pressure range in the last 24 hours was: BP  Min: 95/64  Max: 127/70.The patient's inpatient anti-hypertensive regimen is listed below:  Current Antihypertensives  amLODIPine tablet 10 mg, Daily, Oral  hydrALAZINE tablet 25 mg, Every 8 hours, Oral  metoprolol succinate (TOPROL-XL) 24 hr tablet 200 mg, Daily, Oral    Plan  - BP is controlled, no changes needed to their regimen        VTE Risk Mitigation (From admission, onward)           Ordered     apixaban tablet 10 mg  2 times daily         07/29/25 9112                    Ruslan Gallegos NP  Cardiology  [place] - Emergency Dept

## 2025-07-29 NOTE — HOSPITAL COURSE
66yo male with PMHx of Afib, chronic back pain and HTN presents to the ED for palpitations and having trouble catching his breathe while sitting up in bed. D-dimer abnormal, leading to CTA ordered confirming an acute pulmonary embolism present. SQ Lovenox initiated while awaiting ECHO reading. ECHO showed reduced systolic dysfunction of the right ventricle without right ventricular strain. Negative DVT study of BLE. Troponin negative. NT-proBNP 206. Required supplemental O2-max 2L. No chest pain or SOB. Pain controlled. Last bowel movement less than an hour ago. Tolerating solid diet without complications. No N/V/D. Lovenox converted to Eliquis 10 mg BID x 7 D then resume 5 mg BID thereafter. Pt vital signs stable for discharge to home today. Follow-up with cardiology on an outpatient basis.

## 2025-07-29 NOTE — ASSESSMENT & PLAN NOTE
Patient has paroxysmal (<7 days) atrial fibrillation. Patient is currently in atrial fibrillation. [id number] Score: 3. The patients heart rate in the last 24 hours is as follows:  Pulse  Min: 76  Max: 127     Antiarrhythmics  metoprolol succinate (TOPROL-XL) 24 hr tablet 200 mg, Daily, Oral    Anticoagulants  apixaban tablet 10 mg, 2 times daily, Oral x 7 D given PE then resume 5 mg BID thereafter    Plan  - Replete lytes with a goal of K>4, Mg >2  - Patient is anticoagulated, see medications listed above.  - Patient's afib is currently controlled  - Keep appointment with Dr Smith as scheduled in 2 weeks for ablation consideration   -CPAP at HS

## 2025-07-29 NOTE — ED NOTES
A 6 minute walk test was ordered by admit team for home oxygen qualification.     Patient was 94% on room air at rest and had desaturation to 87% on RA while ambulating for 6 minutes. Patient dyspneic upon exertion.  O2 sat improved to 96% on 1 L NC. Dyspnea improved with rest.

## 2025-07-29 NOTE — MEDICAL/APP STUDENT
History     Chief Complaint   Patient presents with    Palpitations     Intermittent palpitations, chest pain, and shortness of breath that started ~Saturday. Hx afib, compliant with Rx. On Metoprolol and Eliquis.     65-year-old male with HLD, PAF, CEA, DM, HTN, ALLA, CKD, chronic pain. Pt presented to the ED yesterday with chest pain and SOB that has been ongoing for the last two days. Pt had to hold eliquis for 3 days last week due to lumbar CSI. Pt largely without complaint and states CP and SOB has subsided. Pt is hemodynamically stable breathing on room air.     Palpitations       Past Medical History:   Diagnosis Date    Allergy     Carotid artery occlusion     Cataract     Chronic back pain     Colonic polyp     Genetic testing     MUTYH mutation-negative    Hyperlipidemia     Hypertension     Paroxysmal atrial fibrillation 02/28/2024    Sleep apnea     Type 2 diabetes mellitus with stage 3 chronic kidney disease, without long-term current use of insulin 04/02/2020       Past Surgical History:   Procedure Laterality Date    ANKLE SURGERY Left     2    APPENDECTOMY      at age 20    ARTHROSCOPIC CHONDROPLASTY OF KNEE JOINT Left 08/13/2024    Procedure: ARTHROSCOPY, KNEE, WITH CHONDROPLASTY;  Surgeon: Kai Washington MD;  Location: [facility] OR;  Service: Orthopedics;  Laterality: Left;    ARTHROSCOPY, KNEE, WITH ABRASION ARTHROPLASTY OR MICROFRACTURE Left 08/13/2024    Procedure: ARTHROSCOPY, KNEE, WITH  MICROFRACTURE;  Surgeon: Kai Washington MD;  Location: [facility] OR;  Service: Orthopedics;  Laterality: Left;    ARTHROSCOPY,KNEE,WITH MENISCUS REPAIR Left 08/13/2024    Procedure: ARTHROSCOPY,KNEE,WITH MENISCUS REPAIR;  Surgeon: Kai Washington MD;  Location: [facility] OR;  Service: Orthopedics;  Laterality: Left;  NO REGIONAL BLOCK    CAROTID ENDARTERECTOMY Right 07/15/2015    CARPAL TUNNEL RELEASE Bilateral     COLONOSCOPY N/A 12/14/2018    Procedure: COLONOSCOPYSuprep;  Surgeon: Silver Selby MD;   Location: [facility] ENDO;  Service: Endoscopy;  Laterality: N/A;    COLONOSCOPY N/A 10/02/2024    Procedure: COLONOSCOPY;  Surgeon: Heath Morrow MD;  Location: [facility] ENDO;  Service: Endoscopy;  Laterality: N/A;  Ref by Leonor Oneill, pt has unopened Sutab, portal - PC  9/25/24-LVM for precall, portal-DS. 10/1/24 Pt. called and confirmed arrival time.EC    EPIDURAL STEROID INJECTION N/A 02/26/2024    Procedure: CERVICAL C7/T1 IL SELENA;  Surgeon: Gokul Fung MD;  Location: [facility] PAIN MGT;  Service: Pain Management;  Laterality: N/A;  [phone]  2 WK F/U SERGEI    EPIDURAL STEROID INJECTION N/A 05/23/2024    Procedure: LUMBAR L4/5 IL SELENA *ASPIRIN OTC* HOLD FOR 5 DAYS;  Surgeon: Gokul Fung MD;  Location: [facility] PAIN MGT;  Service: Pain Management;  Laterality: N/A;  [phone]  2 WK F/U NOHELIA    EPIDURAL STEROID INJECTION N/A 10/21/2024    Procedure: LUMBAR L5/S1 IL SELENA *ASPIRIN OTC* HOLD FOR 5 DAYS  **ANGELICA PT**;  Surgeon: Sofia Sheikh MD;  Location: [facility] PAIN MGT;  Service: Pain Management;  Laterality: N/A;  [phone]  2 WK F/U NOHELIA    INJECTION OF ANESTHETIC AGENT AROUND NERVE N/A 12/23/2024    Procedure: LUMBAR L4.5 IL SELENA *ASPIRIN OTC* HOLD FOR 5 DAYS;  Surgeon: Gokul Fung MD;  Location: [facility] PAIN MGT;  Service: Pain Management;  Laterality: N/A;  2 WK F/U NOHELIA    INJECTION OF ANESTHETIC AGENT AROUND NERVE Bilateral 02/24/2025    Procedure: BLOCK, NERVE BILATERAL L3, 4, 5 MEDIAL BRANCH;  Surgeon: Gokul Fung MD;  Location: [facility] PAIN MGT;  Service: Pain Management;  Laterality: Bilateral;  2 WK F/U NOHELIA    INJECTION OF ANESTHETIC AGENT AROUND NERVE Bilateral 03/10/2025    Procedure: BLOCK, NERVE BILATERAL L3, L4, L5 MEDIAL BRANCH;  Surgeon: Gokul Fung MD;  Location: [facility] PAIN MGT;  Service: Pain Management;  Laterality: Bilateral;    INJECTION, SPINE, LUMBOSACRAL, TRANSFORAMINAL APPROACH Bilateral 7/24/2025    Procedure: LUMBAR TRANSFORAMINAL BILATERAL L5/S1 *ELIQUIS/ASPIRIN ECONSULT PENDING*  *ANGELICA PT*;  Surgeon: Sofia Sheikh MD;  Location: [facility] PAIN MGT;  Service: Pain Management;  Laterality: Bilateral;  2 WK F/U SERGEI    JOINT REPLACEMENT  09/2010    NASAL SEPTUM SURGERY      RADIOFREQUENCY ABLATION Bilateral 03/27/2025    Procedure: RADIOFREQUENCY ABLATION BILATERAL L3, 4, 5;  Surgeon: Gokul Fung MD;  Location: [facility] PAIN MGT;  Service: Pain Management;  Laterality: Bilateral;    TOTAL KNEE ARTHROPLASTY Right     6 knee surgeries    TRANSESOPHAGEAL ECHOCARDIOGRAM WITH POSSIBLE CARDIOVERSION (ELIZABETH W/ POSS CARDIOVERSION) N/A 02/28/2024    Procedure: Transesophageal echo (ELIZABETH) intra-procedure log documentation;  Surgeon: Ahmet De La Cruz MD;  Location: [facility] CATH LAB/EP;  Service: Cardiology;  Laterality: N/A;    TRANSESOPHAGEAL ECHOCARDIOGRAM WITH POSSIBLE CARDIOVERSION (ELIZABETH W/ POSS CARDIOVERSION) N/A 5/28/2025    Procedure: Transesophageal echo (ELIZABETH) intra-procedure log documentation;  Surgeon: Valerio Jaquez MD;  Location: [facility] OR;  Service: Cardiology;  Laterality: N/A;       Family History   Problem Relation Name Age of Onset    Brain cancer Mother Klarissa 67    Cancer Mother Klarissa     Hypertension Father Keanu     Lung cancer Father Keanu         x2 (PAUL initially- treated surgically only, then recurred distantly) (smoker, & had been exposed to many chemicals)    Cancer Father Keanu     Breast cancer Sister  58    Genetic Disorder Sister          monoallelic MUTYH mutation    Seizures Daughter      Cancer Maternal Grandfather Valerio     Brain cancer Paternal Grandfather  73    Breast cancer Maternal Cousin  57    Aneurysm Other mgm's father 48        brain    Ulcerative colitis Other brother's son        Social History[1]    Review of Systems   Constitutional: Negative.    HENT: Negative.     Respiratory: Negative.     Cardiovascular:  Positive for palpitations.   Musculoskeletal: Negative.    Neurological: Negative.        Physical Exam   /75   Pulse 105   Temp 97.9  "°F (36.6 °C) (Oral)   Resp 16   Ht 5' 10" (1.778 m)   Wt 86.2 kg (190 lb 0.6 oz)   SpO2 95%   BMI 27.27 kg/m²     Physical Exam    Constitutional: He appears well-developed and well-nourished.   HENT:   Head: Normocephalic and atraumatic.   Neck:   Normal range of motion.  Cardiovascular:  Normal rate and regular rhythm.           Pulmonary/Chest: Breath sounds normal.   Abdominal: Abdomen is soft.   Musculoskeletal:      Cervical back: Normal range of motion.     Neurological: He is alert and oriented to person, place, and time.     Imaging Results              US Lower Extremity Veins Bilateral (Final result)  Result time 07/28/25 15:27:12      Final result by Gerardo Blandon MD (07/28/25 15:27:12)                   Impression:      No evidence of deep venous thrombosis in either lower extremity.      Electronically signed by: Gerardo Blandon  Date:    07/28/2025  Time:    15:27               Narrative:    EXAMINATION:  US LOWER EXTREMITY VEINS BILATERAL    CLINICAL HISTORY:  pe;    TECHNIQUE:  Duplex and color flow Doppler and dynamic compression was performed of the bilateral lower extremity veins was performed.    COMPARISON:  None    FINDINGS:  Right thigh veins: The common femoral, femoral, popliteal, upper greater saphenous, and deep femoral veins are patent and free of thrombus. The veins are normally compressible and have normal phasic flow and augmentation response.    Right calf veins: The visualized calf veins are patent.    Left thigh veins: The common femoral, femoral, popliteal, upper greater saphenous, and deep femoral veins are patent and free of thrombus. The veins are normally compressible and have normal phasic flow and augmentation response.    Left calf veins: The visualized calf veins are patent.    Miscellaneous: None                                        CTA Chest Non-Coronary (PE Studies) (Final result)  Result time 07/28/25 13:26:28      Final result by Luis Armando Briseno MD (07/28/25 " 13:26:28)                   Impression:      1. Suspected pulmonary thromboembolism in a left lower lobe pulmonary arterial segmental branch.  2. Other incidental/nonemergent findings in the body of the report.  This report was flagged in Epic as abnormal.    COMMUNICATION  This critical result was discovered/received at 13:20 hours.  The critical information above was relayed directly by me via [employer] secure chat with confirmation to Dr. Miguel Valentine in the emergency department on 07/28/2025 at 13:25 hours.      Electronically signed by: Luis Armando Briseno MD  Date:    07/28/2025  Time:    13:26               Narrative:    EXAMINATION:  CTA CHEST NON CORONARY (PE STUDIES)    CLINICAL HISTORY:  Pulmonary embolism (PE) suspected, high prob;    TECHNIQUE:  Following the intravenous administration of 75 cc of Omnipaque 350 contrast material, 1.25 mm contiguous axial images were acquired through the chest utilizing the CT pulmonary angiogram protocol.  2-D coronal and sagittal reconstructed images of the chest and sagittal oblique MIP images of the pulmonary arterial system were generated from the source data.    COMPARISON:  Chest radiograph same day and 01/09/2023, MRI lumbar spine 06/28/2025    FINDINGS:  Respiratory motion and beam hardening with streak artifact from overlying monitoring leads and contrast bolus in the SVC somewhat limits evaluation.    Pulmonary arterial system: Pulmonary arteries distribute normally.  Four main pulmonary veins draining to the left atrium.  Small filling defect seen within proximal segmental pulmonary arterial branch to the left lower lobe best seen on axial images 192 through 212 of series 3.  No large saddle pulmonary embolism, enlargement of the main pulmonary artery, or secondary findings of right ventricular strain.    Aorta and heart: The heart is normal in size.  No pericardial fluid.  Coronary arterial calcifications noted.  No cardiac thrombus seen.  Three vessel left-sided arch.   No thoracic aortic aneurysm.  No thoracic aortic dissection.    Airways: Unremarkable.    Mediastinum/Lymph nodes: No pathologically enlarged lymph nodes in the chest or axilla.  Esophagus is normal in course and caliber.    Lungs: Well expanded.  Azygous lobe configuration.  Mild dependent atelectasis.  No consolidation.  No concerning pulmonary nodules.    Pleura: No significant volume of pleural fluid or pneumothorax.  No pleural based masses.    Structures at the base of the neck are within normal limits.  Extrathoracic soft tissues are within normal limits.    Visualized upper abdominal soft tissues: No acute/significant abnormalities appreciated.    Bones: There is age-related minimal to mild degenerative change of the thoracic spine.  No acute or destructive osseous process seen.                                       X-Ray Chest AP Portable (Final result)  Result time 07/28/25 10:39:53      Final result by Michael Fierro MD (07/28/25 10:39:53)                   Impression:      See above      Electronically signed by: Michael Fierro MD  Date:    07/28/2025  Time:    10:39               Narrative:    EXAMINATION:  XR CHEST AP PORTABLE    CLINICAL HISTORY:  CHF;    TECHNIQUE:  Single frontal view of the chest was performed.    COMPARISON:  No 02/27/2024 ne    FINDINGS:  Heart size normal.  No significant airspace consolidation or pleural effusion identified.  Old rib fractures noted on the right side as before .                                       Assessment      66 y/o male largely without complaint. No chest pain or SOB on room air. Negative bilateral extremity US, Echo, Troponin. Pt hemodynamically stable    Plan     Pulmonary embolism  LLL segmental PE  US BLE to r/o DVT negative BNP and troponin negative  Converted back to Eliquis 10 mg BID x 7 D then 5 mg BID thereafter  Patient hemodynamically stable on RA, will continue with medical management  Patient counseled on being discharged     Atrial  fibrillation with RVR  Patient is currently in atrial fibrillation.   [id number] Score: 3.     Antiarrhythmics  metoprolol succinate (TOPROL-XL) 24 hr tablet 200 mg, Daily, Oral     Anticoagulants  apixaban tablet 10 mg, 2 times daily, Oral x 7 D given PE then resume 5 mg BID thereafter     Plan  - Replete lytes with a goal of K>4, Mg >2  - Patient is anticoagulated, see medications listed above.  - Patient's afib is currently controlled  - Keep appointment with Dr Smith as scheduled in 2 weeks for ablation consideration   -CPAP at            Overweight (BMI 25.0-29.9)  Weight loss encouraged      ALLA (obstructive sleep apnea)  CPAP at      Essential hypertension  Blood pressure controlled and hemodynamically stable    Current Antihypertensives  amLODIPine tablet 10 mg, Daily, Oral  hydrALAZINE tablet 25 mg, Every 8 hours, Oral  metoprolol succinate (TOPROL-XL) 24 hr tablet 200 mg, Daily, Oral                        [1]   Social History  Tobacco Use    Smoking status: Never     Passive exposure: Never    Smokeless tobacco: Never   Substance Use Topics    Alcohol use: Yes     Alcohol/week: 2.0 standard drinks of alcohol     Types: 2 Cans of beer per week     Comment: 6 beers per month    Drug use: Never

## 2025-07-29 NOTE — CARE UPDATE
A 6 minute walk test was ordered by admit team for home oxygen qualification.      Patient was 94% on room air at rest and had desaturation to 87% on RA while ambulating for 6 minutes. Patient dyspneic upon exertion.  O2 sat improved to 96% on 1 L NC. Dyspnea improved with rest.         -Todd Mckinney PA-C

## 2025-07-29 NOTE — ED NOTES
Rounded on the patient, VSS. Off of oxygen sats 96% and patient denies any shortness of breath or pain. No requests. NAD.

## 2025-07-29 NOTE — ED NOTES
RN ambulated with the patient while connected to portable SPO2 monitor continuous. Desaturation down to 89% with dyspnea on exertion.    Oxygen saturation at rest 96   Oxygen saturation with activity 89   Oxygen saturation with activity on oxygen 95

## 2025-07-29 NOTE — SUBJECTIVE & OBJECTIVE
Past Medical History:   Diagnosis Date    Allergy     Carotid artery occlusion     Cataract     Chronic back pain     Colonic polyp     Genetic testing     MUTYH mutation-negative    Hyperlipidemia     Hypertension     Paroxysmal atrial fibrillation 02/28/2024    Sleep apnea     Type 2 diabetes mellitus with stage 3 chronic kidney disease, without long-term current use of insulin 04/02/2020       Past Surgical History:   Procedure Laterality Date    ANKLE SURGERY Left     2    APPENDECTOMY      at age 20    ARTHROSCOPIC CHONDROPLASTY OF KNEE JOINT Left 08/13/2024    Procedure: ARTHROSCOPY, KNEE, WITH CHONDROPLASTY;  Surgeon: Kai Washington MD;  Location: [facility] OR;  Service: Orthopedics;  Laterality: Left;    ARTHROSCOPY, KNEE, WITH ABRASION ARTHROPLASTY OR MICROFRACTURE Left 08/13/2024    Procedure: ARTHROSCOPY, KNEE, WITH  MICROFRACTURE;  Surgeon: Kai Washington MD;  Location: [facility] OR;  Service: Orthopedics;  Laterality: Left;    ARTHROSCOPY,KNEE,WITH MENISCUS REPAIR Left 08/13/2024    Procedure: ARTHROSCOPY,KNEE,WITH MENISCUS REPAIR;  Surgeon: Kai Washington MD;  Location: [facility] OR;  Service: Orthopedics;  Laterality: Left;  NO REGIONAL BLOCK    CAROTID ENDARTERECTOMY Right 07/15/2015    CARPAL TUNNEL RELEASE Bilateral     COLONOSCOPY N/A 12/14/2018    Procedure: COLONOSCOPYSuprep;  Surgeon: Silver Selby MD;  Location: [facility];  Service: Endoscopy;  Laterality: N/A;    COLONOSCOPY N/A 10/02/2024    Procedure: COLONOSCOPY;  Surgeon: Heath Morrow MD;  Location: [facility] ENDO;  Service: Endoscopy;  Laterality: N/A;  [facility], portal - PC  9/25/24-LVM for precall, portal-DS. 10/1/24 Pt. called and confirmed arrival time.EC    EPIDURAL STEROID INJECTION N/A 02/26/2024    Procedure: CERVICAL C7/T1 IL SELENA;  Surgeon: Gokul Fung MD;  Location: [facility] PAIN MGT;  Service: Pain Management;  Laterality: N/A;  [phone]  2 WK F/U SERGEI    EPIDURAL STEROID INJECTION  N/A 05/23/2024    Procedure: LUMBAR L4/5 IL SELENA *ASPIRIN OTC* HOLD FOR 5 DAYS;  Surgeon: Goklu Fung MD;  Location: [facility] PAIN MGT;  Service: Pain Management;  Laterality: N/A;  [phone]  2 WK F/U NOHELIA    EPIDURAL STEROID INJECTION N/A 10/21/2024    Procedure: LUMBAR L5/S1 IL SELENA *ASPIRIN OTC* HOLD FOR 5 DAYS  **EISSA PT**;  Surgeon: Sofia Sheikh MD;  Location: [facility] PAIN MGT;  Service: Pain Management;  Laterality: N/A;  [phone]  2 WK F/U NOHELIA    INJECTION OF ANESTHETIC AGENT AROUND NERVE N/A 12/23/2024    Procedure: LUMBAR L4.5 IL SELENA *ASPIRIN OTC* HOLD FOR 5 DAYS;  Surgeon: Gokul Fung MD;  Location: [facility] PAIN MGT;  Service: Pain Management;  Laterality: N/A;  2 WK F/U NOHELIA    INJECTION OF ANESTHETIC AGENT AROUND NERVE Bilateral 02/24/2025    Procedure: BLOCK, NERVE BILATERAL L3, 4, 5 MEDIAL BRANCH;  Surgeon: Gokul Fung MD;  Location: [facility] PAIN MGT;  Service: Pain Management;  Laterality: Bilateral;  2 WK F/U NOHELIA    INJECTION OF ANESTHETIC AGENT AROUND NERVE Bilateral 03/10/2025    Procedure: BLOCK, NERVE BILATERAL L3, L4, L5 MEDIAL BRANCH;  Surgeon: Gokul Fung MD;  Location: [facility] PAIN MGT;  Service: Pain Management;  Laterality: Bilateral;    INJECTION, SPINE, LUMBOSACRAL, TRANSFORAMINAL APPROACH Bilateral 7/24/2025    Procedure: LUMBAR TRANSFORAMINAL BILATERAL L5/S1 *ELIQUIS/ASPIRIN ECONSULT PENDING* *EISSA PT*;  Surgeon: Sofia Sheikh MD;  Location: [facility] PAIN MGT;  Service: Pain Management;  Laterality: Bilateral;  2 WK F/U SERGEI    JOINT REPLACEMENT  09/2010    NASAL SEPTUM SURGERY      RADIOFREQUENCY ABLATION Bilateral 03/27/2025    Procedure: RADIOFREQUENCY ABLATION BILATERAL L3, 4, 5;  Surgeon: Gokul Fung MD;  Location: [facility] PAIN MGT;  Service: Pain Management;  Laterality: Bilateral;    TOTAL KNEE ARTHROPLASTY Right     6 knee surgeries    TRANSESOPHAGEAL ECHOCARDIOGRAM WITH POSSIBLE CARDIOVERSION (ELIZABETH W/ POSS CARDIOVERSION) N/A 02/28/2024    Procedure: Transesophageal echo (ELIZABETH)  "intra-procedure log documentation;  Surgeon: Ahmet De La Cruz MD;  Location: [facility] CATH LAB/EP;  Service: Cardiology;  Laterality: N/A;    TRANSESOPHAGEAL ECHOCARDIOGRAM WITH POSSIBLE CARDIOVERSION (ELIZABETH W/ POSS CARDIOVERSION) N/A 5/28/2025    Procedure: Transesophageal echo (ELIZABETH) intra-procedure log documentation;  Surgeon: Valerio Jaquez MD;  Location: [facility] OR;  Service: Cardiology;  Laterality: N/A;       Review of patient's allergies indicates:   Allergen Reactions    Stadol [butorphanol tartrate] Rash     Swelling in face    Strawberries [strawberry] Rash       Current Facility-Administered Medications on File Prior to Encounter   Medication    0.9%  NaCl infusion    0.9%  NaCl infusion     Current Outpatient Medications on File Prior to Encounter   Medication Sig    amitriptyline (ELAVIL) 75 MG tablet Take 1 tablet by mouth in the evening    amLODIPine (NORVASC) 10 MG tablet Take 1 tablet (10 mg total) by mouth once daily.    apixaban (ELIQUIS) 5 mg Tab Take 1 tablet (5 mg total) by mouth 2 (two) times daily.    aspirin (ECOTRIN) 81 MG EC tablet Take 1 tablet (81 mg total) by mouth once daily.    azelastine (ASTELIN) 137 mcg (0.1 %) nasal spray 1 spray (137 mcg total) by Nasal route 2 (two) times daily.    BD LUER-RUSS SYRINGE 3 mL 25 gauge x 1" Syrg USE ONE SYRINGE EVERY 14 DAYS WITH TESTOSTERONE    candesartan (ATACAND) 32 MG tablet Take 1 tablet by mouth once daily    cyanocobalamin (VITAMIN B-12) 1000 MCG tablet Take 1 tablet (1,000 mcg total) by mouth once daily.    DULoxetine (CYMBALTA) 30 MG capsule Take 1 capsule by mouth once daily    ergocalciferol (ERGOCALCIFEROL) 50,000 unit Cap Take 1 capsule by mouth once a week    ezetimibe (ZETIA) 10 mg tablet Take 1 tablet by mouth once daily    fluticasone propionate (FLONASE) 50 mcg/actuation nasal spray 2 sprays by Each Nostril route.    gabapentin (NEURONTIN) 800 MG tablet Take 1 tablet by mouth in the evening    glimepiride (AMARYL) 2 MG tablet Take " "1 tablet (2 mg total) by mouth before breakfast.    glucose 4 GM chewable tablet Take 4 tablets (16 g total) by mouth as needed for Low blood sugar.    hydrALAZINE (APRESOLINE) 25 MG tablet Take 1 tablet (25 mg total) by mouth every 8 (eight) hours.    hydrocortisone 2.5 % cream Apply to face twice daily for flares as needed    ipratropium (ATROVENT) 42 mcg (0.06 %) nasal spray 2 sprays by Nasal route 2 (two) times daily as needed for Rhinitis.    ketoconazole (NIZORAL) 2 % cream Apply twice daily to affected area of skin    metoprolol succinate (TOPROL-XL) 200 MG 24 hr tablet Take 1 tablet (200 mg total) by mouth once daily.    oxyCODONE-acetaminophen (PERCOCET)  mg per tablet Take 1 tablet by mouth every 12 (twelve) hours as needed for Pain.    rosuvastatin (CRESTOR) 40 MG Tab TAKE 1 TABLET BY MOUTH ONCE DAILY IN THE EVENING    semaglutide (OZEMPIC) 2 mg/dose (8 mg/3 mL) PnIj Inject 2 mg into the skin every 7 days.    syringe with needle, safety (BD INTEGRA SYRINGE) 3 mL 22 gauge x 1 1/2" Syrg Inject 1 Syringe as directed once a week.    valACYclovir (VALTREX) 1000 MG tablet TAKE 2 TABLETS BY MOUTH EVERY 12 HOURS     Family History       Problem Relation (Age of Onset)    Aneurysm Other (48)    Brain cancer Mother (67), Paternal Grandfather (73)    Breast cancer Sister (58), Maternal Cousin (57)    Cancer Mother, Father, Maternal Grandfather    Genetic Disorder Sister    Hypertension Father    Lung cancer Father    Seizures Daughter    Ulcerative colitis Other          Tobacco Use    Smoking status: Never     Passive exposure: Never    Smokeless tobacco: Never   Substance and Sexual Activity    Alcohol use: Yes     Alcohol/week: 2.0 standard drinks of alcohol     Types: 2 Cans of beer per week     Comment: 6 beers per month    Drug use: Never    Sexual activity: Yes     Partners: Female     Birth control/protection: None     Review of Systems   Constitutional: Negative for diaphoresis and malaise/fatigue. "   HENT: Negative.     Eyes: Negative.    Cardiovascular:  Positive for irregular heartbeat. Negative for chest pain, dyspnea on exertion, leg swelling, near-syncope, orthopnea, palpitations, paroxysmal nocturnal dyspnea and syncope.   Respiratory:  Negative for shortness of breath.    Endocrine: Negative.    Hematologic/Lymphatic: Negative.    Skin: Negative.    Gastrointestinal:  Negative for nausea and vomiting.   Genitourinary: Negative.    Neurological: Negative.  Negative for dizziness.   Psychiatric/Behavioral: Negative.     Allergic/Immunologic: Negative.      Objective:     Vital Signs (Most Recent):  Temp: 97.9 °F (36.6 °C) (07/29/25 0700)  Pulse: 105 (07/29/25 0937)  Resp: 16 (07/29/25 0937)  BP: 123/75 (07/29/25 0937)  SpO2: 95 % (07/29/25 0937) Vital Signs (24h Range):  Temp:  [97.9 °F (36.6 °C)] 97.9 °F (36.6 °C)  Pulse:  [] 105  Resp:  [10-32] 16  SpO2:  [90 %-97 %] 95 %  BP: ()/(63-81) 123/75     Weight: 86.2 kg (190 lb 0.6 oz)  Body mass index is 27.27 kg/m².    SpO2: 95 %         Intake/Output Summary (Last 24 hours) at 7/29/2025 1003  Last data filed at 7/29/2025 0945  Gross per 24 hour   Intake 500 ml   Output 1000 ml   Net -500 ml       Lines/Drains/Airways       Peripheral Intravenous Line  Duration             Peripheral IV Single Lumen 07/28/25 1009 20 G Left Antecubital <1 day                     Physical Exam  Constitutional:       General: He is not in acute distress.     Appearance: He is not diaphoretic.   HENT:      Head: Atraumatic.   Eyes:      General:         Right eye: No discharge.         Left eye: No discharge.   Cardiovascular:      Rate and Rhythm: Normal rate. Rhythm irregular.   Pulmonary:      Effort: Pulmonary effort is normal.      Breath sounds: Normal breath sounds. No rales.   Abdominal:      General: Bowel sounds are normal.      Palpations: Abdomen is soft.   Musculoskeletal:         General: No tenderness.      Right lower leg: No edema.      Left lower  "leg: No edema.   Skin:     General: Skin is warm and dry.   Neurological:      Mental Status: He is alert and oriented to person, place, and time.   Psychiatric:         Mood and Affect: Mood normal.         Behavior: Behavior normal.         Thought Content: Thought content normal.         Judgment: Judgment normal.          Significant Labs: BMP:   Recent Labs   Lab 07/28/25  1009   *      K 3.8      CO2 26   BUN 19   CREATININE 1.5*   CALCIUM 9.0   , CMP   Recent Labs   Lab 07/28/25  1009      K 3.8      CO2 26   *   BUN 19   CREATININE 1.5*   CALCIUM 9.0   PROT 7.4   ALBUMIN 4.0   BILITOT 0.6   ALKPHOS 75   AST 33   ALT 35   ANIONGAP 11   , CBC   Recent Labs   Lab 07/28/25  1009   WBC 9.89   HGB 17.4   HCT 51.2      , INR No results for input(s): "INR", "PROTIME" in the last 48 hours., Lipid Panel No results for input(s): "CHOL", "HDL", "LDLCALC", "TRIG", "CHOLHDL" in the last 48 hours., Troponin   Recent Labs   Lab 07/28/25  1009 07/28/25  1442   TROPONINIHS <3 3   , and All pertinent lab results from the last 24 hours have been reviewed.    Significant Imaging: Echocardiogram: Transthoracic echo (TTE) complete (Cupid Only):   Results for orders placed or performed during the hospital encounter of 07/28/25   Echo   Result Value Ref Range    BSA 2.06 m2    [facility] CV CPX PATIENT HEIGHT IN 70     Ellis's Biplane MOD Ejection Fraction 46 %    A2C EF 59 %    A4C EF 30 %    LVIDd 3.8 3.5 - 6.0 cm    LV Systolic Volume 25 mL    LV Systolic Volume Index 12.3 mL/m2    LVIDs 2.6 2.1 - 4.0 cm    LV Diastolic Volume 63 mL    LV Diastolic Volume Index 30.88 mL/m2    LV EDV A2C 34.863922197704748 mL    LV EDV A4C 45.82 mL    Left Ventricular End Systolic Volume by Teichholz Method 25.36 mL    Left Ventricular End Diastolic Volume by Teichholz Method 63.10 mL    IVS 1.3 (A) 0.6 - 1.1 cm    FS 31.6 28 - 44 %    Left Ventricle Relative Wall Thickness 0.68 cm    PW 1.3 (A) 0.6 - 1.1 cm "    LV mass 173.1 g    LV Mass Index 84.8 g/m2    TR Max Hammad 2.1 m/s    RV S' 14.99 cm/s    TAPSE 1.2 cm    RA area length vol 28.07 mL    RA Area 13.3 cm2    RA vol index 13.76 mL/m2    RA Vol 29.56 mL    Triscuspid Valve Regurgitation Peak Gradient 18 mmHg    PV PEAK VELOCITY 0.76 m/s    PV peak gradient 2 mmHg    Pulmonary Valve Mean Velocity 0.62 m/s    ZLVIDS -2.85     ZLVIDD -4.70     TV resting pulmonary artery pressure 21 mmHg    RV TB RVSP 5 mmHg    Est. RA pres 3 mmHg    Narrative      Left Ventricle: Mildly increased wall thickness. There is concentric   remodeling. There is normal systolic function with a visually estimated   ejection fraction of 55 - 60%.    Right Ventricle: The right ventricle is normal in size. Systolic   function is reduced.TAPSE is 1.2 cm.    Pulmonary Artery: The estimated pulmonary artery systolic pressure is   21 mmHg.    IVC/SVC: Normal venous pressure at 3 mmHg.

## 2025-07-29 NOTE — ASSESSMENT & PLAN NOTE
LLL segmental PE  Patient required to hold Eliquis x 3 D prior to LESI last week for chronic pain  US BLE to r/o DVT negative  Limited TTE to eval RV - mildly reduced  Converted back to Eliquis 10 mg BID x 7 D then 5 mg BID thereafter  Patient hemodynamically stable on RA, no indication for thrombectomy  BNP and troponin negative  Patient appropriate for DC from CV perspective

## 2025-07-29 NOTE — PLAN OF CARE
SW received approval to pull home O2 e-tank. Pt signed acknowledgement at bedside. Awaiting his wife to  at this time.       07/29/25 1311   Post-Acute Status   Post-Acute Authorization Other   E Status Set-up Complete/Auth obtained   Discharge Delays (!) Personal Transportation   Discharge Plan   Discharge Plan A Home   Discharge Plan B Home with family       Future Appointments   Date Time Provider Department Center   8/5/2025  1:30 PM Skylar Pelletier MD [facility] IMPRI [facility]   8/6/2025  8:20 AM Albin Mcclelland MD [facility] CARDIO [facility]   8/12/2025  8:40 AM Baylee Ni, NP [facility] PAINMGT [facility]   8/13/2025  7:45 AM EKG, APPT [facility] EKG [facility]   8/13/2025  8:00 AM Robin Smith MD [facility] ARRHYTH [facility]   9/5/2025  4:30 PM Jay Prince, FNP-BC [facility] SLEEPC [facility]   9/11/2025  3:45 PM Maricruz Rivera MD [facility] ENT [facility]   9/16/2025  8:45 AM LAB, [facility] [place]   9/19/2025 10:00 AM Rosario Holt PA-C [facility] MED [place]   9/24/2025 10:00 AM Albin Mcclelland MD [facility] CARDIO [facility]   9/29/2025  9:00 AM [facility], [facility] [place]   1/21/2026 10:40 AM Rocco Cavazos DO [facility] MED [place]     Arlette Jimenez, [facility]  [phone]  Emergency Room

## 2025-07-29 NOTE — PLAN OF CARE
07/29/25 1102   Post-Acute Status   Post-Acute Authorization HME   HME Status Pending payor review   Discharge Delays (!) Home Medical Equipment (Insurance, Delivery)   Discharge Plan   Discharge Plan A Home   Discharge Plan B Home with family     Messaged Aysha to review for home O2. Pending acceptance. D/c orders noted

## 2025-07-30 ENCOUNTER — TELEPHONE (OUTPATIENT)
Dept: PRIMARY CARE CLINIC | Facility: CLINIC | Age: 65
End: 2025-07-30
Payer: COMMERCIAL

## 2025-07-30 NOTE — DISCHARGE SUMMARY
[facility] Emergency Dept  [facility] Medicine  Discharge Summary      Patient Name: Partha Curtis Jr.  MRN: [id number]  WENDY: [id number]  Patient Class: OP- Observation  Admission Date: 7/28/2025  Hospital Length of Stay: 0 days  Discharge Date and Time: 7/29/2025  1:11 PM  Attending Physician:  BLANCA DAVIES   Discharging Provider: Todd Mckinney PA-C  Primary Care Provider: Rocco Cavazos DO    Primary Care Team: Networked reference to record PCT     HPI:   Partha Curtis is a 65-year-old male past medical history of Afib, chronic back pain, hypertension , intermittent atrial fibrillation presenting secondary to palpitations and some difficulty catching his breath since yesterday at 2:00 a.m.. States that he is not have any chest pain and denies any shortness of breath just more difficulty catching breath has had to be cardioverted multiple times. Compliant with Eliquis. Compliant with his metoprolol. Compliant with all his medications. His cardiologist is Dr. Mcclelland. No recent fever chills runny nose cough congestion. Patient states that he had 1 alcoholic beverage on Saturday.  CTA chest PE study is concerning for a PE in the left lower lobe.  Cardiology was consulted.  He will be admitted to the hospital medicine service for further workup.    * No surgery found *      Hospital Course:   66yo male with PMHx of Afib, chronic back pain and HTN presents to the ED for palpitations and having trouble catching his breathe while sitting up in bed. D-dimer abnormal, leading to CTA ordered confirming an acute pulmonary embolism present. SQ Lovenox initiated while awaiting ECHO reading. ECHO showed reduced systolic dysfunction of the right ventricle without right ventricular strain. Negative DVT study of BLE. Troponin negative. NT-proBNP 206. Required supplemental O2-max 2L. No chest pain or SOB. Pain controlled. Last bowel movement less than an hour ago. Tolerating solid diet without complications. No N/V/D.  Lovenox converted to Eliquis 10 mg BID x 7 D then resume 5 mg BID thereafter. Pt vital signs stable for discharge to home today. Follow-up with cardiology on an outpatient basis.     Goals of Care Treatment Preferences:  Code Status: Full Code    Living Will: Yes                 Consults:   Consults (From admission, onward)          Status Ordering Provider     Inpatient consult to Cardiology  Once        Provider:  (Not yet assigned)    Completed LEONARD GAN            Assessment & Plan  Acute pulmonary embolism  -consulting cardiology  -CTA chest concerning for PE  -Troponin, BNP wnl  -bilateral lower extremity ultrasound pending  -patient has been compliant with his home dosing of Eliquis  -transitioned Lovenox to Eliquis 10mg BID x 7d followed by 5mf BID thereafter  -Echo w/o signs of RV strain  -Cardiology cleared patient  -6 minute walk test qualified patient for home O2; delivered  -Stable for discharge home today  -Outpatient followup with Cardiology  Essential hypertension  Patient's blood pressure range in the last 24 hours was: BP  Min: 96/67  Max: 133/78.The patient's inpatient anti-hypertensive regimen is listed below:  Current Antihypertensives     amLODIPine tablet 10 mg, Daily, Oral  hydrALAZINE tablet 25 mg, Every 8 hours, Oral  metoprolol succinate (TOPROL-XL) 24 hr tablet 200 mg, Daily, Oral  Plan  -will resume home meds with hold parameters  Dyslipidemia  -resume home meds    BMI 27.0-27.9,adult  Body mass index is 27.27 kg/m². Morbid obesity complicates all aspects of disease management from diagnostic modalities to treatment. Weight loss encouraged and health benefits explained to patient.      Anxiety    -continue duloxetine 30 mg p.o. daily  ALLA (obstructive sleep apnea)  -chronic problem    Atrial fibrillation with RVR  Patient has persistent (7 days or more) atrial fibrillation. Patient is currently in atrial fibrillation. [id number] Score: 3. The patients heart rate in the last 24  hours is as follows:  Pulse  Min: 76  Max: 112     Antiarrhythmics     metoprolol succinate (TOPROL-XL) 24 hr tablet 200 mg, Daily, Oral   Anticoagulants  apixaban tablet, , Oral    Plan  - Replete lytes with a goal of K>4, Mg >2  - Patient is anticoagulated, see medications listed above.  - Patient's afib is currently controlled  - consulted cardiology--cardiology's plan below  - Replete lytes with a goal of K>4, Mg >2  - Patient is anticoagulated, see medications listed above.  - Patient's afib is currently controlled  - If HR sustains >120, start Cardizem gtt  - Converted Lovenox to Eliquis   - Keep appointment with Dr Smith as scheduled in 2 weeks for ablation consideration       Anticoagulated  -continue Eliquis 10mg BID x 7d followed by 5mg BID at discharge  -Cardiology followup    Final Active Diagnoses:    Diagnosis Date Noted POA    PRINCIPAL PROBLEM:  Acute pulmonary embolism [I26.99] 07/28/2025 Yes    Anticoagulated [Z79.01] 06/04/2025 Not Applicable    Atrial fibrillation with RVR [I48.91] 02/28/2024 Yes    ALLA (obstructive sleep apnea) [G47.33]  Yes    Anxiety [F41.9] 02/10/2021 Yes    BMI 27.0-27.9,adult [Z68.27] 03/13/2018 Not Applicable    Dyslipidemia [E78.5] 10/22/2014 Yes     Chronic    Essential hypertension [I10] 10/22/2014 Yes      Problems Resolved During this Admission:       Discharged Condition: stable    Disposition: Home or Self Care    Follow Up:    Patient Instructions:      OXYGEN FOR HOME USE     Order Specific Question Answer Comments   Liter Flow 2    Duration With activity    Qualifying Test Performed at: Activity    Oxygen saturation at rest 94    Oxygen saturation with activity 87    Oxygen saturation with activity on oxygen 96    Portable mode: continuous    Route nasal cannula    Device: home concentrator with portable tanks    Length of need (in months): 3 mos    Patient condition with qualifying saturation Other - List qualifying diagnosis and code    Select a diagnosis &  "list the code in the comments Pulmonary embolism without acute cor pulmonale [[id number]]    Height: 5' 10" (1.778 m)    Weight: 86.2 kg (190 lb 0.6 oz)    Alternative treatment measures have been tried or considered and deemed clinically ineffective. Yes      Ambulatory referral/consult to Cardiology   Standing Status: Future   Referral Priority: Routine Referral Type: Consultation   Referral Reason: Specialty Services Required   Requested Specialty: Cardiology   Number of Visits Requested: 1     Diet Cardiac     Notify your health care provider if you experience any of the following:  temperature >100.4     Notify your health care provider if you experience any of the following:  persistent nausea and vomiting or diarrhea     Notify your health care provider if you experience any of the following:  severe uncontrolled pain     Notify your health care provider if you experience any of the following:  difficulty breathing or increased cough     Notify your health care provider if you experience any of the following:  severe persistent headache     Notify your health care provider if you experience any of the following:  persistent dizziness, light-headedness, or visual disturbances     Notify your health care provider if you experience any of the following:  increased confusion or weakness     Activity as tolerated       Significant Diagnostic Studies:     US Lower Extremity Veins Bilateral [[id number]] Resulted: 07/28/25 1527   Order Status: Completed Updated: 07/28/25 1529   Narrative:     EXAMINATION:  US LOWER EXTREMITY VEINS BILATERAL    CLINICAL HISTORY:  pe;    TECHNIQUE:  Duplex and color flow Doppler and dynamic compression was performed of the bilateral lower extremity veins was performed.    COMPARISON:  None    FINDINGS:  Right thigh veins: The common femoral, femoral, popliteal, upper greater saphenous, and deep femoral veins are patent and free of thrombus. The veins are normally compressible and have " normal phasic flow and augmentation response.    Right calf veins: The visualized calf veins are patent.    Left thigh veins: The common femoral, femoral, popliteal, upper greater saphenous, and deep femoral veins are patent and free of thrombus. The veins are normally compressible and have normal phasic flow and augmentation response.    Left calf veins: The visualized calf veins are patent.    Miscellaneous: None   Impression:       No evidence of deep venous thrombosis in either lower extremity.      Electronically signed by: Gerardo Christie  Date: 07/28/2025  Time: 15:27   CTA Chest Non-Coronary (PE Studies) [[id number]] (Abnormal) Resulted: 07/28/25 1326   Order Status: Completed Updated: 07/28/25 1328   Narrative:     EXAMINATION:  CTA CHEST NON CORONARY (PE STUDIES)    CLINICAL HISTORY:  Pulmonary embolism (PE) suspected, high prob;    TECHNIQUE:  Following the intravenous administration of 75 cc of Omnipaque 350 contrast material, 1.25 mm contiguous axial images were acquired through the chest utilizing the CT pulmonary angiogram protocol.  2-D coronal and sagittal reconstructed images of the chest and sagittal oblique MIP images of the pulmonary arterial system were generated from the source data.    COMPARISON:  Chest radiograph same day and 01/09/2023, MRI lumbar spine 06/28/2025    FINDINGS:  Respiratory motion and beam hardening with streak artifact from overlying monitoring leads and contrast bolus in the SVC somewhat limits evaluation.    Pulmonary arterial system: Pulmonary arteries distribute normally.  Four main pulmonary veins draining to the left atrium.  Small filling defect seen within proximal segmental pulmonary arterial branch to the left lower lobe best seen on axial images 192 through 212 of series 3.  No large saddle pulmonary embolism, enlargement of the main pulmonary artery, or secondary findings of right ventricular strain.    Aorta and heart: The heart is normal in size.  No  pericardial fluid.  Coronary arterial calcifications noted.  No cardiac thrombus seen.  Three vessel left-sided arch.  No thoracic aortic aneurysm.  No thoracic aortic dissection.    Airways: Unremarkable.    Mediastinum/Lymph nodes: No pathologically enlarged lymph nodes in the chest or axilla.  Esophagus is normal in course and caliber.    Lungs: Well expanded.  Azygous lobe configuration.  Mild dependent atelectasis.  No consolidation.  No concerning pulmonary nodules.    Pleura: No significant volume of pleural fluid or pneumothorax.  No pleural based masses.    Structures at the base of the neck are within normal limits.  Extrathoracic soft tissues are within normal limits.    Visualized upper abdominal soft tissues: No acute/significant abnormalities appreciated.    Bones: There is age-related minimal to mild degenerative change of the thoracic spine.  No acute or destructive osseous process seen.   Impression:       1. Suspected pulmonary thromboembolism in a left lower lobe pulmonary arterial segmental branch.  2. Other incidental/nonemergent findings in the body of the report.  This report was flagged in Epic as abnormal.    COMMUNICATION  This critical result was discovered/received at 13:20 hours.  The critical information above was relayed directly by me via [employer] secure chat with confirmation to Dr. Miguel Valentine in the emergency department on 07/28/2025 at 13:25 hours.      Electronically signed by: Luis Armando Briseno MD  Date: 07/28/2025  Time: 13:26       TTE:  Summary  Show Result Comparison     Left Ventricle: Mildly increased wall thickness. There is concentric remodeling. There is normal systolic function with a visually estimated ejection fraction of 55 - 60%.    Right Ventricle: The right ventricle is normal in size. Systolic function is reduced.TAPSE is 1.2 cm.    Pulmonary Artery: The estimated pulmonary artery systolic pressure is 21 mmHg.    IVC/SVC: Normal venous pressure at 3 mmHg.       "  Vitals    Height Weight BMI (Calculated) BSA (Calculated - sq m) BP Pulse   5' 10" (1.778 m) 86.2 kg (190 lb 0.6 oz) 27.3 2.06 sq meters 120/80 96     Study Details A limited echo was performed using limited 2D. 2 mL of intravenous Optison contrast was used during the study.  Overall the study quality was adequate.     Echocardiography Findings    Left Ventricle Mildly increased wall thickness. There is concentric remodeling. Normal wall motion. There is normal systolic function with a visually estimated ejection fraction of 55 - 60%.   Right Ventricle The right ventricle is normal in size.Systolic function is reduced.TAPSE is 1.2 cm.   Left Atrium The left atrium is normal in size.   Right Atrium The right atrium is normal in size.   Aortic Valve The aortic valve is structurally normal. There is normal leaflet mobility.   Mitral Valve The mitral valve is structurally normal. There is normal leaflet mobility.   Tricuspid Valve The tricuspid valve is structurally normal. There is normal leaflet mobility.   Pulmonic Valve Not well visualized due to poor acoustic window.   IVC/SVC Normal venous pressure at 3 mmHg.   Pericardium and Other Findings There is no pericardial effusion.   Pulmonary Artery The estimated pulmonary artery systolic pressure is 21 mmHg.         Pending Diagnostic Studies:       None           Medications:  Reconciled Home Medications:      Medication List        CHANGE how you take these medications      ELIQUIS 5 mg Tab  Generic drug: apixaban  Take 2 tablets (10 mg total) by mouth 2 (two) times daily for 7 days, THEN 1 tablet (5 mg total) 2 (two) times daily.  Start taking on: July 29, 2025  What changed: See the new instructions.     ezetimibe 10 mg tablet  Commonly known as: ZETIA  Take 1 tablet (10 mg total) by mouth once daily.  What changed: when to take this            CONTINUE taking these medications      amitriptyline 75 MG tablet  Commonly known as: ELAVIL  Take 1 tablet by mouth in " "the evening     amLODIPine 10 MG tablet  Commonly known as: NORVASC  Take 1 tablet (10 mg total) by mouth once daily.     aspirin 81 MG EC tablet  Commonly known as: ECOTRIN  Take 1 tablet (81 mg total) by mouth once daily.     azelastine 137 mcg (0.1 %) nasal spray  Commonly known as: ASTELIN  1 spray (137 mcg total) by Nasal route 2 (two) times daily.     BD INTEGRA SYRINGE 3 mL 22 gauge x 1 1/2" Syrg  Generic drug: syringe with needle, safety  Inject 1 Syringe as directed once a week.     BD LUER-RUSS SYRINGE 3 mL 25 gauge x 1" Syrg  Generic drug: syringe with needle  USE ONE SYRINGE EVERY 14 DAYS WITH TESTOSTERONE     candesartan 32 MG tablet  Commonly known as: ATACAND  Take 1 tablet by mouth once daily     cyanocobalamin 1000 MCG tablet  Commonly known as: VITAMIN B-12  Take 1 tablet (1,000 mcg total) by mouth once daily.     DULoxetine 30 MG capsule  Commonly known as: CYMBALTA  Take 1 capsule by mouth once daily     ergocalciferol 50,000 unit Cap  Commonly known as: ERGOCALCIFEROL  Take 1 capsule by mouth once a week     fluticasone propionate 50 mcg/actuation nasal spray  Commonly known as: FLONASE  2 sprays by Each Nostril route.     gabapentin 800 MG tablet  Commonly known as: NEURONTIN  Take 1 tablet by mouth in the evening     glimepiride 2 MG tablet  Commonly known as: AMARYL  Take 1 tablet (2 mg total) by mouth before breakfast.     glucose 4 GM chewable tablet  Take 4 tablets (16 g total) by mouth as needed for Low blood sugar.     hydrALAZINE 25 MG tablet  Commonly known as: APRESOLINE  Take 1 tablet (25 mg total) by mouth every 8 (eight) hours.     hydrocortisone 2.5 % cream  Apply to face twice daily for flares as needed     ipratropium 42 mcg (0.06 %) nasal spray  Commonly known as: ATROVENT  2 sprays by Nasal route 2 (two) times daily as needed for Rhinitis.     ketoconazole 2 % cream  Commonly known as: NIZORAL  Apply twice daily to affected area of skin     metoprolol succinate 200 MG 24 hr " tablet  Commonly known as: TOPROL-XL  Take 1 tablet (200 mg total) by mouth once daily.     oxyCODONE-acetaminophen  mg per tablet  Commonly known as: PERCOCET  Take 1 tablet by mouth every 12 (twelve) hours as needed for Pain.     OZEMPIC 2 mg/dose (8 mg/3 mL) Pnij  Generic drug: semaglutide  Inject 2 mg into the skin every 7 days.     rosuvastatin 40 MG Tab  Commonly known as: CRESTOR  TAKE 1 TABLET BY MOUTH ONCE DAILY IN THE EVENING     valACYclovir 1000 MG tablet  Commonly known as: VALTREX  TAKE 2 TABLETS BY MOUTH EVERY 12 HOURS              Indwelling Lines/Drains at time of discharge:   Lines/Drains/Airways       None                       Time spent on the discharge of patient: 28 minutes         Todd Mckinney PA-C  Department of Hospital Medicine  [place] - Emergency Dept

## 2025-07-30 NOTE — TELEPHONE ENCOUNTER
Patient Outreach/TCC CALL     Discharge Information    Discharge Date: 07/28/2025  Primary Discharge Diagnosis: ATRIAL FIBRILLATION W/RVR  Discharge Summary:REVIEWED    Medication & Order review    Were medication changes made or new mediacations added? YES CHANGED ELIQUIS TO 10 MG TWO TIMES A DAY FOR 7 DAYS    If so, has the patient filled the prescriptions? YES PATIENT HAS MEDICATION    Was home health ordered? NO    If so, has home healh contacted patient and/or inititiated services?    Name of Home Health Agency?    Durable Medical Equipment ordered? YES, OXYGEN    If so, has the DME provider contacted patient and delivered ? YES, OXYGEN FROM [facility]    Follow up    Any problems since discharge? PATIENT FEELS FATIGUED AND LIGHTHEADED WHEN STANDING    How is the patient feeling since returning home?    Have you set up recommended follow up appointments? YES, PATIENT QUESTIONING PULMONARY FOLLOW UP, NOTIFIED THAT WE WILL SCHEDULE IF NEEDED AT FOLLOW UP APPOINTMENT     Schedule hospital follow up appointment within 7-14 days? YES PATIENT IS SCHEDULED FOR 8/5 FOR HFU WITH DR. CHAPPELL    Notes:

## 2025-07-30 NOTE — ASSESSMENT & PLAN NOTE
Patient has persistent (7 days or more) atrial fibrillation. Patient is currently in atrial fibrillation. [id number] Score: 3. The patients heart rate in the last 24 hours is as follows:  Pulse  Min: 76  Max: 112     Antiarrhythmics     metoprolol succinate (TOPROL-XL) 24 hr tablet 200 mg, Daily, Oral   Anticoagulants  apixaban tablet, , Oral    Plan  - Replete lytes with a goal of K>4, Mg >2  - Patient is anticoagulated, see medications listed above.  - Patient's afib is currently controlled  - consulted cardiology--cardiology's plan below  - Replete lytes with a goal of K>4, Mg >2  - Patient is anticoagulated, see medications listed above.  - Patient's afib is currently controlled  - If HR sustains >120, start Cardizem gtt  - Converted Lovenox to Eliquis   - Keep appointment with Dr Smith as scheduled in 2 weeks for ablation consideration

## 2025-07-30 NOTE — ASSESSMENT & PLAN NOTE
Patient's blood pressure range in the last 24 hours was: BP  Min: 96/67  Max: 133/78.The patient's inpatient anti-hypertensive regimen is listed below:  Current Antihypertensives     amLODIPine tablet 10 mg, Daily, Oral  hydrALAZINE tablet 25 mg, Every 8 hours, Oral  metoprolol succinate (TOPROL-XL) 24 hr tablet 200 mg, Daily, Oral  Plan  -will resume home meds with hold parameters

## 2025-07-30 NOTE — ASSESSMENT & PLAN NOTE
-consulting cardiology  -CTA chest concerning for PE  -Troponin, BNP wnl  -bilateral lower extremity ultrasound pending  -patient has been compliant with his home dosing of Eliquis  -transitioned Lovenox to Eliquis 10mg BID x 7d followed by 5mf BID thereafter  -Echo w/o signs of RV strain  -Cardiology cleared patient  -6 minute walk test qualified patient for home O2; delivered  -Stable for discharge home today  -Outpatient followup with Cardiology

## 2025-08-01 ENCOUNTER — PATIENT OUTREACH (OUTPATIENT)
Dept: ADMINISTRATIVE | Facility: CLINIC | Age: 65
End: 2025-08-01
Payer: COMMERCIAL

## 2025-08-05 ENCOUNTER — DOCUMENTATION ONLY (OUTPATIENT)
Dept: OTOLARYNGOLOGY | Facility: CLINIC | Age: 65
End: 2025-08-05
Payer: COMMERCIAL

## 2025-08-05 ENCOUNTER — OFFICE VISIT (OUTPATIENT)
Dept: PRIMARY CARE CLINIC | Facility: CLINIC | Age: 65
End: 2025-08-05
Payer: COMMERCIAL

## 2025-08-05 VITALS
HEIGHT: 70 IN | OXYGEN SATURATION: 99 % | BODY MASS INDEX: 27.96 KG/M2 | TEMPERATURE: 98 F | DIASTOLIC BLOOD PRESSURE: 81 MMHG | SYSTOLIC BLOOD PRESSURE: 120 MMHG | WEIGHT: 195.31 LBS | HEART RATE: 59 BPM

## 2025-08-05 DIAGNOSIS — G47.33 OSA (OBSTRUCTIVE SLEEP APNEA): ICD-10-CM

## 2025-08-05 DIAGNOSIS — I48.91 ATRIAL FIBRILLATION WITH RVR: ICD-10-CM

## 2025-08-05 DIAGNOSIS — Z79.01 ANTICOAGULATED: ICD-10-CM

## 2025-08-05 DIAGNOSIS — I26.99 PULMONARY EMBOLUS, LEFT: Primary | ICD-10-CM

## 2025-08-05 PROCEDURE — 99999 PR PBB SHADOW E&M-EST. PATIENT-LVL V: CPT | Mod: PBBFAC,,, | Performed by: INTERNAL MEDICINE

## 2025-08-05 NOTE — PROGRESS NOTES
[facility]   New Visit Progress Note   Recent Hospital Discharge     PRESENTING HISTORY     Chief Complaint/Reason for Admission:  Follow up Hospital Discharge   PCP: Rocco Cavazos DO    History of Present Illness:  Mr. Partha Curtis Jr. is a 65 y.o. male who was recently admitted to the hospital.    [facility] Medicine  Discharge Summary        Patient Name: Partha Curtis Jr.  MRN: [id number]  WENDY: [id number]  Patient Class: OP- Observation  Admission Date: 7/28/2025  Hospital Length of Stay: 0 days  Discharge Date and Time: 7/29/2025  1:11 PM  Attending Physician:  BLANCA DAVIES   Discharging Provider: Todd Mckinney PA-C  Primary Care Provider: Rocco Cavazos DO   ___________________________________________________________________    Today:  [facility].  Recently hospitalized for management of acute pulmonary embolus.  Event occurred while on outpatient Eliquis (A fib) although patient held 3 days for outpatient pain management procedure.   Admitted to [facility] Medicine service with Cardiology consultation.  TTE without evidence of right ventricular strain.   Bilateral LE US without evidence of DVT.   Lovenox initiated and transitioned to Eliquis.  Remained hypoxic at discharge and home oxygen prescribed.     Patient unaccompanied today.  Did not bring medication bottles for review.   Wearing 2 L supplemental oxygen.  Has removed at home and he reports desaturation to mid 70's to low 80's on room air with minimal exertion.  He has returned to work in an office.       Review of Systems  General ROS: negative for chills, fever or weight loss  Psychological ROS: negative for hallucination, depression or suicidal ideation  Ophthalmic ROS: negative for blurry vision, photophobia or eye pain  ENT ROS: negative for epistaxis, sore throat or rhinorrhea  Respiratory ROS: no cough, shortness of breath, or  wheezing  Cardiovascular ROS: no chest pain + dyspnea on exertion  Gastrointestinal ROS: no abdominal pain, change in bowel habits, or black/ bloody stools  Genito-Urinary ROS: no dysuria, trouble voiding, or hematuria  Musculoskeletal ROS: negative for gait disturbance or muscular weakness  Neurological ROS: no syncope or seizures; no ataxia  Dermatological ROS: negative for pruritis, rash and jaundice      PAST HISTORY:     Past Medical History:   Diagnosis Date    Allergy     Carotid artery occlusion     Cataract     Chronic back pain     Colonic polyp     Genetic testing     MUTYH mutation-negative    Hyperlipidemia     Hypertension     Paroxysmal atrial fibrillation 02/28/2024    Sleep apnea     Type 2 diabetes mellitus with stage 3 chronic kidney disease, without long-term current use of insulin 04/02/2020       Past Surgical History:   Procedure Laterality Date    ANKLE SURGERY Left     2    APPENDECTOMY      at age 20    ARTHROSCOPIC CHONDROPLASTY OF KNEE JOINT Left 08/13/2024    Procedure: ARTHROSCOPY, KNEE, WITH CHONDROPLASTY;  Surgeon: Kai Washington MD;  Location: [facility];  Service: Orthopedics;  Laterality: Left;    ARTHROSCOPY, KNEE, WITH ABRASION ARTHROPLASTY OR MICROFRACTURE Left 08/13/2024    Procedure: ARTHROSCOPY, KNEE, WITH  MICROFRACTURE;  Surgeon: Kai Washington MD;  Location: [facility] OR;  Service: Orthopedics;  Laterality: Left;    ARTHROSCOPY,KNEE,WITH MENISCUS REPAIR Left 08/13/2024    Procedure: ARTHROSCOPY,KNEE,WITH MENISCUS REPAIR;  Surgeon: Kai Washington MD;  Location: [facility] OR;  Service: Orthopedics;  Laterality: Left;  NO REGIONAL BLOCK    CAROTID ENDARTERECTOMY Right 07/15/2015    CARPAL TUNNEL RELEASE Bilateral     COLONOSCOPY N/A 12/14/2018    Procedure: COLONOSCOPYSuprep;  Surgeon: Silver Selby MD;  Location: [facility];  Service: Endoscopy;  Laterality: N/A;    COLONOSCOPY N/A 10/02/2024    Procedure: COLONOSCOPY;  Surgeon: Heath Morrow MD;  Location: [facility];   Service: Endoscopy;  Laterality: N/A;  Ref by Leonor Oneill, pt has unopened Sutab, portal - PC  9/25/24-LVM for precall, portal-DS. 10/1/24 Pt. called and confirmed arrival time.EC    EPIDURAL STEROID INJECTION N/A 02/26/2024    Procedure: CERVICAL C7/T1 IL SELENA;  Surgeon: Gokul Fung MD;  Location: [facility] PAIN [facility]GT;  Service: Pain Management;  Laterality: N/A;  [phone]  2 WK F/U SERGEI    EPIDURAL STEROID INJECTION N/A 05/23/2024    Procedure: LUMBAR L4/5 IL SELENA *ASPIRIN OTC* HOLD FOR 5 DAYS;  Surgeon: Gokul Fung MD;  Location: [facility];  Service: Pain Management;  Laterality: N/A;  [phone]  2 WK F/U NOHELIA    EPIDURAL STEROID INJECTION N/A 10/21/2024    Procedure: LUMBAR L5/S1 IL SELENA *ASPIRIN OTC* HOLD FOR 5 DAYS  **ANGELICA PT**;  Surgeon: Sofia Sheikh MD;  Location: [facility];  Service: Pain Management;  Laterality: N/A;  [phone]  2 WK F/U NOHELIA    INJECTION OF ANESTHETIC AGENT AROUND NERVE N/A 12/23/2024    Procedure: LUMBAR L4.5 IL SELENA *ASPIRIN OTC* HOLD FOR 5 DAYS;  Surgeon: Gokul Fung MD;  Location: [facility];  Service: Pain Management;  Laterality: N/A;  2 WK F/U NOHELIA    INJECTION OF ANESTHETIC AGENT AROUND NERVE Bilateral 02/24/2025    Procedure: BLOCK, NERVE BILATERAL L3, 4, 5 MEDIAL BRANCH;  Surgeon: Gokul Fung MD;  Location: [facility];  Service: Pain Management;  Laterality: Bilateral;  2 WK F/U NOHELIA    INJECTION OF ANESTHETIC AGENT AROUND NERVE Bilateral 03/10/2025    Procedure: BLOCK, NERVE BILATERAL L3, L4, L5 MEDIAL BRANCH;  Surgeon: Gokul Fung MD;  Location: [facility];  Service: Pain Management;  Laterality: Bilateral;    INJECTION, SPINE, LUMBOSACRAL, TRANSFORAMINAL APPROACH Bilateral 7/24/2025    Procedure: LUMBAR TRANSFORAMINAL BILATERAL L5/S1 *ELIQUIS/ASPIRIN ECONSULT PENDING* *ANGELICA PT*;  Surgeon: Sofia Sheikh MD;  Location: [facility];  Service: Pain Management;  Laterality: Bilateral;  2 WK F/U SERGEI    JOINT REPLACEMENT  09/2010    NASAL  "SEPTUM SURGERY      RADIOFREQUENCY ABLATION Bilateral 03/27/2025    Procedure: RADIOFREQUENCY ABLATION BILATERAL L3, 4, 5;  Surgeon: Gokul Fung MD;  Location: [facility] [place];  Service: Pain Management;  Laterality: Bilateral;    TOTAL KNEE ARTHROPLASTY Right     6 knee surgeries    TRANSESOPHAGEAL ECHOCARDIOGRAM WITH POSSIBLE CARDIOVERSION (ELIZABETH W/ POSS CARDIOVERSION) N/A 02/28/2024    Procedure: Transesophageal echo (ELIZABETH) intra-procedure log documentation;  Surgeon: Ahmet De La Cruz MD;  Location: [facility] CATH LAB/EP;  Service: Cardiology;  Laterality: N/A;    TRANSESOPHAGEAL ECHOCARDIOGRAM WITH POSSIBLE CARDIOVERSION (ELIZABETH W/ POSS CARDIOVERSION) N/A 5/28/2025    Procedure: Transesophageal echo (ELIZABETH) intra-procedure log documentation;  Surgeon: Valerio Jaquez MD;  Location: [facility] OR;  Service: Cardiology;  Laterality: N/A;       Family History   Problem Relation Name Age of Onset    Brain cancer Mother Klarissa 67    Cancer Mother Klarissa     Hypertension Father Keanu     Lung cancer Father Keanu         x2 (PAUL initially- treated surgically only, then recurred distantly) (smoker, & had been exposed to many chemicals)    Cancer Father Keanu     Breast cancer Sister  58    Genetic Disorder Sister          monoallelic MUTYH mutation    Seizures Daughter      Cancer Maternal Grandfather Valerio     Brain cancer Paternal Grandfather  73    Breast cancer Maternal Cousin  57    Aneurysm Other mgm's father 48        brain    Ulcerative colitis Other brother's son          MEDICATIONS & ALLERGIES:     Medications Ordered Prior to Encounter[1]     Review of patient's allergies indicates:   Allergen Reactions    Stadol [butorphanol tartrate] Rash     Swelling in face    Strawberries [strawberry] Rash       OBJECTIVE:     Vital Signs:  /81 (BP Location: Left arm, Patient Position: Sitting)   Pulse (!) 59   Temp 97.7 °F (36.5 °C) (Oral)   Ht 5' 10" (1.778 m)   Wt 88.6 kg (195 lb 5.2 oz)   SpO2 99%   PF " "(!) 2 L/min   BMI 28.03 kg/m²   Wt Readings from Last 3 Encounters:   08/05/25 1345 88.6 kg (195 lb 5.2 oz)   07/28/25 1448 86.2 kg (190 lb 0.6 oz)   07/28/25 0949 86.2 kg (190 lb)   07/24/25 0833 86.2 kg (190 lb)     Body mass index is 28.03 kg/m².   99%    Physical Exam:  /81 (BP Location: Left arm, Patient Position: Sitting)   Pulse (!) 59   Temp 97.7 °F (36.5 °C) (Oral)   Ht 5' 10" (1.778 m)   Wt 88.6 kg (195 lb 5.2 oz)   SpO2 99%   PF (!) 2 L/min   BMI 28.03 kg/m²   General appearance: alert, cooperative, no distress  Constitutional:Oriented to person, place, and time  + appears well-developed and well-nourished.   HEENT: Normocephalic, atraumatic, neck symmetrical, no nasal discharge   Eyes: conjunctivae/corneas clear, PERRL, EOM's intact  Lungs: clear to auscultation bilaterally, no dullness to percussion bilaterally  Heart: regular rate and rhythm without rub; no displacement of the PMI   Abdomen: soft, non-tender; bowel sounds normoactive; no organomegaly  Extremities: extremities symmetric; no clubbing, cyanosis, or edema  Integument: Skin color, texture, turgor normal; no rashes; hair distrubution normal  Neurologic: Alert and oriented X 3, normal strength, normal coordination and gait  Psychiatric: no pressured speech; normal affect; no evidence of impaired cognition     Laboratory  Lab Results   Component Value Date    WBC 9.89 07/28/2025    HGB 17.4 07/28/2025    HCT 51.2 07/28/2025    MCV 92 07/28/2025     07/28/2025     BMP  Lab Results   Component Value Date     07/28/2025    K 3.8 07/28/2025     07/28/2025    CO2 26 07/28/2025    BUN 19 07/28/2025    CREATININE 1.5 (H) 07/28/2025    CALCIUM 9.0 07/28/2025    ANIONGAP 11 07/28/2025    EGFRNORACEVR 51 (L) 07/28/2025     Lab Results   Component Value Date    ALT 35 07/28/2025    AST 33 07/28/2025    ALKPHOS 75 07/28/2025    BILITOT 0.6 07/28/2025     Lab Results   Component Value Date    INR 1.1 05/27/2025    INR 1.2 " 05/27/2025    INR 1.1 02/27/2024    PROTIME 13.0 (H) 05/27/2025    PROTIME 13.5 (H) 05/27/2025     Lab Results   Component Value Date    HGBA1C 7.7 (H) 06/16/2025       Diagnostic Results:    CTA Chest Non Coronary 7/28/25:  1. Suspected pulmonary thromboembolism in a left lower lobe pulmonary arterial segmental branch.  2. Other incidental/nonemergent findings in the body of the report.    TTE 7/28/25:    Left Ventricle: Mildly increased wall thickness. There is concentric remodeling. There is normal systolic function with a visually estimated ejection fraction of 55 - 60%.    Right Ventricle: The right ventricle is normal in size. Systolic function is reduced.TAPSE is 1.2 cm.    Pulmonary Artery: The estimated pulmonary artery systolic pressure is 21 mmHg.    IVC/SVC: Normal venous pressure at 3 mmHg.        TRANSITION OF CARE:     [facility] Contact Note: 7/30/25     Family and/or Caretaker present at visit?  No.  Diagnostic tests reviewed/disposition: I have reviewed all completed as well as pending diagnostic tests at the time of discharge.  Disease/illness education: Yes  Home health/community services discussion/referrals: Patient does not have home health established from hospital visit.  They do not need home health.  If needed, we will set up home health for the patient.   Establishment or re-establishment of referral orders for community resources: No other necessary community resources.   Discussion with other health care providers: No discussion with other health care providers necessary.     ASSESSMENT & PLAN:       Pulmonary embolus, left  - recent hospitalization as above  - event occurred while prescribed Eliquis for A fib but medication was held x 4 days for pain management procedure  - on 2 L NC newly prescribed home oxygen- has home concentrator and portable tanks- needs portable concentrator to facilitate his return to work- order sent to [facility]  - see Cardiology team 8/6/25   -      "OXYGEN FOR HOME USE    Atrial fibrillation with RVR  Anticoagulated  - had cardioversion 2/2024 and 5/2025  - continue Eliquis and Toprol XL   - see EP 8/13/25     ALLA (obstructive sleep apnea)  - completed sleep study 7/25/25 - has moderately severe obstructive sleep apnea  - will see sleep medicine team 9/5/25 for definitive plan     Patient will be released from hospital follow up clinic.  Follow up as detailed below.     Instructions for the patient:      Scheduled Follow-up :  Future Appointments   Date Time Provider Department Center   8/6/2025  8:20 AM Albin Mcclelland MD [facility] CARDIO [facility]   8/12/2025  8:40 AM Baylee Ni, NP [facility] PAINMGT [facility]   8/13/2025  7:45 AM EKG, APPT [facility] EKG [facility]   8/13/2025  8:00 AM Robin Smith MD [facility] ARRHYTH [facility]   9/5/2025  4:30 PM Jay Prince, FNP-BC [facility] SLEEP [facility]   9/11/2025  3:45 PM Maricruz Rivera MD [facility] ENT [facility]   9/16/2025  8:45 AM [facility] LAB [place]   9/19/2025 10:00 AM Rosario Holt PA-C [facility]   9/24/2025 10:00 AM Albin Mcclelland MD [facility] CARDIO [facility]   9/29/2025  9:00 AM [facility] LAB [place]   1/21/2026 10:40 AM Rocco Cavazos DO [facility]       Post Visit Medication List:     Medication List            Accurate as of August 5, 2025  2:40 PM. If you have any questions, ask your nurse or doctor.                CONTINUE taking these medications      amitriptyline 75 MG tablet  Commonly known as: ELAVIL  Take 1 tablet by mouth in the evening     amLODIPine 10 MG tablet  Commonly known as: NORVASC  Take 1 tablet (10 mg total) by mouth once daily.     aspirin 81 MG EC tablet  Commonly known as: ECOTRIN  Take 1 tablet (81 mg total) by mouth once daily.     azelastine 137 mcg (0.1 %) nasal spray  Commonly known as: ASTELIN  1 spray (137 mcg total) by Nasal route 2 (two) times daily.     BD INTEGRA SYRINGE 3 mL 22 gauge x 1 1/2" Syrg  Generic drug: " "syringe with needle, safety  Inject 1 Syringe as directed once a week.     BD LUER-RUSS SYRINGE 3 mL 25 gauge x 1" Syrg  Generic drug: syringe with needle  USE ONE SYRINGE EVERY 14 DAYS WITH TESTOSTERONE     candesartan 32 MG tablet  Commonly known as: ATACAND  Take 1 tablet by mouth once daily     cyanocobalamin 1000 MCG tablet  Commonly known as: VITAMIN B-12  Take 1 tablet (1,000 mcg total) by mouth once daily.     DULoxetine 30 MG capsule  Commonly known as: CYMBALTA  Take 1 capsule by mouth once daily     ELIQUIS 5 mg Tab  Generic drug: apixaban  Take 2 tablets (10 mg total) by mouth 2 (two) times daily for 7 days, THEN 1 tablet (5 mg total) 2 (two) times daily.  Start taking on: July 29, 2025     ergocalciferol 50,000 unit Cap  Commonly known as: ERGOCALCIFEROL  Take 1 capsule by mouth once a week     ezetimibe 10 mg tablet  Commonly known as: ZETIA  Take 1 tablet (10 mg total) by mouth once daily.     fluticasone propionate 50 mcg/actuation nasal spray  Commonly known as: FLONASE     gabapentin 800 MG tablet  Commonly known as: NEURONTIN  Take 1 tablet by mouth in the evening     glimepiride 2 MG tablet  Commonly known as: AMARYL  Take 1 tablet (2 mg total) by mouth before breakfast.     glucose 4 GM chewable tablet  Take 4 tablets (16 g total) by mouth as needed for Low blood sugar.     hydrALAZINE 25 MG tablet  Commonly known as: APRESOLINE  Take 1 tablet (25 mg total) by mouth every 8 (eight) hours.     hydrocortisone 2.5 % cream  Apply to face twice daily for flares as needed     ipratropium 42 mcg (0.06 %) nasal spray  Commonly known as: ATROVENT  2 sprays by Nasal route 2 (two) times daily as needed for Rhinitis.     ketoconazole 2 % cream  Commonly known as: NIZORAL  Apply twice daily to affected area of skin     metoprolol succinate 200 MG 24 hr tablet  Commonly known as: TOPROL-XL  Take 1 tablet (200 mg total) by mouth once daily.     oxyCODONE-acetaminophen  mg per tablet  Commonly known as: " "PERCOCET  Take 1 tablet by mouth every 12 (twelve) hours as needed for Pain.     OZEMPIC 2 mg/dose (8 mg/3 mL) Pnij  Generic drug: semaglutide  Inject 2 mg into the skin every 7 days.     rosuvastatin 40 MG Tab  Commonly known as: CRESTOR  TAKE 1 TABLET BY MOUTH ONCE DAILY IN THE EVENING     valACYclovir 1000 MG tablet  Commonly known as: VALTREX  TAKE 2 TABLETS BY MOUTH EVERY 12 HOURS              Signing Physician:  Skylar Pelletier MD         [1]   Current Outpatient Medications on File Prior to Visit   Medication Sig Dispense Refill    amitriptyline (ELAVIL) 75 MG tablet Take 1 tablet by mouth in the evening 90 tablet 3    amLODIPine (NORVASC) 10 MG tablet Take 1 tablet (10 mg total) by mouth once daily. 90 tablet 3    apixaban (ELIQUIS) 5 mg Tab Take 2 tablets (10 mg total) by mouth 2 (two) times daily for 7 days, THEN 1 tablet (5 mg total) 2 (two) times daily. 90 tablet 1    aspirin (ECOTRIN) 81 MG EC tablet Take 1 tablet (81 mg total) by mouth once daily. 28 tablet 0    azelastine (ASTELIN) 137 mcg (0.1 %) nasal spray 1 spray (137 mcg total) by Nasal route 2 (two) times daily. 30 mL 0    BD LUER-RUSS SYRINGE 3 mL 25 gauge x 1" Syrg USE ONE SYRINGE EVERY 14 DAYS WITH TESTOSTERONE 100 each 2    candesartan (ATACAND) 32 MG tablet Take 1 tablet by mouth once daily 90 tablet 3    cyanocobalamin (VITAMIN B-12) 1000 MCG tablet Take 1 tablet (1,000 mcg total) by mouth once daily. 90 tablet 4    DULoxetine (CYMBALTA) 30 MG capsule Take 1 capsule by mouth once daily 90 capsule 3    ergocalciferol (ERGOCALCIFEROL) 50,000 unit Cap Take 1 capsule by mouth once a week 12 capsule 0    ezetimibe (ZETIA) 10 mg tablet Take 1 tablet (10 mg total) by mouth once daily. 90 tablet 1    fluticasone propionate (FLONASE) 50 mcg/actuation nasal spray 2 sprays by Each Nostril route.      gabapentin (NEURONTIN) 800 MG tablet Take 1 tablet by mouth in the evening 90 tablet 0    glimepiride (AMARYL) 2 MG tablet Take 1 tablet (2 mg total) " "by mouth before breakfast.      glucose 4 GM chewable tablet Take 4 tablets (16 g total) by mouth as needed for Low blood sugar. 30 tablet 12    hydrALAZINE (APRESOLINE) 25 MG tablet Take 1 tablet (25 mg total) by mouth every 8 (eight) hours. 270 tablet 3    hydrocortisone 2.5 % cream Apply to face twice daily for flares as needed 40 g 2    ipratropium (ATROVENT) 42 mcg (0.06 %) nasal spray 2 sprays by Nasal route 2 (two) times daily as needed for Rhinitis. 15 mL 0    ketoconazole (NIZORAL) 2 % cream Apply twice daily to affected area of skin 60 g 2    metoprolol succinate (TOPROL-XL) 200 MG 24 hr tablet Take 1 tablet (200 mg total) by mouth once daily. 90 tablet 3    oxyCODONE-acetaminophen (PERCOCET)  mg per tablet Take 1 tablet by mouth every 12 (twelve) hours as needed for Pain. 60 tablet 0    rosuvastatin (CRESTOR) 40 MG Tab TAKE 1 TABLET BY MOUTH ONCE DAILY IN THE EVENING 90 tablet 1    semaglutide (OZEMPIC) 2 mg/dose (8 mg/3 mL) PnIj Inject 2 mg into the skin every 7 days. 9 mL 3    syringe with needle, safety (BD INTEGRA SYRINGE) 3 mL 22 gauge x 1 1/2" Syrg Inject 1 Syringe as directed once a week. 6 each 3    valACYclovir (VALTREX) 1000 MG tablet TAKE 2 TABLETS BY MOUTH EVERY 12 HOURS 4 tablet 0     Current Facility-Administered Medications on File Prior to Visit   Medication Dose Route Frequency Provider Last Rate Last Admin    0.9%  NaCl infusion   Intravenous Continuous Kendell Hoffman MD        0.9%  NaCl infusion   Intravenous Continuous Gerardo Uribe MD         "

## 2025-08-06 ENCOUNTER — PATIENT MESSAGE (OUTPATIENT)
Dept: CARDIOLOGY | Facility: CLINIC | Age: 65
End: 2025-08-06

## 2025-08-06 ENCOUNTER — TELEPHONE (OUTPATIENT)
Dept: HEMATOLOGY/ONCOLOGY | Facility: CLINIC | Age: 65
End: 2025-08-06
Payer: COMMERCIAL

## 2025-08-06 ENCOUNTER — OFFICE VISIT (OUTPATIENT)
Dept: CARDIOLOGY | Facility: CLINIC | Age: 65
End: 2025-08-06
Payer: COMMERCIAL

## 2025-08-06 VITALS
BODY MASS INDEX: 27.85 KG/M2 | WEIGHT: 194.56 LBS | HEIGHT: 70 IN | HEART RATE: 69 BPM | SYSTOLIC BLOOD PRESSURE: 109 MMHG | OXYGEN SATURATION: 98 % | DIASTOLIC BLOOD PRESSURE: 71 MMHG

## 2025-08-06 DIAGNOSIS — I48.91 ATRIAL FIBRILLATION WITH RVR: ICD-10-CM

## 2025-08-06 DIAGNOSIS — I26.99 PULMONARY EMBOLISM, UNSPECIFIED CHRONICITY, UNSPECIFIED PULMONARY EMBOLISM TYPE, UNSPECIFIED WHETHER ACUTE COR PULMONALE PRESENT: ICD-10-CM

## 2025-08-06 DIAGNOSIS — R06.02 SHORTNESS OF BREATH: Primary | ICD-10-CM

## 2025-08-06 DIAGNOSIS — G47.33 OSA (OBSTRUCTIVE SLEEP APNEA): ICD-10-CM

## 2025-08-06 DIAGNOSIS — Z98.890 S/P CAROTID ENDARTERECTOMY: ICD-10-CM

## 2025-08-06 PROCEDURE — 99999 PR PBB SHADOW E&M-EST. PATIENT-LVL V: CPT | Mod: PBBFAC,,, | Performed by: INTERNAL MEDICINE

## 2025-08-06 RX ORDER — FUROSEMIDE 40 MG/1
40 TABLET ORAL DAILY PRN
Qty: 60 TABLET | Refills: 3 | Status: SHIPPED | OUTPATIENT
Start: 2025-08-06 | End: 2026-08-06

## 2025-08-06 RX ORDER — POTASSIUM CHLORIDE 750 MG/1
10 TABLET, EXTENDED RELEASE ORAL DAILY PRN
Qty: 90 TABLET | Refills: 3 | Status: SHIPPED | OUTPATIENT
Start: 2025-08-06

## 2025-08-06 NOTE — TELEPHONE ENCOUNTER
Spoke with patient he he call and make appt with pulmonary and call me back so I can move Dr Best appt after that appt. Thanks

## 2025-08-07 ENCOUNTER — LAB VISIT (OUTPATIENT)
Dept: LAB | Facility: HOSPITAL | Age: 65
End: 2025-08-07
Attending: PHYSICIAN ASSISTANT
Payer: COMMERCIAL

## 2025-08-07 ENCOUNTER — TELEPHONE (OUTPATIENT)
Dept: PRIMARY CARE CLINIC | Facility: CLINIC | Age: 65
End: 2025-08-07
Payer: COMMERCIAL

## 2025-08-07 ENCOUNTER — PATIENT MESSAGE (OUTPATIENT)
Dept: OTOLARYNGOLOGY | Facility: CLINIC | Age: 65
End: 2025-08-07
Payer: COMMERCIAL

## 2025-08-07 ENCOUNTER — OFFICE VISIT (OUTPATIENT)
Dept: HEMATOLOGY/ONCOLOGY | Facility: CLINIC | Age: 65
End: 2025-08-07
Payer: COMMERCIAL

## 2025-08-07 VITALS
DIASTOLIC BLOOD PRESSURE: 62 MMHG | HEIGHT: 70 IN | OXYGEN SATURATION: 92 % | HEART RATE: 68 BPM | SYSTOLIC BLOOD PRESSURE: 103 MMHG | RESPIRATION RATE: 20 BRPM | TEMPERATURE: 99 F | WEIGHT: 194.25 LBS | BODY MASS INDEX: 27.81 KG/M2

## 2025-08-07 DIAGNOSIS — I26.99 PULMONARY EMBOLISM, UNSPECIFIED CHRONICITY, UNSPECIFIED PULMONARY EMBOLISM TYPE, UNSPECIFIED WHETHER ACUTE COR PULMONALE PRESENT: ICD-10-CM

## 2025-08-07 DIAGNOSIS — I26.99 PULMONARY EMBOLISM, UNSPECIFIED CHRONICITY, UNSPECIFIED PULMONARY EMBOLISM TYPE, UNSPECIFIED WHETHER ACUTE COR PULMONALE PRESENT: Primary | ICD-10-CM

## 2025-08-07 DIAGNOSIS — I48.91 ATRIAL FIBRILLATION WITH RVR: ICD-10-CM

## 2025-08-07 DIAGNOSIS — I10 ESSENTIAL HYPERTENSION: ICD-10-CM

## 2025-08-07 PROCEDURE — 3066F NEPHROPATHY DOC TX: CPT | Mod: CPTII,S$GLB,, | Performed by: PHYSICIAN ASSISTANT

## 2025-08-07 PROCEDURE — 99204 OFFICE O/P NEW MOD 45 MIN: CPT | Mod: S$GLB,,, | Performed by: PHYSICIAN ASSISTANT

## 2025-08-07 PROCEDURE — 85306 CLOT INHIBIT PROT S FREE: CPT

## 2025-08-07 PROCEDURE — 85303 CLOT INHIBIT PROT C ACTIVITY: CPT

## 2025-08-07 PROCEDURE — 1159F MED LIST DOCD IN RCRD: CPT | Mod: CPTII,S$GLB,, | Performed by: PHYSICIAN ASSISTANT

## 2025-08-07 PROCEDURE — 99999 PR PBB SHADOW E&M-EST. PATIENT-LVL V: CPT | Mod: PBBFAC,,, | Performed by: PHYSICIAN ASSISTANT

## 2025-08-07 PROCEDURE — 3051F HG A1C>EQUAL 7.0%<8.0%: CPT | Mod: CPTII,S$GLB,, | Performed by: PHYSICIAN ASSISTANT

## 2025-08-07 PROCEDURE — 36415 COLL VENOUS BLD VENIPUNCTURE: CPT

## 2025-08-07 PROCEDURE — 4010F ACE/ARB THERAPY RXD/TAKEN: CPT | Mod: CPTII,S$GLB,, | Performed by: PHYSICIAN ASSISTANT

## 2025-08-07 PROCEDURE — 1101F PT FALLS ASSESS-DOCD LE1/YR: CPT | Mod: CPTII,S$GLB,, | Performed by: PHYSICIAN ASSISTANT

## 2025-08-07 PROCEDURE — 81270 JAK2 GENE: CPT

## 2025-08-07 PROCEDURE — 81241 F5 GENE: CPT

## 2025-08-07 PROCEDURE — 86146 BETA-2 GLYCOPROTEIN ANTIBODY: CPT

## 2025-08-07 PROCEDURE — 3061F NEG MICROALBUMINURIA REV: CPT | Mod: CPTII,S$GLB,, | Performed by: PHYSICIAN ASSISTANT

## 2025-08-07 PROCEDURE — 1157F ADVNC CARE PLAN IN RCRD: CPT | Mod: CPTII,S$GLB,, | Performed by: PHYSICIAN ASSISTANT

## 2025-08-07 PROCEDURE — 86147 CARDIOLIPIN ANTIBODY EA IG: CPT | Mod: 59

## 2025-08-07 PROCEDURE — 3008F BODY MASS INDEX DOCD: CPT | Mod: CPTII,S$GLB,, | Performed by: PHYSICIAN ASSISTANT

## 2025-08-07 PROCEDURE — 81240 F2 GENE: CPT

## 2025-08-07 PROCEDURE — 3288F FALL RISK ASSESSMENT DOCD: CPT | Mod: CPTII,S$GLB,, | Performed by: PHYSICIAN ASSISTANT

## 2025-08-07 PROCEDURE — 85300 ANTITHROMBIN III ACTIVITY: CPT

## 2025-08-07 PROCEDURE — 3078F DIAST BP <80 MM HG: CPT | Mod: CPTII,S$GLB,, | Performed by: PHYSICIAN ASSISTANT

## 2025-08-07 PROCEDURE — 3074F SYST BP LT 130 MM HG: CPT | Mod: CPTII,S$GLB,, | Performed by: PHYSICIAN ASSISTANT

## 2025-08-07 NOTE — TELEPHONE ENCOUNTER
Called [facility] and spoke to Ab she stated that they just started processing the portable concentrator order that was faxed on 8/15/2025. They are waiting on insurance authorization then they will ship off to the pt.. jaspal

## 2025-08-07 NOTE — PROGRESS NOTES
HEMATOLOGY/ONCOLOGY CLINIC VISIT NOTE    PATIENT: Partha Curtis Jr.  MRN: [id number]  DATE: 8/7/2025    Chief Complaint: acute pulmonary embolism     Subjective:    History of Present Illness: Mr. Curtis is a 65 y.o. male who presents for evaluation and management of acute PE. Co morbidities include a fib, chronic back pain, T2DM, hypogonadism, and HTN.     Presented to ED at the end of July with shortness of breath. CTA positive for pulmonary thromboembolism in the LLL pulmonary arterial segmental branch. ECHO without heart strain. He was started on Lovenox but switched to Eliquis on discharge. Of note, pt had been on Eliquis for his A fib, but it was held for pain management procedure x 3 days prior to him presenting to the ED. He was also discharged on 2L of supplemental oxygen which he remains on.     No family history of blood clots that he can recall. Does not use tobacco products. Previously on testosterone injections but stopped this about 3 months ago.       Past medical, surgical, family, and social histories have been reviewed and updated below.    Past Medical History:   Past Medical History:   Diagnosis Date    Allergy     Carotid artery occlusion     Cataract     Chronic back pain     Colonic polyp     Genetic testing     MUTYH mutation-negative    Hyperlipidemia     Hypertension     Paroxysmal atrial fibrillation 02/28/2024    Sleep apnea     Type 2 diabetes mellitus with stage 3 chronic kidney disease, without long-term current use of insulin 04/02/2020       Past Surgical History:   Past Surgical History:   Procedure Laterality Date    ANKLE SURGERY Left     2    APPENDECTOMY      at age 20    ARTHROSCOPIC CHONDROPLASTY OF KNEE JOINT Left 08/13/2024    Procedure: ARTHROSCOPY, KNEE, WITH CHONDROPLASTY;  Surgeon: Kai Washington MD;  Location: [facility];  Service: Orthopedics;  Laterality: Left;    ARTHROSCOPY, KNEE, WITH ABRASION ARTHROPLASTY OR MICROFRACTURE Left 08/13/2024    Procedure: ARTHROSCOPY,  KNEE, WITH  MICROFRACTURE;  Surgeon: Kai Washington MD;  Location: [facility] OR;  Service: Orthopedics;  Laterality: Left;    ARTHROSCOPY,KNEE,WITH MENISCUS REPAIR Left 08/13/2024    Procedure: ARTHROSCOPY,KNEE,WITH MENISCUS REPAIR;  Surgeon: Kai Washington MD;  Location: [facility] OR;  Service: Orthopedics;  Laterality: Left;  NO REGIONAL BLOCK    CAROTID ENDARTERECTOMY Right 07/15/2015    CARPAL TUNNEL RELEASE Bilateral     COLONOSCOPY N/A 12/14/2018    Procedure: COLONOSCOPYSuprep;  Surgeon: Silver Selby MD;  Location: [facility] ENDO;  Service: Endoscopy;  Laterality: N/A;    COLONOSCOPY N/A 10/02/2024    Procedure: COLONOSCOPY;  Surgeon: Heath Morrow MD;  Location: [facility] ENDO;  Service: Endoscopy;  Laterality: N/A;  [facility] S[facility], portal - PC  9/25/24-LVM for precall, portal-DS. 10/1/24 Pt. called and confirmed arrival time.EC    EPIDURAL STEROID INJECTION N/A 02/26/2024    Procedure: CERVICAL C7/T1 IL SELENA;  Surgeon: Gokul Fung MD;  Location: [facility] PAIN MGT;  Service: Pain Management;  Laterality: N/A;  [phone]  2 WK F/U SERGEI    EPIDURAL STEROID INJECTION N/A 05/23/2024    Procedure: LUMBAR L4/5 IL SELENA *ASPIRIN OTC* HOLD FOR 5 DAYS;  Surgeon: Gokul Fung MD;  Location: [facility] PAIN MGT;  Service: Pain Management;  Laterality: N/A;  [phone]  2 WK F/U NOHELIA    EPIDURAL STEROID INJECTION N/A 10/21/2024    Procedure: LUMBAR L5/S1 IL SELENA *ASPIRIN OTC* HOLD FOR 5 DAYS  **ANGELICA PT**;  Surgeon: Sofia Sheikh MD;  Location: [facility] PAIN MGT;  Service: Pain Management;  Laterality: N/A;  [phone]  2 WK F/U NOHELIA    INJECTION OF ANESTHETIC AGENT AROUND NERVE N/A 12/23/2024    Procedure: LUMBAR L4.5 IL SELENA *ASPIRIN OTC* HOLD FOR 5 DAYS;  Surgeon: Gokul Fung MD;  Location: [facility] PAIN MGT;  Service: Pain Management;  Laterality: N/A;  2 WK F/U NOHELIA    INJECTION OF ANESTHETIC AGENT AROUND NERVE Bilateral 02/24/2025    Procedure: BLOCK, NERVE BILATERAL L3, 4, 5 MEDIAL BRANCH;   Surgeon: Gokul Fung MD;  Location: [facility] PAIN MGT;  Service: Pain Management;  Laterality: Bilateral;  2 WK F/U NOHELIA    INJECTION OF ANESTHETIC AGENT AROUND NERVE Bilateral 03/10/2025    Procedure: BLOCK, NERVE BILATERAL L3, L4, L5 MEDIAL BRANCH;  Surgeon: Gokul Fung MD;  Location: [facility] PAIN MGT;  Service: Pain Management;  Laterality: Bilateral;    INJECTION, SPINE, LUMBOSACRAL, TRANSFORAMINAL APPROACH Bilateral 7/24/2025    Procedure: LUMBAR TRANSFORAMINAL BILATERAL L5/S1 *ELIQUIS/ASPIRIN ECONSULT PENDING* *ANGELICA PT*;  Surgeon: Sofia Sheikh MD;  Location: [facility] PAIN MGT;  Service: Pain Management;  Laterality: Bilateral;  2 WK F/U SERGEI    JOINT REPLACEMENT  09/2010    NASAL SEPTUM SURGERY      RADIOFREQUENCY ABLATION Bilateral 03/27/2025    Procedure: RADIOFREQUENCY ABLATION BILATERAL L3, 4, 5;  Surgeon: Gokul Fung MD;  Location: [facility] PAIN MGT;  Service: Pain Management;  Laterality: Bilateral;    TOTAL KNEE ARTHROPLASTY Right     6 knee surgeries    TRANSESOPHAGEAL ECHOCARDIOGRAM WITH POSSIBLE CARDIOVERSION (ELIZABETH W/ POSS CARDIOVERSION) N/A 02/28/2024    Procedure: Transesophageal echo (ELIZABETH) intra-procedure log documentation;  Surgeon: Ahmet De La Cruz MD;  Location: [facility] CATH LAB/EP;  Service: Cardiology;  Laterality: N/A;    TRANSESOPHAGEAL ECHOCARDIOGRAM WITH POSSIBLE CARDIOVERSION (ELIZABETH W/ POSS CARDIOVERSION) N/A 5/28/2025    Procedure: Transesophageal echo (ELIZABETH) intra-procedure log documentation;  Surgeon: Valerio Jaquez MD;  Location: [facility] OR;  Service: Cardiology;  Laterality: N/A;       Family History:   Family History   Problem Relation Name Age of Onset    Brain cancer Mother Klarissa 67    Cancer Mother Klarissa     Hypertension Father Keanu     Lung cancer Father Keanu         x2 (PAUL initially- treated surgically only, then recurred distantly) (smoker, & had been exposed to many chemicals)    Cancer Father Keanu     Breast cancer Sister  58    Genetic Disorder Sister           "monoallelic MUTYH mutation    Seizures Daughter      Cancer Maternal Grandfather Valerio     Brain cancer Paternal Grandfather  73    Breast cancer Maternal Cousin  57    Aneurysm Other mgm's father 48        brain    Ulcerative colitis Other brother's son        Social History:  reports that he has never smoked. He has never been exposed to tobacco smoke. He has never used smokeless tobacco. He reports current alcohol use of about 2.0 standard drinks of alcohol per week. He reports that he does not use drugs.    Allergies:  Review of patient's allergies indicates:   Allergen Reactions    Stadol [butorphanol tartrate] Rash     Swelling in face    Strawberries [strawberry] Rash       Medications:  Current Medications[1]    Review of Systems   Constitutional:  Positive for fatigue. Negative for fever and unexpected weight change.   HENT:  Negative for nosebleeds.    Respiratory:  Positive for shortness of breath (on 2L of O2 NC).    Cardiovascular:  Negative for chest pain.   Gastrointestinal:  Negative for blood in stool.   Genitourinary:  Negative for hematuria.   Musculoskeletal:  Negative for arthralgias.   Neurological:  Negative for dizziness and headaches.       ECOG Performance Status:   ECOG SCORE    1 - Restricted in strenuous activity-ambulatory and able to carry out work of a light nature         Objective:      Vitals:   Vitals:    08/07/25 0859   BP: 103/62   BP Location: Left arm   Patient Position: Sitting   Pulse: 68   Resp: 20   Temp: 98.7 °F (37.1 °C)   TempSrc: Oral   SpO2: (!) 92%   Weight: 88.1 kg (194 lb 3.6 oz)   Height: 5' 10" (1.778 m)     BMI: Body mass index is 27.87 kg/m².    Physical Exam  Constitutional:       General: He is not in acute distress.  HENT:      Nose:      Comments: Nasal cannula in place  Eyes:      General: No scleral icterus.  Pulmonary:      Effort: Pulmonary effort is normal. No respiratory distress.   Skin:     Coloration: Skin is not jaundiced or pale.   Neurological: "      Mental Status: He is alert.       Laboratory Data:  Labs have been reviewed.    Imagin25 CTA CHEST NON CORONARY (PE STUDIES)     CLINICAL HISTORY:  Pulmonary embolism (PE) suspected, high prob;     TECHNIQUE:  Following the intravenous administration of 75 cc of Omnipaque 350 contrast material, 1.25 mm contiguous axial images were acquired through the chest utilizing the CT pulmonary angiogram protocol.  2-D coronal and sagittal reconstructed images of the chest and sagittal oblique MIP images of the pulmonary arterial system were generated from the source data.     COMPARISON:  Chest radiograph same day and 2023, MRI lumbar spine 2025     FINDINGS:  Respiratory motion and beam hardening with streak artifact from overlying monitoring leads and contrast bolus in the SVC somewhat limits evaluation.     Pulmonary arterial system: Pulmonary arteries distribute normally.  Four main pulmonary veins draining to the left atrium.  Small filling defect seen within proximal segmental pulmonary arterial branch to the left lower lobe best seen on axial images 192 through 212 of series 3.  No large saddle pulmonary embolism, enlargement of the main pulmonary artery, or secondary findings of right ventricular strain.     Aorta and heart: The heart is normal in size.  No pericardial fluid.  Coronary arterial calcifications noted.  No cardiac thrombus seen.  Three vessel left-sided arch.  No thoracic aortic aneurysm.  No thoracic aortic dissection.     Airways: Unremarkable.     Mediastinum/Lymph nodes: No pathologically enlarged lymph nodes in the chest or axilla.  Esophagus is normal in course and caliber.     Lungs: Well expanded.  Azygous lobe configuration.  Mild dependent atelectasis.  No consolidation.  No concerning pulmonary nodules.     Pleura: No significant volume of pleural fluid or pneumothorax.  No pleural based masses.     Structures at the base of the neck are within normal limits.   Extrathoracic soft tissues are within normal limits.     Visualized upper abdominal soft tissues: No acute/significant abnormalities appreciated.     Bones: There is age-related minimal to mild degenerative change of the thoracic spine.  No acute or destructive osseous process seen.     Impression:     1. Suspected pulmonary thromboembolism in a left lower lobe pulmonary arterial segmental branch.  2. Other incidental/nonemergent findings in the body of the report.  Assessment:       1. Pulmonary embolism, unspecified chronicity, unspecified pulmonary embolism type, unspecified whether acute cor pulmonale present    2. Atrial fibrillation with RVR    3. Essential hypertension      Plan:     PE  - Likely 2/2 a fib since he was off his Eliquis for a few days prior to this event  - Thrombophilia workup today to r/o hypercoagulable state   - RTC 3 months for CBC and CMP check  - Consider Lovenox bridge for any other elective procedures requiring DOAC hold.     2. A fib  - Eliquis indefinitely   - Continue follow up with cardiology     3. HTN  - BP stable 103/62   - continue follow up with PCP    Kendra Lafleur PA-C   Hematology/Oncology  [facility] - [address], [address]  [place] [address]  Phone: [phone]  Fax: [phone]         [1]   Current Outpatient Medications   Medication Sig Dispense Refill    amitriptyline (ELAVIL) 75 MG tablet Take 1 tablet by mouth in the evening 90 tablet 3    amLODIPine (NORVASC) 10 MG tablet Take 1 tablet (10 mg total) by mouth once daily. 90 tablet 3    apixaban (ELIQUIS) 5 mg Tab Take 2 tablets (10 mg total) by mouth 2 (two) times daily for 7 days, THEN 1 tablet (5 mg total) 2 (two) times daily. 90 tablet 1    aspirin (ECOTRIN) 81 MG EC tablet Take 1 tablet (81 mg total) by mouth once daily. 28 tablet 0    azelastine (ASTELIN) 137 mcg (0.1 %) nasal spray 1 spray (137 mcg total) by Nasal route 2 (two) times daily. 30 mL 0    BD LUER-RUSS  "SYRINGE 3 mL 25 gauge x 1" Syrg USE ONE SYRINGE EVERY 14 DAYS WITH TESTOSTERONE 100 each 2    candesartan (ATACAND) 32 MG tablet Take 1 tablet by mouth once daily 90 tablet 3    cyanocobalamin (VITAMIN B-12) 1000 MCG tablet Take 1 tablet (1,000 mcg total) by mouth once daily. 90 tablet 4    DULoxetine (CYMBALTA) 30 MG capsule Take 1 capsule by mouth once daily 90 capsule 3    ergocalciferol (ERGOCALCIFEROL) 50,000 unit Cap Take 1 capsule by mouth once a week 12 capsule 0    ezetimibe (ZETIA) 10 mg tablet Take 1 tablet (10 mg total) by mouth once daily. 90 tablet 1    fluticasone propionate (FLONASE) 50 mcg/actuation nasal spray 2 sprays by Each Nostril route.      furosemide (LASIX) 40 MG tablet Take 1 tablet (40 mg total) by mouth daily as needed (swelling of feet, shortness of breath). 60 tablet 3    gabapentin (NEURONTIN) 800 MG tablet Take 1 tablet by mouth in the evening 90 tablet 0    glimepiride (AMARYL) 2 MG tablet Take 1 tablet (2 mg total) by mouth before breakfast.      glucose 4 GM chewable tablet Take 4 tablets (16 g total) by mouth as needed for Low blood sugar. 30 tablet 12    hydrALAZINE (APRESOLINE) 25 MG tablet Take 1 tablet (25 mg total) by mouth every 8 (eight) hours. 270 tablet 3    hydrocortisone 2.5 % cream Apply to face twice daily for flares as needed 40 g 2    ipratropium (ATROVENT) 42 mcg (0.06 %) nasal spray 2 sprays by Nasal route 2 (two) times daily as needed for Rhinitis. 15 mL 0    ketoconazole (NIZORAL) 2 % cream Apply twice daily to affected area of skin 60 g 2    metoprolol succinate (TOPROL-XL) 200 MG 24 hr tablet Take 1 tablet (200 mg total) by mouth once daily. 90 tablet 3    oxyCODONE-acetaminophen (PERCOCET)  mg per tablet Take 1 tablet by mouth every 12 (twelve) hours as needed for Pain. 60 tablet 0    potassium chloride SA (K-DUR,KLOR-CON M) 10 MEQ tablet Take 1 tablet (10 mEq total) by mouth daily as needed (with lasix). 90 tablet 3    rosuvastatin (CRESTOR) 40 MG Tab " "TAKE 1 TABLET BY MOUTH ONCE DAILY IN THE EVENING 90 tablet 1    semaglutide (OZEMPIC) 2 mg/dose (8 mg/3 mL) PnIj Inject 2 mg into the skin every 7 days. 9 mL 3    syringe with needle, safety (BD INTEGRA SYRINGE) 3 mL 22 gauge x 1 1/2" Syrg Inject 1 Syringe as directed once a week. 6 each 3    valACYclovir (VALTREX) 1000 MG tablet TAKE 2 TABLETS BY MOUTH EVERY 12 HOURS 4 tablet 0     No current facility-administered medications for this visit.     Facility-Administered Medications Ordered in Other Visits   Medication Dose Route Frequency Provider Last Rate Last Admin    0.9%  NaCl infusion   Intravenous Continuous Kendell Hoffman MD        0.9%  NaCl infusion   Intravenous Continuous Gerardo Uribe MD         "

## 2025-08-08 ENCOUNTER — PATIENT MESSAGE (OUTPATIENT)
Dept: PRIMARY CARE CLINIC | Facility: CLINIC | Age: 65
End: 2025-08-08
Payer: COMMERCIAL

## 2025-08-08 LAB — AT III ACT/NOR PPP CHRO: 127 % (ref 82–139)

## 2025-08-08 NOTE — PROGRESS NOTES
Subjective:   @Patient ID:  Partha Curtis Jr. is a 65 y.o. male who presents for evaluation f No chief complaint on file.      HPI:     Patient is a 65-year-old male, recently admitted to the hospital for atrial fibrillation/palpitations underwent cardioversion.  Now here to establish care with a cardiologist.  Active medical problems include      -recently went to the ER with shortness of breath and palpitation noted to have left lower lobe pulmonary embolism that happened after he stopped the Eliquis for 3 days prior to spinal injection procedure.  Significant shortness of breath reported afterwards.  Echocardiogram with RVSP of 26 left ventricular EF 55%  -history of paroxysmal atrial fibrillation has had cardioversion done twice in the last 2 years.     CHADS-VASc score of 4 with points for age, hypertension, diabetes, atherosclerotic carotid disease  -carotid artery stenosis status post right carotid endarterectomy.  March 2025 carotid ultrasound shows no significant residual stenosis.  On aspirin therapy  -hypertension previously uncontrolled now on hydralazine, amlodipine, candesartan, Toprol-XL  -sleep apnea, unable to wear CPAP machine.  Has generalized tiredness /fatigue throughout the day.  Heart rate controlled in normal sinus rhythm          Significant change in clinical status since recent diagnosis of small left lower lobe pulmonary embolism although I do not see much elevation in intracardiac filling pressures.  Patient now dependent on oxygen.  Previously never had any lung problems.  We discussed mismatch in diagnosis and his worsening symptoms.  Does not have hypoxia with ambulation.  EKG with normal sinus rhythm        Results for orders placed during the hospital encounter of 05/27/25    Echo    Interpretation Summary    Left Ventricle: Moderately increased wall thickness. There is moderate concentric hypertrophy. Normal wall motion. There is normal systolic function with a visually estimated  ejection fraction of 55 - 60%. Quantitated ejection fraction is 58%. Unable to assess diastolic function due to atrial fibrillation. Normal left ventricular filling pressure.    Right Ventricle: The right ventricle is normal in size Systolic function is normal.    Left Atrium: The left atrium is normal in size    Mitral Valve: There is mild regurgitation.    Tricuspid Valve: There is no significant regurgitation.    Aorta: The aortic root is mildly dilated measuring 4.00 cm. The ascending aorta is mildly dilated measuring 3.5 cm.    Pulmonary Artery: The estimated pulmonary artery systolic pressure is 19 mmHg.    IVC/SVC: Normal venous pressure at 3 mmHg.    Pericardium: There is no pericardial effusion.      No results found for this or any previous visit.      Results for orders placed during the hospital encounter of 02/27/24    Intra-Procedure Documentation    Narrative  ELIZABETH performed in the Invasive Lab  - See Procedure Log link below for nursing documentation  - See ELIZABETH order on Card Proc Tab for physician findings      Please document below the medical necessity for continuous telemetry monitoring or discontinue the current order if appropriate.    Current rhythm from flowsheet:               Patient Active Problem List    Diagnosis Date Noted    Acute pulmonary embolism 07/28/2025    Anticoagulated 06/04/2025    Lumbar spondylosis 12/22/2024    Gait abnormality 08/15/2024    Decreased range of motion of lower extremity 08/15/2024    Decreased strength of lower extremity 08/15/2024    Bucket-handle tear of medial meniscus of left knee as current injury 08/13/2024    Atrial fibrillation with RVR 02/28/2024    Microalbuminuria due to type 2 diabetes mellitus 02/27/2024    Decreased range of motion of neck 01/30/2024    Decreased range of motion of left shoulder 01/30/2024    Neck pain 01/30/2024    Myofascial pain syndrome 01/30/2024    Tendinopathy of rotator cuff, left 01/30/2024    Low testosterone in male  10/11/2023    Overweight (BMI 25.0-29.9) 06/27/2023    Insomnia 06/27/2023    ALLA (obstructive sleep apnea)     Abnormal electrocardiogram 06/30/2022    B12 deficiency 06/27/2022    Cervical radiculopathy 03/10/2022    Hypogonadism in male 02/24/2022    Herniation of intervertebral disc 02/24/2022    Chronic bilateral low back pain without sciatica 02/10/2021    Anxiety 02/10/2021    Vitamin D deficiency 02/10/2021    Type 2 diabetes mellitus with stage 3 chronic kidney disease, without long-term current use of insulin 04/02/2020    Long-term current use of testosterone replacement therapy 03/13/2018    Bilateral hearing loss 03/13/2018    BMI 27.0-27.9,adult 03/13/2018    S/P carotid endarterectomy 08/11/2015    Essential hypertension 10/22/2014    Dyslipidemia 10/22/2014    Carotid stenosis 07/01/2014                    LAST HbA1c  Lab Results   Component Value Date    HGBA1C 7.7 (H) 06/16/2025       Lipid panel  Lab Results   Component Value Date    CHOL 93 (L) 06/16/2025    CHOL 96 (L) 12/10/2024    CHOL 151 03/11/2024     Lab Results   Component Value Date    HDL 31 (L) 06/16/2025    HDL 29 (L) 12/10/2024    HDL 31 (L) 03/11/2024     Lab Results   Component Value Date    LDLCALC 36.6 (L) 06/16/2025    LDLCALC 37.6 (L) 12/10/2024    LDLCALC 76.4 03/11/2024     Lab Results   Component Value Date    TRIG 127 06/16/2025    TRIG 147 12/10/2024    TRIG 218 (H) 03/11/2024     Lab Results   Component Value Date    CHOLHDL 33.3 06/16/2025    CHOLHDL 30.2 12/10/2024    CHOLHDL 20.5 03/11/2024            Review of Systems   Constitutional: Negative for chills and fever.   HENT:  Negative for hearing loss and nosebleeds.    Eyes:  Negative for blurred vision.   Cardiovascular:         As in HPI    Respiratory:  Negative for cough, hemoptysis and shortness of breath.    Endocrine: Negative for cold intolerance and polyuria.   Hematologic/Lymphatic: Negative for bleeding problem.   Skin:  Negative for itching.    Musculoskeletal:  Negative for falls.   Gastrointestinal:  Negative for abdominal pain and hematochezia.   Genitourinary:  Negative for hematuria.   Neurological:  Negative for dizziness and loss of balance.   Psychiatric/Behavioral:  Negative for altered mental status and depression.        Objective:   Physical Exam  Constitutional:       Appearance: He is well-developed.   HENT:      Head: Normocephalic and atraumatic.   Eyes:      Conjunctiva/sclera: Conjunctivae normal.   Neck:      Vascular: No carotid bruit or JVD.   Cardiovascular:      Rate and Rhythm: Normal rate and regular rhythm.      Pulses:           Carotid pulses are 2+ on the right side and 2+ on the left side.       Radial pulses are 2+ on the right side and 2+ on the left side.      Heart sounds: Normal heart sounds. No murmur heard.     No friction rub. No gallop.   Pulmonary:      Effort: Pulmonary effort is normal. No respiratory distress.      Breath sounds: Normal breath sounds. No stridor. No wheezing.   Abdominal:      General: Abdomen is flat.      Palpations: Abdomen is soft.   Musculoskeletal:      Cervical back: Neck supple.      Right lower leg: No edema.      Left lower leg: No edema.   Skin:     General: Skin is warm and dry.   Neurological:      Mental Status: He is alert and oriented to person, place, and time.   Psychiatric:         Behavior: Behavior normal.         Assessment:     1. Shortness of breath    2. Atrial fibrillation with RVR    3. Pulmonary embolism, unspecified chronicity, unspecified pulmonary embolism type, unspecified whether acute cor pulmonale present    4. S/P carotid endarterectomy    5. ALLA (obstructive sleep apnea)        Plan:       -patient had 1st episode worked pulmonary embolism when he stopped Eliquis for 3 days.  Eliquis was stopped because of need for spinal injections and was taking Eliquis for atrial fibrillation rather than pulmonary embolism in the past.  -I have reviewed the CT images.  I  do not see much significant thrombus.  Normal intracardiac filling pressures.  Agree with not being a candidate for thrombectomy.  EKG with normal sinus rhythm.  -recommend follow up with pulmonology and consideration for pulmonary function tests considering if need for being on oxygen.  -recommend Hematology Oncology workup for hypercoagulable state.  -is supposed to see ENT for possible inspire device evaluation.  -blood pressure controlled with a antihypertensive medications  -LDL at goal 36.  Continue Zetia  -follow up within 3 months.  Ischemic evaluation is negative pulmonary workup but majority of the symptoms are coming from hypoxia.      Pertinent cardiac images and EKG reviewed independently.    Continue with current medical plan and lifestyle changes.  Return sooner for concerns or questions. If symptoms persist go to the ED  I have reviewed all pertinent data including patient's medical history in detail and updated the computerized patient record.     (Portions of this note were dictated using voice recognition software and may contain dictation related errors in spelling/grammar/syntax not found on text review)    Orders Placed This Encounter   Procedures    Ambulatory referral/consult to Pulmonology     Standing Status:   Future     Expected Date:   8/13/2025     Expiration Date:   9/6/2026     Referral Priority:   Routine     Referral Type:   Consultation     Referral Reason:   Specialty Services Required     Requested Specialty:   Pulmonary Disease     Number of Visits Requested:   1    Ambulatory referral/consult to Hematology / Oncology     Standing Status:   Future     Number of Occurrences:   1     Expected Date:   8/13/2025     Expiration Date:   9/6/2026     Referral Priority:   Routine     Referral Type:   Consultation     Referral Reason:   Specialty Services Required     Requested Specialty:   Hematology and Oncology     Number of Visits Requested:   1    IN OFFICE EKG 12-LEAD (to Muse)  "      Follow up as scheduled.     He expressed verbal understanding and agreed with the plan    Patient's Medications   New Prescriptions    FUROSEMIDE (LASIX) 40 MG TABLET    Take 1 tablet (40 mg total) by mouth daily as needed (swelling of feet, shortness of breath).    POTASSIUM CHLORIDE SA (K-DUR,KLOR-CON M) 10 MEQ TABLET    Take 1 tablet (10 mEq total) by mouth daily as needed (with lasix).   Previous Medications    AMITRIPTYLINE (ELAVIL) 75 MG TABLET    Take 1 tablet by mouth in the evening    AMLODIPINE (NORVASC) 10 MG TABLET    Take 1 tablet (10 mg total) by mouth once daily.    APIXABAN (ELIQUIS) 5 MG TAB    Take 2 tablets (10 mg total) by mouth 2 (two) times daily for 7 days, THEN 1 tablet (5 mg total) 2 (two) times daily.    ASPIRIN (ECOTRIN) 81 MG EC TABLET    Take 1 tablet (81 mg total) by mouth once daily.    AZELASTINE (ASTELIN) 137 MCG (0.1 %) NASAL SPRAY    1 spray (137 mcg total) by Nasal route 2 (two) times daily.    BD LUER-RUSS SYRINGE 3 ML 25 GAUGE X 1" SYRG    USE ONE SYRINGE EVERY 14 DAYS WITH TESTOSTERONE    CANDESARTAN (ATACAND) 32 MG TABLET    Take 1 tablet by mouth once daily    CYANOCOBALAMIN (VITAMIN B-12) 1000 MCG TABLET    Take 1 tablet (1,000 mcg total) by mouth once daily.    DULOXETINE (CYMBALTA) 30 MG CAPSULE    Take 1 capsule by mouth once daily    ERGOCALCIFEROL (ERGOCALCIFEROL) 50,000 UNIT CAP    Take 1 capsule by mouth once a week    EZETIMIBE (ZETIA) 10 MG TABLET    Take 1 tablet (10 mg total) by mouth once daily.    FLUTICASONE PROPIONATE (FLONASE) 50 MCG/ACTUATION NASAL SPRAY    2 sprays by Each Nostril route.    GABAPENTIN (NEURONTIN) 800 MG TABLET    Take 1 tablet by mouth in the evening    GLIMEPIRIDE (AMARYL) 2 MG TABLET    Take 1 tablet (2 mg total) by mouth before breakfast.    GLUCOSE 4 GM CHEWABLE TABLET    Take 4 tablets (16 g total) by mouth as needed for Low blood sugar.    HYDRALAZINE (APRESOLINE) 25 MG TABLET    Take 1 tablet (25 mg total) by mouth every 8 " "(eight) hours.    HYDROCORTISONE 2.5 % CREAM    Apply to face twice daily for flares as needed    IPRATROPIUM (ATROVENT) 42 MCG (0.06 %) NASAL SPRAY    2 sprays by Nasal route 2 (two) times daily as needed for Rhinitis.    KETOCONAZOLE (NIZORAL) 2 % CREAM    Apply twice daily to affected area of skin    METOPROLOL SUCCINATE (TOPROL-XL) 200 MG 24 HR TABLET    Take 1 tablet (200 mg total) by mouth once daily.    OXYCODONE-ACETAMINOPHEN (PERCOCET)  MG PER TABLET    Take 1 tablet by mouth every 12 (twelve) hours as needed for Pain.    ROSUVASTATIN (CRESTOR) 40 MG TAB    TAKE 1 TABLET BY MOUTH ONCE DAILY IN THE EVENING    SEMAGLUTIDE (OZEMPIC) 2 MG/DOSE (8 MG/3 ML) PNIJ    Inject 2 mg into the skin every 7 days.    SYRINGE WITH NEEDLE, SAFETY (BD INTEGRA SYRINGE) 3 ML 22 GAUGE X 1 1/2" SYRG    Inject 1 Syringe as directed once a week.    VALACYCLOVIR (VALTREX) 1000 MG TABLET    TAKE 2 TABLETS BY MOUTH EVERY 12 HOURS   Modified Medications    No medications on file   Discontinued Medications    No medications on file        Albin Mcclelland M.D"

## 2025-08-11 LAB
PROT C ACT/NOR PPP CHRO: 96 % (ref 70–150)
PROT S ACT/NOR PPP: 133 % (ref 65–150)
W CARDIOLIPIN IGG AB: 1.3 GPL
W CARDIOLIPIN IGM AB: <0.9 MPL
W PROTHROMBIN 20210A MUTATION ANALYSIS: NEGATIVE

## 2025-08-12 DIAGNOSIS — M54.12 CERVICAL RADICULOPATHY: ICD-10-CM

## 2025-08-12 DIAGNOSIS — M54.16 LUMBAR RADICULOPATHY: ICD-10-CM

## 2025-08-12 LAB
COAGULATION SPECIALIST REVIEW: NORMAL
F5 P.R506Q BLD/T QL: NEGATIVE
JAK2 P.V617F BLD/T QL: NORMAL
JAK2 P.V617F MUT/NOR BLD/T: NORMAL %
PROVIDER SIGNING NAME: NORMAL
W BETA-2 GLYCOPROTEIN 1 IGA AB: 2.3 U/ML
W BETA-2 GLYCOPROTEIN 1 IGG AB: 1.3 U/ML
W BETA-2 GLYCOPROTEIN 1 IGM AB: <2.4 U/ML

## 2025-08-12 RX ORDER — GABAPENTIN 800 MG/1
800 TABLET ORAL NIGHTLY
Qty: 90 TABLET | Refills: 0 | Status: SHIPPED | OUTPATIENT
Start: 2025-08-12

## 2025-08-13 ENCOUNTER — HOSPITAL ENCOUNTER (OUTPATIENT)
Dept: CARDIOLOGY | Facility: CLINIC | Age: 65
Discharge: HOME | End: 2025-08-13
Payer: COMMERCIAL

## 2025-08-13 ENCOUNTER — OFFICE VISIT (OUTPATIENT)
Dept: ELECTROPHYSIOLOGY | Facility: CLINIC | Age: 65
End: 2025-08-13
Payer: COMMERCIAL

## 2025-08-13 VITALS
DIASTOLIC BLOOD PRESSURE: 60 MMHG | BODY MASS INDEX: 27.94 KG/M2 | WEIGHT: 195.13 LBS | HEIGHT: 70 IN | HEART RATE: 69 BPM | SYSTOLIC BLOOD PRESSURE: 110 MMHG

## 2025-08-13 DIAGNOSIS — E11.22 TYPE 2 DIABETES MELLITUS WITH STAGE 3A CHRONIC KIDNEY DISEASE, WITHOUT LONG-TERM CURRENT USE OF INSULIN: ICD-10-CM

## 2025-08-13 DIAGNOSIS — G47.33 OSA (OBSTRUCTIVE SLEEP APNEA): ICD-10-CM

## 2025-08-13 DIAGNOSIS — N18.31 TYPE 2 DIABETES MELLITUS WITH STAGE 3A CHRONIC KIDNEY DISEASE, WITHOUT LONG-TERM CURRENT USE OF INSULIN: ICD-10-CM

## 2025-08-13 DIAGNOSIS — I48.91 ATRIAL FIBRILLATION WITH RVR: ICD-10-CM

## 2025-08-13 DIAGNOSIS — I48.0 PAROXYSMAL ATRIAL FIBRILLATION: Primary | ICD-10-CM

## 2025-08-13 DIAGNOSIS — Z98.890 S/P CAROTID ENDARTERECTOMY: ICD-10-CM

## 2025-08-13 DIAGNOSIS — I10 ESSENTIAL HYPERTENSION: ICD-10-CM

## 2025-08-13 LAB
OHS QRS DURATION: 82 MS
OHS QTC CALCULATION: 413 MS

## 2025-08-13 PROCEDURE — 99999 PR PBB SHADOW E&M-EST. PATIENT-LVL V: CPT | Mod: PBBFAC,,, | Performed by: INTERNAL MEDICINE

## 2025-08-13 PROCEDURE — 1101F PT FALLS ASSESS-DOCD LE1/YR: CPT | Mod: CPTII,S$GLB,, | Performed by: INTERNAL MEDICINE

## 2025-08-13 PROCEDURE — 99205 OFFICE O/P NEW HI 60 MIN: CPT | Mod: S$GLB,,, | Performed by: INTERNAL MEDICINE

## 2025-08-13 PROCEDURE — 3061F NEG MICROALBUMINURIA REV: CPT | Mod: CPTII,S$GLB,, | Performed by: INTERNAL MEDICINE

## 2025-08-13 PROCEDURE — 3288F FALL RISK ASSESSMENT DOCD: CPT | Mod: CPTII,S$GLB,, | Performed by: INTERNAL MEDICINE

## 2025-08-13 PROCEDURE — 93010 ELECTROCARDIOGRAM REPORT: CPT | Mod: S$GLB,,, | Performed by: STUDENT IN AN ORGANIZED HEALTH CARE EDUCATION/TRAINING PROGRAM

## 2025-08-13 PROCEDURE — 3051F HG A1C>EQUAL 7.0%<8.0%: CPT | Mod: CPTII,S$GLB,, | Performed by: INTERNAL MEDICINE

## 2025-08-13 PROCEDURE — 3008F BODY MASS INDEX DOCD: CPT | Mod: CPTII,S$GLB,, | Performed by: INTERNAL MEDICINE

## 2025-08-13 PROCEDURE — 1159F MED LIST DOCD IN RCRD: CPT | Mod: CPTII,S$GLB,, | Performed by: INTERNAL MEDICINE

## 2025-08-13 PROCEDURE — 3078F DIAST BP <80 MM HG: CPT | Mod: CPTII,S$GLB,, | Performed by: INTERNAL MEDICINE

## 2025-08-13 PROCEDURE — 1160F RVW MEDS BY RX/DR IN RCRD: CPT | Mod: CPTII,S$GLB,, | Performed by: INTERNAL MEDICINE

## 2025-08-13 PROCEDURE — 3066F NEPHROPATHY DOC TX: CPT | Mod: CPTII,S$GLB,, | Performed by: INTERNAL MEDICINE

## 2025-08-13 PROCEDURE — 3074F SYST BP LT 130 MM HG: CPT | Mod: CPTII,S$GLB,, | Performed by: INTERNAL MEDICINE

## 2025-08-13 PROCEDURE — 1157F ADVNC CARE PLAN IN RCRD: CPT | Mod: CPTII,S$GLB,, | Performed by: INTERNAL MEDICINE

## 2025-08-13 PROCEDURE — 4010F ACE/ARB THERAPY RXD/TAKEN: CPT | Mod: CPTII,S$GLB,, | Performed by: INTERNAL MEDICINE

## 2025-08-15 ENCOUNTER — PATIENT MESSAGE (OUTPATIENT)
Dept: ELECTROPHYSIOLOGY | Facility: CLINIC | Age: 65
End: 2025-08-15
Payer: COMMERCIAL

## 2025-08-18 ENCOUNTER — TELEPHONE (OUTPATIENT)
Dept: PAIN MEDICINE | Facility: CLINIC | Age: 65
End: 2025-08-18
Payer: COMMERCIAL

## 2025-08-18 ENCOUNTER — PATIENT MESSAGE (OUTPATIENT)
Dept: PRIMARY CARE CLINIC | Facility: CLINIC | Age: 65
End: 2025-08-18
Payer: COMMERCIAL

## 2025-08-19 ENCOUNTER — OFFICE VISIT (OUTPATIENT)
Dept: PRIMARY CARE CLINIC | Facility: CLINIC | Age: 65
End: 2025-08-19
Payer: COMMERCIAL

## 2025-08-19 VITALS
BODY MASS INDEX: 28.15 KG/M2 | DIASTOLIC BLOOD PRESSURE: 73 MMHG | HEART RATE: 68 BPM | SYSTOLIC BLOOD PRESSURE: 109 MMHG | HEIGHT: 70 IN | OXYGEN SATURATION: 100 % | TEMPERATURE: 98 F | WEIGHT: 196.63 LBS

## 2025-08-19 DIAGNOSIS — G47.33 OSA (OBSTRUCTIVE SLEEP APNEA): ICD-10-CM

## 2025-08-19 DIAGNOSIS — I48.91 ATRIAL FIBRILLATION WITH RVR: ICD-10-CM

## 2025-08-19 DIAGNOSIS — Z79.01 ANTICOAGULATED: ICD-10-CM

## 2025-08-19 DIAGNOSIS — I26.99 OTHER ACUTE PULMONARY EMBOLISM WITHOUT ACUTE COR PULMONALE: Primary | ICD-10-CM

## 2025-08-19 DIAGNOSIS — I10 ESSENTIAL HYPERTENSION: ICD-10-CM

## 2025-08-19 PROCEDURE — 99999 PR PBB SHADOW E&M-EST. PATIENT-LVL V: CPT | Mod: PBBFAC,,, | Performed by: INTERNAL MEDICINE

## 2025-08-19 PROCEDURE — 1160F RVW MEDS BY RX/DR IN RCRD: CPT | Mod: CPTII,S$GLB,, | Performed by: INTERNAL MEDICINE

## 2025-08-19 PROCEDURE — 1101F PT FALLS ASSESS-DOCD LE1/YR: CPT | Mod: CPTII,S$GLB,, | Performed by: INTERNAL MEDICINE

## 2025-08-19 PROCEDURE — 1157F ADVNC CARE PLAN IN RCRD: CPT | Mod: CPTII,S$GLB,, | Performed by: INTERNAL MEDICINE

## 2025-08-19 PROCEDURE — 3078F DIAST BP <80 MM HG: CPT | Mod: CPTII,S$GLB,, | Performed by: INTERNAL MEDICINE

## 2025-08-19 PROCEDURE — 3051F HG A1C>EQUAL 7.0%<8.0%: CPT | Mod: CPTII,S$GLB,, | Performed by: INTERNAL MEDICINE

## 2025-08-19 PROCEDURE — 3066F NEPHROPATHY DOC TX: CPT | Mod: CPTII,S$GLB,, | Performed by: INTERNAL MEDICINE

## 2025-08-19 PROCEDURE — 1159F MED LIST DOCD IN RCRD: CPT | Mod: CPTII,S$GLB,, | Performed by: INTERNAL MEDICINE

## 2025-08-19 PROCEDURE — 4010F ACE/ARB THERAPY RXD/TAKEN: CPT | Mod: CPTII,S$GLB,, | Performed by: INTERNAL MEDICINE

## 2025-08-19 PROCEDURE — 3288F FALL RISK ASSESSMENT DOCD: CPT | Mod: CPTII,S$GLB,, | Performed by: INTERNAL MEDICINE

## 2025-08-19 PROCEDURE — 99214 OFFICE O/P EST MOD 30 MIN: CPT | Mod: S$GLB,,, | Performed by: INTERNAL MEDICINE

## 2025-08-19 PROCEDURE — 3061F NEG MICROALBUMINURIA REV: CPT | Mod: CPTII,S$GLB,, | Performed by: INTERNAL MEDICINE

## 2025-08-19 PROCEDURE — 3008F BODY MASS INDEX DOCD: CPT | Mod: CPTII,S$GLB,, | Performed by: INTERNAL MEDICINE

## 2025-08-19 PROCEDURE — 3074F SYST BP LT 130 MM HG: CPT | Mod: CPTII,S$GLB,, | Performed by: INTERNAL MEDICINE

## 2025-08-20 ENCOUNTER — PATIENT MESSAGE (OUTPATIENT)
Dept: PAIN MEDICINE | Facility: CLINIC | Age: 65
End: 2025-08-20

## 2025-08-20 ENCOUNTER — OFFICE VISIT (OUTPATIENT)
Dept: PAIN MEDICINE | Facility: CLINIC | Age: 65
End: 2025-08-20
Payer: COMMERCIAL

## 2025-08-20 VITALS
HEIGHT: 70 IN | RESPIRATION RATE: 19 BRPM | SYSTOLIC BLOOD PRESSURE: 115 MMHG | TEMPERATURE: 98 F | OXYGEN SATURATION: 99 % | DIASTOLIC BLOOD PRESSURE: 76 MMHG | BODY MASS INDEX: 28.06 KG/M2 | WEIGHT: 196 LBS | HEART RATE: 62 BPM

## 2025-08-20 DIAGNOSIS — M54.12 CERVICAL RADICULOPATHY: ICD-10-CM

## 2025-08-20 DIAGNOSIS — M47.812 CERVICAL SPONDYLOSIS: ICD-10-CM

## 2025-08-20 DIAGNOSIS — M54.51 VERTEBROGENIC LOW BACK PAIN: ICD-10-CM

## 2025-08-20 DIAGNOSIS — M47.816 LUMBAR SPONDYLOSIS: ICD-10-CM

## 2025-08-20 DIAGNOSIS — M54.16 LUMBAR RADICULOPATHY: ICD-10-CM

## 2025-08-20 DIAGNOSIS — G89.4 CHRONIC PAIN DISORDER: Primary | ICD-10-CM

## 2025-08-20 DIAGNOSIS — M48.061 SPINAL STENOSIS OF LUMBAR REGION WITHOUT NEUROGENIC CLAUDICATION: ICD-10-CM

## 2025-08-20 PROCEDURE — 3078F DIAST BP <80 MM HG: CPT | Mod: CPTII,S$GLB,, | Performed by: NURSE PRACTITIONER

## 2025-08-20 PROCEDURE — 3008F BODY MASS INDEX DOCD: CPT | Mod: CPTII,S$GLB,, | Performed by: NURSE PRACTITIONER

## 2025-08-20 PROCEDURE — 99214 OFFICE O/P EST MOD 30 MIN: CPT | Mod: S$GLB,,, | Performed by: NURSE PRACTITIONER

## 2025-08-20 PROCEDURE — 1101F PT FALLS ASSESS-DOCD LE1/YR: CPT | Mod: CPTII,S$GLB,, | Performed by: NURSE PRACTITIONER

## 2025-08-20 PROCEDURE — 3051F HG A1C>EQUAL 7.0%<8.0%: CPT | Mod: CPTII,S$GLB,, | Performed by: NURSE PRACTITIONER

## 2025-08-20 PROCEDURE — 3066F NEPHROPATHY DOC TX: CPT | Mod: CPTII,S$GLB,, | Performed by: NURSE PRACTITIONER

## 2025-08-20 PROCEDURE — 3061F NEG MICROALBUMINURIA REV: CPT | Mod: CPTII,S$GLB,, | Performed by: NURSE PRACTITIONER

## 2025-08-20 PROCEDURE — 99999 PR PBB SHADOW E&M-EST. PATIENT-LVL IV: CPT | Mod: PBBFAC,,, | Performed by: NURSE PRACTITIONER

## 2025-08-20 PROCEDURE — 3288F FALL RISK ASSESSMENT DOCD: CPT | Mod: CPTII,S$GLB,, | Performed by: NURSE PRACTITIONER

## 2025-08-20 PROCEDURE — 1157F ADVNC CARE PLAN IN RCRD: CPT | Mod: CPTII,S$GLB,, | Performed by: NURSE PRACTITIONER

## 2025-08-20 PROCEDURE — 1159F MED LIST DOCD IN RCRD: CPT | Mod: CPTII,S$GLB,, | Performed by: NURSE PRACTITIONER

## 2025-08-20 PROCEDURE — 1160F RVW MEDS BY RX/DR IN RCRD: CPT | Mod: CPTII,S$GLB,, | Performed by: NURSE PRACTITIONER

## 2025-08-20 PROCEDURE — 4010F ACE/ARB THERAPY RXD/TAKEN: CPT | Mod: CPTII,S$GLB,, | Performed by: NURSE PRACTITIONER

## 2025-08-20 PROCEDURE — 3074F SYST BP LT 130 MM HG: CPT | Mod: CPTII,S$GLB,, | Performed by: NURSE PRACTITIONER

## 2025-09-02 ENCOUNTER — PATIENT MESSAGE (OUTPATIENT)
Dept: PRIMARY CARE CLINIC | Facility: CLINIC | Age: 65
End: 2025-09-02
Payer: COMMERCIAL

## 2025-09-02 RX ORDER — POTASSIUM CHLORIDE 750 MG/1
10 TABLET, EXTENDED RELEASE ORAL 2 TIMES DAILY
Qty: 180 TABLET | Refills: 3 | Status: SHIPPED | OUTPATIENT
Start: 2025-09-02

## 2025-09-02 RX ORDER — FUROSEMIDE 40 MG/1
40 TABLET ORAL 2 TIMES DAILY
Qty: 180 TABLET | Refills: 3 | Status: SHIPPED | OUTPATIENT
Start: 2025-09-02 | End: 2026-09-02

## 2025-09-03 DIAGNOSIS — I48.91 ATRIAL FIBRILLATION WITH RVR: Primary | ICD-10-CM

## 2025-09-05 ENCOUNTER — OFFICE VISIT (OUTPATIENT)
Facility: CLINIC | Age: 65
End: 2025-09-05
Payer: COMMERCIAL

## 2025-09-05 DIAGNOSIS — G47.33 OBSTRUCTIVE SLEEP APNEA: Primary | ICD-10-CM

## 2025-09-05 DIAGNOSIS — G47.00 INSOMNIA, UNSPECIFIED TYPE: ICD-10-CM

## (undated) DEVICE — GLOVE BIOGEL SKINSENSE PI 8.0

## (undated) DEVICE — GLOVE SENSICARE PI ALOE 8

## (undated) DEVICE — GLOVE PROTEXIS LIGHT BROWN 8.5

## (undated) DEVICE — SOL IRR SOD CHL .9% POUR

## (undated) DEVICE — GOWN ECLIPSE REINF L4 XLNG XXL

## (undated) DEVICE — Device

## (undated) DEVICE — BRACE KNEE T SCOPE PREMIER

## (undated) DEVICE — NDL HYPO STD REG BVL 18GX1.5IN

## (undated) DEVICE — COVER CAMERA OPERATING ROOM

## (undated) DEVICE — BLADE GREAT WHITE 4.2 6/BX

## (undated) DEVICE — SUT ETHILON 3/0 18IN PS-1

## (undated) DEVICE — BANDAGE MATRIX HK LOOP 6IN 5YD

## (undated) DEVICE — KWIRE PLAIN STYLE 1 1.6X229MM
Type: IMPLANTABLE DEVICE | Site: KNEE | Status: NON-FUNCTIONAL
Removed: 2024-08-13

## (undated) DEVICE — DRESSING N ADH OIL EMUL 3X3

## (undated) DEVICE — PAD CAST SPECIALIST STRL 6

## (undated) DEVICE — DRAPE STERI U-SHAPED 47X51IN

## (undated) DEVICE — SPONGE COTTON TRAY 4X4IN

## (undated) DEVICE — UNDERGLOVES BIOGEL PI SZ 7 LF

## (undated) DEVICE — TOURNIQUET SB QC DP 34X4IN

## (undated) DEVICE — GLOVE BIOGEL PI MICRO SZ 7

## (undated) DEVICE — UNDERGLOVES BIOGEL PI SIZE 8

## (undated) DEVICE — BLADE SHAVER 15D 13CMX4.2MM

## (undated) DEVICE — SOL NACL IRR 3000ML

## (undated) DEVICE — COVER PROXIMA MAYO STAND

## (undated) DEVICE — DRAPE ARTHSCP FLD CTRL POUCH

## (undated) DEVICE — DRAPE TOP 53X102IN

## (undated) DEVICE — PROBE ARTHSCP EDGE ENERGY 50

## (undated) DEVICE — GLOVE PROTEXIS LTX MICRO 8

## (undated) DEVICE — PUSHER CUTTER KNOT FAST FX STR

## (undated) DEVICE — GOWN ECLIPSE REINF LVL4 TWL LG

## (undated) DEVICE — BNDG COFLEX FOAM LF2 ST 6X5YD

## (undated) DEVICE — SET INFLOW TUBE ARTHSCP